# Patient Record
Sex: FEMALE | Race: WHITE | NOT HISPANIC OR LATINO | Employment: OTHER | URBAN - METROPOLITAN AREA
[De-identification: names, ages, dates, MRNs, and addresses within clinical notes are randomized per-mention and may not be internally consistent; named-entity substitution may affect disease eponyms.]

---

## 2015-02-26 LAB — HM MAMMOGRAM: NEGATIVE

## 2016-04-29 LAB — HM PAP SMEAR: NORMAL

## 2017-01-03 ENCOUNTER — TELEPHONE (OUTPATIENT)
Dept: ORTHOPEDIC SURGERY | Facility: CLINIC | Age: 57
End: 2017-01-03

## 2017-01-03 NOTE — TELEPHONE ENCOUNTER
Call return to the patient.  She's having some postop constipation.  She's been taking stool softeners on an as-needed basis rather than daily.  She did have a bowel movement yesterday after trying a fleets enema.  Have advised her that while she is on narcotics she should increase her by mouth fluid intake as well as her diet intake to fruits and vegetables and should take her stool softener twice a day along with MiraLAX daily while on narcotics.  Recommended that she avoid letting her self go more than 2-3 days without having a bowel movement.  She because of this draining yesterday to have a bowel movement she does have some increased warmth to her incision, however denies any redness swelling drainage and has remained afebrile.  Have reassured her that the incision will have some warmth to it while it is healing.  Have advised her to notify the office if she should have any of the above symptoms.  She also has what sounds like a superficial phlebitis from her IV site.  There is no redness and no swelling but does have a small not at the insertion site of the IV that she had while in the hospital.  Have advised her to use warm compresses to that area and it should resolve with time.  Patient also is complaining of her soft cervical collar rubbing her incision.  Denies any discomfort when she wears the hard cervical collar.  I have advised her that she can continue the hard collar 24 hours a day and just forego the use of the soft collar.  If she does decide she wants to soft collar it should be at night and hard cervical collar should be during the day.  Have left it open for her to call if she has any other questions or concerns

## 2017-01-06 DIAGNOSIS — R52 PAIN: Primary | ICD-10-CM

## 2017-01-06 RX ORDER — OXYCODONE HYDROCHLORIDE AND ACETAMINOPHEN 5; 325 MG/1; MG/1
2 TABLET ORAL EVERY 4 HOURS PRN
Qty: 45 TABLET | Refills: 0 | Status: SHIPPED | OUTPATIENT
Start: 2017-01-06 | End: 2017-01-15

## 2017-01-09 ENCOUNTER — TELEPHONE (OUTPATIENT)
Dept: ORTHOPEDIC SURGERY | Facility: CLINIC | Age: 57
End: 2017-01-09

## 2017-01-13 ENCOUNTER — TELEPHONE (OUTPATIENT)
Dept: ORTHOPEDIC SURGERY | Facility: CLINIC | Age: 57
End: 2017-01-13

## 2017-01-13 ENCOUNTER — OFFICE VISIT (OUTPATIENT)
Dept: ORTHOPEDIC SURGERY | Facility: CLINIC | Age: 57
End: 2017-01-13

## 2017-01-13 VITALS — HEIGHT: 64 IN | TEMPERATURE: 98.3 F | BODY MASS INDEX: 34.15 KG/M2 | WEIGHT: 200 LBS

## 2017-01-13 DIAGNOSIS — Z98.1 S/P CERVICAL SPINAL FUSION: Primary | ICD-10-CM

## 2017-01-13 PROCEDURE — 72020 X-RAY EXAM OF SPINE 1 VIEW: CPT | Performed by: ORTHOPAEDIC SURGERY

## 2017-01-13 PROCEDURE — 99024 POSTOP FOLLOW-UP VISIT: CPT | Performed by: ORTHOPAEDIC SURGERY

## 2017-01-13 NOTE — PROGRESS NOTES
Postoperatively the patient expresses normal incisional pain.  There is little or no radiating pain.  Voice and swallow intact.  Good strength on exam.  The wound is intact.  Lateral xray of the cervical spine was obtained to evaluate hardware position and fusion bone an shows good position thereof and are unchanged from intraoperative images.  Instructions given.  We'll need cervical lateral xray on return visit.  As far as the stimulator goes, it may be unavailable to her.

## 2017-01-13 NOTE — MR AVS SNAPSHOT
Keri Nielson   1/13/2017 8:20 AM   Office Visit    Dept Phone:  [phone]   Encounter #:  [id number]    Provider:  Hema Godwin MD   Department:  [facility]                Your Full Care Plan              Your Updated Medication List          This list is accurate as of: 1/13/17  8:07 AM.  Always use your most recent med list.                ARIPiprazole 5 MG tablet   Commonly known as:  ABILIFY       aspirin 81 MG chewable tablet       atenolol 25 MG tablet   Commonly known as:  TENORMIN   Take 1 tablet by mouth daily.       citalopram 40 MG tablet   Commonly known as:  CeleXA       dexlansoprazole 60 MG capsule   Commonly known as:  DEXILANT   Take 1 capsule by mouth Daily for 90 days.       gabapentin 300 MG capsule   Commonly known as:  NEURONTIN   Take 1 capsule by mouth 3 (three) times a day.       isosorbide mononitrate 30 MG 24 hr tablet   Commonly known as:  IMDUR   Take 1 tablet by mouth daily.       lamoTRIgine 200 MG tablet   Commonly known as:  LaMICtal       levothyroxine 50 MCG tablet   Commonly known as:  SYNTHROID, LEVOTHROID       oxyCODONE-acetaminophen 5-325 MG per tablet   Commonly known as:  PERCOCET   Take 2 tablets by mouth Every 4 (Four) Hours As Needed for moderate pain (4-6) for up to 9 days.       pramipexole 0.5 MG tablet   Commonly known as:  MIRAPEX       simethicone 80 MG chewable tablet   Commonly known as:  MYLICON       simvastatin 20 MG tablet   Commonly known as:  ZOCOR       sucralfate 1 GM/10ML suspension   Commonly known as:  CARAFATE   Take 10 mL by mouth 4 (Four) Times a Day.               You Were Diagnosed With        Codes Comments    S/P cervical spinal fusion    -  Primary ICD-10-CM: Z98.1  ICD-9-CM: V45.4       Instructions     None    Patient Instructions History      Upcoming Appointments     Visit Type Date Time Department    POST-OP 1/13/2017  8:20 AM [facility]    OFFICE VISIT 1/30/2017  1:00 PM [facility]    OFFICE VISIT 2/17/2017  1:00 PM [facility]    FOLLOW UP 2/24/2017 10:15 AM [facility]    FOLLOW UP SLEEP CLINIC 3/24/2017  1:45 PM  [facility]    OFFICE VISIT 3/27/2017  9:00 AM [facility]      MyChart Signup     Our records indicate that your [facility] [employer] account has been deactivated. If you would like to reactivate your account, please email [email or web address] or call [phone] to talk to our [employer] staff.             Other Info from Your Visit           Your Appointments     Jan 13, 2017  8:20 AM EST   Post-Op with Hema Godwin MD   [facility] (--)    [address] [address]   [phone]            Jan 30, 2017  1:00 PM EST   Office Visit with James Epley, APRN   [facility] (--)    [address] [facility] [place] [address]   [phone]           Arrive 15 minutes prior to appointment.            Feb 17, 2017  1:00 PM EST   Office Visit with James Epley, APRN   [facility] (--)    [address] [facility] [place] [address]   [phone]           Arrive 15 minutes prior to appointment.            Feb 24, 2017 10:15 AM EST   Follow Up with Hema Godwin MD   [facility] (--)    [address] [address]   [phone]           Arrive 15 minutes prior to appointment.            Mar 24, 2017  1:45 PM EDT   Follow Up Sleep Clinic with  [facility] PROVIDER SCHEDULE   [facility] ([place])    [address] [address] [phone]            1.  If you are currently on a CPAP or BiPAP please bring in your SD card or memory card.  2.  If you are experiencing problems with your current mask, please bring your mask with you  "to your appointment.                Allergies     No Known Allergies      Reason for Visit     Cervical Spine - Follow-up, Pain           Vital Signs     Temperature Height Weight Last Menstrual Period Body Mass Index Smoking Status    98.3 °F (36.8 °C) (Temporal Artery ) 64\" (162.6 cm) 200 lb (90.7 kg) 10/21/2016 34.33 kg/m2 Former Smoker      Problems and Diagnoses Noted     Status post cervical spinal arthrodesis    -  Primary        "

## 2017-01-19 ENCOUNTER — TELEPHONE (OUTPATIENT)
Dept: ORTHOPEDIC SURGERY | Facility: CLINIC | Age: 57
End: 2017-01-19

## 2017-01-19 ENCOUNTER — OFFICE VISIT (OUTPATIENT)
Dept: FAMILY MEDICINE CLINIC | Facility: CLINIC | Age: 57
End: 2017-01-19

## 2017-01-19 VITALS
WEIGHT: 214 LBS | DIASTOLIC BLOOD PRESSURE: 70 MMHG | HEART RATE: 64 BPM | OXYGEN SATURATION: 98 % | BODY MASS INDEX: 36.54 KG/M2 | SYSTOLIC BLOOD PRESSURE: 100 MMHG | TEMPERATURE: 97 F | HEIGHT: 64 IN

## 2017-01-19 DIAGNOSIS — N28.9 RENAL INSUFFICIENCY: ICD-10-CM

## 2017-01-19 DIAGNOSIS — R60.9 DEPENDENT EDEMA: Primary | ICD-10-CM

## 2017-01-19 PROCEDURE — 99213 OFFICE O/P EST LOW 20 MIN: CPT | Performed by: NURSE PRACTITIONER

## 2017-01-19 NOTE — TELEPHONE ENCOUNTER
Call return to the patient.  She is having swelling in both lower extremities and in both hands.  She contacted our office to make sure that it was not related to her spine surgery she was also worried about blood clots.  To her that that is not the usual presentation of blood clots and it would not affect all 4 extremities.  Have a history of heart disease as well as hypertension and it could be related to that.  She did see her PCP today who did do some lab and according to the patient her kidney functions are normal and she had no chest pain or shortness of breath.  he did recommend starting her on a diuretic if the swelling got worse and instructed the patient to continue watching it and notify him if she was having any problems.

## 2017-01-19 NOTE — MR AVS SNAPSHOT
Keri ZELALEM Nielson   1/19/2017 1:40 PM   Office Visit    Dept Phone:  [phone]   Encounter #:  [id number]    Provider:  James Epley, APRN   Department:  [facility]                Your Full Care Plan              Your Updated Medication List          This list is accurate as of: 1/19/17  3:06 PM.  Always use your most recent med list.                ARIPiprazole 5 MG tablet   Commonly known as:  ABILIFY       aspirin 81 MG chewable tablet       atenolol 25 MG tablet   Commonly known as:  TENORMIN   Take 1 tablet by mouth daily.       citalopram 40 MG tablet   Commonly known as:  CeleXA       dexlansoprazole 60 MG capsule   Commonly known as:  DEXILANT   Take 1 capsule by mouth Daily for 90 days.       gabapentin 300 MG capsule   Commonly known as:  NEURONTIN   Take 1 capsule by mouth 3 (three) times a day.       isosorbide mononitrate 30 MG 24 hr tablet   Commonly known as:  IMDUR   Take 1 tablet by mouth daily.       lamoTRIgine 200 MG tablet   Commonly known as:  LaMICtal       levothyroxine 50 MCG tablet   Commonly known as:  SYNTHROID, LEVOTHROID       pramipexole 0.5 MG tablet   Commonly known as:  MIRAPEX       simethicone 80 MG chewable tablet   Commonly known as:  MYLICON       simvastatin 20 MG tablet   Commonly known as:  ZOCOR       sucralfate 1 GM/10ML suspension   Commonly known as:  CARAFATE   Take 10 mL by mouth 4 (Four) Times a Day.               Instructions     None    Patient Instructions History      Upcoming Appointments     Visit Type Date Time Department    OFFICE VISIT 1/19/2017  1:40 PM [facility]    OFFICE VISIT 1/30/2017  1:00 PM [facility]    OFFICE VISIT 2/17/2017  1:00 PM [facility]    FOLLOW UP 2/24/2017 10:10 AM [facility]    FOLLOW UP SLEEP CLINIC 3/24/2017  1:45 PM [facility]    OFFICE VISIT 3/27/2017  9:00 AM [facility]      MyChart Signup     [facility] MyChart allows you  to send messages to your doctor, view your test results, renew your prescriptions, schedule appointments, and more. To sign up, go to [email or web address] and click on the Sign Up Now link in the New User? box. Enter your [employer] Activation Code exactly as it appears below along with the last four digits of your Social Security Number and your Date of Birth () to complete the sign-up process. If you do not sign up before the expiration date, you must request a new code.    [employer] Activation Code: [email or web address]  Expires: 2017 10:15 AM    If you have questions, you can email [email or web address] or call [phone] to talk to our [employer] staff. Remember, [employer] is NOT to be used for urgent needs. For medical emergencies, dial 911.               Other Info from Your Visit           Your Appointments     2017  1:00 PM EST   Office Visit with James Epley, APRN   [facility] (--)    [address] [facility] [address] [phone]           Arrive 15 minutes prior to appointment.            2017  1:00 PM EST   Office Visit with James Epley, APRN   [facility] (--)    [address] [facility] [address] [phone]           Arrive 15 minutes prior to appointment.            2017 10:10 AM EST   Follow Up with Hema Godwin MD   [facility] (--)    [address] [id number] [phone]           Arrive 15 minutes prior to appointment.            Mar 24, 2017  1:45 PM EDT   Follow Up Sleep Clinic with  [facility] SLEEP [facility] PROVIDER SCHEDULE   [facility] ([place])    [address] [facility] [address] [phone]            1.  If you are currently on a CPAP or BiPAP please bring in your SD card or memory card.  2.  If you are experiencing problems with your  "current mask, please bring your mask with you to your appointment.              Mar 27, 2017  9:00 AM EDT   Office Visit with Maxwell Martin MD   [facility] ENDOCRINOLOGY (--)    [address] [place] [address] [phone]           Arrive 15 minutes prior to appointment.              Allergies     No Known Allergies      Reason for Visit     Leg Swelling her legs,feet,and hands has be swollen for about 5 days now, after surgery      Vital Signs     Blood Pressure Pulse Temperature Height Weight Last Menstrual Period    100/70 (BP Location: Left arm, Patient Position: Sitting, Cuff Size: Large Adult) 64 97 °F (36.1 °C) (Oral) 64\" (162.6 cm) 214 lb (97.1 kg) 10/21/2016    Oxygen Saturation Body Mass Index Smoking Status             98% 36.73 kg/m2 Former Smoker           "

## 2017-01-19 NOTE — PROGRESS NOTES
Subjective   eKri Nielson is a 56 y.o. female.     HPI Comments: Complains of lower extremity edema without calf pain no chest pain starts of breath feels some swelling in hands as well    Concerned about her kidneys    Borderline renal insufficiency in September November    The last 2 kidney test had been completely normal    She does not abuse NSAIDs not taking any now increasing her water    Cervical fusion surgery went good                   The following portions of the patient's history were reviewed and updated as appropriate: allergies, current medications, past family history, past medical history, past social history, past surgical history and problem list.    Review of Systems   Cardiovascular: Positive for leg swelling.   Musculoskeletal: Positive for arthralgias.       Objective   Physical Exam   Constitutional: She is oriented to person, place, and time. She appears well-developed and well-nourished.   Patient appears well, wearing c-collarr splint   HENT:   Head: Normocephalic.   Tm clear noemi no mass canal patent without d/c   Eyes: Conjunctivae are normal. Pupils are equal, round, and reactive to light. No scleral icterus.   Neck: Neck supple. No JVD present. No thyromegaly present.   Cardiovascular: Normal rate, regular rhythm and normal heart sounds.  Exam reveals no gallop and no friction rub.    No murmur heard.  Pulmonary/Chest: Effort normal and breath sounds normal. No stridor. No respiratory distress. She has no wheezes. She has no rales.   Abdominal: Soft. Bowel sounds are normal. She exhibits no distension. There is no tenderness.   No hepatosplenomegaly, no ascites,   Musculoskeletal: She exhibits edema. She exhibits no tenderness.   Trace edema ankles nonpitting no calf tenderness no fight tenderness   Lymphadenopathy:     She has no cervical adenopathy.   Neurological: She is alert and oriented to person, place, and time. She has normal reflexes.   Skin: Skin is warm and dry. No rash  noted. No erythema.   Psychiatric: She has a normal mood and affect. Her behavior is normal. Judgment and thought content normal.   Vitals reviewed.      Assessment/Plan   Keri was seen today for leg swelling.    Diagnoses and all orders for this visit:    Dependent edema  Comments:  Trace    Renal insufficiency  Comments:  Last 2 BMP normal                Elevate legs decrease sodium  If calf pain thigh pain increased swelling chest pain shortness of breath seek urgent treatment  Avoid NSAIDs no Aleve no naproxen 64 ounces water daily  Recheck 3 months  Repeat BMP 3-4 months Tylenol okay

## 2017-01-24 ENCOUNTER — TELEPHONE (OUTPATIENT)
Dept: ORTHOPEDIC SURGERY | Facility: CLINIC | Age: 57
End: 2017-01-24

## 2017-01-24 NOTE — TELEPHONE ENCOUNTER
Called in refill OK for Tramadol 50mg, #50, 1-2 q 4-6 hrs prn pain, to [facility], per JGW.  Spoke with pt and informed her JGW stated he would refill her rx but could not increase it/kls

## 2017-02-05 RX ORDER — ATENOLOL 25 MG/1
TABLET ORAL
Qty: 90 TABLET | Refills: 1 | Status: ON HOLD | OUTPATIENT
Start: 2017-02-05 | End: 2017-03-30

## 2017-02-08 ENCOUNTER — TELEPHONE (OUTPATIENT)
Dept: ORTHOPEDIC SURGERY | Facility: CLINIC | Age: 57
End: 2017-02-08

## 2017-02-08 RX ORDER — TRAMADOL HYDROCHLORIDE 50 MG/1
TABLET ORAL
Qty: 50 TABLET | Refills: 0 | Status: SHIPPED | OUTPATIENT
Start: 2017-02-08 | End: 2017-03-27

## 2017-02-14 ENCOUNTER — TELEPHONE (OUTPATIENT)
Dept: ENDOCRINOLOGY | Age: 57
End: 2017-02-14

## 2017-02-14 RX ORDER — LEVOTHYROXINE SODIUM 0.1 MG/1
TABLET ORAL
Qty: 30 TABLET | Refills: 5 | Status: ON HOLD | OUTPATIENT
Start: 2017-02-14 | End: 2017-03-30

## 2017-02-14 NOTE — TELEPHONE ENCOUNTER
----- Message from Celine Parkinson sent at 2/14/2017  3:56 PM EST -----  Contact: patient  Patient is wanting to see if we will up her levothyroxine (SYNTHROID, LEVOTHROID) 50 MCG tablet is gaining weight and low energy, dry skin and losing hair  She would like it sent to  [facility]  [phone] (Phone)  [phone] (Fax)

## 2017-02-16 ENCOUNTER — OFFICE VISIT (OUTPATIENT)
Dept: ORTHOPEDIC SURGERY | Facility: CLINIC | Age: 57
End: 2017-02-16

## 2017-02-16 VITALS — HEIGHT: 64 IN | WEIGHT: 213 LBS | BODY MASS INDEX: 36.37 KG/M2 | TEMPERATURE: 97.6 F

## 2017-02-16 DIAGNOSIS — M17.0 ARTHRITIS OF BOTH KNEES: ICD-10-CM

## 2017-02-16 DIAGNOSIS — M25.561 CHRONIC PAIN OF BOTH KNEES: Primary | ICD-10-CM

## 2017-02-16 DIAGNOSIS — M25.562 CHRONIC PAIN OF BOTH KNEES: Primary | ICD-10-CM

## 2017-02-16 DIAGNOSIS — G89.29 CHRONIC PAIN OF BOTH KNEES: Primary | ICD-10-CM

## 2017-02-16 PROCEDURE — 20610 DRAIN/INJ JOINT/BURSA W/O US: CPT | Performed by: NURSE PRACTITIONER

## 2017-02-16 PROCEDURE — 73562 X-RAY EXAM OF KNEE 3: CPT | Performed by: NURSE PRACTITIONER

## 2017-02-16 RX ORDER — LIDOCAINE HYDROCHLORIDE 10 MG/ML
2 INJECTION, SOLUTION INFILTRATION; PERINEURAL
Status: COMPLETED | OUTPATIENT
Start: 2017-02-16 | End: 2017-02-16

## 2017-02-16 RX ORDER — METHYLPREDNISOLONE ACETATE 80 MG/ML
80 INJECTION, SUSPENSION INTRA-ARTICULAR; INTRALESIONAL; INTRAMUSCULAR; SOFT TISSUE
Status: COMPLETED | OUTPATIENT
Start: 2017-02-16 | End: 2017-02-16

## 2017-02-16 RX ORDER — BUPIVACAINE HYDROCHLORIDE 5 MG/ML
2 INJECTION, SOLUTION PERINEURAL
Status: COMPLETED | OUTPATIENT
Start: 2017-02-16 | End: 2017-02-16

## 2017-02-16 RX ADMIN — LIDOCAINE HYDROCHLORIDE 2 ML: 10 INJECTION, SOLUTION INFILTRATION; PERINEURAL at 09:39

## 2017-02-16 RX ADMIN — METHYLPREDNISOLONE ACETATE 80 MG: 80 INJECTION, SUSPENSION INTRA-ARTICULAR; INTRALESIONAL; INTRAMUSCULAR; SOFT TISSUE at 09:39

## 2017-02-16 RX ADMIN — BUPIVACAINE HYDROCHLORIDE 2 ML: 5 INJECTION, SOLUTION PERINEURAL at 09:39

## 2017-02-16 NOTE — PATIENT INSTRUCTIONS
Knee Injection  A knee injection is a procedure to get medicine into your knee joint. Your health care provider puts a needle into the joint and injects medicine with an attached syringe. The injected medicine may relieve the pain, swelling, and stiffness of arthritis. The injected medicine may also help to lubricate and cushion your knee joint. You may need more than one injection.  LET YOUR HEALTH CARE PROVIDER KNOW ABOUT:  · Any allergies you have.  · All medicines you are taking, including vitamins, herbs, eye drops, creams, and over-the-counter medicines.  · Previous problems you or members of your family have had with the use of anesthetics.  · Any blood disorders you have.  · Previous surgeries you have had.  · Any medical conditions you may have.  RISKS AND COMPLICATIONS  Generally, this is a safe procedure. However, problems may occur, including:  · Infection.  · Bleeding.  · Worsening symptoms.  · Damage to the area around your knee.  · Allergic reaction to any of the medicines.  · Skin reactions from repeated injections.  BEFORE THE PROCEDURE  · Ask your health care provider about changing or stopping your regular medicines. This is especially important if you are taking diabetes medicines or blood thinners.  · Plan to have someone take you home after the procedure.  PROCEDURE  · You will sit or lie down in a position for your knee to be treated.  · The skin over your kneecap will be cleaned with a germ-killing solution (antiseptic).  · You will be given a medicine that numbs the area (local anesthetic). You may feel some stinging.  · After your knee becomes numb, you will have a second injection. This is the medicine. This needle is carefully placed between your kneecap and your knee. The medicine is injected into the joint space.  · At the end of the procedure, the needle will be removed.  · A bandage (dressing) may be placed over the injection site.  The procedure may vary among health care providers  and hospitals.  AFTER THE PROCEDURE  · You may have to move your knee through its full range of motion. This helps to get all of the medicine into your joint space.  · Your blood pressure, heart rate, breathing rate, and blood oxygen level will be monitored often until the medicines you were given have worn off.  · You will be watched to make sure that you do not have a reaction to the injected medicine.     This information is not intended to replace advice given to you by your health care provider. Make sure you discuss any questions you have with your health care provider.     Document Released: 03/10/2008 Document Revised: 01/08/2016 Document Reviewed: 10/28/2015  [email or web address] Interactive Patient Education ©2016 [email or web address] Inc.  Knee Injection, Care After  Refer to this sheet in the next few weeks. These instructions provide you with information about caring for yourself after your procedure. Your health care provider may also give you more specific instructions. Your treatment has been planned according to current medical practices, but problems sometimes occur. Call your health care provider if you have any problems or questions after your procedure.  WHAT TO EXPECT AFTER THE PROCEDURE  After your procedure, it is common to have:  · Soreness.  · Warmth.  · Swelling.  You may have more pain, swelling, and warmth than you did before the injection. This reaction may last for about one day.   HOME CARE INSTRUCTIONS  Bathing  · If you were given a bandage (dressing), keep it dry until your health care provider says it can be removed. Ask your health care provider when you can start showering or taking a bath.   Managing Pain, Stiffness, and Swelling  · If directed, apply ice to the injection area:    Put ice in a plastic bag.    Place a towel between your skin and the bag.    Leave the ice on for 20 minutes, 2-3 times per day.  · Do not apply heat to your knee.  · Raise the injection area above the level of your heart  while you are sitting or lying down.  Activity  · Avoid strenuous activities for as long as directed by your health care provider. Ask your health care provider when you can return to your normal activities.   General Instructions  · Take medicines only as directed by your health care provider.  · Do not take aspirin or other over-the-counter medicines unless your health care provider says you can.  · Check your injection site every day for signs of infection. Watch for:    Redness, swelling, or pain.    Fluid, blood, or pus.  · Follow your health care provider's instructions about dressing changes and removal.  SEEK MEDICAL CARE IF:  · You have symptoms at your injection site that last longer than two days after your procedure.  · You have redness, swelling, or pain in your injection area.  · You have fluid, blood, or pus coming from your injection site.  · You have warmth in your injection area.  · You have a fever.  · Your pain is not controlled with medicine.  SEEK IMMEDIATE MEDICAL CARE IF:  · Your knee turns very red.  · Your knee becomes very swollen.  · Your knee pain is severe.     This information is not intended to replace advice given to you by your health care provider. Make sure you discuss any questions you have with your health care provider.     Document Released: 01/08/2016 Document Reviewed: 01/08/2016  [email or web address] Interactive Patient Education ©2016 [employer].

## 2017-02-16 NOTE — PROGRESS NOTES
Knee Joint Injection      Patient: Keri Nielson  YOB: 1960    Chief Complaints: Knee pain    Subjective:  This problem is not new to this examiner.     History of Present Illness: Pt gets intermittent  injections with good relief. Is here for repeat injection. Understands options. The pain is a generalized joint line tenderness.  It has been progressive in nature but remains intermittent.  Worsened by prolonged standing or walking and squatting activities. Has had improvement in the past with ice/heat, rest, and injections. TIMING:  The pain is described as ACUTE on CHRONIC.  LOCATION: medial joint line tenderness. AGGRAVATING FACTORS:  Is worsened by prolonged standing, sitting, walking and squatting activities.  ASSOCIATED SYMPTOMS:  swelling, tenderness, and aching. OTHER SYMPTOMS denies popping, locking or catching. RELIEVING FACTORS:  Previous treatment has included cortisone injections  OTC Tylenol  OTC meds/NSAIDS.      Allergies: No Known Allergies    Medications:   Home Medications:  Current Outpatient Prescriptions on File Prior to Visit   Medication Sig   • ARIPiprazole (ABILIFY) 5 MG tablet Take 5 mg by mouth Every Morning.   • aspirin 81 MG chewable tablet Chew 81 mg Daily.   • atenolol (TENORMIN) 25 MG tablet TAKE ONE TABLET BY MOUTH DAILY   • citalopram (CeleXA) 40 MG tablet Take 40 mg by mouth Every Night.   • dexlansoprazole (DEXILANT) 60 MG capsule Take 1 capsule by mouth Daily for 90 days. (Patient taking differently: Take 60 mg by mouth Every Morning.)   • gabapentin (NEURONTIN) 300 MG capsule Take 1 capsule by mouth 3 (three) times a day.   • isosorbide mononitrate (IMDUR) 30 MG 24 hr tablet Take 1 tablet by mouth daily. (Patient taking differently: Take 30 mg by mouth Every Morning.)   • lamoTRIgine (LaMICtal) 200 MG tablet Take 200 mg by mouth Every Night.   • levothyroxine (SYNTHROID, LEVOTHROID) 100 MCG tablet TAKE 1 TABLET ON AN EMPTY STOMACH 1 HR BEFORE EATING   •  pramipexole (MIRAPEX) 0.5 MG tablet Take 0.5 mg by mouth Every Night.   • simethicone (MYLICON) 80 MG chewable tablet Chew 80 mg Every 6 (Six) Hours As Needed for flatulence.   • simvastatin (ZOCOR) 20 MG tablet Take 20 mg by mouth Every Night.   • sucralfate (CARAFATE) 1 GM/10ML suspension Take 10 mL by mouth 4 (Four) Times a Day.   • traMADol (ULTRAM) 50 MG tablet 1-2 po q4h prn pain     No current facility-administered medications on file prior to visit.      Current Medications:  Scheduled Meds:  Continuous Infusions:  No current facility-administered medications for this visit.   PRN Meds:.    I have reviewed the patient's medical history in detail and updated the computerized patient record.  Review and summarization of old records include:    Past Medical History   Diagnosis Date   • Anemia    • Arthritis    • Bipolar disorder    • Cervical disc herniation    • Coronary artery disease    • Diverticular disease    • Gastric reflux    • History of transfusion    • Hyperlipidemia    • Hypertension    • Hypothyroidism    • Incontinence in female      wears pads   • Neck pain    • Past myocardial infarction    • PONV (postoperative nausea and vomiting)    • Restless leg syndrome    • Sleep apnea      cpap   • Suicidal ideation 8/19/2016   • Swelling of ankle      right had doppler no s/s blood clot        Past Surgical History   Procedure Laterality Date   • Cervical discectomy  04/2013     C4-7   • Tonsillectomy and adenoidectomy     • Tubal abdominal ligation     • Wrist surgery Bilateral      carpal tunnel   • Shoulder surgery Left      RCR   • Tympanostomy tube placement Right    • Endoscopy  [facility]     Normal.  Dr. Rodriguez   • Colonoscopy  [facility]     Normal.  Dr. Rodriguez   • Cervical biopsy  [facility]     Dr. Jeffery.    • Tonsillectomy     • Cervical fusion  12/29/2016   • Hardware removal  12/29/2016     cervical   • Anterior cervical discectomy w/ fusion N/A 12/29/2016     Procedure: C3-4 anterior cervical discectomy and  fusion with Depuy micro plate, ALLOGRAFT C3-4, AND HARDWARE REMOVAL C4-7.;  Surgeon: Hema Godwin MD;  Location: [facility] MAIN OR;  Service:         Social History     Occupational History   • Not on file.     Social History Main Topics   • Smoking status: Former Smoker     Packs/day: 1.50     Years: 20.00     Types: Cigarettes, Electronic Cigarette     Quit date: 2015   • Smokeless tobacco: Never Used      Comment: Electronic Cigarette/ 3 mg nicotine    • Alcohol use Yes      Comment: OCCASIONAL   • Drug use: No   • Sexual activity: Defer    Social History     Social History Narrative        Family History   Problem Relation Age of Onset   • Diabetes type II Mother    • Hypertension Mother    • Osteoporosis Mother    • Seizures Mother    • COPD Father    • Hypertension Father    • Lung cancer Father    • Heart attack Father    • Bipolar disorder Other    • Heart disease Other    • Thyroid disease Other    • Thyroid disease Sister    • Hypertension Sister    • Bipolar disorder Sister    • Depression Sister    • No Known Problems Son    • Abnormal EKG Daughter    • Hypertension Daughter    • Bipolar disorder Daughter    • Thyroid disease Sister    • Hypertension Sister    • Bipolar disorder Sister    • Asthma Daughter    • Breast cancer Neg Hx    • Ovarian cancer Neg Hx    • Uterine cancer Neg Hx    • Colon cancer Neg Hx        ROS: 14 point review of systems was performed and was negative except for documented findings in HPI and today's encounter.     Allergies: No Known Allergies  Constitutional:  Denies fever, shaking or chills   Eyes:  Denies change in visual acuity   HENT:  Denies nasal congestion or sore throat   Respiratory:  Denies cough or shortness of breath   Cardiovascular:  Denies chest pain or severe LE edema   GI:  Denies abdominal pain, nausea, vomiting, bloody stools or diarrhea   Musculoskeletal:  Numbness, tingling, or loss of motor function only as noted above in history of present  illness.  : Denies painful urination or hematuria  Integument:  Denies rash, lesion or ulceration   Neurologic:  Denies headache or focal weakness  Endocrine:  Denies lymphadenopathy  Psych:  Denies confusion or change in mental status   Hem:  Denies active bleeding    Physical Exam:  Body mass index is 36.56 kg/(m^2).  Vitals:    02/16/17 0923   Temp: 97.6 °F (36.4 °C)     Vital Signs:  reviewed  Constitutional: Awake alert and oriented x3, well developed, no acute distress, non-toxic appearance.  EYES: symmetric, sclera clear  ENT:  Normocephalic, Atraumatic.   Respiratory:  No respiratory distress, No wheezing  CV: pulse regular, no palpitations or pallor.  GI:  Abdomen soft, non-tender.   Vascular:  Intact distal pulses, No cyanosis, no signs or symptoms of DVT.  Neurologic: Sensation grossly intact to the involved extremity, No focal deficits noted.   Neck: No tenderness, Supple.  Integument: warm, dry, no ulcerations.   Psychiatric:  Oriented, no pathological affect.  Musculoskeletal:    Affected knee(s):  Painful gait with a subtle limp, positive for synovitis, swelling, joint effusion with crepitation.  Lachman negative  Posterior drawer negative  Hamida's negative  Patellofemoral grind +  Sensation grossly intact to light touch throughout the lower extremity  Skin is intact  Distal pulses are palpable  No signs or symptoms of DVT        Diagnostic Data:     Imaging was done today and discussed at length with the patient:    Indication: pain related symptoms,  Views: 3V AP, LAT & 40 degree PA bilateral knee(s)   Findings: advanced arthritis  Comparison views: viewed last xray done in the office.     Procedure:  Large Joint Arthrocentesis  Date/Time: 2/16/2017 9:39 AM  Consent given by: patient  Site marked: site marked  Timeout: Immediately prior to procedure a time out was called to verify the correct patient, procedure, equipment, support staff and site/side marked as required   Supporting  Documentation  Indications: pain and joint swelling   Procedure Details  Location: knee - R knee  Preparation: Patient was prepped and draped in the usual sterile fashion  Needle size: 22 G  Approach: anterolateral  Medications administered: 2 mL bupivacaine; 2 mL lidocaine 1 %; 80 mg methylPREDNISolone acetate 80 MG/ML  Patient tolerance: patient tolerated the procedure well with no immediate complications    Large Joint Arthrocentesis  Date/Time: 2/16/2017 9:39 AM  Consent given by: patient  Site marked: site marked  Timeout: Immediately prior to procedure a time out was called to verify the correct patient, procedure, equipment, support staff and site/side marked as required   Supporting Documentation  Indications: pain and joint swelling   Procedure Details  Location: knee - L knee  Preparation: Patient was prepped and draped in the usual sterile fashion  Needle size: 22 G  Approach: anterolateral  Medications administered: 2 mL bupivacaine; 2 mL lidocaine 1 %; 80 mg methylPREDNISolone acetate 80 MG/ML  Patient tolerance: patient tolerated the procedure well with no immediate complications          Assessment. Severe osteoarthritis bilateral knee(s).      Plan: Is to proceed with injection  Follow up as indicated.  Ice, elevate, and rest as needed.  Additional interventions include: Biomechanics of pertinent body area discussed.  Risks, benefits, alternatives, comparisons, and complications of accepted medicines, injections, recommendations, surgical procedures, and therapies explained and education provided in laymen's terms. The patient was given the opportunity to ask questions and they were answerved to their satisfaction.   Natural history and expected course discussed. Questions answered.  Advice on benefits of, and types of regular/moderate exercise.  Lifestyle measures for weight loss with biomechanical explanations for weight loss and how this affects orthopedic condition.  Cortisone Injection. See  procedure note.  OTC analgesics as needed with dosage warning and instructions given.  Cryotherapy/brachy therapy as indicated with instructions.   Rest, ice, compression, and elevation (RICE) therapy.  6 weeks out from cervical fusion surg by Dr. Godwin,doing well  Left knee is new complaint, right knee does well with intermittent cortisone inj  2/16/2017  MJR

## 2017-02-20 ENCOUNTER — OFFICE VISIT (OUTPATIENT)
Dept: FAMILY MEDICINE CLINIC | Facility: CLINIC | Age: 57
End: 2017-02-20

## 2017-02-20 VITALS
HEART RATE: 98 BPM | WEIGHT: 213 LBS | TEMPERATURE: 98.1 F | SYSTOLIC BLOOD PRESSURE: 120 MMHG | HEIGHT: 64 IN | DIASTOLIC BLOOD PRESSURE: 80 MMHG | OXYGEN SATURATION: 100 % | BODY MASS INDEX: 36.37 KG/M2

## 2017-02-20 DIAGNOSIS — J20.9 ACUTE BRONCHITIS, UNSPECIFIED ORGANISM: Primary | ICD-10-CM

## 2017-02-20 DIAGNOSIS — R19.5 LOOSE STOOLS: ICD-10-CM

## 2017-02-20 PROCEDURE — 99213 OFFICE O/P EST LOW 20 MIN: CPT | Performed by: NURSE PRACTITIONER

## 2017-02-20 RX ORDER — BENZONATATE 200 MG/1
200 CAPSULE ORAL 3 TIMES DAILY PRN
Qty: 20 CAPSULE | Refills: 1 | Status: SHIPPED | OUTPATIENT
Start: 2017-02-20 | End: 2017-03-20

## 2017-02-20 RX ORDER — DIPHENHYDRAMINE HCL 25 MG
TABLET ORAL
Qty: 20 TABLET | Refills: 2 | Status: SHIPPED | OUTPATIENT
Start: 2017-02-20 | End: 2017-03-17 | Stop reason: SDUPTHER

## 2017-02-20 NOTE — PATIENT INSTRUCTIONS
Acute Bronchitis  Bronchitis is inflammation of the airways that extend from the windpipe into the lungs (bronchi). The inflammation often causes mucus to develop. This leads to a cough, which is the most common symptom of bronchitis.   In acute bronchitis, the condition usually develops suddenly and goes away over time, usually in a couple weeks. Smoking, allergies, and asthma can make bronchitis worse. Repeated episodes of bronchitis may cause further lung problems.   CAUSES  Acute bronchitis is most often caused by the same virus that causes a cold. The virus can spread from person to person (contagious) through coughing, sneezing, and touching contaminated objects.  SIGNS AND SYMPTOMS   · Cough.    · Fever.    · Coughing up mucus.    · Body aches.    · Chest congestion.    · Chills.    · Shortness of breath.    · Sore throat.    DIAGNOSIS   Acute bronchitis is usually diagnosed through a physical exam. Your health care provider will also ask you questions about your medical history. Tests, such as chest X-rays, are sometimes done to rule out other conditions.   TREATMENT   Acute bronchitis usually goes away in a couple weeks. Oftentimes, no medical treatment is necessary. Medicines are sometimes given for relief of fever or cough. Antibiotic medicines are usually not needed but may be prescribed in certain situations. In some cases, an inhaler may be recommended to help reduce shortness of breath and control the cough. A cool mist vaporizer may also be used to help thin bronchial secretions and make it easier to clear the chest.   HOME CARE INSTRUCTIONS  · Get plenty of rest.    · Drink enough fluids to keep your urine clear or pale yellow (unless you have a medical condition that requires fluid restriction). Increasing fluids may help thin your respiratory secretions (sputum) and reduce chest congestion, and it will prevent dehydration.    · Take medicines only as directed by your health care provider.  · If  you were prescribed an antibiotic medicine, finish it all even if you start to feel better.  · Avoid smoking and secondhand smoke. Exposure to cigarette smoke or irritating chemicals will make bronchitis worse. If you are a smoker, consider using nicotine gum or skin patches to help control withdrawal symptoms. Quitting smoking will help your lungs heal faster.    · Reduce the chances of another bout of acute bronchitis by washing your hands frequently, avoiding people with cold symptoms, and trying not to touch your hands to your mouth, nose, or eyes.    · Keep all follow-up visits as directed by your health care provider.    SEEK MEDICAL CARE IF:  Your symptoms do not improve after 1 week of treatment.   SEEK IMMEDIATE MEDICAL CARE IF:  · You develop an increased fever or chills.    · You have chest pain.    · You have severe shortness of breath.  · You have bloody sputum.    · You develop dehydration.  · You faint or repeatedly feel like you are going to pass out.  · You develop repeated vomiting.  · You develop a severe headache.  MAKE SURE YOU:   · Understand these instructions.  · Will watch your condition.  · Will get help right away if you are not doing well or get worse.     This information is not intended to replace advice given to you by your health care provider. Make sure you discuss any questions you have with your health care provider.     Document Released: 01/25/2006 Document Revised: 01/08/2016 Document Reviewed: 06/10/2014  [employer] Interactive Patient Education ©2016 [employer]      Discharge instructions  Try generic Tessalon Perle 200 mg 3 times a day as needed for cough  If cough unrelieved May take with 25-50 mg Benadryl every 6 hours with caution possible sedation  Do not mix with other sedating   medications    If not working try Robitussin-DM with or without Benadryl    If chest pain shortness of breath high fever seek urgent treatment ER  Recheck if not improving in a couple  weeks    Thank You,      James Epley,  NP    For loose stools, Kaopectate or Pepto-Bismol as needed    Brat diet  Push fluids electrolytes  Avoid high sugary foods which may cause diarrhea  Recheck if not improving in 3-4 days if increased pain fever chills urgent care ER

## 2017-02-20 NOTE — PROGRESS NOTES
Subjective   Keri Nielson is a 56 y.o. female.     HPI Comments: Patient complains several days cough congestion mostly dry  No chest pain or shortness of breath and body  Symptoms are moderate  Increased coughing at night  Not requesting any cough syrup  Not presently taking anything    Some loose stools without fever abdominal pain approximately 3-4 days no recent travel        URI    This is a new problem. The current episode started 1 to 4 weeks ago (1 week). The problem has been gradually improving. There has been no fever. Associated symptoms include coughing, diarrhea, rhinorrhea, sneezing and swollen glands. Pertinent negatives include no abdominal pain, chest pain, joint pain, nausea, plugged ear sensation, sinus pain or wheezing. She has tried nothing for the symptoms. The treatment provided no relief.        The following portions of the patient's history were reviewed and updated as appropriate: allergies, past family history, past medical history, past social history, past surgical history and problem list.    Review of Systems   HENT: Positive for rhinorrhea and sneezing.    Respiratory: Positive for cough. Negative for wheezing.    Cardiovascular: Negative for chest pain.   Gastrointestinal: Positive for diarrhea. Negative for abdominal pain and nausea.   Musculoskeletal: Negative for joint pain.       Objective   Physical Exam   Constitutional: She is oriented to person, place, and time. She appears well-developed.   HENT:   Head: Normocephalic.   Nose: Nose normal.   Turbinates congested   Eyes: Pupils are equal, round, and reactive to light.   Neck:   Neck brace   Pulmonary/Chest: Effort normal and breath sounds normal.   Abdominal: Soft. Bowel sounds are normal. She exhibits no distension and no mass. There is no tenderness. There is no rebound and no guarding. No hernia.   Musculoskeletal:   Wearing neck brace   Neurological: She is alert and oriented to person, place, and time.    Psychiatric: She has a normal mood and affect. Her behavior is normal. Judgment and thought content normal.   Vitals reviewed.      Assessment/Plan   Keri was seen today for uri.    Diagnoses and all orders for this visit:    Acute bronchitis, unspecified organism    Loose stools  Comments:  Mild ×3-4 days    Other orders  -     benzonatate (TESSALON) 200 MG capsule; Take 1 capsule by mouth 3 (Three) Times a Day As Needed for cough.  -     diphenhydrAMINE (BENADRYL) 25 MG tablet; 1-2 tablets every 6 hours as needed for postnasal drip allergies, caution sedation                  Discharge instructions  Try generic Tessalon Perle 200 mg 3 times a day as needed for cough  If cough unrelieved May take with 25-50 mg Benadryl every 6 hours with caution possible sedation  Do not mix with other sedating   medications    If not working try Robitussin-DM with or without Benadryl    If chest pain shortness of breath high fever seek urgent treatment ER  Recheck if not improving in a couple weeks    Thank You,      James Epley,  NP    For loose stools, Kaopectate or Pepto-Bismol as needed    Brat diet  Push fluids electrolytes  Avoid high sugary foods which may cause diarrhea  Recheck if not improving in 3-4 days if increased pain fever chills urgent care ER

## 2017-02-23 DIAGNOSIS — M54.12 CERVICAL RADICULOPATHY: ICD-10-CM

## 2017-02-23 RX ORDER — GABAPENTIN 300 MG/1
CAPSULE ORAL
Qty: 90 CAPSULE | Refills: 2 | Status: ON HOLD | OUTPATIENT
Start: 2017-02-23 | End: 2017-03-30

## 2017-02-24 ENCOUNTER — OFFICE VISIT (OUTPATIENT)
Dept: ORTHOPEDIC SURGERY | Facility: CLINIC | Age: 57
End: 2017-02-24

## 2017-02-24 VITALS — WEIGHT: 210 LBS | BODY MASS INDEX: 37.21 KG/M2 | HEIGHT: 63 IN

## 2017-02-24 DIAGNOSIS — Z98.890 S/P CERVICAL DISCECTOMY: ICD-10-CM

## 2017-02-24 DIAGNOSIS — M50.90 CERVICAL DISC DISEASE: ICD-10-CM

## 2017-02-24 DIAGNOSIS — M47.22 CERVICAL SPONDYLOSIS WITH RADICULOPATHY: Primary | ICD-10-CM

## 2017-02-24 DIAGNOSIS — Z98.1 STATUS POST CERVICAL SPINAL FUSION: ICD-10-CM

## 2017-02-24 PROCEDURE — 72020 X-RAY EXAM OF SPINE 1 VIEW: CPT | Performed by: ORTHOPAEDIC SURGERY

## 2017-02-24 PROCEDURE — 99024 POSTOP FOLLOW-UP VISIT: CPT | Performed by: ORTHOPAEDIC SURGERY

## 2017-02-24 NOTE — PROGRESS NOTES
No new complaints.  Normall neck pain.  Arm and shoulder pain are improved.  Neurologic function intact.  Lateral x-ray obtained to evaluate hardware and fusion bone position appears to be healing, improved from last visit x-ray.  Get a lateral cervical x-ray on return visit.

## 2017-03-17 RX ORDER — DIPHENHYDRAMINE HCL 25 MG
CAPSULE ORAL
Qty: 20 CAPSULE | Refills: 1 | Status: SHIPPED | OUTPATIENT
Start: 2017-03-17 | End: 2017-03-20

## 2017-03-20 ENCOUNTER — OFFICE VISIT (OUTPATIENT)
Dept: FAMILY MEDICINE CLINIC | Facility: CLINIC | Age: 57
End: 2017-03-20

## 2017-03-20 VITALS
TEMPERATURE: 98.2 F | OXYGEN SATURATION: 99 % | WEIGHT: 214 LBS | DIASTOLIC BLOOD PRESSURE: 78 MMHG | HEART RATE: 70 BPM | SYSTOLIC BLOOD PRESSURE: 128 MMHG | HEIGHT: 63 IN | BODY MASS INDEX: 37.92 KG/M2

## 2017-03-20 DIAGNOSIS — J30.9 ALLERGIC RHINITIS, UNSPECIFIED ALLERGIC RHINITIS TRIGGER, UNSPECIFIED RHINITIS SEASONALITY: ICD-10-CM

## 2017-03-20 DIAGNOSIS — R63.5 WEIGHT GAIN, ABNORMAL: Primary | ICD-10-CM

## 2017-03-20 DIAGNOSIS — E78.00 HYPERCHOLESTEROLEMIA: ICD-10-CM

## 2017-03-20 DIAGNOSIS — E66.9 OBESITY (BMI 30-39.9): ICD-10-CM

## 2017-03-20 PROCEDURE — 99213 OFFICE O/P EST LOW 20 MIN: CPT | Performed by: NURSE PRACTITIONER

## 2017-03-20 RX ORDER — FLUTICASONE PROPIONATE 50 MCG
2 SPRAY, SUSPENSION (ML) NASAL DAILY
Qty: 1 EACH | Refills: 5 | Status: ON HOLD | OUTPATIENT
Start: 2017-03-20 | End: 2017-03-30

## 2017-03-20 RX ORDER — SIMVASTATIN 20 MG
20 TABLET ORAL NIGHTLY
Qty: 30 TABLET | Refills: 6 | Status: ON HOLD | OUTPATIENT
Start: 2017-03-20 | End: 2017-03-30

## 2017-03-20 RX ORDER — TOPIRAMATE 50 MG/1
50 TABLET, FILM COATED ORAL 2 TIMES DAILY
COMMUNITY
End: 2017-06-21

## 2017-03-20 RX ORDER — CETIRIZINE HYDROCHLORIDE 10 MG/1
10 TABLET ORAL NIGHTLY PRN
Qty: 30 TABLET | Refills: 5 | Status: ON HOLD | OUTPATIENT
Start: 2017-03-20 | End: 2017-03-30

## 2017-03-20 NOTE — PROGRESS NOTES
Subjective   Keri Nielson is a 56 y.o. female.     HPI Comments: Patient's here today needs refill Zocor for hyperlipidemia  LDL less than 70  Heart disease stable      Depression stable taking Abilify  She's gained weight over the last year    She's had neck surgery as well    Overall she is improved quite a bit  Questions about weight she's had a weight problems for many years  But has picked up approximately 25 pounds or more since last year    Doesn't drink a lot of sodas  Eats out once a week    Oatmeal and small bagel for breakfast sometimes    [place], for lunch    Doesn't think she eats bad    Sneezing allergies  No fever chills chest pain                   The following portions of the patient's history were reviewed and updated as appropriate: allergies, current medications, past medical history, past social history, past surgical history and problem list.    Review of Systems   Constitutional: Negative.    HENT: Positive for postnasal drip and rhinorrhea.    Respiratory: Negative.    Cardiovascular: Negative.    Genitourinary: Negative.    Musculoskeletal: Negative.    Psychiatric/Behavioral: Negative.        Objective   Physical Exam   Constitutional: She is oriented to person, place, and time. She appears well-developed and well-nourished.   HENT:   Head: Normocephalic.   Nose: Nose normal.   Mouth/Throat: Oropharynx is clear and moist.   TMs are retracted turbinates congested   Eyes: Conjunctivae are normal. Pupils are equal, round, and reactive to light. No scleral icterus.   Neck: Neck supple. No JVD present. No thyromegaly present.   Cardiovascular: Normal rate, regular rhythm and normal heart sounds.  Exam reveals no gallop and no friction rub.    No murmur heard.  Pulmonary/Chest: Effort normal and breath sounds normal. No stridor. No respiratory distress. She has no wheezes. She has no rales.   Abdominal: Soft. Bowel sounds are normal. She exhibits no distension. There is no tenderness.   No  hepatosplenomegaly, no ascites,   Musculoskeletal: She exhibits no edema or tenderness.   Lymphadenopathy:     She has no cervical adenopathy.   Neurological: She is alert and oriented to person, place, and time. She has normal reflexes.   Skin: Skin is warm and dry. No rash noted. No erythema.   Psychiatric: She has a normal mood and affect. Her behavior is normal. Judgment and thought content normal.   Vitals reviewed.      Assessment/Plan   Keri was seen today for weight loss and med refill.    Diagnoses and all orders for this visit:    Weight gain, abnormal  Comments:  Excess calories,, increased appetite Abilify    Allergic rhinitis, unspecified allergic rhinitis trigger, unspecified rhinitis seasonality    Hypercholesterolemia    Obesity (BMI 30-39.9)    Other orders  -     fluticasone (FLONASE) 50 MCG/ACT nasal spray; 2 sprays into each nostril Daily. As needed nasal allergies  -     cetirizine (zyrTEC) 10 MG tablet; Take 1 tablet by mouth At Night As Needed for Allergies. For allergies  -     simvastatin (ZOCOR) 20 MG tablet; Take 1 tablet by mouth Every Night.                  Discharge instructions  Read about Mediterranean healthy eating    Portion control caloric restriction  Calories 9998-4432 daily  64 ounces water daily  Recheck in 3 months    Stretching daily  Some exercise joint sparing  Thank You,      James Epley, NP    Office visit 20 minutes greater than 50% counseling

## 2017-03-20 NOTE — PATIENT INSTRUCTIONS
Calorie Counting for Weight Loss  Calories are energy you get from the things you eat and drink. Your body uses this energy to keep you going throughout the day. The number of calories you eat affects your weight. When you eat more calories than your body needs, your body stores the extra calories as fat. When you eat fewer calories than your body needs, your body burns fat to get the energy it needs.  Calorie counting means keeping track of how many calories you eat and drink each day. If you make sure to eat fewer calories than your body needs, you should lose weight. In order for calorie counting to work, you will need to eat the number of calories that are right for you in a day to lose a healthy amount of weight per week. A healthy amount of weight to lose per week is usually 1-2 lb (0.5-0.9 kg). A dietitian can determine how many calories you need in a day and give you suggestions on how to reach your calorie goal.   WHAT IS MY MY PLAN?  My goal is to have __________ calories per day.   If I have this many calories per day, I should lose around __________ pounds per week.  WHAT DO I NEED TO KNOW ABOUT CALORIE COUNTING?  In order to meet your daily calorie goal, you will need to:  · Find out how many calories are in each food you would like to eat. Try to do this before you eat.  · Decide how much of the food you can eat.  · Write down what you ate and how many calories it had. Doing this is called keeping a food log.  WHERE DO I FIND CALORIE INFORMATION?  The number of calories in a food can be found on a Nutrition Facts label. Note that all the information on a label is based on a specific serving of the food. If a food does not have a Nutrition Facts label, try to look up the calories online or ask your dietitian for help.  HOW DO I DECIDE HOW MUCH TO EAT?  To decide how much of the food you can eat, you will need to consider both the number of calories in one serving and the size of one serving. This  information can be found on the Nutrition Facts label. If a food does not have a Nutrition Facts label, look up the information online or ask your dietitian for help.  Remember that calories are listed per serving. If you choose to have more than one serving of a food, you will have to multiply the calories per serving by the amount of servings you plan to eat. For example, the label on a package of bread might say that a serving size is 1 slice and that there are 90 calories in a serving. If you eat 1 slice, you will have eaten 90 calories. If you eat 2 slices, you will have eaten 180 calories.  HOW DO I KEEP A FOOD LOG?  After each meal, record the following information in your food log:  · What you ate.  · How much of it you ate.  · How many calories it had.  · Then, add up your calories.  Keep your food log near you, such as in a small notebook in your pocket. Another option is to use a mobile marimar or website. Some programs will calculate calories for you and show you how many calories you have left each time you add an item to the log.  WHAT ARE SOME CALORIE COUNTING TIPS?  · Use your calories on foods and drinks that will fill you up and not leave you hungry. Some examples of this include foods like nuts and nut butters, vegetables, lean proteins, and high-fiber foods (more than 5 g fiber per serving).  · Eat nutritious foods and avoid empty calories. Empty calories are calories you get from foods or beverages that do not have many nutrients, such as candy and soda. It is better to have a nutritious high-calorie food (such as an avocado) than a food with few nutrients (such as a bag of chips).  · Know how many calories are in the foods you eat most often. This way, you do not have to look up how many calories they have each time you eat them.  · Look out for foods that may seem like low-calorie foods but are really high-calorie foods, such as baked goods, soda, and fat-free candy.  · Pay attention to calories  in drinks. Drinks such as sodas, specialty coffee drinks, alcohol, and juices have a lot of calories yet do not fill you up. Choose low-calorie drinks like water and diet drinks.  · Focus your calorie counting efforts on higher calorie items. Logging the calories in a garden salad that contains only vegetables is less important than calculating the calories in a milk shake.  · Find a way of tracking calories that works for you. Get creative. Most people who are successful find ways to keep track of how much they eat in a day, even if they do not count every calorie.  WHAT ARE SOME PORTION CONTROL TIPS?  · Know how many calories are in a serving. This will help you know how many servings of a certain food you can have.  · Use a measuring cup to measure serving sizes. This is helpful when you start out. With time, you will be able to estimate serving sizes for some foods.  · Take some time to put servings of different foods on your favorite plates, bowls, and cups so you know what a serving looks like.  · Try not to eat straight from a bag or box. Doing this can lead to overeating. Put the amount you would like to eat in a cup or on a plate to make sure you are eating the right portion.  · Use smaller plates, glasses, and bowls to prevent overeating. This is a quick and easy way to practice portion control. If your plate is smaller, less food can fit on it.  · Try not to multitask while eating, such as watching TV or using your computer. If it is time to eat, sit down at a table and enjoy your food. Doing this will help you to start recognizing when you are full. It will also make you more aware of what and how much you are eating.  HOW CAN I CALORIE COUNT WHEN EATING OUT?  · Ask for smaller portion sizes or child-sized portions.  · Consider sharing an entree and sides instead of getting your own entree.  · If you get your own entree, eat only half. Ask for a box at the beginning of your meal and put the rest of your  "entree in it so you are not tempted to eat it.  · Look for the calories on the menu. If calories are listed, choose the lower calorie options.  · Choose dishes that include vegetables, fruits, whole grains, low-fat dairy products, and lean protein. Focusing on smart food choices from each of the 5 food groups can help you stay on track at restaurants.  · Choose items that are boiled, broiled, grilled, or steamed.  · Choose water, milk, unsweetened iced tea, or other drinks without added sugars. If you want an alcoholic beverage, choose a lower calorie option. For example, a regular agapito can have up to 700 calories and a glass of wine has around 150.  · Stay away from items that are buttered, battered, fried, or served with cream sauce. Items labeled \"crispy\" are usually fried, unless stated otherwise.  · Ask for dressings, sauces, and syrups on the side. These are usually very high in calories, so do not eat much of them.  · Watch out for salads. Many people think salads are a healthy option, but this is often not the case. Many salads come with lynn, fried chicken, lots of cheese, fried chips, and dressing. All of these items have a lot of calories. If you want a salad, choose a garden salad and ask for grilled meats or steak. Ask for the dressing on the side, or ask for olive oil and vinegar or lemon to use as dressing.  · Estimate how many servings of a food you are given. For example, a serving of cooked rice is ½ cup or about the size of half a tennis ball or one cupcake wrapper. Knowing serving sizes will help you be aware of how much food you are eating at restaurants. The list below tells you how big or small some common portion sizes are based on everyday objects.    1 oz--4 stacked dice.    3 oz--1 deck of cards.    1 tsp--1 dice.    1 Tbsp--½ a Ping-Pong ball.    2 Tbsp--1 Ping-Pong ball.    ½ cup--1 tennis ball or 1 cupcake wrapper.    1 cup--1 baseball.     This information is not intended to " replace advice given to you by your health care provider. Make sure you discuss any questions you have with your health care provider.     Document Released: 12/18/2006 Document Revised: 01/08/2016 Document Reviewed: 10/23/2014  [employer] Patient Education ©2016 [employer]        Discharge instructions  Read about Mediterranean healthy eating    Portion control caloric restriction  Calories 0656-1364 daily  64 ounces water daily  Recheck in 3 months    Stretching daily  Some exercise joint sparing  Thank You,      James Epley, NP

## 2017-03-24 ENCOUNTER — HOSPITAL ENCOUNTER (OUTPATIENT)
Dept: SLEEP MEDICINE | Facility: HOSPITAL | Age: 57
Discharge: HOME | End: 2017-03-24
Admitting: INTERNAL MEDICINE

## 2017-03-24 PROCEDURE — G0463 HOSPITAL OUTPT CLINIC VISIT: HCPCS

## 2017-03-27 ENCOUNTER — OFFICE VISIT (OUTPATIENT)
Dept: ENDOCRINOLOGY | Age: 57
End: 2017-03-27

## 2017-03-27 VITALS
OXYGEN SATURATION: 98 % | HEART RATE: 60 BPM | SYSTOLIC BLOOD PRESSURE: 114 MMHG | WEIGHT: 214.4 LBS | DIASTOLIC BLOOD PRESSURE: 72 MMHG | HEIGHT: 63 IN | BODY MASS INDEX: 37.99 KG/M2

## 2017-03-27 DIAGNOSIS — E03.9 HYPOTHYROIDISM (ACQUIRED): ICD-10-CM

## 2017-03-27 DIAGNOSIS — E78.5 HYPERLIPIDEMIA, UNSPECIFIED HYPERLIPIDEMIA TYPE: ICD-10-CM

## 2017-03-27 DIAGNOSIS — I25.10 ATHEROSCLEROSIS OF NATIVE CORONARY ARTERY OF NATIVE HEART WITHOUT ANGINA PECTORIS: Primary | ICD-10-CM

## 2017-03-27 DIAGNOSIS — I10 ESSENTIAL HYPERTENSION: ICD-10-CM

## 2017-03-27 DIAGNOSIS — G47.33 OSA (OBSTRUCTIVE SLEEP APNEA): ICD-10-CM

## 2017-03-27 DIAGNOSIS — Z83.3 FAMILY HISTORY OF DIABETES MELLITUS: ICD-10-CM

## 2017-03-27 LAB
ALBUMIN SERPL-MCNC: 4.3 G/DL (ref 3.5–5.2)
ALBUMIN/GLOB SERPL: 1.5 G/DL
ALP SERPL-CCNC: 86 U/L (ref 39–117)
ALT SERPL-CCNC: 24 U/L (ref 1–33)
AST SERPL-CCNC: 17 U/L (ref 1–32)
BILIRUB SERPL-MCNC: 0.2 MG/DL (ref 0.1–1.2)
BUN SERPL-MCNC: 11 MG/DL (ref 6–20)
BUN/CREAT SERPL: 10.9 (ref 7–25)
CALCIUM SERPL-MCNC: 9.6 MG/DL (ref 8.6–10.5)
CHLORIDE SERPL-SCNC: 106 MMOL/L (ref 98–107)
CHOLEST SERPL-MCNC: 181 MG/DL (ref 0–200)
CO2 SERPL-SCNC: 22.7 MMOL/L (ref 22–29)
CREAT SERPL-MCNC: 1.01 MG/DL (ref 0.57–1)
GLOBULIN SER CALC-MCNC: 2.9 GM/DL
GLUCOSE SERPL-MCNC: 87 MG/DL (ref 65–99)
HBA1C MFR BLD: 5.3 % (ref 4.8–5.6)
HDLC SERPL-MCNC: 73 MG/DL (ref 40–60)
LDLC SERPL CALC-MCNC: 86 MG/DL (ref 0–100)
POTASSIUM SERPL-SCNC: 4.4 MMOL/L (ref 3.5–5.2)
PROT SERPL-MCNC: 7.2 G/DL (ref 6–8.5)
SODIUM SERPL-SCNC: 141 MMOL/L (ref 136–145)
T4 FREE SERPL-MCNC: 1.42 NG/DL (ref 0.93–1.7)
TRIGL SERPL-MCNC: 109 MG/DL (ref 0–150)
TSH SERPL DL<=0.005 MIU/L-ACNC: 0.49 MIU/ML (ref 0.27–4.2)
VLDLC SERPL CALC-MCNC: 21.8 MG/DL (ref 5–40)

## 2017-03-27 PROCEDURE — 99214 OFFICE O/P EST MOD 30 MIN: CPT | Performed by: INTERNAL MEDICINE

## 2017-03-27 NOTE — PROGRESS NOTES
Subjective   Keri Nielson is a 56 y.o. female.     HPI Comments: F/u for thyroid nodule, hypothyroidism , hypertension, sleep apnea, hyperlipidemia, cad     Thyroid Problem   Symptoms include anxiety, cold intolerance and fatigue. Her past medical history is significant for hyperlipidemia.   Hypothyroidism   Associated symptoms include fatigue, joint swelling ( knees hands neck ), neck pain and numbness ( hands ).   Hypertension   Associated symptoms include neck pain. Hypertensive end-organ damage includes a thyroid problem.   Hyperlipidemia   Exacerbating diseases include hypothyroidism.   Coronary Artery Disease   Risk factors include hyperlipidemia.      Patient is a 56-year-old female came in for follow-up. She has hypothyroidism and has been on levothyroxine 100 µg per day since April 2016. Thyroid ultrasound done in June 2016 showed 0.5 cm solid nodule in the right.     She has no previous history of thyroid disease. She has no history of head or neck radiation therapy. She has no weight change since 11/16.     She has history of hypertension, and coronary artery disease. She had a previous heart attack in 2000 and had angioplasty was 1 stent. She had a cardiac catheterization done in 2015 which showed a patent stent to the LAD. She has occasional chest pain. She had normal stress test in 9/16.  She is on atenolol and Imdur and follows with Dr. Burroughs.       She has hyperlipidemia and has been on Zocor 20 mg once a day. She denies any myalgia. She has no previous history of diabetes mellitus.  Her mother has diabetes mellitus.  Her last meal was last night.     She had cervical disc surgery in 12/16 Done by Dr. Raymond without complications     She was diagnosed to have sleep apnea and will have another sleep study done.  .    The following portions of the patient's history were reviewed and updated as appropriate: allergies, current medications, past family history, past medical history, past social  "history, past surgical history and problem list.    Review of Systems   Constitutional: Positive for fatigue.   HENT: Negative.    Eyes: Negative.    Respiratory: Negative.    Cardiovascular: Negative.    Gastrointestinal: Negative.    Endocrine: Positive for cold intolerance.   Genitourinary: Positive for frequency and urgency.   Musculoskeletal: Positive for joint swelling ( knees hands neck ) and neck pain.   Skin: Negative.    Allergic/Immunologic: Negative.    Neurological: Positive for numbness ( hands ).   Hematological: Negative.    Psychiatric/Behavioral: Positive for sleep disturbance (sleep apnea has sleep study scheduled for friday ). The patient is nervous/anxious.        Objective      Vitals:    03/27/17 0910   BP: 114/72   BP Location: Right arm   Patient Position: Sitting   Cuff Size: Large Adult   Pulse: 60   SpO2: 98%   Weight: 214 lb 6.4 oz (97.3 kg)   Height: 63\" (160 cm)     Physical Exam   Constitutional: She is oriented to person, place, and time. She appears well-developed and well-nourished. No distress.   HENT:   Head: Normocephalic.   Nose: Nose normal.   Mouth/Throat: No oropharyngeal exudate.   Eyes: Conjunctivae and EOM are normal. Right eye exhibits no discharge. Left eye exhibits no discharge. No scleral icterus.   Neck: Neck supple. No JVD present. No tracheal deviation present. No thyromegaly present.   Cardiovascular: Normal rate, regular rhythm, normal heart sounds and intact distal pulses.  Exam reveals no gallop and no friction rub.    No murmur heard.  Pulmonary/Chest: Effort normal and breath sounds normal. No respiratory distress. She has no wheezes. She has no rales.   Abdominal: Soft. Bowel sounds are normal. She exhibits no distension and no mass. There is no tenderness.   Musculoskeletal: Normal range of motion. She exhibits no edema, tenderness or deformity.   Lymphadenopathy:     She has no cervical adenopathy.   Neurological: She is alert and oriented to person, " place, and time. She has normal reflexes. She displays normal reflexes. No cranial nerve deficit.   Skin: Skin is warm. No erythema. No pallor.   Psychiatric: She has a normal mood and affect. Her behavior is normal.     Admission on 12/29/2016, Discharged on 12/30/2016   Component Date Value Ref Range Status   • HCG, Urine, QL 12/29/2016 Negative  Negative In process   • Lot Number 12/29/2016 [id number]   In process   • Internal Positive Control 12/29/2016 Positive   In process   • Internal Negative Control 12/29/2016 Negative   In process     Assessment/Plan   Keri was seen today for thyroid problem, hypothyroidism, hypertension, hyperlipidemia, coronary artery disease and sleep apnea.    Diagnoses and all orders for this visit:    Atherosclerosis of native coronary artery of native heart without angina pectoris    Essential hypertension    Hyperlipidemia, unspecified hyperlipidemia type  -     Comprehensive Metabolic Panel  -     Lipid Panel    ROCKY (obstructive sleep apnea)    Hypothyroidism (acquired)  -     TSH  -     T4, Free    Family history of diabetes mellitus  -     Hemoglobin A1c      Continue levothyroxine 100 µg per day.  Check thyroid function tests and adjust dose if needed.  Continue Zocor 20 mg once a day.  Check lipid profile and hemoglobin A1c.  Follow-up with sleep specialist.  Continue atenolol and Imdur per Dr. Burroughs.    Send copy of my notes and labs to Dr. Burroughs and James Epley NP.    RTC 6 mos.

## 2017-03-29 ENCOUNTER — TELEPHONE (OUTPATIENT)
Dept: CARDIOLOGY | Facility: CLINIC | Age: 57
End: 2017-03-29

## 2017-03-29 NOTE — TELEPHONE ENCOUNTER
Pt left msg saying she has had angina off and on the past few days and wants to know what she should do about it?      Thanks.  Bette

## 2017-03-30 ENCOUNTER — OFFICE VISIT (OUTPATIENT)
Dept: CARDIOLOGY | Facility: CLINIC | Age: 57
End: 2017-03-30

## 2017-03-30 ENCOUNTER — HOSPITAL ENCOUNTER (OUTPATIENT)
Facility: HOSPITAL | Age: 57
Setting detail: HOSPITAL OUTPATIENT SURGERY
Discharge: HOME | End: 2017-03-30
Attending: INTERNAL MEDICINE | Admitting: INTERNAL MEDICINE

## 2017-03-30 ENCOUNTER — TELEPHONE (OUTPATIENT)
Dept: CARDIOLOGY | Facility: CLINIC | Age: 57
End: 2017-03-30

## 2017-03-30 ENCOUNTER — HOSPITAL ENCOUNTER (OUTPATIENT)
Dept: CARDIOLOGY | Facility: HOSPITAL | Age: 57
Discharge: HOME | End: 2017-03-30

## 2017-03-30 ENCOUNTER — TRANSCRIBE ORDERS (OUTPATIENT)
Dept: PULMONOLOGY | Facility: HOSPITAL | Age: 57
End: 2017-03-30

## 2017-03-30 VITALS
SYSTOLIC BLOOD PRESSURE: 110 MMHG | HEART RATE: 53 BPM | DIASTOLIC BLOOD PRESSURE: 75 MMHG | OXYGEN SATURATION: 96 % | RESPIRATION RATE: 18 BRPM | TEMPERATURE: 97.6 F

## 2017-03-30 VITALS
RESPIRATION RATE: 16 BRPM | WEIGHT: 215 LBS | OXYGEN SATURATION: 95 % | SYSTOLIC BLOOD PRESSURE: 88 MMHG | HEIGHT: 63 IN | DIASTOLIC BLOOD PRESSURE: 62 MMHG | HEART RATE: 56 BPM | BODY MASS INDEX: 38.09 KG/M2

## 2017-03-30 VITALS
HEART RATE: 56 BPM | RESPIRATION RATE: 18 BRPM | HEIGHT: 63 IN | BODY MASS INDEX: 38.09 KG/M2 | DIASTOLIC BLOOD PRESSURE: 60 MMHG | WEIGHT: 215 LBS | SYSTOLIC BLOOD PRESSURE: 102 MMHG

## 2017-03-30 DIAGNOSIS — I25.10 CORONARY ARTERY DISEASE INVOLVING NATIVE HEART, ANGINA PRESENCE UNSPECIFIED, UNSPECIFIED VESSEL OR LESION TYPE: Primary | ICD-10-CM

## 2017-03-30 DIAGNOSIS — I25.10 CORONARY ARTERY DISEASE INVOLVING NATIVE HEART, ANGINA PRESENCE UNSPECIFIED, UNSPECIFIED VESSEL OR LESION TYPE: ICD-10-CM

## 2017-03-30 DIAGNOSIS — R06.02 SHORTNESS OF BREATH: ICD-10-CM

## 2017-03-30 DIAGNOSIS — R07.2 PRECORDIAL PAIN: ICD-10-CM

## 2017-03-30 DIAGNOSIS — G47.33 OSA (OBSTRUCTIVE SLEEP APNEA): Primary | ICD-10-CM

## 2017-03-30 DIAGNOSIS — R07.9 CHEST PAIN, UNSPECIFIED TYPE: Primary | ICD-10-CM

## 2017-03-30 LAB
ALBUMIN SERPL-MCNC: 4 G/DL (ref 3.5–5.2)
ALBUMIN/GLOB SERPL: 1.3 G/DL
ALP SERPL-CCNC: 80 U/L (ref 39–117)
ALT SERPL W P-5'-P-CCNC: 21 U/L (ref 1–33)
ANION GAP SERPL CALCULATED.3IONS-SCNC: 11.1 MMOL/L
AST SERPL-CCNC: 17 U/L (ref 1–32)
BASOPHILS # BLD AUTO: 0.02 10*3/MM3 (ref 0–0.2)
BASOPHILS NFR BLD AUTO: 0.3 % (ref 0–1.5)
BILIRUB SERPL-MCNC: 0.2 MG/DL (ref 0.1–1.2)
BUN BLD-MCNC: 12 MG/DL (ref 6–20)
BUN/CREAT SERPL: 11.5 (ref 7–25)
CALCIUM SPEC-SCNC: 9.4 MG/DL (ref 8.6–10.5)
CHLORIDE SERPL-SCNC: 107 MMOL/L (ref 98–107)
CK MB BLD-MCNC: 1.3 NG/ML
CO2 SERPL-SCNC: 23.9 MMOL/L (ref 22–29)
CREAT BLD-MCNC: 1.04 MG/DL (ref 0.57–1)
DEPRECATED RDW RBC AUTO: 48.5 FL (ref 37–54)
EOSINOPHIL # BLD AUTO: 0.12 10*3/MM3 (ref 0–0.7)
EOSINOPHIL NFR BLD AUTO: 1.9 % (ref 0.3–6.2)
ERYTHROCYTE [DISTWIDTH] IN BLOOD BY AUTOMATED COUNT: 14.8 % (ref 11.7–13)
GFR SERPL CREATININE-BSD FRML MDRD: 55 ML/MIN/1.73
GLOBULIN UR ELPH-MCNC: 3.1 GM/DL
GLUCOSE BLD-MCNC: 85 MG/DL (ref 65–99)
HCT VFR BLD AUTO: 39.7 % (ref 35.6–45.5)
HGB BLD-MCNC: 12.7 G/DL (ref 11.9–15.5)
IMM GRANULOCYTES # BLD: 0.02 10*3/MM3 (ref 0–0.03)
IMM GRANULOCYTES NFR BLD: 0.3 % (ref 0–0.5)
LYMPHOCYTES # BLD AUTO: 2.24 10*3/MM3 (ref 0.9–4.8)
LYMPHOCYTES NFR BLD AUTO: 34.7 % (ref 19.6–45.3)
MCH RBC QN AUTO: 28.8 PG (ref 26.9–32)
MCHC RBC AUTO-ENTMCNC: 32 G/DL (ref 32.4–36.3)
MCV RBC AUTO: 90 FL (ref 80.5–98.2)
MONOCYTES # BLD AUTO: 0.41 10*3/MM3 (ref 0.2–1.2)
MONOCYTES NFR BLD AUTO: 6.4 % (ref 5–12)
NEUTROPHILS # BLD AUTO: 3.64 10*3/MM3 (ref 1.9–8.1)
NEUTROPHILS NFR BLD AUTO: 56.4 % (ref 42.7–76)
PLATELET # BLD AUTO: 307 10*3/MM3 (ref 140–500)
PMV BLD AUTO: 10.7 FL (ref 6–12)
POC MYOGLOBIN: 73.1 NG/ML
POTASSIUM BLD-SCNC: 3.9 MMOL/L (ref 3.5–5.2)
PROT SERPL-MCNC: 7.1 G/DL (ref 6–8.5)
RBC # BLD AUTO: 4.41 10*6/MM3 (ref 3.9–5.2)
SODIUM BLD-SCNC: 142 MMOL/L (ref 136–145)
TROPONIN I SERPL-MCNC: 0.05 NG/ML (ref 0–0.6)
WBC NRBC COR # BLD: 6.45 10*3/MM3 (ref 4.5–10.7)

## 2017-03-30 PROCEDURE — 82553 CREATINE MB FRACTION: CPT

## 2017-03-30 PROCEDURE — 94760 N-INVAS EAR/PLS OXIMETRY 1: CPT

## 2017-03-30 PROCEDURE — 25010000002 MIDAZOLAM PER 1 MG: Performed by: INTERNAL MEDICINE

## 2017-03-30 PROCEDURE — C1769 GUIDE WIRE: HCPCS | Performed by: INTERNAL MEDICINE

## 2017-03-30 PROCEDURE — 93458 L HRT ARTERY/VENTRICLE ANGIO: CPT | Performed by: INTERNAL MEDICINE

## 2017-03-30 PROCEDURE — 99214 OFFICE O/P EST MOD 30 MIN: CPT | Performed by: NURSE PRACTITIONER

## 2017-03-30 PROCEDURE — 25010000002 FENTANYL CITRATE (PF) 100 MCG/2ML SOLUTION: Performed by: INTERNAL MEDICINE

## 2017-03-30 PROCEDURE — 93571 IV DOP VEL&/PRESS C FLO 1ST: CPT | Performed by: INTERNAL MEDICINE

## 2017-03-30 PROCEDURE — C1894 INTRO/SHEATH, NON-LASER: HCPCS | Performed by: INTERNAL MEDICINE

## 2017-03-30 PROCEDURE — 85347 COAGULATION TIME ACTIVATED: CPT

## 2017-03-30 PROCEDURE — 25010000002 HEPARIN (PORCINE) PER 1000 UNITS: Performed by: INTERNAL MEDICINE

## 2017-03-30 PROCEDURE — 25010000002 BH (CUPID ONLY) ADENOSINE 6 MG/100ML MIXTURE: Performed by: INTERNAL MEDICINE

## 2017-03-30 PROCEDURE — 84484 ASSAY OF TROPONIN QUANT: CPT

## 2017-03-30 PROCEDURE — 80053 COMPREHEN METABOLIC PANEL: CPT

## 2017-03-30 PROCEDURE — 93000 ELECTROCARDIOGRAM COMPLETE: CPT | Performed by: NURSE PRACTITIONER

## 2017-03-30 PROCEDURE — 83874 ASSAY OF MYOGLOBIN: CPT

## 2017-03-30 PROCEDURE — 0 IOPAMIDOL PER 1 ML: Performed by: INTERNAL MEDICINE

## 2017-03-30 PROCEDURE — 85025 COMPLETE CBC W/AUTO DIFF WBC: CPT

## 2017-03-30 PROCEDURE — 99152 MOD SED SAME PHYS/QHP 5/>YRS: CPT | Performed by: INTERNAL MEDICINE

## 2017-03-30 PROCEDURE — C1887 CATHETER, GUIDING: HCPCS | Performed by: INTERNAL MEDICINE

## 2017-03-30 PROCEDURE — 99153 MOD SED SAME PHYS/QHP EA: CPT | Performed by: INTERNAL MEDICINE

## 2017-03-30 PROCEDURE — 36415 COLL VENOUS BLD VENIPUNCTURE: CPT

## 2017-03-30 RX ORDER — LEVOTHYROXINE SODIUM 0.1 MG/1
100 TABLET ORAL DAILY
COMMUNITY
End: 2017-09-22 | Stop reason: SDUPTHER

## 2017-03-30 RX ORDER — PANTOPRAZOLE SODIUM 40 MG/1
40 TABLET, DELAYED RELEASE ORAL DAILY
Qty: 30 TABLET | Refills: 3 | Status: SHIPPED | OUTPATIENT
Start: 2017-03-30 | End: 2017-04-17 | Stop reason: SDUPTHER

## 2017-03-30 RX ORDER — ISOSORBIDE MONONITRATE 30 MG/1
30 TABLET, EXTENDED RELEASE ORAL DAILY
COMMUNITY
End: 2017-11-07

## 2017-03-30 RX ORDER — SODIUM CHLORIDE 0.9 % (FLUSH) 0.9 %
10 SYRINGE (ML) INJECTION AS NEEDED
Status: DISCONTINUED | OUTPATIENT
Start: 2017-03-30 | End: 2017-03-30

## 2017-03-30 RX ORDER — ATENOLOL 25 MG/1
25 TABLET ORAL DAILY
COMMUNITY
End: 2017-06-20 | Stop reason: SDUPTHER

## 2017-03-30 RX ORDER — PROMETHAZINE HYDROCHLORIDE 25 MG/ML
12.5 INJECTION, SOLUTION INTRAMUSCULAR; INTRAVENOUS EVERY 4 HOURS PRN
Status: DISCONTINUED | OUTPATIENT
Start: 2017-03-30 | End: 2017-03-30 | Stop reason: HOSPADM

## 2017-03-30 RX ORDER — MIDAZOLAM HYDROCHLORIDE 1 MG/ML
INJECTION INTRAMUSCULAR; INTRAVENOUS AS NEEDED
Status: DISCONTINUED | OUTPATIENT
Start: 2017-03-30 | End: 2017-03-30 | Stop reason: HOSPADM

## 2017-03-30 RX ORDER — SUCRALFATE ORAL 1 G/10ML
1 SUSPENSION ORAL 4 TIMES DAILY
COMMUNITY
End: 2017-05-16

## 2017-03-30 RX ORDER — SODIUM CHLORIDE 9 MG/ML
100 INJECTION, SOLUTION INTRAVENOUS CONTINUOUS
Status: DISCONTINUED | OUTPATIENT
Start: 2017-03-30 | End: 2017-03-30 | Stop reason: HOSPADM

## 2017-03-30 RX ORDER — LIDOCAINE HYDROCHLORIDE 20 MG/ML
INJECTION, SOLUTION INFILTRATION; PERINEURAL AS NEEDED
Status: DISCONTINUED | OUTPATIENT
Start: 2017-03-30 | End: 2017-03-30 | Stop reason: HOSPADM

## 2017-03-30 RX ORDER — LAMOTRIGINE 300 MG/1
1 TABLET, EXTENDED RELEASE ORAL NIGHTLY
COMMUNITY
End: 2017-09-22

## 2017-03-30 RX ORDER — FLUTICASONE PROPIONATE 50 MCG
2 SPRAY, SUSPENSION (ML) NASAL DAILY
COMMUNITY
End: 2017-10-25 | Stop reason: SDUPTHER

## 2017-03-30 RX ORDER — GABAPENTIN 300 MG/1
300 CAPSULE ORAL 3 TIMES DAILY
COMMUNITY
End: 2017-09-22 | Stop reason: SDUPTHER

## 2017-03-30 RX ORDER — NITROGLYCERIN 0.4 MG/1
TABLET SUBLINGUAL
Qty: 100 TABLET | Refills: 1 | Status: ON HOLD | OUTPATIENT
Start: 2017-03-30 | End: 2017-03-30

## 2017-03-30 RX ORDER — METOCLOPRAMIDE HYDROCHLORIDE 5 MG/ML
10 INJECTION INTRAMUSCULAR; INTRAVENOUS EVERY 6 HOURS PRN
Status: DISCONTINUED | OUTPATIENT
Start: 2017-03-30 | End: 2017-03-30 | Stop reason: HOSPADM

## 2017-03-30 RX ORDER — ONDANSETRON 2 MG/ML
4 INJECTION INTRAMUSCULAR; INTRAVENOUS EVERY 6 HOURS PRN
Status: DISCONTINUED | OUTPATIENT
Start: 2017-03-30 | End: 2017-03-30 | Stop reason: HOSPADM

## 2017-03-30 RX ORDER — SODIUM CHLORIDE 0.9 % (FLUSH) 0.9 %
1-10 SYRINGE (ML) INJECTION AS NEEDED
Status: DISCONTINUED | OUTPATIENT
Start: 2017-03-30 | End: 2017-03-30 | Stop reason: HOSPADM

## 2017-03-30 RX ORDER — NITROGLYCERIN 0.4 MG/1
0.4 TABLET SUBLINGUAL
Status: DISCONTINUED | OUTPATIENT
Start: 2017-03-30 | End: 2017-06-21

## 2017-03-30 RX ORDER — SIMVASTATIN 20 MG
20 TABLET ORAL NIGHTLY
COMMUNITY
End: 2017-06-20 | Stop reason: SDUPTHER

## 2017-03-30 RX ORDER — HEPARIN SODIUM 1000 [USP'U]/ML
INJECTION, SOLUTION INTRAVENOUS; SUBCUTANEOUS AS NEEDED
Status: DISCONTINUED | OUTPATIENT
Start: 2017-03-30 | End: 2017-03-30 | Stop reason: HOSPADM

## 2017-03-30 RX ORDER — CETIRIZINE HYDROCHLORIDE 10 MG/1
10 TABLET ORAL DAILY
COMMUNITY
End: 2017-09-27

## 2017-03-30 RX ORDER — SODIUM CHLORIDE 9 MG/ML
75 INJECTION, SOLUTION INTRAVENOUS CONTINUOUS
Status: DISCONTINUED | OUTPATIENT
Start: 2017-03-30 | End: 2017-03-30 | Stop reason: HOSPADM

## 2017-03-30 RX ORDER — NITROGLYCERIN 0.4 MG/1
0.4 TABLET SUBLINGUAL
COMMUNITY
End: 2023-01-06

## 2017-03-30 RX ORDER — FENTANYL CITRATE 50 UG/ML
INJECTION, SOLUTION INTRAMUSCULAR; INTRAVENOUS AS NEEDED
Status: DISCONTINUED | OUTPATIENT
Start: 2017-03-30 | End: 2017-03-30 | Stop reason: HOSPADM

## 2017-03-30 RX ADMIN — NITROGLYCERIN 0.4 MG: 0.4 TABLET SUBLINGUAL at 09:23

## 2017-03-30 RX ADMIN — SODIUM CHLORIDE 75 ML/HR: 9 INJECTION, SOLUTION INTRAVENOUS at 11:46

## 2017-03-30 NOTE — PROGRESS NOTES
DATE OF SERVICE: 03/24/2017    PRIMARY CARE PROVIDER: James Epley, APRN    I had the pleasure of seeing the patient in the office today. Below, please find summary report of pertinent findings and conclusions.      HISTORY OF PRESENT ILLNESS: The patient is a very pleasant 56-year-old female who was last seen in this office in 12/2016. At last visit, we had fixed her CPAP pressures at 15 cm of water as her average AHI remained 10.5. Unfortunately, the patient stopped using her CPAP device 1 month ago. Because of poor compliance the machine was taken away. She is here today for repeat evaluation as she complains of excessive daytime sleepiness. Her Delta Sleepiness Score today is 14. She also reports restless leg syndrome symptoms. She ran out of Mirapex and RLS has gotten worse. When she was taking the Mirapex the RLS was controlled.    REVIEW OF SYSTEMS: Significant nasal congestion, postnasal drip, cough, and anxiety.     CURRENT MEDICATIONS:  1. Simvastatin.  2. Lamictal.  3. Celexa.   4. Other medications are unchanged.     SOCIAL HISTORY: She smokes E-Cigarettes. No alcohol. No energy drinks. Drinks 2-3 caffeinated beverages a day.     PHYSICAL EXAMINATION:  VITAL SIGNS: Weight 212 pounds, blood pressure 124/81, heart rate 71.  HEENT: Class IV Mallampati airway. Large-sized tongue with no evidence of exudate.   NECK: Trachea midline.   LUNGS: Respiratory effort nonlabored.   HEART: Regular rate and rhythm. No murmurs or rubs.  ABDOMEN: Soft, nontender. Bowel sounds are present. Obese.   EXTREMITIES: No evidence of edema. Pedal pulses positive.     DIAGNOSTIC DATA: The initial study in 2015 showed no evidence of obstructive sleep apnea.     Home sleep study done in 2016 shows moderate obstructive sleep apnea.     ASSESSMENT:  1. Obstructive sleep apnea, moderate.   2. Obesity.  3. Periodic limb movement disorder.  4. Excessive caffeine consumption of 2-3 a day.   5. Tobacco abuse and dependency, smoking  E-Cigarettes.   6. Depression.  7. History of 3rd shift work.    PLAN: The patient does have a known diagnosis of moderate obstructive sleep apnea. Her machine was taken away from her due to noncompliance. Unfortunately, she remains excessively sleepy and would like to proceed with repeat evaluation so the machine could be resumed. We will go ahead and schedule her for an in-lab study. We will perform the study during the day as she sleeps during the day. She previously worked 3rd shift. She is no longer working though she maintains a 3rd shift schedule. Caution doing activities that require prolonged concentration is strongly advised. Weight loss will be strongly beneficial in order to reduce the severity of sleep disordered breathing.     She does have periodic leg movement disorder and I would like for her to resume Mirapex 0.5. I would like for her to reduce/eliminate all caffeine in the diet. She is smoking E-Cigarettes and is motivated to quit smoking completely. This is encouraged.     She does have depression. She is currently controlled on Celexa.    I appreciate the opportunity of participating in this patient’s care. I asked the patient to follow up with us after completion of her study.    DAPHENY Pedraza dictating for MD Ama Lima APRN  [place]  D:   03/29/2017 15:08:43  T:   03/30/2017 00:06:15  Job ID:   [id number]  Document ID:   [id number]  cc:   James Epley APRN

## 2017-03-30 NOTE — PROGRESS NOTES
"  Date of Office Visit: 2017  Encounter Provider: DAPHNEY Stahl  Place of Service: [facility] CARDIOLOGY  Patient Name: Keri Nielson  :1960    Chief Complaint   Patient presents with   • Chest Pain   • Coronary Artery Disease   :     HPI: Keri Nielson is a 56 y.o. female who presents today for evaluation of chest pain.  The patient contacted our office yesterday with a chief concern of chest pain and Dr. Cooney and asked that I see the patient.  She has a history of coronary artery disease.  She had a cardiac catheterization in 2015 which showed the following: Ejection fraction 60%, left main normal, LAD with stent in the mid vessel widely patent, large diagonal branch 30% stenosis, remainder of the LAD 30% stenosis, left circumflex 30% stenosis, right coronary artery with focal 50% proximal lesion.  FFR assessment of the RCA lesion was negative for significant stenosis.  Medical management was recommended.    The patient last saw Dr. David Burroughs in 2016.  She was experiencing chest pain at that time.  A stress echocardiogram was completed which was normal showing no evidence of ischemia.    The patient presents today stating that she's been experiencing midsternal chest pressure that radiates to the left for one month.  The pain has become more severe and she had an episode on Tuesday which was more concerning to her.  This occurred at rest and she described a left-sided chest pain that she rated a 7 on a scale of 0-10.  She described the pain as \"sharp and dull\".  She had some mild shortness of breath.  The pain lasted 1.5 hours and subsided on its own.  She had another episode of chest pain yesterday which was similar but not as intense.  She rated that pain a 5 on a scale of 0-10.  She said she has been experiencing some intermittent chest pain this morning and rates that pain a 5 on a scale of 0-10.  She continues to experience some " mild shortness of breath.  I asked her if these symptoms were similar to when she had her stent placement and she was not sure.  She denies radiation of pain, nausea, vomiting, numbness, tingling, or diaphoresis.  She further denies paroxysmal nocturnal dyspnea, orthopnea, cough, wheezing, edema, palpitations, dizziness, or syncope.      Past Medical History:   Diagnosis Date   • Anemia    • Arthritis    • Atherosclerotic heart disease of native coronary artery with other forms of angina pectoris    • Bipolar disorder    • Cervical disc herniation    • Diverticular disease    • Dysphagia    • Gastric reflux    • History of transfusion    • Hyperlipidemia    • Hypertension    • Hypothyroidism    • Incontinence in female     wears pads   • Neck pain    • Obesity    • Past myocardial infarction    • Periodic limb movement disorder    • PONV (postoperative nausea and vomiting)    • Restless leg syndrome    • Sleep apnea     cpap   • Suicidal ideation 8/19/2016   • Swelling of ankle     right had doppler no s/s blood clot   • Tobacco abuse        Past Surgical History:   Procedure Laterality Date   • ANTERIOR CERVICAL DISCECTOMY W/ FUSION N/A 12/29/2016    Procedure: C3-4 anterior cervical discectomy and fusion with Depuy micro plate, ALLOGRAFT C3-4, AND HARDWARE REMOVAL C4-7.;  Surgeon: Hema Godwin MD;  Location: [facility];  Service:    • CERVICAL BIOPSY  [facility]XVI    Dr. Jeffery.    • CERVICAL DISCECTOMY ANTERIOR  04/2013    C4-7   • CERVICAL FUSION  12/29/2016   • COLONOSCOPY  [facility]XV    Normal.  Dr. Rodriguez   • ENDOSCOPY  [facility]    Normal.  Dr. Rodriguez   • HARDWARE REMOVAL  12/29/2016    cervical   • NECK SURGERY  12/29/2016    fusion    • SHOULDER SURGERY Left     RCR   • TONSILLECTOMY     • TONSILLECTOMY AND ADENOIDECTOMY     • TUBAL ABDOMINAL LIGATION     • TYMPANOSTOMY TUBE PLACEMENT Right    • WRIST SURGERY Bilateral     carpal tunnel       Social History     Social History   • Marital status:      Spouse  name: N/A   • Number of children: N/A   • Years of education: N/A     Occupational History   • Not on file.     Social History Main Topics   • Smoking status: Former Smoker     Packs/day: 1.50     Years: 20.00     Types: Cigarettes, Electronic Cigarette     Quit date: 2015   • Smokeless tobacco: Never Used      Comment: Electronic Cigarette/ 3 mg nicotine    • Alcohol use Yes      Comment: OCCASIONAL   • Drug use: No   • Sexual activity: Defer     Other Topics Concern   • Not on file     Social History Narrative       Family History   Problem Relation Age of Onset   • Diabetes type II Mother    • Hypertension Mother    • Osteoporosis Mother    • Seizures Mother    • COPD Father    • Hypertension Father    • Lung cancer Father    • Heart attack Father    • Bipolar disorder Other    • Heart disease Other    • Thyroid disease Other    • Thyroid disease Sister    • Hypertension Sister    • Bipolar disorder Sister    • Depression Sister    • No Known Problems Son    • Abnormal EKG Daughter    • Hypertension Daughter    • Bipolar disorder Daughter    • Thyroid disease Sister    • Hypertension Sister    • Bipolar disorder Sister    • Asthma Daughter    • Breast cancer Neg Hx    • Ovarian cancer Neg Hx    • Uterine cancer Neg Hx    • Colon cancer Neg Hx        Review of Systems   Constitution: Positive for malaise/fatigue. Negative for chills, diaphoresis, fever, night sweats, weight gain and weight loss.   HENT: Negative for hearing loss, nosebleeds, sore throat and tinnitus.    Eyes: Negative for blurred vision, double vision, pain and visual disturbance.   Cardiovascular: Positive for chest pain and dyspnea on exertion. Negative for claudication, cyanosis, irregular heartbeat, leg swelling, near-syncope, orthopnea, palpitations, paroxysmal nocturnal dyspnea and syncope.   Respiratory: Positive for shortness of breath and snoring. Negative for cough, hemoptysis and wheezing.    Endocrine: Positive for cold intolerance and  heat intolerance. Negative for polyuria.   Hematologic/Lymphatic: Negative for bleeding problem. Does not bruise/bleed easily.   Skin: Negative for color change, dry skin, flushing and itching.   Musculoskeletal: Negative for falls, joint pain, joint swelling, muscle cramps, muscle weakness and myalgias.   Gastrointestinal: Negative for abdominal pain, constipation, heartburn, melena, nausea and vomiting.   Genitourinary: Negative for dysuria and hematuria.   Neurological: Positive for excessive daytime sleepiness and paresthesias. Negative for dizziness, light-headedness, loss of balance, numbness, seizures and vertigo.   Psychiatric/Behavioral: Positive for depression. Negative for altered mental status, memory loss and substance abuse. The patient is nervous/anxious. The patient does not have insomnia.    Allergic/Immunologic: Negative for environmental allergies.       No Known Allergies      Current Outpatient Prescriptions:   •  ARIPiprazole (ABILIFY) 5 MG tablet, Take 5 mg by mouth Every Morning., Disp: , Rfl:   •  aspirin 81 MG chewable tablet, Chew 81 mg Daily., Disp: , Rfl:   •  atenolol (TENORMIN) 25 MG tablet, TAKE ONE TABLET BY MOUTH DAILY, Disp: 90 tablet, Rfl: 1  •  cetirizine (zyrTEC) 10 MG tablet, Take 1 tablet by mouth At Night As Needed for Allergies. For allergies, Disp: 30 tablet, Rfl: 5  •  citalopram (CeleXA) 40 MG tablet, Take 40 mg by mouth Every Night., Disp: , Rfl: 2  •  fluticasone (FLONASE) 50 MCG/ACT nasal spray, 2 sprays into each nostril Daily. As needed nasal allergies, Disp: 1 each, Rfl: 5  •  gabapentin (NEURONTIN) 300 MG capsule, TAKE ONE CAPSULE BY MOUTH THREE TIMES A DAY, Disp: 90 capsule, Rfl: 2  •  isosorbide mononitrate (IMDUR) 30 MG 24 hr tablet, Take 1 tablet by mouth daily. (Patient taking differently: Take 30 mg by mouth Every Morning.), Disp: 90 tablet, Rfl: 1  •  LamoTRIgine ER (LAMICTAL XR) 300 MG tablet sustained-release 24 hour, Take 1 tablet by mouth Daily., Disp: ,  "Rfl:   •  levothyroxine (SYNTHROID, LEVOTHROID) 100 MCG tablet, TAKE 1 TABLET ON AN EMPTY STOMACH 1 HR BEFORE EATING, Disp: 30 tablet, Rfl: 5  •  pramipexole (MIRAPEX) 0.5 MG tablet, Take 0.5 mg by mouth Every Night., Disp: , Rfl:   •  simethicone (MYLICON) 80 MG chewable tablet, Chew 80 mg As Needed for flatulence., Disp: , Rfl:   •  simvastatin (ZOCOR) 20 MG tablet, Take 1 tablet by mouth Every Night., Disp: 30 tablet, Rfl: 6  •  sucralfate (CARAFATE) 1 GM/10ML suspension, Take 10 mL by mouth 4 (Four) Times a Day., Disp: 60 each, Rfl: 0  •  topiramate (TOPAMAX) 50 MG tablet, Take 50 mg by mouth 2 (Two) Times a Day., Disp: , Rfl:     Current Facility-Administered Medications:   •  nitroglycerin (NITROSTAT) SL tablet 0.4 mg, 0.4 mg, Sublingual, Q5 Min PRN, Zayda Pineda, APRN, 0.4 mg at 03/30/17 0923  •  sodium chloride 0.9 % flush 10 mL, 10 mL, Intravenous, PRN, Zayda Pineda, APRN     Objective:     Vitals:    03/30/17 0816   BP: 102/60   Pulse: 56   Resp: 18   Weight: 215 lb (97.5 kg)   Height: 63\" (160 cm)     Body mass index is 38.09 kg/(m^2).    PHYSICAL EXAM:    Vitals Reviewed.   General Appearance: No acute distress, well developed and well nourished. Obese.   Eyes: Conjunctiva and lids: No erythema, swelling, or discharge. Sclera non-icteric.   HENT: Atraumatic, normocephalic. External eyes, ears, and nose normal. No hearing loss noted. Mucous membranes normal. Lips not cyanotic. Neck supple with no tenderness.  Respiratory: No signs of respiratory distress. Respiration rhythm and depth normal.   Clear to auscultation. No rales, crackles, rhonchi, or wheezing auscultated.   Cardiovascular:  Jugular Venous Pressure: Normal  Heart Rate and Rhythm: Normal rhythm.  Bradycardic.  Heart Sounds: Normal S1 and S2. No S3 or S4 noted.  Murmurs: No murmurs noted. No rubs, thrills, or gallops.   Arterial Pulses: Carotid pulses normal. No carotid bruit noted. Posterior tibialis and dorsalis pedis pulses normal. "   Lower Extremities: No edema noted.  Gastrointestinal:  Abdomen soft, non-distended, non-tender. Normal bowel sounds. No hepatomegaly.   Musculoskeletal: Normal movement of extremities  Skin and Nails: General appearance normal. No pallor, cyanosis, diaphoresis. Skin temperature normal. No clubbing of fingernails.   Psychiatric: Patient alert and oriented to person, place, and time. Speech and behavior appropriate. Normal mood and affect.       ECG 12 Lead  Date/Time: 3/30/2017 8:18 AM  Performed by: TALAT MENDOZA  Authorized by: TALAT MENDOZA   Comparison: compared with previous ECG from 11/17/2016  Similar to previous ECG  Rhythm: sinus bradycardia  Rate: bradycardic  BPM: 56  Conduction: conduction normal  ST Segments: ST segments normal  T Waves: T waves normal  QRS axis: normal  Other: no other findings                Assessment:       Diagnosis Plan   1. Coronary artery disease involving native heart, angina presence unspecified, unspecified vessel or lesion type  Case Request Cath Lab: Left Heart Cath   2. Precordial pain  Case Request Cath Lab: Left Heart Cath   3. Shortness of breath  Case Request Cath Lab: Left Heart Cath          Plan:       Coronary Artery Disease  Plan  • Lifestyle modifications discussed include adhering to a heart healthy diet, maintenance of a healthy weight, medication compliance, regular exercise and regular monitoring of cholesterol and blood pressure    Subjective - Objective  • There has been a previous stent procedure using DAMI  • Current antiplatelet therapy includes aspirin 81 mg  •   The patient presents today with new onset chest pain and shortness of breath.  The pain can occur with exertion and doesn't necessary resolve with rest.  She's also had episodes of chest pain at rest.  She was also experiencing chest pain back in November 2016.  She had a stress echocardiogram completed at that time which was negative for ischemia.  She does have a history of coronary  artery disease and in 2015 per cardiac catheterization was noted to have a 50% stenosis of the right coronary artery.    I discussed the plan care Dr. David Burroughs.  We have recommended the patient undergo left cardiac catheterization. I have explained the procedure to the patient and he verbalizes understanding. The risks of the procedure were explained to the patient including bleeding, hypotension, allergy/reaction to contrast dye, vascular site problems, kidney problems and 1 in a 1000 risk of heart attack, death, or stroke with procedure. Patient would like to proceed with procedure.     After this was finished explaining the cardiac catheterization to the patient, she stated that she was experience chest pain a 5 out of 10.  I took her over to my urgent care Center in the office and rapid blood work including a troponin level was completed and was negative for myocardial infarction.  Her EKG tracing today is also normal.  The patient was administered nitroglycerin sublingual and her pain subsided.  She has already on isosorbide.  I've called in a prescription for her to take nitroglycerin sublingual as needed per the instructions on the bottle.  I told her if she were to have any worsening symptoms she needs to present to the emergency department immediately.  The patient verbalized understanding.      As always, it has been a pleasure to participate in your patient's care.      Sincerely,         DAPHNEY Mead

## 2017-03-31 ENCOUNTER — HOSPITAL ENCOUNTER (OUTPATIENT)
Dept: SLEEP MEDICINE | Facility: HOSPITAL | Age: 57
Discharge: HOME | End: 2017-03-31
Admitting: INTERNAL MEDICINE

## 2017-03-31 DIAGNOSIS — G47.33 OSA (OBSTRUCTIVE SLEEP APNEA): ICD-10-CM

## 2017-03-31 LAB — ACT BLD: 224 SECONDS (ref 82–152)

## 2017-03-31 PROCEDURE — 95811 POLYSOM 6/>YRS CPAP 4/> PARM: CPT

## 2017-04-07 ENCOUNTER — TELEPHONE (OUTPATIENT)
Dept: SLEEP MEDICINE | Facility: HOSPITAL | Age: 57
End: 2017-04-07

## 2017-04-07 NOTE — TELEPHONE ENCOUNTER
Spoke with pt about her npsg/pap titration study. Order for s/u faxed to [facility] as requested by pt.  Pt given r/v with dr. Vallejo([place]) as well.

## 2017-04-10 ENCOUNTER — OFFICE VISIT (OUTPATIENT)
Dept: ORTHOPEDIC SURGERY | Facility: CLINIC | Age: 57
End: 2017-04-10

## 2017-04-10 VITALS — BODY MASS INDEX: 37.21 KG/M2 | TEMPERATURE: 98.1 F | WEIGHT: 210 LBS | HEIGHT: 63 IN

## 2017-04-10 DIAGNOSIS — S86.302A PERONEAL TENDON INJURY, LEFT, INITIAL ENCOUNTER: ICD-10-CM

## 2017-04-10 DIAGNOSIS — M72.2 PLANTAR FASCIITIS: ICD-10-CM

## 2017-04-10 DIAGNOSIS — M79.672 HEEL PAIN, CHRONIC, LEFT: Primary | ICD-10-CM

## 2017-04-10 DIAGNOSIS — M79.672 FOOT PAIN, LEFT: ICD-10-CM

## 2017-04-10 DIAGNOSIS — G89.29 HEEL PAIN, CHRONIC, LEFT: Primary | ICD-10-CM

## 2017-04-10 PROBLEM — S86.309A PERONEAL TENDON INJURY: Status: ACTIVE | Noted: 2017-04-10

## 2017-04-10 PROCEDURE — 99214 OFFICE O/P EST MOD 30 MIN: CPT | Performed by: ORTHOPAEDIC SURGERY

## 2017-04-10 PROCEDURE — 73620 X-RAY EXAM OF FOOT: CPT | Performed by: ORTHOPAEDIC SURGERY

## 2017-04-10 PROCEDURE — 73650 X-RAY EXAM OF HEEL: CPT | Performed by: ORTHOPAEDIC SURGERY

## 2017-04-10 NOTE — TELEPHONE ENCOUNTER
I spoke with patient about cardiac cath results. She has started on the protonix. She feels that her symptoms have improved. She is going to follow up with Dr. Rodriguez (GI) next week.     She is going to follow up with Dr. Burroughs in 4-6 weeks.

## 2017-04-10 NOTE — PROGRESS NOTES
New Patient Complaint      Patient: Keri Nielson  YOB: 1960 56 y.o. female  Medical Record Number: [id number]    Chief Complaints: My left ankle hurts    History of Present Illness:      Patient sustained an injury falling while dancing last July.  She was seen by Dr. Gutierrez or evidence showed an anterior process the calcaneus fracture she was treated successfully nonoperatively with a boot.  And did well until about 2 weeks ago when she began having onset of severe intermittent stabbing pain in the plantar aspect of her left heel and posterior hindfoot and then 3-4 days ago while walking felt a pop along the lateral aspect of her hindfoot worsening pain and difficulty bearing weight.  She's been relieved some with rest pain is worse with walking and standing.    HPI    Allergies: No Known Allergies    Medications:   Current Outpatient Prescriptions on File Prior to Visit   Medication Sig   • ARIPiprazole (ABILIFY) 5 MG tablet Take 5 mg by mouth Every Morning.   • aspirin 81 MG chewable tablet Chew 81 mg Daily.   • atenolol (TENORMIN) 25 MG tablet Take 25 mg by mouth Daily.   • cetirizine (zyrTEC) 10 MG tablet Take 10 mg by mouth Daily.   • citalopram (CeleXA) 40 MG tablet Take 40 mg by mouth Every Night.   • fluticasone (FLONASE) 50 MCG/ACT nasal spray 2 sprays into each nostril Daily.   • gabapentin (NEURONTIN) 300 MG capsule Take 300 mg by mouth 3 (Three) Times a Day.   • isosorbide mononitrate (IMDUR) 30 MG 24 hr tablet Take 30 mg by mouth Daily.   • LamoTRIgine ER (LAMICTAL XR) 300 MG tablet sustained-release 24 hour Take 1 tablet by mouth Daily.   • levothyroxine (SYNTHROID, LEVOTHROID) 100 MCG tablet Take 100 mcg by mouth Daily.   • nitroglycerin (NITROSTAT) 0.4 MG SL tablet Place 0.4 mg under the tongue Every 5 (Five) Minutes As Needed for Chest Pain (took at M.D  office at 0930 a.m.). Take no more than 3 doses in 15 minutes.   • pantoprazole (PROTONIX) 40 MG EC tablet Take 1 tablet by  mouth Daily.   • pramipexole (MIRAPEX) 0.5 MG tablet Take 0.5 mg by mouth Every Night.   • simethicone (MYLICON) 80 MG chewable tablet Chew 80 mg As Needed for flatulence.   • simvastatin (ZOCOR) 20 MG tablet Take 20 mg by mouth Every Night.   • sucralfate (CARAFATE) 1 GM/10ML suspension Take 1 g by mouth 4 (Four) Times a Day.   • topiramate (TOPAMAX) 50 MG tablet Take 50 mg by mouth 2 (Two) Times a Day.     Current Facility-Administered Medications on File Prior to Visit   Medication   • nitroglycerin (NITROSTAT) SL tablet 0.4 mg       Past Medical History:   Diagnosis Date   • Anemia    • Arthritis    • Atherosclerotic heart disease of native coronary artery with other forms of angina pectoris    • Bipolar disorder    • Carotid artery occlusion    • Cervical disc herniation    • Coronary artery disease    • Diverticular disease    • Dysphagia    • Gastric reflux    • Heart murmur    • History of transfusion    • Hyperlipidemia    • Hypertension    • Hypothyroidism    • Incontinence in female     wears pads   • Neck pain    • Obesity    • Past myocardial infarction    • Periodic limb movement disorder    • PONV (postoperative nausea and vomiting)    • Restless leg syndrome    • Sleep apnea     cpap   • Suicidal ideation 8/19/2016   • Swelling of ankle     right had doppler no s/s blood clot   • Tobacco abuse      Past Surgical History:   Procedure Laterality Date   • ANTERIOR CERVICAL DISCECTOMY W/ FUSION N/A 12/29/2016    Procedure: C3-4 anterior cervical discectomy and fusion with Depuy micro plate, ALLOGRAFT C3-4, AND HARDWARE REMOVAL C4-7.;  Surgeon: Hema Godwin MD;  Location: [facility] OR;  Service:    • CARDIAC CATHETERIZATION N/A 3/30/2017    Procedure: Left Heart Cath;  Surgeon: Tracey Vargas MD;  Location: [facility] CATH INVASIVE LOCATION;  Service:    • CARDIAC CATHETERIZATION N/A 3/30/2017    Procedure: Coronary angiography;  Surgeon: Tracey Vargas MD;  Location: [facility] INVASIVE LOCATION;   Service:    • CARDIAC CATHETERIZATION N/A 3/30/2017    Procedure: Left ventriculography;  Surgeon: Tracey Vargas MD;  Location:  [facility] CATH INVASIVE LOCATION;  Service:    • CARDIAC CATHETERIZATION  3/30/2017    Procedure: Functional Flow [place];  Surgeon: Tracey Vargas MD;  Location:  [facility] CATH INVASIVE LOCATION;  Service:    • CERVICAL BIOPSY  [facility]XVI    Dr. Jeffery.    • CERVICAL DISCECTOMY ANTERIOR  04/2013    C4-7   • CERVICAL FUSION  12/29/2016   • COLONOSCOPY  [facility]    Normal.  Dr. Rodriguez   • ENDOSCOPY  [facility]    Normal.  Dr. Rodriguez   • HARDWARE REMOVAL  12/29/2016    cervical   • NECK SURGERY  12/29/2016    fusion    • OTHER SURGICAL HISTORY Left 2015   • SHOULDER SURGERY Left     RCR   • TONSILLECTOMY     • TONSILLECTOMY AND ADENOIDECTOMY     • TUBAL ABDOMINAL LIGATION     • TYMPANOSTOMY TUBE PLACEMENT Right    • WRIST SURGERY Bilateral     carpal tunnel     Social History     Occupational History   • Not on file.     Social History Main Topics   • Smoking status: Former Smoker     Packs/day: 1.50     Years: 20.00     Types: Cigarettes, Electronic Cigarette     Quit date: 2015   • Smokeless tobacco: Never Used      Comment: Electronic Cigarette/ 3 mg nicotine    • Alcohol use Yes      Comment: OCCASIONAL   • Drug use: No   • Sexual activity: Defer      Social History     Social History Narrative     Family History   Problem Relation Age of Onset   • Diabetes type II Mother    • Hypertension Mother    • Osteoporosis Mother    • Seizures Mother    • COPD Father    • Hypertension Father    • Lung cancer Father    • Heart attack Father    • Bipolar disorder Other    • Heart disease Other    • Thyroid disease Other    • Thyroid disease Sister    • Hypertension Sister    • Bipolar disorder Sister    • Depression Sister    • No Known Problems Son    • Abnormal EKG Daughter    • Hypertension Daughter    • Bipolar disorder Daughter    • Thyroid disease Sister    • Hypertension Sister    • Bipolar disorder Sister    •  "Asthma Daughter    • Breast cancer Neg Hx    • Ovarian cancer Neg Hx    • Uterine cancer Neg Hx    • Colon cancer Neg Hx        Review of Systems: 14 point review of systems performed, positive pertinent findings identified in HPI. All remaining systems negative Except for breathing difficulties, anxiety, difficulty sleeping, hayfever    Review of Systems      Physical Exam:   Vitals:    04/10/17 1407   Temp: 98.1 °F (36.7 °C)   Weight: 210 lb (95.3 kg)   Height: 63\" (160 cm)     Physical Exam   Constitutional: pleasant, well developed   Eyes: sclera non icteric  Hearing : adequate for exam  Cardiovascular: palpable pulses in foot, calf/ thigh NT without sign of DVT  Respiratoy: breathing unlabored   Neurological: grossly sensate to LT throughout LE  Psychiatric: oriented with normal mood and affect.   Lymphatic: No palpable popliteal lymphadenopathy  Skin: intact throughout leg/foot  Musculoskeletal:Slightly antalgic gait.  Left hindfoot shows mild swelling over the anterior lateral hindfoot along the anterior process the calcaneus with focal discomfort palpation to that area also some along the peroneal tendons and peroneal tubercle there is also discomfort at the insertion of the plantar fascia and discomfort with medial lateral calcaneal compression.  Grossly stable ligamentous exam to the ankle with some guarding.  Physical Exam  Ortho Exam    Radiology: 2 views of the left calcaneus and 2 views of left foot were ordered today to evaluate hindfoot pain reviewed and compared with x-rays from last summer.  There is some plantar calcaneal spurring as well as some mild sharpening of the superior calcaneal tuberosity I don't see any obvious fractures about the anterior process the calcaneus her hindfoot.    Assessment/Plan: Left hindfoot pain of unclear etiology.  She may have had a peroneal tendon injury or possible calcaneal stress fracture.  We'll have her fitted with a boot today she may do partial weightbearing " only with crutches she may use ibuprofen as needed for discomfort and I will see her back pending MRI results of her hindfoot.  She understands to call to schedule follow-up appointment once MRI has been scheduled

## 2017-04-12 ENCOUNTER — OFFICE VISIT (OUTPATIENT)
Dept: ORTHOPEDIC SURGERY | Facility: CLINIC | Age: 57
End: 2017-04-12

## 2017-04-12 DIAGNOSIS — Z98.1 HX OF FUSION OF CERVICAL SPINE: Primary | ICD-10-CM

## 2017-04-12 PROCEDURE — 99213 OFFICE O/P EST LOW 20 MIN: CPT | Performed by: ORTHOPAEDIC SURGERY

## 2017-04-12 PROCEDURE — 72020 X-RAY EXAM OF SPINE 1 VIEW: CPT | Performed by: ORTHOPAEDIC SURGERY

## 2017-04-12 NOTE — PROGRESS NOTES
Doing well 3-1/2 months out with no new complaints strength and sensation are good on exam wound is well-healed balance appears good lateral x-ray suggests further solidification of the fusion compared to prior films.  She is doing well I will see her when necessary I told her to save her physical therapy for the ankle.

## 2017-04-14 ENCOUNTER — OFFICE VISIT (OUTPATIENT)
Dept: ORTHOPEDIC SURGERY | Facility: CLINIC | Age: 57
End: 2017-04-14

## 2017-04-14 DIAGNOSIS — S86.302A PERONEAL TENDON INJURY, LEFT, INITIAL ENCOUNTER: ICD-10-CM

## 2017-04-14 DIAGNOSIS — M72.2 PLANTAR FASCIITIS: ICD-10-CM

## 2017-04-14 PROCEDURE — 73721 MRI JNT OF LWR EXTRE W/O DYE: CPT | Performed by: ORTHOPAEDIC SURGERY

## 2017-04-17 ENCOUNTER — OFFICE VISIT (OUTPATIENT)
Dept: GASTROENTEROLOGY | Facility: CLINIC | Age: 57
End: 2017-04-17

## 2017-04-17 VITALS
DIASTOLIC BLOOD PRESSURE: 78 MMHG | HEIGHT: 63 IN | BODY MASS INDEX: 37.7 KG/M2 | WEIGHT: 212.8 LBS | SYSTOLIC BLOOD PRESSURE: 122 MMHG

## 2017-04-17 DIAGNOSIS — K21.9 GASTROESOPHAGEAL REFLUX DISEASE WITHOUT ESOPHAGITIS: ICD-10-CM

## 2017-04-17 DIAGNOSIS — R13.19 OTHER DYSPHAGIA: Primary | ICD-10-CM

## 2017-04-17 DIAGNOSIS — R10.13 DYSPEPSIA: ICD-10-CM

## 2017-04-17 PROCEDURE — 99213 OFFICE O/P EST LOW 20 MIN: CPT | Performed by: NURSE PRACTITIONER

## 2017-04-17 RX ORDER — SODIUM CHLORIDE 0.9 % (FLUSH) 0.9 %
1-10 SYRINGE (ML) INJECTION AS NEEDED
Status: CANCELLED | OUTPATIENT
Start: 2017-04-17

## 2017-04-17 RX ORDER — PANTOPRAZOLE SODIUM 40 MG/1
40 TABLET, DELAYED RELEASE ORAL
Qty: 180 TABLET | Refills: 4 | Status: SHIPPED | OUTPATIENT
Start: 2017-04-17 | End: 2017-09-22

## 2017-04-17 NOTE — PROGRESS NOTES
"Chief Complaint   Patient presents with   • Follow-up     Dyspepsia       Keri Nielson is a  56 y.o. female here for a follow up visit for dyspepsia and GERD sxs.    HPI    Patient of Dr. Rodriguez's last evaluated in the office in December 2016.  She is followed for history of dysphagia, dyspepsia, GERD and bilateral upper quadrant abdominal pain.  Workup thus far has included normal ultrasound of the gallbladder, lab work, and HIDA scan.  Prior EGD performed in April 2015 with findings of gastritis and dilation was performed at that time.  Colonoscopy performed at the same time with findings of diverticular disease and internal hemorrhoids.  Recall colonoscopy scheduled in 5 years from that time.  Maintained at her last office visit on Dexilant with Carafate and over-the-counter FD Zina which she stated improved her symptoms.  She does have some issue continuing the FD Zina on a consistent basis due to cost.  Her last office visit she was scheduled for cervical surgery and underwent that with Dr. Godwin in December.  She reports having been cleared after her cervical surgery back to regular activity.    Patient presents today with continued complaints of discomfort starting at the base of the esophagus and radiating into the epigastric region.  She states \"feels like a have a giant bubble here all the time\".  She reports a lot of upper intestinal belching and gas.  She has had fairly constant symptoms since 2013.  She can identify no specific triggers.  She was initially on a PPI it but became less effective and was placed on Dexilant which she was taking at her prior office visit in December.  She reports that the Dexilant became less effective and she was taken off of that in late March by her cardiologist.  Thinking that some of her symptoms could've been cardiac in nature the patient was evaluated by her cardiologist and underwent a cardiac catheter done approximately 2-3 weeks ago.  Findings were normal.  She " continues with some intermittent dysphagia primarily with pills and foods but not occurring with liquids.  She has no odynophagia and her weight has been stable.    Past Medical History:   Diagnosis Date   • Anemia    • Arthritis    • Atherosclerotic heart disease of native coronary artery with other forms of angina pectoris    • Bipolar disorder    • Carotid artery occlusion    • Cervical disc herniation    • Coronary artery disease    • Diverticular disease    • Dysphagia    • Gastric reflux    • Heart murmur    • History of transfusion    • Hyperlipidemia    • Hypertension    • Hypothyroidism    • Incontinence in female     wears pads   • Neck pain    • Obesity    • Past myocardial infarction    • Periodic limb movement disorder    • PONV (postoperative nausea and vomiting)    • Restless leg syndrome    • Sleep apnea     cpap   • Suicidal ideation 8/19/2016   • Swelling of ankle     right had doppler no s/s blood clot   • Tobacco abuse        Current Outpatient Prescriptions   Medication Sig Dispense Refill   • ARIPiprazole (ABILIFY) 5 MG tablet Take 5 mg by mouth Every Morning.     • aspirin 81 MG chewable tablet Chew 81 mg Daily.     • atenolol (TENORMIN) 25 MG tablet Take 25 mg by mouth Daily.     • cetirizine (zyrTEC) 10 MG tablet Take 10 mg by mouth Daily.     • citalopram (CeleXA) 40 MG tablet Take 40 mg by mouth Every Night.  2   • fluticasone (FLONASE) 50 MCG/ACT nasal spray 2 sprays into each nostril Daily.     • gabapentin (NEURONTIN) 300 MG capsule Take 300 mg by mouth 3 (Three) Times a Day.     • isosorbide mononitrate (IMDUR) 30 MG 24 hr tablet Take 30 mg by mouth Daily.     • LamoTRIgine ER (LAMICTAL XR) 300 MG tablet sustained-release 24 hour Take 1 tablet by mouth Daily.     • levothyroxine (SYNTHROID, LEVOTHROID) 100 MCG tablet Take 100 mcg by mouth Daily.     • nitroglycerin (NITROSTAT) 0.4 MG SL tablet Place 0.4 mg under the tongue Every 5 (Five) Minutes As Needed for Chest Pain (took at M.D   office at 0930 a.m.). Take no more than 3 doses in 15 minutes.     • pantoprazole (PROTONIX) 40 MG EC tablet Take 1 tablet by mouth 2 (Two) Times a Day Before Meals. 180 tablet 4   • pramipexole (MIRAPEX) 0.5 MG tablet Take 0.5 mg by mouth Every Night.     • simethicone (MYLICON) 80 MG chewable tablet Chew 80 mg As Needed for flatulence.     • simvastatin (ZOCOR) 20 MG tablet Take 20 mg by mouth Every Night.     • sucralfate (CARAFATE) 1 GM/10ML suspension Take 1 g by mouth 4 (Four) Times a Day.     • topiramate (TOPAMAX) 50 MG tablet Take 50 mg by mouth 2 (Two) Times a Day.       Current Facility-Administered Medications   Medication Dose Route Frequency Provider Last Rate Last Dose   • nitroglycerin (NITROSTAT) SL tablet 0.4 mg  0.4 mg Sublingual Q5 Min PRN DAPHNEY Driver   0.4 mg at 03/30/17 0923       PRN Meds:.•  nitroglycerin    No Known Allergies    Social History     Social History   • Marital status:      Spouse name: N/A   • Number of children: N/A   • Years of education: N/A     Occupational History   • Not on file.     Social History Main Topics   • Smoking status: Former Smoker     Packs/day: 1.50     Years: 20.00     Types: Cigarettes, Electronic Cigarette     Quit date: 2015   • Smokeless tobacco: Never Used      Comment: Electronic Cigarette/ 3 mg nicotine    • Alcohol use Yes      Comment: OCCASIONAL   • Drug use: No   • Sexual activity: Defer     Other Topics Concern   • Not on file     Social History Narrative       Family History   Problem Relation Age of Onset   • Diabetes type II Mother    • Hypertension Mother    • Osteoporosis Mother    • Seizures Mother    • COPD Father    • Hypertension Father    • Lung cancer Father    • Heart attack Father    • Bipolar disorder Other    • Heart disease Other    • Thyroid disease Other    • Thyroid disease Sister    • Hypertension Sister    • Bipolar disorder Sister    • Depression Sister    • No Known Problems Son    • Abnormal EKG Daughter  "   • Hypertension Daughter    • Bipolar disorder Daughter    • Thyroid disease Sister    • Hypertension Sister    • Bipolar disorder Sister    • Asthma Daughter    • Breast cancer Neg Hx    • Ovarian cancer Neg Hx    • Uterine cancer Neg Hx    • Colon cancer Neg Hx        Review of Systems   Constitutional: Negative for activity change, appetite change and unexpected weight change.   HENT: Positive for trouble swallowing.    Respiratory: Positive for choking.    Gastrointestinal: Positive for abdominal pain and nausea. Negative for abdominal distention and vomiting.       Vitals:    04/17/17 1310   BP: 122/78     /78  Ht 63\" (160 cm)  Wt 212 lb 12.8 oz (96.5 kg)  BMI 37.7 kg/m2  Physical Exam   Constitutional: She is oriented to person, place, and time. She appears well-developed and well-nourished. No distress.   HENT:   Head: Normocephalic and atraumatic.   Mouth/Throat: Oropharynx is clear and moist.   Neck: No tracheal deviation present. No thyromegaly present.   Cardiovascular: Normal rate and regular rhythm.    Pulmonary/Chest: Effort normal and breath sounds normal.   Abdominal: Soft. Bowel sounds are normal. She exhibits no distension.   Bilateral upper abd pain, under rib cage   Neurological: She is alert and oriented to person, place, and time.   Skin: Skin is warm and dry.   Psychiatric: She has a normal mood and affect.   Vitals reviewed.      ASSESSMENT AND PLAN    Keri was seen today for follow-up.    Diagnoses and all orders for this visit:    Other dysphagia  -     Case Request; Standing  -     sodium chloride 0.9 % flush 1-10 mL; Infuse 1-10 mL into a venous catheter As Needed for Line Care.  -     Case Request    Dyspepsia  -     Case Request; Standing  -     sodium chloride 0.9 % flush 1-10 mL; Infuse 1-10 mL into a venous catheter As Needed for Line Care.  -     Case Request    Gastroesophageal reflux disease without esophagitis    Other orders  -     Obtain informed consent; " Standing  -     Insert Peripheral IV; Standing  -     Saline Lock & Maintain IV Access; Standing  -     Verify Informed Consent; Standing  -     pantoprazole (PROTONIX) 40 MG EC tablet; Take 1 tablet by mouth 2 (Two) Times a Day Before Meals.    Current PPI  will be increased to twice daily.  She will continue Carafate.  She will be scheduled for EGD with Dr. Rodriguez for further evaluation.  FD Zina samples were provided to the patient along with a coupon.  She will follow-up with Dr. Rodriguez post-endoscopy as she has as her last office with him was October 2016.  If EGD findings are negative and patient's symptoms continue they will need to discuss additional treatment plan for ongoing symptoms.  She has been evaluated by the nurse practitioner both in December 2016 and today for ongoing complaints listed in HPI.

## 2017-04-19 ENCOUNTER — LAB (OUTPATIENT)
Dept: LAB | Facility: HOSPITAL | Age: 57
End: 2017-04-19

## 2017-04-19 ENCOUNTER — OFFICE VISIT (OUTPATIENT)
Dept: ORTHOPEDIC SURGERY | Facility: CLINIC | Age: 57
End: 2017-04-19

## 2017-04-19 VITALS — HEIGHT: 63 IN | WEIGHT: 212 LBS | TEMPERATURE: 97.9 F | BODY MASS INDEX: 37.56 KG/M2

## 2017-04-19 DIAGNOSIS — M84.375A STRESS FRACTURE OF CALCANEUS, LEFT, INITIAL ENCOUNTER: ICD-10-CM

## 2017-04-19 DIAGNOSIS — E55.9 VITAMIN D DEFICIENCY: ICD-10-CM

## 2017-04-19 DIAGNOSIS — E55.9 VITAMIN D DEFICIENCY: Primary | ICD-10-CM

## 2017-04-19 PROBLEM — M84.376A STRESS FRACTURE OF CALCANEUS: Status: ACTIVE | Noted: 2017-04-19

## 2017-04-19 LAB — 25(OH)D3 SERPL-MCNC: 17 NG/ML (ref 30–100)

## 2017-04-19 PROCEDURE — 82306 VITAMIN D 25 HYDROXY: CPT

## 2017-04-19 PROCEDURE — 36415 COLL VENOUS BLD VENIPUNCTURE: CPT

## 2017-04-19 PROCEDURE — 28400 CLTX CALCANEAL FX W/O MNPJ: CPT | Performed by: ORTHOPAEDIC SURGERY

## 2017-04-19 PROCEDURE — 99213 OFFICE O/P EST LOW 20 MIN: CPT | Performed by: ORTHOPAEDIC SURGERY

## 2017-04-19 NOTE — PROGRESS NOTES
"Ankle Follow Up      Patient: Keri Nielson    YOB: 1960 56 y.o. female    Chief Complaints: Heel hurts    History of Present Illness: Seen back today with 3-4 week history of stabbing pain in the plantar aspect of her left heel that was acutely worsened about 2 weeks ago when she felt a pop along the lateral aspect of her hindfoot with walking.  She was seen on 4/10/17 placed into a boot and crutches she has persistent complaints of plantar heel pain that is throbbing and stabbing worse with attempts at weightbearing.  HPI    ROS: Heel pain, no numbness or tingling  Past Medical History:   Diagnosis Date   • Anemia    • Arthritis    • Atherosclerotic heart disease of native coronary artery with other forms of angina pectoris    • Bipolar disorder    • Carotid artery occlusion    • Cervical disc herniation    • Coronary artery disease    • Diverticular disease    • Dysphagia    • Gastric reflux    • Heart murmur    • History of transfusion    • Hyperlipidemia    • Hypertension    • Hypothyroidism    • Incontinence in female     wears pads   • Neck pain    • Obesity    • Past myocardial infarction    • Periodic limb movement disorder    • PONV (postoperative nausea and vomiting)    • Restless leg syndrome    • Sleep apnea     cpap   • Suicidal ideation 8/19/2016   • Swelling of ankle     right had doppler no s/s blood clot   • Tobacco abuse        Physical Exam:   Vitals:    04/19/17 1107   Temp: 97.9 °F (36.6 °C)   Weight: 212 lb (96.2 kg)   Height: 63\" (160 cm)     Well developed with normal mood. Mild swelling to the left hindfoot discomfort palpation with medial lateral calcaneal compression mild discomfort plantarly.  Neutral dorsiflexion a heel cord nontender over the anterior ankle or dorsum of the midfoot.      Radiology: MRI films and report left hindfoot dated 4/14/17 were reviewed shows marrow edema in the calcaneal tuberosity Stephanie nondisplaced trabecular stress fracture no bony lesion " was identified and no extension of the cortical surface there is a plantar calcaneal spur was some thickening of the medial plantar fascia but no evidence of tendon tear really no surrounding soft tissue edema.      Assessment/Plan: Left calcaneal tuberosity stress fracture.  Reviewed treatment options with her today nothing to do from an operative standpoint.  We'll have her continue with her boot she may do touch down weightbearing only.  I've encouraged heel cord stretching exercises with a towel several times per day.  We'll also check a vitamin D level on her as this may need to be augmented.  Prescription was given for a scooter for use for the next 6 weeks.  I will see her back in 6 weeks' x-rays of her heel if she still having pain.

## 2017-04-21 ENCOUNTER — TELEPHONE (OUTPATIENT)
Dept: ORTHOPEDIC SURGERY | Facility: CLINIC | Age: 57
End: 2017-04-21

## 2017-04-21 NOTE — TELEPHONE ENCOUNTER
----- Message from Rufus Feldman MD sent at 4/19/2017  1:14 PM EDT -----  Please put her on D3 50,000 units weekly and recheck in 6 weeks thank you  ----- Message -----     From: Lab, Background User     Sent: 4/19/2017   1:13 PM       To: Rufus Feldman MD

## 2017-04-25 ENCOUNTER — TELEPHONE (OUTPATIENT)
Dept: ORTHOPEDIC SURGERY | Facility: CLINIC | Age: 57
End: 2017-04-25

## 2017-04-25 DIAGNOSIS — E55.9 VITAMIN D DEFICIENCY: Primary | ICD-10-CM

## 2017-04-26 RX ORDER — CHOLECALCIFEROL (VITAMIN D3) 1250 MCG
50000 CAPSULE ORAL
Qty: 6 CAPSULE | Refills: 0 | Status: SHIPPED | OUTPATIENT
Start: 2017-04-26 | End: 2017-05-10 | Stop reason: SDUPTHER

## 2017-04-26 NOTE — TELEPHONE ENCOUNTER
Patient returned my call.  Her serum vitamin D level is low at 17.0  Be prescribed a new prescription to [facility] at [phone] for vitamin D 3 50,000 units, #6, directions her to take 1 capsule weekly.  Patient also will need to have a repeat serum vitamin D level drawn the week of June 5 per Dr. Feldman

## 2017-04-26 NOTE — TELEPHONE ENCOUNTER
Have again left a message for the patient to contact me the office regarding her serum vitamin D level

## 2017-05-10 ENCOUNTER — TELEPHONE (OUTPATIENT)
Dept: ORTHOPEDIC SURGERY | Facility: CLINIC | Age: 57
End: 2017-05-10

## 2017-05-10 DIAGNOSIS — E55.9 VITAMIN D DEFICIENCY: ICD-10-CM

## 2017-05-10 RX ORDER — CHOLECALCIFEROL (VITAMIN D3) 1250 MCG
50000 CAPSULE ORAL
Qty: 3 CAPSULE | Refills: 0 | Status: SHIPPED | OUTPATIENT
Start: 2017-05-10 | End: 2017-05-25

## 2017-05-12 ENCOUNTER — HOSPITAL ENCOUNTER (OUTPATIENT)
Dept: SLEEP MEDICINE | Facility: HOSPITAL | Age: 57
Discharge: HOME | End: 2017-05-12
Admitting: INTERNAL MEDICINE

## 2017-05-12 PROCEDURE — G0463 HOSPITAL OUTPT CLINIC VISIT: HCPCS

## 2017-05-16 RX ORDER — SUCRALFATE 1 G/10ML
SUSPENSION ORAL
Qty: 1200 ML | Refills: 2 | Status: SHIPPED | OUTPATIENT
Start: 2017-05-16 | End: 2017-06-21

## 2017-05-19 ENCOUNTER — CLINICAL SUPPORT (OUTPATIENT)
Dept: ORTHOPEDIC SURGERY | Facility: CLINIC | Age: 57
End: 2017-05-19

## 2017-05-19 ENCOUNTER — TELEPHONE (OUTPATIENT)
Dept: ORTHOPEDIC SURGERY | Facility: CLINIC | Age: 57
End: 2017-05-19

## 2017-05-19 VITALS — HEIGHT: 64 IN | BODY MASS INDEX: 35.89 KG/M2 | TEMPERATURE: 98 F | WEIGHT: 210.2 LBS

## 2017-05-19 DIAGNOSIS — M17.0 ARTHRITIS OF BOTH KNEES: Primary | ICD-10-CM

## 2017-05-19 PROCEDURE — 20610 DRAIN/INJ JOINT/BURSA W/O US: CPT | Performed by: NURSE PRACTITIONER

## 2017-05-19 RX ORDER — BUPROPION HYDROCHLORIDE 150 MG/1
150 TABLET ORAL EVERY MORNING
COMMUNITY
Start: 2017-05-18 | End: 2017-11-21 | Stop reason: DRUGHIGH

## 2017-05-19 RX ADMIN — METHYLPREDNISOLONE ACETATE 80 MG: 80 INJECTION, SUSPENSION INTRA-ARTICULAR; INTRALESIONAL; INTRAMUSCULAR; SOFT TISSUE at 10:58

## 2017-05-19 RX ADMIN — BUPIVACAINE HYDROCHLORIDE 2 ML: 2.5 INJECTION, SOLUTION INFILTRATION; PERINEURAL at 10:58

## 2017-05-19 RX ADMIN — LIDOCAINE HYDROCHLORIDE 2 ML: 10 INJECTION, SOLUTION INFILTRATION; PERINEURAL at 10:55

## 2017-05-19 RX ADMIN — METHYLPREDNISOLONE ACETATE 80 MG: 80 INJECTION, SUSPENSION INTRA-ARTICULAR; INTRALESIONAL; INTRAMUSCULAR; SOFT TISSUE at 10:55

## 2017-05-19 RX ADMIN — LIDOCAINE HYDROCHLORIDE 2 ML: 10 INJECTION, SOLUTION INFILTRATION; PERINEURAL at 10:58

## 2017-05-19 RX ADMIN — BUPIVACAINE HYDROCHLORIDE 2 ML: 2.5 INJECTION, SOLUTION INFILTRATION; PERINEURAL at 10:55

## 2017-05-22 RX ORDER — METHYLPREDNISOLONE ACETATE 80 MG/ML
80 INJECTION, SUSPENSION INTRA-ARTICULAR; INTRALESIONAL; INTRAMUSCULAR; SOFT TISSUE
Status: COMPLETED | OUTPATIENT
Start: 2017-05-19 | End: 2017-05-19

## 2017-05-22 RX ORDER — BUPIVACAINE HYDROCHLORIDE 2.5 MG/ML
2 INJECTION, SOLUTION INFILTRATION; PERINEURAL
Status: COMPLETED | OUTPATIENT
Start: 2017-05-19 | End: 2017-05-19

## 2017-05-22 RX ORDER — LIDOCAINE HYDROCHLORIDE 10 MG/ML
2 INJECTION, SOLUTION INFILTRATION; PERINEURAL
Status: COMPLETED | OUTPATIENT
Start: 2017-05-19 | End: 2017-05-19

## 2017-05-23 ENCOUNTER — OFFICE VISIT (OUTPATIENT)
Dept: CARDIOLOGY | Facility: CLINIC | Age: 57
End: 2017-05-23

## 2017-05-23 VITALS
WEIGHT: 219 LBS | DIASTOLIC BLOOD PRESSURE: 84 MMHG | SYSTOLIC BLOOD PRESSURE: 120 MMHG | OXYGEN SATURATION: 98 % | HEIGHT: 63 IN | HEART RATE: 83 BPM | BODY MASS INDEX: 38.8 KG/M2

## 2017-05-23 DIAGNOSIS — I25.10 CORONARY ARTERY DISEASE INVOLVING NATIVE CORONARY ARTERY OF NATIVE HEART WITHOUT ANGINA PECTORIS: Primary | ICD-10-CM

## 2017-05-23 DIAGNOSIS — I10 ESSENTIAL HYPERTENSION: ICD-10-CM

## 2017-05-23 PROCEDURE — 99213 OFFICE O/P EST LOW 20 MIN: CPT | Performed by: INTERNAL MEDICINE

## 2017-05-26 ENCOUNTER — TELEPHONE (OUTPATIENT)
Dept: ORTHOPEDIC SURGERY | Facility: CLINIC | Age: 57
End: 2017-05-26

## 2017-05-31 ENCOUNTER — LAB (OUTPATIENT)
Dept: LAB | Facility: HOSPITAL | Age: 57
End: 2017-05-31

## 2017-05-31 ENCOUNTER — OFFICE VISIT (OUTPATIENT)
Dept: ORTHOPEDIC SURGERY | Facility: CLINIC | Age: 57
End: 2017-05-31

## 2017-05-31 VITALS — HEIGHT: 63 IN | TEMPERATURE: 98.1 F | BODY MASS INDEX: 37.21 KG/M2 | WEIGHT: 210 LBS

## 2017-05-31 DIAGNOSIS — E55.9 VITAMIN D DEFICIENCY: Primary | ICD-10-CM

## 2017-05-31 DIAGNOSIS — M84.375D: ICD-10-CM

## 2017-05-31 DIAGNOSIS — E55.9 VITAMIN D DEFICIENCY: ICD-10-CM

## 2017-05-31 LAB — 25(OH)D3 SERPL-MCNC: 32.6 NG/ML (ref 30–100)

## 2017-05-31 PROCEDURE — 36415 COLL VENOUS BLD VENIPUNCTURE: CPT

## 2017-05-31 PROCEDURE — 99024 POSTOP FOLLOW-UP VISIT: CPT | Performed by: ORTHOPAEDIC SURGERY

## 2017-05-31 PROCEDURE — 82306 VITAMIN D 25 HYDROXY: CPT

## 2017-06-02 ENCOUNTER — OFFICE VISIT (OUTPATIENT)
Dept: ORTHOPEDIC SURGERY | Facility: CLINIC | Age: 57
End: 2017-06-02

## 2017-06-02 VITALS — HEIGHT: 63 IN | WEIGHT: 210 LBS | BODY MASS INDEX: 37.21 KG/M2 | TEMPERATURE: 97.6 F

## 2017-06-02 DIAGNOSIS — M25.561 PAIN AND SWELLING OF KNEE, RIGHT: ICD-10-CM

## 2017-06-02 DIAGNOSIS — S89.91XA KNEE INJURY, RIGHT, INITIAL ENCOUNTER: Primary | ICD-10-CM

## 2017-06-02 DIAGNOSIS — M25.461 PAIN AND SWELLING OF KNEE, RIGHT: ICD-10-CM

## 2017-06-02 PROBLEM — M25.569 PAIN AND SWELLING OF KNEE: Status: ACTIVE | Noted: 2017-06-02

## 2017-06-02 PROBLEM — S89.90XA KNEE INJURY: Status: ACTIVE | Noted: 2017-06-02

## 2017-06-02 PROBLEM — M25.469 PAIN AND SWELLING OF KNEE: Status: ACTIVE | Noted: 2017-06-02

## 2017-06-02 PROCEDURE — 99213 OFFICE O/P EST LOW 20 MIN: CPT | Performed by: NURSE PRACTITIONER

## 2017-06-02 PROCEDURE — 73560 X-RAY EXAM OF KNEE 1 OR 2: CPT | Performed by: NURSE PRACTITIONER

## 2017-06-02 RX ORDER — HYDROCODONE BITARTRATE AND ACETAMINOPHEN 5; 325 MG/1; MG/1
TABLET ORAL
Qty: 45 TABLET | Refills: 0 | Status: SHIPPED | OUTPATIENT
Start: 2017-06-02 | End: 2017-09-27

## 2017-06-02 RX ORDER — ISOSORBIDE MONONITRATE 30 MG/1
TABLET, EXTENDED RELEASE ORAL
Qty: 30 TABLET | Refills: 6 | Status: SHIPPED | OUTPATIENT
Start: 2017-06-02 | End: 2017-06-21

## 2017-06-02 NOTE — PATIENT INSTRUCTIONS
[facility]    KNEE HOME EXERCISES      It is important that you maintain and possibly improve your strength and range of motion of your knee.      •  Walk frequently for short intervals  •  Using a cane or walker to allow you to walk safely if needed  •  Avoid sitting for long periods of time to avoid cramping and swelling of your leg   •  Do exercises either on a bed or in a chair.  •  If any of the exercises cause you pain, either eliminate that exercise or decrease          the motion or repetitions      Start exercises with 10 repetitions and increase to 30 repetitions.  Do two sets of each exercise each day    ANKLE PUMPS    1. Move your feet up and down as if you are pumping on a gas pedal       STRAIGHT LEG RAISES    1. Lie flat with your injured leg straight.  Keep the other leg bent.  2. Tighten the injured leg's thigh muscle.  3. Lift leg, with knee locked in the straight position no higher than the other knee for  seconds  4. Lower leg slowly. Rest for 5 seconds      SHORT ARC QUAD    1. Lie with both knees bent over a pillow  2. Straighten both knees  3. Hold 5 seconds  4. Bend knees back to starting position and repeat sequence       QUADRICEPS     1. Lie flat with your injured let straight.  Keep the other leg bent.  2. Tighten the injured leg's thigh muscle by trying to move your kneecap toward the  thigh.  3. Make your leg as stiff as you can.  4. Hold for 5 seconds and relax for 5 seconds        HAMSTRING STRETCH    1. Lie flat with your injured leg straight. Keep the other leg bent  2. Press your heel of your injured leg into the floor for 5 seconds       OR  1. Sit with injured leg out straight.   2. Loop a sheet around the ball of foot and toes.  3. Gently pull on the sheet, keeping the leg straight  4. Hold for 10-30 seconds      KNEE FLEXION-EXTENSION SITTING     1. Sit with injured let bent as shown  2. Straighten leg at the knee  3. Hold 5 seconds  4. Bend knee back  to starting position   5. Rest for 2-5 seconds and repeat sequence       HEEL SLIDES     1. Lie on back with legs straight  2. Slide heels toward buttocks  3. Return to starting position       HEEL SLIDS WITH SHEET    1. Lie on your back with a sheet wrapped around your foot  2. Pull on the sheet to assist you in bending your knee and sliding your heel toward  your buttocks  3. Hold for 5 seconds        KNEE FLEXION STRETCH    1. Sit in a chair  2. Bend bad knee back as far as you can   3. Hold stretch for 5-10 seconds      CHAIR PUSH UPS    1. Sit in a chair with arm rests  2. Place hands on armrests  3. Straighten arms, raising buttocks up off of chair         WALL SLIDES    1. Stand with your back and head against a wall.    2. Keep your feet shoulder width apart and 6-12 inches from the wall  3. Slowly slide down the wall until your knees are slightly bent.    4. Hold for 5 seconds and then slowly slide back up  5. As you get stronger, then you can slowly increase the bend in your knees, but do  not drop your buttocks below the level of your knees       STEP UPS    1. Stand with your foot on your injured leg on a 3-5 inch support (such as a block of  wood)  2. Place other foot on the floor  3. Shift your weight onto the foot on the block and try to straighten the knee and  raising the other foot off of the floor  4. Slowly lower your foot until only the heel touches the floor

## 2017-06-02 NOTE — PROGRESS NOTES
Patient Name: Keri Nielson   YOB: 1960  Referring Primary Care Physician: James Epley, APRN      Chief Complaint:    Chief Complaint   Patient presents with   • Right Knee - Follow-up    Knee injury and pain    Subjective:    HPI:   Keri Nielson is a pleasant 56 y.o. year old who presents today for evaluation of   Chief Complaint   Patient presents with   • Right Knee - Follow-up   . Was walking last week and felt right knee bend backwards and knee cap raise up with sudden severe pain, with swelling and pain since then not relieved by ibuprofen.      This problem is new to this examiner.     Medications:   Home Medications:  Current Outpatient Prescriptions on File Prior to Visit   Medication Sig   • ARIPiprazole (ABILIFY) 5 MG tablet Take 5 mg by mouth Every Morning.   • aspirin 81 MG chewable tablet Chew 81 mg Daily.   • atenolol (TENORMIN) 25 MG tablet Take 25 mg by mouth Daily.   • buPROPion XL (WELLBUTRIN XL) 150 MG 24 hr tablet    • CARAFATE 1 GM/10ML suspension TAKE 10 MILLILITERS BY MOUTH FOUR TIMES A DAY   • cetirizine (zyrTEC) 10 MG tablet Take 10 mg by mouth Daily.   • citalopram (CeleXA) 40 MG tablet Take 40 mg by mouth Every Night.   • fluticasone (FLONASE) 50 MCG/ACT nasal spray 2 sprays into each nostril Daily.   • gabapentin (NEURONTIN) 300 MG capsule Take 300 mg by mouth 3 (Three) Times a Day.   • isosorbide mononitrate (IMDUR) 30 MG 24 hr tablet Take 30 mg by mouth Daily.   • LamoTRIgine ER (LAMICTAL XR) 300 MG tablet sustained-release 24 hour Take 1 tablet by mouth Daily.   • levothyroxine (SYNTHROID, LEVOTHROID) 100 MCG tablet Take 100 mcg by mouth Daily.   • nitroglycerin (NITROSTAT) 0.4 MG SL tablet Place 0.4 mg under the tongue Every 5 (Five) Minutes As Needed for Chest Pain (took at M.D  office at 0930 a.m.). Take no more than 3 doses in 15 minutes.   • pantoprazole (PROTONIX) 40 MG EC tablet Take 1 tablet by mouth 2 (Two) Times a Day Before Meals.   • pramipexole  (MIRAPEX) 0.5 MG tablet Take 0.5 mg by mouth Every Night.   • simethicone (MYLICON) 80 MG chewable tablet Chew 80 mg As Needed for flatulence.   • simvastatin (ZOCOR) 20 MG tablet Take 20 mg by mouth Every Night.   • topiramate (TOPAMAX) 50 MG tablet Take 50 mg by mouth 2 (Two) Times a Day.     Current Facility-Administered Medications on File Prior to Visit   Medication   • nitroglycerin (NITROSTAT) SL tablet 0.4 mg     Current Medications:  Scheduled Meds:   Continuous Infusions:   PRN Meds:.•  nitroglycerin    I have reviewed the patient's medical history in detail and updated the computerized patient record.  Review and summarization of old records includes:    Past Medical History:   Diagnosis Date   • Abnormal vaginal bleeding    • Anemia    • Arthritis    • Atherosclerotic heart disease of native coronary artery with other forms of angina pectoris    • Bipolar I disorder, single manic episode     depressive d(NOS)   • Carotid artery occlusion    • Cervical disc herniation    • Coronary artery disease    • Difficulty swallowing    • Diverticular disease    • Dyspepsia    • Dysphagia    • Gastric reflux    • Heart murmur    • History of transfusion    • Hypercholesterolemia     goal less than 70 s/p MI   • Hyperlipidemia    • Hypertension    • Hypothyroidism    • Incontinence in female     wears pads   • Lightheadedness    • Neck pain    • Obesity    • Past myocardial infarction    • Periodic limb movement disorder    • PONV (postoperative nausea and vomiting)    • Restless leg syndrome    • Sleep apnea     cpap   • Suicidal ideation 8/19/2016   • Swelling of ankle     right had doppler no s/s blood clot   • Thyroid nodule    • Tobacco abuse         Past Surgical History:   Procedure Laterality Date   • ANTERIOR CERVICAL DISCECTOMY W/ FUSION N/A 12/29/2016    Procedure: C3-4 anterior cervical discectomy and fusion with Depuy micro plate, ALLOGRAFT C3-4, AND HARDWARE REMOVAL C4-7.;  Surgeon: Hema Godwin MD;   Location: [facility] MAIN OR;  Service:    • CARDIAC CATHETERIZATION N/A 3/30/2017    Procedure: Left Heart Cath;  Surgeon: Tracey Vargas MD;  Location: [facility] CATH INVASIVE LOCATION;  Service:    • CARDIAC CATHETERIZATION N/A 3/30/2017    Procedure: Coronary angiography;  Surgeon: Tracey Vargas MD;  Location:  [facility] CATH INVASIVE LOCATION;  Service:    • CARDIAC CATHETERIZATION N/A 3/30/2017    Procedure: Left ventriculography;  Surgeon: Tracey Vargas MD;  Location: [facility] CATH INVASIVE LOCATION;  Service:    • CARDIAC CATHETERIZATION  3/30/2017    Procedure: Functional Flow Barco;  Surgeon: Tracey Vargas MD;  Location: [facility] CATH INVASIVE LOCATION;  Service:    • CERVICAL BIOPSY  MMXVI    Dr. Jeffery.    • CERVICAL DISCECTOMY ANTERIOR  04/2013    C4-7   • CERVICAL FUSION  12/29/2016   • COLONOSCOPY  MMXV    Normal.  Dr. Rodriguez   • ENDOSCOPY  [facility]    Normal.  Dr. Rodriguez   • HARDWARE REMOVAL  12/29/2016    cervical   • NECK SURGERY  12/29/2016    fusion    • OTHER SURGICAL HISTORY Left 2015   • SHOULDER SURGERY Left     RCR   • TONSILLECTOMY     • TONSILLECTOMY AND ADENOIDECTOMY     • TUBAL ABDOMINAL LIGATION     • TYMPANOSTOMY TUBE PLACEMENT Right    • WRIST SURGERY Bilateral     carpal tunnel        Social History     Occupational History   • Not on file.     Social History Main Topics   • Smoking status: Former Smoker     Packs/day: 1.50     Years: 20.00     Types: Cigarettes, Electronic Cigarette     Quit date: 2015   • Smokeless tobacco: Never Used      Comment: Electronic Cigarette/ 3 mg nicotine    • Alcohol use Yes      Comment: OCCASIONAL   • Drug use: No   • Sexual activity: Defer      Social History     Social History Narrative        Family History   Problem Relation Age of Onset   • Diabetes type II Mother    • Hypertension Mother    • Osteoporosis Mother    • Seizures Mother    • COPD Father    • Hypertension Father    • Lung cancer Father    • Heart attack Father    • Bipolar disorder Other    •  Heart disease Other    • Thyroid disease Other    • Thyroid disease Sister    • Hypertension Sister    • Bipolar disorder Sister    • Depression Sister    • No Known Problems Son    • Abnormal EKG Daughter    • Hypertension Daughter    • Bipolar disorder Daughter    • Thyroid disease Sister    • Hypertension Sister    • Bipolar disorder Sister    • Asthma Daughter    • Breast cancer Neg Hx    • Ovarian cancer Neg Hx    • Uterine cancer Neg Hx    • Colon cancer Neg Hx        ROS: 14 point review of systems was performed and all other systems were reviewed and are negative except for documented findings in HPI and today's encounter.     Allergies: No Known Allergies  Constitutional:  Denies fever, shaking or chills   Eyes:  Denies change in visual acuity   HENT:  Denies nasal congestion or sore throat   Respiratory:  Denies cough or shortness of breath   Cardiovascular:  Denies chest pain or severe LE edema   GI:  Denies abdominal pain, nausea, vomiting, bloody stools or diarrhea   Musculoskeletal:  Numbness, tingling, pain, or loss of motor function only as noted above in history of present illness.  : Denies painful urination or hematuria  Integument:  Denies rash, lesion or ulceration   Neurologic:  Denies headache or focal weakness  Endocrine:  Denies lymphadenopathy  Psych:  Denies confusion or change in mental status   Hem:  Denies active bleeding    Objective:    Physical Exam: 56 y.o. female  Body mass index is 37.2 kg/(m^2).  Vitals:    06/02/17 0914   Temp: 97.6 °F (36.4 °C)     Vital signs reviewed.   General Appearance:    Alert, cooperative, in no acute distress                  Eyes: conjunctiva clear  ENT: external ears and nose atraumatic  CV: no peripheral edema  Resp: normal respiratory effort  Skin: no rashes or wounds; normal turgor  Psych: mood and affect appropriate  Lymph: no nodes appreciated  Neuro: gross sensation intact  Vascular:  Palpable peripheral pulse in noted  extremity  Musculoskeletal Extremities: GENERAL KNEE Exam: Affected knee(s): Painful gait with a subtle limp, positive for synovitis, swelling, joint effusion with crepitation. Lachman negative, Posterior drawer negative, Hamida's negative, Patellofemoral grind positive, Sensation grossly intact to light touch throughout the lower extremity, Skin is intact, Distal pulses are palpable, No signs or symptoms of DVT Medial and lateral collateral ligament tenderness with manipulation but no instability noted.     Radiology:   Imaging done today and discussed at length with the patient:    Indication: pain related symptoms,  Views: 3V AP, LAT & 40 degree PA right knee(s)   Findings: advanced arthritis  Comparison views: viewed last xray done in the office.       Assessment:     ICD-10-CM ICD-9-CM   1. Knee injury, right, initial encounter S89.91XA 959.7   2. Pain and swelling of knee, right M25.561 719.46    M25.461         Procedures       Plan: Biomechanics of pertinent body area discussed.  Risks, benefits, alternatives, comparisons, and complications of accepted medicines, injections, recommendations, surgical procedures, and therapies explained and education provided in laymen's terms. The patient was given the opportunity to ask questions and they were answerved to their satisfaction.   Natural history and expected course discussed. Questions answered.  Advice on benefits of, and types of regular/moderate exercise.  Lifestyle measures for weight loss with biomechanical explanations for weight loss and how this affects orthopedic condition.  Medications per orders; safety, precautions, and warnings given.  PT referral.  15 min spent face to face with patient >10 min spent counseling about natural history and expected course of assessed complaint. Questions answered.  Compression/elastic brace/support sleeve.   Rest, ice, compression, and elevation (RICE) therapy.  Discussed Narcotic pain medicine with addiction  precautions and weaning instructions, pt verb understanding of instructions and signed narcotic contract verifying agreement to correct use of narcotics.   Cannot take NSAIDS or tramadol will give limited rx for norco for acute pain following injury.     6/2/2017  HOLA

## 2017-06-08 ENCOUNTER — TELEPHONE (OUTPATIENT)
Dept: ORTHOPEDIC SURGERY | Facility: CLINIC | Age: 57
End: 2017-06-08

## 2017-06-08 DIAGNOSIS — E55.9 VITAMIN D DEFICIENCY: Primary | ICD-10-CM

## 2017-06-08 RX ORDER — CHOLECALCIFEROL (VITAMIN D3) 1250 MCG
50000 CAPSULE ORAL
Qty: 4 CAPSULE | Refills: 1 | Status: SHIPPED | OUTPATIENT
Start: 2017-06-08 | End: 2017-07-14

## 2017-06-08 NOTE — TELEPHONE ENCOUNTER
----- Message from Rufus Feldman MD sent at 6/1/2017  9:26 AM EDT -----  Please keep her on 50k of D3 weekly and recheck in 6 weeks  ----- Message -----     From: Lab, Background User     Sent: 5/31/2017   1:36 PM       To: Rufus Feldman MD

## 2017-06-08 NOTE — TELEPHONE ENCOUNTER
Serum vitamin D level is 32.6.  Dr. Feldman is recommending that she remain on the vitamin D 3 50,000 units once a week for the next 6 weeks.  Patient states that her insurance will only cover a month at a time.  Therefore sent in a new prescription to [facility] at [phone] for vitamin D 3 50,000 units, #4, directions her to take 1 capsule every week and 1 refill per Dr. Feldman patient also will need to have a repeat serum vitamin D level done the week of July 17

## 2017-06-08 NOTE — TELEPHONE ENCOUNTER
Patient wanted to know if she was allowed to remove her boot to swim area did will check with Dr. Feldman and get back with the patient

## 2017-06-12 ENCOUNTER — TELEPHONE (OUTPATIENT)
Dept: ORTHOPEDIC SURGERY | Facility: CLINIC | Age: 57
End: 2017-06-12

## 2017-06-20 ENCOUNTER — OFFICE VISIT (OUTPATIENT)
Dept: FAMILY MEDICINE CLINIC | Facility: CLINIC | Age: 57
End: 2017-06-20

## 2017-06-20 VITALS
TEMPERATURE: 98.3 F | WEIGHT: 228 LBS | SYSTOLIC BLOOD PRESSURE: 118 MMHG | BODY MASS INDEX: 40.4 KG/M2 | HEIGHT: 63 IN | OXYGEN SATURATION: 98 % | HEART RATE: 69 BPM | DIASTOLIC BLOOD PRESSURE: 72 MMHG

## 2017-06-20 DIAGNOSIS — E55.9 VITAMIN D DEFICIENCY: ICD-10-CM

## 2017-06-20 DIAGNOSIS — N28.9 RENAL INSUFFICIENCY: ICD-10-CM

## 2017-06-20 DIAGNOSIS — Z11.59 NEED FOR HEPATITIS C SCREENING TEST: ICD-10-CM

## 2017-06-20 DIAGNOSIS — R23.9 UNSPECIFIED SKIN CHANGES: ICD-10-CM

## 2017-06-20 DIAGNOSIS — R63.5 WEIGHT GAIN, ABNORMAL: ICD-10-CM

## 2017-06-20 DIAGNOSIS — I10 ESSENTIAL HYPERTENSION: Primary | ICD-10-CM

## 2017-06-20 PROCEDURE — 99213 OFFICE O/P EST LOW 20 MIN: CPT | Performed by: NURSE PRACTITIONER

## 2017-06-20 RX ORDER — SIMVASTATIN 20 MG
20 TABLET ORAL NIGHTLY
Qty: 30 TABLET | Refills: 5 | Status: SHIPPED | OUTPATIENT
Start: 2017-06-20 | End: 2018-09-24 | Stop reason: SDUPTHER

## 2017-06-20 RX ORDER — ATENOLOL 25 MG/1
25 TABLET ORAL DAILY
Qty: 30 TABLET | Refills: 5 | Status: SHIPPED | OUTPATIENT
Start: 2017-06-20 | End: 2017-11-21 | Stop reason: SDUPTHER

## 2017-06-20 NOTE — PROGRESS NOTES
Subjective   Keri Nielson is a 57 y.o. female.     HPI Comments:     Specialist  Pulmonary Dr. Vallejo    Cardiology Dr. Becker    Psychiatry Dr Villanueva    Orthopedic foot specialist Dr. Padgett    Patient's here for follow-up blood pressures controlled these refill the Tylenol  For hyperlipidemia need Zocor refilled    Recent stress fracture left calcaneus improvement with MRI  Dr. Padgett  presently managing patient's wearing a boot  Significantly limits her mobility  Walking with a cane    Presently getting physical therapy for osteoporotic is right knee chronic pain  She's been trying to eat better lose weight she is interested in physical therapy with [facility] with a weight loss program requesting referral      Present weight 228 with splint    Patient has some chronic borderline renal insufficiency apparent on her labs  She should not take anti-inflammatory's  Hydration him a 64 ounces a day we discussed her labs  She has been taking ibuprofen sometimes  Warned her against this again, will refer to nephrology for further evaluation  Her test are quite borderline but she does have chronic hypertension which is known to cause renal insufficiency       The following portions of the patient's history were reviewed and updated as appropriate: allergies, current medications, past medical history, past social history, past surgical history and problem list.    Review of Systems   Constitutional: Negative.    HENT: Negative.    Eyes: Negative.    Respiratory: Negative.    Genitourinary: Negative.    Musculoskeletal: Positive for arthralgias.   Hematological: Negative.    Psychiatric/Behavioral: The patient is nervous/anxious.        Objective   Physical Exam   Constitutional: She is oriented to person, place, and time. She appears well-developed and well-nourished.   HENT:   Head: Normocephalic.   Nose: Nose normal.   Mouth/Throat: Oropharynx is clear and moist.   Tm clear noemi no mass canal patent without d/c   Eyes:  Conjunctivae are normal. Pupils are equal, round, and reactive to light. No scleral icterus.   Neck: Neck supple. No JVD present. No thyromegaly present.   Cardiovascular: Normal rate, regular rhythm and normal heart sounds.  Exam reveals no gallop and no friction rub.    No murmur heard.  Pulmonary/Chest: Effort normal and breath sounds normal. No stridor. No respiratory distress. She has no wheezes. She has no rales.   Abdominal: Soft. Bowel sounds are normal. She exhibits no distension. There is no tenderness.   No hepatosplenomegaly, no ascites,   Musculoskeletal: She exhibits no edema or tenderness.   Lymphadenopathy:     She has no cervical adenopathy.   Neurological: She is alert and oriented to person, place, and time. She has normal reflexes.   Skin: Skin is warm and dry. No rash noted. No erythema.   Psychiatric: She has a normal mood and affect. Her behavior is normal. Judgment and thought content normal.   Vitals reviewed.      Assessment/Plan   Keri was seen today for follow-up.    Diagnoses and all orders for this visit:    Essential hypertension    Vitamin D deficiency    Weight gain, abnormal    Renal insufficiency  -     Ambulatory Referral to Nephrology  -     Basic Metabolic Panel  -     Hepatitis C Antibody    Unspecified skin changes  -     Ambulatory Referral to Dermatology    Need for hepatitis C screening test  -     Basic Metabolic Panel  -     Hepatitis C Antibody    Other orders  -     simvastatin (ZOCOR) 20 MG tablet; Take 1 tablet by mouth Every Night.  -     atenolol (TENORMIN) 25 MG tablet; Take 1 tablet by mouth Daily. (Patient taking differently: Take 25 mg by mouth Every Morning.)                  Avoid NSAIDs  Need proper hydration  Discharge instructions  Very happy with U progress    See referrals    Calories  [id number] impressions quite stable doing well

## 2017-06-20 NOTE — PATIENT INSTRUCTIONS
Discharge instructions  Very happy with U progress    See referrals    Calories  [phone]

## 2017-06-21 LAB
BUN SERPL-MCNC: 15 MG/DL (ref 6–20)
BUN/CREAT SERPL: 14.3 (ref 7–25)
CALCIUM SERPL-MCNC: 9.3 MG/DL (ref 8.6–10.5)
CHLORIDE SERPL-SCNC: 101 MMOL/L (ref 98–107)
CO2 SERPL-SCNC: 25.3 MMOL/L (ref 22–29)
CREAT SERPL-MCNC: 1.05 MG/DL (ref 0.57–1)
GLUCOSE SERPL-MCNC: 98 MG/DL (ref 65–99)
HCV AB S/CO SERPL IA: <0.1 S/CO RATIO (ref 0–0.9)
POTASSIUM SERPL-SCNC: 4.7 MMOL/L (ref 3.5–5.2)
SODIUM SERPL-SCNC: 143 MMOL/L (ref 136–145)

## 2017-06-21 RX ORDER — SUCRALFATE ORAL 1 G/10ML
1 SUSPENSION ORAL 4 TIMES DAILY
COMMUNITY
End: 2017-09-27 | Stop reason: SDUPTHER

## 2017-06-22 ENCOUNTER — ANESTHESIA (OUTPATIENT)
Dept: GASTROENTEROLOGY | Facility: HOSPITAL | Age: 57
End: 2017-06-22

## 2017-06-22 ENCOUNTER — ANESTHESIA EVENT (OUTPATIENT)
Dept: GASTROENTEROLOGY | Facility: HOSPITAL | Age: 57
End: 2017-06-22

## 2017-06-22 ENCOUNTER — HOSPITAL ENCOUNTER (OUTPATIENT)
Facility: HOSPITAL | Age: 57
Setting detail: HOSPITAL OUTPATIENT SURGERY
Discharge: HOME | End: 2017-06-22
Attending: INTERNAL MEDICINE | Admitting: INTERNAL MEDICINE

## 2017-06-22 VITALS
HEIGHT: 63 IN | DIASTOLIC BLOOD PRESSURE: 90 MMHG | RESPIRATION RATE: 16 BRPM | WEIGHT: 220 LBS | TEMPERATURE: 98.1 F | SYSTOLIC BLOOD PRESSURE: 122 MMHG | HEART RATE: 62 BPM | BODY MASS INDEX: 38.98 KG/M2 | OXYGEN SATURATION: 94 %

## 2017-06-22 DIAGNOSIS — R13.19 OTHER DYSPHAGIA: ICD-10-CM

## 2017-06-22 DIAGNOSIS — R10.13 DYSPEPSIA: ICD-10-CM

## 2017-06-22 PROCEDURE — 88305 TISSUE EXAM BY PATHOLOGIST: CPT | Performed by: INTERNAL MEDICINE

## 2017-06-22 PROCEDURE — 88312 SPECIAL STAINS GROUP 1: CPT | Performed by: INTERNAL MEDICINE

## 2017-06-22 PROCEDURE — 25010000002 PROPOFOL 10 MG/ML EMULSION: Performed by: NURSE ANESTHETIST, CERTIFIED REGISTERED

## 2017-06-22 PROCEDURE — 87081 CULTURE SCREEN ONLY: CPT | Performed by: INTERNAL MEDICINE

## 2017-06-22 PROCEDURE — 88342 IMHCHEM/IMCYTCHM 1ST ANTB: CPT | Performed by: INTERNAL MEDICINE

## 2017-06-22 RX ORDER — PROPOFOL 10 MG/ML
VIAL (ML) INTRAVENOUS AS NEEDED
Status: DISCONTINUED | OUTPATIENT
Start: 2017-06-22 | End: 2017-06-22 | Stop reason: SURG

## 2017-06-22 RX ORDER — SODIUM CHLORIDE, SODIUM LACTATE, POTASSIUM CHLORIDE, CALCIUM CHLORIDE 600; 310; 30; 20 MG/100ML; MG/100ML; MG/100ML; MG/100ML
1000 INJECTION, SOLUTION INTRAVENOUS CONTINUOUS PRN
Status: DISCONTINUED | OUTPATIENT
Start: 2017-06-22 | End: 2017-06-22 | Stop reason: HOSPADM

## 2017-06-22 RX ORDER — PROPOFOL 10 MG/ML
VIAL (ML) INTRAVENOUS CONTINUOUS PRN
Status: DISCONTINUED | OUTPATIENT
Start: 2017-06-22 | End: 2017-06-22 | Stop reason: SURG

## 2017-06-22 RX ORDER — LIDOCAINE HYDROCHLORIDE 20 MG/ML
INJECTION, SOLUTION INFILTRATION; PERINEURAL AS NEEDED
Status: DISCONTINUED | OUTPATIENT
Start: 2017-06-22 | End: 2017-06-22 | Stop reason: SURG

## 2017-06-22 RX ORDER — SODIUM CHLORIDE 0.9 % (FLUSH) 0.9 %
1-10 SYRINGE (ML) INJECTION AS NEEDED
Status: DISCONTINUED | OUTPATIENT
Start: 2017-06-22 | End: 2017-06-22 | Stop reason: HOSPADM

## 2017-06-22 RX ADMIN — LIDOCAINE HYDROCHLORIDE 50 MG: 20 INJECTION, SOLUTION INFILTRATION; PERINEURAL at 14:18

## 2017-06-22 RX ADMIN — PROPOFOL 100 MG: 10 INJECTION, EMULSION INTRAVENOUS at 14:18

## 2017-06-22 RX ADMIN — SODIUM CHLORIDE, POTASSIUM CHLORIDE, SODIUM LACTATE AND CALCIUM CHLORIDE 1000 ML: 600; 310; 30; 20 INJECTION, SOLUTION INTRAVENOUS at 13:42

## 2017-06-22 RX ADMIN — PROPOFOL 100 MCG/KG/MIN: 10 INJECTION, EMULSION INTRAVENOUS at 14:18

## 2017-06-22 RX ADMIN — SODIUM CHLORIDE, POTASSIUM CHLORIDE, SODIUM LACTATE AND CALCIUM CHLORIDE: 600; 310; 30; 20 INJECTION, SOLUTION INTRAVENOUS at 14:15

## 2017-06-22 NOTE — ANESTHESIA PREPROCEDURE EVALUATION
Anesthesia Evaluation     Patient summary reviewed and Nursing notes reviewed   NPO Solid Status: > 8 hours       Airway   Mallampati: III  TM distance: >3 FB  Neck ROM: full  possible difficult intubation  Dental - normal exam     Pulmonary - normal exam   (+) a smoker Current, sleep apnea,   Cardiovascular - normal exam    Patient on routine beta blocker    (+) hypertension, valvular problems/murmurs murmur, CAD, hyperlipidemia  (-) angina, orthopnea, PND, [place]      Neuro/Psych  (+) psychiatric history Bipolar,    GI/Hepatic/Renal/Endo    (+) obesity,  GERD, hypothyroidism,     Musculoskeletal     (+) neck pain,   Abdominal  - normal exam   Substance History - negative use     OB/GYN negative ob/gyn ROS         Other   (+) arthritis                                 Anesthesia Plan    ASA 3     MAC     Anesthetic plan and risks discussed with patient.

## 2017-06-22 NOTE — PLAN OF CARE
Problem: Patient Care Overview (Adult)  Goal: Plan of Care Review  Outcome: Outcome(s) achieved Date Met:  06/22/17  Goal: Adult Individualization and Mutuality  Outcome: Ongoing (interventions implemented as appropriate)  Goal: Discharge Needs Assessment  Outcome: Ongoing (interventions implemented as appropriate)    Problem: GI Endoscopy (Adult)  Goal: Signs and Symptoms of Listed Potential Problems Will be Absent or Manageable (GI Endoscopy)  Outcome: Ongoing (interventions implemented as appropriate)

## 2017-06-22 NOTE — H&P
"Chief Complaint   Patient presents with   • Follow-up       Dyspepsia         Keri Nielson is a 56 y.o. female here for a follow up visit for dyspepsia and GERD sxs.     HPI     Patient of Dr. Rodriguez's last evaluated in the office in December 2016. She is followed for history of dysphagia, dyspepsia, GERD and bilateral upper quadrant abdominal pain. Workup thus far has included normal ultrasound of the gallbladder, lab work, and HIDA scan. Prior EGD performed in April 2015 with findings of gastritis and dilation was performed at that time. Colonoscopy performed at the same time with findings of diverticular disease and internal hemorrhoids. Recall colonoscopy scheduled in 5 years from that time. Maintained at her last office visit on Dexilant with Carafate and over-the-counter FD Zina which she stated improved her symptoms. She does have some issue continuing the FD Zina on a consistent basis due to cost. Her last office visit she was scheduled for cervical surgery and underwent that with Dr. Godwin in December. She reports having been cleared after her cervical surgery back to regular activity.     Patient presents today with continued complaints of discomfort starting at the base of the esophagus and radiating into the epigastric region. She states \"feels like a have a giant bubble here all the time\". She reports a lot of upper intestinal belching and gas. She has had fairly constant symptoms since 2013. She can identify no specific triggers. She was initially on a PPI it but became less effective and was placed on Dexilant which she was taking at her prior office visit in December. She reports that the Dexilant became less effective and she was taken off of that in late March by her cardiologist. Thinking that some of her symptoms could've been cardiac in nature the patient was evaluated by her cardiologist and underwent a cardiac catheter done approximately 2-3 weeks ago. Findings were normal.  She continues " with some intermittent dysphagia primarily with pills and foods but not occurring with liquids. She has no odynophagia and her weight has been stable.      Medical History         Past Medical History:   Diagnosis Date   • Anemia     • Arthritis     • Atherosclerotic heart disease of native coronary artery with other forms of angina pectoris     • Bipolar disorder     • Carotid artery occlusion     • Cervical disc herniation     • Coronary artery disease     • Diverticular disease     • Dysphagia     • Gastric reflux     • Heart murmur     • History of transfusion     • Hyperlipidemia     • Hypertension     • Hypothyroidism     • Incontinence in female       wears pads   • Neck pain     • Obesity     • Past myocardial infarction     • Periodic limb movement disorder     • PONV (postoperative nausea and vomiting)     • Restless leg syndrome     • Sleep apnea       cpap   • Suicidal ideation 8/19/2016   • Swelling of ankle       right had doppler no s/s blood clot   • Tobacco abuse               Current Medications           Current Outpatient Prescriptions   Medication Sig Dispense Refill   • ARIPiprazole (ABILIFY) 5 MG tablet Take 5 mg by mouth Every Morning.       • aspirin 81 MG chewable tablet Chew 81 mg Daily.       • atenolol (TENORMIN) 25 MG tablet Take 25 mg by mouth Daily.       • cetirizine (zyrTEC) 10 MG tablet Take 10 mg by mouth Daily.       • citalopram (CeleXA) 40 MG tablet Take 40 mg by mouth Every Night.   2   • fluticasone (FLONASE) 50 MCG/ACT nasal spray 2 sprays into each nostril Daily.       • gabapentin (NEURONTIN) 300 MG capsule Take 300 mg by mouth 3 (Three) Times a Day.       • isosorbide mononitrate (IMDUR) 30 MG 24 hr tablet Take 30 mg by mouth Daily.       • LamoTRIgine ER (LAMICTAL XR) 300 MG tablet sustained-release 24 hour Take 1 tablet by mouth Daily.       • levothyroxine (SYNTHROID, LEVOTHROID) 100 MCG tablet Take 100 mcg by mouth Daily.       • nitroglycerin (NITROSTAT) 0.4 MG SL  tablet Place 0.4 mg under the tongue Every 5 (Five) Minutes As Needed for Chest Pain (took at M.D office at 0930 a.m.). Take no more than 3 doses in 15 minutes.       • pantoprazole (PROTONIX) 40 MG EC tablet Take 1 tablet by mouth 2 (Two) Times a Day Before Meals. 180 tablet 4   • pramipexole (MIRAPEX) 0.5 MG tablet Take 0.5 mg by mouth Every Night.       • simethicone (MYLICON) 80 MG chewable tablet Chew 80 mg As Needed for flatulence.       • simvastatin (ZOCOR) 20 MG tablet Take 20 mg by mouth Every Night.       • sucralfate (CARAFATE) 1 GM/10ML suspension Take 1 g by mouth 4 (Four) Times a Day.       • topiramate (TOPAMAX) 50 MG tablet Take 50 mg by mouth 2 (Two) Times a Day.                    Current Facility-Administered Medications   Medication Dose Route Frequency Provider Last Rate Last Dose   • nitroglycerin (NITROSTAT) SL tablet 0.4 mg 0.4 mg Sublingual Q5 Min PRN DAPHNEY Driver    0.4 mg at 03/30/17 0923            PRN Meds:.• nitroglycerin     No Known Allergies      Social History    Social History            Social History   • Marital status:        Spouse name: N/A   • Number of children: N/A   • Years of education: N/A          Occupational History   • Not on file.              Social History Main Topics   • Smoking status: Former Smoker       Packs/day: 1.50       Years: 20.00       Types: Cigarettes, Electronic Cigarette       Quit date: 2015   • Smokeless tobacco: Never Used         Comment: Electronic Cigarette/ 3 mg nicotine    • Alcohol use Yes          Comment: OCCASIONAL   • Drug use: No   • Sexual activity: Defer           Other Topics Concern   • Not on file      Social History Narrative                  Family History   Problem Relation Age of Onset   • Diabetes type II Mother     • Hypertension Mother     • Osteoporosis Mother     • Seizures Mother     • COPD Father     • Hypertension Father     • Lung cancer Father     • Heart attack Father     • Bipolar disorder Other  "    • Heart disease Other     • Thyroid disease Other     • Thyroid disease Sister     • Hypertension Sister     • Bipolar disorder Sister     • Depression Sister     • No Known Problems Son     • Abnormal EKG Daughter     • Hypertension Daughter     • Bipolar disorder Daughter     • Thyroid disease Sister     • Hypertension Sister     • Bipolar disorder Sister     • Asthma Daughter     • Breast cancer Neg Hx     • Ovarian cancer Neg Hx     • Uterine cancer Neg Hx     • Colon cancer Neg Hx           Review of Systems   Constitutional: Negative for activity change, appetite change and unexpected weight change.   HENT: Positive for trouble swallowing.   Respiratory: Positive for choking.   Gastrointestinal: Positive for abdominal pain and nausea. Negative for abdominal distention and vomiting.             Vitals:     04/17/17 1310   BP: 122/78      /78  Ht 63\" (160 cm)  Wt 212 lb 12.8 oz (96.5 kg)  BMI 37.7 kg/m2  Physical Exam   Constitutional: She is oriented to person, place, and time. She appears well-developed and well-nourished. No distress.   HENT:   Head: Normocephalic and atraumatic.   Mouth/Throat: Oropharynx is clear and moist.   Neck: No tracheal deviation present. No thyromegaly present.   Cardiovascular: Normal rate and regular rhythm.   Pulmonary/Chest: Effort normal and breath sounds normal.   Abdominal: Soft. Bowel sounds are normal. She exhibits no distension.   Bilateral upper abd pain, under rib cage   Neurological: She is alert and oriented to person, place, and time.   Skin: Skin is warm and dry.   Psychiatric: She has a normal mood and affect.   Vitals reviewed.        ASSESSMENT AND PLAN     Keri was seen today for follow-up.     Diagnoses and all orders for this visit:     Other dysphagia  - Case Request; Standing  - sodium chloride 0.9 % flush 1-10 mL; Infuse 1-10 mL into a venous catheter As Needed for Line Care.  - Case Request     Dyspepsia  - Case Request; Standing  - sodium " chloride 0.9 % flush 1-10 mL; Infuse 1-10 mL into a venous catheter As Needed for Line Care.  - Case Request     Gastroesophageal reflux disease without esophagitis     Other orders  - Obtain informed consent; Standing  - Insert Peripheral IV; Standing  - Saline Lock & Maintain IV Access; Standing  - Verify Informed Consent; Standing  - pantoprazole (PROTONIX) 40 MG EC tablet; Take 1 tablet by mouth 2 (Two) Times a Day Before Meals.     Current PPI will be increased to twice daily. She will continue Carafate. She will be scheduled for EGD with Dr. Rodriguez for further evaluation. FD Zina samples were provided to the patient along with a coupon. She will follow-up with Dr. Rodriguez post-endoscopy as she has as her last office with him was October 2016. If EGD findings are negative and patient's symptoms continue they will need to discuss additional treatment plan for ongoing symptoms. She has been evaluated by the nurse practitioner both in December 2016 and today for ongoing complaints listed in HPI.     6/22/17 - No change from the above H and P. Eduar Rodriguez M.D.

## 2017-06-22 NOTE — DISCHARGE INSTRUCTIONS
For the next 24 hours patient needs to be with a responsible adult.    For 24 hours DO NOT drive, operate machinery, appliances, drink alcohol, make important decisions or sign legal documents.    Start with a light or bland diet and advance to regular diet as tolerated.    Follow recommendations on procedure report provided by your doctor.    Call Dr Rodriguez for problems [phone]    Problems may include but not limited to: large amounts of bleeding, trouble breathing, repeated vomiting, severe unrelieved pain, fever or chills.

## 2017-06-23 LAB — UREASE TISS QL: NEGATIVE

## 2017-06-26 LAB
CYTO UR: NORMAL
LAB AP CASE REPORT: NORMAL
Lab: NORMAL
PATH REPORT.ADDENDUM SPEC: NORMAL
PATH REPORT.FINAL DX SPEC: NORMAL
PATH REPORT.GROSS SPEC: NORMAL

## 2017-06-26 NOTE — PROGRESS NOTES
Tell her that pathology from the EGD looked okay.  I hope that she can swallow better since I did the esophageal dilation.

## 2017-06-28 ENCOUNTER — OFFICE VISIT (OUTPATIENT)
Dept: ORTHOPEDIC SURGERY | Facility: CLINIC | Age: 57
End: 2017-06-28

## 2017-06-28 VITALS — TEMPERATURE: 98.9 F | BODY MASS INDEX: 38.98 KG/M2 | WEIGHT: 220 LBS | HEIGHT: 63 IN

## 2017-06-28 DIAGNOSIS — E55.9 VITAMIN D DEFICIENCY: ICD-10-CM

## 2017-06-28 DIAGNOSIS — M84.375D: Primary | ICD-10-CM

## 2017-06-28 PROCEDURE — 99024 POSTOP FOLLOW-UP VISIT: CPT | Performed by: ORTHOPAEDIC SURGERY

## 2017-06-28 PROCEDURE — 73650 X-RAY EXAM OF HEEL: CPT | Performed by: ORTHOPAEDIC SURGERY

## 2017-06-28 NOTE — PROGRESS NOTES
"Ankle Follow Up      Patient: Keri Nielson    YOB: 1960 57 y.o. female    Chief Complaints: Heel hurts    History of Present Illness: Follows up left calcaneal stress fracture diagnosed 4/19/17.  Last seen on 5/31/17.  Since then she has been using her boot and her cane with complaints of mild to moderate intermittent aching pain in the plantar aspect of the left heel.    Since her last visit she has had her vitamin D level rechecked is now taking 50,000 units of D3 once a week.  HPI    ROS: ankle pain  Past Medical History:   Diagnosis Date   • Abnormal vaginal bleeding    • Anemia    • Arthritis    • Atherosclerotic heart disease of native coronary artery with other forms of angina pectoris    • Bipolar I disorder, single manic episode     depressive d(NOS)   • Cervical disc herniation    • Coronary artery disease    • Depression     NO SUICIDAL PLANS   • Difficulty swallowing    • Diverticular disease    • Dyspepsia    • Dysphagia    • Gastric reflux    • Heart murmur    • History of transfusion    • Hyperlipidemia    • Hypertension    • Hypothyroidism    • Incontinence in female     wears pads   • Lightheadedness    • Neck pain    • Obesity    • Past myocardial infarction 05/2000   • Periodic limb movement disorder    • Restless leg syndrome    • Sleep apnea     cpap   • Stress fracture     LEFT HEEL   • Suicidal ideation 08/19/2016   • Swelling of ankle     right had doppler no s/s blood clot   • Thyroid nodule        Physical Exam:   Vitals:    06/28/17 1152   Temp: 98.9 °F (37.2 °C)   Weight: 220 lb (99.8 kg)   Height: 63\" (160 cm)     Well developed with normal mood.Minimal swelling left hindfoot.  Mild to moderate discomfort with medial lateral calcaneal compression and 5 out of 5 plantarflexion strength without discomfort along the Achilles or its insertion.  Neutral dorsiflexion a heel cord.  Grossly sensate light touch throughout the foot.  Left calf nontender without sign of " DVT      Radiology: 2 views of the left calcaneus were ordered to evaluate heel pain and reviewed and compared with x-rays taken in April.  Does still have some prominence to the superior calcaneal tuberosity and some spurring at the insertion of the Achilles.  I still do not clearly appreciate a fracture line as was seen on MRI.        Vit D 5/31/17 32.6    Assessment/Plan:  1.  Left calcaneal stress fracture 2.  Left insertional Achilles tendinosis with prominent Rod deformity 3.  Vitamin D deficiency.        Regarding her vitamin D she has improved from 17 she'll continue with 50,000 units D3 weekly be rechecked 7/17/17.    Her left calcaneal stress fracture and insertional Achilles tendinosis with prominent Rod deformity prior .  I don't see anything to do from an operative standpoint at this time nor did she desire it.  She'll continue with her boot and use of her cane she may swim for exercise and she has any increased pain should completely off of it onto a scooter and let me know.  Otherwise I will see her back in 4 weeks' x-rays of her left calcaneus

## 2017-06-29 DIAGNOSIS — M54.12 CERVICAL RADICULOPATHY: ICD-10-CM

## 2017-06-30 RX ORDER — GABAPENTIN 300 MG/1
CAPSULE ORAL
Qty: 90 CAPSULE | Refills: 1 | Status: SHIPPED | OUTPATIENT
Start: 2017-06-30 | End: 2017-10-25 | Stop reason: SDUPTHER

## 2017-07-10 ENCOUNTER — OFFICE VISIT (OUTPATIENT)
Dept: GASTROENTEROLOGY | Facility: CLINIC | Age: 57
End: 2017-07-10

## 2017-07-10 VITALS
BODY MASS INDEX: 39.94 KG/M2 | SYSTOLIC BLOOD PRESSURE: 128 MMHG | WEIGHT: 225.4 LBS | HEIGHT: 63 IN | TEMPERATURE: 98 F | DIASTOLIC BLOOD PRESSURE: 82 MMHG

## 2017-07-10 DIAGNOSIS — K21.9 GASTROESOPHAGEAL REFLUX DISEASE, ESOPHAGITIS PRESENCE NOT SPECIFIED: ICD-10-CM

## 2017-07-10 DIAGNOSIS — Z83.71 FH: COLON POLYPS: ICD-10-CM

## 2017-07-10 DIAGNOSIS — R13.10 DYSPHAGIA, UNSPECIFIED TYPE: Primary | ICD-10-CM

## 2017-07-10 PROCEDURE — 99213 OFFICE O/P EST LOW 20 MIN: CPT | Performed by: INTERNAL MEDICINE

## 2017-07-10 NOTE — PROGRESS NOTES
Chief Complaint   Patient presents with   • Follow-up     from scope        History of Present Illness: We discussed the results of the 6/17 EGD with esophageal dilation. She still feels solids dysphagia in the sternal notch.  She is better since the esophageal dilatlion.     Past Medical History:   Diagnosis Date   • Abnormal vaginal bleeding    • Anemia    • Arthritis    • Atherosclerotic heart disease of native coronary artery with other forms of angina pectoris    • Bipolar I disorder, single manic episode     depressive d(NOS)   • Cervical disc herniation    • Coronary artery disease    • Depression     NO SUICIDAL PLANS   • Difficulty swallowing    • Diverticular disease    • Dyspepsia    • Dysphagia    • Gastric reflux    • Heart murmur    • History of transfusion    • Hyperlipidemia    • Hypertension    • Hypothyroidism    • Incontinence in female     wears pads   • Lightheadedness    • Neck pain    • Obesity    • Past myocardial infarction 05/2000   • Periodic limb movement disorder    • Restless leg syndrome    • Sleep apnea     cpap   • Stress fracture     LEFT HEEL   • Suicidal ideation 08/19/2016   • Swelling of ankle     right had doppler no s/s blood clot   • Thyroid nodule        Past Surgical History:   Procedure Laterality Date   • ANTERIOR CERVICAL DISCECTOMY W/ FUSION N/A 12/29/2016    Procedure: C3-4 anterior cervical discectomy and fusion with Depuy micro plate, ALLOGRAFT C3-4, AND HARDWARE REMOVAL C4-7.;  Surgeon: Hema Godwin MD;  Location: [facility] OR;  Service:    • CARDIAC CATHETERIZATION N/A 3/30/2017    Procedure: Left Heart Cath;  Surgeon: Tracey Vargas MD;  Location: [facility] CATH INVASIVE LOCATION;  Service:    • CARDIAC CATHETERIZATION N/A 3/30/2017    Procedure: Coronary angiography;  Surgeon: Tracey Vargas MD;  Location: [facility] INVASIVE LOCATION;  Service:    • CARDIAC CATHETERIZATION N/A 3/30/2017    Procedure: Left ventriculography;  Surgeon: Tracey Vargas MD;   Location:  [facility] CATH INVASIVE LOCATION;  Service:    • CARDIAC CATHETERIZATION  3/30/2017    Procedure: Functional Flow [place];  Surgeon: Tracey Vargas MD;  Location:  [facility] CATH INVASIVE LOCATION;  Service:    • CERVICAL BIOPSY  MMXVI    Dr. Jeffery.    • CERVICAL DISCECTOMY ANTERIOR  04/2013    C4-7   • COLONOSCOPY  04/20/2015    Diverticulosis, IH   • ENDOSCOPY  MMXV    Normal.  Dr. Rodriguez   • ENDOSCOPY N/A 6/22/2017    Erythematous mucosa in the stomach  PATH: Chronic active gastritis, moderate with intestinal metaplasia    • HARDWARE REMOVAL  12/29/2016    cervical   • OTHER SURGICAL HISTORY Left 2015   • SHOULDER SURGERY Left     RCR   • TONSILLECTOMY AND ADENOIDECTOMY     • TUBAL ABDOMINAL LIGATION     • TYMPANOSTOMY TUBE PLACEMENT Right    • WRIST SURGERY Bilateral     carpal tunnel         Current Outpatient Prescriptions:   •  ARIPiprazole (ABILIFY) 5 MG tablet, Take 5 mg by mouth Every Morning., Disp: , Rfl:   •  aspirin 81 MG chewable tablet, Chew 81 mg Daily., Disp: , Rfl:   •  atenolol (TENORMIN) 25 MG tablet, Take 1 tablet by mouth Daily. (Patient taking differently: Take 25 mg by mouth Every Morning.), Disp: 30 tablet, Rfl: 5  •  buPROPion XL (WELLBUTRIN XL) 150 MG 24 hr tablet, Take 150 mg by mouth Every Morning., Disp: , Rfl:   •  cetirizine (zyrTEC) 10 MG tablet, Take 10 mg by mouth Daily., Disp: , Rfl:   •  Cholecalciferol (VITAMIN D3) 84343 UNITS capsule, Take 1 capsule by mouth Every 7 (Seven) Days for 6 doses., Disp: 4 capsule, Rfl: 1  •  citalopram (CeleXA) 40 MG tablet, Take 40 mg by mouth Every Night., Disp: , Rfl: 2  •  fluticasone (FLONASE) 50 MCG/ACT nasal spray, 2 sprays into each nostril Daily., Disp: , Rfl:   •  gabapentin (NEURONTIN) 300 MG capsule, Take 300 mg by mouth 3 (Three) Times a Day., Disp: , Rfl:   •  gabapentin (NEURONTIN) 300 MG capsule, TAKE ONE CAPSULE BY MOUTH THREE TIMES A DAY, Disp: 90 capsule, Rfl: 1  •  HYDROcodone-acetaminophen (NORCO) 5-325 MG per tablet, 1 oral  Q4H PRN severe pain, Disp: 45 tablet, Rfl: 0  •  isosorbide mononitrate (IMDUR) 30 MG 24 hr tablet, Take 30 mg by mouth Daily., Disp: , Rfl:   •  LamoTRIgine ER (LAMICTAL XR) 300 MG tablet sustained-release 24 hour, Take 1 tablet by mouth Every Night., Disp: , Rfl:   •  levothyroxine (SYNTHROID, LEVOTHROID) 100 MCG tablet, Take 100 mcg by mouth Daily., Disp: , Rfl:   •  nitroglycerin (NITROSTAT) 0.4 MG SL tablet, Place 0.4 mg under the tongue Every 5 (Five) Minutes As Needed for Chest Pain (took at M.D  office at 0930 a.m.). Take no more than 3 doses in 15 minutes., Disp: , Rfl:   •  pantoprazole (PROTONIX) 40 MG EC tablet, Take 1 tablet by mouth 2 (Two) Times a Day Before Meals., Disp: 180 tablet, Rfl: 4  •  pramipexole (MIRAPEX) 0.5 MG tablet, Take 0.5 mg by mouth Every Night., Disp: , Rfl:   •  simethicone (MYLICON) 80 MG chewable tablet, Chew 80 mg As Needed for flatulence., Disp: , Rfl:   •  simvastatin (ZOCOR) 20 MG tablet, Take 1 tablet by mouth Every Night., Disp: 30 tablet, Rfl: 5  •  sucralfate (CARAFATE) 1 GM/10ML suspension, Take 1 g by mouth 4 (Four) Times a Day., Disp: , Rfl:     No Known Allergies    Family History   Problem Relation Age of Onset   • Diabetes type II Mother    • Hypertension Mother    • Osteoporosis Mother    • Seizures Mother    • COPD Father    • Hypertension Father    • Lung cancer Father    • Heart attack Father    • Bipolar disorder Other    • Heart disease Other    • Thyroid disease Other    • Thyroid disease Sister    • Hypertension Sister    • Bipolar disorder Sister    • Depression Sister    • No Known Problems Son    • Abnormal EKG Daughter    • Hypertension Daughter    • Bipolar disorder Daughter    • Thyroid disease Sister    • Hypertension Sister    • Bipolar disorder Sister    • Asthma Daughter    • Breast cancer Neg Hx    • Ovarian cancer Neg Hx    • Uterine cancer Neg Hx    • Colon cancer Neg Hx    • Malig Hyperthermia Neg Hx        Social History     Social History    • Marital status:      Spouse name: N/A   • Number of children: N/A   • Years of education: N/A     Occupational History   • Not on file.     Social History Main Topics   • Smoking status: Current Every Day Smoker     Packs/day: 1.50     Years: 20.00     Types: Electronic Cigarette     Last attempt to quit: 2015   • Smokeless tobacco: Never Used      Comment: Electronic Cigarette/ 3 mg nicotine    • Alcohol use Yes      Comment: OCCASIONAL   • Drug use: No   • Sexual activity: Defer     Other Topics Concern   • Not on file     Social History Narrative       Review of Systems   All other systems reviewed and are negative.      Vitals:    07/10/17 1000   BP: 128/82   Temp: 98 °F (36.7 °C)       Physical Exam   Constitutional: She is oriented to person, place, and time. She appears well-developed and well-nourished. No distress.   HENT:   Head: Normocephalic and atraumatic. Hair is normal.   Right Ear: Hearing, tympanic membrane, external ear and ear canal normal. No drainage. No decreased hearing is noted.   Left Ear: Hearing, tympanic membrane, external ear and ear canal normal. No decreased hearing is noted.   Nose: No nasal deformity.   Mouth/Throat: Oropharynx is clear and moist.   Eyes: Conjunctivae, EOM and lids are normal. Pupils are equal, round, and reactive to light. Right eye exhibits no discharge. Left eye exhibits no discharge.   Neck: Normal range of motion. Neck supple. No JVD present. No tracheal deviation present. No thyromegaly present.   Cardiovascular: Normal rate, regular rhythm, normal heart sounds, intact distal pulses and normal pulses.  Exam reveals no gallop and no friction rub.    No murmur heard.  Pulmonary/Chest: Effort normal and breath sounds normal. No respiratory distress. She has no wheezes. She has no rales. She exhibits no tenderness.   Abdominal: Soft. Bowel sounds are normal. She exhibits no distension and no mass. There is no tenderness. There is no rebound and no  guarding. No hernia.   Musculoskeletal: Normal range of motion. She exhibits no edema, tenderness or deformity.   Lymphadenopathy:     She has no cervical adenopathy.   Neurological: She is alert and oriented to person, place, and time. She has normal reflexes. She displays normal reflexes. No cranial nerve deficit. She exhibits normal muscle tone. Coordination normal.   Skin: Skin is warm and dry. No rash noted. She is not diaphoretic. No erythema.   Psychiatric: She has a normal mood and affect. Her behavior is normal. Judgment and thought content normal.   Vitals reviewed.      Keri was seen today for follow-up.    Diagnoses and all orders for this visit:    Dysphagia, unspecified type  -     FL Esophagram Complete    FH: colon polyps    Gastroesophageal reflux disease, esophagitis presence not specified      Assessment:  1) FH (2 sisters) colon polyps.  2) GERD  3) Dysphagia since she has had neck spinal surgeries    Recommendations:  1) Barium swallow. Patient to call for results.  2) f/u 1 yr.     No Follow-up on file.

## 2017-07-14 ENCOUNTER — TELEPHONE (OUTPATIENT)
Dept: GASTROENTEROLOGY | Facility: CLINIC | Age: 57
End: 2017-07-14

## 2017-07-14 NOTE — TELEPHONE ENCOUNTER
----- Message from Eduar Rodriguez MD sent at 6/27/2017  3:03 PM EDT -----  Tell her that pathology from the EGD looked okay.

## 2017-07-21 ENCOUNTER — LAB (OUTPATIENT)
Dept: LAB | Facility: HOSPITAL | Age: 57
End: 2017-07-21

## 2017-07-21 ENCOUNTER — HOSPITAL ENCOUNTER (OUTPATIENT)
Dept: GENERAL RADIOLOGY | Facility: HOSPITAL | Age: 57
Discharge: HOME | End: 2017-07-21
Attending: INTERNAL MEDICINE | Admitting: INTERNAL MEDICINE

## 2017-07-21 ENCOUNTER — TELEPHONE (OUTPATIENT)
Dept: ORTHOPEDIC SURGERY | Facility: CLINIC | Age: 57
End: 2017-07-21

## 2017-07-21 DIAGNOSIS — E55.9 VITAMIN D DEFICIENCY: ICD-10-CM

## 2017-07-21 DIAGNOSIS — E55.9 VITAMIN D DEFICIENCY: Primary | ICD-10-CM

## 2017-07-21 LAB — 25(OH)D3 SERPL-MCNC: 41.5 NG/ML (ref 30–100)

## 2017-07-21 PROCEDURE — 74220 X-RAY XM ESOPHAGUS 1CNTRST: CPT

## 2017-07-21 PROCEDURE — 82306 VITAMIN D 25 HYDROXY: CPT

## 2017-07-21 PROCEDURE — 36415 COLL VENOUS BLD VENIPUNCTURE: CPT

## 2017-07-21 NOTE — TELEPHONE ENCOUNTER
----- Message from Rufus Feldman MD sent at 7/21/2017 12:49 PM EDT -----  Please change to 5000 D3 daily and recheck in 4 weeks, thanks  ----- Message -----     From: Lab, Background User     Sent: 7/21/2017  12:42 PM       To: Rufus Feldman MD

## 2017-07-21 NOTE — TELEPHONE ENCOUNTER
Have spoken with the patient.  Serum vitamin D level drawn today was 41.5.  Dr. Feldman is recommending that she start on vitamin D3 5000 units once a day and will recheck her serum vitamin D level in 4 weeks per Dr. Feldman

## 2017-07-26 ENCOUNTER — OFFICE VISIT (OUTPATIENT)
Dept: ORTHOPEDIC SURGERY | Facility: CLINIC | Age: 57
End: 2017-07-26

## 2017-07-26 VITALS — BODY MASS INDEX: 39.97 KG/M2 | TEMPERATURE: 98.3 F | HEIGHT: 63 IN | WEIGHT: 225.6 LBS

## 2017-07-26 DIAGNOSIS — E55.9 VITAMIN D DEFICIENCY: ICD-10-CM

## 2017-07-26 DIAGNOSIS — M65.28 CALCIFIC ACHILLES TENDONITIS: ICD-10-CM

## 2017-07-26 DIAGNOSIS — M84.375D: Primary | ICD-10-CM

## 2017-07-26 DIAGNOSIS — M92.62 HAGLUND'S DEFORMITY, LEFT: ICD-10-CM

## 2017-07-26 PROBLEM — M76.60 CALCIFIC ACHILLES TENDONITIS: Status: ACTIVE | Noted: 2017-07-26

## 2017-07-26 PROBLEM — M92.60 HAGLUND'S DEFORMITY: Status: ACTIVE | Noted: 2017-07-26

## 2017-07-26 PROCEDURE — 99213 OFFICE O/P EST LOW 20 MIN: CPT | Performed by: ORTHOPAEDIC SURGERY

## 2017-07-26 PROCEDURE — 73650 X-RAY EXAM OF HEEL: CPT | Performed by: ORTHOPAEDIC SURGERY

## 2017-07-26 NOTE — PROGRESS NOTES
"Foot Follow Up      Patient: Keri Nielson    YOB: 1960 57 y.o. female    Chief Complaints: Heel feels a little better    History of Present Illness: Follows up left calcaneal stress fracture diagnosed 4/19/17.  Since visit on 6/28/17 she's been using her boot and her cane and then coming out of the boot intermittently.  She said she can tolerate this but not for extended periods.  Only mild intermittent aching pain.  She's been doing some stretching but is pulling her foot back.  Mild complaints of pain along the distal aspect of the Achilles relieved some with rest      HPI    ROS: Foot pain  Past Medical History:   Diagnosis Date   • Abnormal vaginal bleeding    • Anemia    • Arthritis    • Atherosclerotic heart disease of native coronary artery with other forms of angina pectoris    • Bipolar I disorder, single manic episode     depressive d(NOS)   • Cervical disc herniation    • Coronary artery disease    • Depression     NO SUICIDAL PLANS   • Difficulty swallowing    • Diverticular disease    • Dyspepsia    • Dysphagia    • Gastric reflux    • Heart murmur    • History of transfusion    • Hyperlipidemia    • Hypertension    • Hypothyroidism    • Incontinence in female     wears pads   • Kidney disease, chronic, stage III (GFR 30-59 ml/min)    • Lightheadedness    • Neck pain    • Obesity    • Past myocardial infarction 05/2000   • Periodic limb movement disorder    • Restless leg syndrome    • Sleep apnea     cpap   • Stress fracture     LEFT HEEL   • Suicidal ideation 08/19/2016   • Swelling of ankle     right had doppler no s/s blood clot   • Thyroid nodule      Physical Exam:   Vitals:    07/26/17 1136   Temp: 98.3 °F (36.8 °C)   Weight: 225 lb 9.6 oz (102 kg)   Height: 63\" (160 cm)     Well developed with normal mood.Left Achilles shows no warmth erythema there is mild tenderness at the insertion but no palpable defects.  Neutral dorsiflexion a heel cord.    There is mild discomfort under " the plantar aspect of the calcaneus and minimal discomfort with medial lateral compression.      Radiology: 2 views left calcaneus were ordered to evaluate pain reviewed and compared with x-rays from last visit.  There is prominence of the superior calcaneal tuberosity and some spurring at the insertion of the Achilles.  I don't clearly appreciate the stress fracture as seen on MRI    Vitamin D: 7/21/17 41.5      Assessment/Plan:  1.  Left calcaneal stress fracture  2.  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  Vitamin D deficiency.    Regarding her vitamin D she has improved dramatically and is now on 5000 units daily.  We'll plan to recheck this in about 3 weeks.  She's been on it since 7/21/17.    Regarding the left insertional Achilles tendinosis with Rod deformity I demonstrated gentle heel cord stretching exercises for her to do for about 1 minute 4-5 times per day.  One limit pressure on the heel.    Regarding the calcaneal stress fracture she does seem to be improving albeit slowly and I'll have anything to offer from an operative standpoint.  She may continue try to gradually wean out of her boot to a sturdy a cushioned athletic shoe min one hour in the morning and 1 hour in the evening and may gradually increase as her symptoms improve.  I will check her back in 4 weeks' x-rays of the left calcaneus if she still having pain

## 2017-07-27 ENCOUNTER — TRANSCRIBE ORDERS (OUTPATIENT)
Dept: ADMINISTRATIVE | Facility: HOSPITAL | Age: 57
End: 2017-07-27

## 2017-07-27 ENCOUNTER — TELEPHONE (OUTPATIENT)
Dept: FAMILY MEDICINE CLINIC | Facility: CLINIC | Age: 57
End: 2017-07-27

## 2017-07-27 DIAGNOSIS — I15.1 HYPERTENSION SECONDARY TO OTHER RENAL DISORDERS: Primary | ICD-10-CM

## 2017-07-27 DIAGNOSIS — N18.9 CHRONIC KIDNEY DISEASE, UNSPECIFIED: Primary | ICD-10-CM

## 2017-07-27 DIAGNOSIS — N28.89 HYPERTENSION SECONDARY TO OTHER RENAL DISORDERS: Primary | ICD-10-CM

## 2017-08-02 ENCOUNTER — TELEPHONE (OUTPATIENT)
Dept: ORTHOPEDIC SURGERY | Facility: CLINIC | Age: 57
End: 2017-08-02

## 2017-08-02 NOTE — TELEPHONE ENCOUNTER
Tried calling pt to inform her of JGW response but there was no answer or option to leave a message.

## 2017-08-02 NOTE — TELEPHONE ENCOUNTER
Called in a Medrol dose pack to [facility] pharmacy @ [phone], per JGW/cld. Patient has been informed.cld

## 2017-08-02 NOTE — TELEPHONE ENCOUNTER
Patient called back and I informed her of JW's recommendations. Patient has been using ice and ibuprofen. Do you have any further recommendations to help with her pain? Please advise.cld

## 2017-08-04 ENCOUNTER — HOSPITAL ENCOUNTER (OUTPATIENT)
Dept: CARDIOLOGY | Facility: HOSPITAL | Age: 57
Discharge: HOME | End: 2017-08-04
Admitting: INTERNAL MEDICINE

## 2017-08-04 ENCOUNTER — HOSPITAL ENCOUNTER (OUTPATIENT)
Dept: ULTRASOUND IMAGING | Facility: HOSPITAL | Age: 57
Discharge: HOME | End: 2017-08-04

## 2017-08-04 DIAGNOSIS — N28.89 HYPERTENSION SECONDARY TO OTHER RENAL DISORDERS: ICD-10-CM

## 2017-08-04 DIAGNOSIS — I15.1 HYPERTENSION SECONDARY TO OTHER RENAL DISORDERS: ICD-10-CM

## 2017-08-04 DIAGNOSIS — N18.9 CHRONIC KIDNEY DISEASE, UNSPECIFIED: ICD-10-CM

## 2017-08-04 LAB
BH CV ECHO MEAS - DIST REN A EDV LEFT: 20 CM/SEC
BH CV ECHO MEAS - DIST REN A PSV LEFT: 56 CM/SEC
BH CV ECHO MEAS - DIST REN A RI LEFT: 0.64
BH CV ECHO MEAS - HILAR A EDV LEFT: 17.1 CM/SEC
BH CV ECHO MEAS - HILAR A PSV LEFT: 42.4 CM/SEC
BH CV ECHO MEAS - HILAR A RI LEFT: 0.6
BH CV ECHO MEAS - MID REN A EDV LEFT: 24 CM/SEC
BH CV ECHO MEAS - MID REN A PSV LEFT: 73.4 CM/SEC
BH CV ECHO MEAS - MID REN A RI LEFT: 0.67
BH CV ECHO MEAS - PROX REN A EDV LEFT: 39.2 CM/SEC
BH CV ECHO MEAS - PROX REN A PSV LEFT: 115 CM/SEC
BH CV ECHO MEAS - PROX REN A RI LEFT: 0.66
BH CV VAS BP LEFT ARM: NORMAL MMHG
BH CV VAS BP RIGHT ARM: NORMAL MMHG
BH CV VAS RENAL AORTIC MID PSV: 54 CM/S
BH CV VAS RENAL HILUM LEFT EDV: 17.1 CM/S
BH CV VAS RENAL HILUM LEFT PSV: 42.4 CM/S
BH CV VAS RENAL HILUM RIGHT EDV: 12 CM/S
BH CV VAS RENAL HILUM RIGHT PSV: 27 CM/S
BH CV XLRA MEAS - KID L LEFT: 10 CM
BH CV XLRA MEAS - RENAL A ORG RI LEFT: 0.66
BH CV XLRA MEAS DIST REN A EDV RIGHT: 39.4 CM/SEC
BH CV XLRA MEAS DIST REN A PSV RIGHT: 101 CM/SEC
BH CV XLRA MEAS DIST REN A RI RIGHT: 0.61
BH CV XLRA MEAS HILAR A EDV RIGHT: 12 CM/SEC
BH CV XLRA MEAS HILAR A PSV RIGHT: 27 CM/SEC
BH CV XLRA MEAS HILAR A RI RIGHT: 0.56
BH CV XLRA MEAS KID L RIGHT: 10.5 CM
BH CV XLRA MEAS KID W RIGHT: 5 CM
BH CV XLRA MEAS MID REN A EDV RIGHT: 26.3 CM/SEC
BH CV XLRA MEAS MID REN A PSV RIGHT: 68.1 CM/SEC
BH CV XLRA MEAS MID REN A RI RIGHT: 0.61
BH CV XLRA MEAS PROX REN A EDV RIGHT: 28.4 CM/SEC
BH CV XLRA MEAS PROX REN A PSV RIGHT: 96.8 CM/SEC
BH CV XLRA MEAS PROX REN A RI RIGHT: 0.71
BH CV XLRA MEAS RAR LEFT: 2.1
BH CV XLRA MEAS RAR RIGHT: 1.8
BH CV XLRA MEAS RENAL A ORG EDV LEFT: 38.4 CM/SEC
BH CV XLRA MEAS RENAL A ORG EDV RIGHT: 26.8 CM/SEC
BH CV XLRA MEAS RENAL A ORG PSV LEFT: 114 CM/SEC
BH CV XLRA MEAS RENAL A ORG PSV RIGHT: 79.8 CM/SEC
BH CV XLRA MEAS RENAL A ORG RI RIGHT: 0.66
LEFT KIDNEY WIDTH: 5.2 CM
LEFT RENAL UPPER PARENCHYMA MAX: 26.4 CM/S
LEFT RENAL UPPER PARENCHYMA MIN: 10.5 CM/S
LEFT RENAL UPPER PARENCHYMA RI: 0.6
RIGHT RENAL UPPER PARENCHYMA MAX: 25.7 CM/S
RIGHT RENAL UPPER PARENCHYMA MIN: 10.8 CM/S
RIGHT RENAL UPPER PARENCHYMA RI: 0.58

## 2017-08-04 PROCEDURE — 93975 VASCULAR STUDY: CPT

## 2017-08-04 PROCEDURE — 76775 US EXAM ABDO BACK WALL LIM: CPT

## 2017-08-08 ENCOUNTER — OFFICE VISIT (OUTPATIENT)
Dept: ORTHOPEDIC SURGERY | Facility: CLINIC | Age: 57
End: 2017-08-08

## 2017-08-08 VITALS — HEIGHT: 63 IN | WEIGHT: 228.4 LBS | TEMPERATURE: 97.8 F | BODY MASS INDEX: 40.47 KG/M2

## 2017-08-08 DIAGNOSIS — Z98.1 S/P CERVICAL SPINAL FUSION: Primary | ICD-10-CM

## 2017-08-08 PROCEDURE — 72052 X-RAY EXAM NECK SPINE 6/>VWS: CPT | Performed by: ORTHOPAEDIC SURGERY

## 2017-08-08 PROCEDURE — 99213 OFFICE O/P EST LOW 20 MIN: CPT | Performed by: ORTHOPAEDIC SURGERY

## 2017-08-08 NOTE — PROGRESS NOTES
New patient or new problem visit    Chief Complaint   Patient presents with   • Neck - Follow-up, Pain     She underwent C3 4 anterior cervical discectomy and fusion last December.  Did well until about 6 weeks ago and having increasing neck pain and radiating shoulder pain.  No fever chills etc.  No balance difficulties no bowel or bladder complaints.  No numbness or tingling.  The pain is severe sometimes 8 out of 10 and sometimes only annoying 3 out of 10  HPI: As above    PFSH: See chart- reviewed    Review of Systems    PE: Constitutional: Vital signs above-noted.  Awake, alert and oriented    Psychiatric: Affect and insight do not appear grossly disturbed.    Pulmonary: Breathing is unlabored, color is good.    Skin: Warm, dry and normal turgor    Cardiac:  radial pulses intact.  No arm edema.    Eyesight and hearing appear adequate for examination purposes    Musculoskeletal:  Posture is unremarkable to coronal and sagittal inspection.  Motion appears stiff, by design.  The skin about the area is well-healed.  There is no palpable or visible deformity.  There is no local spasm. There is mild tenderness to percussion and palpation of the spine.     Neurologic:  In the bilateral upper extremities there is no evidence of atrophy.  Motor function is undisturbed in shoulder abduction, elbow flexion, wrist extension, finger extension, triceps extension, or finger abduction   .  Sensation appears symmetrically intact to light touch   .  Reflexes are 2+ and symmetrical in the biceps, brachioradialis, and triceps. Curiel test is negative.  Gait appears undisturbed.  Spurling test is negative.      MEDICAL DECISION MAKING    XRAY: Plain film x-rays of cervical spine are unremarkable and suggest a solid fusion flexion extension films however suggest the at least 6° increase in flexion from extension at the C3-4 level.  Still there is no lucency around the screws, and the graft though not contiguously solid shows areas  of continuous bony contact.  They could certainly past for solid.    Other: n/a    Impression: May have a C3 for nonunion.  CT scan would be best for diagnosing nonunion.  An MRI would be best for adjacent level issues which are less likely.    Plan: For now the point is moot she doesn't one surgery nor my encouraging it we'll simply watch her along and see her back in 3 months with lateral flexion-extension x-rays.

## 2017-08-23 ENCOUNTER — LAB (OUTPATIENT)
Dept: LAB | Facility: HOSPITAL | Age: 57
End: 2017-08-23

## 2017-08-23 DIAGNOSIS — E55.9 VITAMIN D DEFICIENCY: ICD-10-CM

## 2017-08-23 LAB — 25(OH)D3 SERPL-MCNC: 40.7 NG/ML (ref 30–100)

## 2017-08-23 PROCEDURE — 36415 COLL VENOUS BLD VENIPUNCTURE: CPT

## 2017-08-23 PROCEDURE — 82306 VITAMIN D 25 HYDROXY: CPT

## 2017-08-23 RX ORDER — LEVOTHYROXINE SODIUM 0.1 MG/1
TABLET ORAL
Qty: 30 TABLET | Refills: 5 | Status: SHIPPED | OUTPATIENT
Start: 2017-08-23 | End: 2017-09-27 | Stop reason: SDUPTHER

## 2017-08-24 ENCOUNTER — OFFICE VISIT (OUTPATIENT)
Dept: ORTHOPEDIC SURGERY | Facility: CLINIC | Age: 57
End: 2017-08-24

## 2017-08-24 VITALS — HEIGHT: 63 IN | BODY MASS INDEX: 39.87 KG/M2 | WEIGHT: 225 LBS | TEMPERATURE: 97.8 F

## 2017-08-24 DIAGNOSIS — M65.28 CALCIFIC ACHILLES TENDONITIS: Primary | ICD-10-CM

## 2017-08-24 DIAGNOSIS — E55.9 VITAMIN D DEFICIENCY: ICD-10-CM

## 2017-08-24 DIAGNOSIS — M84.375D: ICD-10-CM

## 2017-08-24 PROCEDURE — 99213 OFFICE O/P EST LOW 20 MIN: CPT | Performed by: ORTHOPAEDIC SURGERY

## 2017-08-24 NOTE — PROGRESS NOTES
"Foot Follow Up      Patient: Keri Nielson    YOB: 1960 57 y.o. female    Chief Complaints: Foot feels a lot better    History of Present Illness: Follows up left calcaneus stress fracture diagnosed 4/19/17.  Last seen on 7/26/17.  So follow-up for vitamin D deficiency and insertional Achilles tendinosis    Her last visit she is improved quite a bit with only minimal mild aching pain in the heel after prolonged standing and walking.  She's been doing her stretching exercises and taking vitamin D 5000 units daily.  HPI    ROS: Foot pain  Past Medical History:   Diagnosis Date   • Abnormal vaginal bleeding    • Anemia    • Arthritis    • Atherosclerotic heart disease of native coronary artery with other forms of angina pectoris    • Bipolar I disorder, single manic episode     depressive d(NOS)   • Cervical disc herniation    • Coronary artery disease    • Depression     NO SUICIDAL PLANS   • Difficulty swallowing    • Diverticular disease    • Dyspepsia    • Dysphagia    • Gastric reflux    • Heart murmur    • History of transfusion    • Hyperlipidemia    • Hypertension    • Hypothyroidism    • Incontinence in female     wears pads   • Kidney disease, chronic, stage III (GFR 30-59 ml/min)    • Lightheadedness    • Neck pain    • Obesity    • Past myocardial infarction 05/2000   • Periodic limb movement disorder    • Restless leg syndrome    • Sleep apnea     cpap   • Stress fracture     LEFT HEEL   • Suicidal ideation 08/19/2016   • Swelling of ankle     right had doppler no s/s blood clot   • Thyroid nodule      Physical Exam:   Vitals:    08/24/17 1345   Temp: 97.8 °F (36.6 °C)   Weight: 225 lb (102 kg)   Height: 63\" (160 cm)     Well developed with normal mood.Left heel showed no focal pain with medial lateral calcaneal compression.  Neutral dorsiflexion a heel cord without significant discomfort palpation at the insertion of the Achilles posteriorly.      Radiology: None performed      Vitamin D " 8/23/17  40.7    Assessment/Plan:  1.  Left Radial stress fracture to be left insertional Achilles tendinosis with Rod deformity  3.  Vitamin D deficiency.  Again regarding her vitamin D it is maintained relatively stable level at 5000 units every day.  She will continue with that and will check with her primary care physician regarding further monitoring and dosing.      Regarding the left insertional Achilles tendinosis she'll continue with stretching exercises at least 3-4 times per day as previously demonstrated.    Regarding the calcaneal stress fracture she has improved and may continue weaning out of her boot into an athletic shoe as her pain allows.  She understands may be a while before she is more mobile and will monitor her activity as pain allows.    I will see her back as needed

## 2017-09-01 ENCOUNTER — CLINICAL SUPPORT (OUTPATIENT)
Dept: ORTHOPEDIC SURGERY | Facility: CLINIC | Age: 57
End: 2017-09-01

## 2017-09-01 VITALS — HEIGHT: 63 IN | BODY MASS INDEX: 39.87 KG/M2 | WEIGHT: 225 LBS | TEMPERATURE: 97.9 F

## 2017-09-01 DIAGNOSIS — M17.0 ARTHRITIS OF BOTH KNEES: Primary | ICD-10-CM

## 2017-09-01 PROCEDURE — 99213 OFFICE O/P EST LOW 20 MIN: CPT | Performed by: NURSE PRACTITIONER

## 2017-09-01 PROCEDURE — 20610 DRAIN/INJ JOINT/BURSA W/O US: CPT | Performed by: NURSE PRACTITIONER

## 2017-09-01 RX ORDER — LIDOCAINE HYDROCHLORIDE 10 MG/ML
1 INJECTION, SOLUTION EPIDURAL; INFILTRATION; INTRACAUDAL; PERINEURAL
Status: COMPLETED | OUTPATIENT
Start: 2017-09-01 | End: 2017-09-01

## 2017-09-01 RX ORDER — BUPIVACAINE HYDROCHLORIDE 5 MG/ML
1 INJECTION, SOLUTION EPIDURAL; INTRACAUDAL
Status: COMPLETED | OUTPATIENT
Start: 2017-09-01 | End: 2017-09-01

## 2017-09-01 RX ORDER — LIDOCAINE HYDROCHLORIDE 10 MG/ML
2 INJECTION, SOLUTION EPIDURAL; INFILTRATION; INTRACAUDAL; PERINEURAL
Status: COMPLETED | OUTPATIENT
Start: 2017-09-01 | End: 2017-09-01

## 2017-09-01 RX ORDER — METHYLPREDNISOLONE ACETATE 80 MG/ML
80 INJECTION, SUSPENSION INTRA-ARTICULAR; INTRALESIONAL; INTRAMUSCULAR; SOFT TISSUE
Status: COMPLETED | OUTPATIENT
Start: 2017-09-01 | End: 2017-09-01

## 2017-09-01 RX ADMIN — METHYLPREDNISOLONE ACETATE 80 MG: 80 INJECTION, SUSPENSION INTRA-ARTICULAR; INTRALESIONAL; INTRAMUSCULAR; SOFT TISSUE at 09:05

## 2017-09-01 RX ADMIN — BUPIVACAINE HYDROCHLORIDE 1 ML: 5 INJECTION, SOLUTION EPIDURAL; INTRACAUDAL at 09:13

## 2017-09-01 RX ADMIN — LIDOCAINE HYDROCHLORIDE 1 ML: 10 INJECTION, SOLUTION EPIDURAL; INFILTRATION; INTRACAUDAL; PERINEURAL at 09:05

## 2017-09-01 RX ADMIN — LIDOCAINE HYDROCHLORIDE 2 ML: 10 INJECTION, SOLUTION EPIDURAL; INFILTRATION; INTRACAUDAL; PERINEURAL at 09:13

## 2017-09-01 RX ADMIN — METHYLPREDNISOLONE ACETATE 80 MG: 80 INJECTION, SUSPENSION INTRA-ARTICULAR; INTRALESIONAL; INTRAMUSCULAR; SOFT TISSUE at 09:13

## 2017-09-01 RX ADMIN — BUPIVACAINE HYDROCHLORIDE 1 ML: 5 INJECTION, SOLUTION EPIDURAL; INTRACAUDAL at 09:05

## 2017-09-01 NOTE — PROGRESS NOTES
Patient: Keri Nielson  YOB: 1960    Chief Complaints:  bilateral knee pain    Subjective:    History of Present Illness: Here today for knee pain. The pain is a generalized joint tenderness.  It has been progressive in nature but remains intermittent.  Worsened by prolonged standing or walking and squatting activities. Has had improvement in the past with ice/heat, rest, and injections.     This problem is not new to this examiner.     Allergies: No Known Allergies    Medications:   Home Medications:  Current Outpatient Prescriptions on File Prior to Visit   Medication Sig   • ARIPiprazole (ABILIFY) 5 MG tablet Take 5 mg by mouth Every Morning.   • aspirin 81 MG chewable tablet Chew 81 mg Daily.   • atenolol (TENORMIN) 25 MG tablet Take 1 tablet by mouth Daily. (Patient taking differently: Take 25 mg by mouth Every Morning.)   • buPROPion XL (WELLBUTRIN XL) 150 MG 24 hr tablet Take 150 mg by mouth Every Morning.   • cetirizine (zyrTEC) 10 MG tablet Take 10 mg by mouth Daily.   • Cholecalciferol (VITAMIN D3) 5000 UNITS capsule capsule Take 5,000 Units by mouth Daily.   • citalopram (CeleXA) 40 MG tablet Take 40 mg by mouth Every Night.   • fluticasone (FLONASE) 50 MCG/ACT nasal spray 2 sprays into each nostril Daily.   • gabapentin (NEURONTIN) 300 MG capsule Take 300 mg by mouth 3 (Three) Times a Day.   • gabapentin (NEURONTIN) 300 MG capsule TAKE ONE CAPSULE BY MOUTH THREE TIMES A DAY   • HYDROcodone-acetaminophen (NORCO) 5-325 MG per tablet 1 oral Q4H PRN severe pain   • isosorbide mononitrate (IMDUR) 30 MG 24 hr tablet Take 30 mg by mouth Daily.   • LamoTRIgine ER (LAMICTAL XR) 300 MG tablet sustained-release 24 hour Take 1 tablet by mouth Every Night.   • levothyroxine (SYNTHROID, LEVOTHROID) 100 MCG tablet Take 100 mcg by mouth Daily.   • levothyroxine (SYNTHROID, LEVOTHROID) 100 MCG tablet TAKE ONE TABLET BY MOUTH DAILY ON AN EMPTY STOMACH 1 HOUR BEFORE EATING   • nitroglycerin (NITROSTAT)  0.4 MG SL tablet Place 0.4 mg under the tongue Every 5 (Five) Minutes As Needed for Chest Pain (took at M.D  office at 0930 a.m.). Take no more than 3 doses in 15 minutes.   • pantoprazole (PROTONIX) 40 MG EC tablet Take 1 tablet by mouth 2 (Two) Times a Day Before Meals.   • pramipexole (MIRAPEX) 0.5 MG tablet Take 0.5 mg by mouth Every Night.   • simethicone (MYLICON) 80 MG chewable tablet Chew 80 mg As Needed for flatulence.   • simvastatin (ZOCOR) 20 MG tablet Take 1 tablet by mouth Every Night.   • sucralfate (CARAFATE) 1 GM/10ML suspension Take 1 g by mouth 4 (Four) Times a Day.     No current facility-administered medications on file prior to visit.      Current Medications:  Scheduled Meds:  Continuous Infusions:  No current facility-administered medications for this visit.   PRN Meds:.    I have reviewed the patient's medical history in detail and updated the computerized patient record.  Review and summarization of old records include:    Past Medical History:   Diagnosis Date   • Abnormal vaginal bleeding    • Anemia    • Arthritis    • Atherosclerotic heart disease of native coronary artery with other forms of angina pectoris    • Bipolar I disorder, single manic episode     depressive d(NOS)   • Cervical disc herniation    • Coronary artery disease    • Depression     NO SUICIDAL PLANS   • Difficulty swallowing    • Diverticular disease    • Dyspepsia    • Dysphagia    • Gastric reflux    • Heart murmur    • History of transfusion    • Hyperlipidemia    • Hypertension    • Hypothyroidism    • Incontinence in female     wears pads   • Kidney disease, chronic, stage III (GFR 30-59 ml/min)    • Lightheadedness    • Neck pain    • Obesity    • Past myocardial infarction 05/2000   • Periodic limb movement disorder    • Restless leg syndrome    • Sleep apnea     cpap   • Stress fracture     LEFT HEEL   • Suicidal ideation 08/19/2016   • Swelling of ankle     right had doppler no s/s blood clot   • Thyroid  nodule         Past Surgical History:   Procedure Laterality Date   • ANTERIOR CERVICAL DISCECTOMY W/ FUSION N/A 12/29/2016    Procedure: C3-4 anterior cervical discectomy and fusion with Depuy micro plate, ALLOGRAFT C3-4, AND HARDWARE REMOVAL C4-7.;  Surgeon: Hema Godwin MD;  Location: [facility] MAIN OR;  Service:    • CARDIAC CATHETERIZATION N/A 3/30/2017    Procedure: Left Heart Cath;  Surgeon: Tracey Vargas MD;  Location:  [facility] CATH INVASIVE [facility];  Service:    • CARDIAC CATHETERIZATION N/A 3/30/2017    Procedure: Coronary angiography;  Surgeon: Tracey Vargas MD;  Location:  [facility];  Service:    • CARDIAC CATHETERIZATION N/A 3/30/2017    Procedure: Left ventriculography;  Surgeon: Tracey Vargas MD;  Location:  [facility];  Service:    • CARDIAC CATHETERIZATION  3/30/2017    Procedure: Functional Flow [place];  Surgeon: Tracey Vargas MD;  Location:  [facility];  Service:    • CERVICAL BIOPSY  [facility]    Dr. Jeffery.    • CERVICAL DISCECTOMY ANTERIOR  04/2013    C4-7   • COLONOSCOPY  04/20/2015    Diverticulosis, IH   • ENDOSCOPY  [facility]    Normal.  Dr. Rodriguez   • ENDOSCOPY N/A 6/22/2017    Erythematous mucosa in the stomach  PATH: Chronic active gastritis, moderate with intestinal metaplasia    • HARDWARE REMOVAL  12/29/2016    cervical   • OTHER SURGICAL HISTORY Left 2015   • SHOULDER SURGERY Left     RCR   • TONSILLECTOMY AND ADENOIDECTOMY     • TUBAL ABDOMINAL LIGATION     • TYMPANOSTOMY TUBE PLACEMENT Right    • WRIST SURGERY Bilateral     carpal tunnel        Social History     Occupational History   • Not on file.     Social History Main Topics   • Smoking status: Current Every Day Smoker     Packs/day: 1.50     Years: 20.00     Types: Electronic Cigarette     Last attempt to quit: 2015   • Smokeless tobacco: Never Used      Comment: Electronic Cigarette/ 3 mg nicotine    • Alcohol use Yes      Comment: OCCASIONAL   • Drug use: No   • Sexual  activity: Defer    Social History     Social History Narrative        Family History   Problem Relation Age of Onset   • Diabetes type II Mother    • Hypertension Mother    • Osteoporosis Mother    • Seizures Mother    • COPD Father    • Hypertension Father    • Lung cancer Father    • Heart attack Father    • Bipolar disorder Other    • Heart disease Other    • Thyroid disease Other    • Thyroid disease Sister    • Hypertension Sister    • Bipolar disorder Sister    • Depression Sister    • No Known Problems Son    • Abnormal EKG Daughter    • Hypertension Daughter    • Bipolar disorder Daughter    • Thyroid disease Sister    • Hypertension Sister    • Bipolar disorder Sister    • Asthma Daughter    • Breast cancer Neg Hx    • Ovarian cancer Neg Hx    • Uterine cancer Neg Hx    • Colon cancer Neg Hx    • Malig Hyperthermia Neg Hx        ROS: 14 point review of systems was performed and was negative except for documented findings in HPI and today's encounter.     Allergies: No Known Allergies  Constitutional:  Denies fever, shaking or chills   Eyes:  Denies change in visual acuity   HENT:  Denies nasal congestion or sore throat   Respiratory:  Denies cough or shortness of breath   Cardiovascular:  Denies chest pain or severe LE edema   GI:  Denies abdominal pain, nausea, vomiting, bloody stools or diarrhea   Musculoskeletal:  Numbness, tingling, or loss of motor function only as noted above in history of present illness.  : Denies painful urination or hematuria  Integument:  Denies rash, lesion or ulceration   Neurologic:  Denies headache or focal weakness  Endocrine:  Denies lymphadenopathy  Psych:  Denies confusion or change in mental status   Hem:  Denies active bleeding    Physical Exam:  Body mass index is 39.86 kg/(m^2).  Vitals:    09/01/17 0849   Temp: 97.9 °F (36.6 °C)     Vital Signs:  reviewed  Constitutional: Awake alert and oriented x3, well developed, no acute distress, non-toxic appearance.  EYES:  symmetric, sclera clear  ENT:  Normocephalic, Atraumatic.   Respiratory:  No respiratory distress, No wheezing  CV: pulse regular, no palpitations or pallor.  GI:  Abdomen soft, non-tender.   Vascular:  Intact distal pulses, No cyanosis, no signs or symptoms of DVT.  Neurologic: Sensation grossly intact to the involved extremity, No focal deficits noted.   Neck: No tenderness, Supple.  Integument: warm, dry, no ulcerations.   Psychiatric:  Oriented, no pathological affect.  Musculoskeletal:    Affected knee(s):  Painful gait with a subtle limp, positive for synovitis, swelling, joint effusion with crepitation.  Lachman negative  Posterior drawer negative  Hamida's negative  Patellofemoral grind +  Sensation grossly intact to light touch throughout the lower extremity  Skin is intact  Distal pulses are palpable  No signs or symptoms of DVT        Diagnostic Data:     Imaging was done previously in the office, images were personally viewed and discussed at length with the patient:    Indication: pain related symptoms,  Views: 3V AP, LAT & 40 degree PA bilateral knee(s)   Findings: advanced arthritis  Comparison views: viewed last xray done in the office.     Procedure:  Large Joint Arthrocentesis  Date/Time: 9/1/2017 9:05 AM  Consent given by: patient  Site marked: site marked  Timeout: Immediately prior to procedure a time out was called to verify the correct patient, procedure, equipment, support staff and site/side marked as required   Supporting Documentation  Indications: pain   Procedure Details  Location: knee - R knee  Needle size: 25 G  Approach: anteromedial  Medications administered: 1 mL lidocaine PF 1% 1 %; 1 mL bupivacaine (PF) 0.5 %; 80 mg methylPREDNISolone acetate 80 MG/ML  Patient tolerance: patient tolerated the procedure well with no immediate complications    Large Joint Arthrocentesis  Date/Time: 9/1/2017 9:13 AM  Consent given by: patient  Site marked: site marked  Timeout: Immediately prior  to procedure a time out was called to verify the correct patient, procedure, equipment, support staff and site/side marked as required   Supporting Documentation  Indications: pain   Procedure Details  Location: knee - L knee  Needle size: 25 G  Approach: anteromedial  Medications administered: 80 mg methylPREDNISolone acetate 80 MG/ML; 2 mL lidocaine PF 1% 1 %; 1 mL bupivacaine (PF) 0.5 %  Patient tolerance: patient tolerated the procedure well with no immediate complications          Assessment. Moderate arthritis bilateral knee(s).      Plan: Is to proceed with injection  Follow up as indicated.  Ice, elevate, and rest as needed.  Additional interventions include:  15 min spent face to face with patient 11 min spent counseling about natural history and expected course of assessed complaint and reviewed treatment options that have been tried and not tried and those currently available. Questions answered.    Natural history and expected course of this patient's diagnosis discussed along with evaluation of therapies. Questions answered.  Address and update of wt loss, physical exercises, use of assistive devices, and monitoring of Medications per orders to address ortho complaints; Evaluation and discussion of safety, precautions, side effects, and warnings given especially of long term NSAID therapy.  Lifestyle measures for weight loss, suggest starting at 10lbs, with biomechanical explanations for weight loss and how this affects orthopedic condition.  Cortisone Injection. See procedure note.  OTC analgesics as needed with dosage warning and instructions given.  Cryotherapy/brachy therapy as indicated with instructions.   Advised on knee strengthening exercises, stressed importance of these with progression of arthritis, demonstrated exercises to patient with repeat demonstration and verb of understanding.   Is working on weight loss with diet and exercises is recovering from heel fx at present, encourage to  incorporate thigh strengthening exericises daily for knee strengthening. Verb underst.   9/1/2017  MJR

## 2017-09-01 NOTE — PATIENT INSTRUCTIONS
[facility]    KNEE HOME EXERCISES      It is important that you maintain and possibly improve your strength and range of motion of your knee.      •  Walk frequently for short intervals  •  Using a cane or walker to allow you to walk safely if needed  •  Avoid sitting for long periods of time to avoid cramping and swelling of your leg   •  Do exercises either on a bed or in a chair.  •  If any of the exercises cause you pain, either eliminate that exercise or decrease          the motion or repetitions      Start exercises with 10 repetitions and increase to 30 repetitions.  Do two sets of each exercise each day    ANKLE PUMPS    1. Move your feet up and down as if you are pumping on a gas pedal       STRAIGHT LEG RAISES    1. Lie flat with your injured leg straight.  Keep the other leg bent.  2. Tighten the injured leg's thigh muscle.  3. Lift leg, with knee locked in the straight position no higher than the other knee for  seconds  4. Lower leg slowly. Rest for 5 seconds      SHORT ARC QUAD    1. Lie with both knees bent over a pillow  2. Straighten both knees  3. Hold 5 seconds  4. Bend knees back to starting position and repeat sequence       QUADRICEPS     1. Lie flat with your injured let straight.  Keep the other leg bent.  2. Tighten the injured leg's thigh muscle by trying to move your kneecap toward the  thigh.  3. Make your leg as stiff as you can.  4. Hold for 5 seconds and relax for 5 seconds        HAMSTRING STRETCH    1. Lie flat with your injured leg straight. Keep the other leg bent  2. Press your heel of your injured leg into the floor for 5 seconds       OR  1. Sit with injured leg out straight.   2. Loop a sheet around the ball of foot and toes.  3. Gently pull on the sheet, keeping the leg straight  4. Hold for 10-30 seconds      KNEE FLEXION-EXTENSION SITTING     1. Sit with injured let bent as shown  2. Straighten leg at the knee  3. Hold 5 seconds  4. Bend knee back  to starting position   5. Rest for 2-5 seconds and repeat sequence       HEEL SLIDES     1. Lie on back with legs straight  2. Slide heels toward buttocks  3. Return to starting position       HEEL SLIDS WITH SHEET    1. Lie on your back with a sheet wrapped around your foot  2. Pull on the sheet to assist you in bending your knee and sliding your heel toward  your buttocks  3. Hold for 5 seconds        KNEE FLEXION STRETCH    1. Sit in a chair  2. Bend bad knee back as far as you can   3. Hold stretch for 5-10 seconds      CHAIR PUSH UPS    1. Sit in a chair with arm rests  2. Place hands on armrests  3. Straighten arms, raising buttocks up off of chair         WALL SLIDES    1. Stand with your back and head against a wall.    2. Keep your feet shoulder width apart and 6-12 inches from the wall  3. Slowly slide down the wall until your knees are slightly bent.    4. Hold for 5 seconds and then slowly slide back up  5. As you get stronger, then you can slowly increase the bend in your knees, but do  not drop your buttocks below the level of your knees       STEP UPS    1. Stand with your foot on your injured leg on a 3-5 inch support (such as a block of  wood)  2. Place other foot on the floor  3. Shift your weight onto the foot on the block and try to straighten the knee and  raising the other foot off of the floor  4. Slowly lower your foot until only the heel touches the floor

## 2017-09-22 ENCOUNTER — OFFICE VISIT (OUTPATIENT)
Dept: OBSTETRICS AND GYNECOLOGY | Facility: CLINIC | Age: 57
End: 2017-09-22

## 2017-09-22 VITALS
WEIGHT: 233.2 LBS | SYSTOLIC BLOOD PRESSURE: 124 MMHG | DIASTOLIC BLOOD PRESSURE: 80 MMHG | BODY MASS INDEX: 41.32 KG/M2 | HEIGHT: 63 IN

## 2017-09-22 DIAGNOSIS — Z11.3 SCREEN FOR STD (SEXUALLY TRANSMITTED DISEASE): ICD-10-CM

## 2017-09-22 DIAGNOSIS — Z12.31 ENCOUNTER FOR SCREENING MAMMOGRAM FOR BREAST CANCER: ICD-10-CM

## 2017-09-22 DIAGNOSIS — Z12.4 SCREENING FOR MALIGNANT NEOPLASM OF CERVIX: ICD-10-CM

## 2017-09-22 DIAGNOSIS — Z13.9 SCREENING: ICD-10-CM

## 2017-09-22 DIAGNOSIS — N39.41 URGENCY INCONTINENCE: ICD-10-CM

## 2017-09-22 DIAGNOSIS — R35.1 NOCTURIA: ICD-10-CM

## 2017-09-22 DIAGNOSIS — Z01.419 ENCOUNTER FOR GYNECOLOGICAL EXAMINATION WITHOUT ABNORMAL FINDING: Primary | ICD-10-CM

## 2017-09-22 DIAGNOSIS — R10.31 RLQ ABDOMINAL PAIN: ICD-10-CM

## 2017-09-22 LAB
BILIRUB BLD-MCNC: NEGATIVE MG/DL
CLARITY, POC: CLEAR
COLOR UR: YELLOW
GLUCOSE UR STRIP-MCNC: NEGATIVE MG/DL
KETONES UR QL: NEGATIVE
LEUKOCYTE EST, POC: NEGATIVE
NITRITE UR-MCNC: NEGATIVE MG/ML
PH UR: 6 [PH] (ref 5–8)
PROT UR STRIP-MCNC: NEGATIVE MG/DL
RBC # UR STRIP: NEGATIVE /UL
SP GR UR: 1.01 (ref 1–1.03)
UROBILINOGEN UR QL: NORMAL

## 2017-09-22 PROCEDURE — 99406 BEHAV CHNG SMOKING 3-10 MIN: CPT | Performed by: OBSTETRICS & GYNECOLOGY

## 2017-09-22 PROCEDURE — 99396 PREV VISIT EST AGE 40-64: CPT | Performed by: OBSTETRICS & GYNECOLOGY

## 2017-09-22 RX ORDER — LAMOTRIGINE 150 MG/1
TABLET ORAL
COMMUNITY
Start: 2017-08-29 | End: 2017-09-22

## 2017-09-22 RX ORDER — ARIPIPRAZOLE 10 MG/1
TABLET ORAL
COMMUNITY
Start: 2017-08-29 | End: 2017-09-22

## 2017-09-22 RX ORDER — INFLUENZA A VIRUS A/MICHIGAN/45/2015 X-275 (H1N1) ANTIGEN (FORMALDEHYDE INACTIVATED), INFLUENZA A VIRUS A/SINGAPORE/INFIMH-16-0019/2016 IVR-186 (H3N2) ANTIGEN (FORMALDEHYDE INACTIVATED), INFLUENZA B VIRUS B/PHUKET/3073/2013 ANTIGEN (FORMALDEHYDE INACTIVATED), AND INFLUENZA B VIRUS B/MARYLAND/15/2016 BX-69A ANTIGEN (FORMALDEHYDE INACTIVATED) 15; 15; 15; 15 UG/.5ML; UG/.5ML; UG/.5ML; UG/.5ML
INJECTION, SUSPENSION INTRAMUSCULAR
COMMUNITY
Start: 2017-09-20 | End: 2017-09-22

## 2017-09-22 RX ORDER — RANITIDINE 150 MG/1
TABLET ORAL
COMMUNITY
Start: 2017-09-14 | End: 2017-09-22

## 2017-09-22 RX ORDER — BUPROPION HYDROCHLORIDE 300 MG/1
TABLET ORAL
COMMUNITY
Start: 2017-08-29 | End: 2017-09-22

## 2017-09-22 NOTE — PROGRESS NOTES
"Subjective   Keri Nielson is a 57 y.o. female Pt here for annual. last pap 4/29/16 lgsil hpv+, colpo 6/17/16 negative, mammo 2/26/15 negative -wants to ge screened for std and have blood work for HIV/Aids  History of Present Illness    The following portions of the patient's history were reviewed and updated as appropriate: allergies, current medications, past family history, past medical history, past social history, past surgical history and problem list.    Review of Systems   Constitutional: Negative for chills, fatigue and fever.   Gastrointestinal: Negative for abdominal distention and abdominal pain.   Genitourinary: Negative for dyspareunia, dysuria, menstrual problem, pelvic pain, vaginal bleeding, vaginal discharge and vaginal pain.   All other systems reviewed and are negative.    /80  Ht 63\" (160 cm)  Wt 233 lb 3.2 oz (106 kg)  LMP 10/21/2016  Breastfeeding? No  BMI 41.31 kg/m2    Objective   Physical Exam   Constitutional: She is oriented to person, place, and time. She appears well-developed and well-nourished.   Neck: Normal range of motion. Neck supple. No thyromegaly present.   Cardiovascular: Normal rate and regular rhythm.    Pulmonary/Chest: Effort normal and breath sounds normal. Right breast exhibits no mass, no nipple discharge, no skin change and no tenderness. Left breast exhibits no mass, no nipple discharge, no skin change and no tenderness.   Abdominal: Soft. Bowel sounds are normal. She exhibits no distension. There is no tenderness.   Genitourinary: Vagina normal and uterus normal. Pelvic exam was performed with patient supine. Uterus is not tender. Cervix exhibits no friability. Right adnexum displays tenderness. Right adnexum displays no mass and no fullness. Left adnexum displays no mass and no tenderness. No vaginal discharge found.   Genitourinary Comments: Exam limited by body habitus   Musculoskeletal: Normal range of motion. She exhibits no edema.   Neurological: " She is alert and oriented to person, place, and time.   Skin: Skin is warm and dry. No rash noted.   Psychiatric: She has a normal mood and affect. Her behavior is normal.   Nursing note and vitals reviewed.        Assessment/Plan   Keri was seen today for gynecologic exam and exposure to std.    Diagnoses and all orders for this visit:    Encounter for gynecological examination without abnormal finding    Screening  -     POC Urinalysis Dipstick    Screening for malignant neoplasm of cervix  -     IgP, Aptima HPV    Screen for STD (sexually transmitted disease)  -     NuSwab VG+  -     Hepatitis B Surface Antigen  -     Hepatitis C Antibody  -     HIV-1 / O / 2 Ag / Antibody 4th Generation  -     RPR    Encounter for screening mammogram for breast cancer  -     Mammo Screening Bilateral With CAD; Future    Urgency incontinence  -     Ambulatory Referral to Gynecologic Urology    Nocturia  -     Ambulatory Referral to Gynecologic Urology    RLQ abdominal pain    TVUS - will call with results   Counseling was given to patient for the following topics: self-breast exams .   I advised the patient of the risks in continuing to use tobacco, and I provided this patient with smoking cessation educational materials.  I also discussed how to quit smoking and the patient has expressed the willingness to quit.      During this visit, I spent 3-10 mintues counseling the patient regarding smoking cessation.

## 2017-09-25 LAB
HBV SURFACE AG SERPL QL IA: NEGATIVE
HCV AB S/CO SERPL IA: <0.1 S/CO RATIO (ref 0–0.9)
HIV 1+2 AB+HIV1 P24 AG SERPL QL IA: NON REACTIVE
RPR SER QL: NORMAL

## 2017-09-25 RX ORDER — SUCRALFATE 1 G/10ML
SUSPENSION ORAL
Qty: 1200 ML | Refills: 1 | Status: SHIPPED | OUTPATIENT
Start: 2017-09-25 | End: 2017-11-21 | Stop reason: SDUPTHER

## 2017-09-27 ENCOUNTER — OFFICE VISIT (OUTPATIENT)
Dept: ENDOCRINOLOGY | Age: 57
End: 2017-09-27

## 2017-09-27 VITALS
OXYGEN SATURATION: 97 % | HEIGHT: 63 IN | WEIGHT: 233.4 LBS | HEART RATE: 62 BPM | DIASTOLIC BLOOD PRESSURE: 76 MMHG | SYSTOLIC BLOOD PRESSURE: 120 MMHG | BODY MASS INDEX: 41.36 KG/M2

## 2017-09-27 DIAGNOSIS — E03.9 HYPOTHYROIDISM (ACQUIRED): Primary | ICD-10-CM

## 2017-09-27 DIAGNOSIS — I10 ESSENTIAL HYPERTENSION: ICD-10-CM

## 2017-09-27 DIAGNOSIS — E78.5 HYPERLIPIDEMIA, UNSPECIFIED HYPERLIPIDEMIA TYPE: ICD-10-CM

## 2017-09-27 DIAGNOSIS — I25.10 CORONARY ARTERY DISEASE INVOLVING NATIVE CORONARY ARTERY OF NATIVE HEART WITHOUT ANGINA PECTORIS: ICD-10-CM

## 2017-09-27 DIAGNOSIS — E04.1 THYROID NODULE: ICD-10-CM

## 2017-09-27 DIAGNOSIS — K29.50 CHRONIC GASTRITIS WITHOUT BLEEDING, UNSPECIFIED GASTRITIS TYPE: ICD-10-CM

## 2017-09-27 PROBLEM — R10.31 RLQ ABDOMINAL PAIN: Status: RESOLVED | Noted: 2017-09-22 | Resolved: 2017-09-27

## 2017-09-27 LAB
A VAGINAE DNA VAG QL NAA+PROBE: NORMAL SCORE
ALBUMIN SERPL-MCNC: 4.3 G/DL (ref 3.5–5.2)
ALBUMIN/GLOB SERPL: 1.6 G/DL
ALP SERPL-CCNC: 89 U/L (ref 39–117)
ALT SERPL-CCNC: 29 U/L (ref 1–33)
AST SERPL-CCNC: 17 U/L (ref 1–32)
BILIRUB SERPL-MCNC: 0.2 MG/DL (ref 0.1–1.2)
BUN SERPL-MCNC: 15 MG/DL (ref 6–20)
BUN/CREAT SERPL: 13.2 (ref 7–25)
BVAB2 DNA VAG QL NAA+PROBE: NORMAL SCORE
C ALBICANS DNA VAG QL NAA+PROBE: NEGATIVE
C GLABRATA DNA VAG QL NAA+PROBE: NEGATIVE
C TRACH RRNA SPEC QL NAA+PROBE: NEGATIVE
CALCIUM SERPL-MCNC: 9.6 MG/DL (ref 8.6–10.5)
CHLORIDE SERPL-SCNC: 105 MMOL/L (ref 98–107)
CHOLEST SERPL-MCNC: 174 MG/DL (ref 0–200)
CO2 SERPL-SCNC: 27.7 MMOL/L (ref 22–29)
CREAT SERPL-MCNC: 1.14 MG/DL (ref 0.57–1)
GLOBULIN SER CALC-MCNC: 2.7 GM/DL
GLUCOSE SERPL-MCNC: 94 MG/DL (ref 65–99)
HDLC SERPL-MCNC: 66 MG/DL (ref 40–60)
LDLC SERPL CALC-MCNC: 89 MG/DL (ref 0–100)
MEGA1 DNA VAG QL NAA+PROBE: NORMAL SCORE
N GONORRHOEA RRNA SPEC QL NAA+PROBE: NEGATIVE
POTASSIUM SERPL-SCNC: 5 MMOL/L (ref 3.5–5.2)
PROT SERPL-MCNC: 7 G/DL (ref 6–8.5)
SODIUM SERPL-SCNC: 145 MMOL/L (ref 136–145)
T VAGINALIS RRNA SPEC QL NAA+PROBE: NEGATIVE
T4 FREE SERPL-MCNC: 1.38 NG/DL (ref 0.93–1.7)
TRIGL SERPL-MCNC: 97 MG/DL (ref 0–150)
TSH SERPL DL<=0.005 MIU/L-ACNC: 0.67 MIU/ML (ref 0.27–4.2)
VLDLC SERPL CALC-MCNC: 19.4 MG/DL (ref 5–40)

## 2017-09-27 PROCEDURE — 99214 OFFICE O/P EST MOD 30 MIN: CPT | Performed by: INTERNAL MEDICINE

## 2017-09-27 RX ORDER — ARIPIPRAZOLE 10 MG/1
10 TABLET ORAL DAILY
COMMUNITY
Start: 2017-09-25 | End: 2019-01-17

## 2017-09-27 RX ORDER — LEVOTHYROXINE SODIUM 0.1 MG/1
TABLET ORAL
Qty: 30 TABLET | Refills: 6 | Status: SHIPPED | OUTPATIENT
Start: 2017-09-27 | End: 2017-11-21 | Stop reason: SDUPTHER

## 2017-09-27 RX ORDER — RANITIDINE 150 MG/1
150 TABLET ORAL 2 TIMES DAILY
COMMUNITY
End: 2018-01-29

## 2017-09-27 NOTE — PROGRESS NOTES
Subjective   Keri Nielson is a 57 y.o. female.     HPI Comments: F/u for hypothyroidism, hypertension, hyperlipidemia , cad  Sleep apnea     Hypothyroidism   Associated symptoms include fatigue, joint swelling ( knees , shoulder and neck ) and numbness (left thigh ).   Hypertension   Associated symptoms include palpitations. Identifiable causes of hypertension include sleep apnea.   Hyperlipidemia   Exacerbating diseases include hypothyroidism.   Sleep Apnea   Associated symptoms include fatigue, joint swelling ( knees , shoulder and neck ) and numbness (left thigh ).   Coronary Artery Disease   Symptoms include palpitations. Risk factors include hyperlipidemia.      Patient is a 56-year-old female came in for follow-up. She has hypothyroidism and has been on levothyroxine 100 µg per day since April 2016. Thyroid ultrasound done in June 2016 showed 0.5 cm solid nodule in the right.      She has no previous history of thyroid disease. She has no history of head or neck radiation therapy. She has gained 19 lbs since 3/17.      She had an upper endoscopy with dilatation in June 2016 done by Dr. Rodriugez.  She has gastritis and hiatal hernia and was initially on Protonix and Carafate.  She was switched from Protonix to ranitidine 150 mg twice a day by the nephrologist.  She denies melena or hematochezia    She was seen by Dr. Lind for CKD stage 2 due to HPN and NSAID use.    She has history of hypertension, and coronary artery disease. She had a previous heart attack in 2000 and had angioplasty was 1 stent. She had a cardiac catheterization done in 2015 which showed a patent stent to the LAD. She had normal stress test in 9/16.  She denies chest pain or SOB.She is on atenolol and Imdur and follows with Dr. Burroughs.        She has hyperlipidemia and has been on Zocor 20 mg once a day. She denies any myalgia. She has no previous history of diabetes mellitus.  Hemoglobin A1c done in March 2017 was normal at 5.3%.   "Her mother has diabetes mellitus.  Her last meal was last night.      She was diagnosed to have sleep apnea and is sleeping well with a CPAP.      She has chronic knee and neck pain and sees Dr. Raymond and Dr. Pritchard.  She gets steroid injection on both knees every 3 months.    She smoked cigarettes for more than 40 years and switch to electronic cigarettes 1 year ago.  She has used Wellbutrin in the past.    She had flu vaccine 2 weeks ago.    .  The following portions of the patient's history were reviewed and updated as appropriate: allergies, current medications, past family history, past medical history, past social history, past surgical history and problem list.    Review of Systems   Constitutional: Positive for fatigue.   HENT: Negative.    Eyes: Negative.    Respiratory: Negative.    Cardiovascular: Positive for palpitations.   Gastrointestinal: Negative.    Endocrine: Negative.    Genitourinary: Positive for frequency and urgency.   Musculoskeletal: Positive for back pain and joint swelling ( knees , shoulder and neck ).   Skin: Negative.    Allergic/Immunologic: Negative.    Neurological: Positive for numbness (left thigh ).   Hematological: Bruises/bleeds easily ( bruise ).   Psychiatric/Behavioral: Positive for sleep disturbance (sleep apnea on c pap machine ).       Objective      Vitals:    09/27/17 0851   BP: 120/76   BP Location: Right arm   Patient Position: Sitting   Cuff Size: Large Adult   Pulse: 62   SpO2: 97%   Weight: 233 lb 6.4 oz (106 kg)   Height: 63\" (160 cm)     Physical Exam   Constitutional: She is oriented to person, place, and time. She appears well-developed and well-nourished. No distress.   HENT:   Head: Normocephalic.   Nose: Nose normal.   Mouth/Throat: No oropharyngeal exudate.   Eyes: Conjunctivae and EOM are normal. Right eye exhibits no discharge. Left eye exhibits no discharge. No scleral icterus.   Neck: Neck supple. No JVD present. No tracheal deviation present. No " thyromegaly present.   Cardiovascular: Normal rate, regular rhythm, normal heart sounds and intact distal pulses.  Exam reveals no gallop and no friction rub.    No murmur heard.  Pulmonary/Chest: Effort normal and breath sounds normal. No respiratory distress. She has no wheezes. She has no rales.   Abdominal: Soft. Bowel sounds are normal. She exhibits no distension and no mass. There is no tenderness.   Musculoskeletal: Normal range of motion. She exhibits no edema, tenderness or deformity.   Lymphadenopathy:     She has no cervical adenopathy.   Neurological: She is alert and oriented to person, place, and time. She has normal reflexes. She displays normal reflexes. No cranial nerve deficit.   Skin: Skin is warm and dry. No rash noted. No erythema. No pallor.   Psychiatric: She has a normal mood and affect. Her behavior is normal.     Office Visit on 09/22/2017   Component Date Value Ref Range Status   • Color 09/22/2017 Yellow  Yellow, Straw, Dark Yellow, Mckenzie Final   • Clarity, UA 09/22/2017 Clear  Clear Final   • Glucose, UA 09/22/2017 Negative  Negative, 1000 mg/dL (3+) mg/dL Final   • Bilirubin 09/22/2017 Negative  Negative Final   • Ketones, UA 09/22/2017 Negative  Negative Final   • Specific Gravity  09/22/2017 1.015  1.005 - 1.030 Final   • Blood, UA 09/22/2017 Negative  Negative Final   • pH, Urine 09/22/2017 6.0  5.0 - 8.0 Final   • Protein, POC 09/22/2017 Negative  Negative mg/dL Final   • Urobilinogen, UA 09/22/2017 Normal  Normal Final   • Leukocytes 09/22/2017 Negative  Negative Final   • Nitrite, UA 09/22/2017 Negative  Negative Final   • Atopobium Vaginae 09/22/2017 Moderate - 1  Score Final   • BVAB 2 09/22/2017 Low - 0  Score Final   • Megasphaera 1 09/22/2017 Low - 0  Score Final    Comment: Calculate total score by adding the 3 individual bacterial  vaginosis (BV) marker scores together.  Total score is  interpreted as follows:  Total score 0-1: Indicates the absence of BV.  Total score   2:  Indeterminate for BV. Additional clinical                   data should be evaluated to establish a                   diagnosis.  Total score 3-6: Indicates the presence of BV.  This test was developed and its performance characteristics  determined by [facility].  It has not been cleared or approved  by the Food and Drug Administration.  The FDA has determined  that such clearance or approval is not necessary.     • Diane Albicans, PATRICA 09/22/2017 Negative  Negative Final   • Diane Glabrata, PATRICA 09/22/2017 Negative  Negative Final    Comment: This test was developed and its performance characteristics determined  by [facility].  It has not been cleared or approved by the Food and Drug  Administration.  The FDA has determined that such clearance or  approval is not necessary.     • Trichomonas vaginosis 09/22/2017 Negative  Negative Final   • Chlamydia trachomatis, PATRICA 09/22/2017 Negative  Negative Final   • Neisseria gonorrhoeae, PATRICA 09/22/2017 Negative  Negative Final   • Hepatitis B Surface Ag 09/22/2017 Negative  Negative Final   • Hep C Virus Ab 09/22/2017 <0.1  0.0 - 0.9 s/co ratio Final    Comment:                                   Negative:     < 0.8                               Indeterminate: 0.8 - 0.9                                    Positive:     > 0.9   The CDC recommends that a positive HCV antibody result   be followed up with a HCV Nucleic Acid Amplification   test (675699).     • HIV Screen 4th Gen w/RFX (Referenc* 09/22/2017 Non Reactive  Non Reactive Final   • RPR 09/22/2017 Comment  Non-Reactive Final    Non-Reactive     Assessment/Plan   Keri was seen today for hypothyroidism, hypertension, hyperlipidemia, sleep apnea and coronary artery disease.    Diagnoses and all orders for this visit:    Hypothyroidism (acquired)  -     Comprehensive Metabolic Panel  -     TSH  -     T4, Free  -     US Thyroid  -     levothyroxine (SYNTHROID, LEVOTHROID) 100 MCG tablet; One tablet daily on an empty  stomach.    Thyroid nodule    Coronary artery disease involving native coronary artery of native heart without angina pectoris  -     Lipid Panel    Hyperlipidemia, unspecified hyperlipidemia type  -     Comprehensive Metabolic Panel  -     Lipid Panel    Essential hypertension  -     Comprehensive Metabolic Panel    Chronic gastritis without bleeding, unspecified gastritis type      Continue levothyroxine 100 µg per day.  Schedule thyroid ultrasound.  Continue simvastatin 20 mg once a day.  Check lipid profile.  Continue atenolol and isosorbide per Dr. Burroughs.  Continue Carafate and Zantac.  Follow-up with Dr. Rodriguez.    Send copy of my notes and labs to James Epley NP, Dr. Rodriguez, Dr. Tho Pritchard, and Dr. Lind    RTC 6 mos.

## 2017-09-28 ENCOUNTER — PROCEDURE VISIT (OUTPATIENT)
Dept: OBSTETRICS AND GYNECOLOGY | Facility: CLINIC | Age: 57
End: 2017-09-28

## 2017-09-28 DIAGNOSIS — R10.31 RLQ ABDOMINAL PAIN: Primary | ICD-10-CM

## 2017-09-28 PROBLEM — R87.612 LGSIL ON PAP SMEAR OF CERVIX: Status: ACTIVE | Noted: 2017-09-28

## 2017-09-28 LAB
CYTOLOGIST CVX/VAG CYTO: ABNORMAL
CYTOLOGY CVX/VAG DOC THIN PREP: ABNORMAL
DX ICD CODE: ABNORMAL
DX ICD CODE: ABNORMAL
HIV 1 & 2 AB SER-IMP: ABNORMAL
HPV I/H RISK 4 DNA CVX QL PROBE+SIG AMP: POSITIVE
OTHER STN SPEC: ABNORMAL
PATH REPORT.FINAL DX SPEC: ABNORMAL
PATHOLOGIST CVX/VAG CYTO: ABNORMAL
RECOM F/U CVX/VAG CYTO: ABNORMAL
STAT OF ADQ CVX/VAG CYTO-IMP: ABNORMAL

## 2017-09-28 PROCEDURE — 76830 TRANSVAGINAL US NON-OB: CPT | Performed by: OBSTETRICS & GYNECOLOGY

## 2017-09-29 ENCOUNTER — APPOINTMENT (OUTPATIENT)
Dept: MAMMOGRAPHY | Facility: HOSPITAL | Age: 57
End: 2017-09-29
Attending: OBSTETRICS & GYNECOLOGY

## 2017-10-02 ENCOUNTER — OFFICE VISIT (OUTPATIENT)
Dept: ORTHOPEDIC SURGERY | Facility: CLINIC | Age: 57
End: 2017-10-02

## 2017-10-02 ENCOUNTER — HOSPITAL ENCOUNTER (OUTPATIENT)
Dept: MAMMOGRAPHY | Facility: HOSPITAL | Age: 57
Discharge: HOME | End: 2017-10-02
Attending: OBSTETRICS & GYNECOLOGY | Admitting: OBSTETRICS & GYNECOLOGY

## 2017-10-02 VITALS — BODY MASS INDEX: 41.46 KG/M2 | WEIGHT: 234 LBS | HEIGHT: 63 IN | TEMPERATURE: 98 F

## 2017-10-02 DIAGNOSIS — M25.561 CHRONIC PAIN OF RIGHT KNEE: Primary | ICD-10-CM

## 2017-10-02 DIAGNOSIS — M17.11 PRIMARY OSTEOARTHRITIS OF RIGHT KNEE: ICD-10-CM

## 2017-10-02 DIAGNOSIS — G89.29 CHRONIC PAIN OF RIGHT KNEE: Primary | ICD-10-CM

## 2017-10-02 DIAGNOSIS — Z12.31 ENCOUNTER FOR SCREENING MAMMOGRAM FOR BREAST CANCER: ICD-10-CM

## 2017-10-02 PROCEDURE — 99213 OFFICE O/P EST LOW 20 MIN: CPT | Performed by: NURSE PRACTITIONER

## 2017-10-02 PROCEDURE — G0202 SCR MAMMO BI INCL CAD: HCPCS

## 2017-10-02 PROCEDURE — 73562 X-RAY EXAM OF KNEE 3: CPT | Performed by: NURSE PRACTITIONER

## 2017-10-02 NOTE — PATIENT INSTRUCTIONS
Hylan G-F 20 intra-articular injection  What is this medicine?  HYLAN G-F 20 (HI liliam G F 20) is used to treat osteoarthritis of the knee. It lubricates and cushions the joint, reducing pain in the knee.  This medicine may be used for other purposes; ask your health care provider or pharmacist if you have questions.  COMMON BRAND NAME(S): Synvisc, Synvisc-One  What should I tell my health care provider before I take this medicine?  They need to know if you have any of these conditions:  -severe knee inflammation  -skin conditions or sensitivity  -skin or joint infection  -venous stasis  -an unusual or allergic reaction to hylan G-F 20, hyaluronan (sodium hyaluronate), eggs, other medicines, foods, dyes, or preservatives  -pregnant or trying to get pregnant  -breast-feeding  How should I use this medicine?  This medicine is for injection into the knee joint. It is given by a health care professional in a hospital or clinic setting.  Talk to your pediatrician regarding the use of this medicine in children. This medicine is not approved for use in children.  Overdosage: If you think you have taken too much of this medicine contact a poison control center or emergency room at once.  NOTE: This medicine is only for you. Do not share this medicine with others.  What if I miss a dose?  Keep appointments for follow-up doses as directed. For Synvisc, you will need weekly injections for 3 doses. It is important not to miss your dose. If you will receive Synvisc-One, then only 1 injection will be needed. Call your doctor or health care professional if you are unable to keep an appointment.  What may interact with this medicine?  Do not take this medicine with any of the following medications:  -other injections for the joint like steroids or anesthetics  -certain skin disinfectants like benzalkonium chloride  This list may not describe all possible interactions. Give your health care provider a list of all the medicines, herbs,  non-prescription drugs, or dietary supplements you use. Also tell them if you smoke, drink alcohol, or use illegal drugs. Some items may interact with your medicine.  What should I watch for while using this medicine?  Tell your doctor or healthcare professional if your symptoms do not start to get better or if they get worse. Your condition will be monitored carefully while you are receiving this medicine. Most persons get pain relief for up to 6 months after treatment.  Avoid strenuous activities (high-impact sports, jogging) or major weight-bearing activities for 48 hours after the injection.  What side effects may I notice from receiving this medicine?  Side effects that you should report to your doctor or health care professional as soon as possible:  -allergic reactions like skin rash, itching or hives, swelling of the face, lips, or tongue  -difficulty breathing  -fever or chills  -severe joint pain or swelling  -unusual bleeding or bruising  Side effects that usually do not require medical attention (report to your doctor or health care professional if they continue or are bothersome):  -dizziness  -flushing  -general ill feeling or flu-like symptoms  -headache  -minor joint pain or swelling  -muscle pain or cramps  -pain, redness, irritation or bruising at site of injection  This list may not describe all possible side effects. Call your doctor for medical advice about side effects. You may report side effects to FDA at 4-470-FDA-8466.  Where should I keep my medicine?  This drug is given in a hospital or clinic and will not be stored at home.  NOTE: This sheet is a summary. It may not cover all possible information. If you have questions about this medicine, talk to your doctor, pharmacist, or health care provider.     © 2017, [employer]. (2017-01-19 11:48:41)

## 2017-10-02 NOTE — PROGRESS NOTES
[id number] Name: Keri Nielson   YOB: 1960  Referring Primary Care Physician: James Epley, APRN      Chief Complaint:    Chief Complaint   Patient presents with   • Right Knee - Follow-up        Subjective:    HPI:   Keri Nielson is a pleasant 57 y.o. year old who presents today for evaluation of   Chief Complaint   Patient presents with   • Right Knee - Follow-up   .  KNEE: TIMING:  The pain is described as ACUTE on CHRONIC.  LOCATION: medial joint line tenderness. AGGRAVATING FACTORS:  Is worsened by prolonged standing, sitting, walking and squatting activities.  ASSOCIATED SYMPTOMS:  swelling, tenderness, and aching. OTHER SYMPTOMS denies popping, locking or catching. RELIEVING FACTORS:  Previous treatment has included cortisone injections  OTC Tylenol  activtiy modification  weight loss  ice/heat/rest.    This problem is not new to this examiner.     Medications:   Home Medications:  Current Outpatient Prescriptions on File Prior to Visit   Medication Sig   • ARIPiprazole (ABILIFY) 10 MG tablet Take 10 mg by mouth Daily.   • aspirin 81 MG chewable tablet Chew 81 mg Daily.   • atenolol (TENORMIN) 25 MG tablet Take 1 tablet by mouth Daily. (Patient taking differently: Take 25 mg by mouth Every Morning.)   • buPROPion XL (WELLBUTRIN XL) 150 MG 24 hr tablet Take 150 mg by mouth Every Morning.   • CARAFATE 1 GM/10ML suspension TAKE 10 ML'S BY MOUTH FOUR TIMES A DAY   • Cholecalciferol (VITAMIN D3) 5000 UNITS capsule capsule Take 5,000 Units by mouth Daily.   • citalopram (CeleXA) 40 MG tablet Take 40 mg by mouth Every Night.   • fluticasone (FLONASE) 50 MCG/ACT nasal spray 2 sprays into each nostril Daily.   • gabapentin (NEURONTIN) 300 MG capsule TAKE ONE CAPSULE BY MOUTH THREE TIMES A DAY   • isosorbide mononitrate (IMDUR) 30 MG 24 hr tablet Take 30 mg by mouth Daily.   • levothyroxine (SYNTHROID, LEVOTHROID) 100 MCG tablet One tablet daily on an empty stomach.   • nitroglycerin (NITROSTAT) 0.4  MG SL tablet Place 0.4 mg under the tongue Every 5 (Five) Minutes As Needed for Chest Pain (took at M.D  office at 0930 a.m.). Take no more than 3 doses in 15 minutes.   • pramipexole (MIRAPEX) 0.5 MG tablet Take 0.5 mg by mouth Every Night.   • raNITIdine (ZANTAC) 150 MG tablet Take 150 mg by mouth 2 (Two) Times a Day.   • simvastatin (ZOCOR) 20 MG tablet Take 1 tablet by mouth Every Night.     No current facility-administered medications on file prior to visit.      Current Medications:  Scheduled Meds:  Continuous Infusions:  No current facility-administered medications for this visit.   PRN Meds:.    I have reviewed the patient's medical history in detail and updated the computerized patient record.  Review and summarization of old records includes:    Past Medical History:   Diagnosis Date   • Abnormal vaginal bleeding    • Anemia    • Arthritis    • Atherosclerotic heart disease of native coronary artery with other forms of angina pectoris    • Bipolar I disorder, single manic episode     depressive d(NOS)   • Cervical disc herniation    • Coronary artery disease    • Depression     NO SUICIDAL PLANS   • Difficulty swallowing    • Diverticular disease    • Dyspepsia    • Dysphagia    • Gastric reflux    • Heart murmur    • History of transfusion    • 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    HPV positive on pap LGSIL   • Hyperlipidemia    • Hypertension    • Hypothyroidism    • Incontinence in female     wears pads   • Incontinence in female    • Kidney disease 2017    stage 2    • Kidney disease, chronic, stage III (GFR 30-59 ml/min)    • LGSIL on Pap smear of cervix 04/29/2016    LGSIL HPV positive   • Lightheadedness    • Neck pain    • Obesity    • Past myocardial infarction 05/2000   • Periodic limb movement disorder    • Restless leg syndrome    • Sleep apnea     cpap   • Stress fracture     LEFT HEEL   • Suicidal ideation 08/19/2016   • Swelling of ankle     right had doppler no s/s blood clot    • Thyroid nodule         Past Surgical History:   Procedure Laterality Date   • ANTERIOR CERVICAL DISCECTOMY W/ FUSION N/A 12/29/2016    Procedure: C3-4 anterior cervical discectomy and fusion with Depuy micro plate, ALLOGRAFT C3-4, AND HARDWARE REMOVAL C4-7.;  Surgeon: Hema Godwin MD;  Location: [facility] MAIN OR;  Service:    • CARDIAC CATHETERIZATION N/A 3/30/2017    Procedure: Left Heart Cath;  Surgeon: Tracey Vargas MD;  Location:  [facility]VASIVE [facility];  Service:    • CARDIAC CATHETERIZATION N/A 3/30/2017    Procedure: Coronary angiography;  Surgeon: Tracey Vargas MD;  Location:  [facility];  Service:    • CARDIAC CATHETERIZATION N/A 3/30/2017    Procedure: Left ventriculography;  Surgeon: Tracey Vargas MD;  Location:  [facility];  Service:    • CARDIAC CATHETERIZATION  3/30/2017    Procedure: Functional Flow [place];  Surgeon: Tracey Vargas MD;  Location:  [facility];  Service:    • CERVICAL BIOPSY  [facility]XVI    Dr. Jeffery.    • CERVICAL DISCECTOMY ANTERIOR  04/2013    C4-7   • COLONOSCOPY  04/20/2015    Diverticulosis, IH   • COLPOSCOPY W/ BIOPSY / CURETTAGE  06/17/2016    LGSIL HPV positive. Results were normal repeat pap in one year. Chronic Cervicitis   • ENDOSCOPY  [facility]    Normal.  Dr. Rodriguez   • ENDOSCOPY N/A 6/22/2017    Erythematous mucosa in the stomach  PATH: Chronic active gastritis, moderate with intestinal metaplasia    • HARDWARE REMOVAL  12/29/2016    cervical   • OTHER SURGICAL HISTORY Left 2015   • SHOULDER SURGERY Left     RCR   • TONSILLECTOMY AND ADENOIDECTOMY     • TUBAL ABDOMINAL LIGATION     • TYMPANOSTOMY TUBE PLACEMENT Right    • WRIST SURGERY Bilateral     carpal tunnel        Social History     Occupational History   • Not on file.     Social History Main Topics   • Smoking status: Former Smoker     Packs/day: 1.50     Years: 20.00     Types: Electronic Cigarette     Quit date: 2015   • Smokeless tobacco: Current User       Comment: Electronic Cigarette/ 3 mg nicotine    • Alcohol use Yes      Comment: OCCASIONAL   • Drug use: No   • Sexual activity: Yes     Partners: Male     Birth control/ protection: Condom      Social History     Social History Narrative        Family History   Problem Relation Age of Onset   • Diabetes type II Mother    • Hypertension Mother    • Osteoporosis Mother    • Seizures Mother    • COPD Father    • Hypertension Father    • Lung cancer Father    • Heart attack Father    • Thyroid disease Sister    • Hypertension Sister    • Bipolar disorder Sister    • Depression Sister    • No Known Problems Son    • Abnormal EKG Daughter    • Hypertension Daughter    • Bipolar disorder Daughter    • No Known Problems Paternal Grandfather    • No Known Problems Paternal Grandmother    • No Known Problems Maternal Grandmother    • No Known Problems Maternal Grandfather    • Thyroid disease Sister    • Hypertension Sister    • Bipolar disorder Sister    • Asthma Daughter    • Breast cancer Neg Hx    • Ovarian cancer Neg Hx    • Uterine cancer Neg Hx    • Colon cancer Neg Hx    • Malig Hyperthermia Neg Hx        ROS: 14 point review of systems was performed and all other systems were reviewed and are negative except for documented findings in HPI and today's encounter.     Allergies: No Known Allergies  Constitutional:  Denies fever, shaking or chills   Eyes:  Denies change in visual acuity   HENT:  Denies nasal congestion or sore throat   Respiratory:  Denies cough or shortness of breath   Cardiovascular:  Denies chest pain or severe LE edema   GI:  Denies abdominal pain, nausea, vomiting, bloody stools or diarrhea   Musculoskeletal:  Numbness, tingling, pain, or loss of motor function only as noted above in history of present illness.  : Denies painful urination or hematuria  Integument:  Denies rash, lesion or ulceration   Neurologic:  Denies headache or focal weakness  Endocrine:  Denies lymphadenopathy  Psych:   Denies confusion or change in mental status   Hem:  Denies active bleeding    Objective:    Physical Exam: 57 y.o. female  Body mass index is 41.45 kg/(m^2)., @WT@, @HT@  Vitals:    10/02/17 0928   Temp: 98 °F (36.7 °C)     Vital signs reviewed.   General Appearance:    Alert, cooperative, in no acute distress                  Eyes: conjunctiva clear  ENT: external ears and nose atraumatic  CV: no peripheral edema  Resp: normal respiratory effort  Skin: no rashes or wounds; normal turgor  Psych: mood and affect appropriate  Lymph: no nodes appreciated  Neuro: gross sensation intact  Vascular:  Palpable peripheral pulse in noted extremity  Musculoskeletal Extremities: DETAILED KNEE Exam: Right knee: Painful gait w/wo limp, muscle atrophy, erythema, ecchymosis, or gross deformity noted, Large knee effusion, + medial, medial and lateral joint line tenderness, Active range of motion normal, 5/5 strength flexion and extension, The knee is stable to varus and valgus stress testing, VARUS VALGUS NEUTRAL: valgus alignment of the limb, Lachman negative, Posterior drawer negative, Hamida's negative, Patellofemoral grind +, Sensation grossly intact to light tough throughout the lower extremity, Skin is intact, Distal pulses are palpable, No signs or symptoms of DVT    Radiology:   Imaging done today and discussed at length with the patient:    Indication: pain related symptoms,  Views: 3V AP, LAT & 40 degree PA bilateral knee(s)   Findings: severe end-stage arthritis (bone on bone, subchondral sclerosis/cysts, osteophytes)  Comparison views: viewed last xray done in the office.       Assessment:     ICD-10-CM ICD-9-CM   1. Chronic pain of right knee M25.561 719.46    G89.29 338.29   2. Primary osteoarthritis of right knee M17.11 715.16        Procedures       Plan:  15 min spent face to face with patient 12 min spent counseling about natural history and expected course of assessed complaint and reviewed treatment options  that have been tried and not tried and those currently available. Questions answered.    Natural history and expected course of this patient's diagnosis discussed along with evaluation of therapies. Questions answered.  Advice on benefits of, and types of regular/moderate exercise including biomechanical forces involved as it pertains to this complaint, with a goal of 5 min at least 7 times a week.    Address and update of wt loss, physical exercises, use of assistive devices, and monitoring of Medications per orders to address ortho complaints; Evaluation and discussion of safety, precautions, side effects, and warnings given especially of long term NSAID therapy.  Lifestyle measures for weight loss, suggest starting at 10lbs, with biomechanical explanations for weight loss and how this affects orthopedic condition.  OTC analgesics as needed with dosage warning and instructions given.  Cryotherapy/brachy therapy as indicated with instructions.   Advised on knee strengthening exercises, stressed importance of these with progression of arthritis, demonstrated exercises to patient with repeat demonstration and verb of understanding.   Cannot take NSAIDS due to deccreased kidney function.   Is on a new psych med that she feels is making difficult to lose weight.     Need synvisc approval and will try, the cortisone did not give enough relief and is having a bad flare in the right knee.   10/2/2017   MJR

## 2017-10-03 ENCOUNTER — OFFICE VISIT (OUTPATIENT)
Dept: ORTHOPEDIC SURGERY | Facility: CLINIC | Age: 57
End: 2017-10-03

## 2017-10-03 ENCOUNTER — OFFICE VISIT (OUTPATIENT)
Dept: FAMILY MEDICINE CLINIC | Facility: CLINIC | Age: 57
End: 2017-10-03

## 2017-10-03 VITALS
WEIGHT: 233 LBS | HEART RATE: 73 BPM | OXYGEN SATURATION: 98 % | TEMPERATURE: 98.3 F | SYSTOLIC BLOOD PRESSURE: 138 MMHG | BODY MASS INDEX: 41.29 KG/M2 | DIASTOLIC BLOOD PRESSURE: 70 MMHG | HEIGHT: 63 IN

## 2017-10-03 VITALS — WEIGHT: 230 LBS | TEMPERATURE: 98.4 F | HEIGHT: 63 IN | BODY MASS INDEX: 40.75 KG/M2

## 2017-10-03 DIAGNOSIS — M77.8 LEFT WRIST TENDINITIS: Primary | ICD-10-CM

## 2017-10-03 DIAGNOSIS — Z98.1 HX OF FUSION OF CERVICAL SPINE: Primary | ICD-10-CM

## 2017-10-03 DIAGNOSIS — N18.30 CHRONIC RENAL INSUFFICIENCY, STAGE III (MODERATE) (HCC): ICD-10-CM

## 2017-10-03 PROCEDURE — 72020 X-RAY EXAM OF SPINE 1 VIEW: CPT | Performed by: ORTHOPAEDIC SURGERY

## 2017-10-03 PROCEDURE — 99213 OFFICE O/P EST LOW 20 MIN: CPT | Performed by: ORTHOPAEDIC SURGERY

## 2017-10-03 PROCEDURE — 99213 OFFICE O/P EST LOW 20 MIN: CPT | Performed by: NURSE PRACTITIONER

## 2017-10-03 PROCEDURE — 72040 X-RAY EXAM NECK SPINE 2-3 VW: CPT | Performed by: ORTHOPAEDIC SURGERY

## 2017-10-03 NOTE — PATIENT INSTRUCTIONS
"Decrease processed sweets, carbohydrates, breads and pastas. increase natural fiber, healthy proteins.  Decrease saturated fats. Increase healthy vegetables.  Portion control all meals.  Increase water 64 ounces a day.  Avoid soda pops, juices, sugary drinks, sugar substitutes, alcohol and most \"low-fat\" products which are generally high in sugar.   If you haven't already, watch the movie \"Fed UP\", available [employer] and other sources.      [id number] calories per dayCalorie Counting for Weight Loss  Calories are energy you get from the things you eat and drink. Your body uses this energy to keep you going throughout the day. The number of calories you eat affects your weight. When you eat more calories than your body needs, your body stores the extra calories as fat. When you eat fewer calories than your body needs, your body burns fat to get the energy it needs.  Calorie counting means keeping track of how many calories you eat and drink each day. If you make sure to eat fewer calories than your body needs, you should lose weight. In order for calorie counting to work, you will need to eat the number of calories that are right for you in a day to lose a healthy amount of weight per week. A healthy amount of weight to lose per week is usually 1-2 lb (0.5-0.9 kg). A dietitian can determine how many calories you need in a day and give you suggestions on how to reach your calorie goal.   WHAT IS MY MY PLAN?  My goal is to have __________ calories per day.   If I have this many calories per day, I should lose around __________ pounds per week.  WHAT DO I NEED TO KNOW ABOUT CALORIE COUNTING?  In order to meet your daily calorie goal, you will need to:  · Find out how many calories are in each food you would like to eat. Try to do this before you eat.  · Decide how much of the food you can eat.  · Write down what you ate and how many calories it had. Doing this is called keeping a food log.  WHERE DO I FIND CALORIE " INFORMATION?  The number of calories in a food can be found on a Nutrition Facts label. Note that all the information on a label is based on a specific serving of the food. If a food does not have a Nutrition Facts label, try to look up the calories online or ask your dietitian for help.  HOW DO I DECIDE HOW MUCH TO EAT?  To decide how much of the food you can eat, you will need to consider both the number of calories in one serving and the size of one serving. This information can be found on the Nutrition Facts label. If a food does not have a Nutrition Facts label, look up the information online or ask your dietitian for help.  Remember that calories are listed per serving. If you choose to have more than one serving of a food, you will have to multiply the calories per serving by the amount of servings you plan to eat. For example, the label on a package of bread might say that a serving size is 1 slice and that there are 90 calories in a serving. If you eat 1 slice, you will have eaten 90 calories. If you eat 2 slices, you will have eaten 180 calories.  HOW DO I KEEP A FOOD LOG?  After each meal, record the following information in your food log:  · What you ate.  · How much of it you ate.  · How many calories it had.  · Then, add up your calories.  Keep your food log near you, such as in a small notebook in your pocket. Another option is to use a mobile marimar or website. Some programs will calculate calories for you and show you how many calories you have left each time you add an item to the log.  WHAT ARE SOME CALORIE COUNTING TIPS?  · Use your calories on foods and drinks that will fill you up and not leave you hungry. Some examples of this include foods like nuts and nut butters, vegetables, lean proteins, and high-fiber foods (more than 5 g fiber per serving).  · Eat nutritious foods and avoid empty calories. Empty calories are calories you get from foods or beverages that do not have many nutrients, such  as candy and soda. It is better to have a nutritious high-calorie food (such as an avocado) than a food with few nutrients (such as a bag of chips).  · Know how many calories are in the foods you eat most often. This way, you do not have to look up how many calories they have each time you eat them.  · Look out for foods that may seem like low-calorie foods but are really high-calorie foods, such as baked goods, soda, and fat-free candy.  · Pay attention to calories in drinks. Drinks such as sodas, specialty coffee drinks, alcohol, and juices have a lot of calories yet do not fill you up. Choose low-calorie drinks like water and diet drinks.  · Focus your calorie counting efforts on higher calorie items. Logging the calories in a garden salad that contains only vegetables is less important than calculating the calories in a milk shake.  · Find a way of tracking calories that works for you. Get creative. Most people who are successful find ways to keep track of how much they eat in a day, even if they do not count every calorie.  WHAT ARE SOME PORTION CONTROL TIPS?  · Know how many calories are in a serving. This will help you know how many servings of a certain food you can have.  · Use a measuring cup to measure serving sizes. This is helpful when you start out. With time, you will be able to estimate serving sizes for some foods.  · Take some time to put servings of different foods on your favorite plates, bowls, and cups so you know what a serving looks like.  · Try not to eat straight from a bag or box. Doing this can lead to overeating. Put the amount you would like to eat in a cup or on a plate to make sure you are eating the right portion.  · Use smaller plates, glasses, and bowls to prevent overeating. This is a quick and easy way to practice portion control. If your plate is smaller, less food can fit on it.  · Try not to multitask while eating, such as watching TV or using your computer. If it is time to  "eat, sit down at a table and enjoy your food. Doing this will help you to start recognizing when you are full. It will also make you more aware of what and how much you are eating.  HOW CAN I CALORIE COUNT WHEN EATING OUT?  · Ask for smaller portion sizes or child-sized portions.  · Consider sharing an entree and sides instead of getting your own entree.  · If you get your own entree, eat only half. Ask for a box at the beginning of your meal and put the rest of your entree in it so you are not tempted to eat it.  · Look for the calories on the menu. If calories are listed, choose the lower calorie options.  · Choose dishes that include vegetables, fruits, whole grains, low-fat dairy products, and lean protein. Focusing on smart food choices from each of the 5 food groups can help you stay on track at restaurants.  · Choose items that are boiled, broiled, grilled, or steamed.  · Choose water, milk, unsweetened iced tea, or other drinks without added sugars. If you want an alcoholic beverage, choose a lower calorie option. For example, a regular agapito can have up to 700 calories and a glass of wine has around 150.  · Stay away from items that are buttered, battered, fried, or served with cream sauce. Items labeled \"crispy\" are usually fried, unless stated otherwise.  · Ask for dressings, sauces, and syrups on the side. These are usually very high in calories, so do not eat much of them.  · Watch out for salads. Many people think salads are a healthy option, but this is often not the case. Many salads come with lynn, fried chicken, lots of cheese, fried chips, and dressing. All of these items have a lot of calories. If you want a salad, choose a garden salad and ask for grilled meats or steak. Ask for the dressing on the side, or ask for olive oil and vinegar or lemon to use as dressing.  · Estimate how many servings of a food you are given. For example, a serving of cooked rice is ½ cup or about the size of half " a tennis ball or one cupcake wrapper. Knowing serving sizes will help you be aware of how much food you are eating at restaurants. The list below tells you how big or small some common portion sizes are based on everyday objects.    1 oz--4 stacked dice.    3 oz--1 deck of cards.    1 tsp--1 dice.    1 Tbsp--½ a Ping-Pong ball.    2 Tbsp--1 Ping-Pong ball.    ½ cup--1 tennis ball or 1 cupcake wrapper.    1 cup--1 baseball.     This information is not intended to replace advice given to you by your health care provider. Make sure you discuss any questions you have with your health care provider.     Document Released: 12/18/2006 Document Revised: 01/08/2016 Document Reviewed: 10/23/2014  Elsevier Interactive Patient Education ©2017 [employer]

## 2017-10-03 NOTE — PROGRESS NOTES
Subjective   Keri Nielson is a 57 y.o. female.     HPI Comments:     Patient today complains of left wrist pain approximately one week  Lateral aspect of wrist pain mild  Worse with movement no injury achy sharp pain without radiation  No weakness          She went to nephrology, says she was told she has 2-3 stage renal failure  Due to long-term hypertension and NSAIDs  No longer takes NSAIDs        Uses her hands to play with her phone or use her phone, to hold her e-cigarette    No fall or trauma                     The following portions of the patient's history were reviewed and updated as appropriate: allergies, current medications, past family history, past social history, past surgical history and problem list.    Review of Systems    Objective   Physical Exam   Constitutional: She appears well-developed.   HENT:   Head: Normocephalic.   Eyes: Pupils are equal, round, and reactive to light.   Pulmonary/Chest: Effort normal.   Musculoskeletal:   Mild-to-moderate sharp pain medial aspect on ulnar region  Localized tenderness especially when she moves her wrist certain positions towards the pain  No weakness no fever no chills no swelling no injury     Neurological: She is alert.   Psychiatric: She has a normal mood and affect. Her behavior is normal. Judgment and thought content normal.   Vitals reviewed.      Assessment/Plan Is a hamburger good in the fridge and looked again  Keri was seen today for wrist pain.    Diagnoses and all orders for this visit:    Left wrist tendinitis    Chronic renal insufficiency, stage III (moderate)                    Use up to 16 hours a day  The next couple weeks as needed  Take off at least eight hours  Increase pain weakness urgent recheck  No NSAIDs  Blood pressure control lesson 130 Rady  64 ounces water data protect kidneys      Recheck in one week recheck wrist, as well as to discuss obesity strategy

## 2017-10-03 NOTE — PROGRESS NOTES
8 months out now from anterior cervical discectomy and fusion and she is getting worse not better.  Neurologic exam is unchanged but she complains of neck pain and occipital headache and these were completely relieved before.  Plain film x-rays suggest solid fusions from see for 5 to see 67 where the plate has been removed.  At C3-4 however there remains a cephalad lucency in the fusion bone and very slight lucency of the cephalad screws.  Flexion extension films however don't show any obvious instability.  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  She were going to give this 3 more months at which time I'll see her back with repeat flexion-extension laterals.  If pain becomes so bad that she wants surgery then we'll get a CT scan as the neck study and she may call at any point for this.  It's certainly possible she's got a nonunion and she is not moving enough to demonstrate.  Flexion-extension lateral cervical x-rays on return visit.

## 2017-10-04 ENCOUNTER — TELEPHONE (OUTPATIENT)
Dept: OBSTETRICS AND GYNECOLOGY | Facility: CLINIC | Age: 57
End: 2017-10-04

## 2017-10-04 NOTE — TELEPHONE ENCOUNTER
----- Message from Nelida Jeffery MD sent at 10/4/2017  4:12 PM EDT -----  Please call patient and notify of normal results of mammogram - repeat in one year

## 2017-10-06 ENCOUNTER — HOSPITAL ENCOUNTER (OUTPATIENT)
Dept: ULTRASOUND IMAGING | Facility: HOSPITAL | Age: 57
Discharge: HOME | End: 2017-10-06
Attending: INTERNAL MEDICINE

## 2017-10-06 ENCOUNTER — HOSPITAL ENCOUNTER (OUTPATIENT)
Dept: ULTRASOUND IMAGING | Facility: HOSPITAL | Age: 57
Discharge: HOME | End: 2017-10-06
Attending: INTERNAL MEDICINE | Admitting: INTERNAL MEDICINE

## 2017-10-06 PROCEDURE — 76536 US EXAM OF HEAD AND NECK: CPT

## 2017-10-08 PROBLEM — M77.8 LEFT WRIST TENDINITIS: Status: ACTIVE | Noted: 2017-10-08

## 2017-10-08 PROBLEM — N18.30 CHRONIC RENAL INSUFFICIENCY, STAGE III (MODERATE): Status: ACTIVE | Noted: 2017-10-08

## 2017-10-25 ENCOUNTER — OFFICE VISIT (OUTPATIENT)
Dept: FAMILY MEDICINE CLINIC | Facility: CLINIC | Age: 57
End: 2017-10-25

## 2017-10-25 VITALS
DIASTOLIC BLOOD PRESSURE: 72 MMHG | BODY MASS INDEX: 41.18 KG/M2 | HEART RATE: 84 BPM | WEIGHT: 232.4 LBS | HEIGHT: 63 IN | OXYGEN SATURATION: 98 % | SYSTOLIC BLOOD PRESSURE: 112 MMHG | TEMPERATURE: 99 F

## 2017-10-25 DIAGNOSIS — M54.12 CERVICAL RADICULOPATHY: ICD-10-CM

## 2017-10-25 DIAGNOSIS — R05.9 COUGH: Primary | ICD-10-CM

## 2017-10-25 DIAGNOSIS — Z12.2 ENCOUNTER FOR SCREENING FOR LUNG CANCER: ICD-10-CM

## 2017-10-25 DIAGNOSIS — Z72.0 TOBACCO ABUSE: ICD-10-CM

## 2017-10-25 DIAGNOSIS — E66.01 OBESITY, MORBID, BMI 40.0-49.9 (HCC): ICD-10-CM

## 2017-10-25 PROCEDURE — 99214 OFFICE O/P EST MOD 30 MIN: CPT | Performed by: NURSE PRACTITIONER

## 2017-10-25 RX ORDER — GABAPENTIN 300 MG/1
300 CAPSULE ORAL 3 TIMES DAILY
Qty: 90 CAPSULE | Refills: 3 | Status: SHIPPED | OUTPATIENT
Start: 2017-10-25 | End: 2017-11-22 | Stop reason: SDUPTHER

## 2017-10-25 RX ORDER — LORATADINE 10 MG/1
10 TABLET ORAL DAILY
Qty: 30 TABLET | Refills: 11 | Status: SHIPPED | OUTPATIENT
Start: 2017-10-25 | End: 2019-09-30 | Stop reason: SDUPTHER

## 2017-10-25 RX ORDER — FLUTICASONE PROPIONATE 50 MCG
2 SPRAY, SUSPENSION (ML) NASAL DAILY
Qty: 1 BOTTLE | Refills: 6 | Status: SHIPPED | OUTPATIENT
Start: 2017-10-25 | End: 2018-08-09

## 2017-10-25 RX ORDER — BENZONATATE 200 MG/1
200 CAPSULE ORAL 3 TIMES DAILY PRN
Qty: 20 CAPSULE | Refills: 1 | Status: SHIPPED | OUTPATIENT
Start: 2017-10-25 | End: 2018-01-29

## 2017-10-25 NOTE — PROGRESS NOTES
Subjective   Keri Nielson is a 57 y.o. female.     HPI Comments: Patient's here for follow-up today  Long history of obesity  Approximate 5 years ago was able to lose weight and keep it around 140 but then gradually gained it back  She's always had trouble keep her weight off  And has been significantly obese most of her life  She's not been able to lose weight on her own  Presently 1800 alex a day times one month  She is interested in surgical consult regarding bariatric surgery    Presently depression anxiety mood fairly stable although little more depressed lately  See psychiatry manage her medication  Abilify several months, I don't think she's gained weight with this so far question        urogyn bladder control issues     Needs refill Tessalon Perle dry cough  No chronic cough no chest pain or shortness of breath no fever    Just a cough a couple weeks      Needs allergy medicine Flonase,  Additional allergy medicines well cannot remember the name      P         The following portions of the patient's history were reviewed and updated as appropriate: allergies, current medications, past family history, past medical history, past social history and problem list.    Review of Systems   Constitutional: Negative.  Negative for appetite change, chills, fatigue, fever and unexpected weight change.   HENT: Negative for congestion, ear pain and sore throat.    Eyes: Negative.    Respiratory: Positive for cough. Negative for chest tightness, shortness of breath and wheezing.    Cardiovascular: Negative for chest pain, palpitations and leg swelling.   Gastrointestinal: Negative for abdominal pain and constipation.   Genitourinary: Negative for difficulty urinating, dysuria and urgency.   Musculoskeletal: Negative for arthralgias and myalgias.   Skin: Negative for rash and wound.   Neurological: Negative for dizziness, speech difficulty, weakness, numbness and headaches.   Psychiatric/Behavioral: Negative for sleep  disturbance. The patient is not nervous/anxious.        Objective   Physical Exam   Constitutional: She is oriented to person, place, and time. She appears well-developed and well-nourished.   HENT:   Head: Normocephalic.   Nose: Nose normal.   Mouth/Throat: Oropharynx is clear and moist.   Tm clear noemi no mass canal patent without d/c   Eyes: Conjunctivae are normal. Pupils are equal, round, and reactive to light. No scleral icterus.   Neck: Neck supple. No JVD present. No thyromegaly present.   Cardiovascular: Normal rate, regular rhythm and normal heart sounds.  Exam reveals no gallop and no friction rub.    No murmur heard.  Pulmonary/Chest: Effort normal and breath sounds normal. No stridor. No respiratory distress. She has no wheezes. She has no rales.   Abdominal: Soft. Bowel sounds are normal. She exhibits no distension. There is no tenderness.   No hepatosplenomegaly, no ascites,   Musculoskeletal: She exhibits no edema or tenderness.   Lymphadenopathy:     She has no cervical adenopathy.   Neurological: She is alert and oriented to person, place, and time. She has normal reflexes.   Skin: Skin is warm and dry. No rash noted. No erythema.   Psychiatric: She has a normal mood and affect. Her behavior is normal. Judgment and thought content normal.   Vitals reviewed.      Assessment/Plan   Keri was seen today for follow-up.    Diagnoses and all orders for this visit:    Cough    Obesity, morbid, BMI 40.0-49.9  -     Ambulatory Referral to Bariatric Surgery    Tobacco abuse  -     CT Chest Low Dose Wo    Encounter for screening for lung cancer  -     CT Chest Low Dose Wo    Cervical radiculopathy  -     gabapentin (NEURONTIN) 300 MG capsule; Take 1 capsule by mouth 3 (Three) Times a Day.    Other orders  -     benzonatate (TESSALON) 200 MG capsule; Take 1 capsule by mouth 3 (Three) Times a Day As Needed for Cough.  -     fluticasone (FLONASE) 50 MCG/ACT nasal spray; 2 sprays into each nostril Daily. As  needed allergies  -     loratadine (CLARITIN) 10 MG tablet; Take 1 tablet by mouth Daily. As needed for allergies                    liliam    1400 alex a day increased protein  Healthy fats decrease processed sweets portion control  Bariatric surgery for further information      Avoid all tobacco products  Consider CT scanning chest  Lung cancer screening  Likely a candidate  Risk-benefit    Requesting refill gabapentin takes 300 3 times a day  Neuropathic pain radiculopathy cervical status post cervical fusion much better  When she stops the medication has more pain otherwise usually controlled well  Moderate dose  Low risk abuse diversion  Risk-benefit discussed proper use disposal storage    Mirapex prescribed by her sleep doctor    Upcoming bladder sling surgery as discussed

## 2017-10-29 PROBLEM — M54.12 CERVICAL RADICULOPATHY: Status: ACTIVE | Noted: 2017-10-29

## 2017-10-29 PROBLEM — E66.01 OBESITY, MORBID, BMI 40.0-49.9 (HCC): Status: ACTIVE | Noted: 2017-10-29

## 2017-10-29 PROBLEM — Z72.0 TOBACCO ABUSE: Status: ACTIVE | Noted: 2017-10-29

## 2017-10-29 PROBLEM — Z12.2 ENCOUNTER FOR SCREENING FOR LUNG CANCER: Status: ACTIVE | Noted: 2017-10-29

## 2017-11-06 ENCOUNTER — TELEPHONE (OUTPATIENT)
Dept: FAMILY MEDICINE CLINIC | Facility: CLINIC | Age: 57
End: 2017-11-06

## 2017-11-07 ENCOUNTER — APPOINTMENT (OUTPATIENT)
Dept: OTHER | Facility: HOSPITAL | Age: 57
End: 2017-11-07

## 2017-11-07 ENCOUNTER — PROCEDURE VISIT (OUTPATIENT)
Dept: OBSTETRICS AND GYNECOLOGY | Facility: CLINIC | Age: 57
End: 2017-11-07

## 2017-11-07 VITALS
HEIGHT: 63 IN | WEIGHT: 233 LBS | BODY MASS INDEX: 41.29 KG/M2 | DIASTOLIC BLOOD PRESSURE: 80 MMHG | SYSTOLIC BLOOD PRESSURE: 118 MMHG

## 2017-11-07 DIAGNOSIS — Z13.89 SCREENING FOR BLOOD OR PROTEIN IN URINE: ICD-10-CM

## 2017-11-07 DIAGNOSIS — Z72.0 TOBACCO ABUSE: ICD-10-CM

## 2017-11-07 DIAGNOSIS — N30.00 ACUTE CYSTITIS WITHOUT HEMATURIA: ICD-10-CM

## 2017-11-07 DIAGNOSIS — R87.612 LGSIL ON PAP SMEAR OF CERVIX: Primary | ICD-10-CM

## 2017-11-07 DIAGNOSIS — Z13.9 SPECIAL SCREENING: ICD-10-CM

## 2017-11-07 LAB
B-HCG UR QL: NEGATIVE
BILIRUB BLD-MCNC: NEGATIVE MG/DL
CLARITY, POC: CLEAR
COLOR UR: YELLOW
GLUCOSE UR STRIP-MCNC: NEGATIVE MG/DL
INTERNAL NEGATIVE CONTROL: NEGATIVE
INTERNAL POSITIVE CONTROL: POSITIVE
KETONES UR QL: NEGATIVE
LEUKOCYTE EST, POC: ABNORMAL
Lab: NORMAL
NITRITE UR-MCNC: POSITIVE MG/ML
PH UR: 6 [PH] (ref 5–8)
PROT UR STRIP-MCNC: NEGATIVE MG/DL
RBC # UR STRIP: NEGATIVE /UL
SP GR UR: 1.01 (ref 1–1.03)
UROBILINOGEN UR QL: NORMAL

## 2017-11-07 PROCEDURE — 99406 BEHAV CHNG SMOKING 3-10 MIN: CPT | Performed by: OBSTETRICS & GYNECOLOGY

## 2017-11-07 PROCEDURE — 81025 URINE PREGNANCY TEST: CPT | Performed by: OBSTETRICS & GYNECOLOGY

## 2017-11-07 PROCEDURE — 57454 BX/CURETT OF CERVIX W/SCOPE: CPT | Performed by: OBSTETRICS & GYNECOLOGY

## 2017-11-07 PROCEDURE — 99212 OFFICE O/P EST SF 10 MIN: CPT | Performed by: OBSTETRICS & GYNECOLOGY

## 2017-11-07 RX ORDER — SULFAMETHOXAZOLE AND TRIMETHOPRIM 800; 160 MG/1; MG/1
1 TABLET ORAL 2 TIMES DAILY
Qty: 6 TABLET | Refills: 0 | Status: SHIPPED | OUTPATIENT
Start: 2017-11-07 | End: 2017-11-10

## 2017-11-07 RX ORDER — OXYBUTYNIN CHLORIDE 10 MG/1
10 TABLET, EXTENDED RELEASE ORAL DAILY
COMMUNITY
Start: 2017-10-17 | End: 2018-01-12 | Stop reason: DRUGHIGH

## 2017-11-07 NOTE — PROGRESS NOTES
"Subjective   Keri Nielson is a 57 y.o. female Colposcopy, last pap 9/22/17 lgsil hpv+, urine is nitrite positive. Discussed relationship of smoking to abnormal paps -     History of Present Illness    The following portions of the patient's history were reviewed and updated as appropriate: allergies, current medications, past family history, past medical history, past social history, past surgical history and problem list.    Review of Systems   Constitutional: Negative for chills, fatigue and fever.   Gastrointestinal: Negative for abdominal distention and abdominal pain.   Genitourinary: Negative for dyspareunia, dysuria, pelvic pain, vaginal bleeding, vaginal discharge and vaginal pain.   All other systems reviewed and are negative.  /80  Ht 63\" (160 cm)  Wt 233 lb (106 kg)  LMP 10/21/2016 (Approximate) Comment: 54  Breastfeeding? No  BMI 41.27 kg/m2      Objective   Physical Exam   Constitutional: She is oriented to person, place, and time. She appears well-developed and well-nourished.   Pulmonary/Chest: Effort normal.   Neurological: She is alert and oriented to person, place, and time.   Skin: Skin is warm and dry.   Psychiatric: She has a normal mood and affect. Her behavior is normal.   Nursing note and vitals reviewed.      Assessment/Plan   Keri was seen today for procedure.    Diagnoses and all orders for this visit:    LGSIL on Pap smear of cervix    Screening for blood or protein in urine  -     POC Urinalysis Dipstick    Special screening  -     POC Pregnancy, Urine    Tobacco abuse    Acute cystitis without hematuria  -     Urine Culture - Urine, Urine, Clean Catch  -     sulfamethoxazole-trimethoprim (BACTRIM DS) 800-160 MG per tablet; Take 1 tablet by mouth 2 (Two) Times a Day for 3 days.       I advised the patient of the risks in continuing to use tobacco, and I provided this patient with smoking cessation educational materials.  I also discussed how to quit smoking and the " patient has expressed the willingness to quit.      During this visit, I spent 3-10 mintues counseling the patient regarding smoking cessation.

## 2017-11-07 NOTE — PROGRESS NOTES
Procedure   Procedures    Colposcopy Procedure Note    Indications: Pap smear 1 months ago showed: low-grade squamous intraepithelial neoplasia (LGSIL - encompassing HPV,mild dysplasia,MORALES I). The prior pap showed low-grade squamous intraepithelial neoplasia (LGSIL - encompassing HPV,mild dysplasia,MORALES I).  Prior cervical/vaginal disease: MORALES 1. Prior cervical treatment: no treatment.    Procedure Details   The risks and benefits of the procedure and Written informed consent obtained.    Speculum placed in vagina and excellent visualization of cervix achieved, cervix swabbed x 3 with acetic acid solution.    Findings:  Cervix: visible lesion(s) at 10, 1 and 5 o'clock; SCJ visualized - lesion at 10,1 and 5  o'clock, endocervical curettage performed, cervical biopsies taken at 10, 1 and 5  o'clock and hemostasis achieved with Monsel's solution.  Vaginal inspection: vaginal colposcopy not performed.  Vulvar colposcopy: vulvar colposcopy not performed.    Specimens: cervical biopsies x 3, ECC      Complications: none.    Plan:  Specimens labelled and sent to Pathology.  Will base further treatment on Pathology findings.  Post biopsy instructions given to patient.

## 2017-11-08 ENCOUNTER — OFFICE VISIT (OUTPATIENT)
Dept: FAMILY MEDICINE CLINIC | Facility: CLINIC | Age: 57
End: 2017-11-08

## 2017-11-08 VITALS
OXYGEN SATURATION: 100 % | DIASTOLIC BLOOD PRESSURE: 80 MMHG | BODY MASS INDEX: 41.29 KG/M2 | WEIGHT: 233 LBS | HEART RATE: 80 BPM | HEIGHT: 63 IN | SYSTOLIC BLOOD PRESSURE: 122 MMHG | TEMPERATURE: 98 F

## 2017-11-08 DIAGNOSIS — Z13.1 DIABETES MELLITUS SCREENING: ICD-10-CM

## 2017-11-08 DIAGNOSIS — N39.0 URINARY TRACT INFECTION WITHOUT HEMATURIA, SITE UNSPECIFIED: ICD-10-CM

## 2017-11-08 DIAGNOSIS — R35.0 URINARY FREQUENCY: Primary | ICD-10-CM

## 2017-11-08 LAB — GLUCOSE BLDC GLUCOMTR-MCNC: 99 MG/DL (ref 70–130)

## 2017-11-08 PROCEDURE — 99213 OFFICE O/P EST LOW 20 MIN: CPT | Performed by: NURSE PRACTITIONER

## 2017-11-08 PROCEDURE — 82962 GLUCOSE BLOOD TEST: CPT | Performed by: NURSE PRACTITIONER

## 2017-11-08 NOTE — PROGRESS NOTES
Subjective   Keri Nielson is a 57 y.o. female.     HPI Comments: Patient was diagnosed with a UTI yesterday her doctor's office  She will be having bladder surgery soon sling in December  Urine was positive nitrate and white blood cell  Started Bactrim for 3 days  She has had some urinary frequency times one week and she was concerned because she had a UTI and possibly she has diabetes.  No personal history diabetes  No vision change blurred vision unexplained weight loss  Or increased thirst  Her glucose readings have been less than 100 and last 3  Over the last several months    She does have risk factors for diabetes and family history             The following portions of the patient's history were reviewed and updated as appropriate: allergies, current medications, past family history, past social history, past surgical history and problem list.    Review of Systems   Constitutional: Negative.  Negative for appetite change, chills, fatigue, fever and unexpected weight change.   HENT: Negative for congestion, ear pain and sore throat.    Eyes: Negative.    Respiratory: Negative for cough, chest tightness, shortness of breath and wheezing.    Cardiovascular: Negative for chest pain, palpitations and leg swelling.   Gastrointestinal: Negative for abdominal pain and constipation.   Genitourinary: Positive for frequency. Negative for difficulty urinating, dysuria and urgency.   Musculoskeletal: Negative for arthralgias and myalgias.   Skin: Negative for rash and wound.   Neurological: Negative for dizziness, speech difficulty, weakness, numbness and headaches.   Psychiatric/Behavioral: Negative for sleep disturbance. The patient is not nervous/anxious.        Objective   Physical Exam   Constitutional: She is oriented to person, place, and time. She appears well-developed.   HENT:   Head: Normocephalic.   Nose: Nose normal.   Mouth/Throat: Oropharynx is clear and moist.   Tm clear noemi no mass canal patent  without d/c   Eyes: Conjunctivae are normal. Pupils are equal, round, and reactive to light. No scleral icterus.   Neck: Neck supple. No JVD present. No thyromegaly present.   Cardiovascular: Normal rate, regular rhythm and normal heart sounds.  Exam reveals no gallop and no friction rub.    No murmur heard.  Pulmonary/Chest: Effort normal and breath sounds normal. No stridor. No respiratory distress. She has no wheezes. She has no rales.   Abdominal: Soft. Bowel sounds are normal. She exhibits no distension. There is no tenderness.   No hepatosplenomegaly, no ascites,no CVA tenderness   Genitourinary: Uterus normal.   Musculoskeletal: She exhibits no edema or tenderness.   Lymphadenopathy:     She has no cervical adenopathy.   Neurological: She is alert and oriented to person, place, and time. She has normal reflexes.   Skin: Skin is warm and dry. No rash noted. No erythema.   Psychiatric: She has a normal mood and affect. Her behavior is normal. Judgment and thought content normal.   Vitals reviewed.      Assessment/Plan   Keri was seen today for follow-up and diabetes.    Diagnoses and all orders for this visit:    Urinary frequency  -     POC Glucose Fingerstick    Diabetes mellitus screening  -     POC Glucose Fingerstick                  Finished antibiotic   started yesterdaypush fluids  Fever chills increase pain urgent recheck ER  Glucose 99    No symptoms of diabetes at this time

## 2017-11-09 LAB
DX ICD CODE: NORMAL
DX ICD CODE: NORMAL
PATH REPORT.FINAL DX SPEC: NORMAL
PATH REPORT.GROSS SPEC: NORMAL
PATH REPORT.SITE OF ORIGIN SPEC: NORMAL
PATHOLOGIST NAME: NORMAL
PAYMENT PROCEDURE: NORMAL

## 2017-11-10 ENCOUNTER — OFFICE VISIT (OUTPATIENT)
Dept: SLEEP MEDICINE | Facility: HOSPITAL | Age: 57
End: 2017-11-10
Attending: INTERNAL MEDICINE

## 2017-11-10 VITALS
SYSTOLIC BLOOD PRESSURE: 133 MMHG | WEIGHT: 231 LBS | HEIGHT: 64 IN | BODY MASS INDEX: 39.44 KG/M2 | DIASTOLIC BLOOD PRESSURE: 80 MMHG | HEART RATE: 74 BPM

## 2017-11-10 DIAGNOSIS — Z78.9 EXCESSIVE CAFFEINE INTAKE: ICD-10-CM

## 2017-11-10 DIAGNOSIS — J30.9 ALLERGIC RHINITIS, UNSPECIFIED CHRONICITY, UNSPECIFIED SEASONALITY, UNSPECIFIED TRIGGER: ICD-10-CM

## 2017-11-10 DIAGNOSIS — G47.33 OBSTRUCTIVE SLEEP APNEA: Primary | ICD-10-CM

## 2017-11-10 DIAGNOSIS — G25.81 RESTLESS LEGS SYNDROME (RLS): ICD-10-CM

## 2017-11-10 DIAGNOSIS — R68.2 DRY MOUTH: ICD-10-CM

## 2017-11-10 DIAGNOSIS — E66.9 OBESITY (BMI 30-39.9): ICD-10-CM

## 2017-11-10 DIAGNOSIS — Z72.0 TOBACCO ABUSE: ICD-10-CM

## 2017-11-10 PROCEDURE — G0463 HOSPITAL OUTPT CLINIC VISIT: HCPCS

## 2017-11-10 NOTE — PROGRESS NOTES
[facility] SLEEP MEDICINE  [address]  [address]rd Floor  [facility] [address]  [phone]    PCP: James Epley, APRN    Reason for visit:  Sleep disorders: ROCKY    Keri Nielson was seen in the [facility] today. She reports her mouth is extremely dry when she wakes up. She uses a FFM which fits well. She has a chin strap but cannot use she feels. Sleeps 10P to 7AM. She is more rested with device. ESS is 10. She does not notice the pressure. Sometimes takes mask off in sleep. She smokes e-cigs. Gave up cigarettes 2 years ago. Does not use alcohol. Drinks 4 coffee/iced tea daily upto 9PM. She denies other health problems. She changes her mask regularly. Her RLS bothers her some time and she remains on pramipexole.    Current Medications:    Current Outpatient Prescriptions:   •  ARIPiprazole (ABILIFY) 10 MG tablet, Take 10 mg by mouth Daily., Disp: , Rfl:   •  aspirin 81 MG chewable tablet, Chew 81 mg Daily., Disp: , Rfl:   •  atenolol (TENORMIN) 25 MG tablet, Take 1 tablet by mouth Daily. (Patient taking differently: Take 25 mg by mouth Every Morning.), Disp: 30 tablet, Rfl: 5  •  benzonatate (TESSALON) 200 MG capsule, Take 1 capsule by mouth 3 (Three) Times a Day As Needed for Cough., Disp: 20 capsule, Rfl: 1  •  buPROPion XL (WELLBUTRIN XL) 150 MG 24 hr tablet, Take 150 mg by mouth Every Morning., Disp: , Rfl:   •  CARAFATE 1 GM/10ML suspension, TAKE 10 ML'S BY MOUTH FOUR TIMES A DAY, Disp: 1200 mL, Rfl: 1  •  Cholecalciferol (VITAMIN D3) 5000 UNITS capsule capsule, Take 5,000 Units by mouth Daily., Disp: , Rfl:   •  citalopram (CeleXA) 40 MG tablet, Take 40 mg by mouth Every Night., Disp: , Rfl: 2  •  fluticasone (FLONASE) 50 MCG/ACT nasal spray, 2 sprays into each nostril Daily. As needed allergies, Disp: 1 bottle, Rfl: 6  •  gabapentin (NEURONTIN) 300 MG capsule, Take 1 capsule by mouth 3 (Three) Times a Day., Disp: 90 capsule, Rfl: 3  •  levothyroxine (SYNTHROID, LEVOTHROID) 100 MCG  tablet, One tablet daily on an empty stomach., Disp: 30 tablet, Rfl: 6  •  loratadine (CLARITIN) 10 MG tablet, Take 1 tablet by mouth Daily. As needed for allergies, Disp: 30 tablet, Rfl: 11  •  nitroglycerin (NITROSTAT) 0.4 MG SL tablet, Place 0.4 mg under the tongue Every 5 (Five) Minutes As Needed for Chest Pain (took at M.D  office at 0930 a.m.). Take no more than 3 doses in 15 minutes., Disp: , Rfl:   •  oxybutynin XL (DITROPAN-XL) 10 MG 24 hr tablet, , Disp: , Rfl:   •  pramipexole (MIRAPEX) 0.5 MG tablet, Take 0.5 mg by mouth Every Night., Disp: , Rfl:   •  raNITIdine (ZANTAC) 150 MG tablet, Take 150 mg by mouth 2 (Two) Times a Day., Disp: , Rfl:   •  simvastatin (ZOCOR) 20 MG tablet, Take 1 tablet by mouth Every Night., Disp: 30 tablet, Rfl: 5  •  sulfamethoxazole-trimethoprim (BACTRIM DS) 800-160 MG per tablet, Take 1 tablet by mouth 2 (Two) Times a Day for 3 days., Disp: 6 tablet, Rfl: 0   also entered in Sleep Questionnaire    Patient  has a past medical history of Abnormal vaginal bleeding; Anemia; Arthritis; Atherosclerotic heart disease of native coronary artery with other forms of angina pectoris; Bipolar I disorder, single manic episode; Cervical disc herniation; Coronary artery disease; Depression; Difficulty swallowing; Diverticular disease; Dyspepsia; Dysphagia; Gastric reflux; Heart murmur; History of transfusion; HPV (human papilloma virus) infection (04/29/2016); Hyperlipidemia; Hypertension; Hypothyroidism; Incontinence in female; Incontinence in female; Kidney disease (2017); Kidney disease, chronic, stage III (GFR 30-59 ml/min); LGSIL on Pap smear of cervix (04/29/2016); Lightheadedness; Neck pain; Obesity; Past myocardial infarction (05/2000); Periodic limb movement disorder; Restless leg syndrome; Sleep apnea; Stress fracture; Suicidal ideation (08/19/2016); Swelling of ankle; and Thyroid nodule.   I have reviewed the Past Medical History, Past Surgical History, Social History and Family  "History in EPIC and Sleep Questionnaire.    Pertinent Positive Review of Systems of postnasal drip, acid reflux, depression  Rest of Review of Systems was negative as recorded in Sleep Questionnaire.        Vital Signs: /80  Pulse 74  Ht 64\" (162.6 cm)  Wt 231 lb (105 kg)  LMP 10/21/2016 (Approximate) Comment: 54  BMI 39.65 kg/m2        General: Alert. Cooperative. Well developed. No acute distress.             Head:  Normocephalic. Symmetrical. Atraumatic.              Nose: No septal deviation. No drainage.          Throat: No oral lesions. No thrush. Moist mucous membranes.    Tongue is normal and dentition is intact       Pharynx: Posterior pharyngeal pillars are wide with Mallampati score of Class III     Chest Wall:  Normal shape. Symmetric expansion with respiration. No tenderness.             Neck:  Trachea midline.           Lungs:  Clear to auscultation bilaterally. No wheezes. No rhonchi. No rales. Respirations regular, even and unlabored.            Heart:  Regular rhythm and normal rate. Normal S1 and S2. No murmur.     Abdomen:  Soft, non-tender and non-distended. Normal bowel sounds. No masses.  Extremities:  Moves all extremities well. No edema.    Psychiatric: Normal mood and affect.    Testing:  · 86.7% compliance for last 30 days.  Average usage is 5 hours 17 minutes.  Usage over 4 hour 70%.  AHI 6.2 onset CPAP pressure of 9 cm.    DME Company: [employer]    Impression:  1. Obstructive sleep apnea    2. Restless legs syndrome (RLS)    3. Obesity (BMI 30-39.9)    4. Excessive caffeine intake    5. Tobacco abuse    6. Dry mouth    7. Allergic rhinitis, unspecified chronicity, unspecified seasonality, unspecified trigger      Plan:  Patient's compliance is suboptimal.  She has a history of shift work and also currently drinks excessive amounts of caffeine. Increase CPAP pressure to 10cms with slight elevation in AHI and some EDS. encouraged her to increase her compliance at night.  I advised her " to use a chinstrap to help with a dry mouth and use Flonase nightly.    Asked to avoid caffeine. Continue mirapex for RLS.    Advised to discontinue use of the cigarettes.  She has previous be been a cigarette smoker.  Hopefully she can taper off the nicotine and then eventually discontinue the cigarettes as well.    Her depression is presently controlled on medications are not interfering with sleep.    I reiterated the importance of effective treatment of obstructive sleep apnea with PAP machine.  Cardiovascular health risks of untreated sleep apnea were again reviewed.  Patient was asked to remain cautious if there is persistent hypersomnolence.    Change of PAP supplies regularly is important for effective use.  Change of cushion on the mask or plugs on nasal pillows along with disposable filters once every month and change of mask frame, tubing, headgear and Velcro straps every 6 months at the minimum was reiterated.    This patient is compliant with PAP machine and benefits from its use.  Apnea hypopneas index is corrected/improved.  Daytime hypersomnolence has resolved.    Patient will follow up in this clinic in 6 months APRN    Thank you for allowing me to participate in your patient's care.    Yevgeniy Vallejo MD  [facility] SLEEP MEDICINE  [address]  [address] Floor  [place] [address]  Dept: [phone]  Dept Fax: [phone]  Loc: [phone]    Part of this note may be an electronic transcription/translation of spoken language to printed text using the Dragon Dictation System.

## 2017-11-11 LAB
BACTERIA UR CULT: ABNORMAL
BACTERIA UR CULT: ABNORMAL
OTHER ANTIBIOTIC SUSC ISLT: ABNORMAL

## 2017-11-12 PROBLEM — Z13.1 DIABETES MELLITUS SCREENING: Status: ACTIVE | Noted: 2017-11-12

## 2017-11-12 PROBLEM — R35.0 URINARY FREQUENCY: Status: ACTIVE | Noted: 2017-11-12

## 2017-11-21 ENCOUNTER — APPOINTMENT (OUTPATIENT)
Dept: PREADMISSION TESTING | Facility: HOSPITAL | Age: 57
End: 2017-11-21

## 2017-11-21 ENCOUNTER — TELEPHONE (OUTPATIENT)
Dept: FAMILY MEDICINE CLINIC | Facility: CLINIC | Age: 57
End: 2017-11-21

## 2017-11-21 VITALS
HEIGHT: 63 IN | OXYGEN SATURATION: 94 % | TEMPERATURE: 98 F | SYSTOLIC BLOOD PRESSURE: 117 MMHG | DIASTOLIC BLOOD PRESSURE: 78 MMHG | BODY MASS INDEX: 40.4 KG/M2 | HEART RATE: 57 BPM | RESPIRATION RATE: 20 BRPM | WEIGHT: 228 LBS

## 2017-11-21 DIAGNOSIS — M54.12 CERVICAL RADICULOPATHY: ICD-10-CM

## 2017-11-21 LAB
ANION GAP SERPL CALCULATED.3IONS-SCNC: 13.4 MMOL/L
BILIRUB UR QL STRIP: NEGATIVE
BUN BLD-MCNC: 11 MG/DL (ref 6–20)
BUN/CREAT SERPL: 11.1 (ref 7–25)
CALCIUM SPEC-SCNC: 9.2 MG/DL (ref 8.6–10.5)
CHLORIDE SERPL-SCNC: 103 MMOL/L (ref 98–107)
CLARITY UR: CLEAR
CO2 SERPL-SCNC: 26.6 MMOL/L (ref 22–29)
COLOR UR: YELLOW
CREAT BLD-MCNC: 0.99 MG/DL (ref 0.57–1)
DEPRECATED RDW RBC AUTO: 46.3 FL (ref 37–54)
ERYTHROCYTE [DISTWIDTH] IN BLOOD BY AUTOMATED COUNT: 14.3 % (ref 11.7–13)
GFR SERPL CREATININE-BSD FRML MDRD: 58 ML/MIN/1.73
GLUCOSE BLD-MCNC: 100 MG/DL (ref 65–99)
GLUCOSE UR STRIP-MCNC: NEGATIVE MG/DL
HCT VFR BLD AUTO: 38.7 % (ref 35.6–45.5)
HGB BLD-MCNC: 11.8 G/DL (ref 11.9–15.5)
HGB UR QL STRIP.AUTO: NEGATIVE
KETONES UR QL STRIP: NEGATIVE
LEUKOCYTE ESTERASE UR QL STRIP.AUTO: NEGATIVE
MCH RBC QN AUTO: 26.9 PG (ref 26.9–32)
MCHC RBC AUTO-ENTMCNC: 30.5 G/DL (ref 32.4–36.3)
MCV RBC AUTO: 88.4 FL (ref 80.5–98.2)
NITRITE UR QL STRIP: NEGATIVE
PH UR STRIP.AUTO: 6.5 [PH] (ref 5–8)
PLATELET # BLD AUTO: 287 10*3/MM3 (ref 140–500)
PMV BLD AUTO: 10.1 FL (ref 6–12)
POTASSIUM BLD-SCNC: 4.5 MMOL/L (ref 3.5–5.2)
PROT UR QL STRIP: NEGATIVE
RBC # BLD AUTO: 4.38 10*6/MM3 (ref 3.9–5.2)
SODIUM BLD-SCNC: 143 MMOL/L (ref 136–145)
SP GR UR STRIP: 1.01 (ref 1–1.03)
UROBILINOGEN UR QL STRIP: NORMAL
WBC NRBC COR # BLD: 6.49 10*3/MM3 (ref 4.5–10.7)

## 2017-11-21 PROCEDURE — 81003 URINALYSIS AUTO W/O SCOPE: CPT | Performed by: OBSTETRICS & GYNECOLOGY

## 2017-11-21 PROCEDURE — 93005 ELECTROCARDIOGRAM TRACING: CPT

## 2017-11-21 PROCEDURE — 85027 COMPLETE CBC AUTOMATED: CPT | Performed by: OBSTETRICS & GYNECOLOGY

## 2017-11-21 PROCEDURE — 93010 ELECTROCARDIOGRAM REPORT: CPT | Performed by: INTERNAL MEDICINE

## 2017-11-21 PROCEDURE — 36415 COLL VENOUS BLD VENIPUNCTURE: CPT

## 2017-11-21 PROCEDURE — 80048 BASIC METABOLIC PNL TOTAL CA: CPT | Performed by: OBSTETRICS & GYNECOLOGY

## 2017-11-21 RX ORDER — ATENOLOL 25 MG/1
25 TABLET ORAL DAILY
COMMUNITY
End: 2018-03-05 | Stop reason: SDUPTHER

## 2017-11-21 RX ORDER — ACETAMINOPHEN 325 MG/1
650 TABLET ORAL EVERY 6 HOURS PRN
COMMUNITY
End: 2018-04-06

## 2017-11-21 RX ORDER — BUPROPION HYDROCHLORIDE 300 MG/1
300 TABLET ORAL EVERY MORNING
COMMUNITY
End: 2019-09-19 | Stop reason: DRUGHIGH

## 2017-11-21 RX ORDER — SUCRALFATE ORAL 1 G/10ML
1 SUSPENSION ORAL 4 TIMES DAILY
COMMUNITY
End: 2018-03-05

## 2017-11-21 RX ORDER — ISOSORBIDE MONONITRATE 30 MG/1
30 TABLET, EXTENDED RELEASE ORAL DAILY
COMMUNITY
End: 2018-08-13 | Stop reason: SDUPTHER

## 2017-11-21 RX ORDER — LAMOTRIGINE 150 MG/1
300 TABLET ORAL DAILY
COMMUNITY

## 2017-11-21 RX ORDER — VENLAFAXINE 75 MG/1
75 TABLET ORAL EVERY MORNING
COMMUNITY
End: 2018-07-05

## 2017-11-21 RX ORDER — LEVOTHYROXINE SODIUM 0.1 MG/1
100 TABLET ORAL DAILY
COMMUNITY
End: 2018-04-06 | Stop reason: SDUPTHER

## 2017-11-21 NOTE — DISCHARGE INSTRUCTIONS
Take the following medications the morning of surgery with a small sip of water:    carafate   Tenormin   Isosorbide   zantac    General Instructions:  • Do not eat or drink anything after midnight the night before surgery.  • Infants may have breast milk up to four hours before surgery.  • Infants drinking formula may drink formula up to six hours before surgery.   • Patients who avoid smoking, chewing tobacco and alcohol for 4 weeks prior to surgery have a reduced risk of post-operative complications.  Quit smoking as many days before surgery as you can.  • Do not smoke, use chewing tobacco or drink alcohol the day of surgery.   • If applicable bring your C-PAP/ BI-PAP machine.  • Bring any papers given to you in the doctor’s office.  • Wear clean comfortable clothes and socks.  • Do not wear contact lenses or make-up.  Bring a case for your glasses.   • Bring crutches or walker if applicable.  • Remove all piercings.  Leave jewelry and any other valuables at home.  • The Pre-Admission Testing nurse will instruct you to bring medications if unable to obtain an accurate list in Pre-Admission Testing.        If you were given a blood bank ID arm band remember to bring it with you the day of surgery.    Preventing a Surgical Site Infection:  • For 2 to 3 days before surgery, avoid shaving with a razor because the razor can irritate skin and make it easier to develop an infection.  • The night prior to surgery sleep in a clean bed with clean clothing.  Do not allow pets to sleep with you.  • Shower on the morning of surgery using a fresh bar of anti-bacterial soap (such as Dial) and clean washcloth.  Dry with a clean towel and dress in clean clothing.  • Ask your surgeon if you will be receiving antibiotics prior to surgery.  • Make sure you, your family, and all healthcare providers clean their hands with soap and water or an alcohol based hand  before caring for you or your wound.    Day of surgery:12/6/17   1130  Upon arrival, a Pre-op nurse and Anesthesiologist will review your health history, obtain vital signs, and answer questions you may have.  The only belongings needed at this time will be your home medications and if applicable your C-PAP/BI-PAP machine.  If you are staying overnight your family can leave the rest of your belongings in the car and bring them to your room later.  A Pre-op nurse will start an IV and you may receive medication in preparation for surgery, including something to help you relax.  Your family will be able to see you in the Pre-op area.  While you are in surgery your family should notify the waiting room  if they leave the waiting room area and provide a contact phone number.    Please be aware that surgery does come with discomfort.  We want to make every effort to control your discomfort so please discuss any uncontrolled symptoms with your nurse.   Your doctor will most likely have prescribed pain medications.      If you are going home after surgery you will receive individualized written care instructions before being discharged.  A responsible adult must drive you to and from the hospital on the day of your surgery and stay with you for 24 hours.    If you are staying overnight following surgery, you will be transported to your hospital room following the recovery period.  [facility] has all private rooms.    If you have any questions please call Pre-Admission Testing at [phone].  Deductibles and co-payments are collected on the day of service. Please be prepared to pay the required co-pay, deductible or deposit on the day of service as defined by your plan.

## 2017-11-22 ENCOUNTER — TELEPHONE (OUTPATIENT)
Dept: FAMILY MEDICINE CLINIC | Facility: CLINIC | Age: 57
End: 2017-11-22

## 2017-11-22 ENCOUNTER — TELEPHONE (OUTPATIENT)
Dept: OBSTETRICS AND GYNECOLOGY | Facility: CLINIC | Age: 57
End: 2017-11-22

## 2017-11-22 PROBLEM — N87.0 CIN I (CERVICAL INTRAEPITHELIAL NEOPLASIA I): Status: ACTIVE | Noted: 2017-11-22

## 2017-11-22 RX ORDER — GABAPENTIN 300 MG/1
300 CAPSULE ORAL 3 TIMES DAILY
Qty: 90 CAPSULE | Refills: 2 | Status: SHIPPED | OUTPATIENT
Start: 2017-11-22 | End: 2018-05-24 | Stop reason: SDUPTHER

## 2017-12-01 ENCOUNTER — CLINICAL SUPPORT (OUTPATIENT)
Dept: ORTHOPEDIC SURGERY | Facility: CLINIC | Age: 57
End: 2017-12-01

## 2017-12-01 VITALS — TEMPERATURE: 98.3 F | WEIGHT: 230 LBS | HEIGHT: 63 IN | BODY MASS INDEX: 40.75 KG/M2

## 2017-12-01 DIAGNOSIS — M17.0 PRIMARY OSTEOARTHRITIS OF BOTH KNEES: Primary | ICD-10-CM

## 2017-12-01 PROCEDURE — 20610 DRAIN/INJ JOINT/BURSA W/O US: CPT | Performed by: NURSE PRACTITIONER

## 2017-12-01 PROCEDURE — 99213 OFFICE O/P EST LOW 20 MIN: CPT | Performed by: NURSE PRACTITIONER

## 2017-12-01 RX ORDER — METHYLPREDNISOLONE ACETATE 80 MG/ML
80 INJECTION, SUSPENSION INTRA-ARTICULAR; INTRALESIONAL; INTRAMUSCULAR; SOFT TISSUE
Status: COMPLETED | OUTPATIENT
Start: 2017-12-01 | End: 2017-12-01

## 2017-12-01 RX ORDER — BUPIVACAINE HYDROCHLORIDE 5 MG/ML
2 INJECTION, SOLUTION EPIDURAL; INTRACAUDAL
Status: COMPLETED | OUTPATIENT
Start: 2017-12-01 | End: 2017-12-01

## 2017-12-01 RX ORDER — LIDOCAINE HYDROCHLORIDE 10 MG/ML
2 INJECTION, SOLUTION EPIDURAL; INFILTRATION; INTRACAUDAL; PERINEURAL
Status: COMPLETED | OUTPATIENT
Start: 2017-12-01 | End: 2017-12-01

## 2017-12-01 RX ADMIN — BUPIVACAINE HYDROCHLORIDE 2 ML: 5 INJECTION, SOLUTION EPIDURAL; INTRACAUDAL at 08:52

## 2017-12-01 RX ADMIN — BUPIVACAINE HYDROCHLORIDE 2 ML: 5 INJECTION, SOLUTION EPIDURAL; INTRACAUDAL at 08:53

## 2017-12-01 RX ADMIN — METHYLPREDNISOLONE ACETATE 80 MG: 80 INJECTION, SUSPENSION INTRA-ARTICULAR; INTRALESIONAL; INTRAMUSCULAR; SOFT TISSUE at 08:53

## 2017-12-01 RX ADMIN — LIDOCAINE HYDROCHLORIDE 2 ML: 10 INJECTION, SOLUTION EPIDURAL; INFILTRATION; INTRACAUDAL; PERINEURAL at 08:52

## 2017-12-01 RX ADMIN — LIDOCAINE HYDROCHLORIDE 2 ML: 10 INJECTION, SOLUTION EPIDURAL; INFILTRATION; INTRACAUDAL; PERINEURAL at 08:53

## 2017-12-01 RX ADMIN — METHYLPREDNISOLONE ACETATE 80 MG: 80 INJECTION, SUSPENSION INTRA-ARTICULAR; INTRALESIONAL; INTRAMUSCULAR; SOFT TISSUE at 08:52

## 2017-12-01 NOTE — PROGRESS NOTES
Patient: Keri Nielson  YOB: 1960    Chief Complaints:  bilateral knee pain    Subjective:    History of Present Illness: Here today for knee pain. She has bilat knee pain and has not had as much relief from the cortisone inj so wants to try synvisc today.  She had a course of oral prednisone a week ago for a sinus infection and it helped her knee pain greatly.  The pain is a generalized joint tenderness.  It has been progressive in nature but remains intermittent.  Worsened by prolonged standing or walking and squatting activities. Has had improvement in the past with ice/heat, rest, and injections.     This problem is not new to this examiner.     Allergies: No Known Allergies    Medications:   Home Medications:  Current Outpatient Prescriptions on File Prior to Visit   Medication Sig   • acetaminophen (TYLENOL) 325 MG tablet Take 650 mg by mouth Every 6 (Six) Hours As Needed for Mild Pain .   • ARIPiprazole (ABILIFY) 10 MG tablet Take 10 mg by mouth Daily.   • atenolol (TENORMIN) 25 MG tablet Take 25 mg by mouth Daily.   • benzonatate (TESSALON) 200 MG capsule Take 1 capsule by mouth 3 (Three) Times a Day As Needed for Cough.   • buPROPion XL (WELLBUTRIN XL) 300 MG 24 hr tablet Take 300 mg by mouth Every Morning.   • cefuroxime (CEFTIN) 500 MG tablet Take 1 tablet by mouth 2 (Two) Times a Day.   • Cholecalciferol (VITAMIN D3) 5000 UNITS capsule capsule Take 5,000 Units by mouth Daily.   • fluticasone (FLONASE) 50 MCG/ACT nasal spray 2 sprays into each nostril Daily. As needed allergies   • gabapentin (NEURONTIN) 300 MG capsule Take 1 capsule by mouth 3 (Three) Times a Day.   • guaifenesin-codeine (GUAIFENESIN AC) 100-10 MG/5ML liquid Take 5 mL by mouth 4 (Four) Times a Day As Needed for Cough.   • isosorbide mononitrate (IMDUR) 30 MG 24 hr tablet Take 30 mg by mouth Daily.   • lamoTRIgine (LaMICtal) 150 MG tablet Take 300 mg by mouth Daily.   • levothyroxine (SYNTHROID, LEVOTHROID) 100 MCG  tablet Take 100 mcg by mouth Daily.   • loratadine (CLARITIN) 10 MG tablet Take 1 tablet by mouth Daily. As needed for allergies   • nitroglycerin (NITROSTAT) 0.4 MG SL tablet Place 0.4 mg under the tongue Every 5 (Five) Minutes As Needed for Chest Pain (took at M.D  office at 0930 a.m.). Take no more than 3 doses in 15 minutes.   • oxybutynin XL (DITROPAN-XL) 10 MG 24 hr tablet Take 10 mg by mouth Daily.   • pramipexole (MIRAPEX) 0.5 MG tablet Take 0.5 mg by mouth Every Night.   • predniSONE (DELTASONE) 20 MG tablet 3 for 2 days, 2 for 2 days, 1 for 2 days   • raNITIdine (ZANTAC) 150 MG tablet Take 150 mg by mouth 2 (Two) Times a Day.   • simvastatin (ZOCOR) 20 MG tablet Take 1 tablet by mouth Every Night.   • sucralfate (CARAFATE) 1 GM/10ML suspension Take 1 g by mouth 4 (Four) Times a Day.   • venlafaxine (EFFEXOR) 75 MG tablet Take 75 mg by mouth Every Morning.     No current facility-administered medications on file prior to visit.      Current Medications:  Scheduled Meds:  Continuous Infusions:  No current facility-administered medications for this visit.   PRN Meds:.    I have reviewed the patient's medical history in detail and updated the computerized patient record.  Review and summarization of old records include:    Past Medical History:   Diagnosis Date   • Anemia    • Arthritis    • Atherosclerotic heart disease of native coronary artery with other forms of angina pectoris    • Bipolar I disorder, single manic episode     depressive d(NOS)   • Cervical disc herniation    • Coronary artery disease    • Depression     NO SUICIDAL PLANS   • Difficulty swallowing    • Diverticular disease    • Dyspepsia    • Dysphagia    • Gastric reflux    • Heart murmur    • Hiatal hernia    • High cholesterol    • History of transfusion    • 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    HPV positive on pap LGSIL   • Hyperlipidemia    • Hypertension    • Hypothyroidism    • Incontinence in female     wears pads    • Incontinence in female    • Kidney disease 2017    stage 2    • Kidney disease, chronic, stage III (GFR 30-59 ml/min)    • LGSIL on Pap smear of cervix 04/29/2016    LGSIL HPV positive   • Neck pain    • Obesity    • Past myocardial infarction 05/2000   • Periodic limb movement disorder    • Restless leg syndrome    • Sleep apnea     cpap   • Stress fracture     LEFT HEEL   • Suicidal ideation 08/19/2016    history   • Swelling of ankle     right had doppler no s/s blood clot   • Thyroid nodule         Past Surgical History:   Procedure Laterality Date   • ANTERIOR CERVICAL DISCECTOMY W/ FUSION N/A 12/29/2016    Procedure: C3-4 anterior cervical discectomy and fusion with Depuy micro plate, ALLOGRAFT C3-4, AND HARDWARE REMOVAL C4-7.;  Surgeon: Hema Godwin MD;  Location: [facility] MAIN OR;  Service:    • CARDIAC CATHETERIZATION N/A 3/30/2017    Procedure: Left Heart Cath;  Surgeon: Tracey Vargas MD;  Location:  [facility] CATH INVASIVE [facility];  Service:    • CARDIAC CATHETERIZATION N/A 3/30/2017    Procedure: Coronary angiography;  Surgeon: Tracey Vargas MD;  Location:  [facility];  Service:    • CARDIAC CATHETERIZATION N/A 3/30/2017    Procedure: Left ventriculography;  Surgeon: Tracey Vargas MD;  Location:  [facility];  Service:    • CARDIAC CATHETERIZATION  3/30/2017    Procedure: Functional Flow [place];  Surgeon: Tracey Vargas MD;  Location:  [facility];  Service:    • CERVICAL BIOPSY  [facility]    Dr. Jeffery.    • CERVICAL DISCECTOMY ANTERIOR  04/2013    C4-7   • COLONOSCOPY  04/20/2015    Diverticulosis, IH   • COLPOSCOPY W/ BIOPSY / CURETTAGE  06/17/2016    LGSIL HPV positive. Results were normal repeat pap in one year. Chronic Cervicitis   • CORONARY ANGIOPLASTY WITH STENT PLACEMENT     • ENDOSCOPY  MM[facility]    Normal.  Dr. Rodriguez   • ENDOSCOPY N/A 6/22/2017    Erythematous mucosa in the stomach  PATH: Chronic active gastritis, moderate with intestinal  metaplasia    • HARDWARE REMOVAL  12/29/2016    cervical   • SHOULDER SURGERY Left     RCR   • TONSILLECTOMY     • TONSILLECTOMY AND ADENOIDECTOMY     • TUBAL ABDOMINAL LIGATION     • TYMPANOSTOMY TUBE PLACEMENT Right    • WRIST SURGERY Bilateral     carpal tunnel        Social History     Occupational History   • Not on file.     Social History Main Topics   • Smoking status: Former Smoker     Packs/day: 1.50     Years: 20.00     Types: Electronic Cigarette, Cigarettes     Quit date: 2015   • Smokeless tobacco: Never Used      Comment: Electronic Cigarette/ 3 mg nicotine    • Alcohol use Yes      Comment: 2 times yearly    • Drug use: No   • Sexual activity: Not on file    Social History     Social History Narrative        Family History   Problem Relation Age of Onset   • Diabetes type II Mother    • Hypertension Mother    • Osteoporosis Mother    • Seizures Mother    • COPD Father    • Hypertension Father    • Lung cancer Father    • Heart attack Father    • Liver cancer Father    • Thyroid disease Sister    • Hypertension Sister    • Bipolar disorder Sister    • Depression Sister    • No Known Problems Son    • Abnormal EKG Daughter    • Hypertension Daughter    • Bipolar disorder Daughter    • No Known Problems Paternal Grandfather    • No Known Problems Paternal Grandmother    • No Known Problems Maternal Grandmother    • No Known Problems Maternal Grandfather    • Thyroid disease Sister    • Hypertension Sister    • Bipolar disorder Sister    • Asthma Daughter    • Breast cancer Neg Hx    • Ovarian cancer Neg Hx    • Uterine cancer Neg Hx    • Colon cancer Neg Hx    • Malig Hyperthermia Neg Hx        ROS: 14 point review of systems was performed and was negative except for documented findings in HPI and today's encounter.     Allergies: No Known Allergies  Constitutional:  Denies fever, shaking or chills   Eyes:  Denies change in visual acuity   HENT:  Denies nasal congestion or sore throat   Respiratory:   Denies cough or shortness of breath   Cardiovascular:  Denies chest pain or severe LE edema   GI:  Denies abdominal pain, nausea, vomiting, bloody stools or diarrhea   Musculoskeletal:  Numbness, tingling, or loss of motor function only as noted above in history of present illness.  : Denies painful urination or hematuria  Integument:  Denies rash, lesion or ulceration   Neurologic:  Denies headache or focal weakness  Endocrine:  Denies lymphadenopathy  Psych:  Denies confusion or change in mental status   Hem:  Denies active bleeding    Physical Exam:  Body mass index is 40.74 kg/(m^2).  Vitals:    12/01/17 0846   Temp: 98.3 °F (36.8 °C)     Vital Signs:  reviewed  Constitutional: Awake alert and oriented x3, well developed, no acute distress, non-toxic appearance.  EYES: symmetric, sclera clear  ENT:  Normocephalic, Atraumatic.   Respiratory:  No respiratory distress, No wheezing  CV: pulse regular, no palpitations or pallor.  GI:  Abdomen soft, non-tender.   Vascular:  Intact distal pulses, No cyanosis, no signs or symptoms of DVT.  Neurologic: Sensation grossly intact to the involved extremity, No focal deficits noted.   Neck: No tenderness, Supple.  Integument: warm, dry, no ulcerations.   Psychiatric:  Oriented, no pathological affect.  Musculoskeletal:    Affected knee(s):  Painful gait with a subtle limp, positive for synovitis, swelling, joint effusion with crepitation.  Lachman negative  Posterior drawer negative  Hamida's negative  Patellofemoral grind +  Sensation grossly intact to light touch throughout the lower extremity  Skin is intact  Distal pulses are palpable  No signs or symptoms of DVT        Diagnostic Data:     Imaging was done previously in the office, images were personally viewed and discussed at length with the patient:    Indication: pain related symptoms,  Views: 3V AP, LAT & 40 degree PA bilateral knee(s)   Findings: severe end-stage arthritis (bone on bone, subchondral  sclerosis/cysts, osteophytes)  Comparison views: viewed last xray done in the office.     Procedure:  Large Joint Arthrocentesis  Date/Time: 12/1/2017 8:52 AM  Consent given by: patient  Site marked: site marked  Timeout: Immediately prior to procedure a time out was called to verify the correct patient, procedure, equipment, support staff and site/side marked as required   Supporting Documentation  Indications: pain and joint swelling   Procedure Details  Location: knee - R knee  Preparation: Patient was prepped and draped in the usual sterile fashion  Needle size: 22 G  Approach: anterolateral  Medications administered: 80 mg methylPREDNISolone acetate 80 MG/ML; 2 mL bupivacaine (PF) 0.5 %; 2 mL lidocaine PF 1% 1 %  Patient tolerance: patient tolerated the procedure well with no immediate complications    Large Joint Arthrocentesis  Date/Time: 12/1/2017 8:53 AM  Consent given by: patient  Site marked: site marked  Timeout: Immediately prior to procedure a time out was called to verify the correct patient, procedure, equipment, support staff and site/side marked as required   Supporting Documentation  Indications: pain and joint swelling   Procedure Details  Location: knee - L knee  Preparation: Patient was prepped and draped in the usual sterile fashion  Needle size: 22 G  Approach: anterolateral  Medications administered: 2 mL lidocaine PF 1% 1 %; 2 mL bupivacaine (PF) 0.5 %; 80 mg methylPREDNISolone acetate 80 MG/ML  Patient tolerance: patient tolerated the procedure well with no immediate complications          Assessment. Severe arthritis bilateral knee(s).      Plan: Is to proceed with injection  Follow up as indicated.  Ice, elevate, and rest as needed.  Additional interventions include:  15 min spent face to face with patient 9 min spent counseling about natural history and expected course of assessed complaint and reviewed treatment options that have been tried and not tried and those currently available.  Questions answered.    Natural history and expected course of this patient's diagnosis discussed along with evaluation of therapies. Questions answered.  Advice on benefits of, and types of regular/moderate exercise including biomechanical forces involved as it pertains to this complaint, with a goal of 5 min at least 7 times a week.    Address and update of wt loss, physical exercises, use of assistive devices, and monitoring of Medications per orders to address ortho complaints; Evaluation and discussion of safety, precautions, side effects, and warnings given especially of long term NSAID therapy.  Lifestyle measures for weight loss, suggest starting at 20lbs, with biomechanical explanations for weight loss and how this affects orthopedic condition.  Cortisone Injection. See procedure note.  OTC analgesics as needed with dosage warning and instructions given.  Cryotherapy/brachy therapy as indicated with instructions.   Advised on strengthening exercises, stressed importance of these with progression of arthritis, demonstrated exercises to patient with repeat demonstration and verb of understanding.   Discussed visco, needs approval so will do coritsone today and make 6 wk f/u,  Is also in the process of evaluation for lap band.   12/1/2017  HOLA

## 2017-12-05 RX ORDER — CEFAZOLIN SODIUM 2 G/100ML
2 INJECTION, SOLUTION INTRAVENOUS ONCE
Status: COMPLETED | OUTPATIENT
Start: 2017-12-06 | End: 2017-12-06

## 2017-12-06 ENCOUNTER — ANESTHESIA EVENT (OUTPATIENT)
Dept: PERIOP | Facility: HOSPITAL | Age: 57
End: 2017-12-06

## 2017-12-06 ENCOUNTER — ANESTHESIA (OUTPATIENT)
Dept: PERIOP | Facility: HOSPITAL | Age: 57
End: 2017-12-06

## 2017-12-06 ENCOUNTER — HOSPITAL ENCOUNTER (OUTPATIENT)
Facility: HOSPITAL | Age: 57
Setting detail: HOSPITAL OUTPATIENT SURGERY
Discharge: HOME | End: 2017-12-06
Attending: OBSTETRICS & GYNECOLOGY | Admitting: OBSTETRICS & GYNECOLOGY

## 2017-12-06 VITALS
WEIGHT: 228.2 LBS | HEIGHT: 64 IN | BODY MASS INDEX: 38.96 KG/M2 | SYSTOLIC BLOOD PRESSURE: 123 MMHG | OXYGEN SATURATION: 93 % | RESPIRATION RATE: 16 BRPM | HEART RATE: 70 BPM | TEMPERATURE: 99.2 F | DIASTOLIC BLOOD PRESSURE: 85 MMHG

## 2017-12-06 PROCEDURE — 25010000002 ONDANSETRON PER 1 MG: Performed by: NURSE ANESTHETIST, CERTIFIED REGISTERED

## 2017-12-06 PROCEDURE — 25010000002 MIDAZOLAM PER 1 MG: Performed by: ANESTHESIOLOGY

## 2017-12-06 PROCEDURE — 25010000003 CEFAZOLIN IN DEXTROSE 2-4 GM/100ML-% SOLUTION: Performed by: OBSTETRICS & GYNECOLOGY

## 2017-12-06 PROCEDURE — 25010000002 FENTANYL CITRATE (PF) 100 MCG/2ML SOLUTION: Performed by: NURSE ANESTHETIST, CERTIFIED REGISTERED

## 2017-12-06 PROCEDURE — C1771 REP DEV, URINARY, W/SLING: HCPCS | Performed by: OBSTETRICS & GYNECOLOGY

## 2017-12-06 PROCEDURE — 25010000002 SUCCINYLCHOLINE PER 20 MG: Performed by: NURSE ANESTHETIST, CERTIFIED REGISTERED

## 2017-12-06 PROCEDURE — 25010000002 PROPOFOL 10 MG/ML EMULSION: Performed by: NURSE ANESTHETIST, CERTIFIED REGISTERED

## 2017-12-06 DEVICE — TRANSVAGINAL MID-URETHRAL SLING
Type: IMPLANTABLE DEVICE | Status: FUNCTIONAL
Brand: ADVANTAGE FIT™  SYSTEM

## 2017-12-06 RX ORDER — LIDOCAINE HYDROCHLORIDE 20 MG/ML
INJECTION, SOLUTION INFILTRATION; PERINEURAL AS NEEDED
Status: DISCONTINUED | OUTPATIENT
Start: 2017-12-06 | End: 2017-12-06 | Stop reason: SURG

## 2017-12-06 RX ORDER — LABETALOL HYDROCHLORIDE 5 MG/ML
5 INJECTION, SOLUTION INTRAVENOUS
Status: DISCONTINUED | OUTPATIENT
Start: 2017-12-06 | End: 2017-12-06 | Stop reason: HOSPADM

## 2017-12-06 RX ORDER — MIDAZOLAM HYDROCHLORIDE 1 MG/ML
1 INJECTION INTRAMUSCULAR; INTRAVENOUS
Status: DISCONTINUED | OUTPATIENT
Start: 2017-12-06 | End: 2017-12-06 | Stop reason: HOSPADM

## 2017-12-06 RX ORDER — ROCURONIUM BROMIDE 10 MG/ML
INJECTION, SOLUTION INTRAVENOUS AS NEEDED
Status: DISCONTINUED | OUTPATIENT
Start: 2017-12-06 | End: 2017-12-06 | Stop reason: SURG

## 2017-12-06 RX ORDER — ONDANSETRON 2 MG/ML
INJECTION INTRAMUSCULAR; INTRAVENOUS AS NEEDED
Status: DISCONTINUED | OUTPATIENT
Start: 2017-12-06 | End: 2017-12-06 | Stop reason: SURG

## 2017-12-06 RX ORDER — PROMETHAZINE HYDROCHLORIDE 25 MG/1
12.5 TABLET ORAL ONCE AS NEEDED
Status: DISCONTINUED | OUTPATIENT
Start: 2017-12-06 | End: 2017-12-06 | Stop reason: HOSPADM

## 2017-12-06 RX ORDER — ONDANSETRON 2 MG/ML
4 INJECTION INTRAMUSCULAR; INTRAVENOUS ONCE AS NEEDED
Status: DISCONTINUED | OUTPATIENT
Start: 2017-12-06 | End: 2017-12-06 | Stop reason: HOSPADM

## 2017-12-06 RX ORDER — OXYCODONE HYDROCHLORIDE AND ACETAMINOPHEN 5; 325 MG/1; MG/1
1 TABLET ORAL EVERY 4 HOURS PRN
Qty: 40 TABLET | Refills: 0 | Status: SHIPPED | OUTPATIENT
Start: 2017-12-06 | End: 2018-01-29

## 2017-12-06 RX ORDER — EPHEDRINE SULFATE 50 MG/ML
5 INJECTION, SOLUTION INTRAVENOUS ONCE AS NEEDED
Status: DISCONTINUED | OUTPATIENT
Start: 2017-12-06 | End: 2017-12-06 | Stop reason: HOSPADM

## 2017-12-06 RX ORDER — PROMETHAZINE HYDROCHLORIDE 25 MG/1
25 TABLET ORAL ONCE AS NEEDED
Status: DISCONTINUED | OUTPATIENT
Start: 2017-12-06 | End: 2017-12-06 | Stop reason: HOSPADM

## 2017-12-06 RX ORDER — NALOXONE HCL 0.4 MG/ML
0.2 VIAL (ML) INJECTION AS NEEDED
Status: DISCONTINUED | OUTPATIENT
Start: 2017-12-06 | End: 2017-12-06 | Stop reason: HOSPADM

## 2017-12-06 RX ORDER — DIPHENHYDRAMINE HYDROCHLORIDE 50 MG/ML
12.5 INJECTION INTRAMUSCULAR; INTRAVENOUS
Status: DISCONTINUED | OUTPATIENT
Start: 2017-12-06 | End: 2017-12-06 | Stop reason: HOSPADM

## 2017-12-06 RX ORDER — FENTANYL CITRATE 50 UG/ML
50 INJECTION, SOLUTION INTRAMUSCULAR; INTRAVENOUS
Status: DISCONTINUED | OUTPATIENT
Start: 2017-12-06 | End: 2017-12-06 | Stop reason: HOSPADM

## 2017-12-06 RX ORDER — HYDRALAZINE HYDROCHLORIDE 20 MG/ML
5 INJECTION INTRAMUSCULAR; INTRAVENOUS
Status: DISCONTINUED | OUTPATIENT
Start: 2017-12-06 | End: 2017-12-06 | Stop reason: HOSPADM

## 2017-12-06 RX ORDER — HYDROCODONE BITARTRATE AND ACETAMINOPHEN 7.5; 325 MG/1; MG/1
1 TABLET ORAL ONCE AS NEEDED
Status: DISCONTINUED | OUTPATIENT
Start: 2017-12-06 | End: 2017-12-06 | Stop reason: HOSPADM

## 2017-12-06 RX ORDER — PROMETHAZINE HYDROCHLORIDE 25 MG/1
25 SUPPOSITORY RECTAL ONCE AS NEEDED
Status: DISCONTINUED | OUTPATIENT
Start: 2017-12-06 | End: 2017-12-06 | Stop reason: HOSPADM

## 2017-12-06 RX ORDER — SODIUM CHLORIDE 0.9 % (FLUSH) 0.9 %
1-10 SYRINGE (ML) INJECTION AS NEEDED
Status: DISCONTINUED | OUTPATIENT
Start: 2017-12-06 | End: 2017-12-06 | Stop reason: HOSPADM

## 2017-12-06 RX ORDER — FLUMAZENIL 0.1 MG/ML
0.2 INJECTION INTRAVENOUS AS NEEDED
Status: DISCONTINUED | OUTPATIENT
Start: 2017-12-06 | End: 2017-12-06 | Stop reason: HOSPADM

## 2017-12-06 RX ORDER — FAMOTIDINE 10 MG/ML
20 INJECTION, SOLUTION INTRAVENOUS ONCE
Status: COMPLETED | OUTPATIENT
Start: 2017-12-06 | End: 2017-12-06

## 2017-12-06 RX ORDER — IBUPROFEN 600 MG/1
600 TABLET ORAL EVERY 6 HOURS PRN
Qty: 60 TABLET | Refills: 0 | Status: SHIPPED | OUTPATIENT
Start: 2017-12-06 | End: 2018-01-29

## 2017-12-06 RX ORDER — MIDAZOLAM HYDROCHLORIDE 1 MG/ML
2 INJECTION INTRAMUSCULAR; INTRAVENOUS
Status: DISCONTINUED | OUTPATIENT
Start: 2017-12-06 | End: 2017-12-06 | Stop reason: HOSPADM

## 2017-12-06 RX ORDER — FENTANYL CITRATE 50 UG/ML
INJECTION, SOLUTION INTRAMUSCULAR; INTRAVENOUS AS NEEDED
Status: DISCONTINUED | OUTPATIENT
Start: 2017-12-06 | End: 2017-12-06 | Stop reason: SURG

## 2017-12-06 RX ORDER — HYDROMORPHONE HCL 110MG/55ML
0.5 PATIENT CONTROLLED ANALGESIA SYRINGE INTRAVENOUS
Status: DISCONTINUED | OUTPATIENT
Start: 2017-12-06 | End: 2017-12-06 | Stop reason: HOSPADM

## 2017-12-06 RX ORDER — PROMETHAZINE HYDROCHLORIDE 25 MG/ML
12.5 INJECTION, SOLUTION INTRAMUSCULAR; INTRAVENOUS ONCE AS NEEDED
Status: DISCONTINUED | OUTPATIENT
Start: 2017-12-06 | End: 2017-12-06 | Stop reason: HOSPADM

## 2017-12-06 RX ORDER — PROPOFOL 10 MG/ML
VIAL (ML) INTRAVENOUS AS NEEDED
Status: DISCONTINUED | OUTPATIENT
Start: 2017-12-06 | End: 2017-12-06 | Stop reason: SURG

## 2017-12-06 RX ORDER — SODIUM CHLORIDE, SODIUM LACTATE, POTASSIUM CHLORIDE, CALCIUM CHLORIDE 600; 310; 30; 20 MG/100ML; MG/100ML; MG/100ML; MG/100ML
9 INJECTION, SOLUTION INTRAVENOUS CONTINUOUS
Status: DISCONTINUED | OUTPATIENT
Start: 2017-12-06 | End: 2017-12-06 | Stop reason: HOSPADM

## 2017-12-06 RX ORDER — SUCCINYLCHOLINE CHLORIDE 20 MG/ML
INJECTION INTRAMUSCULAR; INTRAVENOUS AS NEEDED
Status: DISCONTINUED | OUTPATIENT
Start: 2017-12-06 | End: 2017-12-06 | Stop reason: SURG

## 2017-12-06 RX ORDER — OXYCODONE AND ACETAMINOPHEN 7.5; 325 MG/1; MG/1
1 TABLET ORAL ONCE AS NEEDED
Status: COMPLETED | OUTPATIENT
Start: 2017-12-06 | End: 2017-12-06

## 2017-12-06 RX ORDER — DOCUSATE SODIUM 100 MG/1
100 CAPSULE, LIQUID FILLED ORAL 2 TIMES DAILY
Qty: 60 CAPSULE | Refills: 0 | Status: SHIPPED | OUTPATIENT
Start: 2017-12-06 | End: 2018-01-29

## 2017-12-06 RX ADMIN — PROPOFOL 200 MG: 10 INJECTION, EMULSION INTRAVENOUS at 14:43

## 2017-12-06 RX ADMIN — ONDANSETRON 4 MG: 2 INJECTION INTRAMUSCULAR; INTRAVENOUS at 15:32

## 2017-12-06 RX ADMIN — FAMOTIDINE 20 MG: 10 INJECTION, SOLUTION INTRAVENOUS at 12:41

## 2017-12-06 RX ADMIN — FENTANYL CITRATE 100 MCG: 50 INJECTION INTRAMUSCULAR; INTRAVENOUS at 14:40

## 2017-12-06 RX ADMIN — Medication 1 MG: at 12:41

## 2017-12-06 RX ADMIN — SODIUM CHLORIDE, POTASSIUM CHLORIDE, SODIUM LACTATE AND CALCIUM CHLORIDE 9 ML/HR: 600; 310; 30; 20 INJECTION, SOLUTION INTRAVENOUS at 12:17

## 2017-12-06 RX ADMIN — OXYCODONE HYDROCHLORIDE AND ACETAMINOPHEN 1 TABLET: 7.5; 325 TABLET ORAL at 17:29

## 2017-12-06 RX ADMIN — ROCURONIUM BROMIDE 5 MG: 10 INJECTION INTRAVENOUS at 14:42

## 2017-12-06 RX ADMIN — FENTANYL CITRATE 50 MCG: 50 INJECTION, SOLUTION INTRAMUSCULAR; INTRAVENOUS at 17:04

## 2017-12-06 RX ADMIN — SODIUM CHLORIDE, POTASSIUM CHLORIDE, SODIUM LACTATE AND CALCIUM CHLORIDE: 600; 310; 30; 20 INJECTION, SOLUTION INTRAVENOUS at 15:33

## 2017-12-06 RX ADMIN — SUCCINYLCHOLINE CHLORIDE 120 MG: 20 INJECTION, SOLUTION INTRAMUSCULAR; INTRAVENOUS; PARENTERAL at 14:43

## 2017-12-06 RX ADMIN — CEFAZOLIN SODIUM 2 G: 2 INJECTION, SOLUTION INTRAVENOUS at 14:46

## 2017-12-06 RX ADMIN — LIDOCAINE HYDROCHLORIDE 60 MG: 20 INJECTION, SOLUTION INFILTRATION; PERINEURAL at 14:43

## 2017-12-06 NOTE — ANESTHESIA PREPROCEDURE EVALUATION
Anesthesia Evaluation     Patient summary reviewed   no history of anesthetic complications:  NPO Solid Status: > 8 hours  NPO Liquid Status: > 8 hours     Airway   Mallampati: III  TM distance: >3 FB  Neck ROM: full  possible difficult intubation  Comment: Small Mouth  Dental - normal exam     Pulmonary - normal exam   (+) a smoker Former, recent URI, sleep apnea on CPAP,   Cardiovascular - normal exam    (+) hypertension, valvular problems/murmurs murmur, CAD, cardiac stents more than 12 months ago angina, hyperlipidemia      Neuro/Psych  (+) numbness, psychiatric history Bipolar and Depression,    GI/Hepatic/Renal/Endo    (+) obesity, morbid obesity, hiatal hernia, GERD, hypothyroidism,     Musculoskeletal     (+) neck pain,   Abdominal  - normal exam   Substance History      OB/GYN          Other   (+) arthritis   history of cancer                                    Anesthesia Plan    ASA 3     general     intravenous induction   Anesthetic plan and risks discussed with patient and child.

## 2017-12-06 NOTE — DISCHARGE INSTRUCTIONS
SEDATION DISCHARGE INSTRUCTIONS.    IMPORTANT: The following information will help you return to your best level of health.  GENERAL ANESTHESIA.  You have had general anesthesia. You were given a medicine to help you go to sleep and not feel pain.    Follow these instructions:   Go right home. Rest quietly at home today, then you can be up and about.   Do not drink alcohol, drive or operate machinery for 24 hours.   Do not make any important decisions or sign any legal papers in the next 24 hours.   Have a RESPONSIBLE PERSON stay with you the rest of today and overnight for your protection and safety.   Start your diet with fluids and light foods (jello, soup, juice, toast). Then eat a normal diet if not nauseated.    Call your doctor if you have:   any blue or gray skin color.   repeated vomiting.   trouble breathing.   any new problems or concerns.

## 2017-12-06 NOTE — PLAN OF CARE
Problem: Patient Care Overview (Adult)  Goal: Plan of Care Review  Outcome: Ongoing (interventions implemented as appropriate)    12/06/17 1152   Coping/Psychosocial Response Interventions   Plan Of Care Reviewed With patient   Patient Care Overview   Progress no change       Goal: Adult Individualization and Mutuality  Outcome: Ongoing (interventions implemented as appropriate)    12/06/17 1152   Individualization   Patient Specific Preferences none       Goal: Discharge Needs Assessment  Outcome: Ongoing (interventions implemented as appropriate)    12/06/17 1152   Discharge Needs Assessment   Concerns To Be Addressed no discharge needs identified         Problem: Perioperative Period (Adult)  Goal: Signs and Symptoms of Listed Potential Problems Will be Absent or Manageable (Perioperative Period)  Outcome: Ongoing (interventions implemented as appropriate)    12/06/17 1152   Perioperative Period   Problems Assessed (Perioperative Period) all   Problems Present (Perioperative Period) pain;physiologic stress response

## 2017-12-06 NOTE — H&P
Chief complaint:  Stress urinary incontinence    History of present illness:  57-year-old  with a medical history significant for HTN, CAD, MI in , hypothyroidism sleep apnea, depression/bipolar disorder with stress urinary incontinence who desires surgical management.      Review of Systems - History obtained from chart review and the patient  General ROS: negative  Psychological ROS: positive for - depression  Respiratory ROS: no cough, shortness of breath, or wheezing  Cardiovascular ROS: no chest pain or dyspnea on exertion  Gastrointestinal ROS: no abdominal pain, change in bowel habits, or black or bloody stools  Genito-Urinary ROS: no dysuria, trouble voiding, or hematuria  positive for - incontinence  Musculoskeletal ROS: negative  Neurological ROS: no TIA or stroke symptoms    Past Medical History:   Diagnosis Date   • Anemia    • Arthritis    • Atherosclerotic heart disease of native coronary artery with other forms of angina pectoris    • Bipolar I disorder, single manic episode     depressive d(NOS)   • Cervical disc herniation    • Coronary artery disease    • Depression     NO SUICIDAL PLANS   • Difficulty swallowing    • Diverticular disease    • Dyspepsia    • Dysphagia    • Gastric reflux    • Heart murmur    • Hiatal hernia    • High cholesterol    • History of transfusion    • HPV (human papilloma virus) infection 2016    HPV positive on pap LGSIL   • Hyperlipidemia    • Hypertension    • Hypothyroidism    • Incontinence in female     wears pads   • Incontinence in female    • Kidney disease 2017    stage 2    • Kidney disease, chronic, stage III (GFR 30-59 ml/min)    • LGSIL on Pap smear of cervix 2016    LGSIL HPV positive   • Neck pain    • Obesity    • Past myocardial infarction 2000   • Periodic limb movement disorder    • Restless leg syndrome    • Sleep apnea     cpap   • Stress fracture     LEFT HEEL   • Suicidal ideation 2016    history   • Swelling of ankle      right had doppler no s/s blood clot   • Thyroid nodule      Past Surgical History:   Procedure Laterality Date   • ANTERIOR CERVICAL DISCECTOMY W/ FUSION N/A 12/29/2016    Procedure: C3-4 anterior cervical discectomy and fusion with Depuy micro plate, ALLOGRAFT C3-4, AND HARDWARE REMOVAL C4-7.;  Surgeon: Hema Godwin MD;  Location: [facility] OR;  Service:    • CARDIAC CATHETERIZATION N/A 3/30/2017    Procedure: Left Heart Cath;  Surgeon: Tracey Vargas MD;  Location:  [facility];  Service:    • CARDIAC CATHETERIZATION N/A 3/30/2017    Procedure: Coronary angiography;  Surgeon: Tracey Vargas MD;  Location:  [facility];  Service:    • CARDIAC CATHETERIZATION N/A 3/30/2017    Procedure: Left ventriculography;  Surgeon: Tracey Vargas MD;  Location:  [facility];  Service:    • CARDIAC CATHETERIZATION  3/30/2017    Procedure: Functional Flow [place];  Surgeon: Tracey Vargas MD;  Location:  [facility];  Service:    • CERVICAL BIOPSY  [facility]XVI    Dr. Jeffery.    • CERVICAL DISCECTOMY ANTERIOR  04/2013    C4-7   • COLONOSCOPY  04/20/2015    Diverticulosis, IH   • COLPOSCOPY W/ BIOPSY / CURETTAGE  06/17/2016    LGSIL HPV positive. Results were normal repeat pap in one year. Chronic Cervicitis   • CORONARY ANGIOPLASTY WITH STENT PLACEMENT     • ENDOSCOPY  [facility]    Normal.  Dr. Rodriguez   • ENDOSCOPY N/A 6/22/2017    Erythematous mucosa in the stomach  PATH: Chronic active gastritis, moderate with intestinal metaplasia    • HARDWARE REMOVAL  12/29/2016    cervical   • SHOULDER SURGERY Left     RCR   • TONSILLECTOMY     • TONSILLECTOMY AND ADENOIDECTOMY     • TUBAL ABDOMINAL LIGATION     • TYMPANOSTOMY TUBE PLACEMENT Right    • WRIST SURGERY Bilateral     carpal tunnel     No Known Allergies  No current facility-administered medications on file prior to encounter.      Current Outpatient Prescriptions on File Prior to Encounter   Medication Sig  Dispense Refill   • ARIPiprazole (ABILIFY) 10 MG tablet Take 10 mg by mouth Daily.     • Cholecalciferol (VITAMIN D3) 5000 UNITS capsule capsule Take 5,000 Units by mouth Daily.     • nitroglycerin (NITROSTAT) 0.4 MG SL tablet Place 0.4 mg under the tongue Every 5 (Five) Minutes As Needed for Chest Pain (took at M.D  office at 0930 a.m.). Take no more than 3 doses in 15 minutes.     • pramipexole (MIRAPEX) 0.5 MG tablet Take 0.5 mg by mouth Every Night.     • raNITIdine (ZANTAC) 150 MG tablet Take 150 mg by mouth 2 (Two) Times a Day.     • simvastatin (ZOCOR) 20 MG tablet Take 1 tablet by mouth Every Night. 30 tablet 5     Family History   Problem Relation Age of Onset   • Diabetes type II Mother    • Hypertension Mother    • Osteoporosis Mother    • Seizures Mother    • COPD Father    • Hypertension Father    • Lung cancer Father    • Heart attack Father    • Liver cancer Father    • Thyroid disease Sister    • Hypertension Sister    • Bipolar disorder Sister    • Depression Sister    • No Known Problems Son    • Abnormal EKG Daughter    • Hypertension Daughter    • Bipolar disorder Daughter    • No Known Problems Paternal Grandfather    • No Known Problems Paternal Grandmother    • No Known Problems Maternal Grandmother    • No Known Problems Maternal Grandfather    • Thyroid disease Sister    • Hypertension Sister    • Bipolar disorder Sister    • Asthma Daughter    • Breast cancer Neg Hx    • Ovarian cancer Neg Hx    • Uterine cancer Neg Hx    • Colon cancer Neg Hx    • Malig Hyperthermia Neg Hx      Social History     Social History   • Marital status:      Spouse name: N/A   • Number of children: 3   • Years of education: N/A     Occupational History   • Not on file.     Social History Main Topics   • Smoking status: Former Smoker     Packs/day: 1.50     Years: 20.00     Types: Electronic Cigarette, Cigarettes     Quit date: 2015   • Smokeless tobacco: Never Used      Comment: Electronic Cigarette/ 3  mg nicotine    • Alcohol use Yes      Comment: 2 times yearly    • Drug use: No   • Sexual activity: Not on file     Other Topics Concern   • Not on file     Social History Narrative     Physical Exam    General:  No acute distress  Head:  Atraumatic and normocephalic  Neck:  Supple  Cardiovascular:  RRR, S1 and S2 normal  Lungs:  Clear to auscultation bilaterally  Abdomen:  Soft, nontentder, non-distended, normal bowel sounds  Genitourinary:  Deferred  Musculoskeletal:  No edema or calf tenderness.    Neuroglogical:  AAO x 3  Psychological:  Appropriate mood and affect    Assessment and Plan    57-year-old  with stress urinary incontinence for retropubic mid-urethral sling and cystoscopy    TEDs/SCDs  Verify consent  Antibiotic prophylaxis

## 2017-12-06 NOTE — PERIOPERATIVE NURSING NOTE
Dr. Campbell notified of recent URI and sinus infection, completed abx and steroid, routine pre-op orders noted.

## 2017-12-06 NOTE — ANESTHESIA PROCEDURE NOTES
Airway  Urgency: elective    Date/Time: 12/6/2017 2:45 PM  Airway not difficult    General Information and Staff    Patient location during procedure: OR  Anesthesiologist: TITO LOU  CRNA: OMAYRA CAM    Indications and Patient Condition  Indications for airway management: airway protection    Preoxygenated: yes  Mask difficulty assessment: 1 - vent by mask    Final Airway Details  Final airway type: endotracheal airway      Successful airway: ETT  Cuffed: yes   Successful intubation technique: direct laryngoscopy  Endotracheal tube insertion site: oral  Blade: Amador  Blade size: #2  ETT size: 7.0 mm  Cormack-Lehane Classification: grade I - full view of glottis  Placement verified by: chest auscultation and capnometry   Cuff volume (mL): 8  Measured from: lips  ETT to lips (cm): 21  Number of attempts at approach: 1    Additional Comments  SIVI.  EYES TAPED CLOSED PRIOR TO DL.  INTUBATION AS CHARTED ABOVE.  APPEARS ATRAUMATIC.  NO CHANGE TO DENTITION. +ETCO2. +BBS. +CR.

## 2017-12-06 NOTE — OP NOTE
Surgery Date:   12/6/17    Preoperative Diagnosis: Stress incontinence    Postoperative Diagnosis: Same    Surgery: Retropubic mid-urethral sling, cystoscopy    Surgeon:  Dr. Abby Méndez    Assistant:  Dr. Farzad Jimenes    Anesthesia: General ETA    EBL: 50 mL    IVF: 1000 mL    Urine output:  150 mL    Complications:  None    Implants: Advantage Fit mid-urethral sling     Specimens:  None    Intraoperative Findings:  Cystoscopy intraoperative was positive for bilateral ureteral jets and ureteral orifices were normal in number and location.  Bladder mucosa normal and trigone normal level of metaplasia.  No diverticula, stones, trabeculations, masses, cellules, ulcers, or foreign bodies.  On the first pass of the left side sling trocar, the blue sheath appeared too close to the left distal urethral at 4 OC (not perforated but only 2-3 from surface), so on second pass cystoscopy was repeated with again no trocar in bladder and sheath/trocar no longer visible through urethral mucosa.    Description of Procedure:      Patient's consent reviewed in preop area and patient had preoperative antibiotics given IV and SCDs were on and running prior to induction of anesthesia.  Time out was performed in OR and patient underwent induction of anesthesia and intubation without difficulty and was placed in the high dorsal lithotomy position with legs in Kali stirrups and secured with knees and hips and ankles in line with contralateral shoulder and care taken not to overflex or overextend hips and shoulders.  Arms were out at neutral position at the patient's sides.  The patient was prepped and draped in a sterile fashion.  Carrington was placed in the bladder and the urethral-vesicular junction identified.    Two Allis' were placed at 1 cm proximal to the urethral meatus and at the urethro-vesicular junction framing the mid-urethra. Both lateral sulci were injected with 10 mL 1:1 mix of 0.25% Marcaine and 1% lidocaine solution,  both with epinephrine, and a 1 cm midline incision was made in the vaginal mucosa over the mid-urethra with a #15 blade scalpel.    Bilateral tunnels were made under the mucosa toward the pubic rami until the pubic bone was reached. Suprapubic areas of planned sling exit were marked 2 finger breadths lateral to the mildine bilaterally immediately superior to the pubic bone and were infiltrated behind the pubic bone to skin with 20 mL of the lidocaine/Marcaine solution (10 mL each side). The Advantage Fit sling trocars were then placed bilaterally in the vaginal tunnels and passed behind the pubic bone posterior to inferior in line with the ipsilateral shouder to exit out the marked skin sites.  The vaginal sulci were checked to ensure no injury to the vaginal mucosa, and the bladder was drained.    A cystoscopy was performed using a 70-degree cystoscopy. No bladder perforations or irregularities were seen as noted above, but the blue sheath could be seen behind the distal left urethral mucosa and it was deemed too close to urethra, so sheath was removed and trocar/sheath repassed, with cystoscopy now being normal including no injury to bladder and no sheath blue color visible through distal left urethra.  Cystoscope was removed and bladder drained by reinsertion of the Carrington catheter.      The mesh was then lifted up to tension the mesh, the trocars were incised off of the plastic sheaves and mesh, and the midline blue tab of the Advantage Fit sling was cut and removed.  The plastic sheaves were then lifted and removed from over the mesh, using a Анна clamp against the urethra to avoid over-tensioning. The mesh over the urethra was seen to be laying flat and loose enough not to exert pressure on urethra at rest. The extra mesh was then cut below the skin at the suprapubic skin sites and skin tented above to avoid entrapment in the mesh ends. Suprapubic incisions were closed with interrupted sutures of 4-0 Monocryl.  The vaginal incision was irrigated with sterile fluid and wound was found to be hemostatic. Incision was closed with 2-0 Vicryl.    All instruments were removed from the vagina and vaginal sweep was negative.  All counts were correct x2.  Bladder was drained and Carrington was reinserted.  The patient was returned to supine position and cleaned, undraped, dried, and awakened from anesthesia without complication.  She returned to PACU in a stable condition.  Dr. Abby Méndez, the attending surgeon, was present throughout the entirety of the case.

## 2017-12-06 NOTE — PERIOPERATIVE NURSING NOTE
PATIENT POSITIONED SUPINE. 300cc STERILE NORMAL SALINE GENTLY INSTILLED INTO PETERSON CATHETER PER MD ORDER FOR ATTEMPTED VOIDING TRIAL. TOLERATED WELL

## 2017-12-06 NOTE — PERIOPERATIVE NURSING NOTE
PATIENT UP TO BSC AGAIN PER HER REQUEST. IF UNABLE TO VOID AFTER THIS ATTEMPT, WILL PLACE PETERSON AS ORDERED

## 2017-12-07 NOTE — ANESTHESIA POSTPROCEDURE EVALUATION
"Patient: Keri Nielson    Procedure Summary     Date Anesthesia Start Anesthesia Stop Room / Location    12/06/17 1434 1550 [facility] / [facility]       Procedure Diagnosis Surgeon Provider    MID URETHRAL SLING CYSTSCOPY (N/A Vagina) No diagnosis on file. MD Javier Valle MD          Anesthesia Type: general  Last vitals  BP   127/87 (12/06/17 1859)   Temp   37.3 °C (99.2 °F) (12/06/17 1845)   Pulse   68 (12/06/17 1859)   Resp   16 (12/06/17 1859)     SpO2   92 % (12/06/17 1859)     Post Anesthesia Care and Evaluation    Patient location during evaluation: bedside  Patient participation: complete - patient participated  Level of consciousness: awake and alert  Pain management: adequate  Airway patency: patent  Anesthetic complications: No anesthetic complications    Cardiovascular status: acceptable  Respiratory status: acceptable  Hydration status: acceptable    Comments: /87 (BP Location: Left arm, Patient Position: Sitting)  Pulse 68  Temp 37.3 °C (99.2 °F) (Oral)   Resp 16  Ht 161.3 cm (63.5\")  Wt 104 kg (228 lb 3.2 oz)  LMP 10/21/2016 (Approximate) Comment: 54  SpO2 92%  BMI 39.79 kg/m2      "

## 2017-12-07 NOTE — PLAN OF CARE
Problem: Patient Care Overview (Adult)  Goal: Plan of Care Review  Outcome: Outcome(s) achieved Date Met:  12/06/17  Goal: Adult Individualization and Mutuality  Outcome: Outcome(s) achieved Date Met:  12/06/17  Goal: Discharge Needs Assessment  Outcome: Outcome(s) achieved Date Met:  12/06/17    Problem: Perioperative Period (Adult)  Goal: Signs and Symptoms of Listed Potential Problems Will be Absent or Manageable (Perioperative Period)  Outcome: Outcome(s) achieved Date Met:  12/06/17

## 2018-01-03 ENCOUNTER — OFFICE VISIT (OUTPATIENT)
Dept: ORTHOPEDIC SURGERY | Facility: CLINIC | Age: 58
End: 2018-01-03

## 2018-01-03 VITALS — TEMPERATURE: 98.1 F | WEIGHT: 228 LBS | HEIGHT: 63 IN | BODY MASS INDEX: 40.4 KG/M2

## 2018-01-03 DIAGNOSIS — Z98.1 S/P CERVICAL SPINAL FUSION: Primary | ICD-10-CM

## 2018-01-03 PROCEDURE — 99213 OFFICE O/P EST LOW 20 MIN: CPT | Performed by: ORTHOPAEDIC SURGERY

## 2018-01-03 PROCEDURE — 72020 X-RAY EXAM OF SPINE 1 VIEW: CPT | Performed by: ORTHOPAEDIC SURGERY

## 2018-01-03 NOTE — PROGRESS NOTES
She is 11 months out and actually feeling better occasional crepitus no arm pain.  Good strength on examination.  No balance difficulties.  She has stiffness of course.  Lateral x-ray the cervical spine does show some further remodeling and suggestion of a solid fusion and there is some scalloping going on anteriorly which I did not see previously.  I do think she's turned the corner and this is healed.  I will see her when necessary

## 2018-01-08 RX ORDER — ISOSORBIDE MONONITRATE 30 MG/1
TABLET, EXTENDED RELEASE ORAL
Qty: 30 TABLET | Refills: 5 | Status: SHIPPED | OUTPATIENT
Start: 2018-01-08 | End: 2018-01-29 | Stop reason: SDUPTHER

## 2018-01-12 ENCOUNTER — CLINICAL SUPPORT (OUTPATIENT)
Dept: ORTHOPEDIC SURGERY | Facility: CLINIC | Age: 58
End: 2018-01-12

## 2018-01-12 VITALS — BODY MASS INDEX: 40.4 KG/M2 | WEIGHT: 228 LBS | HEIGHT: 63 IN | TEMPERATURE: 99.3 F

## 2018-01-12 DIAGNOSIS — M17.0 PRIMARY OSTEOARTHRITIS OF BOTH KNEES: ICD-10-CM

## 2018-01-12 PROCEDURE — 99213 OFFICE O/P EST LOW 20 MIN: CPT | Performed by: ORTHOPAEDIC SURGERY

## 2018-01-12 PROCEDURE — 20610 DRAIN/INJ JOINT/BURSA W/O US: CPT | Performed by: ORTHOPAEDIC SURGERY

## 2018-01-12 RX ORDER — OXYBUTYNIN CHLORIDE 15 MG/1
15 TABLET, EXTENDED RELEASE ORAL DAILY
COMMUNITY
Start: 2018-01-04 | End: 2019-08-30

## 2018-01-12 RX ORDER — LIDOCAINE HYDROCHLORIDE 10 MG/ML
4 INJECTION, SOLUTION EPIDURAL; INFILTRATION; INTRACAUDAL; PERINEURAL
Status: COMPLETED | OUTPATIENT
Start: 2018-01-12 | End: 2018-01-12

## 2018-01-12 RX ADMIN — LIDOCAINE HYDROCHLORIDE 4 ML: 10 INJECTION, SOLUTION EPIDURAL; INFILTRATION; INTRACAUDAL; PERINEURAL at 09:17

## 2018-01-12 NOTE — PROGRESS NOTES
Patient: Keri Nielson  YOB: 1960    Chief Complaints:  bilateral knee pain    Subjective:    History of Present Illness: Here today for knee pain. The pain is a generalized joint tenderness.  It has been progressive in nature but remains intermittent.  Worsened by prolonged standing or walking and squatting activities. Has had improvement in the past with ice/heat, rest, and injections.     This problem is not new to this examiner.     Allergies: No Known Allergies    Medications:   Home Medications:  Current Outpatient Prescriptions on File Prior to Visit   Medication Sig   • acetaminophen (TYLENOL) 325 MG tablet Take 650 mg by mouth Every 6 (Six) Hours As Needed for Mild Pain .   • ARIPiprazole (ABILIFY) 10 MG tablet Take 10 mg by mouth Daily.   • atenolol (TENORMIN) 25 MG tablet Take 25 mg by mouth Daily.   • benzonatate (TESSALON) 200 MG capsule Take 1 capsule by mouth 3 (Three) Times a Day As Needed for Cough.   • buPROPion XL (WELLBUTRIN XL) 300 MG 24 hr tablet Take 300 mg by mouth Every Morning.   • Cholecalciferol (VITAMIN D3) 5000 UNITS capsule capsule Take 5,000 Units by mouth Daily.   • docusate sodium (COLACE) 100 MG capsule Take 1 capsule by mouth 2 (Two) Times a Day.   • fluticasone (FLONASE) 50 MCG/ACT nasal spray 2 sprays into each nostril Daily. As needed allergies   • gabapentin (NEURONTIN) 300 MG capsule Take 1 capsule by mouth 3 (Three) Times a Day.   • guaifenesin-codeine (GUAIFENESIN AC) 100-10 MG/5ML liquid Take 5 mL by mouth 4 (Four) Times a Day As Needed for Cough.   • ibuprofen (ADVIL,MOTRIN) 600 MG tablet Take 1 tablet by mouth Every 6 (Six) Hours As Needed for Mild Pain .   • isosorbide mononitrate (IMDUR) 30 MG 24 hr tablet Take 30 mg by mouth Daily.   • isosorbide mononitrate (IMDUR) 30 MG 24 hr tablet TAKE ONE TABLET BY MOUTH DAILY   • lamoTRIgine (LaMICtal) 150 MG tablet Take 300 mg by mouth Daily.   • levothyroxine (SYNTHROID, LEVOTHROID) 100 MCG tablet Take 100  mcg by mouth Daily.   • loratadine (CLARITIN) 10 MG tablet Take 1 tablet by mouth Daily. As needed for allergies   • nitroglycerin (NITROSTAT) 0.4 MG SL tablet Place 0.4 mg under the tongue Every 5 (Five) Minutes As Needed for Chest Pain (took at M.D  office at 0930 a.m.). Take no more than 3 doses in 15 minutes.   • oxyCODONE-acetaminophen (PERCOCET) 5-325 MG per tablet Take 1 tablet by mouth Every 4 (Four) Hours As Needed (Pain).   • pramipexole (MIRAPEX) 0.5 MG tablet Take 0.5 mg by mouth Every Night.   • raNITIdine (ZANTAC) 150 MG tablet Take 150 mg by mouth 2 (Two) Times a Day.   • simvastatin (ZOCOR) 20 MG tablet Take 1 tablet by mouth Every Night.   • sucralfate (CARAFATE) 1 GM/10ML suspension Take 1 g by mouth 4 (Four) Times a Day.   • venlafaxine (EFFEXOR) 75 MG tablet Take 75 mg by mouth Every Morning.   • [DISCONTINUED] oxybutynin XL (DITROPAN-XL) 10 MG 24 hr tablet Take 10 mg by mouth Daily.     No current facility-administered medications on file prior to visit.      Current Medications:  Scheduled Meds:  Continuous Infusions:  No current facility-administered medications for this visit.   PRN Meds:.    I have reviewed the patient's medical history in detail and updated the computerized patient record.  Review and summarization of old records include:    Past Medical History:   Diagnosis Date   • Anemia    • Arthritis    • Atherosclerotic heart disease of native coronary artery with other forms of angina pectoris    • Bipolar I disorder, single manic episode     depressive d(NOS)   • Cervical disc herniation    • Coronary artery disease    • Depression     NO SUICIDAL PLANS   • Difficulty swallowing    • Diverticular disease    • Dyspepsia    • Dysphagia    • Gastric reflux    • Heart murmur    • Hiatal hernia    • High cholesterol    • History of transfusion    • 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    HPV positive on pap LGSIL   • Hyperlipidemia    • Hypertension    • Hypothyroidism    •  Incontinence in female     wears pads   • Incontinence in female    • Kidney disease 2017    stage 2    • Kidney disease, chronic, stage III (GFR 30-59 ml/min)    • LGSIL on Pap smear of cervix 04/29/2016    LGSIL HPV positive   • Neck pain    • Obesity    • Past myocardial infarction 05/2000   • Periodic limb movement disorder    • Restless leg syndrome    • Sleep apnea     cpap   • Stress fracture     LEFT HEEL   • Suicidal ideation 08/19/2016    history   • Swelling of ankle     right had doppler no s/s blood clot   • Thyroid nodule         Past Surgical History:   Procedure Laterality Date   • ANTERIOR CERVICAL DISCECTOMY W/ FUSION N/A 12/29/2016    Procedure: C3-4 anterior cervical discectomy and fusion with Depuy micro plate, ALLOGRAFT C3-4, AND HARDWARE REMOVAL C4-7.;  Surgeon: Hema Godwin MD;  Location: [facility] MAIN OR;  Service:    • CARDIAC CATHETERIZATION N/A 3/30/2017    Procedure: Left Heart Cath;  Surgeon: Tracey Vargas MD;  Location:  [facility];  Service:    • CARDIAC CATHETERIZATION N/A 3/30/2017    Procedure: Coronary angiography;  Surgeon: Tracey Vargas MD;  Location:  [facility];  Service:    • CARDIAC CATHETERIZATION N/A 3/30/2017    Procedure: Left ventriculography;  Surgeon: Tracey Vargas MD;  Location:  [facility];  Service:    • CARDIAC CATHETERIZATION  3/30/2017    Procedure: Functional Flow McKenzie;  Surgeon: Tracey Vargas MD;  Location:  [facility];  Service:    • CERVICAL BIOPSY  [facility]    Dr. Jeffery.    • CERVICAL DISCECTOMY ANTERIOR  04/2013    C4-7   • COLONOSCOPY  04/20/2015    Diverticulosis, IH   • COLPOSCOPY W/ BIOPSY / CURETTAGE  06/17/2016    LGSIL HPV positive. Results were normal repeat pap in one year. Chronic Cervicitis   • CORONARY ANGIOPLASTY WITH STENT PLACEMENT     • ENDOSCOPY  [facility]    Normal.  Dr. Rodriguez   • ENDOSCOPY N/A 6/22/2017    Erythematous mucosa in the stomach  PATH: Chronic active  gastritis, moderate with intestinal metaplasia    • HARDWARE REMOVAL  12/29/2016    cervical   • SHOULDER SURGERY Left     RCR   • TONSILLECTOMY     • TONSILLECTOMY AND ADENOIDECTOMY     • TRANSVAGINAL TAPING SUSPENSION N/A 12/6/2017    Procedure: MID URETHRAL SLING CYSTSCOPY;  Surgeon: Abby Méndez MD;  Location: [facility] OR;  Service:    • TUBAL ABDOMINAL LIGATION     • TYMPANOSTOMY TUBE PLACEMENT Right    • WRIST SURGERY Bilateral     carpal tunnel        Social History     Occupational History   • Not on file.     Social History Main Topics   • Smoking status: Former Smoker     Packs/day: 1.50     Years: 20.00     Types: Electronic Cigarette, Cigarettes     Quit date: 2015   • Smokeless tobacco: Never Used      Comment: Electronic Cigarette/ 3 mg nicotine    • Alcohol use Yes      Comment: 2 times yearly    • Drug use: No   • Sexual activity: Not on file    Social History     Social History Narrative        Family History   Problem Relation Age of Onset   • Diabetes type II Mother    • Hypertension Mother    • Osteoporosis Mother    • Seizures Mother    • COPD Father    • Hypertension Father    • Lung cancer Father    • Heart attack Father    • Liver cancer Father    • Thyroid disease Sister    • Hypertension Sister    • Bipolar disorder Sister    • Depression Sister    • No Known Problems Son    • Abnormal EKG Daughter    • Hypertension Daughter    • Bipolar disorder Daughter    • No Known Problems Paternal Grandfather    • No Known Problems Paternal Grandmother    • No Known Problems Maternal Grandmother    • No Known Problems Maternal Grandfather    • Thyroid disease Sister    • Hypertension Sister    • Bipolar disorder Sister    • Asthma Daughter    • Breast cancer Neg Hx    • Ovarian cancer Neg Hx    • Uterine cancer Neg Hx    • Colon cancer Neg Hx    • Malig Hyperthermia Neg Hx        ROS: 14 point review of systems was performed and was negative except for documented findings in HPI and  today's encounter.     Allergies: No Known Allergies  Constitutional:  Denies fever, shaking or chills   Eyes:  Denies change in visual acuity   HENT:  Denies nasal congestion or sore throat   Respiratory:  Denies cough or shortness of breath   Cardiovascular:  Denies chest pain or severe LE edema   GI:  Denies abdominal pain, nausea, vomiting, bloody stools or diarrhea   Musculoskeletal:  Numbness, tingling, or loss of motor function only as noted above in history of present illness.  : Denies painful urination or hematuria  Integument:  Denies rash, lesion or ulceration   Neurologic:  Denies headache or focal weakness  Endocrine:  Denies lymphadenopathy  Psych:  Denies confusion or change in mental status   Hem:  Denies active bleeding    Physical Exam:  Body mass index is 40.39 kg/(m^2).  Vitals:    01/12/18 0915   Temp: 99.3 °F (37.4 °C)     Vital Signs:  reviewed  Constitutional: Awake alert and oriented x3, well developed, no acute distress, non-toxic appearance.  EYES: symmetric, sclera clear  ENT:  Normocephalic, Atraumatic.   Respiratory:  No respiratory distress, No wheezing  CV: pulse regular, no palpitations or pallor.  GI:  Abdomen soft, non-tender.   Vascular:  Intact distal pulses, No cyanosis, no signs or symptoms of DVT.  Neurologic: Sensation grossly intact to the involved extremity, No focal deficits noted.   Neck: No tenderness, Supple.  Integument: warm, dry, no ulcerations.   Psychiatric:  Oriented, no pathological affect.  Musculoskeletal:    Affected knee(s):  Painful gait with a subtle limp, positive for synovitis, swelling, joint effusion with crepitation.  Lachman negative  Posterior drawer negative  Hamida's negative  Patellofemoral grind +  Sensation grossly intact to light touch throughout the lower extremity  Skin is intact  Distal pulses are palpable  No signs or symptoms of DVT        Diagnostic Data:     Imaging was done previously in the office, images were personally viewed  and discussed at length with the patient:    Indication: pain related symptoms,  Views: 3V AP, LAT & 40 degree PA bilateral knee(s)   Findings: advanced arthritis  Comparison views: viewed last xray done in the office.     Procedure:  Large Joint Arthrocentesis  Date/Time: 1/12/2018 9:17 AM  Consent given by: patient  Site marked: site marked  Timeout: Immediately prior to procedure a time out was called to verify the correct patient, procedure, equipment, support staff and site/side marked as required   Supporting Documentation  Indications: pain and joint swelling   Procedure Details  Location: knee - R knee  Preparation: Patient was prepped and draped in the usual sterile fashion  Needle size: 22 G  Approach: anterolateral  Medications administered: 48 mg hylan 48 MG/6ML; 4 mL lidocaine PF 1% 1 %  Patient tolerance: patient tolerated the procedure well with no immediate complications    Large Joint Arthrocentesis  Date/Time: 1/12/2018 9:17 AM  Consent given by: patient  Site marked: site marked  Timeout: Immediately prior to procedure a time out was called to verify the correct patient, procedure, equipment, support staff and site/side marked as required   Supporting Documentation  Indications: pain and joint swelling   Procedure Details  Location: knee - L knee  Preparation: Patient was prepped and draped in the usual sterile fashion  Needle size: 22 G  Approach: anterolateral  Medications administered: 48 mg hylan 48 MG/6ML; 4 mL lidocaine PF 1% 1 %  Patient tolerance: patient tolerated the procedure well with no immediate complications          Assessment:     ICD-10-CM ICD-9-CM   1. Primary osteoarthritis of both knees M17.0 715.16           Plan: Is to proceed with injection  Follow up as indicated.  Ice, elevate, and rest as needed.  Additional interventions include: Biomechanics of pertinent body area discussed.  Risks, benefits, alternatives, comparisons, and complications of accepted medicines,  injections, recommendations, surgical procedures, and therapies explained and education provided in laymen's terms. The patient was given the opportunity to ask questions and they were answerved to their satisfaction.   Natural history and expected course of this patient's diagnosis discussed along with evaluation of therapies. Questions answered.  Viscosupplementation.  See procedure note.   noemi synvisc one.  Gave explanations, warnings and advice    1/12/2018

## 2018-01-29 ENCOUNTER — OFFICE VISIT (OUTPATIENT)
Dept: FAMILY MEDICINE CLINIC | Facility: CLINIC | Age: 58
End: 2018-01-29

## 2018-01-29 VITALS
DIASTOLIC BLOOD PRESSURE: 72 MMHG | BODY MASS INDEX: 40.75 KG/M2 | HEART RATE: 72 BPM | SYSTOLIC BLOOD PRESSURE: 130 MMHG | TEMPERATURE: 98.3 F | HEIGHT: 63 IN | WEIGHT: 230 LBS | OXYGEN SATURATION: 98 %

## 2018-01-29 DIAGNOSIS — M54.32 SCIATICA OF LEFT SIDE: Primary | ICD-10-CM

## 2018-01-29 PROBLEM — Z98.890 H/O BLADDER REPAIR SURGERY: Status: ACTIVE | Noted: 2018-01-29

## 2018-01-29 PROCEDURE — 99213 OFFICE O/P EST LOW 20 MIN: CPT | Performed by: NURSE PRACTITIONER

## 2018-01-29 RX ORDER — OMEPRAZOLE 40 MG/1
40 CAPSULE, DELAYED RELEASE ORAL DAILY
COMMUNITY
Start: 2018-01-29 | End: 2018-06-15

## 2018-01-29 NOTE — PROGRESS NOTES
Subjective   Keri Nielson is a 57 y.o. female.     HPI Comments: Pleasant patient here    complains of moderate paresthesias numbness left lower back radiating to left    knee without back pain fever incontinence groin paresthesias    without foot drop    2 weeks  Nothing Makes better or worse  No treatment        History of chronic obesity BMI 40  Patient has tried everything to lose weight  She is interested and planning on bariatric surgery in a couple weeks  She goes to [facility]  Bariatric program  She is already considered risk and benefit of surgery  With her orthopedic issues she feels like likely her best interest  Interested in lap band    Reviewed information general bariatric surgery  Up-to-date statistics lap bad versus sleeve  She appears likely to be a good candidate for bariatric surgery    Make sure she asked me questions regarding risk-benefit of her procedure  Lap band and or gastric sleeve  LAP-BAND will require continued adjustments likely  To match or nearly match gastric sleeve results  I am not encouraging her either one  Just to make sure she is considering everything and all her questions are satisfied prior to surgery  She will follow-up with her bariatric surgeon with any further questions she might have    Anxiety depression presently stable  Reviewed medications  She sees a counselor  Her doctors temporarily off that she has another one if she needs  She has all her medications                       The following portions of the patient's history were reviewed and updated as appropriate: current medications, past family history, past medical history, past social history, past surgical history and problem list.    Review of Systems   Musculoskeletal:        Tingling paresthesias left low back and leg   All other systems reviewed and are negative.      Objective   Physical Exam   Constitutional: She is oriented to person, place, and time. She appears well-developed and  well-nourished.   Pleasant   HENT:   Head: Normocephalic.   Nose: Nose normal.   Mouth/Throat: Oropharynx is clear and moist.   Tm clear noemi no mass canal patent without d/c   Eyes: Conjunctivae are normal. Pupils are equal, round, and reactive to light. No scleral icterus.   Neck: Neck supple. No JVD present. No thyromegaly present.   Cardiovascular: Normal rate, regular rhythm and normal heart sounds.  Exam reveals no gallop and no friction rub.    No murmur heard.  Pulmonary/Chest: Effort normal and breath sounds normal. No stridor. No respiratory distress. She has no wheezes. She has no rales.   Abdominal: Soft. Bowel sounds are normal. She exhibits no distension. There is no tenderness.   No hepatosplenomegaly, no ascites,   Musculoskeletal: She exhibits no edema or tenderness.   Negative straight leg raise plantar flexion dorsal flexion normal   Lymphadenopathy:     She has no cervical adenopathy.   Neurological: She is alert and oriented to person, place, and time. She has normal reflexes.   Skin: Skin is warm and dry. No rash noted. No erythema.   Psychiatric: She has a normal mood and affect. Her behavior is normal. Judgment and thought content normal.   Vitals reviewed.      Assessment/Plan   Keri was seen today for left leg numbness.    Diagnoses and all orders for this visit:    Sciatica of left side                Education stretching exercises  Compliance medication depression anxiety  As well as discussed impending surgery LAP-BAND bariatric office visit  Counseling and education 15 minutes greater than 50% counseling        Discharge instructions  Start exercises  Follow-up in 6 weeks if not completely resolved  If weakness bowel or bladder incontinence or retention  Groin paresthesias saddlebag paresthesias emergency room    Thank You,      James Epley, NP

## 2018-01-29 NOTE — PATIENT INSTRUCTIONS
Sciatica  Introduction  Sciatica is pain, numbness, weakness, or tingling along your sciatic nerve. The sciatic nerve starts in the lower back and goes down the back of each leg. Sciatica happens when this nerve is pinched or has pressure put on it. Sciatica usually goes away on its own or with treatment. Sometimes, sciatica may keep coming back (recur).  Follow these instructions at home:  Medicines  · Take over-the-counter and prescription medicines only as told by your doctor.  · Do not drive or use heavy machinery while taking prescription pain medicine.  Managing pain  · If directed, put ice on the affected area.  ¨ Put ice in a plastic bag.  ¨ Place a towel between your skin and the bag.  ¨ Leave the ice on for 20 minutes, 2-3 times a day.  · After icing, apply heat to the affected area before you exercise or as often as told by your doctor. Use the heat source that your doctor tells you to use, such as a moist heat pack or a heating pad.  ¨ Place a towel between your skin and the heat source.  ¨ Leave the heat on for 20-30 minutes.  ¨ Remove the heat if your skin turns bright red. This is especially important if you are unable to feel pain, heat, or cold. You may have a greater risk of getting burned.  Activity  · Return to your normal activities as told by your doctor. Ask your doctor what activities are safe for you.  ¨ Avoid activities that make your sciatica worse.  · Take short rests during the day. Rest in a lying or standing position. This is usually better than sitting to rest.  ¨ When you rest for a long time, do some physical activity or stretching between periods of rest.  ¨ Avoid sitting for a long time without moving. Get up and move around at least one time each hour.  · Exercise and stretch regularly, as told by your doctor.  · Do not lift anything that is heavier than 10 lb (4.5 kg) while you have symptoms of sciatica.  ¨ Avoid lifting heavy things even when you do not have symptoms.  ¨ Avoid  lifting heavy things over and over.  · When you lift objects, always lift in a way that is safe for your body. To do this, you should:  ¨ Bend your knees.  ¨ Keep the object close to your body.  ¨ Avoid twisting.  General instructions  · Use good posture.  ¨ Avoid leaning forward when you are sitting.  ¨ Avoid hunching over when you are standing.  · Stay at a healthy weight.  · Wear comfortable shoes that support your feet. Avoid wearing high heels.  · Avoid sleeping on a mattress that is too soft or too hard. You might have less pain if you sleep on a mattress that is firm enough to support your back.  · Keep all follow-up visits as told by your doctor. This is important.  Contact a doctor if:  · You have pain that:  ¨ Wakes you up when you are sleeping.  ¨ Gets worse when you lie down.  ¨ Is worse than the pain you have had in the past.  ¨ Lasts longer than 4 weeks.  · You lose weight for without trying.  Get help right away if:  · You cannot control when you pee (urinate) or poop (have a bowel movement).  · You have weakness in any of these areas and it gets worse.  ¨ Lower back.  ¨ Lower belly (pelvis).  ¨ Butt (buttocks).  ¨ Legs.  · You have redness or swelling of your back.  · You have a burning feeling when you pee.  This information is not intended to replace advice given to you by your health care provider. Make sure you discuss any questions you have with your health care provider.  Document Released: 09/26/2009 Document Revised: 05/25/2017 Document Reviewed: 08/26/2016  © 2017 [employer]    Back Exercises  The following exercises strengthen the muscles that help to support the back. They also help to keep the lower back flexible. Doing these exercises can help to prevent back pain or lessen existing pain.  If you have back pain or discomfort, try doing these exercises 2-3 times each day or as told by your health care provider. When the pain goes away, do them once each day, but increase the number of times  that you repeat the steps for each exercise (do more repetitions). If you do not have back pain or discomfort, do these exercises once each day or as told by your health care provider.  Exercises  Single Knee to Chest   Repeat these steps 3-5 times for each le. Lie on your back on a firm bed or the floor with your legs extended.  2. Bring one knee to your chest. Your other leg should stay extended and in contact with the floor.  3. Hold your knee in place by grabbing your knee or thigh.  4. Pull on your knee until you feel a gentle stretch in your lower back.  5. Hold the stretch for 10-30 seconds.  6. Slowly release and straighten your leg.  Pelvic Tilt   Repeat these steps 5-10 times:  1. Lie on your back on a firm bed or the floor with your legs extended.  2. Bend your knees so they are pointing toward the ceiling and your feet are flat on the floor.  3. Tighten your lower abdominal muscles to press your lower back against the floor. This motion will tilt your pelvis so your tailbone points up toward the ceiling instead of pointing to your feet or the floor.  4. With gentle tension and even breathing, hold this position for 5-10 seconds.  Cat-Cow   Repeat these steps until your lower back becomes more flexible:  1. Get into a hands-and-knees position on a firm surface. Keep your hands under your shoulders, and keep your knees under your hips. You may place padding under your knees for comfort.  2. Let your head hang down, and point your tailbone toward the floor so your lower back becomes rounded like the back of a cat.  3. Hold this position for 5 seconds.  4. Slowly lift your head and point your tailbone up toward the ceiling so your back forms a sagging arch like the back of a cow.  5. Hold this position for 5 seconds.  Press-Ups   Repeat these steps 5-10 times:  1. Lie on your abdomen (face-down) on the floor.  2. Place your palms near your head, about shoulder-width apart.  3. While you keep your back  as relaxed as possible and keep your hips on the floor, slowly straighten your arms to raise the top half of your body and lift your shoulders. Do not use your back muscles to raise your upper torso. You may adjust the placement of your hands to make yourself more comfortable.  4. Hold this position for 5 seconds while you keep your back relaxed.  5. Slowly return to lying flat on the floor.  Bridges   Repeat these steps 10 times:  1. Lie on your back on a firm surface.  2. Bend your knees so they are pointing toward the ceiling and your feet are flat on the floor.  3. Tighten your buttocks muscles and lift your buttocks off of the floor until your waist is at almost the same height as your knees. You should feel the muscles working in your buttocks and the back of your thighs. If you do not feel these muscles, slide your feet 1-2 inches farther away from your buttocks.  4. Hold this position for 3-5 seconds.  5. Slowly lower your hips to the starting position, and allow your buttocks muscles to relax completely.  If this exercise is too easy, try doing it with your arms crossed over your chest.  Abdominal Crunches   Repeat these steps 5-10 times:  1. Lie on your back on a firm bed or the floor with your legs extended.  2. Bend your knees so they are pointing toward the ceiling and your feet are flat on the floor.  3. Cross your arms over your chest.  4. Tip your chin slightly toward your chest without bending your neck.  5. Tighten your abdominal muscles and slowly raise your trunk (torso) high enough to lift your shoulder blades a tiny bit off of the floor. Avoid raising your torso higher than that, because it can put too much stress on your low back and it does not help to strengthen your abdominal muscles.  6. Slowly return to your starting position.  Back Lifts   Repeat these steps 5-10 times:  1. Lie on your abdomen (face-down) with your arms at your sides, and rest your forehead on the floor.  2. Tighten the  muscles in your legs and your buttocks.  3. Slowly lift your chest off of the floor while you keep your hips pressed to the floor. Keep the back of your head in line with the curve in your back. Your eyes should be looking at the floor.  4. Hold this position for 3-5 seconds.  5. Slowly return to your starting position.  Contact a health care provider if:  · Your back pain or discomfort gets much worse when you do an exercise.  · Your back pain or discomfort does not lessen within 2 hours after you exercise.  If you have any of these problems, stop doing these exercises right away. Do not do them again unless your health care provider says that you can.  Get help right away if:  · You develop sudden, severe back pain. If this happens, stop doing the exercises right away. Do not do them again unless your health care provider says that you can.  This information is not intended to replace advice given to you by your health care provider. Make sure you discuss any questions you have with your health care provider.  Document Released: 01/25/2006 Document Revised: 04/26/2017 Document Reviewed: 02/11/2016  Elsevier Interactive Patient Education © 2017 [employer]      Discharge instructions  Start exercises  Follow-up in 6 weeks if not completely resolved  If weakness bowel or bladder incontinence or retention  Groin paresthesias saddlebag paresthesias emergency room    Thank You,      James Epley, NP

## 2018-03-05 ENCOUNTER — CLINICAL SUPPORT (OUTPATIENT)
Dept: ORTHOPEDIC SURGERY | Facility: CLINIC | Age: 58
End: 2018-03-05

## 2018-03-05 VITALS — WEIGHT: 230.2 LBS | HEIGHT: 63 IN | TEMPERATURE: 100.2 F | BODY MASS INDEX: 40.79 KG/M2

## 2018-03-05 DIAGNOSIS — M17.11 PRIMARY OSTEOARTHRITIS OF RIGHT KNEE: Primary | ICD-10-CM

## 2018-03-05 DIAGNOSIS — M17.0 ARTHRITIS OF BOTH KNEES: ICD-10-CM

## 2018-03-05 PROCEDURE — 99212 OFFICE O/P EST SF 10 MIN: CPT | Performed by: NURSE PRACTITIONER

## 2018-03-05 PROCEDURE — 20610 DRAIN/INJ JOINT/BURSA W/O US: CPT | Performed by: NURSE PRACTITIONER

## 2018-03-05 RX ORDER — LIDOCAINE HYDROCHLORIDE 10 MG/ML
4 INJECTION, SOLUTION EPIDURAL; INFILTRATION; INTRACAUDAL; PERINEURAL
Status: COMPLETED | OUTPATIENT
Start: 2018-03-05 | End: 2018-03-05

## 2018-03-05 RX ORDER — METHYLPREDNISOLONE ACETATE 80 MG/ML
80 INJECTION, SUSPENSION INTRA-ARTICULAR; INTRALESIONAL; INTRAMUSCULAR; SOFT TISSUE
Status: COMPLETED | OUTPATIENT
Start: 2018-03-05 | End: 2018-03-05

## 2018-03-05 RX ORDER — LITHIUM CARBONATE 450 MG
450 TABLET, EXTENDED RELEASE ORAL DAILY
COMMUNITY
Start: 2018-02-14 | End: 2019-09-23 | Stop reason: ALTCHOICE

## 2018-03-05 RX ORDER — ATENOLOL 25 MG/1
TABLET ORAL
Qty: 90 TABLET | Refills: 1 | Status: SHIPPED | OUTPATIENT
Start: 2018-03-05 | End: 2018-03-30 | Stop reason: HOSPADM

## 2018-03-05 RX ADMIN — METHYLPREDNISOLONE ACETATE 80 MG: 80 INJECTION, SUSPENSION INTRA-ARTICULAR; INTRALESIONAL; INTRAMUSCULAR; SOFT TISSUE at 10:57

## 2018-03-05 RX ADMIN — LIDOCAINE HYDROCHLORIDE 4 ML: 10 INJECTION, SOLUTION EPIDURAL; INFILTRATION; INTRACAUDAL; PERINEURAL at 10:58

## 2018-03-05 RX ADMIN — LIDOCAINE HYDROCHLORIDE 4 ML: 10 INJECTION, SOLUTION EPIDURAL; INFILTRATION; INTRACAUDAL; PERINEURAL at 10:57

## 2018-03-05 RX ADMIN — METHYLPREDNISOLONE ACETATE 80 MG: 80 INJECTION, SUSPENSION INTRA-ARTICULAR; INTRALESIONAL; INTRAMUSCULAR; SOFT TISSUE at 10:58

## 2018-03-05 NOTE — PROGRESS NOTES
Patient: Keri Nielson  YOB: 1960    Chief Complaints:  bilateral knee pain    Subjective:    History of Present Illness: Here today for knee pain. She is recovering from a lap band surgery last week wed. The pain is a generalized joint tenderness.  It has been progressive in nature but remains intermittent.  Worsened by prolonged standing or walking and squatting activities. Has had improvement in the past with ice/heat, rest, and injections.     This problem is not new to this examiner.     Allergies: No Known Allergies    Medications:   Home Medications:  Current Outpatient Prescriptions on File Prior to Visit   Medication Sig   • acetaminophen (TYLENOL) 325 MG tablet Take 650 mg by mouth Every 6 (Six) Hours As Needed for Mild Pain .   • ARIPiprazole (ABILIFY) 10 MG tablet Take 10 mg by mouth Daily.   • buPROPion XL (WELLBUTRIN XL) 300 MG 24 hr tablet Take 300 mg by mouth Every Morning.   • Cholecalciferol (VITAMIN D3) 5000 UNITS capsule capsule Take 5,000 Units by mouth Daily.   • fluticasone (FLONASE) 50 MCG/ACT nasal spray 2 sprays into each nostril Daily. As needed allergies   • gabapentin (NEURONTIN) 300 MG capsule Take 1 capsule by mouth 3 (Three) Times a Day.   • isosorbide mononitrate (IMDUR) 30 MG 24 hr tablet Take 30 mg by mouth Daily.   • lamoTRIgine (LaMICtal) 150 MG tablet Take 300 mg by mouth Daily.   • levothyroxine (SYNTHROID, LEVOTHROID) 100 MCG tablet Take 100 mcg by mouth Daily.   • loratadine (CLARITIN) 10 MG tablet Take 1 tablet by mouth Daily. As needed for allergies   • nitroglycerin (NITROSTAT) 0.4 MG SL tablet Place 0.4 mg under the tongue Every 5 (Five) Minutes As Needed for Chest Pain (took at M.D  office at 0930 a.m.). Take no more than 3 doses in 15 minutes.   • omeprazole (PRILOSEC) 40 MG capsule Take 1 capsule by mouth Daily.   • oxybutynin XL (DITROPAN XL) 15 MG 24 hr tablet    • pramipexole (MIRAPEX) 0.5 MG tablet Take 0.5 mg by mouth Every Night.   •  simvastatin (ZOCOR) 20 MG tablet Take 1 tablet by mouth Every Night.   • venlafaxine (EFFEXOR) 75 MG tablet Take 75 mg by mouth Every Morning.   • [DISCONTINUED] atenolol (TENORMIN) 25 MG tablet Take 25 mg by mouth Daily.   • [DISCONTINUED] sucralfate (CARAFATE) 1 GM/10ML suspension Take 1 g by mouth 4 (Four) Times a Day.     No current facility-administered medications on file prior to visit.      Current Medications:  Scheduled Meds:  Continuous Infusions:  No current facility-administered medications for this visit.   PRN Meds:.    I have reviewed the patient's medical history in detail and updated the computerized patient record.  Review and summarization of old records include:    Past Medical History:   Diagnosis Date   • Anemia    • Arthritis    • Atherosclerotic heart disease of native coronary artery with other forms of angina pectoris    • Bipolar I disorder, single manic episode     depressive d(NOS)   • Cervical disc herniation    • Coronary artery disease    • Depression     NO SUICIDAL PLANS   • Difficulty swallowing    • Diverticular disease    • Dyspepsia    • Dysphagia    • Gastric reflux    • Heart murmur    • Hiatal hernia    • High cholesterol    • History of transfusion    • 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    HPV positive on pap LGSIL   • Hyperlipidemia    • Hypertension    • Hypothyroidism    • Incontinence in female     wears pads   • Incontinence in female    • Kidney disease 2017    stage 2    • Kidney disease, chronic, stage III (GFR 30-59 ml/min)    • LGSIL on Pap smear of cervix 04/29/2016    LGSIL HPV positive   • Neck pain    • Obesity    • Past myocardial infarction 05/2000   • Periodic limb movement disorder    • Restless leg syndrome    • Sleep apnea     cpap   • Stress fracture     LEFT HEEL   • Suicidal ideation 08/19/2016    history   • Swelling of ankle     right had doppler no s/s blood clot   • Thyroid nodule         Past Surgical History:   Procedure  Laterality Date   • ANTERIOR CERVICAL DISCECTOMY W/ FUSION N/A 12/29/2016    Procedure: C3-4 anterior cervical discectomy and fusion with Depuy micro plate, ALLOGRAFT C3-4, AND HARDWARE REMOVAL C4-7.;  Surgeon: Hema Godwin MD;  Location: [facility] OR;  Service:    • CARDIAC CATHETERIZATION N/A 3/30/2017    Procedure: Left Heart Cath;  Surgeon: Tracey Vargas MD;  Location:  [facility] CATH INVASIVE LOCATION;  Service:    • CARDIAC CATHETERIZATION N/A 3/30/2017    Procedure: Coronary angiography;  Surgeon: Tracey Vargas MD;  Location:  [facility] CATH INVASIVE LOCATION;  Service:    • CARDIAC CATHETERIZATION N/A 3/30/2017    Procedure: Left ventriculography;  Surgeon: Tracey Vargas MD;  Location:  [facility]ATH INVASIVE [facility];  Service:    • CARDIAC CATHETERIZATION  3/30/2017    Procedure: Functional Flow [place];  Surgeon: Tracey Vargas MD;  [facility];  Service:    • CERVICAL BIOPSY  [facility]    Dr. Jeffery.    • CERVICAL DISCECTOMY ANTERIOR  04/2013    C4-7   • COLONOSCOPY  04/20/2015    Diverticulosis, IH   • COLPOSCOPY W/ BIOPSY / CURETTAGE  06/17/2016    LGSIL HPV positive. Results were normal repeat pap in one year. Chronic Cervicitis   • CORONARY ANGIOPLASTY WITH STENT PLACEMENT     • ENDOSCOPY  [facility]    Normal.  Dr. Rodriguez   • ENDOSCOPY N/A 6/22/2017    Erythematous mucosa in the stomach  PATH: Chronic active gastritis, moderate with intestinal metaplasia    • GASTRIC BYPASS      Done using the sleeve technique   • HARDWARE REMOVAL  12/29/2016    cervical   • SHOULDER SURGERY Left     RCR   • TONSILLECTOMY     • TONSILLECTOMY AND ADENOIDECTOMY     • TRANSVAGINAL TAPING SUSPENSION N/A 12/6/2017    Procedure: MID URETHRAL SLING CYSTSCOPY;  Surgeon: Abby Méndez MD;  Location: [facility] OR;  Service:    • TUBAL ABDOMINAL LIGATION     • TYMPANOSTOMY TUBE PLACEMENT Right    • WRIST SURGERY Bilateral     carpal tunnel        Social History     Occupational History   • Not on  file.     Social History Main Topics   • Smoking status: Former Smoker     Packs/day: 1.50     Years: 20.00     Types: Electronic Cigarette, Cigarettes     Quit date: 2015   • Smokeless tobacco: Never Used      Comment: Electronic Cigarette/ 3 mg nicotine    • Alcohol use Yes      Comment: 2 times yearly    • Drug use: No   • Sexual activity: Not on file      Social History     Social History Narrative        Family History   Problem Relation Age of Onset   • Diabetes type II Mother    • Hypertension Mother    • Osteoporosis Mother    • Seizures Mother    • COPD Father    • Hypertension Father    • Lung cancer Father    • Heart attack Father    • Liver cancer Father    • Thyroid disease Sister    • Hypertension Sister    • Bipolar disorder Sister    • Depression Sister    • No Known Problems Son    • Abnormal EKG Daughter    • Hypertension Daughter    • Bipolar disorder Daughter    • No Known Problems Paternal Grandfather    • No Known Problems Paternal Grandmother    • No Known Problems Maternal Grandmother    • No Known Problems Maternal Grandfather    • Thyroid disease Sister    • Hypertension Sister    • Bipolar disorder Sister    • Asthma Daughter    • Breast cancer Neg Hx    • Ovarian cancer Neg Hx    • Uterine cancer Neg Hx    • Colon cancer Neg Hx    • Malig Hyperthermia Neg Hx        ROS: 14 point review of systems was performed and was negative except for documented findings in HPI and today's encounter.     Allergies: No Known Allergies  Constitutional:  Denies fever, shaking or chills   Eyes:  Denies change in visual acuity   HENT:  Denies nasal congestion or sore throat   Respiratory:  Denies cough or shortness of breath   Cardiovascular:  Denies chest pain or severe LE edema   GI:  Denies abdominal pain, nausea, vomiting, bloody stools or diarrhea   Musculoskeletal:  Numbness, tingling, or loss of motor function only as noted above in history of present illness.  : Denies painful urination or  hematuria  Integument:  Denies rash, lesion or ulceration   Neurologic:  Denies headache or focal weakness  Endocrine:  Denies lymphadenopathy  Psych:  Denies confusion or change in mental status   Hem:  Denies active bleeding    Physical Exam:  Body mass index is 40.78 kg/(m^2).  Vitals:    03/05/18 1053   Temp: 100.2 °F (37.9 °C)     Vital Signs:  reviewed  Constitutional: Awake alert and oriented x3, well developed, no acute distress, non-toxic appearance.  EYES: symmetric, sclera clear  ENT:  Normocephalic, Atraumatic.   Respiratory:  No respiratory distress, No wheezing  CV: pulse regular, no palpitations or pallor.  GI:  Abdomen soft, non-tender.   Vascular:  Intact distal pulses, No cyanosis, no signs or symptoms of DVT.  Neurologic: Sensation grossly intact to the involved extremity, No focal deficits noted.   Neck: No tenderness, Supple.  Integument: warm, dry, no ulcerations.   Psychiatric:  Oriented, no pathological affect.  Musculoskeletal:    Affected knee(s):  Painful gait with a subtle limp, positive for synovitis, swelling, joint effusion with crepitation.  Lachman negative  Posterior drawer negative  Hamida's negative  Patellofemoral grind +  Sensation grossly intact to light touch throughout the lower extremity  Skin is intact  Distal pulses are palpable  No signs or symptoms of DVT        Diagnostic Data:     Imaging was done previously in the office, images were personally viewed and discussed at length with the patient:    Indication: pain related symptoms,  Views: 3V AP, LAT & 40 degree PA bilateral knee(s)   Findings: severe end-stage arthritis (bone on bone, subchondral sclerosis/cysts, osteophytes)  Comparison views: viewed last xray done in the office.     Procedure:  Large Joint Arthrocentesis  Date/Time: 3/5/2018 10:57 AM  Consent given by: patient  Site marked: site marked  Timeout: Immediately prior to procedure a time out was called to verify the correct patient, procedure,  equipment, support staff and site/side marked as required   Supporting Documentation  Indications: pain   Procedure Details  Location: knee - R knee  Preparation: Patient was prepped and draped in the usual sterile fashion  Needle size: 25 G  Approach: anteromedial  Medications administered: 4 mL lidocaine PF 1% 1 %; 80 mg methylPREDNISolone acetate 80 MG/ML  Patient tolerance: patient tolerated the procedure well with no immediate complications    Large Joint Arthrocentesis  Date/Time: 3/5/2018 10:58 AM  Consent given by: patient  Site marked: site marked  Timeout: Immediately prior to procedure a time out was called to verify the correct patient, procedure, equipment, support staff and site/side marked as required   Supporting Documentation  Indications: pain   Procedure Details  Location: knee - L knee  Preparation: Patient was prepped and draped in the usual sterile fashion  Needle size: 25 G  Approach: anteromedial  Medications administered: 4 mL lidocaine PF 1% 1 %; 80 mg methylPREDNISolone acetate 80 MG/ML  Patient tolerance: patient tolerated the procedure well with no immediate complications          Assessment:     ICD-10-CM ICD-9-CM   1. Primary osteoarthritis of right knee M17.11 715.16   2. Arthritis of both knees M17.0 716.96           Plan: Is to proceed with injection  Follow up as indicated.  Ice, elevate, and rest as needed.  Additional interventions include:  15 min spent face to face with patient 10 min spent counseling about natural history and expected course of assessed complaint and reviewed treatment options that have been tried and not tried and those currently available. Questions answered.    Natural history and expected course of this patient's diagnosis discussed along with evaluation of therapies. Questions answered.  Advice on benefits of, and types of regular/moderate exercise including biomechanical forces involved as it pertains to this complaint, with a goal of 5 min at least 7  times a week.    Address and update of wt loss, physical exercises, use of assistive devices, and monitoring of Medications per orders to address ortho complaints; Evaluation and discussion of safety, precautions, side effects, and warnings given especially of long term NSAID therapy.  Lifestyle measures for weight loss, suggest starting at 20lbs, with biomechanical explanations for weight loss and how this affects orthopedic condition.  Cortisone Injection. See procedure note.  OTC analgesics as needed with dosage warning and instructions given.  Cryotherapy/brachy therapy as indicated with instructions.   Advised on strengthening exercises, stressed importance of these with progression of arthritis, demonstrated exercises to patient with repeat demonstration and verb of understanding.    She is recovering from a lap band surgery last week wed and is doing well but her knees are hurting today. Has been doing thigh strengthening exericises. Is required to do 30 min of exercises a day.    3/5/2018  MJR

## 2018-03-28 RX ORDER — SIMVASTATIN 20 MG
TABLET ORAL
Qty: 30 TABLET | Refills: 5 | Status: ON HOLD | OUTPATIENT
Start: 2018-03-28 | End: 2018-04-02

## 2018-03-30 ENCOUNTER — HOSPITAL ENCOUNTER (OUTPATIENT)
Dept: CARDIOLOGY | Facility: HOSPITAL | Age: 58
Discharge: HOME | End: 2018-03-30
Attending: INTERNAL MEDICINE

## 2018-03-30 ENCOUNTER — APPOINTMENT (OUTPATIENT)
Dept: GENERAL RADIOLOGY | Facility: HOSPITAL | Age: 58
End: 2018-03-30

## 2018-03-30 ENCOUNTER — HOSPITAL ENCOUNTER (OUTPATIENT)
Facility: HOSPITAL | Age: 58
Setting detail: OBSERVATION
Discharge: HOME | End: 2018-03-30
Attending: EMERGENCY MEDICINE | Admitting: INTERNAL MEDICINE

## 2018-03-30 VITALS
TEMPERATURE: 97.8 F | RESPIRATION RATE: 18 BRPM | WEIGHT: 216.8 LBS | HEIGHT: 62 IN | HEART RATE: 67 BPM | DIASTOLIC BLOOD PRESSURE: 72 MMHG | BODY MASS INDEX: 39.9 KG/M2 | OXYGEN SATURATION: 92 % | SYSTOLIC BLOOD PRESSURE: 102 MMHG

## 2018-03-30 DIAGNOSIS — R07.2 PRECORDIAL PAIN: ICD-10-CM

## 2018-03-30 DIAGNOSIS — R07.2 PRECORDIAL PAIN: Primary | ICD-10-CM

## 2018-03-30 DIAGNOSIS — R07.9 CHEST PAIN, UNSPECIFIED TYPE: Primary | ICD-10-CM

## 2018-03-30 LAB
ALBUMIN SERPL-MCNC: 4.3 G/DL (ref 3.5–5.2)
ALBUMIN/GLOB SERPL: 1.5 G/DL
ALP SERPL-CCNC: 74 U/L (ref 39–117)
ALT SERPL W P-5'-P-CCNC: 25 U/L (ref 1–33)
ANION GAP SERPL CALCULATED.3IONS-SCNC: 10.1 MMOL/L
AST SERPL-CCNC: 21 U/L (ref 1–32)
BASOPHILS # BLD AUTO: 0.02 10*3/MM3 (ref 0–0.2)
BASOPHILS NFR BLD AUTO: 0.3 % (ref 0–1.5)
BH CV NUCLEAR PRIOR STUDY: 3
BH CV STRESS BP STAGE 1: NORMAL
BH CV STRESS COMMENTS STAGE 1: NORMAL
BH CV STRESS DOSE REGADENOSON STAGE 1: 0.4
BH CV STRESS DURATION MIN STAGE 1: 0
BH CV STRESS DURATION SEC STAGE 1: 10
BH CV STRESS HR STAGE 1: 85
BH CV STRESS PROTOCOL 1: NORMAL
BH CV STRESS RECOVERY BP: NORMAL MMHG
BH CV STRESS RECOVERY HR: 68 BPM
BH CV STRESS STAGE 1: 1
BILIRUB SERPL-MCNC: 0.3 MG/DL (ref 0.1–1.2)
BUN BLD-MCNC: 9 MG/DL (ref 6–20)
BUN/CREAT SERPL: 9.7 (ref 7–25)
CALCIUM SPEC-SCNC: 9.5 MG/DL (ref 8.6–10.5)
CHLORIDE SERPL-SCNC: 105 MMOL/L (ref 98–107)
CO2 SERPL-SCNC: 27.9 MMOL/L (ref 22–29)
CREAT BLD-MCNC: 0.93 MG/DL (ref 0.57–1)
DEPRECATED RDW RBC AUTO: 52.1 FL (ref 37–54)
EOSINOPHIL # BLD AUTO: 0.29 10*3/MM3 (ref 0–0.7)
EOSINOPHIL NFR BLD AUTO: 4.4 % (ref 0.3–6.2)
ERYTHROCYTE [DISTWIDTH] IN BLOOD BY AUTOMATED COUNT: 15.1 % (ref 11.7–13)
GFR SERPL CREATININE-BSD FRML MDRD: 63 ML/MIN/1.73
GLOBULIN UR ELPH-MCNC: 2.8 GM/DL
GLUCOSE BLD-MCNC: 98 MG/DL (ref 65–99)
HCT VFR BLD AUTO: 39 % (ref 35.6–45.5)
HGB BLD-MCNC: 12.3 G/DL (ref 11.9–15.5)
HOLD SPECIMEN: NORMAL
HOLD SPECIMEN: NORMAL
IMM GRANULOCYTES # BLD: 0.02 10*3/MM3 (ref 0–0.03)
IMM GRANULOCYTES NFR BLD: 0.3 % (ref 0–0.5)
LIPASE SERPL-CCNC: 19 U/L (ref 13–60)
LITHIUM SERPL-SCNC: 0.7 MMOL/L (ref 0.6–1.2)
LV EF NUC BP: 80 %
LYMPHOCYTES # BLD AUTO: 2.11 10*3/MM3 (ref 0.9–4.8)
LYMPHOCYTES NFR BLD AUTO: 31.7 % (ref 19.6–45.3)
MAXIMAL PREDICTED HEART RATE: 165 BPM
MCH RBC QN AUTO: 29.8 PG (ref 26.9–32)
MCHC RBC AUTO-ENTMCNC: 31.5 G/DL (ref 32.4–36.3)
MCV RBC AUTO: 94.4 FL (ref 80.5–98.2)
MONOCYTES # BLD AUTO: 0.51 10*3/MM3 (ref 0.2–1.2)
MONOCYTES NFR BLD AUTO: 7.7 % (ref 5–12)
NEUTROPHILS # BLD AUTO: 3.71 10*3/MM3 (ref 1.9–8.1)
NEUTROPHILS NFR BLD AUTO: 55.6 % (ref 42.7–76)
NT-PROBNP SERPL-MCNC: 131.9 PG/ML (ref 5–900)
PERCENT MAX PREDICTED HR: 51.52 %
PLATELET # BLD AUTO: 281 10*3/MM3 (ref 140–500)
PMV BLD AUTO: 10.2 FL (ref 6–12)
POTASSIUM BLD-SCNC: 4 MMOL/L (ref 3.5–5.2)
PROCALCITONIN SERPL-MCNC: 0.03 NG/ML (ref 0.1–0.25)
PROT SERPL-MCNC: 7.1 G/DL (ref 6–8.5)
RBC # BLD AUTO: 4.13 10*6/MM3 (ref 3.9–5.2)
SODIUM BLD-SCNC: 143 MMOL/L (ref 136–145)
STRESS BASELINE BP: NORMAL MMHG
STRESS BASELINE HR: 64 BPM
STRESS PERCENT HR: 61 %
STRESS POST EXERCISE DUR SEC: 10 SEC
STRESS POST PEAK BP: NORMAL MMHG
STRESS POST PEAK HR: 85 BPM
STRESS TARGET HR: 140 BPM
TROPONIN T SERPL-MCNC: <0.01 NG/ML (ref 0–0.03)
TROPONIN T SERPL-MCNC: <0.01 NG/ML (ref 0–0.03)
WBC NRBC COR # BLD: 6.66 10*3/MM3 (ref 4.5–10.7)
WHOLE BLOOD HOLD SPECIMEN: NORMAL
WHOLE BLOOD HOLD SPECIMEN: NORMAL

## 2018-03-30 PROCEDURE — 93016 CV STRESS TEST SUPVJ ONLY: CPT | Performed by: INTERNAL MEDICINE

## 2018-03-30 PROCEDURE — 78492 MYOCRD IMG PET MLT RST&STRS: CPT | Performed by: INTERNAL MEDICINE

## 2018-03-30 PROCEDURE — 99284 EMERGENCY DEPT VISIT MOD MDM: CPT

## 2018-03-30 PROCEDURE — 93018 CV STRESS TEST I&R ONLY: CPT | Performed by: INTERNAL MEDICINE

## 2018-03-30 PROCEDURE — 84484 ASSAY OF TROPONIN QUANT: CPT

## 2018-03-30 PROCEDURE — 25010000002 REGADENOSON 0.4 MG/5ML SOLUTION: Performed by: INTERNAL MEDICINE

## 2018-03-30 PROCEDURE — G0378 HOSPITAL OBSERVATION PER HR: HCPCS

## 2018-03-30 PROCEDURE — A9555 RB82 RUBIDIUM: HCPCS | Performed by: INTERNAL MEDICINE

## 2018-03-30 PROCEDURE — 99220 PR INITIAL OBSERVATION CARE/DAY 70 MINUTES: CPT | Performed by: INTERNAL MEDICINE

## 2018-03-30 PROCEDURE — 78492 MYOCRD IMG PET MLT RST&STRS: CPT

## 2018-03-30 PROCEDURE — 80178 ASSAY OF LITHIUM: CPT | Performed by: PHYSICIAN ASSISTANT

## 2018-03-30 PROCEDURE — 83880 ASSAY OF NATRIURETIC PEPTIDE: CPT | Performed by: PHYSICIAN ASSISTANT

## 2018-03-30 PROCEDURE — 0 RUBIDIUM CHLORIDE: Performed by: INTERNAL MEDICINE

## 2018-03-30 PROCEDURE — 93005 ELECTROCARDIOGRAM TRACING: CPT

## 2018-03-30 PROCEDURE — 71046 X-RAY EXAM CHEST 2 VIEWS: CPT

## 2018-03-30 PROCEDURE — 84484 ASSAY OF TROPONIN QUANT: CPT | Performed by: INTERNAL MEDICINE

## 2018-03-30 PROCEDURE — 84145 PROCALCITONIN (PCT): CPT | Performed by: EMERGENCY MEDICINE

## 2018-03-30 PROCEDURE — 93005 ELECTROCARDIOGRAM TRACING: CPT | Performed by: EMERGENCY MEDICINE

## 2018-03-30 PROCEDURE — 85025 COMPLETE CBC W/AUTO DIFF WBC: CPT

## 2018-03-30 PROCEDURE — 93017 CV STRESS TEST TRACING ONLY: CPT

## 2018-03-30 PROCEDURE — 83690 ASSAY OF LIPASE: CPT | Performed by: PHYSICIAN ASSISTANT

## 2018-03-30 PROCEDURE — 93010 ELECTROCARDIOGRAM REPORT: CPT | Performed by: INTERNAL MEDICINE

## 2018-03-30 PROCEDURE — 80053 COMPREHEN METABOLIC PANEL: CPT

## 2018-03-30 RX ORDER — ISOSORBIDE MONONITRATE 30 MG/1
30 TABLET, EXTENDED RELEASE ORAL DAILY
Status: DISCONTINUED | OUTPATIENT
Start: 2018-03-30 | End: 2018-03-30 | Stop reason: HOSPADM

## 2018-03-30 RX ORDER — VENLAFAXINE 75 MG/1
75 TABLET ORAL EVERY MORNING
Status: DISCONTINUED | OUTPATIENT
Start: 2018-03-31 | End: 2018-03-30 | Stop reason: HOSPADM

## 2018-03-30 RX ORDER — SODIUM CHLORIDE 0.9 % (FLUSH) 0.9 %
10 SYRINGE (ML) INJECTION AS NEEDED
Status: DISCONTINUED | OUTPATIENT
Start: 2018-03-30 | End: 2018-03-30 | Stop reason: HOSPADM

## 2018-03-30 RX ORDER — ARIPIPRAZOLE 10 MG/1
10 TABLET ORAL DAILY
Status: DISCONTINUED | OUTPATIENT
Start: 2018-03-30 | End: 2018-03-30 | Stop reason: HOSPADM

## 2018-03-30 RX ORDER — ATENOLOL 25 MG/1
25 TABLET ORAL DAILY
Status: DISCONTINUED | OUTPATIENT
Start: 2018-03-30 | End: 2018-03-30

## 2018-03-30 RX ORDER — LAMOTRIGINE 100 MG/1
300 TABLET ORAL DAILY
Status: DISCONTINUED | OUTPATIENT
Start: 2018-03-30 | End: 2018-03-30 | Stop reason: HOSPADM

## 2018-03-30 RX ORDER — BUPROPION HYDROCHLORIDE 300 MG/1
300 TABLET ORAL EVERY MORNING
Status: DISCONTINUED | OUTPATIENT
Start: 2018-03-31 | End: 2018-03-30 | Stop reason: HOSPADM

## 2018-03-30 RX ORDER — NITROGLYCERIN 0.4 MG/1
0.4 TABLET SUBLINGUAL
Status: DISCONTINUED | OUTPATIENT
Start: 2018-03-30 | End: 2018-03-30 | Stop reason: HOSPADM

## 2018-03-30 RX ORDER — LEVOTHYROXINE SODIUM 0.1 MG/1
100 TABLET ORAL DAILY
Status: DISCONTINUED | OUTPATIENT
Start: 2018-03-30 | End: 2018-03-30 | Stop reason: HOSPADM

## 2018-03-30 RX ORDER — ATENOLOL 50 MG/1
50 TABLET ORAL DAILY
Qty: 30 TABLET | Refills: 6 | Status: SHIPPED | OUTPATIENT
Start: 2018-03-30 | End: 2018-08-20 | Stop reason: SDUPTHER

## 2018-03-30 RX ORDER — PRAMIPEXOLE DIHYDROCHLORIDE 0.5 MG/1
0.5 TABLET ORAL NIGHTLY
Status: DISCONTINUED | OUTPATIENT
Start: 2018-03-30 | End: 2018-03-30 | Stop reason: HOSPADM

## 2018-03-30 RX ORDER — GABAPENTIN 300 MG/1
300 CAPSULE ORAL 3 TIMES DAILY
Status: DISCONTINUED | OUTPATIENT
Start: 2018-03-30 | End: 2018-03-30 | Stop reason: HOSPADM

## 2018-03-30 RX ORDER — ATENOLOL 50 MG/1
50 TABLET ORAL DAILY
Status: DISCONTINUED | OUTPATIENT
Start: 2018-03-30 | End: 2018-03-30 | Stop reason: HOSPADM

## 2018-03-30 RX ORDER — ACETAMINOPHEN 325 MG/1
650 TABLET ORAL EVERY 6 HOURS PRN
Status: DISCONTINUED | OUTPATIENT
Start: 2018-03-30 | End: 2018-03-30 | Stop reason: HOSPADM

## 2018-03-30 RX ADMIN — RUBIDIUM CHLORIDE RB-82 1 DOSE: 150 INJECTION, SOLUTION INTRAVENOUS at 13:55

## 2018-03-30 RX ADMIN — REGADENOSON 0.4 MG: 0.08 INJECTION, SOLUTION INTRAVENOUS at 13:55

## 2018-03-30 RX ADMIN — RUBIDIUM CHLORIDE RB-82 1 DOSE: 150 INJECTION, SOLUTION INTRAVENOUS at 13:45

## 2018-03-30 RX ADMIN — NITROGLYCERIN 0.4 MG: 0.4 TABLET SUBLINGUAL at 11:21

## 2018-04-01 ENCOUNTER — APPOINTMENT (OUTPATIENT)
Dept: GENERAL RADIOLOGY | Facility: HOSPITAL | Age: 58
End: 2018-04-01

## 2018-04-01 ENCOUNTER — APPOINTMENT (OUTPATIENT)
Dept: CT IMAGING | Facility: HOSPITAL | Age: 58
End: 2018-04-01

## 2018-04-01 ENCOUNTER — HOSPITAL ENCOUNTER (INPATIENT)
Facility: HOSPITAL | Age: 58
LOS: 2 days | Discharge: HOME HEALTH | End: 2018-04-03
Attending: EMERGENCY MEDICINE | Admitting: HOSPITALIST

## 2018-04-01 DIAGNOSIS — S62.336A CLOSED DISPLACED FRACTURE OF NECK OF FIFTH METACARPAL BONE OF RIGHT HAND, INITIAL ENCOUNTER: ICD-10-CM

## 2018-04-01 DIAGNOSIS — S00.81XA ABRASION OF FACE, INITIAL ENCOUNTER: ICD-10-CM

## 2018-04-01 DIAGNOSIS — S60.512A ABRASION OF LEFT HAND, INITIAL ENCOUNTER: ICD-10-CM

## 2018-04-01 DIAGNOSIS — S52.592A OTHER CLOSED FRACTURE OF DISTAL END OF LEFT RADIUS, INITIAL ENCOUNTER: ICD-10-CM

## 2018-04-01 DIAGNOSIS — S52.532A CLOSED COLLES' FRACTURE OF LEFT RADIUS, INITIAL ENCOUNTER: ICD-10-CM

## 2018-04-01 DIAGNOSIS — S60.511A ABRASION OF RIGHT HAND, INITIAL ENCOUNTER: ICD-10-CM
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
## 2018-04-01 DIAGNOSIS — S63.259A DISLOCATION OF FINGER, INITIAL ENCOUNTER: ICD-10-CM

## 2018-04-01 DIAGNOSIS — R55 SYNCOPE AND COLLAPSE: Primary | ICD-10-CM

## 2018-04-01 PROBLEM — S52.502A CLOSED FRACTURE OF LEFT DISTAL RADIUS: Status: ACTIVE | Noted: 2018-04-01

## 2018-04-01 LAB
ALBUMIN SERPL-MCNC: 4.6 G/DL (ref 3.5–5.2)
ALBUMIN/GLOB SERPL: 1.8 G/DL
ALP SERPL-CCNC: 80 U/L (ref 39–117)
ALT SERPL W P-5'-P-CCNC: 32 U/L (ref 1–33)
ANION GAP SERPL CALCULATED.3IONS-SCNC: 11.6 MMOL/L
AST SERPL-CCNC: 32 U/L (ref 1–32)
BASOPHILS # BLD AUTO: 0.02 10*3/MM3 (ref 0–0.2)
BASOPHILS NFR BLD AUTO: 0.2 % (ref 0–1.5)
BILIRUB SERPL-MCNC: 0.2 MG/DL (ref 0.1–1.2)
BUN BLD-MCNC: 9 MG/DL (ref 6–20)
BUN/CREAT SERPL: 8.5 (ref 7–25)
CALCIUM SPEC-SCNC: 9.8 MG/DL (ref 8.6–10.5)
CHLORIDE SERPL-SCNC: 102 MMOL/L (ref 98–107)
CO2 SERPL-SCNC: 27.4 MMOL/L (ref 22–29)
CREAT BLD-MCNC: 1.06 MG/DL (ref 0.57–1)
DEPRECATED RDW RBC AUTO: 53.1 FL (ref 37–54)
EOSINOPHIL # BLD AUTO: 0.39 10*3/MM3 (ref 0–0.7)
EOSINOPHIL NFR BLD AUTO: 4.3 % (ref 0.3–6.2)
ERYTHROCYTE [DISTWIDTH] IN BLOOD BY AUTOMATED COUNT: 15 % (ref 11.7–13)
GFR SERPL CREATININE-BSD FRML MDRD: 54 ML/MIN/1.73
GLOBULIN UR ELPH-MCNC: 2.6 GM/DL
GLUCOSE BLD-MCNC: 97 MG/DL (ref 65–99)
HCT VFR BLD AUTO: 42.5 % (ref 35.6–45.5)
HGB BLD-MCNC: 13 G/DL (ref 11.9–15.5)
HOLD SPECIMEN: NORMAL
HOLD SPECIMEN: NORMAL
IMM GRANULOCYTES # BLD: 0.02 10*3/MM3 (ref 0–0.03)
IMM GRANULOCYTES NFR BLD: 0.2 % (ref 0–0.5)
LITHIUM SERPL-SCNC: 0.6 MMOL/L (ref 0.6–1.2)
LYMPHOCYTES # BLD AUTO: 3.28 10*3/MM3 (ref 0.9–4.8)
LYMPHOCYTES NFR BLD AUTO: 35.8 % (ref 19.6–45.3)
MAGNESIUM SERPL-MCNC: 2.5 MG/DL (ref 1.6–2.6)
MCH RBC QN AUTO: 29.5 PG (ref 26.9–32)
MCHC RBC AUTO-ENTMCNC: 30.6 G/DL (ref 32.4–36.3)
MCV RBC AUTO: 96.6 FL (ref 80.5–98.2)
MONOCYTES # BLD AUTO: 0.63 10*3/MM3 (ref 0.2–1.2)
MONOCYTES NFR BLD AUTO: 6.9 % (ref 5–12)
NEUTROPHILS # BLD AUTO: 4.82 10*3/MM3 (ref 1.9–8.1)
NEUTROPHILS NFR BLD AUTO: 52.6 % (ref 42.7–76)
PLATELET # BLD AUTO: 302 10*3/MM3 (ref 140–500)
PMV BLD AUTO: 10.5 FL (ref 6–12)
POTASSIUM BLD-SCNC: 4.1 MMOL/L (ref 3.5–5.2)
PROT SERPL-MCNC: 7.2 G/DL (ref 6–8.5)
RBC # BLD AUTO: 4.4 10*6/MM3 (ref 3.9–5.2)
SODIUM BLD-SCNC: 141 MMOL/L (ref 136–145)
TROPONIN T SERPL-MCNC: <0.01 NG/ML (ref 0–0.03)
WBC NRBC COR # BLD: 9.16 10*3/MM3 (ref 4.5–10.7)
WHOLE BLOOD HOLD SPECIMEN: NORMAL
WHOLE BLOOD HOLD SPECIMEN: NORMAL

## 2018-04-01 PROCEDURE — 36415 COLL VENOUS BLD VENIPUNCTURE: CPT | Performed by: EMERGENCY MEDICINE

## 2018-04-01 PROCEDURE — 0 IOPAMIDOL PER 1 ML: Performed by: EMERGENCY MEDICINE

## 2018-04-01 PROCEDURE — 73140 X-RAY EXAM OF FINGER(S): CPT

## 2018-04-01 PROCEDURE — 70450 CT HEAD/BRAIN W/O DYE: CPT

## 2018-04-01 PROCEDURE — 73110 X-RAY EXAM OF WRIST: CPT

## 2018-04-01 PROCEDURE — 83735 ASSAY OF MAGNESIUM: CPT | Performed by: EMERGENCY MEDICINE

## 2018-04-01 PROCEDURE — 80053 COMPREHEN METABOLIC PANEL: CPT | Performed by: EMERGENCY MEDICINE

## 2018-04-01 PROCEDURE — 71275 CT ANGIOGRAPHY CHEST: CPT

## 2018-04-01 PROCEDURE — 73130 X-RAY EXAM OF HAND: CPT

## 2018-04-01 PROCEDURE — 93005 ELECTROCARDIOGRAM TRACING: CPT | Performed by: EMERGENCY MEDICINE

## 2018-04-01 PROCEDURE — 25010000002 MORPHINE PER 10 MG: Performed by: NURSE PRACTITIONER

## 2018-04-01 PROCEDURE — 93010 ELECTROCARDIOGRAM REPORT: CPT | Performed by: INTERNAL MEDICINE

## 2018-04-01 PROCEDURE — 25010000002 ONDANSETRON PER 1 MG: Performed by: NURSE PRACTITIONER

## 2018-04-01 PROCEDURE — 70486 CT MAXILLOFACIAL W/O DYE: CPT

## 2018-04-01 PROCEDURE — 84484 ASSAY OF TROPONIN QUANT: CPT | Performed by: EMERGENCY MEDICINE

## 2018-04-01 PROCEDURE — 80178 ASSAY OF LITHIUM: CPT | Performed by: EMERGENCY MEDICINE

## 2018-04-01 PROCEDURE — 99285 EMERGENCY DEPT VISIT HI MDM: CPT

## 2018-04-01 PROCEDURE — 85025 COMPLETE CBC W/AUTO DIFF WBC: CPT | Performed by: EMERGENCY MEDICINE

## 2018-04-01 RX ORDER — OXYCODONE HYDROCHLORIDE AND ACETAMINOPHEN 5; 325 MG/1; MG/1
1 TABLET ORAL ONCE
Status: COMPLETED | OUTPATIENT
Start: 2018-04-01 | End: 2018-04-01

## 2018-04-01 RX ORDER — SODIUM CHLORIDE 0.9 % (FLUSH) 0.9 %
10 SYRINGE (ML) INJECTION AS NEEDED
Status: DISCONTINUED | OUTPATIENT
Start: 2018-04-01 | End: 2018-04-03 | Stop reason: HOSPADM

## 2018-04-01 RX ORDER — ONDANSETRON 2 MG/ML
4 INJECTION INTRAMUSCULAR; INTRAVENOUS ONCE
Status: COMPLETED | OUTPATIENT
Start: 2018-04-01 | End: 2018-04-01

## 2018-04-01 RX ORDER — LIDOCAINE HYDROCHLORIDE AND EPINEPHRINE 10; 10 MG/ML; UG/ML
10 INJECTION, SOLUTION INFILTRATION; PERINEURAL ONCE
Status: COMPLETED | OUTPATIENT
Start: 2018-04-01 | End: 2018-04-01

## 2018-04-01 RX ORDER — LIDOCAINE HYDROCHLORIDE 10 MG/ML
10 INJECTION, SOLUTION INFILTRATION; PERINEURAL ONCE
Status: COMPLETED | OUTPATIENT
Start: 2018-04-01 | End: 2018-04-01

## 2018-04-01 RX ADMIN — MORPHINE SULFATE 4 MG: 4 INJECTION INTRAVENOUS at 22:52

## 2018-04-01 RX ADMIN — OXYCODONE HYDROCHLORIDE AND ACETAMINOPHEN 1 TABLET: 5; 325 TABLET ORAL at 23:34

## 2018-04-01 RX ADMIN — IOPAMIDOL 95 ML: 755 INJECTION, SOLUTION INTRAVENOUS at 21:38

## 2018-04-01 RX ADMIN — MORPHINE SULFATE 4 MG: 4 INJECTION, SOLUTION INTRAMUSCULAR; INTRAVENOUS at 21:17

## 2018-04-01 RX ADMIN — LIDOCAINE HYDROCHLORIDE 10 ML: 10 INJECTION, SOLUTION INFILTRATION; PERINEURAL at 22:13

## 2018-04-01 RX ADMIN — SODIUM CHLORIDE 1000 ML: 9 INJECTION, SOLUTION INTRAVENOUS at 21:16

## 2018-04-01 RX ADMIN — LIDOCAINE HYDROCHLORIDE AND EPINEPHRINE 10 ML: 10; 10 INJECTION, SOLUTION INFILTRATION; PERINEURAL at 22:12

## 2018-04-01 RX ADMIN — ONDANSETRON 4 MG: 2 INJECTION INTRAMUSCULAR; INTRAVENOUS at 21:16

## 2018-04-01 NOTE — ED PROVIDER NOTES
EMERGENCY DEPARTMENT ENCOUNTER    Room Number:    Date seen:  2018  Time seen: 7:58 PM  PCP: James Epley, APRN    HPI:  Chief complaint: syncope  Context:Keri Nielson is a 57 y.o. female who presents to the ED with c/o syncopal episode. Pt states that she became lightheaded and then immediately lost consciousness and fell down 4 steps face first. She has pain in all of her left fingers as well as her left wrist. She also has abrasions and pain on her right hand and facial contusions. Pt also c/o of headache and mild neck pain since falling. She states that she has been feeling cold a lot recently. Pt has no other complaints at this time. She denies CP and SOA today as well as any recent travel or illness. Pt increased her atenolol from 25mg to 50mg two days ago.    Timing: constant  Duration: just prior to arrival  Location: n/a  Radiation: n/a  Quality: syncope preceded by short episode of lightheadedness  Intensity/Severity: moderate  Associated Symptoms: pain and swelling in left and right hand with abrasions on both, HA, neck pain,  abrasions on face, feeling cold  Aggravating Factors: none  Alleviating Factors: none  Previous Episodes: none  Treatment before arrival: none    MEDICAL RECORD REVIEW  Pt was seen in the ED two days ago for chest pain. She was admitted by Dr. Maki and had an unremarkable workup and has not had a chance to follow up with a cardiologist yet.     [facility] NUC [facility]ARD  [address] [address] [phone]             Keri Nielson   Stress Test With Pet Myocardial Perfusion   Order# [id number]   Reading physician: David Burroughs MD Ordering physician: Rufus Vinson MD Study date: 3/30/18   Patient Information   Patient Name  Keri iNelson MRN  [id number] Sex  Female  (Age)  1960 (57 y.o.)   Interpretation Summary   · Myocardial perfusion imaging indicates a normal myocardial perfusion study with no  evidence of ischemia.  · Left ventricular ejection fraction is hyperdynamic (Calculated EF > 70%).  · Impressions are consistent with a low risk study.  · There is no prior study available for comparison.       ALLERGIES  Review of patient's allergies indicates no known allergies.    PAST MEDICAL HISTORY  Active Ambulatory Problems     Diagnosis Date Noted   • Gastroesophageal reflux disease 04/29/2016   • Bipolar I disorder, single manic episode 04/29/2016   • Atherosclerosis of coronary artery 04/29/2016   • Essential hypertension 04/29/2016   • Lightheadedness 04/29/2016   • Low back pain 04/29/2016   • Sciatica 04/29/2016   • Thyroid nodule 04/29/2016   • Fatigue 05/06/2016   • Hyperlipidemia    • Hypothyroidism (acquired) 06/29/2016   • Iron deficiency anemia 06/29/2016   • Right knee pain 07/15/2016   • Cervical disc disease 08/18/2016   • Depression 08/19/2016   • ROCKY (obstructive sleep apnea)    • Coronary artery disease involving native coronary artery of native heart without angina pectoris 11/14/2016   • Cervical spondylosis with radiculopathy 12/29/2016   • Chronic pain of both knees 02/16/2017   • Arthritis of both knees 02/16/2017   • Weight gain, abnormal 03/20/2017   • Allergic rhinitis 03/20/2017   • Obesity (BMI 30-39.9) 03/20/2017   • Plantar fasciitis 04/10/2017   • Peroneal tendon injury 04/10/2017   • Stress fracture of calcaneus 04/19/2017   • Vitamin D deficiency 04/19/2017   • Pain and swelling of knee 06/02/2017   • Knee injury 06/02/2017   • Calcific Achilles tendonitis 07/26/2017   • Rod's deformity 07/26/2017   • Urgency incontinence 09/22/2017   • Nocturia 09/22/2017   • Chronic gastritis without bleeding 09/27/2017   • LGSIL on Pap smear of cervix 09/28/2017   • Primary osteoarthritis of right knee 10/02/2017   • Chronic renal insufficiency, stage III (moderate) 10/08/2017   • Left wrist tendinitis 10/08/2017   • Cervical radiculopathy 10/29/2017   • Tobacco abuse 10/29/2017   •  Obesity, morbid, BMI 40.0-49.9 10/29/2017   • Encounter for screening for lung cancer 10/29/2017   • Urinary frequency 11/12/2017   • Diabetes mellitus screening 11/12/2017   • MORALES I (cervical intraepithelial neoplasia I) 11/22/2017   • Primary osteoarthritis of both knees 12/01/2017   • H/O bladder repair surgery 01/29/2018   • Chest pain 03/30/2018     Resolved Ambulatory Problems     Diagnosis Date Noted   • Abnormal vaginal bleeding 04/29/2016   • Dysphagia 04/29/2016   • Indigestion 04/29/2016   • Flatulence 04/29/2016   • Nausea 04/29/2016   • Viral gastroenteritis 04/29/2016   • LGSIL (low grade squamous intraepithelial dysplasia) 06/17/2016   • Suicidal ideation 08/19/2016   • RLQ abdominal pain 09/22/2017     Past Medical History:   Diagnosis Date   • Anemia    • Arthritis    • Atherosclerotic heart disease of native coronary artery with other forms of angina pectoris    • Bipolar I disorder, single manic episode    • Cervical disc herniation    • Coronary artery disease    • Depression    • Difficulty swallowing    • Diverticular disease    • Dyspepsia    • Dysphagia    • Gastric reflux    • Heart murmur    • Hiatal hernia    • High cholesterol    • History of transfusion    • 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   • Hyperlipidemia    • Hypertension    • Hypothyroidism    • Incontinence in female    • Incontinence in female    • Kidney disease 2017   • Kidney disease, chronic, stage III (GFR 30-59 ml/min)    • LGSIL on Pap smear of cervix 04/29/2016   • Myocardial infarction 2000   • Neck pain    • Obesity    • Past myocardial infarction 05/2000   • Periodic limb movement disorder    • Restless leg syndrome    • Sleep apnea    • Stress fracture    • Suicidal ideation 08/19/2016   • Swelling of ankle    • Thyroid nodule        PAST SURGICAL HISTORY  Past Surgical History:   Procedure Laterality Date   • ANTERIOR CERVICAL DISCECTOMY W/ FUSION N/A 12/29/2016    Procedure: C3-4 anterior cervical  discectomy and fusion with Depuy micro plate, ALLOGRAFT C3-4, AND HARDWARE REMOVAL C4-7.;  Surgeon: Hema Godwin MD;  Location: [facility] MAIN OR;  Service:    • CARDIAC CATHETERIZATION N/A 3/30/2017    Procedure: Left Heart Cath;  Surgeon: Tracey Vargas MD;  Location:  [facility] CATH INVASIVE LOCATION;  Service:    • CARDIAC CATHETERIZATION N/A 3/30/2017    Procedure: Coronary angiography;  Surgeon: Tracey Vargas MD;  [facility];  Service:    • CARDIAC CATHETERIZATION N/A 3/30/2017    Procedure: Left ventriculography;  Surgeon: Tracey Vargas MD;  [facility];  Service:    • CARDIAC CATHETERIZATION  3/30/2017    Procedure: Functional Flow [place];  Surgeon: Tracey Vargas MD;  Location: [facility] CATH INVASIVE LOCATION;  Service:    • CERVICAL BIOPSY  MMXVI    Dr. Jeffery.    • CERVICAL DISCECTOMY ANTERIOR  04/2013    C4-7   • COLONOSCOPY  04/20/2015    Diverticulosis, IH   • COLPOSCOPY W/ BIOPSY / CURETTAGE  06/17/2016    LGSIL HPV positive. Results were normal repeat pap in one year. Chronic Cervicitis   • CORONARY ANGIOPLASTY WITH STENT PLACEMENT     • ENDOSCOPY  MMXV    Normal.  Dr. Rodriguez   • ENDOSCOPY N/A 6/22/2017    Erythematous mucosa in the stomach  PATH: Chronic active gastritis, moderate with intestinal metaplasia    • GASTRIC BYPASS      Done using the sleeve technique   • HARDWARE REMOVAL  12/29/2016    cervical   • SHOULDER SURGERY Left     RCR   • TONSILLECTOMY     • TONSILLECTOMY AND ADENOIDECTOMY     • TRANSVAGINAL TAPING SUSPENSION N/A 12/6/2017    Procedure: MID URETHRAL SLING CYSTSCOPY;  Surgeon: Abby Méndez MD;  Location[facility] OR;  Service:    • TUBAL ABDOMINAL LIGATION     • TYMPANOSTOMY TUBE PLACEMENT Right    • WRIST SURGERY Bilateral     carpal tunnel       FAMILY HISTORY  Family History   Problem Relation Age of Onset   • Diabetes type II Mother    • Hypertension Mother    • Osteoporosis Mother    • Seizures Mother     • COPD Father    • Hypertension Father    • Lung cancer Father    • Heart attack Father    • Liver cancer Father    • Thyroid disease Sister    • Hypertension Sister    • Bipolar disorder Sister    • Depression Sister    • No Known Problems Son    • Abnormal EKG Daughter    • Hypertension Daughter    • Bipolar disorder Daughter    • No Known Problems Paternal Grandfather    • No Known Problems Paternal Grandmother    • No Known Problems Maternal Grandmother    • No Known Problems Maternal Grandfather    • Thyroid disease Sister    • Hypertension Sister    • Bipolar disorder Sister    • Asthma Daughter    • Breast cancer Neg Hx    • Ovarian cancer Neg Hx    • Uterine cancer Neg Hx    • Colon cancer Neg Hx    • Malig Hyperthermia Neg Hx        SOCIAL HISTORY  Social History     Social History   • Marital status:      Spouse name: N/A   • Number of children: 3   • Years of education: N/A     Occupational History   • Not on file.     Social History Main Topics   • Smoking status: Former Smoker     Packs/day: 1.50     Years: 20.00     Types: Electronic Cigarette, Cigarettes     Quit date: 2015   • Smokeless tobacco: Never Used      Comment: Electronic Cigarette/ 3 mg nicotine    • Alcohol use Yes      Comment: 2 times yearly    • Drug use: No   • Sexual activity: Not on file     Other Topics Concern   • Not on file     Social History Narrative   • No narrative on file       REVIEW OF SYSTEMS  Review of Systems   Constitutional: Negative for activity change, appetite change, diaphoresis and fever.        Feeling cold   HENT: Negative for trouble swallowing.    Eyes: Negative for visual disturbance.   Respiratory: Negative for cough, chest tightness, shortness of breath and wheezing.    Cardiovascular: Negative for chest pain, palpitations and leg swelling.   Gastrointestinal: Negative for abdominal pain, diarrhea, nausea and vomiting.   Genitourinary: Negative for dysuria.   Musculoskeletal: Positive for  arthralgias (right hands, left hands) and neck pain (mild). Negative for back pain.   Skin: Positive for wound (abrasions to bilateral hands and face). Negative for rash.   Neurological: Positive for syncope, light-headedness and headaches. Negative for dizziness and speech difficulty.       PHYSICAL EXAM  ED Triage Vitals   Temp Heart Rate Resp BP SpO2   04/01/18 1848 04/01/18 1846 04/01/18 1846 04/01/18 1846 04/01/18 1846   98.3 °F (36.8 °C) 60 16 (!) 154/106 96 %      Temp src Heart Rate Source Patient Position BP Location FiO2 (%)   -- -- -- -- --            Physical Exam   Constitutional: She is oriented to person, place, and time and well-developed, well-nourished, and in no distress. No distress.   HENT:   Head: Normocephalic and atraumatic.   Nose: Sinus tenderness present.   Mouth/Throat: Uvula is midline and mucous membranes are normal.   upper lip laceration, bruising on nose   Neck: Normal range of motion. Neck supple.   Cardiovascular: S1 normal, S2 normal and normal heart sounds.  Exam reveals no gallop and no friction rub.    No murmur heard.  Pulmonary/Chest: Effort normal and breath sounds normal. She has no decreased breath sounds. She has no wheezes. She has no rhonchi. She has no rales.   Abdominal: Soft. Normal appearance. There is no rebound and no guarding.   Musculoskeletal: Normal range of motion.        Hands:  Pain in bilateral wrists. Digits 3 and 4 on left hand are deformed. Abrasions to bilateral hands. Scattered linear abrasions to chin. Deformity at area of fifth metacarpal on left hand.   Neurological: She is alert and oriented to person, place, and time. GCS score is 15.   Cranial nerves 2-12 intact as tested.  Sensation intact.  5/5 strength in all extremities.  Normal cerebellar testing.  No drift in any extremity.  No dysarthria.  No aphasia.  No neglect/extinction.      Skin: Skin is warm, dry and intact.   Psychiatric: Affect and judgment normal.   Nursing note and vitals  reviewed.      LAB RESULTS  Recent Results (from the past 24 hour(s))   Comprehensive Metabolic Panel    Collection Time: 04/01/18  6:58 PM   Result Value Ref Range    Glucose 97 65 - 99 mg/dL    BUN 9 6 - 20 mg/dL    Creatinine 1.06 (H) 0.57 - 1.00 mg/dL    Sodium 141 136 - 145 mmol/L    Potassium 4.1 3.5 - 5.2 mmol/L    Chloride 102 98 - 107 mmol/L    CO2 27.4 22.0 - 29.0 mmol/L    Calcium 9.8 8.6 - 10.5 mg/dL    Total Protein 7.2 6.0 - 8.5 g/dL    Albumin 4.60 3.50 - 5.20 g/dL    ALT (SGPT) 32 1 - 33 U/L    AST (SGOT) 32 1 - 32 U/L    Alkaline Phosphatase 80 39 - 117 U/L    Total Bilirubin 0.2 0.1 - 1.2 mg/dL    eGFR Non African Amer 54 (L) >60 mL/min/1.73    Globulin 2.6 gm/dL    A/G Ratio 1.8 g/dL    BUN/Creatinine Ratio 8.5 7.0 - 25.0    Anion Gap 11.6 mmol/L   Magnesium    Collection Time: 04/01/18  6:58 PM   Result Value Ref Range    Magnesium 2.5 1.6 - 2.6 mg/dL   Troponin    Collection Time: 04/01/18  6:58 PM   Result Value Ref Range    Troponin T <0.010 0.000 - 0.030 ng/mL   Lithium Level    Collection Time: 04/01/18  6:58 PM   Result Value Ref Range    Lithium 0.6 0.6 - 1.2 mmol/L   Light Blue Top    Collection Time: 04/01/18  6:58 PM   Result Value Ref Range    Extra Tube hold for add-on    Green Top (Gel)    Collection Time: 04/01/18  6:58 PM   Result Value Ref Range    Extra Tube Hold for add-ons.    Lavender Top    Collection Time: 04/01/18  6:58 PM   Result Value Ref Range    Extra Tube hold for add-on    Gold Top - SST    Collection Time: 04/01/18  6:58 PM   Result Value Ref Range    Extra Tube Hold for add-ons.    CBC Auto Differential    Collection Time: 04/01/18  6:58 PM   Result Value Ref Range    WBC 9.16 4.50 - 10.70 10*3/mm3    RBC 4.40 3.90 - 5.20 10*6/mm3    Hemoglobin 13.0 11.9 - 15.5 g/dL    Hematocrit 42.5 35.6 - 45.5 %    MCV 96.6 80.5 - 98.2 fL    MCH 29.5 26.9 - 32.0 pg    MCHC 30.6 (L) 32.4 - 36.3 g/dL    RDW 15.0 (H) 11.7 - 13.0 %    RDW-SD 53.1 37.0 - 54.0 fl    MPV 10.5 6.0 -  12.0 fL    Platelets 302 140 - 500 10*3/mm3    Neutrophil % 52.6 42.7 - 76.0 %    Lymphocyte % 35.8 19.6 - 45.3 %    Monocyte % 6.9 5.0 - 12.0 %    Eosinophil % 4.3 0.3 - 6.2 %    Basophil % 0.2 0.0 - 1.5 %    Immature Grans % 0.2 0.0 - 0.5 %    Neutrophils, Absolute 4.82 1.90 - 8.10 10*3/mm3    Lymphocytes, Absolute 3.28 0.90 - 4.80 10*3/mm3    Monocytes, Absolute 0.63 0.20 - 1.20 10*3/mm3    Eosinophils, Absolute 0.39 0.00 - 0.70 10*3/mm3    Basophils, Absolute 0.02 0.00 - 0.20 10*3/mm3    Immature Grans, Absolute 0.02 0.00 - 0.03 10*3/mm3       I ordered the above labs and reviewed the results      EK  HR 63, sinus rhythm  Normal P/WILLIS  Normal QRS  Normal Axis  Normal ST, and T    Improved from prior dated 3/30/18    Interpreted by me  RADIOLOGY  XR Finger 2+ View Left   Preliminary Result   Interval reduction of previously dislocated third and fourth proximal   interphalangeal joints.              CT Angiogram Chest With Contrast   Preliminary Result   No evidence for acute pulmonary embolism.                      XR Wrist 3+ View Bilateral   Preliminary Result   Nondisplaced fracture of the distal left radius.              CT Head Without Contrast   Preliminary Result   No definite acute intracranial pathology.           ----------------------------------------------------------------       CT SCAN OF FACIAL BONES WITHOUT CONTRAST.       TECHNIQUE: Radiation dose reduction techniques were utilized, including   automated exposure control and exposure modulation based on body size.   Multiple axial images of the facial bones with coronal and sagittal   reconstructions.       HISTORY: Head trauma, facial abrasions, laceration to the left.       COMPARISON:  No prior studies for comparison.       FINDINGS:    Limited evaluation of the intracranial structures is unremarkable   Visualized portion of the calvarium is intact.       Nasal bones are intact. Orbits are unremarkable.       Paranasal sinus disease  are unremarkable, no evidence for acute   sinusitis.       Mandible and temporomandibular joints are within normal limits.       Bruising and swelling of the lips.       IMPRESSION:    No acute fracture of the facial bones.                  CT Facial Bones Without Contrast   Preliminary Result   No definite acute intracranial pathology.           ----------------------------------------------------------------       CT SCAN OF FACIAL BONES WITHOUT CONTRAST.       TECHNIQUE: Radiation dose reduction techniques were utilized, including   automated exposure control and exposure modulation based on body size.   Multiple axial images of the facial bones with coronal and sagittal   reconstructions.       HISTORY: Head trauma, facial abrasions, laceration to the left.       COMPARISON:  No prior studies for comparison.       FINDINGS:    Limited evaluation of the intracranial structures is unremarkable   Visualized portion of the calvarium is intact.       Nasal bones are intact. Orbits are unremarkable.       Paranasal sinus disease are unremarkable, no evidence for acute   sinusitis.       Mandible and temporomandibular joints are within normal limits.       Bruising and swelling of the lips.       IMPRESSION:    No acute fracture of the facial bones.                  XR Hand 3+ View Bilateral   Preliminary Result   1.  Slightly angulated fracture of the right fifth metacarpal bone.    2.  Dorsal dislocation of the left third and fourth proximal   interphalangeal joints.              I ordered the above noted radiological studies and reviewed the images on the PACS system.    MEDICATIONS GIVEN IN ER  Medications   sodium chloride 0.9 % flush 10 mL (not administered)   sodium chloride 0.9 % flush 10 mL (not administered)   sodium chloride 0.9 % bolus 1,000 mL (1,000 mL Intravenous New Bag 4/1/18 2116)   morphine injection 4 mg (4 mg Intravenous Given 4/1/18 2117)   ondansetron (ZOFRAN) injection 4 mg (4 mg Intravenous  Given 4/1/18 2116)   lidocaine-EPINEPHrine (XYLOCAINE W/EPI) 1 %-1:[id number] injection 10 mL (10 mL Injection Given 4/1/18 2212)   lidocaine (XYLOCAINE) 1 % injection 10 mL (10 mL Injection Given 4/1/18 2213)   iopamidol (ISOVUE-370) 76 % injection 100 mL (95 mL Intravenous Given 4/1/18 2138)   morphine injection 4 mg (4 mg Intravenous Given 4/1/18 2252)       EKG  Interpreted by ED Physician    PROCEDURES  FX Dislocation  Date/Time: 4/1/2018 9:59 PM  Performed by: SUZY SINGH  Authorized by: TALAT MCDOWELL     Consent:     Consent obtained:  Verbal    Consent given by:  Patient  Injury:     Injury location:  Finger    Finger injury location:  L ring finger  Anesthesia (see MAR for exact dosages):     Anesthesia method:  Nerve block    Block location:  Web space    Block needle gauge:  25 G    Block anesthetic:  Lidocaine 1% w/o epi    Block injection procedure:  Anatomic landmarks identified, introduced needle and negative aspiration for blood    Block outcome:  Anesthesia achieved  Procedure details:     Manipulation performed: yes      Skin traction used: yes      Reduction successful: yes      X-ray confirmed reduction: yes      Supplies used:  Aluminum splint  Post-procedure assessment:     Patient tolerance of procedure:  Tolerated well, no immediate complications  FX Dislocation  Date/Time: 4/1/2018 10:00 PM  Performed by: SUZY SINGH  Authorized by: TALAT MCDOWELL     Consent:     Consent obtained:  Verbal    Consent given by:  Patient  Injury:     Injury location:  Finger    Finger injury location:  L ring finger  Anesthesia (see MAR for exact dosages):     Anesthesia method:  Nerve block    Block location:  Web space    Block needle gauge:  25 G    Block anesthetic:  Lidocaine 1% w/o epi    Block injection procedure:  Anatomic landmarks identified, introduced needle and negative aspiration for blood    Block outcome:  Anesthesia achieved  Procedure details:     Manipulation performed: yes      Skin  traction used: yes      Reduction successful: yes      X-ray confirmed reduction: yes      Supplies used:  Aluminum splint  Post-procedure assessment:     Patient tolerance of procedure:  Tolerated well, no immediate complications  Laceration Repair  Date/Time: 4/1/2018 10:16 PM  Performed by: SUZY SINGH  Authorized by: TALAT MCDOWELL     Consent:     Consent obtained:  Verbal    Consent given by:  Patient  Anesthesia (see MAR for exact dosages):     Anesthesia method:  Local infiltration    Local anesthetic:  Lidocaine 1% WITH epi (0.5cc)  Laceration details:     Location:  Lip    Lip location:  Upper interior lip    Length (cm):  0.5  Repair type:     Repair type:  Simple  Treatment:     Area cleansed with:  Betadine    Amount of cleaning:  Standard  Skin repair:     Repair method:  Sutures    Suture size:  6-0    Wound skin closure material used: Vicryl.    Suture technique:  Simple interrupted    Number of sutures:  1  Post-procedure details:     Dressing:  Open (no dressing)    Patient tolerance of procedure:  Tolerated well, no immediate complications  Splint - Cast - Strapping  Date/Time: 4/1/2018 10:24 PM  Performed by: SUZY SINGH  Authorized by: TALAT MCDOWELL     Consent:     Consent obtained:  Verbal    Consent given by:  Patient  Pre-procedure details:     Sensation:  Normal  Procedure details:     Laterality:  Left    Location:  Hand    Hand:  L hand    Splint type:  Thumb spica    Supplies:  Ortho-Glass  Post-procedure details:     Patient tolerance of procedure:  Tolerated well, no immediate complications  Splint - Cast - Strapping  Date/Time: 4/1/2018 10:25 PM  Performed by: SUZY SINGH  Authorized by: TALAT MCDOWELL     Consent:     Consent obtained:  Verbal    Consent given by:  Patient  Pre-procedure details:     Sensation:  Normal  Procedure details:     Laterality:  Right    Location:  Wrist    Wrist:  R wrist    Splint type:  Ulnar gutter    Supplies:  Ortho-Glass  Post-procedure  details:     Sensation:  Normal    Patient tolerance of procedure:  Tolerated well, no immediate complications        COURSE & MEDICAL DECISION MAKING  Pertinent Labs and Imaging studies that were ordered and reviewed are noted above.  Results were reviewed/discussed with the patient and they were also made aware of online assess.  Pt also made aware that some labs, such as cultures, will not be resulted during ER visit and follow up with PMD is necessary.     PROGRESS AND CONSULTS    Progress Notes:    ED Course       2104  Reviewed Pt's history and workup with Dr. Duque.  After a bedside evaluation, Dr. Duque agrees with the plan of care.    2124  Rechecked Pt and informed her that her CT head and CT face are unremarkable. Informed her that her XR bilateral hands shows dislocation at 3rd and 4th digits on left hand and nondisplaced fracture of distal left radius. There is also a displaced fracture at the right fifth metacarpal bone. Discussed plans to reduce her fingers and place Pt in bilateral wrist splints. Also discussed plans to order a CTA chest in order to look for PEs following her recent surgery and admit Pt for further cardiac workup. Pt understands and agrees to all plans.     2148  At bedside to numb left digits.     2204  Placed call to [facility] for admission.    2210  Performed reduction of left digits.    2215  At bedside to perform laceration repair on upper lip. Pt informs us that her tetanus is not up to date and she would like one.     2253  Discussed Pt's case with Dr. Ho ([facility]) who agrees to admit Pt to telemetry for further evaluation and treatment.     2300  Based on the patient's lab findings and presenting symptoms, the doctor and I feel it is appropriate to admit the patient for further management, evaluation, and treatment.  I have discussed this with the admitting team.  I have also discussed this with the patient/family.  They are in agreement with admission.        Disposition vitals:  BP  "105/64 (BP Location: Left arm, Patient Position: Sitting)   Pulse 70   Temp 98.3 °F (36.8 °C)   Resp 15   Ht 160 cm (63\")   Wt 97.5 kg (215 lb)   LMP 10/21/2016 (Approximate) Comment: 54  SpO2 94%   BMI 38.09 kg/m²       DIAGNOSIS  Final diagnoses:   Syncope and collapse   Fall, initial encounter   Dislocation of finger, initial encounter, left 3rd and 4th   Other closed fracture of distal end of left radius, initial encounter   Abrasion of face, initial encounter   Abrasion of left hand, initial encounter   Abrasion of right hand, initial encounter       Documentation assistance provided by jane Salinas for DAPHNEY Tamez.  Information recorded by the scribe was done at my direction and has been verified and validated by me.  Electronically signed by Zenia Carrillo on 4/1/2018 at time 11:07 PM         Thalia Salinas  04/01/18 9534       DAPHNEY Sparks  04/01/18 4505       DAPHNEY Sparks  04/02/18 0122    "

## 2018-04-01 NOTE — ED NOTES
Pt had syncopal episode at home and fell down 4 steps. Pt fell face first. Pt has deformities and pain in all of left fingers. Pt also reports pain in left wrist, abrasions and pain in right hand, facial contusions, headache & laceration to upper lip. Pt denies any other injuries, no neck or back pain.     Velvet Padgett RN  04/01/18 1141

## 2018-04-02 LAB
ALBUMIN SERPL-MCNC: 3.7 G/DL (ref 3.5–5.2)
ALBUMIN/GLOB SERPL: 1.5 G/DL
ALP SERPL-CCNC: 68 U/L (ref 39–117)
ALT SERPL W P-5'-P-CCNC: 25 U/L (ref 1–33)
ANION GAP SERPL CALCULATED.3IONS-SCNC: 10.9 MMOL/L
AST SERPL-CCNC: 20 U/L (ref 1–32)
BASOPHILS # BLD AUTO: 0.01 10*3/MM3 (ref 0–0.2)
BASOPHILS NFR BLD AUTO: 0.1 % (ref 0–1.5)
BILIRUB SERPL-MCNC: 0.2 MG/DL (ref 0.1–1.2)
BUN BLD-MCNC: 6 MG/DL (ref 6–20)
BUN/CREAT SERPL: 6.7 (ref 7–25)
CALCIUM SPEC-SCNC: 8.6 MG/DL (ref 8.6–10.5)
CHLORIDE SERPL-SCNC: 107 MMOL/L (ref 98–107)
CO2 SERPL-SCNC: 24.1 MMOL/L (ref 22–29)
CREAT BLD-MCNC: 0.89 MG/DL (ref 0.57–1)
DEPRECATED RDW RBC AUTO: 53.8 FL (ref 37–54)
EOSINOPHIL # BLD AUTO: 0.28 10*3/MM3 (ref 0–0.7)
EOSINOPHIL NFR BLD AUTO: 3.2 % (ref 0.3–6.2)
ERYTHROCYTE [DISTWIDTH] IN BLOOD BY AUTOMATED COUNT: 15.2 % (ref 11.7–13)
GFR SERPL CREATININE-BSD FRML MDRD: 66 ML/MIN/1.73
GLOBULIN UR ELPH-MCNC: 2.4 GM/DL
GLUCOSE BLD-MCNC: 91 MG/DL (ref 65–99)
HCT VFR BLD AUTO: 35.8 % (ref 35.6–45.5)
HGB BLD-MCNC: 11.1 G/DL (ref 11.9–15.5)
IMM GRANULOCYTES # BLD: 0.02 10*3/MM3 (ref 0–0.03)
IMM GRANULOCYTES NFR BLD: 0.2 % (ref 0–0.5)
LYMPHOCYTES # BLD AUTO: 3.49 10*3/MM3 (ref 0.9–4.8)
LYMPHOCYTES NFR BLD AUTO: 39.6 % (ref 19.6–45.3)
MCH RBC QN AUTO: 29.6 PG (ref 26.9–32)
MCHC RBC AUTO-ENTMCNC: 31 G/DL (ref 32.4–36.3)
MCV RBC AUTO: 95.5 FL (ref 80.5–98.2)
MONOCYTES # BLD AUTO: 0.63 10*3/MM3 (ref 0.2–1.2)
MONOCYTES NFR BLD AUTO: 7.1 % (ref 5–12)
NEUTROPHILS # BLD AUTO: 4.39 10*3/MM3 (ref 1.9–8.1)
NEUTROPHILS NFR BLD AUTO: 49.8 % (ref 42.7–76)
PLATELET # BLD AUTO: 258 10*3/MM3 (ref 140–500)
PMV BLD AUTO: 10.3 FL (ref 6–12)
POTASSIUM BLD-SCNC: 3.8 MMOL/L (ref 3.5–5.2)
PROT SERPL-MCNC: 6.1 G/DL (ref 6–8.5)
RBC # BLD AUTO: 3.75 10*6/MM3 (ref 3.9–5.2)
SODIUM BLD-SCNC: 142 MMOL/L (ref 136–145)
WBC NRBC COR # BLD: 8.82 10*3/MM3 (ref 4.5–10.7)

## 2018-04-02 PROCEDURE — 80053 COMPREHEN METABOLIC PANEL: CPT | Performed by: HOSPITALIST

## 2018-04-02 PROCEDURE — 99252 IP/OBS CONSLTJ NEW/EST SF 35: CPT | Performed by: PHYSICIAN ASSISTANT

## 2018-04-02 PROCEDURE — 85025 COMPLETE CBC W/AUTO DIFF WBC: CPT | Performed by: HOSPITALIST

## 2018-04-02 RX ORDER — GABAPENTIN 300 MG/1
300 CAPSULE ORAL 3 TIMES DAILY
Status: DISCONTINUED | OUTPATIENT
Start: 2018-04-02 | End: 2018-04-03 | Stop reason: HOSPADM

## 2018-04-02 RX ORDER — SODIUM CHLORIDE 9 MG/ML
100 INJECTION, SOLUTION INTRAVENOUS CONTINUOUS
Status: DISCONTINUED | OUTPATIENT
Start: 2018-04-02 | End: 2018-04-03 | Stop reason: HOSPADM

## 2018-04-02 RX ORDER — ATORVASTATIN CALCIUM 20 MG/1
20 TABLET, FILM COATED ORAL DAILY
Status: DISCONTINUED | OUTPATIENT
Start: 2018-04-02 | End: 2018-04-03 | Stop reason: HOSPADM

## 2018-04-02 RX ORDER — ACETAMINOPHEN 325 MG/1
650 TABLET ORAL EVERY 6 HOURS PRN
Status: DISCONTINUED | OUTPATIENT
Start: 2018-04-02 | End: 2018-04-03 | Stop reason: HOSPADM

## 2018-04-02 RX ORDER — OXYBUTYNIN CHLORIDE 5 MG/1
15 TABLET, EXTENDED RELEASE ORAL DAILY
Status: DISCONTINUED | OUTPATIENT
Start: 2018-04-02 | End: 2018-04-03 | Stop reason: HOSPADM

## 2018-04-02 RX ORDER — LITHIUM CARBONATE 450 MG
450 TABLET, EXTENDED RELEASE ORAL EVERY 12 HOURS SCHEDULED
Status: DISCONTINUED | OUTPATIENT
Start: 2018-04-02 | End: 2018-04-03 | Stop reason: HOSPADM

## 2018-04-02 RX ORDER — PANTOPRAZOLE SODIUM 40 MG/1
40 TABLET, DELAYED RELEASE ORAL EVERY MORNING
Status: DISCONTINUED | OUTPATIENT
Start: 2018-04-02 | End: 2018-04-03 | Stop reason: HOSPADM

## 2018-04-02 RX ORDER — ONDANSETRON 2 MG/ML
4 INJECTION INTRAMUSCULAR; INTRAVENOUS EVERY 6 HOURS PRN
Status: DISCONTINUED | OUTPATIENT
Start: 2018-04-02 | End: 2018-04-03 | Stop reason: HOSPADM

## 2018-04-02 RX ORDER — LEVOTHYROXINE SODIUM 0.1 MG/1
100 TABLET ORAL DAILY
Status: DISCONTINUED | OUTPATIENT
Start: 2018-04-02 | End: 2018-04-03 | Stop reason: HOSPADM

## 2018-04-02 RX ORDER — SODIUM CHLORIDE 0.9 % (FLUSH) 0.9 %
1-10 SYRINGE (ML) INJECTION AS NEEDED
Status: DISCONTINUED | OUTPATIENT
Start: 2018-04-02 | End: 2018-04-03 | Stop reason: HOSPADM

## 2018-04-02 RX ORDER — ISOSORBIDE MONONITRATE 30 MG/1
30 TABLET, EXTENDED RELEASE ORAL DAILY
Status: DISCONTINUED | OUTPATIENT
Start: 2018-04-02 | End: 2018-04-03 | Stop reason: HOSPADM

## 2018-04-02 RX ORDER — BUPROPION HYDROCHLORIDE 300 MG/1
300 TABLET ORAL EVERY MORNING
Status: DISCONTINUED | OUTPATIENT
Start: 2018-04-02 | End: 2018-04-03 | Stop reason: HOSPADM

## 2018-04-02 RX ORDER — ARIPIPRAZOLE 10 MG/1
10 TABLET ORAL DAILY
Status: DISCONTINUED | OUTPATIENT
Start: 2018-04-02 | End: 2018-04-03 | Stop reason: HOSPADM

## 2018-04-02 RX ORDER — OXYCODONE HYDROCHLORIDE AND ACETAMINOPHEN 5; 325 MG/1; MG/1
1 TABLET ORAL EVERY 6 HOURS PRN
Status: DISCONTINUED | OUTPATIENT
Start: 2018-04-02 | End: 2018-04-03 | Stop reason: HOSPADM

## 2018-04-02 RX ORDER — PRAMIPEXOLE DIHYDROCHLORIDE 0.5 MG/1
0.5 TABLET ORAL NIGHTLY
Status: DISCONTINUED | OUTPATIENT
Start: 2018-04-02 | End: 2018-04-03 | Stop reason: HOSPADM

## 2018-04-02 RX ORDER — LAMOTRIGINE 100 MG/1
300 TABLET ORAL DAILY
Status: DISCONTINUED | OUTPATIENT
Start: 2018-04-02 | End: 2018-04-03 | Stop reason: HOSPADM

## 2018-04-02 RX ORDER — VENLAFAXINE 75 MG/1
75 TABLET ORAL EVERY MORNING
Status: DISCONTINUED | OUTPATIENT
Start: 2018-04-02 | End: 2018-04-03 | Stop reason: HOSPADM

## 2018-04-02 RX ADMIN — VENLAFAXINE HYDROCHLORIDE 75 MG: 75 TABLET ORAL at 08:16

## 2018-04-02 RX ADMIN — OXYCODONE HYDROCHLORIDE AND ACETAMINOPHEN 1 TABLET: 5; 325 TABLET ORAL at 12:06

## 2018-04-02 RX ADMIN — OXYCODONE HYDROCHLORIDE AND ACETAMINOPHEN 1 TABLET: 5; 325 TABLET ORAL at 06:06

## 2018-04-02 RX ADMIN — ISOSORBIDE MONONITRATE 30 MG: 30 TABLET ORAL at 08:16

## 2018-04-02 RX ADMIN — BUPROPION HYDROCHLORIDE 300 MG: 300 TABLET, EXTENDED RELEASE ORAL at 08:16

## 2018-04-02 RX ADMIN — SODIUM CHLORIDE 100 ML/HR: 9 INJECTION, SOLUTION INTRAVENOUS at 21:40

## 2018-04-02 RX ADMIN — GABAPENTIN 300 MG: 300 CAPSULE ORAL at 08:16

## 2018-04-02 RX ADMIN — OXYBUTYNIN CHLORIDE 15 MG: 10 TABLET, EXTENDED RELEASE ORAL at 02:27

## 2018-04-02 RX ADMIN — OXYBUTYNIN CHLORIDE 15 MG: 5 TABLET, EXTENDED RELEASE ORAL at 08:16

## 2018-04-02 RX ADMIN — SODIUM CHLORIDE 100 ML/HR: 9 INJECTION, SOLUTION INTRAVENOUS at 11:29

## 2018-04-02 RX ADMIN — ATORVASTATIN CALCIUM 20 MG: 20 TABLET, FILM COATED ORAL at 08:16

## 2018-04-02 RX ADMIN — LITHIUM CARBONATE 450 MG: 450 TABLET, EXTENDED RELEASE ORAL at 02:28

## 2018-04-02 RX ADMIN — GABAPENTIN 300 MG: 300 CAPSULE ORAL at 20:24

## 2018-04-02 RX ADMIN — OXYCODONE HYDROCHLORIDE AND ACETAMINOPHEN 1 TABLET: 5; 325 TABLET ORAL at 20:24

## 2018-04-02 RX ADMIN — ACETAMINOPHEN 650 MG: 325 TABLET ORAL at 08:22

## 2018-04-02 RX ADMIN — LAMOTRIGINE 300 MG: 100 TABLET ORAL at 08:16

## 2018-04-02 RX ADMIN — PRAMIPEXOLE DIHYDROCHLORIDE 0.5 MG: 0.5 TABLET ORAL at 02:27

## 2018-04-02 RX ADMIN — ARIPIPRAZOLE 10 MG: 10 TABLET ORAL at 08:16

## 2018-04-02 RX ADMIN — PRAMIPEXOLE DIHYDROCHLORIDE 0.5 MG: 0.5 TABLET ORAL at 20:24

## 2018-04-02 RX ADMIN — LITHIUM CARBONATE 450 MG: 450 TABLET, EXTENDED RELEASE ORAL at 08:16

## 2018-04-02 RX ADMIN — LEVOTHYROXINE SODIUM 100 MCG: 100 TABLET ORAL at 08:16

## 2018-04-02 RX ADMIN — PANTOPRAZOLE SODIUM 40 MG: 40 TABLET, DELAYED RELEASE ORAL at 06:06

## 2018-04-02 RX ADMIN — LITHIUM CARBONATE 450 MG: 450 TABLET, EXTENDED RELEASE ORAL at 20:24

## 2018-04-02 RX ADMIN — SODIUM CHLORIDE 100 ML/HR: 9 INJECTION, SOLUTION INTRAVENOUS at 00:42

## 2018-04-02 NOTE — H&P
Patient Care Team:  James Epley, APRN as PCP - General (Family Medicine)  David Burroughs MD as Consulting Physician (Cardiology)  Ajay Gutierrez MD as Consulting Physician (Orthopedic Surgery)  Ana Medina MD (Psychiatry)  Eduar Rodriguez MD as Consulting Physician (Gastroenterology)  Chata Lind MD as Consulting Physician (Internal Medicine)  Maxwell Martin MD as Consulting Physician (Endocrinology)    Chief complaint  fall    Subjective     Pleasant 56yo woman who was just admitted 3/30 by [facility] for chest pain. She underwent PET stress test and was discharged home. Per pt's report her Atenolol dose was increased. Today while walking down some stair she became lightheaded and fell. She sustained left distal radius fracture, left 3rd and 4th IP joint dislocations (reduced and splinted in ER), right 5th metacarpal fracture with angulation, and multiple bruise/abrasions on face/hands. She sustained a small laceration to the inside of her upper lip--a single Vicryl suture was placed in ER. She is resting comfortably at present. Pain is controlled. Per staff her HR went into the 130's with standing and BP dropped slightly.        Review of Systems   Constitutional: Negative for activity change, appetite change, chills, diaphoresis, fatigue and fever.   HENT: Negative for ear pain, facial swelling, hearing loss, nosebleeds, sinus pain, sore throat, trouble swallowing and voice change.    Eyes: Negative for pain and visual disturbance.   Respiratory: Negative for cough, choking, chest tightness, shortness of breath and stridor.    Cardiovascular: Negative for chest pain, palpitations and leg swelling.   Gastrointestinal: Negative for abdominal pain, blood in stool, diarrhea, nausea and vomiting.   Endocrine: Negative for cold intolerance and heat intolerance.   Genitourinary: Negative for decreased urine volume, difficulty urinating, dysuria and hematuria.   Musculoskeletal:  Negative for back pain and neck pain.   Skin: Negative for color change and pallor.   Allergic/Immunologic: Negative for environmental allergies, food allergies and immunocompromised state.   Neurological: Positive for dizziness, syncope and light-headedness. Negative for tremors, seizures, facial asymmetry, speech difficulty, weakness, numbness and headaches.   Hematological: Negative for adenopathy. Does not bruise/bleed easily.   Psychiatric/Behavioral: Negative for behavioral problems, confusion and hallucinations.        Past Medical History:   Diagnosis Date   • Anemia    • Arthritis    • Atherosclerotic heart disease of native coronary artery with other forms of angina pectoris    • Bipolar I disorder, single manic episode     depressive d(NOS)   • Cervical disc herniation    • Coronary artery disease    • Depression     NO SUICIDAL PLANS   • Difficulty swallowing    • Diverticular disease    • Dyspepsia    • Dysphagia    • Gastric reflux    • Heart murmur    • Hiatal hernia    • High cholesterol    • History of transfusion    • 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    HPV positive on pap LGSIL   • Hyperlipidemia    • Hypertension    • Hypothyroidism    • Incontinence in female     wears pads   • Incontinence in female    • Kidney disease 2017    stage 2    • Kidney disease, chronic, stage III (GFR 30-59 ml/min)    • LGSIL on Pap smear of cervix 04/29/2016    LGSIL HPV positive   • Myocardial infarction 2000   • Neck pain    • Obesity    • Past myocardial infarction 05/2000   • Periodic limb movement disorder    • Restless leg syndrome    • Sleep apnea     cpap   • Stress fracture     LEFT HEEL   • Suicidal ideation 08/19/2016    history   • Swelling of ankle     right had doppler no s/s blood clot   • Thyroid nodule      Past Surgical History:   Procedure Laterality Date   • ANTERIOR CERVICAL DISCECTOMY W/ FUSION N/A 12/29/2016    Procedure: C3-4 anterior cervical discectomy and fusion with [employer]  micro plate, ALLOGRAFT C3-4, AND HARDWARE REMOVAL C4-7.;  Surgeon: Hema Godwin MD;  Location: [facility] MAIN OR;  Service:    • CARDIAC CATHETERIZATION N/A 3/30/2017    Procedure: Left Heart Cath;  Surgeon: Tracey Vargas MD;  Location:  [facility] CATH INVASIVE LOCATION;  Service:    • CARDIAC CATHETERIZATION N/A 3/30/2017    Procedure: Coronary angiography;  Surgeon: Tracey Vargas MD;  Location[facility] C[facility];  Service:    • CARDIAC CATHETERIZATION N/A 3/30/2017    Procedure: Left ventriculography;  Surgeon: Tracey Vargas MD;  Location:  [facility] CATH INVASIVE LOCATION;  Service:    • CARDIAC CATHETERIZATION  3/30/2017    Procedure: Functional Flow [place];  Surgeon: Tracey Vargas MD;  [facility];  Service:    • CERVICAL BIOPSY  MMXVI    Dr. Jeffery.    • CERVICAL DISCECTOMY ANTERIOR  04/2013    C4-7   • COLONOSCOPY  04/20/2015    Diverticulosis, IH   • COLPOSCOPY W/ BIOPSY / CURETTAGE  06/17/2016    LGSIL HPV positive. Results were normal repeat pap in one year. Chronic Cervicitis   • CORONARY ANGIOPLASTY WITH STENT PLACEMENT     • ENDOSCOPY  MMXV    Normal.  Dr. Rodriguez   • ENDOSCOPY N/A 6/22/2017    Erythematous mucosa in the stomach  PATH: Chronic active gastritis, moderate with intestinal metaplasia    • GASTRIC BYPASS      Done using the sleeve technique   • HARDWARE REMOVAL  12/29/2016    cervical   • SHOULDER SURGERY Left     RCR   • TONSILLECTOMY     • TONSILLECTOMY AND ADENOIDECTOMY     • TRANSVAGINAL TAPING SUSPENSION N/A 12/6/2017    Procedure: MID URETHRAL SLING CYSTSCOPY;  Surgeon: Abby Méndez MD;  Location: [facility] MAIN OR;  Service:    • TUBAL ABDOMINAL LIGATION     • TYMPANOSTOMY TUBE PLACEMENT Right    • WRIST SURGERY Bilateral     carpal tunnel     Family History   Problem Relation Age of Onset   • Diabetes type II Mother    • Hypertension Mother    • Osteoporosis Mother    • Seizures Mother    • COPD Father    • Hypertension Father    • Lung  cancer Father    • Heart attack Father    • Liver cancer Father    • Thyroid disease Sister    • Hypertension Sister    • Bipolar disorder Sister    • Depression Sister    • No Known Problems Son    • Abnormal EKG Daughter    • Hypertension Daughter    • Bipolar disorder Daughter    • No Known Problems Paternal Grandfather    • No Known Problems Paternal Grandmother    • No Known Problems Maternal Grandmother    • No Known Problems Maternal Grandfather    • Thyroid disease Sister    • Hypertension Sister    • Bipolar disorder Sister    • Asthma Daughter    • Breast cancer Neg Hx    • Ovarian cancer Neg Hx    • Uterine cancer Neg Hx    • Colon cancer Neg Hx    • Malig Hyperthermia Neg Hx      Social History   Substance Use Topics   • Smoking status: Former Smoker     Packs/day: 1.50     Years: 20.00     Types: Electronic Cigarette, Cigarettes     Quit date: 2015   • Smokeless tobacco: Never Used      Comment: Electronic Cigarette/ 3 mg nicotine    • Alcohol use Yes      Comment: 2 times yearly      Prescriptions Prior to Admission   Medication Sig Dispense Refill Last Dose   • buPROPion XL (WELLBUTRIN XL) 300 MG 24 hr tablet Take 300 mg by mouth Every Morning.   3/29/2018 at Unknown time   • Cholecalciferol (VITAMIN D3) 5000 UNITS capsule capsule Take 5,000 Units by mouth Daily.   3/29/2018 at Unknown time   • fluticasone (FLONASE) 50 MCG/ACT nasal spray 2 sprays into each nostril Daily. As needed allergies 1 bottle 6 Taking   • gabapentin (NEURONTIN) 300 MG capsule Take 1 capsule by mouth 3 (Three) Times a Day. 90 capsule 2 3/29/2018 at Unknown time   • isosorbide mononitrate (IMDUR) 30 MG 24 hr tablet Take 30 mg by mouth Daily.   3/29/2018 at Unknown time   • lamoTRIgine (LaMICtal) 150 MG tablet Take 300 mg by mouth Daily.   3/29/2018 at Unknown time   • levothyroxine (SYNTHROID, LEVOTHROID) 100 MCG tablet Take 100 mcg by mouth Daily.   3/29/2018 at Unknown time   • lithium (ESKALITH) 450 MG CR tablet    3/29/2018  at Unknown time   • nitroglycerin (NITROSTAT) 0.4 MG SL tablet Place 0.4 mg under the tongue Every 5 (Five) Minutes As Needed for Chest Pain (took at M.D  office at 0930 a.m.). Take no more than 3 doses in 15 minutes.   3/30/2018 at Unknown time   • omeprazole (PRILOSEC) 40 MG capsule Take 1 capsule by mouth Daily.   3/29/2018 at Unknown time   • oxybutynin XL (DITROPAN XL) 15 MG 24 hr tablet    3/29/2018 at Unknown time   • pramipexole (MIRAPEX) 0.5 MG tablet Take 0.5 mg by mouth Every Night.   3/29/2018 at Unknown time   • simvastatin (ZOCOR) 20 MG tablet Take 1 tablet by mouth Every Night. (Patient taking differently: Take 40 mg by mouth Every Night.) 30 tablet 5 3/29/2018 at Unknown time   • venlafaxine (EFFEXOR) 75 MG tablet Take 75 mg by mouth Every Morning.   3/29/2018 at Unknown time   • acetaminophen (TYLENOL) 325 MG tablet Take 650 mg by mouth Every 6 (Six) Hours As Needed for Mild Pain .   Past Month at Unknown time   • ARIPiprazole (ABILIFY) 10 MG tablet Take 10 mg by mouth Daily.   3/29/2018 at Unknown time   • atenolol (TENORMIN) 50 MG tablet Take 1 tablet by mouth Daily. 30 tablet 6 Unknown at Unknown time   • loratadine (CLARITIN) 10 MG tablet Take 1 tablet by mouth Daily. As needed for allergies (Patient taking differently: Take 10 mg by mouth Daily As Needed. As needed for allergies) 30 tablet 11 More than a month at Unknown time   • simvastatin (ZOCOR) 20 MG tablet TAKE ONE TABLET BY MOUTH ONCE NIGHTLY 30 tablet 5      Allergies:  Review of patient's allergies indicates no known allergies.    Objective      Vital Signs  Temp:  [98.1 °F (36.7 °C)-98.3 °F (36.8 °C)] 98.1 °F (36.7 °C)  Heart Rate:  [60-70] 67  Resp:  [15-16] 16  BP: (101-154)/() 116/85    Physical Exam   Constitutional: She is oriented to person, place, and time. She appears well-developed and well-nourished. No distress.   HENT:   Head: Normocephalic.   Mouth/Throat: Oropharynx is clear and moist. No oropharyngeal exudate.    Abrasions on chin, lips, nose  Small lac inside upper lip   Eyes: Conjunctivae and EOM are normal. Pupils are equal, round, and reactive to light. Right eye exhibits no discharge. Left eye exhibits no discharge. No scleral icterus.   Neck: Normal range of motion. Neck supple. No tracheal deviation present. No thyromegaly present.   Cardiovascular: Normal rate, regular rhythm, normal heart sounds and intact distal pulses.    No murmur heard.  Pulmonary/Chest: Effort normal and breath sounds normal. No respiratory distress.   Abdominal: Soft. Bowel sounds are normal. She exhibits no distension. There is no tenderness.   Musculoskeletal: She exhibits no edema.   Splint to left wrist  Splints to left 3rd and 4th digits  Good ROM and sensation in all 10 digits  Swelling and ecchymosis to dorsum of right hand around ulnar aspect  Scattered abrasions both hands   Lymphadenopathy:     She has no cervical adenopathy.   Neurological: She is alert and oriented to person, place, and time. No cranial nerve deficit. She exhibits normal muscle tone.   Skin: Skin is warm and dry. Capillary refill takes less than 2 seconds. She is not diaphoretic. No erythema.   Psychiatric: She has a normal mood and affect. Her behavior is normal. Thought content normal.   Nursing note and vitals reviewed.      Results Review:   I reviewed the patient's new clinical results.  I reviewed the patient's new imaging results and agree with the interpretation.  I reviewed the patient's other test results and agree with the interpretation  I personally viewed and interpreted the patient's EKG/Telemetry data  Discussed with patient, family x 3, and Lorena NP      Assessment/Plan     Principal Problem:    Syncope and collapse  Active Problems:    Gastroesophageal reflux disease    Bipolar I disorder, single manic episode    Essential hypertension    Hypothyroidism (acquired)    ROCKY (obstructive sleep apnea)    Coronary artery disease involving native  coronary artery of native heart without angina pectoris    Chronic renal insufficiency, stage III (moderate)    Tobacco abuse    Closed fracture of left distal radius    Closed displaced fracture of neck of right fifth metacarpal bone          Plan:   (Hold Atenolol  Telemetry monitoring  Card consult  IVFs and FINESSE hose  Will ask Hand Surg to see regarding multiple hand/wrist injuries  Further orders to follow as suggested by evolving hospital course).       I discussed the patients findings and my recommendations with patient, family, nursing staff and consulting provider    Dio Ho MD  04/01/18  11:59 PM    Time: 45min   Pt seen/evaluated prior to midnight on 4/1, but parts of documentation are taking place after midnight on 4/2

## 2018-04-02 NOTE — PLAN OF CARE
Problem: Patient Care Overview  Goal: Plan of Care Review  Outcome: Ongoing (interventions implemented as appropriate)   04/02/18 0448   Coping/Psychosocial   Plan of Care Reviewed With patient   Plan of Care Review   Progress no change   OTHER   Outcome Summary Pt admitted from er this evening. IVF, SCD's. Prn meds or pain. Cardio and hand surg to see pt today. Will continue to monitor     Goal: Individualization and Mutuality  Outcome: Ongoing (interventions implemented as appropriate)    Goal: Discharge Needs Assessment  Outcome: Ongoing (interventions implemented as appropriate)    Goal: Interprofessional Rounds/Family Conf  Outcome: Ongoing (interventions implemented as appropriate)      Problem: Pain, Acute (Adult)  Goal: Identify Related Risk Factors and Signs and Symptoms  Outcome: Outcome(s) achieved Date Met: 04/02/18    Goal: Acceptable Pain Control/Comfort Level  Outcome: Ongoing (interventions implemented as appropriate)

## 2018-04-02 NOTE — PROGRESS NOTES
Name: Keri Nielson ADMIT: 2018   : 1960  PCP: James Epley, APRN    MRN: [id number] LOS: 1 days   AGE/SEX: 57 y.o. female    ROOM: [facility]/    Subjective   Fall  Wrist fracture    Brief hospital course since admission:      I have reviewed past medical history, social hisotory, family history, allergies.  No changes from admission note.      Review of Systems   Constitutional: Negative for activity change, appetite change, chills and fatigue.   HENT: Negative for congestion, ear discharge, mouth sores, nosebleeds, sneezing, sore throat and trouble swallowing.    Eyes: Negative for discharge, itching and visual disturbance.   Respiratory: Negative for apnea, cough, chest tightness, shortness of breath, wheezing and stridor.    Cardiovascular: Negative for chest pain, palpitations and leg swelling.   Gastrointestinal: Negative for abdominal distention, abdominal pain, blood in stool, constipation, diarrhea, nausea and vomiting.   Endocrine: Negative for cold intolerance, heat intolerance and polydipsia.   Genitourinary: Negative for difficulty urinating and frequency.   Musculoskeletal: Positive for joint swelling. Negative for arthralgias, back pain, gait problem and neck stiffness.   Skin: Negative for color change, pallor and rash.   Neurological: Negative for dizziness, seizures, syncope, facial asymmetry, weakness, numbness and headaches.   Hematological: Negative for adenopathy. Does not bruise/bleed easily.   Psychiatric/Behavioral: Negative for agitation, behavioral problems and hallucinations. Decreased concentration: wrist pain.        Objective   Vital Signs  Temp:  [98.1 °F (36.7 °C)-98.3 °F (36.8 °C)] 98.1 °F (36.7 °C)  Heart Rate:  [60-70] 70  Resp:  [15-16] 16  BP: ()/() 101/69  SpO2:  [94 %-96 %] 95 %  on  Flow (L/min):  [1] 1;   Device (Oxygen Therapy): nasal cannula  Body mass index is 39.05 kg/m².    Intake/Output Summary (Last 24 hours) at 18 1343  Last data filed  at 04/02/18 0446   Gross per 24 hour   Intake                0 ml   Output              350 ml   Net             -350 ml       Physical Exam   Constitutional: She is oriented to person, place, and time. She appears well-developed and well-nourished.   HENT:   Head: Atraumatic.   Eyes: Pupils are equal, round, and reactive to light. No scleral icterus.   Cardiovascular: Normal rate and regular rhythm.    Pulmonary/Chest: No accessory muscle usage. No respiratory distress. She has no decreased breath sounds. She has no wheezes.   Abdominal: Soft. Normal appearance and bowel sounds are normal. She exhibits no ascites. There is no hepatosplenomegaly.   Musculoskeletal:   Splint to left wrist  Splints to left 3rd and 4th digits  Good ROM and sensation in all 10 digits  Swelling and ecchymosis to dorsum of right hand around ulnar aspect  Scattered abrasions both hands    Neurological: She is alert and oriented to person, place, and time.   Skin: No laceration and no petechiae noted. No cyanosis. Nails show no clubbing.   Psychiatric: She has a normal mood and affect. Her behavior is normal.   Vitals reviewed.      Results Review:      Results from last 7 days  Lab Units 04/02/18  0635 04/01/18  1858 03/30/18  0841   WBC 10*3/mm3 8.82 9.16 6.66   HEMOGLOBIN g/dL 11.1* 13.0 12.3   HEMATOCRIT % 35.8 42.5 39.0   PLATELETS 10*3/mm3 258 302 281       Results from last 7 days  Lab Units 04/02/18  0635 04/01/18  1858 03/30/18  0841   SODIUM mmol/L 142 141 143   POTASSIUM mmol/L 3.8 4.1 4.0   CHLORIDE mmol/L 107 102 105   CO2 mmol/L 24.1 27.4 27.9   BUN mg/dL 6 9 9   CREATININE mg/dL 0.89 1.06* 0.93   GLUCOSE mg/dL 91 97 98   CALCIUM mg/dL 8.6 9.8 9.5         Hemoglobin A1C:  Lab Results   Component Value Date    HGBA1C 5.30 03/27/2017     Glucose Range:No results found for: POCGLU      ARIPiprazole 10 mg Oral Daily   atorvastatin 20 mg Oral Daily   buPROPion  mg Oral QAM   gabapentin 300 mg Oral TID   isosorbide mononitrate  30 mg Oral Daily   lamoTRIgine 300 mg Oral Daily   levothyroxine 100 mcg Oral Daily   lithium 450 mg Oral Q12H   oxybutynin XL 15 mg Oral Daily   pantoprazole 40 mg Oral QAM   pramipexole 0.5 mg Oral Nightly   venlafaxine 75 mg Oral QAM       sodium chloride 100 mL/hr Last Rate: 100 mL/hr (04/02/18 1129)   Diet Regular; Cardiac  Assessment/Plan       Assessment/Plan      Active Hospital Problems (** Indicates Principal Problem)    Diagnosis Date Noted   • **Syncope and collapse [R55] 04/01/2018   • Closed fracture of left distal radius [S52.502A] 04/01/2018   • Closed displaced fracture of neck of right fifth metacarpal bone [S62.336A] 04/01/2018   • Tobacco abuse [Z72.0] 10/29/2017   • Chronic renal insufficiency, stage III (moderate) [N18.3] 10/08/2017   • Coronary artery disease involving native coronary artery of native heart without angina pectoris [I25.10] 11/14/2016   • ROCKY (obstructive sleep apnea) [G47.33]    • Hypothyroidism (acquired) [E03.9] 06/29/2016   • Gastroesophageal reflux disease [K21.9] 04/29/2016   • Bipolar I disorder, single manic episode [F30.9] 04/29/2016   • Essential hypertension [I10] 04/29/2016      Resolved Hospital Problems    Diagnosis Date Noted Date Resolved   No resolved problems to display.     [facility] has already seen her and cleared for surgery  Consult Orthopedic surgery       Sabino Brown MD  [facility]  04/02/18

## 2018-04-02 NOTE — ED PROVIDER NOTES
The [facility]RE[facility] and I have discussed this patient's history, physical exam, and treatment plan. I have reviewed the documentation and personally had a face to face interaction with the patient  I affirm the documentation and agree with the treatment and plan.  The following describes my personal findings.    The patient presents complaining of multiple injuries s/p near syncope and collapse. She advised she was carrying a basket going down some steps and lost her balance down due to light headedness/syncope and fell down the stairs. She attempted to catch her self with her hands out front of her on the ground. Pt denies neck/back pain, SOA, abd pain, pain to legs, but reports left wrist and bilat hand pain, facial abrasion/contusion.    Limited physical exam:  Patient is nontoxic appearing  Lungs/cardiovascular: CTAB, RRR  Abdomen: soft nontender, positive BS  Back/extremities: Left hand:deformity to wrist, deformity at her PIP joints on 3 & 4 fingers with distal capillary refill intact. Right hand: ecchymosis/TTpalp over 4 & 5 metacarpals with intact distal sensation   Good ROM of BLE  HENT: abrasion to chin and lip  Right lower anterior ribs mildly tender, lungs CTA bilat        Documentation assistance provided by jane Streeter.  Information recorded by the scribe was done at my direction and has been verified and validated by me.         Monse Streeter  04/01/18 5582       Zayda Duque MD  04/02/18 6824

## 2018-04-02 NOTE — PROGRESS NOTES
Continued Stay Note  [facility]     Patient Name: Keri Nielson  MRN: [id number]  Today's Date: 4/2/2018    Admit Date: 4/1/2018          Discharge Plan     Row Name 04/02/18 1539       Plan    Plan Home - follow for post op needs    Patient/Family in Agreement with Plan yes    Plan Comments No IMM ([place]). Spoke with patient at bedside. Introduced self and role. Patient lives with her Mom in an apt and helps with her care. Patient says no stairs and no home concerns. She has not used HH or rehab. She has no advanced directives and says her two sisters Ariella Hill [phone] and Annel Murphy [phone] are her decision makers if she is not able. They are also her emergency contacts and her transportation home. Confirmed face sheet correct. PCP is James Epley APRN and her pharmacy is [facility]. Patient plans to return home. She has both arms wrapped and finger splints on and is not able to do for herself. She says MD plans OR on both arms/hands. Patient says her sisters and daughter Karla Wood (she has her phone number in her cell phone but is not able to use it) will be able to assist them at home. CCP to follow up with patient for post op needs. Patient says she is not considering rehab at this time.               Discharge Codes    No documentation.           Damien Valdivia RN  Discharge Planning Assessment  [facility]     Patient Name: Keri Nielson  MRN: [id number]  Today's Date: 4/2/2018    Admit Date: 4/1/2018          Discharge Needs Assessment     Row Name 04/02/18 1534       Living Environment    Lives With parent(s)    Unique Family Situation Patient's mom lives with her in an apt and patient cares for her mom. Patient says her sisters and daughter can assist as needed,     Current Living Arrangements home/apartment/condo   Apt.    Primary Care Provided by self    Provides Primary Care For parent(s)   Her mom    Family Caregiver if Needed  sibling(s);child(krista), adult    Family Caregiver Names Ariella Hill/sister, Annel Murphy/sister and Karla Wood daughter     Quality of Family Relationships helpful;involved;supportive    Able to Return to Prior Arrangements other (see comments)    Living Arrangement Comments Patient will be having surgery - both arms are wrapped - will need to reasses after surgery       Resource/Environmental Concerns    Resource/Environmental Concerns none    Transportation Concerns car, none       Transition Planning    Patient/Family Anticipates Transition to home with family    Patient/Family Anticipated Services at Transition none    Transportation Anticipated family or friend will provide       Discharge Needs Assessment    Concerns to be Addressed basic needs;discharge planning;home safety    Equipment Currently Used at Home none    Anticipated Changes Related to Illness inability to care for self   Will patient be able to care for self and her mother after surgery.    Equipment Needed After Discharge none    Offered/Gave Vendor List no    Current Discharge Risk dependent with mobility/activities of daily living            Discharge Plan     Row Name 04/02/18 2502       Plan    Plan Home - follow for post op needs    Patient/Family in Agreement with Plan yes    Plan Comments No IMM ([place]).         Destination     No service coordination in this encounter.      Durable Medical Equipment     No service coordination in this encounter.      Dialysis/Infusion     No service coordination in this encounter.      Home Medical Care     No service coordination in this encounter.      Social Care     No service coordination in this encounter.                Demographic Summary     Row Name 04/02/18 5026       General Information    Admission Type inpatient    Arrived From home    Referral Source admission list    Reason for Consult discharge planning    Preferred Language English     Used During This Interaction no        Contact Information    Permission Granted to Share Info With     Contact Information Obtained for             Functional Status     Row Name 04/02/18 1533       Functional Status    Usual Activity Tolerance good    Current Activity Tolerance moderate    Functional Status Comments Patient has both arms wrapped and surgery is planned.        Functional Status, IADL    Medications independent    Meal Preparation independent    Housekeeping independent    Laundry independent    Shopping independent       Mental Status    General Appearance WDL WDL       Mental Status Summary    Recent Changes in Mental Status/Cognitive Functioning no changes            Psychosocial    No documentation.           Abuse/Neglect    No documentation.           Legal    No documentation.           Substance Abuse    No documentation.           Patient Forms    No documentation.         Daimen Valdivia, RN

## 2018-04-02 NOTE — PLAN OF CARE
Problem: Patient Care Overview  Goal: Plan of Care Review  Outcome: Ongoing (interventions implemented as appropriate)   04/02/18 0781   Coping/Psychosocial   Plan of Care Reviewed With patient   Plan of Care Review   Progress no change   OTHER   Outcome Summary IVF continued, up to [facility], dr arias with ortho to see patient tomorrow, cards ok'd if sx needed, vss, will continue to monitor      Goal: Individualization and Mutuality  Outcome: Ongoing (interventions implemented as appropriate)    Goal: Discharge Needs Assessment  Outcome: Ongoing (interventions implemented as appropriate)    Goal: Interprofessional Rounds/Family Conf  Outcome: Ongoing (interventions implemented as appropriate)      Problem: Pain, Acute (Adult)  Goal: Acceptable Pain Control/Comfort Level  Outcome: Ongoing (interventions implemented as appropriate)      Problem: Fall Risk (Adult)  Goal: Identify Related Risk Factors and Signs and Symptoms  Outcome: Ongoing (interventions implemented as appropriate)    Goal: Absence of Fall  Outcome: Ongoing (interventions implemented as appropriate)

## 2018-04-02 NOTE — CONSULTS
Date of Hospital Visit: 18  Encounter Provider: ERIN Quispe  Place of Service: [facility] CARDIOLOGY  Patient Name: Keri Nielson  :1960  Referral Provider: Dio Ho MD    Chief complaint    History of Present Illness    She is a patient of Dr. Benson with a past cardiac history significant for coronary artery disease.  Her last cardiac catheterization was on 3/30/2017.  This showed a normal left main, patent mid LAD stent with a 20% in-stent restenosis, 30% distal LAD, normal ramus with an 80% stenosis of a small secondary branch, 10% proximal circumflex, 30% proximal stenosis and a large OM 1, 50-60% proximal RCA (FFR of this lesion was 0.94), 20-30% diffuse mid RCA, and a 30% proximal PDA lesion.  Her EF at that time was 60%.  Her last echocardiogram was in 2016.  This showed normal LV function with no valvular abnormalities.  On 3/30/2018 she presented to the emergency room with complaints of continuous chest pain that was not relieved with sublingual nitroglycerin.  She recently had a lap band surgery.  A nuclear stress patent test was normal with no evidence of ischemia.  It was felt that her constant pain was from her recent surgery.  Her atenolol dose was increased for her hypertension and pain.  She was discharged home.  Then on 2018 she presented to the emergency room after becoming lightheaded and falling down some stairs.  She did sustain multiple left hand and wrist injuries as well as some other minor injuries.  We are being asked to see her in consultation.   She states that when she fell, she had bent over to  a basket and then carried the basket down the stairs.  On the stairs she felt lightheaded and stumbled and then fell.  She states she never lost consciousness.  She denies any chest pain, shortness of breath, palpitations, or edema.  She has not had anymore chest pain since she was then on 3/30/2018.      Past  Medical History:   Diagnosis Date   • Anemia    • Arthritis    • Atherosclerotic heart disease of native coronary artery with other forms of angina pectoris    • Bipolar I disorder, single manic episode     depressive d(NOS)   • Cervical disc herniation    • Coronary artery disease    • Depression     NO SUICIDAL PLANS   • Difficulty swallowing    • Diverticular disease    • Dyspepsia    • Dysphagia    • Gastric reflux    • Heart murmur    • Hiatal hernia    • High cholesterol    • History of transfusion    • 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    HPV positive on pap LGSIL   • Hyperlipidemia    • Hypertension    • Hypothyroidism    • Incontinence in female     wears pads   • Incontinence in female    • Kidney disease 2017    stage 2    • Kidney disease, chronic, stage III (GFR 30-59 ml/min)    • LGSIL on Pap smear of cervix 04/29/2016    LGSIL HPV positive   • Myocardial infarction 2000   • Neck pain    • Obesity    • Past myocardial infarction 05/2000   • Periodic limb movement disorder    • Restless leg syndrome    • Sleep apnea     cpap   • Stress fracture     LEFT HEEL   • Suicidal ideation 08/19/2016    history   • Swelling of ankle     right had doppler no s/s blood clot   • Thyroid nodule        Past Surgical History:   Procedure Laterality Date   • ANTERIOR CERVICAL DISCECTOMY W/ FUSION N/A 12/29/2016    Procedure: C3-4 anterior cervical discectomy and fusion with Depuy micro plate, ALLOGRAFT C3-4, AND HARDWARE REMOVAL C4-7.;  Surgeon: Hema Godwin MD;  Location: [facility];  Service:    • CARDIAC CATHETERIZATION N/A 3/30/2017    Procedure: Left Heart Cath;  Surgeon: Tracey Vargas MD;  Location: [facility] CATH INVASIVE LOCATION;  Service:    • CARDIAC CATHETERIZATION N/A 3/30/2017    Procedure: Coronary angiography;  Surgeon: Tracey Vargas MD;  Location: [facility] INVASIVE LOCATION;  Service:    • CARDIAC CATHETERIZATION N/A 3/30/2017    Procedure: Left ventriculography;  Surgeon:  Tracey Vargas MD;  Location: [facility] CATH INVASIVE LOCATION;  Service:    • CARDIAC CATHETERIZATION  3/30/2017    Procedure: Functional Flow [place];  Surgeon: Tracey Vargas MD;  [facility];  Service:    • CERVICAL BIOPSY  MMXVI    Dr. Jeffery.    • CERVICAL DISCECTOMY ANTERIOR  04/2013    C4-7   • COLONOSCOPY  04/20/2015    Diverticulosis, IH   • COLPOSCOPY W/ BIOPSY / CURETTAGE  06/17/2016    LGSIL HPV positive. Results were normal repeat pap in one year. Chronic Cervicitis   • CORONARY ANGIOPLASTY WITH STENT PLACEMENT     • ENDOSCOPY  MMXV    Normal.  Dr. Rodriguez   • ENDOSCOPY N/A 6/22/2017    Erythematous mucosa in the stomach  PATH: Chronic active gastritis, moderate with intestinal metaplasia    • GASTRIC BYPASS      Done using the sleeve technique   • HARDWARE REMOVAL  12/29/2016    cervical   • SHOULDER SURGERY Left     RCR   • TONSILLECTOMY     • TONSILLECTOMY AND ADENOIDECTOMY     • TRANSVAGINAL TAPING SUSPENSION N/A 12/6/2017    Procedure: MID URETHRAL SLING CYSTSCOPY;  Surgeon: Abby Méndez MD;  Location: [facility] OR;  Service:    • TUBAL ABDOMINAL LIGATION     • TYMPANOSTOMY TUBE PLACEMENT Right    • WRIST SURGERY Bilateral     carpal tunnel       Prior to Admission medications    Medication Sig Start Date End Date Taking? Authorizing Provider   buPROPion XL (WELLBUTRIN XL) 300 MG 24 hr tablet Take 300 mg by mouth Every Morning.   Yes Historical Provider, MD   Cholecalciferol (VITAMIN D3) 5000 UNITS capsule capsule Take 5,000 Units by mouth Daily.   Yes Historical Provider, MD   fluticasone (FLONASE) 50 MCG/ACT nasal spray 2 sprays into each nostril Daily. As needed allergies 10/25/17  Yes James Epley, APRN   gabapentin (NEURONTIN) 300 MG capsule Take 1 capsule by mouth 3 (Three) Times a Day. 11/22/17  Yes James Epley, APRN   isosorbide mononitrate (IMDUR) 30 MG 24 hr tablet Take 30 mg by mouth Daily.   Yes Historical Provider, MD   lamoTRIgine (LaMICtal) 150  MG tablet Take 300 mg by mouth Daily.   Yes Historical Provider, MD   levothyroxine (SYNTHROID, LEVOTHROID) 100 MCG tablet Take 100 mcg by mouth Daily.   Yes Historical Provider, MD   lithium (ESKALITH) 450 MG CR tablet  2/14/18  Yes Historical Provider, MD   nitroglycerin (NITROSTAT) 0.4 MG SL tablet Place 0.4 mg under the tongue Every 5 (Five) Minutes As Needed for Chest Pain (took at M.D  office at 0930 a.m.). Take no more than 3 doses in 15 minutes.   Yes Historical Provider, MD   omeprazole (PRILOSEC) 40 MG capsule Take 1 capsule by mouth Daily. 1/29/18  Yes James Epley, APRN   oxybutynin XL (DITROPAN XL) 15 MG 24 hr tablet  1/4/18  Yes Historical Provider, MD   pramipexole (MIRAPEX) 0.5 MG tablet Take 0.5 mg by mouth Every Night.   Yes Historical Provider, MD   simvastatin (ZOCOR) 20 MG tablet Take 1 tablet by mouth Every Night.  Patient taking differently: Take 40 mg by mouth Every Night. 6/20/17  Yes James Epley, APRN   venlafaxine (EFFEXOR) 75 MG tablet Take 75 mg by mouth Every Morning.   Yes Historical Provider, MD   acetaminophen (TYLENOL) 325 MG tablet Take 650 mg by mouth Every 6 (Six) Hours As Needed for Mild Pain .    Historical Provider, MD   ARIPiprazole (ABILIFY) 10 MG tablet Take 10 mg by mouth Daily. 9/25/17   Historical Provider, MD   atenolol (TENORMIN) 50 MG tablet Take 1 tablet by mouth Daily. 3/30/18   Rufus Vinson MD   loratadine (CLARITIN) 10 MG tablet Take 1 tablet by mouth Daily. As needed for allergies  Patient taking differently: Take 10 mg by mouth Daily As Needed. As needed for allergies 10/25/17   James Epley, APRN   simvastatin (ZOCOR) 20 MG tablet TAKE ONE TABLET BY MOUTH ONCE NIGHTLY 3/28/18 4/2/18  James Epley, APRN       Social History     Social History   • Marital status:      Spouse name: N/A   • Number of children: 3   • Years of education: N/A     Occupational History   • Not on file.     Social History Main Topics   • Smoking status: Former Smoker      Packs/day: 1.50     Years: 20.00     Types: Electronic Cigarette, Cigarettes     Quit date: 2015   • Smokeless tobacco: Never Used      Comment: Electronic Cigarette/ 3 mg nicotine    • Alcohol use Yes      Comment: 2 times yearly    • Drug use: No   • Sexual activity: Not on file     Other Topics Concern   • Not on file     Social History Narrative   • No narrative on file       Family History   Problem Relation Age of Onset   • Diabetes type II Mother    • Hypertension Mother    • Osteoporosis Mother    • Seizures Mother    • COPD Father    • Hypertension Father    • Lung cancer Father    • Heart attack Father    • Liver cancer Father    • Thyroid disease Sister    • Hypertension Sister    • Bipolar disorder Sister    • Depression Sister    • No Known Problems Son    • Abnormal EKG Daughter    • Hypertension Daughter    • Bipolar disorder Daughter    • No Known Problems Paternal Grandfather    • No Known Problems Paternal Grandmother    • No Known Problems Maternal Grandmother    • No Known Problems Maternal Grandfather    • Thyroid disease Sister    • Hypertension Sister    • Bipolar disorder Sister    • Asthma Daughter    • Breast cancer Neg Hx    • Ovarian cancer Neg Hx    • Uterine cancer Neg Hx    • Colon cancer Neg Hx    • Malig Hyperthermia Neg Hx        Review of Systems   Constitutional: Negative for chills, fever and unexpected weight change.   HENT: Negative.    Respiratory: Negative.    Cardiovascular: Negative.    Gastrointestinal: Negative.    Endocrine: Negative.    Musculoskeletal: Negative.    Neurological: Positive for light-headedness.   Psychiatric/Behavioral: Negative.         Objective:     Vitals:    04/01/18 2357 04/02/18 0421 04/02/18 0756 04/02/18 0816   BP: 116/85 115/74 98/61 101/69   BP Location: Left arm  Left arm    Patient Position: Lying  Lying    Pulse: 67 67 66 70   Resp: 16  16    Temp: 98.1 °F (36.7 °C)  98.1 °F (36.7 °C)    TempSrc: Oral  Oral    SpO2: 94% 94% 95%    Weight:  "100 kg (220 lb 7.4 oz)      Height: 160 cm (63\")        Body mass index is 39.05 kg/m².  Flowsheet Rows    Flowsheet Row First Filed Value   Admission Height 160 cm (63\") Documented at 04/01/2018 1846   Admission Weight 97.5 kg (215 lb) Documented at 04/01/2018 1846          Physical Exam   Constitutional: She is oriented to person, place, and time. She appears well-developed and well-nourished. She appears lethargic.   HENT:   Multiple scratches on chin and nose.   Cardiovascular: Normal rate, regular rhythm and normal heart sounds.    Pulmonary/Chest: Effort normal and breath sounds normal.   Abdominal: Soft. Bowel sounds are normal.   Musculoskeletal: She exhibits no edema.   Both forearms wrapped with Ace bandages and splints.   Neurological: She is oriented to person, place, and time. She appears lethargic.   A little groggy from pain medication.   Psychiatric: Her behavior is normal.               Lab Review:                  Results from last 7 days  Lab Units 04/02/18  0635   SODIUM mmol/L 142   POTASSIUM mmol/L 3.8   CHLORIDE mmol/L 107   CO2 mmol/L 24.1   BUN mg/dL 6   CREATININE mg/dL 0.89   GLUCOSE mg/dL 91   CALCIUM mg/dL 8.6       Results from last 7 days  Lab Units 04/01/18  1858 03/30/18  1310 03/30/18  0841   TROPONIN T ng/mL <0.010 <0.010 <0.010       Results from last 7 days  Lab Units 04/02/18  0635   WBC 10*3/mm3 8.82   HEMOGLOBIN g/dL 11.1*   HEMATOCRIT % 35.8   PLATELETS 10*3/mm3 258               Results from last 7 days  Lab Units 04/01/18  1858   MAGNESIUM mg/dL 2.5             I personally viewed and interpreted the patient's EKG/Telemetry data    Assessment/Plan:     1.  Coronary artery disease.  She had a cardiac catheterization in March 2017.  Her LAD stent was patent, FFR of the RCA lesion showed it to be hemodynamically insignificant at 0.94.  She was recently had a nuclear patent stress test on 3/30/2018 which was normal with no evidence of ischemia.  Her chest pain has since " resolved.  Her ECG is unchanged and unremarkable.  From a cardiac standpoint, I'm not recommending any further workup prior to her upcoming hand surgery.    2.  Lightheadedness and subsequent fall.  I think this is consistent with orthostatic hypotension.  Her atenolol has been discontinued, and her blood pressure still remains in the high 90s to low 100s systolic.  Certainly some of this is the pain medication that she is on.  I recommend close monitoring of her blood pressure, if we need to discontinue or decrease the isosorbide we can do that.  Next    I did discuss the plan of care with Dr. Barlow, who is in agreement.    Principal Problem:    Syncope and collapse  Active Problems:    Gastroesophageal reflux disease    Bipolar I disorder, single manic episode    Essential hypertension    Hypothyroidism (acquired)    ROCKY (obstructive sleep apnea)    Coronary artery disease involving native coronary artery of native heart without angina pectoris    Chronic renal insufficiency, stage III (moderate)    Tobacco abuse    Closed fracture of left distal radius    Closed displaced fracture of neck of right fifth metacarpal bone

## 2018-04-03 VITALS
BODY MASS INDEX: 39.06 KG/M2 | SYSTOLIC BLOOD PRESSURE: 98 MMHG | DIASTOLIC BLOOD PRESSURE: 63 MMHG | WEIGHT: 220.46 LBS | HEART RATE: 73 BPM | RESPIRATION RATE: 18 BRPM | HEIGHT: 63 IN | OXYGEN SATURATION: 96 % | TEMPERATURE: 98 F

## 2018-04-03 PROCEDURE — 25600 CLTX DST RDL FX/EPHYS SEP WO: CPT | Performed by: ORTHOPAEDIC SURGERY

## 2018-04-03 PROCEDURE — 99253 IP/OBS CNSLTJ NEW/EST LOW 45: CPT | Performed by: ORTHOPAEDIC SURGERY

## 2018-04-03 RX ORDER — OXYCODONE HYDROCHLORIDE AND ACETAMINOPHEN 5; 325 MG/1; MG/1
1 TABLET ORAL EVERY 6 HOURS PRN
Qty: 30 TABLET | Refills: 0 | Status: SHIPPED | OUTPATIENT
Start: 2018-04-03 | End: 2018-05-24 | Stop reason: ALTCHOICE

## 2018-04-03 RX ADMIN — VENLAFAXINE HYDROCHLORIDE 75 MG: 75 TABLET ORAL at 07:13

## 2018-04-03 RX ADMIN — OXYCODONE HYDROCHLORIDE AND ACETAMINOPHEN 1 TABLET: 5; 325 TABLET ORAL at 07:13

## 2018-04-03 RX ADMIN — OXYBUTYNIN CHLORIDE 15 MG: 5 TABLET, EXTENDED RELEASE ORAL at 09:05

## 2018-04-03 RX ADMIN — SODIUM CHLORIDE 100 ML/HR: 9 INJECTION, SOLUTION INTRAVENOUS at 07:14

## 2018-04-03 RX ADMIN — OXYCODONE HYDROCHLORIDE AND ACETAMINOPHEN 1 TABLET: 5; 325 TABLET ORAL at 13:00

## 2018-04-03 RX ADMIN — LEVOTHYROXINE SODIUM 100 MCG: 100 TABLET ORAL at 09:06

## 2018-04-03 RX ADMIN — GABAPENTIN 300 MG: 300 CAPSULE ORAL at 16:07

## 2018-04-03 RX ADMIN — BUPROPION HYDROCHLORIDE 300 MG: 300 TABLET, EXTENDED RELEASE ORAL at 07:13

## 2018-04-03 RX ADMIN — LAMOTRIGINE 300 MG: 100 TABLET ORAL at 09:06

## 2018-04-03 RX ADMIN — ATORVASTATIN CALCIUM 20 MG: 20 TABLET, FILM COATED ORAL at 09:06

## 2018-04-03 RX ADMIN — GABAPENTIN 300 MG: 300 CAPSULE ORAL at 09:06

## 2018-04-03 RX ADMIN — ARIPIPRAZOLE 10 MG: 10 TABLET ORAL at 09:06

## 2018-04-03 RX ADMIN — PANTOPRAZOLE SODIUM 40 MG: 40 TABLET, DELAYED RELEASE ORAL at 07:13

## 2018-04-03 RX ADMIN — ISOSORBIDE MONONITRATE 30 MG: 30 TABLET ORAL at 09:05

## 2018-04-03 RX ADMIN — LITHIUM CARBONATE 450 MG: 450 TABLET, EXTENDED RELEASE ORAL at 09:06

## 2018-04-03 NOTE — DISCHARGE SUMMARY
Name: Keri Nielson  Age: 57 y.o.  Sex: female  :  1960  MRN: [id number]         Primary Care Physician: James Epley, APRN      Date of Admission:  2018  Date of Discharge:  4/3/2018      CHIEF COMPLAINT     Syncope and Fall      DISCHARGE DIAGNOSIS  Active Hospital Problems (** Indicates Principal Problem)    Diagnosis Date Noted   • **Closed fracture of left distal radius [S52.502A] 2018   • Closed displaced fracture of neck of right fifth metacarpal bone [S62.336A] 2018     Priority: High   • Syncope and collapse [R55] 2018   • Tobacco abuse [Z72.0] 10/29/2017   • Chronic renal insufficiency, stage III (moderate) [N18.3] 10/08/2017   • Coronary artery disease involving native coronary artery of native heart without angina pectoris [I25.10] 2016   • ROCKY (obstructive sleep apnea) [G47.33]    • Hypothyroidism (acquired) [E03.9] 2016   • Gastroesophageal reflux disease [K21.9] 2016   • Bipolar I disorder, single manic episode [F30.9] 2016   • Essential hypertension [I10] 2016      Resolved Hospital Problems    Diagnosis Date Noted Date Resolved   No resolved problems to display.       SECONDARY DIAGNOSES  Past Medical History:   Diagnosis Date   • Anemia    • Arthritis    • Atherosclerotic heart disease of native coronary artery with other forms of angina pectoris    • Bipolar I disorder, single manic episode     depressive d(NOS)   • Cervical disc herniation    • Coronary artery disease    • Depression     NO SUICIDAL PLANS   • Difficulty swallowing    • Diverticular disease    • Dyspepsia    • Dysphagia    • Gastric reflux    • Heart murmur    • Hiatal hernia    • High cholesterol    • History of transfusion    • HPV (human papilloma virus) infection 2016    HPV positive on pap LGSIL   • Hyperlipidemia    • Hypertension    • Hypothyroidism    • Incontinence in female     wears pads   • Incontinence in female    • Kidney disease      stage 2    • Kidney disease, chronic, stage III (GFR 30-59 ml/min)    • LGSIL on Pap smear of cervix 04/29/2016    LGSIL HPV positive   • Myocardial infarction 2000   • Neck pain    • Obesity    • Past myocardial infarction 05/2000   • Periodic limb movement disorder    • Restless leg syndrome    • Sleep apnea     cpap   • Stress fracture     LEFT HEEL   • Suicidal ideation 08/19/2016    history   • Swelling of ankle     right had doppler no s/s blood clot   • Thyroid nodule        CONSULTS   Consulting Physician(s)     Provider Relationship Specialty    Sil Roman MD Consulting Physician Orthopedic Surgery          HOSPITAL COURSE        PHYSICAL EXAM  Temp:  [98 °F (36.7 °C)-98.5 °F (36.9 °C)] 98 °F (36.7 °C)  Heart Rate:  [71-73] 73  Resp:  [16-20] 18  BP: ()/(63-81) 98/63  Body mass index is 39.05 kg/m².  Physical Exam  57-year-old female who had a fall.  She has sustained left distal radius fracture along with the left third and fourth interphalangeal joint dislocation and a right metacarpal fracture.  She also has abrasions of the left hand and face which were cleaned and dressed.  She also has a laceration on the inside of upper lip which has been sutured.  Cardiology also saw the patient in consultation and did not think that it was any cardiac cause for her fall.  Dr. Sil Roman saw the patient and has applied a splint and she will follow in our office next week.  She has been instructed to elevate the hands and also apply ice    CONDITION ON DISCHARGE  Stable.      DISCHARGE DISPOSITION   Home      ALLERGIES  No Known Allergies    RECENT LABS    Results from last 7 days  Lab Units 04/02/18  0635 04/01/18 1858 03/30/18  0841   WBC 10*3/mm3 8.82 9.16 6.66   HEMOGLOBIN g/dL 11.1* 13.0 12.3   HEMATOCRIT % 35.8 42.5 39.0   PLATELETS 10*3/mm3 258 302 281       Results from last 7 days  Lab Units 04/02/18  0635 04/01/18 1858 03/30/18  0841   SODIUM mmol/L 142 141 143   POTASSIUM mmol/L 3.8 4.1  4.0   CHLORIDE mmol/L 107 102 105   CO2 mmol/L 24.1 27.4 27.9   BUN mg/dL 6 9 9   CREATININE mg/dL 0.89 1.06* 0.93   GLUCOSE mg/dL 91 97 98   CALCIUM mg/dL 8.6 9.8 9.5           DIET;  Diet Order   Procedures   • Diet Regular; Cardiac       DISCHARGE MEDICATIONS     Your medication list      START taking these medications      Instructions Last Dose Given Next Dose Due   oxyCODONE-acetaminophen 5-325 MG per tablet  Commonly known as:  PERCOCET      Take 1 tablet by mouth Every 6 (Six) Hours As Needed for Moderate Pain .          CHANGE how you take these medications      Instructions Last Dose Given Next Dose Due   loratadine 10 MG tablet  Commonly known as:  CLARITIN  What changed:   when to take this   reasons to take this   additional instructions      Take 1 tablet by mouth Daily. As needed for allergies       simvastatin 20 MG tablet  Commonly known as:  ZOCOR  What changed:  how much to take      Take 1 tablet by mouth Every Night.          CONTINUE taking these medications      Instructions Last Dose Given Next Dose Due   acetaminophen 325 MG tablet  Commonly known as:  TYLENOL      Take 650 mg by mouth Every 6 (Six) Hours As Needed for Mild Pain .       ARIPiprazole 10 MG tablet  Commonly known as:  ABILIFY      Take 10 mg by mouth Daily.       atenolol 50 MG tablet  Commonly known as:  TENORMIN      Take 1 tablet by mouth Daily.       fluticasone 50 MCG/ACT nasal spray  Commonly known as:  FLONASE      2 sprays into each nostril Daily. As needed allergies       gabapentin 300 MG capsule  Commonly known as:  NEURONTIN      Take 1 capsule by mouth 3 (Three) Times a Day.       isosorbide mononitrate 30 MG 24 hr tablet  Commonly known as:  IMDUR      Take 30 mg by mouth Daily.       lamoTRIgine 150 MG tablet  Commonly known as:  LaMICtal      Take 300 mg by mouth Daily.       levothyroxine 100 MCG tablet  Commonly known as:  SYNTHROID, LEVOTHROID      Take 100 mcg by mouth Daily.       lithium 450 MG CR  tablet  Commonly known as:  ESKALITH           nitroglycerin 0.4 MG SL tablet  Commonly known as:  NITROSTAT      Place 0.4 mg under the tongue Every 5 (Five) Minutes As Needed for Chest Pain (took at M.D  office at 0930 a.m.). Take no more than 3 doses in 15 minutes.       oxybutynin XL 15 MG 24 hr tablet  Commonly known as:  DITROPAN XL           pramipexole 0.5 MG tablet  Commonly known as:  MIRAPEX      Take 0.5 mg by mouth Every Night.       PRILOSEC 40 MG capsule  Generic drug:  omeprazole      Take 1 capsule by mouth Daily.       venlafaxine 75 MG tablet  Commonly known as:  EFFEXOR      Take 75 mg by mouth Every Morning.       vitamin D3 5000 units capsule capsule      Take 5,000 Units by mouth Daily.       WELLBUTRIN  MG 24 hr tablet  Generic drug:  buPROPion XL      Take 300 mg by mouth Every Morning.             Where to Get Your Medications      You can get these medications from any pharmacy    Bring a paper prescription for each of these medications   oxyCODONE-acetaminophen 5-325 MG per tablet        Future Appointments  Date Time Provider Department Center   5/18/2018 2:30 PM Yevgeniy Vallejo MD [facility] None   5/24/2018 3:00 PM David Burroughs MD [facility] None   5/29/2018 1:20 PM James Epley, APRN [facility] None   6/5/2018 1:00 PM Tho Pritchard MD [facility] None     Additional Instructions for the Follow-ups that You Need to Schedule     Ambulatory Referral to Home Health    As directed      Face to Face Visit Date:  4/3/2018    Follow-up Provider for Plan of Care?:  I treated the patient in an acute care facility and will not continue treatment after discharge.    Follow-up Provider:  EPLEY, JAMES [[id number]]    Reason/Clinical Findings:  wrist fracture and phalengel fracture    Describe mobility limitations that make leaving home difficult:  both hand in cast    Nursing/Therapeutic Services Requested:  Skilled Nursing    Skilled nursing orders:  Wound care dressing/changes Medication  education    Frequency:  1 Week 1           Follow-up Information     James Epley, APRN Follow up.    Specialty:  Family Medicine  Contact information:  [address]  [address]  [facility] [id number] [phone]             [facility] [place] [facility] [place] Follow up.    Specialty:  Home Health Services  Contact information:  [address]  [facility] [address]           Sil Roman MD Follow up in 1 week(s).    Specialty:  Orthopedic Surgery  Contact information:  [address]  [address]  [facility] [address]  [phone]                   TEST  RESULTS PENDING AT DISCHARGE  None     CODE STATUS  Full Code        Sabino Brown MD  [facility]  04/03/18      Time: greater than 35 minutes.

## 2018-04-03 NOTE — PLAN OF CARE
Problem: Patient Care Overview  Goal: Plan of Care Review  Outcome: Ongoing (interventions implemented as appropriate)   04/02/18 1518 04/03/18 0449   Coping/Psychosocial   Plan of Care Reviewed With --  patient   Plan of Care Review   Progress no change --    OTHER   Outcome Summary --  VSS. Pt c/o pain, relieved with medication. Ortho to see today. No acute changes, will continue to monitor.     Goal: Individualization and Mutuality  Outcome: Ongoing (interventions implemented as appropriate)    Goal: Discharge Needs Assessment  Outcome: Ongoing (interventions implemented as appropriate)    Goal: Interprofessional Rounds/Family Conf  Outcome: Ongoing (interventions implemented as appropriate)      Problem: Pain, Acute (Adult)  Goal: Acceptable Pain Control/Comfort Level  Outcome: Ongoing (interventions implemented as appropriate)      Problem: Fall Risk (Adult)  Goal: Identify Related Risk Factors and Signs and Symptoms  Outcome: Ongoing (interventions implemented as appropriate)    Goal: Absence of Fall  Outcome: Ongoing (interventions implemented as appropriate)

## 2018-04-03 NOTE — CONSULTS
Orthopedic Consult      Patient: Keri Nielson    Date of Admission: 4/1/2018  7:52 PM    YOB: 1960    Medical Record Number: [id number]    Attending Physician: Sabino Brown MD  Consulting Physician:       Chief Complaints: Syncope and collapse [R55]  Abrasion of face, initial encounter [S00.81XA]  Dislocation of finger, initial encounter [S63.259A]  Fall, initial encounter [W19.XXXA]  Abrasion of right hand, initial encounter [S60.511A]  Abrasion of left hand, initial encounter [S60.512A]  Other closed fracture of distal end of left radius, initial encounter [S52.592A]      History of Present Illness: This is a 57-year-old female who looks much older than her stated age who fell down concrete steps yesterday following a syncopal episode suffering abrasions to her face dislocations of her left third and fourth fingers, left distal radius fracture and contusions and abrasions to both hands.  The syncope is currently being worked up her fingers were reduced in the ER and orthopedics was consult.  Patient states no prior history of injury to either wrist her symptoms and pain are moderate to severe constant    Allergies: No Known Allergies    Medications:   Home Medications:  No current facility-administered medications on file prior to encounter.      Current Outpatient Prescriptions on File Prior to Encounter   Medication Sig   • buPROPion XL (WELLBUTRIN XL) 300 MG 24 hr tablet Take 300 mg by mouth Every Morning.   • Cholecalciferol (VITAMIN D3) 5000 UNITS capsule capsule Take 5,000 Units by mouth Daily.   • fluticasone (FLONASE) 50 MCG/ACT nasal spray 2 sprays into each nostril Daily. As needed allergies   • gabapentin (NEURONTIN) 300 MG capsule Take 1 capsule by mouth 3 (Three) Times a Day.   • isosorbide mononitrate (IMDUR) 30 MG 24 hr tablet Take 30 mg by mouth Daily.   • lamoTRIgine (LaMICtal) 150 MG tablet Take 300 mg by mouth Daily.   • levothyroxine (SYNTHROID, LEVOTHROID) 100 MCG  tablet Take 100 mcg by mouth Daily.   • lithium (ESKALITH) 450 MG CR tablet    • nitroglycerin (NITROSTAT) 0.4 MG SL tablet Place 0.4 mg under the tongue Every 5 (Five) Minutes As Needed for Chest Pain (took at M.D  office at 0930 a.m.). Take no more than 3 doses in 15 minutes.   • omeprazole (PRILOSEC) 40 MG capsule Take 1 capsule by mouth Daily.   • oxybutynin XL (DITROPAN XL) 15 MG 24 hr tablet    • pramipexole (MIRAPEX) 0.5 MG tablet Take 0.5 mg by mouth Every Night.   • simvastatin (ZOCOR) 20 MG tablet Take 1 tablet by mouth Every Night. (Patient taking differently: Take 40 mg by mouth Every Night.)   • venlafaxine (EFFEXOR) 75 MG tablet Take 75 mg by mouth Every Morning.   • acetaminophen (TYLENOL) 325 MG tablet Take 650 mg by mouth Every 6 (Six) Hours As Needed for Mild Pain .   • ARIPiprazole (ABILIFY) 10 MG tablet Take 10 mg by mouth Daily.   • atenolol (TENORMIN) 50 MG tablet Take 1 tablet by mouth Daily.   • loratadine (CLARITIN) 10 MG tablet Take 1 tablet by mouth Daily. As needed for allergies (Patient taking differently: Take 10 mg by mouth Daily As Needed. As needed for allergies)     Current Medications:  Scheduled Meds:  ARIPiprazole 10 mg Oral Daily   atorvastatin 20 mg Oral Daily   buPROPion  mg Oral QAM   gabapentin 300 mg Oral TID   isosorbide mononitrate 30 mg Oral Daily   lamoTRIgine 300 mg Oral Daily   levothyroxine 100 mcg Oral Daily   lithium 450 mg Oral Q12H   oxybutynin XL 15 mg Oral Daily   pantoprazole 40 mg Oral QAM   pramipexole 0.5 mg Oral Nightly   venlafaxine 75 mg Oral QAM     Continuous Infusions:  sodium chloride 100 mL/hr Last Rate: 100 mL/hr (04/03/18 0714)     PRN Meds:.•  acetaminophen  •  ondansetron  •  oxyCODONE-acetaminophen  •  sodium chloride  •  sodium chloride  •  Insert peripheral IV **AND** sodium chloride    Past Medical History:   Diagnosis Date   • Anemia    • Arthritis    • Atherosclerotic heart disease of native coronary artery with other forms of angina  pectoris    • Bipolar I disorder, single manic episode     depressive d(NOS)   • Cervical disc herniation    • Coronary artery disease    • Depression     NO SUICIDAL PLANS   • Difficulty swallowing    • Diverticular disease    • Dyspepsia    • Dysphagia    • Gastric reflux    • Heart murmur    • Hiatal hernia    • High cholesterol    • History of transfusion    • 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    HPV positive on pap LGSIL   • Hyperlipidemia    • Hypertension    • Hypothyroidism    • Incontinence in female     wears pads   • Incontinence in female    • Kidney disease 2017    stage 2    • Kidney disease, chronic, stage III (GFR 30-59 ml/min)    • LGSIL on Pap smear of cervix 04/29/2016    LGSIL HPV positive   • Myocardial infarction 2000   • Neck pain    • Obesity    • Past myocardial infarction 05/2000   • Periodic limb movement disorder    • Restless leg syndrome    • Sleep apnea     cpap   • Stress fracture     LEFT HEEL   • Suicidal ideation 08/19/2016    history   • Swelling of ankle     right had doppler no s/s blood clot   • Thyroid nodule         Past Surgical History:   Procedure Laterality Date   • ANTERIOR CERVICAL DISCECTOMY W/ FUSION N/A 12/29/2016    Procedure: C3-4 anterior cervical discectomy and fusion with Depuy micro plate, ALLOGRAFT C3-4, AND HARDWARE REMOVAL C4-7.;  Surgeon: Hema Godwin MD;  Location: [facility];  Service:    • CARDIAC CATHETERIZATION N/A 3/30/2017    Procedure: Left Heart Cath;  Surgeon: Tracey Vargas MD;  Location: [facility] INVASIVE LOCATION;  Service:    • CARDIAC CATHETERIZATION N/A 3/30/2017    Procedure: Coronary angiography;  Surgeon: Tracey Vargas MD;  Location: [facility] CATH INVASIVE LOCATION;  Service:    • CARDIAC CATHETERIZATION N/A 3/30/2017    Procedure: Left ventriculography;  Surgeon: Tracey Vargas MD;  Location: [facility] CATH INVASIVE LOCATION;  Service:    • CARDIAC CATHETERIZATION  3/30/2017    Procedure: Functional Flow Parker;   Surgeon: Tracey Vargas MD;  Location: [facility] INVASIVE LOCATION;  Service:    • CERVICAL BIOPSY  MMXVI    Dr. Jeffery.    • CERVICAL DISCECTOMY ANTERIOR  04/2013    C4-7   • COLONOSCOPY  04/20/2015    Diverticulosis, IH   • COLPOSCOPY W/ BIOPSY / CURETTAGE  06/17/2016    LGSIL HPV positive. Results were normal repeat pap in one year. Chronic Cervicitis   • CORONARY ANGIOPLASTY WITH STENT PLACEMENT     • ENDOSCOPY  MMXV    Normal.  Dr. Rodriguez   • ENDOSCOPY N/A 6/22/2017    Erythematous mucosa in the stomach  PATH: Chronic active gastritis, moderate with intestinal metaplasia    • GASTRIC BYPASS      Done using the sleeve technique   • HARDWARE REMOVAL  12/29/2016    cervical   • SHOULDER SURGERY Left     RCR   • TONSILLECTOMY     • TONSILLECTOMY AND ADENOIDECTOMY     • TRANSVAGINAL TAPING SUSPENSION N/A 12/6/2017    Procedure: MID URETHRAL SLING CYSTSCOPY;  Surgeon: Abby Méndez MD;  Location: [facility] OR;  Service:    • TUBAL ABDOMINAL LIGATION     • TYMPANOSTOMY TUBE PLACEMENT Right    • WRIST SURGERY Bilateral     carpal tunnel        Social History     Occupational History   • Not on file.     Social History Main Topics   • Smoking status: Former Smoker     Packs/day: 1.50     Years: 20.00     Types: Electronic Cigarette, Cigarettes     Quit date: 2015   • Smokeless tobacco: Never Used      Comment: Electronic Cigarette/ 3 mg nicotine    • Alcohol use Yes      Comment: 2 times yearly    • Drug use: No   • Sexual activity: Not on file    Social History     Social History Narrative   • No narrative on file        Family History   Problem Relation Age of Onset   • Diabetes type II Mother    • Hypertension Mother    • Osteoporosis Mother    • Seizures Mother    • COPD Father    • Hypertension Father    • Lung cancer Father    • Heart attack Father    • Liver cancer Father    • Thyroid disease Sister    • Hypertension Sister    • Bipolar disorder Sister    • Depression Sister    • No Known Problems  Son    • Abnormal EKG Daughter    • Hypertension Daughter    • Bipolar disorder Daughter    • No Known Problems Paternal Grandfather    • No Known Problems Paternal Grandmother    • No Known Problems Maternal Grandmother    • No Known Problems Maternal Grandfather    • Thyroid disease Sister    • Hypertension Sister    • Bipolar disorder Sister    • Asthma Daughter    • Breast cancer Neg Hx    • Ovarian cancer Neg Hx    • Uterine cancer Neg Hx    • Colon cancer Neg Hx    • Malig Hyperthermia Neg Hx        Review of Systems  Review of Systems    Physical Exam  Ortho Exam      Assessment:  Patient Active Problem List   Diagnosis   • Gastroesophageal reflux disease   • Bipolar I disorder, single manic episode   • Atherosclerosis of coronary artery   • Essential hypertension   • Lightheadedness   • Low back pain   • Sciatica   • Thyroid nodule   • Fatigue   • Hyperlipidemia   • Hypothyroidism (acquired)   • Iron deficiency anemia   • Right knee pain   • Cervical disc disease   • Depression   • ROCKY (obstructive sleep apnea)   • Coronary artery disease involving native coronary artery of native heart without angina pectoris   • Cervical spondylosis with radiculopathy   • Chronic pain of both knees   • Arthritis of both knees   • Weight gain, abnormal   • Allergic rhinitis   • Obesity (BMI 30-39.9)   • Plantar fasciitis   • Peroneal tendon injury   • Stress fracture of calcaneus   • Vitamin D deficiency   • Pain and swelling of knee   • Knee injury   • Calcific Achilles tendonitis   • Rod's deformity   • Urgency incontinence   • Nocturia   • Chronic gastritis without bleeding   • LGSIL on Pap smear of cervix   • Primary osteoarthritis of right knee   • Chronic renal insufficiency, stage III (moderate)   • Left wrist tendinitis   • Cervical radiculopathy   • Tobacco abuse   • Obesity, morbid, BMI 40.0-49.9   • Encounter for screening for lung cancer   • Urinary frequency   • Diabetes mellitus screening   • MORALES I  (cervical intraepithelial neoplasia I)   • Primary osteoarthritis of both knees   • H/O bladder repair surgery   • Chest pain   • Syncope and collapse   • Closed fracture of left distal radius   • Closed displaced fracture of neck of right fifth metacarpal bone       Physical Exam: 57 y.o. female  General Appearance:    Alert, cooperative, in no acute distress                    Vitals:    04/02/18 1640 04/02/18 1932 04/02/18 2334 04/03/18 0737   BP: 101/68 113/76 108/71 127/81   BP Location: Left arm Left arm Left arm Left arm   Patient Position: Lying Lying Lying Lying   Pulse: 82 72  71   Resp: 16 16 20 16   Temp: 98 °F (36.7 °C) 98.5 °F (36.9 °C) 98.4 °F (36.9 °C) 98 °F (36.7 °C)   TempSrc: Oral Oral Oral Oral   SpO2: 97% 96%     Weight:       Height:            Head:    Normocephalic, without obvious abnormality, atraumatic   Eyes:            conjunctivae and sclerae normal, no pallor, corneas clear,    Ears:    Ears appear intact with no abnormalities noted   Throat:   No oral lesions, no thrush, oral mucosa moist   Neck:   No adenopathy, supple, trachea midline, no thyromegaly,    Back:     No kyphosis present, no scoliosis present, no skin lesions,      erythema or scars, no tenderness to percussion or                   palpation,   range of motion normal   Lungs:     Clear to auscultation,respirations regular, even and                  unlabored    Heart:    Regular rhythm and normal rate               Chest Wall:    No abnormalities observed   Abdomen:     Normal bowel sounds, no masses, no organomegaly, soft        non-tender, non-distended, no guarding, no rebound                tenderness   Rectal:     Deferred       Pulses:   Pulses palpable and equal bilaterally   Skin:   No bleeding, bruising or rash   Lymph nodes:   No palpable adenopathy   Neurologic:   Appears neurologic intact       Physical exam of her right upper extremity she is in a splint fingers are little swollen a lot of abrasions of the  dorsal aspect of her fingers elbow exam is normal shoulder exam is normal  Physical exam of her left wrist she is in splints and her third and fourth fingers appear to be reduced hand is swollen she is in a volar splint on the left  Labs:    Lab Results (last 24 hours)     ** No results found for the last 24 hours. **        Xrays: AP lateral oblique of the right wrist were taken to evaluate her pain she is a small cyst in her scaphoid that appears old I see no evidence of any fracture AP lateral and oblique of the left wrist were also taken to evaluate her symptoms have no comparative films these show a nondisplaced distal radius fracture AP lateral oblique of the left hand were taken she has a dislocation of her left third and fourth DIP joints and subsequent films that show reduction of these joints    Assessment:/ Plan:     Right wrist injury I suspect this is just a sprain she appears to have some old issues with the scaphoid joint I will put her in a cock-up splint which will allow her a little more mobility.  His far as the left goes the wrist should be a nonoperative fracture we will keep her in her volar splint for now I will change the splints on her third and fourth fingers as she has had dislocations there with good reductions we'll put her in a volar AlumaFoam splint we will follow fingers and wrists with serial x-rays in the next week or so we'll have her continue to elevate and ice both               Sil Roman MD

## 2018-04-04 ENCOUNTER — TELEPHONE (OUTPATIENT)
Dept: ORTHOPEDIC SURGERY | Facility: CLINIC | Age: 58
End: 2018-04-04

## 2018-04-04 ENCOUNTER — TELEPHONE (OUTPATIENT)
Dept: FAMILY MEDICINE CLINIC | Facility: CLINIC | Age: 58
End: 2018-04-04

## 2018-04-04 DIAGNOSIS — R55 SYNCOPE AND COLLAPSE: Primary | ICD-10-CM

## 2018-04-04 DIAGNOSIS — S52.502D CLOSED FRACTURE OF DISTAL END OF LEFT RADIUS WITH ROUTINE HEALING, UNSPECIFIED FRACTURE MORPHOLOGY, SUBSEQUENT ENCOUNTER: ICD-10-CM

## 2018-04-04 DIAGNOSIS — S62.336D CLOSED DISPLACED FRACTURE OF NECK OF FIFTH METACARPAL BONE OF RIGHT HAND WITH ROUTINE HEALING, SUBSEQUENT ENCOUNTER: ICD-10-CM

## 2018-04-04 NOTE — TELEPHONE ENCOUNTER
Dr Brown as already initiated the order    I'm all for 2 days  If there's anything else she needs please ask Tara thank you\\

## 2018-04-04 NOTE — PROGRESS NOTES
Case Management Discharge Note    Final Note: Pt home with [facility] and family. Call to home health to verify orders received ....drc    Destination     No service coordination in this encounter.      Durable Medical Equipment     No service coordination in this encounter.      Dialysis/Infusion     No service coordination in this encounter.      Home Medical Care     No service coordination in this encounter.      Social Care     No service coordination in this encounter.        Other: Other (car)    Final Discharge Disposition Code: 06 - home with home health care

## 2018-04-04 NOTE — TELEPHONE ENCOUNTER
----- Message from Dennies Garrick sent at 4/4/2018  4:14 PM EDT -----  Regarding: Home Health  Meliton Davis is requesting home health for Keri Nisreen    Please advise

## 2018-04-06 ENCOUNTER — TELEPHONE (OUTPATIENT)
Dept: ORTHOPEDIC SURGERY | Facility: CLINIC | Age: 58
End: 2018-04-06

## 2018-04-06 ENCOUNTER — OFFICE VISIT (OUTPATIENT)
Dept: ENDOCRINOLOGY | Age: 58
End: 2018-04-06

## 2018-04-06 ENCOUNTER — DOCUMENTATION (OUTPATIENT)
Dept: FAMILY MEDICINE CLINIC | Facility: CLINIC | Age: 58
End: 2018-04-06

## 2018-04-06 ENCOUNTER — TELEPHONE (OUTPATIENT)
Dept: FAMILY MEDICINE CLINIC | Facility: CLINIC | Age: 58
End: 2018-04-06

## 2018-04-06 VITALS — HEART RATE: 56 BPM | WEIGHT: 223.6 LBS | BODY MASS INDEX: 39.62 KG/M2 | OXYGEN SATURATION: 96 % | HEIGHT: 63 IN

## 2018-04-06 DIAGNOSIS — E03.9 HYPOTHYROIDISM (ACQUIRED): Primary | ICD-10-CM

## 2018-04-06 DIAGNOSIS — I25.10 CORONARY ARTERY DISEASE INVOLVING NATIVE CORONARY ARTERY OF NATIVE HEART WITHOUT ANGINA PECTORIS: ICD-10-CM

## 2018-04-06 DIAGNOSIS — I10 ESSENTIAL HYPERTENSION: ICD-10-CM

## 2018-04-06 DIAGNOSIS — G47.33 OSA (OBSTRUCTIVE SLEEP APNEA): ICD-10-CM

## 2018-04-06 DIAGNOSIS — E78.5 HYPERLIPIDEMIA, UNSPECIFIED HYPERLIPIDEMIA TYPE: ICD-10-CM

## 2018-04-06 PROCEDURE — 99214 OFFICE O/P EST MOD 30 MIN: CPT | Performed by: INTERNAL MEDICINE

## 2018-04-06 RX ORDER — LEVOTHYROXINE SODIUM 0.1 MG/1
100 TABLET ORAL DAILY
Qty: 90 TABLET | Refills: 2 | Status: SHIPPED | OUTPATIENT
Start: 2018-04-06 | End: 2019-01-26 | Stop reason: SDUPTHER

## 2018-04-06 NOTE — PROGRESS NOTES
Phone call with Lila at [facility] giving my verbal approval for them to see the patient.  She advises me that they need a verbal from the physician that will be signing the orders before they can initiate anything even if they are requested to go into the home by the hospitalist.  She understands that James Epley will continue to manage the patient but I will be available for signatures as needed.

## 2018-04-06 NOTE — PROGRESS NOTES
Subjective   Keri Nielson is a 57 y.o. female.     F/u for hypothyroidism, hypertension,hyperlipidemia, CAD, sleep apnea       Hypothyroidism   Associated symptoms include fatigue.   Hyperlipidemia   Exacerbating diseases include hypothyroidism.   Hypertension   Identifiable causes of hypertension include sleep apnea.   Coronary Artery Disease   Risk factors include hyperlipidemia.   Sleep Apnea   Associated symptoms include fatigue.      Patient is a 56-year-old female came in for follow-up. She has hypothyroidism and has been on levothyroxine 100 µg per day since April 2016. Thyroid ultrasound done in 10/17 showed stable 0.5 cm solid nodule in the right.      She has no previous history of thyroid disease. She has no history of head or neck radiation therapy. She has gained 10 lbs since 9/17.      She had an upper endoscopy with dilatation in June 2016 done by Dr. Rodriguez.  She has gastritis and hiatal hernia and was initially on Protonix and Carafate.  She was switched from Protonix to ranitidine 150 mg twice a day by the nephrologist.  She denies melena or hematochezia     She had a LAP-BAND and hiatal hernia repair done by Dr. Sims February 2018.    She was seen by Dr. Lind for CKD stage 2 due to HPN and NSAID use.     She has history of hypertension, and coronary artery disease. She had a previous heart attack in 2000 and had angioplasty was 1 stent. She had a cardiac catheterization done in 2015 which showed a patent stent to the LAD. She had normal nuclear stress test in 1/18 when she was admitted after a syncopal episode.  She denies chest pain or SOB. She is on atenolol and Imdur and follows with Dr. Burroughs.        She has hyperlipidemia and has been on Zocor 20 mg once a day. She denies any myalgia. She has no previous history of diabetes mellitus.  Hemoglobin A1c done in March 2017 was normal at 5.3%.  Her mother has diabetes mellitus.  Her last meal was 11 AM      She was diagnosed to have  "sleep apnea and is sleeping well with a CPAP.       She has chronic knee and neck pain and sees Dr. Raymond and Dr. Pritchard.  She gets steroid injection on both knees every 3 months.     She had a syncopal episode last week and sustained a fracture to left rages right fifth metacarpal and right third metacarpal.  She will be following with Dr. iSl Roman.    She smoked cigarettes for more than 40 years and switch to electronic cigarettes 1 year ago.  She has used Wellbutrin in the past.       The following portions of the patient's history were reviewed and updated as appropriate: allergies, current medications, past family history, past medical history, past social history, past surgical history and problem list.    Review of Systems   Constitutional: Positive for fatigue.   HENT: Negative.    Eyes: Negative.    Respiratory: Negative.    Cardiovascular: Negative.    Gastrointestinal: Positive for constipation.   Endocrine: Negative.    Genitourinary: Negative.    Musculoskeletal: Negative.    Skin: Negative.    Allergic/Immunologic: Negative.    Neurological: Positive for light-headedness.   Hematological: Bruises/bleeds easily.   Psychiatric/Behavioral: Positive for sleep disturbance ( sleep apnea using CPAP machine ).       Objective      Vitals:    04/06/18 1523   BP Location: Other (Comment)   Pulse: 56   SpO2: 96%   Weight: 101 kg (223 lb 9.6 oz)   Height: 160 cm (62.99\")     Physical Exam   Constitutional: She is oriented to person, place, and time. She appears well-developed and well-nourished. No distress.   HENT:   Head: Normocephalic.   Nose: Nose normal.   Mouth/Throat: No oropharyngeal exudate.   Eyes: Conjunctivae and EOM are normal. Right eye exhibits no discharge. Left eye exhibits no discharge. No scleral icterus.   Neck: Neck supple. No JVD present. No tracheal deviation present. No thyromegaly present.   Cardiovascular: Normal rate, regular rhythm, normal heart sounds and intact distal pulses.  " Exam reveals no gallop and no friction rub.    No murmur heard.  Pulmonary/Chest: Effort normal and breath sounds normal. No respiratory distress. She has no wheezes. She has no rales.   Abdominal: Soft. Bowel sounds are normal. She exhibits no distension and no mass. There is no tenderness. There is no guarding.   Musculoskeletal: She exhibits no edema, tenderness or deformity.   Both hands are in a splint.   Lymphadenopathy:     She has no cervical adenopathy.   Neurological: She is alert and oriented to person, place, and time. She has normal reflexes. She displays normal reflexes. No cranial nerve deficit. Coordination normal.   Skin: Skin is warm and dry. No rash noted. No erythema. No pallor.   Psychiatric: She has a normal mood and affect. Her behavior is normal.     Admission on 04/01/2018, Discharged on 04/03/2018   Component Date Value Ref Range Status   • Glucose 04/01/2018 97  65 - 99 mg/dL Final   • BUN 04/01/2018 9  6 - 20 mg/dL Final   • Creatinine 04/01/2018 1.06* 0.57 - 1.00 mg/dL Final   • Sodium 04/01/2018 141  136 - 145 mmol/L Final   • Potassium 04/01/2018 4.1  3.5 - 5.2 mmol/L Final   • Chloride 04/01/2018 102  98 - 107 mmol/L Final   • CO2 04/01/2018 27.4  22.0 - 29.0 mmol/L Final   • Calcium 04/01/2018 9.8  8.6 - 10.5 mg/dL Final   • Total Protein 04/01/2018 7.2  6.0 - 8.5 g/dL Final   • Albumin 04/01/2018 4.60  3.50 - 5.20 g/dL Final   • ALT (SGPT) 04/01/2018 32  1 - 33 U/L Final   • AST (SGOT) 04/01/2018 32  1 - 32 U/L Final   • Alkaline Phosphatase 04/01/2018 80  39 - 117 U/L Final   • Total Bilirubin 04/01/2018 0.2  0.1 - 1.2 mg/dL Final   • eGFR Non African Amer 04/01/2018 54* >60 mL/min/1.73 Final   • Globulin 04/01/2018 2.6  gm/dL Final   • A/G Ratio 04/01/2018 1.8  g/dL Final   • BUN/Creatinine Ratio 04/01/2018 8.5  7.0 - 25.0 Final   • Anion Gap 04/01/2018 11.6  mmol/L Final   • Magnesium 04/01/2018 2.5  1.6 - 2.6 mg/dL Final   • Troponin T 04/01/2018 <0.010  0.000 - 0.030 ng/mL  Final   • Lithium 04/01/2018 0.6  0.6 - 1.2 mmol/L Final   • Extra Tube 04/01/2018 hold for add-on   Final   • Extra Tube 04/01/2018 Hold for add-ons.   Final   • Extra Tube 04/01/2018 hold for add-on   Final   • Extra Tube 04/01/2018 Hold for add-ons.   Final   • WBC 04/01/2018 9.16  4.50 - 10.70 10*3/mm3 Final   • RBC 04/01/2018 4.40  3.90 - 5.20 10*6/mm3 Final   • Hemoglobin 04/01/2018 13.0  11.9 - 15.5 g/dL Final   • Hematocrit 04/01/2018 42.5  35.6 - 45.5 % Final   • MCV 04/01/2018 96.6  80.5 - 98.2 fL Final   • MCH 04/01/2018 29.5  26.9 - 32.0 pg Final   • MCHC 04/01/2018 30.6* 32.4 - 36.3 g/dL Final   • RDW 04/01/2018 15.0* 11.7 - 13.0 % Final   • RDW-SD 04/01/2018 53.1  37.0 - 54.0 fl Final   • MPV 04/01/2018 10.5  6.0 - 12.0 fL Final   • Platelets 04/01/2018 302  140 - 500 10*3/mm3 Final   • Neutrophil % 04/01/2018 52.6  42.7 - 76.0 % Final   • Lymphocyte % 04/01/2018 35.8  19.6 - 45.3 % Final   • Monocyte % 04/01/2018 6.9  5.0 - 12.0 % Final   • Eosinophil % 04/01/2018 4.3  0.3 - 6.2 % Final   • Basophil % 04/01/2018 0.2  0.0 - 1.5 % Final   • Immature Grans % 04/01/2018 0.2  0.0 - 0.5 % Final   • Neutrophils, Absolute 04/01/2018 4.82  1.90 - 8.10 10*3/mm3 Final   • Lymphocytes, Absolute 04/01/2018 3.28  0.90 - 4.80 10*3/mm3 Final   • Monocytes, Absolute 04/01/2018 0.63  0.20 - 1.20 10*3/mm3 Final   • Eosinophils, Absolute 04/01/2018 0.39  0.00 - 0.70 10*3/mm3 Final   • Basophils, Absolute 04/01/2018 0.02  0.00 - 0.20 10*3/mm3 Final   • Immature Grans, Absolute 04/01/2018 0.02  0.00 - 0.03 10*3/mm3 Final   • Glucose 04/02/2018 91  65 - 99 mg/dL Final   • BUN 04/02/2018 6  6 - 20 mg/dL Final   • Creatinine 04/02/2018 0.89  0.57 - 1.00 mg/dL Final   • Sodium 04/02/2018 142  136 - 145 mmol/L Final   • Potassium 04/02/2018 3.8  3.5 - 5.2 mmol/L Final   • Chloride 04/02/2018 107  98 - 107 mmol/L Final   • CO2 04/02/2018 24.1  22.0 - 29.0 mmol/L Final   • Calcium 04/02/2018 8.6  8.6 - 10.5 mg/dL Final   • Total  Protein 04/02/2018 6.1  6.0 - 8.5 g/dL Final   • Albumin 04/02/2018 3.70  3.50 - 5.20 g/dL Final   • ALT (SGPT) 04/02/2018 25  1 - 33 U/L Final   • AST (SGOT) 04/02/2018 20  1 - 32 U/L Final   • Alkaline Phosphatase 04/02/2018 68  39 - 117 U/L Final   • Total Bilirubin 04/02/2018 0.2  0.1 - 1.2 mg/dL Final   • eGFR Non African Amer 04/02/2018 66  >60 mL/min/1.73 Final   • Globulin 04/02/2018 2.4  gm/dL Final   • A/G Ratio 04/02/2018 1.5  g/dL Final   • BUN/Creatinine Ratio 04/02/2018 6.7* 7.0 - 25.0 Final   • Anion Gap 04/02/2018 10.9  mmol/L Final   • WBC 04/02/2018 8.82  4.50 - 10.70 10*3/mm3 Final   • RBC 04/02/2018 3.75* 3.90 - 5.20 10*6/mm3 Final   • Hemoglobin 04/02/2018 11.1* 11.9 - 15.5 g/dL Final   • Hematocrit 04/02/2018 35.8  35.6 - 45.5 % Final   • MCV 04/02/2018 95.5  80.5 - 98.2 fL Final   • MCH 04/02/2018 29.6  26.9 - 32.0 pg Final   • MCHC 04/02/2018 31.0* 32.4 - 36.3 g/dL Final   • RDW 04/02/2018 15.2* 11.7 - 13.0 % Final   • RDW-SD 04/02/2018 53.8  37.0 - 54.0 fl Final   • MPV 04/02/2018 10.3  6.0 - 12.0 fL Final   • Platelets 04/02/2018 258  140 - 500 10*3/mm3 Final   • Neutrophil % 04/02/2018 49.8  42.7 - 76.0 % Final   • Lymphocyte % 04/02/2018 39.6  19.6 - 45.3 % Final   • Monocyte % 04/02/2018 7.1  5.0 - 12.0 % Final   • Eosinophil % 04/02/2018 3.2  0.3 - 6.2 % Final   • Basophil % 04/02/2018 0.1  0.0 - 1.5 % Final   • Immature Grans % 04/02/2018 0.2  0.0 - 0.5 % Final   • Neutrophils, Absolute 04/02/2018 4.39  1.90 - 8.10 10*3/mm3 Final   • Lymphocytes, Absolute 04/02/2018 3.49  0.90 - 4.80 10*3/mm3 Final   • Monocytes, Absolute 04/02/2018 0.63  0.20 - 1.20 10*3/mm3 Final   • Eosinophils, Absolute 04/02/2018 0.28  0.00 - 0.70 10*3/mm3 Final   • Basophils, Absolute 04/02/2018 0.01  0.00 - 0.20 10*3/mm3 Final   • Immature Grans, Absolute 04/02/2018 0.02  0.00 - 0.03 10*3/mm3 Final     Assessment/Plan   Keri was seen today for hypothyroidism, hyperlipidemia, hypertension, coronary artery  disease and sleep apnea.    Diagnoses and all orders for this visit:    Hypothyroidism (acquired)  -     Comprehensive Metabolic Panel  -     TSH  -     T4, Free  -     Lipid Panel  -     levothyroxine (SYNTHROID, LEVOTHROID) 100 MCG tablet; Take 1 tablet by mouth Daily.    ROCKY (obstructive sleep apnea)  -     TSH  -     T4, Free    Hyperlipidemia, unspecified hyperlipidemia type  -     Comprehensive Metabolic Panel  -     TSH  -     T4, Free  -     Lipid Panel    Essential hypertension  -     Comprehensive Metabolic Panel    Coronary artery disease involving native coronary artery of native heart without angina pectoris  -     Comprehensive Metabolic Panel      Continue levothyroxine 100 µg per day.  Check thyroid function tests.  Continue ranitidine.  Follow with Dr. Burroughs and Dr. Sil Roman.  Continue Zocor 20 mg once a day.  Check lipid profile.  Continue CPAP    Send copy of my notes and labs to James Epley NP, Dr. Burroughs, Dr. Sil Roman, and Dr. Lind    RTC 6 mos

## 2018-04-06 NOTE — TELEPHONE ENCOUNTER
Lila figueroa/[facility] called requesting PT and skilled nursing. Llia states that she had received a message from Fermin, but the message was unclear. Needing a verbal from MD.

## 2018-04-07 LAB
ALBUMIN SERPL-MCNC: 4.1 G/DL (ref 3.5–5.2)
ALBUMIN/GLOB SERPL: 1.8 G/DL
ALP SERPL-CCNC: 72 U/L (ref 39–117)
ALT SERPL-CCNC: 28 U/L (ref 1–33)
AST SERPL-CCNC: 25 U/L (ref 1–32)
BILIRUB SERPL-MCNC: 0.2 MG/DL (ref 0.1–1.2)
BUN SERPL-MCNC: 5 MG/DL (ref 6–20)
BUN/CREAT SERPL: 5.3 (ref 7–25)
CALCIUM SERPL-MCNC: 9.5 MG/DL (ref 8.6–10.5)
CHLORIDE SERPL-SCNC: 106 MMOL/L (ref 98–107)
CHOLEST SERPL-MCNC: 138 MG/DL (ref 0–200)
CO2 SERPL-SCNC: 26.8 MMOL/L (ref 22–29)
CREAT SERPL-MCNC: 0.95 MG/DL (ref 0.57–1)
GFR SERPLBLD CREATININE-BSD FMLA CKD-EPI: 61 ML/MIN/1.73
GFR SERPLBLD CREATININE-BSD FMLA CKD-EPI: 74 ML/MIN/1.73
GLOBULIN SER CALC-MCNC: 2.3 GM/DL
GLUCOSE SERPL-MCNC: 93 MG/DL (ref 65–99)
HDLC SERPL-MCNC: 65 MG/DL (ref 40–60)
INTERPRETATION: NORMAL
LDLC SERPL CALC-MCNC: 54 MG/DL (ref 0–100)
POTASSIUM SERPL-SCNC: 4.6 MMOL/L (ref 3.5–5.2)
PROT SERPL-MCNC: 6.4 G/DL (ref 6–8.5)
SODIUM SERPL-SCNC: 146 MMOL/L (ref 136–145)
T4 FREE SERPL-MCNC: 1.25 NG/DL (ref 0.93–1.7)
TRIGL SERPL-MCNC: 95 MG/DL (ref 0–150)
TSH SERPL DL<=0.005 MIU/L-ACNC: 2.19 MIU/ML (ref 0.27–4.2)
VLDLC SERPL CALC-MCNC: 19 MG/DL (ref 5–40)

## 2018-04-10 ENCOUNTER — OFFICE VISIT (OUTPATIENT)
Dept: ORTHOPEDIC SURGERY | Facility: CLINIC | Age: 58
End: 2018-04-10

## 2018-04-10 VITALS — WEIGHT: 218.2 LBS | HEIGHT: 63 IN | TEMPERATURE: 97.1 F | BODY MASS INDEX: 38.66 KG/M2

## 2018-04-10 DIAGNOSIS — S61.209D: ICD-10-CM

## 2018-04-10 DIAGNOSIS — S52.552D OTHER CLOSED EXTRA-ARTICULAR FRACTURE OF DISTAL END OF LEFT RADIUS WITH ROUTINE HEALING, SUBSEQUENT ENCOUNTER: ICD-10-CM

## 2018-04-10 DIAGNOSIS — S63.259D: ICD-10-CM

## 2018-04-10 DIAGNOSIS — Z09 FRACTURE FOLLOW-UP: Primary | ICD-10-CM

## 2018-04-10 DIAGNOSIS — S62.347A NONDISPLACED FRACTURE OF BASE OF FIFTH METACARPAL BONE, LEFT HAND, INITIAL ENCOUNTER FOR CLOSED FRACTURE: ICD-10-CM

## 2018-04-10 PROCEDURE — 73130 X-RAY EXAM OF HAND: CPT | Performed by: ORTHOPAEDIC SURGERY

## 2018-04-10 PROCEDURE — 99024 POSTOP FOLLOW-UP VISIT: CPT | Performed by: ORTHOPAEDIC SURGERY

## 2018-04-10 PROCEDURE — 73100 X-RAY EXAM OF WRIST: CPT | Performed by: ORTHOPAEDIC SURGERY

## 2018-04-10 NOTE — PROGRESS NOTES
Bilateral Wrist Fracture F/U        Patient: Keri Nielson        YOB: 1960        Chief Complaints: Bilateral wrist pain  Chief Complaint   Patient presents with   • Right Wrist - Follow-up, Pain   • Left Wrist - Follow-up, Pain           History of Present Illness: This patient is here follow-up of bilateral wrist injuries she has a right fifth metacarpal fracture she has left third and fourth finger dislocations and a left distal radius fracture she states she's doing better overall with regard to pain    HPI      Allergies: No Known Allergies    Medications:   Home Medications:  Current Outpatient Prescriptions on File Prior to Visit   Medication Sig   • ARIPiprazole (ABILIFY) 10 MG tablet Take 10 mg by mouth Daily.   • atenolol (TENORMIN) 50 MG tablet Take 1 tablet by mouth Daily.   • buPROPion XL (WELLBUTRIN XL) 300 MG 24 hr tablet Take 300 mg by mouth Every Morning.   • Calcium 600-200 MG-UNIT per tablet Take 1 tablet by mouth Daily.   • Cholecalciferol (VITAMIN D3) 5000 UNITS capsule capsule Take 5,000 Units by mouth Daily.   • fluticasone (FLONASE) 50 MCG/ACT nasal spray 2 sprays into each nostril Daily. As needed allergies   • gabapentin (NEURONTIN) 300 MG capsule Take 1 capsule by mouth 3 (Three) Times a Day.   • isosorbide mononitrate (IMDUR) 30 MG 24 hr tablet Take 30 mg by mouth Daily.   • lamoTRIgine (LaMICtal) 150 MG tablet Take 300 mg by mouth Daily.   • levothyroxine (SYNTHROID, LEVOTHROID) 100 MCG tablet Take 1 tablet by mouth Daily.   • lithium (ESKALITH) 450 MG CR tablet Take 450 mg by mouth Daily.   • loratadine (CLARITIN) 10 MG tablet Take 1 tablet by mouth Daily. As needed for allergies (Patient taking differently: Take 10 mg by mouth Daily As Needed. As needed for allergies)   • Multiple Vitamins-Minerals (MULTI COMPLETE/IRON PO) Take 1 tablet/day by mouth.   • nitroglycerin (NITROSTAT) 0.4  MG SL tablet Place 0.4 mg under the tongue Every 5 (Five) Minutes As Needed for Chest Pain (took at M.D  office at 0930 a.m.). Take no more than 3 doses in 15 minutes.   • omeprazole (PRILOSEC) 40 MG capsule Take 1 capsule by mouth Daily.   • oxybutynin XL (DITROPAN XL) 15 MG 24 hr tablet Take 15 mg by mouth Daily.   • oxyCODONE-acetaminophen (PERCOCET) 5-325 MG per tablet Take 1 tablet by mouth Every 6 (Six) Hours As Needed for Moderate Pain .   • pramipexole (MIRAPEX) 0.5 MG tablet Take 0.5 mg by mouth Every Night.   • simvastatin (ZOCOR) 20 MG tablet Take 1 tablet by mouth Every Night. (Patient taking differently: Take 40 mg by mouth Every Night.)   • venlafaxine (EFFEXOR) 75 MG tablet Take 75 mg by mouth Every Morning.     No current facility-administered medications on file prior to visit.      Current Medications:  Scheduled Meds:  Continuous Infusions:  No current facility-administered medications for this visit.        PRN Meds:.    Past Medical History:   Diagnosis Date   • Anemia    • Arthritis    • Atherosclerotic heart disease of native coronary artery with other forms of angina pectoris    • Bipolar I disorder, single manic episode     depressive d(NOS)   • Cervical disc herniation    • Coronary artery disease    • Depression     NO SUICIDAL PLANS   • Difficulty swallowing    • Diverticular disease    • Dyspepsia    • Dysphagia    • Gastric reflux    • Heart murmur    • Hiatal hernia    • High cholesterol    • History of transfusion    • 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    HPV positive on pap LGSIL   • Hyperlipidemia    • Hypertension    • Hypothyroidism    • Incontinence in female     wears pads   • Incontinence in female    • Kidney disease 2017    stage 2    • Kidney disease, chronic, stage III (GFR 30-59 ml/min)    • LGSIL on Pap smear of cervix 04/29/2016    LGSIL HPV positive   • Myocardial infarction 2000   • Neck pain    • Obesity    • Past myocardial infarction 05/2000   •  Periodic limb movement disorder    • Restless leg syndrome    • Sleep apnea     cpap   • Stress fracture     LEFT HEEL   • Suicidal ideation 08/19/2016    history   • Swelling of ankle     right had doppler no s/s blood clot   • Thyroid nodule         Past Surgical History:   Procedure Laterality Date   • ANTERIOR CERVICAL DISCECTOMY W/ FUSION N/A 12/29/2016    Procedure: C3-4 anterior cervical discectomy and fusion with Depuy micro plate, ALLOGRAFT C3-4, AND HARDWARE REMOVAL C4-7.;  Surgeon: Hema Godwin MD;  Location: [facility] MAIN OR;  Service:    • CARDIAC CATHETERIZATION N/A 3/30/2017    Procedure: Left Heart Cath;  Surgeon: Tracey Vargas MD;  Location:  [facility] CATH INVASIVE LOCATION;  Service:    • CARDIAC CATHETERIZATION N/A 3/30/2017    Procedure: Coronary angiography;  Surgeon: Tracey Vargas MD;  Location:  [facility]VASIVE [facility];  Service:    • CARDIAC CATHETERIZATION N/A 3/30/2017    Procedure: Left ventriculography;  Surgeon: Tracey Vargas MD;  Location:  [facility];  Service:    • CARDIAC CATHETERIZATION  3/30/2017    Procedure: Functional Flow [place];  Surgeon: Tracey Vargas MD;  Location:  [facility];  Service:    • CERVICAL BIOPSY  MMXVI    Dr. Jeffery.    • CERVICAL DISCECTOMY ANTERIOR  04/2013    C4-7   • COLONOSCOPY  04/20/2015    Diverticulosis, IH   • COLPOSCOPY W/ BIOPSY / CURETTAGE  06/17/2016    LGSIL HPV positive. Results were normal repeat pap in one year. Chronic Cervicitis   • CORONARY ANGIOPLASTY WITH STENT PLACEMENT     • ENDOSCOPY  MMXV    Normal.  Dr. Rodriguez   • ENDOSCOPY N/A 6/22/2017    Erythematous mucosa in the stomach  PATH: Chronic active gastritis, moderate with intestinal metaplasia    • GASTRIC BYPASS      Done using the sleeve technique   • HARDWARE REMOVAL  12/29/2016    cervical   • LAPAROSCOPIC GASTRIC BANDING  02/2018   • SHOULDER SURGERY Left     RCR   • TONSILLECTOMY     • TONSILLECTOMY AND ADENOIDECTOMY     •  TRANSVAGINAL TAPING SUSPENSION N/A 12/6/2017    Procedure: MID URETHRAL SLING CYSTSCOPY;  Surgeon: Abby Méndez MD;  Location: [facility] OR;  Service:    • TUBAL ABDOMINAL LIGATION     • TYMPANOSTOMY TUBE PLACEMENT Right    • WRIST SURGERY Bilateral     carpal tunnel        Social History     Occupational History   • Not on file.     Social History Main Topics   • Smoking status: Former Smoker     Packs/day: 1.50     Years: 20.00     Types: Electronic Cigarette, Cigarettes     Quit date: 2015   • Smokeless tobacco: Never Used      Comment: Electronic Cigarette/ 3 mg nicotine    • Alcohol use Yes      Comment: 2 times yearly    • Drug use: No   • Sexual activity: Not on file    Social History     Social History Narrative   • No narrative on file        Family History   Problem Relation Age of Onset   • Diabetes type II Mother    • Hypertension Mother    • Osteoporosis Mother    • Seizures Mother    • COPD Father    • Hypertension Father    • Lung cancer Father    • Heart attack Father    • Liver cancer Father    • Thyroid disease Sister    • Hypertension Sister    • Bipolar disorder Sister    • Depression Sister    • ADD / ADHD Sister    • No Known Problems Son    • Abnormal EKG Daughter    • Hypertension Daughter    • Bipolar disorder Daughter    • No Known Problems Paternal Grandfather    • No Known Problems Paternal Grandmother    • No Known Problems Maternal Grandmother    • No Known Problems Maternal Grandfather    • Thyroid disease Sister    • Hypertension Sister    • Bipolar disorder Sister    • Asthma Daughter    • Breast cancer Neg Hx    • Ovarian cancer Neg Hx    • Uterine cancer Neg Hx    • Colon cancer Neg Hx    • Malig Hyperthermia Neg Hx              Physical Exam: 57 y.o. female  General Appearance:    Alert, cooperative, in no acute distress                 Vitals:    04/10/18 1434   Temp: 97.1 °F (36.2 °C)   TempSrc: Temporal Artery    Weight: 99 kg (218 lb 3.2 oz)   Height: 160 cm  "(63\")      Patient is alert and read ×3 no acute distress appears her above-listed at height weight and age.  Affect is normal respiratory rate is normal unlabored. Heart rate regular rate rhythm, sclera, dentition and hearing are normal for the purpose of this exam.            Physical Exam:  Physical exam of her right hand wrist is fine no tenderness over the snuffbox she does have tenderness and swelling about the fifth metacarpal overall alignment of the hand is good no rotation fifth finger alignment is symmetric to the opposite hand  Physical exam of the left hand she does have a neutral alignment of both third and fourth finger she can move those she is tenderness about the left distal radius as one would expect good distal pulses and sensation is intact to both hands with regard to the median ulnar nerve  She does have several abrasions on the dorsal aspect of both hands appear to be healing    Radiology:   AP, Lateral  And oblique of the left wrist  ordered/reviewed to evaluate pain. I have compared to previous films   she does have a nondisplaced distal radius fracture appears to be healing and is out to length.  Also did AP lateral and oblique of the left hand evaluate her fingers her fingers appear to be reduced no obvious acute pathology, AP and lateral the right wrist were taken do not see an obvious wrist fracture she does have some cysts in the scaphoid I did to a dedicated scaphoid view on the right do not see an obvious fracture she does have cyst that appeared to be chronic.  She also was found to have a minimally displaced fifth metacarpal fracture that appears to be more a boxer's type fracture                                    Assessment/Plan:      Follow-up distal radius fracture on the left I will put her in a  exos splint we will splint her fingers with AlumaFoam splints on the third and fourth.  With regard to the right I did show her a cock-up splint with an AlumaFoam splint on the fifth " finger to support that fracture.  I will see her back in 3 weeks with an x-ray of  the right hand left hand and left wrist

## 2018-04-11 ENCOUNTER — TELEPHONE (OUTPATIENT)
Dept: ORTHOPEDIC SURGERY | Facility: CLINIC | Age: 58
End: 2018-04-11

## 2018-04-11 PROBLEM — S62.347A NONDISPLACED FRACTURE OF BASE OF FIFTH METACARPAL BONE, LEFT HAND, INITIAL ENCOUNTER FOR CLOSED FRACTURE: Status: ACTIVE | Noted: 2018-04-11

## 2018-04-11 PROBLEM — S52.502D CLOSED FRACTURE OF LOWER END OF LEFT RADIUS WITH ROUTINE HEALING: Status: ACTIVE | Noted: 2018-04-11

## 2018-04-11 PROBLEM — S63.259A: Status: ACTIVE | Noted: 2018-04-11

## 2018-04-11 PROBLEM — Z09 FRACTURE FOLLOW-UP: Status: ACTIVE | Noted: 2018-04-11

## 2018-04-11 PROBLEM — S61.209A: Status: ACTIVE | Noted: 2018-04-11

## 2018-04-16 ENCOUNTER — TELEPHONE (OUTPATIENT)
Dept: FAMILY MEDICINE CLINIC | Facility: CLINIC | Age: 58
End: 2018-04-16

## 2018-04-16 ENCOUNTER — OFFICE VISIT (OUTPATIENT)
Dept: FAMILY MEDICINE CLINIC | Facility: CLINIC | Age: 58
End: 2018-04-16

## 2018-04-16 VITALS
OXYGEN SATURATION: 97 % | SYSTOLIC BLOOD PRESSURE: 120 MMHG | WEIGHT: 215 LBS | BODY MASS INDEX: 38.09 KG/M2 | HEIGHT: 63 IN | HEART RATE: 70 BPM | DIASTOLIC BLOOD PRESSURE: 82 MMHG

## 2018-04-16 DIAGNOSIS — S61.209D: ICD-10-CM

## 2018-04-16 DIAGNOSIS — S63.259D: ICD-10-CM

## 2018-04-16 DIAGNOSIS — S52.502D CLOSED FRACTURE OF DISTAL END OF LEFT RADIUS WITH ROUTINE HEALING, UNSPECIFIED FRACTURE MORPHOLOGY, SUBSEQUENT ENCOUNTER: Primary | ICD-10-CM

## 2018-04-16 DIAGNOSIS — E78.49 OTHER HYPERLIPIDEMIA: ICD-10-CM

## 2018-04-16 DIAGNOSIS — I10 ESSENTIAL HYPERTENSION: ICD-10-CM

## 2018-04-16 PROCEDURE — 99213 OFFICE O/P EST LOW 20 MIN: CPT | Performed by: NURSE PRACTITIONER

## 2018-04-16 RX ORDER — OXYCODONE HYDROCHLORIDE AND ACETAMINOPHEN 5; 325 MG/1; MG/1
1 TABLET ORAL EVERY 6 HOURS PRN
Qty: 30 TABLET | Refills: 0 | Status: CANCELLED | OUTPATIENT
Start: 2018-04-16

## 2018-04-16 RX ORDER — OXYCODONE HYDROCHLORIDE AND ACETAMINOPHEN 5; 325 MG/1; MG/1
TABLET ORAL
Qty: 50 TABLET | Refills: 0 | Status: SHIPPED | OUTPATIENT
Start: 2018-04-16 | End: 2018-05-24 | Stop reason: ALTCHOICE

## 2018-04-16 NOTE — PROGRESS NOTES
Subjective   Keri Nielson is a 57 y.o. female.     Fell on [facility] basket    Nurse ot and pt  weeking      Called ortho Dr Shelia Roman  Ortho f/u      Occupational therapy help and patient perform ADLs which is quite difficult due to her injuries bilateral fractures  Boxer fracture right  Fifth metacarpal of believe  Left wrist nondisplaced distal radial fracture  Left third and fourth dislocation   prox interphal joints    Physical therapy  Mobility strengthening upper extremities    Nursing services was        Blood pressures controlled  Hyperlipidemia controlled                             The following portions of the patient's history were reviewed and updated as appropriate: allergies, current medications, past family history, past social history, past surgical history and problem list.    Review of Systems   Musculoskeletal: Positive for arthralgias.        ADL problems  Bilateral wrist and hand pain recent fractures   All other systems reviewed and are negative.      Objective   Physical Exam   Constitutional: She is oriented to person, place, and time. She appears well-developed and well-nourished.   Appears well pleasant   HENT:   Head: Normocephalic.   Mouth/Throat: Oropharynx is clear and moist.   Eyes: Conjunctivae are normal. Pupils are equal, round, and reactive to light.   Neck: Neck supple.   Cardiovascular: Normal rate, regular rhythm and normal heart sounds.  Exam reveals no friction rub.    No murmur heard.  Pulmonary/Chest: Effort normal and breath sounds normal. No respiratory distress. She has no wheezes.   Musculoskeletal: She exhibits no edema.   Neurovascular intact bilateral hands  Bilateral splints  No increased redness or swelling   Neurological: She is alert and oriented to person, place, and time.   Skin: Skin is warm and dry.   Psychiatric: She has a normal mood and affect. Her behavior is normal. Judgment and thought content normal.   Vitals  reviewed.        Assessment/Plan   Keri was seen today for hospital f/up on fall.    Diagnoses and all orders for this visit:    Closed fracture of distal end of left radius with routine healing, unspecified fracture morphology, subsequent encounter    Multiple open dislocations of fingers, subsequent encounter    Essential hypertension    Other hyperlipidemia                  Continue home health PT occupational therapy    Continue cholesterol medication antihypertensives

## 2018-04-20 ENCOUNTER — TELEPHONE (OUTPATIENT)
Dept: ORTHOPEDIC SURGERY | Facility: CLINIC | Age: 58
End: 2018-04-20

## 2018-05-01 ENCOUNTER — OFFICE VISIT (OUTPATIENT)
Dept: ORTHOPEDIC SURGERY | Facility: CLINIC | Age: 58
End: 2018-05-01

## 2018-05-01 VITALS — HEIGHT: 63 IN | BODY MASS INDEX: 37.21 KG/M2 | WEIGHT: 210 LBS | TEMPERATURE: 98 F

## 2018-05-01 DIAGNOSIS — M79.641 HAND PAIN, RIGHT: Primary | ICD-10-CM

## 2018-05-01 DIAGNOSIS — S62.336D CLOSED DISPLACED FRACTURE OF NECK OF FIFTH METACARPAL BONE OF RIGHT HAND WITH ROUTINE HEALING, SUBSEQUENT ENCOUNTER: ICD-10-CM

## 2018-05-01 DIAGNOSIS — S52.592D OTHER CLOSED FRACTURE OF DISTAL END OF LEFT RADIUS WITH ROUTINE HEALING, SUBSEQUENT ENCOUNTER: ICD-10-CM

## 2018-05-01 PROCEDURE — 73100 X-RAY EXAM OF WRIST: CPT | Performed by: ORTHOPAEDIC SURGERY

## 2018-05-01 PROCEDURE — 99024 POSTOP FOLLOW-UP VISIT: CPT | Performed by: ORTHOPAEDIC SURGERY

## 2018-05-01 PROCEDURE — 73130 X-RAY EXAM OF HAND: CPT | Performed by: ORTHOPAEDIC SURGERY

## 2018-05-01 NOTE — PROGRESS NOTES
Wrist Fracture F/U        Patient: Keri Nielson        YOB: 1960        Chief Complaints: left wrist pain and right hand pain  Chief Complaint   Patient presents with   • Right Hand - Follow-up, Pain   • Left Wrist - Follow-up, Pain         History of Present Illness:   This patient is here follow-up of left distal radius fracture left finger dislocation and right fifth metacarpal fracture she is improved on all accounts anxious to get out of the splint  HPI      Allergies: No Known Allergies    Medications:   Home Medications:  Current Outpatient Prescriptions on File Prior to Visit   Medication Sig   • ARIPiprazole (ABILIFY) 10 MG tablet Take 10 mg by mouth Daily.   • atenolol (TENORMIN) 50 MG tablet Take 1 tablet by mouth Daily.   • buPROPion XL (WELLBUTRIN XL) 300 MG 24 hr tablet Take 300 mg by mouth Every Morning.   • Calcium 600-200 MG-UNIT per tablet Take 1 tablet by mouth Daily.   • Cholecalciferol (VITAMIN D3) 5000 UNITS capsule capsule Take 5,000 Units by mouth Daily.   • fluticasone (FLONASE) 50 MCG/ACT nasal spray 2 sprays into each nostril Daily. As needed allergies   • gabapentin (NEURONTIN) 300 MG capsule Take 1 capsule by mouth 3 (Three) Times a Day.   • isosorbide mononitrate (IMDUR) 30 MG 24 hr tablet Take 30 mg by mouth Daily.   • lamoTRIgine (LaMICtal) 150 MG tablet Take 300 mg by mouth Daily.   • levothyroxine (SYNTHROID, LEVOTHROID) 100 MCG tablet Take 1 tablet by mouth Daily.   • lithium (ESKALITH) 450 MG CR tablet Take 450 mg by mouth Daily.   • loratadine (CLARITIN) 10 MG tablet Take 1 tablet by mouth Daily. As needed for allergies (Patient taking differently: Take 10 mg by mouth Daily As Needed. As needed for allergies)   • Multiple Vitamins-Minerals (MULTI COMPLETE/IRON PO) Take 1 tablet/day by mouth.   • nitroglycerin (NITROSTAT) 0.4 MG SL tablet Place 0.4 mg under the tongue Every 5  (Five) Minutes As Needed for Chest Pain (took at M.D  office at 0930 a.m.). Take no more than 3 doses in 15 minutes.   • omeprazole (PRILOSEC) 40 MG capsule Take 1 capsule by mouth Daily.   • oxybutynin XL (DITROPAN XL) 15 MG 24 hr tablet Take 15 mg by mouth Daily.   • oxyCODONE-acetaminophen (PERCOCET) 5-325 MG per tablet Take 1 tablet by mouth Every 6 (Six) Hours As Needed for Moderate Pain .   • oxyCODONE-acetaminophen (PERCOCET) 5-325 MG per tablet 1-2 Oral Q4H PRN severe pain   • pramipexole (MIRAPEX) 0.5 MG tablet Take 0.5 mg by mouth Every Night.   • simvastatin (ZOCOR) 20 MG tablet Take 1 tablet by mouth Every Night. (Patient taking differently: Take 40 mg by mouth Every Night.)   • venlafaxine (EFFEXOR) 75 MG tablet Take 75 mg by mouth Every Morning.     No current facility-administered medications on file prior to visit.      Current Medications:  Scheduled Meds:  Continuous Infusions:  No current facility-administered medications for this visit.        PRN Meds:.    Past Medical History:   Diagnosis Date   • Anemia    • Arthritis    • Atherosclerotic heart disease of native coronary artery with other forms of angina pectoris    • Bipolar I disorder, single manic episode     depressive d(NOS)   • Cervical disc herniation    • Coronary artery disease    • Depression     NO SUICIDAL PLANS   • Difficulty swallowing    • Diverticular disease    • Dyspepsia    • Dysphagia    • Gastric reflux    • Heart murmur    • Hiatal hernia    • High cholesterol    • History of transfusion    • 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    HPV positive on pap LGSIL   • Hyperlipidemia    • Hypertension    • Hypothyroidism    • Incontinence in female     wears pads   • Incontinence in female    • Kidney disease 2017    stage 2    • Kidney disease, chronic, stage III (GFR 30-59 ml/min)    • LGSIL on Pap smear of cervix 04/29/2016    LGSIL HPV positive   • Myocardial infarction 2000   • Neck pain    • Obesity    • Past  myocardial infarction 05/2000   • Periodic limb movement disorder    • Restless leg syndrome    • Sleep apnea     cpap   • Stress fracture     LEFT HEEL   • Suicidal ideation 08/19/2016    history   • Swelling of ankle     right had doppler no s/s blood clot   • Thyroid nodule         Past Surgical History:   Procedure Laterality Date   • ANTERIOR CERVICAL DISCECTOMY W/ FUSION N/A 12/29/2016    Procedure: C3-4 anterior cervical discectomy and fusion with Depuy micro plate, ALLOGRAFT C3-4, AND HARDWARE REMOVAL C4-7.;  Surgeon: Hema Godwin MD;  Location: [facility] MAIN OR;  Service:    • CARDIAC CATHETERIZATION N/A 3/30/2017    Procedure: Left Heart Cath;  Surgeon: Tracey Vargas MD;  Location:  [facility] CATH INVASIVE LOCATION;  Service:    • CARDIAC CATHETERIZATION N/A 3/30/2017    Procedure: Coronary angiography;  Surgeon: Tracey Vargas MD;  Location:  [facility] CATH INVASIVE LOCATION;  Service:    • CARDIAC CATHETERIZATION N/A 3/30/2017    Procedure: Left ventriculography;  Surgeon: Tracey Vargas MD;  [facility] C[facility];  Service:    • CARDIAC CATHETERIZATION  3/30/2017    Procedure: Functional Flow [place];  Surgeon: Tracey Vargas MD;  Location:  [facility] CATH INVASIVE LOCATION;  Service:    • CERVICAL BIOPSY  MMXVI    Dr. Jeffery.    • CERVICAL DISCECTOMY ANTERIOR  04/2013    C4-7   • COLONOSCOPY  04/20/2015    Diverticulosis, IH   • COLPOSCOPY W/ BIOPSY / CURETTAGE  06/17/2016    LGSIL HPV positive. Results were normal repeat pap in one year. Chronic Cervicitis   • CORONARY ANGIOPLASTY WITH STENT PLACEMENT     • ENDOSCOPY  MMXV    Normal.  Dr. Rodriguez   • ENDOSCOPY N/A 6/22/2017    Erythematous mucosa in the stomach  PATH: Chronic active gastritis, moderate with intestinal metaplasia    • GASTRIC BYPASS      Done using the sleeve technique   • HARDWARE REMOVAL  12/29/2016    cervical   • LAPAROSCOPIC GASTRIC BANDING  02/2018   • SHOULDER SURGERY Left     RCR   • TONSILLECTOMY     • TONSILLECTOMY  AND ADENOIDECTOMY     • TRANSVAGINAL TAPING SUSPENSION N/A 12/6/2017    Procedure: MID URETHRAL SLING CYSTSCOPY;  Surgeon: Abby Méndez MD;  Location: [facility] MAIN OR;  Service:    • TUBAL ABDOMINAL LIGATION     • TYMPANOSTOMY TUBE PLACEMENT Right    • WRIST SURGERY Bilateral     carpal tunnel        Social History     Occupational History   • Not on file.     Social History Main Topics   • Smoking status: Former Smoker     Packs/day: 1.50     Years: 20.00     Types: Electronic Cigarette, Cigarettes     Quit date: 2015   • Smokeless tobacco: Never Used      Comment: Electronic Cigarette/ 3 mg nicotine    • Alcohol use Yes      Comment: 2 times yearly    • Drug use: No   • Sexual activity: Defer    Social History     Social History Narrative   • No narrative on file        Family History   Problem Relation Age of Onset   • Diabetes type II Mother    • Hypertension Mother    • Osteoporosis Mother    • Seizures Mother    • COPD Father    • Hypertension Father    • Lung cancer Father    • Heart attack Father    • Liver cancer Father    • Thyroid disease Sister    • Hypertension Sister    • Bipolar disorder Sister    • Depression Sister    • ADD / ADHD Sister    • No Known Problems Son    • Abnormal EKG Daughter    • Hypertension Daughter    • Bipolar disorder Daughter    • No Known Problems Paternal Grandfather    • No Known Problems Paternal Grandmother    • No Known Problems Maternal Grandmother    • No Known Problems Maternal Grandfather    • Thyroid disease Sister    • Hypertension Sister    • Bipolar disorder Sister    • Asthma Daughter    • Breast cancer Neg Hx    • Ovarian cancer Neg Hx    • Uterine cancer Neg Hx    • Colon cancer Neg Hx    • Malig Hyperthermia Neg Hx              Physical Exam: 57 y.o. female  General Appearance:    Alert, cooperative, in no acute distress                 Vitals:    05/01/18 1635   Temp: 98 °F (36.7 °C)   TempSrc: Temporal Artery    Weight: 95.3 kg (210 lb)   Height:  "160 cm (63\")      Patient is alert and read ×3 no acute distress appears her above-listed at height weight and age.  Affect is normal respiratory rate is normal unlabored. Heart rate regular rate rhythm, sclera, dentition and hearing are normal for the purpose of this exam.              Physical Exam:  She does have some swelling about the Left wrist but it is better pt  has a bit of a deformity with some prominence at the ulnar styloid.   The pt has very minimal palpable tenderness.  Her range of motion is somewhat decreased which is expected after being casted./Splinted.  Physical exam right hand she does have a prominence at the fifth metacarpal fracture rotation appear symmetric side to side        Radiology:   AP, Lateral  And oblique of the left wrist  ordered/reviewed to evaluate pain. I have compared to previous films   she has a healed distal radius fracture that is metaphyseal appears out the length appears to be healing/healed AP lateral and oblique of the right hand were also taken to evaluate her fracture and compared to previous films she has a fifth metacarpal fracture that is displaced but an acceptable position and clearly healing                                    Assessment/Plan:      Follow-up distal radius fracture overall doing great this is healing hand is healing as well she can wean out of both splints make an appointment for 4 weeks if she is doing fine she can cancel                                   "

## 2018-05-18 ENCOUNTER — OFFICE VISIT (OUTPATIENT)
Dept: SLEEP MEDICINE | Facility: HOSPITAL | Age: 58
End: 2018-05-18
Attending: INTERNAL MEDICINE

## 2018-05-18 VITALS
OXYGEN SATURATION: 96 % | DIASTOLIC BLOOD PRESSURE: 87 MMHG | WEIGHT: 205.5 LBS | SYSTOLIC BLOOD PRESSURE: 124 MMHG | HEIGHT: 63 IN | BODY MASS INDEX: 36.41 KG/M2 | HEART RATE: 57 BPM

## 2018-05-18 DIAGNOSIS — E66.9 OBESITY (BMI 30-39.9): ICD-10-CM

## 2018-05-18 DIAGNOSIS — G25.81 RESTLESS LEGS SYNDROME (RLS): ICD-10-CM

## 2018-05-18 DIAGNOSIS — R68.2 DRY MOUTH: ICD-10-CM

## 2018-05-18 DIAGNOSIS — G47.33 OBSTRUCTIVE SLEEP APNEA: Primary | ICD-10-CM

## 2018-05-18 PROCEDURE — G0463 HOSPITAL OUTPT CLINIC VISIT: HCPCS

## 2018-05-18 RX ORDER — PRAMIPEXOLE DIHYDROCHLORIDE 0.5 MG/1
0.5 TABLET ORAL NIGHTLY
Qty: 30 TABLET | Refills: 11 | Status: SHIPPED | OUTPATIENT
Start: 2018-05-18 | End: 2018-08-03 | Stop reason: SDUPTHER

## 2018-05-18 NOTE — PROGRESS NOTES
[facility] SLEEP MEDICINE  [address] [facility]  3rd Floor  [facility] [address]  [phone]    PCP: James Epley, APRN    Reason for visit:  Sleep disorders: ROCKY    Keri is a 57 y.o.female who was seen in the [facility] today. She keeps pulling the mask off. She does not feel pressure is too high. Using FFM. Mask is fitting well. No air leaks. Very dry mouth even with chin strap. Sleeps from 10p to 7a. However very variable sleep times. Remains on mirapex for RLS.  Keri  reports that she quit smoking about 3 years ago. Her smoking use included Electronic Cigarette and Cigarettes. She has a 30.00 pack-year smoking history. She has never used smokeless tobacco.Now smoking e-cigs  Keri  reports that she drinks alcohol. 0 per week  Keri has caffeine intake 2-3 per day coffee.  [place] Sleepiness Scale is 11.    Pertinent Positive Review of Systems of acid reflux, depression  Rest of Review of Systems was negative as recorded in Sleep Questionnaire.    Current Medications:    Current Outpatient Prescriptions:   •  ARIPiprazole (ABILIFY) 10 MG tablet, Take 10 mg by mouth Daily., Disp: , Rfl:   •  atenolol (TENORMIN) 50 MG tablet, Take 1 tablet by mouth Daily., Disp: 30 tablet, Rfl: 6  •  buPROPion XL (WELLBUTRIN XL) 300 MG 24 hr tablet, Take 300 mg by mouth Every Morning., Disp: , Rfl:   •  Calcium 600-200 MG-UNIT per tablet, Take 1 tablet by mouth Daily., Disp: , Rfl:   •  Cholecalciferol (VITAMIN D3) 5000 UNITS capsule capsule, Take 5,000 Units by mouth Daily., Disp: , Rfl:   •  fluticasone (FLONASE) 50 MCG/ACT nasal spray, 2 sprays into each nostril Daily. As needed allergies, Disp: 1 bottle, Rfl: 6  •  gabapentin (NEURONTIN) 300 MG capsule, Take 1 capsule by mouth 3 (Three) Times a Day., Disp: 90 capsule, Rfl: 2  •  isosorbide mononitrate (IMDUR) 30 MG 24 hr tablet, Take 30 mg by mouth Daily., Disp: , Rfl:   •  lamoTRIgine (LaMICtal) 150 MG tablet, Take 300 mg by mouth Daily., Disp: ,  Rfl:   •  levothyroxine (SYNTHROID, LEVOTHROID) 100 MCG tablet, Take 1 tablet by mouth Daily., Disp: 90 tablet, Rfl: 2  •  lithium (ESKALITH) 450 MG CR tablet, Take 450 mg by mouth Daily., Disp: , Rfl:   •  loratadine (CLARITIN) 10 MG tablet, Take 1 tablet by mouth Daily. As needed for allergies (Patient taking differently: Take 10 mg by mouth Daily As Needed. As needed for allergies), Disp: 30 tablet, Rfl: 11  •  Multiple Vitamins-Minerals (MULTI COMPLETE/IRON PO), Take 1 tablet/day by mouth., Disp: , Rfl:   •  nitroglycerin (NITROSTAT) 0.4 MG SL tablet, Place 0.4 mg under the tongue Every 5 (Five) Minutes As Needed for Chest Pain (took at M.D  office at 0930 a.m.). Take no more than 3 doses in 15 minutes., Disp: , Rfl:   •  omeprazole (PRILOSEC) 40 MG capsule, Take 1 capsule by mouth Daily., Disp: , Rfl:   •  oxybutynin XL (DITROPAN XL) 15 MG 24 hr tablet, Take 15 mg by mouth Daily., Disp: , Rfl:   •  oxyCODONE-acetaminophen (PERCOCET) 5-325 MG per tablet, Take 1 tablet by mouth Every 6 (Six) Hours As Needed for Moderate Pain ., Disp: 30 tablet, Rfl: 0  •  oxyCODONE-acetaminophen (PERCOCET) 5-325 MG per tablet, 1-2 Oral Q4H PRN severe pain, Disp: 50 tablet, Rfl: 0  •  pramipexole (MIRAPEX) 0.5 MG tablet, Take 1 tablet by mouth Every Night for 30 days., Disp: 30 tablet, Rfl: 11  •  simvastatin (ZOCOR) 20 MG tablet, Take 1 tablet by mouth Every Night. (Patient taking differently: Take 40 mg by mouth Every Night.), Disp: 30 tablet, Rfl: 5  •  venlafaxine (EFFEXOR) 75 MG tablet, Take 75 mg by mouth Every Morning., Disp: , Rfl:    also entered in Sleep Questionnaire    Patient  has a past medical history of Anemia; Arthritis; Atherosclerotic heart disease of native coronary artery with other forms of angina pectoris; Bipolar I disorder, single manic episode; Cervical disc herniation; Coronary artery disease; Depression; Difficulty swallowing; Diverticular disease; Dyspepsia; Dysphagia; Gastric reflux; Heart murmur;  "Hiatal hernia; High cholesterol; History of transfusion; HPV (human papilloma virus) infection (04/29/2016); Hyperlipidemia; Hypertension; Hypothyroidism; Incontinence in female; Incontinence in female; Kidney disease (2017); Kidney disease, chronic, stage III (GFR 30-59 ml/min); LGSIL on Pap smear of cervix (04/29/2016); Myocardial infarction (2000); Neck pain; Obesity; Past myocardial infarction (05/2000); Periodic limb movement disorder; Restless leg syndrome; Sleep apnea; Stress fracture; Suicidal ideation (08/19/2016); Swelling of ankle; and Thyroid nodule.          Vital Signs: /87 (BP Location: Right arm, Patient Position: Sitting)   Pulse 57   Ht 160 cm (63\")   Wt 93.2 kg (205 lb 8 oz)   LMP 10/21/2016 (Approximate) Comment: 54  SpO2 96%   BMI 36.40 kg/m²    Body mass index is 36.4 kg/m².       Tongue: large      Dentition: good       Pharynx: Posterior pharyngeal pillars are wide   Mallampatti: III (soft and hard palate and base of uvula visible)          General: Alert. Cooperative. Well developed. No acute distress.             Head:  Normocephalic. Symmetrical. Atraumatic.              Nose: No septal deviation. No drainage.          Throat: No oral lesions. No thrush. Moist mucous membranes.    Chest Wall:  Normal shape. Symmetric expansion with respiration. No tenderness.             Neck:  Trachea midline.           Lungs:  Clear to auscultation bilaterally. No wheezes. No rhonchi. No rales. Respirations regular, even and unlabored.            Heart:  Regular rhythm and normal rate. Normal S1 and S2. No murmur.     Abdomen:  Soft, non-tender and non-distended. Normal bowel sounds. No masses.  Extremities:  Moves all extremities well. No edema.    Psychiatric: Normal mood and affect.    Study:  · HST 9/12/16  AHI index is 21 indicating moderate obstructive sleep apnea.  It is slightly worse in supine position with index 28. Saturation below 89% for 4 minutes and snoring for 2% of total " sleep time.  · PSG 4/3/17  Overnight split polysomnogram study.  Diagnostic study from 9:21 AM to 11:33 AM.  Sleep efficiency 94% with 2 hours total sleep time.  Proportion of REM sleep was reduced to 5%.  Apnea hypopneas index 12 events an hour with RDI of 24.  Oxygen saturation remained above 89%.  Patient had 19% time snoring.  Following the above titration study from 11:33 AM with the 3 PM.  Sleep efficiency 95% with 3 hours total sleep time.  Rebound in rem sleep to 35%.  Oxygen saturation remained above 89%.  Patient was increased to 11 cm water pressure.  Optimal sleep was achieved at a CPAP of 8 cm water pressure.  With higher pressures air leaks and central events are noted.    Testing:  · 90% compliance for 30 days.  Average usage of machine 5 hours 19 minutes.  AHI 13.6 with set CPAP pressure of 10 cm.    DME Company: [employer]    Impression:  1. Obstructive sleep apnea    2. Dry mouth    3. Restless legs syndrome (RLS)    4. Obesity (BMI 30-39.9)        Plan:  Keri is having an excessively dry mouth despite use of chinstrap.  She will need to trial a different style of full face mask perhaps a dream wear or Isaura view.  Her AHI index is still high and I will trial her on auto CPAP settings of 10-15 cm.    She was asked to continue the Mirapex for control of restless leg syndrome.    I reiterated the importance of effective treatment of obstructive sleep apnea with PAP machine.  Cardiovascular health risks of untreated sleep apnea were again reviewed.  Patient was asked to remain cautious if there is persistent hypersomnolence. The benefit of weight loss in reducing severity of obstructive sleep apnea was discussed.  Patient would benefit from adhering to a strict diet to achieve ideal BMI.     Change of PAP supplies regularly is important for effective use.  Change of cushion on the mask or plugs on nasal pillows along with disposable filters once every month and change of mask frame, tubing, headgear and  Velcro straps every 6 months at the minimum was reiterated.    This patient is compliant with PAP machine and benefits from its use.  Apnea hypopneas index is corrected/improved.  Daytime hypersomnolence has resolved.     Patient will follow up in this clinic in 2 months.    Thank you for allowing me to participate in your patient's care.    Yevgeniy Vallejo MD    Part of this note may be an electronic transcription/translation of spoken language to printed text using the Dragon Dictation System.

## 2018-05-22 ENCOUNTER — OFFICE VISIT (OUTPATIENT)
Dept: ORTHOPEDIC SURGERY | Facility: CLINIC | Age: 58
End: 2018-05-22

## 2018-05-22 VITALS — HEIGHT: 63 IN | WEIGHT: 205 LBS | BODY MASS INDEX: 36.32 KG/M2 | TEMPERATURE: 97.9 F

## 2018-05-22 DIAGNOSIS — S52.592S OTHER CLOSED FRACTURE OF DISTAL END OF LEFT RADIUS, SEQUELA: Primary | ICD-10-CM

## 2018-05-22 PROCEDURE — 99024 POSTOP FOLLOW-UP VISIT: CPT | Performed by: ORTHOPAEDIC SURGERY

## 2018-05-22 NOTE — PROGRESS NOTES
Left Wrist Fracture F/U        Patient: Keri Bhagat        YOB: 1960        Chief Complaints: left wrist pain  Chief Complaint   Patient presents with   • Right Hand - Follow-up   • Left Wrist - Follow-up, Pain           History of Present Illness:   This patient is here follow-up of left distal radius fracture right fifth metacarpal fracture and some dislocations of her left fingers overall she's doing well the left wrist is still bothering her little bit she's been in her splint is ready to wean out of that overall think she's doing better  HPI      Allergies: No Known Allergies    Medications:   Home Medications:  Current Outpatient Prescriptions on File Prior to Visit   Medication Sig   • ARIPiprazole (ABILIFY) 10 MG tablet Take 10 mg by mouth Daily.   • atenolol (TENORMIN) 50 MG tablet Take 1 tablet by mouth Daily.   • buPROPion XL (WELLBUTRIN XL) 300 MG 24 hr tablet Take 300 mg by mouth Every Morning.   • Calcium 600-200 MG-UNIT per tablet Take 1 tablet by mouth Daily.   • Cholecalciferol (VITAMIN D3) 5000 UNITS capsule capsule Take 5,000 Units by mouth Daily.   • fluticasone (FLONASE) 50 MCG/ACT nasal spray 2 sprays into each nostril Daily. As needed allergies   • gabapentin (NEURONTIN) 300 MG capsule Take 1 capsule by mouth 3 (Three) Times a Day.   • isosorbide mononitrate (IMDUR) 30 MG 24 hr tablet Take 30 mg by mouth Daily.   • lamoTRIgine (LaMICtal) 150 MG tablet Take 300 mg by mouth Daily.   • levothyroxine (SYNTHROID, LEVOTHROID) 100 MCG tablet Take 1 tablet by mouth Daily.   • lithium (ESKALITH) 450 MG CR tablet Take 450 mg by mouth Daily.   • loratadine (CLARITIN) 10 MG tablet Take 1 tablet by mouth Daily. As needed for allergies (Patient taking differently: Take 10 mg by mouth Daily As Needed. As needed for allergies)   • Multiple Vitamins-Minerals (MULTI COMPLETE/IRON PO) Take 1 tablet/day by  mouth.   • nitroglycerin (NITROSTAT) 0.4 MG SL tablet Place 0.4 mg under the tongue Every 5 (Five) Minutes As Needed for Chest Pain (took at M.D  office at 0930 a.m.). Take no more than 3 doses in 15 minutes.   • omeprazole (PRILOSEC) 40 MG capsule Take 1 capsule by mouth Daily.   • oxybutynin XL (DITROPAN XL) 15 MG 24 hr tablet Take 15 mg by mouth Daily.   • oxyCODONE-acetaminophen (PERCOCET) 5-325 MG per tablet Take 1 tablet by mouth Every 6 (Six) Hours As Needed for Moderate Pain .   • oxyCODONE-acetaminophen (PERCOCET) 5-325 MG per tablet 1-2 Oral Q4H PRN severe pain   • pramipexole (MIRAPEX) 0.5 MG tablet Take 1 tablet by mouth Every Night for 30 days.   • simvastatin (ZOCOR) 20 MG tablet Take 1 tablet by mouth Every Night. (Patient taking differently: Take 40 mg by mouth Every Night.)   • venlafaxine (EFFEXOR) 75 MG tablet Take 75 mg by mouth Every Morning.     No current facility-administered medications on file prior to visit.      Current Medications:  Scheduled Meds:  Continuous Infusions:  No current facility-administered medications for this visit.        PRN Meds:.    Past Medical History:   Diagnosis Date   • Anemia    • Arthritis    • Atherosclerotic heart disease of native coronary artery with other forms of angina pectoris    • Bipolar I disorder, single manic episode     depressive d(NOS)   • Cervical disc herniation    • Coronary artery disease    • Depression     NO SUICIDAL PLANS   • Difficulty swallowing    • Diverticular disease    • Dyspepsia    • Dysphagia    • Gastric reflux    • Heart murmur    • Hiatal hernia    • High cholesterol    • History of transfusion    • 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    HPV positive on pap LGSIL   • Hyperlipidemia    • Hypertension    • Hypothyroidism    • Incontinence in female     wears pads   • Incontinence in female    • Kidney disease 2017    stage 2    • Kidney disease, chronic, stage III (GFR 30-59 ml/min)    • LGSIL on Pap smear of  cervix 04/29/2016    LGSIL HPV positive   • Myocardial infarction 2000   • Neck pain    • Obesity    • Past myocardial infarction 05/2000   • Periodic limb movement disorder    • Restless leg syndrome    • Sleep apnea     cpap   • Stress fracture     LEFT HEEL   • Suicidal ideation 08/19/2016    history   • Swelling of ankle     right had doppler no s/s blood clot   • Thyroid nodule         Past Surgical History:   Procedure Laterality Date   • ANTERIOR CERVICAL DISCECTOMY W/ FUSION N/A 12/29/2016    Procedure: C3-4 anterior cervical discectomy and fusion with Depuy micro plate, ALLOGRAFT C3-4, AND HARDWARE REMOVAL C4-7.;  Surgeon: Hema Godwin MD;  Location: [facility] MAIN OR;  Service:    • CARDIAC CATHETERIZATION N/A 3/30/2017    Procedure: Left Heart Cath;  Surgeon: Tracey Vargas MD;  Location:  [facility] CATH INVASIVE LOCATION;  Service:    • CARDIAC CATHETERIZATION N/A 3/30/2017    Procedure: Coronary angiography;  Surgeon: Tracey Vargas MD;  Location:  [facility] CATH INVASIVE LOCATION;  Service:    • CARDIAC CATHETERIZATION N/A 3/30/2017    Procedure: Left ventriculography;  Surgeon: Tracey Vargas MD;  [facility];  Service:    • CARDIAC CATHETERIZATION  3/30/2017    Procedure: Functional Flow [place];  Surgeon: Tracey Vargas MD;  Location:  [facility] CATH INVASIVE LOCATION;  Service:    • CERVICAL BIOPSY  MMXVI    Dr. Jeffery.    • CERVICAL DISCECTOMY ANTERIOR  04/2013    C4-7   • COLONOSCOPY  04/20/2015    Diverticulosis, IH   • COLPOSCOPY W/ BIOPSY / CURETTAGE  06/17/2016    LGSIL HPV positive. Results were normal repeat pap in one year. Chronic Cervicitis   • CORONARY ANGIOPLASTY WITH STENT PLACEMENT     • ENDOSCOPY  MMXV    Normal.  Dr. Rodriguez   • ENDOSCOPY N/A 6/22/2017    Erythematous mucosa in the stomach  PATH: Chronic active gastritis, moderate with intestinal metaplasia    • GASTRIC BYPASS      Done using the sleeve technique   • HARDWARE REMOVAL  12/29/2016    cervical   •  LAPAROSCOPIC GASTRIC BANDING  02/2018   • SHOULDER SURGERY Left     RCR   • TONSILLECTOMY     • TONSILLECTOMY AND ADENOIDECTOMY     • TRANSVAGINAL TAPING SUSPENSION N/A 12/6/2017    Procedure: MID URETHRAL SLING CYSTSCOPY;  Surgeon: Abby Méndez MD;  Location: [facility] OR;  Service:    • TUBAL ABDOMINAL LIGATION     • TYMPANOSTOMY TUBE PLACEMENT Right    • WRIST SURGERY Bilateral     carpal tunnel        Social History     Occupational History   • Not on file.     Social History Main Topics   • Smoking status: Former Smoker     Packs/day: 1.50     Years: 20.00     Types: Electronic Cigarette, Cigarettes     Quit date: 2015   • Smokeless tobacco: Never Used      Comment: Electronic Cigarette/ 3 mg nicotine    • Alcohol use Yes      Comment: 2 times yearly    • Drug use: No   • Sexual activity: Defer    Social History     Social History Narrative   • No narrative on file        Family History   Problem Relation Age of Onset   • Diabetes type II Mother    • Hypertension Mother    • Osteoporosis Mother    • Seizures Mother    • COPD Father    • Hypertension Father    • Lung cancer Father    • Heart attack Father    • Liver cancer Father    • Thyroid disease Sister    • Hypertension Sister    • Bipolar disorder Sister    • Depression Sister    • ADD / ADHD Sister    • No Known Problems Son    • Abnormal EKG Daughter    • Hypertension Daughter    • Bipolar disorder Daughter    • No Known Problems Paternal Grandfather    • No Known Problems Paternal Grandmother    • No Known Problems Maternal Grandmother    • No Known Problems Maternal Grandfather    • Thyroid disease Sister    • Hypertension Sister    • Bipolar disorder Sister    • Asthma Daughter    • Breast cancer Neg Hx    • Ovarian cancer Neg Hx    • Uterine cancer Neg Hx    • Colon cancer Neg Hx    • Malig Hyperthermia Neg Hx              Physical Exam: 57 y.o. female  General Appearance:    Alert, cooperative, in no acute distress                  "Vitals:    05/22/18 1541   Temp: 97.9 °F (36.6 °C)   TempSrc: Temporal Artery    Weight: 93 kg (205 lb)   Height: 160 cm (63\")      Patient is alert and read ×3 no acute distress appears her above-listed at height weight and age.  Affect is normal respiratory rate is normal unlabored. Heart rate regular rate rhythm, sclera, dentition and hearing are normal for the purpose of this exam.              Physical Exam:  She does have some swelling about the wrist but it is better pt  has a bit of a deformity with some prominence at the ulnar styloid.   The pt has very minimal palpable tenderness.  Her range of motion is somewhat decreased which is expected after being casted./ Splinted  physical exam of the right hand she is some prominence at the fifth metacarpal fracture but for range of motion no rotational abnormality         Radiology:   AP, Lateral  And oblique of the wrist  ordered/reviewed to evaluate pain. I have compared to previous films  this was of the left wrist she does have a healed distal radius fracture with overall very good alignment                                     Assessment/Plan:      Follow-up distal radius fracture on the left this was doing fine she can wean out of her splint really work on her range of motion is far as the right hand goes she has a fifth metacarpal fracture that is healed and I think functionally will be very good long as she is doing well on all the above accounts she can follow-up as needed                                   "

## 2018-05-23 DIAGNOSIS — M54.12 CERVICAL RADICULOPATHY: ICD-10-CM

## 2018-05-24 ENCOUNTER — OFFICE VISIT (OUTPATIENT)
Dept: CARDIOLOGY | Facility: CLINIC | Age: 58
End: 2018-05-24

## 2018-05-24 VITALS
BODY MASS INDEX: 36.5 KG/M2 | SYSTOLIC BLOOD PRESSURE: 115 MMHG | HEART RATE: 66 BPM | WEIGHT: 206 LBS | HEIGHT: 63 IN | DIASTOLIC BLOOD PRESSURE: 80 MMHG

## 2018-05-24 DIAGNOSIS — I10 ESSENTIAL HYPERTENSION: ICD-10-CM

## 2018-05-24 DIAGNOSIS — I25.10 CORONARY ARTERY DISEASE INVOLVING NATIVE CORONARY ARTERY OF NATIVE HEART WITHOUT ANGINA PECTORIS: Primary | ICD-10-CM

## 2018-05-24 DIAGNOSIS — M54.12 CERVICAL RADICULOPATHY: ICD-10-CM

## 2018-05-24 PROCEDURE — 99213 OFFICE O/P EST LOW 20 MIN: CPT | Performed by: INTERNAL MEDICINE

## 2018-05-24 RX ORDER — GABAPENTIN 300 MG/1
300 CAPSULE ORAL 3 TIMES DAILY
Qty: 90 CAPSULE | Refills: 2 | Status: SHIPPED | OUTPATIENT
Start: 2018-05-24 | End: 2018-06-14 | Stop reason: SDUPTHER

## 2018-05-24 RX ORDER — GABAPENTIN 300 MG/1
CAPSULE ORAL
Qty: 90 CAPSULE | Refills: 2 | Status: CANCELLED | OUTPATIENT
Start: 2018-05-24

## 2018-05-24 NOTE — PROGRESS NOTES
Subjective:     Encounter Date:11/17/2016      Patient ID: Keri Bhagat is a 57 y.o. female.    Chief Complaint:  Coronary Artery Disease   Presents for follow-up visit. Symptoms include chest pressure and dizziness. Pertinent negatives include no leg swelling, muscle weakness, palpitations, shortness of breath or weight gain. Her past medical history is significant for past myocardial infarction. The symptoms have been improving.   Hypertension   This is a chronic problem. The current episode started more than 1 year ago. The problem is controlled. Associated symptoms include malaise/fatigue. Pertinent negatives include no palpitations or shortness of breath.       Patient resents today for reevaluation.  Patient had a syncopal episode about 4 weeks ago.  She presents today overall doing well.  Occasional palpitations but is mostly related to caffeine little bit of shortness of breath.  She does have some dizziness today clearly it's positional in nature.  Overall she is actually doing relatively well.  Review of Systems   Constitution: Positive for malaise/fatigue. Negative for weight gain.   HENT: Positive for hearing loss.    Cardiovascular: Negative for leg swelling and palpitations.   Respiratory: Positive for cough. Negative for shortness of breath.    Skin: Positive for itching and rash.   Musculoskeletal: Negative for muscle weakness.   Neurological: Positive for dizziness.   Psychiatric/Behavioral: Positive for depression. The patient is nervous/anxious.    All other systems reviewed and are negative.      Procedures         Objective:     Physical Exam   Constitutional: She is oriented to person, place, and time. She appears well-developed.   HENT:   Head: Normocephalic.   Eyes: Conjunctivae are normal.   Neck: Normal range of motion.   Cardiovascular: Normal rate, regular rhythm and normal heart sounds.    Pulmonary/Chest: Breath sounds normal.   Abdominal: Soft. Bowel sounds are normal.    Musculoskeletal: Normal range of motion. She exhibits no edema.   Neurological: She is alert and oriented to person, place, and time.   Skin: Skin is warm and dry.   Psychiatric: She has a normal mood and affect. Her behavior is normal.   Vitals reviewed.      Lab Review:       Assessment:          Diagnosis Plan   1. Coronary artery disease involving native coronary artery of native heart without angina pectoris     2. Essential hypertension            Plan:         1.  History of known coronary artery disease. Clinically doing well.  Stress test in March of this year was normal.  2.  Since adjustment of medications she's had no further syncopal or near syncopal episodes.  Patient has minimal lightheadedness and no falls.  3.  Benign positional vertigo some going to defer to her primary care physician  4.  Hypertension blood pressures great   5. Patient told follow-up in 6 months sooner if issues      Coronary Artery Disease  Assessment  • Patient currently having atypical symptoms.  She is off aspirin due to possible GI etiologies at the current time    Plan  • Lifestyle modifications discussed include maintenance of a healthy weight, regular exercise and regular monitoring of cholesterol and blood pressure    Subjective - Objective  • There is a history of past MI

## 2018-05-29 ENCOUNTER — OFFICE VISIT (OUTPATIENT)
Dept: FAMILY MEDICINE CLINIC | Facility: CLINIC | Age: 58
End: 2018-05-29

## 2018-05-29 VITALS
HEIGHT: 63 IN | BODY MASS INDEX: 36.14 KG/M2 | DIASTOLIC BLOOD PRESSURE: 68 MMHG | HEART RATE: 78 BPM | SYSTOLIC BLOOD PRESSURE: 118 MMHG | WEIGHT: 204 LBS | OXYGEN SATURATION: 94 %

## 2018-05-29 DIAGNOSIS — I10 ESSENTIAL HYPERTENSION: ICD-10-CM

## 2018-05-29 DIAGNOSIS — R25.1 SHAKINESS: ICD-10-CM

## 2018-05-29 DIAGNOSIS — N30.00 ACUTE CYSTITIS WITHOUT HEMATURIA: Primary | ICD-10-CM

## 2018-05-29 LAB
BILIRUB BLD-MCNC: NEGATIVE MG/DL
CLARITY, POC: CLEAR
COLOR UR: YELLOW
GLUCOSE UR STRIP-MCNC: NEGATIVE MG/DL
KETONES UR QL: NEGATIVE
LEUKOCYTE EST, POC: ABNORMAL
NITRITE UR-MCNC: NEGATIVE MG/ML
PH UR: 6 [PH] (ref 5–8)
PROT UR STRIP-MCNC: NEGATIVE MG/DL
RBC # UR STRIP: NEGATIVE /UL
SP GR UR: 1 (ref 1–1.03)
UROBILINOGEN UR QL: NORMAL

## 2018-05-29 PROCEDURE — 99213 OFFICE O/P EST LOW 20 MIN: CPT | Performed by: NURSE PRACTITIONER

## 2018-05-29 RX ORDER — NITROFURANTOIN 25; 75 MG/1; MG/1
100 CAPSULE ORAL 2 TIMES DAILY
Qty: 14 CAPSULE | Refills: 0 | Status: SHIPPED | OUTPATIENT
Start: 2018-05-29 | End: 2018-06-05

## 2018-05-29 RX ORDER — BUPROPION HYDROCHLORIDE 150 MG/1
150 TABLET ORAL EVERY MORNING
COMMUNITY
End: 2018-08-09

## 2018-05-29 NOTE — PROGRESS NOTES
Pleasant patient here today for follow-up hypertension presently controlled  Recently had labs with psychiatry    Presently taking Abilify and lithium Wellbutrin and Effexor      Patient occasionally has shaking in her hands  Twitching out of nowhere    Intermittently  No vision loss slurred speech headaches dizziness  Or weakness    She is hyperthyroid presently and we need to decrease   Her thyroid medication she will call her doctor today  And taking a decreased dose tomorrow    Lithium ×2 months  Recent level normal 0.7 therapeutic range  As well as Abilify  She should discuss with her  Psychiatrist if her symptoms do not resolve within a couple weeks of normalizing  Thyroid to euthyroid    If she has any chronic tremors she return office for evaluation  Any seizure emergency room  Confusion slurred speech weakness    C/o urine urine with oder  2-3 mos  Has not beed treated    Patient's doing well after lap band surgery  February 28  She is lost almost 30 pounds  I reassured her that it's more likely to be effort and better diet than thyroid

## 2018-05-29 NOTE — PROGRESS NOTES
Subjective   Keri Bhagat is a 57 y.o. female.     Pleasant patient here today for follow-up hypertension presently controlled  Recently had labs with psychiatry    Presently taking Abilify and lithium Wellbutrin and Effexor      Patient occasionally has shaking in her hands  Twitching out of nowhere  Bilateral increased on the right  Intermittently  No vision loss slurred speech headaches dizziness  Or weakness    She is hyperthyroid presently and we need to decrease   Her thyroid medication she will call her doctor today  And taking a decreased dose tomorrow    Lithium ×2 months  Recent level normal 0.7 therapeutic range  As well as Abilify  She should discuss with her  Psychiatrist if her symptoms do not resolve within a couple weeks of normalizing  Thyroid to euthyroid    If she has any chronic tremors she return office for evaluation  Any seizure emergency room  Confusion slurred speech weakness    C/o urine urine with oder  2-3 mos  Has not beed treated    Patient's doing well after lap band surgery  February 28  She is lost almost 30 pounds  I reassured her that it's more likely to be effort and better diet than thyroid             The following portions of the patient's history were reviewed and updated as appropriate: allergies, current medications, past medical history, past social history, past surgical history and problem list.    Review of Systems   Constitutional: Negative.  Negative for chills and fever.   HENT: Negative.    Respiratory: Negative.    Gastrointestinal: Negative.  Negative for abdominal pain.   Genitourinary: Negative.         Urine has oder cloudy   Musculoskeletal: Negative.    Skin: Negative.    Neurological: Positive for tremors. Negative for dizziness, seizures, facial asymmetry, speech difficulty, weakness, light-headedness, numbness, headache, memory problem and confusion.   Psychiatric/Behavioral: Negative.        Objective   Physical Exam   Constitutional: She is oriented  to person, place, and time. She appears well-developed and well-nourished.   HENT:   Head: Normocephalic.   Mouth/Throat: Oropharynx is clear and moist.   Eyes: Conjunctivae are normal. Pupils are equal, round, and reactive to light.   Neck: Neck supple.   Cardiovascular: Normal rate, regular rhythm and normal heart sounds.  Exam reveals no friction rub.    No murmur heard.  Pulmonary/Chest: Effort normal and breath sounds normal. No respiratory distress. She has no wheezes.   Musculoskeletal: She exhibits no edema.   Neurological: She is alert and oriented to person, place, and time. No cranial nerve deficit. She exhibits normal muscle tone. Coordination normal.   No tremors no focal weakness normal gait   Skin: Skin is warm and dry.   Psychiatric: She has a normal mood and affect. Her behavior is normal. Judgment and thought content normal.   Vitals reviewed.        Assessment/Plan   Diagnoses and all orders for this visit:    Acute cystitis without hematuria  -     POC Urinalysis Dipstick, Automated  -     Urine Culture - Urine, Urine, Random Void    Shakiness    Other orders  -     nitrofurantoin, macrocrystal-monohydrate, (MACROBID) 100 MG capsule; Take 1 capsule by mouth 2 (Two) Times a Day for 7 days.                  Plenty fluids  Nitrofurantoin  Await culture  Fever chills nausea vomiting abdominal pain back pain emergency room    Patient's taking lithium as well as  Synthroid and her TSH is low  Likely shakiness related    Any weakness slurred speech vision loss confusion emergency room  Recheck or see neuro any persistent  Tremors  Any seizure ER    If they persist she'll recheck next month  Normal exam today    Otherwise if she is doing well with everything recheck in 4-6 months    Continue present blood pressure medications

## 2018-05-31 LAB
BACTERIA UR CULT: NORMAL
BACTERIA UR CULT: NORMAL

## 2018-06-01 RX ORDER — AMOXICILLIN 500 MG/1
1000 CAPSULE ORAL 2 TIMES DAILY
Qty: 14 CAPSULE | Refills: 1 | Status: SHIPPED | OUTPATIENT
Start: 2018-06-01 | End: 2018-06-15

## 2018-06-07 ENCOUNTER — TELEPHONE (OUTPATIENT)
Dept: FAMILY MEDICINE CLINIC | Facility: CLINIC | Age: 58
End: 2018-06-07

## 2018-06-07 NOTE — TELEPHONE ENCOUNTER
----- Message from Dennies Garrick sent at 6/7/2018  2:44 PM EDT -----  Regarding: REFERRAL  Contact: [phone]  PATIENT WOULD LIKE REFERRAL TO A NEUROLOGIST

## 2018-06-07 NOTE — TELEPHONE ENCOUNTER
The issue she is having cannot wait to see neurology  It takes some time to see neurology sometimes several months     She is either shaky because of hyperthyroid  Or adverse reactions from Wellbutrin and lithium         Most likely his shakiness is from her medications  Specifically thyroid Wellbutrin and lithium  She needs to call Dr Martin, as well as her psychiatrist tomorrow if she has not already regarding her shakiness    Make an appointment here next week  If she has severe tremors severe headache weakness  Or seizure emergency room    Appointment next week here please    She should call me back after discussing with her doctors

## 2018-06-14 DIAGNOSIS — M54.12 CERVICAL RADICULOPATHY: ICD-10-CM

## 2018-06-14 RX ORDER — GABAPENTIN 300 MG/1
CAPSULE ORAL
Qty: 90 CAPSULE | Refills: 2 | Status: SHIPPED | OUTPATIENT
Start: 2018-06-14 | End: 2019-01-16 | Stop reason: SDUPTHER

## 2018-06-15 ENCOUNTER — CLINICAL SUPPORT (OUTPATIENT)
Dept: ORTHOPEDIC SURGERY | Facility: CLINIC | Age: 58
End: 2018-06-15

## 2018-06-15 VITALS — HEIGHT: 63 IN | BODY MASS INDEX: 35.79 KG/M2 | TEMPERATURE: 98.3 F | WEIGHT: 202 LBS

## 2018-06-15 DIAGNOSIS — M25.562 BILATERAL CHRONIC KNEE PAIN: Primary | ICD-10-CM

## 2018-06-15 DIAGNOSIS — M25.561 BILATERAL CHRONIC KNEE PAIN: Primary | ICD-10-CM

## 2018-06-15 DIAGNOSIS — G89.29 BILATERAL CHRONIC KNEE PAIN: Primary | ICD-10-CM

## 2018-06-15 DIAGNOSIS — M17.11 ARTHRITIS OF RIGHT KNEE: ICD-10-CM

## 2018-06-15 DIAGNOSIS — M17.12 ARTHRITIS OF LEFT KNEE: ICD-10-CM

## 2018-06-15 PROCEDURE — 99213 OFFICE O/P EST LOW 20 MIN: CPT | Performed by: ORTHOPAEDIC SURGERY

## 2018-06-15 PROCEDURE — 73562 X-RAY EXAM OF KNEE 3: CPT | Performed by: ORTHOPAEDIC SURGERY

## 2018-06-15 PROCEDURE — 20610 DRAIN/INJ JOINT/BURSA W/O US: CPT | Performed by: ORTHOPAEDIC SURGERY

## 2018-06-15 RX ORDER — METHYLPREDNISOLONE ACETATE 80 MG/ML
80 INJECTION, SUSPENSION INTRA-ARTICULAR; INTRALESIONAL; INTRAMUSCULAR; SOFT TISSUE
Status: COMPLETED | OUTPATIENT
Start: 2018-06-15 | End: 2018-06-15

## 2018-06-15 RX ORDER — LIDOCAINE HYDROCHLORIDE 10 MG/ML
4 INJECTION, SOLUTION EPIDURAL; INFILTRATION; INTRACAUDAL; PERINEURAL
Status: COMPLETED | OUTPATIENT
Start: 2018-06-15 | End: 2018-06-15

## 2018-06-15 RX ORDER — RANITIDINE 150 MG/1
TABLET ORAL
COMMUNITY
Start: 2018-04-15 | End: 2018-11-20 | Stop reason: SDUPTHER

## 2018-06-15 RX ADMIN — METHYLPREDNISOLONE ACETATE 80 MG: 80 INJECTION, SUSPENSION INTRA-ARTICULAR; INTRALESIONAL; INTRAMUSCULAR; SOFT TISSUE at 10:00

## 2018-06-15 RX ADMIN — METHYLPREDNISOLONE ACETATE 80 MG: 80 INJECTION, SUSPENSION INTRA-ARTICULAR; INTRALESIONAL; INTRAMUSCULAR; SOFT TISSUE at 10:01

## 2018-06-15 RX ADMIN — LIDOCAINE HYDROCHLORIDE 4 ML: 10 INJECTION, SOLUTION EPIDURAL; INFILTRATION; INTRACAUDAL; PERINEURAL at 10:00

## 2018-06-15 RX ADMIN — LIDOCAINE HYDROCHLORIDE 4 ML: 10 INJECTION, SOLUTION EPIDURAL; INFILTRATION; INTRACAUDAL; PERINEURAL at 10:01

## 2018-06-15 NOTE — PROGRESS NOTES
Patient: Keri Bhagat  YOB: 1960    Chief Complaints:  bilateral knee pain    Subjective:    History of Present Illness: Here today for knee pain. The pain is a generalized joint tenderness.  It has been progressive in nature but remains intermittent.  Worsened by prolonged standing or walking and squatting activities. Has had improvement in the past with ice/heat, rest, and injections. Had lap band and has lost 30 lbs.  Walks for exercise and made recs.  [facility] in past helped- went over current info.  Offered PT    This problem is not new to this examiner.     Allergies: No Known Allergies    Medications:   Home Medications:  Current Outpatient Prescriptions on File Prior to Visit   Medication Sig   • ARIPiprazole (ABILIFY) 10 MG tablet Take 10 mg by mouth Daily.   • atenolol (TENORMIN) 50 MG tablet Take 1 tablet by mouth Daily.   • buPROPion XL (WELLBUTRIN XL) 150 MG 24 hr tablet Take 150 mg by mouth Every Morning.   • buPROPion XL (WELLBUTRIN XL) 300 MG 24 hr tablet Take 300 mg by mouth Every Morning.   • Calcium 600-200 MG-UNIT per tablet Take 1 tablet by mouth Daily.   • Cholecalciferol (VITAMIN D3) 5000 UNITS capsule capsule Take 5,000 Units by mouth Daily.   • fluticasone (FLONASE) 50 MCG/ACT nasal spray 2 sprays into each nostril Daily. As needed allergies   • gabapentin (NEURONTIN) 300 MG capsule TAKE ONE CAPSULE BY MOUTH THREE TIMES A DAY   • isosorbide mononitrate (IMDUR) 30 MG 24 hr tablet Take 30 mg by mouth Daily.   • lamoTRIgine (LaMICtal) 150 MG tablet Take 300 mg by mouth Daily.   • levothyroxine (SYNTHROID, LEVOTHROID) 100 MCG tablet Take 1 tablet by mouth Daily.   • lithium (ESKALITH) 450 MG CR tablet Take 450 mg by mouth Daily.   • loratadine (CLARITIN) 10 MG tablet Take 1 tablet by mouth Daily. As needed for allergies (Patient taking differently: Take 10 mg by mouth Daily As Needed. As needed for allergies)   • Multiple Vitamins-Minerals (MULTI COMPLETE/IRON PO) Take 1  tablet/day by mouth.   • nitroglycerin (NITROSTAT) 0.4 MG SL tablet Place 0.4 mg under the tongue Every 5 (Five) Minutes As Needed for Chest Pain (took at M.D  office at 0930 a.m.). Take no more than 3 doses in 15 minutes.   • oxybutynin XL (DITROPAN XL) 15 MG 24 hr tablet Take 15 mg by mouth Daily.   • pramipexole (MIRAPEX) 0.5 MG tablet Take 1 tablet by mouth Every Night for 30 days.   • simvastatin (ZOCOR) 20 MG tablet Take 1 tablet by mouth Every Night. (Patient taking differently: Take 40 mg by mouth Every Night.)   • venlafaxine (EFFEXOR) 75 MG tablet Take 75 mg by mouth Every Morning.   • [DISCONTINUED] amoxicillin (AMOXIL) 500 MG capsule Take 2 capsules by mouth 2 (Two) Times a Day.   • [DISCONTINUED] omeprazole (PRILOSEC) 40 MG capsule Take 1 capsule by mouth Daily.     No current facility-administered medications on file prior to visit.      Current Medications:  Scheduled Meds:  Continuous Infusions:  No current facility-administered medications for this visit.   PRN Meds:.    I have reviewed the patient's medical history in detail and updated the computerized patient record.  Review and summarization of old records include:    Past Medical History:   Diagnosis Date   • Anemia    • Arthritis    • Atherosclerotic heart disease of native coronary artery with other forms of angina pectoris    • Bipolar I disorder, single manic episode     depressive d(NOS)   • Cervical disc herniation    • Coronary artery disease    • Depression     NO SUICIDAL PLANS   • Difficulty swallowing    • Diverticular disease    • Dyspepsia    • Dysphagia    • Gastric reflux    • Heart murmur    • Hiatal hernia    • High cholesterol    • History of transfusion    • 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    HPV positive on pap LGSIL   • Hyperlipidemia    • Hypertension    • Hypothyroidism    • Incontinence in female     wears pads   • Kidney disease 2017    stage 2    • Kidney disease, chronic, stage III (GFR 30-59 ml/min)     • LGSIL on Pap smear of cervix 04/29/2016    LGSIL HPV positive   • Myocardial infarction 2000   • Neck pain    • Obesity    • Past myocardial infarction 05/2000   • Periodic limb movement disorder    • Restless leg syndrome    • Sleep apnea     cpap   • Stress fracture     LEFT HEEL   • Suicidal ideation 08/19/2016    history   • Swelling of ankle     right had doppler no s/s blood clot   • Thyroid nodule         Past Surgical History:   Procedure Laterality Date   • ANTERIOR CERVICAL DISCECTOMY W/ FUSION N/A 12/29/2016    Procedure: C3-4 anterior cervical discectomy and fusion with Depuy micro plate, ALLOGRAFT C3-4, AND HARDWARE REMOVAL C4-7.;  Surgeon: Hema Godwin MD;  Location: [facility] MAIN OR;  Service:    • CARDIAC CATHETERIZATION N/A 3/30/2017    Procedure: Left Heart Cath;  Surgeon: Tracey Vargas MD;  Location:  [facility] CATH INVASIVE [facility];  Service:    • CARDIAC CATHETERIZATION N/A 3/30/2017    Procedure: Coronary angiography;  Surgeon: Tracey Vargas MD;  Location:  [facility];  Service:    • CARDIAC CATHETERIZATION N/A 3/30/2017    Procedure: Left ventriculography;  Surgeon: Tracey Vargas MD;  Location:  [facility];  Service:    • CARDIAC CATHETERIZATION  3/30/2017    Procedure: Functional Flow [place];  Surgeon: Tracey Vargas MD;  Location:  [facility];  Service:    • CERVICAL BIOPSY  MMXVI    Dr. Jeffery.    • CERVICAL DISCECTOMY ANTERIOR  04/2013    C4-7   • COLONOSCOPY  04/20/2015    Diverticulosis, IH   • COLPOSCOPY W/ BIOPSY / CURETTAGE  06/17/2016    LGSIL HPV positive. Results were normal repeat pap in one year. Chronic Cervicitis   • CORONARY ANGIOPLASTY WITH STENT PLACEMENT     • ENDOSCOPY  MMXV    Normal.  Dr. Rodriguez   • ENDOSCOPY N/A 6/22/2017    Erythematous mucosa in the stomach  PATH: Chronic active gastritis, moderate with intestinal metaplasia    • GASTRIC BYPASS      Done using the sleeve technique   • HARDWARE REMOVAL   12/29/2016    cervical   • LAPAROSCOPIC GASTRIC BANDING  02/2018   • SHOULDER SURGERY Left     RCR   • TONSILLECTOMY     • TONSILLECTOMY AND ADENOIDECTOMY     • TRANSVAGINAL TAPING SUSPENSION N/A 12/6/2017    Procedure: MID URETHRAL SLING CYSTSCOPY;  Surgeon: Abby Méndez MD;  Location: [facility] OR;  Service:    • TUBAL ABDOMINAL LIGATION     • TYMPANOSTOMY TUBE PLACEMENT Right    • WRIST SURGERY Bilateral     carpal tunnel        Social History     Occupational History   • Not on file.     Social History Main Topics   • Smoking status: Former Smoker     Packs/day: 1.50     Years: 20.00     Types: Electronic Cigarette, Cigarettes     Quit date: 2015   • Smokeless tobacco: Never Used      Comment: Electronic Cigarette/ 3 mg nicotine    • Alcohol use Yes      Comment: 2 times yearly    • Drug use: No   • Sexual activity: Defer    Social History     Social History Narrative   • No narrative on file        Family History   Problem Relation Age of Onset   • Diabetes type II Mother    • Hypertension Mother    • Osteoporosis Mother    • Seizures Mother    • COPD Father    • Hypertension Father    • Lung cancer Father    • Heart attack Father    • Liver cancer Father    • Thyroid disease Sister    • Hypertension Sister    • Bipolar disorder Sister    • Depression Sister    • ADD / ADHD Sister    • No Known Problems Son    • Abnormal EKG Daughter    • Hypertension Daughter    • Bipolar disorder Daughter    • No Known Problems Paternal Grandfather    • No Known Problems Paternal Grandmother    • No Known Problems Maternal Grandmother    • No Known Problems Maternal Grandfather    • Thyroid disease Sister    • Hypertension Sister    • Bipolar disorder Sister    • Asthma Daughter    • Breast cancer Neg Hx    • Ovarian cancer Neg Hx    • Uterine cancer Neg Hx    • Colon cancer Neg Hx    • Malig Hyperthermia Neg Hx        ROS: 14 point review of systems was performed and was negative except for documented findings  "in HPI and today's encounter.     Allergies: No Known Allergies  Constitutional:  Denies fever, shaking or chills   Eyes:  Denies change in visual acuity   HENT:  Denies nasal congestion or sore throat   Respiratory:  Denies cough or shortness of breath   Cardiovascular:  Denies chest pain or severe LE edema   GI:  Denies abdominal pain, nausea, vomiting, bloody stools or diarrhea   Musculoskeletal:  Numbness, tingling, or loss of motor function only as noted above in history of present illness.  : Denies painful urination or hematuria  Integument:  Denies rash, lesion or ulceration   Neurologic:  Denies headache or focal weakness  Endocrine:  Denies lymphadenopathy  Psych:  Denies confusion or change in mental status   Hem:  Denies active bleeding    Physical Exam:  Wt Readings from Last 3 Encounters:   06/15/18 91.6 kg (202 lb)   05/29/18 92.5 kg (204 lb)   05/24/18 93.4 kg (206 lb)     Ht Readings from Last 3 Encounters:   06/15/18 160 cm (63\")   05/29/18 160 cm (63\")   05/24/18 160 cm (63\")     Body mass index is 35.78 kg/m².  Facility age limit for growth percentiles is 20 years.  Vitals:    06/15/18 0956   Temp: 98.3 °F (36.8 °C)     Vital Signs:  reviewed  Constitutional: Awake alert and oriented x3, well developed, no acute distress, non-toxic appearance.  EYES: symmetric, sclera clear  ENT:  Normocephalic, Atraumatic.   Respiratory:  No respiratory distress, No wheezing  CV: pulse regular, no palpitations or pallor.  GI:  Abdomen soft, non-tender.   Vascular:  Intact distal pulses, No cyanosis, no signs or symptoms of DVT.  Neurologic: Sensation grossly intact to the involved extremity, No focal deficits noted.   Neck: No tenderness, Supple.  Integument: warm, dry, no ulcerations.   Psychiatric:  Oriented, no pathological affect.  Musculoskeletal:    Affected knee(s):  Painful gait with a subtle limp, positive for synovitis, swelling, joint effusion with crepitation.  Lachman negative  Posterior drawer " negative  Hamida's negative  Patellofemoral grind +  Sensation grossly intact to light touch throughout the lower extremity  Skin is intact  Distal pulses are palpable  No signs or symptoms of DVT        Diagnostic Data:     Imaging was done today and discussed with the patient:    Indication: pain related symptoms,  Views: 3V AP, LAT & 40 degree PA bilateral knee(s)   Findings: advanced arthritis  Comparison views: not available    Procedure:  Large Joint Arthrocentesis  Date/Time: 6/15/2018 10:00 AM  Consent given by: patient  Site marked: site marked  Timeout: Immediately prior to procedure a time out was called to verify the correct patient, procedure, equipment, support staff and site/side marked as required   Supporting Documentation  Indications: pain and joint swelling   Procedure Details  Location: knee - R knee  Preparation: Patient was prepped and draped in the usual sterile fashion  Needle size: 22 G  Approach: anterolateral  Medications administered: 80 mg methylPREDNISolone acetate 80 MG/ML; 4 mL lidocaine PF 1% 1 %  Patient tolerance: patient tolerated the procedure well with no immediate complications    Large Joint Arthrocentesis  Date/Time: 6/15/2018 10:01 AM  Consent given by: patient  Site marked: site marked  Timeout: Immediately prior to procedure a time out was called to verify the correct patient, procedure, equipment, support staff and site/side marked as required   Supporting Documentation  Indications: pain and joint swelling   Procedure Details  Location: knee - L knee  Preparation: Patient was prepped and draped in the usual sterile fashion  Needle size: 22 G  Approach: anterolateral  Medications administered: 80 mg methylPREDNISolone acetate 80 MG/ML; 4 mL lidocaine PF 1% 1 %  Patient tolerance: patient tolerated the procedure well with no immediate complications          Assessment:     ICD-10-CM ICD-9-CM   1. Bilateral chronic knee pain M25.561 719.46    M25.562 338.29    G89.29             Plan: Is to proceed with injection  Follow up as indicated.  Ice, elevate, and rest as needed.  Additional interventions include: Biomechanics of pertinent body area discussed.  Risks, benefits, alternatives, comparisons, and complications of accepted medicines, injections, recommendations, surgical procedures, and therapies explained and education provided in laymen's terms. The patient was given the opportunity to ask questions and they were answerved to their satisfaction.   Natural history and expected course of this patient's diagnosis discussed along with evaluation of therapies. Questions answered.  Cortisone Injection. See procedure note.  Cryotherapy/brachy therapy as indicated with instructions.   PT referral offered and declined, advised on stretching/strengthening exercises for self at home.  Biomechanics and proper use of ambulatory aids:  crutches, walker, cane, &/or trekking poles.    6/15/2018

## 2018-07-05 ENCOUNTER — OFFICE VISIT (OUTPATIENT)
Dept: ORTHOPEDIC SURGERY | Facility: CLINIC | Age: 58
End: 2018-07-05

## 2018-07-05 VITALS — WEIGHT: 196 LBS | TEMPERATURE: 98.6 F | BODY MASS INDEX: 34.73 KG/M2 | HEIGHT: 63 IN

## 2018-07-05 DIAGNOSIS — M25.532 LEFT WRIST PAIN: Primary | ICD-10-CM

## 2018-07-05 PROCEDURE — 99213 OFFICE O/P EST LOW 20 MIN: CPT | Performed by: ORTHOPAEDIC SURGERY

## 2018-07-05 PROCEDURE — 73110 X-RAY EXAM OF WRIST: CPT | Performed by: ORTHOPAEDIC SURGERY

## 2018-07-05 NOTE — PROGRESS NOTES
Left  Wrist Fracture F/U        Patient: Keri Bhagat        YOB: 1960        Chief Complaints: Left wrist pain  Chief Complaint   Patient presents with   • Left Wrist - Follow-up         History of Present Illness:   This patient's here follow-up of left wrist injury she's had a previous distal radius fracture she was doing well until about 3 weeks ago she fell landing onto that wrist again.  She has pain with range of motion the right hand is doing great her she had a fifth metacarpal fracture  HPI      Allergies: No Known Allergies    Medications:   Home Medications:  Current Outpatient Prescriptions on File Prior to Visit   Medication Sig   • ARIPiprazole (ABILIFY) 10 MG tablet Take 10 mg by mouth Daily.   • atenolol (TENORMIN) 50 MG tablet Take 1 tablet by mouth Daily.   • buPROPion XL (WELLBUTRIN XL) 150 MG 24 hr tablet Take 150 mg by mouth Every Morning.   • buPROPion XL (WELLBUTRIN XL) 300 MG 24 hr tablet Take 300 mg by mouth Every Morning.   • Calcium 600-200 MG-UNIT per tablet Take 1 tablet by mouth Daily.   • Cholecalciferol (VITAMIN D3) 5000 UNITS capsule capsule Take 5,000 Units by mouth Daily.   • fluticasone (FLONASE) 50 MCG/ACT nasal spray 2 sprays into each nostril Daily. As needed allergies   • gabapentin (NEURONTIN) 300 MG capsule TAKE ONE CAPSULE BY MOUTH THREE TIMES A DAY   • isosorbide mononitrate (IMDUR) 30 MG 24 hr tablet Take 30 mg by mouth Daily.   • lamoTRIgine (LaMICtal) 150 MG tablet Take 300 mg by mouth Daily.   • levothyroxine (SYNTHROID, LEVOTHROID) 100 MCG tablet Take 1 tablet by mouth Daily.   • lithium (ESKALITH) 450 MG CR tablet Take 450 mg by mouth Daily.   • loratadine (CLARITIN) 10 MG tablet Take 1 tablet by mouth Daily. As needed for allergies (Patient taking differently: Take 10 mg by mouth Daily As Needed. As needed for allergies)   • Multiple Vitamins-Minerals (MULTI  COMPLETE/IRON PO) Take 1 tablet/day by mouth.   • nitroglycerin (NITROSTAT) 0.4 MG SL tablet Place 0.4 mg under the tongue Every 5 (Five) Minutes As Needed for Chest Pain (took at M.D  office at 0930 a.m.). Take no more than 3 doses in 15 minutes.   • oxybutynin XL (DITROPAN XL) 15 MG 24 hr tablet Take 15 mg by mouth Daily.   • raNITIdine (ZANTAC) 150 MG tablet    • simvastatin (ZOCOR) 20 MG tablet Take 1 tablet by mouth Every Night. (Patient taking differently: Take 40 mg by mouth Every Night.)   • [DISCONTINUED] venlafaxine (EFFEXOR) 75 MG tablet Take 75 mg by mouth Every Morning.     No current facility-administered medications on file prior to visit.      Current Medications:  Scheduled Meds:  Continuous Infusions:  No current facility-administered medications for this visit.        PRN Meds:.    Past Medical History:   Diagnosis Date   • Anemia    • Arthritis    • Atherosclerotic heart disease of native coronary artery with other forms of angina pectoris (CMS/HCC)    • Bipolar I disorder, single manic episode (CMS/HCC)     depressive d(NOS)   • Cervical disc herniation    • Coronary artery disease    • Depression     NO SUICIDAL PLANS   • Difficulty swallowing    • Diverticular disease    • Dyspepsia    • Dysphagia    • Gastric reflux    • Heart murmur    • Hiatal hernia    • High cholesterol    • History of transfusion    • 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    HPV positive on pap LGSIL   • Hyperlipidemia    • Hypertension    • Hypothyroidism    • Incontinence in female     wears pads   • Kidney disease 2017    stage 2    • Kidney disease, chronic, stage III (GFR 30-59 ml/min)    • LGSIL on Pap smear of cervix 04/29/2016    LGSIL HPV positive   • Myocardial infarction 2000   • Neck pain    • Obesity    • Past myocardial infarction 05/2000   • Periodic limb movement disorder    • Restless leg syndrome    • Sleep apnea     cpap   • Stress fracture     LEFT HEEL   • Suicidal ideation 08/19/2016     history   • Swelling of ankle     right had doppler no s/s blood clot   • Thyroid nodule         Past Surgical History:   Procedure Laterality Date   • ANTERIOR CERVICAL DISCECTOMY W/ FUSION N/A 12/29/2016    Procedure: C3-4 anterior cervical discectomy and fusion with Depuy micro plate, ALLOGRAFT C3-4, AND HARDWARE REMOVAL C4-7.;  Surgeon: Hema Godwin MD;  Location: [facility] OR;  Service:    • CARDIAC CATHETERIZATION N/A 3/30/2017    Procedure: Left Heart Cath;  Surgeon: Tracey Vargas MD;  Location:  [facility];  Service:    • CARDIAC CATHETERIZATION N/A 3/30/2017    Procedure: Coronary angiography;  Surgeon: Tracey Vargas MD;  Location:  [facility];  Service:    • CARDIAC CATHETERIZATION N/A 3/30/2017    Procedure: Left ventriculography;  Surgeon: Tracey Vargas MD;  Location:  [facility];  Service:    • CARDIAC CATHETERIZATION  3/30/2017    Procedure: Functional [place];  Surgeon: Tracey Vargas MD;  [facility];  Service:    • CERVICAL BIOPSY  MMXVI    Dr. Jeffery.    • CERVICAL DISCECTOMY ANTERIOR  04/2013    C4-7   • COLONOSCOPY  04/20/2015    Diverticulosis, IH   • COLPOSCOPY W/ BIOPSY / CURETTAGE  06/17/2016    LGSIL HPV positive. Results were normal repeat pap in one year. Chronic Cervicitis   • CORONARY ANGIOPLASTY WITH STENT PLACEMENT     • ENDOSCOPY  MMXV    Normal.  Dr. Rodriguez   • ENDOSCOPY N/A 6/22/2017    Erythematous mucosa in the stomach  PATH: Chronic active gastritis, moderate with intestinal metaplasia    • GASTRIC BYPASS      Done using the sleeve technique   • HARDWARE REMOVAL  12/29/2016    cervical   • LAPAROSCOPIC GASTRIC BANDING  02/2018   • SHOULDER SURGERY Left     RCR   • TONSILLECTOMY     • TONSILLECTOMY AND ADENOIDECTOMY     • TRANSVAGINAL TAPING SUSPENSION N/A 12/6/2017    Procedure: MID URETHRAL SLING CYSTSCOPY;  Surgeon: Abby Méndez MD;  Location: [facility] OR;  Service:   "  • TUBAL ABDOMINAL LIGATION     • TYMPANOSTOMY TUBE PLACEMENT Right    • WRIST SURGERY Bilateral     carpal tunnel        Social History     Occupational History   • Not on file.     Social History Main Topics   • Smoking status: Former Smoker     Packs/day: 1.50     Years: 20.00     Types: Electronic Cigarette, Cigarettes     Quit date: 2015   • Smokeless tobacco: Never Used      Comment: Electronic Cigarette/ 3 mg nicotine    • Alcohol use Yes      Comment: 2 times yearly    • Drug use: No   • Sexual activity: Defer    Social History     Social History Narrative   • No narrative on file        Family History   Problem Relation Age of Onset   • Diabetes type II Mother    • Hypertension Mother    • Osteoporosis Mother    • Seizures Mother    • COPD Father    • Hypertension Father    • Lung cancer Father    • Heart attack Father    • Liver cancer Father    • Thyroid disease Sister    • Hypertension Sister    • Bipolar disorder Sister    • Depression Sister    • ADD / ADHD Sister    • No Known Problems Son    • Abnormal EKG Daughter    • Hypertension Daughter    • Bipolar disorder Daughter    • No Known Problems Paternal Grandfather    • No Known Problems Paternal Grandmother    • No Known Problems Maternal Grandmother    • No Known Problems Maternal Grandfather    • Thyroid disease Sister    • Hypertension Sister    • Bipolar disorder Sister    • Asthma Daughter    • Breast cancer Neg Hx    • Ovarian cancer Neg Hx    • Uterine cancer Neg Hx    • Colon cancer Neg Hx    • Malig Hyperthermia Neg Hx              Physical Exam: 58 y.o. female  General Appearance:    Alert, cooperative, in no acute distress                 Vitals:    07/05/18 1302   Temp: 98.6 °F (37 °C)   Weight: 88.9 kg (196 lb)   Height: 160 cm (63\")      Patient is alert and read ×3 no acute distress appears her above-listed at height weight and age.  Affect is normal respiratory rate is normal unlabored. Heart rate regular rate rhythm, sclera, " dentition and hearing are normal for the purpose of this exam.    Physical exam of the left wrist she does have mild decrease in flexion extension very mild tenderness over the scaphoid negative Tinel's          Physical Exam:  She does have some swelling about the wrist but it is better pt  has a bit of a deformity with some prominence at the ulnar styloid.   The pt has very minimal palpable tenderness.  Her range of motion is somewhat decreased which is expected after being casted.         Radiology:   AP, Lateral  And oblique of the left wrist  ordered/reviewed to evaluate pain.  Also added a scaphoid view I have compared to previous films she does have a healed distal radius fracture I do see an area the scaphoid that is a bit concerning not a Gerardo percent obvious for fracture but concerning                                       Assessment/Plan:      Follow-up distal radius fracture with a new fall and injury I am concerned about her scaphoid I'm going to have her get back in her splint we will get an MRI to evaluate the scaphoid she's going to call in here and asked for Nallely or Tamiko if  her /MRI is negative she can come out of the splint if it's positive I will probably have her see Dr. Mcgarry

## 2018-07-12 ENCOUNTER — HOSPITAL ENCOUNTER (OUTPATIENT)
Dept: MRI IMAGING | Facility: HOSPITAL | Age: 58
Discharge: HOME | End: 2018-07-12
Attending: ORTHOPAEDIC SURGERY | Admitting: ORTHOPAEDIC SURGERY

## 2018-07-12 DIAGNOSIS — M25.532 LEFT WRIST PAIN: ICD-10-CM

## 2018-07-12 PROCEDURE — 73221 MRI JOINT UPR EXTREM W/O DYE: CPT

## 2018-07-18 ENCOUNTER — TELEPHONE (OUTPATIENT)
Dept: ORTHOPEDIC SURGERY | Facility: CLINIC | Age: 58
End: 2018-07-18

## 2018-07-18 NOTE — TELEPHONE ENCOUNTER
Patient returning call from Vidhya re: MRI results. Patient informed Vidhya is in a meeting and will call her back.

## 2018-07-19 DIAGNOSIS — T14.8XXA LIGAMENT TEAR: Primary | ICD-10-CM

## 2018-08-03 ENCOUNTER — OFFICE VISIT (OUTPATIENT)
Dept: SLEEP MEDICINE | Facility: HOSPITAL | Age: 58
End: 2018-08-03
Attending: INTERNAL MEDICINE

## 2018-08-03 VITALS
SYSTOLIC BLOOD PRESSURE: 117 MMHG | DIASTOLIC BLOOD PRESSURE: 78 MMHG | HEART RATE: 57 BPM | HEIGHT: 63 IN | OXYGEN SATURATION: 97 % | WEIGHT: 197 LBS | BODY MASS INDEX: 34.91 KG/M2 | TEMPERATURE: 97.5 F

## 2018-08-03 DIAGNOSIS — G47.33 OBSTRUCTIVE SLEEP APNEA: Primary | ICD-10-CM

## 2018-08-03 DIAGNOSIS — E66.9 OBESITY (BMI 30-39.9): ICD-10-CM

## 2018-08-03 DIAGNOSIS — Z87.891 EX-SMOKER: ICD-10-CM

## 2018-08-03 DIAGNOSIS — G25.81 RESTLESS LEGS SYNDROME (RLS): ICD-10-CM

## 2018-08-03 RX ORDER — PRAMIPEXOLE DIHYDROCHLORIDE 0.5 MG/1
0.5 TABLET ORAL NIGHTLY
Qty: 30 TABLET | Refills: 11 | Status: SHIPPED | OUTPATIENT
Start: 2018-08-03 | End: 2021-10-22 | Stop reason: SDUPTHER

## 2018-08-03 NOTE — PROGRESS NOTES
[facility] SLEEP MEDICINE  [address]  3rd Floor  [facility] [address]  [phone]    PCP: Epley, James, APRN    Reason for visit:  Sleep disorders: ROCKY and RLS    Keri is a 58 y.o.female who was seen in the [facility] today. She was switched to the Dreamwear FFM and it is much more comfortable. She only sleeps 4-6 hrs at night. She is waking up more rested. She does not mind the pressure on the CPAP.  She has very dry mouth. Does not have chin strap. She remains on Mirapex for RLS.  [place] Sleepiness Scale is 9. Caffeine 4 per day. Alcohol 0 per week.    Keri  reports that she quit smoking about 3 years ago. Her smoking use included Electronic Cigarette and Cigarettes. She has a 30.00 pack-year smoking history. She has never used smokeless tobacco.    Pertinent Positive Review of Systems of denies  Rest of Review of Systems was negative as recorded in Sleep Questionnaire.    Patient  has a past medical history of Anemia; Arthritis; Atherosclerotic heart disease of native coronary artery with other forms of angina pectoris (CMS/HCC); Bipolar I disorder, single manic episode (CMS/HCC); Cervical disc herniation; Coronary artery disease; Depression; Difficulty swallowing; Diverticular disease; Dyspepsia; Dysphagia; Gastric reflux; Heart murmur; Hiatal hernia; High cholesterol; History of transfusion; HPV (human papilloma virus) infection (04/29/2016); Hyperlipidemia; Hypertension; Hypothyroidism; Incontinence in female; Kidney disease (2017); Kidney disease, chronic, stage III (GFR 30-59 ml/min); LGSIL on Pap smear of cervix (04/29/2016); Myocardial infarction (2000); Neck pain; Obesity; Past myocardial infarction (05/2000); Periodic limb movement disorder; Restless leg syndrome; Sleep apnea; Stress fracture; Suicidal ideation (08/19/2016); Swelling of ankle; and Thyroid nodule.     Current Medications:    Current Outpatient Prescriptions:   •  ARIPiprazole (ABILIFY) 10 MG tablet,  Take 10 mg by mouth Daily., Disp: , Rfl:   •  atenolol (TENORMIN) 50 MG tablet, Take 1 tablet by mouth Daily., Disp: 30 tablet, Rfl: 6  •  buPROPion XL (WELLBUTRIN XL) 150 MG 24 hr tablet, Take 150 mg by mouth Every Morning., Disp: , Rfl:   •  buPROPion XL (WELLBUTRIN XL) 300 MG 24 hr tablet, Take 300 mg by mouth Every Morning., Disp: , Rfl:   •  Calcium 600-200 MG-UNIT per tablet, Take 1 tablet by mouth Daily., Disp: , Rfl:   •  Cholecalciferol (VITAMIN D3) 5000 UNITS capsule capsule, Take 5,000 Units by mouth Daily., Disp: , Rfl:   •  fluticasone (FLONASE) 50 MCG/ACT nasal spray, 2 sprays into each nostril Daily. As needed allergies, Disp: 1 bottle, Rfl: 6  •  gabapentin (NEURONTIN) 300 MG capsule, TAKE ONE CAPSULE BY MOUTH THREE TIMES A DAY, Disp: 90 capsule, Rfl: 2  •  isosorbide mononitrate (IMDUR) 30 MG 24 hr tablet, Take 30 mg by mouth Daily., Disp: , Rfl:   •  lamoTRIgine (LaMICtal) 150 MG tablet, Take 300 mg by mouth Daily., Disp: , Rfl:   •  levothyroxine (SYNTHROID, LEVOTHROID) 100 MCG tablet, Take 1 tablet by mouth Daily., Disp: 90 tablet, Rfl: 2  •  lithium (ESKALITH) 450 MG CR tablet, Take 450 mg by mouth Daily., Disp: , Rfl:   •  loratadine (CLARITIN) 10 MG tablet, Take 1 tablet by mouth Daily. As needed for allergies (Patient taking differently: Take 10 mg by mouth Daily As Needed. As needed for allergies), Disp: 30 tablet, Rfl: 11  •  Multiple Vitamins-Minerals (MULTI COMPLETE/IRON PO), Take 1 tablet/day by mouth., Disp: , Rfl:   •  nitroglycerin (NITROSTAT) 0.4 MG SL tablet, Place 0.4 mg under the tongue Every 5 (Five) Minutes As Needed for Chest Pain (took at M.D  office at 0930 a.m.). Take no more than 3 doses in 15 minutes., Disp: , Rfl:   •  oxybutynin XL (DITROPAN XL) 15 MG 24 hr tablet, Take 15 mg by mouth Daily., Disp: , Rfl:   •  raNITIdine (ZANTAC) 150 MG tablet, , Disp: , Rfl:   •  simvastatin (ZOCOR) 20 MG tablet, Take 1 tablet by mouth Every Night. (Patient taking differently: Take 40  "mg by mouth Every Night.), Disp: 30 tablet, Rfl: 5  •  pramipexole (MIRAPEX) 0.5 MG tablet, Take 1 tablet by mouth Every Night for 360 days., Disp: 30 tablet, Rfl: 11   also entered in Sleep Questionnaire         Vital Signs: /78 (BP Location: Left arm, Patient Position: Sitting)   Pulse 57   Temp 97.5 °F (36.4 °C) (Oral)   Ht 160 cm (63\")   Wt 89.4 kg (197 lb)   LMP 10/21/2016 (Approximate) Comment: 54  SpO2 97%   BMI 34.90 kg/m²     Body mass index is 34.9 kg/m².       Tongue: large      Dentition: good       Pharynx: Posterior pharyngeal pillars are wide   Mallampatti: III (soft and hard palate and base of uvula visible)        General: Alert. Cooperative. Well developed. No acute distress.             Head:  Normocephalic. Symmetrical. Atraumatic.              Nose: No septal deviation. No drainage.          Throat: No oral lesions. No thrush. Moist mucous membranes.    Chest Wall:  Normal shape. Symmetric expansion with respiration. No tenderness.             Neck:  Trachea midline.           Lungs:  Clear to auscultation bilaterally. No wheezes. No rhonchi. No rales. Respirations regular, even and unlabored.            Heart:  Regular rhythm and normal rate. Normal S1 and S2. No murmur.     Abdomen:  Soft, non-tender and non-distended. Normal bowel sounds. No masses.  Extremities:  Moves all extremities well. No edema.    Psychiatric: Normal mood and affect.    Study:  · HST 9/12/16  AHI index is 21 indicating moderate obstructive sleep apnea.  It is slightly worse in supine position with index 28. Saturation below 89% for 4 minutes and snoring for 2% of total sleep time.  · PSG 4/3/17  Overnight split polysomnogram study.  Diagnostic study from 9:21 AM to 11:33 AM.  Sleep efficiency 94% with 2 hours total sleep time.  Proportion of REM sleep was reduced to 5%.  Apnea hypopneas index 12 events an hour with RDI of 24.  Oxygen saturation remained above 89%.  Patient had 19% time snoring.  Following " the above titration study from 11:33 AM with the 3 PM.  Sleep efficiency 95% with 3 hours total sleep time.  Rebound in rem sleep to 35%.  Oxygen saturation remained above 89%.  Patient was increased to 11 cm water pressure.  Optimal sleep was achieved at a CPAP of 8 cm water pressure.  With higher pressures air leaks and central events are noted.    Testing:  · Compliance study for 90 days shows 89%.  Average usage 4 hours 20 minutes.  Onset CPAP of 14 AHI 12.  Earlier on auto CPAP between 10 and 15 cm AHI was also 12.    DME Company: [employer]    Impression:  1. Obstructive sleep apnea    2. Restless legs syndrome (RLS)    3. Obesity (BMI 30-39.9)    4. Ex-smoker        Plan:  Keri still has an elevated AHI.  On her titration study higher pressures led to air leaks and central events.  I will therefore reduce her to a fixed 10 cm water pressure.  She will be prescribed a chinstrap for dry mouth and air leaks.  Her symptoms of restless legs are controlled with Mirapex 0.5 mg daily at bedtime which was renewed today.      She is an ex-smoker with over 30 pack years.  She may benefit from screening CT for lung cancer.    I reiterated the importance of effective treatment of obstructive sleep apnea with PAP machine.  Cardiovascular health risks of untreated sleep apnea were again reviewed.  Patient was asked to remain cautious if there is persistent hypersomnolence. The benefit of weight loss in reducing severity of obstructive sleep apnea was discussed.  Patient would benefit from adhering to a strict diet to achieve ideal BMI.     Change of PAP supplies regularly is important for effective use.  Change of cushion on the mask or plugs on nasal pillows along with disposable filters once every month and change of mask frame, tubing, headgear and Velcro straps every 6 months at the minimum was reiterated.    This patient is compliant with PAP machine and benefits from its use.  Apnea hypopneas index is corrected/improved.   Daytime hypersomnolence has resolved.     Patient will follow up in this clinic in 6 months  APRN.    Thank you for allowing me to participate in your patient's care.    Yevgeniy Vallejo MD    Part of this note may be an electronic transcription/translation of spoken language to printed text using the Dragon Dictation System.

## 2018-08-09 ENCOUNTER — TELEPHONE (OUTPATIENT)
Dept: FAMILY MEDICINE CLINIC | Facility: CLINIC | Age: 58
End: 2018-08-09

## 2018-08-13 RX ORDER — ISOSORBIDE MONONITRATE 30 MG/1
30 TABLET, EXTENDED RELEASE ORAL DAILY
Qty: 30 TABLET | Refills: 0 | Status: SHIPPED | OUTPATIENT
Start: 2018-08-13 | End: 2018-09-09 | Stop reason: SDUPTHER

## 2018-08-20 RX ORDER — ATENOLOL 50 MG/1
50 TABLET ORAL DAILY
Qty: 30 TABLET | Refills: 3 | Status: SHIPPED | OUTPATIENT
Start: 2018-08-20 | End: 2019-03-19 | Stop reason: SDUPTHER

## 2018-09-10 RX ORDER — ISOSORBIDE MONONITRATE 30 MG/1
TABLET, EXTENDED RELEASE ORAL
Qty: 30 TABLET | Refills: 5 | Status: SHIPPED | OUTPATIENT
Start: 2018-09-10 | End: 2019-03-30 | Stop reason: SDUPTHER

## 2018-09-12 ENCOUNTER — OFFICE VISIT (OUTPATIENT)
Dept: OBSTETRICS AND GYNECOLOGY | Age: 58
End: 2018-09-12

## 2018-09-12 VITALS
DIASTOLIC BLOOD PRESSURE: 75 MMHG | HEIGHT: 63 IN | WEIGHT: 195 LBS | SYSTOLIC BLOOD PRESSURE: 120 MMHG | BODY MASS INDEX: 34.55 KG/M2

## 2018-09-12 DIAGNOSIS — Z13.89 SCREENING FOR BLOOD OR PROTEIN IN URINE: ICD-10-CM

## 2018-09-12 DIAGNOSIS — N87.0 CIN I (CERVICAL INTRAEPITHELIAL NEOPLASIA I): ICD-10-CM

## 2018-09-12 DIAGNOSIS — Z20.2 STD EXPOSURE: Primary | ICD-10-CM

## 2018-09-12 LAB
BILIRUB BLD-MCNC: NEGATIVE MG/DL
GLUCOSE UR STRIP-MCNC: NEGATIVE MG/DL
KETONES UR QL: NEGATIVE
LEUKOCYTE EST, POC: ABNORMAL
NITRITE UR-MCNC: NEGATIVE MG/ML
PH UR: 7 [PH] (ref 5–8)
PROT UR STRIP-MCNC: NEGATIVE MG/DL
RBC # UR STRIP: ABNORMAL /UL
SP GR UR: 1.01 (ref 1–1.03)
UROBILINOGEN UR QL: NORMAL

## 2018-09-12 PROCEDURE — 99213 OFFICE O/P EST LOW 20 MIN: CPT | Performed by: OBSTETRICS & GYNECOLOGY

## 2018-09-12 RX ORDER — PREGABALIN 75 MG/1
75 CAPSULE ORAL 2 TIMES DAILY
COMMUNITY
End: 2019-02-26 | Stop reason: ALTCHOICE

## 2018-09-12 NOTE — PROGRESS NOTES
"Subjective   Keri Bhagat is a 58 y.o. female who presents for STD testing -  Just found out previous partner was cheating with several partners -    History of Present Illness    The following portions of the patient's history were reviewed and updated as appropriate: allergies, current medications, past family history, past medical history, past social history, past surgical history and problem list.    Review of Systems   Constitutional: Negative for chills and fever.   Gastrointestinal: Negative for abdominal distention and abdominal pain.   Genitourinary: Negative for dyspareunia, dysuria, pelvic pain, vaginal bleeding, vaginal discharge and vaginal pain.   All other systems reviewed and are negative.  /75   Ht 160 cm (63\")   Wt 88.5 kg (195 lb)   LMP 10/21/2016 (Approximate) Comment: 54  BMI 34.54 kg/m²       Objective   Physical Exam   Constitutional: She is oriented to person, place, and time. She appears well-developed and well-nourished.   Abdominal: Soft. She exhibits no distension. There is no tenderness.   Genitourinary: Vagina normal and uterus normal. Pelvic exam was performed with patient supine. Uterus is not enlarged and not tender. Cervix exhibits no motion tenderness and no discharge. Right adnexum displays no mass and no tenderness. Left adnexum displays no mass and no tenderness. No vaginal discharge found.   Neurological: She is alert and oriented to person, place, and time.   Skin: Skin is warm and dry.   Psychiatric: She has a normal mood and affect. Her behavior is normal.   Nursing note and vitals reviewed.      Assessment/Plan   Keri was seen today for gynecologic exam.    Diagnoses and all orders for this visit:    STD exposure  -     RPR  -     HIV-1 / O / 2 Ag / Antibody 4th Generation  -     Hepatitis C Antibody  -     Hepatitis B Surface Antigen  -     NuSwab VG+ - Swab, Vagina    Screening for blood or protein in urine  -     POC Urinalysis Dipstick    MORALES I " (cervical intraepithelial neoplasia I)       Return for annual/pap

## 2018-09-13 LAB
HBV SURFACE AG SERPL QL IA: NEGATIVE
HCV AB S/CO SERPL IA: 0.1 S/CO RATIO (ref 0–0.9)
HIV 1+2 AB+HIV1 P24 AG SERPL QL IA: NON REACTIVE
RPR SER QL: NORMAL

## 2018-09-15 LAB
A VAGINAE DNA VAG QL NAA+PROBE: NORMAL SCORE
BVAB2 DNA VAG QL NAA+PROBE: NORMAL SCORE
C ALBICANS DNA VAG QL NAA+PROBE: NEGATIVE
C GLABRATA DNA VAG QL NAA+PROBE: NEGATIVE
C TRACH RRNA SPEC QL NAA+PROBE: NEGATIVE
MEGA1 DNA VAG QL NAA+PROBE: NORMAL SCORE
N GONORRHOEA RRNA SPEC QL NAA+PROBE: NEGATIVE
T VAGINALIS RRNA SPEC QL NAA+PROBE: NEGATIVE

## 2018-09-17 ENCOUNTER — TELEPHONE (OUTPATIENT)
Dept: OBSTETRICS AND GYNECOLOGY | Age: 58
End: 2018-09-17

## 2018-09-17 NOTE — TELEPHONE ENCOUNTER
----- Message from Nelida Jeffery MD sent at 9/17/2018  8:36 AM EDT -----  Please call patient and notify of normal/negative results of STD screen

## 2018-09-18 ENCOUNTER — CLINICAL SUPPORT (OUTPATIENT)
Dept: ORTHOPEDIC SURGERY | Facility: CLINIC | Age: 58
End: 2018-09-18

## 2018-09-18 VITALS — WEIGHT: 192 LBS | BODY MASS INDEX: 34.02 KG/M2 | TEMPERATURE: 98 F | HEIGHT: 63 IN

## 2018-09-18 DIAGNOSIS — M17.11 ARTHRITIS OF RIGHT KNEE: ICD-10-CM

## 2018-09-18 DIAGNOSIS — M17.12 ARTHRITIS OF LEFT KNEE: ICD-10-CM

## 2018-09-18 PROCEDURE — 20610 DRAIN/INJ JOINT/BURSA W/O US: CPT | Performed by: ORTHOPAEDIC SURGERY

## 2018-09-18 PROCEDURE — 99212 OFFICE O/P EST SF 10 MIN: CPT | Performed by: ORTHOPAEDIC SURGERY

## 2018-09-18 RX ORDER — LIDOCAINE HYDROCHLORIDE 20 MG/ML
4 INJECTION, SOLUTION EPIDURAL; INFILTRATION; INTRACAUDAL; PERINEURAL
Status: COMPLETED | OUTPATIENT
Start: 2018-09-18 | End: 2018-09-18

## 2018-09-18 RX ORDER — LURASIDONE HYDROCHLORIDE 40 MG/1
40 TABLET, FILM COATED ORAL DAILY
COMMUNITY
Start: 2018-08-15 | End: 2019-02-26 | Stop reason: ALTCHOICE

## 2018-09-18 RX ADMIN — LIDOCAINE HYDROCHLORIDE 4 ML: 20 INJECTION, SOLUTION EPIDURAL; INFILTRATION; INTRACAUDAL; PERINEURAL at 13:06

## 2018-09-18 NOTE — PROGRESS NOTES
Patient: Keri Bhagat  YOB: 1960    Chief Complaints:  bilateral knee pain    Subjective:    History of Present Illness: Here today for knee pain. The pain is a generalized joint tenderness.  It has been progressive in nature but remains intermittent.  Worsened by prolonged standing or walking and squatting activities. Has had improvement in the past with ice/heat, rest, and injections.     This problem is not new to this examiner.     Allergies: No Known Allergies    Medications:   Home Medications:  Current Outpatient Prescriptions on File Prior to Visit   Medication Sig   • ARIPiprazole (ABILIFY) 10 MG tablet Take 10 mg by mouth Daily.   • atenolol (TENORMIN) 50 MG tablet Take 1 tablet by mouth Daily.   • buPROPion XL (WELLBUTRIN XL) 300 MG 24 hr tablet Take 300 mg by mouth Every Morning.   • Calcium 600-200 MG-UNIT per tablet Take 1 tablet by mouth Daily.   • Cholecalciferol (VITAMIN D3) 5000 UNITS capsule capsule Take 5,000 Units by mouth Daily.   • gabapentin (NEURONTIN) 300 MG capsule TAKE ONE CAPSULE BY MOUTH THREE TIMES A DAY   • isosorbide mononitrate (IMDUR) 30 MG 24 hr tablet TAKE ONE TABLET BY MOUTH DAILY   • lamoTRIgine (LaMICtal) 150 MG tablet Take 300 mg by mouth Daily.   • levothyroxine (SYNTHROID, LEVOTHROID) 100 MCG tablet Take 1 tablet by mouth Daily.   • lithium (ESKALITH) 450 MG CR tablet Take 450 mg by mouth Daily.   • loratadine (CLARITIN) 10 MG tablet Take 1 tablet by mouth Daily. As needed for allergies (Patient taking differently: Take 10 mg by mouth Daily As Needed. As needed for allergies)   • Multiple Vitamins-Minerals (MULTI COMPLETE/IRON PO) Take 1 tablet/day by mouth.   • nitroglycerin (NITROSTAT) 0.4 MG SL tablet Place 0.4 mg under the tongue Every 5 (Five) Minutes As Needed for Chest Pain (took at M.D  office at 0930 a.m.). Take no more than 3 doses in 15 minutes.   • oxybutynin XL (DITROPAN XL) 15 MG 24 hr tablet Take 15 mg by mouth Daily.   • pramipexole  (MIRAPEX) 0.5 MG tablet Take 1 tablet by mouth Every Night for 360 days.   • pregabalin (LYRICA) 75 MG capsule Take 75 mg by mouth 2 (Two) Times a Day.   • raNITIdine (ZANTAC) 150 MG tablet    • simvastatin (ZOCOR) 20 MG tablet Take 1 tablet by mouth Every Night. (Patient taking differently: Take 40 mg by mouth Every Night.)   • [DISCONTINUED] sulfamethoxazole-trimethoprim (BACTRIM DS,SEPTRA DS) 800-160 MG per tablet Take 1 tablet by mouth 2 (Two) Times a Day.     No current facility-administered medications on file prior to visit.      Current Medications:  Scheduled Meds:  Continuous Infusions:  No current facility-administered medications for this visit.   PRN Meds:.    I have reviewed the patient's medical history in detail and updated the computerized patient record.  Review and summarization of old records include:    Past Medical History:   Diagnosis Date   • Anemia    • Arthritis    • Atherosclerotic heart disease of native coronary artery with other forms of angina pectoris (CMS/HCC)    • Bipolar I disorder, single manic episode (CMS/HCC)     depressive d(NOS)   • Cervical disc herniation    • Coronary artery disease    • Depression     NO SUICIDAL PLANS   • Difficulty swallowing    • Diverticular disease    • Dyspepsia    • Dysphagia    • Gastric reflux    • Heart murmur    • Hiatal hernia    • High cholesterol    • History of transfusion    • 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    HPV positive on pap LGSIL   • Hyperlipidemia    • Hypertension    • Hypothyroidism    • Incontinence in female     wears pads   • Kidney disease 2017    stage 2    • Kidney disease, chronic, stage III (GFR 30-59 ml/min)    • LGSIL on Pap smear of cervix 04/29/2016    LGSIL HPV positive   • Myocardial infarction 2000   • Neck pain    • Obesity    • Past myocardial infarction 05/2000   • Periodic limb movement disorder    • Restless leg syndrome    • Sleep apnea     cpap   • Stress fracture     LEFT HEEL   • Suicidal  ideation 08/19/2016    history   • Swelling of ankle     right had doppler no s/s blood clot   • Thyroid nodule         Past Surgical History:   Procedure Laterality Date   • ANTERIOR CERVICAL DISCECTOMY W/ FUSION N/A 12/29/2016    Procedure: C3-4 anterior cervical discectomy and fusion with Depuy micro plate, ALLOGRAFT C3-4, AND HARDWARE REMOVAL C4-7.;  Surgeon: Hema Godwin MD;  Location: [facility] OR;  Service:    • CARDIAC CATHETERIZATION N/A 3/30/2017    Procedure: Left Heart Cath;  Surgeon: Tracey Vargas MD;  [facility]AS[facility];  Service:    • CARDIAC CATHETERIZATION N/A 3/30/2017    Procedure: Coronary angiography;  Surgeon: Tracey Vargas MD;  Location: [facility] CATH INVASIVE LOCATION;  Service:    • CARDIAC CATHETERIZATION N/A 3/30/2017    Procedure: Left ventriculography;  Surgeon: Tracey Vargas MD;  Location: [facility] CATH INVASIVE LOCATION;  Service:    • CARDIAC CATHETERIZATION  3/30/2017    Procedure: Functional Flow Sterling;  Surgeon: Tracey Vargas MD;  Location: [facility] CATH INVASIVE LOCATION;  Service:    • CERVICAL BIOPSY  [facility]    Dr. Jeffery.    • CERVICAL DISCECTOMY ANTERIOR  04/2013    C4-7   • COLONOSCOPY  04/20/2015    Diverticulosis, IH   • COLPOSCOPY W/ BIOPSY / CURETTAGE  06/17/2016    LGSIL HPV positive. Results were normal repeat pap in one year. Chronic Cervicitis   • CORONARY ANGIOPLASTY WITH STENT PLACEMENT     • ENDOSCOPY  [facility]    Normal.  Dr. Rodriguez   • ENDOSCOPY N/A 6/22/2017    Erythematous mucosa in the stomach  PATH: Chronic active gastritis, moderate with intestinal metaplasia    • GASTRIC BYPASS      Done using the sleeve technique   • HARDWARE REMOVAL  12/29/2016    cervical   • LAPAROSCOPIC GASTRIC BANDING  02/2018   • SHOULDER SURGERY Left     RCR   • TONSILLECTOMY     • TONSILLECTOMY AND ADENOIDECTOMY     • TRANSVAGINAL TAPING SUSPENSION N/A 12/6/2017    Procedure: MID URETHRAL SLING CYSTSCOPY;  Surgeon: Abby Méndez MD;  Location:   [facility];  Service:    • TUBAL ABDOMINAL LIGATION     • TYMPANOSTOMY TUBE PLACEMENT Right    • WRIST SURGERY Bilateral     carpal tunnel        Social History     Occupational History   • Not on file.     Social History Main Topics   • Smoking status: Former Smoker     Packs/day: 1.50     Years: 20.00     Types: Electronic Cigarette, Cigarettes     Quit date: 2015   • Smokeless tobacco: Never Used      Comment: Electronic Cigarette/ 3 mg nicotine    • Alcohol use Yes      Comment: 2 times yearly    • Drug use: No   • Sexual activity: Yes     Partners: Male     Birth control/ protection: None, Condom    Social History     Social History Narrative   • No narrative on file        Family History   Problem Relation Age of Onset   • Diabetes type II Mother    • Hypertension Mother    • Osteoporosis Mother    • Seizures Mother    • COPD Father    • Hypertension Father    • Lung cancer Father    • Heart attack Father    • Liver cancer Father    • Thyroid disease Sister    • Hypertension Sister    • Bipolar disorder Sister    • Depression Sister    • ADD / ADHD Sister    • No Known Problems Son    • Abnormal EKG Daughter    • Hypertension Daughter    • Bipolar disorder Daughter    • No Known Problems Paternal Grandfather    • No Known Problems Paternal Grandmother    • No Known Problems Maternal Grandmother    • No Known Problems Maternal Grandfather    • Thyroid disease Sister    • Hypertension Sister    • Bipolar disorder Sister    • Asthma Daughter    • Breast cancer Neg Hx    • Ovarian cancer Neg Hx    • Uterine cancer Neg Hx    • Colon cancer Neg Hx    • Malig Hyperthermia Neg Hx        ROS: 14 point review of systems was performed and was negative except for documented findings in HPI and today's encounter.     Allergies: No Known Allergies  Constitutional:  Denies fever, shaking or chills   Eyes:  Denies change in visual acuity   HENT:  Denies nasal congestion or sore throat   Respiratory:  Denies cough or  "shortness of breath   Cardiovascular:  Denies chest pain or severe LE edema   GI:  Denies abdominal pain, nausea, vomiting, bloody stools or diarrhea   Musculoskeletal:  Numbness, tingling, or loss of motor function only as noted above in history of present illness.  : Denies painful urination or hematuria  Integument:  Denies rash, lesion or ulceration   Neurologic:  Denies headache or focal weakness  Endocrine:  Denies lymphadenopathy  Psych:  Denies confusion or change in mental status   Hem:  Denies active bleeding    Physical Exam:  Wt Readings from Last 3 Encounters:   09/18/18 87.1 kg (192 lb)   09/12/18 88.5 kg (195 lb)   08/09/18 87.1 kg (192 lb)     Ht Readings from Last 3 Encounters:   09/18/18 160 cm (63\")   09/12/18 160 cm (63\")   08/09/18 160 cm (63\")     Body mass index is 34.01 kg/m².  Facility age limit for growth percentiles is 20 years.  Vitals:    09/18/18 1303   Temp: 98 °F (36.7 °C)     Vital Signs:  reviewed  Constitutional: Awake alert and oriented x3, well developed, no acute distress, non-toxic appearance.  EYES: symmetric, sclera clear  ENT:  Normocephalic, Atraumatic.   Respiratory:  No respiratory distress, No wheezing  CV: pulse regular, no palpitations or pallor.  GI:  Abdomen soft, non-tender.   Vascular:  Intact distal pulses, No cyanosis, no signs or symptoms of DVT.  Neurologic: Sensation grossly intact to the involved extremity, No focal deficits noted.   Neck: No tenderness, Supple.  Integument: warm, dry, no ulcerations.   Psychiatric:  Oriented, no pathological affect.  Musculoskeletal:    Affected knee(s):  Painful gait with a subtle limp, positive for synovitis, swelling, joint effusion with crepitation.  Lachman negative  Posterior drawer negative  Hamida's negative  Patellofemoral grind +  Sensation grossly intact to light touch throughout the lower extremity  Skin is intact  Distal pulses are palpable  No signs or symptoms of DVT        Diagnostic Data:     Imaging was " done today, images were personally viewed and discussed with the patient:    Indication: pain related symptoms,  Views: 3V AP, LAT & 40 degree PA bilateral knee(s)   Findings: severe end-stage arthritis (bone on bone, subchondral sclerosis/cysts, osteophytes)  Comparison views: viewed last xray done in the office.     Procedure:  Large Joint Arthrocentesis  Date/Time: 9/18/2018 1:06 PM  Consent given by: patient  Site marked: site marked  Timeout: Immediately prior to procedure a time out was called to verify the correct patient, procedure, equipment, support staff and site/side marked as required   Supporting Documentation  Indications: pain   Procedure Details  Location: knee - R knee  Needle size: 25 G  Approach: anteromedial  Medications administered: 48 mg hylan 48 MG/6ML; 4 mL lidocaine PF 2% 2 %  Patient tolerance: patient tolerated the procedure well with no immediate complications    Large Joint Arthrocentesis  Date/Time: 9/18/2018 1:06 PM  Consent given by: patient  Site marked: site marked  Timeout: Immediately prior to procedure a time out was called to verify the correct patient, procedure, equipment, support staff and site/side marked as required   Supporting Documentation  Indications: pain   Procedure Details  Location: knee - L knee  Needle size: 25 G  Approach: anteromedial  Medications administered: 4 mL lidocaine PF 2% 2 %; 48 mg hylan 48 MG/6ML            Assessment:     ICD-10-CM ICD-9-CM   1. Arthritis of left knee M17.12 716.96   2. Arthritis of right knee M17.11 716.96           Plan: Is to proceed with injection  Follow up as indicated.  Ice, elevate, and rest as needed.  Additional interventions include:  14 min spent face to face with patient 11 min spent counseling about natural history and expected course of assessed complaint and reviewed treatment options that have been tried and not tried and those currently available. Questions answered.    Natural history and expected course of  this patient's diagnosis discussed along with evaluation of therapies. Questions answered.  Cortisone Injection. See procedure note.  Cryotherapy/brachy therapy as indicated with instructions.   Advised on strengthening exercises, stressed importance of these with progression of arthritis, demonstrated exercises to patient with repeat demonstration and verb of understanding.   Do Daily THIGH exercises sitting up straight in a supportive chair, lean forward as you do when you go to stand up and in that forward leaning position, lift the thigh slowly up and slowly down.  Give assist with your hand under the knee to lift as needed. 15x on each thigh once a day, every day.     9/18/2018  MICHELLER

## 2018-09-24 RX ORDER — SIMVASTATIN 20 MG
TABLET ORAL
Qty: 30 TABLET | Refills: 6 | Status: SHIPPED | OUTPATIENT
Start: 2018-09-24 | End: 2019-05-03 | Stop reason: SDUPTHER

## 2018-10-08 ENCOUNTER — OFFICE VISIT (OUTPATIENT)
Dept: ENDOCRINOLOGY | Age: 58
End: 2018-10-08

## 2018-10-08 VITALS
WEIGHT: 193.2 LBS | BODY MASS INDEX: 34.23 KG/M2 | DIASTOLIC BLOOD PRESSURE: 80 MMHG | OXYGEN SATURATION: 97 % | HEIGHT: 63 IN | SYSTOLIC BLOOD PRESSURE: 124 MMHG | HEART RATE: 58 BPM

## 2018-10-08 DIAGNOSIS — E55.9 VITAMIN D DEFICIENCY: ICD-10-CM

## 2018-10-08 DIAGNOSIS — E78.5 HYPERLIPIDEMIA, UNSPECIFIED HYPERLIPIDEMIA TYPE: ICD-10-CM

## 2018-10-08 DIAGNOSIS — E03.9 HYPOTHYROIDISM (ACQUIRED): Primary | ICD-10-CM

## 2018-10-08 DIAGNOSIS — Z78.0 MENOPAUSE: ICD-10-CM

## 2018-10-08 DIAGNOSIS — I25.10 CORONARY ARTERY DISEASE INVOLVING NATIVE CORONARY ARTERY OF NATIVE HEART WITHOUT ANGINA PECTORIS: ICD-10-CM

## 2018-10-08 DIAGNOSIS — Z83.3 FAMILY HISTORY OF DIABETES MELLITUS (DM): ICD-10-CM

## 2018-10-08 DIAGNOSIS — I10 ESSENTIAL HYPERTENSION: ICD-10-CM

## 2018-10-08 DIAGNOSIS — G47.33 OSA (OBSTRUCTIVE SLEEP APNEA): ICD-10-CM

## 2018-10-08 PROCEDURE — 99214 OFFICE O/P EST MOD 30 MIN: CPT | Performed by: INTERNAL MEDICINE

## 2018-10-08 NOTE — PROGRESS NOTES
Subjective   Keri Bhagat is a 58 y.o. female.     Coronary Artery Disease   Risk factors include hyperlipidemia.   Hypertension   Associated symptoms include neck pain. Identifiable causes of hypertension include sleep apnea.   Hyperlipidemia   Exacerbating diseases include hypothyroidism.   Hypothyroidism   Associated symptoms include joint swelling and neck pain.   Sleep Apnea   Associated symptoms include joint swelling and neck pain.      Patient is a 56-year-old female came in for follow-up. She has hypothyroidism and has been on levothyroxine 100 µg per day since April 2016. Thyroid ultrasound done in 10/17 showed stable 0.5 cm solid nodule in the right.      She has no previous history of thyroid disease. She has no history of head or neck radiation therapy.       She had an upper endoscopy with dilatation in June 2016 done by Dr. Rodriguez.  She has gastritis and hiatal hernia and was initially on Protonix and Carafate.  She was switched from Protonix to ranitidine 150 mg twice a day by the nephrologist.  She denies heartburn, melena or hematochezia     She had a LAP-BAND and hiatal hernia repair done by Dr. Sims February 2018.  She has lost 20 pounds since April 2018.     She was seen by Dr. Lind for CKD stage 2 due to HPN and NSAID use.     She has history of hypertension, and coronary artery disease. She had a previous heart attack in 2000 and had angioplasty was 1 stent. She had a cardiac catheterization done in 2015 which showed a patent stent to the LAD. She had normal nuclear stress test in 1/18 when she was admitted after a syncopal episode.  She denies chest pain or SOB. She is on atenolol and Imdur and follows with Dr. Burroughs.        She has hyperlipidemia and has been on Zocor 20 mg once a day. She denies any myalgia. She has no previous history of diabetes mellitus.  Hemoglobin A1c done in March 2017 was normal at 5.3%.  Her mother and son have diabetes mellitus.  Her last meal was 11  "AM      She was diagnosed to have sleep apnea and is sleeping well with a CPAP.       She has chronic knee and neck pain and sees Dr. Raymond and Dr. Pritchard.  She gets steroid injection on both knees every 3 months.  Last steroids injection was 1 month ago.     She had a syncopal episode last week and sustained a fracture of distal left radius, right fifth metacarpal and right third metacarpal and dislocation of the left third and fourth proximal interphalangeal joints.  Her mother has osteoporosis.  She has not had a bone density.     She smoked cigarettes for more than 40 years and switched to electronic cigarettes in 2017.  She has used Wellbutrin in the past.    The following portions of the patient's history were reviewed and updated as appropriate: allergies, current medications, past family history, past medical history, past social history, past surgical history and problem list.    Review of Systems   Constitutional: Negative.    HENT: Negative.    Eyes: Negative.    Respiratory: Negative.    Cardiovascular: Negative.    Gastrointestinal: Negative.    Endocrine: Negative.    Genitourinary: Negative.    Musculoskeletal: Positive for back pain, joint swelling and neck pain.   Skin: Negative.    Allergic/Immunologic: Negative.    Neurological: Negative.    Hematological: Bruises/bleeds easily (bruise ).   Psychiatric/Behavioral: Positive for sleep disturbance (sleep apnea pt using CPAP machine ).       Objective      Vitals:    10/08/18 1420   BP: 124/80   BP Location: Right arm   Patient Position: Sitting   Cuff Size: Large Adult   Pulse: 58   SpO2: 97%   Weight: 87.6 kg (193 lb 3.2 oz)   Height: 160 cm (62.99\")     Physical Exam   Constitutional: She is oriented to person, place, and time. She appears well-developed and well-nourished. No distress.   HENT:   Head: Normocephalic.   Nose: Nose normal.   Mouth/Throat: No oropharyngeal exudate.   Eyes: Conjunctivae and EOM are normal. Right eye exhibits no " discharge. Left eye exhibits no discharge. No scleral icterus.   Neck: Neck supple. No JVD present. No tracheal deviation present. No thyromegaly present.   Cardiovascular: Normal rate, regular rhythm, normal heart sounds and intact distal pulses.  Exam reveals no gallop and no friction rub.    No murmur heard.  Pulmonary/Chest: Effort normal and breath sounds normal. No respiratory distress. She has no wheezes. She has no rales. She exhibits no tenderness.   Abdominal: Soft. Bowel sounds are normal. She exhibits no distension and no mass. There is no tenderness. There is no rebound and no guarding.   Musculoskeletal: Normal range of motion. She exhibits no edema, tenderness or deformity.   Lymphadenopathy:     She has no cervical adenopathy.   Neurological: She is alert and oriented to person, place, and time. She displays normal reflexes.   Skin: Skin is warm and dry. No rash noted. No erythema. No pallor.   Psychiatric: She has a normal mood and affect. Her behavior is normal.     Office Visit on 09/12/2018   Component Date Value Ref Range Status   • Glucose, UA 09/12/2018 Negative  Negative, 1000 mg/dL (3+) mg/dL Final   • Bilirubin 09/12/2018 Negative  Negative Final   • Ketones, UA 09/12/2018 Negative  Negative Final   • Specific Gravity  09/12/2018 1.010  1.005 - 1.030 Final   • Blood, UA 09/12/2018 Trace* Negative Final   • pH, Urine 09/12/2018 7.0  5.0 - 8.0 Final   • Protein, POC 09/12/2018 Negative  Negative mg/dL Final   • Urobilinogen, UA 09/12/2018 Normal  Normal Final   • Leukocytes 09/12/2018 Moderate (2+)* Negative Final   • Nitrite, UA 09/12/2018 Negative  Negative Final   • RPR 09/12/2018 Comment  Non-Reactive Final    Non-Reactive   • HIV Screen 4th Gen w/RFX (Referenc* 09/12/2018 Non Reactive  Non Reactive Final   • Hep C Virus Ab 09/12/2018 0.1  0.0 - 0.9 s/co ratio Final    Comment:                                   Negative:     < 0.8                               Indeterminate: 0.8 - 0.9                                     Positive:     > 0.9   The CDC recommends that a positive HCV antibody result   be followed up with a HCV Nucleic Acid Amplification   test ([id number]).     • Hepatitis B Surface Ag 09/12/2018 Negative  Negative Final   • Atopobium Vaginae 09/12/2018 Low - 0  Score Final   • BVAB 2 09/12/2018 Moderate - 1  Score Final   • Megasphaera 1 09/12/2018 Low - 0  Score Final    Comment: Calculate total score by adding the 3 individual bacterial  vaginosis (BV) marker scores together.  Total score is  interpreted as follows:  Total score 0-1: Indicates the absence of BV.  Total score   2: Indeterminate for BV. Additional clinical                   data should be evaluated to establish a                   diagnosis.  Total score 3-6: Indicates the presence of BV.  This test was developed and its performance characteristics  determined by [facility].  It has not been cleared or approved  by the Food and Drug Administration.  The FDA has determined  that such clearance or approval is not necessary.     • Diane Albicans, PATRICA 09/12/2018 Negative  Negative Final   • Diane Glabrata, PATRICA 09/12/2018 Negative  Negative Final    Comment: This test was developed and its performance characteristics determined  by [facility].  It has not been cleared or approved by the Food and Drug  Administration.  The FDA has determined that such clearance or  approval is not necessary.     • Trichomonas vaginosis 09/12/2018 Negative  Negative Final   • Chlamydia trachomatis, PATRICA 09/12/2018 Negative  Negative Final   • Neisseria gonorrhoeae, PATRICA 09/12/2018 Negative  Negative Final     Assessment/Plan   Keri was seen today for coronary artery disease, hypertension, hyperlipidemia, hypothyroidism and sleep apnea.    Diagnoses and all orders for this visit:    Hypothyroidism (acquired)  -     Comprehensive Metabolic Panel  -     TSH  -     T4, Free    Essential hypertension  -     Comprehensive Metabolic Panel    Hyperlipidemia,  unspecified hyperlipidemia type  -     Comprehensive Metabolic Panel  -     Lipid Panel  -     Hemoglobin A1c  -     TSH  -     T4, Free    Coronary artery disease involving native coronary artery of native heart without angina pectoris  -     Comprehensive Metabolic Panel    ROCKY (obstructive sleep apnea)    Vitamin D deficiency  -     Comprehensive Metabolic Panel  -     Vitamin D 25 Hydroxy    Menopause  -     Comprehensive Metabolic Panel  -     Vitamin D 25 Hydroxy  -     DEXA Bone Density Axial    Family history of diabetes mellitus (DM)  -     Comprehensive Metabolic Panel  -     Hemoglobin A1c      Check thyroid function tests.  Continue ranitidine.   Continue Zocor 20 mg per day.  Continue CPAP.  Check vitamin D.  Schedule bone density.  Check hemoglobin A1c.    Send copy of my note to James Epley and Dr. Burroughs    RTC 6 mos.

## 2018-10-09 LAB
25(OH)D3+25(OH)D2 SERPL-MCNC: 90.9 NG/ML (ref 30–100)
ALBUMIN SERPL-MCNC: 4.4 G/DL (ref 3.5–5.2)
ALBUMIN/GLOB SERPL: 1.6 G/DL
ALP SERPL-CCNC: 81 U/L (ref 39–117)
ALT SERPL-CCNC: 16 U/L (ref 1–33)
AST SERPL-CCNC: 13 U/L (ref 1–32)
BILIRUB SERPL-MCNC: 0.2 MG/DL (ref 0.1–1.2)
BUN SERPL-MCNC: 14 MG/DL (ref 6–20)
BUN/CREAT SERPL: 13.2 (ref 7–25)
CALCIUM SERPL-MCNC: 10 MG/DL (ref 8.6–10.5)
CHLORIDE SERPL-SCNC: 102 MMOL/L (ref 98–107)
CHOLEST SERPL-MCNC: 151 MG/DL (ref 0–200)
CO2 SERPL-SCNC: 25.7 MMOL/L (ref 22–29)
CREAT SERPL-MCNC: 1.06 MG/DL (ref 0.57–1)
GLOBULIN SER CALC-MCNC: 2.7 GM/DL
GLUCOSE SERPL-MCNC: 91 MG/DL (ref 65–99)
HBA1C MFR BLD: 5.1 % (ref 4.8–5.6)
HDLC SERPL-MCNC: 69 MG/DL (ref 40–60)
INTERPRETATION: NORMAL
LDLC SERPL CALC-MCNC: 63 MG/DL (ref 0–100)
Lab: NORMAL
POTASSIUM SERPL-SCNC: 4.6 MMOL/L (ref 3.5–5.2)
PROT SERPL-MCNC: 7.1 G/DL (ref 6–8.5)
SODIUM SERPL-SCNC: 141 MMOL/L (ref 136–145)
T4 FREE SERPL-MCNC: 1.48 NG/DL (ref 0.93–1.7)
TRIGL SERPL-MCNC: 96 MG/DL (ref 0–150)
TSH SERPL DL<=0.005 MIU/L-ACNC: 0.11 MIU/ML (ref 0.27–4.2)
VLDLC SERPL CALC-MCNC: 19.2 MG/DL (ref 5–40)

## 2018-10-19 DIAGNOSIS — E78.49 OTHER HYPERLIPIDEMIA: ICD-10-CM

## 2018-10-19 DIAGNOSIS — I10 ESSENTIAL HYPERTENSION: Primary | ICD-10-CM

## 2018-10-19 DIAGNOSIS — E03.9 HYPOTHYROIDISM (ACQUIRED): ICD-10-CM

## 2018-10-19 DIAGNOSIS — E04.1 THYROID NODULE: ICD-10-CM

## 2018-11-05 ENCOUNTER — HOSPITAL ENCOUNTER (OUTPATIENT)
Dept: BONE DENSITY | Facility: HOSPITAL | Age: 58
Discharge: HOME | End: 2018-11-05
Attending: INTERNAL MEDICINE | Admitting: INTERNAL MEDICINE

## 2018-11-05 PROCEDURE — 77080 DXA BONE DENSITY AXIAL: CPT

## 2018-11-20 ENCOUNTER — OFFICE VISIT (OUTPATIENT)
Dept: FAMILY MEDICINE CLINIC | Facility: CLINIC | Age: 58
End: 2018-11-20

## 2018-11-20 VITALS
HEART RATE: 61 BPM | DIASTOLIC BLOOD PRESSURE: 68 MMHG | OXYGEN SATURATION: 96 % | SYSTOLIC BLOOD PRESSURE: 110 MMHG | BODY MASS INDEX: 34.2 KG/M2 | HEIGHT: 63 IN | WEIGHT: 193 LBS

## 2018-11-20 DIAGNOSIS — I10 ESSENTIAL HYPERTENSION: Primary | ICD-10-CM

## 2018-11-20 PROCEDURE — 99213 OFFICE O/P EST LOW 20 MIN: CPT | Performed by: NURSE PRACTITIONER

## 2018-11-20 RX ORDER — RANITIDINE 150 MG/1
150 TABLET ORAL 2 TIMES DAILY
Qty: 60 TABLET | Refills: 11 | Status: SHIPPED | OUTPATIENT
Start: 2018-11-20 | End: 2020-05-06 | Stop reason: ALTCHOICE

## 2018-11-20 RX ORDER — ASPIRIN 81 MG/1
81 TABLET ORAL DAILY
Qty: 30 TABLET | Refills: 11 | Status: SHIPPED | OUTPATIENT
Start: 2018-11-20 | End: 2019-03-29 | Stop reason: ALTCHOICE

## 2018-11-20 NOTE — PROGRESS NOTES
Subjective   Keri Bhagat is a 58 y.o. female.     Essential hypertension controlled    F/u  barietric  Lost apx 50 lbs    Patient sees hand specialty  Patient fell earlier this spring unfortunately severe fracture right wrist  Continues to see hand specialty    Mammogram patient will follow-up with her GYN next month to get this scheduled  She will send me the results  Bone density test osteopenia  She takes calcium as well as vitamin D Dr. giraldo  Manages her vitamin D  Patient understands that should be monitored probably in 6 months or so  Recent level 90      Colonoscopy approximate 1 year ago up-to-date    Psychiatry    She actually had bilateral wrist fractures at that time           The following portions of the patient's history were reviewed and updated as appropriate: allergies, current medications, past family history, past social history, past surgical history and problem list.    Review of Systems   Musculoskeletal: Positive for arthralgias.   All other systems reviewed and are negative.      Objective   Physical Exam   Constitutional: She is oriented to person, place, and time. She appears well-developed and well-nourished.   HENT:   Head: Normocephalic.   Mouth/Throat: Oropharynx is clear and moist.   Eyes: Conjunctivae are normal. Pupils are equal, round, and reactive to light.   Neck: Neck supple.   Cardiovascular: Normal rate, regular rhythm and normal heart sounds. Exam reveals no friction rub.   No murmur heard.  Pulmonary/Chest: Effort normal and breath sounds normal. No respiratory distress. She has no wheezes.   Musculoskeletal: She exhibits no edema.   Neurological: She is alert and oriented to person, place, and time.   Skin: Skin is warm and dry.   Psychiatric: She has a normal mood and affect. Her behavior is normal. Judgment and thought content normal.   Vitals reviewed.        Assessment/Plan   Keri was seen today for check-up on hypertension.    Diagnoses and all orders for this  visit:    Essential hypertension    Other orders  -     raNITIdine (ZANTAC) 150 MG tablet; Take 1 tablet by mouth 2 (Two) Times a Day.  -     aspirin 81 MG EC tablet; Take 1 tablet by mouth Daily. For cardiovascular disease protection

## 2018-11-29 ENCOUNTER — OFFICE VISIT (OUTPATIENT)
Dept: CARDIOLOGY | Facility: CLINIC | Age: 58
End: 2018-11-29

## 2018-11-29 VITALS
SYSTOLIC BLOOD PRESSURE: 100 MMHG | OXYGEN SATURATION: 97 % | DIASTOLIC BLOOD PRESSURE: 64 MMHG | HEART RATE: 54 BPM | HEIGHT: 63 IN | WEIGHT: 191 LBS | BODY MASS INDEX: 33.84 KG/M2

## 2018-11-29 DIAGNOSIS — I25.10 CORONARY ARTERY DISEASE INVOLVING NATIVE CORONARY ARTERY OF NATIVE HEART WITHOUT ANGINA PECTORIS: Primary | ICD-10-CM

## 2018-11-29 DIAGNOSIS — I10 ESSENTIAL HYPERTENSION: ICD-10-CM

## 2018-11-29 PROCEDURE — 99213 OFFICE O/P EST LOW 20 MIN: CPT | Performed by: INTERNAL MEDICINE

## 2018-11-29 NOTE — PROGRESS NOTES
Subjective:     Encounter Date:11/29/18      Patient ID: Keri Bhagat is a 58 y.o. female.    Chief Complaint:  Coronary Artery Disease   Presents for follow-up visit. Symptoms include chest pressure and dizziness. Pertinent negatives include no leg swelling, muscle weakness, palpitations, shortness of breath or weight gain. The symptoms have been improving.   Hypertension   This is a chronic problem. The current episode started more than 1 year ago. The problem is controlled. Associated symptoms include malaise/fatigue. Pertinent negatives include no palpitations or shortness of breath.       Patient resents today for reevaluation.  Overall patient is been doing well.  She fell on Sunday fractured both wrists.  She was walking down some stairs and stumbled.  She did not have a syncopal or near syncopal episode dizziness lightheadedness.      Review of Systems   Constitution: Positive for malaise/fatigue. Negative for weight gain.   HENT: Positive for hearing loss.    Cardiovascular: Negative for leg swelling and palpitations.   Respiratory: Negative for cough and shortness of breath.    Musculoskeletal: Negative for muscle weakness.   Neurological: Positive for dizziness.   Psychiatric/Behavioral: Positive for depression. The patient is nervous/anxious.    All other systems reviewed and are negative.      Procedures         Objective:     Physical Exam   Constitutional: She is oriented to person, place, and time. She appears well-developed.   HENT:   Head: Normocephalic.   Eyes: Conjunctivae are normal.   Neck: Normal range of motion.   Cardiovascular: Normal rate, regular rhythm and normal heart sounds.   Pulmonary/Chest: Breath sounds normal.   Abdominal: Soft. Bowel sounds are normal.   Musculoskeletal: Normal range of motion. She exhibits no edema.   Neurological: She is alert and oriented to person, place, and time.   Skin: Skin is warm and dry.   Psychiatric: She has a normal mood and affect. Her  behavior is normal.   Vitals reviewed.      Lab Review:       Assessment:          Diagnosis Plan   1. Coronary artery disease involving native coronary artery of native heart without angina pectoris     2. Essential hypertension            Plan:         1.  History of known coronary artery disease. Clinically doing well.  Stress test in March of this year was normal.  2.  Since adjustment of medications she's had no further syncopal or near syncopal episodes.  Patient has minimal lightheadedness and no falls.  3.  Benign positional vertigo resolved  4.  Hypertension blood pressures today.  She said for the past several days she has felt very fatigued.  She has not fallen or blood pressure home the fatigue has been persistent over the past few days not sure if it's secondary to low blood pressure not.  Patellar continue to follow and see what number she get an report back if her symptoms do not improve.  5. Patient told follow-up in 6 -12 months sooner if issues      Coronary Artery Disease  Assessment  • Patient currently having atypical symptoms.  She is off aspirin due to possible GI etiologies at the current time    Plan  • Lifestyle modifications discussed include maintenance of a healthy weight, regular exercise and regular monitoring of cholesterol and blood pressure

## 2018-12-11 DIAGNOSIS — E66.01 MORBID OBESITY (HCC): Primary | ICD-10-CM

## 2018-12-18 ENCOUNTER — CLINICAL SUPPORT (OUTPATIENT)
Dept: ORTHOPEDIC SURGERY | Facility: CLINIC | Age: 58
End: 2018-12-18

## 2018-12-18 VITALS — BODY MASS INDEX: 32.78 KG/M2 | TEMPERATURE: 98.3 F | HEIGHT: 63 IN | WEIGHT: 185 LBS

## 2018-12-18 DIAGNOSIS — M25.562 CHRONIC PAIN OF BOTH KNEES: Primary | ICD-10-CM

## 2018-12-18 DIAGNOSIS — G89.29 CHRONIC PAIN OF BOTH KNEES: Primary | ICD-10-CM

## 2018-12-18 DIAGNOSIS — M25.561 CHRONIC PAIN OF BOTH KNEES: Primary | ICD-10-CM

## 2018-12-18 DIAGNOSIS — M17.0 ARTHRITIS OF BOTH KNEES: ICD-10-CM

## 2018-12-18 PROCEDURE — 99213 OFFICE O/P EST LOW 20 MIN: CPT | Performed by: ORTHOPAEDIC SURGERY

## 2018-12-18 PROCEDURE — 20610 DRAIN/INJ JOINT/BURSA W/O US: CPT | Performed by: ORTHOPAEDIC SURGERY

## 2018-12-18 RX ORDER — LIDOCAINE HYDROCHLORIDE 20 MG/ML
4 INJECTION, SOLUTION EPIDURAL; INFILTRATION; INTRACAUDAL; PERINEURAL
Status: COMPLETED | OUTPATIENT
Start: 2018-12-18 | End: 2018-12-18

## 2018-12-18 RX ORDER — METHYLPREDNISOLONE ACETATE 80 MG/ML
80 INJECTION, SUSPENSION INTRA-ARTICULAR; INTRALESIONAL; INTRAMUSCULAR; SOFT TISSUE
Status: COMPLETED | OUTPATIENT
Start: 2018-12-18 | End: 2018-12-18

## 2018-12-18 RX ADMIN — LIDOCAINE HYDROCHLORIDE 4 ML: 20 INJECTION, SOLUTION EPIDURAL; INFILTRATION; INTRACAUDAL; PERINEURAL at 13:22

## 2018-12-18 RX ADMIN — METHYLPREDNISOLONE ACETATE 80 MG: 80 INJECTION, SUSPENSION INTRA-ARTICULAR; INTRALESIONAL; INTRAMUSCULAR; SOFT TISSUE at 13:22

## 2018-12-18 NOTE — PROGRESS NOTES
Patient: Keri Bhagat  YOB: 1960    Chief Complaints:  bilateral knee pain    Subjective:    History of Present Illness: Here today for knee pain. The pain is a generalized joint tenderness.  It has been progressive in nature but remains intermittent.  Worsened by prolonged standing or walking and squatting activities. Has had improvement in the past with ice/heat, rest, and injections. Has lost 35 lbs from lap band earlier in the year.  Injections last 2 months.  Went over other recds    This problem is not new to this examiner.     Allergies: No Known Allergies    Medications:   Home Medications:  Current Outpatient Medications on File Prior to Visit   Medication Sig   • ARIPiprazole (ABILIFY) 10 MG tablet Take 10 mg by mouth Daily.   • aspirin 81 MG EC tablet Take 1 tablet by mouth Daily. For cardiovascular disease protection   • atenolol (TENORMIN) 50 MG tablet Take 1 tablet by mouth Daily.   • buPROPion XL (WELLBUTRIN XL) 300 MG 24 hr tablet Take 150 mg by mouth Every Morning.   • Calcium 600-200 MG-UNIT per tablet Take 1 tablet by mouth Daily.   • Cholecalciferol (VITAMIN D3) 5000 UNITS capsule capsule Take 5,000 Units by mouth Daily.   • gabapentin (NEURONTIN) 300 MG capsule TAKE ONE CAPSULE BY MOUTH THREE TIMES A DAY   • isosorbide mononitrate (IMDUR) 30 MG 24 hr tablet TAKE ONE TABLET BY MOUTH DAILY   • lamoTRIgine (LaMICtal) 150 MG tablet Take 300 mg by mouth Daily.   • LATUDA 40 MG tablet tablet 40 mg Daily.   • levothyroxine (SYNTHROID, LEVOTHROID) 100 MCG tablet Take 1 tablet by mouth Daily.   • lithium (ESKALITH) 450 MG CR tablet Take 450 mg by mouth Daily.   • loratadine (CLARITIN) 10 MG tablet Take 1 tablet by mouth Daily. As needed for allergies (Patient taking differently: Take 10 mg by mouth Daily As Needed. As needed for allergies)   • Multiple Vitamins-Minerals (MULTI COMPLETE/IRON PO) Take 1 tablet/day by mouth.   • nitroglycerin (NITROSTAT) 0.4 MG SL tablet Place 0.4 mg  under the tongue Every 5 (Five) Minutes As Needed for Chest Pain (took at M.D  office at 0930 a.m.). Take no more than 3 doses in 15 minutes.   • oxybutynin XL (DITROPAN XL) 15 MG 24 hr tablet Take 15 mg by mouth Daily.   • pramipexole (MIRAPEX) 0.5 MG tablet Take 1 tablet by mouth Every Night for 360 days.   • pregabalin (LYRICA) 75 MG capsule Take 75 mg by mouth 2 (Two) Times a Day.   • raNITIdine (ZANTAC) 150 MG tablet Take 1 tablet by mouth 2 (Two) Times a Day.   • simvastatin (ZOCOR) 20 MG tablet TAKE ONE TABLET BY MOUTH ONCE NIGHTLY     No current facility-administered medications on file prior to visit.      Current Medications:  Scheduled Meds:  Continuous Infusions:  No current facility-administered medications for this visit.   PRN Meds:.    I have reviewed the patient's medical history in detail and updated the computerized patient record.  Review and summarization of old records include:    Past Medical History:   Diagnosis Date   • Anemia    • Arthritis    • Atherosclerotic heart disease of native coronary artery with other forms of angina pectoris (CMS/[facility])    • Bipolar I disorder, single manic episode (CMS/HCC)     depressive d(NOS)   • Cervical disc herniation    • Coronary artery disease    • Depression     NO SUICIDAL PLANS   • Difficulty swallowing    • Diverticular disease    • Dyspepsia    • Dysphagia    • Gastric reflux    • Heart murmur    • Hiatal hernia    • High cholesterol    • History of transfusion    • 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    HPV positive on pap LGSIL   • Hyperlipidemia    • Hypertension    • Hypothyroidism    • Incontinence in female     wears pads   • Kidney disease 2017    stage 2    • Kidney disease, chronic, stage III (GFR 30-59 ml/min) (CMS/HCC)    • LGSIL on Pap smear of cervix 04/29/2016    LGSIL HPV positive   • Myocardial infarction (CMS/HCC) 2000   • Neck pain    • Obesity    • Past myocardial infarction 05/2000   • Periodic limb movement disorder     • Restless leg syndrome    • Sleep apnea     cpap   • Stress fracture     LEFT HEEL   • Suicidal ideation 08/19/2016    history   • Swelling of ankle     right had doppler no s/s blood clot   • Thyroid nodule         Past Surgical History:   Procedure Laterality Date   • CERVICAL BIOPSY  [facility]    Dr. Jeffery.    • CERVICAL DISCECTOMY ANTERIOR  04/2013    C4-7   • COLONOSCOPY  04/20/2015    Diverticulosis, IH   • COLPOSCOPY W/ BIOPSY / CURETTAGE  06/17/2016    LGSIL HPV positive. Results were normal repeat pap in one year. Chronic Cervicitis   • CORONARY ANGIOPLASTY WITH STENT PLACEMENT     • ENDOSCOPY  MM[facility]    Normal.  Dr. Rodriguez   • GASTRIC BYPASS      Done using the sleeve technique   • HARDWARE REMOVAL  12/29/2016    cervical   • LAPAROSCOPIC GASTRIC BANDING  02/2018   • SHOULDER SURGERY Left     RCR   • TONSILLECTOMY     • TONSILLECTOMY AND ADENOIDECTOMY     • TUBAL ABDOMINAL LIGATION     • TYMPANOSTOMY TUBE PLACEMENT Right    • WRIST SURGERY Bilateral     carpal tunnel        Social History     Occupational History   • Not on file   Tobacco Use   • Smoking status: Former Smoker     Packs/day: 1.50     Years: 20.00     Pack years: 30.00     Types: Electronic Cigarette, Cigarettes     Last attempt to quit: 2015     Years since quitting: 3.9   • Smokeless tobacco: Never Used   • Tobacco comment: Electronic Cigarette/ 3 mg nicotine    Substance and Sexual Activity   • Alcohol use: Yes     Comment: 2 times yearly    • Drug use: No   • Sexual activity: Yes     Partners: Male     Birth control/protection: None, Condom      Social History     Social History Narrative   • Not on file        Family History   Problem Relation Age of Onset   • Diabetes type II Mother    • Hypertension Mother    • Osteoporosis Mother    • Seizures Mother    • COPD Father    • Hypertension Father    • Lung cancer Father    • Heart attack Father    • Liver cancer Father    • Thyroid disease Sister    • Hypertension Sister    • Bipolar disorder  "Sister    • Depression Sister    • ADD / ADHD Sister    • No Known Problems Son    • Abnormal EKG Daughter    • Hypertension Daughter    • Bipolar disorder Daughter    • No Known Problems Paternal Grandfather    • No Known Problems Paternal Grandmother    • No Known Problems Maternal Grandmother    • No Known Problems Maternal Grandfather    • Thyroid disease Sister    • Hypertension Sister    • Bipolar disorder Sister    • Asthma Daughter    • Breast cancer Neg Hx    • Ovarian cancer Neg Hx    • Uterine cancer Neg Hx    • Colon cancer Neg Hx    • Malig Hyperthermia Neg Hx        ROS: 14 point review of systems was performed and was negative except for documented findings in HPI and today's encounter.     Allergies: No Known Allergies  Constitutional:  Denies fever, shaking or chills   Eyes:  Denies change in visual acuity   HENT:  Denies nasal congestion or sore throat   Respiratory:  Denies cough or shortness of breath   Cardiovascular:  Denies chest pain or severe LE edema   GI:  Denies abdominal pain, nausea, vomiting, bloody stools or diarrhea   Musculoskeletal:  Numbness, tingling, or loss of motor function only as noted above in history of present illness.  : Denies painful urination or hematuria  Integument:  Denies rash, lesion or ulceration   Neurologic:  Denies headache or focal weakness  Endocrine:  Denies lymphadenopathy  Psych:  Denies confusion or change in mental status   Hem:  Denies active bleeding    Physical Exam:  Wt Readings from Last 3 Encounters:   12/18/18 83.9 kg (185 lb)   11/29/18 86.6 kg (191 lb)   11/20/18 87.5 kg (193 lb)     Ht Readings from Last 3 Encounters:   12/18/18 160 cm (63\")   11/29/18 160 cm (62.99\")   11/20/18 160 cm (62.99\")     Body mass index is 32.77 kg/m².  Facility age limit for growth percentiles is 20 years.  Vitals:    12/18/18 1320   Temp: 98.3 °F (36.8 °C)     Vital Signs:  reviewed  Constitutional: Awake alert and oriented x3, well developed, no acute " distress, non-toxic appearance.  EYES: symmetric, sclera clear  ENT:  Normocephalic, Atraumatic.   Respiratory:  No respiratory distress, No wheezing  CV: pulse regular, no palpitations or pallor.  GI:  Abdomen soft, non-tender.   Vascular:  Intact distal pulses, No cyanosis, no signs or symptoms of DVT.  Neurologic: Sensation grossly intact to the involved extremity, No focal deficits noted.   Neck: No tenderness, Supple.  Integument: warm, dry, no ulcerations.   Psychiatric:  Oriented, no pathological affect.  Musculoskeletal:    Affected knee(s):  Painful gait with a subtle limp, positive for synovitis, swelling, joint effusion with crepitation.  Lachman negative  Posterior drawer negative  Hamida's negative  Patellofemoral grind +  Sensation grossly intact to light touch throughout the lower extremity  Skin is intact  Distal pulses are palpable  No signs or symptoms of DVT        Diagnostic Data:     Imaging was done previously in the office and discussed with the patient:    Indication: pain related symptoms,  Views: 3V AP, LAT & 40 degree PA bilateral knee(s)   Findings: advanced arthritis  Comparison views: viewed last xray done in the office.     Procedure:  Large Joint Arthrocentesis: R knee  Date/Time: 12/18/2018 1:22 PM  Consent given by: patient  Site marked: site marked  Timeout: Immediately prior to procedure a time out was called to verify the correct patient, procedure, equipment, support staff and site/side marked as required   Supporting Documentation  Indications: pain   Procedure Details  Location: knee - R knee  Needle size: 25 G  Approach: anteromedial  Medications administered: 80 mg methylPREDNISolone acetate 80 MG/ML; 4 mL lidocaine PF 2% 2 %  Patient tolerance: patient tolerated the procedure well with no immediate complications    Large Joint Arthrocentesis: L knee  Date/Time: 12/18/2018 1:22 PM  Consent given by: patient  Site marked: site marked  Timeout: Immediately prior to procedure  a time out was called to verify the correct patient, procedure, equipment, support staff and site/side marked as required   Supporting Documentation  Indications: pain   Procedure Details  Location: knee - L knee  Needle size: 25 G  Approach: anteromedial  Medications administered: 80 mg methylPREDNISolone acetate 80 MG/ML; 4 mL lidocaine PF 2% 2 %  Patient tolerance: patient tolerated the procedure well with no immediate complications          Assessment:     ICD-10-CM ICD-9-CM   1. Chronic pain of both knees M25.561 719.46    M25.562 338.29    G89.29    2. Arthritis of both knees M17.0 716.96           Plan: Is to proceed with injection  Follow up as indicated.  Ice, elevate, and rest as needed.  Additional interventions include: Biomechanics of pertinent body area discussed.  Risks, benefits, alternatives, comparisons, and complications of accepted medicines, injections, recommendations, surgical procedures, and therapies explained and education provided in laymen's terms. The patient was given the opportunity to ask questions and they were answerved to their satisfaction.   Natural history and expected course of this patient's diagnosis discussed along with evaluation of therapies. Questions answered.  Cortisone Injection. See procedure note.  PT referral offered and declined, advised on stretching/strengthening exercises for self at home.    12/18/2018

## 2019-01-04 ENCOUNTER — APPOINTMENT (OUTPATIENT)
Dept: PHYSICAL THERAPY | Facility: HOSPITAL | Age: 59
End: 2019-01-04
Attending: ORTHOPAEDIC SURGERY

## 2019-01-14 ENCOUNTER — APPOINTMENT (OUTPATIENT)
Dept: PHYSICAL THERAPY | Facility: HOSPITAL | Age: 59
End: 2019-01-14
Attending: ORTHOPAEDIC SURGERY

## 2019-01-16 DIAGNOSIS — M54.12 CERVICAL RADICULOPATHY: ICD-10-CM

## 2019-01-16 RX ORDER — GABAPENTIN 300 MG/1
CAPSULE ORAL
Qty: 90 CAPSULE | Refills: 0 | Status: SHIPPED | OUTPATIENT
Start: 2019-01-16 | End: 2019-06-07 | Stop reason: SDUPTHER

## 2019-01-17 ENCOUNTER — OFFICE VISIT (OUTPATIENT)
Dept: FAMILY MEDICINE CLINIC | Facility: CLINIC | Age: 59
End: 2019-01-17

## 2019-01-17 VITALS
BODY MASS INDEX: 33.66 KG/M2 | OXYGEN SATURATION: 95 % | HEART RATE: 65 BPM | DIASTOLIC BLOOD PRESSURE: 90 MMHG | WEIGHT: 190 LBS | HEIGHT: 63 IN | SYSTOLIC BLOOD PRESSURE: 110 MMHG

## 2019-01-17 DIAGNOSIS — E03.9 HYPOTHYROIDISM (ACQUIRED): ICD-10-CM

## 2019-01-17 DIAGNOSIS — R53.83 FATIGUE, UNSPECIFIED TYPE: ICD-10-CM

## 2019-01-17 DIAGNOSIS — I10 ESSENTIAL HYPERTENSION: ICD-10-CM

## 2019-01-17 DIAGNOSIS — R41.89 COGNITIVE CHANGE: Primary | ICD-10-CM

## 2019-01-17 DIAGNOSIS — F32.A DEPRESSION, UNSPECIFIED DEPRESSION TYPE: ICD-10-CM

## 2019-01-17 PROCEDURE — 99214 OFFICE O/P EST MOD 30 MIN: CPT | Performed by: NURSE PRACTITIONER

## 2019-01-17 RX ORDER — LURASIDONE HYDROCHLORIDE 60 MG/1
TABLET, FILM COATED ORAL
COMMUNITY
Start: 2018-11-21 | End: 2019-02-26 | Stop reason: ALTCHOICE

## 2019-01-17 RX ORDER — VENLAFAXINE HYDROCHLORIDE 75 MG/1
75 CAPSULE, EXTENDED RELEASE ORAL EVERY MORNING
COMMUNITY
Start: 2018-10-27 | End: 2019-02-26 | Stop reason: ALTCHOICE

## 2019-01-17 RX ORDER — CHLORHEXIDINE GLUCONATE 0.12 MG/ML
RINSE ORAL
COMMUNITY
Start: 2018-10-24 | End: 2019-02-26

## 2019-01-17 NOTE — PATIENT INSTRUCTIONS
Discontinue gabapentin starting today  Decrease Lyrica to 75 mg daily  Times one week  Then discontinue    Follow-up psychiatry  Consider increasing Effexor?  Consider increasing bupropion?      If confusion weakness slurred speech vision loss  Or suicidal ideation  Agitation abnormal thinking emergency room    Recheck in one week    MRI    brain outpatient as soon as possible  Call next day for results  Push fluids 2 days prior day of in 2 days after as discussed    Walking daily  Call friends  Discussed with mom

## 2019-01-17 NOTE — PROGRESS NOTES
Subjective   Keri Bhagat is a 58 y.o. female.      Patient complains several weeks approximately 3  Fatigue somnolence decreased cognition some word finding difficulty  No slurred speech vision loss no paresthesias no headache no weakness extremities  No acute confusion   complains of severe depression  See psychiatry  She's taking quite a few psychoactive medications for bipolar disease type I  Taking Lyrica and gabapentin  Not sure why she's taking both  Chronic arthritic pains  I believe she says she's taken Lyrica through psychiatry  I prescribed gabapentin for osteoporotic pain  I think we should decrease these  History of atherosclerotic disease  Presently stable  Blood pressure controlled  LAP-BAND surgery, lost 50 pounds        Medications which are psychoactive and may call sedation    Gabapentin  lyrica  venalfaxine  mirapex restless leg  Lithium  latuda  Oxybutynin   overactive bladder         The following portions of the patient's history were reviewed and updated as appropriate: allergies, current medications, past family history, past medical history, past social history, past surgical history and problem list.    Review of Systems   Constitutional: Negative for chills, fatigue, fever and unexpected weight loss.   HENT: Negative.  Negative for trouble swallowing.    Eyes: Negative.    Respiratory: Negative for cough and shortness of breath.    Cardiovascular: Negative for chest pain, palpitations and leg swelling.   Gastrointestinal: Negative for abdominal pain, blood in stool, constipation and diarrhea.   Genitourinary: Negative.    Musculoskeletal: Negative.    Skin: Negative.    Neurological: Negative.  Negative for dizziness, speech difficulty, weakness and confusion.   Psychiatric/Behavioral: Negative.        Objective   Physical Exam   Constitutional: She is oriented to person, place, and time. She appears well-developed and well-nourished.   HENT:   Head: Normocephalic.   Mouth/Throat:  Oropharynx is clear and moist.   Eyes: Conjunctivae are normal. Pupils are equal, round, and reactive to light.   Neck: Neck supple.   Cardiovascular: Normal rate, regular rhythm and normal heart sounds. Exam reveals no friction rub.   No murmur heard.  Carotids clear   Pulmonary/Chest: Effort normal and breath sounds normal. No respiratory distress. She has no wheezes.   Musculoskeletal: She exhibits no edema.   Lymphadenopathy:     She has no cervical adenopathy.   Neurological: She is alert and oriented to person, place, and time. She displays normal reflexes. No cranial nerve deficit or sensory deficit. She exhibits normal muscle tone. Coordination normal.   PERRLA  3 mm  Speech clear  Alert and oriented  Cognitively intact  Negative Romberg  Negative pronator drift  No weakness normal gait   Skin: Skin is warm and dry.   Psychiatric: Her behavior is normal. Judgment and thought content normal.   Affect flat  Soft spoken  Pleasant appropriate otherwise   Vitals reviewed.        Assessment/Plan   Keri was seen today for dizzy, confuse and not able to get words out.    Diagnoses and all orders for this visit:    Cognitive change  -     MRI Brain With & Without Contrast  -     CBC & Differential  -     Comprehensive Metabolic Panel  -     Urinalysis With Culture If Indicated - Urine, Clean Catch  -     TSH Rfx On Abnormal To Free T4    Depression, unspecified depression type  -     CBC & Differential  -     Comprehensive Metabolic Panel  -     Urinalysis With Culture If Indicated - Urine, Clean Catch  -     TSH Rfx On Abnormal To Free T4    Fatigue, unspecified type  -     MRI Brain With & Without Contrast  -     CBC & Differential  -     Comprehensive Metabolic Panel  -     Urinalysis With Culture If Indicated - Urine, Clean Catch  -     TSH Rfx On Abnormal To Free T4    Essential hypertension  -     CBC & Differential  -     Comprehensive Metabolic Panel  -     Urinalysis With Culture If Indicated - Urine,  Clean Catch  -     TSH Rfx On Abnormal To Free T4    Hypothyroidism (acquired)  -     CBC & Differential  -     Comprehensive Metabolic Panel  -     Urinalysis With Culture If Indicated - Urine, Clean Catch  -     TSH Rfx On Abnormal To Free T4                  Fatigue and sleepiness cognitive change  Without acute confusion  Normal neuro exam  Complaints of severe depression    Likely symptoms are related to worsening depression  As well as potential drug interaction and sedation from her medications    I don't think she's had a stroke, but she does have a history of atherosclerotic disease hypertension  Higher risk bus also give her set up for MRI of the brain  With and without contrast  Discussed risk of contrast with kidneys  Strategy to mitigate risk  Push fluids    Any confusion weakness slurred speech emergency room      Medications which are psychoactive and may call sedation    Gabapentin  lyrica  venalfaxine  mirapex restless leg  Lithium  latuda  Oxybutynin   overactive bladder          Discontinue gabapentin starting today  Decrease Lyrica to 75 mg daily  Times one week  Then discontinue    Follow-up psychiatry  Consider increasing Effexor?  Consider increasing bupropion?      If confusion weakness slurred speech vision loss  Or suicidal ideation  Agitation abnormal thinking emergency room    Recheck in one week    MRI    brain outpatient as soon as possible  Call next day for results  Push fluids 2 days prior day of in 2 days after as discussed    Walking daily  Call friends  Discussed with mom

## 2019-01-17 NOTE — PROGRESS NOTES
Medications which are psychoactive and may call sedation    Gabapentin  lyrica  venalfaxine  mirapex restless leg  Lithium  latuda  Oxybutynin   overactive bladder

## 2019-01-20 LAB
ALBUMIN SERPL-MCNC: 4.4 G/DL (ref 3.5–5.2)
ALBUMIN/GLOB SERPL: 1.9 G/DL
ALP SERPL-CCNC: 65 U/L (ref 39–117)
ALT SERPL-CCNC: 17 U/L (ref 1–33)
APPEARANCE UR: CLEAR
AST SERPL-CCNC: 15 U/L (ref 1–32)
BACTERIA #/AREA URNS HPF: ABNORMAL /HPF
BACTERIA UR CULT: NORMAL
BACTERIA UR CULT: NORMAL
BASOPHILS # BLD AUTO: 0.03 10*3/MM3 (ref 0–0.2)
BASOPHILS NFR BLD AUTO: 0.3 % (ref 0–1.5)
BILIRUB SERPL-MCNC: <0.2 MG/DL (ref 0.1–1.2)
BILIRUB UR QL STRIP: NEGATIVE
BUN SERPL-MCNC: 14 MG/DL (ref 6–20)
BUN/CREAT SERPL: 12.1 (ref 7–25)
CALCIUM SERPL-MCNC: 9.4 MG/DL (ref 8.6–10.5)
CHLORIDE SERPL-SCNC: 103 MMOL/L (ref 98–107)
CO2 SERPL-SCNC: 26.4 MMOL/L (ref 22–29)
COLOR UR: YELLOW
CREAT SERPL-MCNC: 1.16 MG/DL (ref 0.57–1)
CRYSTALS URNS MICRO: ABNORMAL
EOSINOPHIL # BLD AUTO: 0.22 10*3/MM3 (ref 0–0.7)
EOSINOPHIL NFR BLD AUTO: 2.3 % (ref 0.3–6.2)
EPI CELLS #/AREA URNS HPF: ABNORMAL /HPF
ERYTHROCYTE [DISTWIDTH] IN BLOOD BY AUTOMATED COUNT: 14.1 % (ref 11.7–13)
GLOBULIN SER CALC-MCNC: 2.3 GM/DL
GLUCOSE SERPL-MCNC: 101 MG/DL (ref 65–99)
GLUCOSE UR QL: NEGATIVE
HCT VFR BLD AUTO: 41.4 % (ref 35.6–45.5)
HGB BLD-MCNC: 12.9 G/DL (ref 11.9–15.5)
HGB UR QL STRIP: NEGATIVE
IMM GRANULOCYTES # BLD AUTO: 0.03 10*3/MM3 (ref 0–0.03)
IMM GRANULOCYTES NFR BLD AUTO: 0.3 % (ref 0–0.5)
KETONES UR QL STRIP: NEGATIVE
LEUKOCYTE ESTERASE UR QL STRIP: ABNORMAL
LYMPHOCYTES # BLD AUTO: 4.2 10*3/MM3 (ref 0.9–4.8)
LYMPHOCYTES NFR BLD AUTO: 43.1 % (ref 19.6–45.3)
MCH RBC QN AUTO: 30.1 PG (ref 26.9–32)
MCHC RBC AUTO-ENTMCNC: 31.2 G/DL (ref 32.4–36.3)
MCV RBC AUTO: 96.5 FL (ref 80.5–98.2)
MICRO URNS: ABNORMAL
MONOCYTES # BLD AUTO: 0.68 10*3/MM3 (ref 0.2–1.2)
MONOCYTES NFR BLD AUTO: 7 % (ref 5–12)
MUCOUS THREADS URNS QL MICRO: PRESENT /HPF
NEUTROPHILS # BLD AUTO: 4.62 10*3/MM3 (ref 1.9–8.1)
NEUTROPHILS NFR BLD AUTO: 47.3 % (ref 42.7–76)
NITRITE UR QL STRIP: NEGATIVE
PH UR STRIP: 6.5 [PH] (ref 5–7.5)
PLATELET # BLD AUTO: 330 10*3/MM3 (ref 140–500)
POTASSIUM SERPL-SCNC: 4.4 MMOL/L (ref 3.5–5.2)
PROT SERPL-MCNC: 6.7 G/DL (ref 6–8.5)
PROT UR QL STRIP: NEGATIVE
RBC # BLD AUTO: 4.29 10*6/MM3 (ref 3.9–5.2)
RBC #/AREA URNS HPF: ABNORMAL /HPF
SODIUM SERPL-SCNC: 142 MMOL/L (ref 136–145)
SP GR UR: 1.01 (ref 1–1.03)
T4 FREE SERPL-MCNC: 1.43 NG/DL (ref 0.93–1.7)
TSH SERPL DL<=0.005 MIU/L-ACNC: 0.15 MIU/ML (ref 0.27–4.2)
UNIDENT CRYS URNS QL MICRO: PRESENT /LPF
URINALYSIS REFLEX: ABNORMAL
UROBILINOGEN UR STRIP-MCNC: 0.2 MG/DL (ref 0.2–1)
WBC # BLD AUTO: 9.75 10*3/MM3 (ref 4.5–10.7)
WBC #/AREA URNS HPF: >30 /HPF

## 2019-01-23 ENCOUNTER — TELEPHONE (OUTPATIENT)
Dept: FAMILY MEDICINE CLINIC | Facility: CLINIC | Age: 59
End: 2019-01-23

## 2019-01-23 RX ORDER — ALPRAZOLAM 1 MG/1
TABLET ORAL
Qty: 1 TABLET | Refills: 0 | Status: SHIPPED | OUTPATIENT
Start: 2019-01-23 | End: 2019-02-26

## 2019-01-23 NOTE — TELEPHONE ENCOUNTER
Okay I gave her Xanax 1 mg  One hour prior to procedure    Caution falls sedation  Do not drive with medication  Needs transportation thank you

## 2019-01-23 NOTE — TELEPHONE ENCOUNTER
Patient is schedule for MRI on Monday she stated that she is very claustrophobic and would like something. The strongest you can prescribe.

## 2019-01-26 DIAGNOSIS — E03.9 HYPOTHYROIDISM (ACQUIRED): ICD-10-CM

## 2019-01-28 ENCOUNTER — HOSPITAL ENCOUNTER (OUTPATIENT)
Dept: MRI IMAGING | Facility: HOSPITAL | Age: 59
Discharge: HOME | End: 2019-01-28
Admitting: NURSE PRACTITIONER

## 2019-01-28 LAB — CREAT BLDA-MCNC: 1.3 MG/DL (ref 0.6–1.3)

## 2019-01-28 PROCEDURE — 82565 ASSAY OF CREATININE: CPT

## 2019-01-28 PROCEDURE — 0 GADOBENATE DIMEGLUMINE 529 MG/ML SOLUTION: Performed by: NURSE PRACTITIONER

## 2019-01-28 PROCEDURE — A9577 INJ MULTIHANCE: HCPCS | Performed by: NURSE PRACTITIONER

## 2019-01-28 PROCEDURE — 70553 MRI BRAIN STEM W/O & W/DYE: CPT

## 2019-01-28 RX ORDER — LEVOTHYROXINE SODIUM 0.1 MG/1
TABLET ORAL
Qty: 90 TABLET | Refills: 1 | Status: SHIPPED | OUTPATIENT
Start: 2019-01-28 | End: 2019-02-05 | Stop reason: SDUPTHER

## 2019-01-28 RX ADMIN — GADOBENATE DIMEGLUMINE 17 ML: 529 INJECTION, SOLUTION INTRAVENOUS at 15:13

## 2019-02-05 ENCOUNTER — TELEPHONE (OUTPATIENT)
Dept: ENDOCRINOLOGY | Age: 59
End: 2019-02-05

## 2019-02-05 DIAGNOSIS — E03.9 HYPOTHYROIDISM (ACQUIRED): ICD-10-CM

## 2019-02-05 RX ORDER — LEVOTHYROXINE SODIUM 88 UG/1
88 TABLET ORAL EVERY MORNING
Qty: 30 TABLET | Refills: 5 | Status: SHIPPED | OUTPATIENT
Start: 2019-02-05 | End: 2019-08-01 | Stop reason: SDUPTHER

## 2019-02-05 NOTE — TELEPHONE ENCOUNTER
Called and raphael pt sent in the new rx for the levothyroxine 88 mcg daily / scheduled her for repeat labs 3/25    ----- Message from Mehnaz Walker sent at 2/5/2019 11:52 AM EST -----  Contact: patient  Patient said Dr. James Epley pcp has requested that Dr. Martin look at her labs to see about lowering her thyroid medication, Levothyroxine, 100mcg, 1xday.     She said she had an MRI a couple of weeks ago and it showed a small lesion on pituitary gland.     [facility] - [phone]

## 2019-02-12 ENCOUNTER — TRANSCRIBE ORDERS (OUTPATIENT)
Dept: ADMINISTRATIVE | Facility: HOSPITAL | Age: 59
End: 2019-02-12

## 2019-02-12 ENCOUNTER — TELEPHONE (OUTPATIENT)
Dept: FAMILY MEDICINE CLINIC | Facility: CLINIC | Age: 59
End: 2019-02-12

## 2019-02-12 DIAGNOSIS — R90.89 ABNORMAL FINDING ON MRI OF BRAIN: Primary | ICD-10-CM

## 2019-02-12 DIAGNOSIS — Z12.31 SCREENING MAMMOGRAM, ENCOUNTER FOR: Primary | ICD-10-CM

## 2019-02-13 DIAGNOSIS — R90.89 MRI OF BRAIN ABNORMAL: Primary | ICD-10-CM

## 2019-02-21 ENCOUNTER — OFFICE VISIT (OUTPATIENT)
Dept: RETAIL CLINIC | Facility: CLINIC | Age: 59
End: 2019-02-21

## 2019-02-21 VITALS — TEMPERATURE: 98.2 F | HEART RATE: 84 BPM | RESPIRATION RATE: 18 BRPM | OXYGEN SATURATION: 98 %

## 2019-02-21 DIAGNOSIS — N30.00 ACUTE CYSTITIS WITHOUT HEMATURIA: Primary | ICD-10-CM

## 2019-02-21 LAB
BILIRUB BLD-MCNC: NEGATIVE MG/DL
CLARITY, POC: ABNORMAL
COLOR UR: ABNORMAL
GLUCOSE UR STRIP-MCNC: NEGATIVE MG/DL
KETONES UR QL: NEGATIVE
LEUKOCYTE EST, POC: ABNORMAL
NITRITE UR-MCNC: POSITIVE MG/ML
PH UR: 6.5 [PH] (ref 5–8)
PROT UR STRIP-MCNC: NEGATIVE MG/DL
RBC # UR STRIP: NEGATIVE /UL
SP GR UR: 1.02 (ref 1–1.03)
UROBILINOGEN UR QL: NORMAL

## 2019-02-21 PROCEDURE — 99213 OFFICE O/P EST LOW 20 MIN: CPT | Performed by: NURSE PRACTITIONER

## 2019-02-21 RX ORDER — NITROFURANTOIN 25; 75 MG/1; MG/1
100 CAPSULE ORAL 2 TIMES DAILY
Qty: 14 CAPSULE | Refills: 0 | Status: SHIPPED | OUTPATIENT
Start: 2019-02-21 | End: 2019-02-28

## 2019-02-21 RX ORDER — PHENAZOPYRIDINE HYDROCHLORIDE 200 MG/1
200 TABLET, FILM COATED ORAL 3 TIMES DAILY PRN
Qty: 6 TABLET | Refills: 0 | Status: SHIPPED | OUTPATIENT
Start: 2019-02-21 | End: 2019-02-23

## 2019-02-22 NOTE — PROGRESS NOTES
Subjective   Keri Bhagat is a 58 y.o. female.     Urinary Tract Infection    This is a new problem. The current episode started 1 to 4 weeks ago (2 weeks off and on). The problem has been gradually worsening. The pain is at a severity of 4/10. The pain is mild. There has been no fever. Associated symptoms include chills, frequency, nausea and urgency. Pertinent negatives include no flank pain, hematuria, sweats or vomiting. Treatments tried: azo, fluids. The treatment provided mild relief. Her past medical history is significant for recurrent UTIs. stage 2 kidney disease        The following portions of the patient's history were reviewed and updated as appropriate: allergies, current medications, past family history, past medical history, past social history, past surgical history and problem list.    Review of Systems   Constitutional: Positive for chills.   HENT: Negative.    Cardiovascular: Negative.    Gastrointestinal: Positive for nausea. Negative for vomiting.   Genitourinary: Positive for frequency and urgency. Negative for flank pain and hematuria.   Neurological: Negative.        Objective   Physical Exam   Constitutional: She is cooperative. No distress.   HENT:   Head: Normocephalic.   Right Ear: Hearing, tympanic membrane, external ear and ear canal normal.   Left Ear: Hearing, tympanic membrane, external ear and ear canal normal.   Nose: Nose normal.   Mouth/Throat: Oropharynx is clear and moist.   Eyes: Conjunctivae, EOM and lids are normal. Pupils are equal, round, and reactive to light.   Neck: Trachea normal and full passive range of motion without pain.   Cardiovascular: Normal rate, regular rhythm and normal pulses.   Pulmonary/Chest: Effort normal and breath sounds normal.   Abdominal: There is tenderness in the suprapubic area. There is no CVA tenderness.   Mild lower back pain     Neurological: She is alert.   Skin: Skin is warm. Capillary refill takes less than 2 seconds.    Psychiatric: She has a normal mood and affect. Her speech is normal and behavior is normal.   Vitals reviewed.        Assessment/Plan   Problems Addressed this Visit     None      Visit Diagnoses     Acute cystitis without hematuria    -  Primary    Relevant Medications    nitrofurantoin, macrocrystal-monohydrate, (MACROBID) 100 MG capsule    Other Relevant Orders    POC Urinalysis Dipstick, Automated

## 2019-02-26 ENCOUNTER — LAB (OUTPATIENT)
Dept: LAB | Facility: HOSPITAL | Age: 59
End: 2019-02-26

## 2019-02-26 ENCOUNTER — OFFICE VISIT (OUTPATIENT)
Dept: NEUROSURGERY | Facility: CLINIC | Age: 59
End: 2019-02-26

## 2019-02-26 ENCOUNTER — TELEPHONE (OUTPATIENT)
Dept: NEUROSURGERY | Facility: CLINIC | Age: 59
End: 2019-02-26

## 2019-02-26 VITALS
DIASTOLIC BLOOD PRESSURE: 64 MMHG | WEIGHT: 185 LBS | RESPIRATION RATE: 16 BRPM | SYSTOLIC BLOOD PRESSURE: 108 MMHG | HEART RATE: 68 BPM | BODY MASS INDEX: 32.78 KG/M2 | HEIGHT: 63 IN

## 2019-02-26 DIAGNOSIS — Z72.0 TOBACCO ABUSE: ICD-10-CM

## 2019-02-26 DIAGNOSIS — R90.89 ABNORMAL BRAIN MRI: ICD-10-CM

## 2019-02-26 DIAGNOSIS — R41.3 MEMORY DYSFUNCTION: Primary | ICD-10-CM

## 2019-02-26 DIAGNOSIS — I25.10 CORONARY ARTERY DISEASE INVOLVING NATIVE CORONARY ARTERY OF NATIVE HEART WITHOUT ANGINA PECTORIS: ICD-10-CM

## 2019-02-26 DIAGNOSIS — M47.22 CERVICAL SPONDYLOSIS WITH RADICULOPATHY: ICD-10-CM

## 2019-02-26 DIAGNOSIS — R90.89 MRI OF BRAIN ABNORMAL: ICD-10-CM

## 2019-02-26 DIAGNOSIS — R42 LIGHTHEADEDNESS: ICD-10-CM

## 2019-02-26 DIAGNOSIS — R26.9 GAIT DISTURBANCE: ICD-10-CM

## 2019-02-26 DIAGNOSIS — G47.61 PERIODIC LIMB MOVEMENT DISORDER: ICD-10-CM

## 2019-02-26 LAB — PROLACTIN SERPL-MCNC: 7.04 NG/ML (ref 4.79–23.3)

## 2019-02-26 PROCEDURE — 36415 COLL VENOUS BLD VENIPUNCTURE: CPT

## 2019-02-26 PROCEDURE — 84146 ASSAY OF PROLACTIN: CPT

## 2019-02-26 PROCEDURE — 99204 OFFICE O/P NEW MOD 45 MIN: CPT | Performed by: NEUROLOGICAL SURGERY

## 2019-02-26 RX ORDER — DIAZEPAM 5 MG/1
5 TABLET ORAL 2 TIMES DAILY PRN
Qty: 2 TABLET | Refills: 0 | OUTPATIENT
Start: 2019-02-26 | End: 2019-08-19 | Stop reason: SDUPTHER

## 2019-02-26 NOTE — TELEPHONE ENCOUNTER
Patient was seen today for abnormal brain imaging. Pt is suppose to have 4 MRIs.Pt says she is claustrophobic and will need medication.   Juwan Jones    Also Dr Obrien first available appt is 3/29. Is it ok for pt to wait til then to be seen again   Please advise

## 2019-02-26 NOTE — PROGRESS NOTES
Subjective   Patient ID: Keri Bhagat is a 58 y.o. female is being seen for consultation today at the request of James Epley, APRN for brain imaging abnormality. She is unaccompanied.    History of Present Illness     The patient is a 58-year-old right-handed woman who presents today after several years of neurologic issues.  She is noted some tremulousness, some balance issues, as well as some memory dysfunction and trouble concentrating.  When she describes these problems to her primary care physician an MRI scan of the brain was ordered.  MRI demonstrated some abnormalities and she is referred to neurosurgery for evaluation at this time.    She notes that when she first stands up she may feel little lightheaded.  She is otherwise unable to find a specific time or circumstance in which she notices the memory problems or trouble concentrating or specifically balance issues.  She notes more trouble with going up and down stairs.    Patient does have multiple medical issues that have been an ongoing problem for her including cardiac and vascular problems.  A couple of years ago she had a right carotid endarterectomy.    The following portions of the patient's history were reviewed and updated as appropriate: allergies, current medications, past family history, past medical history, past social history, past surgical history and problem list.    Review of Systems   Constitutional: Negative for fever.   HENT: Negative for trouble swallowing.    Eyes: Negative for visual disturbance.   Respiratory: Negative for choking and wheezing.    Cardiovascular: Negative for chest pain and palpitations.   Gastrointestinal: Negative for abdominal pain.   Genitourinary: Positive for difficulty urinating and dysuria (current UTI).   Musculoskeletal: Positive for gait problem.   Skin: Negative for rash.   Neurological: Positive for dizziness, speech difficulty (word finding difficulty) and light-headedness. Negative for  headaches.   Psychiatric/Behavioral: Positive for confusion and decreased concentration.     Past Medical History:   Diagnosis Date   • Anemia    • Arthritis    • Atherosclerotic heart disease of native coronary artery with other forms of angina pectoris (CMS/[facility])    • Bipolar I disorder, single manic episode (CMS/[facility])     depressive d(NOS)   • Cervical disc herniation    • Coronary artery disease    • Depression     NO SUICIDAL PLANS   • Difficulty swallowing    • Diverticular disease    • Dyspepsia    • Dysphagia    • Gastric reflux    • Heart murmur    • Hiatal hernia    • High cholesterol    • History of transfusion    • 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    HPV positive on pap LGSIL   • Hyperlipidemia    • Hypertension    • Hypothyroidism    • Incontinence in female     wears pads   • Kidney disease 2017    stage 2    • Kidney disease, chronic, stage III (GFR 30-59 ml/min) (CMS/[facility])    • LGSIL on Pap smear of cervix 04/29/2016    LGSIL HPV positive   • Myocardial infarction (CMS/[facility]) 2000   • Neck pain    • Obesity    • Past myocardial infarction 05/2000   • Periodic limb movement disorder    • Restless leg syndrome    • Sleep apnea     cpap   • Stress fracture     LEFT HEEL   • Suicidal ideation 08/19/2016    history   • Swelling of ankle     right had doppler no s/s blood clot   • Thyroid nodule        Past Surgical History:   Procedure Laterality Date   • ANTERIOR CERVICAL DISCECTOMY W/ FUSION N/A 12/29/2016    Procedure: C3-4 anterior cervical discectomy and fusion with Depuy micro plate, ALLOGRAFT C3-4, AND HARDWARE REMOVAL C4-7.;  Surgeon: Hema Godwin MD;  Location: [facility] OR;  Service:    • CARDIAC CATHETERIZATION N/A 3/30/2017    Procedure: Left Heart Cath;  Surgeon: Tracey Vargas MD;  Location: [facility] CATH INVASIVE LOCATION;  Service:    • CARDIAC CATHETERIZATION N/A 3/30/2017    Procedure: Coronary angiography;  Surgeon: Tracey Vargas MD;  Location: [facility] INVASIVE  LOCATION;  Service:    • CARDIAC CATHETERIZATION N/A 3/30/2017    Procedure: Left ventriculography;  Surgeon: Tracey Vargas MD;  Location: [facility] CATH INVASIVE LOCATION;  Service:    • CARDIAC CATHETERIZATION  3/30/2017    Procedure: Functional Flow [place];  Surgeon: Tracey Vargas MD;  Location:  [facility] CATH INVASIVE LOCATION;  Service:    • CARPAL TUNNEL RELEASE     • CERVICAL BIOPSY  [facility]    Dr. Jeffery.    • CERVICAL DISCECTOMY ANTERIOR  04/2013    C4-7   • COLONOSCOPY  04/20/2015    Diverticulosis, IH   • COLPOSCOPY W/ BIOPSY / CURETTAGE  06/17/2016    LGSIL HPV positive. Results were normal repeat pap in one year. Chronic Cervicitis   • CORONARY ANGIOPLASTY WITH STENT PLACEMENT     • ENDOSCOPY  [facility]    Normal.  Dr. Rodriguez   • ENDOSCOPY N/A 6/22/2017    Erythematous mucosa in the stomach  PATH: Chronic active gastritis, moderate with intestinal metaplasia    • GASTRIC BYPASS      Done using the sleeve technique   • HARDWARE REMOVAL  12/29/2016    cervical   • LAPAROSCOPIC GASTRIC BANDING  02/2018   • SHOULDER SURGERY Left     RCR   • TONSILLECTOMY     • TONSILLECTOMY AND ADENOIDECTOMY     • TRANSVAGINAL TAPING SUSPENSION N/A 12/6/2017    Procedure: MID URETHRAL SLING CYSTSCOPY;  Surgeon: Abby Méndez MD;  Location: [facility];  Service:    • TUBAL ABDOMINAL LIGATION     • TYMPANOSTOMY TUBE PLACEMENT Right    • WRIST SURGERY Bilateral     carpal tunnel       Social History     Socioeconomic History   • Marital status:      Spouse name: Not on file   • Number of children: 3   • Years of education: Not on file   • Highest education level: Not on file   Social Needs   • Financial resource strain: Not on file   • Food insecurity - worry: Not on file   • Food insecurity - inability: Not on file   • Transportation needs - medical: Not on file   • Transportation needs - non-medical: Not on file   Occupational History   • Not on file   Tobacco Use   • Smoking status: Former Smoker     Packs/day:  1.50     Years: 20.00     Pack years: 30.00     Types: Electronic Cigarette, Cigarettes     Last attempt to quit:      Years since quittin.1   • Smokeless tobacco: Never Used   • Tobacco comment: Electronic Cigarette/ 3 mg nicotine    Substance and Sexual Activity   • Alcohol use: Yes     Comment: 2 times yearly    • Drug use: No   • Sexual activity: Yes     Partners: Male     Birth control/protection: None, Condom   Other Topics Concern   • Not on file   Social History Narrative   • Not on file       Family History   Problem Relation Age of Onset   • Diabetes type II Mother    • Hypertension Mother    • Osteoporosis Mother    • Seizures Mother    • COPD Father    • Hypertension Father    • Lung cancer Father    • Heart attack Father    • Liver cancer Father    • Thyroid disease Sister    • Hypertension Sister    • Bipolar disorder Sister    • Depression Sister    • ADD / ADHD Sister    • No Known Problems Son    • Abnormal EKG Daughter    • Hypertension Daughter    • Bipolar disorder Daughter    • No Known Problems Paternal Grandfather    • No Known Problems Paternal Grandmother    • No Known Problems Maternal Grandmother    • No Known Problems Maternal Grandfather    • Thyroid disease Sister    • Hypertension Sister    • Bipolar disorder Sister    • Asthma Daughter    • Breast cancer Neg Hx    • Ovarian cancer Neg Hx    • Uterine cancer Neg Hx    • Colon cancer Neg Hx    • Malig Hyperthermia Neg Hx         No Known Allergies      Current Outpatient Medications:   •  aspirin 81 MG EC tablet, Take 1 tablet by mouth Daily. For cardiovascular disease protection, Disp: 30 tablet, Rfl: 11  •  atenolol (TENORMIN) 50 MG tablet, Take 1 tablet by mouth Daily., Disp: 30 tablet, Rfl: 3  •  buPROPion XL (WELLBUTRIN XL) 300 MG 24 hr tablet, Take 300 mg by mouth Every Morning., Disp: , Rfl:   •  Cholecalciferol (VITAMIN D3) 5000 UNITS capsule capsule, Take 5,000 Units by mouth Daily., Disp: , Rfl:   •  gabapentin  (NEURONTIN) 300 MG capsule, TAKE 1 CAPSULE BY MOUTH 3 TIMES A DAY, Disp: 90 capsule, Rfl: 0  •  isosorbide mononitrate (IMDUR) 30 MG 24 hr tablet, TAKE ONE TABLET BY MOUTH DAILY, Disp: 30 tablet, Rfl: 5  •  lamoTRIgine (LaMICtal) 150 MG tablet, Take 300 mg by mouth Daily., Disp: , Rfl:   •  levothyroxine (SYNTHROID, LEVOTHROID) 88 MCG tablet, Take 1 tablet by mouth Every Morning., Disp: 30 tablet, Rfl: 5  •  lithium (ESKALITH) 450 MG CR tablet, Take 450 mg by mouth Daily., Disp: , Rfl:   •  Multiple Vitamins-Minerals (MULTI COMPLETE/IRON PO), Take 1 tablet/day by mouth., Disp: , Rfl:   •  nitrofurantoin, macrocrystal-monohydrate, (MACROBID) 100 MG capsule, Take 1 capsule by mouth 2 (Two) Times a Day for 7 days., Disp: 14 capsule, Rfl: 0  •  oxybutynin XL (DITROPAN XL) 15 MG 24 hr tablet, Take 15 mg by mouth Daily., Disp: , Rfl:   •  pramipexole (MIRAPEX) 0.5 MG tablet, Take 1 tablet by mouth Every Night for 360 days., Disp: 30 tablet, Rfl: 11  •  raNITIdine (ZANTAC) 150 MG tablet, Take 1 tablet by mouth 2 (Two) Times a Day., Disp: 60 tablet, Rfl: 11  •  simvastatin (ZOCOR) 20 MG tablet, TAKE ONE TABLET BY MOUTH ONCE NIGHTLY, Disp: 30 tablet, Rfl: 6  •  loratadine (CLARITIN) 10 MG tablet, Take 1 tablet by mouth Daily. As needed for allergies (Patient taking differently: Take 10 mg by mouth Daily As Needed. As needed for allergies), Disp: 30 tablet, Rfl: 11  •  nitroglycerin (NITROSTAT) 0.4 MG SL tablet, Place 0.4 mg under the tongue Every 5 (Five) Minutes As Needed for Chest Pain (took at M.D  office at 0930 a.m.). Take no more than 3 doses in 15 minutes., Disp: , Rfl:      Objective   Physical Exam   Constitutional: She is oriented to person, place, and time. She appears well-developed and well-nourished. She is cooperative.   HENT:   Head: Normocephalic and atraumatic.   Eyes: EOM are normal. Pupils are equal, round, and reactive to light. No scleral icterus.   Neck: Normal range of motion. Neck supple.        Cardiovascular: Normal rate, regular rhythm and intact distal pulses.   No murmur heard.  Pulmonary/Chest: Effort normal and breath sounds normal.   Abdominal: Soft. Bowel sounds are normal. There is no tenderness.   Musculoskeletal: Normal range of motion.   Neurological: She is alert and oriented to person, place, and time. She has normal strength. She displays no atrophy. No cranial nerve deficit or sensory deficit. She exhibits normal muscle tone. She has an abnormal Romberg Test. Coordination and gait normal. GCS eye subscore is 4. GCS verbal subscore is 5. GCS motor subscore is 6. She displays Babinski's sign on the right side. She displays Babinski's sign on the left side.   Reflex Scores:       Tricep reflexes are 3+ on the right side and 3+ on the left side.       Bicep reflexes are 3+ on the right side and 3+ on the left side.       Brachioradialis reflexes are 3+ on the right side and 3+ on the left side.       Patellar reflexes are 4+ on the right side and 4+ on the left side.       Achilles reflexes are 4+ on the right side and 4+ on the left side.  Negative Curiel and clonus  Finger to nose intact   Heel to shin intact  Unable to tandem walk  Able to walk on heels and toes   Skin: Skin is warm, dry and intact.   Psychiatric: She has a normal mood and affect. Her speech is normal and behavior is normal. Judgment and thought content normal. Cognition and memory are normal.   Vitals reviewed.    Neurologic Exam     Mental Status   Oriented to person, place, and time.   Speech: speech is normal     Cranial Nerves     CN III, IV, VI   Pupils are equal, round, and reactive to light.  Extraocular motions are normal.     Motor Exam     Strength   Strength 5/5 throughout.     Gait, Coordination, and Reflexes     Coordination   Romberg: positive    Reflexes   Right brachioradialis: 3+  Left brachioradialis: 3+  Right biceps: 3+  Left biceps: 3+  Right triceps: 3+  Left triceps: 3+  Right patellar:  4+  Left patellar: 4+  Right achilles: 4+  Left achilles: 4+  Right plantar: equivocal  Left plantar: equivocal  Right Curiel: present  Left Curiel: present      Assessment/Plan   Independent Review of Radiographic Studies:    I personally reviewed the MRI scan of the brain done recently: This demonstrates a very small perhaps 1 cm parafalcine lesion to the left on the anterior falx  And also indicates the possibility of a small 4 mm pituitary lesion  Medical Decision Making:    Patient presents with the above-noted history and physical findings.  There are no red flags.    With regard to the abnormal imaging I do not think that it is the cause of her current complaints.    There is evidence of hyperreflexia in the lower extremities with Babinski.  I am concerned with regard to her gait as to whether or not this could be a manifestation of cervical or thoracic myelopathy.  I am going to go ahead and image her spine.  In 2016 she had an MRI scan of the cervical spine because of neck problems and it did show some cervical canal narrowing.    With regard to her current complaints of concentration and memory dysfunction and restless legs I am going to make a referral to neurology.    With regard to the abnormal imaging I am going to order an MRI of the pituitary and then a follow-up MRI in 1 year.    If the MRI of the pituitary shows a abnormality then we will obtain consultation with endocrinology as well.    Keri was seen today for brain imaging abnormality.    Diagnoses and all orders for this visit:    Memory dysfunction  -     Ambulatory Referral to Neurology    Abnormal brain MRI - parafalcial lesion (small) and possible pituitary lesion  -     MRI Pituitary With & Without Contrast; Future    Lightheadedness  -     Ambulatory Referral to Neurology    Periodic limb movement disorder  -     Ambulatory Referral to Neurology    Coronary artery disease involving native coronary artery of native heart without angina  pectoris    Cervical spondylosis with radiculopathy  -     MRI Cervical Spine Without Contrast; Future    Gait disturbance  -     MRI Thoracic Spine Without Contrast; Future  -     MRI Cervical Spine Without Contrast; Future  -     MRI Lumbar Spine Without Contrast; Future    Tobacco abuse      Return for Follow up after testing.

## 2019-02-27 NOTE — TELEPHONE ENCOUNTER
Patient just informed me that she has stage 2 kidney disease. She needs to know about flushing her system out when she has the contrast with the MRIs.

## 2019-02-28 NOTE — TELEPHONE ENCOUNTER
Spoke with Dr Simmons's office (nephro).  They will check with  the doctor and let us know if she can be cleared for MRI with gadolinium.

## 2019-02-28 NOTE — TELEPHONE ENCOUNTER
"This needs to be addressed with her nephrologist or PCP.  We want WRITTEN clearance that is OK for patient to have gadolinium. Additionally, we have a \"renal protection protocol\" that we can send to the provider. They can review it and determine the portions necessary or they can give us the instructions they would like the patient to follow.   "

## 2019-03-05 ENCOUNTER — TELEPHONE (OUTPATIENT)
Dept: FAMILY MEDICINE CLINIC | Facility: CLINIC | Age: 59
End: 2019-03-05

## 2019-03-05 DIAGNOSIS — M79.673 PAIN OF FOOT, UNSPECIFIED LATERALITY: Primary | ICD-10-CM

## 2019-03-11 ENCOUNTER — HOSPITAL ENCOUNTER (OUTPATIENT)
Dept: MAMMOGRAPHY | Facility: HOSPITAL | Age: 59
Discharge: HOME | End: 2019-03-11
Admitting: OBSTETRICS & GYNECOLOGY

## 2019-03-11 DIAGNOSIS — Z12.31 SCREENING MAMMOGRAM, ENCOUNTER FOR: ICD-10-CM

## 2019-03-11 PROCEDURE — 77063 BREAST TOMOSYNTHESIS BI: CPT

## 2019-03-11 PROCEDURE — 77067 SCR MAMMO BI INCL CAD: CPT

## 2019-03-12 ENCOUNTER — HOSPITAL ENCOUNTER (OUTPATIENT)
Dept: MRI IMAGING | Facility: HOSPITAL | Age: 59
Discharge: HOME | End: 2019-03-12

## 2019-03-12 ENCOUNTER — HOSPITAL ENCOUNTER (OUTPATIENT)
Dept: MRI IMAGING | Facility: HOSPITAL | Age: 59
Discharge: HOME | End: 2019-03-12
Admitting: NEUROLOGICAL SURGERY

## 2019-03-12 DIAGNOSIS — M47.22 CERVICAL SPONDYLOSIS WITH RADICULOPATHY: ICD-10-CM

## 2019-03-12 DIAGNOSIS — R26.9 GAIT DISTURBANCE: ICD-10-CM

## 2019-03-12 DIAGNOSIS — R90.89 ABNORMAL BRAIN MRI: ICD-10-CM

## 2019-03-12 LAB — CREAT BLDA-MCNC: 1.1 MG/DL (ref 0.6–1.3)

## 2019-03-12 PROCEDURE — 72146 MRI CHEST SPINE W/O DYE: CPT

## 2019-03-12 PROCEDURE — 82565 ASSAY OF CREATININE: CPT

## 2019-03-12 PROCEDURE — 72141 MRI NECK SPINE W/O DYE: CPT

## 2019-03-12 PROCEDURE — 0 GADOBENATE DIMEGLUMINE 529 MG/ML SOLUTION: Performed by: NEUROLOGICAL SURGERY

## 2019-03-12 PROCEDURE — 72148 MRI LUMBAR SPINE W/O DYE: CPT

## 2019-03-12 PROCEDURE — 70553 MRI BRAIN STEM W/O & W/DYE: CPT

## 2019-03-12 PROCEDURE — A9577 INJ MULTIHANCE: HCPCS | Performed by: NEUROLOGICAL SURGERY

## 2019-03-12 RX ADMIN — GADOBENATE DIMEGLUMINE 16 ML: 529 INJECTION, SOLUTION INTRAVENOUS at 18:22

## 2019-03-13 ENCOUNTER — TELEPHONE (OUTPATIENT)
Dept: OBSTETRICS AND GYNECOLOGY | Age: 59
End: 2019-03-13

## 2019-03-13 NOTE — TELEPHONE ENCOUNTER
----- Message from Nelida Jeffery MD sent at 3/12/2019 12:45 PM EDT -----  Please call patient and notify of negative results of mammogram - repeat one year

## 2019-03-19 ENCOUNTER — OFFICE VISIT (OUTPATIENT)
Dept: FAMILY MEDICINE CLINIC | Facility: CLINIC | Age: 59
End: 2019-03-19

## 2019-03-19 VITALS
TEMPERATURE: 98.4 F | HEIGHT: 63 IN | HEART RATE: 62 BPM | DIASTOLIC BLOOD PRESSURE: 76 MMHG | SYSTOLIC BLOOD PRESSURE: 120 MMHG | BODY MASS INDEX: 33.31 KG/M2 | OXYGEN SATURATION: 95 % | WEIGHT: 188 LBS

## 2019-03-19 DIAGNOSIS — M54.5 LOW BACK PAIN, UNSPECIFIED BACK PAIN LATERALITY, UNSPECIFIED CHRONICITY, WITH SCIATICA PRESENCE UNSPECIFIED: Primary | ICD-10-CM

## 2019-03-19 DIAGNOSIS — N30.00 ACUTE CYSTITIS WITHOUT HEMATURIA: ICD-10-CM

## 2019-03-19 LAB
BILIRUB BLD-MCNC: NEGATIVE MG/DL
CLARITY, POC: ABNORMAL
COLOR UR: YELLOW
GLUCOSE UR STRIP-MCNC: NEGATIVE MG/DL
KETONES UR QL: NEGATIVE
LEUKOCYTE EST, POC: ABNORMAL
NITRITE UR-MCNC: NEGATIVE MG/ML
PH UR: 7.5 [PH] (ref 5–8)
PROT UR STRIP-MCNC: NEGATIVE MG/DL
RBC # UR STRIP: NEGATIVE /UL
SP GR UR: 1.01 (ref 1–1.03)
UROBILINOGEN UR QL: NORMAL

## 2019-03-19 PROCEDURE — 99214 OFFICE O/P EST MOD 30 MIN: CPT | Performed by: NURSE PRACTITIONER

## 2019-03-19 RX ORDER — ATENOLOL 50 MG/1
50 TABLET ORAL DAILY
Qty: 90 TABLET | Refills: 3 | Status: SHIPPED | OUTPATIENT
Start: 2019-03-19 | End: 2020-03-24 | Stop reason: SDUPTHER

## 2019-03-19 RX ORDER — NITROFURANTOIN 25; 75 MG/1; MG/1
100 CAPSULE ORAL 2 TIMES DAILY
Qty: 14 CAPSULE | Refills: 0 | Status: SHIPPED | OUTPATIENT
Start: 2019-03-19 | End: 2019-03-26

## 2019-03-19 NOTE — PROGRESS NOTES
Subjective   Keri Bhagat is a 58 y.o. female.     Pleasant patient here today think she has a UTI frequency urgency cloudy urine 1 week  Some low back pain times 1 week but no flank pain no nausea vomiting  Weakness fever    Nothing makes better or worse    Recently MRI of the brain and entire spine  Had to lay on the table for long time      Urinary Tract Infection    Associated symptoms include frequency and urgency. Pertinent negatives include no chills.        The following portions of the patient's history were reviewed and updated as appropriate: allergies, current medications, past family history, past medical history, past social history, past surgical history and problem list.    Review of Systems   Constitutional: Negative for chills, diaphoresis, fatigue and fever.   Gastrointestinal: Negative.         No flank pain   Genitourinary: Positive for frequency and urgency.   Musculoskeletal: Positive for back pain.   All other systems reviewed and are negative.      Objective   Physical Exam   Constitutional: She is oriented to person, place, and time. She appears well-developed and well-nourished.   Pleasant appears well   HENT:   Head: Normocephalic.   Mouth/Throat: Oropharynx is clear and moist.   Eyes: Conjunctivae are normal. Pupils are equal, round, and reactive to light.   Neck: Neck supple.   Cardiovascular: Normal rate, regular rhythm and normal heart sounds. Exam reveals no friction rub.   No murmur heard.  Pulmonary/Chest: Effort normal and breath sounds normal. No respiratory distress. She has no wheezes.   Abdominal: Soft. Bowel sounds are normal. She exhibits no distension. There is no tenderness.   No CVA tenderness   Musculoskeletal: She exhibits no edema.   Neurological: She is alert and oriented to person, place, and time.   Skin: Skin is warm and dry.   Psychiatric: She has a normal mood and affect. Her behavior is normal. Judgment and thought content normal.   Vitals  reviewed.        Assessment/Plan   Keri was seen today for urinary tract infection.    Diagnoses and all orders for this visit:    Low back pain, unspecified back pain laterality, unspecified chronicity, with sciatica presence unspecified  -     POCT urinalysis dipstick, automated    Acute cystitis without hematuria  -     Urine Culture - Urine, Urine, Clean Catch    Other orders  -     atenolol (TENORMIN) 50 MG tablet; Take 1 tablet by mouth Daily.                    Discharge instructions push fluids  Start antibiotic  Recheck if not improving in 3-4 days  Fever chills nausea vomiting increased back pain weakness emergency room

## 2019-03-19 NOTE — PATIENT INSTRUCTIONS
Discharge instructions push fluids  Start antibiotic  Recheck if not improving in 3-4 days  Fever chills nausea vomiting increased back pain weakness emergency room

## 2019-03-21 LAB
BACTERIA UR CULT: NORMAL
BACTERIA UR CULT: NORMAL

## 2019-03-22 ENCOUNTER — OFFICE VISIT (OUTPATIENT)
Dept: NEUROLOGY | Facility: CLINIC | Age: 59
End: 2019-03-22

## 2019-03-22 VITALS
OXYGEN SATURATION: 90 % | DIASTOLIC BLOOD PRESSURE: 84 MMHG | HEIGHT: 63 IN | BODY MASS INDEX: 33.54 KG/M2 | SYSTOLIC BLOOD PRESSURE: 118 MMHG | WEIGHT: 189.3 LBS | HEART RATE: 61 BPM

## 2019-03-22 DIAGNOSIS — R41.3 MEMORY CHANGE: Primary | ICD-10-CM

## 2019-03-22 PROCEDURE — 99205 OFFICE O/P NEW HI 60 MIN: CPT | Performed by: PSYCHIATRY & NEUROLOGY

## 2019-03-22 NOTE — PATIENT INSTRUCTIONS
**Eat a high fiber diet    **Exercise regularly (physically and mentally)    **Floss daily    **Consider eating yogurt regularly    **Consider drinking green tea    **Limit soda and alcohol    **Ensure safety in the home    Try alternatives or cut back on valium and oxybutynin.
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
## 2019-03-25 ENCOUNTER — RESULTS ENCOUNTER (OUTPATIENT)
Dept: ENDOCRINOLOGY | Age: 59
End: 2019-03-25

## 2019-03-25 DIAGNOSIS — I10 ESSENTIAL HYPERTENSION: ICD-10-CM

## 2019-03-25 DIAGNOSIS — E78.49 OTHER HYPERLIPIDEMIA: ICD-10-CM

## 2019-03-25 DIAGNOSIS — E03.9 HYPOTHYROIDISM (ACQUIRED): ICD-10-CM

## 2019-03-25 DIAGNOSIS — E04.1 THYROID NODULE: ICD-10-CM

## 2019-03-27 LAB
ALBUMIN SERPL-MCNC: 4.3 G/DL (ref 3.5–5.2)
ALBUMIN/GLOB SERPL: 2 G/DL
ALP SERPL-CCNC: 68 U/L (ref 39–117)
ALT SERPL-CCNC: 14 U/L (ref 1–33)
AST SERPL-CCNC: 13 U/L (ref 1–32)
BILIRUB SERPL-MCNC: 0.2 MG/DL (ref 0.2–1.2)
BUN SERPL-MCNC: 8 MG/DL (ref 6–20)
BUN/CREAT SERPL: 8.3 (ref 7–25)
CALCIUM SERPL-MCNC: 8.9 MG/DL (ref 8.6–10.5)
CHLORIDE SERPL-SCNC: 106 MMOL/L (ref 98–107)
CHOLEST SERPL-MCNC: 146 MG/DL (ref 0–200)
CO2 SERPL-SCNC: 24.1 MMOL/L (ref 22–29)
CREAT SERPL-MCNC: 0.96 MG/DL (ref 0.57–1)
FSH SERPL-ACNC: 87.5 MIU/ML
GLOBULIN SER CALC-MCNC: 2.2 GM/DL
GLUCOSE SERPL-MCNC: 83 MG/DL (ref 65–99)
HDLC SERPL-MCNC: 56 MG/DL (ref 40–60)
IGF-I SERPL-MCNC: 94 NG/ML (ref 46–172)
INTERPRETATION: NORMAL
LDLC SERPL CALC-MCNC: 70 MG/DL (ref 0–100)
LH SERPL-ACNC: 53.7 MIU/ML
POTASSIUM SERPL-SCNC: 4.4 MMOL/L (ref 3.5–5.2)
PROLACTIN SERPL-MCNC: 5.1 NG/ML (ref 4.8–23.3)
PROT SERPL-MCNC: 6.5 G/DL (ref 6–8.5)
SODIUM SERPL-SCNC: 144 MMOL/L (ref 136–145)
T4 FREE SERPL-MCNC: 1.17 NG/DL (ref 0.93–1.7)
TRIGL SERPL-MCNC: 102 MG/DL (ref 0–150)
TSH SERPL DL<=0.005 MIU/L-ACNC: 2.45 MIU/ML (ref 0.27–4.2)
VLDLC SERPL CALC-MCNC: 20.4 MG/DL (ref 5–40)

## 2019-03-29 ENCOUNTER — OFFICE VISIT (OUTPATIENT)
Dept: NEUROSURGERY | Facility: CLINIC | Age: 59
End: 2019-03-29

## 2019-03-29 VITALS
BODY MASS INDEX: 34.23 KG/M2 | DIASTOLIC BLOOD PRESSURE: 82 MMHG | HEIGHT: 62 IN | RESPIRATION RATE: 18 BRPM | WEIGHT: 186 LBS | SYSTOLIC BLOOD PRESSURE: 122 MMHG | HEART RATE: 60 BPM

## 2019-03-29 DIAGNOSIS — R90.89 ABNORMAL BRAIN MRI: Primary | ICD-10-CM

## 2019-03-29 PROCEDURE — 99213 OFFICE O/P EST LOW 20 MIN: CPT | Performed by: NEUROLOGICAL SURGERY

## 2019-03-29 NOTE — PROGRESS NOTES
Subjective   Patient ID: Keri Bhagat is a 58 y.o. female is here today for follow-up on cervical spondylosis/gait disturbance. Pt is accompanied by her sister.    History of Present Illness    This very pleasant patient returns to the office today after extensive testing.  She is undergone new MRI of the brain looking at the pituitary gland, a MRI of the cervical thoracic and lumbar spine, and neurology consultation.    The following portions of the patient's history were reviewed and updated as appropriate: allergies, current medications, past family history, past medical history, past social history, past surgical history and problem list.    Review of Systems   Genitourinary: Negative for difficulty urinating and enuresis.   Musculoskeletal: Positive for gait problem and neck pain.   Neurological: Negative for weakness and numbness.   Psychiatric/Behavioral: Negative for sleep disturbance.       Objective   Physical Exam   Constitutional: She is oriented to person, place, and time.   Eyes: EOM are normal. Pupils are equal, round, and reactive to light.   Neurological: She is oriented to person, place, and time. She has an abnormal Romberg Test and an abnormal Tandem Gait Test. She has a normal Finger-Nose-Finger Test and a normal Heel to Shin Test.   Reflex Scores:       Tricep reflexes are 3+ on the right side and 3+ on the left side.       Bicep reflexes are 3+ on the right side and 3+ on the left side.       Brachioradialis reflexes are 3+ on the right side and 3+ on the left side.       Patellar reflexes are 3+ on the right side and 3+ on the left side.       Achilles reflexes are 2+ on the right side and 2+ on the left side.  Psychiatric: Her speech is normal.     Neurologic Exam     Mental Status   Oriented to person, place, and time.   Registration: recalls 3 of 3 objects. Recall at 5 minutes: recalls 1 of 3 objects. Follows 3 step commands.   Attention: normal. Concentration: normal.   Speech:  speech is normal   Level of consciousness: alert  Knowledge: good.   Able to name object. Able to read. Able to repeat. Able to write. Normal comprehension.     Cranial Nerves     CN II   Visual fields full to confrontation.     CN III, IV, VI   Pupils are equal, round, and reactive to light.  Extraocular motions are normal.   Right pupil: Consensual response: intact.   Left pupil: Consensual response: intact.   CN III: no CN III palsy  CN VI: no CN VI palsy  Nystagmus: none   Diplopia: none  Ophthalmoparesis: none  Upgaze: normal  Downgaze: normal  Conjugate gaze: present  Vestibulo-ocular reflex: present    CN V   Facial sensation intact.     CN VII   Facial expression full, symmetric.     CN VIII   CN VIII normal.     CN IX, X   CN IX normal.     CN XI   CN XI normal.     CN XII   CN XII normal.     Motor Exam   Muscle bulk: normal  Overall muscle tone: normal  Right arm tone: normal  Left arm tone: normal  Right arm pronator drift: absent  Left arm pronator drift: absent  Right leg tone: normal  Left leg tone: normal    Strength   Right neck flexion: 5/5  Left neck flexion: 5/5  Right neck extension: 5/5  Left neck extension: 5/5  Right deltoid: 5/5  Left deltoid: 5/5  Right biceps: 5/5  Left biceps: 5/5  Right triceps: 5/5  Left triceps: 5/5  Right wrist flexion: 5/5  Left wrist flexion: 5/5  Right wrist extension: 5/5  Left wrist extension: 5/5  Right interossei: 5/5  Left interossei: 5/5  Right abdominals: 5/5  Left abdominals: 5/5  Right iliopsoas: 5/5  Left iliopsoas: 5/5  Right quadriceps: 5/5  Left quadriceps: 5/5  Right hamstrin/5  Left hamstrin/5  Right glutei: 5/5  Left glutei: 5/5  Right anterior tibial: 5/5  Left anterior tibial: 5/5  Right posterior tibial: 5/5  Left posterior tibial: 5/5  Right peroneal: 5/5  Left peroneal: 5/5  Right gastroc: 5/5  Left gastroc: 5/5    Sensory Exam   Light touch normal.   Vibration normal.   Proprioception normal.   Pinprick normal.     Gait, Coordination,  and Reflexes     Gait  Gait: wide-based    Coordination   Romberg: positive  Finger to nose coordination: normal  Heel to shin coordination: normal  Tandem walking coordination: abnormal    Tremor   Resting tremor: absent  Intention tremor: absent  Action tremor: absent    Reflexes   Right brachioradialis: 3+  Left brachioradialis: 3+  Right biceps: 3+  Left biceps: 3+  Right triceps: 3+  Left triceps: 3+  Right patellar: 3+  Left patellar: 3+  Right achilles: 2+  Left achilles: 2+  Right plantar: normal  Left plantar: normal  Right Curiel: absent  Left Curiel: present  Right ankle clonus: absent  Left ankle clonus: absent      Assessment/Plan   Independent Review of Radiographic Studies:    I personally reviewed the MRI scan of the pituitary gland and I do not see a significant abnormality there.  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    I also reviewed the MRI scan of the cervical thoracic and lumbar spine.  I see multi levels of degenerative arthritic changes but nothing significantly compromising the spinal cord in the neck or in the thoracic region.    Medical Decision Making:    Patient returns today and thankfully we have not discovered anything that I could make a recommendation toward surgery.    I believe that Dr. Cook is moving forward with further diagnostic testing and follow-up with regard to both the patient's gait disturbance and cognitive problems that she is experiencing.    From the neurosurgical standpoint I cannot think of anything that is going to improve her quality of life.    With regard to the small meningioma or likely meningioma we will follow-up in approximately 6 months with a new MRI of the brain to make sure that there is no change.  If that does not demonstrate any growth that I anticipate an MRI in a year if that is stable then we will go to every 2 years in order to monitor that.    Keri was seen today for cervical spondylosis/gait  disturbance.    Diagnoses and all orders for this visit:    Abnormal brain MRI - parafalcial lesion (small) and possible pituitary lesion  -     MRI Brain With & Without Contrast; Future      Return in about 6 months (around 9/29/2019) for Follow up after testing, Follow up with nurse practitioner.

## 2019-04-01 ENCOUNTER — CLINICAL SUPPORT (OUTPATIENT)
Dept: ORTHOPEDIC SURGERY | Facility: CLINIC | Age: 59
End: 2019-04-01

## 2019-04-01 VITALS — TEMPERATURE: 98.4 F | WEIGHT: 186.2 LBS | BODY MASS INDEX: 34.27 KG/M2 | HEIGHT: 62 IN

## 2019-04-01 DIAGNOSIS — M17.0 PRIMARY OSTEOARTHRITIS OF BOTH KNEES: Primary | ICD-10-CM

## 2019-04-01 PROCEDURE — 20610 DRAIN/INJ JOINT/BURSA W/O US: CPT | Performed by: NURSE PRACTITIONER

## 2019-04-01 RX ORDER — METHYLPREDNISOLONE ACETATE 80 MG/ML
80 INJECTION, SUSPENSION INTRA-ARTICULAR; INTRALESIONAL; INTRAMUSCULAR; SOFT TISSUE
Status: COMPLETED | OUTPATIENT
Start: 2019-04-01 | End: 2019-04-01

## 2019-04-01 RX ORDER — ISOSORBIDE MONONITRATE 30 MG/1
TABLET, EXTENDED RELEASE ORAL
Qty: 30 TABLET | Refills: 0 | Status: SHIPPED | OUTPATIENT
Start: 2019-04-01 | End: 2019-04-23 | Stop reason: SDUPTHER

## 2019-04-01 RX ORDER — VENLAFAXINE HYDROCHLORIDE 75 MG/1
150 CAPSULE, EXTENDED RELEASE ORAL DAILY
COMMUNITY
Start: 2019-04-01 | End: 2022-12-20

## 2019-04-01 RX ADMIN — METHYLPREDNISOLONE ACETATE 80 MG: 80 INJECTION, SUSPENSION INTRA-ARTICULAR; INTRALESIONAL; INTRAMUSCULAR; SOFT TISSUE at 14:31

## 2019-04-01 NOTE — PROGRESS NOTES
Patient: Keri Bhagat  YOB: 1960    Chief Complaints:  bilateral knee pain    Subjective:    History of Present Illness: Here today for knee pain. Had about 2 months of relief with the cortisone she had in December. Recently found a pituitary tumor that they are going to rescan in 6 months.  The pain is a generalized joint tenderness.  It has been progressive in nature but remains intermittent.  Worsened by prolonged standing or walking and squatting activities. Has had improvement in the past with ice/heat, rest, and injections.     This problem is not new to this examiner.     Allergies: No Known Allergies    Medications:   Home Medications:  Current Outpatient Medications on File Prior to Visit   Medication Sig   • atenolol (TENORMIN) 50 MG tablet Take 1 tablet by mouth Daily.   • buPROPion XL (WELLBUTRIN XL) 300 MG 24 hr tablet Take 300 mg by mouth Every Morning.   • Cholecalciferol (VITAMIN D3) 5000 UNITS capsule capsule Take 5,000 Units by mouth Daily.   • diazePAM (VALIUM) 5 MG tablet Take 1 tablet by mouth 2 (Two) Times a Day As Needed for Anxiety.   • gabapentin (NEURONTIN) 300 MG capsule TAKE 1 CAPSULE BY MOUTH 3 TIMES A DAY   • isosorbide mononitrate (IMDUR) 30 MG 24 hr tablet TAKE ONE TABLET BY MOUTH DAILY   • lamoTRIgine (LaMICtal) 150 MG tablet Take 300 mg by mouth Daily.   • levothyroxine (SYNTHROID, LEVOTHROID) 88 MCG tablet Take 1 tablet by mouth Every Morning.   • lithium (ESKALITH) 450 MG CR tablet Take 450 mg by mouth Daily.   • loratadine (CLARITIN) 10 MG tablet Take 1 tablet by mouth Daily. As needed for allergies (Patient taking differently: Take 10 mg by mouth Daily As Needed. As needed for allergies)   • nitroglycerin (NITROSTAT) 0.4 MG SL tablet Place 0.4 mg under the tongue Every 5 (Five) Minutes As Needed for Chest Pain (took at M.D  office at 0930 a.m.). Take no more than 3 doses in 15 minutes.   • oxybutynin XL (DITROPAN XL) 15 MG 24 hr tablet Take 15 mg by mouth  Daily.   • pramipexole (MIRAPEX) 0.5 MG tablet Take 1 tablet by mouth Every Night for 360 days.   • raNITIdine (ZANTAC) 150 MG tablet Take 1 tablet by mouth 2 (Two) Times a Day.   • simvastatin (ZOCOR) 20 MG tablet TAKE ONE TABLET BY MOUTH ONCE NIGHTLY   • venlafaxine XR (EFFEXOR-XR) 75 MG 24 hr capsule      No current facility-administered medications on file prior to visit.      Current Medications:  Scheduled Meds:  Continuous Infusions:  No current facility-administered medications for this visit.   PRN Meds:.    I have reviewed the patient's medical history in detail and updated the computerized patient record.  Review and summarization of old records include:    Past Medical History:   Diagnosis Date   • Anemia    • Anxiety    • Arthritis    • Atherosclerotic heart disease of native coronary artery with other forms of angina pectoris (CMS/[facility])    • Bipolar I disorder, single manic episode (CMS/HCC)     depressive d(NOS)   • Cervical disc herniation    • Coronary artery disease    • CTS (carpal tunnel syndrome)    • Depression     NO SUICIDAL PLANS   • Difficulty swallowing    • Diverticular disease    • Dyspepsia    • Dysphagia    • Gastric reflux    • Heart attack (CMS/HCC)    • Heart murmur    • Hiatal hernia    • High cholesterol    • History of transfusion    • 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    HPV positive on pap LGSIL   • Hyperlipidemia    • Hypertension    • Hypothyroidism    • Incontinence in female     wears pads   • Kidney disease 2017    stage 2    • Kidney disease, chronic, stage III (GFR 30-59 ml/min) (CMS/HCC)    • LGSIL on Pap smear of cervix 04/29/2016    LGSIL HPV positive   • Myocardial infarction (CMS/[facility]) 2000   • Neck pain    • Obesity    • Past myocardial infarction 05/2000   • Periodic limb movement disorder    • Restless leg syndrome    • Sleep apnea     cpap   • Stress fracture     LEFT HEEL   • Suicidal ideation 08/19/2016    history   • Swelling of ankle     right  had doppler no s/s blood clot   • Thyroid nodule         Past Surgical History:   Procedure Laterality Date   • ANTERIOR CERVICAL DISCECTOMY W/ FUSION N/A 12/29/2016    Procedure: C3-4 anterior cervical discectomy and fusion with Depuy micro plate, ALLOGRAFT C3-4, AND HARDWARE REMOVAL C4-7.;  Surgeon: Hema Godwin MD;  Location: [facility] OR;  Service:    • CARDIAC CATHETERIZATION N/A 3/30/2017    Procedure: Left Heart Cath;  Surgeon: Tracey Vargas MD;  [facility];  Service:    • CARDIAC CATHETERIZATION N/A 3/30/2017    Procedure: Coronary angiography;  Surgeon: Tracey Vargas MD;  [facility];  Service:    • CARDIAC CATHETERIZATION N/A 3/30/2017    Procedure: Left ventriculography;  Surgeon: Tracey Vargas MD;  [facility];  Service:    • CARDIAC CATHETERIZATION  3/30/2017    Procedure: Functional Flow [place];  Surgeon: Tracey Vargas MD;  Location: [facility] CATH INVASIVE LOC[facility];  Service:    • CARPAL TUNNEL RELEASE     • CERVICAL BIOPSY  MMXVI    Dr. Jeffery.    • CERVICAL DISCECTOMY ANTERIOR  04/2013    C4-7   • COLONOSCOPY  04/20/2015    Diverticulosis, IH   • COLPOSCOPY W/ BIOPSY / CURETTAGE  06/17/2016    LGSIL HPV positive. Results were normal repeat pap in one year. Chronic Cervicitis   • CORONARY ANGIOPLASTY WITH STENT PLACEMENT     • ENDOSCOPY  MMXV    Normal.  Dr. Rodriguez   • ENDOSCOPY N/A 6/22/2017    Erythematous mucosa in the stomach  PATH: Chronic active gastritis, moderate with intestinal metaplasia    • GASTRIC BYPASS      Done using the sleeve technique   • HARDWARE REMOVAL  12/29/2016    cervical   • LAPAROSCOPIC GASTRIC BANDING  02/2018   • SHOULDER SURGERY Left     RCR   • TONSILLECTOMY     • TONSILLECTOMY AND ADENOIDECTOMY     • TRANSVAGINAL TAPING SUSPENSION N/A 12/6/2017    Procedure: MID URETHRAL SLING CYSTSCOPY;  Surgeon: Abby Méndez MD;  Location: [facility] OR;  Service:    • TUBAL  ABDOMINAL LIGATION     • TYMPANOSTOMY TUBE PLACEMENT Right    • WRIST SURGERY Bilateral     carpal tunnel        Social History     Occupational History   • Occupation: disabled   Tobacco Use   • Smoking status: Former Smoker     Packs/day: 1.50     Years: 20.00     Pack years: 30.00     Types: Electronic Cigarette, Cigarettes     Last attempt to quit: 2015     Years since quittin.2   • Smokeless tobacco: Never Used   • Tobacco comment: Electronic Cigarette/ 3 mg nicotine    Substance and Sexual Activity   • Alcohol use: Yes     Comment: 2 times yearly    • Drug use: No   • Sexual activity: Yes     Partners: Male     Birth control/protection: None, Condom      Social History     Social History Narrative   • Not on file        Family History   Problem Relation Age of Onset   • Diabetes type II Mother    • Hypertension Mother    • Osteoporosis Mother    • Seizures Mother    • COPD Father    • Hypertension Father    • Lung cancer Father    • Heart attack Father    • Liver cancer Father    • Thyroid disease Sister    • Hypertension Sister    • Bipolar disorder Sister    • Depression Sister    • ADD / ADHD Sister    • No Known Problems Son    • Abnormal EKG Daughter    • Hypertension Daughter    • Bipolar disorder Daughter    • Thyroid disease Daughter    • No Known Problems Paternal Grandfather    • No Known Problems Paternal Grandmother    • No Known Problems Maternal Grandmother    • No Known Problems Maternal Grandfather    • Thyroid disease Sister    • Hypertension Sister    • Bipolar disorder Sister    • Asthma Daughter    • Breast cancer Neg Hx    • Ovarian cancer Neg Hx    • Uterine cancer Neg Hx    • Colon cancer Neg Hx    • Malig Hyperthermia Neg Hx        ROS: 14 point review of systems was performed and was negative except for documented findings in HPI and today's encounter.     Allergies: No Known Allergies  Constitutional:  Denies fever, shaking or chills   Eyes:  Denies change in visual acuity   HENT:  " Denies nasal congestion or sore throat   Respiratory:  Denies cough or shortness of breath   Cardiovascular:  Denies chest pain or severe LE edema   GI:  Denies abdominal pain, nausea, vomiting, bloody stools or diarrhea   Musculoskeletal:  Numbness, tingling, or loss of motor function only as noted above in history of present illness.  : Denies painful urination or hematuria  Integument:  Denies rash, lesion or ulceration   Neurologic:  Denies headache or focal weakness  Endocrine:  Denies lymphadenopathy  Psych:  Denies confusion or change in mental status   Hem:  Denies active bleeding    Physical Exam:  Wt Readings from Last 3 Encounters:   04/01/19 84.5 kg (186 lb 3.2 oz)   03/29/19 84.4 kg (186 lb)   03/22/19 85.9 kg (189 lb 4.8 oz)     Ht Readings from Last 3 Encounters:   04/01/19 157.5 cm (62\")   03/29/19 157.5 cm (62\")   03/22/19 160 cm (62.99\")     Body mass index is 34.06 kg/m².  Facility age limit for growth percentiles is 20 years.  Vitals:    04/01/19 1429   Temp: 98.4 °F (36.9 °C)     Vital Signs:  reviewed  Constitutional: Awake alert and oriented x3, well developed, no acute distress, non-toxic appearance.  EYES: symmetric, sclera clear  ENT:  Normocephalic, Atraumatic.   Respiratory:  No respiratory distress, No wheezing  CV: pulse regular, no palpitations or pallor.  GI:  Abdomen soft, non-tender.   Vascular:  Intact distal pulses, No cyanosis, no signs or symptoms of DVT.  Neurologic: Sensation grossly intact to the involved extremity, No focal deficits noted.   Neck: No tenderness, Supple.  Integument: warm, dry, no ulcerations.   Psychiatric:  Oriented, no pathological affect.  Musculoskeletal:    Affected knee(s):  Painful gait with a subtle limp, positive for synovitis, swelling, joint effusion with crepitation.  Lachman negative  Posterior drawer negative  Hamida's negative  Patellofemoral grind +  Sensation grossly intact to light touch throughout the lower extremity  Skin is " intact  Distal pulses are palpable  No signs or symptoms of DVT        Diagnostic Data:     Imaging was done today, images were personally viewed and discussed with the patient:    Indication: pain related symptoms,  Views: 3V AP, LAT & 40 degree PA bilateral knee(s)   Findings: advanced arthritis, no unusual cysts  Comparison views: viewed last xray done in the office.     Procedure:  Large Joint Arthrocentesis: L knee  Date/Time: 4/1/2019 2:31 PM  Consent given by: patient  Site marked: site marked  Timeout: Immediately prior to procedure a time out was called to verify the correct patient, procedure, equipment, support staff and site/side marked as required   Supporting Documentation  Indications: pain and joint swelling   Procedure Details  Location: knee - L knee  Preparation: Patient was prepped and draped in the usual sterile fashion  Needle size: 22 G  Approach: anterolateral  Medications administered: 80 mg methylPREDNISolone acetate 80 MG/ML; 4 mL lidocaine (cardiac) 20 MG/ML  Patient tolerance: patient tolerated the procedure well with no immediate complications    Large Joint Arthrocentesis: R knee  Date/Time: 4/1/2019 2:31 PM  Consent given by: patient  Site marked: site marked  Timeout: Immediately prior to procedure a time out was called to verify the correct patient, procedure, equipment, support staff and site/side marked as required   Supporting Documentation  Indications: pain and joint swelling   Procedure Details  Location: knee - R knee  Preparation: Patient was prepped and draped in the usual sterile fashion  Needle size: 22 G  Approach: anterolateral  Medications administered: 4 mL lidocaine (cardiac) 20 MG/ML; 80 mg methylPREDNISolone acetate 80 MG/ML  Patient tolerance: patient tolerated the procedure well with no immediate complications          Assessment:     ICD-10-CM ICD-9-CM   1. Primary osteoarthritis of both knees M17.0 715.16           Plan: Is to proceed with injection  Follow up  as indicated.  Ice, elevate, and rest as needed.  Additional interventions include:  15 min spent face to face with patient 11 min spent counseling about:  Biomechanics of pertinent body area discussed.  Risks, benefits, alternatives, comparisons, and complications of accepted medicines, injections, recommendations, surgical procedures, and therapies explained and education provided in laymen's terms. Natural history and expected course of this patient's diagnosis discussed along with evaluation of therapies. Questions answered. When appropriate I also discussed proper use of cane, walker, trekking poles.   EXERCISES:  Advice on benefits of, and types of regular/moderate exercise including biomechanical forces involved as it pertains to this complaint.  RICE: Rest, ice, compression, and elevation therapy, Cryotherapy/brachy therapy, and or OTC linaments as indicated with instructions.   Cortisone Injection. See procedure note.  Plan on visco bilat knees next time as this seems to work well alternating.   4/1/2019  Ruba Duncan, APRN

## 2019-04-02 RX ORDER — ISOSORBIDE MONONITRATE 30 MG/1
TABLET, EXTENDED RELEASE ORAL
Qty: 30 TABLET | Refills: 4 | OUTPATIENT
Start: 2019-04-02

## 2019-04-23 RX ORDER — ISOSORBIDE MONONITRATE 30 MG/1
TABLET, EXTENDED RELEASE ORAL
Qty: 30 TABLET | Refills: 0 | Status: SHIPPED | OUTPATIENT
Start: 2019-04-23 | End: 2019-05-22 | Stop reason: SDUPTHER

## 2019-04-25 ENCOUNTER — OFFICE VISIT (OUTPATIENT)
Dept: FAMILY MEDICINE CLINIC | Facility: CLINIC | Age: 59
End: 2019-04-25

## 2019-04-25 VITALS
WEIGHT: 181 LBS | DIASTOLIC BLOOD PRESSURE: 80 MMHG | BODY MASS INDEX: 33.31 KG/M2 | HEART RATE: 59 BPM | TEMPERATURE: 98 F | HEIGHT: 62 IN | OXYGEN SATURATION: 93 % | SYSTOLIC BLOOD PRESSURE: 122 MMHG

## 2019-04-25 DIAGNOSIS — M51.36 DEGENERATIVE DISC DISEASE, LUMBAR: ICD-10-CM

## 2019-04-25 DIAGNOSIS — M54.50 ACUTE MIDLINE LOW BACK PAIN WITHOUT SCIATICA: Primary | ICD-10-CM

## 2019-04-25 PROBLEM — M51.369 DEGENERATIVE DISC DISEASE, LUMBAR: Status: ACTIVE | Noted: 2019-04-25

## 2019-04-25 PROCEDURE — 99214 OFFICE O/P EST MOD 30 MIN: CPT | Performed by: NURSE PRACTITIONER

## 2019-04-29 ENCOUNTER — OFFICE VISIT (OUTPATIENT)
Dept: OBSTETRICS AND GYNECOLOGY | Age: 59
End: 2019-04-29

## 2019-04-29 VITALS
BODY MASS INDEX: 33.31 KG/M2 | WEIGHT: 181 LBS | SYSTOLIC BLOOD PRESSURE: 120 MMHG | HEIGHT: 62 IN | DIASTOLIC BLOOD PRESSURE: 70 MMHG

## 2019-04-29 DIAGNOSIS — Z13.89 SCREENING FOR BLOOD OR PROTEIN IN URINE: ICD-10-CM

## 2019-04-29 DIAGNOSIS — N87.0 CIN I (CERVICAL INTRAEPITHELIAL NEOPLASIA I): ICD-10-CM

## 2019-04-29 DIAGNOSIS — Z12.4 SCREENING FOR MALIGNANT NEOPLASM OF CERVIX: ICD-10-CM

## 2019-04-29 DIAGNOSIS — Z01.411 ENCOUNTER FOR GYNECOLOGICAL EXAMINATION WITH ABNORMAL FINDING: Primary | ICD-10-CM

## 2019-04-29 DIAGNOSIS — N95.2 VAGINAL ATROPHY: ICD-10-CM

## 2019-04-29 DIAGNOSIS — N94.10 FEMALE DYSPAREUNIA: ICD-10-CM

## 2019-04-29 DIAGNOSIS — M85.80 OSTEOPENIA, UNSPECIFIED LOCATION: ICD-10-CM

## 2019-04-29 LAB
BILIRUB BLD-MCNC: NEGATIVE MG/DL
GLUCOSE UR STRIP-MCNC: NEGATIVE MG/DL
KETONES UR QL: NEGATIVE
LEUKOCYTE EST, POC: ABNORMAL
NITRITE UR-MCNC: NEGATIVE MG/ML
PH UR: 7 [PH] (ref 5–8)
PROT UR STRIP-MCNC: NEGATIVE MG/DL
RBC # UR STRIP: NEGATIVE /UL
SP GR UR: 1.01 (ref 1–1.03)
UROBILINOGEN UR QL: NORMAL

## 2019-04-29 PROCEDURE — 99396 PREV VISIT EST AGE 40-64: CPT | Performed by: OBSTETRICS & GYNECOLOGY

## 2019-04-29 NOTE — PROGRESS NOTES
"Subjective   Keri Bhagat is a 58 y.o. female annual visit , last pap 09/22/17 LGSIL HPV POS / Colpo done 11/27/17 neg, mmg 3/11/19  at [facility] - DEXA 11/2018 osteopenia.  Recently had MRI due to gait, memory issues and tremor - reports MRI was inconclusive      History of Present Illness    The following portions of the patient's history were reviewed and updated as appropriate: allergies, current medications, past family history, past medical history, past social history, past surgical history and problem list.    Review of Systems   Constitutional: Negative for chills and fever.   Gastrointestinal: Negative for abdominal distention and abdominal pain.   Genitourinary: Positive for dyspareunia and vaginal pain. Negative for dysuria, menstrual problem, pelvic pain, vaginal bleeding and vaginal discharge.   All other systems reviewed and are negative.    /70   Ht 157.5 cm (62\")   Wt 82.1 kg (181 lb)   LMP 10/21/2016 (Approximate) Comment: 54  Breastfeeding? No   BMI 33.11 kg/m²     Objective   Physical Exam   Constitutional: She is oriented to person, place, and time. She appears well-developed and well-nourished.   Neck: Normal range of motion. Neck supple. No thyromegaly present.   Cardiovascular: Normal rate and regular rhythm.   Pulmonary/Chest: Effort normal and breath sounds normal. Right breast exhibits no mass, no nipple discharge, no skin change and no tenderness. Left breast exhibits no mass, no nipple discharge, no skin change and no tenderness.   Abdominal: Soft. Bowel sounds are normal. She exhibits no distension. There is no tenderness.   Genitourinary: Uterus normal. Pelvic exam was performed with patient supine. Uterus is not tender. Cervix exhibits no friability. Right adnexum displays no mass and no tenderness. Left adnexum displays no mass and no tenderness. No vaginal discharge found.   Genitourinary Comments: Pale vaginal tissue with petechiae c/w atrophy    Musculoskeletal: " Normal range of motion. She exhibits no edema.   Neurological: She is alert and oriented to person, place, and time.   Skin: Skin is warm and dry. No rash noted.   Psychiatric: She has a normal mood and affect. Her behavior is normal.   Nursing note and vitals reviewed.        Assessment/Plan   Keri was seen today for gynecologic exam.    Diagnoses and all orders for this visit:    Encounter for gynecological examination with abnormal finding    Screening for blood or protein in urine  -     POC Urinalysis Dipstick    Screening for malignant neoplasm of cervix  -     IgP, Aptima HPV    MORALES I (cervical intraepithelial neoplasia I)    Osteopenia, unspecified location    Vaginal atrophy  -     Estradiol (IMVEXXY MAINTENANCE PACK) 10 MCG insert; Insert 1 capsule into the vagina 2 (Two) Times a Week.    Female dyspareunia  -     Estradiol (IMVEXXY MAINTENANCE PACK) 10 MCG insert; Insert 1 capsule into the vagina 2 (Two) Times a Week.      Counseling was given to patient for the following topics: patient and family education, impressions, risks and benefits of treatment options and self-breast exams .

## 2019-05-02 LAB
CYTOLOGIST CVX/VAG CYTO: NORMAL
CYTOLOGY CVX/VAG DOC THIN PREP: NORMAL
DX ICD CODE: NORMAL
HIV 1 & 2 AB SER-IMP: NORMAL
HPV I/H RISK 4 DNA CVX QL PROBE+SIG AMP: NEGATIVE
Lab: NORMAL
OTHER STN SPEC: NORMAL
PATH REPORT.FINAL DX SPEC: NORMAL
STAT OF ADQ CVX/VAG CYTO-IMP: NORMAL

## 2019-05-03 RX ORDER — SIMVASTATIN 20 MG
20 TABLET ORAL NIGHTLY
Qty: 30 TABLET | Refills: 6 | Status: SHIPPED | OUTPATIENT
Start: 2019-05-03 | End: 2019-12-04 | Stop reason: SDUPTHER

## 2019-05-06 NOTE — PROGRESS NOTES
Subjective   Keri Bhagat is a 58 y.o. female.       Moderate to moderate severe low back pain  Sharp achy  Low back pain    2 weeks  After standing cooking etc    No nausea vomiting fever chills associated with this no bowel or bladder change associated just saw urology said not related    MRI in February due to gait complaints with Dr. Leonardo's neurosurgery  Some degenerative changes in L5-S1  Disc bulging affecting the right side with narrowing    No history of malignancy no fever    tyl helps    After standing                  Back Pain   Pertinent negatives include no abdominal pain, chest pain, fever, pelvic pain, weakness or weight loss.        The following portions of the patient's history were reviewed and updated as appropriate: allergies, current medications, past family history, past medical history, past social history, past surgical history and problem list.    Review of Systems   Constitutional: Negative for chills, fatigue, fever and unexpected weight loss.   HENT: Negative.  Negative for trouble swallowing.    Eyes: Negative.    Respiratory: Negative for cough and shortness of breath.    Cardiovascular: Negative for chest pain, palpitations and leg swelling.   Gastrointestinal: Negative for abdominal pain and blood in stool.   Genitourinary: Negative.  Negative for pelvic pain.   Musculoskeletal: Positive for back pain.   Skin: Negative.    Neurological: Negative.  Negative for dizziness, speech difficulty, weakness and confusion.   Psychiatric/Behavioral: Negative.        Objective   Physical Exam   Constitutional: She is oriented to person, place, and time. She appears well-developed and well-nourished.   HENT:   Head: Normocephalic.   Mouth/Throat: Oropharynx is clear and moist. No oropharyngeal exudate.   Eyes: Conjunctivae are normal. Pupils are equal, round, and reactive to light.   Neck: Neck supple. No JVD present.   Cardiovascular: Normal rate, regular rhythm and normal heart sounds.  Exam reveals no friction rub.   No murmur heard.  Pulmonary/Chest: Effort normal and breath sounds normal. No respiratory distress. She has no wheezes.   Abdominal: Soft. Bowel sounds are normal. She exhibits no distension and no mass. There is no tenderness. There is no guarding. No hernia.   No CVA tenderness   Musculoskeletal: She exhibits no edema or tenderness.   Physical exam negative straight leg raise plantar flexion dorsiflexion normal otherwise normal exam   Lymphadenopathy:     She has no cervical adenopathy.   Neurological: She is alert and oriented to person, place, and time.   Skin: Skin is warm and dry.   Psychiatric: She has a normal mood and affect. Her behavior is normal. Judgment and thought content normal.   Vitals reviewed.        Assessment/Plan   Keri was seen today for back pain.    Diagnoses and all orders for this visit:    Acute midline low back pain without sciatica    Degenerative disc disease, lumbar                  Patient Instructions   Discharge instructions  Good posture  Avoid repetitive lifting pushing pulling squatting for several weeks  Until back better  Slowly start back exercises daily make them part of your life    If not improving in 3 weeks or so physical therapy  Recheck in 6 weeks otherwise if back pain is not completely resolved  Weakness fever incontinence emergency room    Thank You,      James Epley, NP     Flonase as needed for nasal allergies use seasonally      If needed some Claritin at bedtime    If needed Nasalcrom OTC nasal spray 2 sprays each nostril twice daily as needed for nasal allergies [email or web address] approximately $18 for 2 months  Back Exercises  The following exercises strengthen the muscles that help to support the back. They also help to keep the lower back flexible. Doing these exercises can help to prevent back pain or lessen existing pain.  If you have back pain or discomfort, try doing these exercises 2-3 times each day or as told by your health  care provider. When the pain goes away, do them once each day, but increase the number of times that you repeat the steps for each exercise (do more repetitions). If you do not have back pain or discomfort, do these exercises once each day or as told by your health care provider.  Exercises  Single Knee to Chest  Repeat these steps 3-5 times for each le. Lie on your back on a firm bed or the floor with your legs extended.  2. Bring one knee to your chest. Your other leg should stay extended and in contact with the floor.  3. Hold your knee in place by grabbing your knee or thigh.  4. Pull on your knee until you feel a gentle stretch in your lower back.  5. Hold the stretch for 10-30 seconds.  6. Slowly release and straighten your leg.    Pelvic Tilt  Repeat these steps 5-10 times:  1. Lie on your back on a firm bed or the floor with your legs extended.  2. Bend your knees so they are pointing toward the ceiling and your feet are flat on the floor.  3. Tighten your lower abdominal muscles to press your lower back against the floor. This motion will tilt your pelvis so your tailbone points up toward the ceiling instead of pointing to your feet or the floor.  4. With gentle tension and even breathing, hold this position for 5-10 seconds.    Cat-Cow    Repeat these steps until your lower back becomes more flexible:  1. Get into a hands-and-knees position on a firm surface. Keep your hands under your shoulders, and keep your knees under your hips. You may place padding under your knees for comfort.  2. Let your head hang down, and point your tailbone toward the floor so your lower back becomes rounded like the back of a cat.  3. Hold this position for 5 seconds.  4. Slowly lift your head and point your tailbone up toward the ceiling so your back forms a sagging arch like the back of a cow.  5. Hold this position for 5 seconds.    Press-Ups    Repeat these steps 5-10 times:  1. Lie on your abdomen (face-down) on the  floor.  2. Place your palms near your head, about shoulder-width apart.  3. While you keep your back as relaxed as possible and keep your hips on the floor, slowly straighten your arms to raise the top half of your body and lift your shoulders. Do not use your back muscles to raise your upper torso. You may adjust the placement of your hands to make yourself more comfortable.  4. Hold this position for 5 seconds while you keep your back relaxed.  5. Slowly return to lying flat on the floor.    Bridges    Repeat these steps 10 times:  1. Lie on your back on a firm surface.  2. Bend your knees so they are pointing toward the ceiling and your feet are flat on the floor.  3. Tighten your buttocks muscles and lift your buttocks off of the floor until your waist is at almost the same height as your knees. You should feel the muscles working in your buttocks and the back of your thighs. If you do not feel these muscles, slide your feet 1-2 inches farther away from your buttocks.  4. Hold this position for 3-5 seconds.  5. Slowly lower your hips to the starting position, and allow your buttocks muscles to relax completely.    If this exercise is too easy, try doing it with your arms crossed over your chest.  Abdominal Crunches  Repeat these steps 5-10 times:  1. Lie on your back on a firm bed or the floor with your legs extended.  2. Bend your knees so they are pointing toward the ceiling and your feet are flat on the floor.  3. Cross your arms over your chest.  4. Tip your chin slightly toward your chest without bending your neck.  5. Tighten your abdominal muscles and slowly raise your trunk (torso) high enough to lift your shoulder blades a tiny bit off of the floor. Avoid raising your torso higher than that, because it can put too much stress on your low back and it does not help to strengthen your abdominal muscles.  6. Slowly return to your starting position.    Back Lifts  Repeat these steps 5-10 times:  1. Lie on  your abdomen (face-down) with your arms at your sides, and rest your forehead on the floor.  2. Tighten the muscles in your legs and your buttocks.  3. Slowly lift your chest off of the floor while you keep your hips pressed to the floor. Keep the back of your head in line with the curve in your back. Your eyes should be looking at the floor.  4. Hold this position for 3-5 seconds.  5. Slowly return to your starting position.    Contact a health care provider if:  · Your back pain or discomfort gets much worse when you do an exercise.  · Your back pain or discomfort does not lessen within 2 hours after you exercise.  If you have any of these problems, stop doing these exercises right away. Do not do them again unless your health care provider says that you can.  Get help right away if:  · You develop sudden, severe back pain. If this happens, stop doing the exercises right away. Do not do them again unless your health care provider says that you can.  This information is not intended to replace advice given to you by your health care provider. Make sure you discuss any questions you have with your health care provider.  Document Released: 01/25/2006 Document Revised: 07/31/2018 Document Reviewed: 02/11/2016  Elsevier Interactive Patient Education © 2019 [employer]

## 2019-05-23 RX ORDER — ISOSORBIDE MONONITRATE 30 MG/1
TABLET, EXTENDED RELEASE ORAL
Qty: 30 TABLET | Refills: 5 | Status: SHIPPED | OUTPATIENT
Start: 2019-05-23 | End: 2019-11-25 | Stop reason: SDUPTHER

## 2019-06-07 ENCOUNTER — OFFICE VISIT (OUTPATIENT)
Dept: FAMILY MEDICINE CLINIC | Facility: CLINIC | Age: 59
End: 2019-06-07

## 2019-06-07 ENCOUNTER — HOSPITAL ENCOUNTER (OUTPATIENT)
Dept: GENERAL RADIOLOGY | Facility: HOSPITAL | Age: 59
Discharge: HOME | End: 2019-06-07
Admitting: NURSE PRACTITIONER

## 2019-06-07 VITALS
SYSTOLIC BLOOD PRESSURE: 130 MMHG | DIASTOLIC BLOOD PRESSURE: 70 MMHG | HEIGHT: 62 IN | WEIGHT: 182 LBS | BODY MASS INDEX: 33.49 KG/M2 | OXYGEN SATURATION: 96 % | HEART RATE: 73 BPM
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
## 2019-06-07 DIAGNOSIS — M54.2 NECK PAIN: ICD-10-CM

## 2019-06-07 DIAGNOSIS — S00.83XA FACIAL CONTUSION, INITIAL ENCOUNTER: Primary | ICD-10-CM

## 2019-06-07 DIAGNOSIS — S16.1XXA ACUTE STRAIN OF NECK MUSCLE, INITIAL ENCOUNTER: ICD-10-CM

## 2019-06-07 DIAGNOSIS — M54.12 CERVICAL RADICULOPATHY: ICD-10-CM

## 2019-06-07 PROCEDURE — 99214 OFFICE O/P EST MOD 30 MIN: CPT | Performed by: NURSE PRACTITIONER

## 2019-06-07 PROCEDURE — 72040 X-RAY EXAM NECK SPINE 2-3 VW: CPT

## 2019-06-07 RX ORDER — GABAPENTIN 300 MG/1
300 CAPSULE ORAL 2 TIMES DAILY
Qty: 60 CAPSULE | Refills: 2 | Status: SHIPPED | OUTPATIENT
Start: 2019-06-07 | End: 2019-09-09 | Stop reason: SDUPTHER

## 2019-06-07 RX ORDER — GABAPENTIN 300 MG/1
CAPSULE ORAL
Qty: 90 CAPSULE | Refills: 1 | Status: CANCELLED | OUTPATIENT
Start: 2019-06-07

## 2019-06-07 NOTE — PATIENT INSTRUCTIONS
Discharge instructions  Heat massage neck muscles  As needed  X-rays now call Monday for results  If bowel or bladder incontinence weakness of extremities  Difficulty walking new or upper extremity paresthesias and weakness go to the emergency room    If increased or severe or persistent headache  Double vision or vision change  Increased nausea vomiting confusion irritability any of these go to the emergency room for evaluation  Follow-up in 1 week    Over-the-counter Aleve  2 tablets twice daily x5 days  As needed  Avoid chronic use  Chronic use is relatively contraindicated with her history  Take with food and water  Stop if elevated blood pressure or other difficulties

## 2019-06-13 NOTE — PROGRESS NOTES
Subjective   Keri Bhagat is a 58 y.o. female.     Patient was walking in a dark room tripped lost her balance fell forward onto the table hit her chin on the way down.  No LOC no confusion nausea vomiting  Mild chin soreness but no neck pain chest pain  Hit her forehead as well apparently bounced off the table she has no vision changes no double vision  No severe pain or severe headache   Symptoms are mild to moderate  Mild neck muscle soreness but no cervical point pain or tenderness no paresthesias  Does not feel like she injured her neck but just a strain         The following portions of the patient's history were reviewed and updated as appropriate: allergies, current medications, past family history, past medical history, past social history, past surgical history and problem list.    Review of Systems   HENT:        Facial contusions   Eyes: Negative for blurred vision, double vision and visual disturbance.   Respiratory: Negative for shortness of breath.    Cardiovascular: Negative for chest pain.   Neurological: Negative for dizziness, facial asymmetry, light-headedness, headache, memory problem and confusion.   All other systems reviewed and are negative.      Objective   Physical Exam   Constitutional: She is oriented to person, place, and time. She appears well-developed and well-nourished.   HENT:   Head: Normocephalic.   Mouth/Throat: Oropharynx is clear and moist. No oropharyngeal exudate.   Mild tenderness frontal no deformity no step-off  Maxillary without tenderness  Mild tenderness nasal bridge without deformity or swelling noted  Turbinates clear  TMs clear pharynx clear TMJ normal    tender mid posterior anterior jaw mild tenderness  Full range of motion TMJ without click or pain and no mandible deformity  Speech clear   Eyes: Conjunctivae are normal. Pupils are equal, round, and reactive to light.   EOM intact FOV intact   Neck: Neck supple. No JVD present.   Cardiovascular: Normal rate,  regular rhythm and normal heart sounds. Exam reveals no friction rub.   No murmur heard.  Pulmonary/Chest: Effort normal and breath sounds normal. No respiratory distress. She has no wheezes.   Abdominal: Soft. Bowel sounds are normal. She exhibits no distension and no mass. There is no tenderness. There is no guarding. No hernia.   Musculoskeletal: She exhibits no edema or tenderness.   Neck full range of motion without pain and no localized tenderness normal gait   Lymphadenopathy:     She has no cervical adenopathy.   Neurological: She is alert and oriented to person, place, and time.   Skin: Skin is warm and dry.   Psychiatric: She has a normal mood and affect. Her behavior is normal. Judgment and thought content normal.   Vitals reviewed.        Assessment/Plan   Keri was seen today for fall last  face first in the middle of the night not sure if.    Diagnoses and all orders for this visit:    Facial contusion, initial encounter    Acute strain of neck muscle, initial encounter  -     XR Spine Cervical 2 or 3 View    Neck pain  -     XR Spine Cervical 2 or 3 View    Fall, initial encounter  -     XR Spine Cervical 2 or 3 View    Cervical radiculopathy  -     gabapentin (NEURONTIN) 300 MG capsule; Take 1 capsule by mouth 2 (Two) Times a Day.                  Patient Instructions   Discharge instructions  Heat massage neck muscles  As needed  X-rays now call Monday for results  If bowel or bladder incontinence weakness of extremities  Difficulty walking new or upper extremity paresthesias and weakness go to the emergency room    If increased or severe or persistent headache  Double vision or vision change  Increased nausea vomiting confusion irritability any of these go to the emergency room for evaluation  Follow-up in 1 week    Over-the-counter Aleve  2 tablets twice daily x5 days  As needed  Avoid chronic use  Chronic use is relatively contraindicated with her history  Take with food and water  Stop if  elevated blood pressure or other difficulties

## 2019-06-26 ENCOUNTER — TELEPHONE (OUTPATIENT)
Dept: NEUROSURGERY | Facility: CLINIC | Age: 59
End: 2019-06-26

## 2019-06-26 NOTE — TELEPHONE ENCOUNTER
We can move up her MRI and see her earlier, but I believe her complaints overall are better addressed by her neurologist. She should call and discuss her concerns with her office. She had a tiny parafalcine lesion- not likely related to the multiple complaints that she is having. She does not have a pituitary mass- maybe a Rathke's cyst. Again, not related to her symptom complaint.

## 2019-06-26 NOTE — TELEPHONE ENCOUNTER
Pt would like to get in sooner than Sept. She has been falling, stuttering when she talks and is having memory issues.  She said this has been going on for less than a year.     Pt has been seeing a neurologist.  She said she saw one in April of this year upstairs. Her primary care  Referred her to neurology.  No new img   Please advise [facility] or DR Camille Saavedra [phone]

## 2019-06-26 NOTE — TELEPHONE ENCOUNTER
Spoke with pt.  Last appt was 3/29/19 for parafacial lesion/poss pituit lesion.  Was to return in 6mon with MRI for small meningioma.  Is having no vision issues, but has fallen 2x in past 2 weeks.  No hearing , numb, weakness.  Has tremors and decrease conc.  --see below

## 2019-07-18 ENCOUNTER — OFFICE VISIT (OUTPATIENT)
Dept: FAMILY MEDICINE CLINIC | Facility: CLINIC | Age: 59
End: 2019-07-18

## 2019-07-18 ENCOUNTER — HOSPITAL ENCOUNTER (OUTPATIENT)
Dept: CARDIOLOGY | Facility: HOSPITAL | Age: 59
Discharge: HOME | End: 2019-07-18
Admitting: NURSE PRACTITIONER

## 2019-07-18 VITALS
SYSTOLIC BLOOD PRESSURE: 116 MMHG | HEART RATE: 58 BPM | HEIGHT: 62 IN | WEIGHT: 183 LBS | BODY MASS INDEX: 33.68 KG/M2 | TEMPERATURE: 98.4 F | OXYGEN SATURATION: 97 % | DIASTOLIC BLOOD PRESSURE: 80 MMHG

## 2019-07-18 DIAGNOSIS — M79.661 RIGHT CALF PAIN: ICD-10-CM

## 2019-07-18 DIAGNOSIS — M54.40 ACUTE LEFT-SIDED LOW BACK PAIN WITH SCIATICA, SCIATICA LATERALITY UNSPECIFIED: Primary | ICD-10-CM

## 2019-07-18 LAB
BH CV LOWER VASCULAR LEFT COMMON FEMORAL AUGMENT: NORMAL
BH CV LOWER VASCULAR LEFT COMMON FEMORAL COMPETENT: NORMAL
BH CV LOWER VASCULAR LEFT COMMON FEMORAL COMPRESS: NORMAL
BH CV LOWER VASCULAR LEFT COMMON FEMORAL PHASIC: NORMAL
BH CV LOWER VASCULAR LEFT COMMON FEMORAL SPONT: NORMAL
BH CV LOWER VASCULAR RIGHT COMMON FEMORAL AUGMENT: NORMAL
BH CV LOWER VASCULAR RIGHT COMMON FEMORAL COMPETENT: NORMAL
BH CV LOWER VASCULAR RIGHT COMMON FEMORAL COMPRESS: NORMAL
BH CV LOWER VASCULAR RIGHT COMMON FEMORAL PHASIC: NORMAL
BH CV LOWER VASCULAR RIGHT COMMON FEMORAL SPONT: NORMAL
BH CV LOWER VASCULAR RIGHT DISTAL FEMORAL COMPRESS: NORMAL
BH CV LOWER VASCULAR RIGHT GASTRONEMIUS COMPRESS: NORMAL
BH CV LOWER VASCULAR RIGHT GREATER SAPH AK COMPRESS: NORMAL
BH CV LOWER VASCULAR RIGHT GREATER SAPH BK COMPRESS: NORMAL
BH CV LOWER VASCULAR RIGHT MID FEMORAL AUGMENT: NORMAL
BH CV LOWER VASCULAR RIGHT MID FEMORAL COMPETENT: NORMAL
BH CV LOWER VASCULAR RIGHT MID FEMORAL COMPRESS: NORMAL
BH CV LOWER VASCULAR RIGHT MID FEMORAL PHASIC: NORMAL
BH CV LOWER VASCULAR RIGHT MID FEMORAL SPONT: NORMAL
BH CV LOWER VASCULAR RIGHT PERONEAL COMPRESS: NORMAL
BH CV LOWER VASCULAR RIGHT POPLITEAL AUGMENT: NORMAL
BH CV LOWER VASCULAR RIGHT POPLITEAL COMPETENT: NORMAL
BH CV LOWER VASCULAR RIGHT POPLITEAL COMPRESS: NORMAL
BH CV LOWER VASCULAR RIGHT POPLITEAL PHASIC: NORMAL
BH CV LOWER VASCULAR RIGHT POPLITEAL SPONT: NORMAL
BH CV LOWER VASCULAR RIGHT POSTERIOR TIBIAL COMPRESS: NORMAL
BH CV LOWER VASCULAR RIGHT PROXIMAL FEMORAL COMPRESS: NORMAL
BH CV LOWER VASCULAR RIGHT SAPHENOFEMORAL JUNCTION COMPRESS: NORMAL

## 2019-07-18 PROCEDURE — 99214 OFFICE O/P EST MOD 30 MIN: CPT | Performed by: NURSE PRACTITIONER

## 2019-07-18 PROCEDURE — 93971 EXTREMITY STUDY: CPT

## 2019-07-18 RX ORDER — METHYLPREDNISOLONE 4 MG/1
TABLET ORAL
Qty: 21 TABLET | Refills: 0 | Status: SHIPPED | OUTPATIENT
Start: 2019-07-18 | End: 2019-08-30

## 2019-07-18 NOTE — PATIENT INSTRUCTIONS
Discharge instructions  Tylenol as needed  Medrol Dosepak  For inflammation and pain exercises daily work around the pain not through the pain if chest pain shortness of breath weakness groin paresthesias incontinence or urinary retention bowel emergency room    Recheck  1 month  Back Exercises  If you have pain in your back, do these exercises 2-3 times each day or as told by your doctor. When the pain goes away, do the exercises once each day, but repeat the steps more times for each exercise (do more repetitions). If you do not have pain in your back, do these exercises once each day or as told by your doctor.  Exercises  Single Knee to Chest  Do these steps 3-5 times in a row for each le. Lie on your back on a firm bed or the floor with your legs stretched out.  2. Bring one knee to your chest.  3. Hold your knee to your chest by grabbing your knee or thigh.  4. Pull on your knee until you feel a gentle stretch in your lower back.  5. Keep doing the stretch for 10-30 seconds.  6. Slowly let go of your leg and straighten it.    Pelvic Tilt  Do these steps 5-10 times in a row:  1. Lie on your back on a firm bed or the floor with your legs stretched out.  2. Bend your knees so they point up to the ceiling. Your feet should be flat on the floor.  3. Tighten your lower belly (abdomen) muscles to press your lower back against the floor. This will make your tailbone point up to the ceiling instead of pointing down to your feet or the floor.  4. Stay in this position for 5-10 seconds while you gently tighten your muscles and breathe evenly.    Cat-Cow    Do these steps until your lower back bends more easily:  1. Get on your hands and knees on a firm surface. Keep your hands under your shoulders, and keep your knees under your hips. You may put padding under your knees.  2. Let your head hang down, and make your tailbone point down to the floor so your lower back is round like the back of a cat.  3. Stay in this  position for 5 seconds.  4. Slowly lift your head and make your tailbone point up to the ceiling so your back hangs low (sags) like the back of a cow.  5. Stay in this position for 5 seconds.    Press-Ups    Do these steps 5-10 times in a row:  1. Lie on your belly (face-down) on the floor.  2. Place your hands near your head, about shoulder-width apart.  3. While you keep your back relaxed and keep your hips on the floor, slowly straighten your arms to raise the top half of your body and lift your shoulders. Do not use your back muscles. To make yourself more comfortable, you may change where you place your hands.  4. Stay in this position for 5 seconds.  5. Slowly return to lying flat on the floor.    Bridges    Do these steps 10 times in a row:  1. Lie on your back on a firm surface.  2. Bend your knees so they point up to the ceiling. Your feet should be flat on the floor.  3. Tighten your butt muscles and lift your butt off of the floor until your waist is almost as high as your knees. If you do not feel the muscles working in your butt and the back of your thighs, slide your feet 1-2 inches farther away from your butt.  4. Stay in this position for 3-5 seconds.  5. Slowly lower your butt to the floor, and let your butt muscles relax.    If this exercise is too easy, try doing it with your arms crossed over your chest.  Belly Crunches  Do these steps 5-10 times in a row:  1. Lie on your back on a firm bed or the floor with your legs stretched out.  2. Bend your knees so they point up to the ceiling. Your feet should be flat on the floor.  3. Cross your arms over your chest.  4. Tip your chin a little bit toward your chest but do not bend your neck.  5. Tighten your belly muscles and slowly raise your chest just enough to lift your shoulder blades a tiny bit off of the floor.  6. Slowly lower your chest and your head to the floor.    Back Lifts  Do these steps 5-10 times in a row:  1. Lie on your belly  (face-down) with your arms at your sides, and rest your forehead on the floor.  2. Tighten the muscles in your legs and your butt.  3. Slowly lift your chest off of the floor while you keep your hips on the floor. Keep the back of your head in line with the curve in your back. Look at the floor while you do this.  4. Stay in this position for 3-5 seconds.  5. Slowly lower your chest and your face to the floor.    Contact a doctor if:  · Your back pain gets a lot worse when you do an exercise.  · Your back pain does not lessen 2 hours after you exercise.  If you have any of these problems, stop doing the exercises. Do not do them again unless your doctor says it is okay.  Get help right away if:  · You have sudden, very bad back pain. If this happens, stop doing the exercises. Do not do them again unless your doctor says it is okay.  This information is not intended to replace advice given to you by your health care provider. Make sure you discuss any questions you have with your health care provider.  Document Released: 01/20/2012 Document Revised: 07/25/2018 Document Reviewed: 02/11/2016  Elsevier Interactive Patient Education © 2019 [employer]

## 2019-07-18 NOTE — PROGRESS NOTES
Subjective   Keir Bhagat is a 59 y.o. female.     Pleasant patient here today he reports right lower quadrant calf pain and anterior tibial pain x2 weeks  She has some tenderness in her posterior calf no associated chest pain or shortness of breath no injury  Has pain with walking better when she sits  She also has left low back pain radiates down her left upper hip and pelvis  Does not radiate to the groin or down the leg beyond this region no bowel bladder change no lower extremity weakness  She has no history DVT no recent    Immobilization no recent surgery although she had a injection in her knee  Bilateral in April    Increased pain with walking no Achilles pain         The following portions of the patient's history were reviewed and updated as appropriate: allergies, current medications, past family history, past medical history, past social history, past surgical history and problem list.    Review of Systems   Constitutional: Negative for chills, fatigue, fever and unexpected weight loss.   HENT: Negative.  Negative for trouble swallowing.    Eyes: Negative.    Respiratory: Negative for cough and shortness of breath.    Cardiovascular: Negative for chest pain, palpitations and leg swelling.   Gastrointestinal: Negative for abdominal pain and blood in stool.   Genitourinary: Negative.  Negative for pelvic pain.   Musculoskeletal: Negative.         Leg pain   Skin: Negative.    Neurological: Negative.  Negative for dizziness, speech difficulty, weakness and confusion.   Psychiatric/Behavioral: Negative.        Objective   Physical Exam   Constitutional: She is oriented to person, place, and time. She appears well-developed and well-nourished.   HENT:   Head: Normocephalic.   Mouth/Throat: Oropharynx is clear and moist. No oropharyngeal exudate.   Eyes: Conjunctivae are normal. Pupils are equal, round, and reactive to light.   Neck: Neck supple. No JVD present.   Cardiovascular: Normal rate, regular  rhythm and normal heart sounds. Exam reveals no friction rub.   No murmur heard.  Pulmonary/Chest: Effort normal and breath sounds normal. No respiratory distress. She has no wheezes.   Abdominal: Soft. Bowel sounds are normal. She exhibits no distension and no mass. There is no tenderness. There is no guarding. No hernia.   Musculoskeletal: She exhibits tenderness. She exhibits no edema.   Tenderness right calf  Mild positive Homans  No Achilles tenderness  No swelling or redness noted  Ankle stable knee stable  Walks with a mild limp and stiffness  Negative straight leg raise  Internal and external tech rotation abduction abduction left hip without pain radiating to the groin although she did have some increased pain left low back    No plantar or dorsal flexion weakness   Lymphadenopathy:     She has no cervical adenopathy.   Neurological: She is alert and oriented to person, place, and time. She displays normal reflexes. No sensory deficit. She exhibits normal muscle tone. Coordination normal.   Skin: Skin is warm and dry.   Psychiatric: She has a normal mood and affect. Her behavior is normal. Judgment and thought content normal.   Vitals reviewed.  Venous Doppler study negative preliminary  Which I expected likely her pain may be referred from her back      Assessment/Plan   Keri was seen today for right claf pain.    Diagnoses and all orders for this visit:    Acute left-sided low back pain with sciatica, sciatica laterality unspecified    Right calf pain                  There are no Patient Instructions on file for this visit.

## 2019-07-30 ENCOUNTER — CLINICAL SUPPORT (OUTPATIENT)
Dept: ORTHOPEDIC SURGERY | Facility: CLINIC | Age: 59
End: 2019-07-30

## 2019-07-30 VITALS — HEIGHT: 63 IN | WEIGHT: 181 LBS | TEMPERATURE: 98.2 F | BODY MASS INDEX: 32.07 KG/M2

## 2019-07-30 DIAGNOSIS — M17.0 PRIMARY OSTEOARTHRITIS OF BOTH KNEES: ICD-10-CM

## 2019-07-30 DIAGNOSIS — M17.0 ARTHRITIS OF BOTH KNEES: ICD-10-CM

## 2019-07-30 DIAGNOSIS — M25.561 CHRONIC PAIN OF BOTH KNEES: Primary | ICD-10-CM

## 2019-07-30 DIAGNOSIS — G89.29 CHRONIC PAIN OF BOTH KNEES: Primary | ICD-10-CM

## 2019-07-30 DIAGNOSIS — M25.562 CHRONIC PAIN OF BOTH KNEES: Primary | ICD-10-CM

## 2019-07-30 PROCEDURE — 73562 X-RAY EXAM OF KNEE 3: CPT | Performed by: NURSE PRACTITIONER

## 2019-07-30 PROCEDURE — 99212 OFFICE O/P EST SF 10 MIN: CPT | Performed by: NURSE PRACTITIONER

## 2019-07-30 PROCEDURE — 20610 DRAIN/INJ JOINT/BURSA W/O US: CPT | Performed by: NURSE PRACTITIONER

## 2019-07-30 RX ORDER — METHYLPREDNISOLONE ACETATE 80 MG/ML
80 INJECTION, SUSPENSION INTRA-ARTICULAR; INTRALESIONAL; INTRAMUSCULAR; SOFT TISSUE
Status: DISCONTINUED | OUTPATIENT
Start: 2019-07-30 | End: 2019-07-30

## 2019-07-30 RX ORDER — HYDROXYZINE 50 MG/1
50 TABLET, FILM COATED ORAL 3 TIMES DAILY PRN
COMMUNITY
End: 2019-08-28

## 2019-07-30 NOTE — PROGRESS NOTES
Patient: Keri Bhagat  YOB: 1960    Chief Complaints:  bilateral knee pain    Subjective:    History of Present Illness: Here today for knee pain. Alternates gel/cortisone and does well with this plan. The pain is a generalized joint tenderness.  It has been progressive in nature but remains intermittent.  Worsened by prolonged standing or walking and squatting activities. Has had improvement in the past with ice/heat, rest, and injections.     This problem is not new to this examiner.     Allergies: No Known Allergies    Medications:   Home Medications:  Current Outpatient Medications on File Prior to Visit   Medication Sig   • atenolol (TENORMIN) 50 MG tablet Take 1 tablet by mouth Daily.   • buPROPion XL (WELLBUTRIN XL) 300 MG 24 hr tablet Take 300 mg by mouth Every Morning.   • Cholecalciferol (VITAMIN D3) 5000 UNITS capsule capsule Take 5,000 Units by mouth Daily.   • diazePAM (VALIUM) 5 MG tablet Take 1 tablet by mouth 2 (Two) Times a Day As Needed for Anxiety.   • gabapentin (NEURONTIN) 300 MG capsule Take 1 capsule by mouth 2 (Two) Times a Day.   • hydrOXYzine (ATARAX) 50 MG tablet Take 50 mg by mouth 3 (Three) Times a Day As Needed for Itching.   • isosorbide mononitrate (IMDUR) 30 MG 24 hr tablet TAKE ONE TABLET BY MOUTH DAILY   • lamoTRIgine (LaMICtal) 150 MG tablet Take 300 mg by mouth Daily.   • levothyroxine (SYNTHROID, LEVOTHROID) 88 MCG tablet Take 1 tablet by mouth Every Morning.   • lithium (ESKALITH) 450 MG CR tablet Take 450 mg by mouth Daily.   • loratadine (CLARITIN) 10 MG tablet Take 1 tablet by mouth Daily. As needed for allergies (Patient taking differently: Take 10 mg by mouth Daily As Needed. As needed for allergies)   • nitroglycerin (NITROSTAT) 0.4 MG SL tablet Place 0.4 mg under the tongue Every 5 (Five) Minutes As Needed for Chest Pain (took at M.D  office at 0930 a.m.). Take no more than 3 doses in 15 minutes.   • oxybutynin XL (DITROPAN XL) 15 MG 24 hr tablet  Take 15 mg by mouth Daily.   • raNITIdine (ZANTAC) 150 MG tablet Take 1 tablet by mouth 2 (Two) Times a Day.   • simvastatin (ZOCOR) 20 MG tablet Take 1 tablet by mouth Every Night.   • venlafaxine XR (EFFEXOR-XR) 75 MG 24 hr capsule    • Estradiol (IMVEXXY MAINTENANCE PACK) 10 MCG insert Insert 1 capsule into the vagina 2 (Two) Times a Week.   • methylPREDNISolone (MEDROL, TINA,) 4 MG tablet Take as directed on package instructions.   • [] pramipexole (MIRAPEX) 0.5 MG tablet Take 1 tablet by mouth Every Night for 360 days.     No current facility-administered medications on file prior to visit.      Current Medications:  Scheduled Meds:  Continuous Infusions:  No current facility-administered medications for this visit.   PRN Meds:.    I have reviewed the patient's medical history in detail and updated the computerized patient record.  Review and summarization of old records include:    Past Medical History:   Diagnosis Date   • Abnormal Pap smear of cervix    • Anemia    • Anxiety    • Arthritis    • Atherosclerotic heart disease of native coronary artery with other forms of angina pectoris (CMS/HCC)    • Bipolar I disorder, single manic episode (CMS/HCC)     depressive d(NOS)   • Cervical disc herniation    • Cervical dysplasia    • Coronary artery disease    • CTS (carpal tunnel syndrome)    • Depression     NO SUICIDAL PLANS   • Difficulty swallowing    • Diverticular disease    • Dyspepsia    • Dysphagia    • Gastric reflux    • Heart attack (CMS/HCC)    • Heart murmur    • Hiatal hernia    • High cholesterol    • History of transfusion    • HPV (human papilloma virus) infection 2016    HPV positive on pap LGSIL   • Hyperlipidemia    • Hypertension    • Hypothyroidism    • Incontinence in female     wears pads   • Kidney disease 2017    stage 2    • Kidney disease, chronic, stage III (GFR 30-59 ml/min) (CMS/HCC)    • LGSIL on Pap smear of cervix 2016    LGSIL HPV positive   • Myocardial  infarction (CMS/HCC) 2000   • Neck pain    • Obesity    • Past myocardial infarction 05/2000   • Periodic limb movement disorder    • Restless leg syndrome    • Sleep apnea     cpap   • Stress fracture     LEFT HEEL   • Suicidal ideation 08/19/2016    history   • Swelling of ankle     right had doppler no s/s blood clot   • Thyroid nodule         Past Surgical History:   Procedure Laterality Date   • ANTERIOR CERVICAL DISCECTOMY W/ FUSION N/A 12/29/2016    Procedure: C3-4 anterior cervical discectomy and fusion with Depuy micro plate, ALLOGRAFT C3-4, AND HARDWARE REMOVAL C4-7.;  Surgeon: Hema Godwin MD;  Location: [facility] MAIN OR;  Service:    • CARDIAC CATHETERIZATION N/A 3/30/2017    Procedure: Left Heart Cath;  Surgeon: Tracey Vargas MD;  Location:  [facility]VASIVE [facility];  Service:    • CARDIAC CATHETERIZATION N/A 3/30/2017    Procedure: Coronary angiography;  Surgeon: Tracey Vargas MD;  Location:  [facility];  Service:    • CARDIAC CATHETERIZATION N/A 3/30/2017    Procedure: Left ventriculography;  Surgeon: Tracey Vargas MD;  Location:  [facility];  Service:    • CARDIAC CATHETERIZATION  3/30/2017    Procedure: Functional Flow Hernandez;  Surgeon: Tracey Vargas MD;  Location:  [facility];  Service:    • CARPAL TUNNEL RELEASE     • CERVICAL BIOPSY  [facility]    Dr. Jeffery.    • CERVICAL DISCECTOMY ANTERIOR  04/2013    C4-7   • COLONOSCOPY  04/20/2015    Diverticulosis, IH   • COLPOSCOPY W/ BIOPSY / CURETTAGE  06/17/2016    LGSIL HPV positive. Results were normal repeat pap in one year. Chronic Cervicitis   • CORONARY ANGIOPLASTY WITH STENT PLACEMENT     • ENDOSCOPY  [facility]    Normal.  Dr. Rodriguez   • ENDOSCOPY N/A 6/22/2017    Erythematous mucosa in the stomach  PATH: Chronic active gastritis, moderate with intestinal metaplasia    • GASTRIC BYPASS      Done using the sleeve technique   • HARDWARE REMOVAL  12/29/2016    cervical   • LAPAROSCOPIC GASTRIC  BANDING  2018   • SHOULDER SURGERY Left     RCR   • TONSILLECTOMY     • TONSILLECTOMY AND ADENOIDECTOMY     • TRANSVAGINAL TAPING SUSPENSION N/A 2017    Procedure: MID URETHRAL SLING CYSTSCOPY;  Surgeon: Abby Méndez MD;  Location: [facility];  Service:    • TUBAL ABDOMINAL LIGATION     • TYMPANOSTOMY TUBE PLACEMENT Right    • WRIST SURGERY Bilateral     carpal tunnel        Social History     Occupational History   • Occupation: disabled   Tobacco Use   • Smoking status: Former Smoker     Packs/day: 1.50     Years: 20.00     Pack years: 30.00     Types: Electronic Cigarette, Cigarettes     Last attempt to quit:      Years since quittin.5   • Smokeless tobacco: Never Used   • Tobacco comment: Electronic Cigarette/ 3 mg nicotine    Substance and Sexual Activity   • Alcohol use: Yes     Comment: 2 times yearly    • Drug use: No   • Sexual activity: Yes     Partners: Male     Birth control/protection: None, Condom      Social History     Social History Narrative   • Not on file        Family History   Problem Relation Age of Onset   • Diabetes type II Mother    • Hypertension Mother    • Osteoporosis Mother    • Seizures Mother    • COPD Father    • Hypertension Father    • Lung cancer Father    • Heart attack Father    • Liver cancer Father    • Thyroid disease Sister    • Hypertension Sister    • Bipolar disorder Sister    • Depression Sister    • ADD / ADHD Sister    • No Known Problems Son    • Abnormal EKG Daughter    • Hypertension Daughter    • Bipolar disorder Daughter    • Thyroid disease Daughter    • No Known Problems Paternal Grandfather    • No Known Problems Paternal Grandmother    • No Known Problems Maternal Grandmother    • No Known Problems Maternal Grandfather    • Thyroid disease Sister    • Hypertension Sister    • Bipolar disorder Sister    • Asthma Daughter    • Breast cancer Neg Hx    • Ovarian cancer Neg Hx    • Uterine cancer Neg Hx    • Colon cancer Neg Hx   "  • Malig Hyperthermia Neg Hx        ROS: 14 point review of systems was performed and was negative except for documented findings in HPI and today's encounter.     Allergies: No Known Allergies  Constitutional:  Denies fever, shaking or chills   Eyes:  Denies change in visual acuity   HENT:  Denies nasal congestion or sore throat   Respiratory:  Denies cough or shortness of breath   Cardiovascular:  Denies chest pain or severe LE edema   GI:  Denies abdominal pain, nausea, vomiting, bloody stools or diarrhea   Musculoskeletal:  Numbness, tingling, or loss of motor function only as noted above in history of present illness.  : Denies painful urination or hematuria  Integument:  Denies rash, lesion or ulceration   Neurologic:  Denies headache or focal weakness  Endocrine:  Denies lymphadenopathy  Psych:  Denies confusion or change in mental status   Hem:  Denies active bleeding    Physical Exam:  Wt Readings from Last 3 Encounters:   07/30/19 82.1 kg (181 lb)   07/18/19 83 kg (183 lb)   06/07/19 82.6 kg (182 lb)     Ht Readings from Last 3 Encounters:   07/30/19 160 cm (63\")   07/18/19 157.5 cm (62\")   06/07/19 157.5 cm (62\")     Body mass index is 32.06 kg/m².  Facility age limit for growth percentiles is 20 years.  Vitals:    07/30/19 1342   Temp: 98.2 °F (36.8 °C)     Vital Signs:  reviewed  Constitutional: Awake alert and oriented x3, well developed, no acute distress, non-toxic appearance.  EYES: symmetric, sclera clear  ENT:  Normocephalic, Atraumatic.   Respiratory:  No respiratory distress, No wheezing  CV: pulse regular, no palpitations or pallor.  GI:  Abdomen soft, non-tender.   Vascular:  Intact distal pulses, No cyanosis, no signs or symptoms of DVT.  Neurologic: Sensation grossly intact to the involved extremity, No focal deficits noted.   Neck: No tenderness, Supple.  Integument: warm, dry, no ulcerations.   Psychiatric:  Oriented, no pathological affect.  Musculoskeletal:    Affected " knee(s):  Painful gait with a subtle limp, positive for synovitis, swelling, joint effusion with crepitation.  Lachman negative  Posterior drawer negative  Hamida's negative  Patellofemoral grind +  Sensation grossly intact to light touch throughout the lower extremity  Skin is intact  Distal pulses are palpable  No signs or symptoms of DVT        Diagnostic Data:     Imaging was done today, images were personally viewed and discussed with the patient:    Indication: pain related symptoms,  Views: 3V AP, LAT & 40 degree PA bilateral knee(s)   Findings: advanced arthritis  Comparison views: viewed last xray done in the office.     Procedure:  Large Joint Arthrocentesis: Right Synvisc One  Date/Time: 7/30/2019 3:03 PM  Consent given by: patient  Site marked: site marked  Timeout: Immediately prior to procedure a time out was called to verify the correct patient, procedure, equipment, support staff and site/side marked as required   Supporting Documentation  Indications: pain and joint swelling   Procedure Details  Location: knee - R knee  Preparation: Patient was prepped and draped in the usual sterile fashion  Needle size: 25 G (21)  Approach: anterolateral  Medications administered: 48 mg hylan 48 MG/6ML; 4 mL lidocaine (cardiac)  Patient tolerance: patient tolerated the procedure well with no immediate complications    Large Arthrocentesis Left: Synvisc One   Date/Time: 7/30/2019 3:04 PM  Consent given by: patient  Site marked: site marked  Timeout: Immediately prior to procedure a time out was called to verify the correct patient, procedure, equipment, support staff and site/side marked as required   Supporting Documentation  Indications: pain and joint swelling   Procedure Details  Location: knee - L knee  Preparation: Patient was prepped and draped in the usual sterile fashion  Needle size: 25 G (21)  Approach: anterolateral  Medications administered: 48 mg hylan 48 MG/6ML; 4 mL lidocaine (cardiac)  Patient  tolerance: patient tolerated the procedure well with no immediate complications          Assessment:     ICD-10-CM ICD-9-CM   1. Chronic pain of both knees M25.561 719.46    M25.562 338.29    G89.29    2. Arthritis of both knees M17.0 716.96   3. Primary osteoarthritis of both knees M17.0 715.16           Plan: Is to proceed with injection  Follow up as indicated.  Ice, elevate, and rest as needed.  Additional interventions include:  15 min spent face to face with patient 11 min spent counseling about:  Biomechanics of pertinent body area discussed.  Risks, benefits, alternatives, comparisons, and complications of accepted medicines, injections, recommendations, surgical procedures, and therapies explained and education provided in laymen's terms. Natural history and expected course of this patient's diagnosis discussed along with evaluation of therapies. Questions answered. When appropriate I also discussed proper use of cane, walker, trekking poles.   BMI:  The concept of BMI body mass index and its importance and implications discussed.  BMI suggested to be < 40 or as low as possible. Lifestyle measures for weight loss and how this affects orthopedic condition.  EXERCISES:  Advice on benefits of, and types of regular/moderate exercise including biomechanical forces involved as it pertains to this complaint.  RICE: Rest, ice, compression, and elevation therapy, Cryotherapy/brachy therapy, and or OTC linaments as indicated with instructions.   Cortisone Injection. See procedure note.  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   7/30/2019   Ruba Duncan, APRN

## 2019-08-01 DIAGNOSIS — E03.9 HYPOTHYROIDISM (ACQUIRED): ICD-10-CM

## 2019-08-01 RX ORDER — LEVOTHYROXINE SODIUM 88 UG/1
TABLET ORAL
Qty: 90 TABLET | Refills: 0 | Status: SHIPPED | OUTPATIENT
Start: 2019-08-01 | End: 2019-09-23 | Stop reason: ALTCHOICE

## 2019-08-09 ENCOUNTER — APPOINTMENT (OUTPATIENT)
Dept: SLEEP MEDICINE | Facility: HOSPITAL | Age: 59
End: 2019-08-09

## 2019-08-14 ENCOUNTER — OFFICE VISIT (OUTPATIENT)
Dept: SLEEP MEDICINE | Facility: HOSPITAL | Age: 59
End: 2019-08-14

## 2019-08-14 VITALS
HEART RATE: 59 BPM | WEIGHT: 181 LBS | HEIGHT: 63 IN | SYSTOLIC BLOOD PRESSURE: 114 MMHG | OXYGEN SATURATION: 96 % | BODY MASS INDEX: 32.07 KG/M2 | DIASTOLIC BLOOD PRESSURE: 71 MMHG

## 2019-08-14 DIAGNOSIS — G47.33 OBSTRUCTIVE SLEEP APNEA: Primary | ICD-10-CM

## 2019-08-14 DIAGNOSIS — Z87.891 EX-SMOKER: ICD-10-CM

## 2019-08-14 DIAGNOSIS — G25.81 RESTLESS LEGS SYNDROME (RLS): ICD-10-CM

## 2019-08-14 DIAGNOSIS — E66.9 OBESITY (BMI 30-39.9): ICD-10-CM

## 2019-08-14 PROCEDURE — G0463 HOSPITAL OUTPT CLINIC VISIT: HCPCS

## 2019-08-14 NOTE — PROGRESS NOTES
[facility]  Telephone: [phone] / Fax: [phone] [place]  Telephone: [phone] / Fax: [phone] [place]    PCP: Epley, James, APRN    Reason for visit: ROCKY f/u    Keri Bhagat is a 59 y.o.female  was last seen at [facility] sleep lab 1 year ago. She is doing well on the CPAP machine. She fall asleep in a chair and does not wear her mask for a portion of the night, on some nights. She takes nap in the afternoon or right after dinner, which contributes to sleep onset/maintenance insomnia. Her anxiety has been worse and she is seeing psychiatrist.  She is on multiple anti-depressants as below. Her ESS is 16.    SH- E- cigarettes, no alcohol, 4 cups of coffee, 0 energy drinks.    ROS- +anxiety, +depression, rest is negative.      Current Medications:    Current Outpatient Medications:   •  atenolol (TENORMIN) 50 MG tablet, Take 1 tablet by mouth Daily., Disp: 90 tablet, Rfl: 3  •  buPROPion XL (WELLBUTRIN XL) 300 MG 24 hr tablet, Take 300 mg by mouth Every Morning., Disp: , Rfl:   •  Cholecalciferol (VITAMIN D3) 5000 UNITS capsule capsule, Take 5,000 Units by mouth Daily., Disp: , Rfl:   •  diazePAM (VALIUM) 5 MG tablet, Take 1 tablet by mouth 2 (Two) Times a Day As Needed for Anxiety., Disp: 2 tablet, Rfl: 0  •  Estradiol (IMVEXXY MAINTENANCE PACK) 10 MCG insert, Insert 1 capsule into the vagina 2 (Two) Times a Week., Disp: 24 each, Rfl: 3  •  gabapentin (NEURONTIN) 300 MG capsule, Take 1 capsule by mouth 2 (Two) Times a Day., Disp: 60 capsule, Rfl: 2  •  hydrOXYzine (ATARAX) 50 MG tablet, Take 50 mg by mouth 3 (Three) Times a Day As Needed for Itching., Disp: , Rfl:   •  isosorbide mononitrate (IMDUR) 30 MG 24 hr tablet, TAKE ONE TABLET BY MOUTH DAILY, Disp: 30 tablet, Rfl: 5  •  lamoTRIgine (LaMICtal) 150 MG tablet, Take 300 mg by mouth Daily., Disp: , Rfl:   •  levothyroxine (SYNTHROID, LEVOTHROID) 88 MCG tablet, TAKE ONE TABLET BY MOUTH EVERY MORNING, Disp: 90 tablet, Rfl:  0  •  lithium (ESKALITH) 450 MG CR tablet, Take 450 mg by mouth Daily., Disp: , Rfl:   •  loratadine (CLARITIN) 10 MG tablet, Take 1 tablet by mouth Daily. As needed for allergies (Patient taking differently: Take 10 mg by mouth Daily As Needed. As needed for allergies), Disp: 30 tablet, Rfl: 11  •  methylPREDNISolone (MEDROL, TINA,) 4 MG tablet, Take as directed on package instructions., Disp: 21 tablet, Rfl: 0  •  nitroglycerin (NITROSTAT) 0.4 MG SL tablet, Place 0.4 mg under the tongue Every 5 (Five) Minutes As Needed for Chest Pain (took at M.D  office at 0930 a.m.). Take no more than 3 doses in 15 minutes., Disp: , Rfl:   •  oxybutynin XL (DITROPAN XL) 15 MG 24 hr tablet, Take 15 mg by mouth Daily., Disp: , Rfl:   •  raNITIdine (ZANTAC) 150 MG tablet, Take 1 tablet by mouth 2 (Two) Times a Day., Disp: 60 tablet, Rfl: 11  •  simvastatin (ZOCOR) 20 MG tablet, Take 1 tablet by mouth Every Night., Disp: 30 tablet, Rfl: 6  •  venlafaxine XR (EFFEXOR-XR) 75 MG 24 hr capsule, , Disp: , Rfl:    also entered in Sleep Questionnaire    Patient  has a past medical history of Abnormal Pap smear of cervix, Anemia, Anxiety, Arthritis, Atherosclerotic heart disease of native coronary artery with other forms of angina pectoris (CMS/HCC), Bipolar I disorder, single manic episode (CMS/HCC), Cervical disc herniation, Cervical dysplasia, Coronary artery disease, CTS (carpal tunnel syndrome), Depression, Difficulty swallowing, Diverticular disease, Dyspepsia, Dysphagia, Gastric reflux, Heart attack (CMS/HCC), Heart murmur, Hiatal hernia, High cholesterol, History of transfusion, HPV (human papilloma virus) infection (04/29/2016), Hyperlipidemia, Hypertension, Hypothyroidism, Incontinence in female, Kidney disease (2017), Kidney disease, chronic, stage III (GFR 30-59 ml/min) (CMS/HCC), LGSIL on Pap smear of cervix (04/29/2016), Myocardial infarction (CMS/HCC) (2000), Neck pain, Obesity, Past myocardial infarction (05/2000), Periodic  "limb movement disorder, Restless leg syndrome, Sleep apnea, Stress fracture, Suicidal ideation (08/19/2016), Swelling of ankle, and Thyroid nodule.    I have reviewed the Past Medical History, Past Surgical History, Social History and Family History.            ESS  16   Vital Signs /71   Pulse 59   Ht 160 cm (63\")   Wt 82.1 kg (181 lb)   LMP 10/21/2016 (Approximate) Comment: 54  SpO2 96%   BMI 32.06 kg/m²  Body mass index is 32.06 kg/m².    General Alert and oriented. No acute distress noted   Pharynx/Throat Class IV Mallampati airway, large tongue, no evidence of redundant lateral pharyngeal tissue. No oral lesions. No thrush. Moist mucous membranes.   Head Normocephalic. Symmetrical. Atraumatic.    Nose No septal deviation. No drainage   Chest Wall Normal shape. Symmetric expansion with respiration. No tenderness.   Neck Trachea midline, no thyromegaly or adenopathy    Lungs Clear to auscultation bilaterally. No wheezes. No rhonchi. No rales. Respirations regular, even and unlabored.   Heart Regular rhythm and normal rate. Normal S1 and S2. No murmur   Abdomen Soft, non-tender and non-distended. Normal bowel sounds. No masses.   Extremities Moves all extremities well. No edema   Psychiatric Normal mood and affect.     Testing:  Download- did not bring     Study:  HST 9/12/16- AHI index is 21 indicating moderate obstructive sleep apnea.  It is slightly worse in supine position with index 28. Saturation below 89% for 4 minutes and snoring for 2% of total sleep time.    PSG 4/3/17  Overnight split polysomnogram study.  Diagnostic study from 9:21 AM to 11:33 AM.  Sleep efficiency 94% with 2 hours total sleep time.  Proportion of REM sleep was reduced to 5%.  Apnea hypopneas index 12 events an hour with RDI of 24.  Oxygen saturation remained above 89%.  Patient had 19% time snoring.  Following the above titration study from 11:33 AM with the 3 PM.  Sleep efficiency 95% with 3 hours total sleep time. "  Rebound in rem sleep to 35%.  Oxygen saturation remained above 89%.  Patient was increased to 11 cm water pressure.  Optimal sleep was achieved at a CPAP of 8 cm water pressure.  With higher pressures air leaks and central events are noted.      Impression:  1. Obstructive sleep apnea    2. Restless legs syndrome (RLS)    3. Obesity (BMI 30-39.9)    4. Ex-smoker -now smokes [employer]         Plan:  Bring smart card for review this week so that I evaluate her leak and AHI. I asked her to continue the mirapex for RLS-very symptomatic without it. After I look at her card, we will call her and set up a f/u appt based on readings. Smoking cessation advised. I asked her to speak with PCP about ordering a low dose screening CT for lung cancer-as she has significant smoking history.    Patient uses the CPAP device and benefits from its use in terms of reduction of hypersomnia and snoring.  AHI appears to be within adequate range. I reviewed original sleep study report with the patient.     Weight loss will be strongly beneficial to reduce the severity of sleep-disordered breathing.  Caution during activities that require prolonged concentration is strongly advised if sleepiness returns. Changing of PAP supplies regularly is important for effective use. Patient needs to change cushion on the mask or plugs on nasal pillows along with disposable filters once every month and change mask frame, tubing, headgear and Velcro straps every 6 months at the minimum.           Thank you for allowing me to participate in your patient's care.      DAPHNEY Pedraza  [facility]  Phone: [phone]      Part of this note may be an electronic transcription/translation of spoken language to printed text using the Dragon Dictation System.

## 2019-08-15 ENCOUNTER — TELEPHONE (OUTPATIENT)
Dept: FAMILY MEDICINE CLINIC | Facility: CLINIC | Age: 59
End: 2019-08-15

## 2019-08-15 NOTE — TELEPHONE ENCOUNTER
Please call patient she did not make her appointment today without alcohol    In the chart it shows 5 no-shows  Over the last 12 months    A total of 17 at [facility]    I am getting pressure from the administration to communicate to patients  to make sure they keep their appointments, we must have a 24 noticed that she needs to cancel please    I know she has a lot of pressure in her with her health issues just make sure she communicates with us and keep these on her calendar [facility] has a policy to discharge patients after 3    Thanks

## 2019-08-16 ENCOUNTER — TELEPHONE (OUTPATIENT)
Dept: SLEEP MEDICINE | Facility: HOSPITAL | Age: 59
End: 2019-08-16

## 2019-08-16 NOTE — TELEPHONE ENCOUNTER
Tech called pt in regards to DAPHNEY Azar note. Requested to review download Per [employer] review usage min 4 hrs and to replace mask due to high leaks noted. Also informed pt to stop by lab to do a pressure change due to elevated ahi. Per APRN requested to change patient from set pressure of 10cm to auto setting of 5-20cm due to high ahi. Patient to bring in SD card to complete pap pressure change on CO. MAB

## 2019-08-19 ENCOUNTER — TELEPHONE (OUTPATIENT)
Dept: NEUROSURGERY | Facility: CLINIC | Age: 59
End: 2019-08-19

## 2019-08-19 RX ORDER — DIAZEPAM 10 MG/1
10 TABLET ORAL ONCE
Qty: 1 TABLET | Refills: 0 | Status: SHIPPED | OUTPATIENT
Start: 2019-08-19 | End: 2019-08-19

## 2019-08-19 NOTE — TELEPHONE ENCOUNTER
----- Message from Dona Hidalgo sent at 8/19/2019 10:29 AM EDT -----  Patient calling today asking about getting something to help calm her down to get MRI done. She is scheduled for tom. She has is not sure what they gave her in the past.       Juwan Carra[facility]      Pt [phone]

## 2019-08-20 ENCOUNTER — HOSPITAL ENCOUNTER (OUTPATIENT)
Dept: MRI IMAGING | Facility: HOSPITAL | Age: 59
Discharge: HOME | End: 2019-08-20
Admitting: NEUROLOGICAL SURGERY

## 2019-08-20 DIAGNOSIS — R90.89 ABNORMAL BRAIN MRI: ICD-10-CM

## 2019-08-20 LAB — CREAT BLDA-MCNC: 1.1 MG/DL (ref 0.6–1.3)

## 2019-08-20 PROCEDURE — 70553 MRI BRAIN STEM W/O & W/DYE: CPT

## 2019-08-20 PROCEDURE — 0 GADOBENATE DIMEGLUMINE 529 MG/ML SOLUTION: Performed by: NEUROLOGICAL SURGERY

## 2019-08-20 PROCEDURE — 82565 ASSAY OF CREATININE: CPT

## 2019-08-20 PROCEDURE — A9577 INJ MULTIHANCE: HCPCS | Performed by: NEUROLOGICAL SURGERY

## 2019-08-20 RX ADMIN — GADOBENATE DIMEGLUMINE 17 ML: 529 INJECTION, SOLUTION INTRAVENOUS at 16:50

## 2019-08-27 RX ORDER — DIAZEPAM 10 MG/1
10 TABLET ORAL NIGHTLY PRN
COMMUNITY
Start: 2019-08-19 | End: 2019-09-23

## 2019-08-27 RX ORDER — PRAMIPEXOLE DIHYDROCHLORIDE 0.5 MG/1
TABLET ORAL
COMMUNITY
Start: 2019-08-14 | End: 2019-08-30

## 2019-08-27 NOTE — PROGRESS NOTES
Subjective   Keri Bhagat is a 59 y.o. female.     F/u for hypothyroidism, hyperlipidemia, sleep apnea           Patient is a 59-year-old female came in for follow-up.     She has hypothyroidism and has been on levothyroxine 88 µg per day. Thyroid ultrasound done in 10/17 showed stable 0.5 cm solid nodule in the right.  She denies dysphagia or pressure symptoms      She has no previous history of thyroid disease. She has no history of head or neck radiation therapy.       She had an upper endoscopy with dilatation in June 2016 done by Dr. Rodriguez.  She has gastritis and hiatal hernia and was initially on Protonix and Carafate.  She was switched from Protonix to ranitidine 150 mg twice a day by the nephrologist.  She denies heartburn, melena or hematochezia.     She had a LAP-BAND and hiatal hernia repair done by Dr. Sims February 2018.  She has lost 10 pounds since October 2018     She has history of hypertension, and coronary artery disease. She had a previous heart attack in 2000 and had angioplasty was 1 stent. She had a cardiac catheterization done in 2015 which showed a patent stent to the LAD. She had normal nuclear stress test in 1/18 when she was admitted after a syncopal episode.  She denies chest pain or SOB. She is on atenolol and Imdur and follows with Dr. Burroughs.        She has hyperlipidemia and has been on Zocor 20 mg once a day. She denies any myalgia.     She has no previous history of diabetes mellitus.  Hemoglobin A1c done in March 2017 was normal at 5.3%.  Her mother and son have diabetes mellitus.  Her last meal was 11 AM      She was diagnosed to have sleep apnea and is sleeping well with a CPAP.       She has chronic knee and neck pain and sees Dr. Raymond and Dr. Pritchard.  She gets steroid injection on both knees every 6 months alternating with Synvisc.  Last steroid injection on her knees was in July 2019.     She has osteopenia on bone density done in November 2018.  She had her natural  menopause at age 54 and was never on hormone replacement therapy.  She is on vitamin D 5000 units/day.  She does not exercise.  She was a smoker and switched to electronic cigarettes in 2016.  She has no parental history of hip fractures.  She denies alcohol abuse.     She smoked cigarettes for more than 40 years and switched to electronic cigarettes in 2017.  She has used Wellbutrin in the past.    She was seen by her PCP in February 2019 because of poor memory and poor balance.  MRI of the brain done in January 2019 showed a 8 mm lesion on falx cerebri most likely a small meningioma.  Incidental to the study, there is a suggestion of a hypoenhancing focus measuring 4 mm in diameter on the right aspect of the anterior pituitary.  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  There is an incidental arachnoid cyst measuring up to 1.9 cm anterior to the right cerebellar hemisphere.  She was seen by Dr. Obrien' initially and will see his replacement and neurologist Dr. Cook    She denies change in shoes or ring size.  She denies easy bruising.  She has difficulty getting up from a deep chair because of arthritis in her knees.  She denies breast discharge or headaches.  She has nocturia twice a night but is not drinking water through the night.    Her last eye exam was 2 years ago      The following portions of the patient's history were reviewed and updated as appropriate: allergies, current medications, past family history, past medical history, past social history, past surgical history and problem list.    Review of Systems   Constitutional: Negative.    HENT: Negative.    Eyes: Negative.    Respiratory: Negative.    Cardiovascular: Negative.    Gastrointestinal: Negative.    Endocrine: Negative.    Genitourinary: Negative.    Musculoskeletal:  "Negative.    Skin: Negative.    Allergic/Immunologic: Negative.    Neurological: Positive for dizziness.   Hematological: Bruises/bleeds easily.   Psychiatric/Behavioral: Positive for sleep disturbance (sleep apnea using CPAP machine ). The patient is nervous/anxious.        Objective      .  Vitals:    08/28/19 1407   BP: 98/78   BP Location: Right arm   Patient Position: Sitting   Cuff Size: Large Adult   Pulse: 60   SpO2: 99%   Weight: 83.3 kg (183 lb 9.6 oz)   Height: 157.5 cm (62.01\")     Physical Exam   Constitutional: She is oriented to person, place, and time. She appears well-developed and well-nourished. No distress.   HENT:   Head: Normocephalic.   Eyes: Conjunctivae and EOM are normal. Right eye exhibits no discharge. Left eye exhibits no discharge. No scleral icterus.   Neck: Neck supple. No JVD present. No tracheal deviation present. No thyromegaly present.   Cardiovascular: Normal rate, regular rhythm, normal heart sounds and intact distal pulses. Exam reveals no gallop and no friction rub.   No murmur heard.  Pulmonary/Chest: Effort normal and breath sounds normal. No stridor. No respiratory distress. She has no wheezes. She has no rales. She exhibits no tenderness.   Abdominal: Soft. Bowel sounds are normal. She exhibits no distension and no mass. There is no tenderness. There is no guarding.   No prominent purplish striae   Musculoskeletal: Normal range of motion. She exhibits no edema or deformity.   Lymphadenopathy:     She has no cervical adenopathy.   Neurological: She is alert and oriented to person, place, and time. She displays normal reflexes.   Skin: Skin is warm and dry.   Psychiatric: She has a normal mood and affect. Her behavior is normal.     Hospital Outpatient Visit on 08/20/2019   Component Date Value Ref Range Status   • Creatinine 08/20/2019 1.10  0.60 - 1.30 mg/dL Final    Serial Number: [id number]:  [id number]     Assessment/Plan   Keri was seen today for hypothyroidism and " hyperlipidemia.    Diagnoses and all orders for this visit:    Hypothyroidism (acquired)  -     Comprehensive Metabolic Panel  -     TSH  -     T4, Free    Thyroid nodule  -     Comprehensive Metabolic Panel  -     TSH  -     T4, Free    Pituitary cyst (CMS/HCC)  -     Comprehensive Metabolic Panel  -     TSH  -     T4, Free  -     Luteinizing Hormone  -     Prolactin  -     Insulin-like Growth Factor  -     Follicle Stimulating Hormone    Meningioma (CMS/HCC)    Hyperlipidemia, unspecified hyperlipidemia type  -     Comprehensive Metabolic Panel  -     Lipid Panel  -     TSH  -     T4, Free    ROCKY (obstructive sleep apnea)  -     TSH  -     T4, Free    Vitamin D deficiency  -     Comprehensive Metabolic Panel  -     Vitamin D 25 Hydroxy    Osteopenia of multiple sites  -     Comprehensive Metabolic Panel  -     Vitamin D 25 Hydroxy    Family history of diabetes mellitus (DM)  -     Comprehensive Metabolic Panel      Patient has primary hypothyroidism.   Continue levothyroxine 88 mcg/day.  Check thyroid function tests.    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  Will obtain static pituitary hormone tests.  She has possible meningioma on falx cerebri and arachnoid cyst on the right cerebellar area and will be seeing a neurologist and neurosurgeon.    Continue Zocor 20 mg/day.    She has osteopenia on bone density.  Her calculated FRAX score for major osteoporotic fracture is 25% and hip fracture is 3.0%.  She is not a candidate for oral biphosphonate due to symptomatic GERD.  Her options would be Reclast or Prolia.  Check vitamin D levels.    Will defer blood pressure control to Dr. Burroughs and James Epley, NP    Copy of my note sent to James Epley, NP, Dr. Burroughs, Dr. Cook, and Lorena Crawley NP     RTC 4 mos.

## 2019-08-28 ENCOUNTER — OFFICE VISIT (OUTPATIENT)
Dept: ENDOCRINOLOGY | Age: 59
End: 2019-08-28

## 2019-08-28 VITALS
HEART RATE: 60 BPM | WEIGHT: 183.6 LBS | DIASTOLIC BLOOD PRESSURE: 78 MMHG | HEIGHT: 62 IN | OXYGEN SATURATION: 99 % | SYSTOLIC BLOOD PRESSURE: 98 MMHG | BODY MASS INDEX: 33.79 KG/M2

## 2019-08-28 DIAGNOSIS — G47.33 OSA (OBSTRUCTIVE SLEEP APNEA): ICD-10-CM

## 2019-08-28 DIAGNOSIS — E55.9 VITAMIN D DEFICIENCY: ICD-10-CM

## 2019-08-28 DIAGNOSIS — E03.9 HYPOTHYROIDISM (ACQUIRED): Primary | ICD-10-CM

## 2019-08-28 DIAGNOSIS — M85.89 OSTEOPENIA OF MULTIPLE SITES: ICD-10-CM

## 2019-08-28 DIAGNOSIS — Z83.3 FAMILY HISTORY OF DIABETES MELLITUS (DM): ICD-10-CM

## 2019-08-28 DIAGNOSIS — E04.1 THYROID NODULE: ICD-10-CM

## 2019-08-28 DIAGNOSIS — E23.6 PITUITARY CYST (HCC): ICD-10-CM

## 2019-08-28 DIAGNOSIS — D32.9 MENINGIOMA (HCC): ICD-10-CM

## 2019-08-28 DIAGNOSIS — E78.5 HYPERLIPIDEMIA, UNSPECIFIED HYPERLIPIDEMIA TYPE: ICD-10-CM

## 2019-08-28 PROCEDURE — 99214 OFFICE O/P EST MOD 30 MIN: CPT | Performed by: INTERNAL MEDICINE

## 2019-08-28 RX ORDER — ARIPIPRAZOLE 5 MG/1
TABLET ORAL
COMMUNITY
Start: 2019-08-22 | End: 2019-09-19 | Stop reason: DRUGHIGH

## 2019-08-30 LAB
25(OH)D3+25(OH)D2 SERPL-MCNC: 77.6 NG/ML (ref 30–100)
ALBUMIN SERPL-MCNC: 4.4 G/DL (ref 3.5–5.2)
ALBUMIN/GLOB SERPL: 2 G/DL
ALP SERPL-CCNC: 69 U/L (ref 39–117)
ALT SERPL-CCNC: 15 U/L (ref 1–33)
AST SERPL-CCNC: 17 U/L (ref 1–32)
BILIRUB SERPL-MCNC: <0.2 MG/DL (ref 0.2–1.2)
BUN SERPL-MCNC: 11 MG/DL (ref 6–20)
BUN/CREAT SERPL: 10.7 (ref 7–25)
CALCIUM SERPL-MCNC: 9.5 MG/DL (ref 8.6–10.5)
CHLORIDE SERPL-SCNC: 104 MMOL/L (ref 98–107)
CHOLEST SERPL-MCNC: 171 MG/DL (ref 0–200)
CO2 SERPL-SCNC: 29.5 MMOL/L (ref 22–29)
CREAT SERPL-MCNC: 1.03 MG/DL (ref 0.57–1)
FSH SERPL-ACNC: 94.5 MIU/ML
GLOBULIN SER CALC-MCNC: 2.2 GM/DL
GLUCOSE SERPL-MCNC: 92 MG/DL (ref 65–99)
HDLC SERPL-MCNC: 65 MG/DL (ref 40–60)
IGF-I SERPL-MCNC: 121 NG/ML (ref 46–172)
INTERPRETATION: NORMAL
LDLC SERPL CALC-MCNC: 77 MG/DL (ref 0–100)
LH SERPL-ACNC: 53.5 MIU/ML
POTASSIUM SERPL-SCNC: 4.3 MMOL/L (ref 3.5–5.2)
PROLACTIN SERPL-MCNC: 10.8 NG/ML (ref 4.8–23.3)
PROT SERPL-MCNC: 6.6 G/DL (ref 6–8.5)
SODIUM SERPL-SCNC: 144 MMOL/L (ref 136–145)
T4 FREE SERPL-MCNC: 1.33 NG/DL (ref 0.93–1.7)
TRIGL SERPL-MCNC: 143 MG/DL (ref 0–150)
TSH SERPL DL<=0.005 MIU/L-ACNC: 0.28 UIU/ML (ref 0.27–4.2)
VLDLC SERPL CALC-MCNC: 28.6 MG/DL

## 2019-09-09 ENCOUNTER — TELEPHONE (OUTPATIENT)
Dept: SLEEP MEDICINE | Facility: HOSPITAL | Age: 59
End: 2019-09-09

## 2019-09-09 DIAGNOSIS — M54.12 CERVICAL RADICULOPATHY: ICD-10-CM

## 2019-09-09 RX ORDER — GABAPENTIN 300 MG/1
300 CAPSULE ORAL 2 TIMES DAILY
Qty: 60 CAPSULE | Refills: 2 | Status: SHIPPED | OUTPATIENT
Start: 2019-09-09 | End: 2019-10-16

## 2019-09-09 NOTE — TELEPHONE ENCOUNTER
Pt came in to get pressures changed. Care  was down and pt waited a little bit until it came back up. Pressures were changed to autopap of [id number] per Ama Z note.

## 2019-09-13 ENCOUNTER — APPOINTMENT (OUTPATIENT)
Dept: LAB | Facility: HOSPITAL | Age: 59
End: 2019-09-13

## 2019-09-13 ENCOUNTER — OFFICE VISIT (OUTPATIENT)
Dept: NEUROLOGY | Facility: CLINIC | Age: 59
End: 2019-09-13

## 2019-09-13 VITALS
HEIGHT: 62 IN | HEART RATE: 62 BPM | BODY MASS INDEX: 33.49 KG/M2 | OXYGEN SATURATION: 98 % | SYSTOLIC BLOOD PRESSURE: 104 MMHG | WEIGHT: 182 LBS | DIASTOLIC BLOOD PRESSURE: 80 MMHG

## 2019-09-13 DIAGNOSIS — R47.89 WORD FINDING DIFFICULTY: ICD-10-CM

## 2019-09-13 DIAGNOSIS — R41.3 MEMORY LOSS: Primary | ICD-10-CM

## 2019-09-13 LAB — VIT B12 BLD-MCNC: 191 PG/ML (ref 211–946)

## 2019-09-13 PROCEDURE — 99214 OFFICE O/P EST MOD 30 MIN: CPT | Performed by: PSYCHIATRY & NEUROLOGY

## 2019-09-13 PROCEDURE — 82607 VITAMIN B-12: CPT | Performed by: PSYCHIATRY & NEUROLOGY

## 2019-09-13 PROCEDURE — 36415 COLL VENOUS BLD VENIPUNCTURE: CPT | Performed by: PSYCHIATRY & NEUROLOGY

## 2019-09-13 RX ORDER — OXYBUTYNIN CHLORIDE 15 MG/1
15 TABLET, EXTENDED RELEASE ORAL DAILY
COMMUNITY
Start: 2019-09-11

## 2019-09-13 RX ORDER — PRAMIPEXOLE DIHYDROCHLORIDE 0.5 MG/1
0.5 TABLET ORAL DAILY
COMMUNITY
Start: 2019-09-11 | End: 2021-07-12 | Stop reason: SDUPTHER

## 2019-09-13 NOTE — PATIENT INSTRUCTIONS
At night, only use the bed and bedroom for sleep and sex only. Do not read, watch TV, eat, or worry in bed.   Lie down only when you are sleepy.   If you are unable to fall asleep, get up and go to another room. Avoid stimulating activities if you do get up.   No clocks (if you tend to look at the clock throughout the night) or TV (or other electronic screens) in the bedroom.   Avoid screens (TV, computer, tablet, cell phone) the hour prior to bedtime and during your sleep period.   Establish a regular bedtime routine and a regular sleep/wake schedule. Keep this schedule 7 days a week.   Do not eat or drink close to bedtime.   Make sure your bedroom is dark, cool, and comfortable.   If you need background noise, use a fan or a white noise machine.   Avoid caffeine (regular coffee, decaf coffee, tea, sodas, chocolate, energy drinks).   Avoid alcohol and nicotine close to bedtime.   Exercise during the day, but not within 3 hours of bedtime.   Avoid naps.   Check if any of your night time medications may cause sleep problems.   If you have heart burn or indigestion at night: avoid eating close to bedtime; elevated your head of bed; avoid spicy foods, tomatoes, citrus, alcohol, caffeine, and mint at night; take your antacid at night; sleep on your left side.   If you have nasal stuffiness or congestion: consider taking an over the counter allergy medicine such as zyrtec, claritin, or allegra at night as well as a nasal spray such as Flonase or Nasacort.   If you gasp for air at night, stop breathing in your sleep, have night sweats, snore, unrefreshing sleep, feel tired during the day, have morning headaches or dry mouth in the morning, you may be at risk for having problems with your breathing at night, likely sleep apnea. You may want to talk to your doctor about these symptoms.   Consider trying melatonin at night. This can be purchased over the counter without a prescription.  I recommend doses between 0.5-3 mg.   Try [facility], [employer], Life [employer]PS (on [employer]) or REM Fresh brands.

## 2019-09-13 NOTE — PROGRESS NOTES
Subjective:     Patient ID: Keri Bhagat is a 59 y.o. female.    Ms. Bhagat is a 59-year-old female with a history of anxiety, MI, carpal tunnel syndrome, coronary disease, depression, obstructive sleep apnea, restless legs, hypertension, vitamin D deficiency, bipolar disorder, chronic kidney disease, hyperlipidemia, GERD, and hypothyroidism who presents today as an established patient for follow-up for memory loss.  The patient was initially seen as a new patient in March 22, 2019.  Please see that note for specific details.  She reported that she has had progressive memory problems for approximately 10 years and felt like they had worsened over time.  She has to write things down or she will forget she also has a hard time remembering how to get places.  She lives with her mother.  She also reported some tremor.  She had MRI of her brain done in January that showed some scattered ischemic white matter changes.  At her last visit I felt like she might have some mild cognitive impairment.  I thought the etiology was multifactorial from a mood disorder, medications, or medical conditions.  I checked a B12 level however she did not have that blood work done.  I also scheduled her for neuropsychological testing however she has not had that done either yet.  She continues on the Valium and the oxybutynin with no changes.  The patient reports today that her memory is getting worse.  Her speech is also getting worse.  She gets tongue-tied.  She denies any trouble swallowing.  She did have lap band surgery done last year and takes a multivitamin.  I reviewed the patient's records.  I reviewed the patient's last visit with her primary care physician on July 18, 2019.  The patient was there for some leg pain.  She was also experiencing some low back pain that radiated into her hips and pelvis.  The patient also saw her pulmonologist on August 14, 2019.  It was a follow-up for her CPAP.  The patient saw Dr. Leonardo's on  August 20, 2019.  Looks like that was an order for an MRI.  I reviewed the patient's labs.  TSH on August 28 was normal.  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  Again noted is a 2 to 3 mm hypoenhancing focus within the expected level of the interface of the anterior and posterior pituitary that presumably represents a cyst.  This is unchanged.      The following portions of the patient's history were reviewed and updated as appropriate: allergies, current medications, past family history, past medical history, past social history, past surgical history and problem list.    Review of Systems   Musculoskeletal: Positive for gait problem.   Neurological: Positive for tremors (in hands possibly ). Negative for dizziness, seizures, syncope, facial asymmetry, speech difficulty, weakness, light-headedness, numbness and headaches.   Hematological: Does not bruise/bleed easily.   Psychiatric/Behavioral: Positive for confusion. Negative for agitation, behavioral problems, decreased concentration, dysphoric mood, hallucinations, self-injury, sleep disturbance and suicidal ideas. The patient is not nervous/anxious and is not hyperactive.    All other systems reviewed and are negative.   I reviewed the ROS documented by the MA.  All other systems negative.      Objective:    Neurologic Exam    Physical Exam    Assessment/Plan:  Ms. Bhagat is a 59-year-old female with a history of anxiety, MI, carpal tunnel syndrome, coronary disease, depression, obstructive sleep apnea, restless leg syndrome, hypertension, vitamin D deficiency, bipolar disorder, chronic kidney disease, hyperlipidemia, GERD, and hypothyroidism who presents neurology clinic today for follow-up.    1.   Memory loss-aA lot of her symptoms could be related to B12 deficiency.  She is at high risk given her lap band surgery.  I like for her to get that blood checked today.  I also will contact her prescribing providers for the Valium as well as the oxybutynin to see if alternatives could be used that would have less effect on her memory.  I also strongly encouraged her to follow-up with neuropsychological testing.    2.  Word finding difficulties-The etiology of her symptoms are unclear.  I recommend for her to try speech therapy.    3.  Insomnia- Tips given. Would avoid valium.     Problems Addressed this Visit     None      Visit Diagnoses     Memory loss    -  Primary    Relevant Orders    Vitamin B12    Ambulatory Referral to Speech Therapy    Word finding difficulty        Relevant Orders    Ambulatory Referral to Speech Therapy

## 2019-09-16 ENCOUNTER — TELEPHONE (OUTPATIENT)
Dept: NEUROLOGY | Facility: CLINIC | Age: 59
End: 2019-09-16

## 2019-09-16 ENCOUNTER — TELEPHONE (OUTPATIENT)
Dept: OBSTETRICS AND GYNECOLOGY | Age: 59
End: 2019-09-16

## 2019-09-16 NOTE — TELEPHONE ENCOUNTER
----- Message from Uyen Cook MD sent at 9/16/2019  1:05 PM EDT -----  Regarding: RE: oxybutinin  Sorry, she told me it was you.      ----- Message -----  From: Nelida Jeffery MD  Sent: 9/16/2019  12:56 PM  To: Uyen Cook MD  Subject: RE: oxybutinin                                   Hi - there is myrbetriq but it is often too expensive for patients. Do you know who prescribes the oxybutynin for her?   Thanks.   Nelida Jeffery      ----- Message -----  From: Uyen Cook MD  Sent: 9/13/2019   2:15 PM  To: Nelida Jeffery MD  Subject: oxybutinin                                       Dr. Jeffery, I just saw Ms. Bhagat in follow-up today for her memory loss.  At her initial consultation I was concerned about the use of oxybutynin  since it is highly anticholinergic and can cause memory problems.  Is there an alternative that she could use that would have less anticholinergic side effects?  Sincerely, Uyen Cook

## 2019-09-16 NOTE — TELEPHONE ENCOUNTER
----- Message from Uyen Cook MD sent at 9/13/2019  3:47 PM EDT -----  Ms. Bhagat, Your vitamin B12 level has come back and it is very low.  I recommend contacting your primary care office to start receiving injections.  The injections can then get your B12 level up to the normal range and then you will probably need to start on oral supplementation afterwards.  Hopefully by taking B12 your symptoms will improve.  Please contact our office with any further questions or concerns.    Sincerely,  Uyen Cook MD  [facility], operated by [facility]  [phone]

## 2019-09-16 NOTE — TELEPHONE ENCOUNTER
Stephanie- can you please call patient and patient pharmacy and see who prescribes her the  Oxybuytinin? I do not see that I ever have - do you?

## 2019-09-19 ENCOUNTER — OFFICE VISIT (OUTPATIENT)
Dept: FAMILY MEDICINE CLINIC | Facility: CLINIC | Age: 59
End: 2019-09-19

## 2019-09-19 VITALS
HEIGHT: 62 IN | SYSTOLIC BLOOD PRESSURE: 100 MMHG | HEART RATE: 59 BPM | BODY MASS INDEX: 34.23 KG/M2 | WEIGHT: 186 LBS | OXYGEN SATURATION: 96 % | DIASTOLIC BLOOD PRESSURE: 72 MMHG

## 2019-09-19 DIAGNOSIS — E53.8 B12 DEFICIENCY: Primary | ICD-10-CM

## 2019-09-19 PROCEDURE — 99213 OFFICE O/P EST LOW 20 MIN: CPT | Performed by: NURSE PRACTITIONER

## 2019-09-19 RX ORDER — BUPROPION HYDROCHLORIDE 150 MG/1
150 TABLET, EXTENDED RELEASE ORAL DAILY
COMMUNITY
End: 2020-07-27 | Stop reason: SDUPTHER

## 2019-09-19 RX ORDER — LEVOTHYROXINE SODIUM 0.1 MG/1
100 TABLET ORAL DAILY
COMMUNITY
End: 2020-05-06

## 2019-09-19 RX ORDER — ARIPIPRAZOLE 10 MG/1
10 TABLET ORAL DAILY
COMMUNITY
End: 2020-09-30 | Stop reason: DRUGHIGH

## 2019-09-19 NOTE — PATIENT INSTRUCTIONS
B12 1000 mcg daily x3 to 5 days then increase to twice daily as tolerated  Outpatient B12 level 6 weeks    Follow-up neurology routine care  Sooner for problems thank You,      James Epley, NP      Encourage you to get back into healthy eating start reading more labels  Measuring food  Calories 1200-to 1400 alex a day  Watch for sodas caloric beverages  Hidden sugars  Follow-up nutritionist as well as her bariatric surgeon  Vitamin program to decrease deficiencies as we discussed regular use of multivitamin and B12

## 2019-09-19 NOTE — PROGRESS NOTES
Subjective   Keri Bhagat is a 59 y.o. female.     Pleasant patient here today B12 deficiency 190s  Recently saw neurology regarding some mild memory issues  She has no peripheral neuropathy  No anemia related to macrocytosis  She did have bariatric surgery LAP-BAND 2018 takes vitamins multi but forgets sometimes  Not taking B12 supplementation  I do not think her insurance will cover injections here we discussed regarding outpatient injections I can train her  Or simply starting with oral and see where this takes us with her levels she prefers the latter           The following portions of the patient's history were reviewed and updated as appropriate: allergies, current medications, past family history, past medical history, past social history, past surgical history and problem list.    Review of Systems   Constitutional: Negative for chills, fatigue, fever and unexpected weight loss.   HENT: Negative.  Negative for trouble swallowing.    Eyes: Negative.    Respiratory: Negative for cough and shortness of breath.    Cardiovascular: Negative for chest pain, palpitations and leg swelling.   Gastrointestinal: Negative for abdominal pain and blood in stool.   Genitourinary: Negative.  Negative for pelvic pain.   Musculoskeletal: Negative.    Skin: Negative.    Neurological: Positive for memory problem. Negative for dizziness, tremors, seizures, syncope, facial asymmetry, speech difficulty, weakness, light-headedness, numbness, headache and confusion.   Psychiatric/Behavioral: Negative.        Objective   Physical Exam   Constitutional: She is oriented to person, place, and time. She appears well-developed and well-nourished. No distress.   HENT:   Head: Normocephalic.   Eyes: Conjunctivae are normal. Pupils are equal, round, and reactive to light.   Neck: Neck supple.   Cardiovascular: Exam reveals no friction rub.   No murmur heard.  Pulmonary/Chest: Effort normal. No respiratory distress. She has no wheezes.    Musculoskeletal: She exhibits no edema.   Neurological: She is alert and oriented to person, place, and time.   Skin: Skin is warm and dry.   Psychiatric: She has a normal mood and affect. Her behavior is normal. Judgment and thought content normal.   Vitals reviewed.        Assessment/Plan   Keri was seen today for low b 12.    Diagnoses and all orders for this visit:    B12 deficiency  -     Vitamin B12; Future    Other orders  -     cyanocobalamin ([facility] VITAMIN B-12) 1000 MCG tablet; Take 1 tablet by mouth 2 (Two) Times a Day.      B12 deficiency    Keep appointment with neurosurgery as we discussed follow-up neurology              Patient Instructions   B12 1000 mcg daily x3 to 5 days then increase to twice daily as tolerated  Outpatient B12 level 6 weeks    Follow-up neurology routine care  Sooner for problems thank You,      James Epley, NP      Encourage you to get back into healthy eating start reading more labels  Measuring food  Calories 1200-to 1400 alex a day  Watch for sodas caloric beverages  Hidden sugars  Follow-up nutritionist as well as her bariatric surgeon  Vitamin program to decrease deficiencies as we discussed regular use of multivitamin and B12

## 2019-09-23 ENCOUNTER — OFFICE VISIT (OUTPATIENT)
Dept: NEUROSURGERY | Facility: CLINIC | Age: 59
End: 2019-09-23

## 2019-09-23 VITALS
HEIGHT: 62 IN | BODY MASS INDEX: 33.68 KG/M2 | WEIGHT: 183 LBS | RESPIRATION RATE: 16 BRPM | SYSTOLIC BLOOD PRESSURE: 110 MMHG | DIASTOLIC BLOOD PRESSURE: 72 MMHG | HEART RATE: 64 BPM

## 2019-09-23 DIAGNOSIS — R26.9 GAIT DISTURBANCE: ICD-10-CM

## 2019-09-23 DIAGNOSIS — R90.89 ABNORMAL BRAIN MRI: Primary | ICD-10-CM

## 2019-09-23 DIAGNOSIS — R41.3 MEMORY DYSFUNCTION: ICD-10-CM

## 2019-09-23 PROCEDURE — 99214 OFFICE O/P EST MOD 30 MIN: CPT | Performed by: NURSE PRACTITIONER

## 2019-09-23 NOTE — PROGRESS NOTES
Subjective   Patient ID: Keri Bhagat is a 59 y.o. female is here today for follow-up of abnormal MRI.  Pt is accompanied by her mother.    History of Present Illness  Patient presents for follow-up of abnormal brain imaging.  She is being followed for a parafalcine as well as a sellar lesion.  She has recently been evaluated for follow-up by neurology, Dr. Cook for memory loss and speech difficulties.  Was recommended to change or cease the use of her Valium and oxybutynin.  She also recommended replacement of B12 and speech therapy evaluation.  Patient was also referred at one point to neuropsychiatry.  She still has not seen [facility].  She has not started speech therapy.  She did start B12 oral because she states that she declined injections that were recommended by her primary care after B12 results showed low levels.  She complains of headaches that occur 2-3 times weekly typically frontal and tightening feeling.  She takes ibuprofen on occasion but not every time with headache.  It does relieve her discomfort.  She has no associated nausea vomiting or vision changes with her headaches.  2 prior cervical surgeries.  She complains of ongoing difficulties with gait which include stumbles, one foot gets in front of the other, drifts to the side. All began after fall 4/2018.  She had full spine imaging earlier this year that showed no significant compressive lesion.    The following portions of the patient's history were reviewed and updated as appropriate: allergies, current medications and problem list.    Review of Systems   Eyes: Negative for visual disturbance.   Musculoskeletal: Positive for gait problem.   Neurological: Positive for dizziness (occasional), tremors, speech difficulty, light-headedness and headaches (occasional). Negative for seizures, syncope and facial asymmetry.   Psychiatric/Behavioral: Positive for confusion and decreased concentration.       Objective   Physical Exam    Constitutional: She is oriented to person, place, and time. She appears well-developed and well-nourished.   Neck: Neck supple.   Well-healed right anterior incision   Pulmonary/Chest: Effort normal.   Neurological: She is alert and oriented to person, place, and time. She has normal strength. She has a normal Finger-Nose-Finger Test, a normal Heel to Shin Test, a normal Romberg Test and a normal Tandem Gait Test.   Reflex Scores:       Tricep reflexes are 2+ on the right side and 2+ on the left side.       Bicep reflexes are 2+ on the right side and 2+ on the left side.       Brachioradialis reflexes are 2+ on the right side and 2+ on the left side.       Patellar reflexes are 3+ on the right side and 4+ on the left side.       Achilles reflexes are 2+ on the right side and 2+ on the left side.  Skin: Skin is warm and dry.   Psychiatric: Her speech is normal. Her affect is blunt. She is withdrawn. Cognition and memory are normal.   Vitals reviewed.    Neurologic Exam     Mental Status   Oriented to person, place, and time.   Follows 3 step commands.   Attention: normal. Concentration: normal.   Speech: speech is normal   Level of consciousness: alert  General knowledge: fair.   Normal comprehension.     Cranial Nerves   Cranial nerves II through XII intact.     Motor Exam   Muscle bulk: normal  Right arm pronator drift: absent  Left arm pronator drift: absent    Strength   Strength 5/5 throughout.     Sensory Exam   Light touch normal.   Right leg vibration: normal  Left leg vibration: normal    Temperature intact bilateral lower extremities     Gait, Coordination, and Reflexes     Gait  Gait: wide-based    Coordination   Romberg: negative  Finger to nose coordination: normal  Heel to shin coordination: normal  Tandem walking coordination: normal    Reflexes   Right brachioradialis: 2+  Left brachioradialis: 2+  Right biceps: 2+  Left biceps: 2+  Right triceps: 2+  Left triceps: 2+  Right patellar: 3+  Left  patellar: 4+  Right achilles: 2+  Left achilles: 2+  Right plantar: normal  Left plantar: normal  Right Curiel: absent  Left Curiel: absent  Right ankle clonus: absent  Left ankle clonus: absent      Assessment/Plan   Independent Review of Radiographic Studies:    MRI brain reviewed with Dr. Bhakta.  This reveals a slight increase in size of the left parafalcine lesion.  Currently 8 x 10 mm and previously 7 x 8 mm.  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    Medical Decision Making:    Patient presents for follow-up of abnormal brain imaging.  Is a slight increase in size of the parafalcine lesion as compared to prior study.  She does report headaches that occur several times a week.  She did not discuss these with her neurologist.  She was evaluated for memory loss and word finding difficulties.  Recommendations were made for neuropsych testing, speech therapy, B12 replacement.  She has started oral B12 but none of the other recommendations.  There is also discussion regarding the use of oxybutynin and Valium as contributing factors.  She is off Valium and thinks that she stopped oxybutynin but she is not sure.  She asked why she is having gait issues.  I reviewed her cervical and thoracic MRI which showed no significant compressive lesions.  She is got normal strength, sensation including no evidence of peripheral neuropathy in her feet on exam.  She does have increased reflexes at knee jerks bilaterally, but although she is wide-based, she has the ability to tandem and a negative Romberg.    I do not see anything surgical at this point.  We will continue to watch the hair falcine lesion with repeat MRI in 6 months.  If it has further growth we will consider referral for radiation oncology.  I have discussed with her that B12 injections may be a better avenue for her initially due to potential lack of intrinsic factor for absorption.  Also recommend that she  reschedule her speech therapy as well as a neuropsychiatry testing because I think in general these will give her better insight into what is going on with her memory and help her improve her word finding.  I have asked her to advise us if she has any progressive gait issues or headaches prior to her next visit.     Plan: Return to office 6 months with MRI brain with and without contrast.    Keri was seen today for abnormal mri.    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

## 2019-09-24 ENCOUNTER — TELEPHONE (OUTPATIENT)
Dept: FAMILY MEDICINE CLINIC | Facility: CLINIC | Age: 59
End: 2019-09-24

## 2019-09-24 NOTE — TELEPHONE ENCOUNTER
Pt would like a call back. Pt states she seen her Neurologist yesterday and suggested B12 shots instead of pills. Pt has already picked up rx from Pharmacy. Pt would like to know what is the best way to start receiving the shots and would Fermin recommend her to get the shots as suggested by the Neurologist. Pt states she is not sure what to do with the rx she has picked up for the B12 pills.

## 2019-09-25 NOTE — TELEPHONE ENCOUNTER
"She should go ahead and start the oral B12  Does not take long to get the numbers up with these high doses      This passport cover B12 injections at this office?\"    If not I can order her some B12 when she can return to office for office visit and I will show her how to give her own injections  Thank you  "

## 2019-09-30 ENCOUNTER — TELEPHONE (OUTPATIENT)
Dept: FAMILY MEDICINE CLINIC | Facility: CLINIC | Age: 59
End: 2019-09-30

## 2019-09-30 RX ORDER — LORATADINE 10 MG/1
10 TABLET ORAL DAILY
Qty: 30 TABLET | Refills: 11 | Status: SHIPPED | OUTPATIENT
Start: 2019-09-30 | End: 2022-04-21

## 2019-09-30 NOTE — TELEPHONE ENCOUNTER
Patient called requesting a refill on loratadine 10 MG 1 a day sent to [facility].

## 2019-10-02 ENCOUNTER — OFFICE VISIT (OUTPATIENT)
Dept: FAMILY MEDICINE CLINIC | Facility: CLINIC | Age: 59
End: 2019-10-02

## 2019-10-02 VITALS
WEIGHT: 188 LBS | BODY MASS INDEX: 34.6 KG/M2 | TEMPERATURE: 98.2 F | DIASTOLIC BLOOD PRESSURE: 70 MMHG | SYSTOLIC BLOOD PRESSURE: 110 MMHG | HEART RATE: 68 BPM | HEIGHT: 62 IN | OXYGEN SATURATION: 98 %

## 2019-10-02 DIAGNOSIS — M54.31 SCIATICA OF RIGHT SIDE: ICD-10-CM

## 2019-10-02 DIAGNOSIS — S29.019A THORACIC MYOFASCIAL STRAIN, INITIAL ENCOUNTER: ICD-10-CM

## 2019-10-02 DIAGNOSIS — S39.012A LOW BACK STRAIN, INITIAL ENCOUNTER: Primary | ICD-10-CM

## 2019-10-02 DIAGNOSIS — S16.1XXA ACUTE STRAIN OF NECK MUSCLE, INITIAL ENCOUNTER: ICD-10-CM

## 2019-10-02 PROCEDURE — 99214 OFFICE O/P EST MOD 30 MIN: CPT | Performed by: NURSE PRACTITIONER

## 2019-10-02 NOTE — PATIENT INSTRUCTIONS
Discharge instructions    Lowest effective dose anti-inflammatory shortest time possible due to gastric bleeding risk  Bariatric surgery LAP-BAND    [email or web address]    If long-term or longer-term anti-inflammatories required consider Celebrex  Through [employer] member program around $6 a month less likely gastric problems  Plenty fluids while taking anti-inflammatories    Work around the pain rest  Gradually start exercises as tolerated    If not improving in 3 weeks or so likely would need x-rays as well as physical therapy  As well as referral to orthopedic Dr. Raymond      Back Exercises  The following exercises strengthen the muscles that help to support the back. They also help to keep the lower back flexible. Doing these exercises can help to prevent back pain or lessen existing pain.  If you have back pain or discomfort, try doing these exercises 2-3 times each day or as told by your health care provider. When the pain goes away, do them once each day, but increase the number of times that you repeat the steps for each exercise (do more repetitions). If you do not have back pain or discomfort, do these exercises once each day or as told by your health care provider.  Exercises  Single Knee to Chest  Repeat these steps 3-5 times for each le. Lie on your back on a firm bed or the floor with your legs extended.  2. Bring one knee to your chest. Your other leg should stay extended and in contact with the floor.  3. Hold your knee in place by grabbing your knee or thigh.  4. Pull on your knee until you feel a gentle stretch in your lower back.  5. Hold the stretch for 10-30 seconds.  6. Slowly release and straighten your leg.  Pelvic Tilt  Repeat these steps 5-10 times:  1. Lie on your back on a firm bed or the floor with your legs extended.  2. Bend your knees so they are pointing toward the ceiling and your feet are flat on the floor.  3. Tighten your lower abdominal muscles to press your lower back against the  floor. This motion will tilt your pelvis so your tailbone points up toward the ceiling instead of pointing to your feet or the floor.  4. With gentle tension and even breathing, hold this position for 5-10 seconds.  Cat-Cow  Repeat these steps until your lower back becomes more flexible:  1. Get into a hands-and-knees position on a firm surface. Keep your hands under your shoulders, and keep your knees under your hips. You may place padding under your knees for comfort.  2. Let your head hang down, and point your tailbone toward the floor so your lower back becomes rounded like the back of a cat.  3. Hold this position for 5 seconds.  4. Slowly lift your head and point your tailbone up toward the ceiling so your back forms a sagging arch like the back of a cow.  5. Hold this position for 5 seconds.    Press-Ups  Repeat these steps 5-10 times:  1. Lie on your abdomen (face-down) on the floor.  2. Place your palms near your head, about shoulder-width apart.  3. While you keep your back as relaxed as possible and keep your hips on the floor, slowly straighten your arms to raise the top half of your body and lift your shoulders. Do not use your back muscles to raise your upper torso. You may adjust the placement of your hands to make yourself more comfortable.  4. Hold this position for 5 seconds while you keep your back relaxed.  5. Slowly return to lying flat on the floor.    Bridges  Repeat these steps 10 times:  1. Lie on your back on a firm surface.  2. Bend your knees so they are pointing toward the ceiling and your feet are flat on the floor.  3. Tighten your buttocks muscles and lift your buttocks off of the floor until your waist is at almost the same height as your knees. You should feel the muscles working in your buttocks and the back of your thighs. If you do not feel these muscles, slide your feet 1-2 inches farther away from your buttocks.  4. Hold this position for 3-5 seconds.  5. Slowly lower your hips  to the starting position, and allow your buttocks muscles to relax completely.  If this exercise is too easy, try doing it with your arms crossed over your chest.  Abdominal Crunches  Repeat these steps 5-10 times:  1. Lie on your back on a firm bed or the floor with your legs extended.  2. Bend your knees so they are pointing toward the ceiling and your feet are flat on the floor.  3. Cross your arms over your chest.  4. Tip your chin slightly toward your chest without bending your neck.  5. Tighten your abdominal muscles and slowly raise your trunk (torso) high enough to lift your shoulder blades a tiny bit off of the floor. Avoid raising your torso higher than that, because it can put too much stress on your low back and it does not help to strengthen your abdominal muscles.  6. Slowly return to your starting position.  Back Lifts  Repeat these steps 5-10 times:  1. Lie on your abdomen (face-down) with your arms at your sides, and rest your forehead on the floor.  2. Tighten the muscles in your legs and your buttocks.  3. Slowly lift your chest off of the floor while you keep your hips pressed to the floor. Keep the back of your head in line with the curve in your back. Your eyes should be looking at the floor.  4. Hold this position for 3-5 seconds.  5. Slowly return to your starting position.  Contact a health care provider if:  · Your back pain or discomfort gets much worse when you do an exercise.  · Your back pain or discomfort does not lessen within 2 hours after you exercise.  If you have any of these problems, stop doing these exercises right away. Do not do them again unless your health care provider says that you can.  Get help right away if:  · You develop sudden, severe back pain. If this happens, stop doing the exercises right away. Do not do them again unless your health care provider says that you can.  This information is not intended to replace advice given to you by your health care provider.  Make sure you discuss any questions you have with your health care provider.  Document Released: 01/25/2006 Document Revised: 07/31/2018 Document Reviewed: 02/11/2016  Elsevier Interactive Patient Education © 2019 [employer]

## 2019-10-02 NOTE — PROGRESS NOTES
Subjective   Keri Bhagat is a 59 y.o. female.     Pleasant patient complains of sciatica low back pain mild to moderate started approximately 1-1/2 weeks ago she had knocked over a concrete block  Accidentally with her car her neighbor's yard  Walked around try to  the block and strained her neck thoracic low back  Mild pain base of her neck 3 out of 10 without radiation and mild pain down her thoracic  She is had cervical surgery previous with orthopedic surgeon    Pain numbness radiates from right low back right lateral thigh to distal thigh no weakness bowel bladder change no groin paresthesias no fever no gait difficulty  Decreasing certain movements makes better  She is taking 600-800 mg ibuprofen at times for pain helps  Takes ranitidine to protect her stomach  \             The following portions of the patient's history were reviewed and updated as appropriate: allergies, current medications, past family history, past medical history, past social history, past surgical history and problem list.    Review of Systems   Constitutional: Negative for chills, fatigue, fever and unexpected weight loss.   HENT: Negative.  Negative for trouble swallowing.    Eyes: Negative.    Respiratory: Negative for cough and shortness of breath.    Cardiovascular: Negative for chest pain, palpitations and leg swelling.   Gastrointestinal: Negative for abdominal pain and blood in stool.   Genitourinary: Negative.  Negative for pelvic pain.   Musculoskeletal: Positive for back pain and neck pain.   Skin: Negative.    Neurological: Negative.  Negative for dizziness, speech difficulty, weakness and confusion.   Psychiatric/Behavioral: Negative.    All other systems reviewed and are negative.      Objective   Physical Exam   Constitutional: She is oriented to person, place, and time. She appears well-developed and well-nourished. No distress.   HENT:   Head: Normocephalic.   Eyes: Conjunctivae are normal. Pupils are  equal, round, and reactive to light.   Neck: Neck supple.   Cardiovascular: Exam reveals no friction rub.   No murmur heard.  Pulmonary/Chest: Effort normal. No respiratory distress. She has no wheezes.   Musculoskeletal: She exhibits no edema or tenderness.   Decreased range of motion neck  Her baseline status post surgery no cervical point tenderness or thoracic or lumbar point tenderness  Upper strength normal neurovascular intact  Gait normal  Pain with position change on table as well as increased pain low back straight leg raise although no radiation down her legs from maneuver plantar flexion dorsiflexion normal neurovascular intact   Neurological: She is alert and oriented to person, place, and time. She displays normal reflexes. No cranial nerve deficit or sensory deficit. She exhibits normal muscle tone. Coordination normal.   Skin: Skin is warm and dry.   Psychiatric: She has a normal mood and affect. Her behavior is normal. Judgment and thought content normal.   Vitals reviewed.        Assessment/Plan   Keri was seen today for sciatica right.    Diagnoses and all orders for this visit:    Low back strain, initial encounter    Sciatica of right side    Acute strain of neck muscle, initial encounter    Thoracic myofascial strain, initial encounter      Low back strain sciatica  Follow instructions below  Caution anti-inflammatories lowest effective dose shortest time possible especially after lap band              Patient Instructions   Discharge instructions    Lowest effective dose anti-inflammatory shortest time possible due to gastric bleeding risk  Bariatric surgery LAP-BAND    [email or web address]    If long-term or longer-term anti-inflammatories required consider Celebrex  Through [employer] member program around $6 a month less likely gastric problems  Plenty fluids while taking anti-inflammatories    Work around the pain rest  Gradually start exercises as tolerated    If not improving in 3 weeks or so  likely would need x-rays as well as physical therapy  As well as referral to orthopedic Dr. Raymond      Back Exercises  The following exercises strengthen the muscles that help to support the back. They also help to keep the lower back flexible. Doing these exercises can help to prevent back pain or lessen existing pain.  If you have back pain or discomfort, try doing these exercises 2-3 times each day or as told by your health care provider. When the pain goes away, do them once each day, but increase the number of times that you repeat the steps for each exercise (do more repetitions). If you do not have back pain or discomfort, do these exercises once each day or as told by your health care provider.  Exercises  Single Knee to Chest  Repeat these steps 3-5 times for each le. Lie on your back on a firm bed or the floor with your legs extended.  2. Bring one knee to your chest. Your other leg should stay extended and in contact with the floor.  3. Hold your knee in place by grabbing your knee or thigh.  4. Pull on your knee until you feel a gentle stretch in your lower back.  5. Hold the stretch for 10-30 seconds.  6. Slowly release and straighten your leg.  Pelvic Tilt  Repeat these steps 5-10 times:  1. Lie on your back on a firm bed or the floor with your legs extended.  2. Bend your knees so they are pointing toward the ceiling and your feet are flat on the floor.  3. Tighten your lower abdominal muscles to press your lower back against the floor. This motion will tilt your pelvis so your tailbone points up toward the ceiling instead of pointing to your feet or the floor.  4. With gentle tension and even breathing, hold this position for 5-10 seconds.  Cat-Cow  Repeat these steps until your lower back becomes more flexible:  1. Get into a hands-and-knees position on a firm surface. Keep your hands under your shoulders, and keep your knees under your hips. You may place padding under your knees for  comfort.  2. Let your head hang down, and point your tailbone toward the floor so your lower back becomes rounded like the back of a cat.  3. Hold this position for 5 seconds.  4. Slowly lift your head and point your tailbone up toward the ceiling so your back forms a sagging arch like the back of a cow.  5. Hold this position for 5 seconds.    Press-Ups  Repeat these steps 5-10 times:  1. Lie on your abdomen (face-down) on the floor.  2. Place your palms near your head, about shoulder-width apart.  3. While you keep your back as relaxed as possible and keep your hips on the floor, slowly straighten your arms to raise the top half of your body and lift your shoulders. Do not use your back muscles to raise your upper torso. You may adjust the placement of your hands to make yourself more comfortable.  4. Hold this position for 5 seconds while you keep your back relaxed.  5. Slowly return to lying flat on the floor.    Bridges  Repeat these steps 10 times:  1. Lie on your back on a firm surface.  2. Bend your knees so they are pointing toward the ceiling and your feet are flat on the floor.  3. Tighten your buttocks muscles and lift your buttocks off of the floor until your waist is at almost the same height as your knees. You should feel the muscles working in your buttocks and the back of your thighs. If you do not feel these muscles, slide your feet 1-2 inches farther away from your buttocks.  4. Hold this position for 3-5 seconds.  5. Slowly lower your hips to the starting position, and allow your buttocks muscles to relax completely.  If this exercise is too easy, try doing it with your arms crossed over your chest.  Abdominal Crunches  Repeat these steps 5-10 times:  1. Lie on your back on a firm bed or the floor with your legs extended.  2. Bend your knees so they are pointing toward the ceiling and your feet are flat on the floor.  3. Cross your arms over your chest.  4. Tip your chin slightly toward your  chest without bending your neck.  5. Tighten your abdominal muscles and slowly raise your trunk (torso) high enough to lift your shoulder blades a tiny bit off of the floor. Avoid raising your torso higher than that, because it can put too much stress on your low back and it does not help to strengthen your abdominal muscles.  6. Slowly return to your starting position.  Back Lifts  Repeat these steps 5-10 times:  1. Lie on your abdomen (face-down) with your arms at your sides, and rest your forehead on the floor.  2. Tighten the muscles in your legs and your buttocks.  3. Slowly lift your chest off of the floor while you keep your hips pressed to the floor. Keep the back of your head in line with the curve in your back. Your eyes should be looking at the floor.  4. Hold this position for 3-5 seconds.  5. Slowly return to your starting position.  Contact a health care provider if:  · Your back pain or discomfort gets much worse when you do an exercise.  · Your back pain or discomfort does not lessen within 2 hours after you exercise.  If you have any of these problems, stop doing these exercises right away. Do not do them again unless your health care provider says that you can.  Get help right away if:  · You develop sudden, severe back pain. If this happens, stop doing the exercises right away. Do not do them again unless your health care provider says that you can.  This information is not intended to replace advice given to you by your health care provider. Make sure you discuss any questions you have with your health care provider.  Document Released: 01/25/2006 Document Revised: 07/31/2018 Document Reviewed: 02/11/2016  [employer] Interactive Patient Education © 2019 [employer].

## 2019-10-08 ENCOUNTER — TELEPHONE (OUTPATIENT)
Dept: FAMILY MEDICINE CLINIC | Facility: CLINIC | Age: 59
End: 2019-10-08

## 2019-10-08 NOTE — TELEPHONE ENCOUNTER
Pt spoke with Passport Nurse in regards to sciatica and was asked to contact the office and see if Fermin Epley can up the dosage of her gabapentin (NEURONTIN) 300 MG capsule or change the directions from 2 times a day to 3 times a day.

## 2019-10-16 ENCOUNTER — OFFICE VISIT (OUTPATIENT)
Dept: SLEEP MEDICINE | Facility: HOSPITAL | Age: 59
End: 2019-10-16

## 2019-10-16 VITALS
SYSTOLIC BLOOD PRESSURE: 127 MMHG | DIASTOLIC BLOOD PRESSURE: 75 MMHG | BODY MASS INDEX: 33.66 KG/M2 | OXYGEN SATURATION: 97 % | WEIGHT: 190 LBS | HEIGHT: 63 IN | HEART RATE: 69 BPM

## 2019-10-16 DIAGNOSIS — M54.12 CERVICAL RADICULOPATHY: ICD-10-CM

## 2019-10-16 DIAGNOSIS — G47.33 OBSTRUCTIVE SLEEP APNEA: Primary | ICD-10-CM

## 2019-10-16 DIAGNOSIS — G25.81 RESTLESS LEGS SYNDROME (RLS): ICD-10-CM

## 2019-10-16 DIAGNOSIS — E66.9 OBESITY (BMI 30-39.9): ICD-10-CM

## 2019-10-16 PROCEDURE — G0463 HOSPITAL OUTPT CLINIC VISIT: HCPCS

## 2019-10-16 RX ORDER — GABAPENTIN 300 MG/1
300 CAPSULE ORAL 3 TIMES DAILY
Qty: 90 CAPSULE | Refills: 0 | Status: SHIPPED | OUTPATIENT
Start: 2019-10-16 | End: 2019-12-17 | Stop reason: SDUPTHER

## 2019-10-16 NOTE — PROGRESS NOTES
[facility]  Telephone: [phone] / Fax: [phone] [place]  Telephone: [phone] / Fax: [phone] [place]    PCP: Epley, James, APRN    Reason for visit: ROCKY f/u    Keri Bhagat is a 59 y.o.female  was last seen at [facility] sleep lab in August. We changed her pressures to auto 5-20cm at last visit as her AHI was high on CPAP 10cm fixed pressure. She continues to snore some despite being on the CPAP. She takes her mask off at night during sleep without realizing it. She is due to have her mask replaced in November. Her mask does leak some. She tried nasal mask in the past but it did not seem to work.  Her sleep schedule is midnight-8 AM.  Her ESS score is 10.    Social history, smokes E cigarettes, no alcohol, no energy drinks.    Review of systems, positive nasal congestion, postnasal drip, GERD, anxiety, depression.  Rest is unremarkable.    DME NAPS    Current Medications:    Current Outpatient Medications:   •  ARIPiprazole (ABILIFY) 10 MG tablet, Take 10 mg by mouth Daily., Disp: , Rfl:   •  atenolol (TENORMIN) 50 MG tablet, Take 1 tablet by mouth Daily., Disp: 90 tablet, Rfl: 3  •  buPROPion SR (WELLBUTRIN SR) 150 MG 12 hr tablet, Take 150 mg by mouth Daily., Disp: , Rfl:   •  Cholecalciferol (VITAMIN D3) 5000 UNITS capsule capsule, Take 5,000 Units by mouth Daily., Disp: , Rfl:   •  cyanocobalamin ([employer] VITAMIN B-12) 1000 MCG tablet, Take 1 tablet by mouth 2 (Two) Times a Day., Disp: 60 tablet, Rfl: 3  •  gabapentin (NEURONTIN) 300 MG capsule, Take 1 capsule by mouth 3 (Three) Times a Day., Disp: 90 capsule, Rfl: 0  •  isosorbide mononitrate (IMDUR) 30 MG 24 hr tablet, TAKE ONE TABLET BY MOUTH DAILY, Disp: 30 tablet, Rfl: 5  •  lamoTRIgine (LaMICtal) 150 MG tablet, Take 300 mg by mouth Daily., Disp: , Rfl:   •  levothyroxine (SYNTHROID, LEVOTHROID) 100 MCG tablet, Take 100 mcg by mouth Daily., Disp: , Rfl:   •  loratadine (CLARITIN) 10 MG tablet, Take 1 tablet by mouth  Daily. As needed for allergies, Disp: 30 tablet, Rfl: 11  •  nitroglycerin (NITROSTAT) 0.4 MG SL tablet, Place 0.4 mg under the tongue Every 5 (Five) Minutes As Needed for Chest Pain (took at M.D  office at 0930 a.m.). Take no more than 3 doses in 15 minutes., Disp: , Rfl:   •  oxybutynin XL (DITROPAN XL) 15 MG 24 hr tablet, , Disp: , Rfl:   •  pramipexole (MIRAPEX) 0.5 MG tablet, , Disp: , Rfl:   •  raNITIdine (ZANTAC) 150 MG tablet, Take 1 tablet by mouth 2 (Two) Times a Day., Disp: 60 tablet, Rfl: 11  •  simvastatin (ZOCOR) 20 MG tablet, Take 1 tablet by mouth Every Night., Disp: 30 tablet, Rfl: 6  •  venlafaxine XR (EFFEXOR-XR) 75 MG 24 hr capsule, Take 75 mg by mouth Daily., Disp: , Rfl:    also entered in Sleep Questionnaire    Patient  has a past medical history of Abnormal Pap smear of cervix, Anemia, Anxiety, Arthritis, Atherosclerotic heart disease of native coronary artery with other forms of angina pectoris (CMS/HCC), Bipolar I disorder, single manic episode (CMS/HCC), Cervical disc herniation, Cervical dysplasia, Coronary artery disease, CTS (carpal tunnel syndrome), Depression, Difficulty swallowing, Diverticular disease, Dyspepsia, Dysphagia, Gastric reflux, Heart attack (CMS/HCC), Heart murmur, Hiatal hernia, High cholesterol, History of transfusion, HPV (human papilloma virus) infection (04/29/2016), Hyperlipidemia, Hypertension, Hypothyroidism, Incontinence in female, Kidney disease (2017), Kidney disease, chronic, stage III (GFR 30-59 ml/min) (CMS/HCC), LGSIL on Pap smear of cervix (04/29/2016), Myocardial infarction (CMS/HCC) (2000), Neck pain, Obesity, Past myocardial infarction (05/2000), Periodic limb movement disorder, Restless leg syndrome, Sleep apnea, Stress fracture, Suicidal ideation (08/19/2016), Swelling of ankle, Thyroid nodule, and Vitamin B12 deficiency.    I have reviewed the Past Medical History, Past Surgical History, Social History and Family History.    Vital Signs /75    "Pulse 69   Ht 160 cm (63\")   Wt 86.2 kg (190 lb)   LMP 10/21/2016 (Approximate) Comment: 54  SpO2 97%   BMI 33.66 kg/m²  Body mass index is 33.66 kg/m².    General Alert and oriented. No acute distress noted   Pharynx/Throat Class IV Mallampati airway, large tongue, no evidence of redundant lateral pharyngeal tissue. No oral lesions. No thrush. Moist mucous membranes.   Head Normocephalic. Symmetrical. Atraumatic.    Nose No septal deviation. No drainage   Chest Wall Normal shape. Symmetric expansion with respiration. No tenderness.   Neck Trachea midline, no thyromegaly or adenopathy    Lungs Clear to auscultation bilaterally. No wheezes. No rhonchi. No rales. Respirations regular, even and unlabored.   Heart Regular rhythm and normal rate. Normal S1 and S2. No murmur   Abdomen Soft, non-tender and non-distended. Normal bowel sounds. No masses.   Extremities Moves all extremities well. No edema   Psychiatric Normal mood and affect.     Testing:  Download, July 18, 2019-October 15, 2019, only 58% compliance with average nightly use of 3 hours and 25 minutes on auto CPAP 5-20 cm, average AHI 10.5.    Study:  HST 9/12/16- AHI index is 21 indicating moderate obstructive sleep apnea.  It is slightly worse in supine position with index 28. Saturation below 89% for 4 minutes and snoring for 2% of total sleep time.     PSG 4/3/17  Overnight split polysomnogram study.  Diagnostic study from 9:21 AM to 11:33 AM.  Sleep efficiency 94% with 2 hours total sleep time.  Proportion of REM sleep was reduced to 5%.  Apnea hypopneas index 12 events an hour with RDI of 24.  Oxygen saturation remained above 89%.  Patient had 19% time snoring.    Following the above titration study from 11:33 AM with the 3 PM.  Sleep efficiency 95% with 3 hours total sleep time.  Rebound in rem sleep to 35%.  Oxygen saturation remained above 89%.  Patient was increased to 11 cm water pressure.  Optimal sleep was achieved at a CPAP of 8 cm water " pressure.  With higher pressures air leaks and central events are noted.       Impression:  1. Obstructive sleep apnea    2. Restless legs syndrome (RLS)    3. Obesity (BMI 30-39.9)          Plan:  Change CPAP to 8-20cm in view of persistently elevated AHI of 10.  Keep ramp at 5cm for 20 minutes. Replace mask more often to control air leak.  Increase compliance. RLS seems to be under control. She takes Gabapentin.    She uses the CPAP device and benefits from its use in terms of reduction of hypersomnia and snoring.  AHI appears to be within adequate range. I reviewed download report and original sleep study report with the patient. Weight loss will be strongly beneficial to reduce the severity of sleep-disordered breathing.  Caution during activities that require prolonged concentration is strongly advised if sleepiness returns. Changing of PAP supplies regularly is important for effective use. Patient needs to change cushion on the mask or plugs on nasal pillows along with disposable filters once every month and change mask frame, tubing, headgear and Velcro straps every 6 months at the minimum.       Follow up with Dr. Vallejo in one year    Thank you for allowing me to participate in your patient's care.      DAPHNEY Pedraza  [facility]  Phone: [phone]      Part of this note may be an electronic transcription/translation of spoken language to printed text using the Dragon Dictation System.

## 2019-10-16 NOTE — TELEPHONE ENCOUNTER
I increased her gabapentin 300 mg 3 times daily already sent in  She can pick it up when she is ready    If she already has some just increase  Her present medication the prescription will be waiting for her    Follow instructions given the office thank you

## 2019-10-26 DIAGNOSIS — E03.9 HYPOTHYROIDISM (ACQUIRED): ICD-10-CM

## 2019-10-28 RX ORDER — LEVOTHYROXINE SODIUM 88 UG/1
TABLET ORAL
Qty: 90 TABLET | Refills: 0 | Status: SHIPPED | OUTPATIENT
Start: 2019-10-28 | End: 2020-01-25

## 2019-10-31 ENCOUNTER — OFFICE VISIT (OUTPATIENT)
Dept: FAMILY MEDICINE CLINIC | Facility: CLINIC | Age: 59
End: 2019-10-31

## 2019-10-31 ENCOUNTER — RESULTS ENCOUNTER (OUTPATIENT)
Dept: FAMILY MEDICINE CLINIC | Facility: CLINIC | Age: 59
End: 2019-10-31

## 2019-10-31 VITALS
DIASTOLIC BLOOD PRESSURE: 68 MMHG | OXYGEN SATURATION: 98 % | BODY MASS INDEX: 33.66 KG/M2 | WEIGHT: 190 LBS | HEIGHT: 63 IN | HEART RATE: 67 BPM | SYSTOLIC BLOOD PRESSURE: 104 MMHG

## 2019-10-31 DIAGNOSIS — E53.8 B12 DEFICIENCY: ICD-10-CM

## 2019-10-31 DIAGNOSIS — M77.11 LATERAL EPICONDYLITIS OF RIGHT ELBOW: Primary | ICD-10-CM

## 2019-10-31 PROCEDURE — 99214 OFFICE O/P EST MOD 30 MIN: CPT | Performed by: NURSE PRACTITIONER

## 2019-11-01 ENCOUNTER — HOSPITAL ENCOUNTER (OUTPATIENT)
Dept: SPEECH THERAPY | Facility: HOSPITAL | Age: 59
Setting detail: THERAPIES SERIES
Discharge: HOME | End: 2019-11-01

## 2019-11-01 DIAGNOSIS — R41.841 COGNITIVE COMMUNICATION DEFICIT: ICD-10-CM

## 2019-11-01 DIAGNOSIS — E23.6 PITUITARY CYST (HCC): Primary | ICD-10-CM

## 2019-11-01 DIAGNOSIS — D32.9 MENINGIOMA (HCC): ICD-10-CM

## 2019-11-01 PROCEDURE — 96125 COGNITIVE TEST BY HC PRO: CPT | Performed by: SPEECH-LANGUAGE PATHOLOGIST

## 2019-11-01 NOTE — THERAPY EVALUATION
Outpatient Speech Language Pathology   Adult Speech Language Cognitive Initial Evaluation  [facility]     Patient Name: Keri Bhagat  : 1960  MRN: [id number]  Today's Date: 2019        Visit Date: 2019   Patient Active Problem List   Diagnosis   • Gastroesophageal reflux disease   • Bipolar I disorder, single manic episode (CMS/HCC)   • Atherosclerosis of coronary artery   • Essential hypertension   • Lightheadedness   • Low back pain   • Sciatica   • Thyroid nodule   • Fatigue   • Hyperlipidemia   • Hypothyroidism (acquired)   • Iron deficiency anemia   • Right knee pain   • Cervical disc disease   • Depression   • ROCKY (obstructive sleep apnea)   • Coronary artery disease involving native coronary artery of native heart without angina pectoris   • Cervical spondylosis with radiculopathy   • Chronic pain of both knees   • Arthritis of both knees   • Weight gain, abnormal   • Allergic rhinitis   • Obesity (BMI 30-39.9)   • Plantar fasciitis   • Peroneal tendon injury   • Stress fracture of calcaneus   • Vitamin D deficiency   • Pain and swelling of knee   • Knee injury   • Calcific Achilles tendonitis   • Rod's deformity   • Urgency incontinence   • Nocturia   • Chronic gastritis without bleeding   • LGSIL on Pap smear of cervix   • Primary osteoarthritis of right knee   • Chronic renal insufficiency, stage III (moderate) (CMS/HCC)   • Left wrist tendinitis   • Cervical radiculopathy   • Tobacco abuse   • Obesity, morbid, BMI 40.0-49.9 (CMS/HCC)   • Encounter for screening for lung cancer   • Urinary frequency   • Diabetes mellitus screening   • MORALES I (cervical intraepithelial neoplasia I)   • Primary osteoarthritis of both knees   • H/O bladder repair surgery   • Chest pain   • Syncope and collapse   • Closed fracture of left distal radius   • Closed displaced fracture of neck of right fifth metacarpal bone   • Fracture follow-up   • Nondisplaced fracture of base of fifth metacarpal  bone, left hand, initial encounter for closed fracture   • Closed fracture of lower end of left radius with routine healing   • Multiple dislocations of fingers, open   • Bilateral chronic knee pain   • Arthritis of left knee   • Arthritis of right knee   • Family history of diabetes mellitus (DM)   • Menopause   • 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   • Memory dysfunction   • Periodic limb movement disorder   • Gait disturbance   • Degenerative disc disease, lumbar   • Osteopenia   • Vaginal atrophy   • Female dyspareunia   • Pituitary cyst (CMS/HCC)   • Meningioma (CMS/[facility])   • Pituitary cyst (CMS/[facility])        Past Medical History:   Diagnosis Date   • Abnormal Pap smear of cervix    • Anemia    • Anxiety    • Arthritis    • Atherosclerotic heart disease of native coronary artery with other forms of angina pectoris (CMS/[facility])    • Bipolar I disorder, single manic episode (CMS/[facility])     depressive d(NOS)   • Cervical disc herniation    • Cervical dysplasia    • Coronary artery disease    • CTS (carpal tunnel syndrome)    • Depression     NO SUICIDAL PLANS   • Difficulty swallowing    • Diverticular disease    • Dyspepsia    • Dysphagia    • Gastric reflux    • Heart attack (CMS/[facility])    • Heart murmur    • Hiatal hernia    • High cholesterol    • History of transfusion    • 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    HPV positive on pap LGSIL   • Hyperlipidemia    • Hypertension    • Hypothyroidism    • Incontinence in female     wears pads   • Kidney disease 2017    stage 2    • Kidney disease, chronic, stage III (GFR 30-59 ml/min) (CMS/[facility])    • LGSIL on Pap smear of cervix 04/29/2016    LGSIL HPV positive   • Myocardial infarction (CMS/[facility]) 2000   • Neck pain    • Obesity    • Past myocardial infarction 05/2000   • Periodic limb movement disorder    • Restless leg syndrome    • Sleep apnea     cpap   • Stress fracture     LEFT HEEL   • Suicidal ideation 08/19/2016     history   • Swelling of ankle     right had doppler no s/s blood clot   • Thyroid nodule    • Vitamin B12 deficiency         Past Surgical History:   Procedure Laterality Date   • ANTERIOR CERVICAL DISCECTOMY W/ FUSION N/A 12/29/2016    Procedure: C3-4 anterior cervical discectomy and fusion with Depuy micro plate, ALLOGRAFT C3-4, AND HARDWARE REMOVAL C4-7.;  Surgeon: Hema Godwin MD;  Location: [facility] OR;  Service:    • CARDIAC CATHETERIZATION N/A 3/30/2017    Procedure: Left Heart Cath;  Surgeon: Tracey Vargas MD;  Location:  [facility];  Service:    • CARDIAC CATHETERIZATION N/A 3/30/2017    Procedure: Coronary angiography;  Surgeon: Tracey Vargas MD;  Location:  [facility];  Service:    • CARDIAC CATHETERIZATION N/A 3/30/2017    Procedure: Left ventriculography;  Surgeon: Tracey Vargas MD;  Location:  [facility];  Service:    • CARDIAC CATHETERIZATION  3/30/2017    Procedure: Functional Flow Natalia;  Surgeon: Tracey Vargas MD;  Location:  [facility];  Service:    • CARPAL TUNNEL RELEASE     • CERVICAL BIOPSY  MMXVI    Dr. Jeffery.    • CERVICAL DISCECTOMY ANTERIOR  04/2013    C4-7   • COLONOSCOPY  04/20/2015    Diverticulosis, IH   • COLPOSCOPY W/ BIOPSY / CURETTAGE  06/17/2016    LGSIL HPV positive. Results were normal repeat pap in one year. Chronic Cervicitis   • CORONARY ANGIOPLASTY WITH STENT PLACEMENT     • ENDOSCOPY  MM[facility]    Normal.  Dr. Rodriguez   • ENDOSCOPY N/A 6/22/2017    Erythematous mucosa in the stomach  PATH: Chronic active gastritis, moderate with intestinal metaplasia    • GASTRIC BYPASS      Done using the sleeve technique   • HARDWARE REMOVAL  12/29/2016    cervical   • LAPAROSCOPIC GASTRIC BANDING  02/2018   • SHOULDER SURGERY Left     RCR   • TONSILLECTOMY     • TONSILLECTOMY AND ADENOIDECTOMY     • TRANSVAGINAL TAPING SUSPENSION N/A 12/6/2017    Procedure: MID URETHRAL SLING CYSTSCOPY;  Surgeon: Abby  "Vazquez Méndez MD;  Location: [facility] OR;  Service:    • TUBAL ABDOMINAL LIGATION     • TYMPANOSTOMY TUBE PLACEMENT Right    • WRIST SURGERY Bilateral     carpal tunnel         Visit Dx:    ICD-10-CM ICD-9-CM   1. Pituitary cyst (CMS/[facility]) E23.6 253.8   2. Meningioma (CMS/[facility]) D32.9 225.2   3. Cognitive communication deficit R41.841 799.52           SLP SLC Evaluation - 11/01/19 1400        Communication Assessment/Intervention    Document Type  evaluation   -KA    Subjective Information  no complaints   -KA    Patient Observations  cooperative;alert   -KA    Patient Effort  good   -KA    Symptoms Noted During/After Treatment  none   -KA       General Information    Patient Profile Reviewed  yes   -KA    Pertinent History Of Current Problem  Patient is referred to outpatient speech therapy for word finding difficulty and memory loss. Patient has hx of abnormal brain MRI-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. Patient reports she is followed every 6 months from Neuro, and her lesion did slowly grow between her last two MRIs. Patient reports she experiences slurred speech, stuttering speech especially at the conversation level. She also reports she has a poor memory and has to write everything down otherwise she will not remember it..Patient also c/o \"getting words backwards.\" Patient reports these symtoms started about one year ago. She reported she did fall one year ago and fractured her wrists and also cracked her teeth, she reports some changes with her speech from her teeth. Patient reports she is currently in therapy and being treated for depression, bipolar and borderline personality disorder. She reports she is currently not working, on disability .   -KA    Precautions/Limitations, Vision  WFL with corrective lenses   -KA    Precautions/Limitations, Hearing  WFL   -KA    Prior Level of Function-Communication  WFL   -KA    Plans/Goals Discussed with  patient   -KA    Barriers to Rehab  " none identified   -    Patient's Goals for Discharge  functional cognition;functional communication   -    Standardized Assessment Used  CLQT   -       Pain Assessment    Additional Documentation  Pain Scale: Numbers Pre/Post-Treatment (Group)   -KA       Pain Scale: Numbers Pre/Post-Treatment    Pain Scale: Numbers, Pretreatment  0/10 - no pain   -KA    Pain Scale: Numbers, Post-Treatment  0/10 - no pain   -KA       Oral Motor Structure and Function    Oral Motor Structure and Function  WNL   -KA    Dentition Assessment  missing teeth;other (see comments) cracked teeth     cracked teeth   -KA    Mucosal Quality  moist, healthy   -       Oral Musculature and Cranial Nerve Assessment    Oral Motor General Assessment  WFL   -KA    Oral Motor, Comment  Overall pt demonstrates functional oral motor exam and speech intelligibility is 100% at the conversation with no evidence of dysarthria. Patient occasionally demonstrates forward anterior movement of tongue during /s/ sounds, possibly due to dentition will continue to monitor and assess.    -       Standardized Tests    Cognitive/Memory Tests  CLQT: Cognitive Linguistic Quick Test   -       CLQT (The Cognitive Linguistic Quick Test)    Attention Domain Score  179   -KA    Attention Severity Rating  3: Mild   -KA    Memory Domain Score  159   -KA    Memory Severity Rating  4: WNL   -KA    Executive Function Domain Score  23   -KA    Executive Function Severity Rating  3: Mild   -KA    Language Domain Score  31   -KA    Language Severity Rating  4: [facility]   -    Visuospatial Domain Score  79   -KA    Visuospatial Severity Rating  3: Mild   -KA    Clock Drawing Total Score  13   -KA    Clock Drawing Severity Rating  WNL   -    Composite Severity Rating  3.4   -KA    Composite Severity Rating Range  4.0 - 3.5: WNL   -    CLQT Comments  During portions of CLQT the only task where patient performed below the criterion cut off score was symbol trails where  patient correctly connected specific patterns with three lines out of 10, difficulty with divided attention and following directions. Due to pt score with symbol trails, it brought attention, executive function and visuospatial skills domain within the mild range. Patient demonstrated adequate recall of detail in paragraph retention task. In generative naming task patient named 16 items in concrete category and 6 in abstract. Adequate planning and self monitoring in maze task and mental flexibility in design generation task. During informal assessment pt able to recall three unrelated words after 5 minute delay with 100% accuracy and pt stated she was able to associate them to help her recall them. Functional math word problems 100% with repetition of one problem, word deductions 100%. During conversational speech and summarization of story pt demonstrated no word finding errors, functional word finding and no dysfluency. Overall patient demonstrated functional cognitive communication skills today. SLP will continue diagnostic assessment of cognitive communication skills and plan to target education on strategies and external memory aids.     -KA       SLP Clinical Impressions    SLP Diagnosis  Minimal cognitive communication impairment, mostly functional    -KA    Rehab Potential/Prognosis  good   -KA    [facility] Criteria for Skilled Therapy Interventions Met  yes   -KA    Functional Impact  restrictions in personal and social life;other (see comments) per pt, talking to others in conversation     per pt, talking to others in conversation   -KA       Recommendations    Therapy Frequency (SLP SLC)  1 day per week   -KA    Predicted Duration Therapy Intervention (Days)  until discharge   -KA    Anticipated Dischage Disposition  home   -MIHIR      User Key  (r) = Recorded By, (t) = Taken By, (c) = Cosigned By    Initials Name Provider Type    Derek Robles MA,[facility]-SLP Speech and Language Pathologist          "                OP SLP Education     Row Name 11/01/19 1510       Education    Barriers to Learning  No barriers identified  -KA    Education Provided  Described results of evaluation;Patient expressed understanding of evaluation  -KA    Assessed  Learning needs;Learning motivation  -KA    Learning Motivation  Strong  -KA    Learning Method  Explanation  -KA    Teaching Response  Verbalized understanding  -KA      User Key  (r) = Recorded By, (t) = Taken By, (c) = Cosigned By    Initials Name Effective Dates    KA Derek Quiñones MA,[facility]-SLP 06/08/18 -           SLP OP Goals     Row Name 11/01/19 1500          Goal Type Needed    Goal Type Needed  Memory;Probelm Solving;Dysarthria  -KA        Subjective Pain    Able to rate subjective pain?  yes  -KA     Pre-Treatment Pain Level  0  -KA     Post-Treatment Pain Level  0  -KA        Dysarthria Goals     Dysarthria LTG's  Patient will use verbal speech that is perceived as natural-sounding by familiar/unfamiliar listeners  -KA      Dysarthria STG's  Patient will increase strength and power  of articulators so they can move more quickly and accurately to improve speech intelligibility by  -KA     \"Patient will increase strength and power  of articulators so they can move more quickly and accurately to improve speech intelligibility by completing lip exercises\"  90%:;without cues  -KA     Status: Patient will increase strength and power  of articulators so they can move more quickly and accurately to improve speech intelligibility by completing lip exercises  New  -KA        Memory Goals    Memory LTG's  Patient will be able to remember information needed to participate in avocational activities  -KA     Status: Patient will be able to remember information needed to participate in avocational activities  New  -KA     Memory STG's  Patient’s memory skills will be enhanced as reported by patient by using external memory aides  -KA     Patient’s memory skills will be " enhanced as reported by patient by using external memory aides  90%:;without cues  -KA     Status: Patient’s memory skills will be enhanced as reported by patient by using external memory aides  New  -KA        Problem Solving Goals    Problem Solving LTG's  Patient will be able to execute all activities necessary to manage a home  -KA     Patient will be able to execute all activities necessary to manage a home  Independently  -KA     Status: Patient will be able to execute all activities necessary to manage a home  New  -KA     Problem Solving STG's  Patient will improve ability to analyze problems and determine solutions by completing a visual representation/filling in a chart by following  written directions  -KA     Patient will improve ability to analyze problems and determine solutions by completing a visual representation/filling in a chart by following  written directions  90%:;without cues  -KA     Status: Patient will improve ability to analyze problems and determine solutions by completing a visual representation/filling in a chart by following  written directions  New  -KA       User Key  (r) = Recorded By, (t) = Taken By, (c) = Cosigned By    Initials Name Provider Type    Derek Robles MA,[facility]-SLP Speech and Language Pathologist          OP SLP Assessment/Plan - 11/01/19 1509        SLP Assessment    Functional Problems  Speech Language- Adult/Cognition   -KA    Impact on Function: Adult Speech Language/Cognition  Restrictions in personal and social life   -KA    Clinical Impression: Speech Language-Adult/Congnition  Minimal:;Cognitive Communication Impairment   -KA    Please refer to paper survey for additional self-reported information  Yes   -KA    Please refer to items scanned into chart for additional diagnostic informaiton and handouts as provided by clinician  Yes   -KA    Prognosis  Good (comment)   -KA    Patient/caregiver participated in establishment of treatment plan and goals  Yes    -    Patient would benefit from skilled therapy intervention  Yes   -       SLP Plan    Frequency  1x a week    -KA    Duration  4 weeks   -KA    Planned CPT's?  SLP INDIVIDUAL SPEECH THERAPY: 71786   -    Expected Duration Therapy Session - minutes  45-60 minutes   -      User Key  (r) = Recorded By, (t) = Taken By, (c) = Cosigned By    Initials Name Provider Type    Derek Robles MA,[facility]-SLP Speech and Language Pathologist             SLP Outcome Measures (last 72 hours)      SLP Outcome Measures     Row Name 11/01/19 1500             SLP Outcome Measures    Outcome Measure Used?  Adult NOMS  -         Adult FCM Scores    FCM Chosen  Memory;Motor Speech  -      Motor Speech Score  6  -KA      Memory FCM Score  6  -KA        User Key  (r) = Recorded By, (t) = Taken By, (c) = Cosigned By    Initials Name Effective Dates    Derek Robles MA,CCC-SLP 06/08/18 -              Time Calculation:   SLP Start Time: 1245  SLP Stop Time: 1345  SLP Time Calculation (min): 60 min    Therapy Charges for Today     Code Description Service Date Service Provider Modifiers Qty    [id number]  ST STD COG PERF TEST PER HOUR 11/1/2019 Derek Quiñones MA,CCC-SLP GN 2                   Derek Quiñones MA,CCC-SLP  11/1/2019

## 2019-11-05 ENCOUNTER — CLINICAL SUPPORT (OUTPATIENT)
Dept: ORTHOPEDIC SURGERY | Facility: CLINIC | Age: 59
End: 2019-11-05

## 2019-11-05 VITALS — HEIGHT: 63 IN | TEMPERATURE: 98.3 F | BODY MASS INDEX: 33.66 KG/M2 | WEIGHT: 190 LBS

## 2019-11-05 DIAGNOSIS — M17.0 ARTHRITIS OF BOTH KNEES: Primary | ICD-10-CM

## 2019-11-05 PROCEDURE — 20610 DRAIN/INJ JOINT/BURSA W/O US: CPT | Performed by: NURSE PRACTITIONER

## 2019-11-05 PROCEDURE — 99213 OFFICE O/P EST LOW 20 MIN: CPT | Performed by: NURSE PRACTITIONER

## 2019-11-05 RX ORDER — METHYLPREDNISOLONE ACETATE 80 MG/ML
80 INJECTION, SUSPENSION INTRA-ARTICULAR; INTRALESIONAL; INTRAMUSCULAR; SOFT TISSUE
Status: COMPLETED | OUTPATIENT
Start: 2019-11-05 | End: 2019-11-05

## 2019-11-05 RX ADMIN — METHYLPREDNISOLONE ACETATE 80 MG: 80 INJECTION, SUSPENSION INTRA-ARTICULAR; INTRALESIONAL; INTRAMUSCULAR; SOFT TISSUE at 12:22

## 2019-11-05 RX ADMIN — METHYLPREDNISOLONE ACETATE 80 MG: 80 INJECTION, SUSPENSION INTRA-ARTICULAR; INTRALESIONAL; INTRAMUSCULAR; SOFT TISSUE at 12:23

## 2019-11-05 NOTE — PROGRESS NOTES
Patient: Keri Bhagat  YOB: 1960  Date of Service: 11/5/2019    Chief Complaints:   Chief Complaint   Patient presents with   • Left Knee - Follow-up, Pain   • Right Knee - Follow-up, Pain       Subjective:    History of Present Illness: Pt is seen in the office today with complaints of   Chief Complaint   Patient presents with   • Left Knee - Follow-up, Pain   • Right Knee - Follow-up, Pain   . Both knees flared and is achy all over since weather change 3 weeks ago.  Found a pituitary tumor that they are going to rescan in 6 months also found lesions on the brain and she has started speech therapy so she is dealing with other issues in life as well.  KNEE: TIMING:  The pain is described as ACUTE on CHRONIC.  LOCATION: medial joint line tenderness. AGGRAVATING FACTORS:  Is worsened by prolonged standing, sitting, walking and squatting activities.  ASSOCIATED SYMPTOMS:  swelling, tenderness, and aching. OTHER SYMPTOMS denies popping, locking or catching. RELIEVING FACTORS:  Previous treatment has included cortisone injections  viscosupplementation  OTC Tylenol  OTC meds/NSAIDS  prescription NSAIDS  activtiy modification  physical therapy  ice/heat/rest.    This problem is not new to this examiner.     Allergies: No Known Allergies    Medications:   Home Medications:  Current Outpatient Medications on File Prior to Visit   Medication Sig   • ARIPiprazole (ABILIFY) 10 MG tablet Take 10 mg by mouth Daily.   • atenolol (TENORMIN) 50 MG tablet Take 1 tablet by mouth Daily.   • buPROPion SR (WELLBUTRIN SR) 150 MG 12 hr tablet Take 150 mg by mouth Daily.   • Cholecalciferol (VITAMIN D3) 5000 UNITS capsule capsule Take 5,000 Units by mouth Daily.   • cyanocobalamin ([employer] VITAMIN B-12) 1000 MCG tablet Take 1 tablet by mouth 2 (Two) Times a Day.   • diclofenac (VOLTAREN) 1 % gel gel Apply 4 g topically to the appropriate area as directed 4 (Four) Times a Day. As needed   • gabapentin (NEURONTIN) 300 MG  capsule Take 1 capsule by mouth 3 (Three) Times a Day.   • isosorbide mononitrate (IMDUR) 30 MG 24 hr tablet TAKE ONE TABLET BY MOUTH DAILY   • lamoTRIgine (LaMICtal) 150 MG tablet Take 300 mg by mouth Daily.   • levothyroxine (SYNTHROID, LEVOTHROID) 100 MCG tablet Take 100 mcg by mouth Daily.   • levothyroxine (SYNTHROID, LEVOTHROID) 88 MCG tablet TAKE ONE TABLET BY MOUTH EVERY MORNING   • loratadine (CLARITIN) 10 MG tablet Take 1 tablet by mouth Daily. As needed for allergies   • nitroglycerin (NITROSTAT) 0.4 MG SL tablet Place 0.4 mg under the tongue Every 5 (Five) Minutes As Needed for Chest Pain (took at M.D  office at 0930 a.m.). Take no more than 3 doses in 15 minutes.   • oxybutynin XL (DITROPAN XL) 15 MG 24 hr tablet    • pramipexole (MIRAPEX) 0.5 MG tablet    • raNITIdine (ZANTAC) 150 MG tablet Take 1 tablet by mouth 2 (Two) Times a Day.   • simvastatin (ZOCOR) 20 MG tablet Take 1 tablet by mouth Every Night.   • venlafaxine XR (EFFEXOR-XR) 75 MG 24 hr capsule Take 75 mg by mouth Daily.     No current facility-administered medications on file prior to visit.      Current Medications:  Scheduled Meds:  Continuous Infusions:  No current facility-administered medications for this visit.   PRN Meds:.    I have reviewed the patient's medical history in detail and updated the computerized patient record.  Review and summarization of old records include:    Past Medical History:   Diagnosis Date   • Abnormal Pap smear of cervix    • Anemia    • Anxiety    • Arthritis    • Atherosclerotic heart disease of native coronary artery with other forms of angina pectoris (CMS/HCC)    • Bipolar I disorder, single manic episode (CMS/HCC)     depressive d(NOS)   • Cervical disc herniation    • Cervical dysplasia    • Coronary artery disease    • CTS (carpal tunnel syndrome)    • Depression     NO SUICIDAL PLANS   • Difficulty swallowing    • Diverticular disease    • Dyspepsia    • Dysphagia    • Gastric reflux    • Heart  attack (CMS/[facility])    • Heart murmur    • Hiatal hernia    • High cholesterol    • History of transfusion    • 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    HPV positive on pap LGSIL   • Hyperlipidemia    • Hypertension    • Hypothyroidism    • Incontinence in female     wears pads   • Kidney disease 2017    stage 2    • Kidney disease, chronic, stage III (GFR 30-59 ml/min) (CMS/[facility])    • LGSIL on Pap smear of cervix 04/29/2016    LGSIL HPV positive   • Myocardial infarction (CMS/[facility]) 2000   • Neck pain    • Obesity    • Past myocardial infarction 05/2000   • Periodic limb movement disorder    • Restless leg syndrome    • Sleep apnea     cpap   • Stress fracture     LEFT HEEL   • Suicidal ideation 08/19/2016    history   • Swelling of ankle     right had doppler no s/s blood clot   • Thyroid nodule    • Vitamin B12 deficiency         Past Surgical History:   Procedure Laterality Date   • ANTERIOR CERVICAL DISCECTOMY W/ FUSION N/A 12/29/2016    Procedure: C3-4 anterior cervical discectomy and fusion with Depuy micro plate, ALLOGRAFT C3-4, AND HARDWARE REMOVAL C4-7.;  Surgeon: Hema Godwin MD;  Location: [facility] OR;  Service:    • CARDIAC CATHETERIZATION N/A 3/30/2017    Procedure: Left Heart Cath;  Surgeon: Tracey Vargas MD;  Location: [facility] CATH INVASIVE LOCATION;  Service:    • CARDIAC CATHETERIZATION N/A 3/30/2017    Procedure: Coronary angiography;  Surgeon: Tracey Vargas MD;  [facility];  Service:    • CARDIAC CATHETERIZATION N/A 3/30/2017    Procedure: Left ventriculography;  Surgeon: Tracey Vargas MD;  [facility];  Service:    • CARDIAC CATHETERIZATION  3/30/2017    Procedure: Functional Flow Happy;  Surgeon: Tracey Vargas MD;  [facility];  Service:    • CARPAL TUNNEL RELEASE     • CERVICAL BIOPSY  [facility]XVI    Dr. Jeffery.    • CERVICAL DISCECTOMY ANTERIOR  04/2013    C4-7   • COLONOSCOPY  04/20/2015     Diverticulosis, IH   • COLPOSCOPY W/ BIOPSY / CURETTAGE  2016    LGSIL HPV positive. Results were normal repeat pap in one year. Chronic Cervicitis   • CORONARY ANGIOPLASTY WITH STENT PLACEMENT     • ENDOSCOPY  MM[facility]    Normal.  Dr. Rodriguez   • ENDOSCOPY N/A 2017    Erythematous mucosa in the stomach  PATH: Chronic active gastritis, moderate with intestinal metaplasia    • GASTRIC BYPASS      Done using the sleeve technique   • HARDWARE REMOVAL  2016    cervical   • LAPAROSCOPIC GASTRIC BANDING  2018   • SHOULDER SURGERY Left     RCR   • TONSILLECTOMY     • TONSILLECTOMY AND ADENOIDECTOMY     • TRANSVAGINAL TAPING SUSPENSION N/A 2017    Procedure: MID URETHRAL SLING CYSTSCOPY;  Surgeon: Abby Méndez MD;  Location: [facility];  Service:    • TUBAL ABDOMINAL LIGATION     • TYMPANOSTOMY TUBE PLACEMENT Right    • WRIST SURGERY Bilateral     carpal tunnel        Social History     Occupational History   • Occupation: disabled   Tobacco Use   • Smoking status: Former Smoker     Packs/day: 1.50     Years: 20.00     Pack years: 30.00     Types: Electronic Cigarette, Cigarettes     Last attempt to quit:      Years since quittin.8   • Smokeless tobacco: Never Used   • Tobacco comment: Electronic Cigarette/ 3 mg nicotine    Substance and Sexual Activity   • Alcohol use: Yes     Comment: 2 times yearly    • Drug use: No   • Sexual activity: Yes     Partners: Male     Birth control/protection: None, Condom      Social History     Social History Narrative   • Not on file        Family History   Problem Relation Age of Onset   • Diabetes type II Mother    • Hypertension Mother    • Osteoporosis Mother    • Seizures Mother    • COPD Father    • Hypertension Father    • Lung cancer Father    • Heart attack Father    • Liver cancer Father    • Thyroid disease Sister    • Hypertension Sister    • Bipolar disorder Sister    • Depression Sister    • ADD / ADHD Sister    • No Known Problems  "Son    • Abnormal EKG Daughter    • Hypertension Daughter    • Bipolar disorder Daughter    • Thyroid disease Daughter    • No Known Problems Paternal Grandfather    • No Known Problems Paternal Grandmother    • No Known Problems Maternal Grandmother    • No Known Problems Maternal Grandfather    • Thyroid disease Sister    • Hypertension Sister    • Bipolar disorder Sister    • Asthma Daughter    • Breast cancer Neg Hx    • Ovarian cancer Neg Hx    • Uterine cancer Neg Hx    • Colon cancer Neg Hx    • Malig Hyperthermia Neg Hx        ROS: 14 point review of systems was performed and was negative except for documented findings in HPI and today's encounter.     Allergies: No Known Allergies  Constitutional:  Denies fever, shaking or chills   Eyes:  Denies change in visual acuity   HENT:  Denies nasal congestion or sore throat   Respiratory:  Denies cough or shortness of breath   Cardiovascular:  Denies chest pain or severe LE edema   GI:  Denies abdominal pain, nausea, vomiting, bloody stools or diarrhea   Musculoskeletal:  Numbness, tingling, or loss of motor function only as noted above in history of present illness.  : Denies painful urination or hematuria  Integument:  Denies rash, lesion or ulceration   Neurologic:  Denies headache or focal weakness  Endocrine:  Denies lymphadenopathy  Psych:  Denies confusion or change in mental status   Hem:  Denies active bleeding      Physical Exam: 59 y.o. female  Wt Readings from Last 3 Encounters:   11/05/19 86.2 kg (190 lb)   10/31/19 86.2 kg (190 lb)   10/16/19 86.2 kg (190 lb)     Ht Readings from Last 1 Encounters:   11/05/19 160 cm (63\")     Body mass index is 33.66 kg/m².  Vitals:    11/05/19 1427   Temp: 98.3 °F (36.8 °C)     Vital signs reviewed.   General Appearance:    Alert, cooperative, in no acute distress                  Eyes: conjunctiva clear  ENT: external ears and nose atraumatic  CV: no peripheral edema  Resp: normal respiratory effort  Skin: no " rashes or wounds; normal turgor  Psych: mood and affect appropriate  Lymph: no nodes appreciated  Neuro: gross sensation intact  Vascular:  Palpable peripheral pulse in noted extremity  Musculoskeletal:    Affected knee(s):  Painful gait with a subtle limp, positive for synovitis, swelling, joint effusion with crepitation.  Lachman negative  Posterior drawer negative  Hamida's negative  Patellofemoral grind +  Sensation grossly intact to light touch throughout the lower extremity  Skin is intact  Distal pulses are palpable  No signs or symptoms of DVT           Diagnostic Data:      Imaging was done today, images were personally viewed and discussed with the patient:    Indication: pain related symptoms,  Views: 3V AP, LAT & 40 degree PA bilateral knee(s)   Findings: advanced arthritis  Comparison views: viewed last xray done in the office     Procedure:  Large Joint Arthrocentesis: R knee  Date/Time: 11/5/2019 12:22 PM  Consent given by: patient  Site marked: site marked  Timeout: Immediately prior to procedure a time out was called to verify the correct patient, procedure, equipment, support staff and site/side marked as required   Supporting Documentation  Indications: pain and joint swelling   Procedure Details  Location: knee - R knee  Preparation: Patient was prepped and draped in the usual sterile fashion  Needle size: 22 G  Approach: anterolateral  Medications administered: 80 mg methylPREDNISolone acetate 80 MG/ML; 4 mL lidocaine (cardiac)  Patient tolerance: patient tolerated the procedure well with no immediate complications    Large Joint Arthrocentesis: L knee  Date/Time: 11/5/2019 12:23 PM  Consent given by: patient  Site marked: site marked  Timeout: Immediately prior to procedure a time out was called to verify the correct patient, procedure, equipment, support staff and site/side marked as required   Supporting Documentation  Indications: pain and joint swelling   Procedure Details  Location: knee  - L knee  Preparation: Patient was prepped and draped in the usual sterile fashion  Needle size: 22 G  Approach: anterolateral  Medications administered: 4 mL lidocaine (cardiac); 80 mg methylPREDNISolone acetate 80 MG/ML  Patient tolerance: patient tolerated the procedure well with no immediate complications          Assessment:     ICD-10-CM ICD-9-CM   1. Arthritis of both knees M17.0 716.96       Plan:   Follow up as indicated.  Ice, elevate, and rest as needed.   15 min spent face to face with patient 11 min spent counseling about:  Biomechanics of pertinent body area discussed.  Risks, benefits, alternatives, comparisons, and complications of accepted medicines, injections, recommendations, surgical procedures, and therapies explained and education provided in laymen's terms. Natural history and expected course of this patient's diagnosis discussed along with evaluation of therapies. Questions answered. When appropriate I also discussed proper use of cane, walker, trekking poles.   BMI:  The concept of BMI body mass index and its importance and implications discussed.  BMI suggested to be < 40 or as low as possible. Lifestyle measures for weight loss and how this affects orthopedic condition.  EXERCISES:  Advice on benefits of, and types of regular/moderate exercise including biomechanical forces involved as it pertains to this complaint.  RICE: Rest, ice, compression, and elevation therapy, Cryotherapy/brachy therapy, and or OTC linaments as indicated with instructions.   Cortisone Injection. See procedure note.  Enc to find some exercises to do in the winter, talked about bicycle or walking at [facility].  Verb understanding. 3 mo f/u.   11/5/2019  Ruba Duncan, APRN

## 2019-11-05 NOTE — PROGRESS NOTES
"Subjective   Keri Bhagat is a 59 y.o. female.     Patient complains right lateral elbow tenderness x1 week  No radiation no injury uses phone quite a bit  During the day nothing makes better or worse symptoms are mild to moderate  Really pretty uncomfortable tender localized pain she is had no swelling falls or other problems she is had golfers elbow in the past           /68   Pulse 67   Ht 160 cm (63\")   Wt 86.2 kg (190 lb)   LMP 10/21/2016 (Approximate) Comment: 54  SpO2 98%   BMI 33.66 kg/m²       The following portions of the patient's history were reviewed and updated as appropriate: allergies, current medications, past family history, past medical history, past social history, past surgical history and problem list.    Review of Systems   Constitutional: Negative for chills, fatigue, fever and unexpected weight loss.   HENT: Negative.  Negative for trouble swallowing.    Eyes: Negative.    Respiratory: Negative for cough and shortness of breath.    Cardiovascular: Negative for chest pain, palpitations and leg swelling.   Gastrointestinal: Negative for abdominal pain and blood in stool.   Genitourinary: Negative.  Negative for pelvic pain.   Musculoskeletal: Negative.         Elbow pain right   Skin: Negative.    Neurological: Negative.  Negative for dizziness, speech difficulty, weakness and confusion.   Psychiatric/Behavioral: Negative.    All other systems reviewed and are negative.      Objective   Physical Exam   Constitutional: She is oriented to person, place, and time. She appears well-developed and well-nourished. No distress.   HENT:   Head: Normocephalic.   Eyes: Conjunctivae are normal. Pupils are equal, round, and reactive to light.   Neck: Neck supple.   Cardiovascular: Exam reveals no friction rub.   No murmur heard.  Pulmonary/Chest: Effort normal. No respiratory distress. She has no wheezes.   Musculoskeletal: She exhibits no edema.   Tenderness right lateral epicondyle " localized point tenderness consistent with epicondylitis full range of motion internal and external rotation  Normal but with rotation of the wrist pain lateral tenderness upper arm forearm nontender without swelling  No intra-articular swelling no redness bursa normal   Neurological: She is alert and oriented to person, place, and time.   Skin: Skin is warm and dry.   Psychiatric: She has a normal mood and affect. Her behavior is normal. Judgment and thought content normal.   Vitals reviewed.        Assessment/Plan   Keri was seen today for right elbow pain.    Diagnoses and all orders for this visit:    Lateral epicondylitis of right elbow    Other orders  -     diclofenac (VOLTAREN) 1 % gel gel; Apply 4 g topically to the appropriate area as directed 4 (Four) Times a Day. As needed        Lateral epicondylitis follow instructions below    Risk-benefit Voltaren gel      Patient Instructions   Tendinitis    Tendinitis is inflammation of a tendon. A tendon is a strong cord of tissue that connects muscle to bone.  Tendinitis can affect any tendon, but it most commonly affects the:  · Shoulder tendon (rotator cuff).  · Ankle tendon (Achilles tendon).  · Elbow tendon (triceps tendon).  · Tendons in the wrist.  What are the causes?  This condition may be caused by:  · Overusing a tendon or muscle. This is common.  · Age-related wear and tear.  · Injury.  · Inflammatory conditions, such as arthritis.  · Certain medicines.  What increases the risk?  You are more likely to develop this condition if you do activities that involve the same movements over and over again (repetitive motions).  What are the signs or symptoms?  Symptoms of this condition may include:  · Pain.  · Tenderness.  · Mild swelling.  · Decreased range of motion.  How is this diagnosed?  This condition is diagnosed with a physical exam. You may also have tests, such as:  · Ultrasound. This uses sound waves to make an image of the inside of your body in  the affected area.  · MRI.  How is this treated?  This condition may be treated by resting, icing, applying pressure (compression), and raising (elevating) the affected area above the level of your heart. This is known as RICE therapy. Treatment may also include:  · Medicines to help reduce inflammation or to help reduce pain.  · Exercises or physical therapy to strengthen and stretch the tendon.  · A brace or splint.  · Surgery. This is rarely needed.  Follow these instructions at home:  If you have a splint or brace:  · Wear the splint or brace as told by your health care provider. Remove it only as told by your health care provider.  · Loosen the splint or brace if your fingers or toes tingle, become numb, or turn cold and blue.  · Keep the splint or brace clean.  · If the splint or brace is not waterproof:  ? Do not let it get wet.  ? Cover it with a watertight covering when you take a bath or shower.  Managing pain, stiffness, and swelling  · If directed, put ice on the affected area.  ? If you have a removable splint or brace, remove it as told by your health care provider.  ? Put ice in a plastic bag.  ? Place a towel between your skin and the bag.  ? Leave the ice on for 20 minutes, 2-3 times a day.  · Move the fingers or toes of the affected limb often, if this applies. This can help to prevent stiffness and lessen swelling.  · If directed, raise (elevate) the affected area above the level of your heart while you are sitting or lying down.  · If directed, apply heat to the affected area before you exercise. Use the heat source that your health care provider recommends, such as a moist heat pack or a heating pad.         ? Place a towel between your skin and the heat source.  ? Leave the heat on for 20-30 minutes.  ? Remove the heat if your skin turns bright red. This is especially important if you are unable to feel pain, heat, or cold. You may have a greater risk of getting burned.  Driving  · Do not drive  or use heavy machinery while taking prescription pain medicine.  · Ask your health care provider when it is safe to drive if you have a splint or brace on any part of your arm or leg.  Activity  · Rest the affected area as told by your health care provider.  · Return to your normal activities as told by your health care provider. Ask your health care provider what activities are safe for you.  · Avoid using the affected area while you are experiencing symptoms of tendinitis.  · Do exercises as told by your health care provider.  General instructions  · If you have a splint, do not put pressure on any part of the splint until it is fully hardened. This may take several hours.  · Wear an elastic bandage or compression wrap only as told by your health care provider.  · Take over-the-counter and prescription medicines only as told by your health care provider.  · Keep all follow-up visits as told by your health care provider. This is important.  Contact a health care provider if:  · Your symptoms do not improve.  · You develop new, unexplained problems, such as numbness in your hands.  Summary  · Tendinitis is inflammation of a tendon.  · You are more likely to develop this condition if you do activities that involve the same movements over and over again.  · This condition may be treated by resting, icing, applying pressure (compression), and elevating the area above the level of your heart. This is known as RICE therapy.  · Avoid using the affected area while you are experiencing symptoms of tendinitis.  This information is not intended to replace advice given to you by your health care provider. Make sure you discuss any questions you have with your health care provider.  Document Released: 12/15/2001 Document Revised: 05/08/2019 Document Reviewed: 05/08/2019  [employer] Interactive Patient Education © 2019 [employer]    Tennis Elbow Rehab  Ask your health care provider which exercises are safe for you. Do exercises  exactly as told by your health care provider and adjust them as directed. It is normal to feel mild stretching, pulling, tightness, or discomfort as you do these exercises, but you should stop right away if you feel sudden pain or your pain gets worse. Do not begin these exercises until told by your health care provider.  Stretching and range of motion exercises  These exercises warm up your muscles and joints and improve the movement and flexibility of your elbow. These exercises also help to relieve pain, numbness, and tingling.  Exercise A: Wrist extensor stretch  1. Extend your left / right elbow with your fingers pointing down.  2. Gently pull the palm of your left / right hand toward you until you feel a gentle stretch on the top of your forearm.  3. To increase the stretch, push your left / right hand toward the outer edge or pinkie side of your forearm.  4. Hold this position for __________ seconds.  Repeat __________ times. Complete this exercise __________ times a day.  If directed by your health care provider, repeat this stretch except do it with a bent elbow this time.  Exercise B: Wrist flexor stretch    1. Extend your left / right elbow and turn your palm upward.  2. Gently pull your left / right palm and fingertips back so your wrist extends and your fingers point more toward the ground.  3. You should feel a gentle stretch on the inside of your forearm.  4. Hold this position for __________ seconds.  Repeat __________ times. Complete this exercise __________ times a day.  If directed by your health care provider, repeat this stretch except do it with a bent elbow this time.  Strengthening exercises  These exercises build strength and endurance in your elbow. Endurance is the ability to use your muscles for a long time, even after they get tired.  Exercise C: Wrist extensors    1. Sit with your left / right forearm palm-down and fully supported on a table or countertop. Your elbow should be resting  below the height of your shoulder.  2. Let your left / right wrist extend over the edge of the surface.  3. Loosely hold a __________ weight or a piece of rubber exercise band or tubing in your left / right hand. Slowly curl your left / right hand up toward your forearm. If you are using band or tubing, hold the band or tubing in place with your other hand to provide resistance.  4. Hold this position for __________ seconds.  5. Slowly return to the starting position.  Repeat __________ times. Complete this exercise __________ times a day.  Exercise D: Radial deviators    1. Stand with a __________ weight in your left / right hand. Or, sit while holding a rubber exercise band or tubing with your other arm supported on a table or countertop. Position your hand so your thumb is on top.  2. Raise your hand upward in front of you so your thumb travels toward your forearm, or pull up on the rubber tubing.  3. Hold this position for __________ seconds.  4. Slowly return to the starting position.  Repeat __________ times. Complete this exercise __________ times a day.  Exercise E: Eccentric wrist extensors  1. Sit with your left / right forearm palm-down and fully supported on a table or countertop. Your elbow should be resting below the height of your shoulder.  2. If told by your health care provider, hold a __________ weight in your hand.  3. Let your left / right wrist extend over the edge of the surface.  4. Use your other hand to lift up your left / right hand toward your forearm. Keep your forearm on the table.  5. Using only the muscles in your left / right hand, slowly lower your hand back down to the starting position.  Repeat __________ times. Complete this exercise __________ times a day.  This information is not intended to replace advice given to you by your health care provider. Make sure you discuss any questions you have with your health care provider.  Document Released: 12/18/2006 Document Revised:  08/23/2017 Document Reviewed: 09/15/2016  [employer] Interactive Patient Education © 2019 [employer]

## 2019-11-08 ENCOUNTER — HOSPITAL ENCOUNTER (OUTPATIENT)
Dept: SPEECH THERAPY | Facility: HOSPITAL | Age: 59
Setting detail: THERAPIES SERIES
Discharge: HOME | End: 2019-11-08

## 2019-11-08 DIAGNOSIS — R41.841 COGNITIVE COMMUNICATION DEFICIT: ICD-10-CM

## 2019-11-08 DIAGNOSIS — D32.9 MENINGIOMA (HCC): ICD-10-CM

## 2019-11-08 DIAGNOSIS — E23.6 PITUITARY CYST (HCC): Primary | ICD-10-CM

## 2019-11-08 PROCEDURE — 92507 TX SP LANG VOICE COMM INDIV: CPT | Performed by: SPEECH-LANGUAGE PATHOLOGIST

## 2019-11-08 NOTE — THERAPY TREATMENT NOTE
Outpatient Speech Language Pathology   Adult Speech Language Cognitive Treatment Note  [facility]     Patient Name: Keri Bhagat  : 1960  MRN: [id number]  Today's Date: 2019         Visit Date: 2019   Patient Active Problem List   Diagnosis   • Gastroesophageal reflux disease   • Bipolar I disorder, single manic episode (CMS/HCC)   • Atherosclerosis of coronary artery   • Essential hypertension   • Lightheadedness   • Low back pain   • Sciatica   • Thyroid nodule   • Fatigue   • Hyperlipidemia   • Hypothyroidism (acquired)   • Iron deficiency anemia   • Right knee pain   • Cervical disc disease   • Depression   • ROCKY (obstructive sleep apnea)   • Coronary artery disease involving native coronary artery of native heart without angina pectoris   • Cervical spondylosis with radiculopathy   • Chronic pain of both knees   • Arthritis of both knees   • Weight gain, abnormal   • Allergic rhinitis   • Obesity (BMI 30-39.9)   • Plantar fasciitis   • Peroneal tendon injury   • Stress fracture of calcaneus   • Vitamin D deficiency   • Pain and swelling of knee   • Knee injury   • Calcific Achilles tendonitis   • Rod's deformity   • Urgency incontinence   • Nocturia   • Chronic gastritis without bleeding   • LGSIL on Pap smear of cervix   • Primary osteoarthritis of right knee   • Chronic renal insufficiency, stage III (moderate) (CMS/HCC)   • Left wrist tendinitis   • Cervical radiculopathy   • Tobacco abuse   • Obesity, morbid, BMI 40.0-49.9 (CMS/HCC)   • Encounter for screening for lung cancer   • Urinary frequency   • Diabetes mellitus screening   • MORALES I (cervical intraepithelial neoplasia I)   • Primary osteoarthritis of both knees   • H/O bladder repair surgery   • Chest pain   • Syncope and collapse   • Closed fracture of left distal radius   • Closed displaced fracture of neck of right fifth metacarpal bone   • Fracture follow-up   • Nondisplaced fracture of base of fifth metacarpal bone,  left hand, initial encounter for closed fracture   • Closed fracture of lower end of left radius with routine healing   • Multiple dislocations of fingers, open   • Bilateral chronic knee pain   • Arthritis of left knee   • Arthritis of right knee   • Family history of diabetes mellitus (DM)   • Menopause   • 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 Pituitary cyst (CMS/HCC) E23.6 253.8   2. Meningioma (CMS/HCC) D32.9 225.2   3. Cognitive communication deficit R41.841 799.52                         SLP OP Goals     Row Name 11/08/19 1400          Goal Type Needed    Goal Type Needed  Verbal Expression  -KA        Subjective Comments    Subjective Comments  Patient is pleasant and cooperative  -KA        Subjective Pain    Able to rate subjective pain?  yes  -KA     Pre-Treatment Pain Level  0  -KA     Post-Treatment Pain Level  0  -KA        Verbal Expression Goals    Verbal Expression LTG's  Patient will be able to use verbal expressive language skills to communicate effectively in social/avocational/work setting  -KA     Patient will be able to use verbal expressive language skills to communicate effectively in social/avocational/work setting  90%:;without cues  -KA     Status: Patient will be able to use verbal expressive language skills to communicate effectively in social/avocational/work setting  New  -KA     Verbal Expression STG's  Patient will improve verbal expressive language skills by describing attributes, function, action and/or uses of an object/item  -KA     Patient will improve verbal expressive language skills by describing attributes, function, action and/or uses of  "an object/item  90%:;without cues  -KA     Status: Patient will improve verbal expressive language skills by describing attributes, function, action and/or uses of an object/item  New  -KA     Comments: Patient will improve verbal expressive language skills by describing attributes, function, action and/or uses of an object/item  Discussed word finding strategies due to pt c/o word finding difficulty described semantic features with 80% accuracy without cues  -KA        Dysarthria Goals    \"Patient will increase strength and power  of articulators so they can move more quickly and accurately to improve speech intelligibility by completing lip exercises\"  90%:;without cues  -KA     Status: Patient will increase strength and power  of articulators so they can move more quickly and accurately to improve speech intelligibility by completing lip exercises  New  -KA     Comments: Patient will increase strength and power  of articulators so they can move more quickly and accurately to improve speech intelligibility by completing lip exercises  Patient c/o \"slurred speech,\" however when explains her slurred speech she describes and states \"I cannot think of the correct word.\" SLPeducated patient on dysarthria and oral motor exercises which pt completed with 100% accuracy (lingual, labial). During conversational speech pt does not demonstrate slurred speech and SLP educated on strategies to overarticulate and slow speech rate when fatigued.   -KA        Memory Goals    Memory STG's  Patient’s memory skills will be enhanced as reported by patient by utilizing internal memory strategies to recall up to 3 pieces of information after a 5- minute delay  -KA     Patient’s memory skills will be enhanced as reported by patient by utilizing internal memory strategies to recall up to 3 pieces of information after a 5- minute delay  90%:;without cues  -KA     Status: Patient’s memory skills will be enhanced as reported by patient by " utilizing internal memory strategies to recall up to 3 pieces of information after a 5- minute delay  New  -KA     Comments: Patient’s memory skills will be enhanced as reported by patient by utilizing internal memory strategies to recall up to 3 pieces of information after a 5- minute delay  Delayed recall of 5 names with visualization and picture association 1/5 20% without cues after 20 min delay and 4/5 with cues and after 10 minute delay 4/5 80% without cues and 100% with min cues  -KA     Patient’s memory skills will be enhanced as reported by patient by using external memory aides  90%:;without cues  -KA     Status: Patient’s memory skills will be enhanced as reported by patient by using external memory aides  New  -KA     Comments: Patient’s memory skills will be enhanced as reported by patient by using external memory aides  SLP provided pt with handout on external memory aids and discusses calendars,post it notes, notebooks etc and examples, situations when to write important information down.  -KA        Problem Solving Goals    Patient will improve ability to analyze problems and determine solutions by completing a visual representation/filling in a chart by following  written directions  90%:;without cues  -KA     Status: Patient will improve ability to analyze problems and determine solutions by completing a visual representation/filling in a chart by following  written directions  New  -KA     Comments: Patient will improve ability to analyze problems and determine solutions by completing a visual representation/filling in a chart by following  written directions  SLP completed diagnostic assessment of patient's higher level reasoning and process of elimination where pt had to attend to instructions 60% without cues and 100% with min to mod cues  -KA       User Key  (r) = Recorded By, (t) = Taken By, (c) = Cosigned By    Initials Name Provider Type    Derek Robles MA,CCC-SLP Speech and Language  Pathologist                         Time Calculation:   SLP Start Time: 1245  SLP Stop Time: 1345  SLP Time Calculation (min): 60 min    Therapy Charges for Today     Code Description Service Date Service Provider Modifiers Qty    [id number] [facility] TREATMENT SPEECH 4 11/8/2019 Derek Quiñones MA,CCC-SLP GN 1                   Derek Quiñones MA,REESE-SLP  11/8/2019

## 2019-11-15 ENCOUNTER — HOSPITAL ENCOUNTER (OUTPATIENT)
Dept: SPEECH THERAPY | Facility: HOSPITAL | Age: 59
Setting detail: THERAPIES SERIES
Discharge: HOME | End: 2019-11-15

## 2019-11-15 DIAGNOSIS — E23.6 PITUITARY CYST (HCC): Primary | ICD-10-CM

## 2019-11-15 DIAGNOSIS — R41.841 COGNITIVE COMMUNICATION DEFICIT: ICD-10-CM

## 2019-11-15 DIAGNOSIS — D32.9 MENINGIOMA (HCC): ICD-10-CM

## 2019-11-15 PROCEDURE — 92507 TX SP LANG VOICE COMM INDIV: CPT | Performed by: SPEECH-LANGUAGE PATHOLOGIST

## 2019-11-15 NOTE — THERAPY TREATMENT NOTE
Outpatient Speech Language Pathology   Adult Speech Language Cognitive Treatment Note  [facility]     Patient Name: Keri Bhagat  : 1960  MRN: [id number]  Today's Date: 11/15/2019         Visit Date: 11/15/2019   Patient Active Problem List   Diagnosis   • Gastroesophageal reflux disease   • Bipolar I disorder, single manic episode (CMS/HCC)   • Atherosclerosis of coronary artery   • Essential hypertension   • Lightheadedness   • Low back pain   • Sciatica   • Thyroid nodule   • Fatigue   • Hyperlipidemia   • Hypothyroidism (acquired)   • Iron deficiency anemia   • Right knee pain   • Cervical disc disease   • Depression   • ROCKY (obstructive sleep apnea)   • Coronary artery disease involving native coronary artery of native heart without angina pectoris   • Cervical spondylosis with radiculopathy   • Chronic pain of both knees   • Arthritis of both knees   • Weight gain, abnormal   • Allergic rhinitis   • Obesity (BMI 30-39.9)   • Plantar fasciitis   • Peroneal tendon injury   • Stress fracture of calcaneus   • Vitamin D deficiency   • Pain and swelling of knee   • Knee injury   • Calcific Achilles tendonitis   • Rod's deformity   • Urgency incontinence   • Nocturia   • Chronic gastritis without bleeding   • LGSIL on Pap smear of cervix   • Primary osteoarthritis of right knee   • Chronic renal insufficiency, stage III (moderate) (CMS/HCC)   • Left wrist tendinitis   • Cervical radiculopathy   • Tobacco abuse   • Obesity, morbid, BMI 40.0-49.9 (CMS/HCC)   • Encounter for screening for lung cancer   • Urinary frequency   • Diabetes mellitus screening   • MORALES I (cervical intraepithelial neoplasia I)   • Primary osteoarthritis of both knees   • H/O bladder repair surgery   • Chest pain   • Syncope and collapse   • Closed fracture of left distal radius   • Closed displaced fracture of neck of right fifth metacarpal bone   • Fracture follow-up   • Nondisplaced fracture of base of fifth metacarpal  bone, left hand, initial encounter for closed fracture   • Closed fracture of lower end of left radius with routine healing   • Multiple dislocations of fingers, open   • Bilateral chronic knee pain   • Arthritis of left knee   • Arthritis of right knee   • Family history of diabetes mellitus (DM)   • Menopause   • 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 Pituitary cyst (CMS/HCC) E23.6 253.8   2. Meningioma (CMS/HCC) D32.9 225.2   3. Cognitive communication deficit R41.841 799.52                         SLP OP Goals     Row Name 11/15/19 1500          Subjective Comments    Subjective Comments  Patient arrived 20 minutes late to therapy today   -KA        Subjective Pain    Able to rate subjective pain?  yes  -KA     Pre-Treatment Pain Level  0  -KA     Post-Treatment Pain Level  0  -KA        Memory Goals    Patient’s memory skills will be enhanced as reported by patient by utilizing internal memory strategies to recall up to 3 pieces of information after a 5- minute delay  90%:;without cues  -KA     Status: Patient’s memory skills will be enhanced as reported by patient by utilizing internal memory strategies to recall up to 3 pieces of information after a 5- minute delay  Progressing as expected  -KA     Comments: Patient’s memory skills will be enhanced as reported by patient by utilizing internal memory strategies to recall up to 3 pieces of information after a 5- minute delay  Delayed recall of 5 names after 5 minute delay 3/5 60% and after 15 minutes 100% without cues.   -KA     Patient’s memory skills will be enhanced as reported by patient by using external memory aides   90%:;without cues  -KA     Status: Patient’s memory skills will be enhanced as reported by patient by using external memory aides  Progressing as expected  -KA     Comments: Patient’s memory skills will be enhanced as reported by patient by using external memory aides  Repetition of four words and repeating back four words and answering question based on repetition 80% without cues. Repetition when listening to stories outloud in auditory memory task 80% without multiple choice  -KA        Problem Solving Goals    Patient will improve ability to analyze problems and determine solutions by completing a visual representation/filling in a chart by following  written directions  90%:;without cues  -KA     Status: Patient will improve ability to analyze problems and determine solutions by completing a visual representation/filling in a chart by following  written directions  Progressing as expected  -KA     Comments: Patient will improve ability to analyze problems and determine solutions by completing a visual representation/filling in a chart by following  written directions  100% without cues when completing moderately complex deductive reasoning puzzle without cues increased processing speed  -KA       User Key  (r) = Recorded By, (t) = Taken By, (c) = Cosigned By    Initials Name Provider Type    Derek Robles MA,CCC-SLP Speech and Language Pathologist                         Time Calculation:   SLP Start Time: 1305  SLP Stop Time: 1345  SLP Time Calculation (min): 40 min    Therapy Charges for Today     Code Description Service Date Service Provider Modifiers Qty    [id number]  ST TREATMENT SPEECH 3 11/15/2019 Derek Quiñones MA,CCC-SLP GN 1                   Derek Quiñones MA,CCC-SLP  11/15/2019

## 2019-11-22 ENCOUNTER — HOSPITAL ENCOUNTER (OUTPATIENT)
Dept: SPEECH THERAPY | Facility: HOSPITAL | Age: 59
Setting detail: THERAPIES SERIES
Discharge: HOME | End: 2019-11-22

## 2019-11-22 DIAGNOSIS — D32.9 MENINGIOMA (HCC): ICD-10-CM

## 2019-11-22 DIAGNOSIS — E23.6 PITUITARY CYST (HCC): Primary | ICD-10-CM

## 2019-11-22 DIAGNOSIS — R41.841 COGNITIVE COMMUNICATION DEFICIT: ICD-10-CM

## 2019-11-22 PROCEDURE — 92507 TX SP LANG VOICE COMM INDIV: CPT | Performed by: SPEECH-LANGUAGE PATHOLOGIST

## 2019-11-22 NOTE — THERAPY TREATMENT NOTE
Outpatient Speech Language Pathology   Adult Speech Language Cognitive Treatment Note  [facility]     Patient Name: Keri Bhagat  : 1960  MRN: [id number]  Today's Date: 2019         Visit Date: 2019   Patient Active Problem List   Diagnosis   • Gastroesophageal reflux disease   • Bipolar I disorder, single manic episode (CMS/HCC)   • Atherosclerosis of coronary artery   • Essential hypertension   • Lightheadedness   • Low back pain   • Sciatica   • Thyroid nodule   • Fatigue   • Hyperlipidemia   • Hypothyroidism (acquired)   • Iron deficiency anemia   • Right knee pain   • Cervical disc disease   • Depression   • ROCKY (obstructive sleep apnea)   • Coronary artery disease involving native coronary artery of native heart without angina pectoris   • Cervical spondylosis with radiculopathy   • Chronic pain of both knees   • Arthritis of both knees   • Weight gain, abnormal   • Allergic rhinitis   • Obesity (BMI 30-39.9)   • Plantar fasciitis   • Peroneal tendon injury   • Stress fracture of calcaneus   • Vitamin D deficiency   • Pain and swelling of knee   • Knee injury   • Calcific Achilles tendonitis   • Rod's deformity   • Urgency incontinence   • Nocturia   • Chronic gastritis without bleeding   • LGSIL on Pap smear of cervix   • Primary osteoarthritis of right knee   • Chronic renal insufficiency, stage III (moderate) (CMS/HCC)   • Left wrist tendinitis   • Cervical radiculopathy   • Tobacco abuse   • Obesity, morbid, BMI 40.0-49.9 (CMS/HCC)   • Encounter for screening for lung cancer   • Urinary frequency   • Diabetes mellitus screening   • MORALES I (cervical intraepithelial neoplasia I)   • Primary osteoarthritis of both knees   • H/O bladder repair surgery   • Chest pain   • Syncope and collapse   • Closed fracture of left distal radius   • Closed displaced fracture of neck of right fifth metacarpal bone   • Fracture follow-up   • Nondisplaced fracture of base of fifth metacarpal  "bone, left hand, initial encounter for closed fracture   • Closed fracture of lower end of left radius with routine healing   • Multiple dislocations of fingers, open   • Bilateral chronic knee pain   • Arthritis of left knee   • Arthritis of right knee   • Family history of diabetes mellitus (DM)   • Menopause   • 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 Pituitary cyst (CMS/HCC) E23.6 253.8   2. Meningioma (CMS/HCC) D32.9 225.2   3. Cognitive communication deficit R41.841 799.52                         SLP OP Goals     Row Name 11/22/19 1300          Subjective Comments    Subjective Comments  Patient is pleasant and cooperative, discussed anticipate upcoming d/c. Patient continues to report talking to fast, \"or i think of what I want to say but it comes out too fast.\" Patient demonstrates no evidence of aphasia or dysarthria, speech is 100% intelligible and no evidence of aphasia or word finding impairments. SLP provided handouts per patient's request for overarticulation and provided sentences for pt to practice overarticulation and slowing speech rate (especially in the evening when more fatigued), pt demonstrated understanding of education completed today  -KA        Subjective Pain    Able to rate subjective pain?  yes  -KA     Pre-Treatment Pain Level  0  -KA     Post-Treatment Pain Level  0  -KA        Verbal Expression Goals    Patient will improve verbal expressive language skills by describing attributes, function, action and/or uses of an object/item  90%:;without cues  -KA     Status: Patient will improve verbal expressive language skills by describing attributes, " function, action and/or uses of an object/item  Progressing as expected  -KA     Comments: Patient will improve verbal expressive language skills by describing attributes, function, action and/or uses of an object/item  In simple to moderate complexity task describing words (simple to moderate complex including verbs and not common objects) 90% without cues   -KA        Memory Goals    Patient’s memory skills will be enhanced as reported by patient by utilizing internal memory strategies to recall up to 3 pieces of information after a 5- minute delay  90%:;without cues  -KA     Status: Patient’s memory skills will be enhanced as reported by patient by utilizing internal memory strategies to recall up to 3 pieces of information after a 5- minute delay  Progressing as expected  -KA     Comments: Patient’s memory skills will be enhanced as reported by patient by utilizing internal memory strategies to recall up to 3 pieces of information after a 5- minute delay  delayed recall of 5 names from previous week 3/5 60% without cues and after 15 minute delay 100% without cues   -KA     Patient’s memory skills will be enhanced as reported by patient by using external memory aides  90%:;without cues  -KA     Status: Patient’s memory skills will be enhanced as reported by patient by using external memory aides  Progressing as expected  -KA     Comments: Patient’s memory skills will be enhanced as reported by patient by using external memory aides  Use of repetition during recall of detail at the paragraph level 90% without cues   -KA        Problem Solving Goals    Patient will improve ability to analyze problems and determine solutions by completing a visual representation/filling in a chart by following  written directions  90%:;without cues  -KA     Status: Patient will improve ability to analyze problems and determine solutions by completing a visual representation/filling in a chart by following  written directions   Progressing as expected  -KA     Comments: Patient will improve ability to analyze problems and determine solutions by completing a visual representation/filling in a chart by following  written directions  Some confusion today during complex deductive reasoning task/grid, reduced attention to detail at times, 80% without cues on first one with increeased time and 100% on second one, pt independently self corrected independently   -KA       User Key  (r) = Recorded By, (t) = Taken By, (c) = Cosigned By    Initials Name Provider Type    Derek Robles MA,CCC-SLP Speech and Language Pathologist                         Time Calculation:   SLP Start Time: 1245  SLP Stop Time: 1345  SLP Time Calculation (min): 60 min    Therapy Charges for Today     Code Description Service Date Service Provider Modifiers Qty    [id number]  ST TREATMENT SPEECH 4 11/22/2019 Derek Quiñones MA,CCC-SLP GN 1                   Derek Quiñones MA,CCC-SLP  11/22/2019

## 2019-11-25 ENCOUNTER — APPOINTMENT (OUTPATIENT)
Dept: GENERAL RADIOLOGY | Facility: HOSPITAL | Age: 59
End: 2019-11-25

## 2019-11-25 ENCOUNTER — HOSPITAL ENCOUNTER (EMERGENCY)
Facility: HOSPITAL | Age: 59
Discharge: HOME | End: 2019-11-25
Attending: EMERGENCY MEDICINE | Admitting: EMERGENCY MEDICINE

## 2019-11-25 VITALS
WEIGHT: 204.8 LBS | DIASTOLIC BLOOD PRESSURE: 85 MMHG | BODY MASS INDEX: 36.29 KG/M2 | HEIGHT: 63 IN | OXYGEN SATURATION: 95 % | RESPIRATION RATE: 16 BRPM | HEART RATE: 58 BPM | TEMPERATURE: 97.3 F | SYSTOLIC BLOOD PRESSURE: 126 MMHG

## 2019-11-25 DIAGNOSIS — R07.9 CHEST PAIN, UNSPECIFIED TYPE: Primary | ICD-10-CM

## 2019-11-25 LAB
ALBUMIN SERPL-MCNC: 4.3 G/DL (ref 3.5–5.2)
ALBUMIN/GLOB SERPL: 1.8 G/DL
ALP SERPL-CCNC: 70 U/L (ref 39–117)
ALT SERPL W P-5'-P-CCNC: 12 U/L (ref 1–33)
ANION GAP SERPL CALCULATED.3IONS-SCNC: 10.6 MMOL/L (ref 5–15)
AST SERPL-CCNC: 11 U/L (ref 1–32)
BASOPHILS # BLD AUTO: 0.04 10*3/MM3 (ref 0–0.2)
BASOPHILS NFR BLD AUTO: 0.6 % (ref 0–1.5)
BILIRUB SERPL-MCNC: 0.2 MG/DL (ref 0.2–1.2)
BUN BLD-MCNC: 14 MG/DL (ref 6–20)
BUN/CREAT SERPL: 12.4 (ref 7–25)
CALCIUM SPEC-SCNC: 8.9 MG/DL (ref 8.6–10.5)
CHLORIDE SERPL-SCNC: 105 MMOL/L (ref 98–107)
CO2 SERPL-SCNC: 26.4 MMOL/L (ref 22–29)
CREAT BLD-MCNC: 1.13 MG/DL (ref 0.57–1)
DEPRECATED RDW RBC AUTO: 38.8 FL (ref 37–54)
EOSINOPHIL # BLD AUTO: 0.24 10*3/MM3 (ref 0–0.4)
EOSINOPHIL NFR BLD AUTO: 3.3 % (ref 0.3–6.2)
ERYTHROCYTE [DISTWIDTH] IN BLOOD BY AUTOMATED COUNT: 13.1 % (ref 12.3–15.4)
GFR SERPL CREATININE-BSD FRML MDRD: 50 ML/MIN/1.73
GLOBULIN UR ELPH-MCNC: 2.4 GM/DL
GLUCOSE BLD-MCNC: 93 MG/DL (ref 65–99)
HCT VFR BLD AUTO: 33.3 % (ref 34–46.6)
HGB BLD-MCNC: 10.5 G/DL (ref 12–15.9)
HOLD SPECIMEN: NORMAL
HOLD SPECIMEN: NORMAL
IMM GRANULOCYTES # BLD AUTO: 0.02 10*3/MM3 (ref 0–0.05)
IMM GRANULOCYTES NFR BLD AUTO: 0.3 % (ref 0–0.5)
LYMPHOCYTES # BLD AUTO: 3.05 10*3/MM3 (ref 0.7–3.1)
LYMPHOCYTES NFR BLD AUTO: 42 % (ref 19.6–45.3)
MCH RBC QN AUTO: 26 PG (ref 26.6–33)
MCHC RBC AUTO-ENTMCNC: 31.5 G/DL (ref 31.5–35.7)
MCV RBC AUTO: 82.4 FL (ref 79–97)
MONOCYTES # BLD AUTO: 0.51 10*3/MM3 (ref 0.1–0.9)
MONOCYTES NFR BLD AUTO: 7 % (ref 5–12)
NEUTROPHILS # BLD AUTO: 3.41 10*3/MM3 (ref 1.7–7)
NEUTROPHILS NFR BLD AUTO: 46.8 % (ref 42.7–76)
NRBC BLD AUTO-RTO: 0 /100 WBC (ref 0–0.2)
PLATELET # BLD AUTO: 297 10*3/MM3 (ref 140–450)
PMV BLD AUTO: 9.9 FL (ref 6–12)
POTASSIUM BLD-SCNC: 4.2 MMOL/L (ref 3.5–5.2)
PROT SERPL-MCNC: 6.7 G/DL (ref 6–8.5)
RBC # BLD AUTO: 4.04 10*6/MM3 (ref 3.77–5.28)
SODIUM BLD-SCNC: 142 MMOL/L (ref 136–145)
TROPONIN T SERPL-MCNC: <0.01 NG/ML (ref 0–0.03)
TROPONIN T SERPL-MCNC: <0.01 NG/ML (ref 0–0.03)
WBC NRBC COR # BLD: 7.27 10*3/MM3 (ref 3.4–10.8)
WHOLE BLOOD HOLD SPECIMEN: NORMAL
WHOLE BLOOD HOLD SPECIMEN: NORMAL

## 2019-11-25 PROCEDURE — 84484 ASSAY OF TROPONIN QUANT: CPT | Performed by: EMERGENCY MEDICINE

## 2019-11-25 PROCEDURE — 36415 COLL VENOUS BLD VENIPUNCTURE: CPT

## 2019-11-25 PROCEDURE — 84484 ASSAY OF TROPONIN QUANT: CPT

## 2019-11-25 PROCEDURE — 93005 ELECTROCARDIOGRAM TRACING: CPT

## 2019-11-25 PROCEDURE — 71046 X-RAY EXAM CHEST 2 VIEWS: CPT

## 2019-11-25 PROCEDURE — 93005 ELECTROCARDIOGRAM TRACING: CPT | Performed by: EMERGENCY MEDICINE

## 2019-11-25 PROCEDURE — 80053 COMPREHEN METABOLIC PANEL: CPT

## 2019-11-25 PROCEDURE — 85025 COMPLETE CBC W/AUTO DIFF WBC: CPT

## 2019-11-25 PROCEDURE — 93010 ELECTROCARDIOGRAM REPORT: CPT | Performed by: INTERNAL MEDICINE

## 2019-11-25 PROCEDURE — 99283 EMERGENCY DEPT VISIT LOW MDM: CPT

## 2019-11-25 RX ORDER — SODIUM CHLORIDE 0.9 % (FLUSH) 0.9 %
10 SYRINGE (ML) INJECTION AS NEEDED
Status: DISCONTINUED | OUTPATIENT
Start: 2019-11-25 | End: 2019-11-25 | Stop reason: HOSPADM

## 2019-11-25 RX ORDER — ISOSORBIDE MONONITRATE 30 MG/1
30 TABLET, EXTENDED RELEASE ORAL DAILY
Qty: 30 TABLET | Refills: 5 | Status: SHIPPED | OUTPATIENT
Start: 2019-11-25 | End: 2020-06-09

## 2019-11-25 RX ORDER — ASPIRIN 325 MG
325 TABLET ORAL ONCE
Status: DISCONTINUED | OUTPATIENT
Start: 2019-11-25 | End: 2019-11-25 | Stop reason: HOSPADM

## 2019-11-29 ENCOUNTER — APPOINTMENT (OUTPATIENT)
Dept: SPEECH THERAPY | Facility: HOSPITAL | Age: 59
End: 2019-11-29

## 2019-12-04 RX ORDER — SIMVASTATIN 20 MG
20 TABLET ORAL NIGHTLY
Qty: 30 TABLET | Refills: 6 | Status: SHIPPED | OUTPATIENT
Start: 2019-12-04 | End: 2020-06-11 | Stop reason: SDUPTHER

## 2019-12-06 ENCOUNTER — HOSPITAL ENCOUNTER (OUTPATIENT)
Dept: SPEECH THERAPY | Facility: HOSPITAL | Age: 59
Setting detail: THERAPIES SERIES
Discharge: HOME | End: 2019-12-06

## 2019-12-06 DIAGNOSIS — E23.6 PITUITARY CYST (HCC): Primary | ICD-10-CM

## 2019-12-06 DIAGNOSIS — R41.841 COGNITIVE COMMUNICATION DEFICIT: ICD-10-CM

## 2019-12-06 DIAGNOSIS — D32.9 MENINGIOMA (HCC): ICD-10-CM

## 2019-12-06 PROCEDURE — 92507 TX SP LANG VOICE COMM INDIV: CPT | Performed by: SPEECH-LANGUAGE PATHOLOGIST

## 2019-12-06 NOTE — THERAPY DISCHARGE NOTE
Outpatient Speech Language Pathology   Adult Speech Language Cognitive Treatment Note/Discharge Summary  [facility]     Patient Name: Keri Bhagat  : 1960  MRN: [id number]  Today's Date: 2019         Visit Date: 2019   Patient Active Problem List   Diagnosis   • Gastroesophageal reflux disease   • Bipolar I disorder, single manic episode (CMS/HCC)   • Atherosclerosis of coronary artery   • Essential hypertension   • Lightheadedness   • Low back pain   • Sciatica   • Thyroid nodule   • Fatigue   • Hyperlipidemia   • Hypothyroidism (acquired)   • Iron deficiency anemia   • Right knee pain   • Cervical disc disease   • Depression   • ROCKY (obstructive sleep apnea)   • Coronary artery disease involving native coronary artery of native heart without angina pectoris   • Cervical spondylosis with radiculopathy   • Chronic pain of both knees   • Arthritis of both knees   • Weight gain, abnormal   • Allergic rhinitis   • Obesity (BMI 30-39.9)   • Plantar fasciitis   • Peroneal tendon injury   • Stress fracture of calcaneus   • Vitamin D deficiency   • Pain and swelling of knee   • Knee injury   • Calcific Achilles tendonitis   • Rod's deformity   • Urgency incontinence   • Nocturia   • Chronic gastritis without bleeding   • LGSIL on Pap smear of cervix   • Primary osteoarthritis of right knee   • Chronic renal insufficiency, stage III (moderate) (CMS/HCC)   • Left wrist tendinitis   • Cervical radiculopathy   • Tobacco abuse   • Obesity, morbid, BMI 40.0-49.9 (CMS/HCC)   • Encounter for screening for lung cancer   • Urinary frequency   • Diabetes mellitus screening   • MORALES I (cervical intraepithelial neoplasia I)   • Primary osteoarthritis of both knees   • H/O bladder repair surgery   • Chest pain   • Syncope and collapse   • Closed fracture of left distal radius   • Closed displaced fracture of neck of right fifth metacarpal bone   • Fracture follow-up   • Nondisplaced fracture of base of  fifth metacarpal bone, left hand, initial encounter for closed fracture   • Closed fracture of lower end of left radius with routine healing   • Multiple dislocations of fingers, open   • Bilateral chronic knee pain   • Arthritis of left knee   • Arthritis of right knee   • Family history of diabetes mellitus (DM)   • Menopause   • 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 Pituitary cyst (CMS/HCC) E23.6 253.8   2. Meningioma (CMS/HCC) D32.9 225.2   3. Cognitive communication deficit R41.841 799.52                       SLP OP Goals     Row Name 12/06/19 1300          Subjective Comments    Subjective Comments  Patient is pleasant and cooperative  -KA        Subjective Pain    Able to rate subjective pain?  yes  -KA     Pre-Treatment Pain Level  0  -KA     Post-Treatment Pain Level  0  -KA        Verbal Expression Goals    Patient will be able to use verbal expressive language skills to communicate effectively in social/avocational/work setting  90%:;without cues  -KA     Status: Patient will be able to use verbal expressive language skills to communicate effectively in social/avocational/work setting  Achieved  -KA     Comments: Patient will be able to use verbal expressive language skills to communicate effectively in social/avocational/work setting  Patient demonstrates functional word finding at the conversation level  -KA     Patient will improve verbal expressive language skills by describing attributes, function, action and/or uses of an object/item  90%:;without cues  -KA     Status: Patient will improve verbal expressive language skills by describing attributes, function, action  "and/or uses of an object/item  Achieved  -KA     Comments: Patient will improve verbal expressive language skills by describing attributes, function, action and/or uses of an object/item  In simple to moderate complexity task describing words (simple to moderate complex including verbs and not common objects) 90% without cues   -KA        Dysarthria Goals    \"Patient will increase strength and power  of articulators so they can move more quickly and accurately to improve speech intelligibility by completing lip exercises\"  90%:;without cues  -KA     Status: Patient will increase strength and power  of articulators so they can move more quickly and accurately to improve speech intelligibility by completing lip exercises  Achieved  -KA     Comments: Patient will increase strength and power  of articulators so they can move more quickly and accurately to improve speech intelligibility by completing lip exercises  Patient was provided with oral motor exercises due to c/o of slurred speech, however pt does not demonstrate slurred speech in therapy. Patient c/o lisp due to missing upper dentition and reports her teeth will be repaired soon.   -KA        Memory Goals    Status: Patient will be able to remember information needed to participate in avocational activities  Achieved  -KA     Patient’s memory skills will be enhanced as reported by patient by utilizing internal memory strategies to recall up to 3 pieces of information after a 5- minute delay  90%:;without cues  -KA     Status: Patient’s memory skills will be enhanced as reported by patient by utilizing internal memory strategies to recall up to 3 pieces of information after a 5- minute delay  Achieved  -KA     Comments: Patient’s memory skills will be enhanced as reported by patient by utilizing internal memory strategies to recall up to 3 pieces of information after a 5- minute delay  Delayed recall of 5 names 90% without cues after 10 and 20 minute delay, " delayed recall of three unrelated words after 10 minutes 90% without cues   -KA     Patient’s memory skills will be enhanced as reported by patient by using external memory aides  90%:;without cues  -KA     Status: Patient’s memory skills will be enhanced as reported by patient by using external memory aides  Achieved  -KA     Comments: Patient’s memory skills will be enhanced as reported by patient by using external memory aides  Patient independent with external memory aids   -KA        Problem Solving Goals    Status: Patient will be able to execute all activities necessary to manage a home  Achieved  -KA     Patient will improve ability to analyze problems and determine solutions by completing a visual representation/filling in a chart by following  written directions  90%:;without cues  -KA     Status: Patient will improve ability to analyze problems and determine solutions by completing a visual representation/filling in a chart by following  written directions  Achieved  -KA     Comments: Patient will improve ability to analyze problems and determine solutions by completing a visual representation/filling in a chart by following  written directions  90% without cues in complex deductive reasoning puzzle  -KA       User Key  (r) = Recorded By, (t) = Taken By, (c) = Cosigned By    Initials Name Provider Type    Derek Robles MA,CCC-SLP Speech and Language Pathologist                           Time Calculation:   SLP Start Time: 1300  SLP Stop Time: 1345  SLP Time Calculation (min): 45 min    Therapy Charges for Today     Code Description Service Date Service Provider Modifiers Qty    [id number]  ST TREATMENT SPEECH 3 12/6/2019 Derek Quiñones MA,CCC-SLP GN 1                 OP SLP Discharge Summary  Date of Discharge: 12/06/19  Reason for Discharge: all goals and outcomes met, no further needs identified  Progress Toward Achieving Short/long Term Goals: all goals met within established  timelines  Discharge Destination: home      Derek Quiñones MA,CCC-SLP  12/6/2019

## 2019-12-13 ENCOUNTER — APPOINTMENT (OUTPATIENT)
Dept: SPEECH THERAPY | Facility: HOSPITAL | Age: 59
End: 2019-12-13

## 2019-12-17 DIAGNOSIS — M54.12 CERVICAL RADICULOPATHY: ICD-10-CM

## 2019-12-18 RX ORDER — GABAPENTIN 300 MG/1
CAPSULE ORAL
Qty: 90 CAPSULE | Refills: 0 | Status: SHIPPED | OUTPATIENT
Start: 2019-12-18 | End: 2020-01-27 | Stop reason: SDUPTHER

## 2019-12-20 ENCOUNTER — APPOINTMENT (OUTPATIENT)
Dept: SPEECH THERAPY | Facility: HOSPITAL | Age: 59
End: 2019-12-20

## 2020-01-09 ENCOUNTER — TRANSCRIBE ORDERS (OUTPATIENT)
Dept: ADMINISTRATIVE | Facility: HOSPITAL | Age: 60
End: 2020-01-09

## 2020-01-09 ENCOUNTER — TELEPHONE (OUTPATIENT)
Dept: NEUROLOGY | Facility: CLINIC | Age: 60
End: 2020-01-09

## 2020-01-09 DIAGNOSIS — S63.592A TFCC (TRIANGULAR FIBROCARTILAGE COMPLEX) TEAR, LEFT, INITIAL ENCOUNTER: Primary | ICD-10-CM

## 2020-01-09 NOTE — TELEPHONE ENCOUNTER
Received results from neuropsychological testing that was done December 1, 2019.  The overall diagnosis was severe depression with anxiety.  Results to be scanned into the chart.

## 2020-01-10 ENCOUNTER — APPOINTMENT (OUTPATIENT)
Dept: SLEEP MEDICINE | Facility: HOSPITAL | Age: 60
End: 2020-01-10

## 2020-01-10 ENCOUNTER — APPOINTMENT (OUTPATIENT)
Dept: GENERAL RADIOLOGY | Facility: HOSPITAL | Age: 60
End: 2020-01-10

## 2020-01-10 PROCEDURE — 73562 X-RAY EXAM OF KNEE 3: CPT | Performed by: NURSE PRACTITIONER

## 2020-01-10 PROCEDURE — 73110 X-RAY EXAM OF WRIST: CPT | Performed by: NURSE PRACTITIONER

## 2020-01-22 ENCOUNTER — TELEPHONE (OUTPATIENT)
Dept: FAMILY MEDICINE CLINIC | Facility: CLINIC | Age: 60
End: 2020-01-22

## 2020-01-22 DIAGNOSIS — M50.30 DEGENERATION OF CERVICAL INTERVERTEBRAL DISC: Primary | ICD-10-CM

## 2020-01-22 NOTE — TELEPHONE ENCOUNTER
Pt is requesting a referral to see Dr. Raymond at [facility] for reoccurring neck pain.    Pt states she has had 2 neck surgeries in the past.    Pls advise.    Pt can be reached #[phone].

## 2020-01-23 ENCOUNTER — HOSPITAL ENCOUNTER (OUTPATIENT)
Dept: MRI IMAGING | Facility: HOSPITAL | Age: 60
Discharge: HOME | End: 2020-01-23
Admitting: SURGERY

## 2020-01-23 DIAGNOSIS — S63.592A TFCC (TRIANGULAR FIBROCARTILAGE COMPLEX) TEAR, LEFT, INITIAL ENCOUNTER: ICD-10-CM

## 2020-01-23 PROCEDURE — 73221 MRI JOINT UPR EXTREM W/O DYE: CPT

## 2020-01-23 RX ORDER — LANOLIN ALCOHOL/MO/W.PET/CERES
CREAM (GRAM) TOPICAL
Qty: 60 TABLET | Refills: 2 | Status: SHIPPED | OUTPATIENT
Start: 2020-01-23 | End: 2020-04-28

## 2020-01-24 DIAGNOSIS — E03.9 HYPOTHYROIDISM (ACQUIRED): ICD-10-CM

## 2020-01-25 DIAGNOSIS — M54.12 CERVICAL RADICULOPATHY: ICD-10-CM

## 2020-01-25 RX ORDER — LEVOTHYROXINE SODIUM 88 UG/1
TABLET ORAL
Qty: 90 TABLET | Refills: 0 | Status: SHIPPED | OUTPATIENT
Start: 2020-01-25 | End: 2020-04-23

## 2020-01-27 DIAGNOSIS — M54.12 CERVICAL RADICULOPATHY: ICD-10-CM

## 2020-01-27 RX ORDER — GABAPENTIN 300 MG/1
CAPSULE ORAL
Qty: 90 CAPSULE | Refills: 0 | OUTPATIENT
Start: 2020-01-27

## 2020-01-27 RX ORDER — GABAPENTIN 300 MG/1
300 CAPSULE ORAL 3 TIMES DAILY
Qty: 90 CAPSULE | Refills: 0 | Status: SHIPPED | OUTPATIENT
Start: 2020-01-27 | End: 2020-03-09

## 2020-01-31 ENCOUNTER — TELEPHONE (OUTPATIENT)
Dept: FAMILY MEDICINE CLINIC | Facility: CLINIC | Age: 60
End: 2020-01-31

## 2020-01-31 RX ORDER — ALPRAZOLAM 1 MG/1
1 TABLET ORAL ONCE
Qty: 1 TABLET | Refills: 0 | Status: SHIPPED | OUTPATIENT
Start: 2020-01-31 | End: 2020-01-31

## 2020-02-03 ENCOUNTER — APPOINTMENT (OUTPATIENT)
Dept: MRI IMAGING | Facility: HOSPITAL | Age: 60
End: 2020-02-03

## 2020-02-03 ENCOUNTER — TELEPHONE (OUTPATIENT)
Dept: NEUROSURGERY | Facility: CLINIC | Age: 60
End: 2020-02-03

## 2020-02-03 NOTE — TELEPHONE ENCOUNTER
Lindsay Real with Patient access called and states that this patients insurance is non Par with [facility] [place]. She had [facility] community. Lindsay stated that she spoke with patient and informed her to get with her caseworked to find a provider that is in network.     She was last seen on 09/23/19. She was to return on 03/23/20 with a new Brain MRI.

## 2020-02-07 ENCOUNTER — APPOINTMENT (OUTPATIENT)
Dept: SLEEP MEDICINE | Facility: HOSPITAL | Age: 60
End: 2020-02-07

## 2020-02-07 ENCOUNTER — CLINICAL SUPPORT (OUTPATIENT)
Dept: ORTHOPEDIC SURGERY | Facility: CLINIC | Age: 60
End: 2020-02-07

## 2020-02-07 VITALS — HEIGHT: 63 IN | WEIGHT: 203 LBS | TEMPERATURE: 97.6 F | BODY MASS INDEX: 35.97 KG/M2

## 2020-02-07 DIAGNOSIS — M17.0 ARTHRITIS OF BOTH KNEES: Primary | ICD-10-CM

## 2020-02-07 DIAGNOSIS — M17.0 PRIMARY OSTEOARTHRITIS OF BOTH KNEES: ICD-10-CM

## 2020-02-07 PROCEDURE — 99213 OFFICE O/P EST LOW 20 MIN: CPT | Performed by: NURSE PRACTITIONER

## 2020-02-07 PROCEDURE — 20610 DRAIN/INJ JOINT/BURSA W/O US: CPT | Performed by: NURSE PRACTITIONER

## 2020-02-07 RX ORDER — METHYLPREDNISOLONE ACETATE 80 MG/ML
80 INJECTION, SUSPENSION INTRA-ARTICULAR; INTRALESIONAL; INTRAMUSCULAR; SOFT TISSUE
Status: COMPLETED | OUTPATIENT
Start: 2020-02-07 | End: 2020-02-07

## 2020-02-07 RX ADMIN — METHYLPREDNISOLONE ACETATE 80 MG: 80 INJECTION, SUSPENSION INTRA-ARTICULAR; INTRALESIONAL; INTRAMUSCULAR; SOFT TISSUE at 08:15

## 2020-02-07 NOTE — PROGRESS NOTES
Patient: Keri Bhagat  YOB: 1960    Chief Complaints:  bilateral knee pain    Subjective:    History of Present Illness: Here today for knee pain. She had lost some wt but gained it back.  She moved and has a house with sidewalks now. The pain is a generalized joint tenderness.  It has been progressive in nature but remains intermittent.  Worsened by prolonged standing or walking and squatting activities. Has had improvement in the past with ice/heat, rest, and injections.     This problem is not new to this examiner.     Allergies: No Known Allergies    Medications:   Home Medications:  Current Outpatient Medications on File Prior to Visit   Medication Sig   • ARIPiprazole (ABILIFY) 10 MG tablet Take 10 mg by mouth Daily.   • atenolol (TENORMIN) 50 MG tablet Take 1 tablet by mouth Daily.   • buPROPion SR (WELLBUTRIN SR) 150 MG 12 hr tablet Take 150 mg by mouth Daily.   • buPROPion XL (WELLBUTRIN XL) 150 MG 24 hr tablet    • Cholecalciferol (VITAMIN D3) 5000 UNITS capsule capsule Take 5,000 Units by mouth Daily.   • diclofenac (VOLTAREN) 1 % gel gel Apply 4 g topically to the appropriate area as directed 4 (Four) Times a Day. As needed   • gabapentin (NEURONTIN) 300 MG capsule Take 1 capsule by mouth 3 (Three) Times a Day.   • isosorbide mononitrate (IMDUR) 30 MG 24 hr tablet Take 1 tablet by mouth Daily.   • lamoTRIgine (LaMICtal) 150 MG tablet Take 300 mg by mouth Daily.   • levothyroxine (SYNTHROID, LEVOTHROID) 100 MCG tablet Take 100 mcg by mouth Daily.   • levothyroxine (SYNTHROID, LEVOTHROID) 88 MCG tablet TAKE ONE TABLET BY MOUTH EVERY MORNING   • loratadine (CLARITIN) 10 MG tablet Take 1 tablet by mouth Daily. As needed for allergies   • nitroglycerin (NITROSTAT) 0.4 MG SL tablet Place 0.4 mg under the tongue Every 5 (Five) Minutes As Needed for Chest Pain (took at M.D  office at 0930 a.m.). Take no more than 3 doses in 15 minutes.   • oxybutynin XL (DITROPAN XL) 15 MG 24 hr tablet    •  pramipexole (MIRAPEX) 0.5 MG tablet    • raNITIdine (ZANTAC) 150 MG tablet Take 1 tablet by mouth 2 (Two) Times a Day.   • simvastatin (ZOCOR) 20 MG tablet Take 1 tablet by mouth Every Night.   • venlafaxine XR (EFFEXOR-XR) 75 MG 24 hr capsule Take 75 mg by mouth Daily.   • vitamin B-12 (CYANOCOBALAMIN) 1000 MCG tablet TAKE ONE TABLET BY MOUTH TWICE A DAY     No current facility-administered medications on file prior to visit.      Current Medications:  Scheduled Meds:  Continuous Infusions:  No current facility-administered medications for this visit.   PRN Meds:.    I have reviewed the patient's medical history in detail and updated the computerized patient record.  Review and summarization of old records include:    Past Medical History:   Diagnosis Date   • Abnormal Pap smear of cervix    • Anemia    • Anxiety    • Arthritis    • Atherosclerotic heart disease of native coronary artery with other forms of angina pectoris (CMS/HCC)    • Bipolar I disorder, single manic episode (CMS/HCC)     depressive d(NOS)   • Cervical disc herniation    • Cervical dysplasia    • Coronary artery disease    • CTS (carpal tunnel syndrome)    • Depression     NO SUICIDAL PLANS   • Difficulty swallowing    • Diverticular disease    • Dyspepsia    • Dysphagia    • Gastric reflux    • Heart attack (CMS/HCC)    • Heart murmur    • Hiatal hernia    • High cholesterol    • History of transfusion    • 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    HPV positive on pap LGSIL   • Hyperlipidemia    • Hypertension    • Hypothyroidism    • Incontinence in female     wears pads   • Kidney disease 2017    stage 2    • Kidney disease, chronic, stage III (GFR 30-59 ml/min) (CMS/HCC)    • LGSIL on Pap smear of cervix 04/29/2016    LGSIL HPV positive   • Myocardial infarction (CMS/HCC) 2000   • Neck pain    • Obesity    • Past myocardial infarction 05/2000   • Periodic limb movement disorder    • Restless leg syndrome    • Sleep apnea     cpap    • Stress fracture     LEFT HEEL   • Suicidal ideation 08/19/2016    history   • Swelling of ankle     right had doppler no s/s blood clot   • Thyroid nodule    • Vitamin B12 deficiency         Past Surgical History:   Procedure Laterality Date   • ANTERIOR CERVICAL DISCECTOMY W/ FUSION N/A 12/29/2016    Procedure: C3-4 anterior cervical discectomy and fusion with Depuy micro plate, ALLOGRAFT C3-4, AND HARDWARE REMOVAL C4-7.;  Surgeon: Hema Godwin MD;  Location: [facility] MAIN OR;  Service:    • CARDIAC CATHETERIZATION N/A 3/30/2017    Procedure: Left Heart Cath;  Surgeon: Tracey Vargas MD;  Location:  [facility] CATH INVASIVE LOCATION;  Service:    • CARDIAC CATHETERIZATION N/A 3/30/2017    Procedure: Coronary angiography;  Surgeon: Tracey Vargas MD;  Location:  [facility] CATH INVASIVE LOCATION;  Service:    • CARDIAC CATHETERIZATION N/A 3/30/2017    Procedure: Left ventriculography;  Surgeon: Tracey Vargas MD;  [facility];  Service:    • CARDIAC CATHETERIZATION  3/30/2017    Procedure: Functional Flow [place];  Surgeon: Tracey Vargas MD;  Location:  [facility] CATH INVASIVE LOCATION;  Service:    • CARPAL TUNNEL RELEASE     • CERVICAL BIOPSY  MMXVI    Dr. Jeffery.    • CERVICAL DISCECTOMY ANTERIOR  04/2013    C4-7   • COLONOSCOPY  04/20/2015    Diverticulosis, IH   • COLPOSCOPY W/ BIOPSY / CURETTAGE  06/17/2016    LGSIL HPV positive. Results were normal repeat pap in one year. Chronic Cervicitis   • CORONARY ANGIOPLASTY WITH STENT PLACEMENT     • ENDOSCOPY  MM[facility]    Normal.  Dr. Rodriguez   • ENDOSCOPY N/A 6/22/2017    Erythematous mucosa in the stomach  PATH: Chronic active gastritis, moderate with intestinal metaplasia    • GASTRIC BYPASS      Done using the sleeve technique   • HARDWARE REMOVAL  12/29/2016    cervical   • LAPAROSCOPIC GASTRIC BANDING  02/2018   • SHOULDER SURGERY Left     RCR   • TONSILLECTOMY     • TONSILLECTOMY AND ADENOIDECTOMY     • TRANSVAGINAL TAPING SUSPENSION N/A  2017    Procedure: MID URETHRAL SLING CYSTSCOPY;  Surgeon: Abby Méndez MD;  Location: [facility] OR;  Service:    • TUBAL ABDOMINAL LIGATION     • TYMPANOSTOMY TUBE PLACEMENT Right    • WRIST SURGERY Bilateral     carpal tunnel        Social History     Occupational History   • Occupation: disabled   Tobacco Use   • Smoking status: Former Smoker     Packs/day: 1.50     Years: 20.00     Pack years: 30.00     Types: Electronic Cigarette, Cigarettes     Last attempt to quit: 2015     Years since quittin.1   • Smokeless tobacco: Never Used   • Tobacco comment: Electronic Cigarette/ 3 mg nicotine    Substance and Sexual Activity   • Alcohol use: Yes     Comment: 2 times yearly    • Drug use: No   • Sexual activity: Yes     Partners: Male     Birth control/protection: None, Condom      Social History     Social History Narrative   • Not on file        Family History   Problem Relation Age of Onset   • Diabetes type II Mother    • Hypertension Mother    • Osteoporosis Mother    • Seizures Mother    • COPD Father    • Hypertension Father    • Lung cancer Father    • Heart attack Father    • Liver cancer Father    • Thyroid disease Sister    • Hypertension Sister    • Bipolar disorder Sister    • Depression Sister    • ADD / ADHD Sister    • No Known Problems Son    • Abnormal EKG Daughter    • Hypertension Daughter    • Bipolar disorder Daughter    • Thyroid disease Daughter    • No Known Problems Paternal Grandfather    • No Known Problems Paternal Grandmother    • No Known Problems Maternal Grandmother    • No Known Problems Maternal Grandfather    • Thyroid disease Sister    • Hypertension Sister    • Bipolar disorder Sister    • Asthma Daughter    • Breast cancer Neg Hx    • Ovarian cancer Neg Hx    • Uterine cancer Neg Hx    • Colon cancer Neg Hx    • Malig Hyperthermia Neg Hx        ROS: 14 point review of systems was performed and was negative except for documented findings in HPI and today's  "encounter.     Allergies: No Known Allergies  Constitutional:  Denies fever, shaking or chills   Eyes:  Denies change in visual acuity   HENT:  Denies nasal congestion or sore throat   Respiratory:  Denies cough or shortness of breath   Cardiovascular:  Denies chest pain or severe LE edema   GI:  Denies abdominal pain, nausea, vomiting, bloody stools or diarrhea   Musculoskeletal:  Numbness, tingling, or loss of motor function only as noted above in history of present illness.  : Denies painful urination or hematuria  Integument:  Denies rash, lesion or ulceration   Neurologic:  Denies headache or focal weakness  Endocrine:  Denies lymphadenopathy  Psych:  Denies confusion or change in mental status   Hem:  Denies active bleeding    Physical Exam: 59 y.o. female  Wt Readings from Last 3 Encounters:   02/07/20 92.1 kg (203 lb)   01/10/20 83.9 kg (185 lb)   01/23/20 86.2 kg (190 lb)     Ht Readings from Last 1 Encounters:   02/07/20 160 cm (63\")     Body mass index is 35.96 kg/m².  Vitals:    02/07/20 0947   Temp: 97.6 °F (36.4 °C)     Vital signs reviewed.   Constitutional: Awake alert and oriented x3, well developed, no acute distress, non-toxic appearance.  EYES: symmetric, sclera clear  ENT:  Normocephalic, Atraumatic.   Respiratory:  No respiratory distress, No wheezing  CV: pulse regular, no palpitations or pallor.  GI:  Abdomen soft, non-tender.   Vascular:  Intact distal pulses, No cyanosis, no signs or symptoms of DVT.  Neurologic: Sensation grossly intact to the involved extremity, No focal deficits noted.   Neck: No tenderness, Supple.  Integument: warm, dry, no ulcerations.   Psychiatric:  Oriented, no pathological affect.  Musculoskeletal:    Affected knee(s):  Painful gait with a subtle limp, positive for synovitis, swelling, joint effusion with crepitation.  Lachman negative  Posterior drawer negative  Hamida's negative  Patellofemoral grind +  Sensation grossly intact to light touch throughout the " lower extremity  Skin is intact  Distal pulses are palpable  No signs or symptoms of DVT        Diagnostic Data:     Imaging was done previously in the office, images were personally viewed and discussed with the patient:    Indication: pain related symptoms,  Views: 3V AP, LAT & 40 degree PA bilateral knee(s)   Findings: severe end-stage arthritis (bone on bone, subchondral sclerosis/cysts, osteophytes)  Comparison views: viewed last xray done in the office.     Procedure:  Large Joint Arthrocentesis: R knee  Date/Time: 2/7/2020 8:15 AM  Consent given by: patient  Site marked: site marked  Timeout: Immediately prior to procedure a time out was called to verify the correct patient, procedure, equipment, support staff and site/side marked as required   Supporting Documentation  Indications: pain   Procedure Details  Location: knee - R knee  Preparation: Patient was prepped and draped in the usual sterile fashion  Needle gauge: 21.  Approach: anterolateral  Medications administered: 4 mL lidocaine (cardiac); 80 mg methylPREDNISolone acetate 80 MG/ML  Patient tolerance: patient tolerated the procedure well with no immediate complications    Large Joint Arthrocentesis: L knee  Date/Time: 2/7/2020 8:15 AM  Consent given by: patient  Site marked: site marked  Timeout: Immediately prior to procedure a time out was called to verify the correct patient, procedure, equipment, support staff and site/side marked as required   Supporting Documentation  Indications: pain   Procedure Details  Location: knee - L knee  Preparation: Patient was prepped and draped in the usual sterile fashion  Needle gauge: 21.  Approach: anterolateral  Medications administered: 4 mL lidocaine (cardiac); 80 mg methylPREDNISolone acetate 80 MG/ML  Patient tolerance: patient tolerated the procedure well with no immediate complications          Assessment:     ICD-10-CM ICD-9-CM   1. Arthritis of both knees M17.0 716.96           Plan: Is to proceed with  injection  Follow up as indicated.  Ice, elevate, and rest as needed.  Additional interventions include:  15 min spent face to face with patient 11 min spent counseling about:  Biomechanics of pertinent body area discussed.  Risks, benefits, alternatives, comparisons, and complications of accepted medicines, injections, recommendations, surgical procedures, and therapies explained and education provided in laymen's terms. Natural history and expected course of this patient's diagnosis discussed along with evaluation of therapies. Questions answered. When appropriate I also discussed proper use of cane, walker, trekking poles.   BMI:  The concept of BMI body mass index and its importance and implications discussed.  BMI suggested to be < 40 or as low as possible. Lifestyle measures for weight loss and how this affects orthopedic condition.  EXERCISES:  Advice on benefits of, and types of regular/moderate exercise including biomechanical forces involved as it pertains to this complaint.  RICE: Rest, ice, compression, and elevation therapy, Cryotherapy/brachy therapy, and or OTC linaments as indicated with instructions.   Cortisone Injection. See procedure note.  Enc to restart daily strengthening exercises and get back to wt loss efforts.   2/7/2020  Ruba Duncan, APRN

## 2020-02-11 NOTE — TELEPHONE ENCOUNTER
Pt is now back on the schedule to have her MRI done on 2/17. It looks like they have put that same [employer]  back in.  Not sure what is going on.     Emailed Beatriz Hernandez and Lindsay to find out what ins we are going to use for this pt.  Do not want to put back on the schedule until we know what her ins are.

## 2020-02-12 ENCOUNTER — TELEPHONE (OUTPATIENT)
Dept: FAMILY MEDICINE CLINIC | Facility: CLINIC | Age: 60
End: 2020-02-12

## 2020-02-12 ENCOUNTER — TELEPHONE (OUTPATIENT)
Dept: NEUROSURGERY | Facility: CLINIC | Age: 60
End: 2020-02-12

## 2020-02-12 DIAGNOSIS — E23.6 PITUITARY CYST (HCC): ICD-10-CM

## 2020-02-12 DIAGNOSIS — R90.89 ABNORMAL BRAIN MRI: ICD-10-CM

## 2020-02-12 DIAGNOSIS — N18.30 CHRONIC RENAL INSUFFICIENCY, STAGE III (MODERATE) (HCC): Primary | ICD-10-CM

## 2020-02-12 NOTE — TELEPHONE ENCOUNTER
Pt needs a Creatinine level in order to get an MRI pituitary scheduled for her recall appt.  OK to order?

## 2020-02-16 ENCOUNTER — APPOINTMENT (OUTPATIENT)
Dept: MRI IMAGING | Facility: HOSPITAL | Age: 60
End: 2020-02-16

## 2020-02-17 ENCOUNTER — HOSPITAL ENCOUNTER (OUTPATIENT)
Dept: MRI IMAGING | Facility: HOSPITAL | Age: 60
Discharge: HOME | End: 2020-02-17

## 2020-03-07 ENCOUNTER — HOSPITAL ENCOUNTER (OUTPATIENT)
Dept: MRI IMAGING | Facility: HOSPITAL | Age: 60
Discharge: HOME | End: 2020-03-07
Admitting: NURSE PRACTITIONER

## 2020-03-07 DIAGNOSIS — E23.6 PITUITARY CYST (HCC): ICD-10-CM

## 2020-03-07 DIAGNOSIS — R90.89 ABNORMAL BRAIN MRI: ICD-10-CM

## 2020-03-07 PROCEDURE — A9577 INJ MULTIHANCE: HCPCS | Performed by: NURSE PRACTITIONER

## 2020-03-07 PROCEDURE — 82565 ASSAY OF CREATININE: CPT

## 2020-03-07 PROCEDURE — 70553 MRI BRAIN STEM W/O & W/DYE: CPT

## 2020-03-07 PROCEDURE — 0 GADOBENATE DIMEGLUMINE 529 MG/ML SOLUTION: Performed by: NURSE PRACTITIONER

## 2020-03-07 RX ADMIN — GADOBENATE DIMEGLUMINE 19 ML: 529 INJECTION, SOLUTION INTRAVENOUS at 15:07

## 2020-03-08 DIAGNOSIS — M54.12 CERVICAL RADICULOPATHY: ICD-10-CM

## 2020-03-08 LAB — CREAT BLDA-MCNC: 1.2 MG/DL (ref 0.6–1.3)

## 2020-03-09 RX ORDER — GABAPENTIN 300 MG/1
CAPSULE ORAL
Qty: 90 CAPSULE | Refills: 0 | Status: SHIPPED | OUTPATIENT
Start: 2020-03-09 | End: 2020-04-21 | Stop reason: SDUPTHER

## 2020-03-19 ENCOUNTER — TELEPHONE (OUTPATIENT)
Dept: SLEEP MEDICINE | Facility: HOSPITAL | Age: 60
End: 2020-03-19

## 2020-03-20 ENCOUNTER — APPOINTMENT (OUTPATIENT)
Dept: SLEEP MEDICINE | Facility: HOSPITAL | Age: 60
End: 2020-03-20

## 2020-03-24 DIAGNOSIS — I10 HYPERTENSION, UNSPECIFIED TYPE: Primary | ICD-10-CM

## 2020-03-24 RX ORDER — ATENOLOL 50 MG/1
50 TABLET ORAL DAILY
Qty: 90 TABLET | Refills: 0 | Status: SHIPPED | OUTPATIENT
Start: 2020-03-24 | End: 2020-06-29

## 2020-04-19 DIAGNOSIS — M54.12 CERVICAL RADICULOPATHY: ICD-10-CM

## 2020-04-20 RX ORDER — GABAPENTIN 300 MG/1
CAPSULE ORAL
Qty: 90 CAPSULE | Refills: 0 | OUTPATIENT
Start: 2020-04-20

## 2020-04-20 NOTE — TELEPHONE ENCOUNTER
Left voicemail for patient for her to call back so we can schedule appointment.  Number we have on file was patient's number because it was her voicemail. Will double check when patient calls back

## 2020-04-20 NOTE — TELEPHONE ENCOUNTER
Patient needs appointment  She missed her telehealth visit last week  Make sure we have her numbers right  Thanks!

## 2020-04-21 ENCOUNTER — TELEMEDICINE (OUTPATIENT)
Dept: FAMILY MEDICINE CLINIC | Facility: CLINIC | Age: 60
End: 2020-04-21

## 2020-04-21 DIAGNOSIS — I10 ESSENTIAL HYPERTENSION: Primary | ICD-10-CM

## 2020-04-21 DIAGNOSIS — M17.0 ARTHRITIS OF BOTH KNEES: ICD-10-CM

## 2020-04-21 DIAGNOSIS — E78.2 MIXED HYPERLIPIDEMIA: ICD-10-CM

## 2020-04-21 DIAGNOSIS — M54.12 CERVICAL RADICULOPATHY: ICD-10-CM

## 2020-04-21 DIAGNOSIS — I25.10 ATHEROSCLEROSIS OF NATIVE CORONARY ARTERY OF NATIVE HEART WITHOUT ANGINA PECTORIS: ICD-10-CM

## 2020-04-21 PROCEDURE — 99213 OFFICE O/P EST LOW 20 MIN: CPT | Performed by: NURSE PRACTITIONER

## 2020-04-21 RX ORDER — GABAPENTIN 300 MG/1
300 CAPSULE ORAL 3 TIMES DAILY
Qty: 90 CAPSULE | Refills: 2 | Status: SHIPPED | OUTPATIENT
Start: 2020-04-21 | End: 2020-08-26

## 2020-04-21 NOTE — PROGRESS NOTES
Subjective   Keri Bhagat is a 59 y.o. female.     Pleasant patient with history of degenerative arthritis degenerative disc disease of her spine  Degenerative arthritis of her knees  Gabapentin helps substantially takes a moderate dose with no problem no sedation no cognitive problems  Controlled substance agreement intact previous urine drug screens appropriate Julian's have been up-to-date  Without problem  Essential hypertension presently controlled  Hypothyroid acquired compliant with her medication  Mixed hyperlipidemia takes a statin           LMP 10/21/2016 (Approximate) Comment: 54      The following portions of the patient's history were reviewed and updated as appropriate: allergies, current medications, past family history, past medical history, past social history, past surgical history and problem list.    Review of Systems   Constitutional: Negative for chills, fatigue, fever and unexpected weight loss.   HENT: Negative.  Negative for trouble swallowing.    Eyes: Negative.    Respiratory: Negative for cough and shortness of breath.    Cardiovascular: Negative for chest pain, palpitations and leg swelling.   Gastrointestinal: Negative for abdominal pain and blood in stool.   Genitourinary: Negative.  Negative for pelvic pain.   Musculoskeletal: Negative.    Skin: Negative.    Neurological: Negative.  Negative for dizziness, speech difficulty, weakness and confusion.   Psychiatric/Behavioral: Negative.        Objective   Physical Exam   Constitutional: She is oriented to person, place, and time. She appears well-developed and well-nourished. No distress.   Pleasant appears well   HENT:   Head: Normocephalic.   Eyes: Pupils are equal, round, and reactive to light. Conjunctivae are normal.   Neck: Neck supple.   Cardiovascular: Exam reveals no friction rub.   No murmur heard.  Pulmonary/Chest: Effort normal. No respiratory distress. She has no wheezes.   Musculoskeletal: She exhibits no edema.    Neurological: She is alert and oriented to person, place, and time.   Skin: Skin is warm and dry.   Psychiatric: She has a normal mood and affect. Her behavior is normal. Judgment and thought content normal.   Vitals reviewed.        Assessment/Plan   Diagnoses and all orders for this visit:    Essential hypertension    Cervical radiculopathy  -     gabapentin (NEURONTIN) 300 MG capsule; Take 1 capsule by mouth 3 (Three) Times a Day.    Atherosclerosis of native coronary artery of native heart without angina pectoris    Mixed hyperlipidemia    Arthritis of both knees    Continue present medication  Continue cycling for knees  Walking stretching exercises [email or web address]  Recheck in the office 4 to 6 months  Healthy diet decrease calories lots of vegetables  Telehealth visit approximately 20 minutes        There are no Patient Instructions on file for this visit.

## 2020-04-23 DIAGNOSIS — E03.9 HYPOTHYROIDISM (ACQUIRED): ICD-10-CM

## 2020-04-23 RX ORDER — LEVOTHYROXINE SODIUM 88 UG/1
TABLET ORAL
Qty: 30 TABLET | Refills: 0 | Status: SHIPPED | OUTPATIENT
Start: 2020-04-23 | End: 2020-05-06 | Stop reason: SDUPTHER

## 2020-04-27 ENCOUNTER — TELEPHONE (OUTPATIENT)
Dept: ENDOCRINOLOGY | Age: 60
End: 2020-04-27

## 2020-04-28 RX ORDER — LANOLIN ALCOHOL/MO/W.PET/CERES
CREAM (GRAM) TOPICAL
Qty: 60 TABLET | Refills: 1 | Status: SHIPPED | OUTPATIENT
Start: 2020-04-28 | End: 2020-07-06

## 2020-04-29 DIAGNOSIS — E78.5 HYPERLIPIDEMIA, UNSPECIFIED HYPERLIPIDEMIA TYPE: ICD-10-CM

## 2020-04-29 DIAGNOSIS — E23.6 PITUITARY CYST (HCC): ICD-10-CM

## 2020-04-29 DIAGNOSIS — E55.9 VITAMIN D DEFICIENCY: ICD-10-CM

## 2020-04-29 DIAGNOSIS — E04.1 THYROID NODULE: ICD-10-CM

## 2020-04-29 DIAGNOSIS — I10 ESSENTIAL HYPERTENSION: ICD-10-CM

## 2020-04-29 DIAGNOSIS — E03.9 HYPOTHYROIDISM (ACQUIRED): Primary | ICD-10-CM

## 2020-04-30 LAB
25(OH)D3+25(OH)D2 SERPL-MCNC: 46.6 NG/ML (ref 30–100)
ALBUMIN SERPL-MCNC: 4.3 G/DL (ref 3.5–5.2)
ALBUMIN/GLOB SERPL: 1.5 G/DL
ALP SERPL-CCNC: 90 U/L (ref 39–117)
ALT SERPL-CCNC: 13 U/L (ref 1–33)
AST SERPL-CCNC: 17 U/L (ref 1–32)
BILIRUB SERPL-MCNC: 0.2 MG/DL (ref 0.2–1.2)
BUN SERPL-MCNC: 7 MG/DL (ref 6–20)
BUN/CREAT SERPL: 6.7 (ref 7–25)
CALCIUM SERPL-MCNC: 9.3 MG/DL (ref 8.6–10.5)
CHLORIDE SERPL-SCNC: 103 MMOL/L (ref 98–107)
CHOLEST SERPL-MCNC: 176 MG/DL (ref 0–200)
CO2 SERPL-SCNC: 24.9 MMOL/L (ref 22–29)
CREAT SERPL-MCNC: 1.04 MG/DL (ref 0.57–1)
FSH SERPL-ACNC: 89 MIU/ML
GLOBULIN SER CALC-MCNC: 2.8 GM/DL
GLUCOSE SERPL-MCNC: 98 MG/DL (ref 65–99)
HDLC SERPL-MCNC: 70 MG/DL (ref 40–60)
INTERPRETATION: NORMAL
LDLC SERPL CALC-MCNC: 81 MG/DL (ref 0–100)
LH SERPL-ACNC: 56.6 MIU/ML
POTASSIUM SERPL-SCNC: 5 MMOL/L (ref 3.5–5.2)
PROLACTIN SERPL-MCNC: 9.4 NG/ML (ref 4.8–23.3)
PROT SERPL-MCNC: 7.1 G/DL (ref 6–8.5)
SODIUM SERPL-SCNC: 141 MMOL/L (ref 136–145)
T4 FREE SERPL-MCNC: 1.27 NG/DL (ref 0.93–1.7)
TRIGL SERPL-MCNC: 123 MG/DL (ref 0–150)
TSH SERPL DL<=0.005 MIU/L-ACNC: 0.98 UIU/ML (ref 0.27–4.2)
VLDLC SERPL CALC-MCNC: 24.6 MG/DL (ref 5–40)

## 2020-05-08 ENCOUNTER — CLINICAL SUPPORT (OUTPATIENT)
Dept: ORTHOPEDIC SURGERY | Facility: CLINIC | Age: 60
End: 2020-05-08

## 2020-05-08 VITALS — BODY MASS INDEX: 36.96 KG/M2 | WEIGHT: 208.6 LBS | TEMPERATURE: 97.8 F | HEIGHT: 63 IN

## 2020-05-08 DIAGNOSIS — M17.0 PRIMARY OSTEOARTHRITIS OF BOTH KNEES: ICD-10-CM

## 2020-05-08 PROCEDURE — 20610 DRAIN/INJ JOINT/BURSA W/O US: CPT | Performed by: NURSE PRACTITIONER

## 2020-05-08 NOTE — PROGRESS NOTES
"Large Joint Arthrocentesis: R knee  Date/Time: 5/8/2020 11:14 AM  Consent given by: patient  Site marked: site marked  Timeout: Immediately prior to procedure a time out was called to verify the correct patient, procedure, equipment, support staff and site/side marked as required   Supporting Documentation  Indications: pain   Procedure Details  Location: knee - R knee  Needle gauge: 21.  Approach: anteromedial  Medications administered: 88 mg Hyaluronan 88 MG/4ML; 2 mL lidocaine (cardiac)  Patient tolerance: patient tolerated the procedure well with no immediate complications    Large Joint Arthrocentesis: L knee  Date/Time: 5/8/2020 11:14 AM  Consent given by: patient  Site marked: site marked  Timeout: Immediately prior to procedure a time out was called to verify the correct patient, procedure, equipment, support staff and site/side marked as required   Supporting Documentation  Indications: pain   Procedure Details  Location: knee - L knee  Needle gauge: 21.  Approach: anteromedial  Medications administered: 88 mg Hyaluronan 88 MG/4ML; 2 mL lidocaine (cardiac)  Analysis: fluid sample sent for laboratory analysis          Subjective   Keri Bhagat is a 59 y.o. female.     Pain to both knees    Pain in both knees from DJD that pt follows with spm and has had injections and monovisc in past that has helped pt.    The following portions of the patient's history were reviewed and updated as appropriate: allergies, current medications, past social history and problem list.    Review of Systems   Musculoskeletal: Positive for arthralgias and joint swelling.   All other systems reviewed and are negative.   neg for soa, chest pain    Objective   Vitals:    05/08/20 1112   Temp: 97.8 °F (36.6 °C)   TempSrc: Temporal   Weight: 94.6 kg (208 lb 9.6 oz)   Height: 160 cm (63\")     Physical Exam   Constitutional: She is oriented to person, place, and time. She appears well-developed and well-nourished.   HENT:   Head: " Normocephalic.   Pulmonary/Chest: Effort normal.   Musculoskeletal: Normal range of motion.   Neurological: She is alert and oriented to person, place, and time.   Skin: Skin is warm and dry.   Psychiatric: She has a normal mood and affect.   Nursing note and vitals reviewed.   skin warm dry and intact - calf soft to both legs, hips nttp, medial joint line tenderness to both knees with effusion    Assessment/Plan   Problem List Items Addressed This Visit        Other    Primary osteoarthritis of both knees    Relevant Orders    Large Joint Arthrocentesis: R knee    Large Joint Arthrocentesis: L knee

## 2020-05-08 NOTE — PATIENT INSTRUCTIONS
Sodium Hyaluronate intra-articular injection  What is this medicine?  SODIUM HYALURONATE (IRVIN homa um edmund al yoor ON ate) is used to treat pain in the knee due to osteoarthritis.  This medicine may be used for other purposes; ask your health care provider or pharmacist if you have questions.  COMMON BRAND NAME(S): Amvisc, DUROLANE, Euflexxa, GELSYN-3, Hyalgan, Hymovis, Monovisc, Orthovisc, Supartz, Supartz FX, TriVisc, VISCO  What should I tell my health care provider before I take this medicine?  They need to know if you have any of these conditions:  -bleeding disorders  -glaucoma  -infection in the knee joint  -skin conditions or sensitivity  -skin infection  -an unusual allergic reaction to sodium hyaluronate, other medicines, foods, dyes, or preservatives. Different brands of sodium hyaluronate contain different allergens. Some may contain egg. Talk to your doctor about your allergies to make sure that you get the right product.  -pregnant or trying to get pregnant  -breast-feeding  How should I use this medicine?  This medicine is for injection into the knee joint. It is given by a health care professional in a hospital or clinic setting.  Talk to your pediatrician regarding the use of this medicine in children. Special care may be needed.  Overdosage: If you think you have taken too much of this medicine contact a poison control center or emergency room at once.  NOTE: This medicine is only for you. Do not share this medicine with others.  What if I miss a dose?  This does not apply.  What may interact with this medicine?  Interactions are not expected.  This list may not describe all possible interactions. Give your health care provider a list of all the medicines, herbs, non-prescription drugs, or dietary supplements you use. Also tell them if you smoke, drink alcohol, or use illegal drugs. Some items may interact with your medicine.  What should I watch for while using this medicine?  Tell your doctor or  healthcare professional if your symptoms do not start to get better or if they get worse.  If receiving this medicine for osteoarthritis, limit your activity after you receive your injection. Avoid physical activity for 48 hours following your injection to keep your knee from swelling. Do not stand on your feet for more than 1 hour at a time during the first 48 hours following your injection. Ask your doctor or healthcare professional about when you can begin major physical activity again.  What side effects may I notice from receiving this medicine?  Side effects that you should report to your doctor or health care professional as soon as possible:  -allergic reactions like skin rash, itching or hives, swelling of the face, lips, or tongue  -dizziness  -facial flushing  -pain, tingling, numbness in the hands or feet  -vision changes if received this medicine during eye surgery  Side effects that usually do not require medical attention (report to your doctor or health care professional if they continue or are bothersome):  -back pain  -bruising at site where injected  -chills  -diarrhea  -fever  -headache  -joint pain  -joint stiffness  -joint swelling  -muscle cramps  -muscle pain  -nausea, vomiting  -pain, redness, or irritation at site where injected  -weak or tired  This list may not describe all possible side effects. Call your doctor for medical advice about side effects. You may report side effects to FDA at 0-680-FDA-6532.  Where should I keep my medicine?  This drug is given in a hospital or clinic and will not be stored at home.  NOTE: This sheet is a summary. It may not cover all possible information. If you have questions about this medicine, talk to your doctor, pharmacist, or health care provider.  © 2020 [employer] (2017-01-19 08:34:51)

## 2020-05-22 RX ORDER — ASPIRIN 81 MG/1
81 TABLET ORAL DAILY
Qty: 30 TABLET | Refills: 2 | Status: SHIPPED | OUTPATIENT
Start: 2020-05-22 | End: 2022-04-21

## 2020-06-09 ENCOUNTER — OFFICE VISIT (OUTPATIENT)
Dept: ORTHOPEDIC SURGERY | Facility: CLINIC | Age: 60
End: 2020-06-09

## 2020-06-09 VITALS — BODY MASS INDEX: 35.97 KG/M2 | TEMPERATURE: 98 F | WEIGHT: 203 LBS | HEIGHT: 63 IN

## 2020-06-09 DIAGNOSIS — M54.2 CERVICAL SPINE PAIN: Primary | ICD-10-CM

## 2020-06-09 DIAGNOSIS — M54.2 NECK PAIN: ICD-10-CM

## 2020-06-09 PROCEDURE — 99213 OFFICE O/P EST LOW 20 MIN: CPT | Performed by: ORTHOPAEDIC SURGERY

## 2020-06-09 PROCEDURE — 72040 X-RAY EXAM NECK SPINE 2-3 VW: CPT | Performed by: ORTHOPAEDIC SURGERY

## 2020-06-09 RX ORDER — ISOSORBIDE MONONITRATE 30 MG/1
TABLET, EXTENDED RELEASE ORAL
Qty: 30 TABLET | Refills: 4 | Status: SHIPPED | OUTPATIENT
Start: 2020-06-09 | End: 2020-11-12

## 2020-06-09 RX ORDER — HYDROXYZINE 50 MG/1
TABLET, FILM COATED ORAL AS NEEDED
COMMUNITY
Start: 2020-05-19

## 2020-06-09 RX ORDER — ISOSORBIDE MONONITRATE 30 MG/1
TABLET, EXTENDED RELEASE ORAL
Qty: 30 TABLET | Refills: 4 | OUTPATIENT
Start: 2020-06-09

## 2020-06-09 NOTE — PROGRESS NOTES
New patient or new problem visit    Chief Complaint   Patient presents with   • Cervical Spine - Establish Care       HPI: She complains of neck pain radiating into the right shoulder.  It is moderate intermittent stabbing.  She is status post anterior cervical discectomy and fusion first from 4-7 and then over a year ago at C3-4.  I took the old hardware out at that time and she was solidly healed    PFSH: See chart- reviewed    Review of Systems   Constitutional: Positive for activity change.   Musculoskeletal: Positive for arthralgias, back pain, neck pain and neck stiffness.   Psychiatric/Behavioral: Positive for behavioral problems.       PE: Constitutional: Vital signs above-noted.  Awake, alert and oriented    Psychiatric: Affect and insight do not appear grossly disturbed.    Pulmonary: Breathing is unlabored, color is good.    Skin: Warm, dry and normal turgor    Cardiac: Radial pulses intac on the right.  No edema.  She is wearing a brace and stimulator on the left wrist    Eyesight and hearing appear adequate for examination purposes    Musculoskeletal:  Posture is unremarkable to coronal and sagittal inspection.  Motion appears stiff of course that she is solidly fused.  The skin about the area is intact.  There is no palpable or visible deformity.  There is no local spasm. There is no tenderness to percussion and palpation of the spine.     Neurologic:  In the bilateral upper extremities there is no evidence of atrophy.  Motor function is undisturbed in shoulder abduction, elbow flexion, wrist extension, finger extension, triceps extension, or finger abduction.  Sensation appears symmetrically intact to light touch.  Reflexes are 2+ and symmetrical in the biceps, brachioradialis, and triceps. Curiel test is negative.  Gait appears undisturbed.  Spurling test is negative.          MEDICAL DECISION MAKING    XRAY: Plain film x-rays of cervical spine show a plate and fusion at C3-4 and solid interbody  remodeled fusion C4-7.  Mild degenerative changes at 2 3 above really unchanged since 2013 even.    Other: n/a    Impression: Cervical spondylosis with radiculopathy    Plan: For now physical therapy and PRN follow-up  Answers for HPI/ROS submitted by the patient on 6/8/2020   What is the primary reason for your visit?: Other  Have you had these symptoms before?: Yes  Please describe any probable cause for these symptoms. : Arthritis maybe?    Fell in December.    Conflicting answers have been found for some questions. Please document the patient's answers manually.  There is no answer

## 2020-06-11 RX ORDER — SIMVASTATIN 20 MG
20 TABLET ORAL NIGHTLY
Qty: 30 TABLET | Refills: 6 | Status: SHIPPED | OUTPATIENT
Start: 2020-06-11 | End: 2020-06-26

## 2020-06-11 NOTE — TELEPHONE ENCOUNTER
Patient called and request refill for simvastatin (ZOCOR) 20 MG tablet be sent to    [facility] [address] [place] [address], [place] - [address] [facility] [phone] [facility] [phone] FX     Call back  [phone]

## 2020-06-19 ENCOUNTER — HOSPITAL ENCOUNTER (OUTPATIENT)
Dept: PHYSICAL THERAPY | Facility: HOSPITAL | Age: 60
Setting detail: THERAPIES SERIES
Discharge: HOME | End: 2020-06-19

## 2020-06-19 DIAGNOSIS — M54.2 NECK PAIN: Primary | ICD-10-CM

## 2020-06-19 PROCEDURE — 97162 PT EVAL MOD COMPLEX 30 MIN: CPT | Performed by: PHYSICAL THERAPIST

## 2020-06-19 PROCEDURE — 97110 THERAPEUTIC EXERCISES: CPT | Performed by: PHYSICAL THERAPIST

## 2020-06-19 NOTE — THERAPY EVALUATION
Outpatient Physical Therapy Ortho Initial Evaluation  [facility]     Patient Name: Keri Bhagat  : 1960  MRN: [id number]  Today's Date: 2020      Visit Date: 2020    Patient Active Problem List   Diagnosis   • Gastroesophageal reflux disease   • Bipolar I disorder, single manic episode (CMS/HCC)   • Atherosclerosis of coronary artery   • Essential hypertension   • Lightheadedness   • Low back pain   • Sciatica   • Thyroid nodule   • Fatigue   • Hyperlipidemia   • Hypothyroidism (acquired)   • Iron deficiency anemia   • Right knee pain   • Cervical disc disease   • Depression   • ROCKY (obstructive sleep apnea)   • Coronary artery disease involving native coronary artery of native heart without angina pectoris   • Cervical spondylosis with radiculopathy   • Chronic pain of both knees   • Arthritis of both knees   • Weight gain, abnormal   • Allergic rhinitis   • Obesity (BMI 30-39.9)   • Plantar fasciitis   • Peroneal tendon injury   • Stress fracture of calcaneus   • Vitamin D deficiency   • Pain and swelling of knee   • Knee injury   • Calcific Achilles tendonitis   • Rod's deformity   • Urgency incontinence   • Nocturia   • Chronic gastritis without bleeding   • LGSIL on Pap smear of cervix   • Primary osteoarthritis of right knee   • Chronic renal insufficiency, stage III (moderate) (CMS/HCC)   • Left wrist tendinitis   • Cervical radiculopathy   • Tobacco abuse   • Obesity, morbid, BMI 40.0-49.9 (CMS/HCC)   • Encounter for screening for lung cancer   • Urinary frequency   • Diabetes mellitus screening   • MORALES I (cervical intraepithelial neoplasia I)   • Primary osteoarthritis of both knees   • H/O bladder repair surgery   • Chest pain   • Syncope and collapse   • Closed fracture of left distal radius   • Closed displaced fracture of neck of right fifth metacarpal bone   • Fracture follow-up   • Nondisplaced fracture of base of fifth metacarpal bone, left hand, initial encounter  for closed fracture   • Closed fracture of lower end of left radius with routine healing   • Multiple dislocations of fingers, open   • Bilateral chronic knee pain   • Arthritis of left knee   • Arthritis of right knee   • Family history of diabetes mellitus (DM)   • Menopause   • 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   • Memory dysfunction   • Periodic limb movement disorder   • Gait disturbance   • Degenerative disc disease, lumbar   • Osteopenia   • Vaginal atrophy   • Female dyspareunia   • Pituitary cyst (CMS/HCC)   • Meningioma (CMS/[facility])   • Pituitary cyst (CMS/HCC)        Past Medical History:   Diagnosis Date   • Abnormal Pap smear of cervix    • Anemia    • Anxiety    • Arthritis    • Atherosclerotic heart disease of native coronary artery with other forms of angina pectoris (CMS/[facility])    • Bipolar I disorder, single manic episode (CMS/[facility])     depressive d(NOS)   • Cervical disc herniation    • Cervical dysplasia    • Coronary artery disease    • CTS (carpal tunnel syndrome)    • Depression     NO SUICIDAL PLANS   • Difficulty swallowing    • Diverticular disease    • Dyspepsia    • Dysphagia    • Gastric reflux    • Heart attack (CMS/[facility])    • Heart murmur    • Hiatal hernia    • High cholesterol    • History of transfusion    • 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    HPV positive on pap LGSIL   • Hyperlipidemia    • Hypertension    • Hypothyroidism    • Incontinence in female     wears pads   • Kidney disease 2017    stage 2    • Kidney disease, chronic, stage III (GFR 30-59 ml/min) (CMS/[facility])    • LGSIL on Pap smear of cervix 04/29/2016    LGSIL HPV positive   • Myocardial infarction (CMS/[facility]) 2000   • Neck pain    • Obesity    • Past myocardial infarction 05/2000   • Periodic limb movement disorder    • Restless leg syndrome    • Sleep apnea     cpap   • Stress fracture     LEFT HEEL   • Suicidal ideation 08/19/2016    history   • Swelling of ankle     right  had doppler no s/s blood clot   • Thyroid nodule    • Vitamin B12 deficiency         Past Surgical History:   Procedure Laterality Date   • ANTERIOR CERVICAL DISCECTOMY W/ FUSION N/A 12/29/2016    Procedure: C3-4 anterior cervical discectomy and fusion with Depuy micro plate, ALLOGRAFT C3-4, AND HARDWARE REMOVAL C4-7.;  Surgeon: Hema Godwin MD;  Location: [facility] OR;  Service:    • CARDIAC CATHETERIZATION N/A 3/30/2017    Procedure: Left Heart Cath;  Surgeon: Tracey Vargas MD;  Location: [facility] CATH INVASIVE LOCATION;  Service:    • CARDIAC CATHETERIZATION N/A 3/30/2017    Procedure: Coronary angiography;  Surgeon: Tracey Vargas MD;  Location: [facility] CATH INVASIVE LOCATION;  Service:    • CARDIAC CATHETERIZATION N/A 3/30/2017    Procedure: Left ventriculography;  Surgeon: Tracey Vargas MD;  Location: [facility] CATH INVASIVE LOCATION;  Service:    • CARDIAC CATHETERIZATION  3/30/2017    Procedure: Functional Flow [place];  Surgeon: Tracey Vargas MD;  Location: [facility] CATH INVASIVE LOCATION;  Service:    • CARPAL TUNNEL RELEASE     • CERVICAL BIOPSY  MMXVI    Dr. Jeffery.    • CERVICAL DISCECTOMY ANTERIOR  04/2013    C4-7   • COLONOSCOPY  04/20/2015    Diverticulosis, IH   • COLPOSCOPY W/ BIOPSY / CURETTAGE  06/17/2016    LGSIL HPV positive. Results were normal repeat pap in one year. Chronic Cervicitis   • CORONARY ANGIOPLASTY WITH STENT PLACEMENT     • ENDOSCOPY  MMXV    Normal.  Dr. Rodriguez   • ENDOSCOPY N/A 6/22/2017    Erythematous mucosa in the stomach  PATH: Chronic active gastritis, moderate with intestinal metaplasia    • GASTRIC BYPASS      Done using the sleeve technique   • HARDWARE REMOVAL  12/29/2016    cervical   • LAPAROSCOPIC GASTRIC BANDING  02/2018   • SHOULDER SURGERY Left     RCR   • TONSILLECTOMY     • TONSILLECTOMY AND ADENOIDECTOMY     • TRANSVAGINAL TAPING SUSPENSION N/A 12/6/2017    Procedure: MID URETHRAL SLING CYSTSCOPY;  Surgeon: Abby Méndez MD;  Location: [facility]  MAIN OR;  Service:    • TUBAL ABDOMINAL LIGATION     • TYMPANOSTOMY TUBE PLACEMENT Right    • WRIST SURGERY Bilateral     carpal tunnel       Visit Dx:     ICD-10-CM ICD-9-CM   1. Neck pain M54.2 723.1         Patient History     Row Name 06/19/20 1400             History    Chief Complaint  Pain  -GR      Type of Pain  Neck pain  -GR      Date Current Problem(s) Began  01/19/20  -GR      Brief Description of Current Complaint  C/o chronic neck pain dating back to 2009.  She has had surgery 2x at c5-6 dating back to 2013 and 2015. She states she recovered well but now over the last few months she is startingto have pain in the upper part of her neck and between both shoulder blades occasionally travels down her R arm. She states the symptoms stop at her elbow. She states it feels like numbness/tingling. Previously she has had symptoms in the L even resulting in a Rc repair.  She has difficulty lifting heavy weights, ie 50 lbs.  She has some difficulty with brushing her hair. Denies sleep disturbances.  She has not had a fall in 1/5 years but her last was very severe with B wrist fractures; states she was on concrete steps. She is still in a brace and uses a bone growth stimulator. She is not taking pain medication or using heat/ice.  She denies a normal exercise routine but does do a fair amount of yard work.  -GR      Patient/Caregiver Goals  Relieve pain  -GR      Hand Dominance  ambidextrous states she is R handed but depends on her L hand more   -GR         Pain     Pain Location  Neck  -GR      Pain at Present  3  -GR      Pain at Best  0  -GR      Pain at Worst  8  -GR      Is your sleep disturbed?  No  -GR         Fall Risk Assessment    Any falls in the past year:  No  -GR         Services    Do you plan to receive Home Health services in the near future  No  -GR         Daily Activities    Primary Language  English  -GR      How does patient learn best?  Listening;Demonstration  -GR      Pt Participated in  POC and Goals  Yes  -GR         Safety    Are you being hurt, hit, or frightened by anyone at home or in your life?  No  -GR      Are you being neglected by a caregiver  No  -GR        User Key  (r) = Recorded By, (t) = Taken By, (c) = Cosigned By    Initials Name Provider Type    Hossein Renteria, PT Physical Therapist          PT Ortho     Row Name 06/19/20 1500       Quarter Clearing    Quarter Clearing  Upper Quarter Clearing  -GR       Myotomal Screen- Upper Quarter Clearing    Shoulder flexion (C5)  Bilateral:;4 (Good)  -GR    Elbow flexion/wrist extension (C6)  Bilateral:;4 (Good)  -GR    Elbow extension/wrist flexion (C7)  Bilateral:;4 (Good)  -GR       Special Tests/Palpation    Special Tests/Palpation  Cervical/Thoracic  -GR       Cervical/Thoracic Special Tests    Cervical Compression (Forarminal Compression vs. Facet Pain)  Bilateral:;Positive  -GR    Cervical Distraction (Foraminal Compression vs. Facet Pain)  Bilateral:;Positive  -GR       General ROM    Head/Neck/Trunk  Neck Extension;Neck Flexion;Neck Lt Lateral Flexion;Neck Rt Lateral Flexion;Neck Lt Rotation;Neck Rt Rotation  -GR       Head/Neck/Trunk    Neck Extension AROM  30  -GR    Neck Flexion AROM  20  -GR    Neck Lt Lateral Flexion AROM  15  -GR    Neck Rt Lateral Flexion AROM  15  -GR    Neck Lt Rotation AROM  40  -GR    Neck Rt Rotation AROM  36  -GR      User Key  (r) = Recorded By, (t) = Taken By, (c) = Cosigned By    Initials Name Provider Type    Hossein Renteria, PT Physical Therapist                      Therapy Education  Education Details: Role of outpatient PT, POC, differential diagnosis, initial HEP, expectations.  Given: HEP  Program: New  How Provided: Verbal, Demonstration, Written  Provided to: Patient  Level of Understanding: Teach back education performed     PT OP Goals     Row Name 06/19/20 1500          PT Short Term Goals    STG Date to Achieve  07/04/20  -GR     STG 1  Patient will be independent with initial  HEP.  -GR     STG 1 Progress  New  -GR     STG 2  Patient will demonstrate >/=50 degrees of cervical rotation bilaterally to assist with functional turning.  -GR     STG 2 Progress  New  -GR        Long Term Goals    LTG Date to Achieve  08/03/20  -GR     LTG 1  Patient will be independent with progressive HEP for long term management of current condition.  -GR     LTG 1 Progress  New  -GR     LTG 2  Patient will score </= 12% disability on the NDI to indicate improved perceived performance with ADLs.  -GR     LTG 2 Progress  New  -GR     LTG 3  Patient will demonstrate >/=65 degrees of cervical rotation bilaterally to assist with conversational ease  -GR     LTG 3 Progress  New  -GR        Time Calculation    PT Goal Re-Cert Due Date  09/17/20  -GR       User Key  (r) = Recorded By, (t) = Taken By, (c) = Cosigned By    Initials Name Provider Type    GR Hossein Jimenes, PT Physical Therapist          PT Assessment/Plan     Row Name 06/19/20 1623          PT Assessment    Functional Limitations  Performance in leisure activities;Performance in self-care ADL;Limitations in community activities  -GR     Impairments  Posture;Range of motion;Poor body mechanics;Muscle strength;Pain;Peripheral nerve integrity  -GR     Assessment Comments  60 y.o. female referred to outpatient physical therapy for evaluation and treatment of bilateral neck pain x 5 months.  Patient presents with forward head posture, intermittent radicular symptoms to R elbow, decreased mobility all cervical planes and impaired strength. Signs and symptoms are consistent with referring diagnosis.  Pertinent comorbidities and personal factors that may affect progress include, but are not limited to, c/spine surgery x 2, chronic pain, arthritis.  This condition is evolving. Recommend skilled PT to address functional deficits. Thank you for this referral.  -GR     Please refer to paper survey for additional self-reported information  Yes  -GR     Rehab  Potential  Good  -GR     Patient/caregiver participated in establishment of treatment plan and goals  Yes  -GR     Patient would benefit from skilled therapy intervention  Yes  -GR        PT Plan    PT Frequency  2x/week  -GR     Predicted Duration of Therapy Intervention (Therapy Eval)  4 weeks  -GR     Planned CPT's?  PT EVAL MOD COMPLELITY: 44530;PT RE-EVAL: 01176;PT THER PROC EA 15 MIN: 41086;PT THER ACT EA 15 MIN: 68924;PT MANUAL THERAPY EA 15 MIN: 11456;PT NEUROMUSC RE-EDUCATION EA 15 MIN: 70497;PT SELF CARE/HOME MGMT/TRAIN EA 15: 33054;PT HOT OR COLD PACK TREAT MCARE;PT TRACTION CERVICAL: 58016  -GR     Physical Therapy Interventions (Optional Details)  home exercise program;joint mobilization;manual therapy techniques;patient/family education;postural re-education;ROM (Range of Motion);strengthening;stretching  -GR     PT Plan Comments  Begin with supine HEP review. Progress with gentle cervical mobliity, spinal stabilization, postural correction and chest stretches.  -GR       User Key  (r) = Recorded By, (t) = Taken By, (c) = Cosigned By    Initials Name Provider Type    GR Hossein Jimenes, PT Physical Therapist            OP Exercises     Row Name 06/19/20 1500             Total Minutes    60303 - PT Therapeutic Exercise Minutes  10  -GR         Exercise 1    Exercise Name 1  c/spine supine retractiocnn  -GR      Cueing 1  Demo  -GR      Sets 1  1  -GR      Reps 1  10  -GR      Time 1  5 seconds  -GR         Exercise 2    Exercise Name 2  c/spine supine rotation  -GR      Cueing 2  Demo  -GR      Sets 2  1  -GR      Reps 2  10  -GR      Additional Comments  each side  -GR         Exercise 3    Exercise Name 3  scapular retraction  -GR      Cueing 3  Demo  -GR      Sets 3  1  -GR      Reps 3  10  -GR         Exercise 4    Exercise Name 4  doorway stretch - T  -GR      Cueing 4  Demo  -GR      Sets 4  1  -GR      Reps 4  3  -GR      Time 4  20 seconds  -GR        User Key  (r) = Recorded By, (t) = Taken  By, (c) = Cosigned By    Initials Name Provider Type    GR Hossein Jimenes, PT Physical Therapist                        Outcome Measure Options: Neck Disability Index (NDI)(22% disability)         Time Calculation:     Start Time: 1500  Stop Time: 1545  Time Calculation (min): 45 min  Total Timed Code Minutes- PT: 10 minute(s)     Therapy Charges for Today     Code Description Service Date Service Provider Modifiers Qty    67360990988 HC PT THER PROC EA 15 MIN 6/19/2020 Hossein Jimenes, PT GP 1    53662259998 HC PT EVAL MOD COMPLEXITY 2 6/19/2020 Hosseni Jimenes, PT GP 1          PT G-Codes  Outcome Measure Options: Neck Disability Index (NDI)(22% disability)         Hossein Jimenes, PT  6/19/2020

## 2020-06-23 ENCOUNTER — HOSPITAL ENCOUNTER (OUTPATIENT)
Dept: PHYSICAL THERAPY | Facility: HOSPITAL | Age: 60
Setting detail: THERAPIES SERIES
Discharge: HOME | End: 2020-06-23

## 2020-06-23 DIAGNOSIS — M54.2 NECK PAIN: Primary | ICD-10-CM

## 2020-06-23 PROCEDURE — 97110 THERAPEUTIC EXERCISES: CPT | Performed by: PHYSICAL THERAPIST

## 2020-06-23 PROCEDURE — 97140 MANUAL THERAPY 1/> REGIONS: CPT | Performed by: PHYSICAL THERAPIST

## 2020-06-23 NOTE — THERAPY TREATMENT NOTE
Outpatient Physical Therapy Ortho Treatment Note  [facility]     Patient Name: Keri Bhagat  : 1960  MRN: [id number]  Today's Date: 2020      Visit Date: 2020    Visit Dx:    ICD-10-CM ICD-9-CM   1. Neck pain M54.2 723.1       Patient Active Problem List   Diagnosis   • Gastroesophageal reflux disease   • Bipolar I disorder, single manic episode (CMS/[facility])   • Atherosclerosis of coronary artery   • Essential hypertension   • Lightheadedness   • Low back pain   • Sciatica   • Thyroid nodule   • Fatigue   • Hyperlipidemia   • Hypothyroidism (acquired)   • Iron deficiency anemia   • Right knee pain   • Cervical disc disease   • Depression   • ROCKY (obstructive sleep apnea)   • Coronary artery disease involving native coronary artery of native heart without angina pectoris   • Cervical spondylosis with radiculopathy   • Chronic pain of both knees   • Arthritis of both knees   • Weight gain, abnormal   • Allergic rhinitis   • Obesity (BMI 30-39.9)   • Plantar fasciitis   • Peroneal tendon injury   • Stress fracture of calcaneus   • Vitamin D deficiency   • Pain and swelling of knee   • Knee injury   • Calcific Achilles tendonitis   • Rod's deformity   • Urgency incontinence   • Nocturia   • Chronic gastritis without bleeding   • LGSIL on Pap smear of cervix   • Primary osteoarthritis of right knee   • Chronic renal insufficiency, stage III (moderate) (CMS/[facility])   • Left wrist tendinitis   • Cervical radiculopathy   • Tobacco abuse   • Obesity, morbid, BMI 40.0-49.9 (CMS/HCC)   • Encounter for screening for lung cancer   • Urinary frequency   • Diabetes mellitus screening   • MORALES I (cervical intraepithelial neoplasia I)   • Primary osteoarthritis of both knees   • H/O bladder repair surgery   • Chest pain   • Syncope and collapse   • Closed fracture of left distal radius   • Closed displaced fracture of neck of right fifth metacarpal bone   • Fracture follow-up   • Nondisplaced fracture of  base of fifth metacarpal bone, left hand, initial encounter for closed fracture   • Closed fracture of lower end of left radius with routine healing   • Multiple dislocations of fingers, open   • Bilateral chronic knee pain   • Arthritis of left knee   • Arthritis of right knee   • Family history of diabetes mellitus (DM)   • Menopause   • 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   • Memory dysfunction   • Periodic limb movement disorder   • Gait disturbance   • Degenerative disc disease, lumbar   • Osteopenia   • Vaginal atrophy   • Female dyspareunia   • Pituitary cyst (CMS/HCC)   • Meningioma (CMS/[facility])   • Pituitary cyst (CMS/HCC)        Past Medical History:   Diagnosis Date   • Abnormal Pap smear of cervix    • Anemia    • Anxiety    • Arthritis    • Atherosclerotic heart disease of native coronary artery with other forms of angina pectoris (CMS/[facility])    • Bipolar I disorder, single manic episode (CMS/[facility])     depressive d(NOS)   • Cervical disc herniation    • Cervical dysplasia    • Coronary artery disease    • CTS (carpal tunnel syndrome)    • Depression     NO SUICIDAL PLANS   • Difficulty swallowing    • Diverticular disease    • Dyspepsia    • Dysphagia    • Gastric reflux    • Heart attack (CMS/[facility])    • Heart murmur    • Hiatal hernia    • High cholesterol    • History of transfusion    • 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    HPV positive on pap LGSIL   • Hyperlipidemia    • Hypertension    • Hypothyroidism    • Incontinence in female     wears pads   • Kidney disease 2017    stage 2    • Kidney disease, chronic, stage III (GFR 30-59 ml/min) (CMS/[facility])    • LGSIL on Pap smear of cervix 04/29/2016    LGSIL HPV positive   • Myocardial infarction (CMS/[facility]) 2000   • Neck pain    • Obesity    • Past myocardial infarction 05/2000   • Periodic limb movement disorder    • Restless leg syndrome    • Sleep apnea     cpap   • Stress fracture     LEFT HEEL   • Suicidal  ideation 08/19/2016    history   • Swelling of ankle     right had doppler no s/s blood clot   • Thyroid nodule    • Vitamin B12 deficiency         Past Surgical History:   Procedure Laterality Date   • ANTERIOR CERVICAL DISCECTOMY W/ FUSION N/A 12/29/2016    Procedure: C3-4 anterior cervical discectomy and fusion with Depuy micro plate, ALLOGRAFT C3-4, AND HARDWARE REMOVAL C4-7.;  Surgeon: Hema Godwin MD;  Location: [facility] MAIN OR;  Service:    • CARDIAC CATHETERIZATION N/A 3/30/2017    Procedure: Left Heart Cath;  Surgeon: Tracey Vargas MD;  Location:  [facility] CATH INVASIVE LOCATION;  Service:    • CARDIAC CATHETERIZATION N/A 3/30/2017    Procedure: Coronary angiography;  Surgeon: Tracey Vargas MD;  Location:  [facility] CATH INVASIVE LOCATION;  Service:    • CARDIAC CATHETERIZATION N/A 3/30/2017    Procedure: Left ventriculography;  Surgeon: Tracey Vargas MD;  [facility];  Service:    • CARDIAC CATHETERIZATION  3/30/2017    Procedure: Functional Flow [place];  Surgeon: Tracey Vargas MD;  Location:  [facility] CATH INVASIVE LOCATION;  Service:    • CARPAL TUNNEL RELEASE     • CERVICAL BIOPSY  MMXVI    Dr. Jeffery.    • CERVICAL DISCECTOMY ANTERIOR  04/2013    C4-7   • COLONOSCOPY  04/20/2015    Diverticulosis, IH   • COLPOSCOPY W/ BIOPSY / CURETTAGE  06/17/2016    LGSIL HPV positive. Results were normal repeat pap in one year. Chronic Cervicitis   • CORONARY ANGIOPLASTY WITH STENT PLACEMENT     • ENDOSCOPY  MMXV    Normal.  Dr. Rodriguez   • ENDOSCOPY N/A 6/22/2017    Erythematous mucosa in the stomach  PATH: Chronic active gastritis, moderate with intestinal metaplasia    • GASTRIC BYPASS      Done using the sleeve technique   • HARDWARE REMOVAL  12/29/2016    cervical   • LAPAROSCOPIC GASTRIC BANDING  02/2018   • SHOULDER SURGERY Left     RCR   • TONSILLECTOMY     • TONSILLECTOMY AND ADENOIDECTOMY     • TRANSVAGINAL TAPING SUSPENSION N/A 12/6/2017    Procedure: MID URETHRAL SLING  CYSTSCOPY;  Surgeon: Abby Méndez MD;  Location: [facility] OR;  Service:    • TUBAL ABDOMINAL LIGATION     • TYMPANOSTOMY TUBE PLACEMENT Right    • WRIST SURGERY Bilateral     carpal tunnel                       PT Assessment/Plan     Row Name 06/23/20 1313          PT Assessment    Assessment Comments  Patient returns for 1st follow up visit.  She does report mild headache following manual distraction. Advanced gentle with scapular stability without pain.  -GR        PT Plan    PT Plan Comments  Assess response to manual, continue if no worse.  Otherwise continue gentle chest stretching, postural correction.  -GR       User Key  (r) = Recorded By, (t) = Taken By, (c) = Cosigned By    Initials Name Provider Type    GR Hossein Jimenes, PT Physical Therapist            OP Exercises     Row Name 06/23/20 1300 06/23/20 1100          Total Minutes    47083 - PT Therapeutic Exercise Minutes  --  20  -GR     53430 - PT Manual Therapy Minutes  12  -GR  --        Exercise 1    Exercise Name 1  --  c/spine supine retraction  -GR     Cueing 1  --  Demo  -GR     Sets 1  --  2  -GR     Reps 1  --  10  -GR     Time 1  --  5 seconds  -GR        Exercise 2    Exercise Name 2  --  c/spine supine rotation  -GR     Cueing 2  --  Demo  -GR     Sets 2  --  1  -GR     Reps 2  --  10  -GR     Additional Comments  --  each side  -GR        Exercise 3    Exercise Name 3  --  scapular retraction  -GR     Cueing 3  --  Demo  -GR     Sets 3  --  2  -GR     Reps 3  --  10  -GR     Additional Comments  --  performed in supine  -GR        Exercise 4    Exercise Name 4  --  doorway stretch - T  -GR     Cueing 4  --  Demo  -GR     Sets 4  --  1  -GR     Reps 4  --  3  -GR     Time 4  --  20 seconds  -GR        Exercise 5    Exercise Name 5  --  UBE  -GR     Time 5  --  3 min  -GR     Additional Comments  --  L1  -GR        Exercise 6    Exercise Name 6  --  supine horizontal abduction  -GR     Cueing 6  --  Demo  -GR     Sets 6  --  " 1  -GR     Reps 6  --  10  -GR     Additional Comments  --  YTB  -GR        Exercise 7    Exercise Name 7  --  supine \"x's\"  -GR     Cueing 7  --  Demo  -GR     Sets 7  --  1  -GR     Reps 7  --  10  -GR        Exercise 8    Exercise Name 8  --  supine alt shoudler flexion- reciprocal  -GR     Cueing 8  --  Demo  -GR     Sets 8  --  1  -GR     Reps 8  --  10  -GR       User Key  (r) = Recorded By, (t) = Taken By, (c) = Cosigned By    Initials Name Provider Type    GR Hossein Jimenes, PT Physical Therapist                      Manual Rx (last 36 hours)      Manual Treatments     Row Name 06/23/20 1300             Total Minutes    35773 - PT Manual Therapy Minutes  12  -GR         Manual Rx 1    Manual Rx 1 Location  Supine gentle cervical distraction, passive rot/sidebend, suboccipital release  -GR        User Key  (r) = Recorded By, (t) = Taken By, (c) = Cosigned By    Initials Name Provider Type    GR Hossein Jimenes, PT Physical Therapist          PT OP Goals     Row Name 06/23/20 1100          PT Short Term Goals    STG Date to Achieve  07/04/20  -GR     STG 1  Patient will be independent with initial HEP.  -GR     STG 1 Progress  Ongoing  -GR     STG 2  Patient will demonstrate >/=50 degrees of cervical rotation bilaterally to assist with functional turning.  -GR     STG 2 Progress  Ongoing  -GR        Long Term Goals    LTG Date to Achieve  08/03/20  -GR     LTG 1  Patient will be independent with progressive HEP for long term management of current condition.  -GR     LTG 1 Progress  Ongoing  -GR     LTG 2  Patient will score </= 12% disability on the NDI to indicate improved perceived performance with ADLs.  -GR     LTG 2 Progress  Ongoing  -GR     LTG 3  Patient will demonstrate >/=65 degrees of cervical rotation bilaterally to assist with conversational ease  -GR     LTG 3 Progress  Ongoing  -GR       User Key  (r) = Recorded By, (t) = Taken By, (c) = Cosigned By    Initials Name Provider Type    GR " Hossein Jimenes, PT Physical Therapist          Therapy Education  Education Details: continue with initial HEP and monitor effects of manual              Time Calculation:   Start Time: 1130  Stop Time: 1202  Time Calculation (min): 32 min  Total Timed Code Minutes- PT: 32 minute(s)  Therapy Charges for Today     Code Description Service Date Service Provider Modifiers Qty    78205425164  PT THER PROC EA 15 MIN 6/23/2020 Hossein Jimenes, PT GP 1    70622648227 HC PT MANUAL THERAPY EA 15 MIN 6/23/2020 Hossein Jimenes, PT GP 1                    Hossein Jimenes, PT  6/23/2020

## 2020-06-26 RX ORDER — SIMVASTATIN 20 MG
TABLET ORAL
Qty: 90 TABLET | Refills: 0 | Status: SHIPPED | OUTPATIENT
Start: 2020-06-26 | End: 2021-01-06

## 2020-06-28 DIAGNOSIS — I10 HYPERTENSION, UNSPECIFIED TYPE: ICD-10-CM

## 2020-06-29 RX ORDER — ATENOLOL 50 MG/1
TABLET ORAL
Qty: 90 TABLET | Refills: 0 | Status: SHIPPED | OUTPATIENT
Start: 2020-06-29 | End: 2020-09-29

## 2020-06-30 ENCOUNTER — HOSPITAL ENCOUNTER (OUTPATIENT)
Dept: PHYSICAL THERAPY | Facility: HOSPITAL | Age: 60
Setting detail: THERAPIES SERIES
Discharge: HOME | End: 2020-06-30

## 2020-06-30 DIAGNOSIS — M54.2 NECK PAIN: Primary | ICD-10-CM

## 2020-06-30 PROCEDURE — 97140 MANUAL THERAPY 1/> REGIONS: CPT

## 2020-06-30 PROCEDURE — 97110 THERAPEUTIC EXERCISES: CPT

## 2020-07-02 ENCOUNTER — HOSPITAL ENCOUNTER (OUTPATIENT)
Dept: PHYSICAL THERAPY | Facility: HOSPITAL | Age: 60
Setting detail: THERAPIES SERIES
Discharge: HOME | End: 2020-07-02

## 2020-07-02 DIAGNOSIS — M54.2 NECK PAIN: Primary | ICD-10-CM

## 2020-07-02 PROCEDURE — 97140 MANUAL THERAPY 1/> REGIONS: CPT

## 2020-07-02 PROCEDURE — 97110 THERAPEUTIC EXERCISES: CPT

## 2020-07-02 NOTE — THERAPY TREATMENT NOTE
Outpatient Physical Therapy Ortho Treatment Note  [facility]     Patient Name: Keri Bhagat  : 1960  MRN: [id number]  Today's Date: 2020      Visit Date: 2020    Visit Dx:    ICD-10-CM ICD-9-CM   1. Neck pain M54.2 723.1       Patient Active Problem List   Diagnosis   • Gastroesophageal reflux disease   • Bipolar I disorder, single manic episode (CMS/[facility])   • Atherosclerosis of coronary artery   • Essential hypertension   • Lightheadedness   • Low back pain   • Sciatica   • Thyroid nodule   • Fatigue   • Hyperlipidemia   • Hypothyroidism (acquired)   • Iron deficiency anemia   • Right knee pain   • Cervical disc disease   • Depression   • ROCKY (obstructive sleep apnea)   • Coronary artery disease involving native coronary artery of native heart without angina pectoris   • Cervical spondylosis with radiculopathy   • Chronic pain of both knees   • Arthritis of both knees   • Weight gain, abnormal   • Allergic rhinitis   • Obesity (BMI 30-39.9)   • Plantar fasciitis   • Peroneal tendon injury   • Stress fracture of calcaneus   • Vitamin D deficiency   • Pain and swelling of knee   • Knee injury   • Calcific Achilles tendonitis   • Rod's deformity   • Urgency incontinence   • Nocturia   • Chronic gastritis without bleeding   • LGSIL on Pap smear of cervix   • Primary osteoarthritis of right knee   • Chronic renal insufficiency, stage III (moderate) (CMS/[facility])   • Left wrist tendinitis   • Cervical radiculopathy   • Tobacco abuse   • Obesity, morbid, BMI 40.0-49.9 (CMS/HCC)   • Encounter for screening for lung cancer   • Urinary frequency   • Diabetes mellitus screening   • MORALES I (cervical intraepithelial neoplasia I)   • Primary osteoarthritis of both knees   • H/O bladder repair surgery   • Chest pain   • Syncope and collapse   • Closed fracture of left distal radius   • Closed displaced fracture of neck of right fifth metacarpal bone   • Fracture follow-up   • Nondisplaced fracture of  base of fifth metacarpal bone, left hand, initial encounter for closed fracture   • Closed fracture of lower end of left radius with routine healing   • Multiple dislocations of fingers, open   • Bilateral chronic knee pain   • Arthritis of left knee   • Arthritis of right knee   • Family history of diabetes mellitus (DM)   • Menopause   • 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   • Memory dysfunction   • Periodic limb movement disorder   • Gait disturbance   • Degenerative disc disease, lumbar   • Osteopenia   • Vaginal atrophy   • Female dyspareunia   • Pituitary cyst (CMS/HCC)   • Meningioma (CMS/[facility])   • Pituitary cyst (CMS/HCC)        Past Medical History:   Diagnosis Date   • Abnormal Pap smear of cervix    • Anemia    • Anxiety    • Arthritis    • Atherosclerotic heart disease of native coronary artery with other forms of angina pectoris (CMS/[facility])    • Bipolar I disorder, single manic episode (CMS/[facility])     depressive d(NOS)   • Cervical disc herniation    • Cervical dysplasia    • Coronary artery disease    • CTS (carpal tunnel syndrome)    • Depression     NO SUICIDAL PLANS   • Difficulty swallowing    • Diverticular disease    • Dyspepsia    • Dysphagia    • Gastric reflux    • Heart attack (CMS/[facility])    • Heart murmur    • Hiatal hernia    • High cholesterol    • History of transfusion    • 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    HPV positive on pap LGSIL   • Hyperlipidemia    • Hypertension    • Hypothyroidism    • Incontinence in female     wears pads   • Kidney disease 2017    stage 2    • Kidney disease, chronic, stage III (GFR 30-59 ml/min) (CMS/[facility])    • LGSIL on Pap smear of cervix 04/29/2016    LGSIL HPV positive   • Myocardial infarction (CMS/[facility]) 2000   • Neck pain    • Obesity    • Past myocardial infarction 05/2000   • Periodic limb movement disorder    • Restless leg syndrome    • Sleep apnea     cpap   • Stress fracture     LEFT HEEL   • Suicidal  ideation 08/19/2016    history   • Swelling of ankle     right had doppler no s/s blood clot   • Thyroid nodule    • Vitamin B12 deficiency         Past Surgical History:   Procedure Laterality Date   • ANTERIOR CERVICAL DISCECTOMY W/ FUSION N/A 12/29/2016    Procedure: C3-4 anterior cervical discectomy and fusion with Depuy micro plate, ALLOGRAFT C3-4, AND HARDWARE REMOVAL C4-7.;  Surgeon: Hema Godwin MD;  Location: [facility] MAIN OR;  Service:    • CARDIAC CATHETERIZATION N/A 3/30/2017    Procedure: Left Heart Cath;  Surgeon: Tracey Vargas MD;  Location:  [facility] CATH INVASIVE LOCATION;  Service:    • CARDIAC CATHETERIZATION N/A 3/30/2017    Procedure: Coronary angiography;  Surgeon: Tracey Vargas MD;  Location:  [facility] CATH INVASIVE LOCATION;  Service:    • CARDIAC CATHETERIZATION N/A 3/30/2017    Procedure: Left ventriculography;  Surgeon: Tracey Vargas MD;  Location: [facility] CATH INVASIVE LOCATION;  Service:    • CARDIAC CATHETERIZATION  3/30/2017    Procedure: Functional Flow [place];  Surgeon: Tracey Vargas MD;  Location:  [facility] CATH INVASIVE LOCATION;  Service:    • CARPAL TUNNEL RELEASE     • CERVICAL BIOPSY  MMXVI    Dr. Jeffery.    • CERVICAL DISCECTOMY ANTERIOR  04/2013    C4-7   • COLONOSCOPY  04/20/2015    Diverticulosis, IH   • COLPOSCOPY W/ BIOPSY / CURETTAGE  06/17/2016    LGSIL HPV positive. Results were normal repeat pap in one year. Chronic Cervicitis   • CORONARY ANGIOPLASTY WITH STENT PLACEMENT     • ENDOSCOPY  MMXV    Normal.  Dr. Rodriguez   • ENDOSCOPY N/A 6/22/2017    Erythematous mucosa in the stomach  PATH: Chronic active gastritis, moderate with intestinal metaplasia    • GASTRIC BYPASS      Done using the sleeve technique   • HARDWARE REMOVAL  12/29/2016    cervical   • LAPAROSCOPIC GASTRIC BANDING  02/2018   • SHOULDER SURGERY Left     RCR   • TONSILLECTOMY     • TONSILLECTOMY AND ADENOIDECTOMY     • TRANSVAGINAL TAPING SUSPENSION N/A 12/6/2017    Procedure: MID URETHRAL SLING  CYSTSCOPY;  Surgeon: Abby Méndez MD;  Location: [facility] OR;  Service:    • TUBAL ABDOMINAL LIGATION     • TYMPANOSTOMY TUBE PLACEMENT Right    • WRIST SURGERY Bilateral     carpal tunnel                       PT Assessment/Plan     Row Name 07/02/20 1119          PT Assessment    Assessment Comments  Pt. reports headaches seem to be less frequent and more manageable. Continued with manual therapy to address mobility and trigger points in cervical spine. Performed PAs to thoracic spine this session with increased tenderness and hypomobility in upper thoracic spine. Pt. tolerated progression of ther ex with no increased complaints of pain. Added standing rows and shoulder extension with tactile cues needed for proper scapular retraction.  -        PT Plan    PT Plan Comments  Progress exercises to improve standing posture  -       User Key  (r) = Recorded By, (t) = Taken By, (c) = Cosigned By    Initials Name Provider Type     Dona Tapia, PT Physical Therapist            OP Exercises     Row Name 07/02/20 1100             Subjective Pain    Able to rate subjective pain?  yes  -      Post-Treatment Pain Level  3  -      Subjective Pain Comment  My headaches dont seem to be as frequent; they are manageable. I still have a sore spot between my shoulder blades  -         Total Minutes    35999 - PT Therapeutic Exercise Minutes  22  -      26977 - PT Manual Therapy Minutes  17  -MH         Exercise 1    Exercise Name 1  c/spine supine retraction  -      Cueing 1  Demo;Verbal  -      Reps 1  10  -      Time 1  5 seconds  -         Exercise 2    Exercise Name 2  c/spine supine rotation  -      Cueing 2  Demo  -      Reps 2  10  -MH      Additional Comments  each side  -         Exercise 4    Exercise Name 4  doorway stretch - T  -      Cueing 4  Verbal  -      Sets 4  1  -      Reps 4  3  -      Time 4  20  -MH         Exercise 5    Exercise Name 5  UBE  -       "Time 5  4 min  -      Additional Comments  L1  -         Exercise 6    Exercise Name 6  supine horizontal abduction  -MH      Cueing 6  Demo  -      Reps 6  15  -MH      Additional Comments  RTB  -         Exercise 7    Exercise Name 7  supine \"x's\"  -MH      Cueing 7  Demo  -      Reps 7  10  -MH      Additional Comments  RTB  -         Exercise 8    Exercise Name 8  supine alt shoudler flexion- reciprocal  -      Cueing 8  Demo  -      Sets 8  1  -MH      Reps 8  10  -MH      Additional Comments  on orange noodle  -         Exercise 9    Exercise Name 9  open book  -      Cueing 9  Verbal;Tactile  -MH      Reps 9  5e  -MH      Time 9  5  -MH         Exercise 10    Exercise Name 10  rows  -      Cueing 10  Verbal;Tactile;Demo  -      Reps 10  10  -MH      Additional Comments  YTB  -         Exercise 11    Exercise Name 11  shoulder extension  -      Cueing 11  Verbal;Tactile;Demo  -      Reps 11  10  -MH      Additional Comments  YTB  -         Exercise 12    Exercise Name 12  SA punches over orange noodle  -      Cueing 12  Verbal;Tactile;Demo  -      Reps 12  10  -MH        User Key  (r) = Recorded By, (t) = Taken By, (c) = Cosigned By    Initials Name Provider Type     Dona Tapia, PT Physical Therapist                      Manual Rx (last 36 hours)      Manual Treatments     Row Name 07/02/20 1100             Total Minutes    02832 - PT Manual Therapy Minutes  17  -         Manual Rx 1    Manual Rx 1 Location  Supine gentle cervical distraction, passive rot/sidebend, suboccipital release  -         Manual Rx 2    Manual Rx 2 Location  thoracic PA's  -      Manual Rx 2 Grade  II/III  -        User Key  (r) = Recorded By, (t) = Taken By, (c) = Cosigned By    Initials Name Provider Type     Dona Tapia, PT Physical Therapist          PT OP Goals     Row Name 07/02/20 1100          PT Short Term Goals    STG Date to Achieve  07/04/20  -     STG 1  Patient " will be independent with initial HEP.  -     STG 1 Progress  Partially Met  -     STG 2  Patient will demonstrate >/=50 degrees of cervical rotation bilaterally to assist with functional turning.  -     STG 2 Progress  Ongoing  -        Long Term Goals    LTG Date to Achieve  08/03/20  -     LTG 1  Patient will be independent with progressive HEP for long term management of current condition.  -     LTG 1 Progress  Ongoing  -     LTG 2  Patient will score </= 12% disability on the NDI to indicate improved perceived performance with ADLs.  -     LTG 2 Progress  Ongoing  -     LTG 3  Patient will demonstrate >/=65 degrees of cervical rotation bilaterally to assist with conversational ease  -     LTG 3 Progress  Ongoing  -       User Key  (r) = Recorded By, (t) = Taken By, (c) = Cosigned By    Initials Name Provider Type     Dona Tapia, PT Physical Therapist          Therapy Education  Education Details: Continued with education on postureal awareness throughout day; discussed avoiding lifting heavy objects and proper technique if lifting is required  Program: Reinforced  How Provided: Verbal, Demonstration  Provided to: Patient  Level of Understanding: Teach back education performed, Verbalized, Demonstrated              Time Calculation:   Start Time: 1035  Stop Time: 1115  Time Calculation (min): 40 min  Total Timed Code Minutes- PT: 39 minute(s)  Therapy Charges for Today     Code Description Service Date Service Provider Modifiers Qty    51085616099 HC PT THER PROC EA 15 MIN 7/2/2020 Dona Tapia, PT GP 2    88128434589 HC PT MANUAL THERAPY EA 15 MIN 7/2/2020 Dona Tapia, PT GP 1                    Dona Tapia PT  7/2/2020

## 2020-07-07 ENCOUNTER — HOSPITAL ENCOUNTER (OUTPATIENT)
Dept: PHYSICAL THERAPY | Facility: HOSPITAL | Age: 60
Setting detail: THERAPIES SERIES
Discharge: HOME | End: 2020-07-07

## 2020-07-07 DIAGNOSIS — M54.2 NECK PAIN: Primary | ICD-10-CM

## 2020-07-07 PROCEDURE — 97012 MECHANICAL TRACTION THERAPY: CPT | Performed by: PHYSICAL THERAPIST

## 2020-07-07 PROCEDURE — 97110 THERAPEUTIC EXERCISES: CPT | Performed by: PHYSICAL THERAPIST

## 2020-07-07 NOTE — THERAPY TREATMENT NOTE
Outpatient Physical Therapy Ortho Treatment Note  [facility]     Patient Name: Keri Bhagat  : 1960  MRN: [id number]  Today's Date: 2020      Visit Date: 2020    Visit Dx:    ICD-10-CM ICD-9-CM   1. Neck pain M54.2 723.1       Patient Active Problem List   Diagnosis   • Gastroesophageal reflux disease   • Bipolar I disorder, single manic episode (CMS/[facility])   • Atherosclerosis of coronary artery   • Essential hypertension   • Lightheadedness   • Low back pain   • Sciatica   • Thyroid nodule   • Fatigue   • Hyperlipidemia   • Hypothyroidism (acquired)   • Iron deficiency anemia   • Right knee pain   • Cervical disc disease   • Depression   • ROCKY (obstructive sleep apnea)   • Coronary artery disease involving native coronary artery of native heart without angina pectoris   • Cervical spondylosis with radiculopathy   • Chronic pain of both knees   • Arthritis of both knees   • Weight gain, abnormal   • Allergic rhinitis   • Obesity (BMI 30-39.9)   • Plantar fasciitis   • Peroneal tendon injury   • Stress fracture of calcaneus   • Vitamin D deficiency   • Pain and swelling of knee   • Knee injury   • Calcific Achilles tendonitis   • Rod's deformity   • Urgency incontinence   • Nocturia   • Chronic gastritis without bleeding   • LGSIL on Pap smear of cervix   • Primary osteoarthritis of right knee   • Chronic renal insufficiency, stage III (moderate) (CMS/[facility])   • Left wrist tendinitis   • Cervical radiculopathy   • Tobacco abuse   • Obesity, morbid, BMI 40.0-49.9 (CMS/HCC)   • Encounter for screening for lung cancer   • Urinary frequency   • Diabetes mellitus screening   • MORALES I (cervical intraepithelial neoplasia I)   • Primary osteoarthritis of both knees   • H/O bladder repair surgery   • Chest pain   • Syncope and collapse   • Closed fracture of left distal radius   • Closed displaced fracture of neck of right fifth metacarpal bone   • Fracture follow-up   • Nondisplaced fracture of  base of fifth metacarpal bone, left hand, initial encounter for closed fracture   • Closed fracture of lower end of left radius with routine healing   • Multiple dislocations of fingers, open   • Bilateral chronic knee pain   • Arthritis of left knee   • Arthritis of right knee   • Family history of diabetes mellitus (DM)   • Menopause   • 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   • Memory dysfunction   • Periodic limb movement disorder   • Gait disturbance   • Degenerative disc disease, lumbar   • Osteopenia   • Vaginal atrophy   • Female dyspareunia   • Pituitary cyst (CMS/HCC)   • Meningioma (CMS/[facility])   • Pituitary cyst (CMS/HCC)        Past Medical History:   Diagnosis Date   • Abnormal Pap smear of cervix    • Anemia    • Anxiety    • Arthritis    • Atherosclerotic heart disease of native coronary artery with other forms of angina pectoris (CMS/[facility])    • Bipolar I disorder, single manic episode (CMS/[facility])     depressive d(NOS)   • Cervical disc herniation    • Cervical dysplasia    • Coronary artery disease    • CTS (carpal tunnel syndrome)    • Depression     NO SUICIDAL PLANS   • Difficulty swallowing    • Diverticular disease    • Dyspepsia    • Dysphagia    • Gastric reflux    • Heart attack (CMS/[facility])    • Heart murmur    • Hiatal hernia    • High cholesterol    • History of transfusion    • 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    HPV positive on pap LGSIL   • Hyperlipidemia    • Hypertension    • Hypothyroidism    • Incontinence in female     wears pads   • Kidney disease 2017    stage 2    • Kidney disease, chronic, stage III (GFR 30-59 ml/min) (CMS/[facility])    • LGSIL on Pap smear of cervix 04/29/2016    LGSIL HPV positive   • Myocardial infarction (CMS/[facility]) 2000   • Neck pain    • Obesity    • Past myocardial infarction 05/2000   • Periodic limb movement disorder    • Restless leg syndrome    • Sleep apnea     cpap   • Stress fracture     LEFT HEEL   • Suicidal  ideation 08/19/2016    history   • Swelling of ankle     right had doppler no s/s blood clot   • Thyroid nodule    • Vitamin B12 deficiency         Past Surgical History:   Procedure Laterality Date   • ANTERIOR CERVICAL DISCECTOMY W/ FUSION N/A 12/29/2016    Procedure: C3-4 anterior cervical discectomy and fusion with Depuy micro plate, ALLOGRAFT C3-4, AND HARDWARE REMOVAL C4-7.;  Surgeon: Hema Godwin MD;  Location: [facility] MAIN OR;  Service:    • CARDIAC CATHETERIZATION N/A 3/30/2017    Procedure: Left Heart Cath;  Surgeon: Tracey Vargas MD;  Location:  [facility] CATH INVASIVE LOCATION;  Service:    • CARDIAC CATHETERIZATION N/A 3/30/2017    Procedure: Coronary angiography;  Surgeon: Tracey Vargas MD;  Location:  [facility] CATH INVASIVE LOCATION;  Service:    • CARDIAC CATHETERIZATION N/A 3/30/2017    Procedure: Left ventriculography;  Surgeon: Tracey Vargas MD;  Location: [facility] CATH INVASIVE LOC[facility];  Service:    • CARDIAC CATHETERIZATION  3/30/2017    Procedure: Functional Flow [place];  Surgeon: Tracey Vargas MD;  Location:  [facility] CATH INVASIVE LOCATION;  Service:    • CARPAL TUNNEL RELEASE     • CERVICAL BIOPSY  MMXVI    Dr. Jeffery.    • CERVICAL DISCECTOMY ANTERIOR  04/2013    C4-7   • COLONOSCOPY  04/20/2015    Diverticulosis, IH   • COLPOSCOPY W/ BIOPSY / CURETTAGE  06/17/2016    LGSIL HPV positive. Results were normal repeat pap in one year. Chronic Cervicitis   • CORONARY ANGIOPLASTY WITH STENT PLACEMENT     • ENDOSCOPY  MMXV    Normal.  Dr. Rodriguez   • ENDOSCOPY N/A 6/22/2017    Erythematous mucosa in the stomach  PATH: Chronic active gastritis, moderate with intestinal metaplasia    • GASTRIC BYPASS      Done using the sleeve technique   • HARDWARE REMOVAL  12/29/2016    cervical   • LAPAROSCOPIC GASTRIC BANDING  02/2018   • SHOULDER SURGERY Left     RCR   • TONSILLECTOMY     • TONSILLECTOMY AND ADENOIDECTOMY     • TRANSVAGINAL TAPING SUSPENSION N/A 12/6/2017    Procedure: MID URETHRAL SLING  CYSTSCOPY;  Surgeon: Abby Méndez MD;  Location: [facility];  Service:    • TUBAL ABDOMINAL LIGATION     • TYMPANOSTOMY TUBE PLACEMENT Right    • WRIST SURGERY Bilateral     carpal tunnel                       PT Assessment/Plan     Row Name 07/07/20 1558 07/07/20 1530       PT Assessment    Assessment Comments  --  Trialed mechanical cervical traction today with moist heat due to pain flare up which was receieved with immediate reduction in pain, specifically between the shoudler blades. Passively she achieved 40 degrees of passive R rotation (was 36 actively upon evaluation).  -GR       PT Plan    PT Plan Comments  Assess response to traction.  -GR  --      User Key  (r) = Recorded By, (t) = Taken By, (c) = Cosigned By    Initials Name Provider Type    Hossein Renteria, PT Physical Therapist          Modalities     Row Name 07/07/20 1500             Traction 06283    Traction Type  Cervical  -GR      Rx Minutes  10  -GR      Duration  Intermittent  -GR      Position  Other 90/90  -GR      Weight  13 /8  -GR      Hold  45  -GR      Relax  15  -GR        User Key  (r) = Recorded By, (t) = Taken By, (c) = Cosigned By    Initials Name Provider Type    Hossein Renteria, PT Physical Therapist        OP Exercises     Row Name 07/07/20 1500             Subjective Comments    Subjective Comments  Was doing great and today pain has worsened quite a bit. Feeling from the neck to between the shoulder blades and not sure why.  -GR         Subjective Pain    Able to rate subjective pain?  yes  -GR      Pre-Treatment Pain Level  7  -GR      Post-Treatment Pain Level  3  -GR         Total Minutes    81340 - PT Therapeutic Exercise Minutes  30  -GR         Exercise 2    Exercise Name 2  c/spine seated rotation  -GR      Cueing 2  Demo  -GR      Reps 2  1-  -GR         Exercise 3    Exercise Name 3  seated SNAG cervical rotation  -GR      Cueing 3  Demo  -GR      Sets 3  1  -GR      Reps 3  10  -GR       Time 3  5 sec   -GR      Additional Comments  each side; towel  -GR         Exercise 4    Exercise Name 4  doorway stretch - T  -GR      Cueing 4  Verbal  -GR      Sets 4  1  -GR      Reps 4  3  -GR      Time 4  20  -GR         Exercise 5    Exercise Name 5  UBE  -GR      Time 5  4 min  -GR         Exercise 10    Exercise Name 10  rows  -GR      Cueing 10  Verbal;Tactile;Demo  -GR      Reps 10  10  -GR      Additional Comments  YTB  -GR         Exercise 11    Exercise Name 11  shoulder extension  -GR      Cueing 11  Verbal;Tactile;Demo  -GR      Reps 11  10  -GR      Additional Comments  YTB  -GR         Exercise 13    Exercise Name 13  seated thoracic extension  -GR      Cueing 13  Demo  -GR      Sets 13  1  -GR      Reps 13  10  -GR      Time 13  5 sec  -GR        User Key  (r) = Recorded By, (t) = Taken By, (c) = Cosigned By    Initials Name Provider Type    Hossein Renteria, PT Physical Therapist                       PT OP Goals     Row Name 07/07/20 1500          PT Short Term Goals    STG Date to Achieve  07/04/20  -GR     STG 1  Patient will be independent with initial HEP.  -GR     STG 1 Progress  Partially Met  -GR     STG 2  Patient will demonstrate >/=50 degrees of cervical rotation bilaterally to assist with functional turning.  -GR     STG 2 Progress  Ongoing  -GR        Long Term Goals    LTG Date to Achieve  08/03/20  -GR     LTG 1  Patient will be independent with progressive HEP for long term management of current condition.  -GR     LTG 1 Progress  Ongoing  -GR     LTG 2  Patient will score </= 12% disability on the NDI to indicate improved perceived performance with ADLs.  -GR     LTG 2 Progress  Ongoing  -GR     LTG 3  Patient will demonstrate >/=65 degrees of cervical rotation bilaterally to assist with conversational ease  -GR     LTG 3 Progress  Ongoing  -GR       User Key  (r) = Recorded By, (t) = Taken By, (c) = Cosigned By    Initials Name Provider Type    Hossein Renteria, PT  Physical Therapist                         Time Calculation:   Start Time: 1500  Stop Time: 1544  Time Calculation (min): 44 min  Total Timed Code Minutes- PT: 30 minute(s)  Therapy Charges for Today     Code Description Service Date Service Provider Modifiers Qty    [id number]  PT THER PROC EA 15 MIN 7/7/2020 Hossein Jimenes, PT GP 2    [id number]  PT-TRACTION MECHANICAL 7/7/2020 Hossein Jimenes, PT  1                    Hossein Jimenes, PT  7/7/2020

## 2020-07-10 ENCOUNTER — HOSPITAL ENCOUNTER (OUTPATIENT)
Dept: PHYSICAL THERAPY | Facility: HOSPITAL | Age: 60
Setting detail: THERAPIES SERIES
Discharge: HOME | End: 2020-07-10

## 2020-07-10 DIAGNOSIS — M54.2 NECK PAIN: Primary | ICD-10-CM

## 2020-07-10 PROCEDURE — 97012 MECHANICAL TRACTION THERAPY: CPT | Performed by: PHYSICAL THERAPIST

## 2020-07-10 PROCEDURE — 97110 THERAPEUTIC EXERCISES: CPT | Performed by: PHYSICAL THERAPIST

## 2020-07-10 NOTE — THERAPY TREATMENT NOTE
Outpatient Physical Therapy Ortho Treatment Note  [facility]     Patient Name: Keri Bhagat  : 1960  MRN: [id number]  Today's Date: 7/10/2020      Visit Date: 07/10/2020    Visit Dx:    ICD-10-CM ICD-9-CM   1. Neck pain M54.2 723.1       Patient Active Problem List   Diagnosis   • Gastroesophageal reflux disease   • Bipolar I disorder, single manic episode (CMS/[facility])   • Atherosclerosis of coronary artery   • Essential hypertension   • Lightheadedness   • Low back pain   • Sciatica   • Thyroid nodule   • Fatigue   • Hyperlipidemia   • Hypothyroidism (acquired)   • Iron deficiency anemia   • Right knee pain   • Cervical disc disease   • Depression   • ROCKY (obstructive sleep apnea)   • Coronary artery disease involving native coronary artery of native heart without angina pectoris   • Cervical spondylosis with radiculopathy   • Chronic pain of both knees   • Arthritis of both knees   • Weight gain, abnormal   • Allergic rhinitis   • Obesity (BMI 30-39.9)   • Plantar fasciitis   • Peroneal tendon injury   • Stress fracture of calcaneus   • Vitamin D deficiency   • Pain and swelling of knee   • Knee injury   • Calcific Achilles tendonitis   • Rod's deformity   • Urgency incontinence   • Nocturia   • Chronic gastritis without bleeding   • LGSIL on Pap smear of cervix   • Primary osteoarthritis of right knee   • Chronic renal insufficiency, stage III (moderate) (CMS/[facility])   • Left wrist tendinitis   • Cervical radiculopathy   • Tobacco abuse   • Obesity, morbid, BMI 40.0-49.9 (CMS/HCC)   • Encounter for screening for lung cancer   • Urinary frequency   • Diabetes mellitus screening   • MORALES I (cervical intraepithelial neoplasia I)   • Primary osteoarthritis of both knees   • H/O bladder repair surgery   • Chest pain   • Syncope and collapse   • Closed fracture of left distal radius   • Closed displaced fracture of neck of right fifth metacarpal bone   • Fracture follow-up   • Nondisplaced fracture of  base of fifth metacarpal bone, left hand, initial encounter for closed fracture   • Closed fracture of lower end of left radius with routine healing   • Multiple dislocations of fingers, open   • Bilateral chronic knee pain   • Arthritis of left knee   • Arthritis of right knee   • Family history of diabetes mellitus (DM)   • Menopause   • 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   • Memory dysfunction   • Periodic limb movement disorder   • Gait disturbance   • Degenerative disc disease, lumbar   • Osteopenia   • Vaginal atrophy   • Female dyspareunia   • Pituitary cyst (CMS/HCC)   • Meningioma (CMS/[facility])   • Pituitary cyst (CMS/HCC)        Past Medical History:   Diagnosis Date   • Abnormal Pap smear of cervix    • Anemia    • Anxiety    • Arthritis    • Atherosclerotic heart disease of native coronary artery with other forms of angina pectoris (CMS/[facility])    • Bipolar I disorder, single manic episode (CMS/[facility])     depressive d(NOS)   • Cervical disc herniation    • Cervical dysplasia    • Coronary artery disease    • CTS (carpal tunnel syndrome)    • Depression     NO SUICIDAL PLANS   • Difficulty swallowing    • Diverticular disease    • Dyspepsia    • Dysphagia    • Gastric reflux    • Heart attack (CMS/[facility])    • Heart murmur    • Hiatal hernia    • High cholesterol    • History of transfusion    • 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    HPV positive on pap LGSIL   • Hyperlipidemia    • Hypertension    • Hypothyroidism    • Incontinence in female     wears pads   • Kidney disease 2017    stage 2    • Kidney disease, chronic, stage III (GFR 30-59 ml/min) (CMS/[facility])    • LGSIL on Pap smear of cervix 04/29/2016    LGSIL HPV positive   • Myocardial infarction (CMS/[facility]) 2000   • Neck pain    • Obesity    • Past myocardial infarction 05/2000   • Periodic limb movement disorder    • Restless leg syndrome    • Sleep apnea     cpap   • Stress fracture     LEFT HEEL   • Suicidal  ideation 08/19/2016    history   • Swelling of ankle     right had doppler no s/s blood clot   • Thyroid nodule    • Vitamin B12 deficiency         Past Surgical History:   Procedure Laterality Date   • ANTERIOR CERVICAL DISCECTOMY W/ FUSION N/A 12/29/2016    Procedure: C3-4 anterior cervical discectomy and fusion with Depuy micro plate, ALLOGRAFT C3-4, AND HARDWARE REMOVAL C4-7.;  Surgeon: Hema Gdowin MD;  Location: [facility] MAIN OR;  Service:    • CARDIAC CATHETERIZATION N/A 3/30/2017    Procedure: Left Heart Cath;  Surgeon: Tracey Vargas MD;  Location:  [facility] CATH INVASIVE LOCATION;  Service:    • CARDIAC CATHETERIZATION N/A 3/30/2017    Procedure: Coronary angiography;  Surgeon: Tracey Vargas MD;  Location:  [facility] CATH INVASIVE LOCATION;  Service:    • CARDIAC CATHETERIZATION N/A 3/30/2017    Procedure: Left ventriculography;  Surgeon: Tracey Vargas MD;  [facility];  Service:    • CARDIAC CATHETERIZATION  3/30/2017    Procedure: Functional Flow [place];  Surgeon: Tracey Vargas MD;  Location:  [facility] CATH INVASIVE LOCATION;  Service:    • CARPAL TUNNEL RELEASE     • CERVICAL BIOPSY  MMXVI    Dr. Jeffery.    • CERVICAL DISCECTOMY ANTERIOR  04/2013    C4-7   • COLONOSCOPY  04/20/2015    Diverticulosis, IH   • COLPOSCOPY W/ BIOPSY / CURETTAGE  06/17/2016    LGSIL HPV positive. Results were normal repeat pap in one year. Chronic Cervicitis   • CORONARY ANGIOPLASTY WITH STENT PLACEMENT     • ENDOSCOPY  MMXV    Normal.  Dr. Rodriguez   • ENDOSCOPY N/A 6/22/2017    Erythematous mucosa in the stomach  PATH: Chronic active gastritis, moderate with intestinal metaplasia    • GASTRIC BYPASS      Done using the sleeve technique   • HARDWARE REMOVAL  12/29/2016    cervical   • LAPAROSCOPIC GASTRIC BANDING  02/2018   • SHOULDER SURGERY Left     RCR   • TONSILLECTOMY     • TONSILLECTOMY AND ADENOIDECTOMY     • TRANSVAGINAL TAPING SUSPENSION N/A 12/6/2017    Procedure: MID URETHRAL SLING  CYSTSCOPY;  Surgeon: Abby Méndez MD;  Location: [facility];  Service:    • TUBAL ABDOMINAL LIGATION     • TYMPANOSTOMY TUBE PLACEMENT Right    • WRIST SURGERY Bilateral     carpal tunnel                       PT Assessment/Plan     Row Name 07/10/20 1445          PT Assessment    Assessment Comments  Pain reduced and able to increase traction parameters without immediate adverse response.  -GR        PT Plan    PT Plan Comments  Continue traction and therex.  Reassess by 7/19.  -GR       User Key  (r) = Recorded By, (t) = Taken By, (c) = Cosigned By    Initials Name Provider Type    Hossein Renteria, PT Physical Therapist          Modalities     Row Name 07/10/20 1400             Traction 26241    Traction Type  Cervical  -GR      Rx Minutes  15  -GR      Duration  Intermittent  -GR      Position  Other 90/90  -GR      Weight  15 /8  -GR      Hold  45  -GR      Relax  15  -GR        User Key  (r) = Recorded By, (t) = Taken By, (c) = Cosigned By    Initials Name Provider Type    Hossein Renteria, PT Physical Therapist        OP Exercises     Row Name 07/10/20 1400             Subjective Comments    Subjective Comments  Much better today. Hardly any pain. Would like to do traction again.  -GR         Subjective Pain    Able to rate subjective pain?  yes  -GR      Pre-Treatment Pain Level  3  -GR         Total Minutes    49185 - PT Therapeutic Exercise Minutes  25  -GR         Exercise 2    Exercise Name 2  c/spine seated rotation  -GR      Cueing 2  Demo  -GR      Reps 2  10  -GR      Additional Comments  each side  -GR         Exercise 3    Exercise Name 3  seated SNAG cervical rotation  -GR      Cueing 3  Demo  -GR      Sets 3  1  -GR      Reps 3  10  -GR      Time 3  5 sec   -GR      Additional Comments  each side; towel  -GR         Exercise 4    Exercise Name 4  doorway stretch - T  -GR      Cueing 4  Verbal  -GR      Sets 4  1  -GR      Reps 4  3  -GR      Time 4  20  -GR          Exercise 5    Exercise Name 5  UBE  -GR      Time 5  4 min  -GR         Exercise 10    Exercise Name 10  rows  -GR      Cueing 10  Verbal;Tactile;Demo  -GR      Reps 10  10  -GR      Additional Comments  RTB  -GR         Exercise 11    Exercise Name 11  shoulder extension  -GR      Cueing 11  Verbal;Tactile;Demo  -GR      Reps 11  10  -GR      Additional Comments  RTB  -GR         Exercise 13    Exercise Name 13  seated thoracic extension  -GR      Cueing 13  Demo  -GR      Sets 13  1  -GR      Reps 13  10  -GR      Time 13  5 sec  -GR        User Key  (r) = Recorded By, (t) = Taken By, (c) = Cosigned By    Initials Name Provider Type    GR Hossein Jimenes, PT Physical Therapist                                          Time Calculation:   Start Time: 1415  Stop Time: 1455  Time Calculation (min): 40 min  Total Timed Code Minutes- PT: 25 minute(s)  Therapy Charges for Today     Code Description Service Date Service Provider Modifiers Qty    [id number]  PT THER PROC EA 15 MIN 7/10/2020 Hossein Jimenes, PT GP 2    [id number]  PT-TRACTION MECHANICAL 7/10/2020 Hossein Jimenes, PT  1                    Hossein Jimenes, PT  7/10/2020

## 2020-07-13 ENCOUNTER — HOSPITAL ENCOUNTER (OUTPATIENT)
Dept: PHYSICAL THERAPY | Facility: HOSPITAL | Age: 60
Setting detail: THERAPIES SERIES
Discharge: HOME | End: 2020-07-13

## 2020-07-13 DIAGNOSIS — M54.2 NECK PAIN: Primary | ICD-10-CM

## 2020-07-13 PROCEDURE — 97110 THERAPEUTIC EXERCISES: CPT | Performed by: PHYSICAL THERAPIST

## 2020-07-13 PROCEDURE — 97012 MECHANICAL TRACTION THERAPY: CPT | Performed by: PHYSICAL THERAPIST

## 2020-07-13 NOTE — THERAPY TREATMENT NOTE
Outpatient Physical Therapy Ortho Treatment Note  [facility]     Patient Name: Keri Bhagat  : 1960  MRN: [id number]  Today's Date: 2020      Visit Date: 2020    Visit Dx:    ICD-10-CM ICD-9-CM   1. Neck pain M54.2 723.1       Patient Active Problem List   Diagnosis   • Gastroesophageal reflux disease   • Bipolar I disorder, single manic episode (CMS/[facility])   • Atherosclerosis of coronary artery   • Essential hypertension   • Lightheadedness   • Low back pain   • Sciatica   • Thyroid nodule   • Fatigue   • Hyperlipidemia   • Hypothyroidism (acquired)   • Iron deficiency anemia   • Right knee pain   • Cervical disc disease   • Depression   • ROCKY (obstructive sleep apnea)   • Coronary artery disease involving native coronary artery of native heart without angina pectoris   • Cervical spondylosis with radiculopathy   • Chronic pain of both knees   • Arthritis of both knees   • Weight gain, abnormal   • Allergic rhinitis   • Obesity (BMI 30-39.9)   • Plantar fasciitis   • Peroneal tendon injury   • Stress fracture of calcaneus   • Vitamin D deficiency   • Pain and swelling of knee   • Knee injury   • Calcific Achilles tendonitis   • Rod's deformity   • Urgency incontinence   • Nocturia   • Chronic gastritis without bleeding   • LGSIL on Pap smear of cervix   • Primary osteoarthritis of right knee   • Chronic renal insufficiency, stage III (moderate) (CMS/[facility])   • Left wrist tendinitis   • Cervical radiculopathy   • Tobacco abuse   • Obesity, morbid, BMI 40.0-49.9 (CMS/HCC)   • Encounter for screening for lung cancer   • Urinary frequency   • Diabetes mellitus screening   • MORALES I (cervical intraepithelial neoplasia I)   • Primary osteoarthritis of both knees   • H/O bladder repair surgery   • Chest pain   • Syncope and collapse   • Closed fracture of left distal radius   • Closed displaced fracture of neck of right fifth metacarpal bone   • Fracture follow-up   • Nondisplaced fracture of  base of fifth metacarpal bone, left hand, initial encounter for closed fracture   • Closed fracture of lower end of left radius with routine healing   • Multiple dislocations of fingers, open   • Bilateral chronic knee pain   • Arthritis of left knee   • Arthritis of right knee   • Family history of diabetes mellitus (DM)   • Menopause   • 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   • Memory dysfunction   • Periodic limb movement disorder   • Gait disturbance   • Degenerative disc disease, lumbar   • Osteopenia   • Vaginal atrophy   • Female dyspareunia   • Pituitary cyst (CMS/HCC)   • Meningioma (CMS/[facility])   • Pituitary cyst (CMS/HCC)        Past Medical History:   Diagnosis Date   • Abnormal Pap smear of cervix    • Anemia    • Anxiety    • Arthritis    • Atherosclerotic heart disease of native coronary artery with other forms of angina pectoris (CMS/[facility])    • Bipolar I disorder, single manic episode (CMS/[facility])     depressive d(NOS)   • Cervical disc herniation    • Cervical dysplasia    • Coronary artery disease    • CTS (carpal tunnel syndrome)    • Depression     NO SUICIDAL PLANS   • Difficulty swallowing    • Diverticular disease    • Dyspepsia    • Dysphagia    • Gastric reflux    • Heart attack (CMS/[facility])    • Heart murmur    • Hiatal hernia    • High cholesterol    • History of transfusion    • 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    HPV positive on pap LGSIL   • Hyperlipidemia    • Hypertension    • Hypothyroidism    • Incontinence in female     wears pads   • Kidney disease 2017    stage 2    • Kidney disease, chronic, stage III (GFR 30-59 ml/min) (CMS/[facility])    • LGSIL on Pap smear of cervix 04/29/2016    LGSIL HPV positive   • Myocardial infarction (CMS/[facility]) 2000   • Neck pain    • Obesity    • Past myocardial infarction 05/2000   • Periodic limb movement disorder    • Restless leg syndrome    • Sleep apnea     cpap   • Stress fracture     LEFT HEEL   • Suicidal  ideation 08/19/2016    history   • Swelling of ankle     right had doppler no s/s blood clot   • Thyroid nodule    • Vitamin B12 deficiency         Past Surgical History:   Procedure Laterality Date   • ANTERIOR CERVICAL DISCECTOMY W/ FUSION N/A 12/29/2016    Procedure: C3-4 anterior cervical discectomy and fusion with Depuy micro plate, ALLOGRAFT C3-4, AND HARDWARE REMOVAL C4-7.;  Surgeon: Hema Godwin MD;  Location: [facility] MAIN OR;  Service:    • CARDIAC CATHETERIZATION N/A 3/30/2017    Procedure: Left Heart Cath;  Surgeon: Tracey Vargas MD;  Location:  [facility] CATH INVASIVE LOCATION;  Service:    • CARDIAC CATHETERIZATION N/A 3/30/2017    Procedure: Coronary angiography;  Surgeon: Tracey Vargas MD;  Location:  [facility] CATH INVASIVE LOCATION;  Service:    • CARDIAC CATHETERIZATION N/A 3/30/2017    Procedure: Left ventriculography;  Surgeon: Tracey Vargas MD;  [facility];  Service:    • CARDIAC CATHETERIZATION  3/30/2017    Procedure: Functional Flow Maple;  Surgeon: Tracey Vargas MD;  Location:  [facility] CATH INVASIVE LOCATION;  Service:    • CARPAL TUNNEL RELEASE     • CERVICAL BIOPSY  MMXVI    Dr. Jeffery.    • CERVICAL DISCECTOMY ANTERIOR  04/2013    C4-7   • COLONOSCOPY  04/20/2015    Diverticulosis, IH   • COLPOSCOPY W/ BIOPSY / CURETTAGE  06/17/2016    LGSIL HPV positive. Results were normal repeat pap in one year. Chronic Cervicitis   • CORONARY ANGIOPLASTY WITH STENT PLACEMENT     • ENDOSCOPY  MMXV    Normal.  Dr. Rodriguez   • ENDOSCOPY N/A 6/22/2017    Erythematous mucosa in the stomach  PATH: Chronic active gastritis, moderate with intestinal metaplasia    • GASTRIC BYPASS      Done using the sleeve technique   • HARDWARE REMOVAL  12/29/2016    cervical   • LAPAROSCOPIC GASTRIC BANDING  02/2018   • SHOULDER SURGERY Left     RCR   • TONSILLECTOMY     • TONSILLECTOMY AND ADENOIDECTOMY     • TRANSVAGINAL TAPING SUSPENSION N/A 12/6/2017    Procedure: MID URETHRAL SLING  CYSTSCOPY;  Surgeon: Abby Méndez MD;  Location: [facility];  Service:    • TUBAL ABDOMINAL LIGATION     • TYMPANOSTOMY TUBE PLACEMENT Right    • WRIST SURGERY Bilateral     carpal tunnel                       PT Assessment/Plan     Row Name 07/13/20 1158          PT Assessment    Assessment Comments  Returned to strengthening this visit per 1 wk of well controlled symptoms and improving sleep.  All were met without immediate adverse response. End range R shoulder flex pain noted.  -GR        PT Plan    PT Plan Comments  Continue POC. Reassess by 7/19.  -GR       User Key  (r) = Recorded By, (t) = Taken By, (c) = Cosigned By    Initials Name Provider Type    Hossein Renteria, PT Physical Therapist          Modalities     Row Name 07/13/20 1100             Traction 09889    Traction Type  Cervical  -GR      Rx Minutes  15  -GR      Duration  Intermittent  -GR      Position  Other 90/90  -GR      Weight  15 /8  -GR      Hold  45  -GR      Relax  15  -GR        User Key  (r) = Recorded By, (t) = Taken By, (c) = Cosigned By    Initials Name Provider Type    Hossein Renteria, PT Physical Therapist        OP Exercises     Row Name 07/13/20 1100             Subjective Comments    Subjective Comments  Continues to feel better compared to early last week.  -GR         Subjective Pain    Able to rate subjective pain?  yes  -GR      Pre-Treatment Pain Level  3  -GR         Total Minutes    49255 - PT Therapeutic Exercise Minutes  24  -GR         Exercise 3    Exercise Name 3  seated SNAG cervical rotation  -GR      Cueing 3  Demo  -GR      Sets 3  1  -GR      Reps 3  10  -GR      Time 3  5 sec   -GR      Additional Comments  each side; towel  -GR         Exercise 5    Exercise Name 5  UBE  -GR      Time 5  4 min  -GR      Additional Comments  L1  -GR         Exercise 6    Exercise Name 6  supine horizontal abduction  -GR      Cueing 6  Demo  -GR      Reps 6  10  -GR      Additional Comments  RTB; over  "noodle  -GR         Exercise 7    Exercise Name 7  supine \"x's\"  -GR      Cueing 7  Demo  -GR      Reps 7  10  -GR      Additional Comments  RTB; over noodle  -GR         Exercise 8    Exercise Name 8  supine pec stretch over noodle  -GR      Time 8  2 min  -GR         Exercise 10    Exercise Name 10  rows  -GR      Cueing 10  Verbal;Tactile;Demo  -GR      Reps 10  15  -GR      Additional Comments  RTB  -GR         Exercise 12    Exercise Name 12  Pulleys with neck flexion/extension following shoulder  -GR      Time 12  1 min  -GR        User Key  (r) = Recorded By, (t) = Taken By, (c) = Cosigned By    Initials Name Provider Type    GR Hossein Jimenes, PT Physical Therapist                                          Time Calculation:   Start Time: 1130  Stop Time: 1212  Time Calculation (min): 42 min  Total Timed Code Minutes- PT: 24 minute(s)  Therapy Charges for Today     Code Description Service Date Service Provider Modifiers Qty    55989070584  PT THER PROC EA 15 MIN 7/13/2020 Hossein Jimenes, PT GP 2    [id number]  PT-TRACTION MECHANICAL 7/13/2020 Hossein Jimenes, PT  1                    Hossein Jimenes, PT  7/13/2020     "

## 2020-07-15 ENCOUNTER — HOSPITAL ENCOUNTER (OUTPATIENT)
Dept: PHYSICAL THERAPY | Facility: HOSPITAL | Age: 60
Setting detail: THERAPIES SERIES
Discharge: HOME | End: 2020-07-15

## 2020-07-15 DIAGNOSIS — M54.2 NECK PAIN: Primary | ICD-10-CM

## 2020-07-15 PROCEDURE — 97012 MECHANICAL TRACTION THERAPY: CPT | Performed by: PHYSICAL THERAPIST

## 2020-07-15 PROCEDURE — 97110 THERAPEUTIC EXERCISES: CPT | Performed by: PHYSICAL THERAPIST

## 2020-07-15 NOTE — THERAPY TREATMENT NOTE
Outpatient Physical Therapy Ortho Treatment Note  [facility]     Patient Name: Keri Bhagat  : 1960  MRN: [id number]  Today's Date: 7/15/2020      Visit Date: 07/15/2020    Visit Dx:    ICD-10-CM ICD-9-CM   1. Neck pain M54.2 723.1       Patient Active Problem List   Diagnosis   • Gastroesophageal reflux disease   • Bipolar I disorder, single manic episode (CMS/[facility])   • Atherosclerosis of coronary artery   • Essential hypertension   • Lightheadedness   • Low back pain   • Sciatica   • Thyroid nodule   • Fatigue   • Hyperlipidemia   • Hypothyroidism (acquired)   • Iron deficiency anemia   • Right knee pain   • Cervical disc disease   • Depression   • ROCKY (obstructive sleep apnea)   • Coronary artery disease involving native coronary artery of native heart without angina pectoris   • Cervical spondylosis with radiculopathy   • Chronic pain of both knees   • Arthritis of both knees   • Weight gain, abnormal   • Allergic rhinitis   • Obesity (BMI 30-39.9)   • Plantar fasciitis   • Peroneal tendon injury   • Stress fracture of calcaneus   • Vitamin D deficiency   • Pain and swelling of knee   • Knee injury   • Calcific Achilles tendonitis   • Rod's deformity   • Urgency incontinence   • Nocturia   • Chronic gastritis without bleeding   • LGSIL on Pap smear of cervix   • Primary osteoarthritis of right knee   • Chronic renal insufficiency, stage III (moderate) (CMS/[facility])   • Left wrist tendinitis   • Cervical radiculopathy   • Tobacco abuse   • Obesity, morbid, BMI 40.0-49.9 (CMS/HCC)   • Encounter for screening for lung cancer   • Urinary frequency   • Diabetes mellitus screening   • MORALES I (cervical intraepithelial neoplasia I)   • Primary osteoarthritis of both knees   • H/O bladder repair surgery   • Chest pain   • Syncope and collapse   • Closed fracture of left distal radius   • Closed displaced fracture of neck of right fifth metacarpal bone   • Fracture follow-up   • Nondisplaced fracture of  base of fifth metacarpal bone, left hand, initial encounter for closed fracture   • Closed fracture of lower end of left radius with routine healing   • Multiple dislocations of fingers, open   • Bilateral chronic knee pain   • Arthritis of left knee   • Arthritis of right knee   • Family history of diabetes mellitus (DM)   • Menopause   • 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   • Memory dysfunction   • Periodic limb movement disorder   • Gait disturbance   • Degenerative disc disease, lumbar   • Osteopenia   • Vaginal atrophy   • Female dyspareunia   • Pituitary cyst (CMS/HCC)   • Meningioma (CMS/[facility])   • Pituitary cyst (CMS/HCC)        Past Medical History:   Diagnosis Date   • Abnormal Pap smear of cervix    • Anemia    • Anxiety    • Arthritis    • Atherosclerotic heart disease of native coronary artery with other forms of angina pectoris (CMS/[facility])    • Bipolar I disorder, single manic episode (CMS/[facility])     depressive d(NOS)   • Cervical disc herniation    • Cervical dysplasia    • Coronary artery disease    • CTS (carpal tunnel syndrome)    • Depression     NO SUICIDAL PLANS   • Difficulty swallowing    • Diverticular disease    • Dyspepsia    • Dysphagia    • Gastric reflux    • Heart attack (CMS/[facility])    • Heart murmur    • Hiatal hernia    • High cholesterol    • History of transfusion    • 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    HPV positive on pap LGSIL   • Hyperlipidemia    • Hypertension    • Hypothyroidism    • Incontinence in female     wears pads   • Kidney disease 2017    stage 2    • Kidney disease, chronic, stage III (GFR 30-59 ml/min) (CMS/[facility])    • LGSIL on Pap smear of cervix 04/29/2016    LGSIL HPV positive   • Myocardial infarction (CMS/[facility]) 2000   • Neck pain    • Obesity    • Past myocardial infarction 05/2000   • Periodic limb movement disorder    • Restless leg syndrome    • Sleep apnea     cpap   • Stress fracture     LEFT HEEL   • Suicidal  ideation 08/19/2016    history   • Swelling of ankle     right had doppler no s/s blood clot   • Thyroid nodule    • Vitamin B12 deficiency         Past Surgical History:   Procedure Laterality Date   • ANTERIOR CERVICAL DISCECTOMY W/ FUSION N/A 12/29/2016    Procedure: C3-4 anterior cervical discectomy and fusion with Depuy micro plate, ALLOGRAFT C3-4, AND HARDWARE REMOVAL C4-7.;  Surgeon: Hema Godwin MD;  Location: [facility] MAIN OR;  Service:    • CARDIAC CATHETERIZATION N/A 3/30/2017    Procedure: Left Heart Cath;  Surgeon: Tracey Vargas MD;  Location:  [facility] CATH INVASIVE LOCATION;  Service:    • CARDIAC CATHETERIZATION N/A 3/30/2017    Procedure: Coronary angiography;  Surgeon: Tracey Vargas MD;  Location:  [facility] CATH INVASIVE LOCATION;  Service:    • CARDIAC CATHETERIZATION N/A 3/30/2017    Procedure: Left ventriculography;  Surgeon: Tracey Vargas MD;  [facility];  Service:    • CARDIAC CATHETERIZATION  3/30/2017    Procedure: Functional Flow [place];  Surgeon: Tracey Vargas MD;  Location:  [facility] CATH INVASIVE LOCATION;  Service:    • CARPAL TUNNEL RELEASE     • CERVICAL BIOPSY  MMXVI    Dr. Jeffery.    • CERVICAL DISCECTOMY ANTERIOR  04/2013    C4-7   • COLONOSCOPY  04/20/2015    Diverticulosis, IH   • COLPOSCOPY W/ BIOPSY / CURETTAGE  06/17/2016    LGSIL HPV positive. Results were normal repeat pap in one year. Chronic Cervicitis   • CORONARY ANGIOPLASTY WITH STENT PLACEMENT     • ENDOSCOPY  MMXV    Normal.  Dr. Rodriguez   • ENDOSCOPY N/A 6/22/2017    Erythematous mucosa in the stomach  PATH: Chronic active gastritis, moderate with intestinal metaplasia    • GASTRIC BYPASS      Done using the sleeve technique   • HARDWARE REMOVAL  12/29/2016    cervical   • LAPAROSCOPIC GASTRIC BANDING  02/2018   • SHOULDER SURGERY Left     RCR   • TONSILLECTOMY     • TONSILLECTOMY AND ADENOIDECTOMY     • TRANSVAGINAL TAPING SUSPENSION N/A 12/6/2017    Procedure: MID URETHRAL SLING  CYSTSCOPY;  Surgeon: Abby Méndez MD;  Location: [facility] OR;  Service:    • TUBAL ABDOMINAL LIGATION     • TYMPANOSTOMY TUBE PLACEMENT Right    • WRIST SURGERY Bilateral     carpal tunnel                       PT Assessment/Plan     Row Name 07/15/20 1242          PT Assessment    Assessment Comments  Updated HEP with handout, requiring several cues to correctly return SNAG. Pain overall is reduced.  Will reassess for more visits next session.  -GR        PT Plan    PT Plan Comments  Reassess next visit.  -GR       User Key  (r) = Recorded By, (t) = Taken By, (c) = Cosigned By    Initials Name Provider Type    Hossein Renteria, PT Physical Therapist          Modalities     Row Name 07/15/20 1100             Moist Heat    MH Applied  Yes  -GR      Location  upper back during traction  -GR         Traction 09187    Traction Type  Cervical  -GR      Rx Minutes  15  -GR      Duration  Intermittent  -GR      Position  Other 90/90  -GR      Weight  16 /8  -GR      Hold  40  -GR      Relax  10  -GR        User Key  (r) = Recorded By, (t) = Taken By, (c) = Cosigned By    Initials Name Provider Type    Hossein Renteria, PT Physical Therapist        OP Exercises     Row Name 07/15/20 1100             Subjective Comments    Subjective Comments  Pain is less today, was sore after yesterday.  -GR         Subjective Pain    Able to rate subjective pain?  yes  -GR      Pre-Treatment Pain Level  2  -GR         Total Minutes    73064 - PT Therapeutic Exercise Minutes  25  -GR         Exercise 3    Exercise Name 3  seated SNAG cervical rotation  -GR      Cueing 3  Demo  -GR      Sets 3  1  -GR      Reps 3  10  -GR      Time 3  5 sec   -GR      Additional Comments  towel; added to HEP  -GR         Exercise 5    Exercise Name 5  UBE  -GR      Time 5  4 min  -GR      Additional Comments  L2 2' fwd/2' retro  -GR         Exercise 6    Exercise Name 6  supine horizontal abduction  -GR      Cueing 6  Demo  -GR       "Reps 6  10  -GR      Additional Comments  YTB (decreased resistance)  -GR         Exercise 7    Exercise Name 7  supine \"x's\"  -GR      Cueing 7  Demo  -GR      Reps 7  10  -GR      Additional Comments  YTB (decreased resistance)  -GR         Exercise 8    Exercise Name 8  supine pec stretch over noodle  -GR      Time 8  2 min  -GR         Exercise 9    Exercise Name 9  supine B shoulder flex  -GR      Cueing 9  Demo  -GR      Sets 9  1  -GR      Reps 9  10  -GR      Additional Comments  holding 2lb med ball  -GR         Exercise 11    Exercise Name 11  supine PNF D2  -GR      Cueing 11  Demo  -GR      Sets 11  1  -GR      Reps 11  10  -GR      Additional Comments  holding 2lb med ball  -GR        User Key  (r) = Recorded By, (t) = Taken By, (c) = Cosigned By    Initials Name Provider Type    Hossein Renteria, PT Physical Therapist                       PT OP Goals     Row Name 07/15/20 1200          PT Short Term Goals    STG Date to Achieve  07/04/20  -GR     STG 1  Patient will be independent with initial HEP.  -GR     STG 1 Progress  Partially Met  -GR     STG 2  Patient will demonstrate >/=50 degrees of cervical rotation bilaterally to assist with functional turning.  -GR     STG 2 Progress  Ongoing  -GR        Long Term Goals    LTG Date to Achieve  08/03/20  -GR     LTG 1  Patient will be independent with progressive HEP for long term management of current condition.  -GR     LTG 1 Progress  Ongoing  -GR     LTG 2  Patient will score </= 12% disability on the NDI to indicate improved perceived performance with ADLs.  -GR     LTG 2 Progress  Ongoing  -GR     LTG 3  Patient will demonstrate >/=65 degrees of cervical rotation bilaterally to assist with conversational ease  -GR     LTG 3 Progress  Ongoing  -GR       User Key  (r) = Recorded By, (t) = Taken By, (c) = Cosigned By    Initials Name Provider Type    Hossein Renteria, PT Physical Therapist          Therapy Education  Education Details: updated " HEP with handout              Time Calculation:   Start Time: 1130  Stop Time: 1210  Time Calculation (min): 40 min  Total Timed Code Minutes- PT: 25 minute(s)  Therapy Charges for Today     Code Description Service Date Service Provider Modifiers Qty    88281745059  PT THER PROC EA 15 MIN 7/15/2020 Hossein Jimenes, PT GP 2    [id number]  PT-TRACTION MECHANICAL 7/15/2020 Hossein Jimenes, PT  1                    Hossein Jimenes, PT  7/15/2020

## 2020-07-20 ENCOUNTER — TRANSCRIBE ORDERS (OUTPATIENT)
Dept: PHYSICAL THERAPY | Facility: HOSPITAL | Age: 60
End: 2020-07-20

## 2020-07-20 ENCOUNTER — HOSPITAL ENCOUNTER (OUTPATIENT)
Dept: PHYSICAL THERAPY | Facility: HOSPITAL | Age: 60
Setting detail: THERAPIES SERIES
Discharge: HOME | End: 2020-07-20

## 2020-07-20 DIAGNOSIS — M54.2 CERVICAL SPINE PAIN: Primary | ICD-10-CM

## 2020-07-20 DIAGNOSIS — M54.2 NECK PAIN: Primary | ICD-10-CM

## 2020-07-20 PROCEDURE — 97110 THERAPEUTIC EXERCISES: CPT | Performed by: PHYSICAL THERAPIST

## 2020-07-20 PROCEDURE — 97012 MECHANICAL TRACTION THERAPY: CPT | Performed by: PHYSICAL THERAPIST

## 2020-07-20 NOTE — THERAPY RE-EVALUATION
Outpatient Physical Therapy Ortho Re-Evaluation  [facility]     Patient Name: Keri Bhagat  : 1960  MRN: [id number]  Today's Date: 2020      Visit Date: 2020    Patient Active Problem List   Diagnosis   • Gastroesophageal reflux disease   • Bipolar I disorder, single manic episode (CMS/HCC)   • Atherosclerosis of coronary artery   • Essential hypertension   • Lightheadedness   • Low back pain   • Sciatica   • Thyroid nodule   • Fatigue   • Hyperlipidemia   • Hypothyroidism (acquired)   • Iron deficiency anemia   • Right knee pain   • Cervical disc disease   • Depression   • ROCKY (obstructive sleep apnea)   • Coronary artery disease involving native coronary artery of native heart without angina pectoris   • Cervical spondylosis with radiculopathy   • Chronic pain of both knees   • Arthritis of both knees   • Weight gain, abnormal   • Allergic rhinitis   • Obesity (BMI 30-39.9)   • Plantar fasciitis   • Peroneal tendon injury   • Stress fracture of calcaneus   • Vitamin D deficiency   • Pain and swelling of knee   • Knee injury   • Calcific Achilles tendonitis   • Rod's deformity   • Urgency incontinence   • Nocturia   • Chronic gastritis without bleeding   • LGSIL on Pap smear of cervix   • Primary osteoarthritis of right knee   • Chronic renal insufficiency, stage III (moderate) (CMS/HCC)   • Left wrist tendinitis   • Cervical radiculopathy   • Tobacco abuse   • Obesity, morbid, BMI 40.0-49.9 (CMS/HCC)   • Encounter for screening for lung cancer   • Urinary frequency   • Diabetes mellitus screening   • MORALES I (cervical intraepithelial neoplasia I)   • Primary osteoarthritis of both knees   • H/O bladder repair surgery   • Chest pain   • Syncope and collapse   • Closed fracture of left distal radius   • Closed displaced fracture of neck of right fifth metacarpal bone   • Fracture follow-up   • Nondisplaced fracture of base of fifth metacarpal bone, left hand, initial encounter for  closed fracture   • Closed fracture of lower end of left radius with routine healing   • Multiple dislocations of fingers, open   • Bilateral chronic knee pain   • Arthritis of left knee   • Arthritis of right knee   • Family history of diabetes mellitus (DM)   • Menopause   • 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   • Memory dysfunction   • Periodic limb movement disorder   • Gait disturbance   • Degenerative disc disease, lumbar   • Osteopenia   • Vaginal atrophy   • Female dyspareunia   • Pituitary cyst (CMS/HCC)   • Meningioma (CMS/[facility])   • Pituitary cyst (CMS/HCC)        Past Medical History:   Diagnosis Date   • Abnormal Pap smear of cervix    • Anemia    • Anxiety    • Arthritis    • Atherosclerotic heart disease of native coronary artery with other forms of angina pectoris (CMS/[facility])    • Bipolar I disorder, single manic episode (CMS/[facility])     depressive d(NOS)   • Cervical disc herniation    • Cervical dysplasia    • Coronary artery disease    • CTS (carpal tunnel syndrome)    • Depression     NO SUICIDAL PLANS   • Difficulty swallowing    • Diverticular disease    • Dyspepsia    • Dysphagia    • Gastric reflux    • Heart attack (CMS/[facility])    • Heart murmur    • Hiatal hernia    • High cholesterol    • History of transfusion    • 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    HPV positive on pap LGSIL   • Hyperlipidemia    • Hypertension    • Hypothyroidism    • Incontinence in female     wears pads   • Kidney disease 2017    stage 2    • Kidney disease, chronic, stage III (GFR 30-59 ml/min) (CMS/[facility])    • LGSIL on Pap smear of cervix 04/29/2016    LGSIL HPV positive   • Myocardial infarction (CMS/[facility]) 2000   • Neck pain    • Obesity    • Past myocardial infarction 05/2000   • Periodic limb movement disorder    • Restless leg syndrome    • Sleep apnea     cpap   • Stress fracture     LEFT HEEL   • Suicidal ideation 08/19/2016    history   • Swelling of ankle     right had  doppler no s/s blood clot   • Thyroid nodule    • Vitamin B12 deficiency         Past Surgical History:   Procedure Laterality Date   • ANTERIOR CERVICAL DISCECTOMY W/ FUSION N/A 12/29/2016    Procedure: C3-4 anterior cervical discectomy and fusion with Depuy micro plate, ALLOGRAFT C3-4, AND HARDWARE REMOVAL C4-7.;  Surgeon: Hema Godwin MD;  Location: [facility] OR;  Service:    • CARDIAC CATHETERIZATION N/A 3/30/2017    Procedure: Left Heart Cath;  Surgeon: Tracey Vargas MD;  Location: [facility] CATH INVASIVE LOCATION;  Service:    • CARDIAC CATHETERIZATION N/A 3/30/2017    Procedure: Coronary angiography;  Surgeon: Tracey Vargas MD;  Location: [facility] CATH INVASIVE LOCATION;  Service:    • CARDIAC CATHETERIZATION N/A 3/30/2017    Procedure: Left ventriculography;  Surgeon: Tracey Vargas MD;  Location: [facility] CATH INVASIVE LOCATION;  Service:    • CARDIAC CATHETERIZATION  3/30/2017    Procedure: Functional Flow [place];  Surgeon: Tracey Vargas MD;  Location: [facility] CATH INVASIVE LOCATION;  Service:    • CARPAL TUNNEL RELEASE     • CERVICAL BIOPSY  MMXVI    Dr. Jeffery.    • CERVICAL DISCECTOMY ANTERIOR  04/2013    C4-7   • COLONOSCOPY  04/20/2015    Diverticulosis, IH   • COLPOSCOPY W/ BIOPSY / CURETTAGE  06/17/2016    LGSIL HPV positive. Results were normal repeat pap in one year. Chronic Cervicitis   • CORONARY ANGIOPLASTY WITH STENT PLACEMENT     • ENDOSCOPY  MMXV    Normal.  Dr. Rodriguez   • ENDOSCOPY N/A 6/22/2017    Erythematous mucosa in the stomach  PATH: Chronic active gastritis, moderate with intestinal metaplasia    • GASTRIC BYPASS      Done using the sleeve technique   • HARDWARE REMOVAL  12/29/2016    cervical   • LAPAROSCOPIC GASTRIC BANDING  02/2018   • SHOULDER SURGERY Left     RCR   • TONSILLECTOMY     • TONSILLECTOMY AND ADENOIDECTOMY     • TRANSVAGINAL TAPING SUSPENSION N/A 12/6/2017    Procedure: MID URETHRAL SLING CYSTSCOPY;  Surgeon: Abby Méndez MD;  Location: [facility]  OR;  Service:    • TUBAL ABDOMINAL LIGATION     • TYMPANOSTOMY TUBE PLACEMENT Right    • WRIST SURGERY Bilateral     carpal tunnel       Visit Dx:     ICD-10-CM ICD-9-CM   1. Neck pain M54.2 723.1             PT Ortho     Row Name 07/20/20 1200       Head/Neck/Trunk    Neck Lt Lateral Flexion AROM  20  -GR    Neck Rt Lateral Flexion AROM  22  -GR    Neck Lt Rotation AROM  40  -GR    Neck Rt Rotation AROM  55  -GR      User Key  (r) = Recorded By, (t) = Taken By, (c) = Cosigned By    Initials Name Provider Type    Hossein Renteria, PT Physical Therapist                            PT OP Goals     Row Name 07/20/20 1100          PT Short Term Goals    STG Date to Achieve  07/04/20  -GR     STG 1  Patient will be independent with initial HEP.  -GR     STG 1 Progress  Met  -GR     STG 2  Patient will demonstrate >/=50 degrees of cervical rotation bilaterally to assist with functional turning.  -GR     STG 2 Progress  Partially Met  -GR     STG 2 Progress Comments  55 R 40 L  -GR        Long Term Goals    LTG Date to Achieve  08/03/20  -GR     LTG 1  Patient will be independent with progressive HEP for long term management of current condition.  -GR     LTG 1 Progress  Ongoing  -GR     LTG 2  Patient will score </= 12% disability on the NDI to indicate improved perceived performance with ADLs.  -GR     LTG 2 Progress  Ongoing  -GR     LTG 2 Progress Comments  26% disability  -GR     LTG 3  Patient will demonstrate >/=65 degrees of cervical rotation bilaterally to assist with conversational ease  -GR     LTG 3 Progress  Ongoing  -GR       User Key  (r) = Recorded By, (t) = Taken By, (c) = Cosigned By    Initials Name Provider Type    Hossein Renteria PT Physical Therapist          PT Assessment/Plan     Row Name 07/20/20 1225          PT Assessment    Assessment Comments  Patient has attended 8 sessions of skilled PT. She has 1.5/2 STGs and 0/3 LTGs.  Pain is overally reducing and she self reports 60% improvement  since starting therapy. She remains restricted in cervical ROM.  Recommend continue skilled PT 2x/week x 3 additional weeks or 6 visits.  -GR        PT Plan    PT Plan Comments  Continue x 6 visits pending insurance.  -GR       User Key  (r) = Recorded By, (t) = Taken By, (c) = Cosigned By    Initials Name Provider Type    Hossein Renteria, PT Physical Therapist          Modalities     Row Name 07/20/20 1100             Moist Heat    MH Applied  Yes  -GR      Location  upper back during traction  -GR         Traction 61047    Traction Type  Cervical  -GR      Rx Minutes  15  -GR      Duration  Intermittent  -GR      Position  Other 90/90  -GR      Weight  16 /8  -GR      Hold  40  -GR      Relax  10  -GR        User Key  (r) = Recorded By, (t) = Taken By, (c) = Cosigned By    Initials Name Provider Type    Hossein Renteria, PT Physical Therapist        OP Exercises     Row Name 07/20/20 1100             Subjective Comments    Subjective Comments  Arrives 10' late.  Just mild soreness middle of neck. Rating 50-60% improvement since initiating PT.  -GR         Subjective Pain    Able to rate subjective pain?  yes  -GR      Pre-Treatment Pain Level  2  -GR         Total Minutes    91186 - PT Therapeutic Exercise Minutes  10  -GR         Exercise 3    Exercise Name 3  seated SNAG cervical rotation  -GR      Cueing 3  Demo  -GR      Sets 3  1  -GR      Reps 3  10  -GR      Time 3  10 sec  -GR      Additional Comments  each side towel  -GR         Exercise 5    Exercise Name 5  UBE  -GR      Time 5  4 min  -GR      Additional Comments  L2 2' fwd/2' retro  -GR         Exercise 10    Exercise Name 10  rows  -GR      Cueing 10  Verbal;Tactile;Demo  -GR      Sets 10  2  -GR      Reps 10  10  -GR      Additional Comments  RTB  -GR        User Key  (r) = Recorded By, (t) = Taken By, (c) = Cosigned By    Initials Name Provider Type    Hossein Renteria, PT Physical Therapist                        Outcome Measure  Options: Neck Disability Index (NDI)(26% disability)         Time Calculation:     Start Time: 1155  Stop Time: 1225  Time Calculation (min): 30 min  Total Timed Code Minutes- PT: 15 minute(s)     Therapy Charges for Today     Code Description Service Date Service Provider Modifiers Qty    85769395523  PT THER PROC EA 15 MIN 7/20/2020 Hossein Jimenes, PT GP 1    [id number] HC PT-TRACTION MECHANICAL 7/20/2020 Hossein Jimenes, PT  1    [id number]  PT HOT OR COLD PACK TREAT MCARE 7/20/2020 Hossein Jimenes, PT GP 1          PT G-Codes  Outcome Measure Options: Neck Disability Index (NDI)(26% disability)         Hossein Jimenes, PT  7/20/2020

## 2020-07-27 ENCOUNTER — OFFICE VISIT (OUTPATIENT)
Dept: FAMILY MEDICINE CLINIC | Facility: CLINIC | Age: 60
End: 2020-07-27

## 2020-07-27 ENCOUNTER — HOSPITAL ENCOUNTER (OUTPATIENT)
Dept: GENERAL RADIOLOGY | Facility: HOSPITAL | Age: 60
Discharge: HOME | End: 2020-07-27
Admitting: NURSE PRACTITIONER

## 2020-07-27 VITALS
SYSTOLIC BLOOD PRESSURE: 130 MMHG | DIASTOLIC BLOOD PRESSURE: 70 MMHG | TEMPERATURE: 98.3 F | OXYGEN SATURATION: 98 % | HEART RATE: 59 BPM | HEIGHT: 63 IN | BODY MASS INDEX: 37.23 KG/M2 | WEIGHT: 210.1 LBS

## 2020-07-27 DIAGNOSIS — S30.0XXA COCCYX CONTUSION, INITIAL ENCOUNTER: Primary | ICD-10-CM
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
## 2020-07-27 PROCEDURE — 72220 X-RAY EXAM SACRUM TAILBONE: CPT

## 2020-07-27 PROCEDURE — 99213 OFFICE O/P EST LOW 20 MIN: CPT | Performed by: NURSE PRACTITIONER

## 2020-07-27 RX ORDER — BUPROPION HYDROCHLORIDE 300 MG/1
300 TABLET ORAL 2 TIMES DAILY
COMMUNITY
Start: 2020-07-10

## 2020-07-27 NOTE — PROGRESS NOTES
"Subjective   Keri Bhagat is a 60 y.o. female.     Very pleasant patient here today follow-up from her recent fall she was walking for health with her  Sister she got her foot caught on something and she lost her balance and fell backward landing on her coccyx and buttock  She complains of considerable soreness and tenderness on her coccyx pain with sitting she got her a cushion it helps  No bowel or bladder change no groin paresthesias no radiation neuropathic pains or sciatica  Better if she relieves pressure  Pain is moderate sometimes severe depends on what she is doing how she sets    She bumped her head but no LOC and her head is fine no increased neck pain    Tylenol arthritis helps some occasionally she is had ibuprofen but she tends to avoid it as she has history of CAD presently stable  As well as her renal numbers have been intermittently abnormal        Fall   Pertinent negatives include no abdominal pain or fever.        /70   Pulse 59   Temp 98.3 °F (36.8 °C) (Tympanic)   Ht 160 cm (63\")   Wt 95.3 kg (210 lb 1.6 oz)   LMP 10/21/2016 (Approximate) Comment: 54  SpO2 98%   BMI 37.22 kg/m²       The following portions of the patient's history were reviewed and updated as appropriate: allergies, current medications, past family history, past medical history, past social history, past surgical history and problem list.    Review of Systems   Constitutional: Negative for chills, fatigue, fever and unexpected weight loss.   HENT: Negative.  Negative for trouble swallowing.    Eyes: Negative.    Respiratory: Negative for cough and shortness of breath.    Cardiovascular: Negative for chest pain, palpitations and leg swelling.   Gastrointestinal: Negative for abdominal pain and blood in stool.   Genitourinary: Negative.  Negative for pelvic pain.   Musculoskeletal: Positive for back pain.        Tailbone pain   Skin: Negative.    Neurological: Negative.  Negative for dizziness, speech " difficulty, weakness and confusion.   Psychiatric/Behavioral: Negative.    All other systems reviewed and are negative.      Objective   Physical Exam   Constitutional: She is oriented to person, place, and time. She appears well-developed and well-nourished. No distress.   HENT:   Head: Normocephalic.   Eyes: Pupils are equal, round, and reactive to light. Conjunctivae are normal.   Neck: Neck supple.   Cardiovascular: Exam reveals no friction rub.   No murmur heard.  Pulmonary/Chest: Effort normal. No respiratory distress. She has no wheezes.   Musculoskeletal: She exhibits no edema.   Tenderness over her coccyx moderately  Negative straight leg raise plantar flexion dorsiflexion normal neurovascular intact  Gait a little slow otherwise normal no weakness  Pain with sitting she is leaning forward in her chair for relief   Neurological: She is alert and oriented to person, place, and time.   Skin: Skin is warm and dry.   Psychiatric: She has a normal mood and affect. Her behavior is normal. Judgment and thought content normal.   Vitals reviewed.        Assessment/Plan   Keri was seen today for fall.    Diagnoses and all orders for this visit:    Coccyx contusion, initial encounter  -     XR sacrum and coccyx    Fall, initial encounter  -     XR sacrum and coccyx        Coccyx contusion  Still has considerable pain after a week we will check an x-ray see if there is a fracture  She will follow-up if not improving see patient instructions conservative treatment            There are no Patient Instructions on file for this visit.

## 2020-07-27 NOTE — PATIENT INSTRUCTIONS
Discharge instructions      Outpatient x-ray  Downstairs call tomorrow for result  Warm bath as needed  Donut cushion as needed  Tylenol as needed  Recheck in 3 weeks if not greatly improved  Weakness bowel bladder incontinence retention  Saddlebag paresthesia or more severe pain  Emergency room    1200 alex a day  Mostly vegetables chicken fish  64 ounces of water daily  Greatly decrease breads and pastas    My fitness pal

## 2020-08-04 ENCOUNTER — HOSPITAL ENCOUNTER (OUTPATIENT)
Dept: PHYSICAL THERAPY | Facility: HOSPITAL | Age: 60
Setting detail: THERAPIES SERIES
Discharge: HOME | End: 2020-08-04

## 2020-08-04 DIAGNOSIS — M54.2 NECK PAIN: Primary | ICD-10-CM

## 2020-08-04 PROCEDURE — 97012 MECHANICAL TRACTION THERAPY: CPT | Performed by: PHYSICAL THERAPIST

## 2020-08-04 PROCEDURE — 97110 THERAPEUTIC EXERCISES: CPT | Performed by: PHYSICAL THERAPIST

## 2020-08-04 NOTE — THERAPY TREATMENT NOTE
Outpatient Physical Therapy Ortho Treatment Note  [facility]     Patient Name: Keri Bhagat  : 1960  MRN: [id number]  Today's Date: 2020      Visit Date: 2020    Visit Dx:    ICD-10-CM ICD-9-CM   1. Neck pain M54.2 723.1       Patient Active Problem List   Diagnosis   • Gastroesophageal reflux disease   • Bipolar I disorder, single manic episode (CMS/[facility])   • Atherosclerosis of coronary artery   • Essential hypertension   • Lightheadedness   • Low back pain   • Sciatica   • Thyroid nodule   • Fatigue   • Hyperlipidemia   • Hypothyroidism (acquired)   • Iron deficiency anemia   • Right knee pain   • Cervical disc disease   • Depression   • ROCKY (obstructive sleep apnea)   • Coronary artery disease involving native coronary artery of native heart without angina pectoris   • Cervical spondylosis with radiculopathy   • Chronic pain of both knees   • Arthritis of both knees   • Weight gain, abnormal   • Allergic rhinitis   • Obesity (BMI 30-39.9)   • Plantar fasciitis   • Peroneal tendon injury   • Stress fracture of calcaneus   • Vitamin D deficiency   • Pain and swelling of knee   • Knee injury   • Calcific Achilles tendonitis   • Rod's deformity   • Urgency incontinence   • Nocturia   • Chronic gastritis without bleeding   • LGSIL on Pap smear of cervix   • Primary osteoarthritis of right knee   • Chronic renal insufficiency, stage III (moderate) (CMS/[facility])   • Left wrist tendinitis   • Cervical radiculopathy   • Tobacco abuse   • Obesity, morbid, BMI 40.0-49.9 (CMS/HCC)   • Encounter for screening for lung cancer   • Urinary frequency   • Diabetes mellitus screening   • MORALES I (cervical intraepithelial neoplasia I)   • Primary osteoarthritis of both knees   • H/O bladder repair surgery   • Chest pain   • Syncope and collapse   • Closed fracture of left distal radius   • Closed displaced fracture of neck of right fifth metacarpal bone   • Fracture follow-up   • Nondisplaced fracture of  base of fifth metacarpal bone, left hand, initial encounter for closed fracture   • Closed fracture of lower end of left radius with routine healing   • Multiple dislocations of fingers, open   • Bilateral chronic knee pain   • Arthritis of left knee   • Arthritis of right knee   • Family history of diabetes mellitus (DM)   • Menopause   • 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   • Memory dysfunction   • Periodic limb movement disorder   • Gait disturbance   • Degenerative disc disease, lumbar   • Osteopenia   • Vaginal atrophy   • Female dyspareunia   • Pituitary cyst (CMS/HCC)   • Meningioma (CMS/[facility])   • Pituitary cyst (CMS/HCC)        Past Medical History:   Diagnosis Date   • Abnormal Pap smear of cervix    • Anemia    • Anxiety    • Arthritis    • Atherosclerotic heart disease of native coronary artery with other forms of angina pectoris (CMS/[facility])    • Bipolar I disorder, single manic episode (CMS/[facility])     depressive d(NOS)   • Cervical disc herniation    • Cervical dysplasia    • Coronary artery disease    • CTS (carpal tunnel syndrome)    • Depression     NO SUICIDAL PLANS   • Difficulty swallowing    • Diverticular disease    • Dyspepsia    • Dysphagia    • Gastric reflux    • Heart attack (CMS/[facility])    • Heart murmur    • Hiatal hernia    • High cholesterol    • History of transfusion    • 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    HPV positive on pap LGSIL   • Hyperlipidemia    • Hypertension    • Hypothyroidism    • Incontinence in female     wears pads   • Kidney disease 2017    stage 2    • Kidney disease, chronic, stage III (GFR 30-59 ml/min) (CMS/[facility])    • LGSIL on Pap smear of cervix 04/29/2016    LGSIL HPV positive   • Myocardial infarction (CMS/[facility]) 2000   • Neck pain    • Obesity    • Past myocardial infarction 05/2000   • Periodic limb movement disorder    • Restless leg syndrome    • Sleep apnea     cpap   • Stress fracture     LEFT HEEL   • Suicidal  ideation 08/19/2016    history   • Swelling of ankle     right had doppler no s/s blood clot   • Thyroid nodule    • Vitamin B12 deficiency         Past Surgical History:   Procedure Laterality Date   • ANTERIOR CERVICAL DISCECTOMY W/ FUSION N/A 12/29/2016    Procedure: C3-4 anterior cervical discectomy and fusion with Depuy micro plate, ALLOGRAFT C3-4, AND HARDWARE REMOVAL C4-7.;  Surgeon: Hema Godwin MD;  Location: [facility] MAIN OR;  Service:    • CARDIAC CATHETERIZATION N/A 3/30/2017    Procedure: Left Heart Cath;  Surgeon: Tracey Vargas MD;  Location:  [facility] CATH INVASIVE LOCATION;  Service:    • CARDIAC CATHETERIZATION N/A 3/30/2017    Procedure: Coronary angiography;  Surgeon: Tracey Vargas MD;  Location:  [facility] CATH INVASIVE LOCATION;  Service:    • CARDIAC CATHETERIZATION N/A 3/30/2017    Procedure: Left ventriculography;  Surgeon: Tracey Vargas MD;  [facility];  Service:    • CARDIAC CATHETERIZATION  3/30/2017    Procedure: Functional Flow [place];  Surgeon: Tracey Vargas MD;  Location:  [facility] CATH INVASIVE LOCATION;  Service:    • CARPAL TUNNEL RELEASE     • CERVICAL BIOPSY  MMXVI    Dr. Jeffery.    • CERVICAL DISCECTOMY ANTERIOR  04/2013    C4-7   • COLONOSCOPY  04/20/2015    Diverticulosis, IH   • COLPOSCOPY W/ BIOPSY / CURETTAGE  06/17/2016    LGSIL HPV positive. Results were normal repeat pap in one year. Chronic Cervicitis   • CORONARY ANGIOPLASTY WITH STENT PLACEMENT     • ENDOSCOPY  MMXV    Normal.  Dr. Rodriguez   • ENDOSCOPY N/A 6/22/2017    Erythematous mucosa in the stomach  PATH: Chronic active gastritis, moderate with intestinal metaplasia    • GASTRIC BYPASS      Done using the sleeve technique   • HARDWARE REMOVAL  12/29/2016    cervical   • LAPAROSCOPIC GASTRIC BANDING  02/2018   • SHOULDER SURGERY Left     RCR   • TONSILLECTOMY     • TONSILLECTOMY AND ADENOIDECTOMY     • TRANSVAGINAL TAPING SUSPENSION N/A 12/6/2017    Procedure: MID URETHRAL SLING  CYSTSCOPY;  Surgeon: Abby Méndez MD;  Location: [facility];  Service:    • TUBAL ABDOMINAL LIGATION     • TYMPANOSTOMY TUBE PLACEMENT Right    • WRIST SURGERY Bilateral     carpal tunnel                       PT Assessment/Plan     Row Name 08/04/20 1550          PT Assessment    Assessment Comments  Patient returns to PT with 2 week gap due to pending insurance coverage. Pain has been well controlled; reporting stiffness but demonstrating good cervical rotation with overpressure.  Recommend resume traction and therex.  -GR        PT Plan    PT Plan Comments  Resume traction and therex.  -GR       User Key  (r) = Recorded By, (t) = Taken By, (c) = Cosigned By    Initials Name Provider Type    Hossein Renteria, PT Physical Therapist          Modalities     Row Name 08/04/20 1500             Moist Heat    MH Applied  Yes  -GR      Location  upper back during traction  -GR         Traction 63670    Traction Type  Cervical  -GR      Rx Minutes  15  -GR      Duration  Intermittent  -GR      Position  Other 90/90  -GR      Weight  16 /8  -GR      Hold  40  -GR      Relax  10  -GR        User Key  (r) = Recorded By, (t) = Taken By, (c) = Cosigned By    Initials Name Provider Type    Hossein Renteria, PT Physical Therapist        OP Exercises     Row Name 08/04/20 1500             Subjective Comments    Subjective Comments  Pain has beennot bad; felt rough yesterday so cx'd. Feels more stiff than pain the last week.  -GR         Subjective Pain    Able to rate subjective pain?  yes  -GR      Pre-Treatment Pain Level  3  -GR         Total Minutes    22593 - PT Therapeutic Exercise Minutes  17  -GR         Exercise 3    Exercise Name 3  seated SNAG cervical rotation  -GR      Cueing 3  Demo  -GR      Sets 3  1  -GR      Reps 3  10  -GR      Time 3  10 sec  -GR      Additional Comments  each side towel  -GR         Exercise 5    Exercise Name 5  UBE  -GR      Time 5  4 min  -GR         Exercise 6     Exercise Name 6  Pulleys - flexion with cervical flexion/extension following hand  -GR      Cueing 6  Demo  -GR      Sets 6  2  -GR      Reps 6  10  -GR         Exercise 10    Exercise Name 10  rows  -GR      Cueing 10  Verbal;Tactile;Demo  -GR      Sets 10  2  -GR      Reps 10  10  -GR      Additional Comments  RTB  -GR         Exercise 11    Exercise Name 11  PNF chop standing  -GR      Cueing 11  Demo  -GR      Sets 11  1  -GR      Reps 11  10  -GR      Additional Comments  2lb med ball   -GR        User Key  (r) = Recorded By, (t) = Taken By, (c) = Cosigned By    Initials Name Provider Type    Hossein Renteria, PT Physical Therapist                       PT OP Goals     Row Name 08/04/20 1500          PT Short Term Goals    STG Date to Achieve  07/04/20  -GR     STG 1  Patient will be independent with initial HEP.  -GR     STG 1 Progress  Met  -GR     STG 2  Patient will demonstrate >/=50 degrees of cervical rotation bilaterally to assist with functional turning.  -GR     STG 2 Progress  Partially Met  -GR        Long Term Goals    LTG Date to Achieve  08/03/20  -GR     LTG 1  Patient will be independent with progressive HEP for long term management of current condition.  -GR     LTG 1 Progress  Ongoing  -GR     LTG 2  Patient will score </= 12% disability on the NDI to indicate improved perceived performance with ADLs.  -GR     LTG 2 Progress  Ongoing  -GR     LTG 3  Patient will demonstrate >/=65 degrees of cervical rotation bilaterally to assist with conversational ease  -GR     LTG 3 Progress  Ongoing  -GR       User Key  (r) = Recorded By, (t) = Taken By, (c) = Cosigned By    Initials Name Provider Type    Hossein Renteria PT Physical Therapist                         Time Calculation:   Start Time: 1500  Stop Time: 1532  Time Calculation (min): 32 min  Total Timed Code Minutes- PT: 17 minute(s)  Therapy Charges for Today     Code Description Service Date Service Provider Modifiers Qty     25770023708 HC PT THER PROC EA 15 MIN 8/4/2020 Hossein Jimenes, PT GP 1    95719216857 HC PT-TRACTION MECHANICAL 8/4/2020 Hossein Jimenes, PT  1                    Hossein CHOW. Yudy, PT  8/4/2020

## 2020-08-10 ENCOUNTER — CLINICAL SUPPORT (OUTPATIENT)
Dept: ORTHOPEDIC SURGERY | Facility: CLINIC | Age: 60
End: 2020-08-10

## 2020-08-10 ENCOUNTER — HOSPITAL ENCOUNTER (OUTPATIENT)
Dept: PHYSICAL THERAPY | Facility: HOSPITAL | Age: 60
Setting detail: THERAPIES SERIES
Discharge: HOME | End: 2020-08-10

## 2020-08-10 VITALS — TEMPERATURE: 98 F | HEIGHT: 64 IN | WEIGHT: 210 LBS | BODY MASS INDEX: 35.85 KG/M2

## 2020-08-10 DIAGNOSIS — M25.561 PAIN IN BOTH KNEES, UNSPECIFIED CHRONICITY: Primary | ICD-10-CM

## 2020-08-10 DIAGNOSIS — M25.562 PAIN IN BOTH KNEES, UNSPECIFIED CHRONICITY: Primary | ICD-10-CM

## 2020-08-10 DIAGNOSIS — M17.0 BILATERAL PRIMARY OSTEOARTHRITIS OF KNEE: ICD-10-CM

## 2020-08-10 DIAGNOSIS — M54.2 NECK PAIN: Primary | ICD-10-CM

## 2020-08-10 PROCEDURE — 73562 X-RAY EXAM OF KNEE 3: CPT | Performed by: NURSE PRACTITIONER

## 2020-08-10 PROCEDURE — 97012 MECHANICAL TRACTION THERAPY: CPT | Performed by: PHYSICAL THERAPIST

## 2020-08-10 PROCEDURE — 97110 THERAPEUTIC EXERCISES: CPT | Performed by: PHYSICAL THERAPIST

## 2020-08-10 PROCEDURE — 20610 DRAIN/INJ JOINT/BURSA W/O US: CPT | Performed by: NURSE PRACTITIONER

## 2020-08-10 RX ADMIN — METHYLPREDNISOLONE ACETATE 80 MG: 80 INJECTION, SUSPENSION INTRA-ARTICULAR; INTRALESIONAL; INTRAMUSCULAR; SOFT TISSUE at 13:05

## 2020-08-10 NOTE — THERAPY TREATMENT NOTE
Outpatient Physical Therapy Ortho Treatment Note  [facility]     Patient Name: Keri Bhagat  : 1960  MRN: [id number]  Today's Date: 8/10/2020      Visit Date: 08/10/2020    Visit Dx:    ICD-10-CM ICD-9-CM   1. Neck pain M54.2 723.1       Patient Active Problem List   Diagnosis   • Gastroesophageal reflux disease   • Bipolar I disorder, single manic episode (CMS/[facility])   • Atherosclerosis of coronary artery   • Essential hypertension   • Lightheadedness   • Low back pain   • Sciatica   • Thyroid nodule   • Fatigue   • Hyperlipidemia   • Hypothyroidism (acquired)   • Iron deficiency anemia   • Right knee pain   • Cervical disc disease   • Depression   • ROCKY (obstructive sleep apnea)   • Coronary artery disease involving native coronary artery of native heart without angina pectoris   • Cervical spondylosis with radiculopathy   • Chronic pain of both knees   • Arthritis of both knees   • Weight gain, abnormal   • Allergic rhinitis   • Obesity (BMI 30-39.9)   • Plantar fasciitis   • Peroneal tendon injury   • Stress fracture of calcaneus   • Vitamin D deficiency   • Pain and swelling of knee   • Knee injury   • Calcific Achilles tendonitis   • Rod's deformity   • Urgency incontinence   • Nocturia   • Chronic gastritis without bleeding   • LGSIL on Pap smear of cervix   • Primary osteoarthritis of right knee   • Chronic renal insufficiency, stage III (moderate) (CMS/[facility])   • Left wrist tendinitis   • Cervical radiculopathy   • Tobacco abuse   • Obesity, morbid, BMI 40.0-49.9 (CMS/HCC)   • Encounter for screening for lung cancer   • Urinary frequency   • Diabetes mellitus screening   • MORALES I (cervical intraepithelial neoplasia I)   • Primary osteoarthritis of both knees   • H/O bladder repair surgery   • Chest pain   • Syncope and collapse   • Closed fracture of left distal radius   • Closed displaced fracture of neck of right fifth metacarpal bone   • Fracture follow-up   • Nondisplaced fracture of  base of fifth metacarpal bone, left hand, initial encounter for closed fracture   • Closed fracture of lower end of left radius with routine healing   • Multiple dislocations of fingers, open   • Bilateral chronic knee pain   • Arthritis of left knee   • Arthritis of right knee   • Family history of diabetes mellitus (DM)   • Menopause   • 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   • Memory dysfunction   • Periodic limb movement disorder   • Gait disturbance   • Degenerative disc disease, lumbar   • Osteopenia   • Vaginal atrophy   • Female dyspareunia   • Pituitary cyst (CMS/HCC)   • Meningioma (CMS/[facility])   • Pituitary cyst (CMS/HCC)        Past Medical History:   Diagnosis Date   • Abnormal Pap smear of cervix    • Anemia    • Anxiety    • Arthritis    • Atherosclerotic heart disease of native coronary artery with other forms of angina pectoris (CMS/[facility])    • Bipolar I disorder, single manic episode (CMS/[facility])     depressive d(NOS)   • Cervical disc herniation    • Cervical dysplasia    • Coronary artery disease    • CTS (carpal tunnel syndrome)    • Depression     NO SUICIDAL PLANS   • Difficulty swallowing    • Diverticular disease    • Dyspepsia    • Dysphagia    • Gastric reflux    • Heart attack (CMS/[facility])    • Heart murmur    • Hiatal hernia    • High cholesterol    • History of transfusion    • 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    HPV positive on pap LGSIL   • Hyperlipidemia    • Hypertension    • Hypothyroidism    • Incontinence in female     wears pads   • Kidney disease 2017    stage 2    • Kidney disease, chronic, stage III (GFR 30-59 ml/min) (CMS/[facility])    • LGSIL on Pap smear of cervix 04/29/2016    LGSIL HPV positive   • Myocardial infarction (CMS/[facility]) 2000   • Neck pain    • Obesity    • Past myocardial infarction 05/2000   • Periodic limb movement disorder    • Restless leg syndrome    • Sleep apnea     cpap   • Stress fracture     LEFT HEEL   • Suicidal  ideation 08/19/2016    history   • Swelling of ankle     right had doppler no s/s blood clot   • Thyroid nodule    • Vitamin B12 deficiency         Past Surgical History:   Procedure Laterality Date   • ANTERIOR CERVICAL DISCECTOMY W/ FUSION N/A 12/29/2016    Procedure: C3-4 anterior cervical discectomy and fusion with Depuy micro plate, ALLOGRAFT C3-4, AND HARDWARE REMOVAL C4-7.;  Surgeon: Hema Godwin MD;  Location: [facility] MAIN OR;  Service:    • CARDIAC CATHETERIZATION N/A 3/30/2017    Procedure: Left Heart Cath;  Surgeon: Tracey Vargas MD;  Location:  [facility] CATH INVASIVE LOCATION;  Service:    • CARDIAC CATHETERIZATION N/A 3/30/2017    Procedure: Coronary angiography;  Surgeon: Tracey Vargas MD;  Location:  [facility] CATH INVASIVE LOCATION;  Service:    • CARDIAC CATHETERIZATION N/A 3/30/2017    Procedure: Left ventriculography;  Surgeon: Tracey Vargas MD;  [facility];  Service:    • CARDIAC CATHETERIZATION  3/30/2017    Procedure: Functional Flow [place];  Surgeon: Tracey Vargas MD;  Location:  [facility] CATH INVASIVE LOCATION;  Service:    • CARPAL TUNNEL RELEASE     • CERVICAL BIOPSY  MMXVI    Dr. Jeffery.    • CERVICAL DISCECTOMY ANTERIOR  04/2013    C4-7   • COLONOSCOPY  04/20/2015    Diverticulosis, IH   • COLPOSCOPY W/ BIOPSY / CURETTAGE  06/17/2016    LGSIL HPV positive. Results were normal repeat pap in one year. Chronic Cervicitis   • CORONARY ANGIOPLASTY WITH STENT PLACEMENT     • ENDOSCOPY  MMXV    Normal.  Dr. Rodriguez   • ENDOSCOPY N/A 6/22/2017    Erythematous mucosa in the stomach  PATH: Chronic active gastritis, moderate with intestinal metaplasia    • GASTRIC BYPASS      Done using the sleeve technique   • HARDWARE REMOVAL  12/29/2016    cervical   • LAPAROSCOPIC GASTRIC BANDING  02/2018   • SHOULDER SURGERY Left     RCR   • TONSILLECTOMY     • TONSILLECTOMY AND ADENOIDECTOMY     • TRANSVAGINAL TAPING SUSPENSION N/A 12/6/2017    Procedure: MID URETHRAL SLING  CYSTSCOPY;  Surgeon: Abby Méndez MD;  Location: [facility] OR;  Service:    • TUBAL ABDOMINAL LIGATION     • TYMPANOSTOMY TUBE PLACEMENT Right    • WRIST SURGERY Bilateral     carpal tunnel                       PT Assessment/Plan     Row Name 08/10/20 1218          PT Assessment    Assessment Comments  Pain is worse today. Did increase time on traction to mitigate symptoms which helped briefly. Patient requests possible trial of dry needling in future; informed of self pay service.  -GR        PT Plan    PT Plan Comments  Consider dry needling.   -GR       User Key  (r) = Recorded By, (t) = Taken By, (c) = Cosigned By    Initials Name Provider Type    Hossein Renteria, PT Physical Therapist          Modalities     Row Name 08/10/20 1100             Moist Heat    Location  upper back during traction  -GR         Traction 29430    Traction Type  Cervical  -GR      Rx Minutes  18  -GR      Duration  Intermittent  -GR      Position  Other 90/90  -GR      Weight  16 /8  -GR      Hold  40  -GR      Relax  10  -GR        User Key  (r) = Recorded By, (t) = Taken By, (c) = Cosigned By    Initials Name Provider Type    Hossein Renteria, PT Physical Therapist        OP Exercises     Row Name 08/10/20 1200             Subjective Comments    Subjective Comments  Pain bad since Saturday - sharp pain with turning to the L.  -GR         Subjective Pain    Able to rate subjective pain?  yes  -GR      Pre-Treatment Pain Level  6  -GR         Total Minutes    61427 - PT Therapeutic Exercise Minutes  12  -GR         Exercise 5    Exercise Name 5  UBE  -GR      Time 5  4 min  -GR      Additional Comments  L1 2' fwd/2' retro  -GR         Exercise 10    Exercise Name 10  rows  -GR      Cueing 10  Verbal;Tactile;Demo  -GR      Sets 10  2  -GR      Reps 10  10  -GR      Additional Comments  RTB  -GR         Exercise 12    Exercise Name 12  B shoulder ext  -GR      Cueing 12  Demo  -GR      Reps 12  2x10  -GR       Additional Comments  RTB  -GR         Exercise 13    Exercise Name 13  Doorway stretch low and T  -GR      Cueing 13  Demo  -GR      Sets 13  1  -GR      Reps 13  3  -GR      Time 13  20 seconds  -GR      Additional Comments  each position  -GR        User Key  (r) = Recorded By, (t) = Taken By, (c) = Cosigned By    Initials Name Provider Type    Hossein Renteria, PT Physical Therapist                       PT OP Goals     Row Name 08/10/20 1200          PT Short Term Goals    STG Date to Achieve  07/04/20  -GR     STG 1  Patient will be independent with initial HEP.  -GR     STG 1 Progress  Met  -GR     STG 2  Patient will demonstrate >/=50 degrees of cervical rotation bilaterally to assist with functional turning.  -GR     STG 2 Progress  Partially Met  -GR        Long Term Goals    LTG Date to Achieve  08/03/20  -GR     LTG 1  Patient will be independent with progressive HEP for long term management of current condition.  -GR     LTG 1 Progress  Ongoing  -GR     LTG 2  Patient will score </= 12% disability on the NDI to indicate improved perceived performance with ADLs.  -GR     LTG 2 Progress  Ongoing  -GR     LTG 3  Patient will demonstrate >/=65 degrees of cervical rotation bilaterally to assist with conversational ease  -GR     LTG 3 Progress  Ongoing  -GR       User Key  (r) = Recorded By, (t) = Taken By, (c) = Cosigned By    Initials Name Provider Type    Hossein Renteria, PT Physical Therapist                         Time Calculation:   Start Time: 1145  Stop Time: 1215  Time Calculation (min): 30 min  Total Timed Code Minutes- PT: 12 minute(s)  Therapy Charges for Today     Code Description Service Date Service Provider Modifiers Qty    26911854828 HC PT THER PROC EA 15 MIN 8/10/2020 Hossein Jimenes, PT GP 1    74862300315 HC PT-TRACTION MECHANICAL 8/10/2020 Hossein Jimenes, PT  1    15506172924 HC PT HOT OR COLD PACK TREAT [place] 8/10/2020 Hossein Jimenes, PT GP 1                    Hossein  ALFREDO Jimenes, PT  8/10/2020

## 2020-08-10 NOTE — PROGRESS NOTES
Patient Name: Keri Bhagat   YOB: 1960  Referring Primary Care Physician: Epley, James, APRN  BMI: Body mass index is 36.05 kg/m².    Chief Complaint:    Chief Complaint   Patient presents with   • Left Knee - Follow-up   • Right Knee - Follow-up        HPI: Patient Dr. Lunsford presents today with bilateral knee pain she is cortisone injections they work well for her.  Mask were worn by myself and the patient throughout the duration of the visit    Keri Bhagat is a 60 y.o. female who presents today for evaluation of   Chief Complaint   Patient presents with   • Left Knee - Follow-up   • Right Knee - Follow-up     This problem is new to this examiner.     Subjective   Medications:   Home Medications:  Current Outpatient Medications on File Prior to Visit   Medication Sig   • ARIPiprazole (ABILIFY) 10 MG tablet Take 10 mg by mouth Daily.   • aspirin 81 MG EC tablet Take 1 tablet by mouth Daily.   • atenolol (TENORMIN) 50 MG tablet TAKE ONE TABLET BY MOUTH DAILY   • buPROPion XL (WELLBUTRIN XL) 150 MG 24 hr tablet    • Cholecalciferol (VITAMIN D-3) 25 MCG (1000 UT) capsule 1 capsule daily   • famotidine (PEPCID) 20 MG tablet Take 1 tablet by mouth Daily.   • gabapentin (NEURONTIN) 300 MG capsule Take 1 capsule by mouth 3 (Three) Times a Day.   • hydrOXYzine (ATARAX) 50 MG tablet    • isosorbide mononitrate (IMDUR) 30 MG 24 hr tablet TAKE ONE TABLET BY MOUTH DAILY   • lamoTRIgine (LaMICtal) 150 MG tablet Take 300 mg by mouth Daily.   • levothyroxine (SYNTHROID, LEVOTHROID) 88 MCG tablet Take 1 tablet by mouth Every Morning.   • loratadine (CLARITIN) 10 MG tablet Take 1 tablet by mouth Daily. As needed for allergies   • nitroglycerin (NITROSTAT) 0.4 MG SL tablet Place 0.4 mg under the tongue Every 5 (Five) Minutes As Needed for Chest Pain (took at M.D  office at 0930 a.m.). Take no more than 3 doses in 15 minutes.   • oxybutynin XL (DITROPAN XL) 15 MG 24 hr tablet    • pramipexole (MIRAPEX) 0.5 MG  tablet    • simvastatin (ZOCOR) 20 MG tablet TAKE ONE TABLET BY MOUTH ONCE NIGHTLY   • venlafaxine XR (EFFEXOR-XR) 75 MG 24 hr capsule Take 75 mg by mouth Daily.     No current facility-administered medications on file prior to visit.      Current Medications:  Scheduled Meds:  Continuous Infusions:  No current facility-administered medications for this visit.   PRN Meds:.    I have reviewed the patient's medical history in detail and updated the computerized patient record.  Review and summarization of old records includes:    Past Medical History:   Diagnosis Date   • Abnormal Pap smear of cervix    • Anemia    • Anxiety    • Arthritis    • Atherosclerotic heart disease of native coronary artery with other forms of angina pectoris (CMS/HCC)    • Bipolar I disorder, single manic episode (CMS/HCC)     depressive d(NOS)   • Cervical disc herniation    • Cervical dysplasia    • Coronary artery disease    • CTS (carpal tunnel syndrome)    • Depression     NO SUICIDAL PLANS   • Difficulty swallowing    • Diverticular disease    • Dyspepsia    • Dysphagia    • Gastric reflux    • Heart attack (CMS/HCC)    • Heart murmur    • Hiatal hernia    • High cholesterol    • History of transfusion    • 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    HPV positive on pap LGSIL   • Hyperlipidemia    • Hypertension    • Hypothyroidism    • Incontinence in female     wears pads   • Kidney disease 2017    stage 2    • Kidney disease, chronic, stage III (GFR 30-59 ml/min) (CMS/HCC)    • LGSIL on Pap smear of cervix 04/29/2016    LGSIL HPV positive   • Myocardial infarction (CMS/HCC) 2000   • Neck pain    • Obesity    • Past myocardial infarction 05/2000   • Periodic limb movement disorder    • Restless leg syndrome    • Sleep apnea     cpap   • Stress fracture     LEFT HEEL   • Suicidal ideation 08/19/2016    history   • Swelling of ankle     right had doppler no s/s blood clot   • Thyroid nodule    • Vitamin B12 deficiency          Past Surgical History:   Procedure Laterality Date   • ANTERIOR CERVICAL DISCECTOMY W/ FUSION N/A 12/29/2016    Procedure: C3-4 anterior cervical discectomy and fusion with Depuy micro plate, ALLOGRAFT C3-4, AND HARDWARE REMOVAL C4-7.;  Surgeon: Hema Godwin MD;  Location: [facility] OR;  Service:    • CARDIAC CATHETERIZATION N/A 3/30/2017    Procedure: Left Heart Cath;  Surgeon: Tracey Vargas MD;  [facility];  Service:    • CARDIAC CATHETERIZATION N/A 3/30/2017    Procedure: Coronary angiography;  Surgeon: Tracey Vargas MD;  Location:  [facility] CATH INVASIVE LOCATION;  Service:    • CARDIAC CATHETERIZATION N/A 3/30/2017    Procedure: Left ventriculography;  Surgeon: Tracey Vargas MD;  Location:  [facility] CATH INVASIVE LOCATION;  Service:    • CARDIAC CATHETERIZATION  3/30/2017    Procedure: Functional Flow [place];  Surgeon: Tracey Vargas MD;  Location: [facility] CATH INVASIVE LOCATION;  Service:    • CARPAL TUNNEL RELEASE     • CERVICAL BIOPSY  MMXVI    Dr. Jeffery.    • CERVICAL DISCECTOMY ANTERIOR  04/2013    C4-7   • COLONOSCOPY  04/20/2015    Diverticulosis, IH   • COLPOSCOPY W/ BIOPSY / CURETTAGE  06/17/2016    LGSIL HPV positive. Results were normal repeat pap in one year. Chronic Cervicitis   • CORONARY ANGIOPLASTY WITH STENT PLACEMENT     • ENDOSCOPY  MMXV    Normal.  Dr. Rodriguez   • ENDOSCOPY N/A 6/22/2017    Erythematous mucosa in the stomach  PATH: Chronic active gastritis, moderate with intestinal metaplasia    • GASTRIC BYPASS      Done using the sleeve technique   • HARDWARE REMOVAL  12/29/2016    cervical   • LAPAROSCOPIC GASTRIC BANDING  02/2018   • SHOULDER SURGERY Left     RCR   • TONSILLECTOMY     • TONSILLECTOMY AND ADENOIDECTOMY     • TRANSVAGINAL TAPING SUSPENSION N/A 12/6/2017    Procedure: MID URETHRAL SLING CYSTSCOPY;  Surgeon: Abby Méndez MD;  Location: [facility] OR;  Service:    • TUBAL ABDOMINAL LIGATION     • TYMPANOSTOMY TUBE PLACEMENT  Right    • WRIST SURGERY Bilateral     carpal tunnel        Social History     Occupational History   • Occupation: disabled   Tobacco Use   • Smoking status: Former Smoker     Packs/day: 1.50     Years: 20.00     Pack years: 30.00     Types: Electronic Cigarette, Cigarettes     Last attempt to quit:      Years since quittin.6   • Smokeless tobacco: Never Used   • Tobacco comment: Electronic Cigarette/ 3 mg nicotine    Substance and Sexual Activity   • Alcohol use: Yes     Comment: 2 times yearly    • Drug use: No   • Sexual activity: Yes     Partners: Male     Birth control/protection: None, Condom      Social History     Social History Narrative   • Not on file        Family History   Problem Relation Age of Onset   • Diabetes type II Mother    • Hypertension Mother    • Osteoporosis Mother    • Seizures Mother    • COPD Father    • Hypertension Father    • Lung cancer Father    • Heart attack Father    • Liver cancer Father    • Thyroid disease Sister    • Hypertension Sister    • Bipolar disorder Sister    • Depression Sister    • ADD / ADHD Sister    • No Known Problems Son    • Abnormal EKG Daughter    • Hypertension Daughter    • Bipolar disorder Daughter    • Thyroid disease Daughter    • No Known Problems Paternal Grandfather    • No Known Problems Paternal Grandmother    • No Known Problems Maternal Grandmother    • No Known Problems Maternal Grandfather    • Thyroid disease Sister    • Hypertension Sister    • Bipolar disorder Sister    • Asthma Daughter    • Breast cancer Neg Hx    • Ovarian cancer Neg Hx    • Uterine cancer Neg Hx    • Colon cancer Neg Hx    • Malig Hyperthermia Neg Hx        ROS: 14 point review of systems was performed and all other systems were reviewed and are negative except for documented findings in HPI and today's encounter.     Allergies: No Known Allergies  Constitutional:  Denies fever, shaking or chills   Eyes:  Denies change in visual acuity   HENT:  Denies nasal  "congestion or sore throat   Respiratory:  Denies cough or shortness of breath   Cardiovascular:  Denies chest pain or severe LE edema   GI:  Denies abdominal pain, nausea, vomiting, bloody stools or diarrhea   Musculoskeletal:  Numbness, tingling, pain, or loss of motor function only as noted above in history of present illness.  : Denies painful urination or hematuria  Integument:  Denies rash, lesion or ulceration   Neurologic:  Denies headache or focal weakness  Endocrine:  Denies lymphadenopathy  Psych:  Denies confusion or change in mental status   Hem:  Denies active bleeding    OBJECTIVE:  Physical Exam: 60 y.o. female  Wt Readings from Last 3 Encounters:   08/10/20 95.3 kg (210 lb)   07/27/20 95.3 kg (210 lb 1.6 oz)   06/09/20 92.1 kg (203 lb)     Ht Readings from Last 1 Encounters:   08/10/20 162.6 cm (64\")     Body mass index is 36.05 kg/m².  Vitals:    08/10/20 1416   Temp: 98 °F (36.7 °C)     Vital signs reviewed.     General Appearance:    Alert, cooperative, in no acute distress                  Eyes: conjunctiva clear  ENT: external ears and nose atraumatic  CV: no peripheral edema  Resp: normal respiratory effort  Skin: no rashes or wounds; normal turgor  Psych: mood and affect appropriate  Lymph: no nodes appreciated  Neuro: gross sensation intact  Vascular:  Palpable peripheral pulse in noted extremity  Musculoskeletal Extremities: Bilateral knees with medial joint line tenderness with effusion patellofemoral crepitation skin is warm dry and intact has good pulses mood sensation calves are soft and nontender hips are nontender    Radiology:   Bilateral knees 3 views done that show tricompartment compartmental osteoarthritic changes done for pain comparison views were reviewed    Assessment:     ICD-10-CM ICD-9-CM   1. Pain in both knees, unspecified chronicity M25.561 719.46    M25.562    2. Bilateral primary osteoarthritis of knee M17.0 715.16        Large Joint Arthrocentesis: R " knee  Date/Time: 8/10/2020 1:05 PM  Consent given by: patient  Site marked: site marked  Timeout: Immediately prior to procedure a time out was called to verify the correct patient, procedure, equipment, support staff and site/side marked as required   Supporting Documentation  Indications: pain   Procedure Details  Location: knee - R knee  Needle size: 25 G  Approach: anteromedial  Medications administered: 80 mg methylPREDNISolone acetate 80 MG/ML; 4 mL lidocaine (cardiac)      Large Joint Arthrocentesis: L knee  Date/Time: 8/10/2020 1:05 PM  Consent given by: patient  Site marked: site marked  Timeout: Immediately prior to procedure a time out was called to verify the correct patient, procedure, equipment, support staff and site/side marked as required   Supporting Documentation  Indications: pain   Procedure Details  Location: knee - L knee  Needle size: 25 G  Approach: anteromedial  Medications administered: 4 mL lidocaine (cardiac); 80 mg methylPREDNISolone acetate 80 MG/ML  Patient tolerance: patient tolerated the procedure well with no immediate complications             Plan: Biomechanics of pertinent body area discussed.  Risks, benefits, alternatives, comparisons, and complications of accepted medicines, injections, recommendations, surgical procedures, and therapies explained and education provided in laymen's terms. Natural history and expected course of this patient's diagnosis discussed along with evaluation of therapies. Questions answered. When appropriate I also discussed proper use of cane, walker, trekking poles.   BMI:  The concept of BMI body mass index and its importance and implications discussed.  BMI suggested to be < 40 or as low as possible. Lifestyle measures for weight loss and how this affects orthopedic condition.  EXERCISES:  Advice on benefits of, and types of regular/moderate exercise including biomechanical forces involved as it pertains to this complaint.  MEDICATIONS:   Prescription, OTC and Monitoring of Medications per orders to address ortho complaints; Evaluation and discussion of safety, precautions, side effects, and warnings given especially of long term NSAID or steroid therapy.    RICE: Rest, ice, compression, and elevation therapy, Cryotherapy/brachy therapy, and or OTC linaments as indicated with instructions.   Cortisone Injection. See procedure note.      8/12/2020    Much of this encounter note is an electronic transcription/translation of spoken language to printed text. The electronic translation of spoken language may permit erroneous, or at times, nonsensical words or phrases to be inadvertently transcribed; Although I have reviewed the note for such errors, some may still exist

## 2020-08-12 ENCOUNTER — HOSPITAL ENCOUNTER (OUTPATIENT)
Dept: PHYSICAL THERAPY | Facility: HOSPITAL | Age: 60
Setting detail: THERAPIES SERIES
Discharge: HOME | End: 2020-08-12

## 2020-08-12 DIAGNOSIS — M54.2 NECK PAIN: Primary | ICD-10-CM

## 2020-08-12 PROCEDURE — 97012 MECHANICAL TRACTION THERAPY: CPT | Performed by: PHYSICAL THERAPIST

## 2020-08-12 PROCEDURE — 97110 THERAPEUTIC EXERCISES: CPT | Performed by: PHYSICAL THERAPIST

## 2020-08-12 RX ORDER — METHYLPREDNISOLONE ACETATE 80 MG/ML
80 INJECTION, SUSPENSION INTRA-ARTICULAR; INTRALESIONAL; INTRAMUSCULAR; SOFT TISSUE
Status: COMPLETED | OUTPATIENT
Start: 2020-08-10 | End: 2020-08-10

## 2020-08-12 NOTE — THERAPY TREATMENT NOTE
Outpatient Physical Therapy Ortho Treatment Note  [facility]     Patient Name: Keri Bhagat  : 1960  MRN: [id number]  Today's Date: 2020      Visit Date: 2020    Visit Dx:    ICD-10-CM ICD-9-CM   1. Neck pain M54.2 723.1       Patient Active Problem List   Diagnosis   • Gastroesophageal reflux disease   • Bipolar I disorder, single manic episode (CMS/[facility])   • Atherosclerosis of coronary artery   • Essential hypertension   • Lightheadedness   • Low back pain   • Sciatica   • Thyroid nodule   • Fatigue   • Hyperlipidemia   • Hypothyroidism (acquired)   • Iron deficiency anemia   • Right knee pain   • Cervical disc disease   • Depression   • ROCKY (obstructive sleep apnea)   • Coronary artery disease involving native coronary artery of native heart without angina pectoris   • Cervical spondylosis with radiculopathy   • Chronic pain of both knees   • Arthritis of both knees   • Weight gain, abnormal   • Allergic rhinitis   • Obesity (BMI 30-39.9)   • Plantar fasciitis   • Peroneal tendon injury   • Stress fracture of calcaneus   • Vitamin D deficiency   • Pain and swelling of knee   • Knee injury   • Calcific Achilles tendonitis   • Rod's deformity   • Urgency incontinence   • Nocturia   • Chronic gastritis without bleeding   • LGSIL on Pap smear of cervix   • Primary osteoarthritis of right knee   • Chronic renal insufficiency, stage III (moderate) (CMS/[facility])   • Left wrist tendinitis   • Cervical radiculopathy   • Tobacco abuse   • Obesity, morbid, BMI 40.0-49.9 (CMS/HCC)   • Encounter for screening for lung cancer   • Urinary frequency   • Diabetes mellitus screening   • MORALES I (cervical intraepithelial neoplasia I)   • Primary osteoarthritis of both knees   • H/O bladder repair surgery   • Chest pain   • Syncope and collapse   • Closed fracture of left distal radius   • Closed displaced fracture of neck of right fifth metacarpal bone   • Fracture follow-up   • Nondisplaced fracture of  base of fifth metacarpal bone, left hand, initial encounter for closed fracture   • Closed fracture of lower end of left radius with routine healing   • Multiple dislocations of fingers, open   • Bilateral chronic knee pain   • Arthritis of left knee   • Arthritis of right knee   • Family history of diabetes mellitus (DM)   • Menopause   • 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   • Memory dysfunction   • Periodic limb movement disorder   • Gait disturbance   • Degenerative disc disease, lumbar   • Osteopenia   • Vaginal atrophy   • Female dyspareunia   • Pituitary cyst (CMS/HCC)   • Meningioma (CMS/[facility])   • Pituitary cyst (CMS/HCC)        Past Medical History:   Diagnosis Date   • Abnormal Pap smear of cervix    • Anemia    • Anxiety    • Arthritis    • Atherosclerotic heart disease of native coronary artery with other forms of angina pectoris (CMS/[facility])    • Bipolar I disorder, single manic episode (CMS/[facility])     depressive d(NOS)   • Cervical disc herniation    • Cervical dysplasia    • Coronary artery disease    • CTS (carpal tunnel syndrome)    • Depression     NO SUICIDAL PLANS   • Difficulty swallowing    • Diverticular disease    • Dyspepsia    • Dysphagia    • Gastric reflux    • Heart attack (CMS/[facility])    • Heart murmur    • Hiatal hernia    • High cholesterol    • History of transfusion    • 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    HPV positive on pap LGSIL   • Hyperlipidemia    • Hypertension    • Hypothyroidism    • Incontinence in female     wears pads   • Kidney disease 2017    stage 2    • Kidney disease, chronic, stage III (GFR 30-59 ml/min) (CMS/[facility])    • LGSIL on Pap smear of cervix 04/29/2016    LGSIL HPV positive   • Myocardial infarction (CMS/[facility]) 2000   • Neck pain    • Obesity    • Past myocardial infarction 05/2000   • Periodic limb movement disorder    • Restless leg syndrome    • Sleep apnea     cpap   • Stress fracture     LEFT HEEL   • Suicidal  ideation 08/19/2016    history   • Swelling of ankle     right had doppler no s/s blood clot   • Thyroid nodule    • Vitamin B12 deficiency         Past Surgical History:   Procedure Laterality Date   • ANTERIOR CERVICAL DISCECTOMY W/ FUSION N/A 12/29/2016    Procedure: C3-4 anterior cervical discectomy and fusion with Depuy micro plate, ALLOGRAFT C3-4, AND HARDWARE REMOVAL C4-7.;  Surgeon: Hema Godwin MD;  Location: [facility] MAIN OR;  Service:    • CARDIAC CATHETERIZATION N/A 3/30/2017    Procedure: Left Heart Cath;  Surgeon: Tarcey Vargas MD;  Location:  [facility] CATH INVASIVE LOCATION;  Service:    • CARDIAC CATHETERIZATION N/A 3/30/2017    Procedure: Coronary angiography;  Surgeon: Tracey Vargas MD;  Location:  [facility] CATH INVASIVE LOCATION;  Service:    • CARDIAC CATHETERIZATION N/A 3/30/2017    Procedure: Left ventriculography;  Surgeon: Tracey Vargas MD;  [facility];  Service:    • CARDIAC CATHETERIZATION  3/30/2017    Procedure: Functional Flow [place];  Surgeon: Tracey Vargas MD;  Location:  [facility] CATH INVASIVE LOCATION;  Service:    • CARPAL TUNNEL RELEASE     • CERVICAL BIOPSY  MMXVI    Dr. Jeffery.    • CERVICAL DISCECTOMY ANTERIOR  04/2013    C4-7   • COLONOSCOPY  04/20/2015    Diverticulosis, IH   • COLPOSCOPY W/ BIOPSY / CURETTAGE  06/17/2016    LGSIL HPV positive. Results were normal repeat pap in one year. Chronic Cervicitis   • CORONARY ANGIOPLASTY WITH STENT PLACEMENT     • ENDOSCOPY  MMXV    Normal.  Dr. Rodriguez   • ENDOSCOPY N/A 6/22/2017    Erythematous mucosa in the stomach  PATH: Chronic active gastritis, moderate with intestinal metaplasia    • GASTRIC BYPASS      Done using the sleeve technique   • HARDWARE REMOVAL  12/29/2016    cervical   • LAPAROSCOPIC GASTRIC BANDING  02/2018   • SHOULDER SURGERY Left     RCR   • TONSILLECTOMY     • TONSILLECTOMY AND ADENOIDECTOMY     • TRANSVAGINAL TAPING SUSPENSION N/A 12/6/2017    Procedure: MID URETHRAL SLING  CYSTSCOPY;  Surgeon: Abby Méndez MD;  Location: [facility] OR;  Service:    • TUBAL ABDOMINAL LIGATION     • TYMPANOSTOMY TUBE PLACEMENT Right    • WRIST SURGERY Bilateral     carpal tunnel                       PT Assessment/Plan     Row Name 08/12/20 1506          PT Assessment    Assessment Comments  Increased workload this visit. Decreased pain reported leaving clinic.   -GR        PT Plan    PT Plan Comments  Continue therex and traction. Consider DDN for trigger points.  -GR       User Key  (r) = Recorded By, (t) = Taken By, (c) = Cosigned By    Initials Name Provider Type    Hossein Renteria, PT Physical Therapist          Modalities     Row Name 08/12/20 1300             Moist Heat    MH Applied  Yes  -GR      Location  upper  back during traction  -GR      Rx Minutes  Other: 18  -GR         Traction 46358    Traction Type  Cervical  -GR      Rx Minutes  18  -GR      Duration  Intermittent  -GR      Position  Hook-lying  -GR      Weight  16 /8  -GR      Hold  45  -GR      Relax  15  -GR        User Key  (r) = Recorded By, (t) = Taken By, (c) = Cosigned By    Initials Name Provider Type    Hossein Renteria, PT Physical Therapist        OP Exercises     Row Name 08/12/20 1300             Subjective Comments    Subjective Comments  L side maybe mildly better but overall pain is still high.  -GR         Subjective Pain    Able to rate subjective pain?  yes  -GR      Pre-Treatment Pain Level  7  -GR         Total Minutes    10277 - PT Therapeutic Exercise Minutes  20  -GR         Exercise 5    Exercise Name 5  UBE  -GR      Time 5  4 min  -GR      Additional Comments  L1 2' fwd/2' retro  -GR         Exercise 6    Exercise Name 6  Pulleys - flexion with cervical flexion/extension following hand  -GR      Cueing 6  Demo  -GR      Sets 6  2  -GR      Reps 6  10  -GR         Exercise 7    Exercise Name 7  PNF CHOP seated  -GR      Cueing 7  Demo  -GR      Sets 7  2  -GR      Reps 7  10  -GR       Additional Comments  2Lb med ball  -GR         Exercise 10    Exercise Name 10  rows  -GR      Cueing 10  Verbal;Tactile;Demo  -GR      Sets 10  1  -GR      Reps 10  15  -GR      Additional Comments  GTB  -GR         Exercise 12    Exercise Name 12  B shoulder ext  -GR      Cueing 12  Demo  -GR      Reps 12  2x10  -GR      Additional Comments  RTB  -GR         Exercise 13    Exercise Name 13  Doorway stretch low and T  -GR      Cueing 13  Demo  -GR      Sets 13  1  -GR      Reps 13  3  -GR      Time 13  20 seconds  -GR        User Key  (r) = Recorded By, (t) = Taken By, (c) = Cosigned By    Initials Name Provider Type    Hossein Renteria, PT Physical Therapist                           Therapy Education  Education Details: Access Code: [email or web address]              Time Calculation:   Start Time: 1330  Stop Time: 1415  Time Calculation (min): 45 min  Total Timed Code Minutes- PT: 20 minute(s)  Therapy Charges for Today     Code Description Service Date Service Provider Modifiers Qty    75206538391 HC PT THER PROC EA 15 MIN 8/12/2020 Hossein Jimenes, PT GP 1    93977018226 HC PT-TRACTION MECHANICAL 8/12/2020 Hossein Jimenes, PT  1    98584179897 HC PT HOT OR COLD PACK TREAT MCARE 8/12/2020 Hossein Jimenes, PT GP 1                    Hossein Jimenes, RICHY  8/12/2020

## 2020-08-14 NOTE — PROGRESS NOTES
Subjective   Patient ID: Keri Bhagat is a 60 y.o. female is here today for follow-up of parafalcine lesion/pars intermedia cyst.  MRI pituitary on 3/7/20.  Last seen on 9/23/19 and pt reported headaches, gait issues as well as memory dysfunction.  Today pt reports that her symptoms from last visit have not resolved.  She also is having light-headedness, decreased concentration, and dizziness in addition to the HA, gait issues, and memory issue.    History of Present Illness  She is doing fairly well with no new complaints but the complaints of imbalance and memory loss persists. We reviewed the neuropsych recommendations from her evaluation after she was last seen in this office.  She says she had never gotten those results.    The following portions of the patient's history were reviewed and updated as appropriate: allergies, current medications, past family history, past medical history, past social history, past surgical history and problem list.    Review of Systems   HENT: Negative for rhinorrhea.    Eyes: Negative for pain and visual disturbance.   Musculoskeletal: Positive for gait problem.   Neurological: Positive for dizziness (occasionally), light-headedness and headaches. Negative for tremors, seizures, syncope, speech difficulty and numbness.   Psychiatric/Behavioral: Positive for decreased concentration. Negative for confusion.        Memory issues   All other systems reviewed and are negative.      Objective   Physical Exam   Constitutional: She is oriented to person, place, and time.   Eyes: Pupils are equal, round, and reactive to light. EOM are normal.   Neurological: She is oriented to person, place, and time. Gait normal.   Psychiatric: Her speech is normal.     Neurologic Exam     Mental Status   Oriented to person, place, and time.   Speech: speech is normal   Level of consciousness: alert    Cranial Nerves     CN II   Visual fields full to confrontation.     CN III, IV, VI   Pupils are  equal, round, and reactive to light.  Extraocular motions are normal.   Nystagmus: none     CN V   Facial sensation intact.     CN VII   Facial expression full, symmetric.     CN VIII   Hearing: intact    CN IX, X   CN IX normal.   CN X normal.     Motor Exam   Right arm pronator drift: absent  Left arm pronator drift: absent    Sensory Exam   Light touch normal.     Gait, Coordination, and Reflexes     Gait  Gait: normal      Assessment/Plan   Independent Review of Radiographic Studies:    MRI of the brain at [facility] 3/9/2020 was reviewed and reveals stable appearance of left parafalcine lesion measuring 10 x 8 x 10 mm.  The low T1 and high T2 signal right CP angle cyst measuring 18 x 13 mm appears stable from 3/12/2019 as well.  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      Medical Decision Making:    Reviewed the MRI results with Dr. Bhakta as well as the patient.  At this point we will extend follow-up to 1 year with a new MRI of the brain.  He was encouraged to maintain a relationship with a psychologist.  The neuropsychologist recommendation was to keep her depression and anxiety under control.    Keri was seen today for parafalcine lesion/pars intermedia cyst.    Diagnoses and all orders for this visit:    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  -     Cancel: MRI Brain With & Without Contrast; Future  -     MRI Brain With & Without Contrast; Future    Pituitary cyst (CMS/HCC)  -     Cancel: MRI Brain With & Without Contrast; Future  -     MRI Brain With & Without Contrast; Future      Return in about 1 year (around 8/19/2021) for After radiographic tests.

## 2020-08-18 ENCOUNTER — HOSPITAL ENCOUNTER (OUTPATIENT)
Dept: PHYSICAL THERAPY | Facility: HOSPITAL | Age: 60
Setting detail: THERAPIES SERIES
Discharge: HOME | End: 2020-08-18

## 2020-08-18 DIAGNOSIS — M54.2 NECK PAIN: Primary | ICD-10-CM

## 2020-08-18 PROCEDURE — 97110 THERAPEUTIC EXERCISES: CPT | Performed by: PHYSICAL THERAPIST

## 2020-08-18 PROCEDURE — 97012 MECHANICAL TRACTION THERAPY: CPT | Performed by: PHYSICAL THERAPIST

## 2020-08-18 NOTE — THERAPY TREATMENT NOTE
Outpatient Physical Therapy Ortho Treatment Note  [facility]     Patient Name: Keri Bahgat  : 1960  MRN: [id number]  Today's Date: 2020      Visit Date: 2020    Visit Dx:    ICD-10-CM ICD-9-CM   1. Neck pain M54.2 723.1       Patient Active Problem List   Diagnosis   • Gastroesophageal reflux disease   • Bipolar I disorder, single manic episode (CMS/[facility])   • Atherosclerosis of coronary artery   • Essential hypertension   • Lightheadedness   • Low back pain   • Sciatica   • Thyroid nodule   • Fatigue   • Hyperlipidemia   • Hypothyroidism (acquired)   • Iron deficiency anemia   • Right knee pain   • Cervical disc disease   • Depression   • ROCKY (obstructive sleep apnea)   • Coronary artery disease involving native coronary artery of native heart without angina pectoris   • Cervical spondylosis with radiculopathy   • Chronic pain of both knees   • Arthritis of both knees   • Weight gain, abnormal   • Allergic rhinitis   • Obesity (BMI 30-39.9)   • Plantar fasciitis   • Peroneal tendon injury   • Stress fracture of calcaneus   • Vitamin D deficiency   • Pain and swelling of knee   • Knee injury   • Calcific Achilles tendonitis   • Rod's deformity   • Urgency incontinence   • Nocturia   • Chronic gastritis without bleeding   • LGSIL on Pap smear of cervix   • Primary osteoarthritis of right knee   • Chronic renal insufficiency, stage III (moderate) (CMS/[facility])   • Left wrist tendinitis   • Cervical radiculopathy   • Tobacco abuse   • Obesity, morbid, BMI 40.0-49.9 (CMS/HCC)   • Encounter for screening for lung cancer   • Urinary frequency   • Diabetes mellitus screening   • MORALES I (cervical intraepithelial neoplasia I)   • Primary osteoarthritis of both knees   • H/O bladder repair surgery   • Chest pain   • Syncope and collapse   • Closed fracture of left distal radius   • Closed displaced fracture of neck of right fifth metacarpal bone   • Fracture follow-up   • Nondisplaced fracture of  base of fifth metacarpal bone, left hand, initial encounter for closed fracture   • Closed fracture of lower end of left radius with routine healing   • Multiple dislocations of fingers, open   • Bilateral chronic knee pain   • Arthritis of left knee   • Arthritis of right knee   • Family history of diabetes mellitus (DM)   • Menopause   • 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   • Memory dysfunction   • Periodic limb movement disorder   • Gait disturbance   • Degenerative disc disease, lumbar   • Osteopenia   • Vaginal atrophy   • Female dyspareunia   • Pituitary cyst (CMS/HCC)   • Meningioma (CMS/[facility])   • Pituitary cyst (CMS/HCC)        Past Medical History:   Diagnosis Date   • Abnormal Pap smear of cervix    • Anemia    • Anxiety    • Arthritis    • Atherosclerotic heart disease of native coronary artery with other forms of angina pectoris (CMS/[facility])    • Bipolar I disorder, single manic episode (CMS/[facility])     depressive d(NOS)   • Cervical disc herniation    • Cervical dysplasia    • Coronary artery disease    • CTS (carpal tunnel syndrome)    • Depression     NO SUICIDAL PLANS   • Difficulty swallowing    • Diverticular disease    • Dyspepsia    • Dysphagia    • Gastric reflux    • Heart attack (CMS/[facility])    • Heart murmur    • Hiatal hernia    • High cholesterol    • History of transfusion    • 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    HPV positive on pap LGSIL   • Hyperlipidemia    • Hypertension    • Hypothyroidism    • Incontinence in female     wears pads   • Kidney disease 2017    stage 2    • Kidney disease, chronic, stage III (GFR 30-59 ml/min) (CMS/[facility])    • LGSIL on Pap smear of cervix 04/29/2016    LGSIL HPV positive   • Myocardial infarction (CMS/[facility]) 2000   • Neck pain    • Obesity    • Past myocardial infarction 05/2000   • Periodic limb movement disorder    • Restless leg syndrome    • Sleep apnea     cpap   • Stress fracture     LEFT HEEL   • Suicidal  ideation 08/19/2016    history   • Swelling of ankle     right had doppler no s/s blood clot   • Thyroid nodule    • Vitamin B12 deficiency         Past Surgical History:   Procedure Laterality Date   • ANTERIOR CERVICAL DISCECTOMY W/ FUSION N/A 12/29/2016    Procedure: C3-4 anterior cervical discectomy and fusion with Depuy micro plate, ALLOGRAFT C3-4, AND HARDWARE REMOVAL C4-7.;  Surgeon: Hema Godwin MD;  Location: [facility] MAIN OR;  Service:    • CARDIAC CATHETERIZATION N/A 3/30/2017    Procedure: Left Heart Cath;  Surgeon: Tracey Vargas MD;  Location:  [facility] CATH INVASIVE LOCATION;  Service:    • CARDIAC CATHETERIZATION N/A 3/30/2017    Procedure: Coronary angiography;  Surgeon: Tracey Vargas MD;  Location:  [facility] CATH INVASIVE LOCATION;  Service:    • CARDIAC CATHETERIZATION N/A 3/30/2017    Procedure: Left ventriculography;  Surgeon: Tracey Vargas MD;  [facility];  Service:    • CARDIAC CATHETERIZATION  3/30/2017    Procedure: Functional Flow [place];  Surgeon: Tracey Vargas MD;  Location:  [facility] CATH INVASIVE LOCATION;  Service:    • CARPAL TUNNEL RELEASE     • CERVICAL BIOPSY  MMXVI    Dr. Jeffery.    • CERVICAL DISCECTOMY ANTERIOR  04/2013    C4-7   • COLONOSCOPY  04/20/2015    Diverticulosis, IH   • COLPOSCOPY W/ BIOPSY / CURETTAGE  06/17/2016    LGSIL HPV positive. Results were normal repeat pap in one year. Chronic Cervicitis   • CORONARY ANGIOPLASTY WITH STENT PLACEMENT     • ENDOSCOPY  MMXV    Normal.  Dr. Rodriguez   • ENDOSCOPY N/A 6/22/2017    Erythematous mucosa in the stomach  PATH: Chronic active gastritis, moderate with intestinal metaplasia    • GASTRIC BYPASS      Done using the sleeve technique   • HARDWARE REMOVAL  12/29/2016    cervical   • LAPAROSCOPIC GASTRIC BANDING  02/2018   • SHOULDER SURGERY Left     RCR   • TONSILLECTOMY     • TONSILLECTOMY AND ADENOIDECTOMY     • TRANSVAGINAL TAPING SUSPENSION N/A 12/6/2017    Procedure: MID URETHRAL SLING  CYSTSCOPY;  Surgeon: Abby Méndez MD;  Location: [facility] OR;  Service:    • TUBAL ABDOMINAL LIGATION     • TYMPANOSTOMY TUBE PLACEMENT Right    • WRIST SURGERY Bilateral     carpal tunnel                       PT Assessment/Plan     Row Name 08/18/20 1421          PT Assessment    Assessment Comments  Pain is better today, continues to require cueing for correct return of SNAGs.  -GR        PT Plan    PT Plan Comments  Continue POC.  -GR       User Key  (r) = Recorded By, (t) = Taken By, (c) = Cosigned By    Initials Name Provider Type    Hossein Renteria, PT Physical Therapist          Modalities     Row Name 08/18/20 1300             Moist Heat    MH Applied  Yes  -GR      Location  upper  back during traction  -GR      Rx Minutes  15 mins  -GR         Traction 72457    Traction Type  Cervical  -GR      Rx Minutes  15  -GR      Duration  Intermittent  -GR      Position  Hook-lying  -GR      Weight  16 /8  -GR      Hold  45  -GR      Relax  15  -GR        User Key  (r) = Recorded By, (t) = Taken By, (c) = Cosigned By    Initials Name Provider Type    Hossein Renteria, PT Physical Therapist        OP Exercises     Row Name 08/18/20 1300             Subjective Comments    Subjective Comments  Pain is much better this week.  -GR         Subjective Pain    Able to rate subjective pain?  yes  -GR      Pre-Treatment Pain Level  3  -GR         Total Minutes    07153 - PT Therapeutic Exercise Minutes  24  -GR         Exercise 5    Exercise Name 5  UBE  -GR      Time 5  4 min  -GR      Additional Comments  L2 2' fwd/2' retro  -GR         Exercise 6    Exercise Name 6  Pulleys - flexion with cervical flexion/extension following hand  -GR      Cueing 6  Demo  -GR      Sets 6  2  -GR      Reps 6  10  -GR         Exercise 7    Exercise Name 7  PNF CHOP seated  -GR      Cueing 7  Demo  -GR      Sets 7  2  -GR      Reps 7  10  -GR      Additional Comments  2Lb med ball  -GR         Exercise 10    Exercise  Name 10  rows  -GR      Cueing 10  Verbal;Tactile;Demo  -GR      Sets 10  1  -GR      Reps 10  15  -GR      Additional Comments  GTB  -GR         Exercise 12    Exercise Name 12  B shoulder ext  -GR      Cueing 12  Demo  -GR      Reps 12  1x15  -GR      Additional Comments  GTB  -GR         Exercise 13    Exercise Name 13  Doorway stretch low and T  -GR      Cueing 13  Demo  -GR      Sets 13  1  -GR      Reps 13  3  -GR      Time 13  20 seconds  -GR        User Key  (r) = Recorded By, (t) = Taken By, (c) = Cosigned By    Initials Name Provider Type    GR Hossein Jimenes, PT Physical Therapist                                          Time Calculation:   Start Time: 1330  Stop Time: 1415  Time Calculation (min): 45 min  Total Timed Code Minutes- PT: 24 minute(s)  Therapy Charges for Today     Code Description Service Date Service Provider Modifiers Qty    10789072127  PT THER PROC EA 15 MIN 8/18/2020 Hossein Jimenes, PT GP 2    [id number] HC PT-TRACTION MECHANICAL 8/18/2020 Hossein Jimenes, PT  1    [id number]  PT HOT OR COLD PACK TREAT MCARE 8/18/2020 Hossein Jimenes, PT GP 1                    Hossein Jimenes, PT  8/18/2020

## 2020-08-19 ENCOUNTER — OFFICE VISIT (OUTPATIENT)
Dept: NEUROSURGERY | Facility: CLINIC | Age: 60
End: 2020-08-19

## 2020-08-19 VITALS
WEIGHT: 208 LBS | BODY MASS INDEX: 35.51 KG/M2 | TEMPERATURE: 97.5 F | SYSTOLIC BLOOD PRESSURE: 130 MMHG | RESPIRATION RATE: 16 BRPM | DIASTOLIC BLOOD PRESSURE: 86 MMHG | HEIGHT: 64 IN | HEART RATE: 64 BPM

## 2020-08-19 DIAGNOSIS — E23.6 PITUITARY CYST (HCC): ICD-10-CM

## 2020-08-19 DIAGNOSIS — R90.89 ABNORMAL BRAIN MRI: Primary | ICD-10-CM

## 2020-08-19 PROCEDURE — 99213 OFFICE O/P EST LOW 20 MIN: CPT | Performed by: NURSE PRACTITIONER

## 2020-08-20 DIAGNOSIS — I10 ESSENTIAL HYPERTENSION: ICD-10-CM

## 2020-08-20 DIAGNOSIS — M54.12 CERVICAL RADICULOPATHY: ICD-10-CM

## 2020-08-20 DIAGNOSIS — E04.1 THYROID NODULE: ICD-10-CM

## 2020-08-20 DIAGNOSIS — E03.9 HYPOTHYROIDISM (ACQUIRED): Primary | ICD-10-CM

## 2020-08-20 DIAGNOSIS — E23.6 PITUITARY CYST (HCC): ICD-10-CM

## 2020-08-20 DIAGNOSIS — E55.9 VITAMIN D DEFICIENCY: ICD-10-CM

## 2020-08-20 DIAGNOSIS — E78.2 MIXED HYPERLIPIDEMIA: ICD-10-CM

## 2020-08-21 ENCOUNTER — HOSPITAL ENCOUNTER (OUTPATIENT)
Dept: PHYSICAL THERAPY | Facility: HOSPITAL | Age: 60
Setting detail: THERAPIES SERIES
Discharge: HOME | End: 2020-08-21

## 2020-08-21 DIAGNOSIS — M54.2 NECK PAIN: Primary | ICD-10-CM

## 2020-08-21 PROCEDURE — 97110 THERAPEUTIC EXERCISES: CPT | Performed by: PHYSICAL THERAPIST

## 2020-08-21 PROCEDURE — 97012 MECHANICAL TRACTION THERAPY: CPT | Performed by: PHYSICAL THERAPIST

## 2020-08-21 NOTE — THERAPY TREATMENT NOTE
Outpatient Physical Therapy Ortho Treatment Note  [facility]     Patient Name: Keri Bhagat  : 1960  MRN: [id number]  Today's Date: 2020      Visit Date: 2020    Visit Dx:    ICD-10-CM ICD-9-CM   1. Neck pain M54.2 723.1       Patient Active Problem List   Diagnosis   • Gastroesophageal reflux disease   • Bipolar I disorder, single manic episode (CMS/[facility])   • Atherosclerosis of coronary artery   • Essential hypertension   • Lightheadedness   • Low back pain   • Sciatica   • Thyroid nodule   • Fatigue   • Hyperlipidemia   • Hypothyroidism (acquired)   • Iron deficiency anemia   • Right knee pain   • Cervical disc disease   • Depression   • ROCKY (obstructive sleep apnea)   • Coronary artery disease involving native coronary artery of native heart without angina pectoris   • Cervical spondylosis with radiculopathy   • Chronic pain of both knees   • Arthritis of both knees   • Weight gain, abnormal   • Allergic rhinitis   • Obesity (BMI 30-39.9)   • Plantar fasciitis   • Peroneal tendon injury   • Stress fracture of calcaneus   • Vitamin D deficiency   • Pain and swelling of knee   • Knee injury   • Calcific Achilles tendonitis   • Rod's deformity   • Urgency incontinence   • Nocturia   • Chronic gastritis without bleeding   • LGSIL on Pap smear of cervix   • Primary osteoarthritis of right knee   • Chronic renal insufficiency, stage III (moderate) (CMS/[facility])   • Left wrist tendinitis   • Cervical radiculopathy   • Tobacco abuse   • Obesity, morbid, BMI 40.0-49.9 (CMS/HCC)   • Encounter for screening for lung cancer   • Urinary frequency   • Diabetes mellitus screening   • MORALES I (cervical intraepithelial neoplasia I)   • Primary osteoarthritis of both knees   • H/O bladder repair surgery   • Chest pain   • Syncope and collapse   • Closed fracture of left distal radius   • Closed displaced fracture of neck of right fifth metacarpal bone   • Fracture follow-up   • Nondisplaced fracture of  base of fifth metacarpal bone, left hand, initial encounter for closed fracture   • Closed fracture of lower end of left radius with routine healing   • Multiple dislocations of fingers, open   • Bilateral chronic knee pain   • Arthritis of left knee   • Arthritis of right knee   • Family history of diabetes mellitus (DM)   • Menopause   • 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   • Memory dysfunction   • Periodic limb movement disorder   • Gait disturbance   • Degenerative disc disease, lumbar   • Osteopenia   • Vaginal atrophy   • Female dyspareunia   • Pituitary cyst (CMS/HCC)   • Meningioma (CMS/[facility])   • Pituitary cyst (CMS/HCC)        Past Medical History:   Diagnosis Date   • Abnormal Pap smear of cervix    • Anemia    • Anxiety    • Arthritis    • Atherosclerotic heart disease of native coronary artery with other forms of angina pectoris (CMS/[facility])    • Bipolar I disorder, single manic episode (CMS/[facility])     depressive d(NOS)   • Cervical disc herniation    • Cervical dysplasia    • Coronary artery disease    • CTS (carpal tunnel syndrome)    • Depression     NO SUICIDAL PLANS   • Difficulty swallowing    • Diverticular disease    • Dyspepsia    • Dysphagia    • Gastric reflux    • Heart attack (CMS/[facility])    • Heart murmur    • Hiatal hernia    • High cholesterol    • History of transfusion    • 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    HPV positive on pap LGSIL   • Hyperlipidemia    • Hypertension    • Hypothyroidism    • Incontinence in female     wears pads   • Kidney disease 2017    stage 2    • Kidney disease, chronic, stage III (GFR 30-59 ml/min) (CMS/[facility])    • LGSIL on Pap smear of cervix 04/29/2016    LGSIL HPV positive   • Myocardial infarction (CMS/[facility]) 2000   • Neck pain    • Obesity    • Past myocardial infarction 05/2000   • Periodic limb movement disorder    • Restless leg syndrome    • Sleep apnea     cpap   • Stress fracture     LEFT HEEL   • Suicidal  ideation 08/19/2016    history   • Swelling of ankle     right had doppler no s/s blood clot   • Thyroid nodule    • Vitamin B12 deficiency         Past Surgical History:   Procedure Laterality Date   • ANTERIOR CERVICAL DISCECTOMY W/ FUSION N/A 12/29/2016    Procedure: C3-4 anterior cervical discectomy and fusion with Depuy micro plate, ALLOGRAFT C3-4, AND HARDWARE REMOVAL C4-7.;  Surgeon: Hema Godwin MD;  Location: [facility] MAIN OR;  Service:    • CARDIAC CATHETERIZATION N/A 3/30/2017    Procedure: Left Heart Cath;  Surgeon: Tracey Vargas MD;  Location:  [facility] CATH INVASIVE LOCATION;  Service:    • CARDIAC CATHETERIZATION N/A 3/30/2017    Procedure: Coronary angiography;  Surgeon: Tracey Vargas MD;  Location:  [facility] CATH INVASIVE LOCATION;  Service:    • CARDIAC CATHETERIZATION N/A 3/30/2017    Procedure: Left ventriculography;  Surgeon: Tracey Vargas MD;  [facility];  Service:    • CARDIAC CATHETERIZATION  3/30/2017    Procedure: Functional Flow [place];  Surgeon: Tracey Vargas MD;  Location:  [facility] CATH INVASIVE LOCATION;  Service:    • CARPAL TUNNEL RELEASE     • CERVICAL BIOPSY  MMXVI    Dr. Jeffery.    • CERVICAL DISCECTOMY ANTERIOR  04/2013    C4-7   • COLONOSCOPY  04/20/2015    Diverticulosis, IH   • COLPOSCOPY W/ BIOPSY / CURETTAGE  06/17/2016    LGSIL HPV positive. Results were normal repeat pap in one year. Chronic Cervicitis   • CORONARY ANGIOPLASTY WITH STENT PLACEMENT     • ENDOSCOPY  MMXV    Normal.  Dr. Rodriguez   • ENDOSCOPY N/A 6/22/2017    Erythematous mucosa in the stomach  PATH: Chronic active gastritis, moderate with intestinal metaplasia    • GASTRIC BYPASS      Done using the sleeve technique   • HARDWARE REMOVAL  12/29/2016    cervical   • LAPAROSCOPIC GASTRIC BANDING  02/2018   • SHOULDER SURGERY Left     RCR   • TONSILLECTOMY     • TONSILLECTOMY AND ADENOIDECTOMY     • TRANSVAGINAL TAPING SUSPENSION N/A 12/6/2017    Procedure: MID URETHRAL SLING  CYSTSCOPY;  Surgeon: Abby Méndez MD;  Location: [facility];  Service:    • TUBAL ABDOMINAL LIGATION     • TYMPANOSTOMY TUBE PLACEMENT Right    • WRIST SURGERY Bilateral     carpal tunnel   • WRIST SURGERY      L wrist/ 4/2020                       PT Assessment/Plan     Row Name 08/21/20 1203          PT Assessment    Assessment Comments  Mr. Bhagat's pain overall is imroved. She has made mild improvements in cervical ROM and has progressed with a home program. She has persistent L shoulder stuffness which does not resolve with cervical intervention. She wishes to trial dry needling next visit for some scapular trigger points and will likely d/c to home program at that time pending her progress.  -GR        PT Plan    PT Plan Comments  Trial dry needling and likely dc otherwise.  -GR       User Key  (r) = Recorded By, (t) = Taken By, (c) = Cosigned By    Initials Name Provider Type    Hossein Renteria, PT Physical Therapist          Modalities     Row Name 08/21/20 1100             Traction 85081    Traction Type  Cervical  -GR      Rx Minutes  15  -GR      Duration  Intermittent  -GR      Position  Hook-lying  -GR      Weight  16 /8  -GR      Hold  45  -GR      Relax  15  -GR        User Key  (r) = Recorded By, (t) = Taken By, (c) = Cosigned By    Initials Name Provider Type    Hossein Renteria, PT Physical Therapist        OP Exercises     Row Name 08/21/20 1100             Subjective Comments    Subjective Comments  Very little pain; L shoulder feels stiff frequently.  -GR         Subjective Pain    Able to rate subjective pain?  yes  -GR      Pre-Treatment Pain Level  1  -GR         Total Minutes    25361 - PT Therapeutic Exercise Minutes  18  -GR         Exercise 5    Exercise Name 5  UBE  -GR      Time 5  4 min  -GR      Additional Comments  L2 2' fwd/2' retro  -GR         Exercise 7    Exercise Name 7  PNF CHOP seated  -GR      Cueing 7  Demo  -GR      Sets 7  2  -GR      Reps 7   10  -GR      Additional Comments  GTB  -GR         Exercise 10    Exercise Name 10  rows  -GR      Cueing 10  Verbal;Tactile;Demo  -GR      Sets 10  2  -GR      Reps 10  10  -GR      Additional Comments  GTB  -GR         Exercise 12    Exercise Name 12  B shoulder ext  -GR      Cueing 12  Demo  -GR      Reps 12  2x10  -GR      Additional Comments  GTB  -GR         Exercise 13    Exercise Name 13  Doorway stretch low and T  -GR      Cueing 13  Demo  -GR      Sets 13  1  -GR      Reps 13  3  -GR      Time 13  20 seconds  -GR        User Key  (r) = Recorded By, (t) = Taken By, (c) = Cosigned By    Initials Name Provider Type    GR Hossein Jimenes, PT Physical Therapist                                          Time Calculation:   Start Time: 1130  Stop Time: 1204  Time Calculation (min): 34 min  Total Timed Code Minutes- PT: 18 minute(s)  Therapy Charges for Today     Code Description Service Date Service Provider Modifiers Qty    99571835969  PT THER PROC EA 15 MIN 8/21/2020 Hossein Jimenes, PT GP 1    [id number]  PT-TRACTION MECHANICAL 8/21/2020 Hossein Jimenes, PT  1                    Hossein CHOW. Yudy, PT  8/21/2020

## 2020-08-25 ENCOUNTER — RESULTS ENCOUNTER (OUTPATIENT)
Dept: ENDOCRINOLOGY | Age: 60
End: 2020-08-25

## 2020-08-25 DIAGNOSIS — E04.1 THYROID NODULE: ICD-10-CM

## 2020-08-25 DIAGNOSIS — E55.9 VITAMIN D DEFICIENCY: ICD-10-CM

## 2020-08-25 DIAGNOSIS — I10 ESSENTIAL HYPERTENSION: ICD-10-CM

## 2020-08-25 DIAGNOSIS — E78.2 MIXED HYPERLIPIDEMIA: ICD-10-CM

## 2020-08-25 NOTE — TELEPHONE ENCOUNTER
Caller: Olayinka Keri L    Relationship: Self    Best call back number: [phone]     Medication needed:   Requested Prescriptions     Pending Prescriptions Disp Refills   • gabapentin (NEURONTIN) 300 MG capsule [Pharmacy Med Name: GABAPENTIN 300 MG CAPSULE] 90 capsule 1     Sig: TAKE ONE CAPSULE BY MOUTH THREE TIMES A DAY       When do you need the refill by: 8-25-20     What details did the patient provide when requesting the medication: PATIENT STATED SHE CALLED THE PHARMACY 5 DAYS AGO BEFORE SHE RAN OUT OF HER MEDICATION AND NOW SHE HAS BEEN TOTALLY OUT FOR TWO DAYS.     Does the patient have less than a 3 day supply:  [x] Yes  [] No    What is the patient's preferred pharmacy: [facility] [address] AT [facility] [phone] [facility] [phone] FX

## 2020-08-26 RX ORDER — GABAPENTIN 300 MG/1
CAPSULE ORAL
Qty: 90 CAPSULE | Refills: 0 | Status: SHIPPED | OUTPATIENT
Start: 2020-08-26 | End: 2020-10-09

## 2020-08-28 ENCOUNTER — LAB (OUTPATIENT)
Dept: ENDOCRINOLOGY | Age: 60
End: 2020-08-28

## 2020-08-28 DIAGNOSIS — R90.89 ABNORMAL BRAIN MRI: ICD-10-CM

## 2020-08-28 DIAGNOSIS — E04.1 THYROID NODULE: ICD-10-CM

## 2020-08-28 DIAGNOSIS — E23.6 PITUITARY CYST (HCC): ICD-10-CM

## 2020-08-29 LAB
FSH SERPL-ACNC: 90 MIU/ML
LH SERPL-ACNC: 48.9 MIU/ML
PROLACTIN SERPL-MCNC: 9.6 NG/ML (ref 4.8–23.3)
TSH SERPL DL<=0.005 MIU/L-ACNC: 1.27 UIU/ML (ref 0.27–4.2)

## 2020-09-04 ENCOUNTER — OFFICE VISIT (OUTPATIENT)
Dept: ENDOCRINOLOGY | Age: 60
End: 2020-09-04

## 2020-09-04 VITALS
HEART RATE: 60 BPM | BODY MASS INDEX: 36.09 KG/M2 | DIASTOLIC BLOOD PRESSURE: 72 MMHG | HEIGHT: 64 IN | SYSTOLIC BLOOD PRESSURE: 122 MMHG | OXYGEN SATURATION: 98 % | WEIGHT: 211.4 LBS

## 2020-09-04 DIAGNOSIS — K21.9 GASTROESOPHAGEAL REFLUX DISEASE WITHOUT ESOPHAGITIS: ICD-10-CM

## 2020-09-04 DIAGNOSIS — E78.2 MIXED HYPERLIPIDEMIA: ICD-10-CM

## 2020-09-04 DIAGNOSIS — G47.33 OSA (OBSTRUCTIVE SLEEP APNEA): ICD-10-CM

## 2020-09-04 DIAGNOSIS — E03.9 HYPOTHYROIDISM (ACQUIRED): Primary | ICD-10-CM

## 2020-09-04 DIAGNOSIS — E55.9 VITAMIN D DEFICIENCY: ICD-10-CM

## 2020-09-04 DIAGNOSIS — Z83.3 FAMILY HISTORY OF DIABETES MELLITUS (DM): ICD-10-CM

## 2020-09-04 DIAGNOSIS — I10 ESSENTIAL HYPERTENSION: ICD-10-CM

## 2020-09-04 DIAGNOSIS — I25.10 CORONARY ARTERY DISEASE INVOLVING NATIVE CORONARY ARTERY OF NATIVE HEART WITHOUT ANGINA PECTORIS: ICD-10-CM

## 2020-09-04 PROCEDURE — 99214 OFFICE O/P EST MOD 30 MIN: CPT | Performed by: INTERNAL MEDICINE

## 2020-09-05 LAB
25(OH)D3+25(OH)D2 SERPL-MCNC: 50.4 NG/ML (ref 30–100)
ALBUMIN SERPL-MCNC: 4.8 G/DL (ref 3.5–5.2)
ALBUMIN/GLOB SERPL: 2.4 G/DL
ALP SERPL-CCNC: 94 U/L (ref 39–117)
ALT SERPL-CCNC: 21 U/L (ref 1–33)
AST SERPL-CCNC: 20 U/L (ref 1–32)
BILIRUB SERPL-MCNC: 0.2 MG/DL (ref 0–1.2)
BUN SERPL-MCNC: 10 MG/DL (ref 6–20)
BUN/CREAT SERPL: 9.6 (ref 7–25)
CALCIUM SERPL-MCNC: 9.2 MG/DL (ref 8.6–10.5)
CHLORIDE SERPL-SCNC: 105 MMOL/L (ref 98–107)
CHOLEST SERPL-MCNC: 191 MG/DL (ref 0–200)
CO2 SERPL-SCNC: 26.8 MMOL/L (ref 22–29)
CREAT SERPL-MCNC: 1.04 MG/DL (ref 0.57–1)
GLOBULIN SER CALC-MCNC: 2 GM/DL
GLUCOSE SERPL-MCNC: 94 MG/DL (ref 65–99)
HBA1C MFR BLD: 5.3 % (ref 4.8–5.6)
HDLC SERPL-MCNC: 74 MG/DL (ref 40–60)
INTERPRETATION: NORMAL
LDLC SERPL CALC-MCNC: 102 MG/DL (ref 0–100)
Lab: NORMAL
POTASSIUM SERPL-SCNC: 4.8 MMOL/L (ref 3.5–5.2)
PROT SERPL-MCNC: 6.8 G/DL (ref 6–8.5)
SODIUM SERPL-SCNC: 141 MMOL/L (ref 136–145)
T4 FREE SERPL-MCNC: 1.43 NG/DL (ref 0.93–1.7)
TRIGL SERPL-MCNC: 74 MG/DL (ref 0–150)
TSH SERPL DL<=0.005 MIU/L-ACNC: 0.68 UIU/ML (ref 0.27–4.2)
VLDLC SERPL CALC-MCNC: 14.8 MG/DL

## 2020-09-10 ENCOUNTER — TELEPHONE (OUTPATIENT)
Dept: ENDOCRINOLOGY | Age: 60
End: 2020-09-10

## 2020-09-10 NOTE — TELEPHONE ENCOUNTER
Villa from Dr.Smantha Herrera with [place] called requesting pt last labs results to be faxed to [phone]. Attention;Villa

## 2020-09-16 ENCOUNTER — HOSPITAL ENCOUNTER (OUTPATIENT)
Dept: ULTRASOUND IMAGING | Facility: HOSPITAL | Age: 60
Discharge: HOME | End: 2020-09-16
Admitting: INTERNAL MEDICINE

## 2020-09-16 PROCEDURE — 76536 US EXAM OF HEAD AND NECK: CPT

## 2020-09-29 DIAGNOSIS — I10 HYPERTENSION, UNSPECIFIED TYPE: ICD-10-CM

## 2020-09-29 RX ORDER — ATENOLOL 50 MG/1
TABLET ORAL
Qty: 90 TABLET | Refills: 0 | Status: SHIPPED | OUTPATIENT
Start: 2020-09-29 | End: 2020-09-30 | Stop reason: SDUPTHER

## 2020-09-30 ENCOUNTER — OFFICE VISIT (OUTPATIENT)
Dept: FAMILY MEDICINE CLINIC | Facility: CLINIC | Age: 60
End: 2020-09-30

## 2020-09-30 ENCOUNTER — HOSPITAL ENCOUNTER (OUTPATIENT)
Dept: GENERAL RADIOLOGY | Facility: HOSPITAL | Age: 60
Discharge: HOME | End: 2020-09-30
Admitting: NURSE PRACTITIONER

## 2020-09-30 VITALS
DIASTOLIC BLOOD PRESSURE: 82 MMHG | HEART RATE: 61 BPM | SYSTOLIC BLOOD PRESSURE: 127 MMHG | OXYGEN SATURATION: 96 % | TEMPERATURE: 97.1 F | BODY MASS INDEX: 36.02 KG/M2 | WEIGHT: 211 LBS | HEIGHT: 64 IN

## 2020-09-30 DIAGNOSIS — I10 HYPERTENSION, UNSPECIFIED TYPE: ICD-10-CM

## 2020-09-30 DIAGNOSIS — S63.501A SPRAIN OF RIGHT WRIST, INITIAL ENCOUNTER: Primary | ICD-10-CM

## 2020-09-30 DIAGNOSIS — G47.33 OBSTRUCTIVE SLEEP APNEA: ICD-10-CM

## 2020-09-30 PROCEDURE — 73110 X-RAY EXAM OF WRIST: CPT

## 2020-09-30 PROCEDURE — 99214 OFFICE O/P EST MOD 30 MIN: CPT | Performed by: NURSE PRACTITIONER

## 2020-09-30 RX ORDER — ATENOLOL 50 MG/1
50 TABLET ORAL DAILY
Qty: 90 TABLET | Refills: 1 | Status: SHIPPED | OUTPATIENT
Start: 2020-09-30 | End: 2021-06-28

## 2020-09-30 RX ORDER — ARIPIPRAZOLE 20 MG/1
20 TABLET ORAL DAILY
COMMUNITY
Start: 2020-09-10

## 2020-10-08 ENCOUNTER — OFFICE VISIT (OUTPATIENT)
Dept: CARDIOLOGY | Facility: CLINIC | Age: 60
End: 2020-10-08

## 2020-10-08 VITALS
HEART RATE: 61 BPM | DIASTOLIC BLOOD PRESSURE: 88 MMHG | SYSTOLIC BLOOD PRESSURE: 126 MMHG | HEIGHT: 64 IN | BODY MASS INDEX: 36.19 KG/M2 | WEIGHT: 212 LBS

## 2020-10-08 DIAGNOSIS — I25.10 CORONARY ARTERY DISEASE INVOLVING NATIVE CORONARY ARTERY OF NATIVE HEART WITHOUT ANGINA PECTORIS: Primary | ICD-10-CM

## 2020-10-08 DIAGNOSIS — I10 ESSENTIAL HYPERTENSION: ICD-10-CM

## 2020-10-08 DIAGNOSIS — M54.12 CERVICAL RADICULOPATHY: ICD-10-CM

## 2020-10-08 PROCEDURE — 93000 ELECTROCARDIOGRAM COMPLETE: CPT | Performed by: INTERNAL MEDICINE

## 2020-10-08 PROCEDURE — 99214 OFFICE O/P EST MOD 30 MIN: CPT | Performed by: INTERNAL MEDICINE

## 2020-10-08 NOTE — PROGRESS NOTES
Subjective:     Encounter Date:10/08/20        Patient ID: Keri Bhagat is a 60 y.o. female.    Chief Complaint:  Coronary Artery Disease  Presents for follow-up visit. Symptoms include chest pressure and dizziness. Pertinent negatives include no leg swelling, muscle weakness, palpitations, shortness of breath or weight gain. The symptoms have been improving.   Hypertension  This is a chronic problem. The current episode started more than 1 year ago. The problem is controlled. Associated symptoms include malaise/fatigue. Pertinent negatives include no palpitations or shortness of breath.       60-year-old female who have not seen in several years presents today for reevaluation.  Clinically she is actually doing relatively well.  She denies chest pain shortness of breath lightheadedness palpitations swelling.  She did ask about refilling her nitroglycerin.  She has never taken it she threw her last bottle out because it .    Review of Systems   Constitution: Positive for malaise/fatigue. Negative for weight gain.   HENT: Positive for hearing loss.    Cardiovascular: Negative for leg swelling and palpitations.   Respiratory: Negative for cough and shortness of breath.    Musculoskeletal: Negative for muscle weakness.   Neurological: Positive for dizziness.   Psychiatric/Behavioral: Positive for depression. The patient is nervous/anxious.    All other systems reviewed and are negative.        ECG 12 Lead    Date/Time: 10/8/2020 9:55 AM  Performed by: David Burroughs MD  Authorized by: David Burroughs MD   Comparison: compared with previous ECG from 2019  Similar to previous ECG  Rhythm: sinus rhythm    Clinical impression: normal ECG               Objective:     Physical Exam   Constitutional: She is oriented to person, place, and time. She appears well-developed.   HENT:   Head: Normocephalic.   Eyes: Conjunctivae are normal.   Neck: Normal range of motion.   Cardiovascular: Normal rate,  regular rhythm and normal heart sounds.   Pulmonary/Chest: Breath sounds normal.   Abdominal: Soft. Bowel sounds are normal.   Musculoskeletal: Normal range of motion.         General: No edema.   Neurological: She is alert and oriented to person, place, and time.   Skin: Skin is warm and dry.   Psychiatric: She has a normal mood and affect. Her behavior is normal.   Vitals reviewed.      Lab Review:       Assessment:          Diagnosis Plan   1. Coronary artery disease involving native coronary artery of native heart without angina pectoris     2. Essential hypertension            Plan:         1.  History of known coronary artery disease. Clinically doing well.  Last stress test was in 2018.  She has not had any further symptoms and has been doing well.  I told her she did need nitroglycerin if she starts having chest discomfort to contact my office emergency room.  2.  Hypertension blood pressures been doing good.  She has not had dizziness or syncope.  3.  Patient is supposed to get some dental work done at [facility].  From a cardiovascular standpoint she is a low risk and should be able to proceed with what ever is required.  4.  Follow-up 1 year with nurse practitioner sooner course if she is having issues.      Coronary Artery Disease  Assessment  • Patient currently having atypical symptoms.  She is off aspirin due to possible GI etiologies at the current time    Plan  • Lifestyle modifications discussed include maintenance of a healthy weight, regular exercise and regular monitoring of cholesterol and blood pressure

## 2020-10-09 RX ORDER — GABAPENTIN 300 MG/1
CAPSULE ORAL
Qty: 90 CAPSULE | Refills: 2 | Status: SHIPPED | OUTPATIENT
Start: 2020-10-09 | End: 2021-01-18

## 2020-10-21 ENCOUNTER — TELEPHONE (OUTPATIENT)
Dept: CARDIOLOGY | Facility: CLINIC | Age: 60
End: 2020-10-21

## 2020-10-21 NOTE — TELEPHONE ENCOUNTER
Pt left msg asking if we received the paperwork from [facility] for clearance?  I called her back to let her know we have not received it and she is going to call and have them fax it to [phone]./siomara

## 2020-10-23 ENCOUNTER — TELEPHONE (OUTPATIENT)
Dept: CARDIOLOGY | Facility: CLINIC | Age: 60
End: 2020-10-23

## 2020-10-23 NOTE — TELEPHONE ENCOUNTER
Received fax request from [facility] asking for clearance for pt to have restorative work, the formation of a partial and possible extractions performed over apprx 10-15- appts of 2-3 hours, each using local anesthesia and 2 % lidocaine containing 1:200,000 epinephrine.       Please advise.    Fax clearance to:  [phone]      Thanks,  Bette

## 2020-10-29 ENCOUNTER — OFFICE VISIT (OUTPATIENT)
Dept: SLEEP MEDICINE | Facility: HOSPITAL | Age: 60
End: 2020-10-29

## 2020-10-29 ENCOUNTER — TELEPHONE (OUTPATIENT)
Dept: SLEEP MEDICINE | Facility: HOSPITAL | Age: 60
End: 2020-10-29

## 2020-10-29 VITALS
DIASTOLIC BLOOD PRESSURE: 88 MMHG | OXYGEN SATURATION: 97 % | HEART RATE: 74 BPM | WEIGHT: 212 LBS | SYSTOLIC BLOOD PRESSURE: 145 MMHG | BODY MASS INDEX: 37.56 KG/M2 | HEIGHT: 63 IN

## 2020-10-29 DIAGNOSIS — E66.9 OBESITY (BMI 30-39.9): ICD-10-CM

## 2020-10-29 DIAGNOSIS — G47.33 OBSTRUCTIVE SLEEP APNEA: Primary | ICD-10-CM

## 2020-10-29 DIAGNOSIS — G25.81 RESTLESS LEGS SYNDROME (RLS): ICD-10-CM

## 2020-10-29 PROCEDURE — G0463 HOSPITAL OUTPT CLINIC VISIT: HCPCS

## 2020-10-29 NOTE — PROGRESS NOTES
[facility]  Telephone: [phone] / Fax: [phone] [place]  Telephone: [phone] / Fax: [phone] [place]vara    PCP: Epley, James, APRN    Reason for visit: ROCKY f/u    Keri Bhagat is a 60 y.o.female  was last seen at [facility] sleep lab 1 year ago. She stopped using her machine 6 months ago. No apparent reason for discontinuation is provided. Since she stopped using the machine she has been feeling more tired. She has been snoring more, she thinks. She wakes up with a dry mouth. She agrees to resume CPAP. She would like to try a different mask today. She currently has Dreamwear FFM but would like to try something new. She is a mouth breather and will mostly likely need a FFM. Her sleep schedule is MN-10am. ESS is 11. Dr Vallejo previously prescribed Mirapex for RLS and it has really helped. However, she ran out and has been feeling more restless. She does admit to consuming caffeine up until 6pm.    SH- smokes E cig, no alcohol, 3 coffee/tea per day.    ROS-+post nasal drip, +anxiety, +depression. Rest is negative.    DME NAPS    Current Medications:    Current Outpatient Medications:   •  ARIPiprazole (ABILIFY) 20 MG tablet, , Disp: , Rfl:   •  aspirin 81 MG EC tablet, Take 1 tablet by mouth Daily., Disp: 30 tablet, Rfl: 2  •  atenolol (TENORMIN) 50 MG tablet, Take 1 tablet by mouth Daily., Disp: 90 tablet, Rfl: 1  •  buPROPion XL (WELLBUTRIN XL) 150 MG 24 hr tablet, , Disp: , Rfl:   •  Cholecalciferol (VITAMIN D-3) 25 MCG (1000 UT) capsule, 1 capsule daily, Disp: , Rfl:   •  cyanocobalamin (VITAMIN B-12) 1000 MCG tablet, TAKE ONE TABLET BY MOUTH TWICE A DAY, Disp: 60 tablet, Rfl: 5  •  famotidine (PEPCID) 20 MG tablet, Take 1 tablet by mouth Daily., Disp: , Rfl:   •  gabapentin (NEURONTIN) 300 MG capsule, TAKE ONE CAPSULE BY MOUTH THREE TIMES A DAY, Disp: 90 capsule, Rfl: 2  •  hydrOXYzine (ATARAX) 50 MG tablet, , Disp: , Rfl:   •  isosorbide mononitrate (IMDUR) 30 MG 24 hr  tablet, TAKE ONE TABLET BY MOUTH DAILY, Disp: 30 tablet, Rfl: 4  •  lamoTRIgine (LaMICtal) 150 MG tablet, Take 300 mg by mouth Daily., Disp: , Rfl:   •  levothyroxine (SYNTHROID, LEVOTHROID) 88 MCG tablet, Take 1 tablet by mouth Every Morning., Disp: 90 tablet, Rfl: 2  •  loratadine (CLARITIN) 10 MG tablet, Take 1 tablet by mouth Daily. As needed for allergies, Disp: 30 tablet, Rfl: 11  •  nitroglycerin (NITROSTAT) 0.4 MG SL tablet, Place 0.4 mg under the tongue Every 5 (Five) Minutes As Needed for Chest Pain (took at M.D  office at 0930 a.m.). Take no more than 3 doses in 15 minutes., Disp: , Rfl:   •  oxybutynin XL (DITROPAN XL) 15 MG 24 hr tablet, Take 15 mg by mouth Daily., Disp: , Rfl:   •  pramipexole (MIRAPEX) 0.5 MG tablet, , Disp: , Rfl:   •  simvastatin (ZOCOR) 20 MG tablet, TAKE ONE TABLET BY MOUTH ONCE NIGHTLY, Disp: 90 tablet, Rfl: 0  •  venlafaxine XR (EFFEXOR-XR) 75 MG 24 hr capsule, Take 75 mg by mouth Daily., Disp: , Rfl:    also entered in Sleep Questionnaire    Patient  has a past medical history of Abnormal Pap smear of cervix, Anemia, Anxiety, Arthritis, Atherosclerotic heart disease of native coronary artery with other forms of angina pectoris (CMS/HCC), Bipolar I disorder, single manic episode (CMS/HCC), Cervical disc herniation, Cervical dysplasia, Coronary artery disease, CTS (carpal tunnel syndrome), Depression, Difficulty swallowing, Diverticular disease, Dyspepsia, Dysphagia, Gastric reflux, Heart attack (CMS/HCC), Heart murmur, Hiatal hernia, High cholesterol, History of transfusion, HPV (human papilloma virus) infection (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    I have reviewed the Past Medical History, Past Surgical History, Social History and Family History.    Vital Signs /88   Pulse 74   Ht 160 cm (63\")   Wt 96.2 kg (212 lb)   LMP 10/21/2016 (Approximate) Comment: 54  SpO2 97%   BMI 37.55 kg/m²  Body mass index is 37.55 kg/m².    General Alert and oriented. No acute distress noted   Pharynx/Throat Class IV Mallampati airway, large tongue, no evidence of redundant lateral pharyngeal tissue. No oral lesions. No thrush. Moist mucous membranes.   Head Normocephalic. Symmetrical. Atraumatic.    Nose No septal deviation. No drainage   Chest Wall Normal shape. Symmetric expansion with respiration. No tenderness.   Neck Trachea midline, no thyromegaly or adenopathy    Lungs Clear to auscultation bilaterally. No wheezes. No rhonchi. No rales. Respirations regular, even and unlabored.   Heart Regular rhythm and normal rate. Normal S1 and S2. No murmur   Abdomen Soft, non-tender and non-distended. Normal bowel sounds. No masses.   Extremities Moves all extremities well. No edema   Psychiatric Normal mood and affect.     Testing:  · Download n/a- patient has not been using her machine.    Study:  HST 9/12/16- AHI index is 21 indicating moderate obstructive sleep apnea.  It is slightly worse in supine position with index 28. Saturation below 89% for 4 minutes and snoring for 2% of total sleep time.     PSG 4/3/17  Overnight split polysomnogram study.  Diagnostic study from 9:21 AM to 11:33 AM.  Sleep efficiency 94% with 2 hours total sleep time.  Proportion of REM sleep was reduced to 5%.  Apnea hypopneas index 12 events an hour with RDI of 24.  Oxygen saturation remained above 89%.  Patient had 19% time snoring.     Following the above titration study from 11:33 AM with the 3 PM.  Sleep efficiency 95% with 3 hours total sleep time.  Rebound in rem sleep to " 35%.  Oxygen saturation remained above 89%.  Patient was increased to 11 cm water pressure.  Optimal sleep was achieved at a CPAP of 8 cm water pressure.  With higher pressures air leaks and central events are noted.     Impression:  1. Obstructive sleep apnea    2. Restless legs syndrome (RLS)    3. Obesity (BMI 30-39.9)          Plan:  Reduce caffeine after 2pm as caffeine is likely to make her sleep more restless, and may worsen RLS.  She has hx of iron deficiency anemia-and is currently not on any iron. I advised pt to take iron supplement  I recommended she resumes the CPAP machine. To help her get adjusted to it, I asked our staff to decrease the pressures to 8-15cm. We will also do mask fit today. I have resumed Pramipexole 0.5mg and asked patient to take it 2-3 hours prior to bedtime.    I asked her to f/u with Dr Vallejo in 6-8 weeks.      Thank you for allowing me to participate in your patient's care.      DAPHNEY Pedraza  [facility]  Phone: [phone]      Part of this note may be an electronic transcription/translation of spoken language to printed text using the Dragon Dictation System.

## 2020-11-12 RX ORDER — ISOSORBIDE MONONITRATE 30 MG/1
TABLET, EXTENDED RELEASE ORAL
Qty: 90 TABLET | Refills: 1 | Status: SHIPPED | OUTPATIENT
Start: 2020-11-12 | End: 2021-06-02

## 2020-11-23 ENCOUNTER — OFFICE VISIT (OUTPATIENT)
Dept: ORTHOPEDIC SURGERY | Facility: CLINIC | Age: 60
End: 2020-11-23

## 2020-11-23 VITALS — HEIGHT: 63 IN | BODY MASS INDEX: 37.21 KG/M2 | WEIGHT: 210 LBS | TEMPERATURE: 98 F

## 2020-11-23 DIAGNOSIS — M17.0 PRIMARY OSTEOARTHRITIS OF BOTH KNEES: Primary | ICD-10-CM

## 2020-11-23 PROCEDURE — 99213 OFFICE O/P EST LOW 20 MIN: CPT | Performed by: NURSE PRACTITIONER

## 2020-11-23 PROCEDURE — 20610 DRAIN/INJ JOINT/BURSA W/O US: CPT | Performed by: NURSE PRACTITIONER

## 2020-11-23 RX ORDER — METHYLPREDNISOLONE ACETATE 80 MG/ML
80 INJECTION, SUSPENSION INTRA-ARTICULAR; INTRALESIONAL; INTRAMUSCULAR; SOFT TISSUE
Status: COMPLETED | OUTPATIENT
Start: 2020-11-23 | End: 2020-11-23

## 2020-11-23 RX ADMIN — METHYLPREDNISOLONE ACETATE 80 MG: 80 INJECTION, SUSPENSION INTRA-ARTICULAR; INTRALESIONAL; INTRAMUSCULAR; SOFT TISSUE at 14:51

## 2020-11-23 RX ADMIN — METHYLPREDNISOLONE ACETATE 80 MG: 80 INJECTION, SUSPENSION INTRA-ARTICULAR; INTRALESIONAL; INTRAMUSCULAR; SOFT TISSUE at 14:50

## 2020-11-23 NOTE — PROGRESS NOTES
Patient Name: Keri Bhagat   YOB: 1960  Referring Primary Care Physician: Epley, James, APRN  BMI: Body mass index is 37.2 kg/m².    Chief Complaint:    Chief Complaint   Patient presents with   • Left Knee - Follow-up   • Right Knee - Follow-up        HPI: pt here for injections to both knees that help her - she does not want to have surgery. She has used conservative measures successfully.     Keri Bhagat is a 60 y.o. female who presents today for evaluation of   Chief Complaint   Patient presents with   • Left Knee - Follow-up   • Right Knee - Follow-up         Subjective   Medications:   Home Medications:  Current Outpatient Medications on File Prior to Visit   Medication Sig   • ARIPiprazole (ABILIFY) 20 MG tablet    • aspirin 81 MG EC tablet Take 1 tablet by mouth Daily.   • atenolol (TENORMIN) 50 MG tablet Take 1 tablet by mouth Daily.   • buPROPion XL (WELLBUTRIN XL) 150 MG 24 hr tablet    • Cholecalciferol (VITAMIN D-3) 25 MCG (1000 UT) capsule 1 capsule daily   • cyanocobalamin (VITAMIN B-12) 1000 MCG tablet TAKE ONE TABLET BY MOUTH TWICE A DAY   • famotidine (PEPCID) 20 MG tablet Take 1 tablet by mouth Daily.   • gabapentin (NEURONTIN) 300 MG capsule TAKE ONE CAPSULE BY MOUTH THREE TIMES A DAY   • hydrOXYzine (ATARAX) 50 MG tablet    • isosorbide mononitrate (IMDUR) 30 MG 24 hr tablet TAKE ONE TABLET BY MOUTH DAILY   • lamoTRIgine (LaMICtal) 150 MG tablet Take 300 mg by mouth Daily.   • levothyroxine (SYNTHROID, LEVOTHROID) 88 MCG tablet Take 1 tablet by mouth Every Morning.   • loratadine (CLARITIN) 10 MG tablet Take 1 tablet by mouth Daily. As needed for allergies   • nitroglycerin (NITROSTAT) 0.4 MG SL tablet Place 0.4 mg under the tongue Every 5 (Five) Minutes As Needed for Chest Pain (took at M.D  office at 0930 a.m.). Take no more than 3 doses in 15 minutes.   • oxybutynin XL (DITROPAN XL) 15 MG 24 hr tablet Take 15 mg by mouth Daily.   • pramipexole (MIRAPEX) 0.5 MG tablet     • simvastatin (ZOCOR) 20 MG tablet TAKE ONE TABLET BY MOUTH ONCE NIGHTLY   • venlafaxine XR (EFFEXOR-XR) 75 MG 24 hr capsule Take 75 mg by mouth Daily.     No current facility-administered medications on file prior to visit.      Current Medications:  Scheduled Meds:  Continuous Infusions:No current facility-administered medications for this visit.     PRN Meds:.    I have reviewed the patient's medical history in detail and updated the computerized patient record.  Review and summarization of old records includes:    Past Medical History:   Diagnosis Date   • Abnormal Pap smear of cervix    • Anemia    • Anxiety    • Arthritis    • Atherosclerotic heart disease of native coronary artery with other forms of angina pectoris (CMS/HCC)    • Bipolar I disorder, single manic episode (CMS/HCC)     depressive d(NOS)   • Cervical disc herniation    • Cervical dysplasia    • Coronary artery disease    • CTS (carpal tunnel syndrome)    • Depression     NO SUICIDAL PLANS   • Difficulty swallowing    • Diverticular disease    • Dyspepsia    • Dysphagia    • Gastric reflux    • Heart attack (CMS/HCC)    • Heart murmur    • Hiatal hernia    • High cholesterol    • History of transfusion    • 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    HPV positive on pap LGSIL   • Hyperlipidemia    • Hypertension    • Hypothyroidism    • Incontinence in female     wears pads   • Kidney disease 2017    stage 2    • Kidney disease, chronic, stage III (GFR 30-59 ml/min)    • LGSIL on Pap smear of cervix 04/29/2016    LGSIL HPV positive   • Myocardial infarction (CMS/HCC) 2000   • Neck pain    • Obesity    • Past myocardial infarction 05/2000   • Periodic limb movement disorder    • Restless leg syndrome    • Sleep apnea     cpap   • Stress fracture     LEFT HEEL   • Suicidal ideation 08/19/2016    history   • Swelling of ankle     right had doppler no s/s blood clot   • Thyroid nodule    • Vitamin B12 deficiency         Past Surgical  History:   Procedure Laterality Date   • ANTERIOR CERVICAL DISCECTOMY W/ FUSION N/A 12/29/2016    Procedure: C3-4 anterior cervical discectomy and fusion with Depuy micro plate, ALLOGRAFT C3-4, AND HARDWARE REMOVAL C4-7.;  Surgeon: Hema Godwin MD;  Location: [facility] OR;  Service:    • CARDIAC CATHETERIZATION N/A 3/30/2017    Procedure: Left Heart Cath;  Surgeon: Tracey Vargas MD;  [facility];  Service:    • CARDIAC CATHETERIZATION N/A 3/30/2017    Procedure: Coronary angiography;  Surgeon: Tracey Vargas MD;  Location:  [facility] CATH INVASIVE LOCATION;  Service:    • CARDIAC CATHETERIZATION N/A 3/30/2017    Procedure: Left ventriculography;  Surgeon: Tracey Vargas MD;  [facility];  Service:    • CARDIAC CATHETERIZATION  3/30/2017    Procedure: Functional Flow Cascade;  Surgeon: Tracey Vargas MD;  Location: [facility] CATH INVASIVE LOCATION;  Service:    • CARPAL TUNNEL RELEASE     • CERVICAL BIOPSY  [facility]XVI    Dr. Jeffery.    • CERVICAL DISCECTOMY ANTERIOR  04/2013    C4-7   • COLONOSCOPY  04/20/2015    Diverticulosis, IH   • COLPOSCOPY W/ BIOPSY / CURETTAGE  06/17/2016    LGSIL HPV positive. Results were normal repeat pap in one year. Chronic Cervicitis   • CORONARY ANGIOPLASTY WITH STENT PLACEMENT     • ENDOSCOPY  MMXV    Normal.  Dr. Rodriguez   • ENDOSCOPY N/A 6/22/2017    Erythematous mucosa in the stomach  PATH: Chronic active gastritis, moderate with intestinal metaplasia    • GASTRIC BYPASS      Done using the sleeve technique   • HARDWARE REMOVAL  12/29/2016    cervical   • LAPAROSCOPIC GASTRIC BANDING  02/2018   • SHOULDER SURGERY Left     RCR   • TONSILLECTOMY     • TONSILLECTOMY AND ADENOIDECTOMY     • TRANSVAGINAL TAPING SUSPENSION N/A 12/6/2017    Procedure: MID URETHRAL SLING CYSTSCOPY;  Surgeon: Abby Méndez MD;  Location: [facility] OR;  Service:    • TUBAL ABDOMINAL LIGATION     • TYMPANOSTOMY TUBE PLACEMENT Right    • WRIST  SURGERY Bilateral     carpal tunnel   • WRIST SURGERY      L wrist/ 2020        Social History     Occupational History   • Occupation: disabled   Tobacco Use   • Smoking status: Former Smoker     Packs/day: 1.50     Years: 20.00     Pack years: 30.00     Types: Electronic Cigarette, Cigarettes     Quit date:      Years since quittin.8   • Smokeless tobacco: Never Used   • Tobacco comment: Electronic Cigarette/ 3 mg nicotine    Substance and Sexual Activity   • Alcohol use: Yes     Comment: 2 times yearly    • Drug use: No   • Sexual activity: Yes     Partners: Male     Birth control/protection: None, Condom      Social History     Social History Narrative   • Not on file        Family History   Problem Relation Age of Onset   • Diabetes type II Mother    • Hypertension Mother    • Osteoporosis Mother    • Seizures Mother    • COPD Father    • Hypertension Father    • Lung cancer Father    • Heart attack Father    • Liver cancer Father    • Thyroid disease Sister    • Hypertension Sister    • Bipolar disorder Sister    • Depression Sister    • ADD / ADHD Sister    • No Known Problems Son    • Abnormal EKG Daughter    • Hypertension Daughter    • Bipolar disorder Daughter    • Thyroid disease Daughter    • No Known Problems Paternal Grandfather    • No Known Problems Paternal Grandmother    • No Known Problems Maternal Grandmother    • No Known Problems Maternal Grandfather    • Thyroid disease Sister    • Hypertension Sister    • Bipolar disorder Sister    • Asthma Daughter    • Breast cancer Neg Hx    • Ovarian cancer Neg Hx    • Uterine cancer Neg Hx    • Colon cancer Neg Hx    • Malig Hyperthermia Neg Hx        ROS: 14 point review of systems was performed and all other systems were reviewed and are negative except for documented findings in HPI and today's encounter.     Allergies: No Known Allergies  Constitutional:  Denies fever, shaking or chills   Eyes:  Denies change in visual acuity   HENT:   "Denies nasal congestion or sore throat   Respiratory:  Denies cough or shortness of breath   Cardiovascular:  Denies chest pain or severe LE edema   GI:  Denies abdominal pain, nausea, vomiting, bloody stools or diarrhea   Musculoskeletal:  Numbness, tingling, pain, or loss of motor function only as noted above in history of present illness.  : Denies painful urination or hematuria  Integument:  Denies rash, lesion or ulceration   Neurologic:  Denies headache or focal weakness  Endocrine:  Denies lymphadenopathy  Psych:  Denies confusion or change in mental status   Hem:  Denies active bleeding    OBJECTIVE:  Physical Exam: 60 y.o. female  Wt Readings from Last 3 Encounters:   11/23/20 95.3 kg (210 lb)   10/29/20 96.2 kg (212 lb)   10/08/20 96.2 kg (212 lb)     Ht Readings from Last 1 Encounters:   11/23/20 160 cm (63\")     Body mass index is 37.2 kg/m².  Vitals:    11/23/20 1607   Temp: 98 °F (36.7 °C)     Vital signs reviewed.     General Appearance:    Alert, cooperative, in no acute distress                  Eyes: conjunctiva clear  ENT: external ears and nose atraumatic  CV: no peripheral edema  Resp: normal respiratory effort  Skin: no rashes or wounds; normal turgor  Psych: mood and affect appropriate  Lymph: no nodes appreciated  Neuro: gross sensation intact  Vascular:  Palpable peripheral pulse in noted extremity  Musculoskeletal Extremities: Skin warm and dry and intact with good pulses movement sensation calves are soft and nontender both knees have medial joint line tenderness with effusion synovitis stiffness upon ambulation calves are soft    Radiology:   Reviewed from 8/10/2020 show tricompartmental osteoarthritis of both knees    Assessment:     ICD-10-CM ICD-9-CM   1. Primary osteoarthritis of both knees  M17.0 715.16        Large Joint Arthrocentesis: L knee  Date/Time: 11/23/2020 2:50 PM  Consent given by: patient  Site marked: site marked  Timeout: Immediately prior to procedure a time out was " called to verify the correct patient, procedure, equipment, support staff and site/side marked as required   Supporting Documentation  Indications: pain   Procedure Details  Location: knee - L knee  Needle size: 25 G  Approach: anteromedial  Medications administered: 80 mg methylPREDNISolone acetate 80 MG/ML; 4 mL lidocaine (cardiac)      Large Joint Arthrocentesis: R knee  Date/Time: 11/23/2020 2:51 PM  Consent given by: patient  Site marked: site marked  Timeout: Immediately prior to procedure a time out was called to verify the correct patient, procedure, equipment, support staff and site/side marked as required   Supporting Documentation  Indications: pain   Procedure Details  Location: knee - R knee  Needle size: 25 G  Approach: anteromedial  Medications administered: 4 mL lidocaine (cardiac); 80 mg methylPREDNISolone acetate 80 MG/ML  Patient tolerance: patient tolerated the procedure well with no immediate complications             Plan: The diagnosis(es), natural history, pathophysiology and treatment for diagnosis(es) were discussed. Opportunity given and questions answered.  Biomechanics of pertinent body areas discussed.  When appropriate, the use of ambulatory aids discussed.  BMI:  The concept of BMI body mass index and its importance and implications discussed.    MEDICATIONS:  The risks, benefits, warnings,side effects and alternatives of medications discussed.  Inflammation/pain control; with cold, heat, elevation and/or liniments discussed as appropriate  Cortisone Injection. See procedure note.  HOME EXERCISE/PT program encouraged  MEDICAL RECORDS reviewed from other provider(s) for past and current medical history pertinent to this complaint.      11/23/2020    Much of this encounter note is an electronic transcription/translation of spoken language to printed text. The electronic translation of spoken language may permit erroneous, or at times, nonsensical words or phrases to be inadvertently  transcribed; Although I have reviewed the note for such errors, some may still exist

## 2020-12-18 DIAGNOSIS — R68.89 ABNORMAL ENDOCRINE LABORATORY TEST FINDING: ICD-10-CM

## 2020-12-18 DIAGNOSIS — E03.9 HYPOTHYROIDISM (ACQUIRED): Primary | ICD-10-CM

## 2020-12-18 DIAGNOSIS — E78.2 MIXED HYPERLIPIDEMIA: ICD-10-CM

## 2020-12-18 DIAGNOSIS — I10 ESSENTIAL HYPERTENSION: ICD-10-CM

## 2020-12-18 DIAGNOSIS — E55.9 VITAMIN D DEFICIENCY: ICD-10-CM

## 2020-12-23 ENCOUNTER — RESULTS ENCOUNTER (OUTPATIENT)
Dept: ENDOCRINOLOGY | Age: 60
End: 2020-12-23

## 2020-12-23 DIAGNOSIS — I10 ESSENTIAL HYPERTENSION: ICD-10-CM

## 2020-12-23 DIAGNOSIS — E03.9 HYPOTHYROIDISM (ACQUIRED): ICD-10-CM

## 2020-12-23 DIAGNOSIS — E78.2 MIXED HYPERLIPIDEMIA: ICD-10-CM

## 2021-01-06 RX ORDER — SIMVASTATIN 20 MG
TABLET ORAL
Qty: 90 TABLET | Refills: 3 | Status: SHIPPED | OUTPATIENT
Start: 2021-01-06 | End: 2021-04-02

## 2021-01-08 ENCOUNTER — APPOINTMENT (OUTPATIENT)
Dept: SLEEP MEDICINE | Facility: HOSPITAL | Age: 61
End: 2021-01-08

## 2021-01-14 ENCOUNTER — TELEPHONE (OUTPATIENT)
Dept: FAMILY MEDICINE CLINIC | Facility: CLINIC | Age: 61
End: 2021-01-14

## 2021-01-14 NOTE — TELEPHONE ENCOUNTER
Push fluids plenty rest  Assume that she is contagious and she quarantine for 14 days  Should she become ill with any Covid symptoms runny nose cough congestion sore throat loss of taste or smell body aches headache abdominal pain cramping etc.  Then urgent care, or telehealth if her symptoms are mild ER for any severe symptoms    Should she become sick push fluids fluids fluids fluids and treat fever any shortness of breath immediately to the emergency room

## 2021-01-14 NOTE — TELEPHONE ENCOUNTER
PATENT IS CALLING TO INFORM JAMES EPLEY THAT SHE WAS EXPOSED TO COVID ON 1-12-21 AND NEEDS TO KNOW WHAT TO DO.  PATIENT ISN'T SHOWING SYMPTOMS AS OF RIGHT NOW.     CALL BACK: [phone]

## 2021-01-18 DIAGNOSIS — M54.12 CERVICAL RADICULOPATHY: ICD-10-CM

## 2021-01-18 RX ORDER — GABAPENTIN 300 MG/1
CAPSULE ORAL
Qty: 90 CAPSULE | Refills: 0 | Status: SHIPPED | OUTPATIENT
Start: 2021-01-18 | End: 2021-02-22

## 2021-02-05 ENCOUNTER — OFFICE VISIT (OUTPATIENT)
Dept: FAMILY MEDICINE CLINIC | Facility: CLINIC | Age: 61
End: 2021-02-05

## 2021-02-05 VITALS
SYSTOLIC BLOOD PRESSURE: 119 MMHG | BODY MASS INDEX: 39.16 KG/M2 | OXYGEN SATURATION: 94 % | WEIGHT: 221 LBS | HEART RATE: 68 BPM | HEIGHT: 63 IN | DIASTOLIC BLOOD PRESSURE: 79 MMHG | TEMPERATURE: 97.5 F

## 2021-02-05 DIAGNOSIS — I10 ESSENTIAL HYPERTENSION: Primary | ICD-10-CM

## 2021-02-05 DIAGNOSIS — D50.9 MICROCYTIC ANEMIA: ICD-10-CM

## 2021-02-05 DIAGNOSIS — Z72.0 TOBACCO ABUSE: ICD-10-CM

## 2021-02-05 PROCEDURE — 99213 OFFICE O/P EST LOW 20 MIN: CPT | Performed by: NURSE PRACTITIONER

## 2021-02-05 NOTE — PROGRESS NOTES
"Chief Complaint  LOW IRON (ALSO ELEVATED SODIUM LEVEL) and ELEVATED A1C    Subjective          Keri Bhagat presents to [facility] for   Pleasant patient here today follow-up some abnormal labs she has some mild anemia  Hemoglobin 11 MCV 25 otherwise essentially normal    She has no coffee-ground emesis black tarry stools or recurrent abdominal pain  She does have some somnolence during the day has sleep apnea uses her mask  Hypothyroid takes replacement therapy  A1c mildly elevated 5.9 prediabetes  Obesity, BMI 39 she had lap band a couple years ago  Previously B12 deficiency not sure she is taking supplementation        Objective   Vital Signs:   /79   Pulse 68   Temp 97.5 °F (36.4 °C) (Temporal)   Ht 160 cm (63\")   Wt 100 kg (221 lb)   SpO2 94%   BMI 39.15 kg/m²     Physical Exam  Vitals signs reviewed.   Constitutional:       Appearance: She is well-developed.   HENT:      Head: Normocephalic.      Nose: Nose normal.   Eyes:      General: No scleral icterus.     Conjunctiva/sclera: Conjunctivae normal.      Pupils: Pupils are equal, round, and reactive to light.   Neck:      Musculoskeletal: Neck supple.      Thyroid: No thyromegaly.      Vascular: No JVD.   Cardiovascular:      Rate and Rhythm: Normal rate and regular rhythm.      Heart sounds: Normal heart sounds. No murmur. No friction rub. No gallop.    Pulmonary:      Effort: Pulmonary effort is normal. No respiratory distress.      Breath sounds: Normal breath sounds. No stridor. No wheezing or rales.   Abdominal:      General: Bowel sounds are normal. There is no distension.      Palpations: Abdomen is soft.      Tenderness: There is no abdominal tenderness.      Comments: No hepatosplenomegaly, no ascites,   Musculoskeletal:         General: No tenderness.   Lymphadenopathy:      Cervical: No cervical adenopathy.   Skin:     General: Skin is warm and dry.      Findings: No erythema or rash.   Neurological: "      Mental Status: She is alert and oriented to person, place, and time.      Deep Tendon Reflexes: Reflexes are normal and symmetric.   Psychiatric:         Behavior: Behavior normal.         Thought Content: Thought content normal.         Judgment: Judgment normal.        Result Review :                 Assessment and Plan    Problem List Items Addressed This Visit     None          Follow Up   No follow-ups on file.  Patient was given instructions and counseling regarding her condition or for health maintenance advice. Please see specific information pulled into the AVS if appropriate.   We will recheck labs healthy diet regular exercise  Colonoscopy 2015 EGD 2017  LAP-BAND 2018  She is having no black tarry stools or bloody stools no coffee-ground emesis    She will follow up with results  If anemic she will need to complete Hemoccult kit x3 and refer to gastro we will request previous colonoscopy as well should continue present plan healthy diet regular exercise and follow-up with these results next plan

## 2021-02-05 NOTE — PATIENT INSTRUCTIONS
Discharge instructions  You have some mild anemia but no direct evidence of low iron at this time we will recheck labs today    Hemoccult kit x3  Collect on separate days and mail back to us make sure you follow-up  for results    No further action required as long as you are not iron deficient at this time but we will recheck your CBC in 3 months otherwise    Focus on therapeutic lifestyle changes range of motion movement  As described  Dietary changes  Gradually decrease calories portion control  Make an appointment to schedule follow-up with your bariatric surgeon at [facility]  Let me know if you need a referral  To reevaluate your LAP-BAND and see if it needs to be tightened    Decrease portion size as we discussed think with the plan follow-up in 6 months labs prior to next appointment    Follow-up with your sleep specialist doctor regarding your obstructive sleep apnea mass to assure things are going nicely  Dr. Vallejo

## 2021-02-06 LAB
BASOPHILS # BLD AUTO: 0.03 10*3/MM3 (ref 0–0.2)
BASOPHILS NFR BLD AUTO: 0.4 % (ref 0–1.5)
EOSINOPHIL # BLD AUTO: 0.25 10*3/MM3 (ref 0–0.4)
EOSINOPHIL NFR BLD AUTO: 3.1 % (ref 0.3–6.2)
ERYTHROCYTE [DISTWIDTH] IN BLOOD BY AUTOMATED COUNT: 15.6 % (ref 12.3–15.4)
FERRITIN SERPL-MCNC: 8.4 NG/ML (ref 13–150)
FOLATE SERPL-MCNC: 10.3 NG/ML (ref 4.78–24.2)
HCT VFR BLD AUTO: 33.9 % (ref 34–46.6)
HGB BLD-MCNC: 10.8 G/DL (ref 12–15.9)
IMM GRANULOCYTES # BLD AUTO: 0.04 10*3/MM3 (ref 0–0.05)
IMM GRANULOCYTES NFR BLD AUTO: 0.5 % (ref 0–0.5)
IRON SATN MFR SERPL: 4 % (ref 20–50)
IRON SERPL-MCNC: 24 MCG/DL (ref 37–145)
LYMPHOCYTES # BLD AUTO: 3.39 10*3/MM3 (ref 0.7–3.1)
LYMPHOCYTES NFR BLD AUTO: 42.6 % (ref 19.6–45.3)
MCH RBC QN AUTO: 25.2 PG (ref 26.6–33)
MCHC RBC AUTO-ENTMCNC: 31.9 G/DL (ref 31.5–35.7)
MCV RBC AUTO: 79 FL (ref 79–97)
MONOCYTES # BLD AUTO: 0.57 10*3/MM3 (ref 0.1–0.9)
MONOCYTES NFR BLD AUTO: 7.2 % (ref 5–12)
NEUTROPHILS # BLD AUTO: 3.67 10*3/MM3 (ref 1.7–7)
NEUTROPHILS NFR BLD AUTO: 46.2 % (ref 42.7–76)
NRBC BLD AUTO-RTO: 0 /100 WBC (ref 0–0.2)
PLATELET # BLD AUTO: 352 10*3/MM3 (ref 140–450)
RBC # BLD AUTO: 4.29 10*6/MM3 (ref 3.77–5.28)
RETICS/RBC NFR AUTO: 1.04 % (ref 0.7–1.9)
TIBC SERPL-MCNC: 581 MCG/DL
UIBC SERPL-MCNC: 557 MCG/DL (ref 112–346)
VIT B12 SERPL-MCNC: 1912 PG/ML (ref 211–946)
WBC # BLD AUTO: 7.95 10*3/MM3 (ref 3.4–10.8)

## 2021-02-12 DIAGNOSIS — E03.9 HYPOTHYROIDISM (ACQUIRED): ICD-10-CM

## 2021-02-12 RX ORDER — LEVOTHYROXINE SODIUM 88 UG/1
88 TABLET ORAL EVERY MORNING
Qty: 90 TABLET | Refills: 0 | Status: SHIPPED | OUTPATIENT
Start: 2021-02-12 | End: 2021-05-18

## 2021-02-17 DIAGNOSIS — M54.12 CERVICAL RADICULOPATHY: Primary | ICD-10-CM

## 2021-02-21 DIAGNOSIS — M54.12 CERVICAL RADICULOPATHY: ICD-10-CM

## 2021-02-22 RX ORDER — GABAPENTIN 300 MG/1
CAPSULE ORAL
Qty: 90 CAPSULE | Refills: 0 | Status: SHIPPED | OUTPATIENT
Start: 2021-02-22 | End: 2021-04-14

## 2021-02-24 ENCOUNTER — CLINICAL SUPPORT (OUTPATIENT)
Dept: ORTHOPEDIC SURGERY | Facility: CLINIC | Age: 61
End: 2021-02-24

## 2021-02-24 VITALS — BODY MASS INDEX: 38.62 KG/M2 | WEIGHT: 218 LBS | TEMPERATURE: 98 F | HEIGHT: 63 IN

## 2021-02-24 DIAGNOSIS — M25.562 PAIN IN BOTH KNEES, UNSPECIFIED CHRONICITY: Primary | ICD-10-CM

## 2021-02-24 DIAGNOSIS — M25.561 PAIN IN BOTH KNEES, UNSPECIFIED CHRONICITY: Primary | ICD-10-CM

## 2021-02-24 DIAGNOSIS — M17.0 PRIMARY OSTEOARTHRITIS OF BOTH KNEES: ICD-10-CM

## 2021-02-24 PROCEDURE — 20610 DRAIN/INJ JOINT/BURSA W/O US: CPT | Performed by: NURSE PRACTITIONER

## 2021-02-24 PROCEDURE — 99213 OFFICE O/P EST LOW 20 MIN: CPT | Performed by: NURSE PRACTITIONER

## 2021-02-24 PROCEDURE — 73562 X-RAY EXAM OF KNEE 3: CPT | Performed by: NURSE PRACTITIONER

## 2021-02-24 RX ADMIN — METHYLPREDNISOLONE ACETATE 80 MG: 80 INJECTION, SUSPENSION INTRA-ARTICULAR; INTRALESIONAL; INTRAMUSCULAR; SOFT TISSUE at 14:07

## 2021-02-24 NOTE — PROGRESS NOTES
Patient Name: Keri Bhagat   YOB: 1960  Referring Primary Care Physician: Epley, James, APRN  BMI: Body mass index is 38.62 kg/m².    Chief Complaint:    Chief Complaint   Patient presents with   • Right Knee - Follow-up   • Left Knee - Follow-up        HPI: pt here for injections to both knees that help her - she does not want to have surgery. She has used conservative measures successfully.   She is a gastric lap band and is working on getting that readjusted and working on diet and exercise.  Keri Bhagat is a 60 y.o. female who presents today for evaluation of   Chief Complaint   Patient presents with   • Right Knee - Follow-up   • Left Knee - Follow-up         Subjective   Medications:   Home Medications:  Current Outpatient Medications on File Prior to Visit   Medication Sig   • ARIPiprazole (ABILIFY) 20 MG tablet    • aspirin 81 MG EC tablet Take 1 tablet by mouth Daily.   • atenolol (TENORMIN) 50 MG tablet Take 1 tablet by mouth Daily.   • buPROPion XL (WELLBUTRIN XL) 150 MG 24 hr tablet    • Cholecalciferol (VITAMIN D-3) 25 MCG (1000 UT) capsule 1 capsule daily   • cyanocobalamin (VITAMIN B-12) 1000 MCG tablet TAKE ONE TABLET BY MOUTH TWICE A DAY   • famotidine (PEPCID) 20 MG tablet Take 1 tablet by mouth Daily.   • Ferrous Sulfate (IRON PO) Take  by mouth.   • gabapentin (NEURONTIN) 300 MG capsule TAKE ONE CAPSULE BY MOUTH THREE TIMES A DAY   • hydrOXYzine (ATARAX) 50 MG tablet    • isosorbide mononitrate (IMDUR) 30 MG 24 hr tablet TAKE ONE TABLET BY MOUTH DAILY   • lamoTRIgine (LaMICtal) 150 MG tablet Take 300 mg by mouth Daily.   • levothyroxine (SYNTHROID, LEVOTHROID) 88 MCG tablet Take 1 tablet by mouth Every Morning. Please call for follow-up visit   • loratadine (CLARITIN) 10 MG tablet Take 1 tablet by mouth Daily. As needed for allergies   • oxybutynin XL (DITROPAN XL) 15 MG 24 hr tablet Take 15 mg by mouth Daily.   • pramipexole (MIRAPEX) 0.5 MG tablet    • simvastatin  (ZOCOR) 20 MG tablet TAKE ONE TABLET BY MOUTH ONCE NIGHTLY   • venlafaxine XR (EFFEXOR-XR) 75 MG 24 hr capsule Take 75 mg by mouth Daily.   • nitroglycerin (NITROSTAT) 0.4 MG SL tablet Place 0.4 mg under the tongue Every 5 (Five) Minutes As Needed for Chest Pain (took at M.D  office at 0930 a.m.). Take no more than 3 doses in 15 minutes.     No current facility-administered medications on file prior to visit.      Current Medications:  Scheduled Meds:  Continuous Infusions:No current facility-administered medications for this visit.     PRN Meds:.    I have reviewed the patient's medical history in detail and updated the computerized patient record.  Review and summarization of old records includes:    Past Medical History:   Diagnosis Date   • Abnormal Pap smear of cervix    • Anemia    • Anxiety    • Arthritis    • Atherosclerotic heart disease of native coronary artery with other forms of angina pectoris (CMS/HCC)    • Bipolar I disorder, single manic episode (CMS/HCC)     depressive d(NOS)   • Cervical disc herniation    • Cervical dysplasia    • Coronary artery disease    • CTS (carpal tunnel syndrome)    • Depression     NO SUICIDAL PLANS   • Difficulty swallowing    • Diverticular disease    • Dyspepsia    • Dysphagia    • Gastric reflux    • Heart attack (CMS/HCC)    • Heart murmur    • Hiatal hernia    • High cholesterol    • History of transfusion    • 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    HPV positive on pap LGSIL   • Hyperlipidemia    • Hypertension    • Hypothyroidism    • Incontinence in female     wears pads   • Kidney disease 2017    stage 2    • Kidney disease, chronic, stage III (GFR 30-59 ml/min) (CMS/HCC)    • LGSIL on Pap smear of cervix 04/29/2016    LGSIL HPV positive   • Myocardial infarction (CMS/HCC) 2000   • Neck pain    • Obesity    • Past myocardial infarction 05/2000   • Periodic limb movement disorder    • Restless leg syndrome    • Sleep apnea     cpap   • Stress  fracture     LEFT HEEL   • Suicidal ideation 08/19/2016    history   • Swelling of ankle     right had doppler no s/s blood clot   • Thyroid nodule    • Vitamin B12 deficiency         Past Surgical History:   Procedure Laterality Date   • ANTERIOR CERVICAL DISCECTOMY W/ FUSION N/A 12/29/2016    Procedure: C3-4 anterior cervical discectomy and fusion with Depuy micro plate, ALLOGRAFT C3-4, AND HARDWARE REMOVAL C4-7.;  Surgeon: Hema Godwin MD;  Location: [facility] OR;  Service:    • CARDIAC CATHETERIZATION N/A 3/30/2017    Procedure: Left Heart Cath;  Surgeon: Tracey Vargas MD;  Location:  [facility];  Service:    • CARDIAC CATHETERIZATION N/A 3/30/2017    Procedure: Coronary angiography;  Surgeon: Tracey Vargas MD;  [facility];  Service:    • CARDIAC CATHETERIZATION N/A 3/30/2017    Procedure: Left ventriculography;  Surgeon: Tracey Vargas MD;  [facility];  Service:    • CARDIAC CATHETERIZATION  3/30/2017    Procedure: Functional Flow Grabill;  Surgeon: Tracey Vargas MD;  [facility];  Service:    • CARPAL TUNNEL RELEASE     • CERVICAL BIOPSY  [facility]    Dr. Jeffery.    • CERVICAL DISCECTOMY ANTERIOR  04/2013    C4-7   • COLONOSCOPY  04/20/2015    Diverticulosis, IH   • COLPOSCOPY W/ BIOPSY / CURETTAGE  06/17/2016    LGSIL HPV positive. Results were normal repeat pap in one year. Chronic Cervicitis   • CORONARY ANGIOPLASTY WITH STENT PLACEMENT     • ENDOSCOPY  [facility]    Normal.  Dr. Rodriguez   • ENDOSCOPY N/A 6/22/2017    Erythematous mucosa in the stomach  PATH: Chronic active gastritis, moderate with intestinal metaplasia    • GASTRIC BYPASS      Done using the sleeve technique   • HARDWARE REMOVAL  12/29/2016    cervical   • LAPAROSCOPIC GASTRIC BANDING  02/2018   • SHOULDER SURGERY Left     RCR   • TONSILLECTOMY     • TONSILLECTOMY AND ADENOIDECTOMY     • TRANSVAGINAL TAPING SUSPENSION N/A 12/6/2017     Procedure: MID URETHRAL SLING CYSTSCOPY;  Surgeon: Abby Méndez MD;  Location: [facility] OR;  Service:    • TUBAL ABDOMINAL LIGATION     • TYMPANOSTOMY TUBE PLACEMENT Right    • WRIST SURGERY Bilateral     carpal tunnel   • WRIST SURGERY      L wrist/ 2020        Social History     Occupational History   • Occupation: disabled   Tobacco Use   • Smoking status: Former Smoker     Packs/day: 1.50     Years: 20.00     Pack years: 30.00     Types: Electronic Cigarette, Cigarettes     Quit date:      Years since quittin.1   • Smokeless tobacco: Never Used   • Tobacco comment: Electronic Cigarette/ 3 mg nicotine    Substance and Sexual Activity   • Alcohol use: Yes     Comment: 2 times yearly    • Drug use: No   • Sexual activity: Yes     Partners: Male     Birth control/protection: None, Condom      Social History     Social History Narrative   • Not on file        Family History   Problem Relation Age of Onset   • Diabetes type II Mother    • Hypertension Mother    • Osteoporosis Mother    • Seizures Mother    • COPD Father    • Hypertension Father    • Lung cancer Father    • Heart attack Father    • Liver cancer Father    • Thyroid disease Sister    • Hypertension Sister    • Bipolar disorder Sister    • Depression Sister    • ADD / ADHD Sister    • No Known Problems Son    • Abnormal EKG Daughter    • Hypertension Daughter    • Bipolar disorder Daughter    • Thyroid disease Daughter    • No Known Problems Paternal Grandfather    • No Known Problems Paternal Grandmother    • No Known Problems Maternal Grandmother    • No Known Problems Maternal Grandfather    • Thyroid disease Sister    • Hypertension Sister    • Bipolar disorder Sister    • Asthma Daughter    • Breast cancer Neg Hx    • Ovarian cancer Neg Hx    • Uterine cancer Neg Hx    • Colon cancer Neg Hx    • Malig Hyperthermia Neg Hx        ROS: 14 point review of systems was performed and all other systems were reviewed and are negative  "except for documented findings in HPI and today's encounter.     Allergies: No Known Allergies  Constitutional:  Denies fever, shaking or chills   Eyes:  Denies change in visual acuity   HENT:  Denies nasal congestion or sore throat   Respiratory:  Denies cough or shortness of breath   Cardiovascular:  Denies chest pain or severe LE edema   GI:  Denies abdominal pain, nausea, vomiting, bloody stools or diarrhea   Musculoskeletal:  Numbness, tingling, pain, or loss of motor function only as noted above in history of present illness.  : Denies painful urination or hematuria  Integument:  Denies rash, lesion or ulceration   Neurologic:  Denies headache or focal weakness  Endocrine:  Denies lymphadenopathy  Psych:  Denies confusion or change in mental status   Hem:  Denies active bleeding    OBJECTIVE:  Physical Exam: 60 y.o. female  Wt Readings from Last 3 Encounters:   02/24/21 98.9 kg (218 lb)   02/05/21 100 kg (221 lb)   11/23/20 95.3 kg (210 lb)     Ht Readings from Last 1 Encounters:   02/24/21 160 cm (63\")     Body mass index is 38.62 kg/m².  Vitals:    02/24/21 1405   Temp: 98 °F (36.7 °C)     Vital signs reviewed.     General Appearance:    Alert, cooperative, in no acute distress                  Eyes: conjunctiva clear  ENT: external ears and nose atraumatic  CV: no peripheral edema  Resp: normal respiratory effort  Skin: no rashes or wounds; normal turgor  Psych: mood and affect appropriate  Lymph: no nodes appreciated  Neuro: gross sensation intact  Vascular:  Palpable peripheral pulse in noted extremity  Musculoskeletal Extremities: Skin warm and dry and intact with good pulses movement sensation calves are soft and nontender both knees have medial joint line tenderness with effusion synovitis stiffness upon ambulation calves are soft    Radiology:   Both knees     Assessment:     ICD-10-CM ICD-9-CM   1. Pain in both knees, unspecified chronicity  M25.561 719.46    M25.562         Large Joint " Arthrocentesis: R knee  Date/Time: 2/24/2021 2:07 PM  Consent given by: patient  Site marked: site marked  Timeout: Immediately prior to procedure a time out was called to verify the correct patient, procedure, equipment, support staff and site/side marked as required   Supporting Documentation  Indications: pain   Procedure Details  Location: knee - R knee  Needle size: 25 G  Approach: anteromedial  Medications administered: 80 mg methylPREDNISolone acetate 80 MG/ML; 4 mL lidocaine (cardiac)      Large Joint Arthrocentesis: L knee  Date/Time: 2/24/2021 2:07 PM  Consent given by: patient  Site marked: site marked  Timeout: Immediately prior to procedure a time out was called to verify the correct patient, procedure, equipment, support staff and site/side marked as required   Supporting Documentation  Indications: pain   Procedure Details  Location: knee - L knee  Needle size: 25 G  Approach: anteromedial  Medications administered: 4 mL lidocaine (cardiac); 80 mg methylPREDNISolone acetate 80 MG/ML  Patient tolerance: patient tolerated the procedure well with no immediate complications             MDM/Plan:   The diagnosis(es), natural history, pathophysiology and treatment for diagnosis(es) were discussed. Opportunity given and questions answered.  Biomechanics of pertinent body areas discussed.  When appropriate, the use of ambulatory aids discussed.    The diagnosis(es), natural history, pathophysiology and treatment for diagnosis(es) were discussed. Opportunity given and questions answered.  Biomechanics of pertinent body areas discussed.  When appropriate, the use of ambulatory aids discussed.  BMI:  The concept of BMI body mass index and its importance and implications discussed.    EXERCISES:  Advice on benefits of, and types of regular/moderate exercise pertaining to orthopedic diagnosis(es).  MEDICATIONS:  The risks, benefits, warnings,side effects and alternatives of medications  discussed.  Inflammation/pain control; with cold, heat, elevation and/or liniments discussed as appropriate  Cortisone Injection. See procedure note.  HOME EXERCISE/PT program encouraged  MEDICAL RECORDS reviewed from other provider(s) for past and current medical history pertinent to this complaint.          2/24/2021    Much of this encounter note is an electronic transcription/translation of spoken language to printed text. The electronic translation of spoken language may permit erroneous, or at times, nonsensical words or phrases to be inadvertently transcribed; Although I have reviewed the note for such errors, some may still exist

## 2021-02-25 RX ORDER — METHYLPREDNISOLONE ACETATE 80 MG/ML
80 INJECTION, SUSPENSION INTRA-ARTICULAR; INTRALESIONAL; INTRAMUSCULAR; SOFT TISSUE
Status: COMPLETED | OUTPATIENT
Start: 2021-02-24 | End: 2021-02-24

## 2021-02-26 DIAGNOSIS — Z12.11 COLON CANCER SCREENING: Primary | ICD-10-CM

## 2021-03-01 ENCOUNTER — TELEPHONE (OUTPATIENT)
Dept: FAMILY MEDICINE CLINIC | Facility: CLINIC | Age: 61
End: 2021-03-01

## 2021-03-01 NOTE — TELEPHONE ENCOUNTER
Caller: Keri Bhagat    Relationship to patient: Self    Best call back number: [phone]    Patient is needing: PATIENT IS UNABLE TO SEE THE FONT FOR THE STOOL SAMPLE TEST BECAUSE IT IS TOO SMALL. SHE WOULD LIKE TO KNOW IF THERE IS A DIFFERENT TYPE OF TEST. PLEASE ADVISE.    PATIENT HAS CONTROLLED SUBSTANCE CONTRACT SIGNED AND CAN DROP THAT OFF AT THE SAME TIME THAT SHE PICKS UP A NEW TEST.

## 2021-03-02 RX ORDER — LANOLIN ALCOHOL/MO/W.PET/CERES
CREAM (GRAM) TOPICAL
Qty: 60 TABLET | Refills: 4 | Status: SHIPPED | OUTPATIENT
Start: 2021-03-02 | End: 2021-09-13

## 2021-03-03 ENCOUNTER — OFFICE VISIT (OUTPATIENT)
Dept: ORTHOPEDIC SURGERY | Facility: CLINIC | Age: 61
End: 2021-03-03

## 2021-03-03 VITALS — WEIGHT: 220.4 LBS | TEMPERATURE: 97.5 F | HEIGHT: 63 IN | BODY MASS INDEX: 39.05 KG/M2

## 2021-03-03 DIAGNOSIS — M47.22 CERVICAL SPONDYLOSIS WITH RADICULOPATHY: ICD-10-CM

## 2021-03-03 DIAGNOSIS — R52 PAIN: Primary | ICD-10-CM

## 2021-03-03 DIAGNOSIS — M54.2 NECK PAIN: ICD-10-CM

## 2021-03-03 PROCEDURE — 99213 OFFICE O/P EST LOW 20 MIN: CPT | Performed by: ORTHOPAEDIC SURGERY

## 2021-03-03 PROCEDURE — 72040 X-RAY EXAM NECK SPINE 2-3 VW: CPT | Performed by: ORTHOPAEDIC SURGERY

## 2021-03-03 NOTE — PROGRESS NOTES
She complains of continued neck pain worse since November or December of last year.  See the note from last summer I did C3-4 ACDF most recently in 2017 and prior to that she is fused down to C7 and intervals.  She is complaining of neck pain radiating to the shoulders worse with activity equal parts of neck and radiating pain physical therapy helped at first.  On exam intact motor or sensory and reflex evaluation.  Cj film x-rays suggest degenerative changes at 2 3 above the old fusion and hard to tell what is going on at C7-T1.  Everything else looks solidly healed and not much change from prior films.  She is failing to improve with conservative care some going to obtain an MRI scan of the cervical spine.

## 2021-03-04 NOTE — TELEPHONE ENCOUNTER
Patient will have a C-scope in may with Dr. Rodriguez. Do you still want patient to do Ifobt?     Advise.

## 2021-03-11 ENCOUNTER — TELEPHONE (OUTPATIENT)
Dept: ORTHOPEDIC SURGERY | Facility: CLINIC | Age: 61
End: 2021-03-11

## 2021-03-11 DIAGNOSIS — M48.062 LUMBAR STENOSIS WITH NEUROGENIC CLAUDICATION: Primary | ICD-10-CM

## 2021-03-11 NOTE — TELEPHONE ENCOUNTER
Provider:     Caller: PATIENT    Relationship to Patient: SELF    : [place]- [phone]    Reason for Call: PT. # [phone]  SHE HAS MRI OF NECK ARE SCHEDULED FOR 03/23/21. PT. STATES THAT SHE IS CLAUSTRPHOBIC AND IS ASKING IF DR. CREWS CAN PRESCRIBE SOMETHING TO CALM HER.   PLEASE CALL TO ADVISE.

## 2021-03-12 RX ORDER — DIAZEPAM 5 MG/1
TABLET ORAL
Qty: 1 TABLET | Refills: 0 | Status: SHIPPED | OUTPATIENT
Start: 2021-03-12 | End: 2021-04-20

## 2021-03-23 ENCOUNTER — HOSPITAL ENCOUNTER (OUTPATIENT)
Dept: MRI IMAGING | Facility: HOSPITAL | Age: 61
Discharge: HOME | End: 2021-03-23
Admitting: ORTHOPAEDIC SURGERY

## 2021-03-23 ENCOUNTER — OFFICE VISIT (OUTPATIENT)
Dept: FAMILY MEDICINE CLINIC | Facility: CLINIC | Age: 61
End: 2021-03-23

## 2021-03-23 VITALS
BODY MASS INDEX: 40.04 KG/M2 | TEMPERATURE: 97.3 F | DIASTOLIC BLOOD PRESSURE: 86 MMHG | OXYGEN SATURATION: 95 % | HEART RATE: 62 BPM | WEIGHT: 226 LBS | HEIGHT: 63 IN | SYSTOLIC BLOOD PRESSURE: 135 MMHG

## 2021-03-23 DIAGNOSIS — R10.13 DYSPEPSIA: ICD-10-CM

## 2021-03-23 DIAGNOSIS — K21.00 GASTROESOPHAGEAL REFLUX DISEASE WITH ESOPHAGITIS, UNSPECIFIED WHETHER HEMORRHAGE: ICD-10-CM

## 2021-03-23 DIAGNOSIS — K29.00 OTHER ACUTE GASTRITIS WITHOUT HEMORRHAGE: Primary | ICD-10-CM

## 2021-03-23 DIAGNOSIS — M54.2 NECK PAIN: ICD-10-CM

## 2021-03-23 PROBLEM — R33.8 POSTOPERATIVE RETENTION OF URINE: Status: ACTIVE | Noted: 2017-12-12

## 2021-03-23 PROBLEM — N99.89 POSTOPERATIVE RETENTION OF URINE: Status: ACTIVE | Noted: 2017-12-12

## 2021-03-23 PROBLEM — Z98.890 OTHER SPECIFIED POSTPROCEDURAL STATES: Status: ACTIVE | Noted: 2018-01-29

## 2021-03-23 PROBLEM — N39.0 ACUTE URINARY TRACT INFECTION: Status: ACTIVE | Noted: 2018-01-09

## 2021-03-23 PROBLEM — R33.9 INCOMPLETE EMPTYING OF BLADDER: Status: ACTIVE | Noted: 2018-07-05

## 2021-03-23 PROBLEM — R11.10 VOMITING: Status: ACTIVE | Noted: 2018-03-13

## 2021-03-23 PROBLEM — B96.89 BACTERIAL VAGINOSIS: Status: ACTIVE | Noted: 2017-12-15

## 2021-03-23 PROBLEM — Z72.4 INAPPROPRIATE DIET AND EATING HABITS: Status: ACTIVE | Noted: 2018-02-21

## 2021-03-23 PROBLEM — N18.1 STAGE 1 CHRONIC KIDNEY DISEASE: Status: ACTIVE | Noted: 2017-10-08

## 2021-03-23 PROBLEM — T14.91XA SUICIDE ATTEMPT (HCC): Status: ACTIVE | Noted: 2019-04-23

## 2021-03-23 PROBLEM — N76.0 BACTERIAL VAGINOSIS: Status: ACTIVE | Noted: 2017-12-15

## 2021-03-23 PROCEDURE — 72141 MRI NECK SPINE W/O DYE: CPT

## 2021-03-23 PROCEDURE — 99214 OFFICE O/P EST MOD 30 MIN: CPT | Performed by: NURSE PRACTITIONER

## 2021-03-23 RX ORDER — OMEPRAZOLE 40 MG/1
40 CAPSULE, DELAYED RELEASE ORAL DAILY
Qty: 14 CAPSULE | Refills: 1 | Status: SHIPPED | OUTPATIENT
Start: 2021-03-23 | End: 2021-04-19

## 2021-03-23 RX ORDER — ALUMINA, MAGNESIA, AND SIMETHICONE 2400; 2400; 240 MG/30ML; MG/30ML; MG/30ML
20 SUSPENSION ORAL EVERY 6 HOURS PRN
Qty: 355 ML | Refills: 1 | Status: SHIPPED | OUTPATIENT
Start: 2021-03-23 | End: 2022-04-21

## 2021-03-23 NOTE — PATIENT INSTRUCTIONS
Discharge instructions    Encourage you to quit vaping    Smaller more frequent meals but more protein in healthy fat  Avoid spicy greasy foods  Caloric restriction eat mostly vegetables  Avoiding gassy vegetables for now  Healthy chicken and fish weight watchers  Consider intermittent fasting  Count calories keep them 1200 or less on average    Follow-up Dr. Smith regarding LAP-BAND and any esophagitis and results of your EGD colonoscopy    Any severe vomiting fever chills severe epigastric pain chest pain nausea vomiting radiating pains any shortness of breath heaviness in the chest nausea vomiting jaw pain shoulder pain neck pain upper back pain or other emergency room 911  Resume baby aspirin 81 mg with food enteric-coated    Follow-up

## 2021-03-23 NOTE — PROGRESS NOTES
"Chief Complaint  Heartburn    Subjective          Keri Bhagat presents to [facility]  Follow-up here for gastritis dyspepsia acid reflux.  Several days ago she developed mild belching, and acid reflux and some epigastric discomfort, she was evaluated urgent care EKG was normal she was told increase Pepcid to twice a day, and follow-up here.  She is quite a bit better still has some belching she was belching in the office today quite loudly.  But she has no fever no vomiting no epigastric pain abdominal pain no chest pain shortness of breath nausea vomiting diaphoresis no radiating pains.  She has a history of CAD which has been stable.  She quit taking her aspirin a couple months ago she takes no chronic NSAID  She does have chronic neck pain she will get an MRI today but she has not been taking NSAIDs she does take Tylenol.  No problems gabapentin for osteoarthritis pain    Heartburn        Objective   Vital Signs:   /86   Pulse 62   Temp 97.3 °F (36.3 °C) (Temporal)   Ht 160 cm (62.99\")   Wt 103 kg (226 lb)   SpO2 95%   BMI 40.04 kg/m²     Physical Exam  Vitals reviewed.   Constitutional:       Appearance: She is well-developed.      Comments: Pleasant appears well   HENT:      Head: Normocephalic.      Nose: Nose normal.   Eyes:      General: No scleral icterus.     Conjunctiva/sclera: Conjunctivae normal.      Pupils: Pupils are equal, round, and reactive to light.   Neck:      Thyroid: No thyromegaly.      Vascular: No JVD.   Cardiovascular:      Rate and Rhythm: Normal rate and regular rhythm.      Heart sounds: Normal heart sounds. No murmur heard.   No friction rub. No gallop.    Pulmonary:      Effort: Pulmonary effort is normal. No respiratory distress.      Breath sounds: Normal breath sounds. No stridor. No wheezing or rales.   Abdominal:      General: Bowel sounds are normal. There is no distension.      Palpations: Abdomen is soft.      Tenderness: There " is no abdominal tenderness.      Comments: No hepatosplenomegaly, no ascites,   Musculoskeletal:         General: No tenderness.      Cervical back: Neck supple.   Lymphadenopathy:      Cervical: No cervical adenopathy.   Skin:     General: Skin is warm and dry.      Findings: No erythema or rash.   Neurological:      Mental Status: She is alert and oriented to person, place, and time.      Deep Tendon Reflexes: Reflexes are normal and symmetric.   Psychiatric:         Behavior: Behavior normal.         Thought Content: Thought content normal.         Judgment: Judgment normal.        Result Review :                 Assessment and Plan    There are no diagnoses linked to this encounter.    Follow Up   No follow-ups on file.  Patient was given instructions and counseling regarding her condition or for health maintenance advice. Please see specific information pulled into the AVS if appropriate.     ECG was normal patient is having no exertional chest pain her symptoms are suggestive of esophagitis gastritis she is already had a EGD and she has LAP-BAND no severe pain no bleeding she is already improving    Should she have recurrent chest pain follow-up with cardiology promptly any severe chest pain shortness of breath nausea vomiting diaphoresis vomiting emergency room    She will follow-up in 6 months continue present medications gabapentin for osteoarthritis pain updated controlled subs agreement today she will continue plan

## 2021-03-24 ENCOUNTER — BULK ORDERING (OUTPATIENT)
Dept: CASE MANAGEMENT | Facility: OTHER | Age: 61
End: 2021-03-24

## 2021-03-24 DIAGNOSIS — Z23 IMMUNIZATION DUE: ICD-10-CM

## 2021-03-25 ENCOUNTER — IMMUNIZATION (OUTPATIENT)
Dept: VACCINE CLINIC | Facility: HOSPITAL | Age: 61
End: 2021-03-25

## 2021-03-25 DIAGNOSIS — Z23 IMMUNIZATION DUE: ICD-10-CM

## 2021-03-25 PROCEDURE — 0001A: CPT | Performed by: INTERNAL MEDICINE

## 2021-03-25 PROCEDURE — 91300 HC SARSCOV02 VAC 30MCG/0.3ML IM: CPT | Performed by: INTERNAL MEDICINE

## 2021-03-30 ENCOUNTER — OFFICE VISIT (OUTPATIENT)
Dept: ORTHOPEDIC SURGERY | Facility: CLINIC | Age: 61
End: 2021-03-30

## 2021-03-30 VITALS — HEIGHT: 63 IN | BODY MASS INDEX: 38.98 KG/M2 | WEIGHT: 220 LBS | TEMPERATURE: 98.6 F

## 2021-03-30 DIAGNOSIS — M47.22 CERVICAL SPONDYLOSIS WITH RADICULOPATHY: Primary | ICD-10-CM

## 2021-03-30 LAB
25(OH)D3+25(OH)D2 SERPL-MCNC: 54.4 NG/ML (ref 30–100)
ALBUMIN SERPL-MCNC: 4.2 G/DL (ref 3.5–5.2)
ALBUMIN/GLOB SERPL: 1.9 G/DL
ALP SERPL-CCNC: 93 U/L (ref 39–117)
ALT SERPL-CCNC: 24 U/L (ref 1–33)
AST SERPL-CCNC: 20 U/L (ref 1–32)
BILIRUB SERPL-MCNC: 0.2 MG/DL (ref 0–1.2)
BUN SERPL-MCNC: 9 MG/DL (ref 6–20)
BUN/CREAT SERPL: 9.6 (ref 7–25)
CALCIUM SERPL-MCNC: 9.1 MG/DL (ref 8.6–10.5)
CHLORIDE SERPL-SCNC: 104 MMOL/L (ref 98–107)
CHOLEST SERPL-MCNC: 185 MG/DL (ref 0–200)
CO2 SERPL-SCNC: 26.4 MMOL/L (ref 22–29)
CREAT SERPL-MCNC: 0.94 MG/DL (ref 0.57–1)
GLOBULIN SER CALC-MCNC: 2.2 GM/DL
GLUCOSE SERPL-MCNC: 97 MG/DL (ref 65–99)
HDLC SERPL-MCNC: 69 MG/DL (ref 40–60)
IMP & REVIEW OF LAB RESULTS: NORMAL
LDLC SERPL CALC-MCNC: 96 MG/DL (ref 0–100)
POTASSIUM SERPL-SCNC: 4.7 MMOL/L (ref 3.5–5.2)
PROLACTIN SERPL-MCNC: 7.8 NG/ML (ref 4.8–23.3)
PROT SERPL-MCNC: 6.4 G/DL (ref 6–8.5)
SODIUM SERPL-SCNC: 141 MMOL/L (ref 136–145)
T4 FREE SERPL-MCNC: 1.1 NG/DL (ref 0.93–1.7)
TRIGL SERPL-MCNC: 112 MG/DL (ref 0–150)
TSH SERPL DL<=0.005 MIU/L-ACNC: 1.18 UIU/ML (ref 0.27–4.2)
VLDLC SERPL CALC-MCNC: 20 MG/DL (ref 5–40)

## 2021-03-30 PROCEDURE — 99213 OFFICE O/P EST LOW 20 MIN: CPT | Performed by: ORTHOPAEDIC SURGERY

## 2021-03-31 DIAGNOSIS — M48.02 CERVICAL SPINAL STENOSIS: Primary | ICD-10-CM

## 2021-03-31 NOTE — PROGRESS NOTES
New patient or new problem visit    CC: Neck pain occipital headache    HPI: She has persistent neck pain occipital headache.  She is undergone C3-7 ACDF in stages with good result.    PFSH: See attached    ROS: No fever or chills, no balance difficulties bowel or bladder complaints    PE: Anterior wound is well-healed.  Minimal mid to upper cervical tenderness.  Good strength in the upper extremities bilaterally.  Balance appears unremarkable.    XRAY: Prior x-rays show solid fusion C3-7 with a plate in place at C3-4.  Degenerative changes are noted at C2-3.  MRI scan shows broad-based disc bulge at C2-3 and looks fine otherwise.    Other: n/a    Impression: Cervical 2 3 herniated disc and degenerative disc disease status post C3-7 anterior cervical discectomy and fusion with remaining plate at see 3 4..    Plan: She is a candidate for C2-3 anterior cervical discectomy and fusion.  I discussed the risk and benefits of which she is well aware.  We need to make a new incision just under the jaw.  She will think about it and get back to me.

## 2021-04-02 RX ORDER — SIMVASTATIN 20 MG
TABLET ORAL
Qty: 90 TABLET | Refills: 0 | Status: SHIPPED | OUTPATIENT
Start: 2021-04-02 | End: 2022-04-11

## 2021-04-14 ENCOUNTER — TELEPHONE (OUTPATIENT)
Dept: ENDOCRINOLOGY | Age: 61
End: 2021-04-14

## 2021-04-14 DIAGNOSIS — M54.12 CERVICAL RADICULOPATHY: ICD-10-CM

## 2021-04-14 NOTE — TELEPHONE ENCOUNTER
4/14 called and sw pt reg her labs     ----- Message from Maxwell Martin MD sent at 4/3/2021  8:04 PM EDT -----  HDL okay.  LDL lower.  Continue Zocor 20 mg/day.Normal thyroid function tests.  Continue levothyroxine 88 mcg/day.Normal vitamin D.  Continue vitamin D3 1000 units/dayCopy of labs sent to James Epley, NPPlease notify patient of results

## 2021-04-15 ENCOUNTER — IMMUNIZATION (OUTPATIENT)
Dept: VACCINE CLINIC | Facility: HOSPITAL | Age: 61
End: 2021-04-15

## 2021-04-15 DIAGNOSIS — M54.12 CERVICAL RADICULOPATHY: ICD-10-CM

## 2021-04-15 PROCEDURE — 0002A: CPT | Performed by: INTERNAL MEDICINE

## 2021-04-15 PROCEDURE — 91300 HC SARSCOV02 VAC 30MCG/0.3ML IM: CPT | Performed by: INTERNAL MEDICINE

## 2021-04-15 RX ORDER — GABAPENTIN 300 MG/1
CAPSULE ORAL
Qty: 90 CAPSULE | Refills: 0 | Status: SHIPPED | OUTPATIENT
Start: 2021-04-15 | End: 2021-04-15 | Stop reason: SDUPTHER

## 2021-04-15 RX ORDER — GABAPENTIN 300 MG/1
300 CAPSULE ORAL 3 TIMES DAILY
Qty: 90 CAPSULE | Refills: 0 | Status: SHIPPED | OUTPATIENT
Start: 2021-04-15 | End: 2021-06-01

## 2021-04-15 NOTE — TELEPHONE ENCOUNTER
Caller: Keri Bhagat    Relationship: Self    Best call back number: [phone]    Medication needed:   Requested Prescriptions     Pending Prescriptions Disp Refills   • gabapentin (NEURONTIN) 300 MG capsule 90 capsule 0     Sig: Take 1 capsule by mouth 3 (Three) Times a Day.       When do you need the refill by: ASAP    What additional details did the patient provide when requesting the medication:   HAS 1 LEFT    Does the patient have less than a 3 day supply:  [x] Yes  [] No    What is the patient's preferred pharmacy:        [facility] FRAG[facility] [address] [place] [address] 57[address] [facility] AT [facility] - [phone] [facility] [phone] FX

## 2021-04-19 RX ORDER — OMEPRAZOLE 40 MG/1
CAPSULE, DELAYED RELEASE ORAL
Qty: 14 CAPSULE | Refills: 0 | Status: SHIPPED | OUTPATIENT
Start: 2021-04-19 | End: 2021-04-30

## 2021-04-20 ENCOUNTER — HOSPITAL ENCOUNTER (OUTPATIENT)
Dept: GENERAL RADIOLOGY | Facility: HOSPITAL | Age: 61
Discharge: HOME | End: 2021-04-20

## 2021-04-20 ENCOUNTER — HOSPITAL ENCOUNTER (OUTPATIENT)
Dept: PAIN MEDICINE | Facility: HOSPITAL | Age: 61
Discharge: HOME | End: 2021-04-20

## 2021-04-20 ENCOUNTER — ANESTHESIA (OUTPATIENT)
Dept: PAIN MEDICINE | Facility: HOSPITAL | Age: 61
End: 2021-04-20

## 2021-04-20 ENCOUNTER — ANESTHESIA EVENT (OUTPATIENT)
Dept: PAIN MEDICINE | Facility: HOSPITAL | Age: 61
End: 2021-04-20

## 2021-04-20 VITALS
RESPIRATION RATE: 14 BRPM | SYSTOLIC BLOOD PRESSURE: 138 MMHG | OXYGEN SATURATION: 95 % | HEART RATE: 61 BPM | DIASTOLIC BLOOD PRESSURE: 90 MMHG

## 2021-04-20 DIAGNOSIS — R52 PAIN: ICD-10-CM

## 2021-04-20 DIAGNOSIS — M54.12 CERVICAL RADICULOPATHY: Primary | ICD-10-CM

## 2021-04-20 PROCEDURE — 25010000002 DEXAMETHASONE SODIUM PHOSPHATE 10 MG/ML SOLUTION: Performed by: ANESTHESIOLOGY

## 2021-04-20 PROCEDURE — 77003 FLUOROGUIDE FOR SPINE INJECT: CPT

## 2021-04-20 PROCEDURE — 25010000002 MIDAZOLAM PER 1 MG: Performed by: ANESTHESIOLOGY

## 2021-04-20 RX ORDER — LIDOCAINE HYDROCHLORIDE 10 MG/ML
1 INJECTION, SOLUTION INFILTRATION; PERINEURAL ONCE AS NEEDED
Status: DISCONTINUED | OUTPATIENT
Start: 2021-04-20 | End: 2021-04-22 | Stop reason: HOSPADM

## 2021-04-20 RX ORDER — DEXAMETHASONE SODIUM PHOSPHATE 10 MG/ML
10 INJECTION, SOLUTION INTRAMUSCULAR; INTRAVENOUS ONCE
Status: COMPLETED | OUTPATIENT
Start: 2021-04-20 | End: 2021-04-20

## 2021-04-20 RX ORDER — SODIUM CHLORIDE 0.9 % (FLUSH) 0.9 %
1-10 SYRINGE (ML) INJECTION AS NEEDED
Status: DISCONTINUED | OUTPATIENT
Start: 2021-04-20 | End: 2021-04-22 | Stop reason: HOSPADM

## 2021-04-20 RX ORDER — MIDAZOLAM HYDROCHLORIDE 1 MG/ML
1 INJECTION INTRAMUSCULAR; INTRAVENOUS AS NEEDED
Status: DISCONTINUED | OUTPATIENT
Start: 2021-04-20 | End: 2021-04-22 | Stop reason: HOSPADM

## 2021-04-20 RX ORDER — FENTANYL CITRATE 50 UG/ML
50 INJECTION, SOLUTION INTRAMUSCULAR; INTRAVENOUS AS NEEDED
Status: DISCONTINUED | OUTPATIENT
Start: 2021-04-20 | End: 2021-04-22 | Stop reason: HOSPADM

## 2021-04-20 RX ADMIN — DEXAMETHASONE SODIUM PHOSPHATE 10 MG: 10 INJECTION, SOLUTION INTRAMUSCULAR; INTRAVENOUS at 13:33

## 2021-04-20 RX ADMIN — MIDAZOLAM 2 MG: 1 INJECTION INTRAMUSCULAR; INTRAVENOUS at 13:31

## 2021-04-20 NOTE — DISCHARGE INSTRUCTIONS
What can I expect after the procedure?  Follow these instructions at home:  Injection site care  1. You may remove the bandage (dressing) after 24 hours.  2. Check your injection site every day for signs of infection. Check for:  ? Redness, swelling, or pain.  ? Fluid or blood.  ? Warmth.  ? Pus or a bad smell.  Managing pain, stiffness, and swelling  1. For 24 hours after the procedure:  ? Avoid using heat on the injection site.  ? Do not take baths, swim, or use a hot tub. You may take a shower.   2. If directed, put ice on the injection site. To do this:     3.    ? Put ice in a plastic bag.  ? Place a towel between your skin and the bag.  ? Leave the ice on for 20 minutes, 2-3 times a day.     Activity  · NO DRIVING FOR THE REST OF THE DAY IF receiving a sedative during your procedure.  · Return to your normal activities the next day as tolerated.  General instructions  · The injection site may feel sore.  · Take over-the-counter medications as directed on packaging and prescription medicines only as told by your health care provider who prescribes these.  · Keep all follow-up visits as told by your health care provider.   Contact a health care provider if:  1. You have any of these signs of infection:  ? Redness, swelling, or pain around your injection site.  ? Fluid or blood coming from your injection site.  ? Warmth coming from your injection site.  ? Pus or a bad smell coming from your injection site.  ? A fever.  2. You continue to have pain and soreness around the injection site, even after taking over-the-counter pain medicine.  3. You have severe, sudden, or lasting nausea or vomiting.  Get help right away if:  · You have severe pain at the injection site that is not relieved by medicines.  · You develop a severe headache or a stiff neck.  · You become sensitive to light.  · You have any new numbness or weakness in your legs or arms.  · You lose control of your bladder or bowel movements.  · You have  trouble breathing.  Summary  · An epidural steroid block is an injection of steroid medication and numbing medicine that is given into the epidural space.  This injection helps relieve pain caused by an irritated or swollen nerve root.Cervical epidural steroid injection instructions  Plan includes:  1.  Cervical epidural steroid injections, up to 3, spaced 4 weeks apart.  If pain control is acceptable after 1 or 2 injections, it was discussed with the patient that they may return for the subsequent injections if and when their pain returns.  The risks were discussed with the patient including failure of relief, worsening pain, Headache (post dural puncture headache), bleeding (epidural hematoma) and infection (epidural abscess or skin infection).  2.  Physical therapy exercises at home as prescribed by physical therapy or from the pain clinic handout (given to the patient).  Continuation of these exercises every day, or multiple times per week, even when the patient has good pain relief, was stressed to the patient as a preventative measure to decrease the frequency and severity of future pain episodes.  3.  Continue pain medicines as already prescribed.  If patient not currently taking any, it is recommended to begin Acetaminophen 1000 mg po q 8 hours.  If other medicines containing Acetaminophen are currently prescribed, maintain daily dose at 3000 mg.    4.  If they can tolerate NSAIDS, it is recommended to take Ibuprofen 600 mg po q 6 hours for 7 days during pain exacerbations.  Alternatively, they may substitute an NSAID of their choice (e.g. Aleve).  This may be taken at the same time as Acetaminophen.  5.  Heat and ice to the affected area as tolerated for pain control.  It was discussed that heating pads can cause burns.  6.  Low impact exercise such as walking or water exercise was recommended to maintain overall health and aid in weight control.   7.  Follow up as needed for subsequent injections.  · 8.   Patient was counseled to abstain from tobacco products.

## 2021-04-20 NOTE — ANESTHESIA PROCEDURE NOTES
PAIN Epidural block    Pre-sedation assessment completed: 4/20/2021 1:26 PM    Patient reassessed immediately prior to procedure    Patient location during procedure: pain clinic  Start Time: 4/20/2021 1:26 PM  Stop Time: 4/20/2021 1:34 PM  Indication:procedure for pain  Performed By  Anesthesiologist: Jagruti Dean MD  Preanesthetic Checklist  Completed: patient identified, IV checked, site marked, risks and benefits discussed, surgical consent, monitors and equipment checked, pre-op evaluation and timeout performed  Additional Notes  Fluoro used.    Cervical spondylosis w/ radiculopathy.    Prep:  Pt Position:prone  Sterile Tech:cap, gloves, mask and sterile barrier  Prep:chlorhexidine gluconate and isopropyl alcohol  Monitoring:blood pressure monitoring, continuous pulse oximetry and EKG  Procedure:Sedation: yes     Approach:midline  Guidance: fluoroscopy  Location:cervical  Level:6-7  Needle Type:Tuohy  Needle Gauge:20  Aspiration:negative  Medications:  Depomedrol:80  Preservative Free Saline:3mL    Post Assessment:  Dressing:occlusive dressing applied  Pt Tolerance:patient tolerated the procedure well with no apparent complications  Complications:no

## 2021-04-20 NOTE — H&P
[facility]    History and Physical    Patient Name: Keri Bhagat  :  1960  MRN:  [id number]  Date of Admission: 2021    Subjective     Patient is a 60 y.o. female presents with chief complaint of chronic, moderate neck pain.  Pain radiates into her shoulders & right arm.  Onset of symptoms was gradual starting 5 months ago.  Symptoms are associated/aggravated by nothing in particular. Symptoms improve with nothing - though initially helped by PT.  She has had a prior C3-7 ACDF & had been doing well until 5 months ago.  Has tried conservative therapies.  In discussion w/ dr. Godwin re: cervical discectomy & fusion.Presents for MARLA.      Per Dr. Godwin's note:  Prior x-rays show solid fusion C3-7 with a plate in place at C3-4.  Degenerative changes are noted at C2-3.  MRI scan shows broad-based disc bulge at C2-3 and looks fine otherwise.    The following portions of the patients history were reviewed and updated as appropriate: current medications, allergies, past medical history, past surgical history, past family history, past social history and problem list                Objective     Past Medical History:   Past Medical History:   Diagnosis Date   • Abnormal Pap smear of cervix    • Anemia    • Anxiety    • Arthritis    • Atherosclerotic heart disease of native coronary artery with other forms of angina pectoris (CMS/HCC)    • Bipolar I disorder, single manic episode (CMS/HCC)     depressive d(NOS)   • Cervical disc herniation    • Cervical dysplasia    • Coronary artery disease    • CTS (carpal tunnel syndrome)    • Depression     NO SUICIDAL PLANS   • Difficulty swallowing    • Diverticular disease    • Dyspepsia    • Dysphagia    • Gastric reflux    • GERD (gastroesophageal reflux disease)    • Heart attack (CMS/HCC)    • Heart murmur    • Hiatal hernia    • High cholesterol    • History of transfusion    • HPV (human papilloma virus) infection 2016    HPV positive on pap LGSIL    • Hyperlipidemia    • Hypertension    • Hypothyroidism    • Incontinence in female     wears pads   • Kidney disease 2017    stage 2    • Kidney disease, chronic, stage III (GFR 30-59 ml/min) (CMS/[facility])    • LGSIL on Pap smear of cervix 04/29/2016    LGSIL HPV positive   • Myocardial infarction (CMS/[facility]) 2000   • Neck pain    • Obesity    • Past myocardial infarction 05/2000   • Periodic limb movement disorder    • Restless leg syndrome    • Sleep apnea     cpap   • Stress fracture     LEFT HEEL   • Suicidal ideation 08/19/2016    history   • Swelling of ankle     right had doppler no s/s blood clot   • Thyroid nodule    • Vitamin B12 deficiency      Past Surgical History:   Past Surgical History:   Procedure Laterality Date   • ANTERIOR CERVICAL DISCECTOMY W/ FUSION N/A 12/29/2016    Procedure: C3-4 anterior cervical discectomy and fusion with Depuy micro plate, ALLOGRAFT C3-4, AND HARDWARE REMOVAL C4-7.;  Surgeon: Hema Godwin MD;  Location: [facility] OR;  Service:    • CARDIAC CATHETERIZATION N/A 3/30/2017    Procedure: Left Heart Cath;  Surgeon: Tracey Vargas MD;  Location: [facility] CATH INVASIVE LOCATION;  Service:    • CARDIAC CATHETERIZATION N/A 3/30/2017    Procedure: Coronary angiography;  Surgeon: Tracey Vargas MD;  Location: [facility] CATH INVASIVE LOC[facility];  Service:    • CARDIAC CATHETERIZATION N/A 3/30/2017    Procedure: Left ventriculography;  Surgeon: Tracey Vargas MD;  Location:  [facility] CATH INVASIVE LOCATION;  Service:    • CARDIAC CATHETERIZATION  3/30/2017    Procedure: Functional Flow [place];  Surgeon: Tracey Vargas MD;  Location: [facility] CATH INVASIVE LOCATION;  Service:    • CARPAL TUNNEL RELEASE     • CERVICAL BIOPSY  MMXVI    Dr. Jeffery.    • CERVICAL DISCECTOMY ANTERIOR  04/2013    C4-7   • COLONOSCOPY  04/20/2015    Diverticulosis, IH   • COLONOSCOPY  03/09/2021   • COLPOSCOPY W/ BIOPSY / CURETTAGE  06/17/2016    LGSIL HPV positive. Results were normal repeat pap in one year.  Chronic Cervicitis   • CORONARY ANGIOPLASTY WITH STENT PLACEMENT     • ENDOSCOPY  MMXV    Normal.  Dr. Rodriguez   • ENDOSCOPY N/A 6/22/2017    Erythematous mucosa in the stomach  PATH: Chronic active gastritis, moderate with intestinal metaplasia    • GASTRIC BYPASS      Done using the sleeve technique   • HARDWARE REMOVAL  12/29/2016    cervical   • HERNIA REPAIR     • LAPAROSCOPIC GASTRIC BANDING  02/2018   • SHOULDER SURGERY Left     RCR   • TONSILLECTOMY     • TONSILLECTOMY AND ADENOIDECTOMY     • TRANSVAGINAL TAPING SUSPENSION N/A 12/6/2017    Procedure: MID URETHRAL SLING CYSTSCOPY;  Surgeon: Abby Méndez MD;  Location: [facility];  Service:    • TUBAL ABDOMINAL LIGATION     • TYMPANOSTOMY TUBE PLACEMENT Right    • WRIST SURGERY Bilateral     carpal tunnel   • WRIST SURGERY      L wrist/ 4/2020     Family History:   Family History   Problem Relation Age of Onset   • Diabetes type II Mother    • Hypertension Mother    • Osteoporosis Mother    • Seizures Mother    • COPD Father    • Hypertension Father    • Lung cancer Father    • Heart attack Father    • Liver cancer Father    • Thyroid disease Sister    • Hypertension Sister    • Bipolar disorder Sister    • Depression Sister    • ADD / ADHD Sister    • No Known Problems Son    • Abnormal EKG Daughter    • Hypertension Daughter    • Bipolar disorder Daughter    • Thyroid disease Daughter    • No Known Problems Paternal Grandfather    • No Known Problems Paternal Grandmother    • No Known Problems Maternal Grandmother    • No Known Problems Maternal Grandfather    • Thyroid disease Sister    • Hypertension Sister    • Bipolar disorder Sister    • Asthma Daughter    • Breast cancer Neg Hx    • Ovarian cancer Neg Hx    • Uterine cancer Neg Hx    • Colon cancer Neg Hx    • Malig Hyperthermia Neg Hx      Social History:   Social History     Socioeconomic History   • Marital status:      Spouse name: Not on file   • Number of children: 3   •  Years of education: 12   • Highest education level: Not on file   Tobacco Use   • Smoking status: Former Smoker     Packs/day: 1.50     Years: 20.00     Pack years: 30.00     Types: Electronic Cigarette, Cigarettes     Quit date:      Years since quittin.3   • Smokeless tobacco: Never Used   • Tobacco comment: Electronic Cigarette/ 3 mg nicotine    Vaping Use   • Vaping Use: Every day   • Substances: Nicotine, Flavoring   • Devices: Refillable tank   Substance and Sexual Activity   • Alcohol use: Yes     Comment: 2 times yearly    • Drug use: No   • Sexual activity: Yes     Partners: Male     Birth control/protection: None, Condom       Vital Signs Range for the last 24 hours  Temperature:     Temp Source:     BP:     Pulse:     Respirations:     SPO2:     O2 Amount (l/min):     O2 Devices     Weight:           --------------------------------------------------------------------------------    Current Outpatient Medications   Medication Sig Dispense Refill   • aluminum-magnesium hydroxide-simethicone (Mylanta Maximum Strength) 400-400-40 MG/5ML suspension Take 20 mL by mouth Every 6 (Six) Hours As Needed for Indigestion or Heartburn. 355 mL 1   • ARIPiprazole (ABILIFY) 20 MG tablet      • atenolol (TENORMIN) 50 MG tablet Take 1 tablet by mouth Daily. 90 tablet 1   • buPROPion XL (WELLBUTRIN XL) 150 MG 24 hr tablet      • Cholecalciferol (VITAMIN D-3) 25 MCG (1000 UT) capsule 1 capsule daily     • famotidine (PEPCID) 20 MG tablet Take 1 tablet by mouth Daily.     • Ferrous Sulfate (IRON PO) Take  by mouth.     • gabapentin (NEURONTIN) 300 MG capsule Take 1 capsule by mouth 3 (Three) Times a Day. 90 capsule 0   • hydrOXYzine (ATARAX) 50 MG tablet      • isosorbide mononitrate (IMDUR) 30 MG 24 hr tablet TAKE ONE TABLET BY MOUTH DAILY 90 tablet 1   • lamoTRIgine (LaMICtal) 150 MG tablet Take 300 mg by mouth Daily.     • levothyroxine (SYNTHROID, LEVOTHROID) 88 MCG tablet Take 1 tablet by mouth Every Morning.  Please call for follow-up visit 90 tablet 0   • loratadine (CLARITIN) 10 MG tablet Take 1 tablet by mouth Daily. As needed for allergies 30 tablet 11   • nitroglycerin (NITROSTAT) 0.4 MG SL tablet Place 0.4 mg under the tongue Every 5 (Five) Minutes As Needed for Chest Pain (took at M.D  office at 0930 a.m.). Take no more than 3 doses in 15 minutes.     • omeprazole (priLOSEC) 40 MG capsule TAKE ONE CAPSULE BY MOUTH DAILY 14 capsule 0   • oxybutynin XL (DITROPAN XL) 15 MG 24 hr tablet Take 15 mg by mouth Daily.     • pramipexole (MIRAPEX) 0.5 MG tablet      • simvastatin (ZOCOR) 20 MG tablet TAKE ONE TABLET BY MOUTH ONCE NIGHTLY 90 tablet 0   • venlafaxine XR (EFFEXOR-XR) 75 MG 24 hr capsule Take 75 mg by mouth Daily.     • vitamin B-12 (CYANOCOBALAMIN) 1000 MCG tablet TAKE ONE TABLET BY MOUTH TWICE A DAY 60 tablet 4   • aspirin 81 MG EC tablet Take 1 tablet by mouth Daily. 30 tablet 2     Current Facility-Administered Medications   Medication Dose Route Frequency Provider Last Rate Last Admin   • dexamethasone sodium phosphate injection 10 mg  10 mg Epidural Once Jagruti Dean MD       • fentaNYL citrate (PF) (SUBLIMAZE) injection 50 mcg  50 mcg Intravenous PRN Jagruti Dean MD       • iopamidol (ISOVUE-M 200) injection 41%  12 mL Epidural Once in imaging Jagruti Dean MD       • lidocaine (XYLOCAINE) 1 % injection 1 mL  1 mL Intradermal Once PRN Jagruti Dean MD       • midazolam (VERSED) injection 1 mg  1 mg Intravenous PRN Jagruti Dean MD       • sodium chloride 0.9 % flush 1-10 mL  1-10 mL Intravenous PRN Jagruti Dean MD           --------------------------------------------------------------------------------  Assessment/Plan      Anesthesia Evaluation     Patient summary reviewed and Nursing notes reviewed                Airway   Mallampati: II  Dental      Pulmonary    (+) a smoker Former, sleep apnea,   Cardiovascular    Exercise tolerance: good (4-7 METS)    (+) hypertension, valvular problems/murmurs, past MI , CAD, angina, hyperlipidemia,       Neuro/Psych  (+) syncope, numbness, psychiatric history Bipolar and Anxiety,     GI/Hepatic/Renal/Endo    (+) morbid obesity, hiatal hernia, GERD,  renal disease, thyroid problem hypothyroidism    Musculoskeletal     (+) back pain, chronic pain, neck pain, radiculopathy  Abdominal    Substance History - negative use     OB/GYN negative ob/gyn ROS         Other   arthritis,    history of cancer               Diagnosis and Plan    Treatment Plan  ASA 3      Procedures: Cervical Epidural Steroid Injection(MARLA), With fluoroscopy,       Anesthetic plan and risks discussed with patient.          Diagnosis     * Cervical disc disorder [M50.90]     * Cervical spondylosis with radiculopathy [M47.22]

## 2021-04-30 RX ORDER — OMEPRAZOLE 40 MG/1
CAPSULE, DELAYED RELEASE ORAL
Qty: 14 CAPSULE | Refills: 0 | Status: SHIPPED | OUTPATIENT
Start: 2021-04-30 | End: 2021-05-03 | Stop reason: ALTCHOICE

## 2021-05-03 ENCOUNTER — OFFICE VISIT (OUTPATIENT)
Dept: GASTROENTEROLOGY | Facility: CLINIC | Age: 61
End: 2021-05-03

## 2021-05-03 VITALS
HEIGHT: 63 IN | DIASTOLIC BLOOD PRESSURE: 90 MMHG | BODY MASS INDEX: 39.65 KG/M2 | TEMPERATURE: 97 F | SYSTOLIC BLOOD PRESSURE: 140 MMHG | WEIGHT: 223.8 LBS

## 2021-05-03 DIAGNOSIS — K63.5 POLYP OF COLON, UNSPECIFIED PART OF COLON, UNSPECIFIED TYPE: ICD-10-CM

## 2021-05-03 DIAGNOSIS — D50.9 IRON DEFICIENCY ANEMIA, UNSPECIFIED IRON DEFICIENCY ANEMIA TYPE: ICD-10-CM

## 2021-05-03 DIAGNOSIS — R13.10 DYSPHAGIA, UNSPECIFIED TYPE: ICD-10-CM

## 2021-05-03 DIAGNOSIS — R10.10 PAIN OF UPPER ABDOMEN: Primary | ICD-10-CM

## 2021-05-03 DIAGNOSIS — R10.13 DYSPEPSIA: ICD-10-CM

## 2021-05-03 LAB
ALBUMIN SERPL-MCNC: 4.6 G/DL (ref 3.5–5.2)
ALBUMIN/GLOB SERPL: 1.9 G/DL
ALP SERPL-CCNC: 89 U/L (ref 39–117)
ALT SERPL-CCNC: 33 U/L (ref 1–33)
AMYLASE SERPL-CCNC: 30 U/L (ref 28–100)
AST SERPL-CCNC: 25 U/L (ref 1–32)
BASOPHILS # BLD AUTO: 0.03 10*3/MM3 (ref 0–0.2)
BASOPHILS NFR BLD AUTO: 0.4 % (ref 0–1.5)
BILIRUB SERPL-MCNC: 0.2 MG/DL (ref 0–1.2)
BUN SERPL-MCNC: 12 MG/DL (ref 6–20)
BUN/CREAT SERPL: 11.5 (ref 7–25)
CALCIUM SERPL-MCNC: 10.1 MG/DL (ref 8.6–10.5)
CHLORIDE SERPL-SCNC: 102 MMOL/L (ref 98–107)
CO2 SERPL-SCNC: 27.8 MMOL/L (ref 22–29)
CREAT SERPL-MCNC: 1.04 MG/DL (ref 0.57–1)
EOSINOPHIL # BLD AUTO: 0.24 10*3/MM3 (ref 0–0.4)
EOSINOPHIL NFR BLD AUTO: 2.9 % (ref 0.3–6.2)
ERYTHROCYTE [DISTWIDTH] IN BLOOD BY AUTOMATED COUNT: 18.2 % (ref 12.3–15.4)
FERRITIN SERPL-MCNC: 39.9 NG/ML (ref 13–150)
GLOBULIN SER CALC-MCNC: 2.4 GM/DL
GLUCOSE SERPL-MCNC: 93 MG/DL (ref 65–99)
HCT VFR BLD AUTO: 41.7 % (ref 34–46.6)
HGB BLD-MCNC: 13.5 G/DL (ref 12–15.9)
IMM GRANULOCYTES # BLD AUTO: 0.04 10*3/MM3 (ref 0–0.05)
IMM GRANULOCYTES NFR BLD AUTO: 0.5 % (ref 0–0.5)
IRON SATN MFR SERPL: 31 % (ref 20–50)
IRON SERPL-MCNC: 156 MCG/DL (ref 37–145)
LIPASE SERPL-CCNC: 18 U/L (ref 13–60)
LYMPHOCYTES # BLD AUTO: 3.11 10*3/MM3 (ref 0.7–3.1)
LYMPHOCYTES NFR BLD AUTO: 37.3 % (ref 19.6–45.3)
MCH RBC QN AUTO: 28.1 PG (ref 26.6–33)
MCHC RBC AUTO-ENTMCNC: 32.4 G/DL (ref 31.5–35.7)
MCV RBC AUTO: 86.9 FL (ref 79–97)
MONOCYTES # BLD AUTO: 0.63 10*3/MM3 (ref 0.1–0.9)
MONOCYTES NFR BLD AUTO: 7.6 % (ref 5–12)
NEUTROPHILS # BLD AUTO: 4.28 10*3/MM3 (ref 1.7–7)
NEUTROPHILS NFR BLD AUTO: 51.3 % (ref 42.7–76)
NRBC BLD AUTO-RTO: 0 /100 WBC (ref 0–0.2)
PLATELET # BLD AUTO: 321 10*3/MM3 (ref 140–450)
POTASSIUM SERPL-SCNC: 5.1 MMOL/L (ref 3.5–5.2)
PROT SERPL-MCNC: 7 G/DL (ref 6–8.5)
RBC # BLD AUTO: 4.8 10*6/MM3 (ref 3.77–5.28)
SODIUM SERPL-SCNC: 140 MMOL/L (ref 136–145)
TIBC SERPL-MCNC: 505 MCG/DL
TSH SERPL DL<=0.005 MIU/L-ACNC: 0.83 UIU/ML (ref 0.27–4.2)
UIBC SERPL-MCNC: 349 MCG/DL (ref 112–346)
VIT B12 SERPL-MCNC: 1405 PG/ML (ref 211–946)
WBC # BLD AUTO: 8.33 10*3/MM3 (ref 3.4–10.8)

## 2021-05-03 PROCEDURE — 99204 OFFICE O/P NEW MOD 45 MIN: CPT | Performed by: INTERNAL MEDICINE

## 2021-05-03 RX ORDER — PANTOPRAZOLE SODIUM 40 MG/1
40 TABLET, DELAYED RELEASE ORAL DAILY
Qty: 30 TABLET | Refills: 11 | Status: SHIPPED | OUTPATIENT
Start: 2021-05-03 | End: 2021-07-01 | Stop reason: SDUPTHER

## 2021-05-03 NOTE — PROGRESS NOTES
Chief Complaint   Patient presents with   • Heartburn   • Diarrhea       History of Present Illness:   60 y.o. female who had a lapband placed in 2019 at [facility] and was happy but her weight is now increasing and so is unhappy now.        I have seen her in the past with intermittent dysphagia.  I last did an EGD in 6 of 2017 when I dilated her esophagus with a 56 [place] dilator.       She is having extreme heartburn and loose bowels. She averages 4-5 BM/day. No rectal bleeding or melena. No constiatpion. She has discomfort in the epigastric and substernal area. It is worse after drinking water. May be worse after eating. No change with exercise. She does have some solids dysphagia. She may vomit. She saw the Bariatric surgeon at [facility] and he did an EGD and colonoscopy. There is no fluid to take out of the band. She is on Omeprazole 40 mg/day and Pepcid 20 mg BID. Takes ibuprofen. She vapes. Occasional ETOH.     Past Medical History:   Diagnosis Date   • Abnormal Pap smear of cervix    • Anemia    • Anxiety    • Arthritis    • Atherosclerotic heart disease of native coronary artery with other forms of angina pectoris (CMS/HCC)    • Bipolar I disorder, single manic episode (CMS/HCC)     depressive d(NOS)   • Cervical disc herniation    • Cervical dysplasia    • Coronary artery disease    • CTS (carpal tunnel syndrome)    • Depression     NO SUICIDAL PLANS   • Difficulty swallowing    • Diverticular disease    • Dyspepsia    • Dysphagia    • Gastric reflux    • GERD (gastroesophageal reflux disease)    • Heart attack (CMS/HCC)    • Heart murmur    • Hiatal hernia    • High cholesterol    • History of transfusion    • 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    HPV positive on pap LGSIL   • Hyperlipidemia    • Hypertension    • Hypothyroidism    • Incontinence in female     wears pads   • Kidney disease 2017    stage 2    • Kidney disease, chronic, stage III (GFR 30-59 ml/min) (CMS/HCC)    • LGSIL on Pap  smear of cervix 04/29/2016    LGSIL HPV positive   • Myocardial infarction (CMS/HCC) 2000   • Neck pain    • Obesity    • Past myocardial infarction 05/2000   • Periodic limb movement disorder    • Restless leg syndrome    • Sleep apnea     cpap   • Stress fracture     LEFT HEEL   • Suicidal ideation 08/19/2016    history   • Swelling of ankle     right had doppler no s/s blood clot   • Thyroid nodule    • Vitamin B12 deficiency        Past Surgical History:   Procedure Laterality Date   • ANTERIOR CERVICAL DISCECTOMY W/ FUSION N/A 12/29/2016    Procedure: C3-4 anterior cervical discectomy and fusion with Depuy micro plate, ALLOGRAFT C3-4, AND HARDWARE REMOVAL C4-7.;  Surgeon: Hema Godwin MD;  Location: [facility] MAIN OR;  Service:    • CARDIAC CATHETERIZATION N/A 3/30/2017    Procedure: Left Heart Cath;  Surgeon: Tracey Vargas MD;  Location:  [facility] C[facility]VASIVE [facility];  Service:    • CARDIAC CATHETERIZATION N/A 3/30/2017    Procedure: Coronary angiography;  Surgeon: Tracey Vargas MD;  Location:  [facility];  Service:    • CARDIAC CATHETERIZATION N/A 3/30/2017    Procedure: Left ventriculography;  Surgeon: Tracey Vargas MD;  Location:  [facility];  Service:    • CARDIAC CATHETERIZATION  3/30/2017    Procedure: Functional Flow Grayling;  Surgeon: Tracey Vargas MD;  Location:  [facility];  Service:    • CARPAL TUNNEL RELEASE     • CERVICAL BIOPSY  MMXVI    Dr. Jeffery.    • CERVICAL DISCECTOMY ANTERIOR  04/2013    C4-7   • COLONOSCOPY  04/20/2015    Diverticulosis, IH   • COLONOSCOPY  03/09/2021   • COLPOSCOPY W/ BIOPSY / CURETTAGE  06/17/2016    LGSIL HPV positive. Results were normal repeat pap in one year. Chronic Cervicitis   • CORONARY ANGIOPLASTY WITH STENT PLACEMENT     • ENDOSCOPY  MM[facility]    Normal.  Dr. Rodriguez   • ENDOSCOPY N/A 6/22/2017    Erythematous mucosa in the stomach  PATH: Chronic active gastritis, moderate with intestinal metaplasia    •  GASTRIC BYPASS      Done using the sleeve technique   • HARDWARE REMOVAL  12/29/2016    cervical   • HERNIA REPAIR     • LAPAROSCOPIC GASTRIC BANDING  02/2018   • SHOULDER SURGERY Left     RCR   • TONSILLECTOMY     • TONSILLECTOMY AND ADENOIDECTOMY     • TRANSVAGINAL TAPING SUSPENSION N/A 12/6/2017    Procedure: MID URETHRAL SLING CYSTSCOPY;  Surgeon: Abby Méndez MD;  Location: [facility];  Service:    • TUBAL ABDOMINAL LIGATION     • TYMPANOSTOMY TUBE PLACEMENT Right    • WRIST SURGERY Bilateral     carpal tunnel   • WRIST SURGERY      L wrist/ 4/2020         Current Outpatient Medications:   •  aluminum-magnesium hydroxide-simethicone (Mylanta Maximum Strength) 400-400-40 MG/5ML suspension, Take 20 mL by mouth Every 6 (Six) Hours As Needed for Indigestion or Heartburn., Disp: 355 mL, Rfl: 1  •  ARIPiprazole (ABILIFY) 20 MG tablet, Take 20 mg by mouth Daily., Disp: , Rfl:   •  aspirin 81 MG EC tablet, Take 1 tablet by mouth Daily., Disp: 30 tablet, Rfl: 2  •  atenolol (TENORMIN) 50 MG tablet, Take 1 tablet by mouth Daily., Disp: 90 tablet, Rfl: 1  •  buPROPion XL (WELLBUTRIN XL) 150 MG 24 hr tablet, , Disp: , Rfl:   •  Cholecalciferol (VITAMIN D-3) 25 MCG (1000 UT) capsule, 1 capsule daily, Disp: , Rfl:   •  famotidine (PEPCID) 20 MG tablet, Take 1 tablet by mouth Daily., Disp: , Rfl:   •  Ferrous Sulfate (IRON PO), Take  by mouth., Disp: , Rfl:   •  gabapentin (NEURONTIN) 300 MG capsule, Take 1 capsule by mouth 3 (Three) Times a Day., Disp: 90 capsule, Rfl: 0  •  hydrOXYzine (ATARAX) 50 MG tablet, As Needed., Disp: , Rfl:   •  isosorbide mononitrate (IMDUR) 30 MG 24 hr tablet, TAKE ONE TABLET BY MOUTH DAILY, Disp: 90 tablet, Rfl: 1  •  lamoTRIgine (LaMICtal) 150 MG tablet, Take 300 mg by mouth Daily., Disp: , Rfl:   •  levothyroxine (SYNTHROID, LEVOTHROID) 88 MCG tablet, Take 1 tablet by mouth Every Morning. Please call for follow-up visit, Disp: 90 tablet, Rfl: 0  •  loratadine (CLARITIN) 10 MG  tablet, Take 1 tablet by mouth Daily. As needed for allergies, Disp: 30 tablet, Rfl: 11  •  oxybutynin XL (DITROPAN XL) 15 MG 24 hr tablet, Take 15 mg by mouth Daily., Disp: , Rfl:   •  pramipexole (MIRAPEX) 0.5 MG tablet, , Disp: , Rfl:   •  simvastatin (ZOCOR) 20 MG tablet, TAKE ONE TABLET BY MOUTH ONCE NIGHTLY, Disp: 90 tablet, Rfl: 0  •  venlafaxine XR (EFFEXOR-XR) 75 MG 24 hr capsule, Take 75 mg by mouth Daily., Disp: , Rfl:   •  vitamin B-12 (CYANOCOBALAMIN) 1000 MCG tablet, TAKE ONE TABLET BY MOUTH TWICE A DAY, Disp: 60 tablet, Rfl: 4  •  nitroglycerin (NITROSTAT) 0.4 MG SL tablet, Place 0.4 mg under the tongue Every 5 (Five) Minutes As Needed for Chest Pain (took at M.D  office at 0930 a.m.). Take no more than 3 doses in 15 minutes., Disp: , Rfl:   •  pantoprazole (PROTONIX) 40 MG EC tablet, Take 1 tablet by mouth Daily., Disp: 30 tablet, Rfl: 11    No Known Allergies    Family History   Problem Relation Age of Onset   • Diabetes type II Mother    • Hypertension Mother    • Osteoporosis Mother    • Seizures Mother    • COPD Father    • Hypertension Father    • Lung cancer Father    • Heart attack Father    • Liver cancer Father    • Thyroid disease Sister    • Hypertension Sister    • Bipolar disorder Sister    • Depression Sister    • ADD / ADHD Sister    • No Known Problems Son    • Abnormal EKG Daughter    • Hypertension Daughter    • Bipolar disorder Daughter    • Thyroid disease Daughter    • No Known Problems Paternal Grandfather    • No Known Problems Paternal Grandmother    • No Known Problems Maternal Grandmother    • No Known Problems Maternal Grandfather    • Thyroid disease Sister    • Hypertension Sister    • Bipolar disorder Sister    • Asthma Daughter    • Breast cancer Neg Hx    • Ovarian cancer Neg Hx    • Uterine cancer Neg Hx    • Colon cancer Neg Hx    • Malig Hyperthermia Neg Hx        Social History     Socioeconomic History   • Marital status:      Spouse name: Not on file   •  Number of children: 3   • Years of education: 12   • Highest education level: Not on file   Tobacco Use   • Smoking status: Former Smoker     Packs/day: 1.50     Years: 20.00     Pack years: 30.00     Types: Electronic Cigarette, Cigarettes     Quit date:      Years since quittin.3   • Smokeless tobacco: Never Used   • Tobacco comment: Electronic Cigarette/ 3 mg nicotine    Vaping Use   • Vaping Use: Every day   • Substances: Nicotine, Flavoring   • Devices: Refillable tank   Substance and Sexual Activity   • Alcohol use: Yes     Comment: 2 times yearly    • Drug use: No   • Sexual activity: Yes     Partners: Male     Birth control/protection: None, Condom       Review of Systems   Gastrointestinal: Positive for abdominal pain.     Pertinent positives and negatives documented in the HPI and all other systems reviewed and were found to be negative.  Vitals:    21 1005   BP: 140/90   Temp: 97 °F (36.1 °C)       Physical Exam  Vitals reviewed.   Constitutional:       General: She is not in acute distress.     Appearance: Normal appearance. She is well-developed. She is not diaphoretic.   HENT:      Head: Normocephalic and atraumatic. Hair is normal.      Right Ear: Hearing, tympanic membrane, ear canal and external ear normal. No decreased hearing noted. No drainage.      Left Ear: Hearing, tympanic membrane, ear canal and external ear normal. No decreased hearing noted.      Nose: Nose normal. No nasal deformity.      Mouth/Throat:      Mouth: Mucous membranes are moist.   Eyes:      General: Lids are normal.         Right eye: No discharge.         Left eye: No discharge.      Extraocular Movements: Extraocular movements intact.      Conjunctiva/sclera: Conjunctivae normal.      Pupils: Pupils are equal, round, and reactive to light.   Neck:      Thyroid: No thyromegaly.      Vascular: No JVD.      Trachea: No tracheal deviation.   Cardiovascular:      Rate and Rhythm: Normal rate and regular rhythm.       Pulses: Normal pulses.      Heart sounds: Normal heart sounds. No murmur heard.   No friction rub. No gallop.    Pulmonary:      Effort: Pulmonary effort is normal. No respiratory distress.      Breath sounds: Normal breath sounds. No wheezing or rales.   Chest:      Chest wall: No tenderness.   Abdominal:      General: Bowel sounds are normal. There is no distension.      Palpations: Abdomen is soft. There is no mass.      Tenderness: There is no abdominal tenderness. There is no guarding or rebound.      Hernia: No hernia is present.   Genitourinary:     Rectum: Normal. Guaiac result negative.   Musculoskeletal:         General: No tenderness or deformity. Normal range of motion.      Cervical back: Normal range of motion and neck supple.   Lymphadenopathy:      Cervical: No cervical adenopathy.   Skin:     General: Skin is warm and dry.      Findings: No erythema or rash.   Neurological:      Mental Status: She is alert and oriented to person, place, and time.      Cranial Nerves: No cranial nerve deficit.      Motor: No abnormal muscle tone.      Coordination: Coordination normal.      Deep Tendon Reflexes: Reflexes are normal and symmetric. Reflexes normal.   Psychiatric:         Mood and Affect: Mood normal.         Behavior: Behavior normal.         Thought Content: Thought content normal.         Judgment: Judgment normal.         Diagnoses and all orders for this visit:    1. Pain of upper abdomen (Primary)  -     US Gallbladder; Future  -     FL Upper GI With Air Contrast & SBFT; Future  -     Amylase  -     Lipase  -     CBC & Differential  -     Comprehensive Metabolic Panel  -     Celiac Ab tTG DGP TIgA  -     TSH  -     Troponin I ([facility])  -     pantoprazole (PROTONIX) 40 MG EC tablet; Take 1 tablet by mouth Daily.  Dispense: 30 tablet; Refill: 11    2. Iron deficiency anemia, unspecified iron deficiency anemia type  -     US Gallbladder; Future  -     FL Upper GI With Air Contrast & SBFT;  Future  -     Amylase  -     Lipase  -     CBC & Differential  -     Comprehensive Metabolic Panel  -     Celiac Ab tTG DGP TIgA  -     TSH  -     Troponin I ([facility])  -     Ferritin  -     Folate RBC  -     Iron Profile  -     Vitamin B12    3. Dyspepsia  -     US Gallbladder; Future  -     FL Upper GI With Air Contrast & SBFT; Future  -     Amylase  -     Lipase  -     CBC & Differential  -     Comprehensive Metabolic Panel  -     Celiac Ab tTG DGP TIgA  -     TSH  -     Troponin I ([facility])  -     pantoprazole (PROTONIX) 40 MG EC tablet; Take 1 tablet by mouth Daily.  Dispense: 30 tablet; Refill: 11    4. Polyp of colon, unspecified part of colon, unspecified type    5. Dysphagia, unspecified type  -     US Gallbladder; Future  -     FL Upper GI With Air Contrast & SBFT; Future  -     Amylase  -     Lipase  -     CBC & Differential  -     Comprehensive Metabolic Panel  -     Celiac Ab tTG DGP TIgA  -     TSH  -     Troponin I ([facility])  -     pantoprazole (PROTONIX) 40 MG EC tablet; Take 1 tablet by mouth Daily.  Dispense: 30 tablet; Refill: 11      Assessment:  1. Bad heartburn  2. H/o iron def anemia  3. Epigastric pain  4. H/o colon polyps.   5. Dysphagia    Recommendations:  1. US of the gallbladder  2. Labs: CBC, amylase, lipase, CMP. Troponin  3. UGI with sbft.  4. Trial of Protonix 40 mg/day and take that instead of Omeprazole  5. Get records from the EGD and colonoscopy done 3/21 at [facility].   6. F/u 6 weeks.   7. Stop vaping, stop smoking!    Return in about 1 week (around 5/10/2021).    Eduar Rodriguez MD  5/3/2021

## 2021-05-04 ENCOUNTER — TELEPHONE (OUTPATIENT)
Dept: GASTROENTEROLOGY | Facility: CLINIC | Age: 61
End: 2021-05-04

## 2021-05-04 LAB
FOLATE BLD-MCNC: 554 NG/ML
FOLATE RBC-MCNC: 1329 NG/ML
GLIADIN PEPTIDE IGA SER-ACNC: 4 UNITS (ref 0–19)
GLIADIN PEPTIDE IGG SER-ACNC: 5 UNITS (ref 0–19)
IGA SERPL-MCNC: 211 MG/DL (ref 87–352)
TROPONIN I SERPL-MCNC: <0.01 NG/ML (ref 0–0.04)
TTG IGA SER-ACNC: <2 U/ML (ref 0–3)
TTG IGG SER-ACNC: <2 U/ML (ref 0–5)

## 2021-05-04 NOTE — TELEPHONE ENCOUNTER
Please get the reports of the 3/21 EGD and colonoscopy (with pathology report) done at [facility] in [facility].

## 2021-05-04 NOTE — TELEPHONE ENCOUNTER
Call to  [facility]&E @ [phone] and spoke with Clarisa.  Request 3/2021 egd/c/s and path reports be faxed to [phone].

## 2021-05-05 ENCOUNTER — TELEPHONE (OUTPATIENT)
Dept: GASTROENTEROLOGY | Facility: CLINIC | Age: 61
End: 2021-05-05

## 2021-05-05 NOTE — PROGRESS NOTES
05/05/21  Tell her that her celiac sprue antibody panel, amylase, lipase, CBC, and thyroid-stimulating hormone were all normal.  The patient's iron level (ferritin of 39.9) is normal although it is low normal.  I would continue taking the iron pills for 3 more months and then I think you could stop the iron pills.  Please fax a copy of this report to her PCP.  Quang zheng

## 2021-05-12 ENCOUNTER — OFFICE VISIT (OUTPATIENT)
Dept: SLEEP MEDICINE | Facility: HOSPITAL | Age: 61
End: 2021-05-12

## 2021-05-12 ENCOUNTER — DOCUMENTATION (OUTPATIENT)
Dept: PAIN MEDICINE | Facility: HOSPITAL | Age: 61
End: 2021-05-12

## 2021-05-12 VITALS
SYSTOLIC BLOOD PRESSURE: 125 MMHG | HEART RATE: 62 BPM | WEIGHT: 227 LBS | OXYGEN SATURATION: 97 % | HEIGHT: 63 IN | BODY MASS INDEX: 40.22 KG/M2 | DIASTOLIC BLOOD PRESSURE: 75 MMHG

## 2021-05-12 DIAGNOSIS — G47.33 OSA (OBSTRUCTIVE SLEEP APNEA): Primary | ICD-10-CM

## 2021-05-12 DIAGNOSIS — Z78.9 INTOLERANCE OF CONTINUOUS POSITIVE AIRWAY PRESSURE (CPAP) VENTILATION: ICD-10-CM

## 2021-05-12 DIAGNOSIS — E66.01 MORBID (SEVERE) OBESITY DUE TO EXCESS CALORIES (HCC): ICD-10-CM

## 2021-05-12 PROCEDURE — G0463 HOSPITAL OUTPT CLINIC VISIT: HCPCS

## 2021-05-12 RX ORDER — OMEPRAZOLE 40 MG/1
CAPSULE, DELAYED RELEASE ORAL
Qty: 30 CAPSULE | Refills: 1 | Status: SHIPPED | OUTPATIENT
Start: 2021-05-12 | End: 2021-07-01 | Stop reason: ALTCHOICE

## 2021-05-12 RX ORDER — ZOLPIDEM TARTRATE 5 MG/1
5 TABLET ORAL NIGHTLY PRN
Qty: 1 TABLET | Refills: 0 | Status: SHIPPED | OUTPATIENT
Start: 2021-05-12 | End: 2021-06-17

## 2021-05-12 NOTE — PROGRESS NOTES
[facility]  Telephone: [phone] / Fax: [phone] [place]  Telephone: [phone] / Fax: [phone] [place]    PCP: Epley, James, APRN    Reason for visit: ROCKY f/u    Keri Bhagat is a 60 y.o.female  was last seen at [facility] sleep lab in October 2020. She tried resuming her CPAP machine after seeing us and used it for a couple of months. However, she kept on taking her mask off as pressures felt uncomfortable. We tried pressure changes, but it did not help. She has had a hard time exhaling with CPAP.  Her mask fits well. She would like to repeat sleep study to see if CPAP is still needed. She may need different pressures or a BIPAP device.  Her weight has fluctuated over the years.  She sleeps alone and is unaware of snoring.  She has CAD and HTN.   Her sleep schedule is MN-10am. ESS is 7.    SH- E cig, no alcohol, 3 coffee/tea per day, no energy drinks.    ROS- +nasal congestion, +post nasal drip, +SOA, +GERD, +anxiety, +depression.    Current Medications:    Current Outpatient Medications:   •  aluminum-magnesium hydroxide-simethicone (Mylanta Maximum Strength) 400-400-40 MG/5ML suspension, Take 20 mL by mouth Every 6 (Six) Hours As Needed for Indigestion or Heartburn., Disp: 355 mL, Rfl: 1  •  ARIPiprazole (ABILIFY) 20 MG tablet, Take 20 mg by mouth Daily., Disp: , Rfl:   •  aspirin 81 MG EC tablet, Take 1 tablet by mouth Daily., Disp: 30 tablet, Rfl: 2  •  atenolol (TENORMIN) 50 MG tablet, Take 1 tablet by mouth Daily., Disp: 90 tablet, Rfl: 1  •  buPROPion XL (WELLBUTRIN XL) 150 MG 24 hr tablet, , Disp: , Rfl:   •  Cholecalciferol (VITAMIN D-3) 25 MCG (1000 UT) capsule, 1 capsule daily, Disp: , Rfl:   •  famotidine (PEPCID) 20 MG tablet, Take 1 tablet by mouth Daily., Disp: , Rfl:   •  Ferrous Sulfate (IRON PO), Take  by mouth., Disp: , Rfl:   •  gabapentin (NEURONTIN) 300 MG capsule, Take 1 capsule by mouth 3 (Three) Times a Day., Disp: 90 capsule, Rfl: 0  •   hydrOXYzine (ATARAX) 50 MG tablet, As Needed., Disp: , Rfl:   •  isosorbide mononitrate (IMDUR) 30 MG 24 hr tablet, TAKE ONE TABLET BY MOUTH DAILY, Disp: 90 tablet, Rfl: 1  •  lamoTRIgine (LaMICtal) 150 MG tablet, Take 300 mg by mouth Daily., Disp: , Rfl:   •  levothyroxine (SYNTHROID, LEVOTHROID) 88 MCG tablet, Take 1 tablet by mouth Every Morning. Please call for follow-up visit, Disp: 90 tablet, Rfl: 0  •  loratadine (CLARITIN) 10 MG tablet, Take 1 tablet by mouth Daily. As needed for allergies, Disp: 30 tablet, Rfl: 11  •  nitroglycerin (NITROSTAT) 0.4 MG SL tablet, Place 0.4 mg under the tongue Every 5 (Five) Minutes As Needed for Chest Pain (took at M.D  office at 0930 a.m.). Take no more than 3 doses in 15 minutes., Disp: , Rfl:   •  omeprazole (priLOSEC) 40 MG capsule, TAKE ONE CAPSULE BY MOUTH DAILY, Disp: 30 capsule, Rfl: 1  •  oxybutynin XL (DITROPAN XL) 15 MG 24 hr tablet, Take 15 mg by mouth Daily., Disp: , Rfl:   •  pantoprazole (PROTONIX) 40 MG EC tablet, Take 1 tablet by mouth Daily., Disp: 30 tablet, Rfl: 11  •  pramipexole (MIRAPEX) 0.5 MG tablet, , Disp: , Rfl:   •  simvastatin (ZOCOR) 20 MG tablet, TAKE ONE TABLET BY MOUTH ONCE NIGHTLY, Disp: 90 tablet, Rfl: 0  •  venlafaxine XR (EFFEXOR-XR) 75 MG 24 hr capsule, Take 75 mg by mouth Daily., Disp: , Rfl:   •  vitamin B-12 (CYANOCOBALAMIN) 1000 MCG tablet, TAKE ONE TABLET BY MOUTH TWICE A DAY, Disp: 60 tablet, Rfl: 4   also entered in Sleep Questionnaire    Patient  has a past medical history of Abnormal Pap smear of cervix, Anemia, Anxiety, Arthritis, Atherosclerotic heart disease of native coronary artery with other forms of angina pectoris (CMS/HCC), Bipolar I disorder, single manic episode (CMS/HCC), Cervical disc herniation, Cervical dysplasia, Coronary artery disease, CTS (carpal tunnel syndrome), Depression, Difficulty swallowing, Diverticular disease, Dyspepsia, Dysphagia, Gastric reflux, GERD (gastroesophageal reflux disease), Heart attack  "(CMS/[facility]), Heart murmur, Hiatal hernia, High cholesterol, History of transfusion, HPV (human papilloma virus) infection (04/29/2016), Hyperlipidemia, Hypertension, Hypothyroidism, Incontinence in female, Kidney disease (2017), Kidney disease, chronic, stage III (GFR 30-59 ml/min) (CMS/[facility]), LGSIL on Pap smear of cervix (04/29/2016), Myocardial infarction (CMS/[facility]) (2000), Neck pain, Obesity, Past myocardial infarction (05/2000), Periodic limb movement disorder, Restless leg syndrome, Sleep apnea, Stress fracture, Suicidal ideation (08/19/2016), Swelling of ankle, Thyroid nodule, and Vitamin B12 deficiency.    I have reviewed the Past Medical History, Past Surgical History, Social History and Family History.    Vital Signs /75   Pulse 62   Ht 160 cm (62.99\")   Wt 103 kg (227 lb)   LMP 10/21/2016 (Approximate) Comment: 54  SpO2 97%   BMI 40.22 kg/m²  Body mass index is 40.22 kg/m².    General Alert and oriented. No acute distress noted   Pharynx/Throat Class IV  Mallampati airway, large tongue, no evidence of redundant lateral pharyngeal tissue. No oral lesions. No thrush. Moist mucous membranes.   Head Normocephalic. Symmetrical. Atraumatic.    Nose No septal deviation. No drainage   Chest Wall Normal shape. Symmetric expansion with respiration. No tenderness.   Neck Trachea midline, no thyromegaly or adenopathy    Lungs Clear to auscultation bilaterally. No wheezes. No rhonchi. No rales. Respirations regular, even and unlabored.   Heart Regular rhythm and normal rate. Normal S1 and S2. No murmur   Abdomen Soft, non-tender and non-distended. Normal bowel sounds. No masses.   Extremities Moves all extremities well. No edema   Psychiatric Normal mood and affect.     Testing:  · Download n/a     Study:  HST 9/12/16- AHI index is 21 indicating moderate obstructive sleep apnea.  It is slightly worse in supine position with index 28. Saturation below 89% for 4 minutes and snoring for 2% of total sleep " time.       Impression:  1. ROCKY (obstructive sleep apnea)    2. Intolerance of continuous positive airway pressure (CPAP) ventilation    3. Morbid (severe) obesity due to excess calories (CMS/HCC)          Plan:  Repeat sleep study. Split if AHI over 5 and do CPAP/BIPAP titration if needed.  Failed CPAP despite pressure/mask adjustment. Not inspire candidate due to BMI 40.  We discussed OMAD.  It may not be ideal.    Follow up with us after study    Thank you for allowing me to participate in your patient's care.      DAPHNEY Pedraza  [facility]  Phone: [phone]      Part of this note may be an electronic transcription/translation of spoken language to printed text using the Dragon Dictation System.

## 2021-05-12 NOTE — PROGRESS NOTES
First CE was 4/20.    Intended procedure for 5/8 will be CE #2 levels C6 to C7   Pain rated as an 8 on 1 to 10 scale prior to last CE.  Current pain on best days rated as a 3 and on other days a 5.  Overall she feels like she is 80% improved.  Taking Ibuprofen and Tylenol arthritis as before.    Also doing home exercises.

## 2021-05-13 ENCOUNTER — TRANSCRIBE ORDERS (OUTPATIENT)
Dept: ADMINISTRATIVE | Facility: HOSPITAL | Age: 61
End: 2021-05-13

## 2021-05-13 ENCOUNTER — DOCUMENTATION (OUTPATIENT)
Dept: PREADMISSION TESTING | Facility: HOSPITAL | Age: 61
End: 2021-05-13

## 2021-05-13 DIAGNOSIS — Z01.818 OTHER SPECIFIED PRE-OPERATIVE EXAMINATION: Primary | ICD-10-CM

## 2021-05-15 DIAGNOSIS — E03.9 HYPOTHYROIDISM (ACQUIRED): ICD-10-CM

## 2021-05-17 ENCOUNTER — TRANSCRIBE ORDERS (OUTPATIENT)
Dept: SLEEP MEDICINE | Facility: HOSPITAL | Age: 61
End: 2021-05-17

## 2021-05-17 DIAGNOSIS — Z01.818 OTHER SPECIFIED PRE-OPERATIVE EXAMINATION: Primary | ICD-10-CM

## 2021-05-18 ENCOUNTER — ANESTHESIA (OUTPATIENT)
Dept: PAIN MEDICINE | Facility: HOSPITAL | Age: 61
End: 2021-05-18

## 2021-05-18 ENCOUNTER — ANESTHESIA EVENT (OUTPATIENT)
Dept: PAIN MEDICINE | Facility: HOSPITAL | Age: 61
End: 2021-05-18

## 2021-05-18 ENCOUNTER — LAB (OUTPATIENT)
Dept: LAB | Facility: HOSPITAL | Age: 61
End: 2021-05-18

## 2021-05-18 ENCOUNTER — HOSPITAL ENCOUNTER (OUTPATIENT)
Dept: GENERAL RADIOLOGY | Facility: HOSPITAL | Age: 61
Discharge: HOME | End: 2021-05-18

## 2021-05-18 ENCOUNTER — HOSPITAL ENCOUNTER (OUTPATIENT)
Dept: PAIN MEDICINE | Facility: HOSPITAL | Age: 61
Discharge: HOME | End: 2021-05-18

## 2021-05-18 VITALS
HEART RATE: 59 BPM | OXYGEN SATURATION: 96 % | SYSTOLIC BLOOD PRESSURE: 116 MMHG | RESPIRATION RATE: 16 BRPM | TEMPERATURE: 96.9 F | DIASTOLIC BLOOD PRESSURE: 73 MMHG

## 2021-05-18 DIAGNOSIS — R52 PAIN: ICD-10-CM

## 2021-05-18 DIAGNOSIS — Z01.818 OTHER SPECIFIED PRE-OPERATIVE EXAMINATION: ICD-10-CM

## 2021-05-18 DIAGNOSIS — M54.12 CERVICAL RADICULOPATHY: ICD-10-CM

## 2021-05-18 PROCEDURE — 25010000002 DEXAMETHASONE SODIUM PHOSPHATE 10 MG/ML SOLUTION: Performed by: ANESTHESIOLOGY

## 2021-05-18 PROCEDURE — 77003 FLUOROGUIDE FOR SPINE INJECT: CPT

## 2021-05-18 PROCEDURE — 25010000002 MIDAZOLAM PER 1 MG: Performed by: ANESTHESIOLOGY

## 2021-05-18 PROCEDURE — C9803 HOPD COVID-19 SPEC COLLECT: HCPCS

## 2021-05-18 PROCEDURE — U0004 COV-19 TEST NON-CDC HGH THRU: HCPCS

## 2021-05-18 RX ORDER — LIDOCAINE HYDROCHLORIDE 10 MG/ML
1 INJECTION, SOLUTION INFILTRATION; PERINEURAL ONCE AS NEEDED
Status: DISCONTINUED | OUTPATIENT
Start: 2021-05-18 | End: 2021-05-19 | Stop reason: HOSPADM

## 2021-05-18 RX ORDER — FENTANYL CITRATE 50 UG/ML
50 INJECTION, SOLUTION INTRAMUSCULAR; INTRAVENOUS AS NEEDED
Status: DISCONTINUED | OUTPATIENT
Start: 2021-05-18 | End: 2021-05-19 | Stop reason: HOSPADM

## 2021-05-18 RX ORDER — SODIUM CHLORIDE 0.9 % (FLUSH) 0.9 %
3 SYRINGE (ML) INJECTION EVERY 12 HOURS SCHEDULED
Status: DISCONTINUED | OUTPATIENT
Start: 2021-05-18 | End: 2021-05-19 | Stop reason: HOSPADM

## 2021-05-18 RX ORDER — LEVOTHYROXINE SODIUM 88 UG/1
TABLET ORAL
Qty: 90 TABLET | Refills: 1 | Status: SHIPPED | OUTPATIENT
Start: 2021-05-18 | End: 2021-10-13 | Stop reason: SDUPTHER

## 2021-05-18 RX ORDER — SODIUM CHLORIDE 0.9 % (FLUSH) 0.9 %
1-10 SYRINGE (ML) INJECTION AS NEEDED
Status: DISCONTINUED | OUTPATIENT
Start: 2021-05-18 | End: 2021-05-19 | Stop reason: HOSPADM

## 2021-05-18 RX ORDER — SODIUM CHLORIDE 0.9 % (FLUSH) 0.9 %
3-10 SYRINGE (ML) INJECTION AS NEEDED
Status: DISCONTINUED | OUTPATIENT
Start: 2021-05-18 | End: 2021-05-19 | Stop reason: HOSPADM

## 2021-05-18 RX ORDER — METHYLPREDNISOLONE ACETATE 80 MG/ML
80 INJECTION, SUSPENSION INTRA-ARTICULAR; INTRALESIONAL; INTRAMUSCULAR; SOFT TISSUE ONCE
Status: DISCONTINUED | OUTPATIENT
Start: 2021-05-18 | End: 2021-05-19 | Stop reason: HOSPADM

## 2021-05-18 RX ORDER — DEXAMETHASONE SODIUM PHOSPHATE 10 MG/ML
10 INJECTION, SOLUTION INTRAMUSCULAR; INTRAVENOUS ONCE
Status: COMPLETED | OUTPATIENT
Start: 2021-05-18 | End: 2021-05-18

## 2021-05-18 RX ORDER — MIDAZOLAM HYDROCHLORIDE 1 MG/ML
1 INJECTION INTRAMUSCULAR; INTRAVENOUS AS NEEDED
Status: DISCONTINUED | OUTPATIENT
Start: 2021-05-18 | End: 2021-05-19 | Stop reason: HOSPADM

## 2021-05-18 RX ADMIN — MIDAZOLAM 2 MG: 1 INJECTION INTRAMUSCULAR; INTRAVENOUS at 10:03

## 2021-05-18 RX ADMIN — DEXAMETHASONE SODIUM PHOSPHATE 10 MG: 10 INJECTION, SOLUTION INTRAMUSCULAR; INTRAVENOUS at 10:06

## 2021-05-18 NOTE — ANESTHESIA PROCEDURE NOTES
PAIN Epidural block    Pre-sedation assessment completed: 5/18/2021 9:54 AM    Patient reassessed immediately prior to procedure    Patient location during procedure: pain clinic  Start Time: 5/18/2021 9:54 AM  Stop Time: 5/18/2021 10:07 AM  Indication:procedure for pain  Performed By  Anesthesiologist: Jagruti Dean MD  Preanesthetic Checklist  Completed: patient identified, IV checked, site marked, risks and benefits discussed, surgical consent, monitors and equipment checked, pre-op evaluation and timeout performed  Additional Notes  Fluoro used.    Cervical spondylosis w/ radiculopathy.    Prep:  Pt Position:prone  Sterile Tech:cap, gloves, mask and sterile barrier  Prep:chlorhexidine gluconate and isopropyl alcohol  Monitoring:blood pressure monitoring, continuous pulse oximetry and EKG  Procedure:Sedation: yes     Approach:midline  Guidance: fluoroscopy  Location:cervical  Level:C7-T1  Needle Type:Tuohy  Needle Gauge:20  Aspiration:negative  Medications:  Depomedrol:80  Preservative Free Saline:3mL    Post Assessment:  Dressing:occlusive dressing applied  Pt Tolerance:patient tolerated the procedure well with no apparent complications  Complications:no

## 2021-05-18 NOTE — INTERVAL H&P NOTE
[facility]  H&P reviewed. No changes since last visit.  Patient states   50-75% improvement since the last procedure/injection.  Pain relief lasted approx 2 weeks.  Pain persists in left shoulder & up into the base of the neck/skull.  Presents for dariela.      Diagnosis     * Cervical disc disorder [M50.90]     * Cervical spondylosis with radiculopathy [M47.22]      Airway assessed since last visit. Airway class equals: 2.

## 2021-05-19 LAB — SARS-COV-2 RNA RESP QL NAA+PROBE: NOT DETECTED

## 2021-05-20 ENCOUNTER — APPOINTMENT (OUTPATIENT)
Dept: GENERAL RADIOLOGY | Facility: HOSPITAL | Age: 61
End: 2021-05-20

## 2021-05-20 ENCOUNTER — HOSPITAL ENCOUNTER (OUTPATIENT)
Dept: ULTRASOUND IMAGING | Facility: HOSPITAL | Age: 61
Discharge: HOME | End: 2021-05-20
Admitting: INTERNAL MEDICINE

## 2021-05-20 ENCOUNTER — TRANSCRIBE ORDERS (OUTPATIENT)
Dept: ADMINISTRATIVE | Facility: HOSPITAL | Age: 61
End: 2021-05-20

## 2021-05-20 DIAGNOSIS — Z01.818 OTHER SPECIFIED PRE-OPERATIVE EXAMINATION: Primary | ICD-10-CM

## 2021-05-20 DIAGNOSIS — R13.10 DYSPHAGIA, UNSPECIFIED TYPE: ICD-10-CM

## 2021-05-20 DIAGNOSIS — R10.13 DYSPEPSIA: ICD-10-CM

## 2021-05-20 DIAGNOSIS — D50.9 IRON DEFICIENCY ANEMIA, UNSPECIFIED IRON DEFICIENCY ANEMIA TYPE: ICD-10-CM

## 2021-05-20 DIAGNOSIS — R10.10 PAIN OF UPPER ABDOMEN: ICD-10-CM

## 2021-05-20 PROCEDURE — 76705 ECHO EXAM OF ABDOMEN: CPT

## 2021-05-21 ENCOUNTER — LAB (OUTPATIENT)
Dept: LAB | Facility: HOSPITAL | Age: 61
End: 2021-05-21

## 2021-05-21 ENCOUNTER — TRANSCRIBE ORDERS (OUTPATIENT)
Dept: SLEEP MEDICINE | Facility: HOSPITAL | Age: 61
End: 2021-05-21

## 2021-05-21 DIAGNOSIS — Z01.818 OTHER SPECIFIED PRE-OPERATIVE EXAMINATION: ICD-10-CM

## 2021-05-21 DIAGNOSIS — Z01.818 OTHER SPECIFIED PRE-OPERATIVE EXAMINATION: Primary | ICD-10-CM

## 2021-05-21 PROCEDURE — C9803 HOPD COVID-19 SPEC COLLECT: HCPCS

## 2021-05-21 PROCEDURE — U0004 COV-19 TEST NON-CDC HGH THRU: HCPCS

## 2021-05-22 LAB — SARS-COV-2 ORF1AB RESP QL NAA+PROBE: NOT DETECTED

## 2021-05-24 ENCOUNTER — HOSPITAL ENCOUNTER (OUTPATIENT)
Dept: GENERAL RADIOLOGY | Facility: HOSPITAL | Age: 61
Discharge: HOME | End: 2021-05-24
Admitting: INTERNAL MEDICINE

## 2021-05-24 DIAGNOSIS — R10.13 DYSPEPSIA: ICD-10-CM

## 2021-05-24 DIAGNOSIS — R10.10 PAIN OF UPPER ABDOMEN: ICD-10-CM

## 2021-05-24 DIAGNOSIS — D50.9 IRON DEFICIENCY ANEMIA, UNSPECIFIED IRON DEFICIENCY ANEMIA TYPE: ICD-10-CM

## 2021-05-24 DIAGNOSIS — R13.10 DYSPHAGIA, UNSPECIFIED TYPE: ICD-10-CM

## 2021-05-24 PROCEDURE — 74240 X-RAY XM UPR GI TRC 1CNTRST: CPT

## 2021-05-24 PROCEDURE — 74248 X-RAY SM INT F-THRU STD: CPT

## 2021-05-24 RX ADMIN — BARIUM SULFATE 366 ML: 960 POWDER, FOR SUSPENSION ORAL at 08:45

## 2021-05-25 NOTE — PROGRESS NOTES
05/25/21  Tell her that her lapband looks in satisfactory position. There is moderate dilation of the esphagus above the band with some food residue in the esophagus.        I would recommend that she go see the Surgeon that placed the lapband and did the EGD in 3/21. I would recommend that the Surgeon take all of the fluid out of the lapband and see if your dysphagia resolves? Please fax this report to her PCP and to the Surgeon at [facility] that placed the lapband.  Ailyn. [email or web address]

## 2021-05-25 NOTE — PROGRESS NOTES
05/25/21  Tell her that the US of the RUQ of the abdomen showed fatty liver. The way to get rid of fatty liver would be weight loss. FAX to her PCP.   Quang zheng

## 2021-05-26 ENCOUNTER — TELEPHONE (OUTPATIENT)
Dept: GASTROENTEROLOGY | Facility: CLINIC | Age: 61
End: 2021-05-26

## 2021-05-26 NOTE — TELEPHONE ENCOUNTER
Call to pt.  Advise per DR Rodriguez's notes.  Verb understanding.      SBFT faxed via epic to DR Jhony Morse.     Update to James Epley, APRN.

## 2021-05-26 NOTE — TELEPHONE ENCOUNTER
----- Message from Eduar Rodriguez MD sent at 5/25/2021  5:55 PM EDT -----  05/25/21  Tell her that her lapband looks in satisfactory position. There is moderate dilation of the esphagus above the band with some food residue in the esophagus.        I would recommend that she go see the Surgeon that placed the lapband and did the EGD in 3/21. I would recommend that the Surgeon take all of the fluid out of the lapband and see if your dysphagia resolves? Please fax this report to her PCP and to the Surgeon at [facility] that placed the lapband.  Thx. kjh

## 2021-05-26 NOTE — TELEPHONE ENCOUNTER
----- Message from Eduar Rodriguez MD sent at 5/25/2021  5:52 PM EDT -----  05/25/21  Tell her that the US of the RUQ of the abdomen showed fatty liver. The way to get rid of fatty liver would be weight loss. FAX to her PCP.   Thx. kjh

## 2021-05-28 ENCOUNTER — TELEPHONE (OUTPATIENT)
Dept: ENDOCRINOLOGY | Age: 61
End: 2021-05-28

## 2021-05-28 NOTE — TELEPHONE ENCOUNTER
Called and sw pt     ----- Message from Maxwell Martin MD sent at 5/26/2021  6:48 PM EDT -----  Thyroid levels were normal on May 3, 2021 and March 29, 2021.  Stay on levothyroxine 88 mcg/day.  Recheck thyroid function tests in 6 weeks.  Excess thyroid hormone can cause increased bone loss and rapid heartbeat,insomnia and tremors  ----- Message -----  From: Do Caraballo MA  Sent: 5/25/2021   4:51 PM EDT  To: Maxwell Martin MD    5/25 pt called in has messed up on her synthroid 88 mcg daily she has been taking 2 instead of 1. She wants to know if she can stay on this dose because she fell better and is loosing wait on it /told her to  take only 1 as prescribed so she doesn't mess up her levels until I can talk to you to see if its okay or if she needs to come in for f/u labs

## 2021-05-30 DIAGNOSIS — M54.12 CERVICAL RADICULOPATHY: ICD-10-CM

## 2021-06-01 RX ORDER — GABAPENTIN 300 MG/1
CAPSULE ORAL
Qty: 90 CAPSULE | Refills: 0 | Status: SHIPPED | OUTPATIENT
Start: 2021-06-01 | End: 2021-06-30

## 2021-06-02 ENCOUNTER — CLINICAL SUPPORT (OUTPATIENT)
Dept: ORTHOPEDIC SURGERY | Facility: CLINIC | Age: 61
End: 2021-06-02

## 2021-06-02 VITALS — TEMPERATURE: 96.4 F | BODY MASS INDEX: 38.98 KG/M2 | WEIGHT: 220 LBS | HEIGHT: 63 IN

## 2021-06-02 DIAGNOSIS — M17.0 PRIMARY OSTEOARTHRITIS OF KNEES, BILATERAL: Primary | ICD-10-CM

## 2021-06-02 PROCEDURE — 20610 DRAIN/INJ JOINT/BURSA W/O US: CPT | Performed by: NURSE PRACTITIONER

## 2021-06-02 RX ORDER — METHYLPREDNISOLONE ACETATE 80 MG/ML
80 INJECTION, SUSPENSION INTRA-ARTICULAR; INTRALESIONAL; INTRAMUSCULAR; SOFT TISSUE
Status: COMPLETED | OUTPATIENT
Start: 2021-06-02 | End: 2021-06-02

## 2021-06-02 RX ORDER — LIDOCAINE HYDROCHLORIDE 20 MG/ML
4 INJECTION, SOLUTION EPIDURAL; INFILTRATION; INTRACAUDAL; PERINEURAL
Status: COMPLETED | OUTPATIENT
Start: 2021-06-02 | End: 2021-06-02

## 2021-06-02 RX ORDER — ISOSORBIDE MONONITRATE 30 MG/1
TABLET, EXTENDED RELEASE ORAL
Qty: 30 TABLET | Refills: 5 | Status: SHIPPED | OUTPATIENT
Start: 2021-06-02 | End: 2021-12-23

## 2021-06-02 RX ORDER — ERGOCALCIFEROL 1.25 MG/1
50000 CAPSULE ORAL
COMMUNITY
End: 2021-10-13

## 2021-06-02 RX ADMIN — LIDOCAINE HYDROCHLORIDE 4 ML: 20 INJECTION, SOLUTION EPIDURAL; INFILTRATION; INTRACAUDAL; PERINEURAL at 14:05

## 2021-06-02 RX ADMIN — METHYLPREDNISOLONE ACETATE 80 MG: 80 INJECTION, SUSPENSION INTRA-ARTICULAR; INTRALESIONAL; INTRAMUSCULAR; SOFT TISSUE at 14:05

## 2021-06-02 NOTE — PATIENT INSTRUCTIONS
Knee Injection  A knee injection is a procedure to get medicine into your knee joint to relieve the pain, swelling, and stiffness of arthritis. Your health care provider uses a needle to inject medicine, which may also help to lubricate and cushion your knee joint. You may need more than one injection.  Tell a health care provider about:  · Any allergies you have.  · All medicines you are taking, including vitamins, herbs, eye drops, creams, and over-the-counter medicines.  · Any problems you or family members have had with anesthetic medicines.  · Any blood disorders you have.  · Any surgeries you have had.  · Any medical conditions you have.  · Whether you are pregnant or may be pregnant.  What are the risks?  Generally, this is a safe procedure. However, problems may occur, including:  · Infection.  · Bleeding.  · Symptoms that get worse.  · Damage to the area around your knee.  · Allergic reaction to any of the medicines.  · Skin reactions from repeated injections.  What happens before the procedure?  · Ask your health care provider about changing or stopping your regular medicines. This is especially important if you are taking diabetes medicines or blood thinners.  · Plan to have someone take you home from the hospital or clinic.  What happens during the procedure?    · You will sit or lie down in a position for your knee to be treated.  · The skin over your kneecap will be cleaned with a germ-killing soap.  · You will be given a medicine that numbs the area (local anesthetic). You may feel some stinging.  · The medicine will be injected into your knee. The needle is carefully placed between your kneecap and your knee. The medicine is injected into the joint space.  · The needle will be removed at the end of the procedure.  · A bandage (dressing) may be placed over the injection site.  The procedure may vary among health care providers and hospitals.  What can I expect after the procedure?  · Your blood  pressure, heart rate, breathing rate, and blood oxygen level will be monitored until you leave the hospital or clinic.  · You may have to move your knee through its full range of motion. This helps to get all the medicine into your joint space.  · You will be watched to make sure that you do not have a reaction to the injected medicine.  · You may feel more pain, swelling, and warmth than you did before the injection. This reaction may last about 1-2 days.  Follow these instructions at home:  Medicines  · Take over-the-counter and prescription medicines only as told by your doctor.  · Do not drive or use heavy machinery while taking prescription pain medicine.  · Do not take medicines such as aspirin and ibuprofen unless your health care provider tells you to take them.  Injection site care  · Follow instructions from your health care provider about:  ? How to take care of your puncture site.  ? When and how you should change your dressing.  ? When you should remove your dressing.  · Check your injection area every day for signs of infection. Check for:  ? More redness, swelling, or pain after 2 days.  ? Fluid or blood.  ? Pus or a bad smell.  ? Warmth.  Managing pain, stiffness, and swelling    · If directed, put ice on the injection area:  ? Put ice in a plastic bag.  ? Place a towel between your skin and the bag.  ? Leave the ice on for 20 minutes, 2-3 times per day.  · Do not apply heat to your knee.  · Raise (elevate) the injection area above the level of your heart while you are sitting or lying down.  General instructions  · If you were given a dressing, keep it dry until your health care provider says it can be removed. Ask your health care provider when you can start showering or taking a bath.  · Avoid strenuous activities for as long as directed by your health care provider. Ask your health care provider when you can return to your normal activities.  · Keep all follow-up visits as told by your health  care provider. This is important. You may need more injections.  Contact a health care provider if you have:  · A fever.  · Warmth in your injection area.  · Fluid, blood, or pus coming from your injection site.  · Symptoms at your injection site that last longer than 2 days after your procedure.  Get help right away if:  · Your knee:  ? Turns very red.  ? Becomes very swollen.  ? Is in severe pain.  Summary  · A knee injection is a procedure to get medicine into your knee joint to relieve the pain, swelling, and stiffness of arthritis.  · A needle is carefully placed between your kneecap and your knee to inject medicine into the joint space.  · Before the procedure, ask your health care provider about changing or stopping your regular medicines, especially if you are taking diabetes medicines or blood thinners.  · Contact your health care provider if you have any problems or questions after your procedure.  This information is not intended to replace advice given to you by your health care provider. Make sure you discuss any questions you have with your health care provider.  Document Revised: 01/07/2019 Document Reviewed: 01/07/2019  Elsevier Patient Education © 2021 [employer]

## 2021-06-02 NOTE — PROGRESS NOTES
Patient Name: Keri Bhagat   YOB: 1960  Referring Primary Care Physician: Epley, James, APRN  BMI: Body mass index is 38.97 kg/m².    Chief Complaint:    Chief Complaint   Patient presents with   • Right Knee - Follow-up   • Left Knee - Follow-up      HPI: pt here for injections to both knees that help her - she does not want to have surgery. She has used conservative measures successfully.   She is a gastric lap band and is working on getting that readjusted and working on diet and exercise. Injections lasting 2 months     Keri Bhagat is a 60 y.o. female who presents today for evaluation of   Chief Complaint   Patient presents with   • Right Knee - Follow-up   • Left Knee - Follow-up         Subjective   Medications:   Home Medications:  Current Outpatient Medications on File Prior to Visit   Medication Sig   • aluminum-magnesium hydroxide-simethicone (Mylanta Maximum Strength) 400-400-40 MG/5ML suspension Take 20 mL by mouth Every 6 (Six) Hours As Needed for Indigestion or Heartburn.   • ARIPiprazole (ABILIFY) 20 MG tablet Take 20 mg by mouth Daily.   • aspirin 81 MG EC tablet Take 1 tablet by mouth Daily.   • atenolol (TENORMIN) 50 MG tablet Take 1 tablet by mouth Daily.   • buPROPion XL (WELLBUTRIN XL) 150 MG 24 hr tablet    • Cholecalciferol (VITAMIN D-3) 25 MCG (1000 UT) capsule 1 capsule daily   • ergocalciferol (ERGOCALCIFEROL) 1.25 MG (49227 UT) capsule Take  by mouth.   • famotidine (PEPCID) 20 MG tablet Take 1 tablet by mouth Daily.   • Ferrous Sulfate (IRON PO) Take  by mouth.   • gabapentin (NEURONTIN) 300 MG capsule TAKE ONE CAPSULE BY MOUTH THREE TIMES A DAY   • hydrOXYzine (ATARAX) 50 MG tablet As Needed.   • isosorbide mononitrate (IMDUR) 30 MG 24 hr tablet TAKE ONE TABLET BY MOUTH DAILY   • lamoTRIgine (LaMICtal) 150 MG tablet Take 300 mg by mouth Daily.   • levothyroxine (SYNTHROID, LEVOTHROID) 88 MCG tablet TAKE ONE TABLET BY MOUTH EVERY MORNING   • loratadine (CLARITIN)  10 MG tablet Take 1 tablet by mouth Daily. As needed for allergies   • nitroglycerin (NITROSTAT) 0.4 MG SL tablet Place 0.4 mg under the tongue Every 5 (Five) Minutes As Needed for Chest Pain (took at M.D  office at 0930 a.m.). Take no more than 3 doses in 15 minutes.   • omeprazole (priLOSEC) 40 MG capsule TAKE ONE CAPSULE BY MOUTH DAILY   • oxybutynin XL (DITROPAN XL) 15 MG 24 hr tablet Take 15 mg by mouth Daily.   • pantoprazole (PROTONIX) 40 MG EC tablet Take 1 tablet by mouth Daily.   • pramipexole (MIRAPEX) 0.5 MG tablet    • simvastatin (ZOCOR) 20 MG tablet TAKE ONE TABLET BY MOUTH ONCE NIGHTLY   • venlafaxine XR (EFFEXOR-XR) 75 MG 24 hr capsule Take 75 mg by mouth Daily.   • vitamin B-12 (CYANOCOBALAMIN) 1000 MCG tablet TAKE ONE TABLET BY MOUTH TWICE A DAY   • zolpidem (Ambien) 5 MG tablet Take 1 tablet by mouth At Night As Needed (Bring to the sleep lab. DO NOT take at home.).     No current facility-administered medications on file prior to visit.     Current Medications:  Scheduled Meds:  Continuous Infusions:No current facility-administered medications for this visit.    PRN Meds:.    I have reviewed the patient's medical history in detail and updated the computerized patient record.  Review and summarization of old records includes:    Past Medical History:   Diagnosis Date   • Abnormal Pap smear of cervix    • Anemia    • Anxiety    • Arthritis    • Atherosclerotic heart disease of native coronary artery with other forms of angina pectoris (CMS/HCC)    • Bipolar I disorder, single manic episode (CMS/HCC)     depressive d(NOS)   • Cervical disc herniation    • Cervical dysplasia    • Coronary artery disease    • CTS (carpal tunnel syndrome)    • Depression     NO SUICIDAL PLANS   • Difficulty swallowing    • Diverticular disease    • Dyspepsia    • Dysphagia    • Gastric reflux    • GERD (gastroesophageal reflux disease)    • Heart attack (CMS/HCC)    • Heart murmur    • Hiatal hernia    • High  cholesterol    • History of transfusion    • 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    HPV positive on pap LGSIL   • Hyperlipidemia    • Hypertension    • Hypothyroidism    • Incontinence in female     wears pads   • Kidney disease 2017    stage 2    • Kidney disease, chronic, stage III (GFR 30-59 ml/min) (CMS/[facility])    • LGSIL on Pap smear of cervix 04/29/2016    LGSIL HPV positive   • Myocardial infarction (CMS/[facility]) 2000   • Neck pain    • Obesity    • Past myocardial infarction 05/2000   • Periodic limb movement disorder    • Restless leg syndrome    • Sleep apnea     cpap   • Stress fracture     LEFT HEEL   • Suicidal ideation 08/19/2016    history   • Swelling of ankle     right had doppler no s/s blood clot   • Thyroid nodule    • Vitamin B12 deficiency         Past Surgical History:   Procedure Laterality Date   • ANTERIOR CERVICAL DISCECTOMY W/ FUSION N/A 12/29/2016    Procedure: C3-4 anterior cervical discectomy and fusion with Depuy micro plate, ALLOGRAFT C3-4, AND HARDWARE REMOVAL C4-7.;  Surgeon: Hema Godwin MD;  Location: [facility] MAIN OR;  Service:    • CARDIAC CATHETERIZATION N/A 3/30/2017    Procedure: Left Heart Cath;  Surgeon: Tracey Vargas MD;  Location: [facility] CATH [facility]VASIVE LOC[facility];  Service:    • CARDIAC CATHETERIZATION N/A 3/30/2017    Procedure: Coronary angiography;  Surgeon: Tracey Vargas MD;  Location:  [facility] CATH INVASIVE LOCATION;  Service:    • CARDIAC CATHETERIZATION N/A 3/30/2017    Procedure: Left ventriculography;  Surgeon: Tracey Vargas MD;  Location:  [facility] CATH INVASIVE LOCATION;  Service:    • CARDIAC CATHETERIZATION  3/30/2017    Procedure: Functional Flow Clyde;  Surgeon: Tracey Vargas MD;  Location: [facility] CATH INVASIVE LOCATION;  Service:    • CARPAL TUNNEL RELEASE     • CERVICAL BIOPSY  [facility]    Dr. Jeffery.    • CERVICAL DISCECTOMY ANTERIOR  04/2013    C4-7   • COLONOSCOPY  04/20/2015    Diverticulosis, IH   • COLONOSCOPY  03/09/2021   • COLPOSCOPY W/  BIOPSY / CURETTAGE  2016    LGSIL HPV positive. Results were normal repeat pap in one year. Chronic Cervicitis   • CORONARY ANGIOPLASTY WITH STENT PLACEMENT     • ENDOSCOPY  [facility]    Normal.  Dr. Rodriguez   • ENDOSCOPY N/A 2017    Erythematous mucosa in the stomach  PATH: Chronic active gastritis, moderate with intestinal metaplasia    • GASTRIC BYPASS      Done using the sleeve technique   • HARDWARE REMOVAL  2016    cervical   • HERNIA REPAIR     • LAPAROSCOPIC GASTRIC BANDING  2018   • SHOULDER SURGERY Left     RCR   • TONSILLECTOMY     • TONSILLECTOMY AND ADENOIDECTOMY     • TRANSVAGINAL TAPING SUSPENSION N/A 2017    Procedure: MID URETHRAL SLING CYSTSCOPY;  Surgeon: Abby Méndez MD;  Location: [facility];  Service:    • TUBAL ABDOMINAL LIGATION     • TYMPANOSTOMY TUBE PLACEMENT Right    • WRIST SURGERY Bilateral     carpal tunnel   • WRIST SURGERY      L wrist/ 2020        Social History     Occupational History   • Occupation: disabled   Tobacco Use   • Smoking status: Former Smoker     Packs/day: 1.50     Years: 20.00     Pack years: 30.00     Types: Electronic Cigarette, Cigarettes     Quit date:      Years since quittin.4   • Smokeless tobacco: Never Used   • Tobacco comment: Electronic Cigarette/ 3 mg nicotine    Vaping Use   • Vaping Use: Every day   • Substances: Nicotine, Flavoring   • Devices: Refillable tank   Substance and Sexual Activity   • Alcohol use: Yes     Comment: 2 times yearly    • Drug use: No   • Sexual activity: Yes     Partners: Male     Birth control/protection: None, Condom      Social History     Social History Narrative   • Not on file        Family History   Problem Relation Age of Onset   • Diabetes type II Mother    • Hypertension Mother    • Osteoporosis Mother    • Seizures Mother    • COPD Father    • Hypertension Father    • Lung cancer Father    • Heart attack Father    • Liver cancer Father    • Thyroid disease Sister    •  "Hypertension Sister    • Bipolar disorder Sister    • Depression Sister    • ADD / ADHD Sister    • No Known Problems Son    • Abnormal EKG Daughter    • Hypertension Daughter    • Bipolar disorder Daughter    • Thyroid disease Daughter    • No Known Problems Paternal Grandfather    • No Known Problems Paternal Grandmother    • No Known Problems Maternal Grandmother    • No Known Problems Maternal Grandfather    • Thyroid disease Sister    • Hypertension Sister    • Bipolar disorder Sister    • Asthma Daughter    • Breast cancer Neg Hx    • Ovarian cancer Neg Hx    • Uterine cancer Neg Hx    • Colon cancer Neg Hx    • Malig Hyperthermia Neg Hx        ROS: 14 point review of systems was performed and all other systems were reviewed and are negative except for documented findings in HPI and today's encounter.     Allergies: No Known Allergies  Constitutional:  Denies fever, shaking or chills   Eyes:  Denies change in visual acuity   HENT:  Denies nasal congestion or sore throat   Respiratory:  Denies cough or shortness of breath   Cardiovascular:  Denies chest pain or severe LE edema   GI:  Denies abdominal pain, nausea, vomiting, bloody stools or diarrhea   Musculoskeletal:  Numbness, tingling, pain, or loss of motor function only as noted above in history of present illness.  : Denies painful urination or hematuria  Integument:  Denies rash, lesion or ulceration   Neurologic:  Denies headache or focal weakness  Endocrine:  Denies lymphadenopathy  Psych:  Denies confusion or change in mental status   Hem:  Denies active bleeding    OBJECTIVE:  Physical Exam: 60 y.o. female  Wt Readings from Last 3 Encounters:   06/02/21 99.8 kg (220 lb)   05/12/21 103 kg (227 lb)   05/03/21 102 kg (223 lb 12.8 oz)     Ht Readings from Last 1 Encounters:   06/02/21 160 cm (63\")     Body mass index is 38.97 kg/m².  Vitals:    06/02/21 1402   Temp: 96.4 °F (35.8 °C)     Vital signs reviewed.     General Appearance:    Alert, " cooperative, in no acute distress                  Eyes: conjunctiva clear  ENT: external ears and nose atraumatic  CV: no peripheral edema  Resp: normal respiratory effort  Skin: no rashes or wounds; normal turgor  Psych: mood and affect appropriate  Lymph: no nodes appreciated  Neuro: gross sensation intact  Vascular:  Palpable peripheral pulse in noted extremity  Musculoskeletal Extremities:Skin warm and dry and intact with good pulses movement sensation calves are soft and nontender both knees have medial joint line tenderness with effusion synovitis stiffness upon ambulation calves are soft       Radiology:   Not done today reviewed tricompartmental djd        Assessment:     ICD-10-CM ICD-9-CM   1. Primary osteoarthritis of knees, bilateral  M17.0 715.16        MDM/Plan:   The diagnosis(es), natural history, pathophysiology and treatment for diagnosis(es) were discussed. Opportunity given and questions answered.  Biomechanics of pertinent body areas discussed.  When appropriate, the use of ambulatory aids discussed.    Large Joint Arthrocentesis: R knee  Date/Time: 6/2/2021 2:05 PM  Consent given by: patient  Site marked: site marked  Timeout: Immediately prior to procedure a time out was called to verify the correct patient, procedure, equipment, support staff and site/side marked as required   Supporting Documentation  Indications: pain   Procedure Details  Location: knee - R knee  Preparation: Patient was prepped and draped in the usual sterile fashion  Needle size: 22 G  Approach: anteromedial  Medications administered: 80 mg methylPREDNISolone acetate 80 MG/ML; 4 mL lidocaine PF 2% 2 %      Large Joint Arthrocentesis: L knee  Date/Time: 6/2/2021 2:05 PM  Consent given by: patient  Site marked: site marked  Timeout: Immediately prior to procedure a time out was called to verify the correct patient, procedure, equipment, support staff and site/side marked as required   Supporting Documentation  Indications:  pain   Procedure Details  Location: knee - L knee  Preparation: Patient was prepped and draped in the usual sterile fashion  Needle size: 22 G  Approach: anteromedial  Medications administered: 80 mg methylPREDNISolone acetate 80 MG/ML; 4 mL lidocaine PF 2% 2 %  Patient tolerance: patient tolerated the procedure well with no immediate complications          The diagnosis(es), natural history, pathophysiology and treatment for diagnosis(es) were discussed. Opportunity given and questions answered.  Biomechanics of pertinent body areas discussed.  When appropriate, the use of ambulatory aids discussed.  BMI:  The concept of BMI body mass index and its importance and implications discussed.    EXERCISES:  Advice on benefits of, and types of regular/moderate exercise pertaining to orthopedic diagnosis(es).  MEDICATIONS:  The risks, benefits, warnings,side effects and alternatives of medications discussed.  Inflammation/pain control; with cold, heat, elevation and/or liniments discussed as appropriate  Cortisone Injection. See procedure note.  HOME EXERCISE/PT program encouraged  MEDICAL RECORDS reviewed from other provider(s) for past and current medical history pertinent to this complaint.      6/2/2021    Much of this encounter note is an electronic transcription/translation of spoken language to printed text. The electronic translation of spoken language may permit erroneous, or at times, nonsensical words or phrases to be inadvertently transcribed; Although I have reviewed the note for such errors, some may still exist

## 2021-06-08 ENCOUNTER — LAB (OUTPATIENT)
Dept: LAB | Facility: HOSPITAL | Age: 61
End: 2021-06-08

## 2021-06-08 LAB — 25(OH)D3 SERPL-MCNC: 48.7 NG/ML (ref 30–100)

## 2021-06-08 PROCEDURE — 84443 ASSAY THYROID STIM HORMONE: CPT | Performed by: INTERNAL MEDICINE

## 2021-06-08 PROCEDURE — 84146 ASSAY OF PROLACTIN: CPT | Performed by: INTERNAL MEDICINE

## 2021-06-08 PROCEDURE — 82306 VITAMIN D 25 HYDROXY: CPT | Performed by: INTERNAL MEDICINE

## 2021-06-09 ENCOUNTER — APPOINTMENT (OUTPATIENT)
Dept: LAB | Facility: HOSPITAL | Age: 61
End: 2021-06-09

## 2021-06-09 ENCOUNTER — LAB (OUTPATIENT)
Dept: LAB | Facility: HOSPITAL | Age: 61
End: 2021-06-09

## 2021-06-09 ENCOUNTER — TRANSCRIBE ORDERS (OUTPATIENT)
Dept: SLEEP MEDICINE | Facility: HOSPITAL | Age: 61
End: 2021-06-09

## 2021-06-09 DIAGNOSIS — Z01.818 OTHER SPECIFIED PRE-OPERATIVE EXAMINATION: Primary | ICD-10-CM

## 2021-06-09 LAB — SARS-COV-2 ORF1AB RESP QL NAA+PROBE: NOT DETECTED

## 2021-06-09 PROCEDURE — C9803 HOPD COVID-19 SPEC COLLECT: HCPCS | Performed by: INTERNAL MEDICINE

## 2021-06-09 PROCEDURE — U0004 COV-19 TEST NON-CDC HGH THRU: HCPCS | Performed by: INTERNAL MEDICINE

## 2021-06-11 ENCOUNTER — HOSPITAL ENCOUNTER (OUTPATIENT)
Dept: SLEEP MEDICINE | Facility: HOSPITAL | Age: 61
Discharge: HOME | End: 2021-06-11
Admitting: NURSE PRACTITIONER

## 2021-06-11 DIAGNOSIS — G47.33 OSA (OBSTRUCTIVE SLEEP APNEA): ICD-10-CM

## 2021-06-11 DIAGNOSIS — E66.01 MORBID (SEVERE) OBESITY DUE TO EXCESS CALORIES (HCC): ICD-10-CM

## 2021-06-11 DIAGNOSIS — Z78.9 INTOLERANCE OF CONTINUOUS POSITIVE AIRWAY PRESSURE (CPAP) VENTILATION: ICD-10-CM

## 2021-06-11 PROCEDURE — 95811 POLYSOM 6/>YRS CPAP 4/> PARM: CPT

## 2021-06-17 ENCOUNTER — OFFICE VISIT (OUTPATIENT)
Dept: FAMILY MEDICINE CLINIC | Facility: CLINIC | Age: 61
End: 2021-06-17

## 2021-06-17 VITALS
HEART RATE: 58 BPM | DIASTOLIC BLOOD PRESSURE: 88 MMHG | SYSTOLIC BLOOD PRESSURE: 118 MMHG | HEIGHT: 63 IN | OXYGEN SATURATION: 99 % | BODY MASS INDEX: 39.97 KG/M2 | TEMPERATURE: 96.9 F | RESPIRATION RATE: 16 BRPM | WEIGHT: 225.6 LBS

## 2021-06-17 DIAGNOSIS — G44.209 TENSION HEADACHE: Primary | ICD-10-CM

## 2021-06-17 DIAGNOSIS — D32.9 MENINGIOMA (HCC): ICD-10-CM

## 2021-06-17 PROBLEM — M77.8 TENDINITIS OF LEFT WRIST: Status: ACTIVE | Noted: 2017-10-08

## 2021-06-17 PROBLEM — Z09 ENCOUNTER FOR FOLLOW-UP EXAMINATION AFTER COMPLETED TREATMENT FOR CONDITIONS OTHER THAN MALIGNANT NEOPLASM: Status: ACTIVE | Noted: 2018-04-11

## 2021-06-17 PROBLEM — K21.00 GASTRO-ESOPHAGEAL REFLUX DISEASE WITH ESOPHAGITIS, WITHOUT BLEEDING: Status: ACTIVE | Noted: 2021-05-10

## 2021-06-17 PROCEDURE — 99214 OFFICE O/P EST MOD 30 MIN: CPT | Performed by: NURSE PRACTITIONER

## 2021-06-17 RX ORDER — BUTALBITAL, ACETAMINOPHEN AND CAFFEINE 300; 40; 50 MG/1; MG/1; MG/1
1 CAPSULE ORAL EVERY 6 HOURS PRN
Qty: 10 CAPSULE | Refills: 0 | Status: SHIPPED | OUTPATIENT
Start: 2021-06-17 | End: 2021-07-06
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
## 2021-06-17 NOTE — PATIENT INSTRUCTIONS
Discharge instructions likely a tension headache  Need to immediately stop which you do and if possible soon as the headache returns, Fioricet at the onset  And relaxation meditation at the onset to prevent these headaches each day  Healthy diet exercise walking some type of nice relax will activity daily to decrease the recurrence of these headaches should she have the worst headache ever weakness fevers chills severe neck pain  Confusion gait difficulties emergency room    Follow-up neurosurgery as we discussed
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
## 2021-06-23 DIAGNOSIS — E23.6 PITUITARY CYST (HCC): ICD-10-CM

## 2021-06-23 DIAGNOSIS — R90.89 ABNORMAL BRAIN MRI: Primary | ICD-10-CM

## 2021-06-28 DIAGNOSIS — I10 HYPERTENSION, UNSPECIFIED TYPE: ICD-10-CM

## 2021-06-28 RX ORDER — ATENOLOL 50 MG/1
TABLET ORAL
Qty: 90 TABLET | Refills: 0 | Status: SHIPPED | OUTPATIENT
Start: 2021-06-28 | End: 2021-06-29 | Stop reason: SDUPTHER

## 2021-06-29 ENCOUNTER — APPOINTMENT (OUTPATIENT)
Dept: PAIN MEDICINE | Facility: HOSPITAL | Age: 61
End: 2021-06-29

## 2021-06-29 DIAGNOSIS — I10 HYPERTENSION, UNSPECIFIED TYPE: ICD-10-CM

## 2021-06-29 DIAGNOSIS — M54.12 CERVICAL RADICULOPATHY: ICD-10-CM

## 2021-06-29 RX ORDER — ATENOLOL 50 MG/1
TABLET ORAL
Qty: 90 TABLET | Refills: 0 | OUTPATIENT
Start: 2021-06-29

## 2021-06-30 DIAGNOSIS — I10 HYPERTENSION, UNSPECIFIED TYPE: ICD-10-CM

## 2021-06-30 RX ORDER — GABAPENTIN 300 MG/1
CAPSULE ORAL
Qty: 90 CAPSULE | Refills: 0 | Status: SHIPPED | OUTPATIENT
Start: 2021-06-30 | End: 2021-07-26

## 2021-06-30 RX ORDER — ATENOLOL 50 MG/1
TABLET ORAL
Qty: 90 TABLET | Refills: 0 | OUTPATIENT
Start: 2021-06-30

## 2021-07-01 ENCOUNTER — OFFICE VISIT (OUTPATIENT)
Dept: GASTROENTEROLOGY | Facility: CLINIC | Age: 61
End: 2021-07-01

## 2021-07-01 VITALS — WEIGHT: 224 LBS | HEIGHT: 63 IN | BODY MASS INDEX: 39.69 KG/M2

## 2021-07-01 DIAGNOSIS — R10.11 RIGHT UPPER QUADRANT ABDOMINAL PAIN: Primary | ICD-10-CM

## 2021-07-01 DIAGNOSIS — R10.13 DYSPEPSIA: ICD-10-CM

## 2021-07-01 DIAGNOSIS — K21.00 GASTROESOPHAGEAL REFLUX DISEASE WITH ESOPHAGITIS WITHOUT HEMORRHAGE: ICD-10-CM

## 2021-07-01 DIAGNOSIS — R19.7 DIARRHEA, UNSPECIFIED TYPE: ICD-10-CM

## 2021-07-01 DIAGNOSIS — Z98.84 HX OF LAPAROSCOPIC GASTRIC BANDING: ICD-10-CM

## 2021-07-01 DIAGNOSIS — R13.10 DYSPHAGIA, UNSPECIFIED TYPE: ICD-10-CM

## 2021-07-01 DIAGNOSIS — R10.10 PAIN OF UPPER ABDOMEN: ICD-10-CM

## 2021-07-01 PROCEDURE — 99214 OFFICE O/P EST MOD 30 MIN: CPT | Performed by: NURSE PRACTITIONER

## 2021-07-01 RX ORDER — PANTOPRAZOLE SODIUM 40 MG/1
40 TABLET, DELAYED RELEASE ORAL 2 TIMES DAILY
Qty: 60 TABLET | Refills: 3 | Status: SHIPPED | OUTPATIENT
Start: 2021-07-01 | End: 2021-10-26

## 2021-07-01 RX ORDER — ONDANSETRON 4 MG/1
4 TABLET, FILM COATED ORAL EVERY 8 HOURS PRN
Qty: 20 TABLET | Refills: 3 | Status: SHIPPED | OUTPATIENT
Start: 2021-07-01

## 2021-07-01 RX ORDER — ATENOLOL 50 MG/1
50 TABLET ORAL DAILY
Qty: 90 TABLET | Refills: 0 | Status: SHIPPED | OUTPATIENT
Start: 2021-07-01 | End: 2021-09-24

## 2021-07-01 NOTE — PROGRESS NOTES
Chief Complaint   Patient presents with   • Abdominal Pain   • change in stool       Keri Bhagat is a  61 y.o. female here for a follow up visit for abdominal pain.    HPI  61-year-old female presents today for follow-up visit for abdominal pain.  She is a patient of Dr. Rodriguez.  She was last seen in the office on 5/3/2021.  She is new to me today.  She has a history of LAP-BAND surgery.  And has lately been having a lot of issues including upper abdominal pain, dyspepsia, trouble swallowing, nausea and vomiting and diarrhea.  She had an upper GI study done recently that showed:    IMPRESSIONS: Lap band in satisfactory position as described. Moderate  diffuse dilatation of the esophagus proximal to the lap band with  residual food material in the esophagus at the beginning of the study  that did not pass across the lap band during the study. Diminished  primary esophageal peristalsis was observed with tertiary contractions.  Otherwise unremarkable upper GI series and small bowel follow-through.     She had a gallbladder ultrasound done that showed:  IMPRESSION:  Fatty infiltration of the liver otherwise unremarkable    Does have a history of GERD and lately she says her reflux is really bad she took her self off Pepcid because she did not feel like it was working.  She has been having worsening reflux with food, vomiting and has a feeling that things are getting stuck every time she eats.  She is been having nausea and vomiting.  And lately she is even been having some diarrhea with fecal incontinence.  She does see her bariatric surgeon in August.  She does have a history of colon polyps.  Her last colonoscopy was on 3/9/2021.  Her last EGD was in 2017.  Past Medical History:   Diagnosis Date   • Abnormal Pap smear of cervix    • Anemia    • Anxiety    • Arthritis    • Atherosclerotic heart disease of native coronary artery with other forms of angina pectoris (CMS/HCC)    • Bipolar I disorder, single manic  episode (CMS/[facility])     depressive d(NOS)   • Cervical disc herniation    • Cervical dysplasia    • Coronary artery disease    • CTS (carpal tunnel syndrome)    • Depression     NO SUICIDAL PLANS   • Difficulty swallowing    • Diverticular disease    • Dyspepsia    • Dysphagia    • Gastric reflux    • GERD (gastroesophageal reflux disease)    • Heart attack (CMS/[facility])    • Heart murmur    • Hiatal hernia    • High cholesterol    • History of transfusion    • 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    HPV positive on pap LGSIL   • Hyperlipidemia    • Hypertension    • Hypothyroidism    • Incontinence in female     wears pads   • Kidney disease 2017    stage 2    • Kidney disease, chronic, stage III (GFR 30-59 ml/min) (CMS/[facility])    • LGSIL on Pap smear of cervix 04/29/2016    LGSIL HPV positive   • Myocardial infarction (CMS/[facility]) 2000   • Neck pain    • Obesity    • Past myocardial infarction 05/2000   • Periodic limb movement disorder    • Restless leg syndrome    • Sleep apnea     cpap   • Stress fracture     LEFT HEEL   • Suicidal ideation 08/19/2016    history   • Swelling of ankle     right had doppler no s/s blood clot   • Thyroid nodule    • Vitamin B12 deficiency        Past Surgical History:   Procedure Laterality Date   • ANTERIOR CERVICAL DISCECTOMY W/ FUSION N/A 12/29/2016    Procedure: C3-4 anterior cervical discectomy and fusion with Depuy micro plate, ALLOGRAFT C3-4, AND HARDWARE REMOVAL C4-7.;  Surgeon: Hema Godwin MD;  Location: [facility];  Service:    • CARDIAC CATHETERIZATION N/A 3/30/2017    Procedure: Left Heart Cath;  Surgeon: Tracey Vargas MD;  Location: [facility] INVASIVE LOCATION;  Service:    • CARDIAC CATHETERIZATION N/A 3/30/2017    Procedure: Coronary angiography;  Surgeon: Tracey Vargas MD;  Location: [facility] INVASIVE LOCATION;  Service:    • CARDIAC CATHETERIZATION N/A 3/30/2017    Procedure: Left ventriculography;  Surgeon: Tracey Vargas MD;  Location: [facility]  CATH INVASIVE LOCATION;  Service:    • CARDIAC CATHETERIZATION  3/30/2017    Procedure: Functional Flow Catherine;  Surgeon: Tracey Vargas MD;  Location: [facility] CATH INVASIVE LOCATION;  Service:    • CARPAL TUNNEL RELEASE     • CERVICAL BIOPSY  MMXVI    Dr. Jeffery.    • CERVICAL DISCECTOMY ANTERIOR  2013    C4-7   • COLONOSCOPY  2015    Diverticulosis, IH   • COLONOSCOPY  2021   • COLPOSCOPY W/ BIOPSY / CURETTAGE  2016    LGSIL HPV positive. Results were normal repeat pap in one year. Chronic Cervicitis   • CORONARY ANGIOPLASTY WITH STENT PLACEMENT     • ENDOSCOPY  MMXV    Normal.  Dr. Rodriguez   • ENDOSCOPY N/A 2017    Erythematous mucosa in the stomach  PATH: Chronic active gastritis, moderate with intestinal metaplasia    • GASTRIC BYPASS      Done using the sleeve technique   • HARDWARE REMOVAL  2016    cervical   • HERNIA REPAIR     • LAPAROSCOPIC GASTRIC BANDING  2018   • SHOULDER SURGERY Left     RCR   • TONSILLECTOMY     • TONSILLECTOMY AND ADENOIDECTOMY     • TRANSVAGINAL TAPING SUSPENSION N/A 2017    Procedure: MID URETHRAL SLING CYSTSCOPY;  Surgeon: Abby Méndez MD;  Location: [facility] MAIN OR;  Service:    • TUBAL ABDOMINAL LIGATION     • TYMPANOSTOMY TUBE PLACEMENT Right    • WRIST SURGERY Bilateral     carpal tunnel   • WRIST SURGERY      L wrist/ 2020       Scheduled Meds:    Continuous Infusions:No current facility-administered medications for this visit.      PRN Meds:.    No Known Allergies    Social History     Socioeconomic History   • Marital status:      Spouse name: Not on file   • Number of children: 3   • Years of education: 12   • Highest education level: Not on file   Tobacco Use   • Smoking status: Former Smoker     Packs/day: 1.50     Years: 20.00     Pack years: 30.00     Types: Electronic Cigarette, Cigarettes     Quit date:      Years since quittin.5   • Smokeless tobacco: Never Used   • Tobacco comment: Electronic  Cigarette   Vaping Use   • Vaping Use: Every day   • Substances: Nicotine, Flavoring   • Devices: Refillable tank   Substance and Sexual Activity   • Alcohol use: Yes     Comment: 2 times yearly    • Drug use: No   • Sexual activity: Yes     Partners: Male     Birth control/protection: None, Condom       Family History   Problem Relation Age of Onset   • Diabetes type II Mother    • Hypertension Mother    • Osteoporosis Mother    • Seizures Mother    • COPD Father    • Hypertension Father    • Lung cancer Father    • Heart attack Father    • Liver cancer Father    • Thyroid disease Sister    • Hypertension Sister    • Bipolar disorder Sister    • Depression Sister    • ADD / ADHD Sister    • No Known Problems Son    • Abnormal EKG Daughter    • Hypertension Daughter    • Bipolar disorder Daughter    • Thyroid disease Daughter    • No Known Problems Paternal Grandfather    • No Known Problems Paternal Grandmother    • No Known Problems Maternal Grandmother    • No Known Problems Maternal Grandfather    • Thyroid disease Sister    • Hypertension Sister    • Bipolar disorder Sister    • Asthma Daughter    • Breast cancer Neg Hx    • Ovarian cancer Neg Hx    • Uterine cancer Neg Hx    • Colon cancer Neg Hx    • Malig Hyperthermia Neg Hx        Review of Systems   Constitutional: Negative for appetite change, chills, diaphoresis, fatigue, fever and unexpected weight change.   HENT: Negative for nosebleeds, postnasal drip, sore throat, trouble swallowing and voice change.    Respiratory: Negative for cough, choking, chest tightness, shortness of breath and wheezing.    Cardiovascular: Negative for chest pain, palpitations and leg swelling.   Gastrointestinal: Positive for abdominal distention, abdominal pain, diarrhea, nausea and vomiting. Negative for anal bleeding, blood in stool, constipation and rectal pain.   Endocrine: Negative for polydipsia, polyphagia and polyuria.   Musculoskeletal: Negative for gait problem.    Skin: Negative for rash and wound.   Allergic/Immunologic: Negative for food allergies.   Neurological: Negative for dizziness, speech difficulty and light-headedness.   Psychiatric/Behavioral: Negative for confusion, self-injury, sleep disturbance and suicidal ideas.       There were no vitals filed for this visit.    Physical Exam  Constitutional:       General: She is not in acute distress.     Appearance: She is well-developed. She is not ill-appearing.   HENT:      Head: Normocephalic.   Eyes:      Pupils: Pupils are equal, round, and reactive to light.   Cardiovascular:      Rate and Rhythm: Normal rate and regular rhythm.      Heart sounds: Normal heart sounds.   Pulmonary:      Effort: Pulmonary effort is normal.      Breath sounds: Normal breath sounds.   Abdominal:      General: Bowel sounds are normal. There is distension.      Palpations: Abdomen is soft. There is no mass.      Tenderness: There is abdominal tenderness. There is no guarding or rebound.      Hernia: No hernia is present.       Musculoskeletal:         General: Normal range of motion.   Skin:     General: Skin is warm and dry.   Neurological:      Mental Status: She is alert and oriented to person, place, and time.   Psychiatric:         Speech: Speech normal.         Behavior: Behavior normal.         Judgment: Judgment normal.         No radiology results for the last 7 days     Assessment and plan     1. Right upper quadrant abdominal pain  - CBC & Differential  - Comprehensive Metabolic Panel  - Amylase  - Lipase    2. Diarrhea, unspecified type  - CBC & Differential  - Comprehensive Metabolic Panel  - Amylase  - Lipase    3. Gastroesophageal reflux disease with esophagitis without hemorrhage    4. Hx of laparoscopic gastric banding    Reviewed her upper GI study with her today.  She is supposed to follow-up with her bariatric surgeon in early August.  She still continues to have terrible reflux with nausea and vomiting.  Will go  ahead and increase the Protonix to 40 mg twice daily.  Will give her some Zofran for the nausea.  Will check some labs today.  For the diarrhea that just started up I would like her to add some Pepto-Bismol OTC as needed.  Continue a bland diet.  Continue GERD precautions.  She still has her gallbladder so will check some labs today.  May need to get a HIDA scan.  Her abdominal ultrasound was good except for some fatty liver disease.  Patient to call the office next week with an update.  Patient to follow-up with me in 2 weeks.  Patient is agreeable to the plan.

## 2021-07-02 ENCOUNTER — TELEPHONE (OUTPATIENT)
Dept: GASTROENTEROLOGY | Facility: CLINIC | Age: 61
End: 2021-07-02

## 2021-07-02 LAB
ALBUMIN SERPL-MCNC: 4.5 G/DL (ref 3.5–5.2)
ALBUMIN/GLOB SERPL: 1.9 G/DL
ALP SERPL-CCNC: 93 U/L (ref 39–117)
ALT SERPL-CCNC: 42 U/L (ref 1–33)
AMYLASE SERPL-CCNC: 31 U/L (ref 28–100)
AST SERPL-CCNC: 26 U/L (ref 1–32)
BASOPHILS # BLD AUTO: 0.03 10*3/MM3 (ref 0–0.2)
BASOPHILS NFR BLD AUTO: 0.3 % (ref 0–1.5)
BILIRUB SERPL-MCNC: 0.2 MG/DL (ref 0–1.2)
BUN SERPL-MCNC: 10 MG/DL (ref 6–20)
BUN/CREAT SERPL: 10.2 (ref 7–25)
CALCIUM SERPL-MCNC: 9.6 MG/DL (ref 8.6–10.5)
CHLORIDE SERPL-SCNC: 106 MMOL/L (ref 98–107)
CO2 SERPL-SCNC: 27.4 MMOL/L (ref 22–29)
CREAT SERPL-MCNC: 0.98 MG/DL (ref 0.57–1)
EOSINOPHIL # BLD AUTO: 0.18 10*3/MM3 (ref 0–0.4)
EOSINOPHIL NFR BLD AUTO: 1.9 % (ref 0.3–6.2)
ERYTHROCYTE [DISTWIDTH] IN BLOOD BY AUTOMATED COUNT: 14.2 % (ref 12.3–15.4)
GLOBULIN SER CALC-MCNC: 2.4 GM/DL
GLUCOSE SERPL-MCNC: 74 MG/DL (ref 65–99)
HCT VFR BLD AUTO: 41.7 % (ref 34–46.6)
HGB BLD-MCNC: 13.7 G/DL (ref 12–15.9)
IMM GRANULOCYTES # BLD AUTO: 0.03 10*3/MM3 (ref 0–0.05)
IMM GRANULOCYTES NFR BLD AUTO: 0.3 % (ref 0–0.5)
LIPASE SERPL-CCNC: 19 U/L (ref 13–60)
LYMPHOCYTES # BLD AUTO: 3.46 10*3/MM3 (ref 0.7–3.1)
LYMPHOCYTES NFR BLD AUTO: 36.9 % (ref 19.6–45.3)
MCH RBC QN AUTO: 29.8 PG (ref 26.6–33)
MCHC RBC AUTO-ENTMCNC: 32.9 G/DL (ref 31.5–35.7)
MCV RBC AUTO: 90.8 FL (ref 79–97)
MONOCYTES # BLD AUTO: 0.6 10*3/MM3 (ref 0.1–0.9)
MONOCYTES NFR BLD AUTO: 6.4 % (ref 5–12)
NEUTROPHILS # BLD AUTO: 5.08 10*3/MM3 (ref 1.7–7)
NEUTROPHILS NFR BLD AUTO: 54.2 % (ref 42.7–76)
NRBC BLD AUTO-RTO: 0 /100 WBC (ref 0–0.2)
PLATELET # BLD AUTO: 273 10*3/MM3 (ref 140–450)
POTASSIUM SERPL-SCNC: 4.3 MMOL/L (ref 3.5–5.2)
PROT SERPL-MCNC: 6.9 G/DL (ref 6–8.5)
RBC # BLD AUTO: 4.59 10*6/MM3 (ref 3.77–5.28)
SODIUM SERPL-SCNC: 141 MMOL/L (ref 136–145)
WBC # BLD AUTO: 9.38 10*3/MM3 (ref 3.4–10.8)

## 2021-07-02 NOTE — TELEPHONE ENCOUNTER
----- Message from DAPHNEY Hayes sent at 7/2/2021  9:14 AM EDT -----  Please call the patient and let her know her labs look good.  Her ALT is slightly elevated but everything else looks good.

## 2021-07-06 RX ORDER — BUTALBITAL, ACETAMINOPHEN AND CAFFEINE 300; 40; 50 MG/1; MG/1; MG/1
CAPSULE ORAL
Qty: 10 CAPSULE | Refills: 0 | Status: SHIPPED | OUTPATIENT
Start: 2021-07-06 | End: 2021-08-03

## 2021-07-06 RX ORDER — OMEPRAZOLE 40 MG/1
CAPSULE, DELAYED RELEASE ORAL
Qty: 14 CAPSULE | Refills: 0 | Status: SHIPPED | OUTPATIENT
Start: 2021-07-06 | End: 2021-07-19

## 2021-07-12 ENCOUNTER — OFFICE VISIT (OUTPATIENT)
Dept: SLEEP MEDICINE | Facility: HOSPITAL | Age: 61
End: 2021-07-12

## 2021-07-12 VITALS
BODY MASS INDEX: 40.22 KG/M2 | HEIGHT: 63 IN | WEIGHT: 227 LBS | HEART RATE: 71 BPM | DIASTOLIC BLOOD PRESSURE: 81 MMHG | SYSTOLIC BLOOD PRESSURE: 126 MMHG | OXYGEN SATURATION: 95 %

## 2021-07-12 DIAGNOSIS — Z78.9 INTOLERANCE OF CONTINUOUS POSITIVE AIRWAY PRESSURE (CPAP) VENTILATION: ICD-10-CM

## 2021-07-12 DIAGNOSIS — E66.9 OBESITY (BMI 30-39.9): ICD-10-CM

## 2021-07-12 DIAGNOSIS — G25.81 RESTLESS LEGS SYNDROME (RLS): ICD-10-CM

## 2021-07-12 DIAGNOSIS — E66.01 MORBID (SEVERE) OBESITY DUE TO EXCESS CALORIES (HCC): ICD-10-CM

## 2021-07-12 DIAGNOSIS — G47.33 OSA (OBSTRUCTIVE SLEEP APNEA): Primary | ICD-10-CM

## 2021-07-12 PROCEDURE — G0463 HOSPITAL OUTPT CLINIC VISIT: HCPCS

## 2021-07-12 RX ORDER — PRAMIPEXOLE DIHYDROCHLORIDE 0.5 MG/1
0.5 TABLET ORAL DAILY
Qty: 30 TABLET | Refills: 5 | Status: SHIPPED | OUTPATIENT
Start: 2021-07-12 | End: 2021-08-05

## 2021-07-12 NOTE — PROGRESS NOTES
[facility]  Telephone: [phone] / Fax: [phone] [place]  Telephone: [phone] / Fax: [phone] [place]    PCP: Epley, James, APRN    Reason for visit: ROCKY f/u    Keri Bhagat is a 61 y.o.female  was last seen at [facility] sleep lab in May 2021. We did in lab titration study in June 2021 in view of CPAP intolerance, and auto BIPAP was recommended with IPAP max 25, EPAP min 10 and PS 4.  Her machine is going to be 5 years old in September 2021. She would like to get it replaced. She wants to use [facility] for DME.  She has Med[employer] auto machine. It is set at 8-15cm Her CPAP usage was compromised April to June due to a move.  She since then resumed the machine and is feeling better. She is inquiring about inspire. Sleep schedule is 11pm-8am.    SH- +tobacco, no alcohol, 2-4 caffeine per day    ROS- +nasal congestion, +GERD +depression    Current Medications:    Current Outpatient Medications:   •  aluminum-magnesium hydroxide-simethicone (Mylanta Maximum Strength) 400-400-40 MG/5ML suspension, Take 20 mL by mouth Every 6 (Six) Hours As Needed for Indigestion or Heartburn., Disp: 355 mL, Rfl: 1  •  ARIPiprazole (ABILIFY) 20 MG tablet, Take 20 mg by mouth Daily., Disp: , Rfl:   •  aspirin 81 MG EC tablet, Take 1 tablet by mouth Daily., Disp: 30 tablet, Rfl: 2  •  atenolol (TENORMIN) 50 MG tablet, Take 1 tablet by mouth Daily., Disp: 90 tablet, Rfl: 0  •  buPROPion XL (WELLBUTRIN XL) 150 MG 24 hr tablet, Take 150 mg by mouth Every Morning., Disp: , Rfl:   •  butalbital-acetaminophen-caffeine (ORBIVAN) -40 MG capsule capsule, TAKE ONE CAPSULE BY MOUTH EVERY 6 HOURS AS NEEDED FOR TENSION HEADACHE, Disp: 10 capsule, Rfl: 0  •  Cholecalciferol (VITAMIN D-3) 25 MCG (1000 UT) capsule, 1 capsule daily, Disp: , Rfl:   •  ergocalciferol (ERGOCALCIFEROL) 1.25 MG (62454 UT) capsule, Take  by mouth., Disp: , Rfl:   •  Ferrous Sulfate (IRON PO), Take  by mouth., Disp: , Rfl:   •   gabapentin (NEURONTIN) 300 MG capsule, TAKE ONE CAPSULE BY MOUTH THREE TIMES A DAY, Disp: 90 capsule, Rfl: 0  •  hydrOXYzine (ATARAX) 50 MG tablet, As Needed., Disp: , Rfl:   •  isosorbide mononitrate (IMDUR) 30 MG 24 hr tablet, TAKE ONE TABLET BY MOUTH DAILY, Disp: 30 tablet, Rfl: 5  •  lamoTRIgine (LaMICtal) 150 MG tablet, Take 300 mg by mouth Daily., Disp: , Rfl:   •  levothyroxine (SYNTHROID, LEVOTHROID) 88 MCG tablet, TAKE ONE TABLET BY MOUTH EVERY MORNING, Disp: 90 tablet, Rfl: 1  •  loratadine (CLARITIN) 10 MG tablet, Take 1 tablet by mouth Daily. As needed for allergies, Disp: 30 tablet, Rfl: 11  •  nitroglycerin (NITROSTAT) 0.4 MG SL tablet, Place 0.4 mg under the tongue Every 5 (Five) Minutes As Needed for Chest Pain (took at M.D  office at 0930 a.m.). Take no more than 3 doses in 15 minutes., Disp: , Rfl:   •  omeprazole (priLOSEC) 40 MG capsule, TAKE ONE CAPSULE BY MOUTH DAILY, Disp: 14 capsule, Rfl: 0  •  ondansetron (Zofran) 4 MG tablet, Take 1 tablet by mouth Every 8 (Eight) Hours As Needed for Nausea or Vomiting., Disp: 20 tablet, Rfl: 3  •  oxybutynin XL (DITROPAN XL) 15 MG 24 hr tablet, Take 15 mg by mouth Daily., Disp: , Rfl:   •  pantoprazole (PROTONIX) 40 MG EC tablet, Take 1 tablet by mouth 2 (two) times a day., Disp: 60 tablet, Rfl: 3  •  pramipexole (MIRAPEX) 0.5 MG tablet, Take 0.5 mg by mouth Daily., Disp: , Rfl:   •  simvastatin (ZOCOR) 20 MG tablet, TAKE ONE TABLET BY MOUTH ONCE NIGHTLY, Disp: 90 tablet, Rfl: 0  •  venlafaxine XR (EFFEXOR-XR) 75 MG 24 hr capsule, Take 75 mg by mouth Daily., Disp: , Rfl:   •  vitamin B-12 (CYANOCOBALAMIN) 1000 MCG tablet, TAKE ONE TABLET BY MOUTH TWICE A DAY, Disp: 60 tablet, Rfl: 4   also entered in Sleep Questionnaire    Patient  has a past medical history of Abnormal Pap smear of cervix, Anemia, Anxiety, Arthritis, Atherosclerotic heart disease of native coronary artery with other forms of angina pectoris (CMS/HCC), Bipolar I disorder, single manic  "episode (CMS/[facility]), Cervical disc herniation, Cervical dysplasia, Coronary artery disease, CTS (carpal tunnel syndrome), Depression, Difficulty swallowing, Diverticular disease, Dyspepsia, Dysphagia, Gastric reflux, GERD (gastroesophageal reflux disease), Heart attack (CMS/[facility]), Heart murmur, Hiatal hernia, High cholesterol, History of transfusion, HPV (human papilloma virus) infection (04/29/2016), Hyperlipidemia, Hypertension, Hypothyroidism, Incontinence in female, Kidney disease (2017), Kidney disease, chronic, stage III (GFR 30-59 ml/min) (CMS/[facility]), LGSIL on Pap smear of cervix (04/29/2016), Myocardial infarction (CMS/[facility]) (2000), Neck pain, Obesity, Past myocardial infarction (05/2000), Periodic limb movement disorder, Restless leg syndrome, Sleep apnea, Stress fracture, Suicidal ideation (08/19/2016), Swelling of ankle, Thyroid nodule, and Vitamin B12 deficiency.    I have reviewed the Past Medical History, Past Surgical History, Social History and Family History.    Vital Signs /81   Pulse 71   Ht 160 cm (63\")   Wt 103 kg (227 lb)   LMP 10/21/2016 (Approximate) Comment: 54  SpO2 95%   BMI 40.21 kg/m²  Body mass index is 40.21 kg/m².    General Alert and oriented. No acute distress noted   Pharynx/Throat Class IV  Mallampati airway, large tongue, no evidence of redundant lateral pharyngeal tissue. No oral lesions. No thrush. Moist mucous membranes.   Head Normocephalic. Symmetrical. Atraumatic.    Nose No septal deviation. No drainage   Chest Wall Normal shape. Symmetric expansion with respiration. No tenderness.   Neck Trachea midline, no thyromegaly or adenopathy    Lungs Clear to auscultation bilaterally. No wheezes. No rhonchi. No rales. Respirations regular, even and unlabored.   Heart Regular rhythm and normal rate. Normal S1 and S2. No murmur   Abdomen Soft, non-tender and non-distended. Normal bowel sounds. No masses.   Extremities Moves all extremities well. No edema   Psychiatric Normal " mood and affect.     Testing:    Study-6/12/21- Results:  Overnight split polysomnogram study.  Diagnostic study 10:07 PM to 11:49 PM.  Sleep efficiency 86.5% with 1.48 hours total sleep time.  Sleep distribution shows absence of slow-wave sleep and REM sleep.  AHI index 12.9 indicating at least mild sleep apnea.  Minimal supine sleep of 4.5 minutes indicated moderately severe sleep apnea.  Due to absence of REM sleep severity may be underestimated.  Oxygen stayed above 88%.  Arousal index 12.9.  PLM index 55.6 with PLM arousal index 3.4.  Titration study from 11:49 PM to 5 AM.  Sleep efficiency 95% with 4.91 hours of total sleep time.  Sleep distribution continues to show absence of slow-wave sleep but there was a rebound in rem sleep to 22.6%.   Apnea hypopnea index is improved.  Oxygen saturation remains above 88%.  PLM index improved but still at 21 events an hour.  CPAP increased from 5 to 13 cm water pressure.  She continued to have apneas and hypopneas and was switched to a bilevel device.  She was tried on pressures of 14/9 and then 17/12.  There was persistent elevated AHI index at all pressure settings.  The overall central apnea index for the titration study was 3.1 and the overall obstructive apnea index for the study was 2.9.  Some of these could be treatment emergent central apneas.  Maximum sleep was achieved only with bilevel pressures.    Study:  HST 9/12/16- AHI index is 21 indicating moderate obstructive sleep apnea.  It is slightly worse in supine position with index 28. Saturation below 89% for 4 minutes and snoring for 2% of total sleep time.    Labs-  May 2021  Component   Ref Range & Units 2 mo ago   Ferritin   13.00 - 150.00 ng/mL 39.90          Impression:  1. ROCKY (obstructive sleep apnea)    2. Intolerance of continuous positive airway pressure (CPAP) ventilation    3. Morbid (severe) obesity due to excess calories (CMS/HCC)    4. Restless legs syndrome (RLS)    5. Obesity (BMI 30-39.9)           Plan:  New machine IPAP 25 EPAP min 10, PS 4.   Start Pramipexole 0.25mg 2-3 hours prior to bedtime  Check iron profile next visit. Ferritin level 39 in May 2021  Continue iron once per day if BID dose makes her constipation  Advised to take iron with Vit C to enhance absorption.  [place] current device with Huang to see if affected.  Not a candidate for inspire as BMI is high and ROCKY is mild.        Weight loss will be strongly beneficial to reduce the severity of sleep-disordered breathing.  Caution during activities that require prolonged concentration is strongly advised if sleepiness returns.    Follow up with Dr. Vallejo in 3 months    Thank you for allowing me to participate in your patient's care.      DAPHNEY Pedraza  [facility]  Phone: [phone]      Part of this note may be an electronic transcription/translation of spoken language to printed text using the Dragon Dictation System.

## 2021-07-15 ENCOUNTER — TELEPHONE (OUTPATIENT)
Dept: SLEEP MEDICINE | Facility: HOSPITAL | Age: 61
End: 2021-07-15

## 2021-07-19 RX ORDER — OMEPRAZOLE 40 MG/1
CAPSULE, DELAYED RELEASE ORAL
Qty: 14 CAPSULE | Refills: 0 | Status: SHIPPED | OUTPATIENT
Start: 2021-07-19 | End: 2021-07-30

## 2021-07-23 ENCOUNTER — TELEPHONE (OUTPATIENT)
Dept: GASTROENTEROLOGY | Facility: CLINIC | Age: 61
End: 2021-07-23

## 2021-07-23 NOTE — TELEPHONE ENCOUNTER
PA submitted through CM for Pantoprazole Sodium 40MG dr tablets.  The patient currently has access to the requested medication and a Prior Authorization is not needed for the patient/medication.

## 2021-07-26 ENCOUNTER — TELEPHONE (OUTPATIENT)
Dept: PAIN MEDICINE | Facility: HOSPITAL | Age: 61
End: 2021-07-26

## 2021-07-26 DIAGNOSIS — M54.12 CERVICAL RADICULOPATHY: ICD-10-CM

## 2021-07-26 RX ORDER — GABAPENTIN 300 MG/1
CAPSULE ORAL
Qty: 90 CAPSULE | Refills: 2 | Status: SHIPPED | OUTPATIENT
Start: 2021-07-26 | End: 2021-07-28

## 2021-07-26 NOTE — TELEPHONE ENCOUNTER
..What would you rate your pain on a 1-10 scale...   Prior to your last procedure? 10   On the best days following your last procedure? 1   Currently? 10    How frequently were you having pain...   Prior to your last procedure? constant   On the best days following your last procedure?   No pain   Currently? constant    When you do have pain does an episode last as long as before your last procedure?  What has been the difference?      Has your function improved?  Yes after getting the injection  Are you walking farther or more often?  NA  If so, how much farther and how much more often than before your last procedure? NA    Have you tried/continued to try any other treatments since your last procedure? (Chiropractor, physical therapy, massage, yoga, back exercises, heat/ice) NO    What medications are you currently taking to help with your pain? In chart  (Narcotics, muscle relaxer's, NSAID's, Tylenol, other)    Considering all factors what % improvement would you say you have had overall? 60%   At its best following your last procedure? 90%   Currently? 30%    Is there anything else you'd like to tell us that has improved since your last procedure?

## 2021-07-27 ENCOUNTER — TELEPHONE (OUTPATIENT)
Dept: FAMILY MEDICINE CLINIC | Facility: CLINIC | Age: 61
End: 2021-07-27

## 2021-07-27 NOTE — TELEPHONE ENCOUNTER
PT IS REQUESTING A CALL BACK TO SEE IF SOMETHING CAN BE CALLED IN FOR A PAIN IN HER SHOULDER AND NECK.

## 2021-07-28 ENCOUNTER — TELEPHONE (OUTPATIENT)
Dept: NEUROSURGERY | Facility: CLINIC | Age: 61
End: 2021-07-28

## 2021-07-28 ENCOUNTER — OFFICE VISIT (OUTPATIENT)
Dept: FAMILY MEDICINE CLINIC | Facility: CLINIC | Age: 61
End: 2021-07-28

## 2021-07-28 VITALS
OXYGEN SATURATION: 97 % | DIASTOLIC BLOOD PRESSURE: 90 MMHG | HEIGHT: 63 IN | WEIGHT: 228.9 LBS | SYSTOLIC BLOOD PRESSURE: 130 MMHG | TEMPERATURE: 96.9 F | HEART RATE: 69 BPM | BODY MASS INDEX: 40.56 KG/M2

## 2021-07-28 DIAGNOSIS — M50.30 BULGE OF CERVICAL DISC WITHOUT MYELOPATHY: ICD-10-CM

## 2021-07-28 DIAGNOSIS — M50.30 DEGENERATIVE DISC DISEASE, CERVICAL: ICD-10-CM

## 2021-07-28 DIAGNOSIS — M54.12 CERVICAL RADICULOPATHY: Primary | ICD-10-CM

## 2021-07-28 PROCEDURE — 99214 OFFICE O/P EST MOD 30 MIN: CPT | Performed by: NURSE PRACTITIONER

## 2021-07-28 RX ORDER — METHYLPREDNISOLONE 4 MG/1
TABLET ORAL
Qty: 21 TABLET | Refills: 0 | Status: SHIPPED | OUTPATIENT
Start: 2021-07-28 | End: 2021-08-05

## 2021-07-28 RX ORDER — GABAPENTIN 300 MG/1
600 CAPSULE ORAL 3 TIMES DAILY
Qty: 180 CAPSULE | Refills: 1 | Status: SHIPPED | OUTPATIENT
Start: 2021-07-28 | End: 2021-10-22

## 2021-07-28 NOTE — TELEPHONE ENCOUNTER
Kiki from [facility] called to clarify pt's orders for MRIs. She has an order in for MRI brain (scheduled 8/4/21) ordered by Ruth Luz and MRI pituitary (scheduled 8/2/21) ordered by Maria Elena Giles. She is wanting to know if pt is to get both or is one of the orders incorrect. Last testing pt had completed was MRI pituitary 3/7/20. Please advise! She stated if someone calls today to cancel one of the orders to call her directly at [phone] but if someone calls after today they will need to call the main number.

## 2021-07-28 NOTE — TELEPHONE ENCOUNTER
I spoke to Cam let her know to cancel the old order from last year and to use the order from this year signed by Maria Elena Giles and cancel the one entered by Ruth Luz.  The order this year has already been approved by her insurance.

## 2021-07-30 RX ORDER — OMEPRAZOLE 40 MG/1
CAPSULE, DELAYED RELEASE ORAL
Qty: 30 CAPSULE | Refills: 1 | Status: SHIPPED | OUTPATIENT
Start: 2021-07-30 | End: 2021-08-03

## 2021-08-02 ENCOUNTER — APPOINTMENT (OUTPATIENT)
Dept: MRI IMAGING | Facility: HOSPITAL | Age: 61
End: 2021-08-02

## 2021-08-02 ENCOUNTER — HOSPITAL ENCOUNTER (OUTPATIENT)
Dept: MRI IMAGING | Facility: HOSPITAL | Age: 61
Discharge: HOME | End: 2021-08-02

## 2021-08-02 DIAGNOSIS — R90.89 ABNORMAL BRAIN MRI: ICD-10-CM

## 2021-08-02 DIAGNOSIS — E23.6 PITUITARY CYST (HCC): ICD-10-CM

## 2021-08-02 LAB — CREAT BLDA-MCNC: 1 MG/DL (ref 0.6–1.3)

## 2021-08-02 PROCEDURE — 82565 ASSAY OF CREATININE: CPT

## 2021-08-02 PROCEDURE — 0 GADOBENATE DIMEGLUMINE 529 MG/ML SOLUTION: Performed by: NURSE PRACTITIONER

## 2021-08-02 PROCEDURE — A9577 INJ MULTIHANCE: HCPCS | Performed by: NURSE PRACTITIONER

## 2021-08-02 PROCEDURE — 70553 MRI BRAIN STEM W/O & W/DYE: CPT

## 2021-08-02 RX ADMIN — GADOBENATE DIMEGLUMINE 20 ML: 529 INJECTION, SOLUTION INTRAVENOUS at 09:37

## 2021-08-03 ENCOUNTER — OFFICE VISIT (OUTPATIENT)
Dept: GASTROENTEROLOGY | Facility: CLINIC | Age: 61
End: 2021-08-03

## 2021-08-03 VITALS — TEMPERATURE: 96.8 F | HEIGHT: 63 IN | BODY MASS INDEX: 40.43 KG/M2 | WEIGHT: 228.2 LBS

## 2021-08-03 DIAGNOSIS — K76.0 FATTY LIVER: ICD-10-CM

## 2021-08-03 DIAGNOSIS — K21.9 GASTROESOPHAGEAL REFLUX DISEASE WITHOUT ESOPHAGITIS: Primary | ICD-10-CM

## 2021-08-03 DIAGNOSIS — R19.7 DIARRHEA, UNSPECIFIED TYPE: ICD-10-CM

## 2021-08-03 DIAGNOSIS — K29.50 OTHER CHRONIC GASTRITIS WITHOUT HEMORRHAGE: ICD-10-CM

## 2021-08-03 PROCEDURE — 99214 OFFICE O/P EST MOD 30 MIN: CPT | Performed by: NURSE PRACTITIONER

## 2021-08-03 NOTE — PROGRESS NOTES
Chief Complaint   Patient presents with   • Diarrhea   • Heartburn   • Abdominal Pain       Keri Bhagat is a  61 y.o. female here for a follow up visit for GERD.    HPI  61-year-old female presents today for a follow-up visit for GERD and diarrhea.  She is a patient of Dr. Rodriguez.  She was last seen in the office by me on 7/1/2021.  She has a history of GERD/gastritis and recently had an upper GI done that showed dilated esophagus with residual food above her LAP-BAND.  She tells me she did follow-up with her surgeon last week and he told her that there is no fluid in her lap band so that really should not be causing any of her upper GI symptoms.  Patient admits her swallowing problems and her reflux is somewhat better since we increase the Protonix to 40 mg twice daily.  She still having some occasional nausea and heartburn.  But she does feel like it somewhat better right now.  She does use Zofran as needed for nausea.  At last visit she was having a lot of diarrhea but now she admits that is calm down and she tends to have more constipation now that she is on iron supplement twice daily for iron deficiency anemia.  Her last colonoscopy was on 3/9/2021.  She does have a history of colon polyps.  Last EGD was in 2017.  She denies any dysphagia, vomiting, rectal bleeding or melena.  She admits her appetite is okay and her weight has gone up 4 pounds since last visit.  She tells me she is really considering having the LAP-BAND removed and having a gastric surgery to help with her weight loss.  She tells me she is tired is gaining weight with the LAP-BAND.  She had a gallbladder ultrasound recently that showed fatty liver disease.  Most recent LFTs were normal except for a slightly elevated AST at 42.  Past Medical History:   Diagnosis Date   • Abnormal Pap smear of cervix    • Anemia    • Anxiety    • Arthritis    • Atherosclerotic heart disease of native coronary artery with other forms of angina pectoris  (CMS/[facility])    • Bipolar I disorder, single manic episode (CMS/[facility])     depressive d(NOS)   • Cervical disc herniation    • Cervical dysplasia    • Coronary artery disease    • CTS (carpal tunnel syndrome)    • Depression     NO SUICIDAL PLANS   • Difficulty swallowing    • Diverticular disease    • Dyspepsia    • Dysphagia    • Gastric reflux    • GERD (gastroesophageal reflux disease)    • Heart attack (CMS/[facility])    • Heart murmur    • Hiatal hernia    • High cholesterol    • History of transfusion    • 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    HPV positive on pap LGSIL   • Hyperlipidemia    • Hypertension    • Hypothyroidism    • Incontinence in female     wears pads   • Kidney disease 2017    stage 2    • Kidney disease, chronic, stage III (GFR 30-59 ml/min) (CMS/[facility])    • LGSIL on Pap smear of cervix 04/29/2016    LGSIL HPV positive   • Myocardial infarction (CMS/[facility]) 2000   • Neck pain    • Obesity    • Past myocardial infarction 05/2000   • Periodic limb movement disorder    • Restless leg syndrome    • Sleep apnea     cpap   • Stress fracture     LEFT HEEL   • Suicidal ideation 08/19/2016    history   • Swelling of ankle     right had doppler no s/s blood clot   • Thyroid nodule    • Vitamin B12 deficiency        Past Surgical History:   Procedure Laterality Date   • ANTERIOR CERVICAL DISCECTOMY W/ FUSION N/A 12/29/2016    Procedure: C3-4 anterior cervical discectomy and fusion with Depuy micro plate, ALLOGRAFT C3-4, AND HARDWARE REMOVAL C4-7.;  Surgeon: Hema Godwin MD;  Location: [facility];  Service:    • CARDIAC CATHETERIZATION N/A 3/30/2017    Procedure: Left Heart Cath;  Surgeon: Tracey Vargas MD;  Location: [facility] INVASIVE LOCATION;  Service:    • CARDIAC CATHETERIZATION N/A 3/30/2017    Procedure: Coronary angiography;  Surgeon: Tracey Vargas MD;  Location: [facility] CATH INVASIVE LOCATION;  Service:    • CARDIAC CATHETERIZATION N/A 3/30/2017    Procedure: Left ventriculography;   Surgeon: Tracey Vargas MD;  Location:  [facility] CATH INVASIVE LOCATION;  Service:    • CARDIAC CATHETERIZATION  3/30/2017    Procedure: Functional Flow [place];  Surgeon: Tracey Vargas MD;  Location:  [facility] CATH INVASIVE LOCATION;  Service:    • CARPAL TUNNEL RELEASE     • CERVICAL BIOPSY  MMXVI    Dr. Jeffery.    • CERVICAL DISCECTOMY ANTERIOR  04/2013    C4-7   • COLONOSCOPY  04/20/2015    Diverticulosis, IH   • COLONOSCOPY  03/09/2021   • COLPOSCOPY W/ BIOPSY / CURETTAGE  06/17/2016    LGSIL HPV positive. Results were normal repeat pap in one year. Chronic Cervicitis   • CORONARY ANGIOPLASTY WITH STENT PLACEMENT     • ENDOSCOPY  MMXV    Normal.  Dr. Rodriguez   • ENDOSCOPY N/A 6/22/2017    Erythematous mucosa in the stomach  PATH: Chronic active gastritis, moderate with intestinal metaplasia    • GASTRIC BYPASS      Done using the sleeve technique   • HARDWARE REMOVAL  12/29/2016    cervical   • HERNIA REPAIR     • LAPAROSCOPIC GASTRIC BANDING  02/2018   • SHOULDER SURGERY Left     RCR   • TONSILLECTOMY     • TONSILLECTOMY AND ADENOIDECTOMY     • TRANSVAGINAL TAPING SUSPENSION N/A 12/6/2017    Procedure: MID URETHRAL SLING CYSTSCOPY;  Surgeon: Abby Méndez MD;  Location: [facility] OR;  Service:    • TUBAL ABDOMINAL LIGATION     • TYMPANOSTOMY TUBE PLACEMENT Right    • UPPER GASTROINTESTINAL ENDOSCOPY  approx 2021   • WRIST SURGERY Bilateral     carpal tunnel   • WRIST SURGERY      L wrist/ 4/2020       Scheduled Meds:    Continuous Infusions:No current facility-administered medications for this visit.      PRN Meds:.    No Known Allergies    Social History     Socioeconomic History   • Marital status:      Spouse name: Not on file   • Number of children: 3   • Years of education: 12   • Highest education level: Not on file   Tobacco Use   • Smoking status: Former Smoker     Packs/day: 1.50     Years: 20.00     Pack years: 30.00     Types: Electronic Cigarette, Cigarettes     Quit date: 2015      Years since quittin.5   • Smokeless tobacco: Never Used   • Tobacco comment: Electronic Cigarette   Vaping Use   • Vaping Use: Every day   • Substances: Nicotine, Flavoring   • Devices: Refillable tank   Substance and Sexual Activity   • Alcohol use: Not Currently   • Drug use: No   • Sexual activity: Yes     Partners: Male     Birth control/protection: None, Condom       Family History   Problem Relation Age of Onset   • Diabetes type II Mother    • Hypertension Mother    • Osteoporosis Mother    • Seizures Mother    • COPD Father    • Hypertension Father    • Lung cancer Father    • Heart attack Father    • Liver cancer Father    • Liver disease Father    • Thyroid disease Sister    • Hypertension Sister    • Bipolar disorder Sister    • Depression Sister    • ADD / ADHD Sister    • No Known Problems Son    • Abnormal EKG Daughter    • Hypertension Daughter    • Bipolar disorder Daughter    • Thyroid disease Daughter    • No Known Problems Paternal Grandfather    • No Known Problems Paternal Grandmother    • No Known Problems Maternal Grandmother    • No Known Problems Maternal Grandfather    • Thyroid disease Sister    • Hypertension Sister    • Bipolar disorder Sister    • Asthma Daughter    • Breast cancer Neg Hx    • Ovarian cancer Neg Hx    • Uterine cancer Neg Hx    • Colon cancer Neg Hx    • Malig Hyperthermia Neg Hx        Review of Systems   Constitutional: Negative for appetite change, chills, diaphoresis, fatigue, fever and unexpected weight change.   HENT: Negative for nosebleeds, postnasal drip, sore throat, trouble swallowing and voice change.    Respiratory: Negative for cough, choking, chest tightness, shortness of breath and wheezing.    Cardiovascular: Negative for chest pain, palpitations and leg swelling.   Gastrointestinal: Positive for constipation. Negative for abdominal distention, abdominal pain, anal bleeding, blood in stool, diarrhea, nausea, rectal pain and vomiting.   Endocrine:  Negative for polydipsia, polyphagia and polyuria.   Musculoskeletal: Negative for gait problem.   Skin: Negative for rash and wound.   Allergic/Immunologic: Negative for food allergies.   Neurological: Negative for dizziness, speech difficulty and light-headedness.   Psychiatric/Behavioral: Negative for confusion, self-injury, sleep disturbance and suicidal ideas.       Vitals:    08/03/21 1138   Temp: 96.8 °F (36 °C)       Physical Exam  Constitutional:       General: She is not in acute distress.     Appearance: She is well-developed. She is not ill-appearing.   HENT:      Head: Normocephalic.   Eyes:      Pupils: Pupils are equal, round, and reactive to light.   Cardiovascular:      Rate and Rhythm: Normal rate and regular rhythm.      Heart sounds: Normal heart sounds.   Pulmonary:      Effort: Pulmonary effort is normal.      Breath sounds: Normal breath sounds.   Abdominal:      General: Bowel sounds are normal. There is no distension.      Palpations: Abdomen is soft. There is no mass.      Tenderness: There is no abdominal tenderness. There is no guarding or rebound.      Hernia: No hernia is present.   Musculoskeletal:         General: Normal range of motion.   Skin:     General: Skin is warm and dry.   Neurological:      Mental Status: She is alert and oriented to person, place, and time.   Psychiatric:         Speech: Speech normal.         Behavior: Behavior normal.         Judgment: Judgment normal.         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 This area is stable when compared to prior MRIs dating back to 03/12/2019.  There are findings most compatible with a meningioma arising from the falx cerebri along the left aspect of the anterior and inferior portion of the falx mildly indenting the  adjacent frontal lobe measuring up to 7 x 9 mm in greatest axial dimensions. This lesion demonstrates no convincing interval change when compared to studies dating back to 03/12/2019.  Mild-to-moderate changes of chronic small vessel ischemic phenomena.  The previously identified probable incidental small arachnoid cyst anterior to the inferior aspect of the right cerebellar hemisphere is additionally stable.        Assessment and plan     1. Gastroesophageal reflux disease without esophagitis    2. Other chronic gastritis without hemorrhage    3. Diarrhea, unspecified type    4. Fatty liver      GERD seems somewhat better today on Protonix 40 mg twice daily.  She is not having near as much nausea, vomiting or dysphagia.  Continue Zofran as needed for nausea.  Diarrhea has resolved but now she is having more episodes with constipation.  This is most likely due to her increase in iron supplementation to twice daily.  I would like her to start a daily stool softener or MiraLAX to keep her regular.  Continue a high-fiber diet.  Patient to call the office with any issues.  Patient to make follow-up with Dr. Rodriguez as planned.  Patient is agreeable to the plan.

## 2021-08-04 ENCOUNTER — HOSPITAL ENCOUNTER (OUTPATIENT)
Dept: MRI IMAGING | Facility: HOSPITAL | Age: 61
End: 2021-08-04

## 2021-08-04 ENCOUNTER — TELEPHONE (OUTPATIENT)
Dept: NEUROSURGERY | Facility: CLINIC | Age: 61
End: 2021-08-04

## 2021-08-04 NOTE — TELEPHONE ENCOUNTER
I called and left message on patient's voicemail regarding there has been a provider change for her appointment on 8-20-21 @ 130 pm and she will be seeing DAPHNEY Davis instead of DAPHNEY Benitez.

## 2021-08-05 ENCOUNTER — ANESTHESIA EVENT (OUTPATIENT)
Dept: PAIN MEDICINE | Facility: HOSPITAL | Age: 61
End: 2021-08-05

## 2021-08-05 ENCOUNTER — ANESTHESIA (OUTPATIENT)
Dept: PAIN MEDICINE | Facility: HOSPITAL | Age: 61
End: 2021-08-05

## 2021-08-05 ENCOUNTER — HOSPITAL ENCOUNTER (OUTPATIENT)
Dept: GENERAL RADIOLOGY | Facility: HOSPITAL | Age: 61
Discharge: HOME | End: 2021-08-05

## 2021-08-05 ENCOUNTER — HOSPITAL ENCOUNTER (OUTPATIENT)
Dept: PAIN MEDICINE | Facility: HOSPITAL | Age: 61
Discharge: HOME | End: 2021-08-05

## 2021-08-05 VITALS
OXYGEN SATURATION: 97 % | DIASTOLIC BLOOD PRESSURE: 94 MMHG | RESPIRATION RATE: 17 BRPM | SYSTOLIC BLOOD PRESSURE: 142 MMHG | TEMPERATURE: 97.3 F | HEART RATE: 59 BPM

## 2021-08-05 DIAGNOSIS — R52 PAIN: ICD-10-CM

## 2021-08-05 DIAGNOSIS — M47.22 CERVICAL SPONDYLOSIS WITH RADICULOPATHY: Primary | ICD-10-CM

## 2021-08-05 PROCEDURE — 25010000002 DEXAMETHASONE SODIUM PHOSPHATE 10 MG/ML SOLUTION: Performed by: ANESTHESIOLOGY

## 2021-08-05 PROCEDURE — 25010000002 MIDAZOLAM PER 1 MG: Performed by: ANESTHESIOLOGY

## 2021-08-05 PROCEDURE — 77003 FLUOROGUIDE FOR SPINE INJECT: CPT

## 2021-08-05 RX ORDER — SODIUM CHLORIDE 0.9 % (FLUSH) 0.9 %
3 SYRINGE (ML) INJECTION EVERY 12 HOURS SCHEDULED
Status: DISCONTINUED | OUTPATIENT
Start: 2021-08-05 | End: 2021-08-06 | Stop reason: HOSPADM

## 2021-08-05 RX ORDER — MIDAZOLAM HYDROCHLORIDE 1 MG/ML
1 INJECTION INTRAMUSCULAR; INTRAVENOUS AS NEEDED
Status: DISCONTINUED | OUTPATIENT
Start: 2021-08-05 | End: 2021-08-06 | Stop reason: HOSPADM

## 2021-08-05 RX ORDER — FENTANYL CITRATE 50 UG/ML
50 INJECTION, SOLUTION INTRAMUSCULAR; INTRAVENOUS AS NEEDED
Status: DISCONTINUED | OUTPATIENT
Start: 2021-08-05 | End: 2021-08-06 | Stop reason: HOSPADM

## 2021-08-05 RX ORDER — SODIUM CHLORIDE 0.9 % (FLUSH) 0.9 %
1-10 SYRINGE (ML) INJECTION AS NEEDED
Status: DISCONTINUED | OUTPATIENT
Start: 2021-08-05 | End: 2021-08-06 | Stop reason: HOSPADM

## 2021-08-05 RX ORDER — DEXAMETHASONE SODIUM PHOSPHATE 10 MG/ML
10 INJECTION, SOLUTION INTRAMUSCULAR; INTRAVENOUS ONCE
Status: COMPLETED | OUTPATIENT
Start: 2021-08-05 | End: 2021-08-05

## 2021-08-05 RX ORDER — LIDOCAINE HYDROCHLORIDE 10 MG/ML
1 INJECTION, SOLUTION INFILTRATION; PERINEURAL ONCE AS NEEDED
Status: DISCONTINUED | OUTPATIENT
Start: 2021-08-05 | End: 2021-08-06 | Stop reason: HOSPADM

## 2021-08-05 RX ORDER — SODIUM CHLORIDE 0.9 % (FLUSH) 0.9 %
3-10 SYRINGE (ML) INJECTION AS NEEDED
Status: DISCONTINUED | OUTPATIENT
Start: 2021-08-05 | End: 2021-08-06 | Stop reason: HOSPADM

## 2021-08-05 RX ADMIN — MIDAZOLAM 2 MG: 1 INJECTION INTRAMUSCULAR; INTRAVENOUS at 09:01

## 2021-08-05 RX ADMIN — DEXAMETHASONE SODIUM PHOSPHATE 10 MG: 10 INJECTION, SOLUTION INTRAMUSCULAR; INTRAVENOUS at 09:06

## 2021-08-05 NOTE — ANESTHESIA PROCEDURE NOTES
PAIN Epidural block      Patient reassessed immediately prior to procedure    Patient location during procedure: pain clinic  Start Time: 8/5/2021 8:58 AM  Stop Time: 8/5/2021 9:13 AM  Indication:procedure for pain  Performed By  Anesthesiologist: Javier Savage MD  Preanesthetic Checklist  Completed: patient identified and risks and benefits discussed  Additional Notes  Diagnosis:   Post-Op Diagnosis Codes:     * Cervical disc disorder with radiculopathy (M50.10)    Sedation: Versed    Under fluoroscopic guidance, the epidural space was identified and accessed, confirmed by loss of resistance to saline.  The above medications were injected uneventfully.    Prep:  Pt Position:prone  Sterile Tech:cap, gloves, mask and sterile barrier  Prep:chlorhexidine gluconate and isopropyl alcohol  Monitoring:blood pressure monitoring, continuous pulse oximetry and EKG  Procedure:Sedation: yes     Approach:midline  Guidance: fluoroscopy  Location:cervical  Level:6-7  Needle Type:Tuohy  Needle Gauge:20  Aspiration:negative  Medications:  Comments:Decadron 10 mg preservative-free  Post Assessment:  Pt Tolerance:patient tolerated the procedure well with no apparent complications  Complications:no

## 2021-08-05 NOTE — H&P
HPI:  The patient returns for another Cervical epidural steroid injection today.  They have received 90% improvement since their last injection with a pain level of 10/10 at its worst recently.    Conservative measures tried in the interim rest    Current Outpatient Medications on File Prior to Encounter   Medication Sig Dispense Refill   • aluminum-magnesium hydroxide-simethicone (Mylanta Maximum Strength) 400-400-40 MG/5ML suspension Take 20 mL by mouth Every 6 (Six) Hours As Needed for Indigestion or Heartburn. 355 mL 1   • ARIPiprazole (ABILIFY) 20 MG tablet Take 20 mg by mouth Daily.     • atenolol (TENORMIN) 50 MG tablet Take 1 tablet by mouth Daily. 90 tablet 0   • buPROPion XL (WELLBUTRIN XL) 150 MG 24 hr tablet Take 150 mg by mouth Every Morning.     • Cholecalciferol (VITAMIN D-3) 25 MCG (1000 UT) capsule 1 capsule daily     • Ferrous Sulfate (IRON PO) Take  by mouth.     • gabapentin (NEURONTIN) 300 MG capsule Take 2 capsules by mouth 3 (Three) Times a Day. For cervical radiculopathy pain 180 capsule 1   • hydrOXYzine (ATARAX) 50 MG tablet As Needed.     • isosorbide mononitrate (IMDUR) 30 MG 24 hr tablet TAKE ONE TABLET BY MOUTH DAILY 30 tablet 5   • lamoTRIgine (LaMICtal) 150 MG tablet Take 300 mg by mouth Daily.     • levothyroxine (SYNTHROID, LEVOTHROID) 88 MCG tablet TAKE ONE TABLET BY MOUTH EVERY MORNING 90 tablet 1   • oxybutynin XL (DITROPAN XL) 15 MG 24 hr tablet Take 15 mg by mouth Daily.     • pantoprazole (PROTONIX) 40 MG EC tablet Take 1 tablet by mouth 2 (two) times a day. 60 tablet 3   • pramipexole (MIRAPEX) 0.5 MG tablet Take 1 tablet by mouth Every Night for 360 days. 30 tablet 11   • venlafaxine XR (EFFEXOR-XR) 75 MG 24 hr capsule Take 75 mg by mouth Daily.     • vitamin B-12 (CYANOCOBALAMIN) 1000 MCG tablet TAKE ONE TABLET BY MOUTH TWICE A DAY 60 tablet 4   • aspirin 81 MG EC tablet Take 1 tablet by mouth Daily. 30 tablet 2   • ergocalciferol (ERGOCALCIFEROL) 1.25 MG (67728 UT) capsule  Take  by mouth.     • loratadine (CLARITIN) 10 MG tablet Take 1 tablet by mouth Daily. As needed for allergies 30 tablet 11   • nitroglycerin (NITROSTAT) 0.4 MG SL tablet Place 0.4 mg under the tongue Every 5 (Five) Minutes As Needed for Chest Pain (took at M.D  office at 0930 a.m.). Take no more than 3 doses in 15 minutes.     • ondansetron (Zofran) 4 MG tablet Take 1 tablet by mouth Every 8 (Eight) Hours As Needed for Nausea or Vomiting. 20 tablet 3   • simvastatin (ZOCOR) 20 MG tablet TAKE ONE TABLET BY MOUTH ONCE NIGHTLY 90 tablet 0   • [DISCONTINUED] methylPREDNISolone (MEDROL) 4 MG dose pack Take as directed on package instructions. 21 tablet 0   • [DISCONTINUED] pramipexole (MIRAPEX) 0.5 MG tablet Take 1 tablet by mouth Daily. 30 tablet 5     No current facility-administered medications on file prior to encounter.       Past Medical History:   Diagnosis Date   • Abnormal Pap smear of cervix    • Anemia    • Anxiety    • Arthritis    • Atherosclerotic heart disease of native coronary artery with other forms of angina pectoris (CMS/HCC)    • Bipolar I disorder, single manic episode (CMS/HCC)     depressive d(NOS)   • Cervical disc herniation    • Cervical dysplasia    • Coronary artery disease    • CTS (carpal tunnel syndrome)    • Depression     NO SUICIDAL PLANS   • Difficulty swallowing    • Diverticular disease    • Dyspepsia    • Dysphagia    • Gastric reflux    • GERD (gastroesophageal reflux disease)    • Heart attack (CMS/HCC)    • Heart murmur    • Hiatal hernia    • High cholesterol    • History of transfusion    • 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    HPV positive on pap LGSIL   • Hyperlipidemia    • Hypertension    • Hypothyroidism    • Incontinence in female     wears pads   • Kidney disease 2017    stage 2    • Kidney disease, chronic, stage III (GFR 30-59 ml/min) (CMS/HCC)    • LGSIL on Pap smear of cervix 04/29/2016    LGSIL HPV positive   • Myocardial infarction (CMS/HCC) 2000   •  Neck pain    • Obesity    • Past myocardial infarction 05/2000   • Periodic limb movement disorder    • Restless leg syndrome    • Sleep apnea     cpap   • Stress fracture     LEFT HEEL   • Suicidal ideation 08/19/2016    history   • Swelling of ankle     right had doppler no s/s blood clot   • Thyroid nodule    • Vitamin B12 deficiency        Positive ROCKY screen/Positive ROCKY DX   2 or more mitigating factors used  recovery non-supine and multimodal analgesia    Review of systems negative for hematologic, infectious or constitutional symptoms    Exam:  BP (!) 156/106 (BP Location: Left arm, Patient Position: Sitting)   Pulse 68   Temp 36.3 °C (97.3 °F) (Oral)   Resp 16   LMP 10/21/2016 (Approximate) Comment: 54  SpO2 97%   Airway Mallampatti 2  Alert and oriented    Diagnosis:    Post-Op Diagnosis Codes:     * Cervical disc disorder with radiculopathy [M50.10]    Plan:  Cervical 6 epidural steroid injection under fluoroscopic guidance    I have encouraged them to continue:  1.  Physical therapy exercises at home as prescribed by physical therapy or from the pain clinic handout (given to the patient).  Continuation of these exercises every day, or multiple times per week, even when the patient has good pain relief, was stressed to the patient as a preventative measure to decrease the frequency and severity of future pain episodes.  2.  Continue pain medicines as already prescribed.  If patient not currently taking any, it is recommended to begin Acetaminophen 1000 mg po q 8 hours.  If other medicines containing Acetaminophen are currently prescribed, maintain daily dose at 3000mg.    3.  If they can tolerate NSAIDS, it is recommended to take Ibuprofen 600 mg po q 6 hours for 7 days during pain exacerbations.  Alternatively, they may substitute an NSAID of their choice (e.g. Aleve)  4.  Heat and ice to the affected area as tolerated for pain control.  It was discussed that heating pads can cause burns.  5.  Low  impact exercise such as walking or water exercise was recommended to maintain overall health and aid in weight control.   6.  Follow up as needed for subsequent injections.  7.  Patient was counseled to abstain from tobacco products.

## 2021-08-06 ENCOUNTER — OFFICE VISIT (OUTPATIENT)
Dept: FAMILY MEDICINE CLINIC | Facility: CLINIC | Age: 61
End: 2021-08-06

## 2021-08-06 ENCOUNTER — HOSPITAL ENCOUNTER (EMERGENCY)
Facility: HOSPITAL | Age: 61
Discharge: HOME | End: 2021-08-06
Attending: EMERGENCY MEDICINE | Admitting: EMERGENCY MEDICINE

## 2021-08-06 ENCOUNTER — APPOINTMENT (OUTPATIENT)
Dept: GENERAL RADIOLOGY | Facility: HOSPITAL | Age: 61
End: 2021-08-06

## 2021-08-06 VITALS
HEART RATE: 56 BPM | DIASTOLIC BLOOD PRESSURE: 89 MMHG | OXYGEN SATURATION: 98 % | RESPIRATION RATE: 16 BRPM | SYSTOLIC BLOOD PRESSURE: 135 MMHG | TEMPERATURE: 98.3 F

## 2021-08-06 VITALS
WEIGHT: 226 LBS | SYSTOLIC BLOOD PRESSURE: 130 MMHG | HEIGHT: 63 IN | HEART RATE: 61 BPM | DIASTOLIC BLOOD PRESSURE: 80 MMHG | OXYGEN SATURATION: 97 % | BODY MASS INDEX: 40.04 KG/M2 | TEMPERATURE: 97.1 F

## 2021-08-06 DIAGNOSIS — I25.118 ATHEROSCLEROTIC HEART DISEASE OF NATIVE CORONARY ARTERY WITH OTHER FORMS OF ANGINA PECTORIS (HCC): ICD-10-CM

## 2021-08-06 DIAGNOSIS — R07.9 CHEST PAIN, UNSPECIFIED TYPE: Primary | ICD-10-CM

## 2021-08-06 LAB
ALBUMIN SERPL-MCNC: 4.2 G/DL (ref 3.5–5.2)
ALBUMIN/GLOB SERPL: 1.5 G/DL
ALP SERPL-CCNC: 76 U/L (ref 39–117)
ALT SERPL W P-5'-P-CCNC: 37 U/L (ref 1–33)
ANION GAP SERPL CALCULATED.3IONS-SCNC: 10.6 MMOL/L (ref 5–15)
AST SERPL-CCNC: 22 U/L (ref 1–32)
BILIRUB SERPL-MCNC: 0.2 MG/DL (ref 0–1.2)
BUN SERPL-MCNC: 12 MG/DL (ref 6–20)
BUN/CREAT SERPL: 13 (ref 7–25)
CALCIUM SPEC-SCNC: 9.1 MG/DL (ref 8.6–10.5)
CHLORIDE SERPL-SCNC: 103 MMOL/L (ref 98–107)
CO2 SERPL-SCNC: 29.4 MMOL/L (ref 22–29)
CREAT SERPL-MCNC: 0.92 MG/DL (ref 0.57–1)
DEPRECATED RDW RBC AUTO: 50.1 FL (ref 37–54)
EOSINOPHIL # BLD MANUAL: 0.44 10*3/MM3 (ref 0–0.4)
EOSINOPHIL NFR BLD MANUAL: 3 % (ref 0.3–6.2)
ERYTHROCYTE [DISTWIDTH] IN BLOOD BY AUTOMATED COUNT: 14.2 % (ref 12.3–15.4)
GFR SERPL CREATININE-BSD FRML MDRD: 62 ML/MIN/1.73
GLOBULIN UR ELPH-MCNC: 2.8 GM/DL
GLUCOSE SERPL-MCNC: 89 MG/DL (ref 65–99)
HCT VFR BLD AUTO: 43.7 % (ref 34–46.6)
HGB BLD-MCNC: 14 G/DL (ref 12–15.9)
LYMPHOCYTES # BLD MANUAL: 5.21 10*3/MM3 (ref 0.7–3.1)
LYMPHOCYTES NFR BLD MANUAL: 35.4 % (ref 19.6–45.3)
LYMPHOCYTES NFR BLD MANUAL: 7.1 % (ref 5–12)
MCH RBC QN AUTO: 30.6 PG (ref 26.6–33)
MCHC RBC AUTO-ENTMCNC: 32 G/DL (ref 31.5–35.7)
MCV RBC AUTO: 95.4 FL (ref 79–97)
MONOCYTES # BLD AUTO: 1.04 10*3/MM3 (ref 0.1–0.9)
NEUTROPHILS # BLD AUTO: 8.02 10*3/MM3 (ref 1.7–7)
NEUTROPHILS NFR BLD MANUAL: 54.5 % (ref 42.7–76)
PLAT MORPH BLD: NORMAL
PLATELET # BLD AUTO: 300 10*3/MM3 (ref 140–450)
PMV BLD AUTO: 9.9 FL (ref 6–12)
POTASSIUM SERPL-SCNC: 3.7 MMOL/L (ref 3.5–5.2)
PROT SERPL-MCNC: 7 G/DL (ref 6–8.5)
RBC # BLD AUTO: 4.58 10*6/MM3 (ref 3.77–5.28)
RBC MORPH BLD: NORMAL
SODIUM SERPL-SCNC: 143 MMOL/L (ref 136–145)
TROPONIN T SERPL-MCNC: <0.01 NG/ML (ref 0–0.03)
TROPONIN T SERPL-MCNC: <0.01 NG/ML (ref 0–0.03)
WBC # BLD AUTO: 14.71 10*3/MM3 (ref 3.4–10.8)
WBC MORPH BLD: NORMAL

## 2021-08-06 PROCEDURE — 71045 X-RAY EXAM CHEST 1 VIEW: CPT

## 2021-08-06 PROCEDURE — 99213 OFFICE O/P EST LOW 20 MIN: CPT | Performed by: NURSE PRACTITIONER

## 2021-08-06 PROCEDURE — 84484 ASSAY OF TROPONIN QUANT: CPT | Performed by: EMERGENCY MEDICINE

## 2021-08-06 PROCEDURE — 85025 COMPLETE CBC W/AUTO DIFF WBC: CPT | Performed by: EMERGENCY MEDICINE

## 2021-08-06 PROCEDURE — 80053 COMPREHEN METABOLIC PANEL: CPT | Performed by: EMERGENCY MEDICINE

## 2021-08-06 PROCEDURE — 93005 ELECTROCARDIOGRAM TRACING: CPT | Performed by: EMERGENCY MEDICINE

## 2021-08-06 PROCEDURE — 93000 ELECTROCARDIOGRAM COMPLETE: CPT | Performed by: NURSE PRACTITIONER

## 2021-08-06 PROCEDURE — 93010 ELECTROCARDIOGRAM REPORT: CPT | Performed by: INTERNAL MEDICINE

## 2021-08-06 PROCEDURE — 99284 EMERGENCY DEPT VISIT MOD MDM: CPT

## 2021-08-06 PROCEDURE — 93005 ELECTROCARDIOGRAM TRACING: CPT

## 2021-08-06 PROCEDURE — 85007 BL SMEAR W/DIFF WBC COUNT: CPT | Performed by: EMERGENCY MEDICINE

## 2021-08-06 RX ORDER — ASPIRIN 325 MG
325 TABLET ORAL DAILY
Status: SHIPPED | OUTPATIENT
Start: 2021-08-06 | End: 2021-08-07

## 2021-08-06 NOTE — ED TRIAGE NOTES
Pt comes from PCP for intermittent CP x1 week. Pt states the pain was brought on by vaping last night and subsided after stopping vaping. Pt received 4 nitro, and ASA in route and is now pain free. Pt and RN wearing mask upon triage.

## 2021-08-06 NOTE — ED PROVIDER NOTES
EMERGENCY DEPARTMENT ENCOUNTER    Room Number:  04/04  Date of encounter:  8/6/2021  PCP: Epley, James, APRN  Historian: Patient      HPI:  Chief Complaint: Chest pain  A complete HPI/ROS/PMH/PSH/SH/FH are unobtainable due to: Nothing    Context: Keri Bhagat is a 61 y.o. female who presents to the ED c/o chest pain that has been intermittent for approximately 24 hours.  Per the patient she was vaping yesterday evening at about 9 PM and began to develop the chest pain.  She describes the pain as a left-sided substernal pressure that does not radiate.  Each episode of pain last about 30 minutes and then slowly subsides.  She had 2 episodes yesterday evening prior to going to bed.  She has had approximately 6 today.  She denies exertional chest pain, associated nausea, vomiting, diaphoresis.  Movements and repositioning does not improve the chest pain.  She states on the way to the hospital this evening she was given 3 nitro by EMS as well as ASA and this did relieve her pain.  At present she is pain-free.  She is currently taking 81 mg of ASA for her known coronary artery disease.    Reviewing the patient's past medical history she is followed by [facility] and last had a cardiac catheter 3/30/2017.  At that time the left main had 0% stenosis.  The LAD had 20% mid stenosis the distal LAD 30% stenosis.  The ramus 0% proximal stenosis, 80% stenosis, a small secondary branch.  The left circumflex 10% stenosis the obtuse marginal branch 30% proximal stenosis.  The RCA 50 to 60% proximal stenosis.  The RCA bifurcated into a PDA branch and a MISA branch.  The PDA branch has a 30% stenosis in the MISA branch 0% stenosis.  Patient states she had an MI in 2000 and did receive a stent but is unable to give me the coronary artery.      PAST MEDICAL HISTORY  Active Ambulatory Problems     Diagnosis Date Noted   • Gastroesophageal reflux disease 04/29/2016   • Bipolar I disorder, single manic episode (CMS/[facility])  04/29/2016   • Atherosclerosis of coronary artery 04/29/2016   • Essential hypertension 04/29/2016   • Lightheadedness 04/29/2016   • Low back pain 04/29/2016   • Sciatica 04/29/2016   • Thyroid nodule 04/29/2016   • Fatigue 05/06/2016   • Hyperlipidemia    • Hypothyroidism (acquired) 06/29/2016   • Iron deficiency anemia 06/29/2016   • Right knee pain 07/15/2016   • Degenerative disc disease, cervical 08/18/2016   • Depression 08/19/2016   • ROCKY (obstructive sleep apnea)    • Atherosclerotic heart disease of native coronary artery with other forms of angina pectoris (CMS/[facility]) 11/14/2016   • Cervical spondylosis with radiculopathy 12/29/2016   • Chronic pain of both knees 02/16/2017   • Arthritis of both knees 02/16/2017   • Weight gain, abnormal 03/20/2017   • Allergic rhinitis 03/20/2017   • Obesity (BMI 30-39.9) 03/20/2017   • Plantar fasciitis 04/10/2017   • Peroneal tendon injury 04/10/2017   • Stress fracture of calcaneus 04/19/2017   • Vitamin D deficiency 04/19/2017   • Pain and swelling of knee 06/02/2017   • Knee injury 06/02/2017   • Calcific Achilles tendinitis 07/26/2017   • Rod's deformity 07/26/2017   • Urgency incontinence 09/22/2017   • Nocturia 09/22/2017   • Chronic gastritis without bleeding 09/27/2017   • LGSIL on Pap smear of cervix 09/28/2017   • Primary osteoarthritis of right knee 10/02/2017   • Stage 1 chronic kidney disease 10/08/2017   • Left wrist tendinitis 10/08/2017   • Cervical radiculopathy 10/29/2017   • Tobacco abuse 10/29/2017   • Obesity, morbid, BMI 40.0-49.9 (CMS/[facility]) 10/29/2017   • Encounter for screening for lung cancer 10/29/2017   • Urinary frequency 11/12/2017   • Diabetes mellitus screening 11/12/2017   • MORALES I (cervical intraepithelial neoplasia I) 11/22/2017   • Primary osteoarthritis of both knees 12/01/2017   • H/O bladder repair surgery 01/29/2018   • Chest pain 03/30/2018   • Syncope and collapse 04/01/2018   • Closed fracture of left distal radius 04/01/2018    • Closed displaced fracture of neck of right fifth metacarpal bone 04/01/2018   • Fracture follow-up 04/11/2018   • Nondisplaced fracture of base of fifth metacarpal bone, left hand, initial encounter for closed fracture 04/11/2018   • Closed fracture of lower end of left radius with routine healing 04/11/2018   • Multiple dislocations of fingers, open 04/11/2018   • Bilateral chronic knee pain 06/15/2018   • Arthritis of left knee 06/15/2018   • Arthritis of right knee 06/15/2018   • Family history of diabetes mellitus (DM) 10/08/2018   • Menopause 10/08/2018   • 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 02/26/2019   • Memory dysfunction 02/26/2019   • Periodic limb movement disorder 02/26/2019   • Gait disturbance 02/26/2019   • Degenerative disc disease, lumbar 04/25/2019   • Osteopenia 04/29/2019   • Vaginal atrophy 04/29/2019   • Female dyspareunia 04/29/2019   • Pituitary cyst (CMS/[facility]) 08/28/2019   • Meningioma (CMS/HCC) 08/28/2019   • Pituitary cyst (CMS/[facility]) 08/28/2019   • Vitamin B12 deficiency    • Acute urinary tract infection 01/09/2018   • Anemia 02/04/2015   • Bacterial vaginosis 12/15/2017   • Anxiety 02/04/2015   • Inappropriate diet and eating habits 02/21/2018   • Incomplete emptying of bladder 07/05/2018   • Myocardial infarction (CMS/[facility]) 05/01/2000   • Other specified postprocedural states 01/29/2018   • Postoperative retention of urine 12/12/2017   • Suicide attempt (CMS/HCC) 04/23/2019   • Urinary urgency 03/25/2015   • Vaginal discharge 03/25/2015   • Vomiting 03/13/2018   • Gastro-esophageal reflux disease with esophagitis, without bleeding 05/10/2021   • Encounter for follow-up examination after completed treatment for conditions other than malignant neoplasm 04/11/2018   • Tendinitis of left wrist 10/08/2017     Resolved Ambulatory Problems     Diagnosis Date Noted   • Abnormal vaginal bleeding 04/29/2016   • Dysphagia 04/29/2016   • Indigestion 04/29/2016    • Flatulence 04/29/2016   • Nausea 04/29/2016   • Viral gastroenteritis 04/29/2016   • LGSIL (low grade squamous intraepithelial dysplasia) 06/17/2016   • Suicidal ideation 08/19/2016   • RLQ abdominal pain 09/22/2017     Past Medical History:   Diagnosis Date   • Abnormal Pap smear of cervix    • Arthritis    • Cervical disc herniation    • Cervical dysplasia    • Coronary artery disease    • CTS (carpal tunnel syndrome)    • Difficulty swallowing    • Diverticular disease    • Dyspepsia    • Gastric reflux    • GERD (gastroesophageal reflux disease)    • Heart attack (CMS/[facility])    • Heart murmur    • Hiatal hernia    • High cholesterol    • History of transfusion    • 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   • Hypertension    • Hypothyroidism    • Incontinence in female    • Kidney disease 2017   • Kidney disease, chronic, stage III (GFR 30-59 ml/min) (CMS/[facility])    • Neck pain    • Obesity    • Past myocardial infarction 05/2000   • Restless leg syndrome    • Sleep apnea    • Stress fracture    • Swelling of ankle          PAST SURGICAL HISTORY  Past Surgical History:   Procedure Laterality Date   • ANTERIOR CERVICAL DISCECTOMY W/ FUSION N/A 12/29/2016    Procedure: C3-4 anterior cervical discectomy and fusion with Depuy micro plate, ALLOGRAFT C3-4, AND HARDWARE REMOVAL C4-7.;  Surgeon: Hema Godwin MD;  Location: [facility];  Service:    • CARDIAC CATHETERIZATION N/A 3/30/2017    Procedure: Left Heart Cath;  Surgeon: Tracey Vargas MD;  Location: [facility] CATH INVASIVE LOCATION;  Service:    • CARDIAC CATHETERIZATION N/A 3/30/2017    Procedure: Coronary angiography;  Surgeon: Tracey Vargas MD;  Location: [facility] CATH INVASIVE LOCATION;  Service:    • CARDIAC CATHETERIZATION N/A 3/30/2017    Procedure: Left ventriculography;  Surgeon: Tracey Vargas MD;  Location: [facility] CATH INVASIVE LOCATION;  Service:    • CARDIAC CATHETERIZATION  3/30/2017    Procedure: Functional Flow [place];  Surgeon:  Tracey Vargas MD;  Location: [facility] INVASIVE LOCATION;  Service:    • CARPAL TUNNEL RELEASE     • CERVICAL BIOPSY  MMXVI    Dr. Jeffery.    • CERVICAL DISCECTOMY ANTERIOR  04/2013    C4-7   • COLONOSCOPY  04/20/2015    Diverticulosis, IH   • COLONOSCOPY  03/09/2021   • COLPOSCOPY W/ BIOPSY / CURETTAGE  06/17/2016    LGSIL HPV positive. Results were normal repeat pap in one year. Chronic Cervicitis   • CORONARY ANGIOPLASTY WITH STENT PLACEMENT     • ENDOSCOPY  MMXV    Normal.  Dr. Rodriguez   • ENDOSCOPY N/A 6/22/2017    Erythematous mucosa in the stomach  PATH: Chronic active gastritis, moderate with intestinal metaplasia    • GASTRIC BYPASS      Done using the sleeve technique   • HARDWARE REMOVAL  12/29/2016    cervical   • HERNIA REPAIR     • LAPAROSCOPIC GASTRIC BANDING  02/2018   • SHOULDER SURGERY Left     RCR   • TONSILLECTOMY     • TONSILLECTOMY AND ADENOIDECTOMY     • TRANSVAGINAL TAPING SUSPENSION N/A 12/6/2017    Procedure: MID URETHRAL SLING CYSTSCOPY;  Surgeon: Abby Méndez MD;  Location: [facility] OR;  Service:    • TUBAL ABDOMINAL LIGATION     • TYMPANOSTOMY TUBE PLACEMENT Right    • UPPER GASTROINTESTINAL ENDOSCOPY  approx 2021   • WRIST SURGERY Bilateral     carpal tunnel   • WRIST SURGERY      L wrist/ 4/2020         FAMILY HISTORY  Family History   Problem Relation Age of Onset   • Diabetes type II Mother    • Hypertension Mother    • Osteoporosis Mother    • Seizures Mother    • COPD Father    • Hypertension Father    • Lung cancer Father    • Heart attack Father    • Liver cancer Father    • Liver disease Father    • Thyroid disease Sister    • Hypertension Sister    • Bipolar disorder Sister    • Depression Sister    • ADD / ADHD Sister    • No Known Problems Son    • Abnormal EKG Daughter    • Hypertension Daughter    • Bipolar disorder Daughter    • Thyroid disease Daughter    • No Known Problems Paternal Grandfather    • No Known Problems Paternal Grandmother    • No Known  Problems Maternal Grandmother    • No Known Problems Maternal Grandfather    • Thyroid disease Sister    • Hypertension Sister    • Bipolar disorder Sister    • Asthma Daughter    • Breast cancer Neg Hx    • Ovarian cancer Neg Hx    • Uterine cancer Neg Hx    • Colon cancer Neg Hx    • Malig Hyperthermia Neg Hx          SOCIAL HISTORY  Social History     Socioeconomic History   • Marital status:      Spouse name: Not on file   • Number of children: 3   • Years of education: 12   • Highest education level: Not on file   Tobacco Use   • Smoking status: Former Smoker     Packs/day: 1.50     Years: 20.00     Pack years: 30.00     Types: Electronic Cigarette, Cigarettes     Quit date:      Years since quittin.6   • Smokeless tobacco: Never Used   • Tobacco comment: Electronic Cigarette   Vaping Use   • Vaping Use: Every day   • Substances: Nicotine, Flavoring   • Devices: Refillable tank   Substance and Sexual Activity   • Alcohol use: Not Currently   • Drug use: No   • Sexual activity: Yes     Partners: Male     Birth control/protection: None, Condom         ALLERGIES  Patient has no known allergies.        REVIEW OF SYSTEMS  Review of Systems   Constitutional: Negative for chills, diaphoresis and fever.   HENT: Negative.    Respiratory: Negative for cough and shortness of breath.    Cardiovascular: Positive for chest pain.   Gastrointestinal: Negative for abdominal pain, nausea and vomiting.   Genitourinary: Negative.    Musculoskeletal: Negative.    Skin: Negative.    Neurological: Negative.         All systems reviewed and negative except for those discussed in HPI.       PHYSICAL EXAM    I have reviewed the triage vital signs and nursing notes.    ED Triage Vitals [21 1652]   Temp Heart Rate Resp BP SpO2   98.3 °F (36.8 °C) 57 16 127/90 100 %      Temp src Heart Rate Source Patient Position BP Location FiO2 (%)   Tympanic -- -- -- --       Physical Exam  GENERAL: Obese, nontoxic, no acute  distress  HENT: nares patent  EYES: no scleral icterus  CV: regular rhythm, regular rate, no obvious rubs murmurs gallops, no unilateral leg swelling, palpable +2 pedal pulses bilaterally, no JVD  RESPIRATORY: normal effort, lungs CTA B  ABDOMEN: soft, nontender  MUSCULOSKELETAL: no deformity  NEURO: alert, moves all extremities, follows commands  SKIN: warm, dry no skin rash to the chest wall        LAB RESULTS  Recent Results (from the past 24 hour(s))   Comprehensive Metabolic Panel    Collection Time: 08/06/21  5:32 PM    Specimen: Blood   Result Value Ref Range    Glucose 89 65 - 99 mg/dL    BUN 12 6 - 20 mg/dL    Creatinine 0.92 0.57 - 1.00 mg/dL    Sodium 143 136 - 145 mmol/L    Potassium 3.7 3.5 - 5.2 mmol/L    Chloride 103 98 - 107 mmol/L    CO2 29.4 (H) 22.0 - 29.0 mmol/L    Calcium 9.1 8.6 - 10.5 mg/dL    Total Protein 7.0 6.0 - 8.5 g/dL    Albumin 4.20 3.50 - 5.20 g/dL    ALT (SGPT) 37 (H) 1 - 33 U/L    AST (SGOT) 22 1 - 32 U/L    Alkaline Phosphatase 76 39 - 117 U/L    Total Bilirubin 0.2 0.0 - 1.2 mg/dL    eGFR Non African Amer 62 >60 mL/min/1.73    Globulin 2.8 gm/dL    A/G Ratio 1.5 g/dL    BUN/Creatinine Ratio 13.0 7.0 - 25.0    Anion Gap 10.6 5.0 - 15.0 mmol/L   Troponin    Collection Time: 08/06/21  5:32 PM    Specimen: Blood   Result Value Ref Range    Troponin T <0.010 0.000 - 0.030 ng/mL   CBC Auto Differential    Collection Time: 08/06/21  5:32 PM    Specimen: Blood   Result Value Ref Range    WBC 14.71 (H) 3.40 - 10.80 10*3/mm3    RBC 4.58 3.77 - 5.28 10*6/mm3    Hemoglobin 14.0 12.0 - 15.9 g/dL    Hematocrit 43.7 34.0 - 46.6 %    MCV 95.4 79.0 - 97.0 fL    MCH 30.6 26.6 - 33.0 pg    MCHC 32.0 31.5 - 35.7 g/dL    RDW 14.2 12.3 - 15.4 %    RDW-SD 50.1 37.0 - 54.0 fl    MPV 9.9 6.0 - 12.0 fL    Platelets 300 140 - 450 10*3/mm3   Manual Differential    Collection Time: 08/06/21  5:32 PM    Specimen: Blood   Result Value Ref Range    Neutrophil % 54.5 42.7 - 76.0 %    Lymphocyte % 35.4 19.6 -  45.3 %    Monocyte % 7.1 5.0 - 12.0 %    Eosinophil % 3.0 0.3 - 6.2 %    Neutrophils Absolute 8.02 (H) 1.70 - 7.00 10*3/mm3    Lymphocytes Absolute 5.21 (H) 0.70 - 3.10 10*3/mm3    Monocytes Absolute 1.04 (H) 0.10 - 0.90 10*3/mm3    Eosinophils Absolute 0.44 (H) 0.00 - 0.40 10*3/mm3    RBC Morphology Normal Normal    WBC Morphology Normal Normal    Platelet Morphology Normal Normal   Troponin    Collection Time: 08/06/21  7:33 PM    Specimen: Blood   Result Value Ref Range    Troponin T <0.010 0.000 - 0.030 ng/mL       Ordered the above labs and independently reviewed the results.        RADIOLOGY  XR Chest 1 View    Result Date: 8/6/2021  ONE VIEW PORTABLE CHEST  HISTORY: Chest pain.  FINDINGS: The lungs are well-expanded and clear and the heart size is normal. There is no acute disease or change from 11/25/2019.  This report was finalized on 8/6/2021 7:51 PM by Dr. Gunnar Davis M.D.        I ordered the above noted radiological studies. Reviewed by me and discussed with radiologist.  See dictation for official radiology interpretation.      PROCEDURES    Procedures      MEDICATIONS GIVEN IN ER    Medications - No data to display      PROGRESS, DATA ANALYSIS, CONSULTS, AND MEDICAL DECISION MAKING    All labs have been independently reviewed by me.  All radiology studies have been reviewed by me and discussed with radiologist dictating the report.   EKG's independently viewed and interpreted by me.  Discussion below represents my analysis of pertinent findings related to patient's condition, differential diagnosis, treatment plan and final disposition.    DDx includes but is not limited to: ACS, PTX, PE, PNA, GI mediated chest pain, chest wall pain, anxiety.  To further evaluate this patient I will order a CBC, CMP, troponin, EKG, portable chest x-ray.  Please see below for MDM and course of care.    ED Course as of Aug 06 2056   Fri Aug 06, 2021   1737 EKG          EKG time: 1023  Rhythm/Rate: 92/Sinus  P waves  and PA: Normal pr interval  QRS, axis: Normal Axis   ST and T waves: No acute st or t wave cgs     Interpreted Contemporaneously by me, independently viewed  -There are Q waves present in 2 3 and avF that are more pronounced than they were on 10/8/2020    [RC]   2052 HEART SCORE    History Slightly or non-suspicious (0)  ECG Normal (0)  Age 46-65 (1)  Risk factors > or = to 3 RF for atherosclerotic dx (2)  Troponin < or = Normal limit (0)    This patient's HEART score is 3    HEART Score Key:  Scores 0-3: 0.9-1.7% risk of adverse cardiac event. In the HEART Score study, these patients were discharged (0.99% in the retrospective study, 1.7% in the prospective study)  Scores 4-6: 12-16.6% risk of adverse cardiac event. In the HEART Score study, these patients were admitted to the hospital. (11.6% retrospective, 16.6% prospective)  Scores ?7: 50-65% risk of adverse cardiac event. In the HEART Score study, these patients were candidates for early invasive measures. (65.2% retrospective, 50.1% prospective)       [RC]   2052 Patient's heart score is a 3 she has 2 - troponins.  Her chest x-ray and EKG are unremarkable.  Is felt she is safe for discharge at this time with close follow-up with her cardiologist.      [RC]      ED Course User Index  [RC] Leandro Muñoz III, PA       Patient was placed in face mask in first look. Patient was wearing facemask when I entered the room and throughout our encounter. I wore full protective equipment throughout this patient encounter including a face mask, and gloves. Hand hygiene was performed before donning protective equipment and after removal when leaving the room.    AS OF 20:56 EDT VITALS:    BP - 135/89  HR - 56  TEMP - 98.3 °F (36.8 °C) (Tympanic)  O2 SATS - 98%        DIAGNOSIS  Final diagnoses:   Chest pain, unspecified type         DISPOSITION  DISCHARGE    Patient discharged in stable condition.    Reviewed implications of results, diagnosis, meds, responsibility  to follow up, warning signs and symptoms of possible worsening, potential complications and reasons to return to ER.    Patient/Family voiced understanding of above instructions.    Discussed plan for discharge, as there is no emergent indication for admission. Patient referred to primary care provider for BP management due to today's BP. Pt/family is agreeable and understands need for follow up and repeat testing.  Pt is aware that discharge does not mean that nothing is wrong but it indicates no emergency is present that requires admission and they must continue care with follow-up as given below or physician of their choice.     FOLLOW-UP  [facility] CARDIOLOGY  [address]  [address]  [place] [facility] [id number] [phone]  Schedule an appointment as soon as possible for a visit   For further evaluation and treatment         Medication List      No changes were made to your prescriptions during this visit.                Leandro Muñoz III, PA  08/06/21 4807

## 2021-08-06 NOTE — PROGRESS NOTES
"Chief Complaint  Follow-up on radiculopathy (patient states feels like something sitting on chest)    Subjective          Keri Bhagat presents to [facility]  Pleasant patient here today complains of chest pressure intermittent lasting about 30 seconds throughout the day x1 week,  Occasionally has some shortness of breath with this but no nausea vomiting diaphoresis or radiating pain no jaw pain upper thoracic pain or arm pain.  It was severe last night she thought about calling her cardiologist, but he got better so she decided to come here now have very minimal discomfort presently 3 out of 10 substernal discomfort or pressure, without nausea vomiting diaphoresis or other associated symptoms presently.  Nothing is making better or worse.  She has no accompanying abdominal pain  No respiratory symptoms no cough congestion sore throat loss of taste or smell or Covid symptoms.    She is supposed to be taking aspirin but just forgot has not been on it more lately,  We will give her an aspirin today  She had epidural last night for cervical radiculopathy pains.    Social history  No tobacco but she does vape tobacco      Objective   Vital Signs:   /80   Pulse 61   Temp 97.1 °F (36.2 °C) (Temporal)   Ht 160 cm (63\")   Wt 103 kg (226 lb)   SpO2 97%   BMI 40.03 kg/m²     Physical Exam  Vitals reviewed.   Constitutional:       Appearance: She is well-developed.   HENT:      Head: Normocephalic.      Nose: Nose normal.   Eyes:      General: No scleral icterus.     Conjunctiva/sclera: Conjunctivae normal.      Pupils: Pupils are equal, round, and reactive to light.   Neck:      Thyroid: No thyromegaly.      Vascular: No JVD.   Cardiovascular:      Rate and Rhythm: Normal rate and regular rhythm.      Heart sounds: Normal heart sounds. No murmur heard.   No friction rub. No gallop.    Pulmonary:      Effort: Pulmonary effort is normal. No respiratory distress.      Breath " sounds: Normal breath sounds. No stridor. No wheezing or rales.   Abdominal:      General: Bowel sounds are normal. There is no distension.      Palpations: Abdomen is soft.      Tenderness: There is no abdominal tenderness.      Comments: No hepatosplenomegaly, no ascites,   Musculoskeletal:         General: No tenderness.      Cervical back: Neck supple.   Lymphadenopathy:      Cervical: No cervical adenopathy.   Skin:     General: Skin is warm and dry.      Findings: No erythema or rash.   Neurological:      Mental Status: She is alert and oriented to person, place, and time.      Deep Tendon Reflexes: Reflexes are normal and symmetric.   Psychiatric:         Behavior: Behavior normal.         Thought Content: Thought content normal.         Judgment: Judgment normal.        Result Review :                 Assessment and Plan    Diagnoses and all orders for this visit:    1. Chest pain, unspecified type (Primary)    2. Atherosclerotic heart disease of native coronary artery with other forms of angina pectoris (CMS/HCC)        Follow Up   No follow-ups on file.  Patient was given instructions and counseling regarding her condition or for health maintenance advice. Please see specific information pulled into the AVS if appropriate.     EKG is normal  Patient is having 3 out of 10 chest pressure without nausea vomiting diaphoresis  She has had 325 aspirin today although she has not been taking aspirin at home prior to this she had forgotten.    Advised emergency room EMS now  She is presently stable vitals are stable she is without distress.

## 2021-08-06 NOTE — PROGRESS NOTES
Procedure   Procedures     Adult ECG Report     Name: Keri Bhagat   Age: 61 y.o.   Gender: female       Rate: 56   Rhythm: sinus bradycardia   QRS Axis: nml   WV Interval: 139   QRS Duration: 94   QTc: 403   Voltages: .   Conduction Disturbances: none   Other Abnormalities: none     Narrative Interpretation: Sinus bradycardia no acute ST changes, no change EKG 10/8/2020  Indication chest pain

## 2021-08-07 NOTE — ED PROVIDER NOTES
MD ATTESTATION NOTE    The MIREYA and I have discussed this patient's history, physical exam, and treatment plan.  I have reviewed the documentation and personally had a face to face interaction with the patient. I affirm the documentation and agree with the treatment and plan.  The attached note describes my personal findings.    61-year-old female who presents with left-sided chest pain that began yesterday while she was vaping.  It was a sharp pain, it is not exertional, and it was not relieved with nitroglycerin.  Her EKG shows no acute ischemia, chest x-ray unremarkable and enzymes negative x2.  Patient does have underlying coronary disease and should stop vaping, but I think this particular episode of chest pain is not cardiac and she can follow-up in the outpatient setting safely     Delgado Lowe MD  08/06/21 2010

## 2021-08-10 NOTE — PROGRESS NOTES
Subjective   Patient ID: Keri Bhagat is a 61 y.o. female is here today for a 1Y follow-up of parafalcine lesion/Rathke's cyst.  MRI pituitary done on 8/2/21.     History of Present Illness  Today pt reports occasional headaches along with light-headedness as well as gait issues. No visual changes. She had full spine imaging after fall in April 2018 due to complaints of balance issues.  There was no evidence of significant compressive lesion at that time.  She was recently evaluated by Dr. Godwin with new cervical MRI.  No surgery recommended.    The following portions of the patient's history were reviewed and updated as appropriate: allergies, current medications, past family history, past medical history, past social history, past surgical history and problem list.    Review of Systems   Eyes: Negative for visual disturbance.   Musculoskeletal: Positive for gait problem (occasionally).   Neurological: Positive for light-headedness (occasionally) and headaches (occasional). Negative for dizziness, tremors, seizures, syncope, speech difficulty and numbness.   Psychiatric/Behavioral: Positive for decreased concentration. Negative for confusion.       Objective   Physical Exam  Vitals reviewed.   Constitutional:       Appearance: Normal appearance. She is obese.   Eyes:      Extraocular Movements: EOM normal.   Pulmonary:      Effort: Pulmonary effort is normal.   Musculoskeletal:      Cervical back: Neck supple.   Skin:     General: Skin is warm and dry.   Neurological:      General: No focal deficit present.      Mental Status: She is alert and oriented to person, place, and time.      Coordination: Romberg Test normal.      Gait: Gait is intact.   Psychiatric:         Mood and Affect: Mood normal.         Speech: Speech normal.         Thought Content: Thought content normal.       Neurologic Exam     Mental Status   Oriented to person, place, and time.   Attention: normal. Concentration: normal.   Speech:  speech is normal   Level of consciousness: alert  Knowledge: good.   Normal comprehension.     Cranial Nerves   Cranial nerves II through XII intact.     CN II   Visual fields full to confrontation.     CN III, IV, VI   Extraocular motions are normal.   Right pupil: Size: 3 mm. Shape: regular.   Left pupil: Size: 3 mm. Shape: regular.   CN III: no CN III palsy  CN VI: no CN VI palsy  Nystagmus: none   Diplopia: none    CN VII   Right facial weakness: none  Left facial weakness: Mild left nasolabial fold flattening.    CN VIII   CN VIII normal.     CN IX, X   CN IX normal.   CN X normal.     CN XII   CN XII normal.     Motor Exam   Right arm pronator drift: absent  Left arm pronator drift: absent    Gait, Coordination, and Reflexes     Gait  Gait: normal    Coordination   Romberg: negative      Assessment/Plan   Independent Review of Radiographic Studies:    MRI pituitary reveals stable small T1 abnormality at the interface of the anterior/posterior pituitary consistent with pars intermedius cyst and is stable in size when compared to prior imaging.  There is also a stable dural based enhancing lesion at the inferior falx cerebri that is stable in size when compared to the prior imaging.    Medical Decision Making:    Patient presents for follow-up of abnormal brain imaging with a pituitary lesion and parafalcine mass.  Both are stable on recent MRI study.  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  She has no particular issues with progressive or severe headaches or visual changes.  She has no red flags on exam.  Recommend follow-up in 2 years with repeat MRI pituitary.  Although the pars intermedius cyst does not does not need to be followed, the suspected meningioma does.  We will continue to watch both at the same time.  She is advised to call if she has any increasing headaches or visual changes.    Diagnoses and all orders for this visit:    1.  Meningioma (CMS/HCC) (Primary)  -     MRI Pituitary With & Without Contrast; Future    2. 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  -     MRI Pituitary With & Without Contrast; Future      Return in about 2 years (around 8/20/2023) for Follow-up with [facility], with imaging.

## 2021-08-11 LAB — QT INTERVAL: 414 MS

## 2021-08-16 ENCOUNTER — OFFICE VISIT (OUTPATIENT)
Dept: CARDIOLOGY | Facility: CLINIC | Age: 61
End: 2021-08-16

## 2021-08-16 VITALS
BODY MASS INDEX: 40.4 KG/M2 | WEIGHT: 228 LBS | HEART RATE: 61 BPM | OXYGEN SATURATION: 99 % | HEIGHT: 63 IN | DIASTOLIC BLOOD PRESSURE: 86 MMHG | SYSTOLIC BLOOD PRESSURE: 124 MMHG

## 2021-08-16 DIAGNOSIS — I25.10 CORONARY ARTERY DISEASE INVOLVING NATIVE CORONARY ARTERY OF NATIVE HEART WITHOUT ANGINA PECTORIS: Primary | ICD-10-CM

## 2021-08-16 DIAGNOSIS — R07.2 PRECORDIAL PAIN: ICD-10-CM

## 2021-08-16 PROCEDURE — 99214 OFFICE O/P EST MOD 30 MIN: CPT | Performed by: NURSE PRACTITIONER

## 2021-08-16 NOTE — PROGRESS NOTES
"    CARDIOLOGY        Patient Name: Keri Bhagat  :1960  Age: 61 y.o.  Primary Cardiologist: David Burroughs MD  Encounter Provider:  DAPHNEY Nolasco    Date of Service: 21          CHIEF COMPLAINT / REASON FOR OFFICE VISIT     Coronary Artery Disease ([facility] ED f/u )      HISTORY OF PRESENT ILLNESS       HPI  Keri Bhagat is a 61 y.o. female who presents today for emergency department reevaluation.     Pt has a  history significant for CAD, hypertension.    Review of medical records reveals patient was evaluated in the emergency department 2021 for episode of chest discomfort.  Episode was described as left-sided substernal chest pressure that was nonradiating that occurred after vaping.  Episode lasted 30 minutes and then subsided.  She was given nitroglycerin by EMS and it did relieve her pain.  ECG tracings were independently reviewed by me and did not reveal any ischemic changes.  Patient had negative cardiac biomarkers.    Patient states that on the date of ED eval she experienced chest discomfort that was non-radiating.  She states that episodes were intermittent for about a week and lasted about 30 minutes.  She denies associated symptoms of shortness of breath, nausea, vomiting.  She states that episodes were near the time that she was vaping. She states that she has been vaping for sometime and that there were no new products.  She has vaped since and not had any episodes.  Last episode was over the weekend.  Pain is worsened with exertion and improved with NTG.  She does not exercise.        The following portions of the patient's history were reviewed and updated as appropriate: allergies, current medications, past family history, past medical history, past social history, past surgical history and problem list.      VITAL SIGNS     Visit Vitals  /86 (BP Location: Left arm, Patient Position: Sitting, Cuff Size: Large Adult)   Pulse 61   Ht 160 cm (63\")   Wt 103 kg " (228 lb)   LMP 10/21/2016 (Approximate) Comment: 54   SpO2 99%   BMI 40.39 kg/m²         Wt Readings from Last 3 Encounters:   08/16/21 103 kg (228 lb)   08/06/21 103 kg (226 lb)   08/03/21 104 kg (228 lb 3.2 oz)     Body mass index is 40.39 kg/m².      REVIEW OF SYSTEMS   Review of Systems   Constitutional: Negative for chills, fever, weight gain and weight loss.   Cardiovascular: Positive for chest pain. Negative for leg swelling.   Respiratory: Negative for cough, snoring and wheezing.    Hematologic/Lymphatic: Negative for bleeding problem. Does not bruise/bleed easily.   Skin: Negative for color change.   Musculoskeletal: Negative for falls, joint pain and myalgias.   Gastrointestinal: Negative for melena.   Genitourinary: Negative for hematuria.   Neurological: Negative for excessive daytime sleepiness.   Psychiatric/Behavioral: Negative for depression. The patient is not nervous/anxious.            PHYSICAL EXAMINATION     Vitals and nursing note reviewed.   Constitutional:       Appearance: Normal appearance. Well-developed.   Eyes:      Conjunctiva/sclera: Conjunctivae normal.   Neck:      Vascular: No carotid bruit.   Pulmonary:      Breath sounds: Normal breath sounds.   Cardiovascular:      Normal rate. Regular rhythm. Normal S1 with normal intensity. Normal S2 with normal intensity.      Murmurs: There is no murmur.      No gallop. No click. No rub.   Edema:     Peripheral edema absent.   Musculoskeletal: Normal range of motion. Skin:     General: Skin is warm and dry.   Neurological:      Mental Status: Alert and oriented to person, place, and time.      GCS: GCS eye subscore is 4. GCS verbal subscore is 5. GCS motor subscore is 6.   Psychiatric:         Speech: Speech normal.         Behavior: Behavior normal.         Thought Content: Thought content normal.         Judgment: Judgment normal.           REVIEWED DATA     Procedures    Cardiac Procedures:  1. Cardiac catheterization 3/30/2017.  Left  main 0% stenosis, LAD 20% mid stenosis, distal LAD 30% stenosis.  Ramus 0% stenosis, 80% stenosis of a small secondary branch.  Left circumflex 10% stenosis.  RCA 50-60% stenosis.  PDA 30% stenosis.    Lipid Panel    Lipid Panel 9/4/20 3/29/21   Total Cholesterol 191 185   Triglycerides 74 112   HDL Cholesterol 74 (A) 69 (A)   VLDL Cholesterol 14.8 20   LDL Cholesterol  102 (A) 96   (A) Abnormal value       Comments are available for some flowsheets but are not being displayed.               ASSESSMENT & PLAN      Diagnosis Plan   1. Coronary artery disease involving native coronary artery of native heart without angina pectoris  Adult Transthoracic Echo Complete W/ Cont if Necessary Per Protocol    Stress Test With Myocardial Perfusion One Day   2. Precordial pain  Adult Transthoracic Echo Complete W/ Cont if Necessary Per Protocol    Stress Test With Myocardial Perfusion One Day         SUMMARY/DISCUSSION  1. History of nonobstructive coronary disease.  Patient had nonobstructive coronary artery disease on cardiac catheterization in 2017.  At that time she did have 50-60% RCA stenosis.  She reports that she has been having intermittent episodes of precordial chest pain.  She states that episodes last for approximately 30 minutes and have been present intermittently for the past week.  Some of these episodes occur after vaping and some are worse with exertion.  On date that she was evaluated in the emergency department she was given nitroglycerin by EMS and stated that it did relieve her pain.  She did have episodes over the weekend.  I recommended that we proceed with a myocardial perfusion stress test to further evaluate her symptoms.  She also complains of dyspnea with exertion so we will get echocardiogram to assess structure and function of the heart.  2. Precordial chest pain.  As noted above.  3. Follow-up to be arranged after above-mentioned testing.        MEDICATIONS         Discharge Medications           Accurate as of August 16, 2021 10:56 AM. If you have any questions, ask your nurse or doctor.            Continue These Medications      Instructions Start Date   aluminum-magnesium hydroxide-simethicone 400-400-40 MG/5ML suspension  Commonly known as: Mylanta Maximum Strength   20 mL, Oral, Every 6 Hours PRN      ARIPiprazole 20 MG tablet  Commonly known as: ABILIFY   20 mg, Oral, Daily      aspirin 81 MG EC tablet   81 mg, Oral, Daily      atenolol 50 MG tablet  Commonly known as: TENORMIN   50 mg, Oral, Daily      buPROPion  MG 24 hr tablet  Commonly known as: WELLBUTRIN XL   150 mg, Oral, Every Morning      ergocalciferol 1.25 MG (53687 UT) capsule  Commonly known as: ERGOCALCIFEROL   Oral      gabapentin 300 MG capsule  Commonly known as: NEURONTIN   600 mg, Oral, 3 Times Daily, For cervical radiculopathy pain      hydrOXYzine 50 MG tablet  Commonly known as: ATARAX   As Needed      IRON PO   Oral      isosorbide mononitrate 30 MG 24 hr tablet  Commonly known as: IMDUR   TAKE ONE TABLET BY MOUTH DAILY      lamoTRIgine 150 MG tablet  Commonly known as: LaMICtal   300 mg, Oral, Daily      levothyroxine 88 MCG tablet  Commonly known as: SYNTHROID, LEVOTHROID   TAKE ONE TABLET BY MOUTH EVERY MORNING      loratadine 10 MG tablet  Commonly known as: Claritin   10 mg, Oral, Daily, As needed for allergies      nitroglycerin 0.4 MG SL tablet  Commonly known as: NITROSTAT   0.4 mg, Sublingual, Every 5 Minutes PRN, Take no more than 3 doses in 15 minutes.       ondansetron 4 MG tablet  Commonly known as: Zofran   4 mg, Oral, Every 8 Hours PRN      oxybutynin XL 15 MG 24 hr tablet  Commonly known as: DITROPAN XL   15 mg, Oral, Daily      pantoprazole 40 MG EC tablet  Commonly known as: PROTONIX   40 mg, Oral, 2 times daily      pramipexole 0.5 MG tablet  Commonly known as: MIRAPEX   0.5 mg, Oral, Nightly      simvastatin 20 MG tablet  Commonly known as: ZOCOR   TAKE ONE TABLET BY MOUTH ONCE NIGHTLY      venlafaxine  XR 75 MG 24 hr capsule  Commonly known as: EFFEXOR-XR   75 mg, Oral, Daily      vitamin B-12 1000 MCG tablet  Commonly known as: CYANOCOBALAMIN   TAKE ONE TABLET BY MOUTH TWICE A DAY      Vitamin D-3 25 MCG (1000 UT) capsule   1 capsule daily                 **Dragon Disclaimer:   Much of this encounter note is an electronic transcription/translation of spoken language to printed text. The electronic translation of spoken language may permit erroneous, or at times, nonsensical words or phrases to be inadvertently transcribed. Although I have reviewed the note for such errors, some may still exist.

## 2021-08-20 ENCOUNTER — OFFICE VISIT (OUTPATIENT)
Dept: NEUROSURGERY | Facility: CLINIC | Age: 61
End: 2021-08-20

## 2021-08-20 VITALS
DIASTOLIC BLOOD PRESSURE: 78 MMHG | SYSTOLIC BLOOD PRESSURE: 130 MMHG | HEART RATE: 60 BPM | BODY MASS INDEX: 40.22 KG/M2 | WEIGHT: 227 LBS | RESPIRATION RATE: 16 BRPM | TEMPERATURE: 97.9 F | HEIGHT: 63 IN

## 2021-08-20 DIAGNOSIS — D32.9 MENINGIOMA (HCC): Primary | ICD-10-CM

## 2021-08-20 DIAGNOSIS — R90.89 ABNORMAL BRAIN MRI: ICD-10-CM

## 2021-08-20 PROCEDURE — 99213 OFFICE O/P EST LOW 20 MIN: CPT | Performed by: NURSE PRACTITIONER

## 2021-08-27 ENCOUNTER — HOSPITAL ENCOUNTER (OUTPATIENT)
Dept: CARDIOLOGY | Facility: HOSPITAL | Age: 61
Discharge: HOME | End: 2021-08-27

## 2021-08-27 VITALS
DIASTOLIC BLOOD PRESSURE: 82 MMHG | HEIGHT: 63 IN | WEIGHT: 227 LBS | SYSTOLIC BLOOD PRESSURE: 130 MMHG | HEART RATE: 68 BPM | BODY MASS INDEX: 40.22 KG/M2

## 2021-08-27 DIAGNOSIS — I25.10 CORONARY ARTERY DISEASE INVOLVING NATIVE CORONARY ARTERY OF NATIVE HEART WITHOUT ANGINA PECTORIS: ICD-10-CM

## 2021-08-27 DIAGNOSIS — R07.2 PRECORDIAL PAIN: ICD-10-CM

## 2021-08-27 LAB
AORTIC ARCH: 2.4 CM
ASCENDING AORTA: 3.2 CM
BH CV ECHO MEAS - ACS: 1.9 CM
BH CV ECHO MEAS - AO MAX PG (FULL): 4.1 MMHG
BH CV ECHO MEAS - AO MAX PG: 11 MMHG
BH CV ECHO MEAS - AO MEAN PG (FULL): 2 MMHG
BH CV ECHO MEAS - AO MEAN PG: 6 MMHG
BH CV ECHO MEAS - AO ROOT AREA (BSA CORRECTED): 1.3
BH CV ECHO MEAS - AO ROOT AREA: 5.7 CM^2
BH CV ECHO MEAS - AO ROOT DIAM: 2.7 CM
BH CV ECHO MEAS - AO V2 MAX: 166 CM/SEC
BH CV ECHO MEAS - AO V2 MEAN: 117 CM/SEC
BH CV ECHO MEAS - AO V2 VTI: 35.1 CM
BH CV ECHO MEAS - ASC AORTA: 3.2 CM
BH CV ECHO MEAS - AVA(I,A): 2.2 CM^2
BH CV ECHO MEAS - AVA(I,D): 2.2 CM^2
BH CV ECHO MEAS - AVA(V,A): 2 CM^2
BH CV ECHO MEAS - AVA(V,D): 2 CM^2
BH CV ECHO MEAS - BSA(HAYCOCK): 2.2 M^2
BH CV ECHO MEAS - BSA: 2 M^2
BH CV ECHO MEAS - BZI_BMI: 40.2 KILOGRAMS/M^2
BH CV ECHO MEAS - BZI_METRIC_HEIGHT: 160 CM
BH CV ECHO MEAS - BZI_METRIC_WEIGHT: 103 KG
BH CV ECHO MEAS - EDV(MOD-SP2): 56 ML
BH CV ECHO MEAS - EDV(MOD-SP4): 67 ML
BH CV ECHO MEAS - EDV(TEICH): 92.4 ML
BH CV ECHO MEAS - EF(CUBED): 73.2 %
BH CV ECHO MEAS - EF(MOD-BP): 60.4 %
BH CV ECHO MEAS - EF(MOD-SP2): 60.7 %
BH CV ECHO MEAS - EF(MOD-SP4): 61.2 %
BH CV ECHO MEAS - EF(TEICH): 65.2 %
BH CV ECHO MEAS - ESV(MOD-SP2): 22 ML
BH CV ECHO MEAS - ESV(MOD-SP4): 26 ML
BH CV ECHO MEAS - ESV(TEICH): 32.2 ML
BH CV ECHO MEAS - FS: 35.6 %
BH CV ECHO MEAS - IVS/LVPW: 1
BH CV ECHO MEAS - IVSD: 0.9 CM
BH CV ECHO MEAS - LAT PEAK E' VEL: 9 CM/SEC
BH CV ECHO MEAS - LV DIASTOLIC VOL/BSA (35-75): 32.8 ML/M^2
BH CV ECHO MEAS - LV MASS(C)D: 132.8 GRAMS
BH CV ECHO MEAS - LV MASS(C)DI: 65.1 GRAMS/M^2
BH CV ECHO MEAS - LV MAX PG: 7 MMHG
BH CV ECHO MEAS - LV MEAN PG: 4 MMHG
BH CV ECHO MEAS - LV SYSTOLIC VOL/BSA (12-30): 12.7 ML/M^2
BH CV ECHO MEAS - LV V1 MAX: 132 CM/SEC
BH CV ECHO MEAS - LV V1 MEAN: 99.7 CM/SEC
BH CV ECHO MEAS - LV V1 VTI: 30.9 CM
BH CV ECHO MEAS - LVIDD: 4.5 CM
BH CV ECHO MEAS - LVIDS: 2.9 CM
BH CV ECHO MEAS - LVLD AP2: 7 CM
BH CV ECHO MEAS - LVLD AP4: 7 CM
BH CV ECHO MEAS - LVLS AP2: 6 CM
BH CV ECHO MEAS - LVLS AP4: 6.3 CM
BH CV ECHO MEAS - LVOT AREA (M): 2.5 CM^2
BH CV ECHO MEAS - LVOT AREA: 2.5 CM^2
BH CV ECHO MEAS - LVOT DIAM: 1.8 CM
BH CV ECHO MEAS - LVPWD: 0.9 CM
BH CV ECHO MEAS - MED PEAK E' VEL: 5.2 CM/SEC
BH CV ECHO MEAS - MR MAX PG: 57.8 MMHG
BH CV ECHO MEAS - MR MAX VEL: 380 CM/SEC
BH CV ECHO MEAS - MV A DUR: 0.12 SEC
BH CV ECHO MEAS - MV A MAX VEL: 94.8 CM/SEC
BH CV ECHO MEAS - MV DEC SLOPE: 163 CM/SEC^2
BH CV ECHO MEAS - MV DEC TIME: 0.25 SEC
BH CV ECHO MEAS - MV E MAX VEL: 77.9 CM/SEC
BH CV ECHO MEAS - MV E/A: 0.82
BH CV ECHO MEAS - MV MAX PG: 3 MMHG
BH CV ECHO MEAS - MV MEAN PG: 1 MMHG
BH CV ECHO MEAS - MV P1/2T MAX VEL: 76 CM/SEC
BH CV ECHO MEAS - MV P1/2T: 136.6 MSEC
BH CV ECHO MEAS - MV V2 MAX: 86.1 CM/SEC
BH CV ECHO MEAS - MV V2 MEAN: 50 CM/SEC
BH CV ECHO MEAS - MV V2 VTI: 29.9 CM
BH CV ECHO MEAS - MVA P1/2T LCG: 2.9 CM^2
BH CV ECHO MEAS - MVA(P1/2T): 1.6 CM^2
BH CV ECHO MEAS - MVA(VTI): 2.6 CM^2
BH CV ECHO MEAS - PA ACC TIME: 0.11 SEC
BH CV ECHO MEAS - PA MAX PG (FULL): 1.5 MMHG
BH CV ECHO MEAS - PA MAX PG: 3.2 MMHG
BH CV ECHO MEAS - PA PR(ACCEL): 30.4 MMHG
BH CV ECHO MEAS - PA V2 MAX: 89.6 CM/SEC
BH CV ECHO MEAS - PULM A REVS DUR: 0.12 SEC
BH CV ECHO MEAS - PULM A REVS VEL: 22.4 CM/SEC
BH CV ECHO MEAS - PULM DIAS VEL: 31.4 CM/SEC
BH CV ECHO MEAS - PULM S/D: 1.4
BH CV ECHO MEAS - PULM SYS VEL: 44.3 CM/SEC
BH CV ECHO MEAS - PVA(V,A): 2 CM^2
BH CV ECHO MEAS - PVA(V,D): 2 CM^2
BH CV ECHO MEAS - QP/QS: 0.47
BH CV ECHO MEAS - RAP SYSTOLE: 3 MMHG
BH CV ECHO MEAS - RV MAX PG: 1.7 MMHG
BH CV ECHO MEAS - RV MEAN PG: 1 MMHG
BH CV ECHO MEAS - RV V1 MAX: 64.6 CM/SEC
BH CV ECHO MEAS - RV V1 MEAN: 47 CM/SEC
BH CV ECHO MEAS - RV V1 VTI: 13.1 CM
BH CV ECHO MEAS - RVOT AREA: 2.8 CM^2
BH CV ECHO MEAS - RVOT DIAM: 1.9 CM
BH CV ECHO MEAS - RVSP: 24 MMHG
BH CV ECHO MEAS - SI(AO): 98.5 ML/M^2
BH CV ECHO MEAS - SI(CUBED): 32.7 ML/M^2
BH CV ECHO MEAS - SI(LVOT): 38.5 ML/M^2
BH CV ECHO MEAS - SI(MOD-SP2): 16.7 ML/M^2
BH CV ECHO MEAS - SI(MOD-SP4): 20.1 ML/M^2
BH CV ECHO MEAS - SI(TEICH): 29.5 ML/M^2
BH CV ECHO MEAS - SUP REN AO DIAM: 2.2 CM
BH CV ECHO MEAS - SV(AO): 201 ML
BH CV ECHO MEAS - SV(CUBED): 66.7 ML
BH CV ECHO MEAS - SV(LVOT): 78.6 ML
BH CV ECHO MEAS - SV(MOD-SP2): 34 ML
BH CV ECHO MEAS - SV(MOD-SP4): 41 ML
BH CV ECHO MEAS - SV(RVOT): 37.1 ML
BH CV ECHO MEAS - SV(TEICH): 60.2 ML
BH CV ECHO MEAS - TAPSE (>1.6): 2.3 CM
BH CV ECHO MEAS - TR MAX VEL: 229 CM/SEC
BH CV ECHO MEASUREMENTS AVERAGE E/E' RATIO: 10.97
BH CV REST NUCLEAR ISOTOPE DOSE: 11.3 MCI
BH CV STRESS BP STAGE 1: NORMAL
BH CV STRESS BP STAGE 2: NORMAL
BH CV STRESS DURATION MIN STAGE 1: 3
BH CV STRESS DURATION MIN STAGE 2: 2
BH CV STRESS DURATION SEC STAGE 1: 0
BH CV STRESS DURATION SEC STAGE 2: 0
BH CV STRESS GRADE STAGE 1: 10
BH CV STRESS GRADE STAGE 2: 12
BH CV STRESS HR STAGE 1: 129
BH CV STRESS HR STAGE 2: 162
BH CV STRESS METS STAGE 1: 5
BH CV STRESS METS STAGE 2: 7.5
BH CV STRESS NUCLEAR ISOTOPE DOSE: 34.3 MCI
BH CV STRESS PROTOCOL 1: NORMAL
BH CV STRESS RECOVERY BP: NORMAL MMHG
BH CV STRESS RECOVERY HR: 94 BPM
BH CV STRESS SPEED STAGE 1: 1.7
BH CV STRESS SPEED STAGE 2: 2.5
BH CV STRESS STAGE 1: 1
BH CV STRESS STAGE 2: 2
BH CV XLRA - RV BASE: 2.9 CM
BH CV XLRA - RV LENGTH: 6.9 CM
BH CV XLRA - RV MID: 2.3 CM
BH CV XLRA - TDI S': 13.7 CM/SEC
LEFT ATRIUM VOLUME INDEX: 25 ML/M2
LV EF NUC BP: 67 %
MAXIMAL PREDICTED HEART RATE: 161 BPM
MAXIMAL PREDICTED HEART RATE: 161 BPM
PERCENT MAX PREDICTED HR: 100.62 %
SINUS: 3 CM
STJ: 2.8 CM
STRESS BASELINE BP: NORMAL MMHG
STRESS BASELINE HR: 67 BPM
STRESS PERCENT HR: 118 %
STRESS POST ESTIMATED WORKLOAD: 6 METS
STRESS POST EXERCISE DUR MIN: 5 MIN
STRESS POST EXERCISE DUR SEC: 0 SEC
STRESS POST PEAK BP: NORMAL MMHG
STRESS POST PEAK HR: 162 BPM
STRESS TARGET HR: 137 BPM
STRESS TARGET HR: 137 BPM

## 2021-08-27 PROCEDURE — 93356 MYOCRD STRAIN IMG SPCKL TRCK: CPT

## 2021-08-27 PROCEDURE — 93356 MYOCRD STRAIN IMG SPCKL TRCK: CPT | Performed by: INTERNAL MEDICINE

## 2021-08-27 PROCEDURE — 93016 CV STRESS TEST SUPVJ ONLY: CPT | Performed by: INTERNAL MEDICINE

## 2021-08-27 PROCEDURE — 78452 HT MUSCLE IMAGE SPECT MULT: CPT

## 2021-08-27 PROCEDURE — 93306 TTE W/DOPPLER COMPLETE: CPT | Performed by: INTERNAL MEDICINE

## 2021-08-27 PROCEDURE — 93017 CV STRESS TEST TRACING ONLY: CPT

## 2021-08-27 PROCEDURE — 78452 HT MUSCLE IMAGE SPECT MULT: CPT | Performed by: INTERNAL MEDICINE

## 2021-08-27 PROCEDURE — A9502 TC99M TETROFOSMIN: HCPCS | Performed by: NURSE PRACTITIONER

## 2021-08-27 PROCEDURE — 93306 TTE W/DOPPLER COMPLETE: CPT

## 2021-08-27 PROCEDURE — 93018 CV STRESS TEST I&R ONLY: CPT | Performed by: INTERNAL MEDICINE

## 2021-08-27 PROCEDURE — 0 TECHNETIUM TETROFOSMIN KIT: Performed by: NURSE PRACTITIONER

## 2021-08-27 RX ADMIN — TETROFOSMIN 1 DOSE: 1.38 INJECTION, POWDER, LYOPHILIZED, FOR SOLUTION INTRAVENOUS at 09:31

## 2021-08-27 RX ADMIN — TETROFOSMIN 1 DOSE: 1.38 INJECTION, POWDER, LYOPHILIZED, FOR SOLUTION INTRAVENOUS at 08:38

## 2021-08-31 ENCOUNTER — APPOINTMENT (OUTPATIENT)
Dept: GENERAL RADIOLOGY | Facility: HOSPITAL | Age: 61
End: 2021-08-31

## 2021-08-31 ENCOUNTER — TELEPHONE (OUTPATIENT)
Dept: ORTHOPEDIC SURGERY | Facility: CLINIC | Age: 61
End: 2021-08-31

## 2021-08-31 ENCOUNTER — HOSPITAL ENCOUNTER (EMERGENCY)
Facility: HOSPITAL | Age: 61
Discharge: HOME | End: 2021-08-31
Attending: EMERGENCY MEDICINE | Admitting: EMERGENCY MEDICINE

## 2021-08-31 VITALS
HEART RATE: 59 BPM | BODY MASS INDEX: 40.22 KG/M2 | DIASTOLIC BLOOD PRESSURE: 68 MMHG | HEIGHT: 63 IN | TEMPERATURE: 97.9 F | RESPIRATION RATE: 17 BRPM | SYSTOLIC BLOOD PRESSURE: 115 MMHG | OXYGEN SATURATION: 96 % | WEIGHT: 227 LBS

## 2021-08-31 DIAGNOSIS — S80.211A ABRASION OF RIGHT KNEE, INITIAL ENCOUNTER: ICD-10-CM

## 2021-08-31 DIAGNOSIS — S60.222A CONTUSION OF LEFT HAND, INITIAL ENCOUNTER: ICD-10-CM

## 2021-08-31 DIAGNOSIS — S80.01XA CONTUSION OF RIGHT KNEE, INITIAL ENCOUNTER: ICD-10-CM

## 2021-08-31 DIAGNOSIS — S63.502A LEFT WRIST SPRAIN, INITIAL ENCOUNTER: Primary | ICD-10-CM

## 2021-08-31 PROCEDURE — 73110 X-RAY EXAM OF WRIST: CPT

## 2021-08-31 PROCEDURE — 73562 X-RAY EXAM OF KNEE 3: CPT

## 2021-08-31 PROCEDURE — 25010000002 TDAP 5-2.5-18.5 LF-MCG/0.5 SUSPENSION: Performed by: PHYSICIAN ASSISTANT

## 2021-08-31 PROCEDURE — 99283 EMERGENCY DEPT VISIT LOW MDM: CPT

## 2021-08-31 PROCEDURE — 90715 TDAP VACCINE 7 YRS/> IM: CPT | Performed by: PHYSICIAN ASSISTANT

## 2021-08-31 PROCEDURE — 73130 X-RAY EXAM OF HAND: CPT

## 2021-08-31 PROCEDURE — 90471 IMMUNIZATION ADMIN: CPT | Performed by: PHYSICIAN ASSISTANT

## 2021-08-31 RX ORDER — DICLOFENAC SODIUM 75 MG/1
75 TABLET, DELAYED RELEASE ORAL 2 TIMES DAILY
Qty: 20 TABLET | Refills: 0 | Status: SHIPPED | OUTPATIENT
Start: 2021-08-31 | End: 2022-04-21

## 2021-08-31 RX ORDER — IBUPROFEN 800 MG/1
800 TABLET ORAL ONCE
Status: COMPLETED | OUTPATIENT
Start: 2021-08-31 | End: 2021-08-31

## 2021-08-31 RX ADMIN — IBUPROFEN 800 MG: 800 TABLET, FILM COATED ORAL at 17:28

## 2021-08-31 RX ADMIN — TETANUS TOXOID, REDUCED DIPHTHERIA TOXOID AND ACELLULAR PERTUSSIS VACCINE, ADSORBED 0.5 ML: 5; 2.5; 8; 8; 2.5 SUSPENSION INTRAMUSCULAR at 17:29

## 2021-08-31 NOTE — TELEPHONE ENCOUNTER
PT fell and went to be seen at the ER. PT called and wanted to make sure you were informed. PT was currently in waiting room. PT would like you to call before her appointment to discuss treatment options once she is taken care of

## 2021-09-02 ENCOUNTER — CLINICAL SUPPORT (OUTPATIENT)
Dept: ORTHOPEDIC SURGERY | Facility: CLINIC | Age: 61
End: 2021-09-02

## 2021-09-02 VITALS — TEMPERATURE: 97.1 F | HEIGHT: 63 IN | WEIGHT: 227 LBS | BODY MASS INDEX: 40.22 KG/M2

## 2021-09-02 DIAGNOSIS — M17.0 PRIMARY OSTEOARTHRITIS OF BOTH KNEES: Primary | ICD-10-CM

## 2021-09-02 DIAGNOSIS — S63.502A SPRAIN OF LEFT WRIST, INITIAL ENCOUNTER: ICD-10-CM

## 2021-09-02 PROCEDURE — 20610 DRAIN/INJ JOINT/BURSA W/O US: CPT | Performed by: NURSE PRACTITIONER

## 2021-09-02 PROCEDURE — 99213 OFFICE O/P EST LOW 20 MIN: CPT | Performed by: NURSE PRACTITIONER

## 2021-09-02 RX ORDER — METHYLPREDNISOLONE ACETATE 80 MG/ML
80 INJECTION, SUSPENSION INTRA-ARTICULAR; INTRALESIONAL; INTRAMUSCULAR; SOFT TISSUE
Status: COMPLETED | OUTPATIENT
Start: 2021-09-02 | End: 2021-09-02

## 2021-09-02 RX ADMIN — METHYLPREDNISOLONE ACETATE 80 MG: 80 INJECTION, SUSPENSION INTRA-ARTICULAR; INTRALESIONAL; INTRAMUSCULAR; SOFT TISSUE at 13:34

## 2021-09-02 RX ADMIN — METHYLPREDNISOLONE ACETATE 80 MG: 80 INJECTION, SUSPENSION INTRA-ARTICULAR; INTRALESIONAL; INTRAMUSCULAR; SOFT TISSUE at 13:33

## 2021-09-02 NOTE — PROGRESS NOTES
Patient Name: Keri Bhagat   YOB: 1960  Referring Primary Care Physician: Epley, James, APRN  BMI: Body mass index is 40.21 kg/m².    Chief Complaint:    Chief Complaint   Patient presents with   • Left Knee - Pain, Follow-up   • Right Knee - Follow-up, Pain        HPI: Pt returns for knee injections to both knees. She unfortunately fell Monday and hit her right knee and went to ER and had xrays.     Keri Bhagat is a 61 y.o. female who presents today for evaluation of   Chief Complaint   Patient presents with   • Left Knee - Pain, Follow-up   • Right Knee - Follow-up, Pain         Subjective   Medications:   Home Medications:  Current Outpatient Medications on File Prior to Visit   Medication Sig   • aluminum-magnesium hydroxide-simethicone (Mylanta Maximum Strength) 400-400-40 MG/5ML suspension Take 20 mL by mouth Every 6 (Six) Hours As Needed for Indigestion or Heartburn.   • ARIPiprazole (ABILIFY) 20 MG tablet Take 20 mg by mouth Daily.   • aspirin 81 MG EC tablet Take 1 tablet by mouth Daily.   • atenolol (TENORMIN) 50 MG tablet Take 1 tablet by mouth Daily.   • buPROPion XL (WELLBUTRIN XL) 150 MG 24 hr tablet Take 150 mg by mouth Every Morning.   • diclofenac (VOLTAREN) 75 MG EC tablet Take 1 tablet by mouth 2 (Two) Times a Day. With food   • ergocalciferol (ERGOCALCIFEROL) 1.25 MG (54762 UT) capsule Take  by mouth.   • Ferrous Sulfate (IRON PO) Take  by mouth.   • gabapentin (NEURONTIN) 300 MG capsule Take 2 capsules by mouth 3 (Three) Times a Day. For cervical radiculopathy pain   • hydrOXYzine (ATARAX) 50 MG tablet As Needed.   • isosorbide mononitrate (IMDUR) 30 MG 24 hr tablet TAKE ONE TABLET BY MOUTH DAILY   • lamoTRIgine (LaMICtal) 150 MG tablet Take 300 mg by mouth Daily.   • levothyroxine (SYNTHROID, LEVOTHROID) 88 MCG tablet TAKE ONE TABLET BY MOUTH EVERY MORNING   • loratadine (CLARITIN) 10 MG tablet Take 1 tablet by mouth Daily. As needed for allergies   • Mirabegron ER  (MYRBETRIQ) 25 MG tablet sustained-release 24 hour 24 hr tablet Take 25 mg by mouth Daily.   • nitroglycerin (NITROSTAT) 0.4 MG SL tablet Place 0.4 mg under the tongue Every 5 (Five) Minutes As Needed for Chest Pain (took at M.D  office at 0930 a.m.). Take no more than 3 doses in 15 minutes.   • ondansetron (Zofran) 4 MG tablet Take 1 tablet by mouth Every 8 (Eight) Hours As Needed for Nausea or Vomiting.   • oxybutynin XL (DITROPAN XL) 15 MG 24 hr tablet Take 15 mg by mouth Daily.   • pantoprazole (PROTONIX) 40 MG EC tablet Take 1 tablet by mouth 2 (two) times a day.   • pramipexole (MIRAPEX) 0.5 MG tablet Take 1 tablet by mouth Every Night for 360 days.   • simvastatin (ZOCOR) 20 MG tablet TAKE ONE TABLET BY MOUTH ONCE NIGHTLY   • venlafaxine XR (EFFEXOR-XR) 75 MG 24 hr capsule Take 75 mg by mouth Daily.   • vitamin B-12 (CYANOCOBALAMIN) 1000 MCG tablet TAKE ONE TABLET BY MOUTH TWICE A DAY     No current facility-administered medications on file prior to visit.     Current Medications:  Scheduled Meds:  Continuous Infusions:No current facility-administered medications for this visit.    PRN Meds:.    I have reviewed the patient's medical history in detail and updated the computerized patient record.  Review and summarization of old records includes:    Past Medical History:   Diagnosis Date   • Abnormal Pap smear of cervix    • Anemia    • Anxiety    • Arthritis    • Atherosclerotic heart disease of native coronary artery with other forms of angina pectoris (CMS/HCC)    • Bipolar I disorder, single manic episode (CMS/HCC)     depressive d(NOS)   • Cervical disc herniation    • Cervical dysplasia    • Coronary artery disease    • CTS (carpal tunnel syndrome)    • Depression     NO SUICIDAL PLANS   • Difficulty swallowing    • Diverticular disease    • Dyspepsia    • Dysphagia    • Gastric reflux    • GERD (gastroesophageal reflux disease)    • Heart attack (CMS/HCC)    • Heart murmur    • Hiatal hernia    • High  cholesterol    • History of transfusion    • 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    HPV positive on pap LGSIL   • Hyperlipidemia    • Hypertension    • Hypothyroidism    • Incontinence in female     wears pads   • Kidney disease 2017    stage 2    • Kidney disease, chronic, stage III (GFR 30-59 ml/min) (CMS/[facility])    • LGSIL on Pap smear of cervix 04/29/2016    LGSIL HPV positive   • Myocardial infarction (CMS/[facility]) 2000   • Neck pain    • Obesity    • Past myocardial infarction 05/2000   • Periodic limb movement disorder    • Restless leg syndrome    • Sleep apnea     cpap   • Stress fracture     LEFT HEEL   • Suicidal ideation 08/19/2016    history   • Swelling of ankle     right had doppler no s/s blood clot   • Thyroid nodule    • Vitamin B12 deficiency         Past Surgical History:   Procedure Laterality Date   • ANTERIOR CERVICAL DISCECTOMY W/ FUSION N/A 12/29/2016    Procedure: C3-4 anterior cervical discectomy and fusion with Depuy micro plate, ALLOGRAFT C3-4, AND HARDWARE REMOVAL C4-7.;  Surgeon: Hema Godwin MD;  Location: [facility] MAIN OR;  Service:    • CARDIAC CATHETERIZATION N/A 3/30/2017    Procedure: Left Heart Cath;  Surgeon: Tracey Vargas MD;  [facility];  Service:    • CARDIAC CATHETERIZATION N/A 3/30/2017    Procedure: Coronary angiography;  Surgeon: Tracey Vargas MD;  Location:  [facility] CATH INVASIVE LOCATION;  Service:    • CARDIAC CATHETERIZATION N/A 3/30/2017    Procedure: Left ventriculography;  Surgeon: Tracey Vargas MD;  Location:  [facility] CATH INVASIVE LOCATION;  Service:    • CARDIAC CATHETERIZATION  3/30/2017    Procedure: Functional Flow [place];  Surgeon: Tracey Vargas MD;  [facility];  Service:    • CARPAL TUNNEL RELEASE     • CERVICAL BIOPSY  MMXVI    Dr. Jeffery.    • CERVICAL DISCECTOMY ANTERIOR  04/2013    C4-7   • COLONOSCOPY  04/20/2015    Diverticulosis, IH   • COLONOSCOPY  03/09/2021   • COLPOSCOPY W/  BIOPSY / CURETTAGE  2016    LGSIL HPV positive. Results were normal repeat pap in one year. Chronic Cervicitis   • CORONARY ANGIOPLASTY WITH STENT PLACEMENT     • ENDOSCOPY  MMXV    Normal.  Dr. Rodriguez   • ENDOSCOPY N/A 2017    Erythematous mucosa in the stomach  PATH: Chronic active gastritis, moderate with intestinal metaplasia    • GASTRIC BYPASS      Done using the sleeve technique   • HARDWARE REMOVAL  2016    cervical   • HERNIA REPAIR     • LAPAROSCOPIC GASTRIC BANDING  2018   • SHOULDER SURGERY Left     RCR   • TONSILLECTOMY     • TONSILLECTOMY AND ADENOIDECTOMY     • TRANSVAGINAL TAPING SUSPENSION N/A 2017    Procedure: MID URETHRAL SLING CYSTSCOPY;  Surgeon: Abby Méndez MD;  Location: [facility];  Service:    • TUBAL ABDOMINAL LIGATION     • TYMPANOSTOMY TUBE PLACEMENT Right    • UPPER GASTROINTESTINAL ENDOSCOPY  approx    • WRIST SURGERY Bilateral     carpal tunnel   • WRIST SURGERY      L wrist/ 2020        Social History     Occupational History   • Occupation: disabled   Tobacco Use   • Smoking status: Former Smoker     Packs/day: 1.50     Years: 20.00     Pack years: 30.00     Types: Electronic Cigarette, Cigarettes     Quit date:      Years since quittin.6   • Smokeless tobacco: Never Used   • Tobacco comment: Electronic Cigarette   Vaping Use   • Vaping Use: Every day   • Substances: Nicotine, Flavoring   • Devices: Refillable tank   Substance and Sexual Activity   • Alcohol use: Not Currently   • Drug use: No   • Sexual activity: Yes     Partners: Male     Birth control/protection: None, Condom      Social History     Social History Narrative   • Not on file        Family History   Problem Relation Age of Onset   • Diabetes type II Mother    • Hypertension Mother    • Osteoporosis Mother    • Seizures Mother    • COPD Father    • Hypertension Father    • Lung cancer Father    • Heart attack Father    • Liver cancer Father    • Liver disease  "Father    • Thyroid disease Sister    • Hypertension Sister    • Bipolar disorder Sister    • Depression Sister    • ADD / ADHD Sister    • No Known Problems Son    • Abnormal EKG Daughter    • Hypertension Daughter    • Bipolar disorder Daughter    • Thyroid disease Daughter    • No Known Problems Paternal Grandfather    • No Known Problems Paternal Grandmother    • No Known Problems Maternal Grandmother    • No Known Problems Maternal Grandfather    • Thyroid disease Sister    • Hypertension Sister    • Bipolar disorder Sister    • Asthma Daughter    • Breast cancer Neg Hx    • Ovarian cancer Neg Hx    • Uterine cancer Neg Hx    • Colon cancer Neg Hx    • Malig Hyperthermia Neg Hx        ROS: 14 point review of systems was performed and all other systems were reviewed and are negative except for documented findings in HPI and today's encounter.     Allergies: No Known Allergies  Constitutional:  Denies fever, shaking or chills   Eyes:  Denies change in visual acuity   HENT:  Denies nasal congestion or sore throat   Respiratory:  Denies cough or shortness of breath   Cardiovascular:  Denies chest pain or severe LE edema   GI:  Denies abdominal pain, nausea, vomiting, bloody stools or diarrhea   Musculoskeletal:  Numbness, tingling, pain, or loss of motor function only as noted above in history of present illness.  : Denies painful urination or hematuria  Integument:  Denies rash, lesion or ulceration   Neurologic:  Denies headache or focal weakness  Endocrine:  Denies lymphadenopathy  Psych:  Denies confusion or change in mental status   Hem:  Denies active bleeding    OBJECTIVE:  Physical Exam: 61 y.o. female  Wt Readings from Last 3 Encounters:   09/02/21 103 kg (227 lb)   08/31/21 103 kg (227 lb)   08/27/21 103 kg (227 lb)     Ht Readings from Last 1 Encounters:   09/02/21 160 cm (63\")     Body mass index is 40.21 kg/m².  Vitals:    09/02/21 1337   Temp: 97.1 °F (36.2 °C)     Vital signs reviewed.     General " Appearance:    Alert, cooperative, in no acute distress                  Eyes: conjunctiva clear  ENT: external ears and nose atraumatic  CV: no peripheral edema  Resp: normal respiratory effort  Skin: no rashes or wounds; normal turgor  Psych: mood and affect appropriate  Lymph: no nodes appreciated  Neuro: gross sensation intact  Vascular:  Palpable peripheral pulse in noted extremity  Musculoskeletal Extremities: Skin is warm and dry there is a superficial abrasion to the left knee laterally calf is soft and nontender there is no effusion she has medial joint line tenderness and patellofemoral crepitation she has a cock up brace on the left wrist and right knee has a pull-up knee sleeve wrist has no scaphoid or navicular tenderness x-rays were reviewed from the ER    Radiology:   X-RAYS OF THE RIGHT KNEE AND LEFT HAND AND LEFT WRIST     CLINICAL HISTORY: Patient fell. Knee and wrist and hand pain     Right knee:     A total 4 AP lateral and sunrise views were obtained. There is no  evidence recent or old fracture or subluxation. The joint spaces appear  fairly well maintained. No obvious joint effusion is evident.     Left hand and wrist:     3 views of the left hand and 5 views of the left wrist were obtained.  There is a well-healed old fracture of the distal ulna with internal  fixation. No other fractures are identified. There is no evidence of  acute fracture or subluxation involving the hand or wrist.     This report was finalized on 8/31/2021 5:38 PM by Dr. Marques Blonut M.D.             Assessment:     ICD-10-CM ICD-9-CM   1. Primary osteoarthritis of both knees  M17.0 715.16   2. Sprain of left wrist, initial encounter  S63.502A 842.00        MDM/Plan:   The diagnosis(es), natural history, pathophysiology and treatment for diagnosis(es) were discussed. Opportunity given and questions answered.  Biomechanics of pertinent body areas discussed.  When appropriate, the use of ambulatory aids  discussed.    Large Joint Arthrocentesis: R knee  Date/Time: 9/2/2021 1:33 PM  Consent given by: patient  Site marked: site marked  Timeout: Immediately prior to procedure a time out was called to verify the correct patient, procedure, equipment, support staff and site/side marked as required   Supporting Documentation  Indications: pain   Procedure Details  Location: knee - R knee  Preparation: Patient was prepped and draped in the usual sterile fashion  Needle size: 22 G  Approach: anteromedial  Medications administered: 80 mg methylPREDNISolone acetate 80 MG/ML; 4 mL lidocaine (cardiac)      Large Joint Arthrocentesis: L knee  Date/Time: 9/2/2021 1:34 PM  Consent given by: patient  Site marked: site marked  Timeout: Immediately prior to procedure a time out was called to verify the correct patient, procedure, equipment, support staff and site/side marked as required   Supporting Documentation  Indications: pain   Procedure Details  Location: knee - L knee  Preparation: Patient was prepped and draped in the usual sterile fashion  Needle size: 22 G  Approach: anteromedial  Medications administered: 80 mg methylPREDNISolone acetate 80 MG/ML; 4 mL lidocaine (cardiac)  Patient tolerance: patient tolerated the procedure well with no immediate complications            The diagnosis(es), natural history, pathophysiology and treatment for diagnosis(es) were discussed. Opportunity given and questions answered.  Biomechanics of pertinent body areas discussed.  When appropriate, the use of ambulatory aids discussed.  BMI:  The concept of BMI body mass index and its importance and implications discussed.    EXERCISES:  Advice on benefits of, and types of regular/moderate exercise pertaining to orthopedic diagnosis(es).  MEDICATIONS:  The risks, benefits, warnings,side effects and alternatives of medications discussed.  Inflammation/pain control; with cold, heat, elevation and/or liniments discussed as appropriate  MEDICAL  RECORDS reviewed from other provider(s) for past and current medical history pertinent to this complaint.      9/2/2021    Much of this encounter note is an electronic transcription/translation of spoken language to printed text. The electronic translation of spoken language may permit erroneous, or at times, nonsensical words or phrases to be inadvertently transcribed; Although I have reviewed the note for such errors, some may still exist

## 2021-09-09 ENCOUNTER — APPOINTMENT (OUTPATIENT)
Dept: GENERAL RADIOLOGY | Facility: HOSPITAL | Age: 61
End: 2021-09-09

## 2021-09-09 PROCEDURE — 73130 X-RAY EXAM OF HAND: CPT | Performed by: FAMILY MEDICINE

## 2021-09-13 RX ORDER — LANOLIN ALCOHOL/MO/W.PET/CERES
CREAM (GRAM) TOPICAL
Qty: 60 TABLET | Refills: 4 | Status: SHIPPED | OUTPATIENT
Start: 2021-09-13 | End: 2022-03-02

## 2021-09-24 DIAGNOSIS — I10 HYPERTENSION, UNSPECIFIED TYPE: ICD-10-CM

## 2021-09-24 RX ORDER — ATENOLOL 50 MG/1
TABLET ORAL
Qty: 90 TABLET | Refills: 0 | Status: SHIPPED | OUTPATIENT
Start: 2021-09-24 | End: 2022-01-14 | Stop reason: SDUPTHER

## 2021-10-01 ENCOUNTER — APPOINTMENT (OUTPATIENT)
Dept: SLEEP MEDICINE | Facility: HOSPITAL | Age: 61
End: 2021-10-01

## 2021-10-13 ENCOUNTER — OFFICE VISIT (OUTPATIENT)
Dept: ENDOCRINOLOGY | Age: 61
End: 2021-10-13

## 2021-10-13 VITALS
HEART RATE: 63 BPM | OXYGEN SATURATION: 98 % | WEIGHT: 227.8 LBS | HEIGHT: 63 IN | SYSTOLIC BLOOD PRESSURE: 126 MMHG | BODY MASS INDEX: 40.36 KG/M2 | DIASTOLIC BLOOD PRESSURE: 84 MMHG

## 2021-10-13 DIAGNOSIS — Z83.3 FAMILY HISTORY OF DIABETES MELLITUS (DM): ICD-10-CM

## 2021-10-13 DIAGNOSIS — M85.89 OSTEOPENIA OF MULTIPLE SITES: ICD-10-CM

## 2021-10-13 DIAGNOSIS — I10 ESSENTIAL HYPERTENSION: ICD-10-CM

## 2021-10-13 DIAGNOSIS — G47.33 OSA (OBSTRUCTIVE SLEEP APNEA): ICD-10-CM

## 2021-10-13 DIAGNOSIS — E78.5 HYPERLIPIDEMIA, UNSPECIFIED HYPERLIPIDEMIA TYPE: ICD-10-CM

## 2021-10-13 DIAGNOSIS — E03.9 HYPOTHYROIDISM (ACQUIRED): Primary | ICD-10-CM

## 2021-10-13 DIAGNOSIS — I25.10 ATHEROSCLEROSIS OF NATIVE CORONARY ARTERY OF NATIVE HEART WITHOUT ANGINA PECTORIS: ICD-10-CM

## 2021-10-13 DIAGNOSIS — E55.9 VITAMIN D DEFICIENCY: ICD-10-CM

## 2021-10-13 DIAGNOSIS — E23.6 PITUITARY CYST (HCC): ICD-10-CM

## 2021-10-13 DIAGNOSIS — D32.9 MENINGIOMA (HCC): ICD-10-CM

## 2021-10-13 DIAGNOSIS — Z23 NEED FOR INFLUENZA VACCINATION: ICD-10-CM

## 2021-10-13 PROCEDURE — G0008 ADMIN INFLUENZA VIRUS VAC: HCPCS | Performed by: INTERNAL MEDICINE

## 2021-10-13 PROCEDURE — 90686 IIV4 VACC NO PRSV 0.5 ML IM: CPT | Performed by: INTERNAL MEDICINE

## 2021-10-13 RX ORDER — LEVOTHYROXINE SODIUM 88 UG/1
88 TABLET ORAL EVERY MORNING
Qty: 90 TABLET | Refills: 1 | Status: SHIPPED | OUTPATIENT
Start: 2021-10-13 | End: 2021-12-09 | Stop reason: SDUPTHER

## 2021-10-13 RX ORDER — BUPROPION HYDROCHLORIDE 300 MG/1
TABLET ORAL
COMMUNITY
Start: 2021-10-05 | End: 2022-04-13 | Stop reason: SDUPTHER

## 2021-10-13 RX ORDER — FAMOTIDINE 20 MG/1
TABLET, FILM COATED ORAL
COMMUNITY
Start: 2021-09-07 | End: 2022-04-21

## 2021-10-14 LAB
25(OH)D3+25(OH)D2 SERPL-MCNC: 46.2 NG/ML (ref 30–100)
ALBUMIN SERPL-MCNC: 4.4 G/DL (ref 3.5–5.2)
ALBUMIN/GLOB SERPL: 2.2 G/DL
ALP SERPL-CCNC: 89 U/L (ref 39–117)
ALT SERPL-CCNC: 36 U/L (ref 1–33)
AST SERPL-CCNC: 28 U/L (ref 1–32)
BILIRUB SERPL-MCNC: 0.2 MG/DL (ref 0–1.2)
BUN SERPL-MCNC: 8 MG/DL (ref 6–20)
BUN/CREAT SERPL: 7.1 (ref 7–25)
CALCIUM SERPL-MCNC: 9.3 MG/DL (ref 8.6–10.5)
CHLORIDE SERPL-SCNC: 107 MMOL/L (ref 98–107)
CHOLEST SERPL-MCNC: 170 MG/DL (ref 0–200)
CO2 SERPL-SCNC: 26.9 MMOL/L (ref 22–29)
CREAT SERPL-MCNC: 1.13 MG/DL (ref 0.57–1)
GLOBULIN SER CALC-MCNC: 2 GM/DL
GLUCOSE SERPL-MCNC: 106 MG/DL (ref 65–99)
HBA1C MFR BLD: 5.4 % (ref 4.8–5.6)
HDLC SERPL-MCNC: 67 MG/DL (ref 40–60)
IMP & REVIEW OF LAB RESULTS: NORMAL
LDLC SERPL CALC-MCNC: 81 MG/DL (ref 0–100)
POTASSIUM SERPL-SCNC: 4.3 MMOL/L (ref 3.5–5.2)
PROT SERPL-MCNC: 6.4 G/DL (ref 6–8.5)
SODIUM SERPL-SCNC: 142 MMOL/L (ref 136–145)
T4 FREE SERPL-MCNC: 1.36 NG/DL (ref 0.93–1.7)
TRIGL SERPL-MCNC: 129 MG/DL (ref 0–150)
TSH SERPL DL<=0.005 MIU/L-ACNC: 1.28 UIU/ML (ref 0.27–4.2)
VLDLC SERPL CALC-MCNC: 22 MG/DL (ref 5–40)

## 2021-10-14 NOTE — PROGRESS NOTES
Normal thyroid function tests.  Continue levothyroxine 88 mcg/day.Normal vitamin D.  Continue vitamin D3 1000 units twice daily.LDL 81.  HDL 67.  Continue Zocor 20 mg a day and low-fat diet.Hemoglobin A1c 5.4%.  Hemoglobin A1c is in nondiabetic range.  Copy of labs sent to James Epley, NP.  Please notify patient of results.

## 2021-10-21 ENCOUNTER — OFFICE VISIT (OUTPATIENT)
Dept: CARDIOLOGY | Facility: CLINIC | Age: 61
End: 2021-10-21

## 2021-10-21 VITALS
DIASTOLIC BLOOD PRESSURE: 84 MMHG | HEIGHT: 63 IN | HEART RATE: 64 BPM | SYSTOLIC BLOOD PRESSURE: 130 MMHG | BODY MASS INDEX: 40.35 KG/M2

## 2021-10-21 DIAGNOSIS — I10 ESSENTIAL HYPERTENSION: ICD-10-CM

## 2021-10-21 DIAGNOSIS — I25.118 ATHEROSCLEROTIC HEART DISEASE OF NATIVE CORONARY ARTERY WITH OTHER FORMS OF ANGINA PECTORIS (HCC): Primary | ICD-10-CM

## 2021-10-21 PROCEDURE — 99213 OFFICE O/P EST LOW 20 MIN: CPT | Performed by: INTERNAL MEDICINE

## 2021-10-21 PROCEDURE — 93000 ELECTROCARDIOGRAM COMPLETE: CPT | Performed by: INTERNAL MEDICINE

## 2021-10-22 ENCOUNTER — TELEPHONE (OUTPATIENT)
Dept: SLEEP MEDICINE | Facility: HOSPITAL | Age: 61
End: 2021-10-22

## 2021-10-22 ENCOUNTER — OFFICE VISIT (OUTPATIENT)
Dept: SLEEP MEDICINE | Facility: HOSPITAL | Age: 61
End: 2021-10-22

## 2021-10-22 ENCOUNTER — LAB (OUTPATIENT)
Dept: LAB | Facility: HOSPITAL | Age: 61
End: 2021-10-22

## 2021-10-22 VITALS
WEIGHT: 229 LBS | SYSTOLIC BLOOD PRESSURE: 112 MMHG | HEART RATE: 61 BPM | DIASTOLIC BLOOD PRESSURE: 71 MMHG | OXYGEN SATURATION: 95 % | HEIGHT: 63 IN | BODY MASS INDEX: 40.57 KG/M2

## 2021-10-22 DIAGNOSIS — E61.1 IRON DEFICIENCY: Primary | ICD-10-CM

## 2021-10-22 DIAGNOSIS — G47.33 OSA (OBSTRUCTIVE SLEEP APNEA): ICD-10-CM

## 2021-10-22 DIAGNOSIS — Z78.9 INTOLERANCE OF CONTINUOUS POSITIVE AIRWAY PRESSURE (CPAP) VENTILATION: ICD-10-CM

## 2021-10-22 DIAGNOSIS — E61.1 IRON DEFICIENCY: ICD-10-CM

## 2021-10-22 DIAGNOSIS — G25.81 RESTLESS LEGS SYNDROME (RLS): ICD-10-CM

## 2021-10-22 LAB
FERRITIN SERPL-MCNC: 76.3 NG/ML (ref 13–150)
IRON 24H UR-MRATE: 122 MCG/DL (ref 37–145)
IRON SATN MFR SERPL: 30 % (ref 20–50)
TIBC SERPL-MCNC: 410 MCG/DL (ref 298–536)
TRANSFERRIN SERPL-MCNC: 275 MG/DL (ref 200–360)

## 2021-10-22 PROCEDURE — 36415 COLL VENOUS BLD VENIPUNCTURE: CPT

## 2021-10-22 PROCEDURE — 82728 ASSAY OF FERRITIN: CPT

## 2021-10-22 PROCEDURE — 84466 ASSAY OF TRANSFERRIN: CPT

## 2021-10-22 PROCEDURE — 83540 ASSAY OF IRON: CPT

## 2021-10-22 PROCEDURE — G0463 HOSPITAL OUTPT CLINIC VISIT: HCPCS

## 2021-10-22 RX ORDER — GABAPENTIN 300 MG/1
CAPSULE ORAL
Qty: 270 CAPSULE | Refills: 0 | Status: SHIPPED | OUTPATIENT
Start: 2021-10-22 | End: 2022-01-17

## 2021-10-22 RX ORDER — PRAMIPEXOLE DIHYDROCHLORIDE 0.5 MG/1
0.5 TABLET ORAL NIGHTLY
Qty: 30 TABLET | Refills: 5 | Status: SHIPPED | OUTPATIENT
Start: 2021-10-22 | End: 2022-06-27 | Stop reason: SDUPTHER

## 2021-10-22 NOTE — PROGRESS NOTES
"      CARDIOLOGY    David Burroughs MD    ENCOUNTER DATE:  10/21/2021    Keri Bhagat / 61 y.o. / female        CHIEF COMPLAINT / REASON FOR OFFICE VISIT     Coronary Artery Disease (08/16/2021 Follow up)  Hypertension    HISTORY OF PRESENT ILLNESS       Coronary Artery Disease  Symptoms include weight gain.     Keri Bhagat is a 61 y.o. female who presents today for reevaluation.  Patient says she is doing great no chest pain shortness of breath palpitations lightheadedness swelling or fatigue.  Patient exercises routinely with no issues.      The following portions of the patient's history were reviewed and updated as appropriate: allergies, current medications, past family history, past medical history, past social history, past surgical history and problem list.      VITAL SIGNS     Visit Vitals  /84 (BP Location: Left arm)   Pulse 64   Ht 160 cm (63\")   LMP 10/21/2016 (Approximate) Comment: 54   BMI 40.35 kg/m²         Wt Readings from Last 3 Encounters:   10/13/21 103 kg (227 lb 12.8 oz)   09/02/21 103 kg (227 lb)   08/31/21 103 kg (227 lb)     Body mass index is 40.35 kg/m².      REVIEW OF SYSTEMS   Review of Systems   Constitutional: Positive for weight gain.   Gastrointestinal: Positive for vomiting.   Psychiatric/Behavioral: Positive for depression. The patient is nervous/anxious.    All other systems reviewed and are negative.          PHYSICAL EXAMINATION     Vitals reviewed.   Constitutional:       Appearance: Healthy appearance.   Pulmonary:      Effort: Pulmonary effort is normal.   Cardiovascular:      Normal rate. Regular rhythm. Normal S1. Normal S2.      Murmurs: There is no murmur.      No gallop. No click. No rub.   Pulses:     Intact distal pulses.   Edema:     Peripheral edema absent.   Neurological:      Mental Status: Alert and oriented to person, place and time.           REVIEWED DATA       ECG 12 Lead    Date/Time: 10/21/2021 8:47 AM  Performed by: David Burroughs, " MD  Authorized by: David Burroughs MD   Comparison: compared with previous ECG from 10/8/2020  Similar to previous ECG  Rhythm: sinus rhythm    Clinical impression: normal ECG            Cardiac Procedures:  1.     Lipid Panel    Lipid Panel 3/29/21 10/13/21   Total Cholesterol 185 170   Triglycerides 112 129   HDL Cholesterol 69 (A) 67 (A)   VLDL Cholesterol 20 22   LDL Cholesterol  96 81   (A) Abnormal value       Comments are available for some flowsheets but are not being displayed.               ASSESSMENT & PLAN      Diagnosis Plan   1. Atherosclerotic heart disease of native coronary artery with other forms of angina pectoris (HCC)     2. Essential hypertension           SUMMARY/DISCUSSION  1. Coronary artery disease.  Clinically stable.  Cholesterol levels noted above.  Continue statin.  2. Hypertension blood pressures good  3. Continue same patient was told to follow-up in 1 year sooner if issues.        MEDICATIONS         Discharge Medications          Accurate as of October 21, 2021 11:59 PM. If you have any questions, ask your nurse or doctor.            Continue These Medications      Instructions Start Date   aluminum-magnesium hydroxide-simethicone 400-400-40 MG/5ML suspension  Commonly known as: Mylanta Maximum Strength   20 mL, Oral, Every 6 Hours PRN      ARIPiprazole 20 MG tablet  Commonly known as: ABILIFY   20 mg, Oral, Daily      aspirin 81 MG EC tablet   81 mg, Oral, Daily      atenolol 50 MG tablet  Commonly known as: TENORMIN   TAKE ONE TABLET BY MOUTH DAILY      buPROPion  MG 24 hr tablet  Commonly known as: WELLBUTRIN XL   300 mg, Oral, Every Morning      buPROPion  MG 24 hr tablet  Commonly known as: WELLBUTRIN XL   No dose, route, or frequency recorded.      diclofenac 75 MG EC tablet  Commonly known as: VOLTAREN   75 mg, Oral, 2 Times Daily, With food      famotidine 20 MG tablet  Commonly known as: PEPCID   No dose, route, or frequency recorded.      gabapentin 300 MG  capsule  Commonly known as: NEURONTIN   600 mg, Oral, 3 Times Daily, For cervical radiculopathy pain      hydrOXYzine 50 MG tablet  Commonly known as: ATARAX   As Needed      IRON PO   Oral      isosorbide mononitrate 30 MG 24 hr tablet  Commonly known as: IMDUR   TAKE ONE TABLET BY MOUTH DAILY      lamoTRIgine 150 MG tablet  Commonly known as: LaMICtal   300 mg, Oral, Daily      levothyroxine 88 MCG tablet  Commonly known as: SYNTHROID, LEVOTHROID   88 mcg, Oral, Every Morning      loratadine 10 MG tablet  Commonly known as: Claritin   10 mg, Oral, Daily, As needed for allergies      nitroglycerin 0.4 MG SL tablet  Commonly known as: NITROSTAT   0.4 mg, Sublingual, Every 5 Minutes PRN, Take no more than 3 doses in 15 minutes.       ondansetron 4 MG tablet  Commonly known as: Zofran   4 mg, Oral, Every 8 Hours PRN      oxybutynin XL 15 MG 24 hr tablet  Commonly known as: DITROPAN XL   15 mg, Oral, Daily      pantoprazole 40 MG EC tablet  Commonly known as: PROTONIX   40 mg, Oral, 2 times daily      pramipexole 0.5 MG tablet  Commonly known as: MIRAPEX   0.5 mg, Oral, Nightly      simvastatin 20 MG tablet  Commonly known as: ZOCOR   TAKE ONE TABLET BY MOUTH ONCE NIGHTLY      venlafaxine XR 75 MG 24 hr capsule  Commonly known as: EFFEXOR-XR   75 mg, Oral, Daily      vitamin B-12 1000 MCG tablet  Commonly known as: CYANOCOBALAMIN   TAKE 1 TABLET BY MOUTH TWICE A DAY                 **Dragon Disclaimer:   Much of this encounter note is an electronic transcription/translation of spoken language to printed text. The electronic translation of spoken language may permit erroneous, or at times, nonsensical words or phrases to be inadvertently transcribed. Although I have reviewed the note for such errors, some may still exist.

## 2021-10-22 NOTE — PROGRESS NOTES
[facility]  Telephone: [phone] / Fax: [phone] [place]  Telephone: [phone] / Fax: [phone] Libra Guevara    PCP: Epley, James, APRN    Reason for visit: ROCKY f/u    Keri Bhagat is a 61 y.o.female  was last seen at [facility] sleep lab in July 2021. I ordered new machine from [facility] in July 2021, however, machine was not yet 5 years old, and [facility] was unable to replace it. They also needs additional info from us(likely updated office note, orders, and copies of her study) they said.  She still has her old machine at home. It requires humidity reduction from 5 to 2.  She has RLS and history of iron deficiency. She takes OTC iron, Pramipexole and also takes Gabapentin for cervical radiculopathy.     SH-+tobacco, no alcohol, no energy drinks.    ROS- +post nasal drip, +GERD, +anxiety, +depression, rest is negative.    DME [facility].    Current Medications:    Current Outpatient Medications:   •  aluminum-magnesium hydroxide-simethicone (Mylanta Maximum Strength) 400-400-40 MG/5ML suspension, Take 20 mL by mouth Every 6 (Six) Hours As Needed for Indigestion or Heartburn., Disp: 355 mL, Rfl: 1  •  ARIPiprazole (ABILIFY) 20 MG tablet, Take 20 mg by mouth Daily., Disp: , Rfl:   •  aspirin 81 MG EC tablet, Take 1 tablet by mouth Daily., Disp: 30 tablet, Rfl: 2  •  atenolol (TENORMIN) 50 MG tablet, TAKE ONE TABLET BY MOUTH DAILY, Disp: 90 tablet, Rfl: 0  •  buPROPion XL (WELLBUTRIN XL) 300 MG 24 hr tablet, Take 300 mg by mouth Every Morning., Disp: , Rfl:   •  buPROPion XL (WELLBUTRIN XL) 300 MG 24 hr tablet, , Disp: , Rfl:   •  diclofenac (VOLTAREN) 75 MG EC tablet, Take 1 tablet by mouth 2 (Two) Times a Day. With food, Disp: 20 tablet, Rfl: 0  •  famotidine (PEPCID) 20 MG tablet, , Disp: , Rfl:   •  Ferrous Sulfate (IRON PO), Take  by mouth., Disp: , Rfl:   •  gabapentin (NEURONTIN) 300 MG capsule, Take 2 capsules by mouth 3 (Three) Times a Day. For cervical radiculopathy pain,  Disp: 180 capsule, Rfl: 1  •  hydrOXYzine (ATARAX) 50 MG tablet, As Needed., Disp: , Rfl:   •  isosorbide mononitrate (IMDUR) 30 MG 24 hr tablet, TAKE ONE TABLET BY MOUTH DAILY, Disp: 30 tablet, Rfl: 5  •  lamoTRIgine (LaMICtal) 150 MG tablet, Take 300 mg by mouth Daily., Disp: , Rfl:   •  levothyroxine (SYNTHROID, LEVOTHROID) 88 MCG tablet, Take 1 tablet by mouth Every Morning., Disp: 90 tablet, Rfl: 1  •  loratadine (CLARITIN) 10 MG tablet, Take 1 tablet by mouth Daily. As needed for allergies, Disp: 30 tablet, Rfl: 11  •  nitroglycerin (NITROSTAT) 0.4 MG SL tablet, Place 0.4 mg under the tongue Every 5 (Five) Minutes As Needed for Chest Pain (took at M.D  office at 0930 a.m.). Take no more than 3 doses in 15 minutes., Disp: , Rfl:   •  ondansetron (Zofran) 4 MG tablet, Take 1 tablet by mouth Every 8 (Eight) Hours As Needed for Nausea or Vomiting., Disp: 20 tablet, Rfl: 3  •  oxybutynin XL (DITROPAN XL) 15 MG 24 hr tablet, Take 15 mg by mouth Daily., Disp: , Rfl:   •  pantoprazole (PROTONIX) 40 MG EC tablet, Take 1 tablet by mouth 2 (two) times a day., Disp: 60 tablet, Rfl: 3  •  pramipexole (MIRAPEX) 0.5 MG tablet, Take 1 tablet by mouth Every Night for 360 days., Disp: 30 tablet, Rfl: 11  •  simvastatin (ZOCOR) 20 MG tablet, TAKE ONE TABLET BY MOUTH ONCE NIGHTLY, Disp: 90 tablet, Rfl: 0  •  venlafaxine XR (EFFEXOR-XR) 75 MG 24 hr capsule, Take 75 mg by mouth Daily., Disp: , Rfl:   •  vitamin B-12 (CYANOCOBALAMIN) 1000 MCG tablet, TAKE 1 TABLET BY MOUTH TWICE A DAY, Disp: 60 tablet, Rfl: 4   also entered in Sleep Questionnaire    Patient  has a past medical history of Abnormal Pap smear of cervix, Anemia, Anxiety, Arthritis, Atherosclerotic heart disease of native coronary artery with other forms of angina pectoris (HCC), Bipolar I disorder, single manic episode (HCC), Cervical disc herniation, Cervical dysplasia, Coronary artery disease, CTS (carpal tunnel syndrome), Depression, Difficulty swallowing,  "Diverticular disease, Dyspepsia, Dysphagia, Gastric reflux, GERD (gastroesophageal reflux disease), Heart attack (HCC), Heart murmur, Hiatal hernia, High cholesterol, History of transfusion, HPV (human papilloma virus) infection (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    I have reviewed the Past Medical History, Past Surgical History, Social History and Family History.    Vital Signs /71   Pulse 61   Ht 160 cm (63\")   Wt 104 kg (229 lb)   LMP 10/21/2016 (Approximate) Comment: 54  SpO2 95%   BMI 40.57 kg/m²  Body mass index is 40.57 kg/m².    General Alert and oriented. No acute distress noted   Pharynx/Throat Class IV   Mallampati airway, large tongue, no evidence of redundant lateral pharyngeal tissue. No oral lesions. No thrush. Moist mucous membranes.   Head Normocephalic. Symmetrical. Atraumatic.    Nose No septal deviation. No drainage   Chest Wall Normal shape. Symmetric expansion with respiration. No tenderness.   Neck Trachea midline, no thyromegaly or adenopathy    Lungs Clear to auscultation bilaterally. No wheezes. No rhonchi. No rales. Respirations regular, even and unlabored.   Heart Regular rhythm and normal rate. Normal S1 and S2. No murmur   Abdomen Soft, non-tender and non-distended. Normal bowel sounds. No masses.   Extremities Moves all extremities well. No edema   Psychiatric Normal mood and affect.     Testing:  · No recent d/l available.    Study-7/14/21- Overnight split polysomnogram study.  Diagnostic study 10:07 PM to 11:49 PM.  Sleep " efficiency 86.5% with 1.48 hours total sleep time.  Sleep distribution shows absence of slow-wave sleep and REM sleep.  AHI index 12.9 indicating at least mild sleep apnea.  Minimal supine sleep of 4.5 minutes indicated moderately severe sleep apnea.  Due to absence of REM sleep severity may be underestimated.  Oxygen stayed above 88%.  Arousal index 12.9.  PLM index 55.6 with PLM arousal index 3.4.    Titration study from 11:49 PM to 5 AM.  Sleep efficiency 95% with 4.91 hours of total sleep time.  Sleep distribution continues to show absence of slow-wave sleep but there was a rebound in rem sleep to 22.6%.   Apnea hypopnea index is improved.  Oxygen saturation remains above 88%.  PLM index improved but still at 21 events an hour.  CPAP increased from 5 to 13 cm water pressure.  She continued to have apneas and hypopneas and was switched to a bilevel device.  She was tried on pressures of 14/9 and then 17/12.  There was persistent elevated AHI index at all pressure settings.  The overall central apnea index for the titration study was 3.1 and the overall obstructive apnea index for the study was 2.9.  Some of these could be treatment emergent central apneas.  Maximum sleep was achieved only with bilevel pressures.     Study:  HST 9/12/16- AHI index is 21 indicating moderate obstructive sleep apnea.  It is slightly worse in supine position with index 28. Saturation below 89% for 4 minutes and snoring for 2% of total sleep time.    Impression:  1. Iron deficiency    2. ROCKY (obstructive sleep apnea)    3. Intolerance of continuous positive airway pressure (CPAP) ventilation    4. Restless legs syndrome (RLS)          Plan:  Resume old machine until replacement provided. Order new machine, BIPAP, IPAP max 25, EPAP min 10, PS 4 from [facility] as she is CPAP intolerant and continues to have apneas despite CPAP. Repeat iron profile and ferritin, avoid Soclean and UV light . Reduce humidity to level 2 on CPAP. Refill  Pramipexole. Re-evaluate here in 4 months with Dr. Vallejo.    Thank you for allowing me to participate in your patient's care.      DAPHNEY Pedraza  [facility]  Phone: [phone]      Part of this note may be an electronic transcription/translation of spoken language to printed text using the Dragon Dictation System.

## 2021-10-22 NOTE — TELEPHONE ENCOUNTER
Rx Refill Note  Requested Prescriptions     Pending Prescriptions Disp Refills   • gabapentin (NEURONTIN) 300 MG capsule [Pharmacy Med Name: GABAPENTIN 300 MG CAPSULE] 90 capsule      Sig: TAKE ONE CAPSULE BY MOUTH THREE TIMES A DAY      Last office visit with prescribing clinician: 8/6/2021      Next office visit with prescribing clinician: Visit date not found            Jigar Chapa Rep  10/22/21, 14:50 EDT

## 2021-10-26 DIAGNOSIS — K21.00 GASTROESOPHAGEAL REFLUX DISEASE WITH ESOPHAGITIS WITHOUT HEMORRHAGE: ICD-10-CM

## 2021-10-26 RX ORDER — PANTOPRAZOLE SODIUM 40 MG/1
TABLET, DELAYED RELEASE ORAL
Qty: 60 TABLET | Refills: 0 | Status: SHIPPED | OUTPATIENT
Start: 2021-10-26 | End: 2023-01-24

## 2021-11-03 ENCOUNTER — OFFICE VISIT (OUTPATIENT)
Dept: GASTROENTEROLOGY | Facility: CLINIC | Age: 61
End: 2021-11-03

## 2021-11-03 VITALS
WEIGHT: 228.4 LBS | DIASTOLIC BLOOD PRESSURE: 76 MMHG | HEART RATE: 61 BPM | OXYGEN SATURATION: 94 % | TEMPERATURE: 96.5 F | BODY MASS INDEX: 40.47 KG/M2 | HEIGHT: 63 IN | SYSTOLIC BLOOD PRESSURE: 116 MMHG

## 2021-11-03 DIAGNOSIS — K21.00 GASTROESOPHAGEAL REFLUX DISEASE WITH ESOPHAGITIS WITHOUT HEMORRHAGE: Primary | ICD-10-CM

## 2021-11-03 DIAGNOSIS — R13.10 DYSPHAGIA, UNSPECIFIED TYPE: ICD-10-CM

## 2021-11-03 PROCEDURE — 99214 OFFICE O/P EST MOD 30 MIN: CPT | Performed by: INTERNAL MEDICINE

## 2021-11-03 NOTE — PROGRESS NOTES
Chief Complaint   Patient presents with   • Follow-up       History of Present Illness:   61 y.o. female        I last did an EGD on her in 6 of 2017 when I dilated her esophagus with a 56 [place] dilator.  I believe she had another EGD in 3 of 2021 by her bariatric surgeon at [facility]?       She had a colonoscopy in 4/15 and in 3/21.       I last saw her in the office in 5 of 2021.  At that time my assessment and plan was as follows:  Assessment:  1. Bad heartburn  2. H/o iron def anemia  3. Epigastric pain  4. H/o colon polyps.   5. Dysphagia     Recommendations:  1. US of the gallbladder  2. Labs: CBC, amylase, lipase, CMP. Troponin  3. UGI with sbft.  4. Trial of Protonix 40 mg/day and take that instead of Omeprazole  5. Get records from the EGD and colonoscopy done 3/21 at [facility].   6. F/u 6 weeks.   7. Stop vaping, stop smoking!       Not vomiting. She eats smaller meals. She stops eating when she is full. Sometimes constiaption. No rectal bleeeding or melena. No abdomial or chest pain. +dysphagia. Food gets stuck in the sternal notch.  The gastric band was put in in 2019. The dysphagia started in the last 1-1.5 yrs. Her weight is going up. Occasional heartburn. Taking Pantoprazole 40 mg/ BID.            Past Medical History:   Diagnosis Date   • Abnormal Pap smear of cervix    • Anemia    • Anxiety    • Arthritis    • Atherosclerotic heart disease of native coronary artery with other forms of angina pectoris (HCC)    • Bipolar I disorder, single manic episode (HCC)     depressive d(NOS)   • Cervical disc herniation    • Cervical dysplasia    • Coronary artery disease    • CTS (carpal tunnel syndrome)    • Depression     NO SUICIDAL PLANS   • Difficulty swallowing    • Diverticular disease    • Dyspepsia    • Dysphagia    • Gastric reflux    • GERD (gastroesophageal reflux disease)    • Heart attack (HCC)    • Heart murmur    • Hiatal hernia    • High cholesterol    • History of  transfusion    • 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    HPV positive on pap LGSIL   • Hyperlipidemia    • Hypertension    • Hypothyroidism    • Incontinence in female     wears pads   • Kidney disease 2017    stage 2    • Kidney disease, chronic, stage III (GFR 30-59 ml/min) ([facility])    • LGSIL on Pap smear of cervix 04/29/2016    LGSIL HPV positive   • Myocardial infarction ([facility]) 2000   • Neck pain    • Obesity    • Past myocardial infarction 05/2000   • Periodic limb movement disorder    • Restless leg syndrome    • Sleep apnea     cpap   • Stress fracture     LEFT HEEL   • Suicidal ideation 08/19/2016    history   • Swelling of ankle     right had doppler no s/s blood clot   • Thyroid nodule    • Vitamin B12 deficiency        Past Surgical History:   Procedure Laterality Date   • ANTERIOR CERVICAL DISCECTOMY W/ FUSION N/A 12/29/2016    Procedure: C3-4 anterior cervical discectomy and fusion with Depuy micro plate, ALLOGRAFT C3-4, AND HARDWARE REMOVAL C4-7.;  Surgeon: Hema Godwin MD;  Location: [facility] OR;  Service:    • CARDIAC CATHETERIZATION N/A 3/30/2017    Procedure: Left Heart Cath;  Surgeon: Tracey Vargas MD;  Location: [facility] CATH INVASIVE LOCATION;  Service:    • CARDIAC CATHETERIZATION N/A 3/30/2017    Procedure: Coronary angiography;  Surgeon: Tracey Vargas MD;  Location:  [facility] CATH INVASIVE LOCATION;  Service:    • CARDIAC CATHETERIZATION N/A 3/30/2017    Procedure: Left ventriculography;  Surgeon: Tracey Vargas MD;  Location:  [facility] CATH INVASIVE LOCATION;  Service:    • CARDIAC CATHETERIZATION  3/30/2017    Procedure: Functional Flow [place];  Surgeon: Tracey Vargas MD;  Location: [facility] CATH INVASIVE LOCATION;  Service:    • CARPAL TUNNEL RELEASE     • CERVICAL BIOPSY  MMXVI    Dr. Jeffery.    • CERVICAL DISCECTOMY ANTERIOR  04/2013    C4-7   • COLONOSCOPY  04/20/2015    Diverticulosis, IH   • COLONOSCOPY  03/09/2021   • COLPOSCOPY W/ BIOPSY / CURETTAGE  06/17/2016    LGSIL  HPV positive. Results were normal repeat pap in one year. Chronic Cervicitis   • CORONARY ANGIOPLASTY WITH STENT PLACEMENT     • ENDOSCOPY  [facility]    Normal.  Dr. Rodriguez   • ENDOSCOPY N/A 6/22/2017    Erythematous mucosa in the stomach  PATH: Chronic active gastritis, moderate with intestinal metaplasia    • GASTRIC BYPASS      Done using the sleeve technique   • HARDWARE REMOVAL  12/29/2016    cervical   • HERNIA REPAIR     • LAPAROSCOPIC GASTRIC BANDING  02/2018   • SHOULDER SURGERY Left     RCR   • TONSILLECTOMY     • TONSILLECTOMY AND ADENOIDECTOMY     • TRANSVAGINAL TAPING SUSPENSION N/A 12/6/2017    Procedure: MID URETHRAL SLING CYSTSCOPY;  Surgeon: Abby Méndez MD;  Location: [facility];  Service:    • TUBAL ABDOMINAL LIGATION     • TYMPANOSTOMY TUBE PLACEMENT Right    • UPPER GASTROINTESTINAL ENDOSCOPY  approx 2021   • WRIST SURGERY Bilateral     carpal tunnel   • WRIST SURGERY      L wrist/ 4/2020         Current Outpatient Medications:   •  aluminum-magnesium hydroxide-simethicone (Mylanta Maximum Strength) 400-400-40 MG/5ML suspension, Take 20 mL by mouth Every 6 (Six) Hours As Needed for Indigestion or Heartburn., Disp: 355 mL, Rfl: 1  •  ARIPiprazole (ABILIFY) 20 MG tablet, Take 20 mg by mouth Daily., Disp: , Rfl:   •  aspirin 81 MG EC tablet, Take 1 tablet by mouth Daily., Disp: 30 tablet, Rfl: 2  •  atenolol (TENORMIN) 50 MG tablet, TAKE ONE TABLET BY MOUTH DAILY, Disp: 90 tablet, Rfl: 0  •  buPROPion XL (WELLBUTRIN XL) 300 MG 24 hr tablet, Take 300 mg by mouth Every Morning., Disp: , Rfl:   •  buPROPion XL (WELLBUTRIN XL) 300 MG 24 hr tablet, , Disp: , Rfl:   •  diclofenac (VOLTAREN) 75 MG EC tablet, Take 1 tablet by mouth 2 (Two) Times a Day. With food, Disp: 20 tablet, Rfl: 0  •  famotidine (PEPCID) 20 MG tablet, , Disp: , Rfl:   •  Ferrous Sulfate (IRON PO), Take  by mouth., Disp: , Rfl:   •  gabapentin (NEURONTIN) 300 MG capsule, TAKE ONE CAPSULE BY MOUTH THREE TIMES A DAY, Disp:  270 capsule, Rfl: 0  •  hydrOXYzine (ATARAX) 50 MG tablet, As Needed., Disp: , Rfl:   •  isosorbide mononitrate (IMDUR) 30 MG 24 hr tablet, TAKE ONE TABLET BY MOUTH DAILY, Disp: 30 tablet, Rfl: 5  •  lamoTRIgine (LaMICtal) 150 MG tablet, Take 300 mg by mouth Daily., Disp: , Rfl:   •  levothyroxine (SYNTHROID, LEVOTHROID) 88 MCG tablet, Take 1 tablet by mouth Every Morning., Disp: 90 tablet, Rfl: 1  •  loratadine (CLARITIN) 10 MG tablet, Take 1 tablet by mouth Daily. As needed for allergies, Disp: 30 tablet, Rfl: 11  •  nitroglycerin (NITROSTAT) 0.4 MG SL tablet, Place 0.4 mg under the tongue Every 5 (Five) Minutes As Needed for Chest Pain (took at M.D  office at 0930 a.m.). Take no more than 3 doses in 15 minutes., Disp: , Rfl:   •  ondansetron (Zofran) 4 MG tablet, Take 1 tablet by mouth Every 8 (Eight) Hours As Needed for Nausea or Vomiting., Disp: 20 tablet, Rfl: 3  •  oxybutynin XL (DITROPAN XL) 15 MG 24 hr tablet, Take 15 mg by mouth Daily., Disp: , Rfl:   •  pantoprazole (PROTONIX) 40 MG EC tablet, TAKE ONE TABLET BY MOUTH TWICE A DAY, Disp: 60 tablet, Rfl: 0  •  pramipexole (MIRAPEX) 0.5 MG tablet, Take 1 tablet by mouth Every Night for 360 days., Disp: 30 tablet, Rfl: 5  •  simvastatin (ZOCOR) 20 MG tablet, TAKE ONE TABLET BY MOUTH ONCE NIGHTLY, Disp: 90 tablet, Rfl: 0  •  venlafaxine XR (EFFEXOR-XR) 75 MG 24 hr capsule, Take 75 mg by mouth Daily., Disp: , Rfl:   •  vitamin B-12 (CYANOCOBALAMIN) 1000 MCG tablet, TAKE 1 TABLET BY MOUTH TWICE A DAY, Disp: 60 tablet, Rfl: 4    No Known Allergies    Family History   Problem Relation Age of Onset   • Diabetes type II Mother    • Hypertension Mother    • Osteoporosis Mother    • Seizures Mother    • COPD Father    • Hypertension Father    • Lung cancer Father    • Heart attack Father    • Liver cancer Father    • Liver disease Father    • Thyroid disease Sister    • Hypertension Sister    • Bipolar disorder Sister    • Depression Sister    • ADD / ADHD Sister    •  No Known Problems Son    • Abnormal EKG Daughter    • Hypertension Daughter    • Bipolar disorder Daughter    • Thyroid disease Daughter    • No Known Problems Paternal Grandfather    • No Known Problems Paternal Grandmother    • No Known Problems Maternal Grandmother    • No Known Problems Maternal Grandfather    • Thyroid disease Sister    • Hypertension Sister    • Bipolar disorder Sister    • Asthma Daughter    • Breast cancer Neg Hx    • Ovarian cancer Neg Hx    • Uterine cancer Neg Hx    • Colon cancer Neg Hx    • Malig Hyperthermia Neg Hx        Social History     Socioeconomic History   • Marital status:    • Number of children: 3   • Years of education: 12   Tobacco Use   • Smoking status: Former Smoker     Packs/day: 1.50     Years: 20.00     Pack years: 30.00     Types: Electronic Cigarette, Cigarettes     Quit date:      Years since quittin.8   • Smokeless tobacco: Never Used   • Tobacco comment: Electronic Cigarette   Vaping Use   • Vaping Use: Every day   • Substances: Nicotine, Flavoring   • Devices: Refillable tank   Substance and Sexual Activity   • Alcohol use: Not Currently   • Drug use: No   • Sexual activity: Yes     Partners: Male     Birth control/protection: None, Condom       Review of Systems   Gastrointestinal: Negative for abdominal pain.   All other systems reviewed and are negative.    Pertinent positives and negatives documented in the HPI and all other systems reviewed and were found to be negative.  Vitals:    21 1307   BP: 116/76   Pulse: 61   Temp: 96.5 °F (35.8 °C)   SpO2: 94%       Physical Exam  Vitals reviewed.   Constitutional:       General: She is not in acute distress.     Appearance: Normal appearance. She is well-developed. She is obese. She is not diaphoretic.   HENT:      Head: Normocephalic and atraumatic. Hair is normal.      Right Ear: Hearing, tympanic membrane, ear canal and external ear normal. No decreased hearing noted. No drainage.       Left Ear: Hearing, tympanic membrane, ear canal and external ear normal. No decreased hearing noted.      Nose: Nose normal. No nasal deformity.      Mouth/Throat:      Mouth: Mucous membranes are moist.   Eyes:      General: Lids are normal.         Right eye: No discharge.         Left eye: No discharge.      Extraocular Movements: Extraocular movements intact.      Conjunctiva/sclera: Conjunctivae normal.      Pupils: Pupils are equal, round, and reactive to light.   Neck:      Thyroid: No thyromegaly.      Vascular: No JVD.      Trachea: No tracheal deviation.   Cardiovascular:      Rate and Rhythm: Normal rate and regular rhythm.      Pulses: Normal pulses.      Heart sounds: Normal heart sounds. No murmur heard.  No friction rub. No gallop.    Pulmonary:      Effort: Pulmonary effort is normal. No respiratory distress.      Breath sounds: Normal breath sounds. No wheezing or rales.   Chest:      Chest wall: No tenderness.   Abdominal:      General: Bowel sounds are normal. There is no distension.      Palpations: Abdomen is soft. There is no mass.      Tenderness: There is no abdominal tenderness. There is no guarding or rebound.      Hernia: No hernia is present.   Musculoskeletal:         General: No tenderness or deformity. Normal range of motion.      Cervical back: Normal range of motion and neck supple.   Lymphadenopathy:      Cervical: No cervical adenopathy.   Skin:     General: Skin is warm and dry.      Findings: No erythema or rash.   Neurological:      Mental Status: She is alert and oriented to person, place, and time.      Cranial Nerves: No cranial nerve deficit.      Motor: No abnormal muscle tone.      Coordination: Coordination normal.      Deep Tendon Reflexes: Reflexes are normal and symmetric. Reflexes normal.   Psychiatric:         Mood and Affect: Mood normal.         Behavior: Behavior normal.         Thought Content: Thought content normal.         Judgment: Judgment normal.          Diagnoses and all orders for this visit:    1. Gastroesophageal reflux disease with esophagitis without hemorrhage (Primary)  -     FL Esophagram Complete Single Contrast; Future    2. Dysphagia, unspecified type  -     FL Esophagram Complete Single Contrast; Future      Assessment:  1. H/o colon polyps. Her last colonsocpoy [facility] 3/21 at [facility].  2. Dypshagia  3.     Recommendations:  1. Barium swallow with barium tablets  2. F/u 4 weeks.     Return in about 4 weeks (around 12/1/2021).    Eduar Rodriguez MD  11/3/2021

## 2021-11-17 ENCOUNTER — IMMUNIZATION (OUTPATIENT)
Dept: VACCINE CLINIC | Facility: HOSPITAL | Age: 61
End: 2021-11-17

## 2021-11-17 PROCEDURE — 91300 HC SARSCOV02 VAC 30MCG/0.3ML IM: CPT | Performed by: INTERNAL MEDICINE

## 2021-11-17 PROCEDURE — 0004A HC ADM SARSCOV2 30MCG/0.3ML BOOSTER: CPT | Performed by: INTERNAL MEDICINE

## 2021-11-17 PROCEDURE — 0003A: CPT | Performed by: INTERNAL MEDICINE

## 2021-11-24 ENCOUNTER — TELEPHONE (OUTPATIENT)
Dept: ORTHOPEDIC SURGERY | Facility: CLINIC | Age: 61
End: 2021-11-24

## 2021-11-24 NOTE — TELEPHONE ENCOUNTER
Caller: Keri Bhagat    Relationship to patient: Self    Best call back number: [phone]    Chief complaint: NEEDS BILATERAL KNEE INJECTION APPT RESCHEDULED     Type of visit: 3 MONTH FOLLOW UP / BILATERAL KNEES / NO NEW INJURIES / DISCUSS CORTISONE INJECTIONS (PREVIOUSLY 09/02/21)     Requested date: ANY DAY ANY TIME EXCEPT NOT THURS 12/02/21 NOR FRI 12/03/21 (PATIENT WILL BE OUT OF TOWN)     If rescheduling, when is the original appointment: WAS 12/02/21 @ 1110     THANKS

## 2021-12-02 ENCOUNTER — TELEPHONE (OUTPATIENT)
Dept: GASTROENTEROLOGY | Facility: CLINIC | Age: 61
End: 2021-12-02

## 2021-12-02 RX ORDER — PANTOPRAZOLE SODIUM 40 MG/1
40 TABLET, DELAYED RELEASE ORAL 2 TIMES DAILY
Qty: 180 TABLET | Refills: 1 | Status: SHIPPED | OUTPATIENT
Start: 2021-12-02 | End: 2022-05-11 | Stop reason: SDUPTHER

## 2021-12-02 NOTE — TELEPHONE ENCOUNTER
Called pt to clarify if pt takes her pantoprazole 40mg po bid or daily. Pt states takes it bid and is working great.      Asked Misite Np and she advised that we can refill this at bid.     Called pt and advised of the above. Advised we have escribed this to her [facility] pharmacy. Pt verb understanding.

## 2021-12-02 NOTE — TELEPHONE ENCOUNTER
----- Message from Jigar Ortiz Rep sent at 12/2/2021 11:52 AM EST -----  Regarding: pantoprazole (PROTONIX) 40 MG  Contact: [phone]  Pt needs prescription called in.     BERKLEY FRAGA [address] [place] [address] - [address] AT [facility] - [phone]  - [phone] FX   [address], [place] [id number]   Phone:  [phone]  Fax:  [phone]

## 2021-12-06 ENCOUNTER — CLINICAL SUPPORT (OUTPATIENT)
Dept: ORTHOPEDIC SURGERY | Facility: CLINIC | Age: 61
End: 2021-12-06

## 2021-12-06 VITALS — WEIGHT: 220 LBS | BODY MASS INDEX: 38.98 KG/M2 | HEIGHT: 63 IN | TEMPERATURE: 98.2 F

## 2021-12-06 DIAGNOSIS — M17.0 PRIMARY OSTEOARTHRITIS OF KNEES, BILATERAL: Primary | ICD-10-CM

## 2021-12-06 PROCEDURE — 20610 DRAIN/INJ JOINT/BURSA W/O US: CPT | Performed by: NURSE PRACTITIONER

## 2021-12-06 RX ORDER — METHYLPREDNISOLONE ACETATE 80 MG/ML
80 INJECTION, SUSPENSION INTRA-ARTICULAR; INTRALESIONAL; INTRAMUSCULAR; SOFT TISSUE
Status: COMPLETED | OUTPATIENT
Start: 2021-12-06 | End: 2021-12-06

## 2021-12-06 RX ADMIN — METHYLPREDNISOLONE ACETATE 80 MG: 80 INJECTION, SUSPENSION INTRA-ARTICULAR; INTRALESIONAL; INTRAMUSCULAR; SOFT TISSUE at 13:54

## 2021-12-06 RX ADMIN — METHYLPREDNISOLONE ACETATE 80 MG: 80 INJECTION, SUSPENSION INTRA-ARTICULAR; INTRALESIONAL; INTRAMUSCULAR; SOFT TISSUE at 13:53

## 2021-12-06 NOTE — PATIENT INSTRUCTIONS
Knee Injection  A knee injection is a procedure to get medicine into your knee joint to relieve the pain, swelling, and stiffness of arthritis. Your health care provider uses a needle to inject medicine, which may also help to lubricate and cushion your knee joint. You may need more than one injection.  Tell a health care provider about:  · Any allergies you have.  · All medicines you are taking, including vitamins, herbs, eye drops, creams, and over-the-counter medicines.  · Any problems you or family members have had with anesthetic medicines.  · Any blood disorders you have.  · Any surgeries you have had.  · Any medical conditions you have.  · Whether you are pregnant or may be pregnant.  What are the risks?  Generally, this is a safe procedure. However, problems may occur, including:  · Infection.  · Bleeding.  · Symptoms that get worse.  · Damage to the area around your knee.  · Allergic reaction to any of the medicines.  · Skin reactions from repeated injections.  What happens before the procedure?  · Ask your health care provider about:  ? Changing or stopping your regular medicines. This is especially important if you are taking diabetes medicines or blood thinners.  ? Taking medicines such as aspirin and ibuprofen. These medicines can thin your blood. Do not take these medicines unless your health care provider tells you to take them.  ? Taking over-the-counter medicines, vitamins, herbs, and supplements.  · Plan to have a responsible adult take you home from the hospital or clinic.  What happens during the procedure?    · You will sit or lie down in a position for your knee to be treated.  · The skin over your kneecap will be cleaned with a germ-killing soap.  · You will be given a medicine that numbs the area (local anesthetic). You may feel some stinging.  · The medicine will be injected into your knee. The needle is carefully placed between your kneecap and your knee. The medicine is injected into the  joint space.  · The needle will be removed at the end of the procedure.  · A bandage (dressing) may be placed over the injection site.  The procedure may vary among health care providers and hospitals.  What can I expect after the procedure?  · Your blood pressure, heart rate, breathing rate, and blood oxygen level will be monitored until you leave the hospital or clinic.  · You may have to move your knee through its full range of motion. This helps to get all the medicine into your joint space.  · You will be watched to make sure that you do not have a reaction to the injected medicine.  · You may feel more pain, swelling, and warmth than you did before the injection. This reaction may last about 1-2 days.  Follow these instructions at home:  Medicines  · Take over-the-counter and prescription medicines only as told by your health care provider.  · Ask your health care provider if the medicine prescribed to you requires you to avoid driving or using machinery.  · Do not take medicines such as aspirin and ibuprofen unless your health care provider tells you to take them.  Injection site care  · Follow instructions from your health care provider about:  ? How to take care of your puncture site.  ? When and how you should change your dressing.  ? When you should remove your dressing.  · Check your injection area every day for signs of infection. Check for:  ? More redness, swelling, or pain after 2 days.  ? Fluid or blood.  ? Pus or a bad smell.  ? Warmth.  Managing pain, stiffness, and swelling    · If directed, put ice on the injection area. To do this:  ? Put ice in a plastic bag.  ? Place a towel between your skin and the bag.  ? Leave the ice on for 20 minutes, 2-3 times per day.  ? Remove the ice if your skin turns bright red. This is very important. If you cannot feel pain, heat, or cold, you have a greater risk of damage to the area.  · Do not apply heat to your knee.  · Raise (elevate) the injection area  above the level of your heart while you are sitting or lying down.    General instructions  · If you were given a dressing, keep it dry until your health care provider says it can be removed. Ask your health care provider when you can start showering or bathing.  · Avoid strenuous activities for as long as directed by your health care provider. Ask your health care provider when you can return to your normal activities.  · Keep all follow-up visits. This is important. You may need more injections.  Contact a health care provider if you have:  · A fever.  · Warmth in your injection area.  · Fluid, blood, or pus coming from your injection site.  · Symptoms at your injection site that last longer than 2 days after your procedure.  Get help right away if:  · Your knee turns very red.  · Your knee becomes very swollen.  · Your knee is in severe pain.  Summary  · A knee injection is a procedure to get medicine into your knee joint to relieve the pain, swelling, and stiffness of arthritis.  · A needle is carefully placed between your kneecap and your knee to inject medicine into the joint space.  · Before the procedure, ask your health care provider about changing or stopping your regular medicines, especially if you are taking diabetes medicines or blood thinners.  · Contact your health care provider if you have any problems or questions after your procedure.  This information is not intended to replace advice given to you by your health care provider. Make sure you discuss any questions you have with your health care provider.  Document Revised: 06/02/2021 Document Reviewed: 06/02/2021  [employer] Patient Education © 2021 [employer]

## 2021-12-06 NOTE — PROGRESS NOTES
Patient Name: Keri Bhagat   YOB: 1960  Referring Primary Care Physician: Epley, James, APRN  BMI: Body mass index is 38.97 kg/m².    Chief Complaint:    Chief Complaint   Patient presents with   • Left Knee - Follow-up, Pain   • Right Knee - Follow-up, Pain        HPI: Pt returns for knee injections to both knees. Desires conservative treatment - injections last about 2 months. Working on weight loss.     Keri Bhagat is a 61 y.o. female who presents today for evaluation of   Chief Complaint   Patient presents with   • Left Knee - Follow-up, Pain   • Right Knee - Follow-up, Pain         Subjective   Medications:   Home Medications:  Current Outpatient Medications on File Prior to Visit   Medication Sig   • aluminum-magnesium hydroxide-simethicone (Mylanta Maximum Strength) 400-400-40 MG/5ML suspension Take 20 mL by mouth Every 6 (Six) Hours As Needed for Indigestion or Heartburn.   • ARIPiprazole (ABILIFY) 20 MG tablet Take 20 mg by mouth Daily.   • aspirin 81 MG EC tablet Take 1 tablet by mouth Daily.   • atenolol (TENORMIN) 50 MG tablet TAKE ONE TABLET BY MOUTH DAILY   • buPROPion XL (WELLBUTRIN XL) 300 MG 24 hr tablet Take 300 mg by mouth Every Morning.   • buPROPion XL (WELLBUTRIN XL) 300 MG 24 hr tablet    • diclofenac (VOLTAREN) 75 MG EC tablet Take 1 tablet by mouth 2 (Two) Times a Day. With food   • famotidine (PEPCID) 20 MG tablet    • Ferrous Sulfate (IRON PO) Take  by mouth.   • gabapentin (NEURONTIN) 300 MG capsule TAKE ONE CAPSULE BY MOUTH THREE TIMES A DAY   • hydrOXYzine (ATARAX) 50 MG tablet As Needed.   • isosorbide mononitrate (IMDUR) 30 MG 24 hr tablet TAKE ONE TABLET BY MOUTH DAILY   • lamoTRIgine (LaMICtal) 150 MG tablet Take 300 mg by mouth Daily.   • levothyroxine (SYNTHROID, LEVOTHROID) 88 MCG tablet Take 1 tablet by mouth Every Morning.   • loratadine (CLARITIN) 10 MG tablet Take 1 tablet by mouth Daily. As needed for allergies   • nitroglycerin (NITROSTAT) 0.4 MG  SL tablet Place 0.4 mg under the tongue Every 5 (Five) Minutes As Needed for Chest Pain (took at M.D  office at 0930 a.m.). Take no more than 3 doses in 15 minutes.   • ondansetron (Zofran) 4 MG tablet Take 1 tablet by mouth Every 8 (Eight) Hours As Needed for Nausea or Vomiting.   • oxybutynin XL (DITROPAN XL) 15 MG 24 hr tablet Take 15 mg by mouth Daily.   • pantoprazole (PROTONIX) 40 MG EC tablet TAKE ONE TABLET BY MOUTH TWICE A DAY   • pantoprazole (PROTONIX) 40 MG EC tablet Take 1 tablet by mouth 2 (Two) Times a Day.   • pramipexole (MIRAPEX) 0.5 MG tablet Take 1 tablet by mouth Every Night for 360 days.   • simvastatin (ZOCOR) 20 MG tablet TAKE ONE TABLET BY MOUTH ONCE NIGHTLY   • venlafaxine XR (EFFEXOR-XR) 75 MG 24 hr capsule Take 75 mg by mouth Daily.   • vitamin B-12 (CYANOCOBALAMIN) 1000 MCG tablet TAKE 1 TABLET BY MOUTH TWICE A DAY     No current facility-administered medications on file prior to visit.     Current Medications:  Scheduled Meds:  Continuous Infusions:No current facility-administered medications for this visit.    PRN Meds:.    I have reviewed the patient's medical history in detail and updated the computerized patient record.  Review and summarization of old records includes:    Past Medical History:   Diagnosis Date   • Abnormal Pap smear of cervix    • Anemia    • Anxiety    • Arthritis    • Atherosclerotic heart disease of native coronary artery with other forms of angina pectoris (HCC)    • Bipolar I disorder, single manic episode (HCC)     depressive d(NOS)   • Cervical disc herniation    • Cervical dysplasia    • Coronary artery disease    • CTS (carpal tunnel syndrome)    • Depression     NO SUICIDAL PLANS   • Difficulty swallowing    • Diverticular disease    • Dyspepsia    • Dysphagia    • Gastric reflux    • GERD (gastroesophageal reflux disease)    • Heart attack (HCC)    • Heart murmur    • Hiatal hernia    • High cholesterol    • History of transfusion    • 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/2016    HPV positive on pap LGSIL   • Hyperlipidemia    • Hypertension    • Hypothyroidism    • Incontinence in female     wears pads   • Kidney disease 2017    stage 2    • Kidney disease, chronic, stage III (GFR 30-59 ml/min) ([facility])    • LGSIL on Pap smear of cervix 04/29/2016    LGSIL HPV positive   • Myocardial infarction ([facility]) 2000   • Neck pain    • Obesity    • Past myocardial infarction 05/2000   • Periodic limb movement disorder    • Restless leg syndrome    • Sleep apnea     cpap   • Stress fracture     LEFT HEEL   • Suicidal ideation 08/19/2016    history   • Swelling of ankle     right had doppler no s/s blood clot   • Thyroid nodule    • Vitamin B12 deficiency         Past Surgical History:   Procedure Laterality Date   • ANTERIOR CERVICAL DISCECTOMY W/ FUSION N/A 12/29/2016    Procedure: C3-4 anterior cervical discectomy and fusion with Depuy micro plate, ALLOGRAFT C3-4, AND HARDWARE REMOVAL C4-7.;  Surgeon: Hema Godwin MD;  Location: [facility] OR;  Service:    • CARDIAC CATHETERIZATION N/A 3/30/2017    Procedure: Left Heart Cath;  Surgeon: Tracey Vargas MD;  [facility];  Service:    • CARDIAC CATHETERIZATION N/A 3/30/2017    Procedure: Coronary angiography;  Surgeon: Tracey Vargas MD;  Location:  [facility] CATH INVASIVE LOCATION;  Service:    • CARDIAC CATHETERIZATION N/A 3/30/2017    Procedure: Left ventriculography;  Surgeon: Tracey Vargas MD;  Location:  [facility] CATH INVASIVE LOCATION;  Service:    • CARDIAC CATHETERIZATION  3/30/2017    Procedure: Functional Flow [place];  Surgeon: Tracey Vargas MD;  [facility];  Service:    • CARPAL TUNNEL RELEASE     • CERVICAL BIOPSY  MMX[facility]    Dr. Jeffery.    • CERVICAL DISCECTOMY ANTERIOR  04/2013    C4-7   • COLONOSCOPY  04/20/2015    Diverticulosis, IH   • COLONOSCOPY  03/09/2021   • COLPOSCOPY W/ BIOPSY / CURETTAGE  06/17/2016    LGSIL HPV positive. Results were  normal repeat pap in one year. Chronic Cervicitis   • CORONARY ANGIOPLASTY WITH STENT PLACEMENT     • ENDOSCOPY  MMXV    Normal.  Dr. Rodriguez   • ENDOSCOPY N/A 2017    Erythematous mucosa in the stomach  PATH: Chronic active gastritis, moderate with intestinal metaplasia    • GASTRIC BYPASS      Done using the sleeve technique   • HARDWARE REMOVAL  2016    cervical   • HERNIA REPAIR     • LAPAROSCOPIC GASTRIC BANDING  2018   • SHOULDER SURGERY Left     RCR   • TONSILLECTOMY     • TONSILLECTOMY AND ADENOIDECTOMY     • TRANSVAGINAL TAPING SUSPENSION N/A 2017    Procedure: MID URETHRAL SLING CYSTSCOPY;  Surgeon: Abby Méndez MD;  Location: [facility];  Service:    • TUBAL ABDOMINAL LIGATION     • TYMPANOSTOMY TUBE PLACEMENT Right    • UPPER GASTROINTESTINAL ENDOSCOPY  approx    • WRIST SURGERY Bilateral     carpal tunnel   • WRIST SURGERY      L wrist/ 2020        Social History     Occupational History   • Occupation: disabled   Tobacco Use   • Smoking status: Former Smoker     Packs/day: 1.50     Years: 20.00     Pack years: 30.00     Types: Electronic Cigarette, Cigarettes     Quit date:      Years since quittin.9   • Smokeless tobacco: Never Used   • Tobacco comment: Electronic Cigarette   Vaping Use   • Vaping Use: Every day   • Substances: Nicotine, Flavoring   • Devices: Refillable tank   Substance and Sexual Activity   • Alcohol use: Not Currently   • Drug use: No   • Sexual activity: Yes     Partners: Male     Birth control/protection: None, Condom      Social History     Social History Narrative   • Not on file        Family History   Problem Relation Age of Onset   • Diabetes type II Mother    • Hypertension Mother    • Osteoporosis Mother    • Seizures Mother    • COPD Father    • Hypertension Father    • Lung cancer Father    • Heart attack Father    • Liver cancer Father    • Liver disease Father    • Thyroid disease Sister    • Hypertension Sister    •  "Bipolar disorder Sister    • Depression Sister    • ADD / ADHD Sister    • No Known Problems Son    • Abnormal EKG Daughter    • Hypertension Daughter    • Bipolar disorder Daughter    • Thyroid disease Daughter    • No Known Problems Paternal Grandfather    • No Known Problems Paternal Grandmother    • No Known Problems Maternal Grandmother    • No Known Problems Maternal Grandfather    • Thyroid disease Sister    • Hypertension Sister    • Bipolar disorder Sister    • Asthma Daughter    • Breast cancer Neg Hx    • Ovarian cancer Neg Hx    • Uterine cancer Neg Hx    • Colon cancer Neg Hx    • Malig Hyperthermia Neg Hx        ROS: 14 point review of systems was performed and all other systems were reviewed and are negative except for documented findings in HPI and today's encounter.     Allergies: No Known Allergies  Constitutional:  Denies fever, shaking or chills   Eyes:  Denies change in visual acuity   HENT:  Denies nasal congestion or sore throat   Respiratory:  Denies cough or shortness of breath   Cardiovascular:  Denies chest pain or severe LE edema   GI:  Denies abdominal pain, nausea, vomiting, bloody stools or diarrhea   Musculoskeletal:  Numbness, tingling, pain, or loss of motor function only as noted above in history of present illness.  : Denies painful urination or hematuria  Integument:  Denies rash, lesion or ulceration   Neurologic:  Denies headache or focal weakness  Endocrine:  Denies lymphadenopathy  Psych:  Denies confusion or change in mental status   Hem:  Denies active bleeding    OBJECTIVE:  Physical Exam: 61 y.o. female  Wt Readings from Last 3 Encounters:   12/06/21 99.8 kg (220 lb)   11/03/21 104 kg (228 lb 6.4 oz)   10/22/21 104 kg (229 lb)     Ht Readings from Last 1 Encounters:   12/06/21 160 cm (63\")     Body mass index is 38.97 kg/m².  Vitals:    12/06/21 1355   Temp: 98.2 °F (36.8 °C)     Vital signs reviewed.     General Appearance:    Alert, cooperative, in no acute distress   "                Eyes: conjunctiva clear  ENT: external ears and nose atraumatic  CV: no peripheral edema  Resp: normal respiratory effort  Skin: no rashes or wounds; normal turgor  Psych: mood and affect appropriate  Lymph: no nodes appreciated  Neuro: gross sensation intact  Vascular:  Palpable peripheral pulse in noted extremity  Musculoskeletal Extremities: Skin is warm and dry there is a superficial abrasion to the left knee laterally calf is soft and nontender there is no effusion she has medial joint line tenderness and patellofemoral crepitation     Radiology:   reviewed        Assessment:     ICD-10-CM ICD-9-CM   1. Primary osteoarthritis of knees, bilateral  M17.0 715.16        MDM/Plan:   The diagnosis(es), natural history, pathophysiology and treatment for diagnosis(es) were discussed. Opportunity given and questions answered.  Biomechanics of pertinent body areas discussed.  When appropriate, the use of ambulatory aids discussed.    Large Joint Arthrocentesis: R knee  Date/Time: 12/6/2021 1:53 PM  Consent given by: patient  Site marked: site marked  Timeout: Immediately prior to procedure a time out was called to verify the correct patient, procedure, equipment, support staff and site/side marked as required   Supporting Documentation  Indications: pain   Procedure Details  Location: knee - R knee  Preparation: Patient was prepped and draped in the usual sterile fashion  Needle gauge: 21G.  Approach: anteromedial  Medications administered: 80 mg methylPREDNISolone acetate 80 MG/ML; 4 mL lidocaine (cardiac)      Large Joint Arthrocentesis: L knee  Date/Time: 12/6/2021 1:54 PM  Consent given by: patient  Site marked: site marked  Timeout: Immediately prior to procedure a time out was called to verify the correct patient, procedure, equipment, support staff and site/side marked as required   Supporting Documentation  Indications: pain   Procedure Details  Location: knee - L knee  Preparation: Patient was  prepped and draped in the usual sterile fashion  Needle gauge: 21G.  Approach: anteromedial  Medications administered: 80 mg methylPREDNISolone acetate 80 MG/ML; 4 mL lidocaine (cardiac)  Patient tolerance: patient tolerated the procedure well with no immediate complications          The diagnosis(es), natural history, pathophysiology and treatment for diagnosis(es) were discussed. Opportunity given and questions answered.  Biomechanics of pertinent body areas discussed.  When appropriate, the use of ambulatory aids discussed.  EXERCISES:  Advice on benefits of, and types of regular/moderate exercise pertaining to orthopedic diagnosis(es).  MEDICATIONS:  The risks, benefits, warnings,side effects and alternatives of medications discussed.  DIABETES:  Diabetic counseling and how it affects the diagnosis and treatment  Inflammation/pain control; with cold, heat, elevation and/or liniments discussed as appropriate  HOME EXERCISE/PT program encouraged  MEDICAL RECORDS reviewed from other provider(s) for past and current medical history pertinent to this complaint.      12/6/2021    Dictated utilizing Dragon dictation

## 2021-12-09 DIAGNOSIS — E03.9 HYPOTHYROIDISM (ACQUIRED): ICD-10-CM

## 2021-12-09 RX ORDER — LEVOTHYROXINE SODIUM 88 UG/1
88 TABLET ORAL EVERY MORNING
Qty: 90 TABLET | Refills: 1 | Status: SHIPPED | OUTPATIENT
Start: 2021-12-09 | End: 2022-04-21 | Stop reason: SDUPTHER

## 2021-12-13 NOTE — TELEPHONE ENCOUNTER
Records received - see media tab.  Message to DR Rodriguez.    Quality 226: Preventive Care And Screening: Tobacco Use: Screening And Cessation Intervention: Patient screened for tobacco use and is an ex/non-smoker Quality 130: Documentation Of Current Medications In The Medical Record: Current Medications Documented Quality 431: Preventive Care And Screening: Unhealthy Alcohol Use - Screening: Patient not identified as an unhealthy alcohol user when screened for unhealthy alcohol use using a systematic screening method Detail Level: Generalized

## 2021-12-23 ENCOUNTER — TELEPHONE (OUTPATIENT)
Dept: FAMILY MEDICINE CLINIC | Facility: CLINIC | Age: 61
End: 2021-12-23

## 2021-12-23 RX ORDER — ISOSORBIDE MONONITRATE 30 MG/1
TABLET, EXTENDED RELEASE ORAL
Qty: 30 TABLET | Refills: 2 | Status: SHIPPED | OUTPATIENT
Start: 2021-12-23 | End: 2022-03-31

## 2021-12-23 NOTE — TELEPHONE ENCOUNTER
Rx Refill Note  Requested Prescriptions     Pending Prescriptions Disp Refills   • isosorbide mononitrate (IMDUR) 30 MG 24 hr tablet [Pharmacy Med Name: ISOSORBIDE MONONIT ER 30 MG TB] 30 tablet 5     Sig: TAKE ONE TABLET BY MOUTH DAILY      Last office visit with prescribing clinician: 8/6/2021      Next office visit with prescribing clinician: Visit date not found            Jigar Chapa Rep  12/23/21, 13:45 EST

## 2021-12-23 NOTE — TELEPHONE ENCOUNTER
Called LVM for pt to give the office a call back to find out what kind of medication she is wanting

## 2021-12-23 NOTE — TELEPHONE ENCOUNTER
Caller: Keri Bhagat    Relationship: Self    Best call back number: [phone]    What medication are you requesting: SOMETHING TO HELP WITH NUMBNESS     What are your current symptoms: SCIATICA NERVE NUMBNESS IN LEFT HIP TO LEFT KNEE    How long have you been experiencing symptoms: OFF AND ON FOR 1 MONTH     Have you had these symptoms before:    [x] Yes  [] No    Have you been treated for these symptoms before:   [x] Yes  [] No    If a prescription is needed, what is your preferred pharmacy and phone number: [facility] [address] [facility] AT [facility] - [phone]  - [phone] FX     Additional notes:

## 2021-12-28 DIAGNOSIS — M54.30 SCIATICA, UNSPECIFIED LATERALITY: Primary | ICD-10-CM

## 2021-12-28 RX ORDER — METHYLPREDNISOLONE 4 MG/1
TABLET ORAL
Qty: 30 EACH | Refills: 0 | Status: SHIPPED | OUTPATIENT
Start: 2021-12-28 | End: 2022-01-08

## 2022-01-11 ENCOUNTER — OFFICE VISIT (OUTPATIENT)
Dept: FAMILY MEDICINE CLINIC | Facility: CLINIC | Age: 62
End: 2022-01-11

## 2022-01-11 VITALS
HEIGHT: 63 IN | TEMPERATURE: 97.1 F | HEART RATE: 125 BPM | BODY MASS INDEX: 39.55 KG/M2 | RESPIRATION RATE: 14 BRPM | WEIGHT: 223.2 LBS | SYSTOLIC BLOOD PRESSURE: 148 MMHG | DIASTOLIC BLOOD PRESSURE: 100 MMHG | OXYGEN SATURATION: 95 %

## 2022-01-11 DIAGNOSIS — B34.9 ACUTE VIRAL SYNDROME: Primary | ICD-10-CM

## 2022-01-11 PROCEDURE — 99214 OFFICE O/P EST MOD 30 MIN: CPT | Performed by: NURSE PRACTITIONER

## 2022-01-11 RX ORDER — ALBUTEROL SULFATE 90 UG/1
2 AEROSOL, METERED RESPIRATORY (INHALATION) EVERY 4 HOURS PRN
Qty: 6.7 G | Refills: 1 | Status: SHIPPED | OUTPATIENT
Start: 2022-01-11 | End: 2022-04-21

## 2022-01-11 NOTE — PROGRESS NOTES
"Chief Complaint  breathing (pt states feels like shes not getting enough air), Cough (pt states head feels like its congested), Chest Pain (pt states chest ache), and Generalized Body Aches    Subjective          Keri Bhagat presents to [facility]  Cough puma bodyaches sinus puma symptoms started Thursday, worsened and she went to urgent care Saturday.  Cough worse  uc stat uc med not helping  covid test neg  No chest pain presently positive wheezing no lethargy no weakness she still has some body aches and fever equivalent but no actual fever        Cough  Associated symptoms include chest pain.   Chest Pain   Associated symptoms include a cough.       Objective   Vital Signs:   /100   Pulse (!) 125   Temp 97.1 °F (36.2 °C) (Infrared)   Resp 14   Ht 160 cm (63\")   Wt 101 kg (223 lb 3.2 oz)   SpO2 95%   BMI 39.54 kg/m²     Physical Exam   Result Review :                 Assessment and Plan    There are no diagnoses linked to this encounter.    Follow Up   No follow-ups on file.  Patient was given instructions and counseling regarding her condition or for health maintenance advice. Please see specific information pulled into the AVS if appropriate.       "

## 2022-01-11 NOTE — PATIENT INSTRUCTIONS
Push fluids  Fluids fluids fluids  Ibuprofen 800 mg 3 times a day for body aches fever fever equivalent  Albuterol 4 times a day until better  Cough medicine as needed  Increased shortness of breath chest pain high fever lethargy weakness emergency room  Recheck if not improving Monday night plenty of rest    Oral rehydration therapy and stay on top of your fever and you will do nicely    Send me a message Friday before the weekend let me know how you are doing I will follow you in 2-year

## 2022-01-14 ENCOUNTER — TELEPHONE (OUTPATIENT)
Dept: FAMILY MEDICINE CLINIC | Facility: CLINIC | Age: 62
End: 2022-01-14

## 2022-01-14 DIAGNOSIS — I10 HYPERTENSION, UNSPECIFIED TYPE: ICD-10-CM

## 2022-01-14 LAB — SARS-COV-2 RNA RESP QL NAA+PROBE: NOT DETECTED

## 2022-01-14 RX ORDER — ATENOLOL 50 MG/1
50 TABLET ORAL DAILY
Qty: 90 TABLET | Refills: 0 | Status: SHIPPED | OUTPATIENT
Start: 2022-01-14 | End: 2022-03-31

## 2022-01-14 NOTE — TELEPHONE ENCOUNTER
Caller: Keri Bhagat    Relationship: Self    Best call back number: [phone]    Requested Prescriptions:   Requested Prescriptions     Pending Prescriptions Disp Refills   • atenolol (TENORMIN) 50 MG tablet 90 tablet 0     Sig: Take 1 tablet by mouth Daily.        Pharmacy where request should be sent: [facility] [address] AT [facility] [phone] [facility] [phone] FX     Additional details provided by patient: OUT OF MEDICATION     Does the patient have less than a 3 day supply:  [x] Yes  [] No    Jigar Winston Rep   01/14/22 09:36 EST

## 2022-01-14 NOTE — TELEPHONE ENCOUNTER
Caller: Keri Bhagat    Relationship: Self    Best call back number: [phone]    Caller requesting test results: PATIENT     What test was performed: COVID     When was the test performed: 1-11-22    Where was the test performed: OUR OFFICE     Additional notes: PATIENT STATES SHE CAN'T GET RID OF COUGH

## 2022-01-17 RX ORDER — GABAPENTIN 300 MG/1
CAPSULE ORAL
Qty: 270 CAPSULE | Refills: 0 | Status: SHIPPED | OUTPATIENT
Start: 2022-01-17 | End: 2022-05-11

## 2022-01-19 ENCOUNTER — TELEPHONE (OUTPATIENT)
Dept: FAMILY MEDICINE CLINIC | Facility: CLINIC | Age: 62
End: 2022-01-19

## 2022-01-19 DIAGNOSIS — J20.9 ACUTE BRONCHITIS, UNSPECIFIED ORGANISM: Primary | ICD-10-CM

## 2022-01-19 DIAGNOSIS — R05.9 COUGH: ICD-10-CM

## 2022-01-19 NOTE — TELEPHONE ENCOUNTER
Plenty of fluids    Outpatient chest x-ray Thursday call Friday for results if increasing chest pain shortness of breath high fever lethargy weakness emergency room  Recheck next week if not better thank you

## 2022-01-19 NOTE — TELEPHONE ENCOUNTER
PATIENT CALLED STATING THAT SHE STILL HAS THE COUGH, EVEN WITH THE MEDICATION THAT WAS GIVEN TO HER    SHE DOES NOT HAVE ANY ENERGY    PATIENT ALSO NOTED THAT HER CHEST AREA IS HURTING FROM COUGHING SO MUCH    PLEASE CALL AND ADVISE   Keri Bhagat (Self) [phone] (H)           [facility] [address] AT [facility] - [phone] [facility] [phone]   [phone]

## 2022-01-20 ENCOUNTER — HOSPITAL ENCOUNTER (OUTPATIENT)
Dept: GENERAL RADIOLOGY | Facility: HOSPITAL | Age: 62
Discharge: HOME | End: 2022-01-20
Admitting: NURSE PRACTITIONER

## 2022-01-20 ENCOUNTER — TELEPHONE (OUTPATIENT)
Dept: PEDIATRICS | Facility: OTHER | Age: 62
End: 2022-01-20

## 2022-01-20 PROCEDURE — 71046 X-RAY EXAM CHEST 2 VIEWS: CPT

## 2022-01-20 NOTE — TELEPHONE ENCOUNTER
PATIENT WANTED TO LET JAMES EPLEY KNOW THAT SHE HAD HER CHEST XRAY TODAY. IF HE COULD CALL HER WITH THE RESULTS SHE WOULD APPRECIATE IT.    PLEASE ADVISE  [phone]

## 2022-02-23 ENCOUNTER — TELEPHONE (OUTPATIENT)
Dept: CARDIOLOGY | Facility: CLINIC | Age: 62
End: 2022-02-23

## 2022-02-23 NOTE — TELEPHONE ENCOUNTER
Pt left msg saying she was having chest pain that radiates to her shoulder sometimes and asked what to do.    I called her bk.  She had pain that started yesterday that comes and goes. If she sits down it makes it better. Doesn't know vitals, denied all other symptoms.  She currently doesn't have the pain.  Please advise.

## 2022-02-23 NOTE — TELEPHONE ENCOUNTER
I called.  Patient was very vague about the discomfort.  I asked her to do several maneuvers to see if we get a better insight what could be causing this.  She just had a stress test in August so I do not think that doing another stress test would be beneficial at this time.

## 2022-03-02 ENCOUNTER — HOSPITAL ENCOUNTER (OUTPATIENT)
Dept: BONE DENSITY | Facility: HOSPITAL | Age: 62
Discharge: HOME | End: 2022-03-02
Admitting: INTERNAL MEDICINE

## 2022-03-02 PROCEDURE — 77080 DXA BONE DENSITY AXIAL: CPT

## 2022-03-02 RX ORDER — LANOLIN ALCOHOL/MO/W.PET/CERES
CREAM (GRAM) TOPICAL
Qty: 60 TABLET | Refills: 4 | Status: SHIPPED | OUTPATIENT
Start: 2022-03-02 | End: 2022-08-22

## 2022-03-02 NOTE — TELEPHONE ENCOUNTER
Rx Refill Note  Requested Prescriptions     Pending Prescriptions Disp Refills   • vitamin B-12 (CYANOCOBALAMIN) 1000 MCG tablet [Pharmacy Med Name: VITAMIN B-12 1,000 MCG TABLET] 60 tablet 4     Sig: TAKE ONE TABLET BY MOUTH TWICE A DAY      Last office visit with prescribing clinician: 1/11/2022      Next office visit with prescribing clinician: Visit date not found            Jigar Chapa Rep  03/02/22, 16:46 EST

## 2022-03-07 ENCOUNTER — CLINICAL SUPPORT (OUTPATIENT)
Dept: ORTHOPEDIC SURGERY | Facility: CLINIC | Age: 62
End: 2022-03-07

## 2022-03-07 VITALS — TEMPERATURE: 96.9 F | BODY MASS INDEX: 38.45 KG/M2 | HEIGHT: 63 IN | WEIGHT: 217 LBS

## 2022-03-07 DIAGNOSIS — M17.0 PRIMARY OSTEOARTHRITIS OF BOTH KNEES: ICD-10-CM

## 2022-03-07 DIAGNOSIS — M25.562 PAIN IN BOTH KNEES, UNSPECIFIED CHRONICITY: Primary | ICD-10-CM

## 2022-03-07 DIAGNOSIS — M25.561 PAIN IN BOTH KNEES, UNSPECIFIED CHRONICITY: Primary | ICD-10-CM

## 2022-03-07 PROCEDURE — 20610 DRAIN/INJ JOINT/BURSA W/O US: CPT | Performed by: NURSE PRACTITIONER

## 2022-03-07 PROCEDURE — 73562 X-RAY EXAM OF KNEE 3: CPT | Performed by: NURSE PRACTITIONER

## 2022-03-07 RX ORDER — METHYLPREDNISOLONE ACETATE 80 MG/ML
80 INJECTION, SUSPENSION INTRA-ARTICULAR; INTRALESIONAL; INTRAMUSCULAR; SOFT TISSUE
Status: COMPLETED | OUTPATIENT
Start: 2022-03-07 | End: 2022-03-07

## 2022-03-07 RX ADMIN — METHYLPREDNISOLONE ACETATE 80 MG: 80 INJECTION, SUSPENSION INTRA-ARTICULAR; INTRALESIONAL; INTRAMUSCULAR; SOFT TISSUE at 13:37

## 2022-03-07 NOTE — PROGRESS NOTES
3/7/2022    Keri Bhagat is here today for worsening knee pain. Pt has undergone injection of the knee in the past with good resolution of symptoms. Pt is requesting a repeat injection.     KNEE Injection Procedure Note:    Large Joint Arthrocentesis: L knee  Date/Time: 3/7/2022 1:37 PM  Consent given by: patient  Site marked: site marked  Timeout: Immediately prior to procedure a time out was called to verify the correct patient, procedure, equipment, support staff and site/side marked as required   Supporting Documentation  Indications: pain and joint swelling   Procedure Details  Location: knee - L knee  Preparation: Patient was prepped and draped in the usual sterile fashion  Needle gauge: 21 G.  Approach: anterolateral  Medications administered: 2 mL lidocaine (cardiac); 80 mg methylPREDNISolone acetate 80 MG/ML  Patient tolerance: patient tolerated the procedure well with no immediate complications    Large Joint Arthrocentesis: R knee  Date/Time: 3/7/2022 1:37 PM  Consent given by: patient  Site marked: site marked  Timeout: Immediately prior to procedure a time out was called to verify the correct patient, procedure, equipment, support staff and site/side marked as required   Supporting Documentation  Indications: pain and joint swelling   Procedure Details  Location: knee - R knee  Preparation: Patient was prepped and draped in the usual sterile fashion  Needle gauge: 21 G.  Approach: anterolateral  Medications administered: 2 mL lidocaine (cardiac); 80 mg methylPREDNISolone acetate 80 MG/ML  Patient tolerance: patient tolerated the procedure well with no immediate complications        Both knees 3 views done for pain with tricompartmental DJD comparison views reviewed  At the conclusion of the injection I discussed the importance of continued quad strengthening exercises on a daily basis. I will see the patient back if the symptoms should fail to improve or worsen.    Haily Pritchard, APRN  3/7/2022

## 2022-03-16 DIAGNOSIS — I10 ESSENTIAL HYPERTENSION: ICD-10-CM

## 2022-03-16 DIAGNOSIS — E78.5 HYPERLIPIDEMIA, UNSPECIFIED HYPERLIPIDEMIA TYPE: ICD-10-CM

## 2022-03-16 DIAGNOSIS — E03.9 HYPOTHYROIDISM (ACQUIRED): Primary | ICD-10-CM

## 2022-03-16 DIAGNOSIS — Z83.3 FAMILY HISTORY OF DIABETES MELLITUS (DM): ICD-10-CM

## 2022-03-16 DIAGNOSIS — E55.9 VITAMIN D DEFICIENCY: ICD-10-CM

## 2022-03-30 DIAGNOSIS — I10 HYPERTENSION, UNSPECIFIED TYPE: ICD-10-CM

## 2022-03-31 RX ORDER — ATENOLOL 50 MG/1
TABLET ORAL
Qty: 90 TABLET | Refills: 0 | Status: SHIPPED | OUTPATIENT
Start: 2022-03-31 | End: 2022-05-13 | Stop reason: SDUPTHER

## 2022-03-31 RX ORDER — ISOSORBIDE MONONITRATE 30 MG/1
TABLET, EXTENDED RELEASE ORAL
Qty: 30 TABLET | Refills: 0 | Status: SHIPPED | OUTPATIENT
Start: 2022-03-31 | End: 2022-05-26 | Stop reason: SDUPTHER

## 2022-04-01 ENCOUNTER — TELEPHONE (OUTPATIENT)
Dept: GASTROENTEROLOGY | Facility: CLINIC | Age: 62
End: 2022-04-01

## 2022-04-01 NOTE — TELEPHONE ENCOUNTER
Called pt and advised of Misite NP's note. Advised we will have the sample for her at the  .  Also advised that the sample is enough for 15 days.  Verb understanding.

## 2022-04-01 NOTE — TELEPHONE ENCOUNTER
----- Message from Jigar Gonsalez sent at 4/1/2022  9:23 AM EDT -----  Contact: [phone]  Pt says that she has a  hemorrhoids that's been there for 3 days that will not ago away.

## 2022-04-01 NOTE — TELEPHONE ENCOUNTER
Called pt and pt reports that on Tuesday she developed severe hemorrhoid pain.   Pt reports that it is swollen and is bulging outward.  Pt has been using prep h cream. Pt denies constipation.  Pt reports lifting very heavy items prior to this. Pt reports that she can not do sitz baths due to the size of her tub.  Advised will send message to Ember HENDRIX.   Verb understanding

## 2022-04-07 ENCOUNTER — LAB (OUTPATIENT)
Dept: ENDOCRINOLOGY | Age: 62
End: 2022-04-07

## 2022-04-07 DIAGNOSIS — E55.9 VITAMIN D DEFICIENCY: ICD-10-CM

## 2022-04-07 DIAGNOSIS — E03.9 HYPOTHYROIDISM (ACQUIRED): ICD-10-CM

## 2022-04-07 DIAGNOSIS — Z83.3 FAMILY HISTORY OF DIABETES MELLITUS (DM): ICD-10-CM

## 2022-04-07 DIAGNOSIS — E78.5 HYPERLIPIDEMIA, UNSPECIFIED HYPERLIPIDEMIA TYPE: ICD-10-CM

## 2022-04-07 DIAGNOSIS — I10 ESSENTIAL HYPERTENSION: ICD-10-CM

## 2022-04-08 LAB
25(OH)D3+25(OH)D2 SERPL-MCNC: 31.3 NG/ML (ref 30–100)
ALBUMIN SERPL-MCNC: 4.2 G/DL (ref 3.8–4.9)
ALBUMIN/GLOB SERPL: 1.8 {RATIO} (ref 1.2–2.2)
ALP SERPL-CCNC: 89 IU/L (ref 44–121)
ALT SERPL-CCNC: 36 IU/L (ref 0–32)
AST SERPL-CCNC: 26 IU/L (ref 0–40)
BILIRUB SERPL-MCNC: 0.3 MG/DL (ref 0–1.2)
BUN SERPL-MCNC: 7 MG/DL (ref 6–24)
BUN/CREAT SERPL: 6 (ref 9–23)
CALCIUM SERPL-MCNC: 9.3 MG/DL (ref 8.7–10.2)
CHLORIDE SERPL-SCNC: 104 MMOL/L (ref 96–106)
CHOLEST SERPL-MCNC: 200 MG/DL (ref 100–199)
CO2 SERPL-SCNC: 22 MMOL/L (ref 20–29)
CREAT SERPL-MCNC: 1.1 MG/DL (ref 0.57–1)
EGFRCR SERPLBLD CKD-EPI 2021: 58 ML/MIN/1.73
GLOBULIN SER CALC-MCNC: 2.4 G/DL (ref 1.5–4.5)
GLUCOSE SERPL-MCNC: 95 MG/DL (ref 65–99)
HBA1C MFR BLD: 5.4 % (ref 4.8–5.6)
HDLC SERPL-MCNC: 69 MG/DL
IMP & REVIEW OF LAB RESULTS: NORMAL
LDLC SERPL CALC-MCNC: 106 MG/DL (ref 0–99)
POTASSIUM SERPL-SCNC: 4.5 MMOL/L (ref 3.5–5.2)
PROT SERPL-MCNC: 6.6 G/DL (ref 6–8.5)
REPORT: NORMAL
SODIUM SERPL-SCNC: 141 MMOL/L (ref 134–144)
T4 FREE SERPL-MCNC: 1.23 NG/DL (ref 0.82–1.77)
TRIGL SERPL-MCNC: 143 MG/DL (ref 0–149)
TSH SERPL DL<=0.005 MIU/L-ACNC: 1.59 UIU/ML (ref 0.45–4.5)
VLDLC SERPL CALC-MCNC: 25 MG/DL (ref 5–40)

## 2022-04-11 RX ORDER — SIMVASTATIN 20 MG
TABLET ORAL
Qty: 90 TABLET | Refills: 0 | Status: SHIPPED | OUTPATIENT
Start: 2022-04-11 | End: 2022-09-28

## 2022-04-11 NOTE — TELEPHONE ENCOUNTER
Rx Refill Note  Requested Prescriptions     Pending Prescriptions Disp Refills   • simvastatin (ZOCOR) 20 MG tablet [Pharmacy Med Name: SIMVASTATIN 20 MG TABLET] 90 tablet 0     Sig: TAKE ONE TABLET BY MOUTH ONCE NIGHTLY      Last office visit with prescribing clinician: 1/11/2022      Next office visit with prescribing clinician: Visit date not found            Jigar Chapa Rep  04/11/22, 16:28 EDT

## 2022-04-13 ENCOUNTER — OFFICE VISIT (OUTPATIENT)
Dept: FAMILY MEDICINE CLINIC | Facility: CLINIC | Age: 62
End: 2022-04-13

## 2022-04-13 VITALS
OXYGEN SATURATION: 97 % | TEMPERATURE: 97.3 F | SYSTOLIC BLOOD PRESSURE: 126 MMHG | HEART RATE: 80 BPM | HEIGHT: 63 IN | WEIGHT: 218.7 LBS | BODY MASS INDEX: 38.75 KG/M2 | DIASTOLIC BLOOD PRESSURE: 87 MMHG

## 2022-04-13 DIAGNOSIS — S16.1XXA STRAIN OF NECK MUSCLE, INITIAL ENCOUNTER: ICD-10-CM

## 2022-04-13 PROCEDURE — 99213 OFFICE O/P EST LOW 20 MIN: CPT | Performed by: NURSE PRACTITIONER

## 2022-04-13 RX ORDER — CYCLOBENZAPRINE HCL 10 MG
10 TABLET ORAL NIGHTLY PRN
Qty: 30 TABLET | Refills: 0 | Status: SHIPPED | OUTPATIENT
Start: 2022-04-13 | End: 2022-05-16 | Stop reason: SDUPTHER

## 2022-04-13 NOTE — PROGRESS NOTES
"Chief Complaint  Neck Pain (C/o neck pain, neck stiffness, shoulder pain both shoulders )    Subjective          Keri Bhagat presents to [facility]  History of Present Illness new pt to me.  Here for sudden onset of b/l neck pain last Friday.  Has had 2 past neck surgeries for DDD. Saw Tato for this in past.  Usually manages with Ibuprofen.    Went to  and was given steroids, muscle relaxer and naproxen and using heat.  Helps for short time.  Steroids completed today.  She does not feel any better. She is concerned that something might be wrong in neck and that she may need to see neurosurg or have imaging.   Pain is located right upper mid back between scapula and spine and right trapezius and pectoral muscles.  No bony pain.  No radiculopathy.    No injury prior, was carrying some heavy water. But did not have pain while carrying.      Objective   Vital Signs:   /87   Pulse 80   Temp 97.3 °F (36.3 °C)   Ht 160 cm (63\")   Wt 99.2 kg (218 lb 11.2 oz)   SpO2 97%   BMI 38.74 kg/m²            Physical Exam  Vitals and nursing note reviewed.   Constitutional:       General: She is not in acute distress.     Appearance: She is well-developed. She is not diaphoretic.   HENT:      Head: Normocephalic and atraumatic.   Eyes:      General:         Right eye: No discharge.         Left eye: No discharge.      Conjunctiva/sclera: Conjunctivae normal.   Cardiovascular:      Rate and Rhythm: Normal rate and regular rhythm.   Pulmonary:      Effort: Pulmonary effort is normal.      Breath sounds: Normal breath sounds.   Abdominal:      General: Bowel sounds are normal.      Palpations: Abdomen is soft.      Tenderness: There is no abdominal tenderness.   Musculoskeletal:         General: No deformity.      Cervical back: Spasms and tenderness present. No deformity, bony tenderness or crepitus. Pain with movement present. Decreased range of motion.      Comments: Neck is " kyphotic in appearance.  She seems to have abnl posture-leaning upper body slightly to right   Skin:     General: Skin is warm and dry.   Neurological:      Mental Status: She is alert and oriented to person, place, and time.        Result Review :{Labs  Result Review  Imaging  Med Tab  Media  Procedures :23}                 Assessment and Plan    Diagnoses and all orders for this visit:    1. Strain of neck muscle, initial encounter  -     cyclobenzaprine (FLEXERIL) 10 MG tablet; Take 1 tablet by mouth At Night As Needed for Muscle Spasms.  Dispense: 30 tablet; Refill: 0  -     Ambulatory Referral to Physical Therapy Evaluate and treat    I dont think she needs imaging at this point.  No RED FLAGs in exam or history.  Pain seems to be muscular.  We discussed topicals, ICE, heat.  I dont think she needs additional round of steroids.  Would continue naproxen with food prn.  I will give refill on flexeril and have her take at HS.  She will schedule for PT and I have put referral in.  If worsening recommend f/u with PCP.             Follow Up   Return if symptoms worsen or fail to improve.  Patient was given instructions and counseling regarding her condition or for health maintenance advice. Please see specific information pulled into the AVS if appropriate.

## 2022-04-18 ENCOUNTER — CLINICAL SUPPORT (OUTPATIENT)
Dept: ORTHOPEDIC SURGERY | Facility: CLINIC | Age: 62
End: 2022-04-18

## 2022-04-18 VITALS — BODY MASS INDEX: 39.34 KG/M2 | WEIGHT: 222 LBS | TEMPERATURE: 97.7 F | HEIGHT: 63 IN

## 2022-04-18 DIAGNOSIS — E66.01 OBESITY, MORBID, BMI 40.0-49.9: ICD-10-CM

## 2022-04-18 DIAGNOSIS — M17.0 PRIMARY OSTEOARTHRITIS OF BOTH KNEES: Primary | ICD-10-CM

## 2022-04-18 PROCEDURE — 20610 DRAIN/INJ JOINT/BURSA W/O US: CPT | Performed by: NURSE PRACTITIONER

## 2022-04-18 RX ORDER — LIDOCAINE HYDROCHLORIDE 20 MG/ML
4 INJECTION, SOLUTION EPIDURAL; INFILTRATION; INTRACAUDAL; PERINEURAL
Status: COMPLETED | OUTPATIENT
Start: 2022-04-18 | End: 2022-04-18

## 2022-04-18 RX ADMIN — LIDOCAINE HYDROCHLORIDE 4 ML: 20 INJECTION, SOLUTION EPIDURAL; INFILTRATION; INTRACAUDAL; PERINEURAL at 15:23

## 2022-04-18 RX ADMIN — LIDOCAINE HYDROCHLORIDE 4 ML: 20 INJECTION, SOLUTION EPIDURAL; INFILTRATION; INTRACAUDAL; PERINEURAL at 15:25

## 2022-04-18 NOTE — PROGRESS NOTES
Patient Name: Keri Bhagat   YOB: 1960  Referring Primary Care Physician: Epley, James, APRN  BMI: Body mass index is 39.33 kg/m².    Chief Complaint:    Chief Complaint   Patient presents with   • Left Knee - Pain, Follow-up   • Right Knee - Pain, Follow-up        HPI: Keri Bhagat is here today for worsening knee pain. Pt has undergone injection of the knee in the past with good resolution of symptoms. Pt is here for synvisc one gel    Keri Bhagat is a 61 y.o. female who presents today for evaluation of   Chief Complaint   Patient presents with   • Left Knee - Pain, Follow-up   • Right Knee - Pain, Follow-up         Subjective   Medications:   Home Medications:  Current Outpatient Medications on File Prior to Visit   Medication Sig   • albuterol sulfate  (90 Base) MCG/ACT inhaler Inhale 2 puffs Every 4 (Four) Hours As Needed for Wheezing.   • aluminum-magnesium hydroxide-simethicone (Mylanta Maximum Strength) 400-400-40 MG/5ML suspension Take 20 mL by mouth Every 6 (Six) Hours As Needed for Indigestion or Heartburn.   • ARIPiprazole (ABILIFY) 20 MG tablet Take 20 mg by mouth Daily.   • aspirin 81 MG EC tablet Take 1 tablet by mouth Daily.   • atenolol (TENORMIN) 50 MG tablet TAKE ONE TABLET BY MOUTH DAILY   • buPROPion XL (WELLBUTRIN XL) 300 MG 24 hr tablet Take 300 mg by mouth 2 (Two) Times a Day.   • cyclobenzaprine (FLEXERIL) 10 MG tablet Take 1 tablet by mouth At Night As Needed for Muscle Spasms.   • diclofenac (VOLTAREN) 75 MG EC tablet Take 1 tablet by mouth 2 (Two) Times a Day. With food   • famotidine (PEPCID) 20 MG tablet    • Ferrous Sulfate (IRON PO) Take  by mouth.   • gabapentin (NEURONTIN) 300 MG capsule TAKE ONE CAPSULE BY MOUTH THREE TIMES A DAY   • hydrOXYzine (ATARAX) 50 MG tablet As Needed.   • isosorbide mononitrate (IMDUR) 30 MG 24 hr tablet TAKE ONE TABLET BY MOUTH DAILY   • lamoTRIgine (LaMICtal) 150 MG tablet Take 300 mg by mouth Daily.   •  levothyroxine (SYNTHROID, LEVOTHROID) 88 MCG tablet Take 1 tablet by mouth Every Morning.   • loratadine (CLARITIN) 10 MG tablet Take 1 tablet by mouth Daily. As needed for allergies   • naproxen (NAPROSYN) 500 MG tablet Take 1 tablet by mouth 2 (Two) Times a Day With Meals for 10 days.   • nitroglycerin (NITROSTAT) 0.4 MG SL tablet Place 0.4 mg under the tongue Every 5 (Five) Minutes As Needed for Chest Pain (took at M.D  office at 0930 a.m.). Take no more than 3 doses in 15 minutes.   • ondansetron (Zofran) 4 MG tablet Take 1 tablet by mouth Every 8 (Eight) Hours As Needed for Nausea or Vomiting.   • oxybutynin XL (DITROPAN XL) 15 MG 24 hr tablet Take 15 mg by mouth Daily.   • pantoprazole (PROTONIX) 40 MG EC tablet TAKE ONE TABLET BY MOUTH TWICE A DAY   • pantoprazole (PROTONIX) 40 MG EC tablet Take 1 tablet by mouth 2 (Two) Times a Day.   • pramipexole (MIRAPEX) 0.5 MG tablet Take 1 tablet by mouth Every Night for 360 days.   • simvastatin (ZOCOR) 20 MG tablet TAKE ONE TABLET BY MOUTH ONCE NIGHTLY   • venlafaxine XR (EFFEXOR-XR) 75 MG 24 hr capsule Take 75 mg by mouth Daily.   • vitamin B-12 (CYANOCOBALAMIN) 1000 MCG tablet TAKE ONE TABLET BY MOUTH TWICE A DAY     No current facility-administered medications on file prior to visit.     Current Medications:  Scheduled Meds:  Continuous Infusions:No current facility-administered medications for this visit.    PRN Meds:.    I have reviewed the patient's medical history in detail and updated the computerized patient record.  Review and summarization of old records includes:    Past Medical History:   Diagnosis Date   • Abnormal Pap smear of cervix    • Anemia    • Anxiety    • Arthritis    • Atherosclerotic heart disease of native coronary artery with other forms of angina pectoris (HCC)    • Bipolar I disorder, single manic episode (HCC)     depressive d(NOS)   • Cervical disc herniation    • Cervical dysplasia    • Coronary artery disease    • CTS (carpal tunnel  syndrome)    • Depression     NO SUICIDAL PLANS   • Difficulty swallowing    • Diverticular disease    • Dyspepsia    • Dysphagia    • Gastric reflux    • GERD (gastroesophageal reflux disease)    • Heart attack ([facility])    • Heart murmur    • Hiatal hernia    • High cholesterol    • History of transfusion    • 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    HPV positive on pap LGSIL   • Hyperlipidemia    • Hypertension    • Hypothyroidism    • Incontinence in female     wears pads   • Kidney disease 2017    stage 2    • Kidney disease, chronic, stage III (GFR 30-59 ml/min) ([facility])    • LGSIL on Pap smear of cervix 04/29/2016    LGSIL HPV positive   • Myocardial infarction ([facility]) 2000   • Neck pain    • Obesity    • Past myocardial infarction 05/2000   • Periodic limb movement disorder    • Restless leg syndrome    • Sleep apnea     cpap   • Stress fracture     LEFT HEEL   • Suicidal ideation 08/19/2016    history   • Swelling of ankle     right had doppler no s/s blood clot   • Thyroid nodule    • Vitamin B12 deficiency         Past Surgical History:   Procedure Laterality Date   • ANTERIOR CERVICAL DISCECTOMY W/ FUSION N/A 12/29/2016    Procedure: C3-4 anterior cervical discectomy and fusion with Depuy micro plate, ALLOGRAFT C3-4, AND HARDWARE REMOVAL C4-7.;  Surgeon: Hema Godwin MD;  Location: [facility];  Service:    • CARDIAC CATHETERIZATION N/A 3/30/2017    Procedure: Left Heart Cath;  Surgeon: Tracey Vargas MD;  Location: [facility] INVASIVE LOCATION;  Service:    • CARDIAC CATHETERIZATION N/A 3/30/2017    Procedure: Coronary angiography;  Surgeon: Tracey Vargas MD;  Location: [facility] INVASIVE LOCATION;  Service:    • CARDIAC CATHETERIZATION N/A 3/30/2017    Procedure: Left ventriculography;  Surgeon: Tracey Vargas MD;  Location: [facility] CATH INVASIVE LOCATION;  Service:    • CARDIAC CATHETERIZATION  3/30/2017    Procedure: Functional Flow [place];  Surgeon: Tracey Vargas MD;  Location:   [facility] INVASIVE LOCATION;  Service:    • CARPAL TUNNEL RELEASE     • CERVICAL BIOPSY  [facility]    Dr. Jeffery.    • CERVICAL DISCECTOMY ANTERIOR  2013    C4-7   • COLONOSCOPY  2015    Diverticulosis, IH   • COLONOSCOPY  2021   • COLPOSCOPY W/ BIOPSY / CURETTAGE  2016    LGSIL HPV positive. Results were normal repeat pap in one year. Chronic Cervicitis   • CORONARY ANGIOPLASTY WITH STENT PLACEMENT     • ENDOSCOPY  [facility]    Normal.  Dr. Rodriguez   • ENDOSCOPY N/A 2017    Erythematous mucosa in the stomach  PATH: Chronic active gastritis, moderate with intestinal metaplasia    • GASTRIC BYPASS      Done using the sleeve technique   • HARDWARE REMOVAL  2016    cervical   • HERNIA REPAIR     • LAPAROSCOPIC GASTRIC BANDING  2018   • SHOULDER SURGERY Left     RCR   • TONSILLECTOMY     • TONSILLECTOMY AND ADENOIDECTOMY     • TRANSVAGINAL TAPING SUSPENSION N/A 2017    Procedure: MID URETHRAL SLING CYSTSCOPY;  Surgeon: Abby Méndez MD;  Location: [facility];  Service:    • TUBAL ABDOMINAL LIGATION     • TYMPANOSTOMY TUBE PLACEMENT Right    • UPPER GASTROINTESTINAL ENDOSCOPY  approx    • WRIST SURGERY Bilateral     carpal tunnel   • WRIST SURGERY      L wrist/ 2020        Social History     Occupational History   • Occupation: disabled   Tobacco Use   • Smoking status: Former Smoker     Packs/day: 1.50     Years: 20.00     Pack years: 30.00     Types: Electronic Cigarette, Cigarettes     Quit date:      Years since quittin.2   • Smokeless tobacco: Never Used   • Tobacco comment: Electronic Cigarette   Vaping Use   • Vaping Use: Every day   • Substances: Nicotine, Flavoring   • Devices: Refillable tank   Substance and Sexual Activity   • Alcohol use: Not Currently   • Drug use: No   • Sexual activity: Yes     Partners: Male     Birth control/protection: None, Condom      Social History     Social History Narrative   • Not on file        Family History   Problem  Relation Age of Onset   • Diabetes type II Mother    • Hypertension Mother    • Osteoporosis Mother    • Seizures Mother    • COPD Father    • Hypertension Father    • Lung cancer Father    • Heart attack Father    • Liver cancer Father    • Liver disease Father    • Thyroid disease Sister    • Hypertension Sister    • Bipolar disorder Sister    • Depression Sister    • ADD / ADHD Sister    • No Known Problems Son    • Abnormal EKG Daughter    • Hypertension Daughter    • Bipolar disorder Daughter    • Thyroid disease Daughter    • No Known Problems Paternal Grandfather    • No Known Problems Paternal Grandmother    • No Known Problems Maternal Grandmother    • No Known Problems Maternal Grandfather    • Thyroid disease Sister    • Hypertension Sister    • Bipolar disorder Sister    • Asthma Daughter    • Breast cancer Neg Hx    • Ovarian cancer Neg Hx    • Uterine cancer Neg Hx    • Colon cancer Neg Hx    • Malig Hyperthermia Neg Hx        ROS: 14 point review of systems was performed and all other systems were reviewed and are negative except for documented findings in HPI and today's encounter.     Allergies: No Known Allergies  Constitutional:  Denies fever, shaking or chills   Eyes:  Denies change in visual acuity   HENT:  Denies nasal congestion or sore throat   Respiratory:  Denies cough or shortness of breath   Cardiovascular:  Denies chest pain or severe LE edema   GI:  Denies abdominal pain, nausea, vomiting, bloody stools or diarrhea   Musculoskeletal:  Numbness, tingling, pain, or loss of motor function only as noted above in history of present illness.  : Denies painful urination or hematuria  Integument:  Denies rash, lesion or ulceration   Neurologic:  Denies headache or focal weakness  Endocrine:  Denies lymphadenopathy  Psych:  Denies confusion or change in mental status   Hem:  Denies active bleeding    OBJECTIVE:  Physical Exam: 61 y.o. female  Wt Readings from Last 3 Encounters:   04/18/22 101  "kg (222 lb)   04/13/22 99.2 kg (218 lb 11.2 oz)   03/07/22 98.4 kg (217 lb)     Ht Readings from Last 1 Encounters:   04/18/22 160 cm (63\")     Body mass index is 39.33 kg/m².  Vitals:    04/18/22 1516   Temp: 97.7 °F (36.5 °C)     Vital signs reviewed.     General Appearance:    Alert, cooperative, in no acute distress                  Eyes: conjunctiva clear  ENT: external ears and nose atraumatic  CV: no peripheral edema  Resp: normal respiratory effort  Skin: no rashes or wounds; normal turgor  Psych: mood and affect appropriate  Lymph: no nodes appreciated  Neuro: gross sensation intact  Vascular:  Palpable peripheral pulse in noted extremity  Musculoskeletal Extremities: skin warm, dry and intact, calves soft and nttp, medial joint line tenderness    Radiology: 3-7-22 reviewed oa          Assessment:     ICD-10-CM ICD-9-CM   1. Primary osteoarthritis of both knees  M17.0 715.16        MDM/Plan:   The diagnosis(es), natural history, pathophysiology and treatment for diagnosis(es) were discussed. Opportunity given and questions answered.  Biomechanics of pertinent body areas discussed.  When appropriate, the use of ambulatory aids discussed.    The diagnosis(es), natural history, pathophysiology and treatment for diagnosis(es) were discussed. Opportunity given and questions answered.  Biomechanics of pertinent body areas discussed.  When appropriate, the use of ambulatory aids discussed.  BMI:  The concept of BMI body mass index and its importance and implications discussed.    EXERCISES:  Advice on benefits of, and types of regular/moderate exercise pertaining to orthopedic diagnosis(es).  MEDICATIONS:  The risks, benefits, warnings,side effects and alternatives of medications discussed.  Inflammation/pain control; with cold, heat, elevation and/or liniments discussed as appropriate  Viscosupplementation.  See procedure note.     Large Joint Arthrocentesis: L knee  Date/Time: 4/18/2022 3:23 PM  Consent given " by: patient  Site marked: site marked  Timeout: Immediately prior to procedure a time out was called to verify the correct patient, procedure, equipment, support staff and site/side marked as required   Supporting Documentation  Indications: pain   Procedure Details  Location: knee - L knee  Preparation: Patient was prepped and draped in the usual sterile fashion  Needle gauge: 21G.  Approach: anteromedial  Medications administered: 4 mL lidocaine PF 2% 2 %; 48 mg hylan 48 MG/6ML  Patient tolerance: patient tolerated the procedure well with no immediate complications    Large Joint Arthrocentesis: R knee  Date/Time: 4/18/2022 3:25 PM  Consent given by: patient  Site marked: site marked  Timeout: Immediately prior to procedure a time out was called to verify the correct patient, procedure, equipment, support staff and site/side marked as required   Supporting Documentation  Indications: pain   Procedure Details  Location: knee - R knee  Preparation: Patient was prepped and draped in the usual sterile fashion  Needle gauge: 21G.  Approach: anteromedial  Medications administered: 4 mL lidocaine PF 2% 2 %; 48 mg hylan 48 MG/6ML  Patient tolerance: patient tolerated the procedure well with no immediate complications                  4/18/2022    Dictated utilizing Dragon dictation

## 2022-04-20 NOTE — PROGRESS NOTES
Subjective   Keri Bhagat is a 61 y.o. female.     F/u for hypothyroidism, hyperlipidemia, sleep apnea           Patient is a 61 year old who came in for follow-up     She has hypothyroidism and has been on levothyroxine 88 µg per day. Thyroid ultrasound done in 10/17 showed stable 0.5 cm solid nodule in the right.  She denies dysphagia or pressure symptoms.  TSH done in April 2022 is normal at 1.59 with a normal free T4 at 1.23 ng per DL.      She has no history of head or neck radiation therapy.       She had an upper endoscopy with dilatation in June 2016 done by Dr. Rodriguez.  She has gastritis and hiatal hernia.  She is on Protonix.  She has occasional heartburn.  She denies melena or hematochezia.     She had a LAP-BAND and hiatal hernia repair done by Dr. Sims February 2018.  She has lost 6 pounds since October 2021.     She has history of hypertension, and coronary artery disease. She had a previous heart attack in 2000 and had angioplasty was 1 stent. She had a cardiac catheterization done in 2015 which showed a patent stent to the LAD. She had normal nuclear stress test in August 2021.  She denies chest pain or SOB. She is on atenolol and Imdur and follows with Dr. Burroughs.        She has hyperlipidemia and has been on Zocor 20 mg once a day. She denies any myalgia.  Lipid panel done in April 2022 are as follows: Cholesterol 200.  HDL 69.  .  Triglycerides 143.     She has no previous history of diabetes mellitus.  Hemoglobin A1c done in October 2018 is normal at 5.1%.  Her mother and son have diabetes mellitus.       She has sleep apnea and is using a CPAP.  She wakes up rested most of the time.     She has chronic knee and neck pain and sees Dr. Godwin and Dr. Pritchard.  She had steroid injection and Synvisc on both knees in the past.      She has osteopenia on bone density done in November 2018.  She had her natural menopause at age 54 and was never on hormone replacement therapy. She does  exercise regularly.  She has no parental history of hip fractures.  She denies alcohol abuse.      Bone density done in March 2022 showed osteopenia.  Her 10-year risk of major osteoporotic fracture is 5.4% and of hip fracture is 0.5%.  She is on vitamin D3 1000 units twice daily.  25 hydroxy vitamin D done in April 2022 is normal at 31.3 ng/mL.     She smoked cigarettes for more than 40 years and switched to electronic cigarettes in 2017.  She is on Wellbutrin.     She was seen by her PCP in February 2019 because of poor memory and poor balance.  MRI of the brain done in January 2019 showed a 8 mm lesion on falx cerebri most likely a small meningioma.  Incidental to the study, there is a suggestion of a hypoenhancing focus measuring 4 mm in diameter on the right aspect of the anterior pituitary.  MRI of the pituitary done in March 2019 showed a hypoenhancing area within the sella turcica more posteriorly at the level of the interface of the anterior posterior pituitary suspected to be a pars intermedia cyst.  There is an incidental arachnoid cyst measuring up to 1.9 cm anterior to the right cerebellar hemisphere.  She is following with Maria Elena Giles NP and neurologist Dr. Cook.     MRI of the pituitary done in August 2021 showed a stable pars intermedia cyst and stable meningioma on the falx cerebri.     She denies changes in shoes or ring size.  She denies easy bruising.  She denies headaches or seizures.     She had 3 Pfizer Covid vaccines.       The following portions of the patient's history were reviewed and updated as appropriate: allergies, current medications, past family history, past medical history, past social history, past surgical history and problem list.    Review of Systems   Respiratory: Negative for shortness of breath.    Cardiovascular: Negative for chest pain and palpitations.   Gastrointestinal: Negative.    Endocrine: Negative for cold intolerance, heat intolerance and polyuria.  "  Genitourinary: Negative.    Musculoskeletal: Negative for myalgias.   Neurological: Negative for numbness.   Hematological: Negative for adenopathy. Does not bruise/bleed easily.     Objective      Vitals:    04/21/22 1424   BP: 108/74   Pulse: 62   SpO2: 99%   Weight: 100 kg (220 lb 6.4 oz)   Height: 160 cm (63\")     Physical Exam  Constitutional:       General: She is not in acute distress.     Appearance: Normal appearance. She is not ill-appearing, toxic-appearing or diaphoretic.   Eyes:      General: No scleral icterus.        Right eye: No discharge.         Left eye: No discharge.      Extraocular Movements: Extraocular movements intact.   Neck:      Vascular: No carotid bruit.   Cardiovascular:      Rate and Rhythm: Normal rate and regular rhythm.      Pulses: Normal pulses.      Heart sounds: Normal heart sounds. No murmur heard.    No friction rub.   Pulmonary:      Effort: Pulmonary effort is normal. No respiratory distress.      Breath sounds: Normal breath sounds. No stridor.   Abdominal:      Tenderness: There is no right CVA tenderness or left CVA tenderness.   Musculoskeletal:         General: No swelling or tenderness.      Cervical back: No rigidity or tenderness.   Lymphadenopathy:      Cervical: No cervical adenopathy.   Neurological:      Mental Status: She is alert and oriented to person, place, and time.       Lab on 04/07/2022   Component Date Value Ref Range Status   • 25 Hydroxy, Vitamin D 04/07/2022 31.3  30.0 - 100.0 ng/mL Final    Comment: Vitamin D deficiency has been defined by the [place] [employer] and an Endocrine Society practice guideline as a  level of serum 25-OH vitamin D less than 20 ng/mL (1,2).  The Endocrine Society went on to further define vitamin D  insufficiency as a level between 21 and 29 ng/mL (2).  1. IOM ([place] [employer]). 2010. Dietary reference     intakes for calcium and D. [place]: The     National Academies Press.  2. Price HAIRSTON, Miracle MONTERO, " Bj MADRIGAL et al.     Evaluation, treatment, and prevention of vitamin D     deficiency: an Endocrine Society clinical practice     guideline. JCEM. 2011 Jul; 96(7):1911-30.     • Hemoglobin A1C 04/07/2022 5.4  4.8 - 5.6 % Final    Comment:          Prediabetes: 5.7 - 6.4           Diabetes: >6.4           Glycemic control for adults with diabetes: <7.0     • Free T4 04/07/2022 1.23  0.82 - 1.77 ng/dL Final   • TSH 04/07/2022 1.590  0.450 - 4.500 uIU/mL Final   • Total Cholesterol 04/07/2022 200 (A) 100 - 199 mg/dL Final   • Triglycerides 04/07/2022 143  0 - 149 mg/dL Final   • HDL Cholesterol 04/07/2022 69  >39 mg/dL Final   • VLDL Cholesterol Ronaldo 04/07/2022 25  5 - 40 mg/dL Final   • LDL Chol Calc (NIH) 04/07/2022 106 (A) 0 - 99 mg/dL Final   • Glucose 04/07/2022 95  65 - 99 mg/dL Final   • BUN 04/07/2022 7  6 - 24 mg/dL Final   • Creatinine 04/07/2022 1.10 (A) 0.57 - 1.00 mg/dL Final   • EGFR Result 04/07/2022 58 (A) >59 mL/min/1.73 Final   • BUN/Creatinine Ratio 04/07/2022 6 (A) 9 - 23 Final   • Sodium 04/07/2022 141  134 - 144 mmol/L Final   • Potassium 04/07/2022 4.5  3.5 - 5.2 mmol/L Final   • Chloride 04/07/2022 104  96 - 106 mmol/L Final   • Total CO2 04/07/2022 22  20 - 29 mmol/L Final   • Calcium 04/07/2022 9.3  8.7 - 10.2 mg/dL Final   • Total Protein 04/07/2022 6.6  6.0 - 8.5 g/dL Final   • Albumin 04/07/2022 4.2  3.8 - 4.9 g/dL Final   • Globulin 04/07/2022 2.4  1.5 - 4.5 g/dL Final   • A/G Ratio 04/07/2022 1.8  1.2 - 2.2 Final   • Total Bilirubin 04/07/2022 0.3  0.0 - 1.2 mg/dL Final   • Alkaline Phosphatase 04/07/2022 89  44 - 121 IU/L Final   • AST (SGOT) 04/07/2022 26  0 - 40 IU/L Final   • ALT (SGPT) 04/07/2022 36 (A) 0 - 32 IU/L Final   • Interpretation 04/07/2022 Note   Final    Supplemental report is available.   • Interpretation 04/07/2022 Note   Final    Comment: Medical Director's Note: Effective 2/28/2022, [facility] uses  the 2021 CKD-EPI creatinine equation without a race  factor  to calculate and report eGFR results. eGFR results reported  prior to this date using the 2009 CKD-EPI equation are no  longer included in this report interpretation.  -------------------------------  CHRONIC KIDNEY DISEASE:  EGFR, BLOOD PRESSURE, AND PROTEINURIA ASSESSMENT  We presume eGFR has been less than 60 mL/min/1.73mE2 on at  least two occasions spaced at least 3 months apart. Current  eGFR is 58 mL/min/1.73mE2 corresponding to CKD stage 3a.  Potassium is within goal and has not changed significantly,  was 4.3 and now is 4.5 mmol/L. Hemoglobin A1C is 5.4 %;  diabetic status is unknown. Refer to ADA guidelines for  clinical practice recommendations.  EGFR, BLOOD PRESSURE, AND PROTEINURIA TREATMENT SUGGESTIONS  -  Guidelines recommend a target blood pressure of 120/80 mmHg  or less in CKD patients to reduce cardiovascular risk and  CKD progression. Assessment of                            albuminuria (urine  albumin:creatinine ratio or urine protein:creatinine ratio  preferred) is recommended at least annually in CKD patients  for staging and disease prognosis.  EGFR, BLOOD PRESSURE, AND PROTEINURIA FOLLOW-UP  -  fasting Renal Panel within 12 months; Spot Urine Panel is  recommended by guidelines, at least yearly;  BONE and MINERAL ASSESSMENT  Calcium is within goal and has not changed significantly,  was 9.3 and now is 9.3 mg/dL. Carbon Dioxide is within goal  and has decreased, was 27 and now is 22 mmol/L. Vitamin D is  adequate (was 46.2 and now is 31.3 ng/mL).  BONE and MINERAL TREATMENT SUGGESTIONS  -  Interpretations require simultaneous measurements of serum  calcium and phosphorus.  BONE and MINERAL FOLLOW-UP  -  25-Hydroxy Vitamin D within 12 months; fasting PTH with  Renal Panel is recommended by guidelines, at least yearly;  LIPIDS ASSESSMENT  LDL-C is borderline high and has risen, was 81 and now is  106 mg/dL. Triglyceride is normal and has not changed  si                            gnificantly, was 129 and now is 143 mg/dL. Non-HDL  Cholesterol is borderline high and has risen, was 103 and  now is 131 mg/dL. HDL-C is high and has not changed  significantly, was 67 and now is 69 mg/dL.  LIPIDS TREATMENT SUGGESTIONS  -  Therapeutic lifestyle changes are always valuable to  maintain optimal blood lipid status (diet, exercise, weight  management). Begin statin. If statin already in use,  consider increasing dose to achieve at least a 50% LDL  reduction from baseline. Moderate or high intensity statin  is preferred. If statin cannot be tolerated or increased,  alternatives include use of an intestinal agent (ezetimibe  or bile acid sequestrant) or niacin.  LIPIDS FOLLOW-UP  -  fasting Lipid Panel within 3 months;  ANEMIA ASSESSMENT  Most recent order does not include a CBC Panel or iron  studies.  ANEMIA FOLLOW-UP  -  CBC is recommended by guidelines, at least yearly;  -------------------------------  DISCLAIMER  These assessments and treatment suggestions are provide                           d as  a convenience in support of the physician-patient  relationship and are not intended to replace the physician's  clinical judgment. They are derived from national guidelines  in addition to other evidence and expert opinion. The  clinician should consider this information within the  context of clinical opinion and the individual patient.  SEE GUIDANCE FOR CHRONIC KIDNEY DISEASE PROGRAM: Kidney  Disease Improving Global Outcomes (KDIGO) clinical practice  guidelines are at http://kdigo.org/home/guidelines/.  National Kidney Foundation Kidney Disease Outcomes Quality  Initiative (KDOQI (TM)), with its limitations and  disclaimers, are at www.kidney.org/professionals/KDOQI. This  program is intended for patients who have been diagnosed  with stages 3, 4, or pre-dialysis 5 CKD. It is not intended  for children, pregnant patients, or transplant patients.       Assessment/Plan   Diagnoses and all orders for  this visit:    1. Hypothyroidism (acquired) (Primary)    2. Essential hypertension    3. Thyroid nodule    4. Hyperlipidemia, unspecified hyperlipidemia type    5. ROCKY (obstructive sleep apnea)    6. Atherosclerotic heart disease of native coronary artery with other forms of angina pectoris (HCC)    7. Vitamin D deficiency    8. Family history of diabetes mellitus (DM)    9. Osteopenia of multiple sites    10. Pituitary cyst (HCC)    11. Meningioma (HCC)      Continue levothyroxine 88 mcg a day.  Continue Protonix.  Follow-up with Dr. Rodriguez as scheduled.  Continue simvastatin 20 mg/day and low-fat diet.  Continue CPAP.  Continue vitamin D3 1000 units twice a day.  Follow-up with neurosurgeon and neurologist as scheduled.    Copy of my note sent to James Epley NP, Dr. Rodriguez, Maria Elena Giles NP, Dr. Godwin and Dr. Cook.    RTC 4 mos

## 2022-04-21 ENCOUNTER — OFFICE VISIT (OUTPATIENT)
Dept: ENDOCRINOLOGY | Age: 62
End: 2022-04-21

## 2022-04-21 VITALS
SYSTOLIC BLOOD PRESSURE: 108 MMHG | DIASTOLIC BLOOD PRESSURE: 74 MMHG | OXYGEN SATURATION: 99 % | HEIGHT: 63 IN | WEIGHT: 220.4 LBS | BODY MASS INDEX: 39.05 KG/M2 | HEART RATE: 62 BPM

## 2022-04-21 DIAGNOSIS — Z83.3 FAMILY HISTORY OF DIABETES MELLITUS (DM): ICD-10-CM

## 2022-04-21 DIAGNOSIS — G47.33 OSA (OBSTRUCTIVE SLEEP APNEA): ICD-10-CM

## 2022-04-21 DIAGNOSIS — E55.9 VITAMIN D DEFICIENCY: ICD-10-CM

## 2022-04-21 DIAGNOSIS — E23.6 PITUITARY CYST: ICD-10-CM

## 2022-04-21 DIAGNOSIS — I25.118 ATHEROSCLEROTIC HEART DISEASE OF NATIVE CORONARY ARTERY WITH OTHER FORMS OF ANGINA PECTORIS: ICD-10-CM

## 2022-04-21 DIAGNOSIS — M85.89 OSTEOPENIA OF MULTIPLE SITES: ICD-10-CM

## 2022-04-21 DIAGNOSIS — D32.9 MENINGIOMA: ICD-10-CM

## 2022-04-21 DIAGNOSIS — E03.9 HYPOTHYROIDISM (ACQUIRED): Primary | ICD-10-CM

## 2022-04-21 DIAGNOSIS — E04.1 THYROID NODULE: ICD-10-CM

## 2022-04-21 DIAGNOSIS — E78.5 HYPERLIPIDEMIA, UNSPECIFIED HYPERLIPIDEMIA TYPE: ICD-10-CM

## 2022-04-21 DIAGNOSIS — I10 ESSENTIAL HYPERTENSION: ICD-10-CM

## 2022-04-21 PROCEDURE — 99214 OFFICE O/P EST MOD 30 MIN: CPT | Performed by: INTERNAL MEDICINE

## 2022-04-21 RX ORDER — PHENOL 1.4 %
600 AEROSOL, SPRAY (ML) MUCOUS MEMBRANE DAILY
COMMUNITY

## 2022-04-21 RX ORDER — LEVOTHYROXINE SODIUM 88 UG/1
88 TABLET ORAL EVERY MORNING
Qty: 90 TABLET | Refills: 1 | Status: SHIPPED | OUTPATIENT
Start: 2022-04-21 | End: 2023-02-16

## 2022-04-26 ENCOUNTER — OFFICE VISIT (OUTPATIENT)
Dept: FAMILY MEDICINE CLINIC | Facility: CLINIC | Age: 62
End: 2022-04-26

## 2022-04-26 VITALS
HEIGHT: 63 IN | HEART RATE: 68 BPM | TEMPERATURE: 97.3 F | RESPIRATION RATE: 12 BRPM | BODY MASS INDEX: 39.12 KG/M2 | OXYGEN SATURATION: 97 % | SYSTOLIC BLOOD PRESSURE: 132 MMHG | WEIGHT: 220.8 LBS | DIASTOLIC BLOOD PRESSURE: 76 MMHG

## 2022-04-26 DIAGNOSIS — M25.511 ACUTE PAIN OF RIGHT SHOULDER: ICD-10-CM

## 2022-04-26 DIAGNOSIS — M54.12 CERVICAL RADICULOPATHY: Primary | ICD-10-CM

## 2022-04-26 PROCEDURE — 99213 OFFICE O/P EST LOW 20 MIN: CPT | Performed by: NURSE PRACTITIONER

## 2022-04-26 NOTE — PATIENT INSTRUCTIONS
Discharge instructions range of motion exercises of your shoulders  Physical therapy continue with your neck and your shoulder, see orthopedic if not improving with your shoulder after 6 or 8 weeks or up to 3 months Dr. Gutierrez    Otherwise follow-up in 6 to 8 weeks for your Medicare wellness and recheck of your primary care needs as well as your neck and shoulder healthy diet, decrease portion control across the board    Decrease breads Posta sweets 64 ounces water daily slow gradual weight loss over the next 12 months

## 2022-04-26 NOTE — PROGRESS NOTES
"Chief Complaint  Neck Pain    Subjective          Keri Bhagat presents to [facility]  Very pleasant patient here today has a couple issues she has had increased left radiculopathy pain pain that radiates from her left upper neck down her trap across her shoulder down most of her arm down to her fingers.  She also has pain with range of motion of her right shoulder this pain does not radiate from her neck although she has had it before which is done this.  And pain mostly in her anterior shoulder and when she reaches backward no known injury.  She has a known history of degenerative disc disease cervical as well as a cervical fusion  Cervical fusion C3-C7  She has no new bowel or bladder incontinence retention no weakness.  Her gait difficulties.  Associated with this    Neck Pain          Objective   Vital Signs:   /76   Pulse 68   Temp 97.3 °F (36.3 °C) (Infrared)   Resp 12   Ht 160 cm (63\")   Wt 100 kg (220 lb 12.8 oz)   SpO2 97%   BMI 39.11 kg/m²           Physical Exam  Vitals reviewed.   Constitutional:       Appearance: Normal appearance. She is well-developed. She is not ill-appearing or diaphoretic.   HENT:      Head: Normocephalic.      Nose: Nose normal.   Eyes:      General: No scleral icterus.     Conjunctiva/sclera: Conjunctivae normal.      Pupils: Pupils are equal, round, and reactive to light.   Neck:      Thyroid: No thyromegaly.      Vascular: No JVD.   Cardiovascular:      Rate and Rhythm: Normal rate and regular rhythm.      Pulses: Normal pulses.      Heart sounds: Normal heart sounds. No murmur heard.    No friction rub. No gallop.   Pulmonary:      Effort: Pulmonary effort is normal. No respiratory distress.      Breath sounds: Normal breath sounds. No stridor. No wheezing or rales.   Abdominal:      General: Bowel sounds are normal. There is no distension.      Palpations: Abdomen is soft.      Tenderness: There is no abdominal tenderness.     "  Comments: No hepatosplenomegaly, no ascites,   Musculoskeletal:         General: No tenderness.      Cervical back: Neck supple.      Comments: Mildly decreased range of motion more so left lateral,  No cervical focal tenderness,  Normal strength upper extremity neurovascular intact.  Right shoulder pain with range of motion, reaching back and some tenderness anterior right lateral.  No gross deformity.   Lymphadenopathy:      Cervical: No cervical adenopathy.   Skin:     General: Skin is warm and dry.      Findings: No erythema or rash.   Neurological:      General: No focal deficit present.      Mental Status: She is alert and oriented to person, place, and time.      Deep Tendon Reflexes: Reflexes are normal and symmetric.   Psychiatric:         Behavior: Behavior normal.         Thought Content: Thought content normal.         Judgment: Judgment normal.        Result Review :                 Assessment and Plan    Diagnoses and all orders for this visit:    1. Cervical radiculopathy (Primary)  -     Ambulatory Referral to Physical Therapy Evaluate and treat    2. Acute pain of right shoulder  -     Ambulatory Referral to Physical Therapy Evaluate and treat      I spent 30  minutes caring for Keri on this date of service. This time includes time spent by me in the following activities:preparing for the visit, reviewing tests, obtaining and/or reviewing a separately obtained history, performing a medically appropriate examination and/or evaluation , counseling and educating the patient/family/caregiver, referring and communicating with other health care professionals , documenting information in the medical record and independently interpreting results and communicating that information with the patient/family/caregiver  Follow Up   Return in about 6 weeks (around 6/7/2022) for Medicare Wellness.  Patient was given instructions and counseling regarding her condition or for health maintenance advice. Please see  specific information pulled into the AVS if appropriate.     Discharge instructions range of motion exercises of your shoulders  Physical therapy continue with your neck and your shoulder, see orthopedic if not improving with your shoulder after 6 or 8 weeks or up to 3 months Dr. Gutierrez    Otherwise follow-up in 6 to 8 weeks for your Medicare wellness and recheck of your primary care needs as well as your neck and shoulder healthy diet, decrease portion control across the board    Decrease breads Posta sweets 64 ounces water daily slow gradual weight loss over the next 12 months    Great looks like you doing spectacular  At your present medications generic Abilify does carry a significant risk  For you benefit has been greater than the risk  However it does have some potential downfall side effects always have risk-benefit discussion and make sure you are at the lowest effective dose

## 2022-04-28 ENCOUNTER — HOSPITAL ENCOUNTER (OUTPATIENT)
Dept: PHYSICAL THERAPY | Facility: HOSPITAL | Age: 62
Setting detail: THERAPIES SERIES
Discharge: HOME | End: 2022-04-28

## 2022-04-28 DIAGNOSIS — M25.511 RIGHT SHOULDER PAIN, UNSPECIFIED CHRONICITY: ICD-10-CM

## 2022-04-28 DIAGNOSIS — M54.2 NECK PAIN: ICD-10-CM

## 2022-04-28 DIAGNOSIS — M54.12 CERVICAL RADICULOPATHY: Primary | ICD-10-CM

## 2022-04-28 PROCEDURE — 97161 PT EVAL LOW COMPLEX 20 MIN: CPT | Performed by: PHYSICAL THERAPIST

## 2022-04-28 PROCEDURE — 97110 THERAPEUTIC EXERCISES: CPT | Performed by: PHYSICAL THERAPIST

## 2022-04-28 NOTE — THERAPY EVALUATION
Outpatient Physical Therapy Ortho Initial Evaluation  [facility]     Patient Name: Keri Bhagat  : 1960  MRN: [id number]  Today's Date: 2022      Visit Date: 2022    Patient Active Problem List   Diagnosis   • Gastroesophageal reflux disease   • Bipolar I disorder, single manic episode ([facility])   • Atherosclerosis of coronary artery   • Essential hypertension   • Lightheadedness   • Low back pain   • Sciatica   • Thyroid nodule   • Fatigue   • Hyperlipidemia   • Hypothyroidism (acquired)   • Iron deficiency anemia   • Right knee pain   • Degenerative disc disease, cervical   • Depression   • ROCKY (obstructive sleep apnea)   • Atherosclerotic heart disease of native coronary artery with other forms of angina pectoris ([facility])   • Cervical spondylosis with radiculopathy   • Chronic pain of both knees   • Arthritis of both knees   • Weight gain, abnormal   • Allergic rhinitis   • Obesity (BMI 30-39.9)   • Plantar fasciitis   • Peroneal tendon injury   • Stress fracture of calcaneus   • Vitamin D deficiency   • Pain and swelling of knee   • Knee injury   • Calcific Achilles tendinitis   • Rod's deformity   • Urgency incontinence   • Nocturia   • Chronic gastritis without bleeding   • LGSIL on Pap smear of cervix   • Primary osteoarthritis of right knee   • Stage 1 chronic kidney disease   • Left wrist tendinitis   • Cervical radiculopathy   • Tobacco abuse   • Obesity, morbid, BMI 40.0-49.9 ([facility])   • Encounter for screening for lung cancer   • Urinary frequency   • Diabetes mellitus screening   • MORALES I (cervical intraepithelial neoplasia I)   • Primary osteoarthritis of both knees   • H/O bladder repair surgery   • Chest pain   • Syncope and collapse   • Closed fracture of left distal radius   • Closed displaced fracture of neck of right fifth metacarpal bone   • Fracture follow-up   • Nondisplaced fracture of base of fifth metacarpal bone, left hand, initial encounter for closed fracture   •  Closed fracture of lower end of left radius with routine healing   • Multiple dislocations of fingers, open   • Bilateral chronic knee pain   • Arthritis of left knee   • Arthritis of right knee   • Family history of diabetes mellitus (DM)   • Menopause   • 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   • Memory dysfunction   • Periodic limb movement disorder   • Gait disturbance   • Degenerative disc disease, lumbar   • Osteopenia   • Vaginal atrophy   • Female dyspareunia   • Pituitary cyst ([facility])   • Meningioma ([facility])   • Vitamin B12 deficiency   • Acute urinary tract infection   • Anemia   • Bacterial vaginosis   • Anxiety   • Inappropriate diet and eating habits   • Incomplete emptying of bladder   • Myocardial infarction ([facility])   • Other specified postprocedural states   • Postoperative retention of urine   • Suicide attempt ([facility])   • Urinary urgency   • Vaginal discharge   • Vomiting   • Gastro-esophageal reflux disease with esophagitis, without bleeding   • Encounter for follow-up examination after completed treatment for conditions other than malignant neoplasm   • Tendinitis of left wrist        Past Medical History:   Diagnosis Date   • Abnormal Pap smear of cervix    • Anemia    • Anxiety    • Arthritis    • Atherosclerotic heart disease of native coronary artery with other forms of angina pectoris ([facility])    • Bipolar I disorder, single manic episode ([facility])     depressive d(NOS)   • Cervical disc herniation    • Cervical dysplasia    • Coronary artery disease    • CTS (carpal tunnel syndrome)    • Depression     NO SUICIDAL PLANS   • Difficulty swallowing    • Diverticular disease    • Dyspepsia    • Dysphagia    • Gastric reflux    • GERD (gastroesophageal reflux disease)    • Heart attack ([facility])    • Heart murmur    • Hiatal hernia    • High cholesterol    • History of transfusion    • 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    HPV positive on pap LGSIL   • Hyperlipidemia     • Hypertension    • Hypothyroidism    • Incontinence in female     wears pads   • Kidney disease 2017    stage 2    • Kidney disease, chronic, stage III (GFR 30-59 ml/min) ([facility])    • LGSIL on Pap smear of cervix 04/29/2016    LGSIL HPV positive   • Myocardial infarction ([facility]) 2000   • Neck pain    • Obesity    • Past myocardial infarction 05/2000   • Periodic limb movement disorder    • Restless leg syndrome    • Sleep apnea     cpap   • Stress fracture     LEFT HEEL   • Suicidal ideation 08/19/2016    history   • Swelling of ankle     right had doppler no s/s blood clot   • Thyroid nodule    • Vitamin B12 deficiency         Past Surgical History:   Procedure Laterality Date   • ANTERIOR CERVICAL DISCECTOMY W/ FUSION N/A 12/29/2016    Procedure: C3-4 anterior cervical discectomy and fusion with Depuy micro plate, ALLOGRAFT C3-4, AND HARDWARE REMOVAL C4-7.;  Surgeon: Hema Godwin MD;  Location: [facility] MAIN OR;  Service:    • CARDIAC CATHETERIZATION N/A 3/30/2017    Procedure: Left Heart Cath;  Surgeon: Tracey Vargas MD;  Location: [facility] CATH INVASIVE LOCATION;  Service:    • CARDIAC CATHETERIZATION N/A 3/30/2017    Procedure: Coronary angiography;  Surgeon: Tracey Vargas MD;  Location:  [facility] CATH INVASIVE LOCATION;  Service:    • CARDIAC CATHETERIZATION N/A 3/30/2017    Procedure: Left ventriculography;  Surgeon: Tracey Vargas MD;  Location:  [facility] CATH INVASIVE LOCATION;  Service:    • CARDIAC CATHETERIZATION  3/30/2017    Procedure: Functional Flow [place];  Surgeon: Tracey Vargas MD;  Location:  [facility] CATH INVASIVE LOCATION;  Service:    • CARPAL TUNNEL RELEASE     • CERVICAL BIOPSY  MMXVI    Dr. Jeffery.    • CERVICAL DISCECTOMY ANTERIOR  04/2013    C4-7   • COLONOSCOPY  04/20/2015    Diverticulosis, IH   • COLONOSCOPY  03/09/2021   • COLPOSCOPY W/ BIOPSY / CURETTAGE  06/17/2016    LGSIL HPV positive. Results were normal repeat pap in one year. Chronic Cervicitis   • CORONARY ANGIOPLASTY WITH STENT  "PLACEMENT     • ENDOSCOPY  MM[facility]    Normal.  Dr. Rodriguez   • ENDOSCOPY N/A 6/22/2017    Erythematous mucosa in the stomach  PATH: Chronic active gastritis, moderate with intestinal metaplasia    • GASTRIC BYPASS      Done using the sleeve technique   • HARDWARE REMOVAL  12/29/2016    cervical   • HERNIA REPAIR     • LAPAROSCOPIC GASTRIC BANDING  02/2018   • SHOULDER SURGERY Left     RCR   • TONSILLECTOMY     • TONSILLECTOMY AND ADENOIDECTOMY     • TRANSVAGINAL TAPING SUSPENSION N/A 12/6/2017    Procedure: MID URETHRAL SLING CYSTSCOPY;  Surgeon: Abby Méndez MD;  Location: [facility];  Service:    • TUBAL ABDOMINAL LIGATION     • TYMPANOSTOMY TUBE PLACEMENT Right    • UPPER GASTROINTESTINAL ENDOSCOPY  approx 2021   • WRIST SURGERY Bilateral     carpal tunnel   • WRIST SURGERY      L wrist/ 4/2020       Visit Dx:     ICD-10-CM ICD-9-CM   1. Cervical radiculopathy  M54.12 723.4   2. Neck pain  M54.2 723.1   3. Right shoulder pain, unspecified chronicity  M25.511 719.41          Patient History     Row Name 04/28/22 1045 04/28/22 1000          History    Chief Complaint Pain  -JS --  -JS     Type of Pain Neck pain;Shoulder pain  -JS --  -JS     Date Current Problem(s) Began 02/01/22  -JS --  -JS     Brief Description of Current Complaint Patient reports onset of R shoulder pain in Feb 2022 of unknown onset. Also history of cervical pain, with physical therapy approximately 1 year ago. Cervical pain had improved until 1 month ago when patient c/o neck pain that extends from L side of neck to shoulder after neck \"popped\" at home. L sided neck pain has remained increased since that time with occasional numbness & tingling middle 3 fingers when symptoms are worsened. No current exercises. Patient denies continuing previously prescribed HEP as pain had improved. 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 Knees do occasionally buckle, which has resulted in 2 falls in the past year, one just last week. Pt with history of cervical fusion.  -JS --     Patient/Caregiver Goals Improve strength;Other (comment)  avoid another surgery  -JS --     Hand Dominance right-handed  -JS --     Surgery/Hospitalization Cervical fusion. Per record, hardware removal in 2016  -JS --            Pain     Pain Location Neck;Shoulder  -JS --     Pain at Present 3;4  -JS --     Pain at Best 0  -JS --     Pain at Worst 8  -JS --     What Performance Factors Make the Current Problem(s) WORSE? Looking down at phone, lifting, moving neck in wrong direction, am  -JS --     What Performance Factors Make the Current Problem(s) BETTER? Lying supine, heating pad  -JS --     What position do you sleep in? Right sidelying;Left sidelying  -JS --     Difficulties with ADL's? lifting, housework, assisting pt's mom (ie putting her compression stockings on)  -JS --            Fall Risk Assessment    Any falls in the past year: Yes  -JS --     Number of falls reported in the last 12 months 2  -JS --     Factors that contributed to the fall: Lost balance;Tripped  -JS --            Daily Activities    Primary Language English  -JS --     Are you able to read Yes  -JS --     Are you able to write Yes  -JS --     Pt Participated in POC and Goals Yes  -JS --            Safety    Are you being hurt, hit, or frightened by anyone at home or in your life? No  -JS --     Are you being neglected by a caregiver No  -JS --           User Key  (r) = Recorded By, (t) = Taken By, (c) = Cosigned By    Initials Name Provider Type    Ginger Strickland PT Physical Therapist                 PT Ortho     Row Name 04/28/22 8061       Subjective Comments    Subjective Comments C/o L sided neck pain, R shoulder pain  -JS       Subjective Pain    Able to rate subjective pain? yes  -JS    Pre-Treatment Pain Level 3  -4/10  -JS       Posture/Observations     Alignment Options Forward head;Rounded shoulders  -JS    Posture/Observations Comments Holds neck in slight R lateral flexion  -JS       Quarter Clearing    Quarter Clearing Upper Quarter Clearing  -JS       Myotomal Screen- Upper Quarter Clearing    Shoulder flexion (C5) Bilateral:;3+ (Fair +)  -JS    Elbow flexion/wrist extension (C6) Bilateral:;4+ (Good +)  -JS    Elbow extension/wrist flexion (C7) Bilateral:;4 (Good)  -JS       Cervical/Shoulder ROM Screen    Cervical flexion Impaired  Completes 25% with pain  -JS    Cervical extension Impaired  Completes 25% with pain  -JS    Cervical lateral flexion Impaired  Completes 25% to R, 10% to L with opp side tightness/pain  -JS    Cervical rotation Impaired  Completes 25% with pain  -JS       General ROM    RT Upper Ext Rt Shoulder ABduction;Rt Shoulder Flexion;Rt Shoulder External Rotation;Rt Shoulder Internal Rotation  -JS    LT Upper Ext Lt Shoulder ABduction;Lt Shoulder Flexion;Lt Shoulder External Rotation;Lt Shoulder Internal Rotation  -JS       Right Upper Ext    Rt Shoulder Abduction AROM 90  -JS    Rt Shoulder Abduction PROM 155  -JS    Rt Shoulder Flexion AROM 70  -JS    Rt Shoulder Flexion PROM 145  -JS    Rt Shoulder External Rotation AROM Functionally to C8  -JS    Rt Shoulder External Rotation PROM 60  -JS    Rt Shoulder Internal Rotation AROM FIR to T9 level  -JS    Rt Shoulder Internal Rotation PROM 70  -JS    Rt Upper Extremity Comments  R shoulder pain with all movements  -JS       Left Upper Ext    Lt Shoulder Abduction AROM 115  -JS    Lt Shoulder Abduction PROM 150  -JS    Lt Shoulder Flexion AROM 140  -JS    Lt Shoulder Flexion PROM 160  -JS    Lt Shoulder External Rotation AROM Functionally to C8  -JS    Lt Shoulder External Rotation PROM 75  -JS    Lt Shoulder Internal Rotation AROM FIR to T10 level  -JS    Lt Shoulder Internal Rotation PROM 70  -JS    Lt Upper Extremity Comments  Pain with shoulder abd, ER, IR on L from shoulder to neck  -JS        MMT (Manual Muscle Testing)    Rt Upper Ext Rt Shoulder Internal Rotation;Rt Shoulder External Rotation  -JS    Lt Upper Ext Lt Shoulder Internal Rotation;Lt Shoulder External Rotation  -JS       MMT Right Upper Ext    Rt Shoulder Internal Rotation MMT, Gross Movement (4/5) good  -JS    Rt Shoulder External Rotation MMT, Gross Movement (3+/5) fair plus  -JS       MMT Left Upper Ext    Lt Shoulder Internal Rotation MMT, Gross Movement (4/5) good  -JS    Lt Shoulder External Rotation MMT, Gross Movement (3+/5) fair plus  -JS          User Key  (r) = Recorded By, (t) = Taken By, (c) = Cosigned By    Initials Name Provider Type    Ginger Strickland, PT Physical Therapist                            Therapy Education  Education Details: Role of outpatient PT, POC, differential diagnosis, initial HEP, expectations.  Issued initial HEP via [place] [id number] (printed & texted)  Given: HEP, Symptoms/condition management  Program: New  How Provided: Verbal, Demonstration, Written  Provided to: Patient  Level of Understanding: Teach back education performed, Verbalized, Demonstrated      PT OP Goals     Row Name 04/28/22 1045          PT Short Term Goals    STG Date to Achieve 05/12/22  -JS     STG 1 Patient will be independent in initial HEP.  -JS     STG 1 Progress New  -JS     STG 2 Patient will complete cervical rotation through 50% ROM for improved functional mobility.  -JS     STG 2 Progress New  -JS     STG 3 Patient will be independent in postural awareness in sitting and standing for improved mechanics of cervical spine & shoulders.  -JS     STG 3 Progress New  -JS            Long Term Goals    LTG Date to Achieve 06/02/22  -JS     LTG 1 Patient will be independent in comprehensive HEP to allow ongoing self-management.  -JS     LTG 1 Progress New  -JS     LTG 2 Patient will improve NDI score from 28% to 15% or less for improved perceived disability.  -JS     LTG 2 Progress New  -JS     LTG 3 Patient will improve  Quick Dash  score from 40.91 to 25% or less for improved perceived disability of shoulder.  -JS     LTG 3 Progress New  -JS     LTG 4 Patient will improve cervical rotation to 75% or greater ROM to allow improved functional mobility.  -JS     LTG 4 Progress New  -JS     LTG 5 Patient will improve shoulder ROM and strength to allow flexion to 160 degrees or greater for improved reaching and functional mobility.  -JS     LTG 5 Progress New  -JS            Time Calculation    PT Goal Re-Cert Due Date 07/28/22  -JS           User Key  (r) = Recorded By, (t) = Taken By, (c) = Cosigned By    Initials Name Provider Type    Ginger Strickland, PT Physical Therapist                 PT Assessment/Plan     Row Name 04/28/22 1045          PT Assessment    Functional Limitations Limitation in home management;Limitations in functional capacity and performance;Performance in self-care ADL  -JS     Impairments Impaired flexibility;Impaired postural alignment;Muscle strength;Pain;Posture;Range of motion  -JS     Assessment Comments Keri Bhagat is a 61 y.o. year-old female referred to physical therapy for cervical radiculopathy and shoulder pain. She presents with a evolving clinical presentation with onset of R shoulder pain in Feb 2022 and onset of cervical pain extending to L shoulder since March 2022.   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  Signs and symptoms include decreased postural awareness, limited shoulder & cervical ROM, decreased shoulder strength, (+) impingement tests R shoulder, decreased flexibility that contribute to abnormal mechanics. Patient will benefit from skilled physical therapy to address impairments and progress toward goals.  -JS     Please refer to paper survey for additional  self-reported information Yes  -JS     Rehab Potential Good  -JS     Patient/caregiver participated in establishment of treatment plan and goals Yes  -JS     Patient would benefit from skilled therapy intervention Yes  -JS            PT Plan    PT Frequency 2x/week  -JS     Predicted Duration of Therapy Intervention (PT) 8-10 visits  -JS     Planned CPT's? PT EVAL LOW COMPLEXITY: 43004;PT RE-EVAL: 69900;PT THER PROC EA 15 MIN: 20208;PT MANUAL THERAPY EA 15 MIN: 17399;PT NEUROMUSC RE-EDUCATION EA 15 MIN: 09236  -JS     Physical Therapy Interventions (Optional Details) home exercise program;manual therapy techniques;neuromuscular re-education;patient/family education;postural re-education;ROM (Range of Motion);strengthening;stretching  -JS     PT Plan Comments Consider UBE/pulleys warm-up, review initial HEP and postural awareness, B theraband rows & shoulder extension in neutral cervical spine position, gentle doorway stretch (arms low).  -JS           User Key  (r) = Recorded By, (t) = Taken By, (c) = Cosigned By    Initials Name Provider Type    Ginger Strickland, PT Physical Therapist                   OP Exercises     Row Name 04/28/22 1045             Subjective Comments    Subjective Comments C/o L sided neck pain, R shoulder pain  -JS              Subjective Pain    Able to rate subjective pain? yes  -JS      Pre-Treatment Pain Level 3  -4/10  -JS              Total Minutes    45901 - PT Therapeutic Exercise Minutes 10  -JS              Exercise 1    Exercise Name 1 Cervical retraction (supine & sitting)  -JS      Cueing 1 Verbal;Demo  -JS      Reps 1 10 each  -JS              Exercise 2    Exercise Name 2 Supine cervical retraction with rotation  -JS      Cueing 2 Verbal;Demo  -JS      Reps 2 10  -JS      Additional Comments within painfree range  -JS              Exercise 3    Exercise Name 3 Cane shoulder flex AAROM (supine)  -JS      Cueing 3 Verbal;Demo  -JS      Reps 3 10  -JS              Exercise 4     Exercise Name 4 Post shoulder rolls  -JS      Cueing 4 Verbal;Demo  -JS      Reps 4 10  -JS            User Key  (r) = Recorded By, (t) = Taken By, (c) = Cosigned By    Initials Name Provider Type    Ginger Strickland PT Physical Therapist                              Outcome Measure Options: Neck Disability Index (NDI), Quick DASH  Quick DASH  Open a tight or new jar.: Moderate Difficulty  Do heavy household chores (e.g., wash walls, wash floors): Moderate Difficulty  Carry a shopping bag or briefcase: Mild Difficulty  Wash your back: No Difficulty  Use a knife to cut food: No Difficulty  Recreational activities in which you take some force or impact through your arm, should or hand (e.g. golf, hammering, tennis, etc.): Severe Difficulty  During the past week, to what extent has your arm, shoulder, or hand problem interfered with your normal social activites with family, friends, neighbors or groups?: Moderately  During the past week, were you limited in your work or other regular daily activities as a result of your arm, shoulder or hand problem?: Very limited  Arm, Shoulder, or hand pain: Moderate  Tingling (pins and needles) in your arm, shoulder, or hand: Moderate  During the past week, how much difficulty have you had sleeping because of the pain in your arm, shoulder or hand?: Mild Difficulty  Number of Questions Answered: 11  Quick DASH Score: 40.91  Neck Disability Index  Section 1 - Pain Intensity: The pain is moderate at the moment.  Section 2 - Personal Care: I can look after myself normally, but it causes extra pain.  Section 3 - Lifting: I can lift heavy weights, but it gives me extra pain.  Section 4 - Reading: I can read as much as I want with moderate neck pain.  Section 5 - Headaches: I have slight headaches that come infrequently.  Section 6 - Concentration: I can concentrate fully with slight difficulty.  Section 7 - Work: I can only do my usual work, but no more  Section 8 - Driving: I can drive  as long as I want with slight neck pain.  Section 9 - Sleeping: My sleep is mildly disturbed (1-2 hours sleepless).  Section 10 - Recreation: I have some neck pain with a few recreational activities.  Neck Disability Index Score: 14  Neck Disability Index Comments: 28%      Time Calculation:     Start Time: 1045  Stop Time: 1135  Time Calculation (min): 50 min  Timed Charges  74758 - PT Therapeutic Exercise Minutes: 10  Total Minutes  Timed Charges Total Minutes: 10   Total Minutes: 10     Therapy Charges for Today     Code Description Service Date Service Provider Modifiers Qty    95217958012 HC PT THER PROC EA 15 MIN 4/28/2022 Gingre Smart, PT GP 1    27549681668 HC PT EVAL LOW COMPLEXITY 2 4/28/2022 Ginger Smart, PT GP 1          PT G-Codes  Outcome Measure Options: Neck Disability Index (NDI), Quick DASH  Quick DASH Score: 40.91  Neck Disability Index Score: 14         Ginger Smart PT  4/28/2022

## 2022-05-09 ENCOUNTER — HOSPITAL ENCOUNTER (OUTPATIENT)
Dept: PHYSICAL THERAPY | Facility: HOSPITAL | Age: 62
Setting detail: THERAPIES SERIES
Discharge: HOME | End: 2022-05-09

## 2022-05-09 ENCOUNTER — TELEPHONE (OUTPATIENT)
Dept: PHYSICAL THERAPY | Facility: HOSPITAL | Age: 62
End: 2022-05-09

## 2022-05-09 DIAGNOSIS — M54.12 CERVICAL RADICULOPATHY: Primary | ICD-10-CM

## 2022-05-09 DIAGNOSIS — M54.2 NECK PAIN: ICD-10-CM

## 2022-05-09 DIAGNOSIS — M25.511 RIGHT SHOULDER PAIN, UNSPECIFIED CHRONICITY: ICD-10-CM

## 2022-05-09 PROCEDURE — 97110 THERAPEUTIC EXERCISES: CPT

## 2022-05-09 PROCEDURE — 97140 MANUAL THERAPY 1/> REGIONS: CPT

## 2022-05-09 NOTE — THERAPY TREATMENT NOTE
Outpatient Physical Therapy Ortho Treatment Note  [facility]     Patient Name: Keri Bhagat  : 1960  MRN: [id number]  Today's Date: 2022      Visit Date: 2022    Visit Dx:    ICD-10-CM ICD-9-CM   1. Cervical radiculopathy  M54.12 723.4   2. Neck pain  M54.2 723.1   3. Right shoulder pain, unspecified chronicity  M25.511 719.41       Patient Active Problem List   Diagnosis   • Gastroesophageal reflux disease   • Bipolar I disorder, single manic episode ([facility])   • Atherosclerosis of coronary artery   • Essential hypertension   • Lightheadedness   • Low back pain   • Sciatica   • Thyroid nodule   • Fatigue   • Hyperlipidemia   • Hypothyroidism (acquired)   • Iron deficiency anemia   • Right knee pain   • Degenerative disc disease, cervical   • Depression   • ROCKY (obstructive sleep apnea)   • Atherosclerotic heart disease of native coronary artery with other forms of angina pectoris ([facility])   • Cervical spondylosis with radiculopathy   • Chronic pain of both knees   • Arthritis of both knees   • Weight gain, abnormal   • Allergic rhinitis   • Obesity (BMI 30-39.9)   • Plantar fasciitis   • Peroneal tendon injury   • Stress fracture of calcaneus   • Vitamin D deficiency   • Pain and swelling of knee   • Knee injury   • Calcific Achilles tendinitis   • Rod's deformity   • Urgency incontinence   • Nocturia   • Chronic gastritis without bleeding   • LGSIL on Pap smear of cervix   • Primary osteoarthritis of right knee   • Stage 1 chronic kidney disease   • Left wrist tendinitis   • Cervical radiculopathy   • Tobacco abuse   • Obesity, morbid, BMI 40.0-49.9 ([facility])   • Encounter for screening for lung cancer   • Urinary frequency   • Diabetes mellitus screening   • MORALES I (cervical intraepithelial neoplasia I)   • Primary osteoarthritis of both knees   • H/O bladder repair surgery   • Chest pain   • Syncope and collapse   • Closed fracture of left distal radius   • Closed displaced fracture of  neck of right fifth metacarpal bone   • Fracture follow-up   • Nondisplaced fracture of base of fifth metacarpal bone, left hand, initial encounter for closed fracture   • Closed fracture of lower end of left radius with routine healing   • Multiple dislocations of fingers, open   • Bilateral chronic knee pain   • Arthritis of left knee   • Arthritis of right knee   • Family history of diabetes mellitus (DM)   • Menopause   • 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   • Memory dysfunction   • Periodic limb movement disorder   • Gait disturbance   • Degenerative disc disease, lumbar   • Osteopenia   • Vaginal atrophy   • Female dyspareunia   • Pituitary cyst ([facility])   • Meningioma ([facility])   • Vitamin B12 deficiency   • Acute urinary tract infection   • Anemia   • Bacterial vaginosis   • Anxiety   • Inappropriate diet and eating habits   • Incomplete emptying of bladder   • Myocardial infarction ([facility])   • Other specified postprocedural states   • Postoperative retention of urine   • Suicide attempt ([facility])   • Urinary urgency   • Vaginal discharge   • Vomiting   • Gastro-esophageal reflux disease with esophagitis, without bleeding   • Encounter for follow-up examination after completed treatment for conditions other than malignant neoplasm   • Tendinitis of left wrist        Past Medical History:   Diagnosis Date   • Abnormal Pap smear of cervix    • Anemia    • Anxiety    • Arthritis    • Atherosclerotic heart disease of native coronary artery with other forms of angina pectoris ([facility])    • Bipolar I disorder, single manic episode ([facility])     depressive d(NOS)   • Cervical disc herniation    • Cervical dysplasia    • Coronary artery disease    • CTS (carpal tunnel syndrome)    • Depression     NO SUICIDAL PLANS   • Difficulty swallowing    • Diverticular disease    • Dyspepsia    • Dysphagia    • Gastric reflux    • GERD (gastroesophageal reflux disease)    • Heart attack ([facility])    • Heart  murmur    • Hiatal hernia    • High cholesterol    • History of transfusion    • 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    HPV positive on pap LGSIL   • Hyperlipidemia    • Hypertension    • Hypothyroidism    • Incontinence in female     wears pads   • Kidney disease 2017    stage 2    • Kidney disease, chronic, stage III (GFR 30-59 ml/min) ([facility])    • LGSIL on Pap smear of cervix 04/29/2016    LGSIL HPV positive   • Myocardial infarction ([facility]) 2000   • Neck pain    • Obesity    • Past myocardial infarction 05/2000   • Periodic limb movement disorder    • Restless leg syndrome    • Sleep apnea     cpap   • Stress fracture     LEFT HEEL   • Suicidal ideation 08/19/2016    history   • Swelling of ankle     right had doppler no s/s blood clot   • Thyroid nodule    • Vitamin B12 deficiency         Past Surgical History:   Procedure Laterality Date   • ANTERIOR CERVICAL DISCECTOMY W/ FUSION N/A 12/29/2016    Procedure: C3-4 anterior cervical discectomy and fusion with Depuy micro plate, ALLOGRAFT C3-4, AND HARDWARE REMOVAL C4-7.;  Surgeon: Hema Godwin MD;  Location: [facility] OR;  Service:    • CARDIAC CATHETERIZATION N/A 3/30/2017    Procedure: Left Heart Cath;  Surgeon: Tracey Vargas MD;  Location: [facility] CATH INVASIVE LOCATION;  Service:    • CARDIAC CATHETERIZATION N/A 3/30/2017    Procedure: Coronary angiography;  Surgeon: Tracey Vargas MD;  [facility];  Service:    • CARDIAC CATHETERIZATION N/A 3/30/2017    Procedure: Left ventriculography;  Surgeon: Tracey Vargas MD;  [facility];  Service:    • CARDIAC CATHETERIZATION  3/30/2017    Procedure: Functional Flow [place];  Surgeon: Tracey Vargas MD;  Location: [facility] CATH INVASIVE LOCATION;  Service:    • CARPAL TUNNEL RELEASE     • CERVICAL BIOPSY  MMXVI    Dr. Jeffery.    • CERVICAL DISCECTOMY ANTERIOR  04/2013    C4-7   • COLONOSCOPY  04/20/2015    Diverticulosis, IH   • COLONOSCOPY   03/09/2021   • COLPOSCOPY W/ BIOPSY / CURETTAGE  06/17/2016    LGSIL HPV positive. Results were normal repeat pap in one year. Chronic Cervicitis   • CORONARY ANGIOPLASTY WITH STENT PLACEMENT     • ENDOSCOPY  MMXV    Normal.  Dr. Rodriguez   • ENDOSCOPY N/A 6/22/2017    Erythematous mucosa in the stomach  PATH: Chronic active gastritis, moderate with intestinal metaplasia    • GASTRIC BYPASS      Done using the sleeve technique   • HARDWARE REMOVAL  12/29/2016    cervical   • HERNIA REPAIR     • LAPAROSCOPIC GASTRIC BANDING  02/2018   • SHOULDER SURGERY Left     RCR   • TONSILLECTOMY     • TONSILLECTOMY AND ADENOIDECTOMY     • TRANSVAGINAL TAPING SUSPENSION N/A 12/6/2017    Procedure: MID URETHRAL SLING CYSTSCOPY;  Surgeon: Abby Méndez MD;  Location: [facility];  Service:    • TUBAL ABDOMINAL LIGATION     • TYMPANOSTOMY TUBE PLACEMENT Right    • UPPER GASTROINTESTINAL ENDOSCOPY  approx 2021   • WRIST SURGERY Bilateral     carpal tunnel   • WRIST SURGERY      L wrist/ 4/2020                        PT Assessment/Plan     Row Name 05/09/22 1217          PT Assessment    Assessment Comments Pt returns for initial follow up after evaluation reporting slight decrease in symptoms last session. Time spent throughout session discussing posture with ADLs to reduce stress on affected tissues including propping UEs on pillows when looking at her phone as well as activity pacing with cleaning tasks as well as lifting (i.e. carrying groceries). Added rows, shoulder ext, UT stretch and doorway stretch with good tolerance and cues throughout for form.  -CN            PT Plan    PT Plan Comments Assess response to added exercises. Consider SNAG, bottom up rotation. Update HEP if appropriate.  -CN           User Key  (r) = Recorded By, (t) = Taken By, (c) = Cosigned By    Initials Name Provider Type    Jaye Beauchamp, PT Physical Therapist                   OP Exercises     Row Name 05/09/22 1100              Subjective Comments    Subjective Comments It was a little better after last time but it is hurting today. It is into my shoulder blade.  -CN              Subjective Pain    Able to rate subjective pain? yes  -CN      Pre-Treatment Pain Level 7  -CN              Total Minutes    51492 - PT Therapeutic Exercise Minutes 30  -CN      25154 - PT Manual Therapy Minutes 10  -CN              Exercise 1    Exercise Name 1 Cervical retraction (supine & sitting)  -CN      Cueing 1 Verbal;Demo  -CN      Reps 1 10 each  -CN              Exercise 2    Exercise Name 2 Supine cervical retraction with rotation  -CN      Cueing 2 Verbal;Demo  -CN      Reps 2 10  -CN      Additional Comments within painfree range  -CN              Exercise 3    Exercise Name 3 Cane shoulder flex AAROM (supine)  -CN      Cueing 3 Verbal;Demo  -CN      Reps 3 10  -CN              Exercise 4    Exercise Name 4 Post shoulder rolls  -CN      Cueing 4 Verbal;Demo  -CN      Reps 4 10  -CN              Exercise 5    Exercise Name 5 UBE  -CN      Cueing 5 Verbal;Demo  -CN      Time 5 4 min  -CN              Exercise 6    Exercise Name 6 Beach chair stretch  -CN      Cueing 6 Verbal;Demo  -CN      Reps 6 2  -CN      Time 6 30 sec  -CN              Exercise 7    Exercise Name 7 SL arc  -CN      Cueing 7 Verbal;Demo  -CN      Reps 7 10 B  -CN      Additional Comments small range, cues to follow with eyes  -CN              Exercise 8    Exercise Name 8 Row  -CN      Cueing 8 Verbal;Demo  -CN      Reps 8 10  -CN      Additional Comments RTB  -CN              Exercise 9    Exercise Name 9 Shoulder ext  -CN      Cueing 9 Verbal;Demo;Tactile  -CN      Reps 9 10  -CN      Additional Comments RTB  -CN              Exercise 10    Exercise Name 10 Low doorway pec stretch  -CN      Cueing 10 Verbal;Demo  -CN      Reps 10 3  -CN      Time 10 20 sec  -CN              Exercise 11    Exercise Name 11 Seated UT stretch  -CN      Cueing 11 Verbal;Demo  -CN      Reps 11 3  -CN       Time 11 20 sec  -CN            User Key  (r) = Recorded By, (t) = Taken By, (c) = Cosigned By    Initials Name Provider Type    Jaye Beauchamp, PT Physical Therapist                         Manual Rx (last 36 hours)     Manual Treatments     Row Name 05/09/22 1100             Total Minutes    30325 - PT Manual Therapy Minutes 10  -CN              Manual Rx 1    Manual Rx 1 Location B UT/levator stm, intermittent distraction, suboccipital release, PA mobs  -CN            User Key  (r) = Recorded By, (t) = Taken By, (c) = Cosigned By    Initials Name Provider Type    Jaye Beauchamp, PT Physical Therapist                 PT OP Goals     Row Name 05/09/22 1200          PT Short Term Goals    STG Date to Achieve 05/12/22  -CN     STG 1 Patient will be independent in initial HEP.  -CN     STG 1 Progress Ongoing  -CN     STG 1 Progress Comments Pt reporting compliance with initial HEP, reviewed form today.  -CN     STG 2 Patient will complete cervical rotation through 50% ROM for improved functional mobility.  -CN     STG 2 Progress Ongoing  -CN     STG 3 Patient will be independent in postural awareness in sitting and standing for improved mechanics of cervical spine & shoulders.  -CN     STG 3 Progress Ongoing  -CN            Long Term Goals    LTG Date to Achieve 06/02/22  -CN     LTG 1 Patient will be independent in comprehensive HEP to allow ongoing self-management.  -CN     LTG 1 Progress Ongoing  -CN     LTG 2 Patient will improve NDI score from 28% to 15% or less for improved perceived disability.  -CN     LTG 2 Progress Ongoing  -CN     LTG 3 Patient will improve Quick Dash  score from 40.91 to 25% or less for improved perceived disability of shoulder.  -CN     LTG 3 Progress Ongoing  -CN     LTG 4 Patient will improve cervical rotation to 75% or greater ROM to allow improved functional mobility.  -CN     LTG 4 Progress Ongoing  -CN     LTG 5 Patient will improve shoulder ROM and  strength to allow flexion to 160 degrees or greater for improved reaching and functional mobility.  -CARMEN     LTG 5 Progress Ongoing  -CN           User Key  (r) = Recorded By, (t) = Taken By, (c) = Cosigned By    Initials Name Provider Type    Jaye Beauchamp, PT Physical Therapist                Therapy Education  Given: HEP, Symptoms/condition management  Program: Reinforced  How Provided: Verbal, Demonstration, Written  Provided to: Patient  Level of Understanding: Teach back education performed, Verbalized, Demonstrated              Time Calculation:   Start Time: 1135  Stop Time: 1215  Time Calculation (min): 40 min  Timed Charges  93800 - PT Therapeutic Exercise Minutes: 30  76656 - PT Manual Therapy Minutes: 10  Total Minutes  Timed Charges Total Minutes: 40   Total Minutes: 40  Therapy Charges for Today     Code Description Service Date Service Provider Modifiers Qty    43868770562 HC PT THER PROC EA 15 MIN 5/9/2022 Jaye Cooney, PT GP 2    89450414711 HC PT MANUAL THERAPY EA 15 MIN 5/9/2022 Jaye Cooney, PT GP 1                    Jaye Cooney PT  5/9/2022

## 2022-05-09 NOTE — TELEPHONE ENCOUNTER
Spoke with pt re: missed visit today for outpatient PT.  I did offer her an appointment later today, initially stating 10:45, however was mistaken and called to let her know it was actually at 11:30, and asked her to call to confirm.

## 2022-05-11 RX ORDER — GABAPENTIN 300 MG/1
CAPSULE ORAL
Qty: 270 CAPSULE | Refills: 0 | Status: SHIPPED | OUTPATIENT
Start: 2022-05-11 | End: 2023-01-17 | Stop reason: SDUPTHER

## 2022-05-11 RX ORDER — PANTOPRAZOLE SODIUM 40 MG/1
TABLET, DELAYED RELEASE ORAL
Qty: 180 TABLET | Refills: 1 | Status: SHIPPED | OUTPATIENT
Start: 2022-05-11 | End: 2022-09-21 | Stop reason: SDUPTHER

## 2022-05-11 NOTE — TELEPHONE ENCOUNTER
Rx Refill Note  Requested Prescriptions     Pending Prescriptions Disp Refills   • gabapentin (NEURONTIN) 300 MG capsule [Pharmacy Med Name: GABAPENTIN 300 MG CAPSULE] 270 capsule      Sig: TAKE ONE CAPSULE BY MOUTH THREE TIMES A DAY      Last office visit with prescribing clinician: 4/26/2022      Next office visit with prescribing clinician: 6/7/2022            Jigar Chapa Rep  05/11/22, 14:10 EDT

## 2022-05-13 DIAGNOSIS — I10 HYPERTENSION, UNSPECIFIED TYPE: ICD-10-CM

## 2022-05-13 RX ORDER — ATENOLOL 50 MG/1
50 TABLET ORAL DAILY
Qty: 90 TABLET | Refills: 1 | Status: SHIPPED | OUTPATIENT
Start: 2022-05-13 | End: 2023-01-24

## 2022-05-13 NOTE — TELEPHONE ENCOUNTER
Rx Refill Note  Requested Prescriptions     Pending Prescriptions Disp Refills   • atenolol (TENORMIN) 50 MG tablet 90 tablet 0     Sig: Take 1 tablet by mouth Daily.      Last office visit with prescribing clinician: 4/26/2022      Next office visit with prescribing clinician: 6/7/2022            Yasmine Miller MA  05/13/22, 16:52 EDT

## 2022-05-16 DIAGNOSIS — S16.1XXA STRAIN OF NECK MUSCLE, INITIAL ENCOUNTER: ICD-10-CM

## 2022-05-16 RX ORDER — CYCLOBENZAPRINE HCL 10 MG
10 TABLET ORAL NIGHTLY PRN
Qty: 30 TABLET | Refills: 1 | Status: SHIPPED | OUTPATIENT
Start: 2022-05-16

## 2022-05-16 NOTE — TELEPHONE ENCOUNTER
Rx Refill Note  Requested Prescriptions     Pending Prescriptions Disp Refills   • cyclobenzaprine (FLEXERIL) 10 MG tablet 30 tablet 0     Sig: Take 1 tablet by mouth At Night As Needed for Muscle Spasms.      Last office visit with prescribing clinician: 4/26/2022      Next office visit with prescribing clinician: 6/7/2022            Jigar Chapa Rep  05/16/22, 17:05 EDT

## 2022-05-16 NOTE — TELEPHONE ENCOUNTER
Caller: Keri Bahgat    Relationship: Self    Best call back number: [phone]    Requested Prescriptions:   Requested Prescriptions     Pending Prescriptions Disp Refills   • cyclobenzaprine (FLEXERIL) 10 MG tablet 30 tablet 0     Sig: Take 1 tablet by mouth At Night As Needed for Muscle Spasms.        Pharmacy where request should be sent: [facility] [address] [place] [address] RD AT [facility] [phone] [facility] [phone]      Additional details provided by patient: PATIENT RAN OUT YESTERDAY     Does the patient have less than a 3 day supply:  [x] Yes  [] No    Jigar Barroso Rep   05/16/22 16:25 EDT

## 2022-05-17 ENCOUNTER — HOSPITAL ENCOUNTER (OUTPATIENT)
Dept: PHYSICAL THERAPY | Facility: HOSPITAL | Age: 62
Setting detail: THERAPIES SERIES
Discharge: HOME | End: 2022-05-17

## 2022-05-17 DIAGNOSIS — M25.511 RIGHT SHOULDER PAIN, UNSPECIFIED CHRONICITY: ICD-10-CM

## 2022-05-17 DIAGNOSIS — M54.2 NECK PAIN: ICD-10-CM

## 2022-05-17 DIAGNOSIS — M54.12 CERVICAL RADICULOPATHY: Primary | ICD-10-CM

## 2022-05-17 PROCEDURE — 97110 THERAPEUTIC EXERCISES: CPT | Performed by: PHYSICAL THERAPIST

## 2022-05-17 PROCEDURE — 97140 MANUAL THERAPY 1/> REGIONS: CPT | Performed by: PHYSICAL THERAPIST

## 2022-05-17 NOTE — THERAPY TREATMENT NOTE
Outpatient Physical Therapy Ortho Treatment Note  [facility]     Patient Name: Keri Bhagat  : 1960  MRN: [id number]  Today's Date: 2022      Visit Date: 2022    Visit Dx:    ICD-10-CM ICD-9-CM   1. Cervical radiculopathy  M54.12 723.4   2. Neck pain  M54.2 723.1   3. Right shoulder pain, unspecified chronicity  M25.511 719.41       Patient Active Problem List   Diagnosis   • Gastroesophageal reflux disease   • Bipolar I disorder, single manic episode ([facility])   • Atherosclerosis of coronary artery   • Essential hypertension   • Lightheadedness   • Low back pain   • Sciatica   • Thyroid nodule   • Fatigue   • Hyperlipidemia   • Hypothyroidism (acquired)   • Iron deficiency anemia   • Right knee pain   • Degenerative disc disease, cervical   • Depression   • ROCKY (obstructive sleep apnea)   • Atherosclerotic heart disease of native coronary artery with other forms of angina pectoris ([facility])   • Cervical spondylosis with radiculopathy   • Chronic pain of both knees   • Arthritis of both knees   • Weight gain, abnormal   • Allergic rhinitis   • Obesity (BMI 30-39.9)   • Plantar fasciitis   • Peroneal tendon injury   • Stress fracture of calcaneus   • Vitamin D deficiency   • Pain and swelling of knee   • Knee injury   • Calcific Achilles tendinitis   • Rod's deformity   • Urgency incontinence   • Nocturia   • Chronic gastritis without bleeding   • LGSIL on Pap smear of cervix   • Primary osteoarthritis of right knee   • Stage 1 chronic kidney disease   • Left wrist tendinitis   • Cervical radiculopathy   • Tobacco abuse   • Obesity, morbid, BMI 40.0-49.9 ([facility])   • Encounter for screening for lung cancer   • Urinary frequency   • Diabetes mellitus screening   • MORALES I (cervical intraepithelial neoplasia I)   • Primary osteoarthritis of both knees   • H/O bladder repair surgery   • Chest pain   • Syncope and collapse   • Closed fracture of left distal radius   • Closed displaced fracture of  neck of right fifth metacarpal bone   • Fracture follow-up   • Nondisplaced fracture of base of fifth metacarpal bone, left hand, initial encounter for closed fracture   • Closed fracture of lower end of left radius with routine healing   • Multiple dislocations of fingers, open   • Bilateral chronic knee pain   • Arthritis of left knee   • Arthritis of right knee   • Family history of diabetes mellitus (DM)   • Menopause   • 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   • Memory dysfunction   • Periodic limb movement disorder   • Gait disturbance   • Degenerative disc disease, lumbar   • Osteopenia   • Vaginal atrophy   • Female dyspareunia   • Pituitary cyst ([facility])   • Meningioma ([facility])   • Vitamin B12 deficiency   • Acute urinary tract infection   • Anemia   • Bacterial vaginosis   • Anxiety   • Inappropriate diet and eating habits   • Incomplete emptying of bladder   • Myocardial infarction ([facility])   • Other specified postprocedural states   • Postoperative retention of urine   • Suicide attempt ([facility])   • Urinary urgency   • Vaginal discharge   • Vomiting   • Gastro-esophageal reflux disease with esophagitis, without bleeding   • Encounter for follow-up examination after completed treatment for conditions other than malignant neoplasm   • Tendinitis of left wrist        Past Medical History:   Diagnosis Date   • Abnormal Pap smear of cervix    • Anemia    • Anxiety    • Arthritis    • Atherosclerotic heart disease of native coronary artery with other forms of angina pectoris ([facility])    • Bipolar I disorder, single manic episode ([facility])     depressive d(NOS)   • Cervical disc herniation    • Cervical dysplasia    • Coronary artery disease    • CTS (carpal tunnel syndrome)    • Depression     NO SUICIDAL PLANS   • Difficulty swallowing    • Diverticular disease    • Dyspepsia    • Dysphagia    • Gastric reflux    • GERD (gastroesophageal reflux disease)    • Heart attack ([facility])    • Heart  murmur    • Hiatal hernia    • High cholesterol    • History of transfusion    • 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    HPV positive on pap LGSIL   • Hyperlipidemia    • Hypertension    • Hypothyroidism    • Incontinence in female     wears pads   • Kidney disease 2017    stage 2    • Kidney disease, chronic, stage III (GFR 30-59 ml/min) ([facility])    • LGSIL on Pap smear of cervix 04/29/2016    LGSIL HPV positive   • Myocardial infarction ([facility]) 2000   • Neck pain    • Obesity    • Past myocardial infarction 05/2000   • Periodic limb movement disorder    • Restless leg syndrome    • Sleep apnea     cpap   • Stress fracture     LEFT HEEL   • Suicidal ideation 08/19/2016    history   • Swelling of ankle     right had doppler no s/s blood clot   • Thyroid nodule    • Vitamin B12 deficiency         Past Surgical History:   Procedure Laterality Date   • ANTERIOR CERVICAL DISCECTOMY W/ FUSION N/A 12/29/2016    Procedure: C3-4 anterior cervical discectomy and fusion with Depuy micro plate, ALLOGRAFT C3-4, AND HARDWARE REMOVAL C4-7.;  Surgeon: Hema Godwin MD;  Location: [facility] OR;  Service:    • CARDIAC CATHETERIZATION N/A 3/30/2017    Procedure: Left Heart Cath;  Surgeon: Tracey Vargas MD;  Location: [facility] CATH INVASIVE LOCATION;  Service:    • CARDIAC CATHETERIZATION N/A 3/30/2017    Procedure: Coronary angiography;  Surgeon: Tracey Vargas MD;  Location[facility]AS[facility];  Service:    • CARDIAC CATHETERIZATION N/A 3/30/2017    Procedure: Left ventriculography;  Surgeon: Tracey Vargas MD;  [facility];  Service:    • CARDIAC CATHETERIZATION  3/30/2017    Procedure: Functional Flow [place];  Surgeon: Traecy Vargas MD;  Location: [facility] CATH INVASIVE LOCATION;  Service:    • CARPAL TUNNEL RELEASE     • CERVICAL BIOPSY  MMXVI    Dr. Jeffery.    • CERVICAL DISCECTOMY ANTERIOR  04/2013    C4-7   • COLONOSCOPY  04/20/2015    Diverticulosis, IH   • COLONOSCOPY   03/09/2021   • COLPOSCOPY W/ BIOPSY / CURETTAGE  06/17/2016    LGSIL HPV positive. Results were normal repeat pap in one year. Chronic Cervicitis   • CORONARY ANGIOPLASTY WITH STENT PLACEMENT     • ENDOSCOPY  MMXV    Normal.  Dr. Rodriguez   • ENDOSCOPY N/A 6/22/2017    Erythematous mucosa in the stomach  PATH: Chronic active gastritis, moderate with intestinal metaplasia    • GASTRIC BYPASS      Done using the sleeve technique   • HARDWARE REMOVAL  12/29/2016    cervical   • HERNIA REPAIR     • LAPAROSCOPIC GASTRIC BANDING  02/2018   • SHOULDER SURGERY Left     RCR   • TONSILLECTOMY     • TONSILLECTOMY AND ADENOIDECTOMY     • TRANSVAGINAL TAPING SUSPENSION N/A 12/6/2017    Procedure: MID URETHRAL SLING CYSTSCOPY;  Surgeon: Abby Méndez MD;  Location: [facility];  Service:    • TUBAL ABDOMINAL LIGATION     • TYMPANOSTOMY TUBE PLACEMENT Right    • UPPER GASTROINTESTINAL ENDOSCOPY  approx 2021   • WRIST SURGERY Bilateral     carpal tunnel   • WRIST SURGERY      L wrist/ 4/2020        PT Ortho     Row Name 05/17/22 1220       Subjective Comments    Subjective Comments Reports some soreness after last visit. Today, increased pain include sciatica-like symptoms, noting performing gardening/yardwork bending for prolonged periods.  -JS          User Key  (r) = Recorded By, (t) = Taken By, (c) = Cosigned By    Initials Name Provider Type    Ginger Strickland PT Physical Therapist                             PT Assessment/Plan     Row Name 05/17/22 1220          PT Assessment    Assessment Comments Pt presents with increased subjective report of pain, noting yardwork with poor mechanics which may contribute to exacerbated symptoms. Education provided to improve postural awareness. Pt with improved technique with cervical retraction exercises following cueing with centralized symptoms noted with this movement. Introduced SNAG to address cervical rotation. Pt responds with decreased symptoms following manual  "therapy.  -JS            PT Plan    PT Plan Comments Continue PT, including manual therapy. Review cervical self-SNAG to ensure proper form. Further education on postural awareness & proper body mechanics.  -JS           User Key  (r) = Recorded By, (t) = Taken By, (c) = Cosigned By    Initials Name Provider Type    Ginger Strickland, PT Physical Therapist                   OP Exercises     Row Name 05/17/22 1220             Subjective Comments    Subjective Comments Reports some soreness after last visit. Today, increased pain include sciatica-like symptoms, noting performing gardening/yardwork bending for prolonged periods.  -JS              Subjective Pain    Able to rate subjective pain? yes  -JS      Pre-Treatment Pain Level 8  -JS      Subjective Pain Comment Neck, L shoulder, \"behind\" shoulder blade  -JS              Total Minutes    40588 - PT Therapeutic Exercise Minutes 30  -JS      42215 - PT Manual Therapy Minutes 10  -JS              Exercise 1    Exercise Name 1 Cervical retraction (supine & sitting)  -JS      Cueing 1 Verbal;Demo;Tactile  -JS      Reps 1 10 each  -JS              Exercise 2    Exercise Name 2 Supine cervical retraction with rotation  -JS      Cueing 2 Verbal;Demo;Tactile  -JS      Reps 2 10  -JS      Additional Comments within painfree range  -JS              Exercise 3    Exercise Name 3 Cane shoulder flex AAROM (supine)  -JS      Cueing 3 Verbal;Demo  -JS      Reps 3 10  -JS              Exercise 4    Exercise Name 4 Post shoulder rolls  -JS      Cueing 4 Verbal;Demo  -JS      Reps 4 10  -JS              Exercise 5    Exercise Name 5 UBE  -JS      Cueing 5 Verbal;Demo  -JS      Time 5 4 min  -JS              Exercise 6    Exercise Name 6 Beach chair stretch  -JS      Cueing 6 Verbal;Demo  -JS      Reps 6 2  -JS      Time 6 30 sec  -JS              Exercise 7    Exercise Name 7 SL arc  -JS      Cueing 7 Verbal;Demo  -JS      Reps 7 10 B  -JS      Additional Comments small range, cues to " follow with eyes  -JS              Exercise 8    Exercise Name 8 Row  -JS      Cueing 8 Verbal;Demo  -JS      Reps 8 10  -JS      Additional Comments RTB  -JS              Exercise 9    Exercise Name 9 Shoulder ext  -JS      Cueing 9 Verbal;Demo;Tactile  -JS      Reps 9 10  -JS      Additional Comments RTB  -JS              Exercise 10    Exercise Name 10 Low doorway pec stretch  -JS      Cueing 10 Verbal;Demo  -JS      Reps 10 3  -JS      Time 10 20 sec  -JS              Exercise 11    Exercise Name 11 Seated UT stretch  -JS      Time 11 resume next visit  -JS              Exercise 12    Exercise Name 12 cervical self SNAG  -JS      Reps 12 3B  -JS            User Key  (r) = Recorded By, (t) = Taken By, (c) = Cosigned By    Initials Name Provider Type    Ginger Strickland, PT Physical Therapist                         Manual Rx (last 36 hours)     Manual Treatments     Row Name 05/17/22 1220             Total Minutes    54006 - PT Manual Therapy Minutes 10  -JS              Manual Rx 1    Manual Rx 1 Location B UT/levator stm, intermittent distraction, suboccipital release, PA mobs  -JS            User Key  (r) = Recorded By, (t) = Taken By, (c) = Cosigned By    Initials Name Provider Type    Ginger Strickland, PT Physical Therapist                 PT OP Goals     Row Name 05/17/22 1220          PT Short Term Goals    STG Date to Achieve 05/12/22  -     STG 1 Patient will be independent in initial HEP.  -JS     STG 1 Progress Met  -JS     STG 1 Progress Comments Met for initial HEP  -JS     STG 2 Patient will complete cervical rotation through 50% ROM for improved functional mobility.  -JS     STG 2 Progress Ongoing  -     STG 3 Patient will be independent in postural awareness in sitting and standing for improved mechanics of cervical spine & shoulders.  -     STG 3 Progress Ongoing  -            Long Term Goals    LTG Date to Achieve 06/02/22  -     LTG 1 Patient will be independent in comprehensive HEP to  allow ongoing self-management.  -JS     LTG 1 Progress Ongoing  -JS     LTG 2 Patient will improve NDI score from 28% to 15% or less for improved perceived disability.  -JS     LTG 2 Progress Ongoing  -JS     LTG 3 Patient will improve Quick Dash  score from 40.91 to 25% or less for improved perceived disability of shoulder.  -JS     LTG 3 Progress Ongoing  -JS     LTG 4 Patient will improve cervical rotation to 75% or greater ROM to allow improved functional mobility.  -JS     LTG 4 Progress Ongoing  -JS     LTG 5 Patient will improve shoulder ROM and strength to allow flexion to 160 degrees or greater for improved reaching and functional mobility.  -JS     LTG 5 Progress Ongoing  -JS           User Key  (r) = Recorded By, (t) = Taken By, (c) = Cosigned By    Initials Name Provider Type    Ginger Strickland PT Physical Therapist                Therapy Education  Education Details: Reviewed HEP. Education on proper body mechanics, working on neutral height & sitting on chair to garden vs bending over to reduce strain on lumbar & cervical spine  Given: HEP, Posture/body mechanics              Time Calculation:   Start Time: 1220  Stop Time: 1300  Time Calculation (min): 40 min  Timed Charges  34577 - PT Therapeutic Exercise Minutes: 30  27389 - PT Manual Therapy Minutes: 10  Total Minutes  Timed Charges Total Minutes: 40   Total Minutes: 40  Therapy Charges for Today     Code Description Service Date Service Provider Modifiers Qty    [id number]  PT THER PROC EA 15 MIN 5/17/2022 Ginger Smart PT GP 2    [id number] HC PT MANUAL THERAPY EA 15 MIN 5/17/2022 Ginger Smart PT GP 1                    Ginger Smart PT  5/17/2022

## 2022-05-20 ENCOUNTER — HOSPITAL ENCOUNTER (OUTPATIENT)
Dept: PHYSICAL THERAPY | Facility: HOSPITAL | Age: 62
Setting detail: THERAPIES SERIES
Discharge: HOME | End: 2022-05-20

## 2022-05-20 DIAGNOSIS — M54.2 NECK PAIN: ICD-10-CM

## 2022-05-20 DIAGNOSIS — M54.12 CERVICAL RADICULOPATHY: Primary | ICD-10-CM

## 2022-05-20 DIAGNOSIS — M25.511 RIGHT SHOULDER PAIN, UNSPECIFIED CHRONICITY: ICD-10-CM

## 2022-05-20 PROCEDURE — 97140 MANUAL THERAPY 1/> REGIONS: CPT | Performed by: PHYSICAL THERAPIST

## 2022-05-20 PROCEDURE — 97110 THERAPEUTIC EXERCISES: CPT | Performed by: PHYSICAL THERAPIST

## 2022-05-20 NOTE — THERAPY TREATMENT NOTE
Outpatient Physical Therapy Ortho Treatment Note  [facility]     Patient Name: Keri Bhagat  : 1960  MRN: [id number]  Today's Date: 2022      Visit Date: 2022    Visit Dx:    ICD-10-CM ICD-9-CM   1. Cervical radiculopathy  M54.12 723.4   2. Neck pain  M54.2 723.1   3. Right shoulder pain, unspecified chronicity  M25.511 719.41       Patient Active Problem List   Diagnosis   • Gastroesophageal reflux disease   • Bipolar I disorder, single manic episode ([facility])   • Atherosclerosis of coronary artery   • Essential hypertension   • Lightheadedness   • Low back pain   • Sciatica   • Thyroid nodule   • Fatigue   • Hyperlipidemia   • Hypothyroidism (acquired)   • Iron deficiency anemia   • Right knee pain   • Degenerative disc disease, cervical   • Depression   • ROCKY (obstructive sleep apnea)   • Atherosclerotic heart disease of native coronary artery with other forms of angina pectoris ([facility])   • Cervical spondylosis with radiculopathy   • Chronic pain of both knees   • Arthritis of both knees   • Weight gain, abnormal   • Allergic rhinitis   • Obesity (BMI 30-39.9)   • Plantar fasciitis   • Peroneal tendon injury   • Stress fracture of calcaneus   • Vitamin D deficiency   • Pain and swelling of knee   • Knee injury   • Calcific Achilles tendinitis   • Rod's deformity   • Urgency incontinence   • Nocturia   • Chronic gastritis without bleeding   • LGSIL on Pap smear of cervix   • Primary osteoarthritis of right knee   • Stage 1 chronic kidney disease   • Left wrist tendinitis   • Cervical radiculopathy   • Tobacco abuse   • Obesity, morbid, BMI 40.0-49.9 ([facility])   • Encounter for screening for lung cancer   • Urinary frequency   • Diabetes mellitus screening   • MORALES I (cervical intraepithelial neoplasia I)   • Primary osteoarthritis of both knees   • H/O bladder repair surgery   • Chest pain   • Syncope and collapse   • Closed fracture of left distal radius   • Closed displaced fracture of  neck of right fifth metacarpal bone   • Fracture follow-up   • Nondisplaced fracture of base of fifth metacarpal bone, left hand, initial encounter for closed fracture   • Closed fracture of lower end of left radius with routine healing   • Multiple dislocations of fingers, open   • Bilateral chronic knee pain   • Arthritis of left knee   • Arthritis of right knee   • Family history of diabetes mellitus (DM)   • Menopause   • 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   • Memory dysfunction   • Periodic limb movement disorder   • Gait disturbance   • Degenerative disc disease, lumbar   • Osteopenia   • Vaginal atrophy   • Female dyspareunia   • Pituitary cyst ([facility])   • Meningioma ([facility])   • Vitamin B12 deficiency   • Acute urinary tract infection   • Anemia   • Bacterial vaginosis   • Anxiety   • Inappropriate diet and eating habits   • Incomplete emptying of bladder   • Myocardial infarction ([facility])   • Other specified postprocedural states   • Postoperative retention of urine   • Suicide attempt ([facility])   • Urinary urgency   • Vaginal discharge   • Vomiting   • Gastro-esophageal reflux disease with esophagitis, without bleeding   • Encounter for follow-up examination after completed treatment for conditions other than malignant neoplasm   • Tendinitis of left wrist        Past Medical History:   Diagnosis Date   • Abnormal Pap smear of cervix    • Anemia    • Anxiety    • Arthritis    • Atherosclerotic heart disease of native coronary artery with other forms of angina pectoris ([facility])    • Bipolar I disorder, single manic episode ([facility])     depressive d(NOS)   • Cervical disc herniation    • Cervical dysplasia    • Coronary artery disease    • CTS (carpal tunnel syndrome)    • Depression     NO SUICIDAL PLANS   • Difficulty swallowing    • Diverticular disease    • Dyspepsia    • Dysphagia    • Gastric reflux    • GERD (gastroesophageal reflux disease)    • Heart attack ([facility])    • Heart  murmur    • Hiatal hernia    • High cholesterol    • History of transfusion    • 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    HPV positive on pap LGSIL   • Hyperlipidemia    • Hypertension    • Hypothyroidism    • Incontinence in female     wears pads   • Kidney disease 2017    stage 2    • Kidney disease, chronic, stage III (GFR 30-59 ml/min) ([facility])    • LGSIL on Pap smear of cervix 04/29/2016    LGSIL HPV positive   • Myocardial infarction ([facility]) 2000   • Neck pain    • Obesity    • Past myocardial infarction 05/2000   • Periodic limb movement disorder    • Restless leg syndrome    • Sleep apnea     cpap   • Stress fracture     LEFT HEEL   • Suicidal ideation 08/19/2016    history   • Swelling of ankle     right had doppler no s/s blood clot   • Thyroid nodule    • Vitamin B12 deficiency         Past Surgical History:   Procedure Laterality Date   • ANTERIOR CERVICAL DISCECTOMY W/ FUSION N/A 12/29/2016    Procedure: C3-4 anterior cervical discectomy and fusion with Depuy micro plate, ALLOGRAFT C3-4, AND HARDWARE REMOVAL C4-7.;  Surgeon: Hema Godwin MD;  Location: [facility] OR;  Service:    • CARDIAC CATHETERIZATION N/A 3/30/2017    Procedure: Left Heart Cath;  Surgeon: Tracey Vargas MD;  Location: [facility] CATH INVASIVE LOCATION;  Service:    • CARDIAC CATHETERIZATION N/A 3/30/2017    Procedure: Coronary angiography;  Surgeon: Tracey Vargas MD;  Location: [facility] CATH INVASIVE LOCATION;  Service:    • CARDIAC CATHETERIZATION N/A 3/30/2017    Procedure: Left ventriculography;  Surgeon: Tracey Vargas MD;  Location: [facility] CATH INVASIVE LOCATION;  Service:    • CARDIAC CATHETERIZATION  3/30/2017    Procedure: Functional Flow Kamas;  Surgeon: Tracey Vargas MD;  Location: [facility] CATH INVASIVE LOCATION;  Service:    • CARPAL TUNNEL RELEASE     • CERVICAL BIOPSY  MMXVI    Dr. Jeffery.    • CERVICAL DISCECTOMY ANTERIOR  04/2013    C4-7   • COLONOSCOPY  04/20/2015    Diverticulosis, IH   • COLONOSCOPY   "03/09/2021   • COLPOSCOPY W/ BIOPSY / CURETTAGE  06/17/2016    LGSIL HPV positive. Results were normal repeat pap in one year. Chronic Cervicitis   • CORONARY ANGIOPLASTY WITH STENT PLACEMENT     • ENDOSCOPY  MMXV    Normal.  Dr. Rodriguez   • ENDOSCOPY N/A 6/22/2017    Erythematous mucosa in the stomach  PATH: Chronic active gastritis, moderate with intestinal metaplasia    • GASTRIC BYPASS      Done using the sleeve technique   • HARDWARE REMOVAL  12/29/2016    cervical   • HERNIA REPAIR     • LAPAROSCOPIC GASTRIC BANDING  02/2018   • SHOULDER SURGERY Left     RCR   • TONSILLECTOMY     • TONSILLECTOMY AND ADENOIDECTOMY     • TRANSVAGINAL TAPING SUSPENSION N/A 12/6/2017    Procedure: MID URETHRAL SLING CYSTSCOPY;  Surgeon: Abby Méndez MD;  Location: [facility];  Service:    • TUBAL ABDOMINAL LIGATION     • TYMPANOSTOMY TUBE PLACEMENT Right    • UPPER GASTROINTESTINAL ENDOSCOPY  approx 2021   • WRIST SURGERY Bilateral     carpal tunnel   • WRIST SURGERY      L wrist/ 4/2020        PT Ortho     Row Name 05/20/22 1045       Subjective Comments    Subjective Comments Neck \"is not popping as much\". Continued pain, but notes shoulder pain is the greater area of symptoms.  -JS          User Key  (r) = Recorded By, (t) = Taken By, (c) = Cosigned By    Initials Name Provider Type    Ginger Strickland PT Physical Therapist                             PT Assessment/Plan     Row Name 05/20/22 1045          PT Assessment    Assessment Comments Pt presents with improving cervical pain but no significant change in shoulder pain. Cervical rotation gradually improving phill to R. Progressed shoulder strengthening with theraband shoulder ER. Remaining exercises not modified as pt continues to require intermittent cueing.  -JS           User Key  (r) = Recorded By, (t) = Taken By, (c) = Cosigned By    Initials Name Provider Type    Ginger Strickland PT Physical Therapist                   OP Exercises     Row Name 05/20/22 " "1045             Subjective Comments    Subjective Comments Neck \"is not popping as much\". Continued pain, but notes shoulder pain is the greater area of symptoms.  -JS              Subjective Pain    Able to rate subjective pain? yes  -JS      Pre-Treatment Pain Level 5  -JS      Subjective Pain Comment Neck, R shoulder, L scapula  -JS              Total Minutes    89641 - PT Therapeutic Exercise Minutes 30  -JS      21728 - PT Manual Therapy Minutes 10  -JS              Exercise 1    Exercise Name 1 Cervical retraction (supine & sitting)  -JS      Cueing 1 Verbal;Demo;Tactile  -JS      Reps 1 10 each  -JS              Exercise 2    Exercise Name 2 Supine cervical retraction with rotation  -JS      Cueing 2 Verbal;Demo;Tactile  -JS      Reps 2 10  -JS              Exercise 3    Exercise Name 3 Cane shoulder flex AAROM (supine)  -JS      Cueing 3 Verbal;Demo  -JS      Reps 3 10  -JS              Exercise 4    Exercise Name 4 Post shoulder rolls  -JS      Cueing 4 Verbal;Demo  -JS      Reps 4 10  -JS              Exercise 5    Exercise Name 5 UBE  -JS      Cueing 5 Verbal;Demo  -JS      Time 5 4 min  -JS      Additional Comments 2 min forward/2 min backward  -JS              Exercise 6    Exercise Name 6 Beach chair stretch  -JS      Cueing 6 Verbal;Demo  -JS      Reps 6 2  -JS      Time 6 30 sec  -JS              Exercise 7    Exercise Name 7 SL arc  -JS      Cueing 7 Verbal;Demo  -JS      Reps 7 10 B  -JS      Additional Comments small range, cues to follow with eyes  -JS              Exercise 8    Exercise Name 8 Row  -JS      Cueing 8 Verbal;Demo  -JS      Sets 8 2  -JS      Reps 8 10  -JS      Additional Comments RTB  -JS              Exercise 9    Exercise Name 9 Shoulder ext  -JS      Cueing 9 Verbal;Demo;Tactile  -JS      Sets 9 2  -JS      Reps 9 10  -JS      Additional Comments RTB  -JS              Exercise 10    Exercise Name 10 Low doorway pec stretch  -JS      Cueing 10 Verbal;Demo  -JS      Reps 10 3  " -JS      Time 10 20 sec  -JS              Exercise 11    Exercise Name 11 Seated UT stretch  -JS      Reps 11 3  -JS      Time 11 10 sec B  -JS              Exercise 12    Exercise Name 12 cervical self SNAG  -JS      Reps 12 5B  -JS              Exercise 13    Exercise Name 13 B shoulder ER in supine  -JS      Cueing 13 Verbal;Demo  -JS      Reps 13 10  -JS      Time 13 RTB  -JS            User Key  (r) = Recorded By, (t) = Taken By, (c) = Cosigned By    Initials Name Provider Type    Ginger Strickland, PT Physical Therapist                         Manual Rx (last 36 hours)     Manual Treatments     Row Name 05/20/22 1045             Total Minutes    20932 - PT Manual Therapy Minutes 10  -JS              Manual Rx 1    Manual Rx 1 Location B UT/levator stm, intermittent distraction, suboccipital release, PA mobs  -JS            User Key  (r) = Recorded By, (t) = Taken By, (c) = Cosigned By    Initials Name Provider Type    Ginger Strickland, PT Physical Therapist                 PT OP Goals     Row Name 05/20/22 1045          PT Short Term Goals    STG Date to Achieve 05/12/22  -JS     STG 1 Patient will be independent in initial HEP.  -JS     STG 1 Progress Met  -JS     STG 2 Patient will complete cervical rotation through 50% ROM for improved functional mobility.  -JS     STG 2 Progress Ongoing;Partially Met  -JS     STG 2 Progress Comments Met with R rotation 50%, ongoing for L  -JS     STG 3 Patient will be independent in postural awareness in sitting and standing for improved mechanics of cervical spine & shoulders.  -JS     STG 3 Progress Ongoing  -            Long Term Goals    LTG Date to Achieve 06/02/22  -JS     LTG 1 Patient will be independent in comprehensive HEP to allow ongoing self-management.  -JS     LTG 1 Progress Ongoing  -JS     LTG 2 Patient will improve NDI score from 28% to 15% or less for improved perceived disability.  -JS     LTG 2 Progress Ongoing  -JS     LTG 3 Patient will improve Quick  Dash  score from 40.91 to 25% or less for improved perceived disability of shoulder.  -JS     LTG 3 Progress Ongoing  -JS     LTG 4 Patient will improve cervical rotation to 75% or greater ROM to allow improved functional mobility.  -JS     LTG 4 Progress Ongoing  -JS     LTG 5 Patient will improve shoulder ROM and strength to allow flexion to 160 degrees or greater for improved reaching and functional mobility.  -JS     LTG 5 Progress Ongoing  -JS           User Key  (r) = Recorded By, (t) = Taken By, (c) = Cosigned By    Initials Name Provider Type    Ginger Strickland PT Physical Therapist                Therapy Education  Education Details: Reviewed HEP. Added supine B ER strengthening              Time Calculation:   Start Time: 1045  Stop Time: 1130  Time Calculation (min): 45 min  Timed Charges  92452 - PT Therapeutic Exercise Minutes: 30  88717 - PT Manual Therapy Minutes: 10  Total Minutes  Timed Charges Total Minutes: 40   Total Minutes: 40  Therapy Charges for Today     Code Description Service Date Service Provider Modifiers Qty    [id number]  PT THER PROC EA 15 MIN 5/20/2022 Ginger Smart, PT GP 2    [id number]  PT MANUAL THERAPY EA 15 MIN 5/20/2022 Ginger Smart, PT GP 1                    Ginger Smart PT  5/20/2022

## 2022-05-24 ENCOUNTER — HOSPITAL ENCOUNTER (OUTPATIENT)
Dept: PHYSICAL THERAPY | Facility: HOSPITAL | Age: 62
Setting detail: THERAPIES SERIES
Discharge: HOME | End: 2022-05-24

## 2022-05-24 DIAGNOSIS — M54.12 CERVICAL RADICULOPATHY: Primary | ICD-10-CM

## 2022-05-24 DIAGNOSIS — M54.2 NECK PAIN: ICD-10-CM

## 2022-05-24 DIAGNOSIS — M25.511 RIGHT SHOULDER PAIN, UNSPECIFIED CHRONICITY: ICD-10-CM

## 2022-05-24 PROCEDURE — 97140 MANUAL THERAPY 1/> REGIONS: CPT | Performed by: PHYSICAL THERAPIST

## 2022-05-24 PROCEDURE — 97110 THERAPEUTIC EXERCISES: CPT | Performed by: PHYSICAL THERAPIST

## 2022-05-24 NOTE — THERAPY TREATMENT NOTE
Outpatient Physical Therapy Ortho Treatment Note  [facility]     Patient Name: Keri Bhagat  : 1960  MRN: [id number]  Today's Date: 2022      Visit Date: 2022    Visit Dx:    ICD-10-CM ICD-9-CM   1. Cervical radiculopathy  M54.12 723.4   2. Neck pain  M54.2 723.1   3. Right shoulder pain, unspecified chronicity  M25.511 719.41       Patient Active Problem List   Diagnosis   • Gastroesophageal reflux disease   • Bipolar I disorder, single manic episode ([facility])   • Atherosclerosis of coronary artery   • Essential hypertension   • Lightheadedness   • Low back pain   • Sciatica   • Thyroid nodule   • Fatigue   • Hyperlipidemia   • Hypothyroidism (acquired)   • Iron deficiency anemia   • Right knee pain   • Degenerative disc disease, cervical   • Depression   • ROCKY (obstructive sleep apnea)   • Atherosclerotic heart disease of native coronary artery with other forms of angina pectoris ([facility])   • Cervical spondylosis with radiculopathy   • Chronic pain of both knees   • Arthritis of both knees   • Weight gain, abnormal   • Allergic rhinitis   • Obesity (BMI 30-39.9)   • Plantar fasciitis   • Peroneal tendon injury   • Stress fracture of calcaneus   • Vitamin D deficiency   • Pain and swelling of knee   • Knee injury   • Calcific Achilles tendinitis   • Rod's deformity   • Urgency incontinence   • Nocturia   • Chronic gastritis without bleeding   • LGSIL on Pap smear of cervix   • Primary osteoarthritis of right knee   • Stage 1 chronic kidney disease   • Left wrist tendinitis   • Cervical radiculopathy   • Tobacco abuse   • Obesity, morbid, BMI 40.0-49.9 ([facility])   • Encounter for screening for lung cancer   • Urinary frequency   • Diabetes mellitus screening   • MORALES I (cervical intraepithelial neoplasia I)   • Primary osteoarthritis of both knees   • H/O bladder repair surgery   • Chest pain   • Syncope and collapse   • Closed fracture of left distal radius   • Closed displaced fracture of  neck of right fifth metacarpal bone   • Fracture follow-up   • Nondisplaced fracture of base of fifth metacarpal bone, left hand, initial encounter for closed fracture   • Closed fracture of lower end of left radius with routine healing   • Multiple dislocations of fingers, open   • Bilateral chronic knee pain   • Arthritis of left knee   • Arthritis of right knee   • Family history of diabetes mellitus (DM)   • Menopause   • 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   • Memory dysfunction   • Periodic limb movement disorder   • Gait disturbance   • Degenerative disc disease, lumbar   • Osteopenia   • Vaginal atrophy   • Female dyspareunia   • Pituitary cyst ([facility])   • Meningioma ([facility])   • Vitamin B12 deficiency   • Acute urinary tract infection   • Anemia   • Bacterial vaginosis   • Anxiety   • Inappropriate diet and eating habits   • Incomplete emptying of bladder   • Myocardial infarction ([facility])   • Other specified postprocedural states   • Postoperative retention of urine   • Suicide attempt ([facility])   • Urinary urgency   • Vaginal discharge   • Vomiting   • Gastro-esophageal reflux disease with esophagitis, without bleeding   • Encounter for follow-up examination after completed treatment for conditions other than malignant neoplasm   • Tendinitis of left wrist        Past Medical History:   Diagnosis Date   • Abnormal Pap smear of cervix    • Anemia    • Anxiety    • Arthritis    • Atherosclerotic heart disease of native coronary artery with other forms of angina pectoris ([facility])    • Bipolar I disorder, single manic episode ([facility])     depressive d(NOS)   • Cervical disc herniation    • Cervical dysplasia    • Coronary artery disease    • CTS (carpal tunnel syndrome)    • Depression     NO SUICIDAL PLANS   • Difficulty swallowing    • Diverticular disease    • Dyspepsia    • Dysphagia    • Gastric reflux    • GERD (gastroesophageal reflux disease)    • Heart attack ([facility])    • Heart  murmur    • Hiatal hernia    • High cholesterol    • History of transfusion    • 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    HPV positive on pap LGSIL   • Hyperlipidemia    • Hypertension    • Hypothyroidism    • Incontinence in female     wears pads   • Kidney disease 2017    stage 2    • Kidney disease, chronic, stage III (GFR 30-59 ml/min) ([facility])    • LGSIL on Pap smear of cervix 04/29/2016    LGSIL HPV positive   • Myocardial infarction ([facility]) 2000   • Neck pain    • Obesity    • Past myocardial infarction 05/2000   • Periodic limb movement disorder    • Restless leg syndrome    • Sleep apnea     cpap   • Stress fracture     LEFT HEEL   • Suicidal ideation 08/19/2016    history   • Swelling of ankle     right had doppler no s/s blood clot   • Thyroid nodule    • Vitamin B12 deficiency         Past Surgical History:   Procedure Laterality Date   • ANTERIOR CERVICAL DISCECTOMY W/ FUSION N/A 12/29/2016    Procedure: C3-4 anterior cervical discectomy and fusion with Depuy micro plate, ALLOGRAFT C3-4, AND HARDWARE REMOVAL C4-7.;  Surgeon: Hema Godwin MD;  Location: [facility] OR;  Service:    • CARDIAC CATHETERIZATION N/A 3/30/2017    Procedure: Left Heart Cath;  Surgeon: Tracey Vargas MD;  Location: [facility] CATH INVASIVE LOCATION;  Service:    • CARDIAC CATHETERIZATION N/A 3/30/2017    Procedure: Coronary angiography;  Surgeon: Tracey Vargas MD;  [facility];  Service:    • CARDIAC CATHETERIZATION N/A 3/30/2017    Procedure: Left ventriculography;  Surgeon: Tracey Vargas MD;  [facility];  Service:    • CARDIAC CATHETERIZATION  3/30/2017    Procedure: Functional Flow [place];  Surgeon: Tracey Vargas MD;  Location: [facility] CATH INVASIVE LOCATION;  Service:    • CARPAL TUNNEL RELEASE     • CERVICAL BIOPSY  MMXVI    Dr. Jeffery.    • CERVICAL DISCECTOMY ANTERIOR  04/2013    C4-7   • COLONOSCOPY  04/20/2015    Diverticulosis, IH   • COLONOSCOPY   "03/09/2021   • COLPOSCOPY W/ BIOPSY / CURETTAGE  06/17/2016    LGSIL HPV positive. Results were normal repeat pap in one year. Chronic Cervicitis   • CORONARY ANGIOPLASTY WITH STENT PLACEMENT     • ENDOSCOPY  MMXV    Normal.  Dr. Rodriguez   • ENDOSCOPY N/A 6/22/2017    Erythematous mucosa in the stomach  PATH: Chronic active gastritis, moderate with intestinal metaplasia    • GASTRIC BYPASS      Done using the sleeve technique   • HARDWARE REMOVAL  12/29/2016    cervical   • HERNIA REPAIR     • LAPAROSCOPIC GASTRIC BANDING  02/2018   • SHOULDER SURGERY Left     RCR   • TONSILLECTOMY     • TONSILLECTOMY AND ADENOIDECTOMY     • TRANSVAGINAL TAPING SUSPENSION N/A 12/6/2017    Procedure: MID URETHRAL SLING CYSTSCOPY;  Surgeon: Abby Méndez MD;  Location: [facility];  Service:    • TUBAL ABDOMINAL LIGATION     • TYMPANOSTOMY TUBE PLACEMENT Right    • UPPER GASTROINTESTINAL ENDOSCOPY  approx 2021   • WRIST SURGERY Bilateral     carpal tunnel   • WRIST SURGERY      L wrist/ 4/2020        PT Ortho     Row Name 05/24/22 1040       Subjective Comments    Subjective Comments Would like to consider dry needling, as it has helped in the past. Reports L scapular pain & R shoulder popping intermittently with exercises. \"My knees are killing me\" due to the weather.  -JS       Right Upper Ext    Rt Shoulder Abduction AROM 135  -JS    Rt Shoulder Flexion AROM 155  -JS    Rt Shoulder External Rotation AROM T1 level  -JS    Rt Shoulder Internal Rotation AROM T9 level  -JS       Left Upper Ext    Lt Shoulder Abduction AROM 155  -JS    Lt Shoulder Flexion AROM 155  -JS    Lt Shoulder External Rotation AROM T2 level  -JS    Lt Shoulder Internal Rotation AROM T9 level  -JS          User Key  (r) = Recorded By, (t) = Taken By, (c) = Cosigned By    Initials Name Provider Type    Ginger Strickland, PT Physical Therapist                             PT Assessment/Plan     Row Name 05/24/22 1140 05/24/22 1040       PT Assessment    " "Assessment Comments Patient presents with continued c/o shoulder & cervical pain, though subjective pain rating continues to improve. Pt demonstrates normal R shoulder PROM and improving B shoulder & cervical AROM. Making progress with goals with STG's met and progress toward LTGs.  -JS --  -JS       PT Plan    PT Plan Comments Continue PT for postural strengthening, flexibility and ROM exercises.  Assess for DDD as appropriate.  -JS --          User Key  (r) = Recorded By, (t) = Taken By, (c) = Cosigned By    Initials Name Provider Type    Ginger Strickland, PT Physical Therapist                   OP Exercises     Row Name 05/24/22 1140 05/24/22 1040          Subjective Comments    Subjective Comments -- Would like to consider dry needling, as it has helped in the past. Reports L scapular pain & R shoulder popping intermittently with exercises. \"My knees are killing me\" due to the weather.  -JS            Subjective Pain    Able to rate subjective pain? -- yes  -JS     Pre-Treatment Pain Level -- 3  -JS     Subjective Pain Comment -- neck/L scapular area, R shoulder  -JS            Total Minutes    36277 - PT Therapeutic Exercise Minutes -- 35  -JS     26082 - PT Manual Therapy Minutes 10  -JS --  -JS            Exercise 1    Exercise Name 1 -- Cervical retraction (supine & sitting)  -JS     Cueing 1 -- Verbal;Demo  -JS     Reps 1 -- 10  -JS            Exercise 2    Exercise Name 2 -- Sitting cervical retraction with rotation  -JS     Cueing 2 -- Verbal;Demo  -JS     Reps 2 -- 10  -JS            Exercise 3    Exercise Name 3 -- Cane shoulder flex AAROM (supine)  -JS     Cueing 3 -- Verbal;Demo  -JS     Reps 3 -- 10  -JS            Exercise 4    Exercise Name 4 -- Post shoulder rolls  -JS     Cueing 4 -- Verbal;Demo  -JS     Reps 4 -- 10  -JS            Exercise 5    Exercise Name 5 -- UBE  -JS     Cueing 5 -- Verbal;Demo  -JS     Time 5 -- 4 min  -JS     Additional Comments -- 2 min forward/2 min backward  -JS         "    Exercise 6    Exercise Name 6 -- Beach chair stretch  -JS     Cueing 6 -- Verbal;Demo  -JS     Reps 6 -- 2  -JS     Time 6 -- 30 sec  -JS            Exercise 8    Exercise Name 8 -- Row  -JS     Cueing 8 -- Verbal;Demo  -JS     Sets 8 -- 2  -JS     Reps 8 -- 10  -JS     Additional Comments -- RTB  -JS            Exercise 9    Exercise Name 9 -- Shoulder ext  -JS     Cueing 9 -- Verbal;Demo;Tactile  -JS     Sets 9 -- 2  -JS     Reps 9 -- 10  -JS     Additional Comments -- RTB  -JS            Exercise 10    Exercise Name 10 -- Low doorway pec stretch  -JS     Cueing 10 -- Verbal;Demo  -JS     Reps 10 -- 3  -JS     Time 10 -- 20 sec  -JS            Exercise 11    Exercise Name 11 -- Seated UT stretch  -JS     Reps 11 -- 3  -JS     Time 11 -- 10 sec B  -JS            Exercise 13    Exercise Name 13 -- B shoulder ER in supine  -JS     Cueing 13 -- Verbal;Demo  -JS     Sets 13 -- 2  -JS     Reps 13 -- 10  -JS     Time 13 -- RTB  -JS            Exercise 14    Exercise Name 14 -- Serratus ant punch- supine  -JS     Cueing 14 -- Verbal;Demo  -JS     Reps 14 -- 10  -JS            Exercise 15    Exercise Name 15 -- Supine HA  -JS     Cueing 15 -- Verbal;Demo  -JS     Reps 15 -- 10  -JS           User Key  (r) = Recorded By, (t) = Taken By, (c) = Cosigned By    Initials Name Provider Type    Ginger Strickland, PT Physical Therapist                         Manual Rx (last 36 hours)     Manual Treatments     Row Name 05/24/22 1140 05/24/22 1040          Total Minutes    07771 - PT Manual Therapy Minutes 10  -JS --  -JS            Manual Rx 1    Manual Rx 1 Location B UT/levator stm, intermittent distraction, suboccipital release, PA mobs  -JS --  -JS     Manual Rx 1 Type PROM R shoulder  -JS --           User Key  (r) = Recorded By, (t) = Taken By, (c) = Cosigned By    Initials Name Provider Type    JS Ginger Smart, PT Physical Therapist                 PT OP Goals     Row Name 05/24/22 1140          PT Short Term Goals    STG  Date to Achieve 05/12/22  -JS     STG 1 Patient will be independent in initial HEP.  -JS     STG 1 Progress Met  -JS     STG 2 Patient will complete cervical rotation through 50% ROM for improved functional mobility.  -JS     STG 2 Progress Met  -JS     STG 3 Patient will be independent in postural awareness in sitting and standing for improved mechanics of cervical spine & shoulders.  -JS     STG 3 Progress Met  -JS            Long Term Goals    LTG Date to Achieve 06/02/22  -JS     LTG 1 Patient will be independent in comprehensive HEP to allow ongoing self-management.  -JS     LTG 1 Progress Ongoing  -JS     LTG 2 Patient will improve NDI score from 28% to 15% or less for improved perceived disability.  -JS     LTG 2 Progress Ongoing  -JS     LTG 3 Patient will improve Quick Dash  score from 40.91 to 25% or less for improved perceived disability of shoulder.  -JS     LTG 3 Progress Ongoing  -JS     LTG 4 Patient will improve cervical rotation to 75% or greater ROM to allow improved functional mobility.  -JS     LTG 4 Progress Ongoing  -JS     LTG 4 Progress Comments 50% Bilaterally  -JS     LTG 5 Patient will improve shoulder ROM and strength to allow flexion to 160 degrees or greater for improved reaching and functional mobility.  -JS     LTG 5 Progress Ongoing;Progressing  -JS     LTG 5 Progress Comments 155 degrees bilaterally  -JS           User Key  (r) = Recorded By, (t) = Taken By, (c) = Cosigned By    Initials Name Provider Type    Ginger Strickland, PT Physical Therapist                Therapy Education  Education Details: Reviewed & updated HEP with written instruction issued for theraband exercises, RTB issued via [employer] [id number]  Given: HEP  Program: Reinforced, Progressed  How Provided: Verbal, Demonstration, Written  Provided to: Patient  Level of Understanding: Teach back education performed, Verbalized, Demonstrated              Time Calculation:   Start Time: 1140  Stop Time: 1220  Time  Calculation (min): 40 min  Timed Charges  55115 - PT Therapeutic Exercise Minutes: 35  69252 - PT Manual Therapy Minutes: 10  Total Minutes  Timed Charges Total Minutes: 10   Total Minutes: 10  Therapy Charges for Today     Code Description Service Date Service Provider Modifiers Qty    [id number]  PT THER PROC EA 15 MIN 5/24/2022 Ginger Smart, PT GP 2    [id number]  PT MANUAL THERAPY EA 15 MIN 5/24/2022 Ginger Smart, PT GP 1                    Ginger Smart PT  5/24/2022

## 2022-05-26 NOTE — TELEPHONE ENCOUNTER
Rx Refill Note  Requested Prescriptions     Pending Prescriptions Disp Refills   • isosorbide mononitrate (IMDUR) 30 MG 24 hr tablet 30 tablet 0     Sig: Take 1 tablet by mouth Daily.      Last office visit with prescribing clinician: 4/26/2022      Next office visit with prescribing clinician: 6/7/2022            Yasmine Miller MA  05/26/22, 16:42 EDT

## 2022-05-27 RX ORDER — ISOSORBIDE MONONITRATE 30 MG/1
30 TABLET, EXTENDED RELEASE ORAL DAILY
Qty: 90 TABLET | Refills: 1 | Status: SHIPPED | OUTPATIENT
Start: 2022-05-27 | End: 2022-12-22

## 2022-06-01 ENCOUNTER — APPOINTMENT (OUTPATIENT)
Dept: PHYSICAL THERAPY | Facility: HOSPITAL | Age: 62
End: 2022-06-01

## 2022-06-03 ENCOUNTER — HOSPITAL ENCOUNTER (OUTPATIENT)
Dept: PHYSICAL THERAPY | Facility: HOSPITAL | Age: 62
Setting detail: THERAPIES SERIES
Discharge: HOME | End: 2022-06-03

## 2022-06-03 DIAGNOSIS — M25.511 RIGHT SHOULDER PAIN, UNSPECIFIED CHRONICITY: ICD-10-CM

## 2022-06-03 DIAGNOSIS — M54.12 CERVICAL RADICULOPATHY: Primary | ICD-10-CM

## 2022-06-03 DIAGNOSIS — M54.2 NECK PAIN: ICD-10-CM

## 2022-06-03 PROCEDURE — 97530 THERAPEUTIC ACTIVITIES: CPT | Performed by: PHYSICAL THERAPIST

## 2022-06-03 NOTE — THERAPY TREATMENT NOTE
Outpatient Physical Therapy Ortho Treatment Note  [facility]     Patient Name: Keri Bhagat  : 1960  MRN: [id number]  Today's Date: 6/3/2022      Visit Date: 2022    Visit Dx:    ICD-10-CM ICD-9-CM   1. Cervical radiculopathy  M54.12 723.4   2. Neck pain  M54.2 723.1   3. Right shoulder pain, unspecified chronicity  M25.511 719.41       Patient Active Problem List   Diagnosis   • Gastroesophageal reflux disease   • Bipolar I disorder, single manic episode ([facility])   • Atherosclerosis of coronary artery   • Essential hypertension   • Lightheadedness   • Low back pain   • Sciatica   • Thyroid nodule   • Fatigue   • Hyperlipidemia   • Hypothyroidism (acquired)   • Iron deficiency anemia   • Right knee pain   • Degenerative disc disease, cervical   • Depression   • ROCKY (obstructive sleep apnea)   • Atherosclerotic heart disease of native coronary artery with other forms of angina pectoris ([facility])   • Cervical spondylosis with radiculopathy   • Chronic pain of both knees   • Arthritis of both knees   • Weight gain, abnormal   • Allergic rhinitis   • Obesity (BMI 30-39.9)   • Plantar fasciitis   • Peroneal tendon injury   • Stress fracture of calcaneus   • Vitamin D deficiency   • Pain and swelling of knee   • Knee injury   • Calcific Achilles tendinitis   • Rod's deformity   • Urgency incontinence   • Nocturia   • Chronic gastritis without bleeding   • LGSIL on Pap smear of cervix   • Primary osteoarthritis of right knee   • Stage 1 chronic kidney disease   • Left wrist tendinitis   • Cervical radiculopathy   • Tobacco abuse   • Obesity, morbid, BMI 40.0-49.9 ([facility])   • Encounter for screening for lung cancer   • Urinary frequency   • Diabetes mellitus screening   • MORALES I (cervical intraepithelial neoplasia I)   • Primary osteoarthritis of both knees   • H/O bladder repair surgery   • Chest pain   • Syncope and collapse   • Closed fracture of left distal radius   • Closed displaced fracture of  neck of right fifth metacarpal bone   • Fracture follow-up   • Nondisplaced fracture of base of fifth metacarpal bone, left hand, initial encounter for closed fracture   • Closed fracture of lower end of left radius with routine healing   • Multiple dislocations of fingers, open   • Bilateral chronic knee pain   • Arthritis of left knee   • Arthritis of right knee   • Family history of diabetes mellitus (DM)   • Menopause   • 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   • Memory dysfunction   • Periodic limb movement disorder   • Gait disturbance   • Degenerative disc disease, lumbar   • Osteopenia   • Vaginal atrophy   • Female dyspareunia   • Pituitary cyst ([facility])   • Meningioma ([facility])   • Vitamin B12 deficiency   • Acute urinary tract infection   • Anemia   • Bacterial vaginosis   • Anxiety   • Inappropriate diet and eating habits   • Incomplete emptying of bladder   • Myocardial infarction ([facility])   • Other specified postprocedural states   • Postoperative retention of urine   • Suicide attempt ([facility])   • Urinary urgency   • Vaginal discharge   • Vomiting   • Gastro-esophageal reflux disease with esophagitis, without bleeding   • Encounter for follow-up examination after completed treatment for conditions other than malignant neoplasm   • Tendinitis of left wrist        Past Medical History:   Diagnosis Date   • Abnormal Pap smear of cervix    • Anemia    • Anxiety    • Arthritis    • Atherosclerotic heart disease of native coronary artery with other forms of angina pectoris ([facility])    • Bipolar I disorder, single manic episode ([facility])     depressive d(NOS)   • Cervical disc herniation    • Cervical dysplasia    • Coronary artery disease    • CTS (carpal tunnel syndrome)    • Depression     NO SUICIDAL PLANS   • Difficulty swallowing    • Diverticular disease    • Dyspepsia    • Dysphagia    • Gastric reflux    • GERD (gastroesophageal reflux disease)    • Heart attack ([facility])    • Heart  murmur    • Hiatal hernia    • High cholesterol    • History of transfusion    • 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    HPV positive on pap LGSIL   • Hyperlipidemia    • Hypertension    • Hypothyroidism    • Incontinence in female     wears pads   • Kidney disease 2017    stage 2    • Kidney disease, chronic, stage III (GFR 30-59 ml/min) ([facility])    • LGSIL on Pap smear of cervix 04/29/2016    LGSIL HPV positive   • Myocardial infarction ([facility]) 2000   • Neck pain    • Obesity    • Past myocardial infarction 05/2000   • Periodic limb movement disorder    • Restless leg syndrome    • Sleep apnea     cpap   • Stress fracture     LEFT HEEL   • Suicidal ideation 08/19/2016    history   • Swelling of ankle     right had doppler no s/s blood clot   • Thyroid nodule    • Vitamin B12 deficiency         Past Surgical History:   Procedure Laterality Date   • ANTERIOR CERVICAL DISCECTOMY W/ FUSION N/A 12/29/2016    Procedure: C3-4 anterior cervical discectomy and fusion with Depuy micro plate, ALLOGRAFT C3-4, AND HARDWARE REMOVAL C4-7.;  Surgeon: Hema Godwin MD;  Location: [facility] OR;  Service:    • CARDIAC CATHETERIZATION N/A 3/30/2017    Procedure: Left Heart Cath;  Surgeon: Tracey Vargas MD;  Location: [facility] CATH INVASIVE LOCATION;  Service:    • CARDIAC CATHETERIZATION N/A 3/30/2017    Procedure: Coronary angiography;  Surgeon: Tracey Vargas MD;  Location[facility] C[facility]ASIVE LOC[facility];  Service:    • CARDIAC CATHETERIZATION N/A 3/30/2017    Procedure: Left ventriculography;  Surgeon: Tracey Vargas MD;  [facility];  Service:    • CARDIAC CATHETERIZATION  3/30/2017    Procedure: Functional Flow [place];  Surgeon: Tracey Vargas MD;  Location: [facility] CATH INVASIVE LOCATION;  Service:    • CARPAL TUNNEL RELEASE     • CERVICAL BIOPSY  MMXVI    Dr. Jeffery.    • CERVICAL DISCECTOMY ANTERIOR  04/2013    C4-7   • COLONOSCOPY  04/20/2015    Diverticulosis, IH   • COLONOSCOPY   03/09/2021   • COLPOSCOPY W/ BIOPSY / CURETTAGE  06/17/2016    LGSIL HPV positive. Results were normal repeat pap in one year. Chronic Cervicitis   • CORONARY ANGIOPLASTY WITH STENT PLACEMENT     • ENDOSCOPY  MMXV    Normal.  Dr. Rodriguez   • ENDOSCOPY N/A 6/22/2017    Erythematous mucosa in the stomach  PATH: Chronic active gastritis, moderate with intestinal metaplasia    • GASTRIC BYPASS      Done using the sleeve technique   • HARDWARE REMOVAL  12/29/2016    cervical   • HERNIA REPAIR     • LAPAROSCOPIC GASTRIC BANDING  02/2018   • SHOULDER SURGERY Left     RCR   • TONSILLECTOMY     • TONSILLECTOMY AND ADENOIDECTOMY     • TRANSVAGINAL TAPING SUSPENSION N/A 12/6/2017    Procedure: MID URETHRAL SLING CYSTSCOPY;  Surgeon: Abby Méndez MD;  Location: [facility];  Service:    • TUBAL ABDOMINAL LIGATION     • TYMPANOSTOMY TUBE PLACEMENT Right    • UPPER GASTROINTESTINAL ENDOSCOPY  approx 2021   • WRIST SURGERY Bilateral     carpal tunnel   • WRIST SURGERY      L wrist/ 4/2020                        PT Assessment/Plan     Row Name 06/03/22 1300          PT Assessment    Assessment Comments Following informed consent (writtent/verbal), we proceed with DDN of bilateral UT tissues.  She demosntrated multiple moderate to large LTR's in R>L LTR's.  She did not demonstrate any immediate adverse response.  -GJ            PT Plan    PT Plan Comments assess response to DDN  -           User Key  (r) = Recorded By, (t) = Taken By, (c) = Cosigned By    Initials Name Provider Type    Abel Bernstein W, PT Physical Therapist                               Manual Rx (last 36 hours)     Manual Treatments     Row Name 06/03/22 1300             Manual Rx 2    Manual Rx 2 Location intramuscular manual therapy  - Assessed pt. for Dry Needling and intramuscular manual therapy. Discussed risks/benefits of Dry Needling with pt, including but not limited to muscle soreness, bruising, vasovagal response, pneumothorax, nerve  injury. Patient signed written consent to proceed with treatment.    Patient position during treatment: prone, nose in nose hole  Muscles treated: R/L UT    Response to treatment: multiple moderate to large LTR's R>L LTR's.  No immediate adverse response.      palpation used before, during, and after to facilitate assessment Clean needle technique observed at all times, precautions for lung fields, neurovascular structures observed.    DDN performed by Abel Sorensen II, PT, DPT     Manual Rx 2 Type R/L UT  -GJ            User Key  (r) = Recorded By, (t) = Taken By, (c) = Cosigned By    Initials Name Provider Type    Abel Bernstein, PT Physical Therapist                    Therapy Education  Education Details: discussed risks/benefits of DDN and it's role in therapy as an adjunct therapy and not a stand alone treatment  Given: Symptoms/condition management              Time Calculation:   Start Time: 1149  Stop Time: 1215  Time Calculation (min): 26 min  Therapy Charges for Today     Code Description Service Date Service Provider Modifiers Qty    [id number]  PT SELF PAY DRY NEEDLING SESSION 6/3/2022 Abel Sorensen, PT  1                    Abel Sorensen, PT  6/3/2022

## 2022-06-03 NOTE — THERAPY PROGRESS REPORT/RE-CERT
Outpatient Physical Therapy Ortho Progress Note  [facility]     Patient Name: Keri Bhagat  : 1960  MRN: [id number]  Today's Date: 6/3/2022      Visit Date: 2022    Visit Dx:    ICD-10-CM ICD-9-CM   1. Cervical radiculopathy  M54.12 723.4   2. Neck pain  M54.2 723.1   3. Right shoulder pain, unspecified chronicity  M25.511 719.41       Patient Active Problem List   Diagnosis   • Gastroesophageal reflux disease   • Bipolar I disorder, single manic episode ([facility])   • Atherosclerosis of coronary artery   • Essential hypertension   • Lightheadedness   • Low back pain   • Sciatica   • Thyroid nodule   • Fatigue   • Hyperlipidemia   • Hypothyroidism (acquired)   • Iron deficiency anemia   • Right knee pain   • Degenerative disc disease, cervical   • Depression   • ROCKY (obstructive sleep apnea)   • Atherosclerotic heart disease of native coronary artery with other forms of angina pectoris ([facility])   • Cervical spondylosis with radiculopathy   • Chronic pain of both knees   • Arthritis of both knees   • Weight gain, abnormal   • Allergic rhinitis   • Obesity (BMI 30-39.9)   • Plantar fasciitis   • Peroneal tendon injury   • Stress fracture of calcaneus   • Vitamin D deficiency   • Pain and swelling of knee   • Knee injury   • Calcific Achilles tendinitis   • Rod's deformity   • Urgency incontinence   • Nocturia   • Chronic gastritis without bleeding   • LGSIL on Pap smear of cervix   • Primary osteoarthritis of right knee   • Stage 1 chronic kidney disease   • Left wrist tendinitis   • Cervical radiculopathy   • Tobacco abuse   • Obesity, morbid, BMI 40.0-49.9 ([facility])   • Encounter for screening for lung cancer   • Urinary frequency   • Diabetes mellitus screening   • MORALES I (cervical intraepithelial neoplasia I)   • Primary osteoarthritis of both knees   • H/O bladder repair surgery   • Chest pain   • Syncope and collapse   • Closed fracture of left distal radius   • Closed displaced fracture of neck  of right fifth metacarpal bone   • Fracture follow-up   • Nondisplaced fracture of base of fifth metacarpal bone, left hand, initial encounter for closed fracture   • Closed fracture of lower end of left radius with routine healing   • Multiple dislocations of fingers, open   • Bilateral chronic knee pain   • Arthritis of left knee   • Arthritis of right knee   • Family history of diabetes mellitus (DM)   • Menopause   • 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   • Memory dysfunction   • Periodic limb movement disorder   • Gait disturbance   • Degenerative disc disease, lumbar   • Osteopenia   • Vaginal atrophy   • Female dyspareunia   • Pituitary cyst ([facility])   • Meningioma ([facility])   • Vitamin B12 deficiency   • Acute urinary tract infection   • Anemia   • Bacterial vaginosis   • Anxiety   • Inappropriate diet and eating habits   • Incomplete emptying of bladder   • Myocardial infarction ([facility])   • Other specified postprocedural states   • Postoperative retention of urine   • Suicide attempt ([facility])   • Urinary urgency   • Vaginal discharge   • Vomiting   • Gastro-esophageal reflux disease with esophagitis, without bleeding   • Encounter for follow-up examination after completed treatment for conditions other than malignant neoplasm   • Tendinitis of left wrist        Past Medical History:   Diagnosis Date   • Abnormal Pap smear of cervix    • Anemia    • Anxiety    • Arthritis    • Atherosclerotic heart disease of native coronary artery with other forms of angina pectoris ([facility])    • Bipolar I disorder, single manic episode ([facility])     depressive d(NOS)   • Cervical disc herniation    • Cervical dysplasia    • Coronary artery disease    • CTS (carpal tunnel syndrome)    • Depression     NO SUICIDAL PLANS   • Difficulty swallowing    • Diverticular disease    • Dyspepsia    • Dysphagia    • Gastric reflux    • GERD (gastroesophageal reflux disease)    • Heart attack ([facility])    • Heart murmur     • Hiatal hernia    • High cholesterol    • History of transfusion    • 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    HPV positive on pap LGSIL   • Hyperlipidemia    • Hypertension    • Hypothyroidism    • Incontinence in female     wears pads   • Kidney disease 2017    stage 2    • Kidney disease, chronic, stage III (GFR 30-59 ml/min) ([facility])    • LGSIL on Pap smear of cervix 04/29/2016    LGSIL HPV positive   • Myocardial infarction ([facility]) 2000   • Neck pain    • Obesity    • Past myocardial infarction 05/2000   • Periodic limb movement disorder    • Restless leg syndrome    • Sleep apnea     cpap   • Stress fracture     LEFT HEEL   • Suicidal ideation 08/19/2016    history   • Swelling of ankle     right had doppler no s/s blood clot   • Thyroid nodule    • Vitamin B12 deficiency         Past Surgical History:   Procedure Laterality Date   • ANTERIOR CERVICAL DISCECTOMY W/ FUSION N/A 12/29/2016    Procedure: C3-4 anterior cervical discectomy and fusion with Depuy micro plate, ALLOGRAFT C3-4, AND HARDWARE REMOVAL C4-7.;  Surgeon: Hema Godwin MD;  Location: [facility] OR;  Service:    • CARDIAC CATHETERIZATION N/A 3/30/2017    Procedure: Left Heart Cath;  Surgeon: Tracey Vargas MD;  [facility];  Service:    • CARDIAC CATHETERIZATION N/A 3/30/2017    Procedure: Coronary angiography;  Surgeon: Tracey Vargas MD;  Location:  [facility] CATH INVASIVE LOCATION;  Service:    • CARDIAC CATHETERIZATION N/A 3/30/2017    Procedure: Left ventriculography;  Surgeon: Tracey Vargas MD;  [facility];  Service:    • CARDIAC CATHETERIZATION  3/30/2017    Procedure: Functional Flow [place];  Surgeon: Tracey Vargas MD;  [facility];  Service:    • CARPAL TUNNEL RELEASE     • CERVICAL BIOPSY  MMXVI    Dr. Jeffery.    • CERVICAL DISCECTOMY ANTERIOR  04/2013    C4-7   • COLONOSCOPY  04/20/2015    Diverticulosis, IH   • COLONOSCOPY  03/09/2021    • COLPOSCOPY W/ BIOPSY / CURETTAGE  06/17/2016    LGSIL HPV positive. Results were normal repeat pap in one year. Chronic Cervicitis   • CORONARY ANGIOPLASTY WITH STENT PLACEMENT     • ENDOSCOPY  MMXV    Normal.  Dr. Rodriguez   • ENDOSCOPY N/A 6/22/2017    Erythematous mucosa in the stomach  PATH: Chronic active gastritis, moderate with intestinal metaplasia    • GASTRIC BYPASS      Done using the sleeve technique   • HARDWARE REMOVAL  12/29/2016    cervical   • HERNIA REPAIR     • LAPAROSCOPIC GASTRIC BANDING  02/2018   • SHOULDER SURGERY Left     RCR   • TONSILLECTOMY     • TONSILLECTOMY AND ADENOIDECTOMY     • TRANSVAGINAL TAPING SUSPENSION N/A 12/6/2017    Procedure: MID URETHRAL SLING CYSTSCOPY;  Surgeon: Abby Méndez MD;  Location: [facility];  Service:    • TUBAL ABDOMINAL LIGATION     • TYMPANOSTOMY TUBE PLACEMENT Right    • UPPER GASTROINTESTINAL ENDOSCOPY  approx 2021   • WRIST SURGERY Bilateral     carpal tunnel   • WRIST SURGERY      L wrist/ 4/2020        PT Ortho     Row Name 06/03/22 1100       Subjective Comments    Subjective Comments i have good days and bad days. I see Dr. Godwin for my neck soon. I want to try dry needling  -GJ       Myotomal Screen- Upper Quarter Clearing    Shoulder flexion (C5) Bilateral:;4- (Good -);4 (Good)  in available range  -GJ    Elbow flexion/wrist extension (C6) Bilateral:;4+ (Good +)  -GJ    Elbow extension/wrist flexion (C7) Right:;4- (Good -);Left:;4+ (Good +)  -GJ       Cervical/Shoulder ROM Screen    Cervical flexion Impaired  25% impaired  -GJ    Cervical extension Impaired  25% impaired, guarded, painful  -GJ    Cervical rotation Impaired  R 50, L 35, painful  -GJ       Special Tests/Palpation    Special Tests/Palpation Cervical/Thoracic  -GJ       Cervical Palpation    Cervical Palpation- Location? Levator scapula;Upper traps  -GJ    Levator Scapula Bilateral:;Tender;Guarded/taut;Trigger point  -GJ    Upper Traps  Bilateral:;Tender;Guarded/taut;Trigger point  -GJ       Right Upper Ext    Rt Shoulder Abduction AROM 115 seated  -GJ    Rt Shoulder Abduction PROM 175 supine  -GJ    Rt Shoulder Flexion AROM 140 seated  -GJ    Rt Shoulder Flexion PROM 170 supine  -GJ    Rt Shoulder External Rotation PROM 90 supine, POS, 45 abd  -GJ    Rt Shoulder Internal Rotation AROM FIR midline T8/9  -GJ    Rt Shoulder Internal Rotation PROM supine pos, 70  -GJ       Left Upper Ext    Lt Shoulder Abduction AROM 135 seated  -GJ    Lt Shoulder Abduction PROM 175 supien  -GJ    Lt Shoulder Flexion AROM 145  seated  -GJ    Lt Shoulder Flexion PROM 170 supine  -GJ    Lt Shoulder External Rotation PROM 90 supine pos 45 abd  -GJ    Lt Shoulder Internal Rotation AROM FIR midline T 8  -GJ    Lt Shoulder Internal Rotation PROM 90 supine pos 45 abd  -GJ          User Key  (r) = Recorded By, (t) = Taken By, (c) = Cosigned By    Initials Name Provider Type    Abel Bernstein, PT Physical Therapist                             PT Assessment/Plan     Row Name 06/03/22 1118          PT Assessment    Functional Limitations Limitation in home management;Limitations in community activities;Performance in leisure activities  -     Impairments Impaired flexibility;Impaired muscle endurance;Impaired muscle length;Impaired muscle power;Impaired postural alignment;Joint mobility;Muscle strength;Motor function;Pain;Poor body mechanics;Posture;Range of motion  -     Assessment Comments Ms. Bhagat is a 62 y/o R handed female. She has attended 6 sessions of physical therapy fo rher L sided c spine pain and R shoulder pain.  She returns to the clinic this morning, reporting that she has some good days/bad days.  She reports she is 60% improved.  Progress toward goals is fair, having met 3 of 3 STG's and 0 of 5 LTG's.  She demosntrates limited R>L shoulder AROM, near full PROM bilaterally without pain. She demonstrates decreased R>L shoulder strength and R elbow  extension strength. She is to see Dr. Godwin (has peformed 2 previous c spine surgeries for her) in the next week or two.  She demosntrates (+) trigger points bilateral UT/levator tissues. Ms. Bhagat continues to be a good candidate for skilled physical therapy.  -GJ     Please refer to paper survey for additional self-reported information Yes  -GJ     Rehab Potential Good  -GJ     Patient/caregiver participated in establishment of treatment plan and goals Yes  -GJ     Patient would benefit from skilled therapy intervention Yes  -GJ            PT Plan    PT Frequency 1x/week;2x/week  -GJ     Predicted Duration of Therapy Intervention (PT) 10 visits  -GJ     Planned CPT's? PT RE-EVAL: 10132;PT THER PROC EA 15 MIN: 80243;PT THER ACT EA 15 MIN: 80624;PT MANUAL THERAPY EA 15 MIN: 56035;PT NEUROMUSC RE-EDUCATION EA 15 MIN: 73287;PT HOT OR COLD PACK TREAT MCARE;PT ELECTRICAL STIM UNATTEND: ;PT HOT/COLD PACK WC NONMCARE: 00769  -GJ     PT Plan Comments assess response to DDN, repeat as appropriate, work on scapular gridle strengthening shoulder ROM  -GJ           User Key  (r) = Recorded By, (t) = Taken By, (c) = Cosigned By    Initials Name Provider Type    Abel Bernstein, PT Physical Therapist                   OP Exercises     Row Name 06/03/22 1104 06/03/22 1100          Subjective Comments    Subjective Comments -- i have good days and bad days. I see Dr. Godwin for my neck soon. I want to try dry needling  -GJ            Total Minutes    71669 - PT Therapeutic Activity Minutes 15  -GJ --           User Key  (r) = Recorded By, (t) = Taken By, (c) = Cosigned By    Initials Name Provider Type    Abel Bernstein, PT Physical Therapist                              PT OP Goals     Row Name 06/03/22 1100          PT Short Term Goals    STG Date to Achieve 05/12/22  -GJ     STG 1 Patient will be independent in initial HEP.  -GJ     STG 1 Progress Met  -GJ     STG 2 Patient will complete cervical rotation  through 50% ROM for improved functional mobility.  -GJ     STG 2 Progress Met  -GJ     STG 3 Patient will be independent in postural awareness in sitting and standing for improved mechanics of cervical spine & shoulders.  -GJ     STG 3 Progress Met  -GJ            Long Term Goals    LTG Date to Achieve 08/12/22  -GJ     LTG 1 Patient will be independent in comprehensive HEP to allow ongoing self-management.  -GJ     LTG 1 Progress Ongoing  -GJ     LTG 2 Patient will improve NDI score from 28% to 15% or less for improved perceived disability.  -GJ     LTG 2 Progress Ongoing  -GJ     LTG 2 Progress Comments 22%  -GJ     LTG 3 Patient will improve Quick Dash  score from 40.91 to 25% or less for improved perceived disability of shoulder.  -GJ     LTG 3 Progress Ongoing  -GJ     LTG 3 Progress Comments 34%  -GJ     LTG 4 Patient will improve cervical rotation to 75% or greater ROM to allow improved functional mobility.  -GJ     LTG 4 Progress Partially Met  -GJ     LTG 5 Patient will improve shoulder ROM and strength to allow flexion to 160 degrees or greater for improved reaching and functional mobility.  -GJ     LTG 5 Progress Ongoing;Progressing  -GJ     LTG 5 Progress Comments see ortho  -GJ           User Key  (r) = Recorded By, (t) = Taken By, (c) = Cosigned By    Initials Name Provider Type    Abel Bernstein, PT Physical Therapist                Therapy Education  Education Details: discussed progressed to date  Given: HEP, Symptoms/condition management, Pain management, Posture/body mechanics, Mobility training  Program: Reinforced  How Provided: Verbal  Provided to: Patient  Level of Understanding: Verbalized    Outcome Measure Options: Neck Disability Index (NDI), Quick DASH  Quick DASH  Open a tight or new jar.: Mild Difficulty  Do heavy household chores (e.g., wash walls, wash floors): Mild Difficulty  Carry a shopping bag or briefcase: Moderate Difficulty  Wash your back: Mild Difficulty  Use a knife to  cut food: No Difficulty  Recreational activities in which you take some force or impact through your arm, should or hand (e.g. golf, hammering, tennis, etc.): Mild Difficulty  During the past week, to what extent has your arm, shoulder, or hand problem interfered with your normal social activites with family, friends, neighbors or groups?: Quite a bit  During the past week, were you limited in your work or other regular daily activities as a result of your arm, shoulder or hand problem?: Very limited  Arm, Shoulder, or hand pain: Moderate  Tingling (pins and needles) in your arm, shoulder, or hand: Mild  During the past week, how much difficulty have you had sleeping because of the pain in your arm, shoulder or hand?: No difficulty  Number of Questions Answered: 11  Quick DASH Score: 34.09  Neck Disability Index  Section 1 - Pain Intensity: The pain is mild at the moment.  Section 2 - Personal Care: I can look after myself normally without causing extra pain.  Section 3 - Lifting: Pain prevents me from lifting heavy weights off the floor but I can if they are conveniently positioned, for example on a table.  Section 4 - Work: I can do most of my usual work, but no more  Section 5 - Headaches: I have slight headaches that come infrequently.  Section 6 - Concentration: I can concentrate fully when I want to without difficulty.  Section 7 - Sleeping: My sleep is slightly disturbed (less than 1 hour sleepless).  Section 8 - Driving: I can drive as long as I want with moderate neck pain.  Section 9 - Reading: I can read as much as I want with moderate neck pain.  Section 10 - Recreation: I am able to engage in all recreational activities with no pain in my neck at all.  Neck Disability Index Score: 11  Neck Disability Index Comments: 22%      Time Calculation:   Start Time: 1130  Stop Time: 1145  Time Calculation (min): 15 min  Timed Charges  91820 - PT Therapeutic Activity Minutes: 15  Total Minutes  Timed Charges Total  Minutes: 15   Total Minutes: 15  Therapy Charges for Today     Code Description Service Date Service Provider Modifiers Qty    [id number]  PT THERAPEUTIC ACT EA 15 MIN 6/3/2022 Abel Sorensen, PT GP 1          PT G-Codes  Outcome Measure Options: Neck Disability Index (NDI), Quick DASH  Quick DASH Score: 34.09  Neck Disability Index Score: 11         Abel Sorensen, PT  6/3/2022

## 2022-06-07 ENCOUNTER — HOSPITAL ENCOUNTER (OUTPATIENT)
Dept: PHYSICAL THERAPY | Facility: HOSPITAL | Age: 62
Setting detail: THERAPIES SERIES
Discharge: HOME | End: 2022-06-07

## 2022-06-07 ENCOUNTER — OFFICE VISIT (OUTPATIENT)
Dept: FAMILY MEDICINE CLINIC | Facility: CLINIC | Age: 62
End: 2022-06-07

## 2022-06-07 ENCOUNTER — HOSPITAL ENCOUNTER (OUTPATIENT)
Dept: GENERAL RADIOLOGY | Facility: HOSPITAL | Age: 62
Discharge: HOME | End: 2022-06-07
Admitting: NURSE PRACTITIONER

## 2022-06-07 VITALS
WEIGHT: 218.5 LBS | HEIGHT: 63 IN | RESPIRATION RATE: 12 BRPM | TEMPERATURE: 97.4 F | SYSTOLIC BLOOD PRESSURE: 120 MMHG | DIASTOLIC BLOOD PRESSURE: 60 MMHG | BODY MASS INDEX: 38.71 KG/M2 | OXYGEN SATURATION: 97 % | HEART RATE: 65 BPM

## 2022-06-07 DIAGNOSIS — M54.2 ACUTE NECK PAIN: Primary | ICD-10-CM

## 2022-06-07 DIAGNOSIS — M54.12 CERVICAL RADICULOPATHY: Primary | ICD-10-CM

## 2022-06-07 DIAGNOSIS — M25.511 RIGHT SHOULDER PAIN, UNSPECIFIED CHRONICITY: ICD-10-CM

## 2022-06-07 DIAGNOSIS — M54.12 CERVICAL RADICULOPATHY: ICD-10-CM

## 2022-06-07 DIAGNOSIS — M54.2 NECK PAIN: ICD-10-CM

## 2022-06-07 PROCEDURE — 97140 MANUAL THERAPY 1/> REGIONS: CPT | Performed by: PHYSICAL THERAPIST

## 2022-06-07 PROCEDURE — 97110 THERAPEUTIC EXERCISES: CPT | Performed by: PHYSICAL THERAPIST

## 2022-06-07 PROCEDURE — 72040 X-RAY EXAM NECK SPINE 2-3 VW: CPT

## 2022-06-07 PROCEDURE — 99213 OFFICE O/P EST LOW 20 MIN: CPT | Performed by: NURSE PRACTITIONER

## 2022-06-07 NOTE — PROGRESS NOTES
"Chief Complaint  Follow-up (6 week f/u)    Subjective        Keri Bhagat presents to [facility]  Pleasant patient here today  Follow-up left cervical radiculopathy neck pain, physical therapy helped pain previously 5 or 6 now down to about a 3, she has a follow-up Dr. Godwin in a couple months said previous neck surgery 2013 and 15  No weakness paresthesias no fever chills    She will return to office for complete physical exam blood pressure looks great today.  Overall she is doing well.      Mildly decreased range of motion no cervical point tenderness mild tenderness to palpation left trap and left side neck.              Objective   Vital Signs:  /60   Pulse 65   Temp 97.4 °F (36.3 °C) (Infrared)   Resp 12   Ht 160 cm (63\")   Wt 99.1 kg (218 lb 8 oz)   SpO2 97%   BMI 38.71 kg/m²   Estimated body mass index is 38.71 kg/m² as calculated from the following:    Height as of this encounter: 160 cm (63\").    Weight as of this encounter: 99.1 kg (218 lb 8 oz).          Physical Exam   Result Review :                Assessment and Plan   Diagnoses and all orders for this visit:    1. Acute neck pain (Primary)  -     XR Spine Cervical 2 or 3 View    2. Cervical radiculopathy  -     XR Spine Cervical 2 or 3 View             Follow Up {Instructions Charge Capture  Follow-up Communications :23}  No follow-ups on file.  Patient was given instructions and counseling regarding her condition or for health maintenance advice. Please see specific information pulled into the AVS if appropriate.       Continue exercises strengthening the ligaments of your C-spine, keep your appointment with Dr. Raymond for follow-up x-rays today    If weakness gait difficulties severe uncontrolled pain bowel or bladder incontinence retention emergency room    Wegovy high dose ozempic med       New med mounjaro  even better   "

## 2022-06-07 NOTE — THERAPY TREATMENT NOTE
Outpatient Physical Therapy Ortho Treatment Note  [facility]     Patient Name: Keri Bhagat  : 1960  MRN: [id number]  Today's Date: 2022      Visit Date: 2022    Visit Dx:    ICD-10-CM ICD-9-CM   1. Cervical radiculopathy  M54.12 723.4   2. Neck pain  M54.2 723.1   3. Right shoulder pain, unspecified chronicity  M25.511 719.41       Patient Active Problem List   Diagnosis   • Gastroesophageal reflux disease   • Bipolar I disorder, single manic episode ([facility])   • Atherosclerosis of coronary artery   • Essential hypertension   • Lightheadedness   • Low back pain   • Sciatica   • Thyroid nodule   • Fatigue   • Hyperlipidemia   • Hypothyroidism (acquired)   • Iron deficiency anemia   • Right knee pain   • Degenerative disc disease, cervical   • Depression   • ROCKY (obstructive sleep apnea)   • Atherosclerotic heart disease of native coronary artery with other forms of angina pectoris ([facility])   • Cervical spondylosis with radiculopathy   • Chronic pain of both knees   • Arthritis of both knees   • Weight gain, abnormal   • Allergic rhinitis   • Obesity (BMI 30-39.9)   • Plantar fasciitis   • Peroneal tendon injury   • Stress fracture of calcaneus   • Vitamin D deficiency   • Pain and swelling of knee   • Knee injury   • Calcific Achilles tendinitis   • Rod's deformity   • Urgency incontinence   • Nocturia   • Chronic gastritis without bleeding   • LGSIL on Pap smear of cervix   • Primary osteoarthritis of right knee   • Stage 1 chronic kidney disease   • Left wrist tendinitis   • Cervical radiculopathy   • Tobacco abuse   • Obesity, morbid, BMI 40.0-49.9 ([facility])   • Encounter for screening for lung cancer   • Urinary frequency   • Diabetes mellitus screening   • MORALES I (cervical intraepithelial neoplasia I)   • Primary osteoarthritis of both knees   • H/O bladder repair surgery   • Chest pain   • Syncope and collapse   • Closed fracture of left distal radius   • Closed displaced fracture of  neck of right fifth metacarpal bone   • Fracture follow-up   • Nondisplaced fracture of base of fifth metacarpal bone, left hand, initial encounter for closed fracture   • Closed fracture of lower end of left radius with routine healing   • Multiple dislocations of fingers, open   • Bilateral chronic knee pain   • Arthritis of left knee   • Arthritis of right knee   • Family history of diabetes mellitus (DM)   • Menopause   • 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   • Memory dysfunction   • Periodic limb movement disorder   • Gait disturbance   • Degenerative disc disease, lumbar   • Osteopenia   • Vaginal atrophy   • Female dyspareunia   • Pituitary cyst ([facility])   • Meningioma ([facility])   • Vitamin B12 deficiency   • Acute urinary tract infection   • Anemia   • Bacterial vaginosis   • Anxiety   • Inappropriate diet and eating habits   • Incomplete emptying of bladder   • Myocardial infarction ([facility])   • Other specified postprocedural states   • Postoperative retention of urine   • Suicide attempt ([facility])   • Urinary urgency   • Vaginal discharge   • Vomiting   • Gastro-esophageal reflux disease with esophagitis, without bleeding   • Encounter for follow-up examination after completed treatment for conditions other than malignant neoplasm   • Tendinitis of left wrist        Past Medical History:   Diagnosis Date   • Abnormal Pap smear of cervix    • Anemia    • Anxiety    • Arthritis    • Atherosclerotic heart disease of native coronary artery with other forms of angina pectoris ([facility])    • Bipolar I disorder, single manic episode ([facility])     depressive d(NOS)   • Cervical disc herniation    • Cervical dysplasia    • Coronary artery disease    • CTS (carpal tunnel syndrome)    • Depression     NO SUICIDAL PLANS   • Difficulty swallowing    • Diverticular disease    • Dyspepsia    • Dysphagia    • Gastric reflux    • GERD (gastroesophageal reflux disease)    • Heart attack ([facility])    • Heart  murmur    • Hiatal hernia    • High cholesterol    • History of transfusion    • 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    HPV positive on pap LGSIL   • Hyperlipidemia    • Hypertension    • Hypothyroidism    • Incontinence in female     wears pads   • Kidney disease 2017    stage 2    • Kidney disease, chronic, stage III (GFR 30-59 ml/min) ([facility])    • LGSIL on Pap smear of cervix 04/29/2016    LGSIL HPV positive   • Myocardial infarction ([facility]) 2000   • Neck pain    • Obesity    • Past myocardial infarction 05/2000   • Periodic limb movement disorder    • Restless leg syndrome    • Sleep apnea     cpap   • Stress fracture     LEFT HEEL   • Suicidal ideation 08/19/2016    history   • Swelling of ankle     right had doppler no s/s blood clot   • Thyroid nodule    • Vitamin B12 deficiency         Past Surgical History:   Procedure Laterality Date   • ANTERIOR CERVICAL DISCECTOMY W/ FUSION N/A 12/29/2016    Procedure: C3-4 anterior cervical discectomy and fusion with Depuy micro plate, ALLOGRAFT C3-4, AND HARDWARE REMOVAL C4-7.;  Surgeon: Hema Godwin MD;  Location: [facility] OR;  Service:    • CARDIAC CATHETERIZATION N/A 3/30/2017    Procedure: Left Heart Cath;  Surgeon: Tracey Vargas MD;  Location: [facility] CATH INVASIVE LOCATION;  Service:    • CARDIAC CATHETERIZATION N/A 3/30/2017    Procedure: Coronary angiography;  Surgeon: Tracey Vargas MD;  [facility];  Service:    • CARDIAC CATHETERIZATION N/A 3/30/2017    Procedure: Left ventriculography;  Surgeon: Tracey Vargas MD;  [facility];  Service:    • CARDIAC CATHETERIZATION  3/30/2017    Procedure: Functional Flow McLeod;  Surgeon: Tracey Vargas MD;  Location: [facility] CATH INVASIVE LOCATION;  Service:    • CARPAL TUNNEL RELEASE     • CERVICAL BIOPSY  MMXVI    Dr. Jeffery.    • CERVICAL DISCECTOMY ANTERIOR  04/2013    C4-7   • COLONOSCOPY  04/20/2015    Diverticulosis, IH   • COLONOSCOPY   "03/09/2021   • COLPOSCOPY W/ BIOPSY / CURETTAGE  06/17/2016    LGSIL HPV positive. Results were normal repeat pap in one year. Chronic Cervicitis   • CORONARY ANGIOPLASTY WITH STENT PLACEMENT     • ENDOSCOPY  MMXV    Normal.  Dr. Rodriguze   • ENDOSCOPY N/A 6/22/2017    Erythematous mucosa in the stomach  PATH: Chronic active gastritis, moderate with intestinal metaplasia    • GASTRIC BYPASS      Done using the sleeve technique   • HARDWARE REMOVAL  12/29/2016    cervical   • HERNIA REPAIR     • LAPAROSCOPIC GASTRIC BANDING  02/2018   • SHOULDER SURGERY Left     RCR   • TONSILLECTOMY     • TONSILLECTOMY AND ADENOIDECTOMY     • TRANSVAGINAL TAPING SUSPENSION N/A 12/6/2017    Procedure: MID URETHRAL SLING CYSTSCOPY;  Surgeon: Abby Méndez MD;  Location: [facility];  Service:    • TUBAL ABDOMINAL LIGATION     • TYMPANOSTOMY TUBE PLACEMENT Right    • UPPER GASTROINTESTINAL ENDOSCOPY  approx 2021   • WRIST SURGERY Bilateral     carpal tunnel   • WRIST SURGERY      L wrist/ 4/2020        PT Ortho     Row Name 06/07/22 0840       Subjective Comments    Subjective Comments Reports being quite sore for a few days following dry needling, but notes improvement with less \"popping\" following procedure.  -JS       Subjective Pain    Able to rate subjective pain? yes  -JS    Pre-Treatment Pain Level 3  -JS       Right Upper Ext    Rt Shoulder Flexion AROM 150 seated  -JS          User Key  (r) = Recorded By, (t) = Taken By, (c) = Cosigned By    Initials Name Provider Type    Ginger Strickland, PT Physical Therapist                             PT Assessment/Plan     Row Name 06/07/22 0840          PT Assessment    Assessment Comments Pt presents with report of initial increased pain following DDN, but over-all improvement following treatment. Demonstrates normal R shoulder PROM, improving shoulder AROM. Arrived 10 min late to treatment session, therefore addition of wall washing only to today's treatment.  -JS            " "PT Plan    PT Plan Comments Scheduled x 3 further visits to progress toward goals.  -JS           User Key  (r) = Recorded By, (t) = Taken By, (c) = Cosigned By    Initials Name Provider Type    JS Ginger Smart, PT Physical Therapist                   OP Exercises     Row Name 06/07/22 0840             Subjective Comments    Subjective Comments Reports being quite sore for a few days following dry needling, but notes improvement with less \"popping\" following procedure.  -JS              Subjective Pain    Able to rate subjective pain? yes  -JS      Pre-Treatment Pain Level 3  -JS      Subjective Pain Comment Pt arrived 10 min late to treatment session  -JS              Total Minutes    81623 - PT Therapeutic Exercise Minutes 25  -JS      55289 - PT Manual Therapy Minutes 10  -JS              Exercise 1    Exercise Name 1 Cervical retraction (supine & sitting)  -JS      Cueing 1 Verbal;Demo  -JS      Reps 1 10  -JS      Time 1 5 seconds  -JS              Exercise 2    Exercise Name 2 Sitting cervical retraction with rotation  -JS      Cueing 2 Verbal;Demo  -JS      Reps 2 10  -JS              Exercise 3    Exercise Name 3 Cane shoulder flex AAROM (supine)  -JS      Cueing 3 Verbal;Demo  -JS      Reps 3 10  -JS      Time 3 10 sec  -JS              Exercise 4    Exercise Name 4 Post shoulder rolls  -JS      Cueing 4 Verbal;Demo  -JS      Reps 4 10  -JS              Exercise 5    Exercise Name 5 UBE  -JS      Cueing 5 Verbal;Demo  -JS      Time 5 4 min  -JS              Exercise 6    Exercise Name 6 Beach chair stretch  -JS      Cueing 6 Verbal;Demo  -JS      Reps 6 2  -JS      Time 6 30 sec  -JS              Exercise 8    Exercise Name 8 Row  -JS      Cueing 8 Verbal;Demo  -JS      Sets 8 1  -JS      Reps 8 10  -JS      Additional Comments RTB  -JS              Exercise 9    Exercise Name 9 Shoulder ext  -JS      Cueing 9 Verbal;Demo;Tactile  -JS      Sets 9 1  -JS      Reps 9 10  -JS      Additional Comments RTB  -JS   "            Exercise 10    Exercise Name 10 Low doorway pec stretch  -JS      Cueing 10 Verbal;Demo  -JS      Reps 10 3  -JS      Time 10 20 sec  -JS              Exercise 11    Exercise Name 11 Seated UT stretch  -JS      Cueing 11 Verbal;Demo  -JS      Reps 11 3  -JS      Time 11 10 sec B  -JS              Exercise 13    Exercise Name 13 B shoulder ER in supine  -JS      Cueing 13 Verbal;Demo  -JS      Sets 13 1  -JS      Reps 13 10  -JS      Time 13 RTB  -JS              Exercise 14    Exercise Name 14 Serratus ant punch- supine  -JS      Cueing 14 Verbal;Demo  -JS      Reps 14 10  -JS              Exercise 15    Exercise Name 15 Supine HA  -JS      Cueing 15 Verbal;Demo  -JS      Reps 15 10  -JS      Time 15 RTB  -JS              Exercise 16    Exercise Name 16 Wall wash with towel  -JS      Cueing 16 Verbal;Demo  -JS      Reps 16 10  -JS            User Key  (r) = Recorded By, (t) = Taken By, (c) = Cosigned By    Initials Name Provider Type    Ginger Strickland, PT Physical Therapist                         Manual Rx (last 36 hours)     Manual Treatments     Row Name 06/07/22 0840             Total Minutes    15615 - PT Manual Therapy Minutes 10  -JS              Manual Rx 1    Manual Rx 1 Location B UT/levator stm, intermittent distraction, suboccipital release, PA mobs  -JS      Manual Rx 1 Type PROM R shoulder  -JS            User Key  (r) = Recorded By, (t) = Taken By, (c) = Cosigned By    Initials Name Provider Type    Ginger Strickland, PT Physical Therapist                 PT OP Goals     Row Name 06/07/22 0840          PT Short Term Goals    STG Date to Achieve 05/12/22  -JS     STG 1 Patient will be independent in initial HEP.  -JS     STG 1 Progress Met  -JS     STG 2 Patient will complete cervical rotation through 50% ROM for improved functional mobility.  -JS     STG 2 Progress Met  -JS     STG 3 Patient will be independent in postural awareness in sitting and standing for improved mechanics of cervical  spine & shoulders.  -JS     STG 3 Progress Met  -JS            Long Term Goals    LTG Date to Achieve 08/12/22  -JS     LTG 1 Patient will be independent in comprehensive HEP to allow ongoing self-management.  -JS     LTG 1 Progress Ongoing  -JS     LTG 2 Patient will improve NDI score from 28% to 15% or less for improved perceived disability.  -JS     LTG 2 Progress Ongoing  -JS     LTG 3 Patient will improve Quick Dash  score from 40.91 to 25% or less for improved perceived disability of shoulder.  -JS     LTG 3 Progress Ongoing  -JS     LTG 4 Patient will improve cervical rotation to 75% or greater ROM to allow improved functional mobility.  -JS     LTG 4 Progress Partially Met  -JS     LTG 5 Patient will improve shoulder ROM and strength to allow flexion to 160 degrees or greater for improved reaching and functional mobility.  -JS     LTG 5 Progress Ongoing;Progressing  -JS     LTG 5 Progress Comments shoulder flex AROM 150  -JS           User Key  (r) = Recorded By, (t) = Taken By, (c) = Cosigned By    Initials Name Provider Type    Ginger Strickland PT Physical Therapist                               Time Calculation:   Start Time: 0840  Stop Time: 0915  Time Calculation (min): 35 min  Timed Charges  36100 - PT Therapeutic Exercise Minutes: 25  73267 - PT Manual Therapy Minutes: 10  Total Minutes  Timed Charges Total Minutes: 35   Total Minutes: 35  Therapy Charges for Today     Code Description Service Date Service Provider Modifiers Qty    84208460520  PT THER PROC EA 15 MIN 6/7/2022 Ginger Smart, PT GP 1    [id number] HC PT MANUAL THERAPY EA 15 MIN 6/7/2022 Ginger Smart, PT GP 1                    Ginger Smart PT  6/7/2022

## 2022-06-07 NOTE — PATIENT INSTRUCTIONS
Continue exercises strengthening the ligaments of your C-spine, keep your appointment with Dr. Raymond for follow-up x-rays today    If weakness gait difficulties severe uncontrolled pain bowel or bladder incontinence retention emergency room    Wegovy high dose ozempic med       New med mounjaro  even better

## 2022-06-16 ENCOUNTER — HOSPITAL ENCOUNTER (OUTPATIENT)
Dept: PHYSICAL THERAPY | Facility: HOSPITAL | Age: 62
Setting detail: THERAPIES SERIES
Discharge: HOME | End: 2022-06-16

## 2022-06-16 DIAGNOSIS — M25.511 RIGHT SHOULDER PAIN, UNSPECIFIED CHRONICITY: ICD-10-CM

## 2022-06-16 DIAGNOSIS — M54.12 CERVICAL RADICULOPATHY: Primary | ICD-10-CM

## 2022-06-16 DIAGNOSIS — M54.2 NECK PAIN: ICD-10-CM

## 2022-06-16 PROCEDURE — 97110 THERAPEUTIC EXERCISES: CPT | Performed by: PHYSICAL THERAPIST

## 2022-06-16 PROCEDURE — 97140 MANUAL THERAPY 1/> REGIONS: CPT | Performed by: PHYSICAL THERAPIST

## 2022-06-16 NOTE — THERAPY TREATMENT NOTE
Outpatient Physical Therapy Ortho Treatment Note  [facility]     Patient Name: Keri Bhagat  : 1960  MRN: [id number]  Today's Date: 2022      Visit Date: 2022    Visit Dx:    ICD-10-CM ICD-9-CM   1. Cervical radiculopathy  M54.12 723.4   2. Neck pain  M54.2 723.1   3. Right shoulder pain, unspecified chronicity  M25.511 719.41       Patient Active Problem List   Diagnosis   • Gastroesophageal reflux disease   • Bipolar I disorder, single manic episode ([facility])   • Atherosclerosis of coronary artery   • Essential hypertension   • Lightheadedness   • Low back pain   • Sciatica   • Thyroid nodule   • Fatigue   • Hyperlipidemia   • Hypothyroidism (acquired)   • Iron deficiency anemia   • Right knee pain   • Degenerative disc disease, cervical   • Depression   • ROCKY (obstructive sleep apnea)   • Atherosclerotic heart disease of native coronary artery with other forms of angina pectoris ([facility])   • Cervical spondylosis with radiculopathy   • Chronic pain of both knees   • Arthritis of both knees   • Weight gain, abnormal   • Allergic rhinitis   • Obesity (BMI 30-39.9)   • Plantar fasciitis   • Peroneal tendon injury   • Stress fracture of calcaneus   • Vitamin D deficiency   • Pain and swelling of knee   • Knee injury   • Calcific Achilles tendinitis   • Rod's deformity   • Urgency incontinence   • Nocturia   • Chronic gastritis without bleeding   • LGSIL on Pap smear of cervix   • Primary osteoarthritis of right knee   • Stage 1 chronic kidney disease   • Left wrist tendinitis   • Cervical radiculopathy   • Tobacco abuse   • Obesity, morbid, BMI 40.0-49.9 ([facility])   • Encounter for screening for lung cancer   • Urinary frequency   • Diabetes mellitus screening   • MORALES I (cervical intraepithelial neoplasia I)   • Primary osteoarthritis of both knees   • H/O bladder repair surgery   • Chest pain   • Syncope and collapse   • Closed fracture of left distal radius   • Closed displaced fracture of  neck of right fifth metacarpal bone   • Fracture follow-up   • Nondisplaced fracture of base of fifth metacarpal bone, left hand, initial encounter for closed fracture   • Closed fracture of lower end of left radius with routine healing   • Multiple dislocations of fingers, open   • Bilateral chronic knee pain   • Arthritis of left knee   • Arthritis of right knee   • Family history of diabetes mellitus (DM)   • Menopause   • 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   • Memory dysfunction   • Periodic limb movement disorder   • Gait disturbance   • Degenerative disc disease, lumbar   • Osteopenia   • Vaginal atrophy   • Female dyspareunia   • Pituitary cyst ([facility])   • Meningioma ([facility])   • Vitamin B12 deficiency   • Acute urinary tract infection   • Anemia   • Bacterial vaginosis   • Anxiety   • Inappropriate diet and eating habits   • Incomplete emptying of bladder   • Myocardial infarction ([facility])   • Other specified postprocedural states   • Postoperative retention of urine   • Suicide attempt ([facility])   • Urinary urgency   • Vaginal discharge   • Vomiting   • Gastro-esophageal reflux disease with esophagitis, without bleeding   • Encounter for follow-up examination after completed treatment for conditions other than malignant neoplasm   • Tendinitis of left wrist        Past Medical History:   Diagnosis Date   • Abnormal Pap smear of cervix    • Anemia    • Anxiety    • Arthritis    • Atherosclerotic heart disease of native coronary artery with other forms of angina pectoris ([facility])    • Bipolar I disorder, single manic episode ([facility])     depressive d(NOS)   • Cervical disc herniation    • Cervical dysplasia    • Coronary artery disease    • CTS (carpal tunnel syndrome)    • Depression     NO SUICIDAL PLANS   • Difficulty swallowing    • Diverticular disease    • Dyspepsia    • Dysphagia    • Gastric reflux    • GERD (gastroesophageal reflux disease)    • Heart attack ([facility])    • Heart  murmur    • Hiatal hernia    • High cholesterol    • History of transfusion    • 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    HPV positive on pap LGSIL   • Hyperlipidemia    • Hypertension    • Hypothyroidism    • Incontinence in female     wears pads   • Kidney disease 2017    stage 2    • Kidney disease, chronic, stage III (GFR 30-59 ml/min) ([facility])    • LGSIL on Pap smear of cervix 04/29/2016    LGSIL HPV positive   • Myocardial infarction ([facility]) 2000   • Neck pain    • Obesity    • Past myocardial infarction 05/2000   • Periodic limb movement disorder    • Restless leg syndrome    • Sleep apnea     cpap   • Stress fracture     LEFT HEEL   • Suicidal ideation 08/19/2016    history   • Swelling of ankle     right had doppler no s/s blood clot   • Thyroid nodule    • Vitamin B12 deficiency         Past Surgical History:   Procedure Laterality Date   • ANTERIOR CERVICAL DISCECTOMY W/ FUSION N/A 12/29/2016    Procedure: C3-4 anterior cervical discectomy and fusion with Depuy micro plate, ALLOGRAFT C3-4, AND HARDWARE REMOVAL C4-7.;  Surgeon: Hema Godwin MD;  Location: [facility] OR;  Service:    • CARDIAC CATHETERIZATION N/A 3/30/2017    Procedure: Left Heart Cath;  Surgeon: Tracey Vargas MD;  Location: [facility] CATH INVASIVE LOCATION;  Service:    • CARDIAC CATHETERIZATION N/A 3/30/2017    Procedure: Coronary angiography;  Surgeon: Tracey Vargas MD;  Location[facility] CATH INVASIVE LOCATION;  Service:    • CARDIAC CATHETERIZATION N/A 3/30/2017    Procedure: Left ventriculography;  Surgeon: Tracey Vargas MD;  [facility] CATH INVASIVE LOCATION;  Service:    • CARDIAC CATHETERIZATION  3/30/2017    Procedure: Functional [place];  Surgeon: Tracey Vargas MD;  Location: [facility] CATH INVASIVE LOCATION;  Service:    • CARPAL TUNNEL RELEASE     • CERVICAL BIOPSY  MMXVI    Dr. Jeffery.    • CERVICAL DISCECTOMY ANTERIOR  04/2013    C4-7   • COLONOSCOPY  04/20/2015    Diverticulosis, IH   • COLONOSCOPY   03/09/2021   • COLPOSCOPY W/ BIOPSY / CURETTAGE  06/17/2016    LGSIL HPV positive. Results were normal repeat pap in one year. Chronic Cervicitis   • CORONARY ANGIOPLASTY WITH STENT PLACEMENT     • ENDOSCOPY  MMXV    Normal.  Dr. Rodriguez   • ENDOSCOPY N/A 6/22/2017    Erythematous mucosa in the stomach  PATH: Chronic active gastritis, moderate with intestinal metaplasia    • GASTRIC BYPASS      Done using the sleeve technique   • HARDWARE REMOVAL  12/29/2016    cervical   • HERNIA REPAIR     • LAPAROSCOPIC GASTRIC BANDING  02/2018   • SHOULDER SURGERY Left     RCR   • TONSILLECTOMY     • TONSILLECTOMY AND ADENOIDECTOMY     • TRANSVAGINAL TAPING SUSPENSION N/A 12/6/2017    Procedure: MID URETHRAL SLING CYSTSCOPY;  Surgeon: Abby Méndez MD;  Location: [facility]  Service:    • TUBAL ABDOMINAL LIGATION     • TYMPANOSTOMY TUBE PLACEMENT Right    • UPPER GASTROINTESTINAL ENDOSCOPY  approx 2021   • WRIST SURGERY Bilateral     carpal tunnel   • WRIST SURGERY      L wrist/ 4/2020        PT Ortho     Row Name 06/16/22 4773       Subjective Comments    Subjective Comments Increased neck & shoulder pain this morning after helping sister clean out her basement yesterday.  -JS       Subjective Pain    Able to rate subjective pain? yes  -JS    Pre-Treatment Pain Level 3  -JS    Subjective Pain Comment 3-4/10  -JS          User Key  (r) = Recorded By, (t) = Taken By, (c) = Cosigned By    Initials Name Provider Type    Ginger Strickland, PT Physical Therapist                             PT Assessment/Plan     Row Name 06/16/22 1135          PT Assessment    Assessment Comments Pt presents with increased soreness upon arrival after increased work while cleaning yesterday. Performed ROM and light postural exercises, then manual therapy with good response to treatment. R shoulder PROM WFL, AROM improved.  L cervical rotation remains restricted.  General education provided on postural awareness, mechanics with bending,  lifting. Review of HEP & techniques for symptom management.  -JS            PT Plan    PT Plan Comments Assess pt's status & resume all strengthening as tolerated. Scheduled x 2 further visits (1x next week, then follow-up in 2 weeks to ensure independence in HEP and progress toward remaining goals).  -JS           User Key  (r) = Recorded By, (t) = Taken By, (c) = Cosigned By    Initials Name Provider Type    Ginger Strickland, PT Physical Therapist                   OP Exercises     Row Name 06/16/22 8125             Subjective Comments    Subjective Comments Increased neck & shoulder pain this morning after helping sister clean out her basement yesterday.  -JS              Subjective Pain    Able to rate subjective pain? yes  -JS      Pre-Treatment Pain Level 3  -JS      Subjective Pain Comment 3-4/10  -JS              Total Minutes    06864 - PT Therapeutic Exercise Minutes 30  -JS      56382 - PT Manual Therapy Minutes 10  -JS              Exercise 1    Exercise Name 1 Cervical retraction (supine & sitting)  -JS      Cueing 1 Verbal;Demo  -JS      Reps 1 10  -JS      Time 1 5 seconds  -JS              Exercise 2    Exercise Name 2 Sitting cervical retraction with rotation  -JS      Cueing 2 Verbal;Demo  -JS      Reps 2 10  -JS              Exercise 3    Exercise Name 3 Cane shoulder flex AAROM (supine)  -JS      Cueing 3 Verbal;Demo  -JS      Reps 3 10  -JS      Time 3 10 sec  -JS              Exercise 4    Exercise Name 4 Post shoulder rolls  -JS      Cueing 4 Verbal;Demo  -JS      Reps 4 10  -JS              Exercise 5    Exercise Name 5 UBE  -JS      Cueing 5 Verbal;Demo  -JS      Time 5 4 min  -JS      Additional Comments 2 fwd/2bwd  -JS              Exercise 6    Exercise Name 6 Beach chair stretch  -JS      Cueing 6 Verbal;Demo  -JS      Reps 6 3  -JS      Time 6 30 sec  -JS              Exercise 8    Exercise Name 8 Row  -JS      Cueing 8 Verbal;Demo  -JS      Sets 8 1  -JS      Reps 8 10  -JS       Additional Comments RTB  -JS              Exercise 9    Exercise Name 9 Shoulder ext  -JS      Cueing 9 Verbal;Demo  -JS      Sets 9 1  -JS      Reps 9 10  -JS      Additional Comments RTB  -JS              Exercise 10    Exercise Name 10 Low doorway pec stretch  -JS      Cueing 10 Verbal;Demo  -JS      Reps 10 3  -JS      Time 10 20 sec  -JS              Exercise 16    Exercise Name 16 Wall wash with towel  -JS      Cueing 16 Verbal;Demo  -JS      Reps 16 10  -JS            User Key  (r) = Recorded By, (t) = Taken By, (c) = Cosigned By    Initials Name Provider Type    Ginger Strickland, PT Physical Therapist                         Manual Rx (last 36 hours)     Manual Treatments     Row Name 06/16/22 1135             Total Minutes    25431 - PT Manual Therapy Minutes 10  -JS              Manual Rx 1    Manual Rx 1 Location B UT/levator stm, intermittent distraction, suboccipital release, PA mobs  -JS      Manual Rx 1 Type PROM R shoulder  -JS            User Key  (r) = Recorded By, (t) = Taken By, (c) = Cosigned By    Initials Name Provider Type    Ginger Strickland, PT Physical Therapist                 PT OP Goals     Row Name 06/16/22 1135          PT Short Term Goals    STG Date to Achieve 05/12/22  -JS     STG 1 Patient will be independent in initial HEP.  -JS     STG 1 Progress Met  -JS     STG 2 Patient will complete cervical rotation through 50% ROM for improved functional mobility.  -JS     STG 2 Progress Met  -JS     STG 3 Patient will be independent in postural awareness in sitting and standing for improved mechanics of cervical spine & shoulders.  -JS     STG 3 Progress Met  -JS            Long Term Goals    LTG Date to Achieve 08/12/22  -JS     LTG 1 Patient will be independent in comprehensive HEP to allow ongoing self-management.  -JS     LTG 1 Progress Ongoing  -JS     LTG 2 Patient will improve NDI score from 28% to 15% or less for improved perceived disability.  -JS     LTG 2 Progress Ongoing  -JS      LTG 3 Patient will improve Quick Dash  score from 40.91 to 25% or less for improved perceived disability of shoulder.  -     LTG 3 Progress Ongoing  -     LTG 4 Patient will improve cervical rotation to 75% or greater ROM to allow improved functional mobility.  -     LTG 4 Progress Partially Met  -     LTG 4 Progress Comments Met to R , ongoing to L  -     LTG 5 Patient will improve shoulder ROM and strength to allow flexion to 160 degrees or greater for improved reaching and functional mobility.  -     LTG 5 Progress Met  -[facility] 5 Progress Comments R shoulder AROM 160 in standing at end of treatment session  -           User Key  (r) = Recorded By, (t) = Taken By, (c) = Cosigned By    Initials Name Provider Type    Ginger Strickland PT Physical Therapist                Therapy Education  Education Details: Reviewed HEP. Discussed postural awareness, including use of LEs for bending, avoiding heavy lifting, taking breaks to peform postural exercises/flexibility.              Time Calculation:   Start Time: 1135  Stop Time: 1215  Time Calculation (min): 40 min  Timed Charges  30533 - PT Therapeutic Exercise Minutes: 30  47352 - PT Manual Therapy Minutes: 10  Total Minutes  Timed Charges Total Minutes: 40   Total Minutes: 40  Therapy Charges for Today     Code Description Service Date Service Provider Modifiers Qty    [id number]  PT THER PROC EA 15 MIN 6/16/2022 Ginger Smart, PT GP 2    [id number]  PT MANUAL THERAPY EA 15 MIN 6/16/2022 Ginger Smart, PT GP 1                    Ginger Smart PT  6/16/2022

## 2022-06-23 ENCOUNTER — HOSPITAL ENCOUNTER (OUTPATIENT)
Dept: PHYSICAL THERAPY | Facility: HOSPITAL | Age: 62
Setting detail: THERAPIES SERIES
Discharge: HOME | End: 2022-06-23

## 2022-06-23 DIAGNOSIS — M54.12 CERVICAL RADICULOPATHY: Primary | ICD-10-CM

## 2022-06-23 DIAGNOSIS — M54.2 NECK PAIN: ICD-10-CM

## 2022-06-23 DIAGNOSIS — M25.511 RIGHT SHOULDER PAIN, UNSPECIFIED CHRONICITY: ICD-10-CM

## 2022-06-23 PROCEDURE — 97110 THERAPEUTIC EXERCISES: CPT

## 2022-06-23 PROCEDURE — 97140 MANUAL THERAPY 1/> REGIONS: CPT

## 2022-06-23 NOTE — THERAPY TREATMENT NOTE
Outpatient Physical Therapy Ortho Treatment Note  [facility]     Patient Name: Keri Bhagat  : 1960  MRN: [id number]  Today's Date: 2022      Visit Date: 2022    Visit Dx:    ICD-10-CM ICD-9-CM   1. Cervical radiculopathy  M54.12 723.4   2. Neck pain  M54.2 723.1   3. Right shoulder pain, unspecified chronicity  M25.511 719.41       Patient Active Problem List   Diagnosis   • Gastroesophageal reflux disease   • Bipolar I disorder, single manic episode ([facility])   • Atherosclerosis of coronary artery   • Essential hypertension   • Lightheadedness   • Low back pain   • Sciatica   • Thyroid nodule   • Fatigue   • Hyperlipidemia   • Hypothyroidism (acquired)   • Iron deficiency anemia   • Right knee pain   • Degenerative disc disease, cervical   • Depression   • ROCKY (obstructive sleep apnea)   • Atherosclerotic heart disease of native coronary artery with other forms of angina pectoris ([facility])   • Cervical spondylosis with radiculopathy   • Chronic pain of both knees   • Arthritis of both knees   • Weight gain, abnormal   • Allergic rhinitis   • Obesity (BMI 30-39.9)   • Plantar fasciitis   • Peroneal tendon injury   • Stress fracture of calcaneus   • Vitamin D deficiency   • Pain and swelling of knee   • Knee injury   • Calcific Achilles tendinitis   • Rod's deformity   • Urgency incontinence   • Nocturia   • Chronic gastritis without bleeding   • LGSIL on Pap smear of cervix   • Primary osteoarthritis of right knee   • Stage 1 chronic kidney disease   • Left wrist tendinitis   • Cervical radiculopathy   • Tobacco abuse   • Obesity, morbid, BMI 40.0-49.9 ([facility])   • Encounter for screening for lung cancer   • Urinary frequency   • Diabetes mellitus screening   • MORALES I (cervical intraepithelial neoplasia I)   • Primary osteoarthritis of both knees   • H/O bladder repair surgery   • Chest pain   • Syncope and collapse   • Closed fracture of left distal radius   • Closed displaced fracture of  neck of right fifth metacarpal bone   • Fracture follow-up   • Nondisplaced fracture of base of fifth metacarpal bone, left hand, initial encounter for closed fracture   • Closed fracture of lower end of left radius with routine healing   • Multiple dislocations of fingers, open   • Bilateral chronic knee pain   • Arthritis of left knee   • Arthritis of right knee   • Family history of diabetes mellitus (DM)   • Menopause   • 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   • Memory dysfunction   • Periodic limb movement disorder   • Gait disturbance   • Degenerative disc disease, lumbar   • Osteopenia   • Vaginal atrophy   • Female dyspareunia   • Pituitary cyst ([facility])   • Meningioma ([facility])   • Vitamin B12 deficiency   • Acute urinary tract infection   • Anemia   • Bacterial vaginosis   • Anxiety   • Inappropriate diet and eating habits   • Incomplete emptying of bladder   • Myocardial infarction ([facility])   • Other specified postprocedural states   • Postoperative retention of urine   • Suicide attempt ([facility])   • Urinary urgency   • Vaginal discharge   • Vomiting   • Gastro-esophageal reflux disease with esophagitis, without bleeding   • Encounter for follow-up examination after completed treatment for conditions other than malignant neoplasm   • Tendinitis of left wrist        Past Medical History:   Diagnosis Date   • Abnormal Pap smear of cervix    • Anemia    • Anxiety    • Arthritis    • Atherosclerotic heart disease of native coronary artery with other forms of angina pectoris ([facility])    • Bipolar I disorder, single manic episode ([facility])     depressive d(NOS)   • Cervical disc herniation    • Cervical dysplasia    • Coronary artery disease    • CTS (carpal tunnel syndrome)    • Depression     NO SUICIDAL PLANS   • Difficulty swallowing    • Diverticular disease    • Dyspepsia    • Dysphagia    • Gastric reflux    • GERD (gastroesophageal reflux disease)    • Heart attack ([facility])    • Heart  murmur    • Hiatal hernia    • High cholesterol    • History of transfusion    • 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    HPV positive on pap LGSIL   • Hyperlipidemia    • Hypertension    • Hypothyroidism    • Incontinence in female     wears pads   • Kidney disease 2017    stage 2    • Kidney disease, chronic, stage III (GFR 30-59 ml/min) ([facility])    • LGSIL on Pap smear of cervix 04/29/2016    LGSIL HPV positive   • Myocardial infarction ([facility]) 2000   • Neck pain    • Obesity    • Past myocardial infarction 05/2000   • Periodic limb movement disorder    • Restless leg syndrome    • Sleep apnea     cpap   • Stress fracture     LEFT HEEL   • Suicidal ideation 08/19/2016    history   • Swelling of ankle     right had doppler no s/s blood clot   • Thyroid nodule    • Vitamin B12 deficiency         Past Surgical History:   Procedure Laterality Date   • ANTERIOR CERVICAL DISCECTOMY W/ FUSION N/A 12/29/2016    Procedure: C3-4 anterior cervical discectomy and fusion with Depuy micro plate, ALLOGRAFT C3-4, AND HARDWARE REMOVAL C4-7.;  Surgeon: Hema Godwin MD;  Location: [facility] OR;  Service:    • CARDIAC CATHETERIZATION N/A 3/30/2017    Procedure: Left Heart Cath;  Surgeon: Tracey Vargas MD;  Location: [facility] CATH INVASIVE LOCATION;  Service:    • CARDIAC CATHETERIZATION N/A 3/30/2017    Procedure: Coronary angiography;  Surgeon: Tracey Vargas MD;  Location: [facility] CATH INVASIVE LOCATION;  Service:    • CARDIAC CATHETERIZATION N/A 3/30/2017    Procedure: Left ventriculography;  Surgeon: Tracey Vargas MD;  Location[facility] LOC[facility];  Service:    • CARDIAC CATHETERIZATION  3/30/2017    Procedure: Functional Flow Bearden;  Surgeon: Tracey Vargas MD;  Location: [facility] CATH INVASIVE LOCATION;  Service:    • CARPAL TUNNEL RELEASE     • CERVICAL BIOPSY  MMXVI    Dr. Jeffery.    • CERVICAL DISCECTOMY ANTERIOR  04/2013    C4-7   • COLONOSCOPY  04/20/2015    Diverticulosis, IH   • COLONOSCOPY   03/09/2021   • COLPOSCOPY W/ BIOPSY / CURETTAGE  06/17/2016    LGSIL HPV positive. Results were normal repeat pap in one year. Chronic Cervicitis   • CORONARY ANGIOPLASTY WITH STENT PLACEMENT     • ENDOSCOPY  MMXV    Normal.  Dr. Rodriguez   • ENDOSCOPY N/A 6/22/2017    Erythematous mucosa in the stomach  PATH: Chronic active gastritis, moderate with intestinal metaplasia    • GASTRIC BYPASS      Done using the sleeve technique   • HARDWARE REMOVAL  12/29/2016    cervical   • HERNIA REPAIR     • LAPAROSCOPIC GASTRIC BANDING  02/2018   • SHOULDER SURGERY Left     RCR   • TONSILLECTOMY     • TONSILLECTOMY AND ADENOIDECTOMY     • TRANSVAGINAL TAPING SUSPENSION N/A 12/6/2017    Procedure: MID URETHRAL SLING CYSTSCOPY;  Surgeon: Abby Méndez MD;  Location: [facility];  Service:    • TUBAL ABDOMINAL LIGATION     • TYMPANOSTOMY TUBE PLACEMENT Right    • UPPER GASTROINTESTINAL ENDOSCOPY  approx 2021   • WRIST SURGERY Bilateral     carpal tunnel   • WRIST SURGERY      L wrist/ 4/2020                        PT Assessment/Plan     Row Name 06/23/22 0950          PT Assessment    Assessment Comments Ms. Bhagat returns after one week of doing HEP. She continues to have mild restriction with L cerv rotation, but improved the more times she tried. Most markedly noted is poor chin position with UBE and other activities, but she corrected easily when cued.  Tightness and tenderness still present L upper trap and mid scap mm.  Pt was inquiring about DDN again.  -LP     Please refer to paper survey for additional self-reported information Yes  -LP     Rehab Potential Good  -LP     Patient/caregiver participated in establishment of treatment plan and goals Yes  -LP     Patient would benefit from skilled therapy intervention Yes  -LP            PT Plan    PT Frequency 1x/week  -LP     PT Plan Comments Pt scheduled for one more session in 2 weeks.  -LP           User Key  (r) = Recorded By, (t) = Taken By, (c) = Cosigned  By    Initials Name Provider Type    LP Patria Dyer, PT Physical Therapist                   OP Exercises     Row Name 06/23/22 0800 06/23/22 0700          Subjective Comments    Subjective Comments sore from working in the yard-pulling weeds  -LP --            Subjective Pain    Able to rate subjective pain? yes  -LP --     Pre-Treatment Pain Level 3  -LP --            Total Minutes    81273 - PT Therapeutic Exercise Minutes 30  -LP --     46280 - PT Manual Therapy Minutes -- 13  -LP            Exercise 1    Exercise Name 1 Cervical retraction (supine & sitting)  -LP --     Cueing 1 Verbal;Demo  -LP --     Reps 1 10  -LP --     Time 1 5s  -LP --     Additional Comments cuing needed for correct form  -LP --            Exercise 2    Exercise Name 2 Sitting cervical retraction with rotation  -LP --     Cueing 2 Verbal;Demo  -LP --     Reps 2 15  -LP --            Exercise 3    Exercise Name 3 Cane shoulder flex AAROM (supine)  -LP --     Cueing 3 Verbal;Demo  -LP --     Sets 3 1  -LP --     Reps 3 10  -LP --     Time 3 10s  -LP --            Exercise 4    Exercise Name 4 Post shoulder rolls  -LP --     Cueing 4 Verbal;Demo  -LP --            Exercise 5    Exercise Name 5 UBE  -LP --     Cueing 5 Verbal;Demo  -LP --     Time 5 4 min  -LP --            Exercise 6    Exercise Name 6 Beach chair stretch  -LP --     Cueing 6 Verbal;Demo  -LP --     Reps 6 3  -LP --     Time 6 30s  -LP --            Exercise 8    Exercise Name 8 Row  -LP --     Cueing 8 Verbal;Demo  -LP --     Sets 8 1  -LP --     Reps 8 10  -LP --     Additional Comments RTB  -LP --            Exercise 9    Exercise Name 9 Shoulder ext  -LP --     Cueing 9 Verbal;Demo  -LP --     Sets 9 1  -LP --     Reps 9 10  -LP --     Additional Comments RTB  -LP --            Exercise 10    Exercise Name 10 Low doorway pec stretch  -LP --     Cueing 10 Verbal;Demo  -LP --     Reps 10 3  -LP --     Time 10 20s  -LP --            Exercise 11    Exercise Name 11  Seated UT stretch  -LP --     Cueing 11 Verbal;Demo  -LP --     Reps 11 3  -LP --     Time 11 10s  -LP --     Additional Comments cuing for correct form  -LP --            Exercise 13    Exercise Name 13 B shoulder ER in supine  -LP --     Cueing 13 Verbal;Demo  -LP --     Sets 13 2  -LP --     Reps 13 10  -LP --     Time 13 RTB  -LP --            Exercise 14    Exercise Name 14 Serratus ant punch- supine  -LP --     Cueing 14 Verbal;Demo  -LP --     Sets 14 1  -LP --     Reps 14 15  -LP --     Additional Comments 2#  -LP --            Exercise 15    Exercise Name 15 Supine HA  -LP --     Cueing 15 Verbal;Demo  -LP --     Sets 15 2  -LP --     Reps 15 5  -LP --     Time 15 RTB  -LP --            Exercise 17    Exercise Name 17 SL ER  -LP --     Cueing 17 Verbal;Tactile;Demo  -LP --     Sets 17 1  -LP --     Reps 17 15  -LP --           User Key  (r) = Recorded By, (t) = Taken By, (c) = Cosigned By    Initials Name Provider Type    LP Patria Dyer, PT Physical Therapist                         Manual Rx (last 36 hours)     Manual Treatments     Row Name 06/23/22 0700             Total Minutes    36376 - PT Manual Therapy Minutes 13  -LP              Manual Rx 1    Manual Rx 1 Location Cerv Supine  -LP      Manual Rx 1 Type STM levator/upper trap,Intermittent distraction,Passive rotation-B  -LP      Manual Rx 1 Grade gentle  -LP      Manual Rx 1 Duration 10  -LP              Manual Rx 2    Manual Rx 2 Location Sidelying  -LP      Manual Rx 2 Type L scap mobs/distraction with light stm to mid scap  -LP      Manual Rx 2 Grade gentle  -LP      Manual Rx 2 Duration 3  -LP            User Key  (r) = Recorded By, (t) = Taken By, (c) = Cosigned By    Initials Name Provider Type    LP Patria Dyer, PT Physical Therapist                    Therapy Education  Education Details: discussed chin tuliliya with several home activities, being more aware of when she has poor chin postition.  Reviewed entire HEP  Given: HEP,  Posture/body mechanics  Program: Reinforced, Progressed  How Provided: Verbal, Demonstration  Provided to: Patient  Level of Understanding: Teach back education performed              Time Calculation:   Start Time: 0800  Stop Time: 0845  Time Calculation (min): 45 min  Timed Charges  71491 - PT Therapeutic Exercise Minutes: 30  69077 - PT Manual Therapy Minutes: 13  Total Minutes  Timed Charges Total Minutes: 30   Total Minutes: 30  Therapy Charges for Today     Code Description Service Date Service Provider Modifiers Qty    96103520489  PT THER PROC EA 15 MIN 6/23/2022 Patria Dyer, PT GP 2    [id number]  PT MANUAL THERAPY EA 15 MIN 6/23/2022 Patria Dyer, PT GP 1                    Patria Dyer PT  6/23/2022

## 2022-06-27 ENCOUNTER — OFFICE VISIT (OUTPATIENT)
Dept: SLEEP MEDICINE | Facility: HOSPITAL | Age: 62
End: 2022-06-27

## 2022-06-27 ENCOUNTER — TELEPHONE (OUTPATIENT)
Dept: SLEEP MEDICINE | Facility: HOSPITAL | Age: 62
End: 2022-06-27

## 2022-06-27 VITALS
OXYGEN SATURATION: 96 % | BODY MASS INDEX: 38.62 KG/M2 | SYSTOLIC BLOOD PRESSURE: 135 MMHG | DIASTOLIC BLOOD PRESSURE: 78 MMHG | HEIGHT: 63 IN | HEART RATE: 65 BPM | WEIGHT: 218 LBS

## 2022-06-27 DIAGNOSIS — G47.33 OSA (OBSTRUCTIVE SLEEP APNEA): Primary | ICD-10-CM

## 2022-06-27 DIAGNOSIS — G25.81 RESTLESS LEGS SYNDROME (RLS): ICD-10-CM

## 2022-06-27 DIAGNOSIS — R68.2 DRY MOUTH: ICD-10-CM

## 2022-06-27 PROCEDURE — G0463 HOSPITAL OUTPT CLINIC VISIT: HCPCS

## 2022-06-27 RX ORDER — PRAMIPEXOLE DIHYDROCHLORIDE 0.5 MG/1
0.5 TABLET ORAL NIGHTLY
Qty: 30 TABLET | Refills: 5 | Status: SHIPPED | OUTPATIENT
Start: 2022-06-27 | End: 2023-01-23 | Stop reason: SDUPTHER

## 2022-06-28 ENCOUNTER — OFFICE VISIT (OUTPATIENT)
Dept: FAMILY MEDICINE CLINIC | Facility: CLINIC | Age: 62
End: 2022-06-28

## 2022-06-28 VITALS
RESPIRATION RATE: 12 BRPM | DIASTOLIC BLOOD PRESSURE: 68 MMHG | HEIGHT: 63 IN | SYSTOLIC BLOOD PRESSURE: 124 MMHG | TEMPERATURE: 97.4 F | BODY MASS INDEX: 38.75 KG/M2 | OXYGEN SATURATION: 96 % | HEART RATE: 62 BPM | WEIGHT: 218.7 LBS

## 2022-06-28 DIAGNOSIS — D17.20 LIPOMA OF UPPER EXTREMITY, UNSPECIFIED LATERALITY: ICD-10-CM

## 2022-06-28 DIAGNOSIS — G89.29 CHRONIC RIGHT SHOULDER PAIN: Primary | ICD-10-CM

## 2022-06-28 DIAGNOSIS — M54.12 CERVICAL RADICULOPATHY: ICD-10-CM

## 2022-06-28 DIAGNOSIS — M25.511 CHRONIC RIGHT SHOULDER PAIN: Primary | ICD-10-CM

## 2022-06-28 PROCEDURE — 99213 OFFICE O/P EST LOW 20 MIN: CPT | Performed by: NURSE PRACTITIONER

## 2022-06-28 NOTE — PROGRESS NOTES
"Chief Complaint  Follow-up (3 week f/u), Cyst (Lower left arm), Neck Pain, and Shoulder Pain    Subjective        Keri Bhagat presents to [facility]  Very pleasant patient here today follow-up acute neck pain, and cervical radiculopathy symptoms, she recently x-rays without any hardware dislocation or other concerns no fractures, she has chronic degenerative disease of the C-spine and she had a fusion and discectomy several years ago she has an appointment with Dr. Godwin in a couple months.    She is chronic right shoulder pain increases with shoulder movement feels a popping more anterior shoulder pain without any weakness or neuralgia.  No chest pain or radiating pains.    More recently she noticed a small bump on her left mid lateral forearm does not cause pain no redness or other associated symptoms no fevers.        Cyst  Associated symptoms include neck pain.   Neck Pain         Objective   Vital Signs:  /68   Pulse 62   Temp 97.4 °F (36.3 °C) (Infrared)   Resp 12   Ht 160 cm (63\")   Wt 99.2 kg (218 lb 11.2 oz)   SpO2 96%   BMI 38.74 kg/m²   Estimated body mass index is 38.74 kg/m² as calculated from the following:    Height as of this encounter: 160 cm (63\").    Weight as of this encounter: 99.2 kg (218 lb 11.2 oz).          Physical Exam  Constitutional:       Appearance: Normal appearance. She is not ill-appearing or diaphoretic.   Eyes:      Conjunctiva/sclera: Conjunctivae normal.      Pupils: Pupils are equal, round, and reactive to light.   Musculoskeletal:      Comments: Mildly decreased range of motion no cervical point tenderness  Upper extremity strength normal, right shoulder pain with reaching upward and backwards, and some anterior tenderness as well over the AC joint tenderness suspicious for AC joint arthritis.    Left forearm 1.5 cm smooth flat mobile soft  Induration suspicious for lipoma left mid forearm no redness no lymphadenopathy     "   Skin:     General: Skin is warm and dry.   Neurological:      General: No focal deficit present.      Mental Status: She is alert and oriented to person, place, and time.   Psychiatric:         Mood and Affect: Mood normal.         Behavior: Behavior normal.        Result Review :                Assessment and Plan   Diagnoses and all orders for this visit:    1. Chronic right shoulder pain (Primary)  -     Ambulatory Referral to Orthopedic Surgery    2. Cervical radiculopathy    3. Lipoma of upper extremity, unspecified laterality  Comments:  Likely very small 1.5 cm flat mobile smooth lipoma or other soft tissue nodule, with benign appearance left lateral mid forearm           I spent 20  minutes caring for Keri on this date of service. This time includes time spent by me in the following activities:preparing for the visit, obtaining and/or reviewing a separately obtained history, performing a medically appropriate examination and/or evaluation , counseling and educating the patient/family/caregiver, ordering medications, tests, or procedures, referring and communicating with other health care professionals , documenting information in the medical record and care coordination  Follow Up   No follow-ups on file.  Patient was given instructions and counseling regarding her condition or for health maintenance advice. Please see specific information pulled into the AVS if appropriate.     Discharge instructions continue physical therapy range of motion exercises for your shoulder evaluate with orthopedic you will need a x-ray and you may benefit from a steroid injection the reason for the referral  Follow-up with Ortho  For your C-spine neck    Should you like to see a surgeon let me know regarding the lump on your left forearm otherwise  Let me know should he get larger or cause you pain or other problems with this I will refer you for further evaluation and/or ultrasound.

## 2022-06-28 NOTE — PATIENT INSTRUCTIONS
Discharge instructions continue physical therapy range of motion exercises for your shoulder evaluate with orthopedic you will need a x-ray and you may benefit from a steroid injection the reason for the referral  Follow-up with Ortho  For your C-spine neck    Should you like to see a surgeon let me know regarding the lump on your left forearm otherwise  Let me know should he get larger or cause you pain or other problems with this I will refer you for further evaluation and/or ultrasound.

## 2022-07-06 ENCOUNTER — HOSPITAL ENCOUNTER (OUTPATIENT)
Dept: PHYSICAL THERAPY | Facility: HOSPITAL | Age: 62
Setting detail: THERAPIES SERIES
Discharge: HOME | End: 2022-07-06

## 2022-07-06 DIAGNOSIS — M54.12 CERVICAL RADICULOPATHY: Primary | ICD-10-CM

## 2022-07-06 DIAGNOSIS — M54.2 NECK PAIN: ICD-10-CM

## 2022-07-06 DIAGNOSIS — M25.511 RIGHT SHOULDER PAIN, UNSPECIFIED CHRONICITY: ICD-10-CM

## 2022-07-06 NOTE — THERAPY TREATMENT NOTE
Outpatient Physical Therapy Ortho Treatment Note  [facility]     Patient Name: Keri Bhagat  : 1960  MRN: [id number]  Today's Date: 2022      Visit Date: 2022    Visit Dx:    ICD-10-CM ICD-9-CM   1. Cervical radiculopathy  M54.12 723.4   2. Neck pain  M54.2 723.1   3. Right shoulder pain, unspecified chronicity  M25.511 719.41       Patient Active Problem List   Diagnosis   • Gastroesophageal reflux disease   • Bipolar I disorder, single manic episode ([facility])   • Atherosclerosis of coronary artery   • Essential hypertension   • Lightheadedness   • Low back pain   • Sciatica   • Thyroid nodule   • Fatigue   • Hyperlipidemia   • Hypothyroidism (acquired)   • Iron deficiency anemia   • Right knee pain   • Degenerative disc disease, cervical   • Depression   • ROCKY (obstructive sleep apnea)   • Atherosclerotic heart disease of native coronary artery with other forms of angina pectoris ([facility])   • Cervical spondylosis with radiculopathy   • Chronic pain of both knees   • Arthritis of both knees   • Weight gain, abnormal   • Allergic rhinitis   • Obesity (BMI 30-39.9)   • Plantar fasciitis   • Peroneal tendon injury   • Stress fracture of calcaneus   • Vitamin D deficiency   • Pain and swelling of knee   • Knee injury   • Calcific Achilles tendinitis   • Rod's deformity   • Urgency incontinence   • Nocturia   • Chronic gastritis without bleeding   • LGSIL on Pap smear of cervix   • Primary osteoarthritis of right knee   • Stage 1 chronic kidney disease   • Left wrist tendinitis   • Cervical radiculopathy   • Tobacco abuse   • Obesity, morbid, BMI 40.0-49.9 ([facility])   • Encounter for screening for lung cancer   • Urinary frequency   • Diabetes mellitus screening   • MORALES I (cervical intraepithelial neoplasia I)   • Primary osteoarthritis of both knees   • H/O bladder repair surgery   • Chest pain   • Syncope and collapse   • Closed fracture of left distal radius   • Closed displaced fracture of  neck of right fifth metacarpal bone   • Fracture follow-up   • Nondisplaced fracture of base of fifth metacarpal bone, left hand, initial encounter for closed fracture   • Closed fracture of lower end of left radius with routine healing   • Multiple dislocations of fingers, open   • Bilateral chronic knee pain   • Arthritis of left knee   • Arthritis of right knee   • Family history of diabetes mellitus (DM)   • Menopause   • 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   • Memory dysfunction   • Periodic limb movement disorder   • Gait disturbance   • Degenerative disc disease, lumbar   • Osteopenia   • Vaginal atrophy   • Female dyspareunia   • Pituitary cyst ([facility])   • Meningioma ([facility])   • Vitamin B12 deficiency   • Acute urinary tract infection   • Anemia   • Bacterial vaginosis   • Anxiety   • Inappropriate diet and eating habits   • Incomplete emptying of bladder   • Myocardial infarction ([facility])   • Other specified postprocedural states   • Postoperative retention of urine   • Suicide attempt ([facility])   • Urinary urgency   • Vaginal discharge   • Vomiting   • Gastro-esophageal reflux disease with esophagitis, without bleeding   • Encounter for follow-up examination after completed treatment for conditions other than malignant neoplasm   • Tendinitis of left wrist   • Lipoma of upper extremity        Past Medical History:   Diagnosis Date   • Abnormal Pap smear of cervix    • Anemia    • Anxiety    • Arthritis    • Atherosclerotic heart disease of native coronary artery with other forms of angina pectoris ([facility])    • Bipolar I disorder, single manic episode ([facility])     depressive d(NOS)   • Cervical disc herniation    • Cervical dysplasia    • Coronary artery disease    • CTS (carpal tunnel syndrome)    • Depression     NO SUICIDAL PLANS   • Difficulty swallowing    • Diverticular disease    • Dyspepsia    • Dysphagia    • Gastric reflux    • GERD (gastroesophageal reflux disease)    • Heart  attack ([facility])    • Heart murmur    • Hiatal hernia    • High cholesterol    • History of transfusion    • 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    HPV positive on pap LGSIL   • Hyperlipidemia    • Hypertension    • Hypothyroidism    • Incontinence in female     wears pads   • Kidney disease 2017    stage 2    • Kidney disease, chronic, stage III (GFR 30-59 ml/min) ([facility])    • LGSIL on Pap smear of cervix 04/29/2016    LGSIL HPV positive   • Myocardial infarction ([facility]) 2000   • Neck pain    • Obesity    • Past myocardial infarction 05/2000   • Periodic limb movement disorder    • Restless leg syndrome    • Sleep apnea     cpap   • Stress fracture     LEFT HEEL   • Suicidal ideation 08/19/2016    history   • Swelling of ankle     right had doppler no s/s blood clot   • Thyroid nodule    • Vitamin B12 deficiency         Past Surgical History:   Procedure Laterality Date   • ANTERIOR CERVICAL DISCECTOMY W/ FUSION N/A 12/29/2016    Procedure: C3-4 anterior cervical discectomy and fusion with Depuy micro plate, ALLOGRAFT C3-4, AND HARDWARE REMOVAL C4-7.;  Surgeon: Hema Godwin MD;  Location: [facility];  Service:    • CARDIAC CATHETERIZATION N/A 3/30/2017    Procedure: Left Heart Cath;  Surgeon: Tracey Vargas MD;  Location: [facility] CATH INVASIVE LOCATION;  Service:    • CARDIAC CATHETERIZATION N/A 3/30/2017    Procedure: Coronary angiography;  Surgeon: Tracey Vargas MD;  Location[facility] CATH [facility]VASIVE LOC[facility];  Service:    • CARDIAC CATHETERIZATION N/A 3/30/2017    Procedure: Left ventriculography;  Surgeon: Tracey Vargas MD;  [facility];  Service:    • CARDIAC CATHETERIZATION  3/30/2017    Procedure: Functional Flow Salix;  Surgeon: Tracey Vargas MD;  Location: [facility] CATH INVASIVE LOCATION;  Service:    • CARPAL TUNNEL RELEASE     • CERVICAL BIOPSY  MMXVI    Dr. Jeffery.    • CERVICAL DISCECTOMY ANTERIOR  04/2013    C4-7   • COLONOSCOPY  04/20/2015    Diverticulosis,  IH   • COLONOSCOPY  03/09/2021   • COLPOSCOPY W/ BIOPSY / CURETTAGE  06/17/2016    LGSIL HPV positive. Results were normal repeat pap in one year. Chronic Cervicitis   • CORONARY ANGIOPLASTY WITH STENT PLACEMENT     • ENDOSCOPY  MMXV    Normal.  Dr. Rodriguez   • ENDOSCOPY N/A 6/22/2017    Erythematous mucosa in the stomach  PATH: Chronic active gastritis, moderate with intestinal metaplasia    • GASTRIC BYPASS      Done using the sleeve technique   • HARDWARE REMOVAL  12/29/2016    cervical   • HERNIA REPAIR     • LAPAROSCOPIC GASTRIC BANDING  02/2018   • SHOULDER SURGERY Left     RCR   • TONSILLECTOMY     • TONSILLECTOMY AND ADENOIDECTOMY     • TRANSVAGINAL TAPING SUSPENSION N/A 12/6/2017    Procedure: MID URETHRAL SLING CYSTSCOPY;  Surgeon: Abby Méndez MD;  Location: [facility];  Service:    • TUBAL ABDOMINAL LIGATION     • TYMPANOSTOMY TUBE PLACEMENT Right    • UPPER GASTROINTESTINAL ENDOSCOPY  approx 2021   • WRIST SURGERY Bilateral     carpal tunnel   • WRIST SURGERY      L wrist/ 4/2020                        PT Assessment/Plan     Row Name 07/06/22 1214          PT Assessment    Assessment Comments Ms. Bhagat returns, reporting increased L/R UT pain. She wishes to try DDN of her UT tissues. She is due to see Dr. Godwin on 7/8 per patient.  She reports N/T of her L digit 1/2.  We proceeded with DDN of Bilateral UT tissues, noted fewer overall LTR's, but deeper.  We reviewed importance of postural awareness.  She wishes to follow up after her visit with her MD.  Ms. Bhagat continues to be a candidate for skilled physical therapy .  -GJ            PT Plan    PT Plan Comments assess response to DDN of R/L UT tissues.  -GJ           User Key  (r) = Recorded By, (t) = Taken By, (c) = Cosigned By    Initials Name Provider Type    Abel Bernstein, PT Physical Therapist                   OP Exercises     Row Name 07/06/22 1100             Subjective Comments    Subjective Comments it's fair, been  having pain in my R shoulder and in my L shoulder blade and the side of my neck. I had to put heat on it yesterday.  The heat helped ease it up some.  -GJ              Subjective Pain    Pre-Treatment Pain Level 4  -            User Key  (r) = Recorded By, (t) = Taken By, (c) = Cosigned By    Initials Name Provider Type    Abel Bernstein, PT Physical Therapist                         Manual Rx (last 36 hours)     Manual Treatments     Row Name 07/06/22 1100             Manual Rx 3    Manual Rx 3 Location intramuscular manual therapy  -GJ Assessed pt. for Dry Needling and intramuscular manual therapy. Discussed risks/benefits of Dry Needling with pt, including but not limited to muscle soreness, bruising, vasovagal response, pneumothorax, nerve injury. Patient signed written consent to proceed with treatment.    Patient position during treatment: prone, nose in nose hole  Muscles treated: R/L UT  Response to treatment: several moderate LTR's bilaterally, decreased frequency from previous session, however deeper LTR's.  No immediate adverse response.     palpation used before, during, and after to facilitate assessment Clean needle technique observed at all times, precautions for lung fields, neurovascular structures observed.    DDN performed by Abel MACIEL. Edu II, PT, DPT     Manual Rx 3 Type R/L UT  -GJ            User Key  (r) = Recorded By, (t) = Taken By, (c) = Cosigned By    Initials Name Provider Type    Abel Bernstein, PT Physical Therapist                 PT OP Goals     Row Name 07/06/22 1100          PT Short Term Goals    STG Date to Achieve 05/12/22  -GJ     STG 1 Patient will be independent in initial HEP.  -GJ     STG 1 Progress Met  -GJ     STG 2 Patient will complete cervical rotation through 50% ROM for improved functional mobility.  -GJ     STG 2 Progress Met  -GJ     STG 3 Patient will be independent in postural awareness in sitting and standing for improved mechanics of cervical spine &  shoulders.  -GJ     STG 3 Progress Met  -GJ            Long Term Goals    LTG Date to Achieve 08/12/22  -GJ     LTG 1 Patient will be independent in comprehensive HEP to allow ongoing self-management.  -GJ     LTG 1 Progress Ongoing  -GJ     LTG 2 Patient will improve NDI score from 28% to 15% or less for improved perceived disability.  -GJ     LTG 2 Progress Ongoing  -GJ     LTG 3 Patient will improve Quick Dash  score from 40.91 to 25% or less for improved perceived disability of shoulder.  -GJ     LTG 3 Progress Ongoing  -GJ     LTG 4 Patient will improve cervical rotation to 75% or greater ROM to allow improved functional mobility.  -GJ     LTG 4 Progress Partially Met  -GJ     LTG 5 Patient will improve shoulder ROM and strength to allow flexion to 160 degrees or greater for improved reaching and functional mobility.  -GJ     LTG 5 Progress Met  -GJ           User Key  (r) = Recorded By, (t) = Taken By, (c) = Cosigned By    Initials Name Provider Type     Abel Sorensen, PT Physical Therapist                Therapy Education  Given: HEP, Pain management, Symptoms/condition management, Posture/body mechanics, Mobility training  Program: Reinforced  How Provided: Verbal  Provided to: Patient  Level of Understanding: Verbalized              Time Calculation:   Start Time: 1135  Stop Time: 1210  Time Calculation (min): 35 min  Therapy Charges for Today     Code Description Service Date Service Provider Modifiers Qty    [id number]  PT SELF PAY DRY NEEDLING SESSION 7/6/2022 Abel Sorensen, PT  1                    Abel Sorensen, PT  7/6/2022

## 2022-07-07 ENCOUNTER — CLINICAL SUPPORT (OUTPATIENT)
Dept: ORTHOPEDIC SURGERY | Facility: CLINIC | Age: 62
End: 2022-07-07

## 2022-07-07 VITALS — WEIGHT: 219.8 LBS | HEIGHT: 63 IN | BODY MASS INDEX: 38.95 KG/M2

## 2022-07-07 DIAGNOSIS — M17.0 PRIMARY OSTEOARTHRITIS OF BOTH KNEES: Primary | ICD-10-CM

## 2022-07-07 PROCEDURE — 20610 DRAIN/INJ JOINT/BURSA W/O US: CPT | Performed by: NURSE PRACTITIONER

## 2022-07-07 RX ORDER — METHYLPREDNISOLONE ACETATE 80 MG/ML
80 INJECTION, SUSPENSION INTRA-ARTICULAR; INTRALESIONAL; INTRAMUSCULAR; SOFT TISSUE
Status: COMPLETED | OUTPATIENT
Start: 2022-07-07 | End: 2022-07-07

## 2022-07-07 RX ADMIN — METHYLPREDNISOLONE ACETATE 80 MG: 80 INJECTION, SUSPENSION INTRA-ARTICULAR; INTRALESIONAL; INTRAMUSCULAR; SOFT TISSUE at 11:48

## 2022-07-07 RX ADMIN — METHYLPREDNISOLONE ACETATE 80 MG: 80 INJECTION, SUSPENSION INTRA-ARTICULAR; INTRALESIONAL; INTRAMUSCULAR; SOFT TISSUE at 11:47

## 2022-07-07 NOTE — PROGRESS NOTES
7/7/2022    Keri Bhagat is here today for worsening knee pain. Pt has undergone injection of the knee in the past with good resolution of symptoms. Pt is requesting a repeat injection.     KNEE Injection Procedure Note:    Large Joint Arthrocentesis: L knee  Date/Time: 7/7/2022 11:47 AM  Consent given by: patient  Site marked: site marked  Timeout: Immediately prior to procedure a time out was called to verify the correct patient, procedure, equipment, support staff and site/side marked as required   Supporting Documentation  Indications: pain and joint swelling   Procedure Details  Location: knee - L knee  Preparation: Patient was prepped and draped in the usual sterile fashion  Needle gauge: 21 G.  Approach: anterolateral  Medications administered: 2 mL lidocaine (cardiac); 80 mg methylPREDNISolone acetate 80 MG/ML  Patient tolerance: patient tolerated the procedure well with no immediate complications    Large Joint Arthrocentesis: R knee  Date/Time: 7/7/2022 11:48 AM  Consent given by: patient  Site marked: site marked  Timeout: Immediately prior to procedure a time out was called to verify the correct patient, procedure, equipment, support staff and site/side marked as required   Supporting Documentation  Indications: pain and joint swelling   Procedure Details  Location: knee - R knee  Preparation: Patient was prepped and draped in the usual sterile fashion  Needle gauge: 21 G.  Approach: anterolateral  Medications administered: 2 mL lidocaine (cardiac); 80 mg methylPREDNISolone acetate 80 MG/ML  Patient tolerance: patient tolerated the procedure well with no immediate complications          At the conclusion of the injection I discussed the importance of continued quad strengthening exercises on a daily basis. I will see the patient back if the symptoms should fail to improve or worsen.    Haily Pritchard, APRN  7/7/2022

## 2022-07-08 ENCOUNTER — OFFICE VISIT (OUTPATIENT)
Dept: ORTHOPEDIC SURGERY | Facility: CLINIC | Age: 62
End: 2022-07-08

## 2022-07-08 VITALS — WEIGHT: 215 LBS | HEIGHT: 63 IN | BODY MASS INDEX: 38.09 KG/M2 | TEMPERATURE: 98.1 F

## 2022-07-08 DIAGNOSIS — M47.22 CERVICAL SPONDYLOSIS WITH RADICULOPATHY: ICD-10-CM

## 2022-07-08 DIAGNOSIS — M54.2 NECK PAIN: ICD-10-CM

## 2022-07-08 DIAGNOSIS — R52 PAIN: Primary | ICD-10-CM

## 2022-07-08 DIAGNOSIS — M48.02 CERVICAL SPINAL STENOSIS: ICD-10-CM

## 2022-07-08 PROCEDURE — 72100 X-RAY EXAM L-S SPINE 2/3 VWS: CPT | Performed by: ORTHOPAEDIC SURGERY

## 2022-07-08 PROCEDURE — 99213 OFFICE O/P EST LOW 20 MIN: CPT | Performed by: ORTHOPAEDIC SURGERY

## 2022-07-08 NOTE — PROGRESS NOTES
Persistent neck pain and popping.  Worse when she turns to the left but I cannot reproduce the popping.  Neurologically upper extremities are intact for motor and sensory testing.  Gait is unremarkable.  X-rays suggest solid fusion C3-7 degenerative changes at 2 3 unchanged from before.  She had a broad-based bulging disc at 2 3.  We had considered surgery for that at 1 point.  For now she wants to try epidurals and certainly this could be coming from C7-T1 where there is some anterolisthesis and degeneration noted.  I will see her back as needed.  At some point she may need to be referred to neurosurgery for more aggressive treatment.

## 2022-07-13 RX ORDER — METHYLPREDNISOLONE 4 MG/1
TABLET ORAL
Qty: 21 TABLET | Refills: 0 | Status: SHIPPED | OUTPATIENT
Start: 2022-07-13 | End: 2022-09-21

## 2022-07-26 ENCOUNTER — HOSPITAL ENCOUNTER (OUTPATIENT)
Dept: GENERAL RADIOLOGY | Facility: HOSPITAL | Age: 62
Discharge: HOME | End: 2022-07-26

## 2022-07-26 ENCOUNTER — HOSPITAL ENCOUNTER (OUTPATIENT)
Dept: PAIN MEDICINE | Facility: HOSPITAL | Age: 62
Discharge: HOME | End: 2022-07-26

## 2022-07-26 ENCOUNTER — ANESTHESIA (OUTPATIENT)
Dept: PAIN MEDICINE | Facility: HOSPITAL | Age: 62
End: 2022-07-26

## 2022-07-26 ENCOUNTER — ANESTHESIA EVENT (OUTPATIENT)
Dept: PAIN MEDICINE | Facility: HOSPITAL | Age: 62
End: 2022-07-26

## 2022-07-26 VITALS
SYSTOLIC BLOOD PRESSURE: 118 MMHG | RESPIRATION RATE: 16 BRPM | TEMPERATURE: 97.8 F | OXYGEN SATURATION: 97 % | HEART RATE: 60 BPM | DIASTOLIC BLOOD PRESSURE: 78 MMHG

## 2022-07-26 DIAGNOSIS — M47.22 CERVICAL SPONDYLOSIS WITH RADICULOPATHY: ICD-10-CM

## 2022-07-26 DIAGNOSIS — M48.02 CERVICAL SPINAL STENOSIS: ICD-10-CM

## 2022-07-26 DIAGNOSIS — R52 PAIN: ICD-10-CM

## 2022-07-26 DIAGNOSIS — M54.2 NECK PAIN: ICD-10-CM

## 2022-07-26 PROCEDURE — 77003 FLUOROGUIDE FOR SPINE INJECT: CPT

## 2022-07-26 PROCEDURE — 25010000002 METHYLPREDNISOLONE PER 80 MG: Performed by: ANESTHESIOLOGY

## 2022-07-26 RX ORDER — LIDOCAINE HYDROCHLORIDE 10 MG/ML
1 INJECTION, SOLUTION INFILTRATION; PERINEURAL ONCE AS NEEDED
Status: DISCONTINUED | OUTPATIENT
Start: 2022-07-26 | End: 2022-07-27 | Stop reason: HOSPADM

## 2022-07-26 RX ORDER — MIDAZOLAM HYDROCHLORIDE 1 MG/ML
1 INJECTION INTRAMUSCULAR; INTRAVENOUS AS NEEDED
Status: DISCONTINUED | OUTPATIENT
Start: 2022-07-26 | End: 2022-07-27 | Stop reason: HOSPADM

## 2022-07-26 RX ORDER — SODIUM CHLORIDE 0.9 % (FLUSH) 0.9 %
1-10 SYRINGE (ML) INJECTION AS NEEDED
Status: DISCONTINUED | OUTPATIENT
Start: 2022-07-26 | End: 2022-07-27 | Stop reason: HOSPADM

## 2022-07-26 RX ORDER — METHYLPREDNISOLONE ACETATE 80 MG/ML
80 INJECTION, SUSPENSION INTRA-ARTICULAR; INTRALESIONAL; INTRAMUSCULAR; SOFT TISSUE ONCE
Status: COMPLETED | OUTPATIENT
Start: 2022-07-26 | End: 2022-07-26

## 2022-07-26 RX ORDER — FENTANYL CITRATE 50 UG/ML
50 INJECTION, SOLUTION INTRAMUSCULAR; INTRAVENOUS AS NEEDED
Status: DISCONTINUED | OUTPATIENT
Start: 2022-07-26 | End: 2022-07-27 | Stop reason: HOSPADM

## 2022-07-26 RX ADMIN — METHYLPREDNISOLONE ACETATE 80 MG: 80 INJECTION, SUSPENSION INTRA-ARTICULAR; INTRALESIONAL; INTRAMUSCULAR; SOFT TISSUE at 12:01

## 2022-07-26 NOTE — H&P
[facility]    History and Physical    Patient Name: Keri Bhagat  :  1960  MRN:  [id number]  Date of Admission: 2022    Subjective     Patient is a 62 y.o. female presents with chief complaint of chronic neck and shoulder: left pain.  Onset of symptoms was gradual starting several years ago.  Symptoms are associated/aggravated by nothing in particular or activity. Symptoms improve with injection    The following portions of the patients history were reviewed and updated as appropriate: current medications, allergies, past medical history, past surgical history, past family history, past social history and problem list    She reports neck pain that radiates primarily to her left shoulder and shoulder blade.  It is somewhat exacerbated by rotating her head and extension of her neck.  She had a C3-4 fusion in the past and has plain x-rays that just show some spondylosis of the other remaining levels caudally.            Objective     Past Medical History:   Past Medical History:   Diagnosis Date   • Abnormal Pap smear of cervix    • Anemia    • Anxiety    • Arthritis    • Atherosclerotic heart disease of native coronary artery with other forms of angina pectoris (HCC)    • Bipolar I disorder, single manic episode ([facility])     depressive d(NOS)   • Cervical disc herniation    • Cervical dysplasia    • Coronary artery disease    • CTS (carpal tunnel syndrome)    • Depression     NO SUICIDAL PLANS   • Difficulty swallowing    • Diverticular disease    • Dyspepsia    • Dysphagia    • Gastric reflux    • GERD (gastroesophageal reflux disease)    • Heart attack (HCC)    • Heart murmur    • Hiatal hernia    • High cholesterol    • History of transfusion    • HPV (human papilloma virus) infection 2016    HPV positive on pap LGSIL   • Hyperlipidemia    • Hypertension    • Hypothyroidism    • Incontinence in female     wears pads   • Kidney disease 2017    stage 2    • Kidney disease, chronic, stage III  (GFR 30-59 ml/min) ([facility])    • LGSIL on Pap smear of cervix 04/29/2016    LGSIL HPV positive   • Myocardial infarction ([facility]) 2000   • Neck pain    • Obesity    • Past myocardial infarction 05/2000   • Periodic limb movement disorder    • Restless leg syndrome    • Sleep apnea     cpap   • Stress fracture     LEFT HEEL   • Suicidal ideation 08/19/2016    history   • Swelling of ankle     right had doppler no s/s blood clot   • Thyroid nodule    • Vitamin B12 deficiency      Past Surgical History:   Past Surgical History:   Procedure Laterality Date   • ANTERIOR CERVICAL DISCECTOMY W/ FUSION N/A 12/29/2016    Procedure: C3-4 anterior cervical discectomy and fusion with Depuy micro plate, ALLOGRAFT C3-4, AND HARDWARE REMOVAL C4-7.;  Surgeon: Hema Godwin MD;  Location: [facility] MAIN OR;  Service:    • CARDIAC CATHETERIZATION N/A 3/30/2017    Procedure: Left Heart Cath;  Surgeon: Tracey Vargas MD;  Location:  [facility] CATH INVASIVE LOCATION;  Service:    • CARDIAC CATHETERIZATION N/A 3/30/2017    Procedure: Coronary angiography;  Surgeon: Tracey Vargas MD;  Location:  [facility] CATH [facility]VASIVE [facility];  Service:    • CARDIAC CATHETERIZATION N/A 3/30/2017    Procedure: Left ventriculography;  Surgeon: Tracey Vargas MD;  Location:  [facility]VASIVE [facility];  Service:    • CARDIAC CATHETERIZATION  3/30/2017    Procedure: Functional Flow [place];  Surgeon: Tracey Vargas MD;  Location:  [facility] CATH [facility]VASIVE [facility];  Service:    • CARPAL TUNNEL RELEASE     • CERVICAL BIOPSY  MMXVI    Dr. Jeffery.    • CERVICAL DISCECTOMY ANTERIOR  04/2013    C4-7   • COLONOSCOPY  04/20/2015    Diverticulosis, IH   • COLONOSCOPY  03/09/2021   • COLPOSCOPY W/ BIOPSY / CURETTAGE  06/17/2016    LGSIL HPV positive. Results were normal repeat pap in one year. Chronic Cervicitis   • CORONARY ANGIOPLASTY WITH STENT PLACEMENT     • ENDOSCOPY  MMXV    Normal.  Dr. Rodriguez   • ENDOSCOPY N/A 6/22/2017    Erythematous mucosa in the stomach  PATH: Chronic  active gastritis, moderate with intestinal metaplasia    • GASTRIC BYPASS      Done using the sleeve technique   • HARDWARE REMOVAL  12/29/2016    cervical   • HERNIA REPAIR     • LAPAROSCOPIC GASTRIC BANDING  02/2018   • SHOULDER SURGERY Left     RCR   • TONSILLECTOMY     • TONSILLECTOMY AND ADENOIDECTOMY     • TRANSVAGINAL TAPING SUSPENSION N/A 12/6/2017    Procedure: MID URETHRAL SLING CYSTSCOPY;  Surgeon: Abby Méndez MD;  Location: [facility];  Service:    • TUBAL ABDOMINAL LIGATION     • TYMPANOSTOMY TUBE PLACEMENT Right    • UPPER GASTROINTESTINAL ENDOSCOPY  approx 2021   • WRIST SURGERY Bilateral     carpal tunnel   • WRIST SURGERY      L wrist/ 4/2020     Family History:   Family History   Problem Relation Age of Onset   • Diabetes type II Mother    • Hypertension Mother    • Osteoporosis Mother    • Seizures Mother    • COPD Father    • Hypertension Father    • Lung cancer Father    • Heart attack Father    • Liver cancer Father    • Liver disease Father    • Thyroid disease Sister    • Hypertension Sister    • Bipolar disorder Sister    • Depression Sister    • ADD / ADHD Sister    • No Known Problems Son    • Abnormal EKG Daughter    • Hypertension Daughter    • Bipolar disorder Daughter    • Thyroid disease Daughter    • No Known Problems Paternal Grandfather    • No Known Problems Paternal Grandmother    • No Known Problems Maternal Grandmother    • No Known Problems Maternal Grandfather    • Thyroid disease Sister    • Hypertension Sister    • Bipolar disorder Sister    • Asthma Daughter    • Breast cancer Neg Hx    • Ovarian cancer Neg Hx    • Uterine cancer Neg Hx    • Colon cancer Neg Hx    • Malig Hyperthermia Neg Hx      Social History:   Social History     Socioeconomic History   • Marital status:    • Number of children: 3   • Years of education: 12   Tobacco Use   • Smoking status: Former Smoker     Packs/day: 1.50     Years: 20.00     Pack years: 30.00     Types:  Electronic Cigarette, Cigarettes     Quit date:      Years since quittin.5   • Smokeless tobacco: Never Used   • Tobacco comment: Electronic Cigarette   Vaping Use   • Vaping Use: Every day   • Substances: Nicotine, Flavoring   • Devices: [employer] tank   Substance and Sexual Activity   • Alcohol use: Not Currently   • Drug use: No   • Sexual activity: Yes     Partners: Male     Birth control/protection: None, Condom       Vital Signs Range for the last 24 hours  Temperature:     Temp Source:     BP:     Pulse:     Respirations:     SPO2:     O2 Amount (l/min):     O2 Devices     Weight:           --------------------------------------------------------------------------------    Current Outpatient Medications   Medication Sig Dispense Refill   • ARIPiprazole (ABILIFY) 20 MG tablet Take 20 mg by mouth Daily.     • atenolol (TENORMIN) 50 MG tablet Take 1 tablet by mouth Daily. 90 tablet 1   • buPROPion XL (WELLBUTRIN XL) 300 MG 24 hr tablet Take 300 mg by mouth 2 (Two) Times a Day.     • calcium carbonate (OS-STEVIE) 600 MG tablet Take 600 mg by mouth Daily.     • cyclobenzaprine (FLEXERIL) 10 MG tablet Take 1 tablet by mouth At Night As Needed for Muscle Spasms. 30 tablet 1   • Ferrous Sulfate (IRON PO) Take  by mouth.     • gabapentin (NEURONTIN) 300 MG capsule TAKE ONE CAPSULE BY MOUTH THREE TIMES A  capsule 0   • hydrOXYzine (ATARAX) 50 MG tablet As Needed.     • isosorbide mononitrate (IMDUR) 30 MG 24 hr tablet Take 1 tablet by mouth Daily. 90 tablet 1   • lamoTRIgine (LaMICtal) 150 MG tablet Take 300 mg by mouth Daily.     • levothyroxine (SYNTHROID, LEVOTHROID) 88 MCG tablet Take 1 tablet by mouth Every Morning. 90 tablet 1   • methylPREDNISolone (MEDROL) 4 MG dose pack Take as directed on package instructions. 21 tablet 0   • nitroglycerin (NITROSTAT) 0.4 MG SL tablet Place 0.4 mg under the tongue Every 5 (Five) Minutes As Needed for Chest Pain (took at M.D  office at 0930 a.m.). Take no more  than 3 doses in 15 minutes.     • ondansetron (Zofran) 4 MG tablet Take 1 tablet by mouth Every 8 (Eight) Hours As Needed for Nausea or Vomiting. 20 tablet 3   • oxybutynin XL (DITROPAN XL) 15 MG 24 hr tablet Take 15 mg by mouth Daily.     • pantoprazole (PROTONIX) 40 MG EC tablet TAKE ONE TABLET BY MOUTH TWICE A DAY 60 tablet 0   • pantoprazole (PROTONIX) 40 MG EC tablet TAKE ONE TABLET BY MOUTH TWICE A  tablet 1   • pramipexole (MIRAPEX) 0.5 MG tablet Take 1 tablet by mouth Every Night for 360 days. 30 tablet 5   • simvastatin (ZOCOR) 20 MG tablet TAKE ONE TABLET BY MOUTH ONCE NIGHTLY 90 tablet 0   • venlafaxine XR (EFFEXOR-XR) 75 MG 24 hr capsule Take 150 mg by mouth Daily.     • vitamin B-12 (CYANOCOBALAMIN) 1000 MCG tablet TAKE ONE TABLET BY MOUTH TWICE A DAY 60 tablet 4     Current Facility-Administered Medications   Medication Dose Route Frequency Provider Last Rate Last Admin   • fentaNYL citrate (PF) (SUBLIMAZE) injection 50 mcg  50 mcg Intravenous PRN Mart Oliva MD       • iopamidol (ISOVUE-M 200) injection 41%  12 mL Epidural Once in imaging Render, Mart Goldstein MD       • lidocaine (XYLOCAINE) 1 % injection 1 mL  1 mL Intradermal Once PRN Mart Oliva MD       • methylPREDNISolone acetate (DEPO-medrol) injection 80 mg  80 mg Intra-articular Once RenderMart MD       • midazolam (VERSED) injection 1 mg  1 mg Intravenous PRN Mart Oliva MD       • sodium chloride 0.9 % flush 1-10 mL  1-10 mL Intravenous PRN Mart Oliva MD           --------------------------------------------------------------------------------  Assessment & Plan      Anesthesia Evaluation           Pain impairs ability to perform ADLs: Exercise/Activity       Airway   Mallampati: III  Neck ROM: limited  Possible difficult intubation  Dental      Pulmonary    (+) sleep apnea,   (-) wheezes  Cardiovascular     Rhythm: regular    (+) hypertension, valvular problems/murmurs murmur, past MI , CAD,        "Neuro/Psych  (-) normal sensory deficit  GI/Hepatic/Renal/Endo    (+)  GERD,  renal disease, thyroid problem     Musculoskeletal         PE comment: No obvious focal weakness in her upper extremities with respect to  strength, biceps and triceps.  Abdominal    Substance History      OB/GYN          Other                   Diagnosis and Plan    Treatment Plan  ASA 3      Procedures: Cervical Epidural Steroid Injection(MARLA), With fluoroscopy,           Discussed with patient risk and benefits of KAMI including but not limited to : Bleeding, infection, PDPH, inadvertant spinal anesthetic, worsening pain, hyperglycemia, hypertension, CHF, nerve damage, steroid \"toxicity\" and AVN of hips.  Pt agrees to proceed.    Diagnosis     * Cervical radiculopathy [M54.12]                    "

## 2022-07-26 NOTE — ANESTHESIA PROCEDURE NOTES
PAIN Epidural block    Pre-sedation assessment completed: 7/26/2022 11:53 AM    Patient reassessed immediately prior to procedure    Patient location during procedure: pain clinic  Start Time: 7/26/2022 11:53 AM  Indication:at surgeon's request and procedure for pain  Performed By  Anesthesiologist: Mart Oliva MD  Preanesthetic Checklist  Completed: patient identified, risks and benefits discussed, surgical consent, monitors and equipment checked, pre-op evaluation and timeout performed  Additional Notes  Post-Op Diagnosis Codes:     * Cervical radiculopathy (M54.12)    Prep:  Pt Position:prone  Sterile Tech:sterile barrier, mask and gloves  Prep:chlorhexidine gluconate and isopropyl alcohol  Monitoring:blood pressure monitoring, continuous pulse oximetry and EKG  Procedure:Sedation: no     Approach:midline  Guidance: fluoroscopy  Location:cervical  Level:C7-T1  Needle Gauge:20 G  Aspiration:negative  Test Dose:negative  Medications:  Depomedrol:80mg  Post Assessment:  Pt Tolerance:patient tolerated the procedure well with no apparent complications  Complications:no

## 2022-07-29 ENCOUNTER — OFFICE VISIT (OUTPATIENT)
Dept: ORTHOPEDIC SURGERY | Facility: CLINIC | Age: 62
End: 2022-07-29

## 2022-07-29 VITALS — TEMPERATURE: 97.1 F | WEIGHT: 216 LBS | BODY MASS INDEX: 38.27 KG/M2 | HEIGHT: 63 IN

## 2022-07-29 DIAGNOSIS — R52 PAIN: Primary | ICD-10-CM

## 2022-07-29 DIAGNOSIS — M25.511 CHRONIC RIGHT SHOULDER PAIN: ICD-10-CM

## 2022-07-29 DIAGNOSIS — G89.29 CHRONIC RIGHT SHOULDER PAIN: ICD-10-CM

## 2022-07-29 PROCEDURE — 20610 DRAIN/INJ JOINT/BURSA W/O US: CPT | Performed by: ORTHOPAEDIC SURGERY

## 2022-07-29 PROCEDURE — 99213 OFFICE O/P EST LOW 20 MIN: CPT | Performed by: ORTHOPAEDIC SURGERY

## 2022-07-29 PROCEDURE — 73030 X-RAY EXAM OF SHOULDER: CPT | Performed by: ORTHOPAEDIC SURGERY

## 2022-07-29 RX ADMIN — METHYLPREDNISOLONE ACETATE 80 MG: 80 INJECTION, SUSPENSION INTRA-ARTICULAR; INTRALESIONAL; INTRAMUSCULAR; SOFT TISSUE at 15:32

## 2022-07-29 RX ADMIN — LIDOCAINE HYDROCHLORIDE 2 ML: 20 INJECTION, SOLUTION EPIDURAL; INFILTRATION; INTRACAUDAL; PERINEURAL at 15:32

## 2022-08-01 RX ORDER — LIDOCAINE HYDROCHLORIDE 20 MG/ML
2 INJECTION, SOLUTION EPIDURAL; INFILTRATION; INTRACAUDAL; PERINEURAL
Status: COMPLETED | OUTPATIENT
Start: 2022-07-29 | End: 2022-07-29

## 2022-08-01 RX ORDER — METHYLPREDNISOLONE ACETATE 80 MG/ML
80 INJECTION, SUSPENSION INTRA-ARTICULAR; INTRALESIONAL; INTRAMUSCULAR; SOFT TISSUE
Status: COMPLETED | OUTPATIENT
Start: 2022-07-29 | End: 2022-07-29

## 2022-08-22 RX ORDER — LANOLIN ALCOHOL/MO/W.PET/CERES
CREAM (GRAM) TOPICAL
Qty: 60 TABLET | Refills: 4 | Status: SHIPPED | OUTPATIENT
Start: 2022-08-22

## 2022-08-22 NOTE — TELEPHONE ENCOUNTER
Rx Refill Note  Requested Prescriptions     Pending Prescriptions Disp Refills   • vitamin B-12 (CYANOCOBALAMIN) 1000 MCG tablet [Pharmacy Med Name: VITAMIN B-12 1,000 MCG TABLET] 60 tablet 4     Sig: TAKE ONE TABLET BY MOUTH TWICE A DAY      Last office visit with prescribing clinician: 6/28/2022      Next office visit with prescribing clinician: Visit date not found            Karla Ashby MA  08/22/22, 09:30 EDT

## 2022-09-02 ENCOUNTER — TELEPHONE (OUTPATIENT)
Dept: ENDOCRINOLOGY | Age: 62
End: 2022-09-02

## 2022-09-09 DIAGNOSIS — E03.9 HYPOTHYROIDISM (ACQUIRED): Primary | ICD-10-CM

## 2022-09-09 DIAGNOSIS — E78.5 HYPERLIPIDEMIA, UNSPECIFIED HYPERLIPIDEMIA TYPE: ICD-10-CM

## 2022-09-09 DIAGNOSIS — M85.89 OSTEOPENIA OF MULTIPLE SITES: ICD-10-CM

## 2022-09-15 ENCOUNTER — LAB (OUTPATIENT)
Dept: ENDOCRINOLOGY | Age: 62
End: 2022-09-15

## 2022-09-15 DIAGNOSIS — M85.89 OSTEOPENIA OF MULTIPLE SITES: ICD-10-CM

## 2022-09-15 DIAGNOSIS — E78.5 HYPERLIPIDEMIA, UNSPECIFIED HYPERLIPIDEMIA TYPE: ICD-10-CM

## 2022-09-15 DIAGNOSIS — E03.9 HYPOTHYROIDISM (ACQUIRED): ICD-10-CM

## 2022-09-16 LAB
25(OH)D3+25(OH)D2 SERPL-MCNC: 48.4 NG/ML (ref 30–100)
ALBUMIN SERPL-MCNC: 4.3 G/DL (ref 3.5–5.2)
ALBUMIN/GLOB SERPL: 2 G/DL
ALP SERPL-CCNC: 84 U/L (ref 39–117)
ALT SERPL-CCNC: 36 U/L (ref 1–33)
AST SERPL-CCNC: 25 U/L (ref 1–32)
BILIRUB SERPL-MCNC: 0.2 MG/DL (ref 0–1.2)
BUN SERPL-MCNC: 7 MG/DL (ref 8–23)
BUN/CREAT SERPL: 7.2 (ref 7–25)
CALCIUM SERPL-MCNC: 9.7 MG/DL (ref 8.6–10.5)
CHLORIDE SERPL-SCNC: 107 MMOL/L (ref 98–107)
CHOLEST SERPL-MCNC: 183 MG/DL (ref 0–200)
CO2 SERPL-SCNC: 26.5 MMOL/L (ref 22–29)
CREAT SERPL-MCNC: 0.97 MG/DL (ref 0.57–1)
EGFRCR-CYS SERPLBLD CKD-EPI 2021: 67 ML/MIN/1.73
GLOBULIN SER CALC-MCNC: 2.1 GM/DL
GLUCOSE SERPL-MCNC: 106 MG/DL (ref 65–99)
HDLC SERPL-MCNC: 72 MG/DL (ref 40–60)
IMP & REVIEW OF LAB RESULTS: NORMAL
LDLC SERPL CALC-MCNC: 94 MG/DL (ref 0–100)
POTASSIUM SERPL-SCNC: 4.7 MMOL/L (ref 3.5–5.2)
PROT SERPL-MCNC: 6.4 G/DL (ref 6–8.5)
SODIUM SERPL-SCNC: 143 MMOL/L (ref 136–145)
T4 FREE SERPL-MCNC: 1.13 NG/DL (ref 0.93–1.7)
TRIGL SERPL-MCNC: 97 MG/DL (ref 0–150)
TSH SERPL DL<=0.005 MIU/L-ACNC: 0.93 UIU/ML (ref 0.27–4.2)
VLDLC SERPL CALC-MCNC: 17 MG/DL (ref 5–40)

## 2022-09-20 NOTE — PROGRESS NOTES
Subjective   Keri Bhagat is a 62 y.o. female.     History of Present Illness  F/u for hypothyroidism, hyperlipidemia, sleep apnea            Patient is a 62 year old who came in for follow-up     She has hypothyroidism and has been on levothyroxine 88 µg per day. Thyroid ultrasound done in 10/17 showed stable 0.5 cm solid nodule in the right.  She denies dysphagia or pressure symptoms.  Thyroid ultrasound done in September 2020 was normal.  TSH done in September 2022 is normal at 0.934 with normal free T4 at 1.13 ng per DL.      She has no history of head or neck radiation therapy.       She had an upper endoscopy with dilatation in June 2016 done by Dr. Rodriguez.  She has gastritis and hiatal hernia.  She is on Protonix.  She has denies heartburn.  She denies melena or hematochezia.     She had a LAP-BAND and hiatal hernia repair done by Dr. Sims February 2018.  She has gained 7 lbs since 4/22.     She has history of hypertension, and coronary artery disease. She had a previous heart attack in 2000 and had angioplasty was 1 stent. She had a cardiac catheterization done in 2015 which showed a patent stent to the LAD. She had normal nuclear stress test in August 2021.  She denies chest pain or SOB. She is on atenolol and Imdur and follows with Dr. Burroughs.        She has hyperlipidemia and has been on Zocor 20 mg once a day. She denies any myalgia.  Lipid panel done in September 2022 are as follows: Cholesterol 183.  HDL 72.  LDL 94.  Triglycerides 97.     She has no previous history of diabetes mellitus.  Hemoglobin A1c done in October 2018 is normal at 5.1%.  Her mother and son have diabetes mellitus.       She has sleep apnea and is using a BiPap.  She wakes up rested most of the time.     She has chronic knee and neck pain and sees Dr. Godwin and Dr. Pritchard.  She had steroid injection and Synvisc on both knees in the past.      She has osteopenia on bone density done in November 2018.  She had her natural  menopause at age 54 and was never on hormone replacement therapy. She does exercise regularly.  She has no parental history of hip fractures.  She denies alcohol abuse.       Bone density done in March 2022 showed osteopenia.  Her 10-year risk of major osteoporotic fracture is 5.4% and of hip fracture is 0.5%.  She is on vitamin D3 1000 units twice daily.  25 hydroxy vitamin D done in September 2022 is normal at 48.4 ng/mL.     She smoked cigarettes for more than 40 years and switched to electronic cigarettes in 2017.  She is on Wellbutrin.     She was seen by her PCP in February 2019 because of poor memory and poor balance.  MRI of the brain done in January 2019 showed a 8 mm lesion on falx cerebri most likely a small meningioma.  Incidental to the study, there is a suggestion of a hypoenhancing focus measuring 4 mm in diameter on the right aspect of the anterior pituitary.  MRI of the pituitary done in March 2019 showed a hypoenhancing area within the sella turcica more posteriorly at the level of the interface of the anterior posterior pituitary suspected to be a pars intermedia cyst.  There is an incidental arachnoid cyst measuring up to 1.9 cm anterior to the right cerebellar hemisphere.  She is following with Maria Elena Giles NP and neurologist Dr. Cook.     MRI of the pituitary done in August 2021 showed a stable pars intermedia cyst and stable meningioma on the falx cerebri.     She had 3 Pfizer Covid vaccines.        The following portions of the patient's history were reviewed and updated as appropriate: allergies, current medications, past family history, past medical history, past social history, past surgical history and problem list.    Review of Systems   Eyes: Negative for visual disturbance.   Respiratory: Negative for shortness of breath.    Cardiovascular: Negative for chest pain and palpitations.   Gastrointestinal: Negative.    Genitourinary: Negative.    Musculoskeletal: Positive for arthralgias.  "Negative for myalgias.   Neurological: Negative for numbness.     Vitals:    09/21/22 1215   BP: 118/72   Pulse: 69   Temp: 95.2 °F (35.1 °C)   TempSrc: Temporal   SpO2: 97%   Weight: 103 kg (227 lb 9.6 oz)   Height: 160 cm (62.99\")      Objective   Physical Exam  Constitutional:       General: She is not in acute distress.     Appearance: Normal appearance. She is obese. She is not ill-appearing, toxic-appearing or diaphoretic.   Eyes:      General: No scleral icterus.        Right eye: No discharge.         Left eye: No discharge.      Extraocular Movements: Extraocular movements intact.      Conjunctiva/sclera: Conjunctivae normal.   Neck:      Vascular: No carotid bruit.   Cardiovascular:      Rate and Rhythm: Normal rate and regular rhythm.      Pulses: Normal pulses.      Heart sounds: Normal heart sounds. No murmur heard.    No friction rub.   Pulmonary:      Effort: No respiratory distress.      Breath sounds: Normal breath sounds. No stridor. No rales.   Chest:      Chest wall: No tenderness.   Abdominal:      General: Bowel sounds are normal. There is no distension.      Palpations: Abdomen is soft. There is no mass.      Tenderness: There is no right CVA tenderness or left CVA tenderness.   Musculoskeletal:         General: Normal range of motion.      Cervical back: Normal range of motion and neck supple. No rigidity or tenderness.      Right lower leg: No edema.      Left lower leg: No edema.   Lymphadenopathy:      Cervical: No cervical adenopathy.   Skin:     General: Skin is warm and dry.   Neurological:      General: No focal deficit present.      Mental Status: She is alert and oriented to person, place, and time.      Comments: +SLR on left.       Lab on 09/15/2022   Component Date Value Ref Range Status   • 25 Hydroxy, Vitamin D 09/15/2022 48.4  30.0 - 100.0 ng/ml Final    Comment: Results may be falsely increased if patient taking Biotin.  Reference Range for Total Vitamin D 25(OH)  Deficiency " <20.0 ng/mL  Insufficiency 21-29 ng/mL  Sufficiency  ng/mL  Toxicity >100 ng/ml     • TSH 09/15/2022 0.934  0.270 - 4.200 uIU/mL Final   • Free T4 09/15/2022 1.13  0.93 - 1.70 ng/dL Final    Results may be falsely increased if patient taking Biotin.   • Glucose 09/15/2022 106 (A) 65 - 99 mg/dL Final   • BUN 09/15/2022 7 (A) 8 - 23 mg/dL Final   • Creatinine 09/15/2022 0.97  0.57 - 1.00 mg/dL Final   • EGFR Result 09/15/2022 67.0  >60.0 mL/min/1.73 Final    Comment: National Kidney Foundation and American Society of  Nephrology (ASN) Task Force recommended calculation based  on the Chronic Kidney Disease Epidemiology Collaboration  (CKD-EPI) equation refit without adjustment for race.  GFR Normal >60  Chronic Kidney Disease <60  Kidney Failure <15     • BUN/Creatinine Ratio 09/15/2022 7.2  7.0 - 25.0 Final   • Sodium 09/15/2022 143  136 - 145 mmol/L Final   • Potassium 09/15/2022 4.7  3.5 - 5.2 mmol/L Final   • Chloride 09/15/2022 107  98 - 107 mmol/L Final   • Total CO2 09/15/2022 26.5  22.0 - 29.0 mmol/L Final   • Calcium 09/15/2022 9.7  8.6 - 10.5 mg/dL Final   • Total Protein 09/15/2022 6.4  6.0 - 8.5 g/dL Final   • Albumin 09/15/2022 4.30  3.50 - 5.20 g/dL Final   • Globulin 09/15/2022 2.1  gm/dL Final   • A/G Ratio 09/15/2022 2.0  g/dL Final   • Total Bilirubin 09/15/2022 0.2  0.0 - 1.2 mg/dL Final   • Alkaline Phosphatase 09/15/2022 84  39 - 117 U/L Final   • AST (SGOT) 09/15/2022 25  1 - 32 U/L Final   • ALT (SGPT) 09/15/2022 36 (A) 1 - 33 U/L Final   • Total Cholesterol 09/15/2022 183  0 - 200 mg/dL Final    Comment: Cholesterol Reference Ranges  (U.S. Department of Health and Human Services ATP III  Classifications)  Desirable          <200 mg/dL  Borderline High    200-239 mg/dL  High Risk          >240 mg/dL  Triglyceride Reference Ranges  (U.S. Department of Health and Human Services ATP III  Classifications)  Normal           <150 mg/dL  Borderline High  150-199 mg/dL  High             200-499  mg/dL  Very High        >500 mg/dL  HDL Reference Ranges  (U.S. Department of Health and Human Services ATP III  Classifications)  Low     <40 mg/dl (major risk factor for CHD)  High    >60 mg/dl ('negative' risk factor for CHD)  LDL Reference Ranges  (U.S. Department of Health and Human Services ATP III  Classifications)  Optimal          <100 mg/dL  Near Optimal     100-129 mg/dL  Borderline High  130-159 mg/dL  High             160-189 mg/dL  Very High        >189 mg/dL     • Triglycerides 09/15/2022 97  0 - 150 mg/dL Final   • HDL Cholesterol 09/15/2022 72 (A) 40 - 60 mg/dL Final   • VLDL Cholesterol R[place] 09/15/2022 17  5 - 40 mg/dL Final   • LDL Chol Calc ([facility]) 09/15/2022 94  0 - 100 mg/dL Final   • Interpretation 09/15/2022 Note   Final    Supplemental report is available.     Assessment & Plan   Diagnoses and all orders for this visit:    1. Hypothyroidism (acquired) (Primary)    2. Atherosclerosis of native coronary artery of native heart without angina pectoris    3. Essential hypertension    4. Sciatica of right side    5. ROCKY (obstructive sleep apnea)    6. Vitamin D deficiency    7. Osteopenia of multiple sites    8. Pituitary cyst (HCC)    9. Meningioma (HCC)    10. Hyperlipidemia, unspecified hyperlipidemia type      Continue levothyroxine 88 mcg/day.  Continue Imdur and atenolol per Dr. Burroughs.  Follow-up with PCP with regards to sciatica.  May use Tylenol and heating pad as needed.  Continue BiPAP.  Continue vitamin D3 1000 units twice a day.  Continue simvastatin 20 mg/day and low-fat diet.  Flu vaccine next month.    Copy of my note sent to James Epley, NP.    RTC 4 mos.

## 2022-09-21 ENCOUNTER — OFFICE VISIT (OUTPATIENT)
Dept: ENDOCRINOLOGY | Age: 62
End: 2022-09-21

## 2022-09-21 VITALS
DIASTOLIC BLOOD PRESSURE: 72 MMHG | OXYGEN SATURATION: 97 % | HEART RATE: 69 BPM | WEIGHT: 227.6 LBS | HEIGHT: 63 IN | TEMPERATURE: 95.2 F | BODY MASS INDEX: 40.33 KG/M2 | SYSTOLIC BLOOD PRESSURE: 118 MMHG

## 2022-09-21 DIAGNOSIS — E03.9 HYPOTHYROIDISM (ACQUIRED): Primary | ICD-10-CM

## 2022-09-21 DIAGNOSIS — E23.6 PITUITARY CYST: ICD-10-CM

## 2022-09-21 DIAGNOSIS — M54.31 SCIATICA OF RIGHT SIDE: ICD-10-CM

## 2022-09-21 DIAGNOSIS — I25.10 ATHEROSCLEROSIS OF NATIVE CORONARY ARTERY OF NATIVE HEART WITHOUT ANGINA PECTORIS: ICD-10-CM

## 2022-09-21 DIAGNOSIS — D32.9 MENINGIOMA: ICD-10-CM

## 2022-09-21 DIAGNOSIS — G47.33 OSA (OBSTRUCTIVE SLEEP APNEA): ICD-10-CM

## 2022-09-21 DIAGNOSIS — E78.5 HYPERLIPIDEMIA, UNSPECIFIED HYPERLIPIDEMIA TYPE: ICD-10-CM

## 2022-09-21 DIAGNOSIS — E55.9 VITAMIN D DEFICIENCY: ICD-10-CM

## 2022-09-21 DIAGNOSIS — I10 ESSENTIAL HYPERTENSION: ICD-10-CM

## 2022-09-21 DIAGNOSIS — M85.89 OSTEOPENIA OF MULTIPLE SITES: ICD-10-CM

## 2022-09-21 PROBLEM — D35.00 ADRENAL ADENOMA: Status: ACTIVE | Noted: 2022-09-21

## 2022-09-21 PROCEDURE — 99214 OFFICE O/P EST MOD 30 MIN: CPT | Performed by: INTERNAL MEDICINE

## 2022-09-21 RX ORDER — NEOMYCIN SULFATE, POLYMYXIN B SULFATE, AND DEXAMETHASONE 3.5; 10000; 1 MG/G; [USP'U]/G; MG/G
OINTMENT OPHTHALMIC
COMMUNITY
Start: 2022-08-23 | End: 2022-09-21

## 2022-09-26 ENCOUNTER — TELEPHONE (OUTPATIENT)
Dept: FAMILY MEDICINE CLINIC | Facility: CLINIC | Age: 62
End: 2022-09-26

## 2022-09-26 NOTE — TELEPHONE ENCOUNTER
Caller: Keri Bhagat    Relationship: Self    Best call back number: [phone]    What was the call regarding: PATIENT CURRENTLY HAS [employer] MEDICARE AND MEDICAID, THEY ARE STATING THAT JAMES EPLEY IS NOT IN THEIR NETWORK. CAN JO ANN FILL OUT A TRANSITION OF COVERAGE REQUEST SO SHE CAN STAY WITH HIM AS A PATIENT? PLEASE ADVISE.     Do you require a callback: YES

## 2022-09-28 RX ORDER — SIMVASTATIN 20 MG
TABLET ORAL
Qty: 90 TABLET | Refills: 0 | Status: SHIPPED | OUTPATIENT
Start: 2022-09-28 | End: 2023-01-19 | Stop reason: SDUPTHER

## 2022-09-29 NOTE — TELEPHONE ENCOUNTER
Patient is going to bring in her card today. We may have the incorrect card as this also shows dental.

## 2022-10-06 ENCOUNTER — CLINICAL SUPPORT (OUTPATIENT)
Dept: ORTHOPEDIC SURGERY | Facility: CLINIC | Age: 62
End: 2022-10-06

## 2022-10-06 VITALS — HEIGHT: 63 IN | TEMPERATURE: 98.7 F | BODY MASS INDEX: 39.69 KG/M2 | WEIGHT: 224 LBS

## 2022-10-06 DIAGNOSIS — M17.0 PRIMARY OSTEOARTHRITIS OF BOTH KNEES: ICD-10-CM

## 2022-10-06 DIAGNOSIS — R52 PAIN: Primary | ICD-10-CM

## 2022-10-06 PROCEDURE — 73562 X-RAY EXAM OF KNEE 3: CPT | Performed by: NURSE PRACTITIONER

## 2022-10-06 PROCEDURE — 20610 DRAIN/INJ JOINT/BURSA W/O US: CPT | Performed by: NURSE PRACTITIONER

## 2022-10-06 RX ORDER — METHYLPREDNISOLONE ACETATE 80 MG/ML
80 INJECTION, SUSPENSION INTRA-ARTICULAR; INTRALESIONAL; INTRAMUSCULAR; SOFT TISSUE
Status: COMPLETED | OUTPATIENT
Start: 2022-10-06 | End: 2022-10-06

## 2022-10-06 RX ADMIN — METHYLPREDNISOLONE ACETATE 80 MG: 80 INJECTION, SUSPENSION INTRA-ARTICULAR; INTRALESIONAL; INTRAMUSCULAR; SOFT TISSUE at 08:21

## 2022-10-06 RX ADMIN — METHYLPREDNISOLONE ACETATE 80 MG: 80 INJECTION, SUSPENSION INTRA-ARTICULAR; INTRALESIONAL; INTRAMUSCULAR; SOFT TISSUE at 09:33

## 2022-10-06 NOTE — PROGRESS NOTES
Patient: Keri Bhagat  YOB: 1960  Date of Service: 10/6/2022    Chief Complaints:   Chief Complaint   Patient presents with   • Left Knee - Pain, Follow-up   • Right Knee - Pain, Follow-up       Subjective:Keri Bhagat is here today for worsening knee pain. Pt has undergone injection of the knee in the past with good resolution of symptoms. Pt is requesting a repeat injection to both knees.    History of Present Illness: Pt is seen in the office today with complaints of   Chief Complaint   Patient presents with   • Left Knee - Pain, Follow-up   • Right Knee - Pain, Follow-up   .  KNEE: TIMING:  The pain is described as ACUTE.  LOCATION: medial joint line tenderness. AGGRAVATING FACTORS:  Is worsened by prolonged standing, sitting, walking and squatting activities.  ASSOCIATED SYMPTOMS:  swelling, tenderness, and aching.    This problem is not new to this examiner.     Allergies: No Known Allergies    Medications:   Home Medications:  Current Outpatient Medications on File Prior to Visit   Medication Sig   • ARIPiprazole (ABILIFY) 20 MG tablet Take 20 mg by mouth Daily.   • atenolol (TENORMIN) 50 MG tablet Take 1 tablet by mouth Daily.   • buPROPion XL (WELLBUTRIN XL) 300 MG 24 hr tablet Take 300 mg by mouth 2 (Two) Times a Day.   • calcium carbonate (OS-STEVIE) 600 MG tablet Take 600 mg by mouth Daily.   • cyclobenzaprine (FLEXERIL) 10 MG tablet Take 1 tablet by mouth At Night As Needed for Muscle Spasms.   • Ferrous Sulfate (IRON PO) Take  by mouth.   • gabapentin (NEURONTIN) 300 MG capsule TAKE ONE CAPSULE BY MOUTH THREE TIMES A DAY   • hydrOXYzine (ATARAX) 50 MG tablet As Needed.   • isosorbide mononitrate (IMDUR) 30 MG 24 hr tablet Take 1 tablet by mouth Daily.   • lamoTRIgine (LaMICtal) 150 MG tablet Take 300 mg by mouth Daily.   • levothyroxine (SYNTHROID, LEVOTHROID) 88 MCG tablet Take 1 tablet by mouth Every Morning.   • nitroglycerin (NITROSTAT) 0.4 MG SL tablet Place 0.4 mg under the  tongue Every 5 (Five) Minutes As Needed for Chest Pain (took at M.D  office at 0930 a.m.). Take no more than 3 doses in 15 minutes.   • ondansetron (Zofran) 4 MG tablet Take 1 tablet by mouth Every 8 (Eight) Hours As Needed for Nausea or Vomiting.   • oxybutynin XL (DITROPAN XL) 15 MG 24 hr tablet Take 15 mg by mouth Daily.   • pantoprazole (PROTONIX) 40 MG EC tablet TAKE ONE TABLET BY MOUTH TWICE A DAY   • pramipexole (MIRAPEX) 0.5 MG tablet Take 1 tablet by mouth Every Night for 360 days.   • simvastatin (ZOCOR) 20 MG tablet TAKE ONE TABLET BY MOUTH ONCE NIGHTLY   • tobramycin 0.3 % solution ophthalmic solution Administer 2 drops to the right eye Every 4 (Four) Hours While Awake.   • venlafaxine XR (EFFEXOR-XR) 75 MG 24 hr capsule Take 150 mg by mouth Daily.   • vitamin B-12 (CYANOCOBALAMIN) 1000 MCG tablet TAKE ONE TABLET BY MOUTH TWICE A DAY     No current facility-administered medications on file prior to visit.     Current Medications:  Scheduled Meds:  Continuous Infusions:No current facility-administered medications for this visit.    PRN Meds:.    I have reviewed the patient's medical history in detail and updated the computerized patient record.  Review and summarization of old records include:    Past Medical History:   Diagnosis Date   • Abnormal Pap smear of cervix    • Anemia    • Anxiety    • Arthritis    • Atherosclerotic heart disease of native coronary artery with other forms of angina pectoris (HCC)    • Bipolar I disorder, single manic episode (HCC)     depressive d(NOS)   • Cervical disc herniation    • Cervical dysplasia    • Coronary artery disease    • CTS (carpal tunnel syndrome)    • Depression     NO SUICIDAL PLANS   • Difficulty swallowing    • Diverticular disease    • Dyspepsia    • Dysphagia    • Gastric reflux    • GERD (gastroesophageal reflux disease)    • Heart attack (HCC)    • Heart murmur    • Hiatal hernia    • High cholesterol    • History of transfusion    • 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/2016    HPV positive on pap LGSIL   • Hyperlipidemia    • Hypertension    • Hypothyroidism    • Incontinence in female     wears pads   • Kidney disease 2017    stage 2    • Kidney disease, chronic, stage III (GFR 30-59 ml/min) ([facility])    • LGSIL on Pap smear of cervix 04/29/2016    LGSIL HPV positive   • Myocardial infarction ([facility]) 2000   • Neck pain    • Obesity    • Past myocardial infarction 05/2000   • Periodic limb movement disorder    • Restless leg syndrome    • Sleep apnea     cpap   • Stress fracture     LEFT HEEL   • Suicidal ideation 08/19/2016    history   • Swelling of ankle     right had doppler no s/s blood clot   • Thyroid nodule    • Vitamin B12 deficiency         Past Surgical History:   Procedure Laterality Date   • ANTERIOR CERVICAL DISCECTOMY W/ FUSION N/A 12/29/2016    Procedure: C3-4 anterior cervical discectomy and fusion with Depuy micro plate, ALLOGRAFT C3-4, AND HARDWARE REMOVAL C4-7.;  Surgeon: Hema Godwin MD;  Location: [facility] OR;  Service:    • CARDIAC CATHETERIZATION N/A 03/30/2017    Procedure: Left Heart Cath;  Surgeon: Tracey Vargas MD;  [facility];  Service:    • CARDIAC CATHETERIZATION N/A 03/30/2017    Procedure: Coronary angiography;  Surgeon: Tracey Vargas MD;  Location:  [facility] CATH INVASIVE LOCATION;  Service:    • CARDIAC CATHETERIZATION N/A 03/30/2017    Procedure: Left ventriculography;  Surgeon: Tracey Vargas MD;  Location:  [facility] CATH INVASIVE LOCATION;  Service:    • CARDIAC CATHETERIZATION  03/30/2017    Procedure: Functional Flow [place];  Surgeon: Tracey Vargas MD;  [facility];  Service:    • CARPAL TUNNEL RELEASE     • CATARACT EXTRACTION     • CERVICAL BIOPSY  MMXVI    Dr. Jeffery.    • CERVICAL DISCECTOMY ANTERIOR  04/2013    C4-7   • COLONOSCOPY  04/20/2015    Diverticulosis, IH   • COLONOSCOPY  03/09/2021   • COLPOSCOPY W/ BIOPSY / CURETTAGE  06/17/2016     LGSIL HPV positive. Results were normal repeat pap in one year. Chronic Cervicitis   • CORONARY ANGIOPLASTY WITH STENT PLACEMENT     • ENDOSCOPY  MMXV    Normal.  Dr. Rodriguez   • ENDOSCOPY N/A 2017    Erythematous mucosa in the stomach  PATH: Chronic active gastritis, moderate with intestinal metaplasia    • GASTRIC BYPASS      Done using the sleeve technique   • HARDWARE REMOVAL  2016    cervical   • HERNIA REPAIR     • LAPAROSCOPIC GASTRIC BANDING  2018   • SHOULDER SURGERY Left     RCR   • TONSILLECTOMY     • TONSILLECTOMY AND ADENOIDECTOMY     • TRANSVAGINAL TAPING SUSPENSION N/A 2017    Procedure: MID URETHRAL SLING CYSTSCOPY;  Surgeon: Abby Méndez MD;  Location: [facility];  Service:    • TUBAL ABDOMINAL LIGATION     • TYMPANOSTOMY TUBE PLACEMENT Right    • UPPER GASTROINTESTINAL ENDOSCOPY  approx    • WRIST SURGERY Bilateral     carpal tunnel   • WRIST SURGERY      L wrist/ 2020        Social History     Occupational History   • Occupation: disabled   Tobacco Use   • Smoking status: Former Smoker     Packs/day: 1.50     Years: 20.00     Pack years: 30.00     Types: Electronic Cigarette, Cigarettes     Quit date:      Years since quittin.7   • Smokeless tobacco: Current User   • Tobacco comment: Electronic Cigarette   Vaping Use   • Vaping Use: Every day   • Substances: Nicotine, Flavoring   • Devices: Refillable tank   Substance and Sexual Activity   • Alcohol use: Not Currently   • Drug use: No   • Sexual activity: Yes     Partners: Male     Birth control/protection: None, Condom      Social History     Social History Narrative   • Not on file        Family History   Problem Relation Age of Onset   • Diabetes type II Mother    • Hypertension Mother    • Osteoporosis Mother    • Seizures Mother    • COPD Father    • Hypertension Father    • Lung cancer Father    • Heart attack Father    • Liver cancer Father    • Liver disease Father    • Thyroid disease  Sister    • Hypertension Sister    • Bipolar disorder Sister    • Depression Sister    • ADD / ADHD Sister    • No Known Problems Son    • Abnormal EKG Daughter    • Hypertension Daughter    • Bipolar disorder Daughter    • Thyroid disease Daughter    • No Known Problems Paternal Grandfather    • No Known Problems Paternal Grandmother    • No Known Problems Maternal Grandmother    • No Known Problems Maternal Grandfather    • Thyroid disease Sister    • Hypertension Sister    • Bipolar disorder Sister    • Asthma Daughter    • Breast cancer Neg Hx    • Ovarian cancer Neg Hx    • Uterine cancer Neg Hx    • Colon cancer Neg Hx    • Malig Hyperthermia Neg Hx        ROS: 14 point review of systems was performed and was negative except for documented findings in HPI and today's encounter.     Allergies: No Known Allergies  Constitutional:  Denies fever, shaking or chills   Eyes:  Denies change in visual acuity   HENT:  Denies nasal congestion or sore throat   Respiratory:  Denies cough or shortness of breath   Cardiovascular:  Denies chest pain or severe LE edema   GI:  Denies abdominal pain, nausea, vomiting, bloody stools or diarrhea   Musculoskeletal:  Numbness, tingling, or loss of motor function only as noted above in history of present illness.  : Denies painful urination or hematuria  Integument:  Denies rash, lesion or ulceration   Neurologic:  Denies headache or focal weakness  Endocrine:  Denies lymphadenopathy  Psych:  Denies confusion or change in mental status   Hem:  Denies active bleeding      Physical Exam: 62 y.o. female  Wt Readings from Last 3 Encounters:   10/06/22 102 kg (224 lb)   09/25/22 100 kg (221 lb)   09/21/22 103 kg (227 lb 9.6 oz)       Body mass index is 39.68 kg/m².  Facility age limit for growth percentiles is 20 years.  Vitals:    10/06/22 0937   Temp: 98.7 °F (37.1 °C)     Vital signs reviewed.   General Appearance:    Alert, cooperative, in no acute distress                  Eyes:  conjunctiva clear  ENT: external ears and nose atraumatic  CV: no peripheral edema  Resp: normal respiratory effort  Skin: no rashes or wounds; normal turgor  Psych: mood and affect appropriate  Lymph: no nodes appreciated  Neuro: gross sensation intact  Vascular:  Palpable peripheral pulse in noted extremity  Musculoskeletal Extremities: KNEE Exam: medial and lateral joint line tenderness with crepitation, synovitis, swelling, and joint effusion bilateral knee.      Diagnostic Data:  Imaging done today and discussed with the patient:    Indication: pain related symptoms,  Views: 3V AP, LAT & 40 degree PA bilateral knee(s)   Findings: advanced arthritis  Comparison views: viewed last xray done in the office.      Procedure:  Large Joint Arthrocentesis: R knee  Date/Time: 10/6/2022 8:21 AM  Consent given by: patient  Site marked: site marked  Timeout: Immediately prior to procedure a time out was called to verify the correct patient, procedure, equipment, support staff and site/side marked as required   Supporting Documentation  Indications: pain, joint swelling and diagnostic evaluation   Procedure Details  Location: knee - R knee  Preparation: Patient was prepped and draped in the usual sterile fashion  Needle gauge: 21G.  Medications administered: 80 mg methylPREDNISolone acetate 80 MG/ML; 2 mL lidocaine (cardiac)  Patient tolerance: patient tolerated the procedure well with no immediate complications    Large Joint Arthrocentesis: L knee  Date/Time: 10/6/2022 9:33 AM  Consent given by: patient  Site marked: site marked  Timeout: Immediately prior to procedure a time out was called to verify the correct patient, procedure, equipment, support staff and site/side marked as required   Supporting Documentation  Indications: pain   Procedure Details  Location: knee - L knee  Preparation: Patient was prepped and draped in the usual sterile fashion  Needle gauge: 21G.  Medications administered: 80 mg methylPREDNISolone  acetate 80 MG/ML; 2 mL lidocaine (cardiac)  Patient tolerance: patient tolerated the procedure well with no immediate complications          Assessment:     ICD-10-CM ICD-9-CM   1. Pain  R52 780.96   2. Primary osteoarthritis of both knees  M17.0 715.16       Plan:   Follow up as indicated.  Ice, elevate, and rest as needed.  The diagnosis(es), natural history, pathophysiology and treatment for diagnosis(es) were discussed. Opportunity given and questions answered.  Biomechanics of pertinent body areas discussed.  When appropriate, the use of ambulatory aids discussed.  BMI:  The concept of BMI body mass index and its importance and implications discussed.    EXERCISES:  Advice on benefits of, and types of regular/moderate exercise pertaining to orthopedic diagnosis(es).  MEDICATIONS:  The risks, benefits, warnings,side effects and alternatives of medications discussed.  Inflammation/pain control; with cold, heat, elevation and/or liniments discussed as appropriate  Cortisone Injection. See procedure note.  HOME EXERCISE/PT program encouraged  MEDICAL RECORDS reviewed from other provider(s) for past and current medical history pertinent to this complaint.    10/6/2022

## 2022-10-11 ENCOUNTER — ANESTHESIA EVENT (OUTPATIENT)
Dept: PAIN MEDICINE | Facility: HOSPITAL | Age: 62
End: 2022-10-11

## 2022-10-11 ENCOUNTER — HOSPITAL ENCOUNTER (OUTPATIENT)
Dept: GENERAL RADIOLOGY | Facility: HOSPITAL | Age: 62
Discharge: HOME | End: 2022-10-11

## 2022-10-11 ENCOUNTER — HOSPITAL ENCOUNTER (OUTPATIENT)
Dept: PAIN MEDICINE | Facility: HOSPITAL | Age: 62
Discharge: HOME | End: 2022-10-11

## 2022-10-11 ENCOUNTER — ANESTHESIA (OUTPATIENT)
Dept: PAIN MEDICINE | Facility: HOSPITAL | Age: 62
End: 2022-10-11

## 2022-10-11 VITALS
HEART RATE: 63 BPM | DIASTOLIC BLOOD PRESSURE: 92 MMHG | OXYGEN SATURATION: 96 % | TEMPERATURE: 97.7 F | RESPIRATION RATE: 16 BRPM | SYSTOLIC BLOOD PRESSURE: 143 MMHG

## 2022-10-11 DIAGNOSIS — R52 PAIN: ICD-10-CM

## 2022-10-11 DIAGNOSIS — M48.02 CERVICAL SPINAL STENOSIS: ICD-10-CM

## 2022-10-11 PROCEDURE — 25010000002 MIDAZOLAM PER 1 MG: Performed by: ANESTHESIOLOGY

## 2022-10-11 PROCEDURE — 77003 FLUOROGUIDE FOR SPINE INJECT: CPT

## 2022-10-11 PROCEDURE — 25010000002 DEXAMETHASONE PER 1 MG: Performed by: ANESTHESIOLOGY

## 2022-10-11 PROCEDURE — 25010000002 DEXAMETHASONE SODIUM PHOSPHATE 10 MG/ML SOLUTION: Performed by: ANESTHESIOLOGY

## 2022-10-11 RX ORDER — DEXAMETHASONE SODIUM PHOSPHATE 10 MG/ML
10 INJECTION, SOLUTION INTRAMUSCULAR; INTRAVENOUS ONCE
Status: COMPLETED | OUTPATIENT
Start: 2022-10-11 | End: 2022-10-11

## 2022-10-11 RX ORDER — MIDAZOLAM HYDROCHLORIDE 1 MG/ML
1 INJECTION INTRAMUSCULAR; INTRAVENOUS AS NEEDED
Status: DISCONTINUED | OUTPATIENT
Start: 2022-10-11 | End: 2022-10-12 | Stop reason: HOSPADM

## 2022-10-11 RX ORDER — SODIUM CHLORIDE 0.9 % (FLUSH) 0.9 %
1-10 SYRINGE (ML) INJECTION AS NEEDED
Status: DISCONTINUED | OUTPATIENT
Start: 2022-10-11 | End: 2022-10-12 | Stop reason: HOSPADM

## 2022-10-11 RX ORDER — DEXAMETHASONE SODIUM PHOSPHATE 4 MG/ML
INJECTION, SOLUTION INTRA-ARTICULAR; INTRALESIONAL; INTRAMUSCULAR; INTRAVENOUS; SOFT TISSUE
Status: COMPLETED | OUTPATIENT
Start: 2022-10-11 | End: 2022-10-11

## 2022-10-11 RX ORDER — FENTANYL CITRATE 50 UG/ML
50 INJECTION, SOLUTION INTRAMUSCULAR; INTRAVENOUS AS NEEDED
Status: DISCONTINUED | OUTPATIENT
Start: 2022-10-11 | End: 2022-10-12 | Stop reason: HOSPADM

## 2022-10-11 RX ORDER — LIDOCAINE HYDROCHLORIDE 10 MG/ML
1 INJECTION, SOLUTION INFILTRATION; PERINEURAL ONCE AS NEEDED
Status: DISCONTINUED | OUTPATIENT
Start: 2022-10-11 | End: 2022-10-12 | Stop reason: HOSPADM

## 2022-10-11 RX ADMIN — DEXAMETHASONE SODIUM PHOSPHATE 10 MG: 10 INJECTION INTRAMUSCULAR; INTRAVENOUS at 10:05

## 2022-10-11 RX ADMIN — MIDAZOLAM HYDROCHLORIDE 2 MG: 1 INJECTION, SOLUTION INTRAMUSCULAR; INTRAVENOUS at 10:02

## 2022-10-11 RX ADMIN — DEXAMETHASONE SODIUM PHOSPHATE 10 MG: 4 INJECTION, SOLUTION INTRA-ARTICULAR; INTRALESIONAL; INTRAMUSCULAR; INTRAVENOUS; SOFT TISSUE at 09:56

## 2022-10-11 NOTE — ANESTHESIA PROCEDURE NOTES
PAIN Epidural block    Pre-sedation assessment completed: 10/11/2022 9:56 AM    Patient reassessed immediately prior to procedure    Patient location during procedure: pain clinic  Start Time: 10/11/2022 9:56 AM  Stop Time: 10/11/2022 10:06 AM  Indication:procedure for pain  Performed By  Anesthesiologist: Jagruti Dean MD  Preanesthetic Checklist  Completed: patient identified, IV checked, site marked, risks and benefits discussed, surgical consent, monitors and equipment checked, pre-op evaluation and timeout performed  Additional Notes  Fluoro used.    Dx: cervical radiculopathy.    Sedation 1002 - 1006.    Prep:  Pt Position:prone  Sterile Tech:cap, gloves, mask and sterile barrier  Prep:chlorhexidine gluconate and isopropyl alcohol  Monitoring:blood pressure monitoring, continuous pulse oximetry and EKG  Procedure:Sedation: yes     Approach:midline  Guidance: fluoroscopy  Location:cervical  Level:C7-T1  Needle Type:Tuohy  Needle Gauge:20  Aspiration:negative  Medications:  Preservative Free Saline:3mL  Comments:Dye not indicated  Post Assessment:  Dressing:occlusive dressing applied  Pt Tolerance:patient tolerated the procedure well with no apparent complications  Complications:no

## 2022-10-11 NOTE — H&P
[facility]    History and Physical    Patient Name: Keri Bhagat  :  1960  MRN:  [id number]  Date of Admission: 10/11/2022    Subjective     Patient is a 62 y.o. female presents with chief complaint of chronic, moderate neck, shoulder: left and arm: left pain.  Onset of symptoms was gradual starting a few years ago.  Symptoms are associated/aggravated by nothing in particular. Symptoms improve with injection - more than 50% improved following prior dariela. Relief lasted about 4 weeks. Presents for dariela today.      The following portions of the patients history were reviewed and updated as appropriate: current medications, allergies, past medical history, past surgical history, past family history, past social history and problem list                Objective     Past Medical History:   Past Medical History:   Diagnosis Date   • Abnormal Pap smear of cervix    • Anemia    • Anxiety    • Arthritis    • Atherosclerotic heart disease of native coronary artery with other forms of angina pectoris ([facility])    • Bipolar I disorder, single manic episode ([facility])     depressive d(NOS)   • Cervical disc herniation    • Cervical dysplasia    • Coronary artery disease    • CTS (carpal tunnel syndrome)    • Depression     NO SUICIDAL PLANS   • Difficulty swallowing    • Diverticular disease    • Dyspepsia    • Dysphagia    • Gastric reflux    • GERD (gastroesophageal reflux disease)    • Heart attack ([facility])    • Heart murmur    • Hiatal hernia    • High cholesterol    • History of transfusion    • HPV (human papilloma virus) infection 2016    HPV positive on pap LGSIL   • Hyperlipidemia    • Hypertension    • Hypothyroidism    • Incontinence in female     wears pads   • Kidney disease 2017    stage 2    • Kidney disease, chronic, stage III (GFR 30-59 ml/min) ([facility])    • LGSIL on Pap smear of cervix 2016    LGSIL HPV positive   • Myocardial infarction ([facility])    • Neck pain    • Obesity    • Past myocardial  infarction 05/2000   • Periodic limb movement disorder    • Restless leg syndrome    • Sleep apnea     cpap   • Stress fracture     LEFT HEEL   • Suicidal ideation 08/19/2016    history   • Swelling of ankle     right had doppler no s/s blood clot   • Thyroid nodule    • Vitamin B12 deficiency      Past Surgical History:   Past Surgical History:   Procedure Laterality Date   • ANTERIOR CERVICAL DISCECTOMY W/ FUSION N/A 12/29/2016    Procedure: C3-4 anterior cervical discectomy and fusion with Depuy micro plate, ALLOGRAFT C3-4, AND HARDWARE REMOVAL C4-7.;  Surgeon: Hema Godwin MD;  Location: [facility] MAIN OR;  Service:    • CARDIAC CATHETERIZATION N/A 03/30/2017    Procedure: Left Heart Cath;  Surgeon: Tracey Vargas MD;  Location:  [facility] CATH INVASIVE LOCATION;  Service:    • CARDIAC CATHETERIZATION N/A 03/30/2017    Procedure: Coronary angiography;  Surgeon: Tracey Vargas MD;  Location:  [facility] CATH INVASIVE LOCATION;  Service:    • CARDIAC CATHETERIZATION N/A 03/30/2017    Procedure: Left ventriculography;  Surgeon: Tracey Vargas MD;  Location:  [facility] CATH INVASIVE LOCATION;  Service:    • CARDIAC CATHETERIZATION  03/30/2017    Procedure: Functional Flow [place];  Surgeon: Tracey Vargas MD;  Location:  [facility] CATH INVASIVE LOCATION;  Service:    • CARPAL TUNNEL RELEASE     • CATARACT EXTRACTION     • CERVICAL BIOPSY  MMXVI    Dr. Jeffery.    • CERVICAL DISCECTOMY ANTERIOR  04/2013    C4-7   • COLONOSCOPY  04/20/2015    Diverticulosis, IH   • COLONOSCOPY  03/09/2021   • COLPOSCOPY W/ BIOPSY / CURETTAGE  06/17/2016    LGSIL HPV positive. Results were normal repeat pap in one year. Chronic Cervicitis   • CORONARY ANGIOPLASTY WITH STENT PLACEMENT     • ENDOSCOPY  MMXV    Normal.  Dr. Rodriguez   • ENDOSCOPY N/A 06/22/2017    Erythematous mucosa in the stomach  PATH: Chronic active gastritis, moderate with intestinal metaplasia    • GASTRIC BYPASS      Done using the sleeve technique   • HARDWARE REMOVAL  12/29/2016     cervical   • HERNIA REPAIR     • LAPAROSCOPIC GASTRIC BANDING  2018   • SHOULDER SURGERY Left     RCR   • TONSILLECTOMY     • TONSILLECTOMY AND ADENOIDECTOMY     • TRANSVAGINAL TAPING SUSPENSION N/A 2017    Procedure: MID URETHRAL SLING CYSTSCOPY;  Surgeon: Abby Méndez MD;  Location: [facility] OR;  Service:    • TUBAL ABDOMINAL LIGATION     • TYMPANOSTOMY TUBE PLACEMENT Right    • UPPER GASTROINTESTINAL ENDOSCOPY  approx    • WRIST SURGERY Bilateral     carpal tunnel   • WRIST SURGERY      L wrist2020     Family History:   Family History   Problem Relation Age of Onset   • Diabetes type II Mother    • Hypertension Mother    • Osteoporosis Mother    • Seizures Mother    • COPD Father    • Hypertension Father    • Lung cancer Father    • Heart attack Father    • Liver cancer Father    • Liver disease Father    • Thyroid disease Sister    • Hypertension Sister    • Bipolar disorder Sister    • Depression Sister    • ADD / ADHD Sister    • No Known Problems Son    • Abnormal EKG Daughter    • Hypertension Daughter    • Bipolar disorder Daughter    • Thyroid disease Daughter    • No Known Problems Paternal Grandfather    • No Known Problems Paternal Grandmother    • No Known Problems Maternal Grandmother    • No Known Problems Maternal Grandfather    • Thyroid disease Sister    • Hypertension Sister    • Bipolar disorder Sister    • Asthma Daughter    • Breast cancer Neg Hx    • Ovarian cancer Neg Hx    • Uterine cancer Neg Hx    • Colon cancer Neg Hx    • Malig Hyperthermia Neg Hx      Social History:   Social History     Socioeconomic History   • Marital status:    • Number of children: 3   • Years of education: 12   Tobacco Use   • Smoking status: Former     Packs/day: 1.50     Years: 20.00     Pack years: 30.00     Types: Electronic Cigarette, Cigarettes     Quit date:      Years since quittin.7   • Smokeless tobacco: Current   • Tobacco comments:     Electronic  Cigarette   Vaping Use   • Vaping Use: Every day   • Substances: Nicotine, Flavoring   • Devices: Refillable tank   Substance and Sexual Activity   • Alcohol use: Not Currently   • Drug use: No   • Sexual activity: Yes     Partners: Male     Birth control/protection: None, Condom       Vital Signs Range for the last 24 hours  Temperature:     Temp Source:     BP:     Pulse:     Respirations:     SPO2:     O2 Amount (l/min):     O2 Devices     Weight:           --------------------------------------------------------------------------------    Current Outpatient Medications   Medication Sig Dispense Refill   • ARIPiprazole (ABILIFY) 20 MG tablet Take 20 mg by mouth Daily.     • atenolol (TENORMIN) 50 MG tablet Take 1 tablet by mouth Daily. 90 tablet 1   • buPROPion XL (WELLBUTRIN XL) 300 MG 24 hr tablet Take 300 mg by mouth 2 (Two) Times a Day.     • calcium carbonate (OS-STEVIE) 600 MG tablet Take 600 mg by mouth Daily.     • cyclobenzaprine (FLEXERIL) 10 MG tablet Take 1 tablet by mouth At Night As Needed for Muscle Spasms. 30 tablet 1   • Ferrous Sulfate (IRON PO) Take  by mouth.     • gabapentin (NEURONTIN) 300 MG capsule TAKE ONE CAPSULE BY MOUTH THREE TIMES A  capsule 0   • hydrOXYzine (ATARAX) 50 MG tablet As Needed.     • isosorbide mononitrate (IMDUR) 30 MG 24 hr tablet Take 1 tablet by mouth Daily. 90 tablet 1   • lamoTRIgine (LaMICtal) 150 MG tablet Take 300 mg by mouth Daily.     • levothyroxine (SYNTHROID, LEVOTHROID) 88 MCG tablet Take 1 tablet by mouth Every Morning. 90 tablet 1   • nitroglycerin (NITROSTAT) 0.4 MG SL tablet Place 0.4 mg under the tongue Every 5 (Five) Minutes As Needed for Chest Pain (took at M.D  office at 0930 a.m.). Take no more than 3 doses in 15 minutes.     • ondansetron (Zofran) 4 MG tablet Take 1 tablet by mouth Every 8 (Eight) Hours As Needed for Nausea or Vomiting. 20 tablet 3   • oxybutynin XL (DITROPAN XL) 15 MG 24 hr tablet Take 15 mg by mouth Daily.     •  pantoprazole (PROTONIX) 40 MG EC tablet TAKE ONE TABLET BY MOUTH TWICE A DAY 60 tablet 0   • pramipexole (MIRAPEX) 0.5 MG tablet Take 1 tablet by mouth Every Night for 360 days. 30 tablet 5   • simvastatin (ZOCOR) 20 MG tablet TAKE ONE TABLET BY MOUTH ONCE NIGHTLY 90 tablet 0   • tobramycin 0.3 % solution ophthalmic solution Administer 2 drops to the right eye Every 4 (Four) Hours While Awake. 5 mL 0   • venlafaxine XR (EFFEXOR-XR) 75 MG 24 hr capsule Take 150 mg by mouth Daily.     • vitamin B-12 (CYANOCOBALAMIN) 1000 MCG tablet TAKE ONE TABLET BY MOUTH TWICE A DAY 60 tablet 4     No current facility-administered medications for this encounter.       --------------------------------------------------------------------------------  Assessment & Plan      Anesthesia Evaluation     Patient summary reviewed and Nursing notes reviewed                Airway   Mallampati: II  Dental      Pulmonary    (+) a smoker Former, sleep apnea,   Cardiovascular     (+) hypertension, valvular problems/murmurs, past MI , CAD, angina, hyperlipidemia,       Neuro/Psych  (+) syncope, numbness, psychiatric history,    GI/Hepatic/Renal/Endo    (+) morbid obesity, hiatal hernia, GERD,  renal disease, thyroid problem     Musculoskeletal     (+) neck pain, radiculopathy  Abdominal   (+) obese,    Substance History - negative use     OB/GYN negative ob/gyn ROS         Other   arthritis,    history of cancer               Diagnosis and Plan    Treatment Plan  ASA 3   Patient has had previous injection/procedure with 50-75% improvement.   Procedures: Cervical Epidural Steroid Injection(MARLA), With fluoroscopy,       Anesthetic plan and risks discussed with patient.          Diagnosis     * Cervical radiculopathy [M54.12]

## 2022-10-14 ENCOUNTER — TELEPHONE (OUTPATIENT)
Dept: ORTHOPEDIC SURGERY | Facility: CLINIC | Age: 62
End: 2022-10-14

## 2022-10-14 RX ORDER — METHYLPREDNISOLONE 4 MG/1
TABLET ORAL
Qty: 21 TABLET | Refills: 0 | Status: SHIPPED | OUTPATIENT
Start: 2022-10-14 | End: 2022-12-30 | Stop reason: SDUPTHER

## 2022-10-14 NOTE — TELEPHONE ENCOUNTER
Left shoulder pain and left neck pain. Patient states that the pain goes down her arm . Patient states pain is unbearable. Patient would like for you to call in a steroid pack.

## 2022-10-14 NOTE — TELEPHONE ENCOUNTER
Caller:  PATIENT    Relationship to patient: SELF     Best call back number: [phone]    Patient is needing: PATIENT RECEIVED AN EPIDURAL ON 10.11.22 AND HER NECK PAIN HAS GRADUALLY GOTTEN WORSE SINCE GETTING HER EPIDURAL. PATIENT IS WANTING TO DISCUSS THIS WITH THE CLINICAL STAFF AND SEE IF DR. CREWS COULD POSSIBLY CALL SOMETHING IN TO HELP HER WITH THE PAIN. I ATTEMPTED TO WARM TRANSFER CALL TO THE CLINICAL LINE BUT RECEIVED NO ANSWER. THANK YOU!

## 2022-10-17 NOTE — TELEPHONE ENCOUNTER
Pain is improving with Medrol pack.  She is scheduled for another epidural in January.  Advised her if she does not get satisfactory relief then to call back and we will get her into [facility].

## 2022-10-17 NOTE — TELEPHONE ENCOUNTER
Called to check to see if she got relief from medrol pack. Left VM to call back if still struggling.

## 2022-10-20 NOTE — TELEPHONE ENCOUNTER
Patient was nofitifed. Patient said the medrol pack is giving a min. Relief. Will call back if the pain gets worse.

## 2022-11-03 ENCOUNTER — OFFICE VISIT (OUTPATIENT)
Dept: CARDIOLOGY | Facility: CLINIC | Age: 62
End: 2022-11-03

## 2022-11-03 VITALS
DIASTOLIC BLOOD PRESSURE: 90 MMHG | WEIGHT: 223 LBS | HEIGHT: 63 IN | SYSTOLIC BLOOD PRESSURE: 128 MMHG | BODY MASS INDEX: 39.51 KG/M2 | HEART RATE: 59 BPM

## 2022-11-03 DIAGNOSIS — I25.10 ATHEROSCLEROSIS OF NATIVE CORONARY ARTERY OF NATIVE HEART WITHOUT ANGINA PECTORIS: ICD-10-CM

## 2022-11-03 DIAGNOSIS — I10 ESSENTIAL HYPERTENSION: Primary | ICD-10-CM

## 2022-11-03 PROCEDURE — 93000 ELECTROCARDIOGRAM COMPLETE: CPT | Performed by: INTERNAL MEDICINE

## 2022-11-03 PROCEDURE — 99214 OFFICE O/P EST MOD 30 MIN: CPT | Performed by: INTERNAL MEDICINE

## 2022-11-03 NOTE — PROGRESS NOTES
"      CARDIOLOGY    David Burroughs MD    ENCOUNTER DATE:  11/03/2022    Keri Bhagat / 62 y.o. / female        CHIEF COMPLAINT / REASON FOR OFFICE VISIT     Atherosclerotic heart disease of native coronary artery (10/21/2021 Follow up)  Hypertension    HISTORY OF PRESENT ILLNESS       HPI  Keri Bhagat is a 62 y.o. female who presents today for reevaluation.  Clinically patient is doing great no chest pain shortness of breath palpitations lightheadedness swelling or fatigue.  She did have an incident where her heart was racing.  She went to urgent care and she felt was found to have a tachycardic rate been about 132.  It subsequently resolved and she has not had any further problems.  She was very symptomatic with his fast heart rate.      The following portions of the patient's history were reviewed and updated as appropriate: allergies, current medications, past family history, past medical history, past social history, past surgical history and problem list.      VITAL SIGNS     Visit Vitals  /90 (BP Location: Left arm)   Pulse 59   Ht 160 cm (63\")   Wt 101 kg (223 lb)   LMP 10/21/2016 (Approximate) Comment: 54   BMI 39.50 kg/m²         Wt Readings from Last 3 Encounters:   11/03/22 101 kg (223 lb)   10/06/22 102 kg (224 lb)   09/25/22 100 kg (221 lb)     Body mass index is 39.5 kg/m².      REVIEW OF SYSTEMS   ROS        PHYSICAL EXAMINATION     Vitals reviewed.   Constitutional:       Appearance: Healthy appearance.   Pulmonary:      Effort: Pulmonary effort is normal.   Cardiovascular:      Normal rate. Regular rhythm. Normal S1. Normal S2.      Murmurs: There is no murmur.      No gallop. No click. No rub.   Pulses:     Intact distal pulses.   Edema:     Peripheral edema absent.   Neurological:      Mental Status: Alert and oriented to person, place and time.           REVIEWED DATA       ECG 12 Lead    Date/Time: 11/3/2022 10:07 AM  Performed by: David Burroughs MD  Authorized by: " David Burroughs MD   Comparison: compared with previous ECG from 9/25/2022  Comparison to previous ECG: Heart rate appeared to be a sinus tachycardia at 132 on previous ECG  Rhythm: sinus rhythm    Clinical impression: normal ECG            Cardiac Procedures:  Interpretation Summary  8/27/21    • Left ventricular ejection fraction appears to be 56 - 60%. Normal global longitudinal LV strain (GLS) = -21.4%. Left ventricle strain data was reviewed by the physician and found to be accurate. Normal left ventricular cavity size noted. Left ventricular wall thickness is consistent with mild to moderate basal asymmetric hypertrophy. All left ventricular wall segments contract normally. Left ventricular diastolic function was normal.     Interpretation Summary  8/27/21    • Left ventricular ejection fraction is normal. (Calculated EF = 67%).  • Myocardial perfusion imaging indicates a normal myocardial perfusion study with no evidence of ischemia.  • Impressions are consistent with a low risk study.     1.     Lipid Panel    Lipid Panel 4/7/22 9/15/22   Total Cholesterol 200 (A) 183   Triglycerides 143 97   HDL Cholesterol 69 72 (A)   VLDL Cholesterol 25 17   LDL Cholesterol  106 (A) 94   (A) Abnormal value       Comments are available for some flowsheets but are not being displayed.               ASSESSMENT & PLAN      Diagnosis Plan   1. Essential hypertension        2. Atherosclerosis of native coronary artery of native heart without angina pectoris              SUMMARY/DISCUSSION  1. Hypertension blood pressures good  2. Coronary artery disease patient remained stable no issues.  3. Tachycardia this is subsequently resolved.  She was highly symptomatic so we will just follow clinically.  4. Follow-up 1 year unless she starts having more issues.        MEDICATIONS         Discharge Medications          Accurate as of November 3, 2022 10:06 AM. If you have any questions, ask your nurse or doctor.            Continue  These Medications      Instructions Start Date   ARIPiprazole 20 MG tablet  Commonly known as: ABILIFY   20 mg, Oral, Daily      atenolol 50 MG tablet  Commonly known as: TENORMIN   50 mg, Oral, Daily      buPROPion  MG 24 hr tablet  Commonly known as: WELLBUTRIN XL   300 mg, Oral, 2 Times Daily      calcium carbonate 600 MG tablet  Commonly known as: OS-STEVIE   600 mg, Oral, Daily      cyclobenzaprine 10 MG tablet  Commonly known as: FLEXERIL   10 mg, Oral, Nightly PRN      gabapentin 300 MG capsule  Commonly known as: NEURONTIN   TAKE ONE CAPSULE BY MOUTH THREE TIMES A DAY      hydrOXYzine 50 MG tablet  Commonly known as: ATARAX   As Needed      IRON PO   Oral      isosorbide mononitrate 30 MG 24 hr tablet  Commonly known as: IMDUR   30 mg, Oral, Daily      lamoTRIgine 150 MG tablet  Commonly known as: LaMICtal   300 mg, Oral, Daily      levothyroxine 88 MCG tablet  Commonly known as: SYNTHROID, LEVOTHROID   88 mcg, Oral, Every Morning      methylPREDNISolone 4 MG dose pack  Commonly known as: MEDROL   Use as directed by package instructions      nitroglycerin 0.4 MG SL tablet  Commonly known as: NITROSTAT   0.4 mg, Sublingual, Every 5 Minutes PRN, Take no more than 3 doses in 15 minutes.       ondansetron 4 MG tablet  Commonly known as: Zofran   4 mg, Oral, Every 8 Hours PRN      oxybutynin XL 15 MG 24 hr tablet  Commonly known as: DITROPAN XL   15 mg, Oral, Daily      pantoprazole 40 MG EC tablet  Commonly known as: PROTONIX   TAKE ONE TABLET BY MOUTH TWICE A DAY      pramipexole 0.5 MG tablet  Commonly known as: MIRAPEX   0.5 mg, Oral, Nightly      simvastatin 20 MG tablet  Commonly known as: ZOCOR   TAKE ONE TABLET BY MOUTH ONCE NIGHTLY      tobramycin 0.3 % solution ophthalmic solution   2 drops, Right Eye, Every 4 Hours While Awake      venlafaxine XR 75 MG 24 hr capsule  Commonly known as: EFFEXOR-XR   150 mg, Oral, Daily      vitamin B-12 1000 MCG tablet  Commonly known as: CYANOCOBALAMIN   TAKE ONE  TABLET BY MOUTH TWICE A DAY                 **Dragon Disclaimer:   Much of this encounter note is an electronic transcription/translation of spoken language to printed text. The electronic translation of spoken language may permit erroneous, or at times, nonsensical words or phrases to be inadvertently transcribed. Although I have reviewed the note for such errors, some may still exist.

## 2022-11-04 ENCOUNTER — TELEPHONE (OUTPATIENT)
Dept: FAMILY MEDICINE CLINIC | Facility: CLINIC | Age: 62
End: 2022-11-04

## 2022-11-04 NOTE — TELEPHONE ENCOUNTER
"SPOKE WITH BOYD FROM [facility] CARDIOLOGY, SHE STATED \"PATIENT INSURANCE REQUIRES A REFERRAL.\" BOYD IS REQUESTING THE REFERRAL BE ENTERED INTO PTS CHART.     PLEASE ADVISE  "

## 2022-11-09 ENCOUNTER — TELEPHONE (OUTPATIENT)
Dept: FAMILY MEDICINE CLINIC | Facility: CLINIC | Age: 62
End: 2022-11-09

## 2022-11-09 DIAGNOSIS — I25.10 CORONARY ARTERY DISEASE INVOLVING NATIVE CORONARY ARTERY OF NATIVE HEART WITHOUT ANGINA PECTORIS: ICD-10-CM

## 2022-11-09 DIAGNOSIS — I25.118 ATHEROSCLEROTIC HEART DISEASE OF NATIVE CORONARY ARTERY WITH OTHER FORMS OF ANGINA PECTORIS: Primary | ICD-10-CM

## 2022-11-09 NOTE — TELEPHONE ENCOUNTER
BOYD FROM [facility] C[facility] OFFICE CALLED AND STATED THE PT WAS SEEN FOR HER ANNUAL VISIT BUT WITH HER HMO INSURANCE PLAN SHE NEEDED A REFERRAL TO BE SEEN. PT WAS SEEN ON 11/3. THEY WERE WANTING TO KNOW IF WE COULD SEND A BACK DATED REFERRAL.     FAX # is [phone].   PHONE # [phone].    THANK YOU.

## 2022-12-20 ENCOUNTER — OFFICE VISIT (OUTPATIENT)
Dept: ORTHOPEDIC SURGERY | Facility: CLINIC | Age: 62
End: 2022-12-20

## 2022-12-20 VITALS — HEIGHT: 63 IN | TEMPERATURE: 97.6 F | WEIGHT: 211.6 LBS | BODY MASS INDEX: 37.49 KG/M2

## 2022-12-20 DIAGNOSIS — M47.22 CERVICAL SPONDYLOSIS WITH RADICULOPATHY: Primary | ICD-10-CM

## 2022-12-20 DIAGNOSIS — M54.2 NECK PAIN: ICD-10-CM

## 2022-12-20 DIAGNOSIS — M48.02 CERVICAL SPINAL STENOSIS: ICD-10-CM

## 2022-12-20 DIAGNOSIS — M43.22 CERVICAL VERTEBRAL FUSION: ICD-10-CM

## 2022-12-20 PROCEDURE — 99213 OFFICE O/P EST LOW 20 MIN: CPT | Performed by: ORTHOPAEDIC SURGERY

## 2022-12-20 RX ORDER — VENLAFAXINE HYDROCHLORIDE 150 MG/1
CAPSULE, EXTENDED RELEASE ORAL
COMMUNITY
Start: 2022-11-29

## 2022-12-20 RX ORDER — DOXYCYCLINE HYCLATE 100 MG/1
1 CAPSULE ORAL
COMMUNITY
Start: 2022-09-28 | End: 2023-01-06

## 2022-12-20 NOTE — PROGRESS NOTES
Long history of cervical spondylosis and previous fusion.  Has a small central disc protrusion at C2-3 on MRI scan of the cervical spine performed last year.  But pain has been managed with epidurals in the past and she wants to repeat them.  On exam today she is neurologically intact and I see no reason not to proceed.  We will repeat the cervical epidural steroid injection she is aware of the risk and benefits.  Long-term she knows I am retiring and would follow-up with Dr. Mercado as a new patient as needed worsening pain.

## 2022-12-22 RX ORDER — ISOSORBIDE MONONITRATE 30 MG/1
TABLET, EXTENDED RELEASE ORAL
Qty: 30 TABLET | Refills: 0 | Status: SHIPPED | OUTPATIENT
Start: 2022-12-22 | End: 2023-01-19 | Stop reason: SDUPTHER

## 2022-12-22 NOTE — TELEPHONE ENCOUNTER
Rx Refill Note  Requested Prescriptions     Pending Prescriptions Disp Refills   • isosorbide mononitrate (IMDUR) 30 MG 24 hr tablet [Pharmacy Med Name: ISOSORBIDE MONONIT ER 30 MG TB] 90 tablet 1     Sig: TAKE ONE TABLET BY MOUTH DAILY      Last office visit with prescribing clinician: 6/28/2022   Last telemedicine visit with prescribing clinician: 12/21/2022   Next office visit with prescribing clinician: 12/21/2022                         Would you like a call back once the refill request has been completed: [] Yes [] No    If the office needs to give you a call back, can they leave a voicemail: [] Yes [] No    Jigar Chapa Rep  12/22/22, 08:53 EST

## 2022-12-29 NOTE — TELEPHONE ENCOUNTER
Provider: DR. CREWS    Caller: TITO THORNE    Relationship to Patient: SELF     Pharmacy: [facility] [phone]    Phone Number: [phone]    Reason for Call: PATIENT STATES SHE IS HAVING INCREASED NECK PAIN. SHE IS ASKING IF DR. CREWS WILL SEND SOMETHING IN TO HER PHARMACY TO HELP WITH THIS. SHE WOULD LIKE A CALL BACK TO LET HER KNOW IF ANYTHING HAS BEEN SENT.    When was the patient last seen: 12/20/22

## 2022-12-30 RX ORDER — METHYLPREDNISOLONE 4 MG/1
TABLET ORAL
Qty: 21 TABLET | Refills: 0 | Status: SHIPPED | OUTPATIENT
Start: 2022-12-30 | End: 2023-01-13

## 2023-01-03 ENCOUNTER — APPOINTMENT (OUTPATIENT)
Dept: SLEEP MEDICINE | Facility: HOSPITAL | Age: 63
End: 2023-01-03

## 2023-01-03 ENCOUNTER — TELEPHONE (OUTPATIENT)
Dept: ORTHOPEDIC SURGERY | Facility: CLINIC | Age: 63
End: 2023-01-03

## 2023-01-03 NOTE — TELEPHONE ENCOUNTER
Per Dr. Godwin's last note if that was ineffective she needs to see Dr. Mercado can we make her an appointment for him

## 2023-01-03 NOTE — TELEPHONE ENCOUNTER
Please review and advise   Dr. Godwin sent the medication in back on 12/30/2022 and Ruth is out until 01/16/2023

## 2023-01-03 NOTE — TELEPHONE ENCOUNTER
Caller: TITO THORNE    Relationship to patient: SELF    Best call back number: [phone]    Patient is needing: PATIENT IS STATING THAT HER STEROID PACK HAS BEEN INEFFECTIVE AND SHE WOULD LIKE TO KNOW WHAT ALTERNATIVES SHE CAN DO.

## 2023-01-06 ENCOUNTER — OFFICE VISIT (OUTPATIENT)
Dept: NEUROSURGERY | Facility: CLINIC | Age: 63
End: 2023-01-06
Payer: MEDICAID

## 2023-01-06 ENCOUNTER — TELEPHONE (OUTPATIENT)
Dept: NEUROSURGERY | Facility: CLINIC | Age: 63
End: 2023-01-06

## 2023-01-06 VITALS
OXYGEN SATURATION: 97 % | HEART RATE: 64 BPM | WEIGHT: 211 LBS | SYSTOLIC BLOOD PRESSURE: 130 MMHG | DIASTOLIC BLOOD PRESSURE: 78 MMHG | BODY MASS INDEX: 37.38 KG/M2

## 2023-01-06 DIAGNOSIS — M54.12 CERVICAL RADICULOPATHY: Primary | ICD-10-CM

## 2023-01-06 DIAGNOSIS — M54.2 CERVICALGIA: ICD-10-CM

## 2023-01-06 PROCEDURE — 99213 OFFICE O/P EST LOW 20 MIN: CPT | Performed by: NEUROLOGICAL SURGERY

## 2023-01-06 NOTE — PROGRESS NOTES
Patient ID: Keri Bhagat is a 62 y.o. female is being seen for consultation today at the request of No ref. provider found     Today,  Keri Bhagat reports  Neck pain radiates down left arm and wrist/palm goes numb. Denies dizziness reports back of head with headaches. She reports Physical Therapy only a short time of relief.  She stated 2 injection complains of only last a short time.    Subjective     The patient is here in regards to   Chief Complaint   Patient presents with   • Neck Pain   • Numbness   • Arm Pain       History of Present Illness  Keri presents with 3 to 4 weeks of neck pain on the left side.  Its tender to palpation and also hurts when she is moving around.  She occasionally hears a popping sound and feels like she needs to stop when she is doing.  She occasionally also describes left-sided arm pain.  This travels down her arm in a nondermatomal distribution.  She has had a brief stent with physical therapy but has not tried an epidural steroid injection just yet.  She is scheduled to have 1 this next week.      While in the room and during my examination of the patient I wore a mask and eye protection.  I washed my hands before and after this patient encounter.  The patient was also wearing a mask.    The following portions of the patient's history were reviewed and updated as appropriate: allergies, current medications, past family history, past medical history, past social history, past surgical history and problem list.    Review of Systems   Constitutional: Positive for activity change.   Eyes: Negative.    Respiratory: Negative for chest tightness and shortness of breath.    Cardiovascular: Negative.    Gastrointestinal: Negative.    Endocrine: Positive for heat intolerance.   Genitourinary: Negative.    Musculoskeletal: Positive for neck pain and neck stiffness.   Skin: Negative.    Allergic/Immunologic: Negative.    Neurological: Positive for weakness, numbness and headaches.    Hematological: Bruises/bleeds easily.   Psychiatric/Behavioral: Positive for agitation and sleep disturbance.        Past Medical History:   Diagnosis Date   • Abnormal Pap smear of cervix    • Anemia    • Anxiety    • Arthritis    • Atherosclerotic heart disease of native coronary artery with other forms of angina pectoris ([facility])    • Bipolar I disorder, single manic episode ([facility])     depressive d(NOS)   • Cervical disc herniation    • Cervical dysplasia    • Coronary artery disease    • CTS (carpal tunnel syndrome)    • Depression     NO SUICIDAL PLANS   • Difficulty swallowing    • Diverticular disease    • Dyspepsia    • Dysphagia    • Gastric reflux    • GERD (gastroesophageal reflux disease)    • Heart attack ([facility])    • Heart murmur    • Hiatal hernia    • High cholesterol    • History of transfusion    • 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    HPV positive on pap LGSIL   • Hyperlipidemia    • Hypertension    • Hypothyroidism    • Incontinence in female     wears pads   • Kidney disease 2017    stage 2    • Kidney disease, chronic, stage III (GFR 30-59 ml/min) ([facility])    • LGSIL on Pap smear of cervix 04/29/2016    LGSIL HPV positive   • Myocardial infarction ([facility]) 2000   • Neck pain    • Obesity    • Past myocardial infarction 05/2000   • Periodic limb movement disorder    • Restless leg syndrome    • Sleep apnea     cpap   • Stress fracture     LEFT HEEL   • Suicidal ideation 08/19/2016    history   • Swelling of ankle     right had doppler no s/s blood clot   • Thyroid nodule    • Vitamin B12 deficiency        No Known Allergies    Family History   Problem Relation Age of Onset   • Diabetes type II Mother    • Hypertension Mother    • Osteoporosis Mother    • Seizures Mother    • COPD Father    • Hypertension Father    • Lung cancer Father    • Heart attack Father    • Liver cancer Father    • Liver disease Father    • Thyroid disease Sister    • Hypertension Sister    • Bipolar disorder Sister     • Depression Sister    • ADD / ADHD Sister    • No Known Problems Son    • Abnormal EKG Daughter    • Hypertension Daughter    • Bipolar disorder Daughter    • Thyroid disease Daughter    • No Known Problems Paternal Grandfather    • No Known Problems Paternal Grandmother    • No Known Problems Maternal Grandmother    • No Known Problems Maternal Grandfather    • Thyroid disease Sister    • Hypertension Sister    • Bipolar disorder Sister    • Asthma Daughter    • Breast cancer Neg Hx    • Ovarian cancer Neg Hx    • Uterine cancer Neg Hx    • Colon cancer Neg Hx    • Malig Hyperthermia Neg Hx        Social History     Socioeconomic History   • Marital status:    • Number of children: 3   • Years of education: 12   Tobacco Use   • Smoking status: Former     Packs/day: 1.50     Years: 20.00     Pack years: 30.00     Types: Electronic Cigarette, Cigarettes     Quit date:      Years since quittin.0   • Smokeless tobacco: Current   • Tobacco comments:     Electronic Cigarette   Vaping Use   • Vaping Use: Every day   • Substances: Nicotine, Flavoring   • Devices: Refillable tank   Substance and Sexual Activity   • Alcohol use: Not Currently   • Drug use: No   • Sexual activity: Yes     Partners: Male     Birth control/protection: None, Condom       Past Surgical History:   Procedure Laterality Date   • ANTERIOR CERVICAL DISCECTOMY W/ FUSION N/A 2016    Procedure: C3-4 anterior cervical discectomy and fusion with Depuy micro plate, ALLOGRAFT C3-4, AND HARDWARE REMOVAL C4-7.;  Surgeon: Hema Godwin MD;  Location: [facility];  Service:    • CARDIAC CATHETERIZATION N/A 2017    Procedure: Left Heart Cath;  Surgeon: Tracey Vargas MD;  Location: [facility] INVASIVE LOCATION;  Service:    • CARDIAC CATHETERIZATION N/A 2017    Procedure: Coronary angiography;  Surgeon: Tracey Vargas MD;  Location: [facility] CATH INVASIVE LOCATION;  Service:    • CARDIAC CATHETERIZATION N/A 2017     Procedure: Left ventriculography;  Surgeon: Tracey Vargas MD;  Location: [facility] INVASIVE LOCATION;  Service:    • CARDIAC CATHETERIZATION  03/30/2017    Procedure: Functional Flow [place];  Surgeon: Tracey Vargas MD;  Location: [facility] INVASIVE LOCATION;  Service:    • CARPAL TUNNEL RELEASE     • CATARACT EXTRACTION     • CERVICAL BIOPSY  MMXVI    Dr. Jeffery.    • CERVICAL DISCECTOMY ANTERIOR  04/2013    C4-7   • COLONOSCOPY  04/20/2015    Diverticulosis, IH   • COLONOSCOPY  03/09/2021   • COLPOSCOPY W/ BIOPSY / CURETTAGE  06/17/2016    LGSIL HPV positive. Results were normal repeat pap in one year. Chronic Cervicitis   • CORONARY ANGIOPLASTY WITH STENT PLACEMENT     • ENDOSCOPY  MMXV    Normal.  Dr. Rodriguez   • ENDOSCOPY N/A 06/22/2017    Erythematous mucosa in the stomach  PATH: Chronic active gastritis, moderate with intestinal metaplasia    • GASTRIC BYPASS      Done using the sleeve technique   • HARDWARE REMOVAL  12/29/2016    cervical   • HERNIA REPAIR     • LAPAROSCOPIC GASTRIC BANDING  02/2018   • SHOULDER SURGERY Left     RCR   • TONSILLECTOMY     • TONSILLECTOMY AND ADENOIDECTOMY     • TRANSVAGINAL TAPING SUSPENSION N/A 12/06/2017    Procedure: MID URETHRAL SLING CYSTSCOPY;  Surgeon: Abby Méndez MD;  Location: [facility] OR;  Service:    • TUBAL ABDOMINAL LIGATION     • TYMPANOSTOMY TUBE PLACEMENT Right    • UPPER GASTROINTESTINAL ENDOSCOPY  approx 2021   • WRIST SURGERY Bilateral     carpal tunnel   • WRIST SURGERY      L wrist/ 4/2020         Objective     Vitals:    01/06/23 1225   BP: 130/78   Pulse: 64   SpO2: 97%     Body mass index is 37.38 kg/m².    Physical Exam  Constitutional:       Appearance: Normal appearance.   HENT:      Head: Normocephalic and atraumatic.   Eyes:      Extraocular Movements: Extraocular movements intact.      Conjunctiva/sclera: Conjunctivae normal.      Pupils: Pupils are equal, round, and reactive to light.   Cardiovascular:      Rate and  Rhythm: Normal rate and regular rhythm.      Pulses: Normal pulses.   Pulmonary:      Breath sounds: Normal breath sounds.   Abdominal:      Palpations: Abdomen is soft.   Musculoskeletal:         General: Normal range of motion.      Cervical back: Normal range of motion and neck supple.   Skin:     General: Skin is warm and dry.   Neurological:      Mental Status: She is alert and oriented to person, place, and time.      Cranial Nerves: Cranial nerves 2-12 are intact.      Motor: Motor function is intact. No weakness or atrophy.      Coordination: Coordination is intact. Romberg sign negative. Romberg Test normal.      Gait: Gait is intact. Gait normal.      Deep Tendon Reflexes: Reflexes are normal and symmetric.      Reflex Scores:       Tricep reflexes are 2+ on the right side and 2+ on the left side.       Bicep reflexes are 2+ on the right side and 2+ on the left side.       Brachioradialis reflexes are 2+ on the right side and 2+ on the left side.       Patellar reflexes are 2+ on the right side and 2+ on the left side.       Achilles reflexes are 2+ on the right side and 2+ on the left side.  Psychiatric:         Speech: Speech normal.         Neurologic Exam     Mental Status   Oriented to person, place, and time.   Attention: normal. Concentration: normal.   Speech: speech is normal   Level of consciousness: alert    Cranial Nerves   Cranial nerves II through XII intact.     CN III, IV, VI   Pupils are equal, round, and reactive to light.    Motor Exam   Muscle bulk: normal  Overall muscle tone: normal    Strength   Strength 5/5 except as noted.     Sensory Exam   Light touch normal.     Gait, Coordination, and Reflexes     Gait  Gait: normal    Coordination   Romberg: negative    Reflexes   Reflexes 2+ except as noted.   Right brachioradialis: 2+  Left brachioradialis: 2+  Right biceps: 2+  Left biceps: 2+  Right triceps: 2+  Left triceps: 2+  Right patellar: 2+  Left patellar: 2+  Right achilles:  2+  Left achilles: 2+      Assessment & Plan   Independent Review of Radiographic Studies:      I personally reviewed the images from the following studies.    XR: XR of the cervical spine was reviewed and shows Previous C3-6 anterior cervical fusion with good fusion across the interbody spacers.  No obvious hardware malfunction at the C3-4 anterior plate  MR: MRI of the cervical spine wo contrast was reviewed and shows Previous fusion with no obvious new disc herniation or central cord stenosis or foraminal stenosis across the fusion levels.    Assessment/Plan: Keri's neck is tender palpation just paramedian to her spinous processes.  It seems like a muscular strain on exam.  I think that she would benefit from some continued physical therapy and conservative management.  Her left arm pain may be radiculopathy but its not in a classic dermatomal distribution.  Hopefully the epidural steroid injection is helpful for her.  If not, we can reinvestigate with a new MRI and x-ray when I see her back in clinic.    Medical Decision Making:      Follow-up in 4 to 6 weeks         Diagnoses and all orders for this visit:    1. Cervical radiculopathy (Primary)    2. Cervicalgia             Patient Instructions/Recommendations:    Call with any questions or concerns      Bob Mercado MD  01/06/23  12:41 EST

## 2023-01-06 NOTE — TELEPHONE ENCOUNTER
Called patient to inform her due to  on call her appointment needed be moved to the afternoon. Asked patient if she would like to come in then or another day. Patient stated she would come in this afternoon.

## 2023-01-09 ENCOUNTER — CLINICAL SUPPORT (OUTPATIENT)
Dept: ORTHOPEDIC SURGERY | Facility: CLINIC | Age: 63
End: 2023-01-09
Payer: MEDICAID

## 2023-01-09 VITALS — TEMPERATURE: 96.4 F | HEIGHT: 63 IN | WEIGHT: 209 LBS | BODY MASS INDEX: 37.03 KG/M2

## 2023-01-09 DIAGNOSIS — M17.0 PRIMARY OSTEOARTHRITIS OF BOTH KNEES: Primary | ICD-10-CM

## 2023-01-09 PROCEDURE — 20610 DRAIN/INJ JOINT/BURSA W/O US: CPT | Performed by: NURSE PRACTITIONER

## 2023-01-09 RX ORDER — LIDOCAINE HYDROCHLORIDE 20 MG/ML
2 INJECTION, SOLUTION EPIDURAL; INFILTRATION; INTRACAUDAL; PERINEURAL
Status: COMPLETED | OUTPATIENT
Start: 2023-01-09 | End: 2023-01-09

## 2023-01-09 RX ORDER — METHYLPREDNISOLONE ACETATE 80 MG/ML
80 INJECTION, SUSPENSION INTRA-ARTICULAR; INTRALESIONAL; INTRAMUSCULAR; SOFT TISSUE
Status: COMPLETED | OUTPATIENT
Start: 2023-01-09 | End: 2023-01-09

## 2023-01-09 RX ADMIN — METHYLPREDNISOLONE ACETATE 80 MG: 80 INJECTION, SUSPENSION INTRA-ARTICULAR; INTRALESIONAL; INTRAMUSCULAR; SOFT TISSUE at 14:02

## 2023-01-09 RX ADMIN — LIDOCAINE HYDROCHLORIDE 2 ML: 20 INJECTION, SOLUTION EPIDURAL; INFILTRATION; INTRACAUDAL; PERINEURAL at 14:02

## 2023-01-09 NOTE — PROGRESS NOTES
Patient: Keri Bhagat  YOB: 1960  Date of Service: 1/9/2023    Chief Complaints:   Chief Complaint   Patient presents with   • Left Knee - Pain, Follow-up   • Right Knee - Follow-up, Pain       Subjective: Here for bilateral knee pain desires conservative treatment has had injections in the past with good resolution of symptoms.    History of Present Illness: Pt is seen in the office today with complaints of   Chief Complaint   Patient presents with   • Left Knee - Pain, Follow-up   • Right Knee - Follow-up, Pain   .  KNEE: TIMING:  The pain is described as ACUTE.  LOCATION: medial joint line tenderness. AGGRAVATING FACTORS:  Is worsened by prolonged standing, sitting, walking and squatting activities.  ASSOCIATED SYMPTOMS:  swelling, tenderness, and aching.    This problem is not new to this examiner.     Allergies: No Known Allergies    Medications:   Home Medications:  Current Outpatient Medications on File Prior to Visit   Medication Sig   • ARIPiprazole (ABILIFY) 20 MG tablet Take 20 mg by mouth Daily.   • atenolol (TENORMIN) 50 MG tablet Take 1 tablet by mouth Daily.   • buPROPion XL (WELLBUTRIN XL) 300 MG 24 hr tablet Take 300 mg by mouth 2 (Two) Times a Day.   • calcium carbonate (OS-STEVIE) 600 MG tablet Take 600 mg by mouth Daily.   • Cholecalciferol 25 MCG (1000 UT) capsule Take 1 capsule by mouth 2 (Two) Times a Day.   • cyclobenzaprine (FLEXERIL) 10 MG tablet Take 1 tablet by mouth At Night As Needed for Muscle Spasms.   • Ferrous Sulfate (IRON PO) Take  by mouth.   • gabapentin (NEURONTIN) 300 MG capsule TAKE ONE CAPSULE BY MOUTH THREE TIMES A DAY   • hydrOXYzine (ATARAX) 50 MG tablet As Needed.   • isosorbide mononitrate (IMDUR) 30 MG 24 hr tablet TAKE ONE TABLET BY MOUTH DAILY   • lamoTRIgine (LaMICtal) 150 MG tablet Take 300 mg by mouth Daily.   • levothyroxine (SYNTHROID, LEVOTHROID) 88 MCG tablet Take 1 tablet by mouth Every Morning.   • ondansetron (Zofran) 4 MG tablet Take 1  tablet by mouth Every 8 (Eight) Hours As Needed for Nausea or Vomiting.   • oxybutynin XL (DITROPAN XL) 15 MG 24 hr tablet Take 15 mg by mouth Daily.   • pantoprazole (PROTONIX) 40 MG EC tablet TAKE ONE TABLET BY MOUTH TWICE A DAY   • pramipexole (MIRAPEX) 0.5 MG tablet Take 1 tablet by mouth Every Night for 360 days.   • simvastatin (ZOCOR) 20 MG tablet TAKE ONE TABLET BY MOUTH ONCE NIGHTLY   • venlafaxine XR (EFFEXOR-XR) 150 MG 24 hr capsule    • vitamin B-12 (CYANOCOBALAMIN) 1000 MCG tablet TAKE ONE TABLET BY MOUTH TWICE A DAY   • methylPREDNISolone (MEDROL) 4 MG dose pack Use as directed by package instructions     No current facility-administered medications on file prior to visit.     Current Medications:  Scheduled Meds:  Continuous Infusions:No current facility-administered medications for this visit.    PRN Meds:.    I have reviewed the patient's medical history in detail and updated the computerized patient record.  Review and summarization of old records include:    Past Medical History:   Diagnosis Date   • Abnormal Pap smear of cervix    • Anemia    • Anxiety    • Arthritis    • Atherosclerotic heart disease of native coronary artery with other forms of angina pectoris (HCC)    • Bipolar I disorder, single manic episode (HCC)     depressive d(NOS)   • Cervical disc herniation    • Cervical dysplasia    • Coronary artery disease    • CTS (carpal tunnel syndrome)    • Depression     NO SUICIDAL PLANS   • Difficulty swallowing    • Diverticular disease    • Dyspepsia    • Dysphagia    • Gastric reflux    • GERD (gastroesophageal reflux disease)    • Heart attack (HCC)    • Heart murmur    • Hiatal hernia    • High cholesterol    • History of transfusion    • 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    HPV positive on pap LGSIL   • Hyperlipidemia    • Hypertension    • Hypothyroidism    • Incontinence in female     wears pads   • Kidney disease 2017    stage 2    • Kidney disease, chronic, stage III  (GFR 30-59 ml/min) ([facility])    • LGSIL on Pap smear of cervix 04/29/2016    LGSIL HPV positive   • Myocardial infarction ([facility]) 2000   • Neck pain    • Obesity    • Past myocardial infarction 05/2000   • Periodic limb movement disorder    • Restless leg syndrome    • Sleep apnea     cpap   • Stress fracture     LEFT HEEL   • Suicidal ideation 08/19/2016    history   • Swelling of ankle     right had doppler no s/s blood clot   • Thyroid nodule    • Vitamin B12 deficiency         Past Surgical History:   Procedure Laterality Date   • ANTERIOR CERVICAL DISCECTOMY W/ FUSION N/A 12/29/2016    Procedure: C3-4 anterior cervical discectomy and fusion with Depuy micro plate, ALLOGRAFT C3-4, AND HARDWARE REMOVAL C4-7.;  Surgeon: Hema Godwin MD;  Location: [facility] MAIN OR;  Service:    • CARDIAC CATHETERIZATION N/A 03/30/2017    Procedure: Left Heart Cath;  Surgeon: Tracey Vargas MD;  Location:  [facility]ASIVE [facility];  Service:    • CARDIAC CATHETERIZATION N/A 03/30/2017    Procedure: Coronary angiography;  Surgeon: Tracey Vargas MD;  Location:  [facility];  Service:    • CARDIAC CATHETERIZATION N/A 03/30/2017    Procedure: Left ventriculography;  Surgeon: Tracey Vargas MD;  Location:  [facility];  Service:    • CARDIAC CATHETERIZATION  03/30/2017    Procedure: Functional Flow [place];  Surgeon: Tracey Vargas MD;  Location:  [facility];  Service:    • CARPAL TUNNEL RELEASE     • CATARACT EXTRACTION     • CERVICAL BIOPSY  MMXVI    Dr. Jeffery.    • CERVICAL DISCECTOMY ANTERIOR  04/2013    C4-7   • COLONOSCOPY  04/20/2015    Diverticulosis, IH   • COLONOSCOPY  03/09/2021   • COLPOSCOPY W/ BIOPSY / CURETTAGE  06/17/2016    LGSIL HPV positive. Results were normal repeat pap in one year. Chronic Cervicitis   • CORONARY ANGIOPLASTY WITH STENT PLACEMENT     • ENDOSCOPY  MMXV    Normal.  Dr. Rodriguez   • ENDOSCOPY N/A 06/22/2017    Erythematous mucosa in the stomach   PATH: Chronic active gastritis, moderate with intestinal metaplasia    • GASTRIC BYPASS      Done using the sleeve technique   • HARDWARE REMOVAL  2016    cervical   • HERNIA REPAIR     • LAPAROSCOPIC GASTRIC BANDING  2018   • SHOULDER SURGERY Left     RCR   • TONSILLECTOMY     • TONSILLECTOMY AND ADENOIDECTOMY     • TRANSVAGINAL TAPING SUSPENSION N/A 2017    Procedure: MID URETHRAL SLING CYSTSCOPY;  Surgeon: Abby Méndez MD;  Location: [facility] OR;  Service:    • TUBAL ABDOMINAL LIGATION     • TYMPANOSTOMY TUBE PLACEMENT Right    • UPPER GASTROINTESTINAL ENDOSCOPY  approx    • WRIST SURGERY Bilateral     carpal tunnel   • WRIST SURGERY      L wrist2020        Social History     Occupational History   • Occupation: disabled   Tobacco Use   • Smoking status: Former     Packs/day: 1.50     Years: 20.00     Pack years: 30.00     Types: Electronic Cigarette, Cigarettes     Quit date:      Years since quittin.0   • Smokeless tobacco: Current   • Tobacco comments:     Electronic Cigarette   Vaping Use   • Vaping Use: Every day   • Substances: Nicotine, Flavoring   • Devices: Refillable tank   Substance and Sexual Activity   • Alcohol use: Not Currently   • Drug use: No   • Sexual activity: Yes     Partners: Male     Birth control/protection: None, Condom      Social History     Social History Narrative   • Not on file        Family History   Problem Relation Age of Onset   • Diabetes type II Mother    • Hypertension Mother    • Osteoporosis Mother    • Seizures Mother    • COPD Father    • Hypertension Father    • Lung cancer Father    • Heart attack Father    • Liver cancer Father    • Liver disease Father    • Thyroid disease Sister    • Hypertension Sister    • Bipolar disorder Sister    • Depression Sister    • ADD / ADHD Sister    • No Known Problems Son    • Abnormal EKG Daughter    • Hypertension Daughter    • Bipolar disorder Daughter    • Thyroid disease Daughter     • No Known Problems Paternal Grandfather    • No Known Problems Paternal Grandmother    • No Known Problems Maternal Grandmother    • No Known Problems Maternal Grandfather    • Thyroid disease Sister    • Hypertension Sister    • Bipolar disorder Sister    • Asthma Daughter    • Breast cancer Neg Hx    • Ovarian cancer Neg Hx    • Uterine cancer Neg Hx    • Colon cancer Neg Hx    • Malig Hyperthermia Neg Hx        ROS: 14 point review of systems was performed and was negative except for documented findings in HPI and today's encounter.     Allergies: No Known Allergies  Constitutional:  Denies fever, shaking or chills   Eyes:  Denies change in visual acuity   HENT:  Denies nasal congestion or sore throat   Respiratory:  Denies cough or shortness of breath   Cardiovascular:  Denies chest pain or severe LE edema   GI:  Denies abdominal pain, nausea, vomiting, bloody stools or diarrhea   Musculoskeletal:  Numbness, tingling, or loss of motor function only as noted above in history of present illness.  : Denies painful urination or hematuria  Integument:  Denies rash, lesion or ulceration   Neurologic:  Denies headache or focal weakness  Endocrine:  Denies lymphadenopathy  Psych:  Denies confusion or change in mental status   Hem:  Denies active bleeding      Physical Exam: 62 y.o. female  Wt Readings from Last 3 Encounters:   01/09/23 94.8 kg (209 lb)   01/06/23 95.7 kg (211 lb)   12/20/22 96 kg (211 lb 9.6 oz)     Body mass index is 37.02 kg/m².  Facility age limit for growth percentiles is 20 years.  Vitals:    01/09/23 1403   Temp: 96.4 °F (35.8 °C)     Vital signs reviewed.   General Appearance:    Alert, cooperative, in no acute distress                  Eyes: conjunctiva clear  ENT: external ears and nose atraumatic  CV: no peripheral edema  Resp: normal respiratory effort  Skin: no rashes or wounds; normal turgor  Psych: mood and affect appropriate  Lymph: no nodes appreciated  Neuro: gross sensation  intact  Vascular:  Palpable peripheral pulse in noted extremity  Musculoskeletal Extremities: KNEE Exam: medial and lateral joint line tenderness with crepitation, synovitis, swelling, and joint effusion bilateral knee.      Diagnostic Data:  Imaging done previously in the office and discussed with the patient:         Procedure:  Large Joint Arthrocentesis: R knee  Date/Time: 1/9/2023 2:02 PM  Consent given by: patient  Site marked: site marked  Timeout: Immediately prior to procedure a time out was called to verify the correct patient, procedure, equipment, support staff and site/side marked as required   Supporting Documentation  Indications: pain, joint swelling and diagnostic evaluation   Procedure Details  Location: knee - R knee  Preparation: Patient was prepped and draped in the usual sterile fashion  Needle gauge: 21G.  Medications administered: 80 mg methylPREDNISolone acetate 80 MG/ML; 2 mL lidocaine PF 2% 2 %  Patient tolerance: patient tolerated the procedure well with no immediate complications    Large Joint Arthrocentesis: L knee  Date/Time: 1/9/2023 2:02 PM  Consent given by: patient  Site marked: site marked  Timeout: Immediately prior to procedure a time out was called to verify the correct patient, procedure, equipment, support staff and site/side marked as required   Supporting Documentation  Indications: pain   Procedure Details  Location: knee - L knee  Preparation: Patient was prepped and draped in the usual sterile fashion  Needle gauge: 21G.  Medications administered: 80 mg methylPREDNISolone acetate 80 MG/ML; 2 mL lidocaine (cardiac)  Patient tolerance: patient tolerated the procedure well with no immediate complications          Assessment:     ICD-10-CM ICD-9-CM   1. Primary osteoarthritis of both knees  M17.0 715.16       Plan:   Follow up as indicated.  Ice, elevate, and rest as needed.  The diagnosis(es), natural history, pathophysiology and treatment for diagnosis(es) were discussed.  Opportunity given and questions answered.  Biomechanics of pertinent body areas discussed.  When appropriate, the use of ambulatory aids discussed.  EXERCISES:  Advice on benefits of, and types of regular/moderate exercise pertaining to orthopedic diagnosis(es).  MEDICATIONS:  The risks, benefits, warnings,side effects and alternatives of medications discussed.  Inflammation/pain control; with cold, heat, elevation and/or liniments discussed as appropriate  Cortisone Injection. See procedure note.  HOME EXERCISE/PT program encouraged  MEDICAL RECORDS reviewed from other provider(s) for past and current medical history pertinent to this complaint.    1/9/2023

## 2023-01-13 ENCOUNTER — TELEPHONE (OUTPATIENT)
Dept: FAMILY MEDICINE CLINIC | Facility: CLINIC | Age: 63
End: 2023-01-13

## 2023-01-13 NOTE — TELEPHONE ENCOUNTER
Called letting pt know that Fermin is out of the office today and other pcps in the office are booked. I let pt know that she can either do a virual visit with [facility] or she can go to urgent care to be seen. Pt voiced her understanding.

## 2023-01-13 NOTE — TELEPHONE ENCOUNTER
Hub staff attempted to follow warm transfer process and was unsuccessful     Caller: Keri Bhagat    Relationship to patient: Self    Best call back number: [phone]    Patient is needing: PATIENT HAS SAME DAY SYMPTOMS.  HUB UNABLE TO SCHEDULE.  THERE WAS A 1:15 PM APPOINTMENT BUT PATIENT DID NOT HAVE TIME TO DRIVE TO OFFICE BY THAT TIME.      PLEASE CALLBACK TO SCHEDULE OR SEE IF JAMES EPLEY CAN CALL IN A PRESCRIPTION FOR HER SYMPTOMS OF HEADACHE, COUGH, NASAL AND CHEST CONGESTION.    [facility] [id number] - [facility] [address] AT [facility] - [phone] [facility] [phone] FX    PLEASE ADVISE.

## 2023-01-16 ENCOUNTER — TELEPHONE (OUTPATIENT)
Dept: FAMILY MEDICINE CLINIC | Facility: CLINIC | Age: 63
End: 2023-01-16

## 2023-01-16 NOTE — TELEPHONE ENCOUNTER
Caller: Keri Bhagat    Relationship to patient: Self    Best call back number: [phone]    Chief complaint: COVID RE TEST    Type of visit: OFFICE VISIT    Requested date: 01.18.23    If rescheduling, when is the original appointment: 01.17.23    Additional notes: PATIENT WAS ADVISED TO RE TEST FOR COVID AFTER 5 DAYS. PATIENT TESTED POSITIVE ON 01.13.23. HUB HAD NO AVAILABILITY ON 01.18.23, ATTEMPTED TO WARM TRANSFER BUT WAS UNSUCCESSFUL. PATIENT TOOK APPOINTMENT ON 01.17.23 RATHER THAN APPOINTMENT ON 01.20.23 BECAUSE SHE CAN NOT WAIT UNTIL Friday TO BE TESTED. PLEASE CALL THE PATIENT AND ADVISE OF APPOINTMENT AVAILABILITY.

## 2023-01-17 ENCOUNTER — OFFICE VISIT (OUTPATIENT)
Dept: FAMILY MEDICINE CLINIC | Facility: CLINIC | Age: 63
End: 2023-01-17
Payer: MEDICARE

## 2023-01-17 VITALS
DIASTOLIC BLOOD PRESSURE: 80 MMHG | OXYGEN SATURATION: 97 % | RESPIRATION RATE: 14 BRPM | HEIGHT: 63 IN | TEMPERATURE: 97 F | WEIGHT: 205 LBS | BODY MASS INDEX: 36.32 KG/M2 | SYSTOLIC BLOOD PRESSURE: 128 MMHG | HEART RATE: 65 BPM

## 2023-01-17 DIAGNOSIS — E78.2 MIXED HYPERLIPIDEMIA: ICD-10-CM

## 2023-01-17 DIAGNOSIS — E03.8 OTHER SPECIFIED HYPOTHYROIDISM: ICD-10-CM

## 2023-01-17 DIAGNOSIS — I10 ESSENTIAL HYPERTENSION: Primary | ICD-10-CM

## 2023-01-17 PROCEDURE — 99213 OFFICE O/P EST LOW 20 MIN: CPT | Performed by: NURSE PRACTITIONER

## 2023-01-17 RX ORDER — GABAPENTIN 300 MG/1
300 CAPSULE ORAL 3 TIMES DAILY
Qty: 270 CAPSULE | Refills: 0 | Status: SHIPPED | OUTPATIENT
Start: 2023-01-17

## 2023-01-17 NOTE — PROGRESS NOTES
"Chief Complaint  covid    Subjective        Keri Bhagat presents to [facility]  History of Present Illness  Pleasant patient here today follow-up recent COVID infection,  Symptoms started Thursday with cough congestion headache body aches, she worsened and went to immediate care center tested positive for COVID  Was prescribed antiviral, patient is doing better now just has a mild cough and some drainage but no fever no chest pain or shortness of breath.  She is in hearing as she was told to follow-up    She had previous vaccines but not the latest EXTR.  Needs refill of cholesterol medication for hyperlipidemia mixed as well as gabapentin for chronic radiculopathy nerve pain arthritic pain stable in the medication doing well low risk reviewed Julian.      Objective   Vital Signs:  /80   Pulse 65   Temp 97 °F (36.1 °C) (Infrared)   Resp 14   Ht 160 cm (63\")   Wt 93 kg (205 lb)   SpO2 97%   BMI 36.31 kg/m²   Estimated body mass index is 36.31 kg/m² as calculated from the following:    Height as of this encounter: 160 cm (63\").    Weight as of this encounter: 93 kg (205 lb).          Physical Exam  Vitals reviewed.   Constitutional:       General: She is not in acute distress.     Appearance: She is well-developed. She is not ill-appearing, toxic-appearing or diaphoretic.      Comments: Pleasant alert no distress   HENT:      Head: Normocephalic.      Mouth/Throat:      Pharynx: No oropharyngeal exudate.   Eyes:      Conjunctiva/sclera: Conjunctivae normal.      Pupils: Pupils are equal, round, and reactive to light.   Neck:      Vascular: No JVD.   Cardiovascular:      Rate and Rhythm: Normal rate and regular rhythm.      Heart sounds: Normal heart sounds. No murmur heard.    No friction rub.   Pulmonary:      Effort: Pulmonary effort is normal. No respiratory distress.      Breath sounds: Normal breath sounds. No wheezing.      Comments: Clear unlabored  Abdominal: "      General: Bowel sounds are normal. There is no distension.      Palpations: Abdomen is soft. There is no mass.      Tenderness: There is no abdominal tenderness. There is no guarding.      Hernia: No hernia is present.   Musculoskeletal:         General: No tenderness.      Cervical back: Neck supple.   Lymphadenopathy:      Cervical: No cervical adenopathy.   Skin:     General: Skin is warm and dry.   Neurological:      Mental Status: She is alert and oriented to person, place, and time.   Psychiatric:         Behavior: Behavior normal.         Thought Content: Thought content normal.         Judgment: Judgment normal.        Result Review :                Assessment and Plan   There are no diagnoses linked to this encounter.         Follow Up   No follow-ups on file.  Patient was given instructions and counseling regarding her condition or for health maintenance advice. Please see specific information pulled into the AVS if appropriate.     There are no Patient Instructions on file for this visit.       Answers for HPI/ROS submitted by the patient on 1/17/2023  Please describe your symptoms.: Positive test for covid.  Need recheck.  Have you had these symptoms before?: No  How long have you been having these symptoms?: 5-7 days  What is the primary reason for your visit?: Other

## 2023-01-17 NOTE — PATIENT INSTRUCTIONS
Discharge instructions continue to push fluids plenty rest  Anything over-the-counter for cough congestion if needed such as Robitussin-DM    Quarantine for total 5 days from onset of symptoms  About fever for 24 hours with the CDC guidance    Although you made a little more time  Likely before you around someone high risk        Should you have high fever chest pain shortness of breath urgent recheck

## 2023-01-19 RX ORDER — SIMVASTATIN 20 MG
20 TABLET ORAL NIGHTLY
Qty: 90 TABLET | Refills: 1 | Status: SHIPPED | OUTPATIENT
Start: 2023-01-19

## 2023-01-19 RX ORDER — ISOSORBIDE MONONITRATE 30 MG/1
30 TABLET, EXTENDED RELEASE ORAL DAILY
Qty: 90 TABLET | Refills: 1 | Status: SHIPPED | OUTPATIENT
Start: 2023-01-19 | End: 2023-01-23

## 2023-01-19 NOTE — TELEPHONE ENCOUNTER
Caller: Keri Bhagat    Relationship: Self    Best call back number: [phone]    Requested Prescriptions:   Requested Prescriptions     Pending Prescriptions Disp Refills   • simvastatin (ZOCOR) 20 MG tablet 90 tablet 0     Sig: Take 1 tablet by mouth Every Night.        Pharmacy where request should be sent: [facility] [id number] [facility] [address] [facility] AT [facility] [phone] [facility] [phone] FX     Additional details provided by patient:     Does the patient have less than a 3 day supply:  [x] Yes  [] No    Would you like a call back once the refill request has been completed: [] Yes [x] No    If the office needs to give you a call back, can they leave a voicemail: [] Yes [] No    Jigar Issa Rep   01/19/23 12:45 EST

## 2023-01-19 NOTE — TELEPHONE ENCOUNTER
Caller: Keri Bhagat    Relationship: Self    Best call back number: [phone]    Requested Prescriptions:   Requested Prescriptions     Pending Prescriptions Disp Refills   • simvastatin (ZOCOR) 20 MG tablet 90 tablet 0     Sig: Take 1 tablet by mouth Every Night.   • isosorbide mononitrate (IMDUR) 30 MG 24 hr tablet 30 tablet 0     Sig: Take 1 tablet by mouth Daily.        Pharmacy where request should be sent: [facility] [id number] [address] AT [facility] - [phone] [facility] [phone] FX     Additional details provided by patient:     Does the patient have less than a 3 day supply:  [x] Yes  [] No    Would you like a call back once the refill request has been completed: [] Yes [x] No    If the office needs to give you a call back, can they leave a voicemail: [] Yes [x] No    Jigar Ko Rep   01/19/23 12:48 EST

## 2023-01-19 NOTE — TELEPHONE ENCOUNTER
Rx Refill Note  Requested Prescriptions     Pending Prescriptions Disp Refills   • simvastatin (ZOCOR) 20 MG tablet 90 tablet 0     Sig: Take 1 tablet by mouth Every Night.   • isosorbide mononitrate (IMDUR) 30 MG 24 hr tablet 30 tablet 0     Sig: Take 1 tablet by mouth Daily.      Last office visit with prescribing clinician: 1/17/2023   Last telemedicine visit with prescribing clinician: Visit date not found   Next office visit with prescribing clinician: Visit date not found                         Would you like a call back once the refill request has been completed: [] Yes [] No    If the office needs to give you a call back, can they leave a voicemail: [] Yes [] No    Jigar Chapa Rep  01/19/23, 14:08 EST

## 2023-01-20 DIAGNOSIS — K21.00 GASTROESOPHAGEAL REFLUX DISEASE WITH ESOPHAGITIS WITHOUT HEMORRHAGE: ICD-10-CM

## 2023-01-23 ENCOUNTER — TELEPHONE (OUTPATIENT)
Dept: GASTROENTEROLOGY | Facility: CLINIC | Age: 63
End: 2023-01-23
Payer: MEDICARE

## 2023-01-23 RX ORDER — PRAMIPEXOLE DIHYDROCHLORIDE 0.5 MG/1
0.5 TABLET ORAL NIGHTLY
Qty: 30 TABLET | Refills: 5 | Status: SHIPPED | OUTPATIENT
Start: 2023-01-23 | End: 2024-01-18

## 2023-01-23 RX ORDER — ISOSORBIDE MONONITRATE 30 MG/1
TABLET, EXTENDED RELEASE ORAL
Qty: 90 TABLET | Refills: 1 | Status: SHIPPED | OUTPATIENT
Start: 2023-01-23

## 2023-01-23 NOTE — TELEPHONE ENCOUNTER
Caller: Keri Bhagat    Relationship: Self    Best call back number: [phone]    Requested Prescriptions:  PANTOPRAZOLE (PROTONIX) 40 MG EC TABLET          Pharmacy where request should be sent:  [facility]    Additional details provided by patient: ONE DOSAGE LEFT    Does the patient have less than a 3 day supply:  [x] Yes  [] No    If the office needs to give you a call back, can they leave a voicemail: [x] Yes [] No    Jigar Mcnair Rep   01/23/23 13:08 EST

## 2023-01-23 NOTE — TELEPHONE ENCOUNTER
Pt last seen 11/3/21.     Call to pt.  Appt scheduled with KATINA Mendoza for 1/24 @ 2:15 pm.      Pt currently taking pantoprazole 40 mg 2x/day.  Will discuss refill with KATINA Mendoza tomorrow.

## 2023-01-23 NOTE — TELEPHONE ENCOUNTER
Rx Refill Note  Requested Prescriptions     Pending Prescriptions Disp Refills   • isosorbide mononitrate (IMDUR) 30 MG 24 hr tablet [Pharmacy Med Name: ISOSORBIDE MONONIT ER 30 MG TB] 30 tablet      Sig: TAKE ONE TABLET BY MOUTH DAILY      Last office visit with prescribing clinician: 1/17/2023   Last telemedicine visit with prescribing clinician: Visit date not found   Next office visit with prescribing clinician: Visit date not found                         Would you like a call back once the refill request has been completed: [] Yes [] No    If the office needs to give you a call back, can they leave a voicemail: [] Yes [] No    Jigar Chapa Rep  01/23/23, 08:56 EST

## 2023-01-24 ENCOUNTER — OFFICE VISIT (OUTPATIENT)
Dept: GASTROENTEROLOGY | Facility: CLINIC | Age: 63
End: 2023-01-24
Payer: MEDICARE

## 2023-01-24 VITALS
SYSTOLIC BLOOD PRESSURE: 128 MMHG | WEIGHT: 205.8 LBS | BODY MASS INDEX: 36.46 KG/M2 | TEMPERATURE: 97.3 F | OXYGEN SATURATION: 96 % | HEIGHT: 63 IN | HEART RATE: 57 BPM | DIASTOLIC BLOOD PRESSURE: 87 MMHG

## 2023-01-24 DIAGNOSIS — K21.00 GASTROESOPHAGEAL REFLUX DISEASE WITH ESOPHAGITIS WITHOUT HEMORRHAGE: ICD-10-CM

## 2023-01-24 DIAGNOSIS — K76.0 FATTY LIVER: ICD-10-CM

## 2023-01-24 DIAGNOSIS — K29.50 CHRONIC GASTRITIS WITHOUT BLEEDING, UNSPECIFIED GASTRITIS TYPE: Primary | ICD-10-CM

## 2023-01-24 DIAGNOSIS — Z86.010 HISTORY OF COLON POLYPS: ICD-10-CM

## 2023-01-24 DIAGNOSIS — I10 HYPERTENSION, UNSPECIFIED TYPE: ICD-10-CM

## 2023-01-24 PROCEDURE — 99213 OFFICE O/P EST LOW 20 MIN: CPT | Performed by: NURSE PRACTITIONER

## 2023-01-24 RX ORDER — PANTOPRAZOLE SODIUM 40 MG/1
40 TABLET, DELAYED RELEASE ORAL 2 TIMES DAILY
Qty: 180 TABLET | Refills: 3 | Status: SHIPPED | OUTPATIENT
Start: 2023-01-24

## 2023-01-24 RX ORDER — PANTOPRAZOLE SODIUM 40 MG/1
TABLET, DELAYED RELEASE ORAL
Qty: 180 TABLET | Refills: 3 | Status: SHIPPED | OUTPATIENT
Start: 2023-01-24 | End: 2023-01-24 | Stop reason: SDUPTHER

## 2023-01-24 RX ORDER — ATENOLOL 50 MG/1
TABLET ORAL
Qty: 90 TABLET | Refills: 1 | Status: SHIPPED | OUTPATIENT
Start: 2023-01-24

## 2023-01-24 NOTE — TELEPHONE ENCOUNTER
Rx Refill Note  Requested Prescriptions     Pending Prescriptions Disp Refills   • atenolol (TENORMIN) 50 MG tablet [Pharmacy Med Name: ATENOLOL 50 MG TABLET] 90 tablet 1     Sig: TAKE ONE TABLET BY MOUTH DAILY      Last office visit with prescribing clinician: 1/17/2023   Last telemedicine visit with prescribing clinician: Visit date not found   Next office visit with prescribing clinician: Visit date not found                         Would you like a call back once the refill request has been completed: [] Yes [] No    If the office needs to give you a call back, can they leave a voicemail: [] Yes [] No    Jigar Chapa Rep  01/24/23, 12:49 EST

## 2023-01-24 NOTE — PROGRESS NOTES
Chief Complaint   Patient presents with   • Med Refill       Keri Bhagat is a  62 y.o. female here for a follow up visit for GERD.    HPI  62-year-old female presents today for follow-up visit for GERD.  She is a patient of Dr. Rodriguez.  She was last seen in the office by him on 11/3/2021.  She has a history of GERD with esophagitis and admits she is doing really well on Protonix 40 mg twice a day.  She tells me she is try to cut back but she really sees a difference when she takes it twice a day.  She does have a history of a Lap-Band surgery and admits recently she did have the fluid taken out of it.  She had a gallbladder ultrasound done recently that did show fatty liver disease.  Most recent LFTs were normal.  Her last EGD and colonoscopy was in 3/2021.  She does have a history of colon polyps.  She denies any dysphagia, reflux, abdominal pain, nausea and vomiting, diarrhea, constipation, rectal bleeding or melena.  She admits her appetite is good and her weight is stable.  She denies any significant GI family history at this time.  Past Medical History:   Diagnosis Date   • Abnormal Pap smear of cervix    • Anemia    • Anxiety    • Arthritis    • Atherosclerotic heart disease of native coronary artery with other forms of angina pectoris (HCC)    • Bipolar I disorder, single manic episode (HCC)     depressive d(NOS)   • Cervical disc herniation    • Cervical dysplasia    • Coronary artery disease    • COVID-19 01/10/2023   • CTS (carpal tunnel syndrome)    • Depression     NO SUICIDAL PLANS   • Difficulty swallowing    • Diverticular disease    • Dyspepsia    • Dysphagia    • Gastric reflux    • GERD (gastroesophageal reflux disease)    • Heart attack (HCC)    • Heart murmur    • Hiatal hernia    • High cholesterol    • History of transfusion    • 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    HPV positive on pap LGSIL   • Hyperlipidemia    • Hypertension    • Hypothyroidism    • Incontinence in female      wears pads   • Kidney disease 2017    stage 2    • Kidney disease, chronic, stage III (GFR 30-59 ml/min) ([facility])    • LGSIL on Pap smear of cervix 04/29/2016    LGSIL HPV positive   • Myocardial infarction ([facility]) 2000   • Neck pain    • Obesity    • Past myocardial infarction 05/2000   • Periodic limb movement disorder    • Restless leg syndrome    • Sleep apnea     cpap   • Stress fracture     LEFT HEEL   • Suicidal ideation 08/19/2016    history   • Swelling of ankle     right had doppler no s/s blood clot   • Thyroid nodule    • Vitamin B12 deficiency        Past Surgical History:   Procedure Laterality Date   • ANTERIOR CERVICAL DISCECTOMY W/ FUSION N/A 12/29/2016    Procedure: C3-4 anterior cervical discectomy and fusion with Depuy micro plate, ALLOGRAFT C3-4, AND HARDWARE REMOVAL C4-7.;  Surgeon: Hmea Godwin MD;  Location: [facility] MAIN OR;  Service:    • BARIATRIC SURGERY  2018   • CARDIAC CATHETERIZATION N/A 03/30/2017    Procedure: Left Heart Cath;  Surgeon: Tracey Vargas MD;  Location:  [facility] CATH INVASIVE LOCATION;  Service:    • CARDIAC CATHETERIZATION N/A 03/30/2017    Procedure: Coronary angiography;  Surgeon: Tracey Vargas MD;  Location:  [facility];  Service:    • CARDIAC CATHETERIZATION N/A 03/30/2017    Procedure: Left ventriculography;  Surgeon: Tracey Vargas MD;  Location:  [facility];  Service:    • CARDIAC CATHETERIZATION  03/30/2017    Procedure: Functional Flow [place];  Surgeon: Tracey Vargas MD;  Location:  [facility];  Service:    • CARPAL TUNNEL RELEASE     • CATARACT EXTRACTION     • CERVICAL BIOPSY  [facility]    Dr. Jeffery.    • CERVICAL DISCECTOMY ANTERIOR  04/2013    C4-7   • COLONOSCOPY  04/20/2015    Diverticulosis, IH   • COLONOSCOPY  03/09/2021   • COLPOSCOPY W/ BIOPSY / CURETTAGE  06/17/2016    LGSIL HPV positive. Results were normal repeat pap in one year. Chronic Cervicitis   • CORONARY ANGIOPLASTY WITH STENT PLACEMENT      • ENDOSCOPY  [facility]    Normal.  Dr. Rodriguez   • ENDOSCOPY N/A 06/22/2017    Erythematous mucosa in the stomach  PATH: Chronic active gastritis, moderate with intestinal metaplasia    • GASTRIC BYPASS      Done using the sleeve technique   • HARDWARE REMOVAL  12/29/2016    cervical   • HERNIA REPAIR     • LAPAROSCOPIC GASTRIC BANDING  02/2018   • SHOULDER SURGERY Left     RCR   • TONSILLECTOMY     • TONSILLECTOMY AND ADENOIDECTOMY     • TRANSVAGINAL TAPING SUSPENSION N/A 12/06/2017    Procedure: MID URETHRAL SLING CYSTSCOPY;  Surgeon: Abby Méndez MD;  Location: [facility];  Service:    • TUBAL ABDOMINAL LIGATION     • TYMPANOSTOMY TUBE PLACEMENT Right    • UPPER GASTROINTESTINAL ENDOSCOPY  approx 2021   • WRIST SURGERY Bilateral     carpal tunnel   • WRIST SURGERY      L wrist/ 4/2020       Scheduled Meds:    Continuous Infusions:No current facility-administered medications for this visit.      PRN Meds:.    No Known Allergies    Social History     Socioeconomic History   • Marital status:    • Number of children: 3   • Years of education: 12   Tobacco Use   • Smoking status: Light Smoker     Types: Electronic Cigarette   • Smokeless tobacco: Current   • Tobacco comments:     Electronic Cigarette   Vaping Use   • Vaping Use: Every day   • Substances: Nicotine, Flavoring   • Devices: Refillable tank   Substance and Sexual Activity   • Alcohol use: Not Currently   • Drug use: No   • Sexual activity: Not Currently     Partners: Male     Birth control/protection: Condom, Abstinence, Post-menopausal, None, Tubal ligation       Family History   Problem Relation Age of Onset   • Diabetes type II Mother    • Hypertension Mother    • Osteoporosis Mother    • Seizures Mother    • COPD Father    • Hypertension Father    • Lung cancer Father    • Heart attack Father    • Liver cancer Father    • Liver disease Father    • Thyroid disease Sister    • Hypertension Sister    • Bipolar disorder Sister    •  Depression Sister    • ADD / ADHD Sister    • No Known Problems Son    • Abnormal EKG Daughter    • Hypertension Daughter    • Bipolar disorder Daughter    • Thyroid disease Daughter    • No Known Problems Paternal Grandfather    • No Known Problems Paternal Grandmother    • No Known Problems Maternal Grandmother    • No Known Problems Maternal Grandfather    • Thyroid disease Sister    • Hypertension Sister    • Bipolar disorder Sister    • Asthma Daughter    • Breast cancer Neg Hx    • Ovarian cancer Neg Hx    • Uterine cancer Neg Hx    • Colon cancer Neg Hx    • Malig Hyperthermia Neg Hx        Review of Systems   Constitutional: Negative for appetite change, chills, diaphoresis, fatigue, fever and unexpected weight change.   HENT: Negative for nosebleeds, postnasal drip, sore throat, trouble swallowing and voice change.    Respiratory: Negative for cough, choking, chest tightness, shortness of breath, wheezing and stridor.    Cardiovascular: Negative for chest pain, palpitations and leg swelling.   Gastrointestinal: Negative for abdominal distention, abdominal pain, anal bleeding, blood in stool, constipation, diarrhea, nausea, rectal pain and vomiting.   Endocrine: Negative for polydipsia, polyphagia and polyuria.   Musculoskeletal: Negative for gait problem.   Skin: Negative for rash and wound.   Allergic/Immunologic: Negative for food allergies.   Neurological: Negative for dizziness, speech difficulty and light-headedness.   Psychiatric/Behavioral: Negative for confusion, self-injury, sleep disturbance and suicidal ideas.       Vitals:    01/24/23 1425   BP: 128/87   Pulse: 57   Temp: 97.3 °F (36.3 °C)   SpO2: 96%       Physical Exam  Constitutional:       General: She is not in acute distress.     Appearance: She is well-developed. She is not ill-appearing.   HENT:      Head: Normocephalic.   Eyes:      Pupils: Pupils are equal, round, and reactive to light.   Cardiovascular:      Rate and Rhythm: Normal  rate and regular rhythm.      Heart sounds: Normal heart sounds.   Pulmonary:      Effort: Pulmonary effort is normal.      Breath sounds: Normal breath sounds.   Abdominal:      General: Bowel sounds are normal. There is no distension.      Palpations: Abdomen is soft. There is no mass.      Tenderness: There is no abdominal tenderness. There is no guarding or rebound.      Hernia: No hernia is present.   Musculoskeletal:         General: Normal range of motion.   Skin:     General: Skin is warm and dry.   Neurological:      Mental Status: She is alert and oriented to person, place, and time.   Psychiatric:         Speech: Speech normal.         Behavior: Behavior normal.         Judgment: Judgment normal.         No radiology results for the last 7 days     Diagnoses and all orders for this visit:    1. Chronic gastritis without bleeding, unspecified gastritis type (Primary)    2. Gastroesophageal reflux disease with esophagitis without hemorrhage  -     pantoprazole (PROTONIX) 40 MG EC tablet; Take 1 tablet by mouth 2 (Two) Times a Day.  Dispense: 180 tablet; Refill: 3    3. Fatty liver    4. History of colon polyps    GERD seems well controlled on Protonix 40 mg twice daily.  Continue GERD precautions.  Reviewed most recent lab work with her today.  LFTs were normal.  Next screening colonoscopy will be due in 2026.  Patient to call the office with any issues.  Patient to follow-up with me in 1 year.  Patient is agreeable to the plan.

## 2023-02-02 NOTE — TELEPHONE ENCOUNTER
----- Message from Eduar Rodriguez MD sent at 5/5/2021  7:59 AM EDT -----  05/05/21  Tell her that her celiac sprue antibody panel, amylase, lipase, CBC, and thyroid-stimulating hormone were all normal.  The patient's iron level (ferritin of 39.9) is normal although it is low normal.  I would continue taking the iron pills for 3 more months and then I think you could stop the iron pills.  Please fax a copy of this report to her PCP.  Quang zheng   11.6   7.07  )-----------( 221      ( 01 Feb 2023 10:21 )             34.0

## 2023-02-15 DIAGNOSIS — E03.9 HYPOTHYROIDISM (ACQUIRED): ICD-10-CM

## 2023-02-16 RX ORDER — LEVOTHYROXINE SODIUM 88 UG/1
TABLET ORAL
Qty: 90 TABLET | Refills: 1 | Status: SHIPPED | OUTPATIENT
Start: 2023-02-16

## 2023-03-07 ENCOUNTER — OFFICE VISIT (OUTPATIENT)
Dept: ENDOCRINOLOGY | Age: 63
End: 2023-03-07
Payer: MEDICARE

## 2023-03-07 VITALS
HEIGHT: 63 IN | BODY MASS INDEX: 36 KG/M2 | OXYGEN SATURATION: 97 % | SYSTOLIC BLOOD PRESSURE: 118 MMHG | WEIGHT: 203.2 LBS | HEART RATE: 69 BPM | DIASTOLIC BLOOD PRESSURE: 80 MMHG | TEMPERATURE: 96.7 F

## 2023-03-07 DIAGNOSIS — E03.9 HYPOTHYROIDISM (ACQUIRED): Primary | ICD-10-CM

## 2023-03-07 DIAGNOSIS — G47.33 OSA (OBSTRUCTIVE SLEEP APNEA): ICD-10-CM

## 2023-03-07 DIAGNOSIS — E78.5 HYPERLIPIDEMIA, UNSPECIFIED HYPERLIPIDEMIA TYPE: ICD-10-CM

## 2023-03-07 DIAGNOSIS — I10 ESSENTIAL HYPERTENSION: ICD-10-CM

## 2023-03-07 DIAGNOSIS — E55.9 VITAMIN D DEFICIENCY: ICD-10-CM

## 2023-03-07 DIAGNOSIS — M85.89 OSTEOPENIA OF MULTIPLE SITES: ICD-10-CM

## 2023-03-07 PROCEDURE — 99214 OFFICE O/P EST MOD 30 MIN: CPT | Performed by: INTERNAL MEDICINE

## 2023-03-07 RX ORDER — ACETAMINOPHEN 160 MG
2000 TABLET,DISINTEGRATING ORAL DAILY
Start: 2023-03-07 | End: 2024-03-06

## 2023-03-07 NOTE — PROGRESS NOTES
Subjective   Keri Bhagat is a 62 y.o. female.     History of Present Illness        Patient is a 62 year old who came in for follow-up     She has hypothyroidism and has been on levothyroxine 88 µg per day. Thyroid ultrasound done in 10/17 showed stable 0.5 cm solid nodule in the right.  She denies dysphagia or pressure symptoms.  Thyroid ultrasound done in September 2020 was normal.       She has no history of head or neck radiation therapy.       She had an upper endoscopy with dilatation in June 2016 done by Dr. Rodriguez.  She has gastritis and hiatal hernia.  She is on Protonix.  She has denies heartburn.  She denies melena or hematochezia.     She had a LAP-BAND and hiatal hernia repair done by Dr. Sims February 2018.  She has gained 7 lbs since 4/22.     She has history of hypertension, and coronary artery disease. She had a previous heart attack in 2000 and had angioplasty was 1 stent. She had a cardiac catheterization done in 2015 which showed a patent stent to the LAD. She had normal nuclear stress test in August 2021.  She denies chest pain or SOB. She is on atenolol and Imdur and follows with Dr. Burroughs.        She has hyperlipidemia and has been on Zocor 20 mg once a day. She denies any myalgia.       She has no previous history of diabetes mellitus.  Hemoglobin A1c done in April 2022 is normal at 5.4%.  Her mother and son have diabetes mellitus.       She has sleep apnea and is using a BiPap.  She wakes up rested most of the time.     She has chronic knee and neck pain and sees Dr. Godwin and Dr. Pritchard.  She had steroid injection and Synvisc on both knees in the past.      She has osteopenia on bone density done in November 2018.  She had her natural menopause at age 54 and was never on hormone replacement therapy. She does exercise regularly.  She has no parental history of hip fractures.  She denies alcohol abuse.       Bone density done in March 2022 showed osteopenia.  Her 10-year risk of major  osteoporotic fracture is 5.4% and of hip fracture is 0.5%.  She is on vitamin D3 1000 units twice daily.      She smoked cigarettes for more than 40 years and switched to electronic cigarettes in 2017.  She is on Wellbutrin.     She was seen by her PCP in February 2019 because of poor memory and poor balance.  MRI of the brain done in January 2019 showed a 8 mm lesion on falx cerebri most likely a small meningioma.  Incidental to the study, there is a suggestion of a hypoenhancing focus measuring 4 mm in diameter on the right aspect of the anterior pituitary.  MRI of the pituitary done in March 2019 showed a hypoenhancing area within the sella turcica more posteriorly at the level of the interface of the anterior posterior pituitary suspected to be a pars intermedia cyst.  There is an incidental arachnoid cyst measuring up to 1.9 cm anterior to the right cerebellar hemisphere.  She is following with Maria Elena Giles NP and neurologist Dr. Cook.     MRI of the pituitary done in August 2021 showed a stable pars intermedia cyst and stable meningioma on the falx cerebri.     She had 3 Pfizer Covid vaccines before Covid infection in January 2023.  She did not require hospitalization and was treated with Paxlovid.      The following portions of the patient's history were reviewed and updated as appropriate: allergies, current medications, past family history, past medical history, past social history, past surgical history and problem list.    Review of Systems   Eyes: Negative for visual disturbance.   Respiratory: Negative for shortness of breath.    Gastrointestinal: Negative.    Endocrine: Negative for cold intolerance and heat intolerance.   Genitourinary: Negative.    Musculoskeletal: Negative for myalgias.   Neurological: Negative for seizures, numbness and headaches.     Vitals:    03/07/23 1405   BP: 118/80   Pulse: 69   Temp: 96.7 °F (35.9 °C)   TempSrc: Temporal   SpO2: 97%   Weight: 92.2 kg (203 lb 3.2 oz)  "  Height: 160 cm (63\")      Objective   Physical Exam  Constitutional:       General: She is not in acute distress.     Appearance: Normal appearance. She is not ill-appearing, toxic-appearing or diaphoretic.   Eyes:      General: No scleral icterus.        Right eye: No discharge.         Left eye: No discharge.      Extraocular Movements: Extraocular movements intact.      Conjunctiva/sclera: Conjunctivae normal.   Neck:      Vascular: No carotid bruit.   Cardiovascular:      Rate and Rhythm: Normal rate and regular rhythm.      Pulses: Normal pulses.      Heart sounds: Normal heart sounds. No murmur heard.    No friction rub.   Pulmonary:      Effort: No respiratory distress.      Breath sounds: No rales.   Chest:      Chest wall: No tenderness.   Abdominal:      General: Bowel sounds are normal.      Palpations: Abdomen is soft.      Tenderness: There is no right CVA tenderness or left CVA tenderness.   Musculoskeletal:         General: Normal range of motion.      Right lower leg: No edema.      Left lower leg: No edema.   Lymphadenopathy:      Cervical: No cervical adenopathy.   Skin:     General: Skin is warm.   Neurological:      Mental Status: She is alert and oriented to person, place, and time.   Psychiatric:         Mood and Affect: Mood normal.         Behavior: Behavior normal.       Admission on 01/13/2023, Discharged on 01/13/2023   Component Date Value Ref Range Status   • COVID19 01/13/2023 Detected (A)   Final   • Influenza A Antigen MARY 01/13/2023 Not Detected   Final   • Influenza B Antigen MARY 01/13/2023 Not Detected   Final   • Internal Control 01/13/2023 Passed   Final   • Lot Number 01/13/2023 [id number]   Final   • Expiration Date 01/13/2023 02/08/2023   Final     Assessment & Plan   Diagnoses and all orders for this visit:    1. Hypothyroidism (acquired) (Primary)  -     TSH  -     T4, Free    2. Essential hypertension  -     Comprehensive Metabolic Panel    3. Hyperlipidemia, unspecified " hyperlipidemia type  -     Comprehensive Metabolic Panel  -     Lipid Panel    4. ROCKY (obstructive sleep apnea)    5. Vitamin D deficiency  -     Vitamin D,25-Hydroxy    6. Osteopenia of multiple sites  -     Vitamin D,25-Hydroxy    Other orders  -     Cholecalciferol (Vitamin D3) 50 MCG (2000 UT) capsule; Take 1 capsule by mouth Daily.      Continue levothyroxine 88 mcg/day.  Continue atenolol and isosorbide mononitrate per cardiologist.  Continue simvastatin 20 mg/day.  Continue vitamin D3 2000 units/day.  Follow-up with neurosurgery.    Copy of my note sent to James Epley, NP.    RTC 6 mos.

## 2023-03-08 LAB
25(OH)D3+25(OH)D2 SERPL-MCNC: 47.7 NG/ML (ref 30–100)
ALBUMIN SERPL-MCNC: 4.6 G/DL (ref 3.5–5.2)
ALBUMIN/GLOB SERPL: 2.2 G/DL
ALP SERPL-CCNC: 82 U/L (ref 39–117)
ALT SERPL-CCNC: 19 U/L (ref 1–33)
AST SERPL-CCNC: 18 U/L (ref 1–32)
BILIRUB SERPL-MCNC: 0.3 MG/DL (ref 0–1.2)
BUN SERPL-MCNC: 8 MG/DL (ref 8–23)
BUN/CREAT SERPL: 6.7 (ref 7–25)
CALCIUM SERPL-MCNC: 9.8 MG/DL (ref 8.6–10.5)
CHLORIDE SERPL-SCNC: 106 MMOL/L (ref 98–107)
CHOLEST SERPL-MCNC: 172 MG/DL (ref 0–200)
CO2 SERPL-SCNC: 28.8 MMOL/L (ref 22–29)
CREAT SERPL-MCNC: 1.2 MG/DL (ref 0.57–1)
EGFRCR SERPLBLD CKD-EPI 2021: 51.9 ML/MIN/1.73
GLOBULIN SER CALC-MCNC: 2.1 GM/DL
GLUCOSE SERPL-MCNC: 93 MG/DL (ref 65–99)
HDLC SERPL-MCNC: 75 MG/DL (ref 40–60)
IMP & REVIEW OF LAB RESULTS: NORMAL
LDLC SERPL CALC-MCNC: 73 MG/DL (ref 0–100)
POTASSIUM SERPL-SCNC: 4.2 MMOL/L (ref 3.5–5.2)
PROT SERPL-MCNC: 6.7 G/DL (ref 6–8.5)
SODIUM SERPL-SCNC: 142 MMOL/L (ref 136–145)
T4 FREE SERPL-MCNC: 1.27 NG/DL (ref 0.93–1.7)
TRIGL SERPL-MCNC: 139 MG/DL (ref 0–150)
TSH SERPL DL<=0.005 MIU/L-ACNC: 0.57 UIU/ML (ref 0.27–4.2)
VLDLC SERPL CALC-MCNC: 24 MG/DL (ref 5–40)

## 2023-03-12 NOTE — PROGRESS NOTES
LDL 73.  HDL 75.  Continue Zocor 20 mg/day.  Normal thyroid function tests.  Continue levothyroxine 88 mcg/day.    Normal vitamin D.  Continue vitamin D3 2000 units/day.  Copy of labs sent to James Epley, NP and to patient through [employer].

## 2023-03-29 ENCOUNTER — OFFICE VISIT (OUTPATIENT)
Dept: FAMILY MEDICINE CLINIC | Facility: CLINIC | Age: 63
End: 2023-03-29
Payer: MEDICARE

## 2023-03-29 VITALS
TEMPERATURE: 97.1 F | HEART RATE: 64 BPM | WEIGHT: 201.7 LBS | DIASTOLIC BLOOD PRESSURE: 74 MMHG | BODY MASS INDEX: 35.74 KG/M2 | SYSTOLIC BLOOD PRESSURE: 111 MMHG | HEIGHT: 63 IN | OXYGEN SATURATION: 98 %

## 2023-03-29 DIAGNOSIS — M67.431 GANGLION CYST OF VOLAR ASPECT OF RIGHT WRIST: ICD-10-CM

## 2023-03-29 DIAGNOSIS — D32.9 MENINGIOMA: Primary | ICD-10-CM

## 2023-03-29 DIAGNOSIS — R90.89 ABNORMAL BRAIN MRI: ICD-10-CM

## 2023-03-29 DIAGNOSIS — E23.6 PITUITARY MASS: ICD-10-CM

## 2023-03-29 DIAGNOSIS — G47.33 OBSTRUCTIVE SLEEP APNEA: ICD-10-CM

## 2023-03-29 DIAGNOSIS — R41.3 MEMORY CHANGES: ICD-10-CM

## 2023-03-29 DIAGNOSIS — D32.9 MENINGIOMA: ICD-10-CM

## 2023-03-29 DIAGNOSIS — R47.89 WORD FINDING DIFFICULTY: Primary | ICD-10-CM

## 2023-03-29 PROCEDURE — 99214 OFFICE O/P EST MOD 30 MIN: CPT | Performed by: NURSE PRACTITIONER

## 2023-03-29 PROCEDURE — 3078F DIAST BP <80 MM HG: CPT | Performed by: NURSE PRACTITIONER

## 2023-03-29 PROCEDURE — 3074F SYST BP LT 130 MM HG: CPT | Performed by: NURSE PRACTITIONER

## 2023-03-29 NOTE — PROGRESS NOTES
"Chief Complaint  Memory Loss (Having trouble forming words and repeating conversations ), Weight Loss (Pt states that shes been losing weight without trying ), and Mass (Mass on right wrist x couple of weeks  )    Subjective        Keri Bhagat presents to [facility]  History of Present Illness    Objective   Vital Signs:  /74   Pulse 64   Temp 97.1 °F (36.2 °C) (Temporal)   Ht 160 cm (63\")   Wt 91.5 kg (201 lb 11.2 oz)   SpO2 98%   BMI 35.73 kg/m²   Estimated body mass index is 35.73 kg/m² as calculated from the following:    Height as of this encounter: 160 cm (63\").    Weight as of this encounter: 91.5 kg (201 lb 11.2 oz).          Physical Exam   Result Review :                Assessment and Plan   Diagnoses and all orders for this visit:    1. Word finding difficulty (Primary)  -     Ambulatory Referral to Neurology    2. Memory changes  -     Ambulatory Referral to Neurology    3. Pituitary mass (HCC)  -     Ambulatory Referral to Neurosurgery    4. Meningioma (HCC)  -     Ambulatory Referral to Neurosurgery    5. Obstructive sleep apnea  -     Cancel: Ambulatory Referral to Sleep Medicine  -     Ambulatory Referral to Sleep Medicine    6. Ganglion cyst of volar aspect of right wrist  -     Ambulatory Referral to Hand Surgery             Follow Up   Return in about 1 week (around 4/5/2023).  Patient was given instructions and counseling regarding her condition or for health maintenance advice. Please see specific information pulled into the AVS if appropriate.     Patient Instructions   Discharge instructions MRI of the pituitary with and without contrast hydrate well before the test in 2 days after to protect your kidneys    Neurology to discuss word finding difficulties and memory issues cognitive issues consider [facility]  Neurosurgery to follow you with your pituitary cyst and you have a small likely benign meningioma which has been stable from " 2019-21 but needs yearly follow-up    Severe headache vision loss confusion weakness emergency room    Referral for hand persist,    Recheck next week to discuss weight loss abnormal weight loss you will need lab and a full discussion here to make sure on top of things very important visit do not be lost to follow-up here to make sure on top of things please    Gabapentin you take this 3 times a day  See if you can decrease this to daily for now and see how you do  Can possibly wean off of this over several months or shorter    Discussed word finding difficulties as well with psychiatry unlikely related to medications nonetheless they should know these things  That said you been stable for long time and there is quite a bit of metabolic risk to the Abilify  Even though it is a favorable choice as opposed to others nonetheless see if you can wean this over the next year or so  To the lowest effective dose if possible    Wegovy or Saxenda see if your insurance will cover for obesity BMI 35 or greater  With serious car mobility  If with the prior authorization they may cover these medications    That said  Since we had some weight loss were not sure why did not a candidate for this at this time but may be in the future  So after discussing this with you may be this is a conversation for a later date                   Answers for HPI/ROS submitted by the patient on 3/28/2023  What is the primary reason for your visit?: Neurological Problem

## 2023-03-29 NOTE — PATIENT INSTRUCTIONS
Discharge instructions MRI of the pituitary with and without contrast hydrate well before the test in 2 days after to protect your kidneys    Neurology to discuss word finding difficulties and memory issues cognitive issues consider [facility]  Neurosurgery to follow you with your pituitary cyst and you have a small likely benign meningioma which has been stable from 2019-21 but needs yearly follow-up    Severe headache vision loss confusion weakness emergency room    Referral for hand persist,    Recheck next week to discuss weight loss abnormal weight loss you will need lab and a full discussion here to make sure on top of things very important visit do not be lost to follow-up here to make sure on top of things please    Gabapentin you take this 3 times a day  See if you can decrease this to daily for now and see how you do  Can possibly wean off of this over several months or shorter    Discussed word finding difficulties as well with psychiatry unlikely related to medications nonetheless they should know these things  That said you been stable for long time and there is quite a bit of metabolic risk to the Abilify  Even though it is a favorable choice as opposed to others nonetheless see if you can wean this over the next year or so  To the lowest effective dose if possible    Wegovy or Saxenda see if your insurance will cover for obesity BMI 35 or greater  With serious car mobility  If with the prior authorization they may cover these medications    That said  Since we had some weight loss were not sure why did not a candidate for this at this time but may be in the future  So after discussing this with you may be this is a conversation for a later date

## 2023-04-13 ENCOUNTER — CLINICAL SUPPORT (OUTPATIENT)
Dept: ORTHOPEDIC SURGERY | Facility: CLINIC | Age: 63
End: 2023-04-13
Payer: MEDICARE

## 2023-04-13 VITALS — BODY MASS INDEX: 35.97 KG/M2 | WEIGHT: 203 LBS | TEMPERATURE: 98.4 F | HEIGHT: 63 IN

## 2023-04-13 DIAGNOSIS — R52 PAIN: Primary | ICD-10-CM

## 2023-04-13 DIAGNOSIS — M17.0 PRIMARY OSTEOARTHRITIS OF BOTH KNEES: ICD-10-CM

## 2023-04-13 RX ORDER — METHYLPREDNISOLONE ACETATE 80 MG/ML
80 INJECTION, SUSPENSION INTRA-ARTICULAR; INTRALESIONAL; INTRAMUSCULAR; SOFT TISSUE
Status: COMPLETED | OUTPATIENT
Start: 2023-04-13 | End: 2023-04-13

## 2023-04-13 RX ADMIN — METHYLPREDNISOLONE ACETATE 80 MG: 80 INJECTION, SUSPENSION INTRA-ARTICULAR; INTRALESIONAL; INTRAMUSCULAR; SOFT TISSUE at 13:45

## 2023-04-13 RX ADMIN — METHYLPREDNISOLONE ACETATE 80 MG: 80 INJECTION, SUSPENSION INTRA-ARTICULAR; INTRALESIONAL; INTRAMUSCULAR; SOFT TISSUE at 13:46

## 2023-04-13 NOTE — PROGRESS NOTES
4/13/2023    Keri Bhagat is here today for worsening knee pain. Pt has undergone injection of the knee in the past with good resolution of symptoms. Pt is requesting a repeat injection.     KNEE Injection Procedure Note:    Large Joint Arthrocentesis: L knee  Date/Time: 4/13/2023 1:45 PM  Consent given by: patient  Site marked: site marked  Timeout: Immediately prior to procedure a time out was called to verify the correct patient, procedure, equipment, support staff and site/side marked as required   Supporting Documentation  Indications: pain and joint swelling   Procedure Details  Location: knee - L knee  Preparation: Patient was prepped and draped in the usual sterile fashion  Needle gauge: 21 G.  Approach: anterolateral  Medications administered: 2 mL lidocaine (cardiac); 80 mg methylPREDNISolone acetate 80 MG/ML  Patient tolerance: patient tolerated the procedure well with no immediate complications    Large Joint Arthrocentesis: R knee  Date/Time: 4/13/2023 1:46 PM  Consent given by: patient  Site marked: site marked  Timeout: Immediately prior to procedure a time out was called to verify the correct patient, procedure, equipment, support staff and site/side marked as required   Supporting Documentation  Indications: pain and joint swelling   Procedure Details  Location: knee - R knee  Preparation: Patient was prepped and draped in the usual sterile fashion  Needle gauge: 21 G.  Approach: anterolateral  Medications administered: 2 mL lidocaine (cardiac); 80 mg methylPREDNISolone acetate 80 MG/ML  Patient tolerance: patient tolerated the procedure well with no immediate complications          At the conclusion of the injection I discussed the importance of continued quad strengthening exercises on a daily basis. I will see the patient back if the symptoms should fail to improve or worsen.    Haily Pritchard, APRN  4/13/2023

## 2023-04-19 ENCOUNTER — TELEPHONE (OUTPATIENT)
Dept: FAMILY MEDICINE CLINIC | Facility: CLINIC | Age: 63
End: 2023-04-19

## 2023-04-19 ENCOUNTER — OFFICE VISIT (OUTPATIENT)
Dept: FAMILY MEDICINE CLINIC | Facility: CLINIC | Age: 63
End: 2023-04-19
Payer: MEDICARE

## 2023-04-19 VITALS
BODY MASS INDEX: 35.81 KG/M2 | TEMPERATURE: 97.7 F | HEART RATE: 63 BPM | SYSTOLIC BLOOD PRESSURE: 141 MMHG | HEIGHT: 63 IN | WEIGHT: 202.1 LBS | DIASTOLIC BLOOD PRESSURE: 84 MMHG | RESPIRATION RATE: 14 BRPM | OXYGEN SATURATION: 97 %

## 2023-04-19 DIAGNOSIS — R41.3 MEMORY CHANGES: ICD-10-CM

## 2023-04-19 DIAGNOSIS — I25.10 ATHEROSCLEROSIS OF NATIVE CORONARY ARTERY OF NATIVE HEART WITHOUT ANGINA PECTORIS: ICD-10-CM

## 2023-04-19 DIAGNOSIS — E78.5 HYPERLIPIDEMIA, UNSPECIFIED HYPERLIPIDEMIA TYPE: ICD-10-CM

## 2023-04-19 DIAGNOSIS — I10 ESSENTIAL HYPERTENSION: Primary | ICD-10-CM

## 2023-04-19 PROCEDURE — 3077F SYST BP >= 140 MM HG: CPT | Performed by: NURSE PRACTITIONER

## 2023-04-19 PROCEDURE — 99213 OFFICE O/P EST LOW 20 MIN: CPT | Performed by: NURSE PRACTITIONER

## 2023-04-19 PROCEDURE — 3079F DIAST BP 80-89 MM HG: CPT | Performed by: NURSE PRACTITIONER

## 2023-04-19 RX ORDER — ALPRAZOLAM 1 MG/1
1 TABLET ORAL ONCE
Qty: 1 TABLET | Refills: 0 | Status: SHIPPED | OUTPATIENT
Start: 2023-04-19 | End: 2023-04-19

## 2023-04-19 NOTE — PROGRESS NOTES
"Chief Complaint  Follow-up    Subjective        Keri Bhagat presents to [facility]  History of Present Illness  Very pleasant patient here today follow-up essential hypertension has been controlled mixed hyperlipidemia stable,  Some memory difficulties, and history of pituitary mass needed follow-up  She is scheduled for MRI of the pituitary with and without contrast soon and plans to get this she is compliant with all her medications and takes a statin antihypertensives  Plan thyroid replacement therapy she is doing well.  Has obstructive sleep apnea, osteoarthritis of her knees, hypertension, and mixed hyperlipidemia as serious comorbidity with obesity she has had Lap-Band surgery in 2018 and she did have some improvement  And substantial weight loss however she has been at a plateau for several years.  BMI is 35 and greater with serious comorbidities as above and she is interested in the newer generation GLP-1 agonists wegovy she has no contraindications no family history of medullary thyroid cancer or multiple endocrine dysplasia and syndrome type II which are blackbox warnings  She is down 6 pounds from January she has no night sweats,  And she is not trying to diet, but she has no significant unexplained weight loss here.  We spent several minutes ensuring.         Objective   Vital Signs:  /84   Pulse 63   Temp 97.7 °F (36.5 °C) (Temporal)   Resp 14   Ht 160 cm (63\")   Wt 91.7 kg (202 lb 1.6 oz)   SpO2 97%   BMI 35.80 kg/m²   Estimated body mass index is 35.8 kg/m² as calculated from the following:    Height as of this encounter: 160 cm (63\").    Weight as of this encounter: 91.7 kg (202 lb 1.6 oz).    Class 2 Severe Obesity (BMI >=35 and <=39.9). Obesity-related health conditions include the following: obstructive sleep apnea, hypertension, osteoarthritis and .. Obesity is unchanged. BMI is is above average; BMI management plan is completed. We discussed " portion control and increasing exercise.      Physical Exam  Vitals reviewed.   Constitutional:       General: She is not in acute distress.     Appearance: She is well-developed. She is obese. She is not ill-appearing, toxic-appearing or diaphoretic.   HENT:      Head: Normocephalic.      Mouth/Throat:      Pharynx: No oropharyngeal exudate.   Eyes:      Conjunctiva/sclera: Conjunctivae normal.      Pupils: Pupils are equal, round, and reactive to light.   Neck:      Vascular: No JVD.   Cardiovascular:      Rate and Rhythm: Normal rate and regular rhythm.      Heart sounds: Normal heart sounds. No murmur heard.    No friction rub.   Pulmonary:      Effort: Pulmonary effort is normal. No respiratory distress.      Breath sounds: Normal breath sounds. No wheezing.   Abdominal:      General: Bowel sounds are normal. There is no distension.      Palpations: Abdomen is soft. There is no mass.      Tenderness: There is no abdominal tenderness. There is no guarding.      Hernia: No hernia is present.   Musculoskeletal:         General: No tenderness.      Cervical back: Neck supple.   Lymphadenopathy:      Cervical: No cervical adenopathy.   Skin:     General: Skin is warm and dry.      Capillary Refill: Capillary refill takes less than 2 seconds.   Neurological:      General: No focal deficit present.      Mental Status: She is alert and oriented to person, place, and time. Mental status is at baseline.   Psychiatric:         Mood and Affect: Mood normal.         Behavior: Behavior normal.         Thought Content: Thought content normal.         Judgment: Judgment normal.        Result Review :                Assessment and Plan   Diagnoses and all orders for this visit:    1. Essential hypertension (Primary)    2. Memory changes    3. Hyperlipidemia, unspecified hyperlipidemia type    4. Atherosclerosis of native coronary artery of native heart without angina pectoris    Other orders  -     ALPRAZolam (Xanax) 1 MG  tablet; Take 1 tablet by mouth 1 (One) Time for 1 dose. 1 hr before MRI  Dispense: 1 tablet; Refill: 0             Follow Up   Return in about 3 months (around 7/19/2023) for Medicare Wellness.  Patient was given instructions and counseling regarding her condition or for health maintenance advice. Please see specific information pulled into the AVS if appropriate.     Patient Instructions   Discharge instructions    Continue healthy diet,  Lets start watching your diet closely  Start monitoring calories keep calories less than 1400 around 1200 a day consider intermittent fasting 64 ounces of fluid daily vegetables vegetables vegetables but much much much less of bread Posta potato starch gravies casseroles sweets meal alcohol juice avoid all these        ischarge instructions    Continue healthy diet,  Lets start watching your diet closely  Start monitoring calories keep calories less than 1400 around 1200 a day consider intermittent fasting 64 ounces of fluid daily vegetables vegetables vegetables but much much much less of bread Posta potato starch gravies casseroles sweets meal alcohol juice avoid all these

## 2023-04-19 NOTE — PATIENT INSTRUCTIONS
Discharge instructions    Continue healthy diet,  Lets start watching your diet closely  Start monitoring calories keep calories less than 1400 around 1200 a day consider intermittent fasting 64 ounces of fluid daily vegetables vegetables vegetables but much much much less of bread Posta potato starch gravies casseroles sweets meal alcohol juice avoid all these

## 2023-04-19 NOTE — TELEPHONE ENCOUNTER
Inquire if lab orders submitted on 4/2/23 will be used for appt 7/19/23-wellness. I will call pt to inform about labs.

## 2023-04-21 ENCOUNTER — TELEPHONE (OUTPATIENT)
Dept: FAMILY MEDICINE CLINIC | Facility: CLINIC | Age: 63
End: 2023-04-21
Payer: MEDICARE

## 2023-04-21 DIAGNOSIS — E78.2 MIXED HYPERLIPIDEMIA: ICD-10-CM

## 2023-04-21 DIAGNOSIS — I10 ESSENTIAL HYPERTENSION: Primary | ICD-10-CM

## 2023-04-21 DIAGNOSIS — R73.03 PREDIABETES: ICD-10-CM

## 2023-04-21 DIAGNOSIS — Z13.1 DIABETES MELLITUS SCREENING: ICD-10-CM

## 2023-04-21 DIAGNOSIS — E55.9 VITAMIN D DEFICIENCY: ICD-10-CM

## 2023-04-21 DIAGNOSIS — E78.5 HYPERLIPIDEMIA, UNSPECIFIED HYPERLIPIDEMIA TYPE: ICD-10-CM

## 2023-04-21 DIAGNOSIS — E03.9 HYPOTHYROIDISM (ACQUIRED): ICD-10-CM

## 2023-04-25 ENCOUNTER — TELEPHONE (OUTPATIENT)
Dept: ORTHOPEDIC SURGERY | Facility: CLINIC | Age: 63
End: 2023-04-25
Payer: MEDICARE

## 2023-04-25 NOTE — TELEPHONE ENCOUNTER
Caller: Keri Bhagat    Relationship: Self    Best call back number:     Who are you requesting to speak with (clinical staff, provider,  specific staff member): HAYDE TREVINO/CLINICAL    What was the call regarding: THE PATIENT HAS AN APPT FOR KNEE INJECTIONS ON 7/17/23 BUT SHE IS HAVING A LOT OF PAIN. SHE WOULD LIKE TO KNOW WHAT ELSE SHE CAN DO TO HELP THE PAIN    Do you require a callback: YES

## 2023-04-26 NOTE — TELEPHONE ENCOUNTER
Spoke with pt; she was informed of [facility]'s recommendations.  She has tried PT in the past and it did not help.  Appt was scheduled for her to see SPM on 4/28/23.

## 2023-04-30 ENCOUNTER — APPOINTMENT (OUTPATIENT)
Dept: GENERAL RADIOLOGY | Facility: HOSPITAL | Age: 63
End: 2023-04-30
Payer: MEDICARE

## 2023-04-30 ENCOUNTER — APPOINTMENT (OUTPATIENT)
Dept: CT IMAGING | Facility: HOSPITAL | Age: 63
End: 2023-04-30
Payer: MEDICARE

## 2023-04-30 ENCOUNTER — HOSPITAL ENCOUNTER (EMERGENCY)
Facility: HOSPITAL | Age: 63
Discharge: HOME | End: 2023-04-30
Attending: EMERGENCY MEDICINE | Admitting: EMERGENCY MEDICINE
Payer: MEDICARE

## 2023-04-30 VITALS
OXYGEN SATURATION: 96 % | HEART RATE: 55 BPM | RESPIRATION RATE: 16 BRPM | TEMPERATURE: 98.6 F | WEIGHT: 203 LBS | DIASTOLIC BLOOD PRESSURE: 77 MMHG | HEIGHT: 63 IN | SYSTOLIC BLOOD PRESSURE: 120 MMHG | BODY MASS INDEX: 35.97 KG/M2

## 2023-04-30 DIAGNOSIS — S16.1XXA STRAIN OF NECK MUSCLE, INITIAL ENCOUNTER: ICD-10-CM

## 2023-04-30 DIAGNOSIS — M54.12 CERVICAL RADICULOPATHY: Primary | ICD-10-CM

## 2023-04-30 LAB
ALBUMIN SERPL-MCNC: 4.1 G/DL (ref 3.5–5.2)
ALBUMIN/GLOB SERPL: 1.8 G/DL
ALP SERPL-CCNC: 74 U/L (ref 39–117)
ALT SERPL W P-5'-P-CCNC: 27 U/L (ref 1–33)
ANION GAP SERPL CALCULATED.3IONS-SCNC: 11.7 MMOL/L (ref 5–15)
AST SERPL-CCNC: 21 U/L (ref 1–32)
BASOPHILS # BLD AUTO: 0.02 10*3/MM3 (ref 0–0.2)
BASOPHILS NFR BLD AUTO: 0.3 % (ref 0–1.5)
BILIRUB SERPL-MCNC: 0.2 MG/DL (ref 0–1.2)
BUN SERPL-MCNC: 10 MG/DL (ref 8–23)
BUN/CREAT SERPL: 9.8 (ref 7–25)
CALCIUM SPEC-SCNC: 8.9 MG/DL (ref 8.6–10.5)
CHLORIDE SERPL-SCNC: 105 MMOL/L (ref 98–107)
CO2 SERPL-SCNC: 24.3 MMOL/L (ref 22–29)
CREAT SERPL-MCNC: 1.02 MG/DL (ref 0.57–1)
DEPRECATED RDW RBC AUTO: 45.9 FL (ref 37–54)
EGFRCR SERPLBLD CKD-EPI 2021: 63.1 ML/MIN/1.73
EOSINOPHIL # BLD AUTO: 0.19 10*3/MM3 (ref 0–0.4)
EOSINOPHIL NFR BLD AUTO: 2.4 % (ref 0.3–6.2)
ERYTHROCYTE [DISTWIDTH] IN BLOOD BY AUTOMATED COUNT: 12.8 % (ref 12.3–15.4)
GLOBULIN UR ELPH-MCNC: 2.3 GM/DL
GLUCOSE SERPL-MCNC: 121 MG/DL (ref 65–99)
HCT VFR BLD AUTO: 39.4 % (ref 34–46.6)
HGB BLD-MCNC: 13.3 G/DL (ref 12–15.9)
IMM GRANULOCYTES # BLD AUTO: 0.01 10*3/MM3 (ref 0–0.05)
IMM GRANULOCYTES NFR BLD AUTO: 0.1 % (ref 0–0.5)
LYMPHOCYTES # BLD AUTO: 3.02 10*3/MM3 (ref 0.7–3.1)
LYMPHOCYTES NFR BLD AUTO: 38.5 % (ref 19.6–45.3)
MCH RBC QN AUTO: 32.3 PG (ref 26.6–33)
MCHC RBC AUTO-ENTMCNC: 33.8 G/DL (ref 31.5–35.7)
MCV RBC AUTO: 95.6 FL (ref 79–97)
MONOCYTES # BLD AUTO: 0.38 10*3/MM3 (ref 0.1–0.9)
MONOCYTES NFR BLD AUTO: 4.8 % (ref 5–12)
NEUTROPHILS NFR BLD AUTO: 4.22 10*3/MM3 (ref 1.7–7)
NEUTROPHILS NFR BLD AUTO: 53.9 % (ref 42.7–76)
PLATELET # BLD AUTO: 251 10*3/MM3 (ref 140–450)
PMV BLD AUTO: 10 FL (ref 6–12)
POTASSIUM SERPL-SCNC: 4 MMOL/L (ref 3.5–5.2)
PROT SERPL-MCNC: 6.4 G/DL (ref 6–8.5)
QT INTERVAL: 409 MS
RBC # BLD AUTO: 4.12 10*6/MM3 (ref 3.77–5.28)
SODIUM SERPL-SCNC: 141 MMOL/L (ref 136–145)
TROPONIN T SERPL HS-MCNC: 11 NG/L
TROPONIN T SERPL HS-MCNC: 12 NG/L
WBC NRBC COR # BLD: 7.84 10*3/MM3 (ref 3.4–10.8)

## 2023-04-30 PROCEDURE — 99283 EMERGENCY DEPT VISIT LOW MDM: CPT

## 2023-04-30 PROCEDURE — 96374 THER/PROPH/DIAG INJ IV PUSH: CPT

## 2023-04-30 PROCEDURE — 85025 COMPLETE CBC W/AUTO DIFF WBC: CPT | Performed by: EMERGENCY MEDICINE

## 2023-04-30 PROCEDURE — 84484 ASSAY OF TROPONIN QUANT: CPT | Performed by: EMERGENCY MEDICINE

## 2023-04-30 PROCEDURE — 93010 ELECTROCARDIOGRAM REPORT: CPT | Performed by: INTERNAL MEDICINE

## 2023-04-30 PROCEDURE — 72125 CT NECK SPINE W/O DYE: CPT

## 2023-04-30 PROCEDURE — 36415 COLL VENOUS BLD VENIPUNCTURE: CPT

## 2023-04-30 PROCEDURE — 80053 COMPREHEN METABOLIC PANEL: CPT | Performed by: EMERGENCY MEDICINE

## 2023-04-30 PROCEDURE — 93005 ELECTROCARDIOGRAM TRACING: CPT | Performed by: EMERGENCY MEDICINE

## 2023-04-30 PROCEDURE — 25010000002 DEXAMETHASONE SODIUM PHOSPHATE 10 MG/ML SOLUTION: Performed by: EMERGENCY MEDICINE

## 2023-04-30 PROCEDURE — 73030 X-RAY EXAM OF SHOULDER: CPT

## 2023-04-30 RX ORDER — DEXAMETHASONE SODIUM PHOSPHATE 10 MG/ML
10 INJECTION, SOLUTION INTRAMUSCULAR; INTRAVENOUS ONCE
Status: COMPLETED | OUTPATIENT
Start: 2023-04-30 | End: 2023-04-30

## 2023-04-30 RX ORDER — CYCLOBENZAPRINE HCL 10 MG
5 TABLET ORAL ONCE
Status: COMPLETED | OUTPATIENT
Start: 2023-04-30 | End: 2023-04-30

## 2023-04-30 RX ORDER — HYDROCODONE BITARTRATE AND ACETAMINOPHEN 5; 325 MG/1; MG/1
1 TABLET ORAL ONCE
Status: COMPLETED | OUTPATIENT
Start: 2023-04-30 | End: 2023-04-30

## 2023-04-30 RX ORDER — IBUPROFEN 600 MG/1
600 TABLET ORAL EVERY 6 HOURS PRN
Qty: 30 TABLET | Refills: 0 | Status: SHIPPED | OUTPATIENT
Start: 2023-04-30

## 2023-04-30 RX ORDER — CYCLOBENZAPRINE HCL 10 MG
10 TABLET ORAL 3 TIMES DAILY PRN
Qty: 20 TABLET | Refills: 0 | Status: SHIPPED | OUTPATIENT
Start: 2023-04-30

## 2023-04-30 RX ADMIN — DEXAMETHASONE SODIUM PHOSPHATE 10 MG: 10 INJECTION, SOLUTION INTRAMUSCULAR; INTRAVENOUS at 14:26

## 2023-04-30 RX ADMIN — HYDROCODONE BITARTRATE AND ACETAMINOPHEN 1 TABLET: 5; 325 TABLET ORAL at 14:27

## 2023-04-30 RX ADMIN — CYCLOBENZAPRINE 5 MG: 10 TABLET, FILM COATED ORAL at 14:27

## 2023-04-30 NOTE — FSED PROVIDER NOTE
Subjective   History of Present Illness  62-year-old female presents complaining of left-sided neck pain.  Symptoms radiate into her left shoulder and chest.  It is sharp and worse with movement.  She denies numbness or weakness.  She denies any recent injury or provoking activity.  She has not had improvement with NSAIDs at home.  Patient does report a history of disc disease in the neck requiring fusion in the past, as well as cardiac disease/MI although this feels different.    History provided by:  Patient   used: No        Review of Systems   Constitutional: Negative.  Negative for fever.   Respiratory: Negative.  Negative for shortness of breath.    Cardiovascular: Positive for chest pain.   Gastrointestinal: Negative.  Negative for abdominal pain and vomiting.   Genitourinary: Negative.  Negative for dysuria.   Musculoskeletal: Positive for arthralgias and neck pain.   All other systems reviewed and are negative.      Past Medical History:   Diagnosis Date   • Abnormal Pap smear of cervix    • Anemia    • Anxiety    • Arthritis    • Atherosclerotic heart disease of native coronary artery with other forms of angina pectoris    • Bipolar I disorder, single manic episode     depressive d(NOS)   • Cervical disc herniation    • Cervical dysplasia    • Coronary artery disease    • COVID-19 01/10/2023   • CTS (carpal tunnel syndrome)    • Depression     NO SUICIDAL PLANS   • Difficulty swallowing    • Diverticular disease    • Dyspepsia    • Dysphagia    • Gastric reflux    • GERD (gastroesophageal reflux disease)    • Heart attack    • Heart murmur    • Hiatal hernia    • High cholesterol    • History of transfusion    • 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    HPV positive on pap LGSIL   • Hyperlipidemia    • Hypertension    • Hypothyroidism    • Incontinence in female     wears pads   • Kidney disease 2017    stage 2    • Kidney disease, chronic, stage III (GFR 30-59 ml/min)    •  LGSIL on Pap smear of cervix 04/29/2016    LGSIL HPV positive   • Myocardial infarction 2000   • Neck pain    • Obesity    • Osteopenia 2021    Bone density test   • Past myocardial infarction 05/2000   • Periodic limb movement disorder    • Renal insufficiency 2018   • Restless leg syndrome    • Sleep apnea     cpap   • Stress fracture     LEFT HEEL   • Suicidal ideation 08/19/2016    history   • Swelling of ankle     right had doppler no s/s blood clot   • Thyroid nodule    • Vitamin B12 deficiency        No Known Allergies    Past Surgical History:   Procedure Laterality Date   • ADENOIDECTOMY  1974   • ANTERIOR CERVICAL DISCECTOMY W/ FUSION N/A 12/29/2016    Procedure: C3-4 anterior cervical discectomy and fusion with Depuy micro plate, ALLOGRAFT C3-4, AND HARDWARE REMOVAL C4-7.;  Surgeon: Hema Godwin MD;  Location: [facility] OR;  Service:    • BARIATRIC SURGERY  2018   • CARDIAC CATHETERIZATION N/A 03/30/2017    Procedure: Left Heart Cath;  Surgeon: Tracey aVrgas MD;  Location:  [facility] CATH INVASIVE LOCATION;  Service:    • CARDIAC CATHETERIZATION N/A 03/30/2017    Procedure: Coronary angiography;  Surgeon: Tracey Vargas MD;  Location:  [facility]ATH [facility]VASIVE [facility];  Service:    • CARDIAC CATHETERIZATION N/A 03/30/2017    Procedure: Left ventriculography;  Surgeon: Tracey Vargas MD;  Location:  [facility]VASIVE [facility]ATION;  Service:    • CARDIAC CATHETERIZATION  03/30/2017    Procedure: Functional Flow [place];  Surgeon: Tracey Vargas MD;  [facility];  Service:    • CARPAL TUNNEL RELEASE     • CATARACT EXTRACTION     • CERVICAL BIOPSY  MMXVI    Dr. Jeffery.    • CERVICAL DISCECTOMY ANTERIOR  04/2013    C4-7   • COLONOSCOPY  04/20/2015    Diverticulosis, IH   • COLONOSCOPY  03/09/2021   • COLPOSCOPY W/ BIOPSY / CURETTAGE  06/17/2016    LGSIL HPV positive. Results were normal repeat pap in one year. Chronic Cervicitis   • CORONARY ANGIOPLASTY WITH STENT PLACEMENT     •  CORONARY STENT PLACEMENT  May 2000   • ENDOSCOPY  MMXV    Normal.  Dr. Rodriguez   • ENDOSCOPY N/A 06/22/2017    Erythematous mucosa in the stomach  PATH: Chronic active gastritis, moderate with intestinal metaplasia    • EYE SURGERY  2022    Cateract surgery   • GASTRIC BYPASS      Done using the sleeve technique   • GASTRIC RESTRICTION SURGERY     • HARDWARE REMOVAL  12/29/2016    cervical   • HERNIA REPAIR     • INGUINAL HERNIA REPAIR     • LAPAROSCOPIC GASTRIC BANDING  02/2018   • SHOULDER SURGERY Left     RCR   • TONSILLECTOMY     • TONSILLECTOMY AND ADENOIDECTOMY     • TRANSVAGINAL TAPING SUSPENSION N/A 12/06/2017    Procedure: MID URETHRAL SLING CYSTSCOPY;  Surgeon: Abby Méndez MD;  Location: [facility];  Service:    • TUBAL ABDOMINAL LIGATION     • TYMPANOSTOMY TUBE PLACEMENT Right    • UPPER GASTROINTESTINAL ENDOSCOPY  approx 2021   • WRIST SURGERY Bilateral     carpal tunnel   • WRIST SURGERY      L wrist/ 4/2020       Family History   Problem Relation Age of Onset   • Diabetes type II Mother    • Hypertension Mother    • Osteoporosis Mother    • Seizures Mother    • Diabetes Mother    • Arthritis Mother    • COPD Father    • Hypertension Father    • Lung cancer Father    • Heart attack Father    • Liver cancer Father    • Liver disease Father    • Heart disease Father    • Thyroid disease Sister    • Hypertension Sister    • Bipolar disorder Sister    • Depression Sister    • ADD / ADHD Sister    • Anxiety disorder Sister    • Mental illness Sister    • No Known Problems Son    • Abnormal EKG Daughter    • Hypertension Daughter    • Bipolar disorder Daughter    • Thyroid disease Daughter    • Mental illness Daughter    • No Known Problems Paternal Grandfather    • No Known Problems Paternal Grandmother    • Cancer Maternal Grandmother    • No Known Problems Maternal Grandfather    • Thyroid disease Sister    • Hypertension Sister    • Bipolar disorder Sister    • Anxiety disorder Sister    •  Depression Sister    • Mental illness Sister    • Asthma Daughter    • Breast cancer Neg Hx    • Ovarian cancer Neg Hx    • Uterine cancer Neg Hx    • Colon cancer Neg Hx    • Malig Hyperthermia Neg Hx        Social History     Socioeconomic History   • Marital status:    • Number of children: 3   • Years of education: 12   Tobacco Use   • Smoking status: Light Smoker     Types: Electronic Cigarette     Passive exposure: Never   • Smokeless tobacco: Current   • Tobacco comments:     Electronic Cigarette   Vaping Use   • Vaping Use: Every day   • Substances: Nicotine, Flavoring   • Devices: Refillable tank   Substance and Sexual Activity   • Alcohol use: Not Currently   • Drug use: Never   • Sexual activity: Not Currently     Partners: Male     Birth control/protection: Condom, Abstinence, Post-menopausal, None, Tubal ligation           Objective   Physical Exam  Vitals and nursing note reviewed.   Constitutional:       General: She is not in acute distress.     Appearance: She is not diaphoretic.   HENT:      Head: Normocephalic and atraumatic.      Right Ear: External ear normal.      Left Ear: External ear normal.      Nose: Nose normal.      Mouth/Throat:      Mouth: Mucous membranes are moist.   Eyes:      Pupils: Pupils are equal, round, and reactive to light.   Neck:      Comments: Decreased ROM, L paraspinal and trapezius ttp  Cardiovascular:      Rate and Rhythm: Normal rate and regular rhythm.      Heart sounds: Normal heart sounds.   Pulmonary:      Effort: Pulmonary effort is normal. No respiratory distress.      Breath sounds: Normal breath sounds.   Abdominal:      Palpations: Abdomen is soft.      Tenderness: There is no abdominal tenderness.   Musculoskeletal:         General: No swelling. Normal range of motion.      Cervical back: Neck supple. Tenderness present.      Comments: L shoulder decreased ROM   Skin:     General: Skin is warm and dry.      Findings: No rash.   Neurological:       General: No focal deficit present.      Mental Status: She is alert and oriented to person, place, and time.      Sensory: No sensory deficit.      Motor: No weakness.   Psychiatric:         Mood and Affect: Mood normal.         Behavior: Behavior normal.         Procedures       CT Cervical Spine Without Contrast   Final Result      XR Shoulder 2+ View Left   Final Result            ED Course  ED Course as of 05/01/23 0822   Sun Apr 30, 2023   1408 EKG: Normal sinus rhythm, rate 57, , QRS 95, QTc 399, no STEMI. [DH]      ED Course User Index  [DH] Marques Lock MD                                   [facility] reviewed by Marques Lock MD       Medical Decision Making  Number and Complexity of Problems  Differential Diagnosis: MSK pain, radiculopathy, disc herniation, fracture, rotator cuff, atypical ACS    MDM Data  My EKG interpretation: Nonspecific changes  My Laboratory interpretation: CBC and chemistry unremarkable, troponin stable  Radiology interpretation: See radiology reports    Treatment and Disposition  ED Course: 62-year-old female presenting with symptoms as described.  History sounds most consistent with an MSK etiology, likely herniated disc and radiculopathy.  However, given radiation into the chest and her cardiac history a more thorough work-up was obtained.  Basic labs were unremarkable.  Initial troponin 12, repeat 11, so no evidence for ACS at this time.  X-ray of the left shoulder and CT of the cervical spine revealed degenerative changes but no acute fractures or other complications.  Patient is feeling much better after symptom control.  She is lying in bed no acute distress.  No indications for admission or transfer.  We will continue conservative management and have her follow-up with her surgeon.    Shared decision making: Home with symptom control, follow-up with her surgeon    Cervical radiculopathy: acute illness or injury  Amount and/or Complexity of Data  Reviewed  Labs: ordered.  Radiology: ordered.  ECG/medicine tests: ordered and independent interpretation performed.      Risk  Prescription drug management.          Final diagnoses:   Cervical radiculopathy       ED Disposition  ED Disposition     ED Disposition   Discharge    Condition   Stable    Comment   --             Epley, James, APRN  [address]  [address]  [place] [id number] [phone]    Schedule an appointment as soon as possible for a visit            Medication List      New Prescriptions    ibuprofen 600 MG tablet  Commonly known as: ADVIL,MOTRIN  Take 1 tablet by mouth Every 6 (Six) Hours As Needed for Mild Pain or Moderate Pain.        Changed    cyclobenzaprine 10 MG tablet  Commonly known as: FLEXERIL  Take 1 tablet by mouth 3 (Three) Times a Day As Needed for Muscle Spasms.  What changed: when to take this           Where to Get Your Medications      These medications were sent to [facility] [id number] - [place], [place] - [address] AT [facility] - [phone]  - [phone]   [address] , [facility] [address]    Phone: [phone]   · cyclobenzaprine 10 MG tablet  · ibuprofen 600 MG tablet

## 2023-05-01 ENCOUNTER — OFFICE VISIT (OUTPATIENT)
Dept: ORTHOPEDIC SURGERY | Facility: CLINIC | Age: 63
End: 2023-05-01
Payer: MEDICARE

## 2023-05-01 VITALS — HEIGHT: 63 IN | WEIGHT: 205 LBS | TEMPERATURE: 97.1 F | BODY MASS INDEX: 36.32 KG/M2

## 2023-05-01 DIAGNOSIS — M17.12 ARTHRITIS OF LEFT KNEE: Primary | ICD-10-CM

## 2023-05-01 PROCEDURE — 99214 OFFICE O/P EST MOD 30 MIN: CPT | Performed by: ORTHOPAEDIC SURGERY

## 2023-05-01 RX ORDER — MELOXICAM 7.5 MG/1
15 TABLET ORAL ONCE
OUTPATIENT
Start: 2023-05-01 | End: 2023-05-01

## 2023-05-01 RX ORDER — CHLORHEXIDINE GLUCONATE 500 MG/1
1 CLOTH TOPICAL TAKE AS DIRECTED
OUTPATIENT
Start: 2023-05-01

## 2023-05-01 RX ORDER — ALPRAZOLAM 1 MG/1
TABLET ORAL
COMMUNITY
Start: 2023-04-19

## 2023-05-01 RX ORDER — PREGABALIN 75 MG/1
75 CAPSULE ORAL ONCE
OUTPATIENT
Start: 2023-05-01 | End: 2023-05-01

## 2023-05-01 RX ORDER — ACETAMINOPHEN 325 MG/1
1000 TABLET ORAL ONCE
OUTPATIENT
Start: 2023-05-01 | End: 2023-05-01

## 2023-05-01 NOTE — PROGRESS NOTES
Patient Name: Keri Bhagat   YOB: 1960  Referring Primary Care Physician: Epley, James, APRN  BMI: Body mass index is 36.31 kg/m².    Chief Complaint:    Chief Complaint   Patient presents with   • Left Knee - Pain, Initial Evaluation   • Right Knee - Initial Evaluation, Pain        HPI:     Keri Bhagat is a 62 y.o. female who presents today for evaluation of   Chief Complaint   Patient presents with   • Left Knee - Pain, Initial Evaluation   • Right Knee - Initial Evaluation, Pain   .  Patient seen today complaining of bilateral knee pain but the left bothers her much more than the right.  They are achy they are stiff.  She is done physical therapy and says she is doing a home program and it helps a little but she still has pain.  She had injections in her back When not too long ago essentially about 2 weeks ago and is just not helping like it used to she had a lap band and her BMI is now down to 36 and she wishes to talk about left total knee surgery today.      Subjective   Medications:   Home Medications:  Current Outpatient Medications on File Prior to Visit   Medication Sig   • ARIPiprazole (ABILIFY) 20 MG tablet Take 1 tablet by mouth Daily.   • atenolol (TENORMIN) 50 MG tablet TAKE ONE TABLET BY MOUTH DAILY   • buPROPion XL (WELLBUTRIN XL) 300 MG 24 hr tablet Take 1 tablet by mouth 2 (Two) Times a Day.   • calcium carbonate (OS-STEVIE) 600 MG tablet Take 1 tablet by mouth Daily.   • Cholecalciferol (Vitamin D3) 50 MCG (2000 UT) capsule Take 1 capsule by mouth Daily.   • Cholecalciferol 25 MCG (1000 UT) capsule Take 1 capsule by mouth 2 (Two) Times a Day.   • cyclobenzaprine (FLEXERIL) 10 MG tablet Take 1 tablet by mouth 3 (Three) Times a Day As Needed for Muscle Spasms.   • Ferrous Sulfate (IRON PO) Take  by mouth.   • gabapentin (NEURONTIN) 300 MG capsule Take 1 capsule by mouth 3 (Three) Times a Day.   • hydrOXYzine (ATARAX) 50 MG tablet As Needed.   • ibuprofen (ADVIL,MOTRIN) 600 MG  tablet Take 1 tablet by mouth Every 6 (Six) Hours As Needed for Mild Pain or Moderate Pain.   • isosorbide mononitrate (IMDUR) 30 MG 24 hr tablet TAKE ONE TABLET BY MOUTH DAILY   • lamoTRIgine (LaMICtal) 150 MG tablet Take 2 tablets by mouth Daily.   • levothyroxine (SYNTHROID, LEVOTHROID) 88 MCG tablet TAKE ONE TABLET BY MOUTH EVERY MORNING   • ondansetron (Zofran) 4 MG tablet Take 1 tablet by mouth Every 8 (Eight) Hours As Needed for Nausea or Vomiting.   • oxybutynin XL (DITROPAN XL) 15 MG 24 hr tablet Take 1 tablet by mouth Daily.   • pantoprazole (PROTONIX) 40 MG EC tablet Take 1 tablet by mouth 2 (Two) Times a Day.   • pramipexole (MIRAPEX) 0.5 MG tablet Take 1 tablet by mouth Every Night for 360 days.   • simvastatin (ZOCOR) 20 MG tablet Take 1 tablet by mouth Every Night.   • venlafaxine XR (EFFEXOR-XR) 150 MG 24 hr capsule    • vitamin B-12 (CYANOCOBALAMIN) 1000 MCG tablet TAKE ONE TABLET BY MOUTH TWICE A DAY   • ALPRAZolam (XANAX) 1 MG tablet      Current Facility-Administered Medications on File Prior to Visit   Medication   • [COMPLETED] cyclobenzaprine (FLEXERIL) tablet 5 mg   • [COMPLETED] dexamethasone sodium phosphate injection 10 mg   • [COMPLETED] HYDROcodone-acetaminophen (NORCO) 5-325 MG per tablet 1 tablet     Current Medications:  Scheduled Meds:  Continuous Infusions:No current facility-administered medications for this visit.    PRN Meds:.    I have reviewed the patient's medical history in detail and updated the computerized patient record.  Review and summarization of old records includes:    Past Medical History:   Diagnosis Date   • Abnormal Pap smear of cervix    • Anemia    • Ankle sprain    • Anxiety    • Arthritis    • Arthritis of back    • Arthritis of neck    • Atherosclerotic heart disease of native coronary artery with other forms of angina pectoris    • Bipolar I disorder, single manic episode     depressive d(NOS)   • Cervical disc herniation    • Cervical dysplasia    •  Coronary artery disease    • COVID-19 01/10/2023   • CTS (carpal tunnel syndrome)    • Depression     NO SUICIDAL PLANS   • Difficulty swallowing    • Dislocation of finger    • Diverticular disease    • Dyspepsia    • Dysphagia    • Fracture of wrist    • Fracture, finger    • Fracture, foot    • Fracture, radius    • Gastric reflux    • GERD (gastroesophageal reflux disease)    • Heart attack    • Heart murmur    • Hiatal hernia    • High cholesterol    • History of transfusion    • 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    HPV positive on pap LGSIL   • Hyperlipidemia    • Hypertension    • Hypothyroidism    • Incontinence in female     wears pads   • Kidney disease 2017    stage 2    • Kidney disease, chronic, stage III (GFR 30-59 ml/min)    • Knee swelling    • LGSIL on Pap smear of cervix 04/29/2016    LGSIL HPV positive   • Myocardial infarction 2000   • Neck pain    • Neck strain    • Obesity    • Osteopenia 2021    Bone density test   • Past myocardial infarction 05/2000   • Periodic limb movement disorder    • Renal insufficiency 2018   • Restless leg syndrome    • Rotator cuff syndrome    • Sleep apnea     cpap   • Stress fracture     LEFT HEEL   • Suicidal ideation 08/19/2016    history   • Swelling of ankle     right had doppler no s/s blood clot   • Tear of meniscus of knee    • Thyroid nodule    • Vitamin B12 deficiency    • Wrist sprain         Past Surgical History:   Procedure Laterality Date   • ADENOIDECTOMY  1974   • ANTERIOR CERVICAL DISCECTOMY W/ FUSION N/A 12/29/2016    Procedure: C3-4 anterior cervical discectomy and fusion with Depuy micro plate, ALLOGRAFT C3-4, AND HARDWARE REMOVAL C4-7.;  Surgeon: Hema Godwin MD;  Location: [facility] OR;  Service:    • BARIATRIC SURGERY  2018   • CARDIAC CATHETERIZATION N/A 03/30/2017    Procedure: Left Heart Cath;  Surgeon: Tracey Vargas MD;  Location: [facility] INVASIVE LOCATION;  Service:    • CARDIAC CATHETERIZATION N/A 03/30/2017     Procedure: Coronary angiography;  Surgeon: Tracey Vargas MD;  Location:  [facility] CATH INVASIVE LOCATION;  Service:    • CARDIAC CATHETERIZATION N/A 03/30/2017    Procedure: Left ventriculography;  Surgeon: Tracey Vargas MD;  Location:  [facility]ATH INVASIVE LOCATION;  Service:    • CARDIAC CATHETERIZATION  03/30/2017    Procedure: Functional Flow [place];  Surgeon: Tracey Vargas MD;  Location:  [facility]ATH INVASIVE LOCATION;  Service:    • CARPAL TUNNEL RELEASE     • CATARACT EXTRACTION     • CERVICAL BIOPSY  [facility]    Dr. Jeffery.    • CERVICAL DISCECTOMY ANTERIOR  04/2013    C4-7   • COLONOSCOPY  04/20/2015    Diverticulosis, IH   • COLONOSCOPY  03/09/2021   • COLPOSCOPY W/ BIOPSY / CURETTAGE  06/17/2016    LGSIL HPV positive. Results were normal repeat pap in one year. Chronic Cervicitis   • CORONARY ANGIOPLASTY WITH STENT PLACEMENT     • CORONARY STENT PLACEMENT  May 2000   • ENDOSCOPY  MMXV    Normal.  Dr. Rodriguez   • ENDOSCOPY N/A 06/22/2017    Erythematous mucosa in the stomach  PATH: Chronic active gastritis, moderate with intestinal metaplasia    • EYE SURGERY  2022    Cateract surgery   • GASTRIC BYPASS      Done using the sleeve technique   • GASTRIC RESTRICTION SURGERY     • HAND SURGERY  Carpal tunnel   • HARDWARE REMOVAL  12/29/2016    cervical   • HERNIA REPAIR     • INGUINAL HERNIA REPAIR     • LAPAROSCOPIC GASTRIC BANDING  02/2018   • NECK SURGERY     • SHOULDER SURGERY Left     RCR   • TONSILLECTOMY     • TONSILLECTOMY AND ADENOIDECTOMY     • TRANSVAGINAL TAPING SUSPENSION N/A 12/06/2017    Procedure: MID URETHRAL SLING CYSTSCOPY;  Surgeon: Abby Méndez MD;  Location: [facility];  Service:    • TRIGGER POINT INJECTION     • TUBAL ABDOMINAL LIGATION     • TYMPANOSTOMY TUBE PLACEMENT Right    • UPPER GASTROINTESTINAL ENDOSCOPY  approx 2021   • WRIST SURGERY Bilateral     carpal tunnel   • WRIST SURGERY      L wrist/ 4/2020        Social History     Occupational History   • Occupation:  disabled   Tobacco Use   • Smoking status: Light Smoker     Types: Electronic Cigarette     Passive exposure: Never   • Smokeless tobacco: Current   • Tobacco comments:     Electronic Cigarette   Vaping Use   • Vaping Use: Every day   • Substances: Nicotine, Flavoring   • Devices: Refillable tank   Substance and Sexual Activity   • Alcohol use: Not Currently   • Drug use: Never   • Sexual activity: Not Currently     Partners: Male     Birth control/protection: Condom, Abstinence, Post-menopausal, None, Tubal ligation      Social History     Social History Narrative   • Not on file        Family History   Problem Relation Age of Onset   • Diabetes type II Mother    • Hypertension Mother    • Osteoporosis Mother    • Seizures Mother    • Diabetes Mother    • Arthritis Mother    • COPD Father    • Hypertension Father    • Lung cancer Father    • Heart attack Father    • Liver cancer Father    • Liver disease Father    • Heart disease Father    • Cancer Father         Lung cacer that metastasized  into the liver.   • Thyroid disease Sister    • Hypertension Sister    • Bipolar disorder Sister    • Depression Sister    • ADD / ADHD Sister    • Anxiety disorder Sister    • Mental illness Sister    • No Known Problems Son    • Abnormal EKG Daughter    • Hypertension Daughter    • Bipolar disorder Daughter    • Thyroid disease Daughter    • Mental illness Daughter    • No Known Problems Paternal Grandfather    • No Known Problems Paternal Grandmother    • Cancer Maternal Grandmother    • No Known Problems Maternal Grandfather    • Thyroid disease Sister    • Hypertension Sister    • Bipolar disorder Sister    • Anxiety disorder Sister    • Depression Sister    • Mental illness Sister    • Asthma Daughter    • Breast cancer Neg Hx    • Ovarian cancer Neg Hx    • Uterine cancer Neg Hx    • Colon cancer Neg Hx    • Malig Hyperthermia Neg Hx        ROS: 14 point review of systems was performed and all other systems were  "reviewed and are negative except for documented findings in HPI and today's encounter.     Allergies: No Known Allergies  Constitutional:  Denies fever, shaking or chills   Eyes:  Denies change in visual acuity   HENT:  Denies nasal congestion or sore throat   Respiratory:  Denies cough or shortness of breath   Cardiovascular:  Denies chest pain or severe LE edema   GI:  Denies abdominal pain, nausea, vomiting, bloody stools or diarrhea   Musculoskeletal:  Numbness, tingling, pain, or loss of motor function only as noted above in history of present illness.  : Denies painful urination or hematuria  Integument:  Denies rash, lesion or ulceration   Neurologic:  Denies headache or focal weakness  Endocrine:  Denies lymphadenopathy  Psych:  Denies confusion or change in mental status   Hem:  Denies active bleeding    OBJECTIVE:  Physical Exam: 62 y.o. female  Wt Readings from Last 3 Encounters:   05/01/23 93 kg (205 lb)   04/30/23 92.1 kg (203 lb)   04/19/23 91.7 kg (202 lb 1.6 oz)     Ht Readings from Last 1 Encounters:   05/01/23 160 cm (63\")     Body mass index is 36.31 kg/m².  Vitals:    05/01/23 1024   Temp: 97.1 °F (36.2 °C)     Vital signs reviewed.     General Appearance:    Alert, cooperative, in no acute distress                  Eyes: conjunctiva clear  ENT: external ears and nose atraumatic  CV: no peripheral edema  Resp: normal respiratory effort  Skin: no rashes or wounds; normal turgor  Psych: mood and affect appropriate  Lymph: no nodes appreciated  Neuro: gross sensation intact  Vascular:  Palpable peripheral pulse in noted extremity  Musculoskeletal Extremities: Exam shows crepitation synovitis and swelling of both knees she has joint line tenderness more tenderness on the left than the right and hips noncontributory she has some pseudolaxity    Radiology:   AP lateral 40 degree PA x-ray bilateral knees taken April 13 and viewed at this time for purposes of this visit for the first time shows " advanced end-stage arthritis of the knees with bone-on-bone subchondral sclerosis and osteophytes        Assessment:     ICD-10-CM ICD-9-CM   1. Arthritis of left knee  M17.12 716.96        MDM/Plan:   The diagnosis(es), natural history, pathophysiology and treatment for diagnosis(es) were discussed. Opportunity given and questions answered.  Biomechanics of pertinent body areas discussed.  When appropriate, the use of ambulatory aids discussed.    Inflammation/pain control; with cold, heat, elevation and/or liniments discussed as appropriate  HOME EXERCISE/PT program encouraged  MEDICAL RECORDS reviewed from other provider(s) for past and current medical history pertinent to this complaint.  Total Knee Replacement : Continuation of conservative management vs. TKA: I reviewed anatomy of a total knee arthroplasty in laymen's terms, as well as typical postoperative recovery and possibly 6-12 months for maximal recovery, and possible need for rehabilitation stay after hospitalization. We also discussed risks, benefits, alternatives, and limitations of procedure with risks including but not limited to neurovascular damage, bleeding, infection, malalignment, chronic pain, failure of implants, osteolysis, loosening of implants, loss of motion, weakness, stiffness, instability, DVT, pulmonary embolus, death, stroke, complex regional pain syndrome, myocardial infarction, and need for additional procedures. Concept of substitution vs. replacement discussed.  No guarantees were given regarding results of surgery.  Patient verbalized understanding, and was given the opportunity to ask and have all questions answered today.   Would need cardiac clearance that she sees cardiology    5/1/2023    Dictated utilizing Dragon dictation

## 2023-05-02 ENCOUNTER — HOSPITAL ENCOUNTER (OUTPATIENT)
Dept: MRI IMAGING | Facility: HOSPITAL | Age: 63
Discharge: HOME | End: 2023-05-02
Admitting: NURSE PRACTITIONER
Payer: MEDICARE

## 2023-05-02 DIAGNOSIS — R90.89 ABNORMAL BRAIN MRI: ICD-10-CM

## 2023-05-02 DIAGNOSIS — D32.9 MENINGIOMA: ICD-10-CM

## 2023-05-02 PROCEDURE — 70553 MRI BRAIN STEM W/O & W/DYE: CPT

## 2023-05-02 PROCEDURE — 0 GADOBENATE DIMEGLUMINE 529 MG/ML SOLUTION: Performed by: NURSE PRACTITIONER

## 2023-05-02 PROCEDURE — A9577 INJ MULTIHANCE: HCPCS | Performed by: NURSE PRACTITIONER

## 2023-05-02 RX ADMIN — GADOBENATE DIMEGLUMINE 20 ML: 529 INJECTION, SOLUTION INTRAVENOUS at 11:37

## 2023-05-04 ENCOUNTER — TELEPHONE (OUTPATIENT)
Dept: CARDIOLOGY | Facility: CLINIC | Age: 63
End: 2023-05-04
Payer: MEDICARE

## 2023-05-04 NOTE — TELEPHONE ENCOUNTER
Pt needs clearance for a total knee arthroplasty being done 08/08/23 by   Dr. Vinod Pritchard.    LOV 11/03/22, NEXT OV 11/07/23.    Fax to: [phone]    Thanks,  Bette

## 2023-05-08 ENCOUNTER — OFFICE VISIT (OUTPATIENT)
Dept: ORTHOPEDIC SURGERY | Facility: CLINIC | Age: 63
End: 2023-05-08
Payer: MEDICARE

## 2023-05-08 VITALS — TEMPERATURE: 97.4 F | HEIGHT: 63 IN | BODY MASS INDEX: 36.79 KG/M2 | WEIGHT: 207.6 LBS

## 2023-05-08 DIAGNOSIS — M47.22 CERVICAL SPONDYLOSIS WITH RADICULOPATHY: Primary | ICD-10-CM

## 2023-05-08 NOTE — PROGRESS NOTES
Patient Name: Keri Bhagat   YOB: 1960  Referring Primary Care Physician: Epley, James, APRN      Chief Complaint:    Chief Complaint   Patient presents with   • Cervical Spine - Follow-up, Pain        HPI:  Keri Bhagat is a 62 y.o. female who presents to [facility] for follow-up of neck pain referring into the left deltoid and anterior shoulder.  Patient has extensive history of true previous cervical surgeries, appears to have had a C4-7 ACDF followed by C3-4 ACDF and removal of previous plate.  Last evaluated 12/20/2022 by Dr. Godwin.  At that time he recommended repeating cervical epidural injections and following up with Dr. Mercado going forward.  It does appear that she saw Dr. Mercado about 3 weeks after last seeing Dr. Godwin.  She also had an epidural scheduled 5 days later which she canceled.  She says she canceled because she was not having much in the way of neck and arm pain at the time.  It appears in reviewing Dr. Mercado's note that he also recommended she repeat cervical epidural and therapy and if not better he will consider repeating cervical MRI.  She does report completing physical therapy which was moderately beneficial, especially dry needling.  She also has history of bilateral carpal tunnel surgery in [place].    PFSH:  See attached    ROS: As per HPI, otherwise negative    Objective:      62 y.o. female  Body mass index is 36.77 kg/m²., 94.2 kg (207 lb 9.6 oz), @HT@  Vitals:    05/08/23 1429   Temp: 97.4 °F (36.3 °C)         Neurologic Exam     Gait, Coordination, and Reflexes     Gait  Gait: normal     Right Hand Exam     Tests   Tinel's sign (median nerve): positive      Left Hand Exam     Tests   Tinel's sign (median nerve): positive      Right Elbow Exam     Tests   Tinel's sign (cubital tunnel): positive      Left Elbow Exam     Tests   Tinel's sign (cubital tunnel): positive            Spine Musculoskeletal Exam    Gait    Gait is  normal.    Palpation    Cervical Spine    Tenderness: present      Paraspinous: left      Trapezius: left    Range of Motion    Cervical Spine    Cervical flexion: 1-2 finger breadths.     Cervical extension: reduced extension (0-25%).       Right      Lateral bending: reduced bend (0-25%).       Lateral rotation: reduced rotation (0-25%).       Left      Lateral bending: reduced bend (0-25%).       Lateral rotation: reduced rotation (0-25%).      Strength    Cervical Spine    Cervical spine motor exam is normal.    Special Tests    Cervical Spine      Right      Tinel's - carpal tunnel: positive      Tinel's - cubital tunnel: positive      Left      Tinel's - carpal tunnel: positive      Tinel's - cubital tunnel: positive        IMAGING:         Assessment:           Diagnoses and all orders for this visit:    1. Cervical spondylosis with radiculopathy (Primary)  -     Epidural Block             Plan:  Patient had establish care with Dr. Mercado.  There was some documentation that she requested to follow-up in this office presumably for recent flareup.  We discussed that I agree with Dr. Mercado in regards to repeating cervical epidural since she did get relief with those in the past.  She has follow-up appointment with Dr. Mercado in 2 weeks so will ask pain management to expedite the next epidural in hopes of being able to know if she got any results once she does follow-up with him.  If not, would leave it up to Dr. Mercado whether he feels repeating cervical MRI is justified.  Encouraged her to continue with the therapy exercises on her own long-term.  She also has positive Tinel's at bilateral wrists and elbows, may need referral to hand specialist or EMG/NCV in the future.  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      No follow-ups on file.    EMR Dragon/Transcription Disclaimer:    Much of this encounter note is an electronic transcription/translation of spoken language to printed text. The electronic translation of spoken language may permit erroneous, or at times, nonsensical words or phrases to be inadvertently transcribed; Although I have reviewed the note for such errors, some may still exist.

## 2023-05-12 ENCOUNTER — OFFICE VISIT (OUTPATIENT)
Dept: SLEEP MEDICINE | Facility: HOSPITAL | Age: 63
End: 2023-05-12
Payer: MEDICARE

## 2023-05-12 DIAGNOSIS — G47.33 OBSTRUCTIVE SLEEP APNEA: ICD-10-CM

## 2023-05-12 DIAGNOSIS — E66.9 OBESITY, UNSPECIFIED CLASSIFICATION, UNSPECIFIED OBESITY TYPE, UNSPECIFIED WHETHER SERIOUS COMORBIDITY PRESENT: Primary | ICD-10-CM

## 2023-05-12 PROCEDURE — G0463 HOSPITAL OUTPT CLINIC VISIT: HCPCS

## 2023-05-12 NOTE — PROGRESS NOTES
[facility]  [address]  [address]  [place] [address]  Phone   Fax         SLEEP CLINIC OFFICE NOTE    Keri Bhagat  [id number]   1960  62 y.o.  female      PCP: Epley, James, APRN    DATE OF VISIT: 5/12/2023        CHIEF COMPLAINT: Obstructive sleep apnea    HPI:  This is a 62 y.o. year old patient who presents to the clinic today for the management of obstructive sleep apnea.  Patient has a history of sleep apnea treated with BiPAP.  She was last seen 6/2022 at which point her EPAP minimum was increased to 12.  She was asked to call if pressures felt excessive.  She never followed up thereafter.  She canceled her 1/2023 follow-up with Dr. Vallejo.  She is now wanting to switch to a new provider.  She reports she has not used her BiPAP in at least 6 to 8 months.  She was having issues with the pressures.  She has since lost more than 20 pounds and believes that her sleep apnea may have resolved.  She is wanting a home sleep study to reassess this.      MEDICATIONS: reviewed     ALLERGIES:  Patient has no known allergies.    SOCIAL HISTORY (habits pertaining to sleep medicine):  • History tobacco use: Yes, e-cigarette  • History of alcohol use: 0 per week  • Caffeine use: 4     REVIEW OF SYSTEMS:   Pertinent positive symptoms are:  • Dowell Sleepiness Scale :  11      PHYSICAL EXAMINATION:  CONSTITUTIONAL:There were no vitals filed for this visit. There is no height or weight on file to calculate BMI.   HEAD: atraumatic, normocephalic  RESP SYSTEM: not in respiratory distress, breathing unlabored  CARDIOVASULAR: normal rate, no edema noted   NEURO: Alert and oriented x 3, mood and affect appeared appropriate        ASSESSMENT AND PLAN:  · Obstructive Sleep Apnea (G 47.33): Patient was treated with BiPAP.  Had issues with the pressures and has not been using for at least 6 to 8 months.  She has lost over 20 pounds and feels that her sleep apnea may have  resolved.  She is wanting a home sleep study (declines in lab study) to reassess.  Have placed order.  She will follow-up in the office after study to go over results.  · Obesity: There is no height or weight on file to calculate BMI.. Patients who are overweight or obese are at increased risk of sleep apnea/ sleep disordered breathing. Weight reduction and healthy lifestyle are encouraged in overweight/ obese patients as part of a comprehensive approach to sleep apnea treatment.    · Hypertension  · Chronic pain: Not on any narcotic pain medication  · CAD  · Memory complaint: If she still has sleep apnea then would recommend resuming treatment.      Patient will follow-up after study or follow-up sooner for any issues or concerns.  Patient's questions were answered.          Thank you for allowing me to participate in the care of this patient.     Janessa Blas DNP, APRN  [facility]

## 2023-05-17 ENCOUNTER — APPOINTMENT (OUTPATIENT)
Dept: GENERAL RADIOLOGY | Facility: HOSPITAL | Age: 63
End: 2023-05-17
Payer: MEDICARE

## 2023-05-17 ENCOUNTER — HOSPITAL ENCOUNTER (OUTPATIENT)
Facility: HOSPITAL | Age: 63
Discharge: HOME | End: 2023-05-17
Attending: EMERGENCY MEDICINE | Admitting: EMERGENCY MEDICINE
Payer: MEDICARE

## 2023-05-17 VITALS
OXYGEN SATURATION: 96 % | TEMPERATURE: 98.8 F | HEIGHT: 63 IN | RESPIRATION RATE: 18 BRPM | HEART RATE: 61 BPM | DIASTOLIC BLOOD PRESSURE: 89 MMHG | WEIGHT: 203 LBS | BODY MASS INDEX: 35.97 KG/M2 | SYSTOLIC BLOOD PRESSURE: 135 MMHG

## 2023-05-17 DIAGNOSIS — S63.502A SPRAIN OF LEFT WRIST, INITIAL ENCOUNTER: Primary | ICD-10-CM

## 2023-05-17 PROCEDURE — G0463 HOSPITAL OUTPT CLINIC VISIT: HCPCS | Performed by: EMERGENCY MEDICINE

## 2023-05-17 PROCEDURE — 73110 X-RAY EXAM OF WRIST: CPT

## 2023-05-17 NOTE — FSED PROVIDER NOTE
Subjective   History of Present Illness  Patient presents with left wrist pain after she tripped over a curb falling forward.  This happened around 330 today and states she skinned up her knees but is not having any pain in her legs.  She denies any head injury, neck or back pain or any other injuries.  She states she took an ibuprofen prior to arrival and is not requesting anything for pain at this time.         Review of Systems   Constitutional: Negative for chills, diaphoresis and fever.   HENT: Negative for congestion, sore throat and trouble swallowing.    Eyes: Negative for visual disturbance.   Respiratory: Negative for cough and shortness of breath.    Cardiovascular: Negative for chest pain and palpitations.   Gastrointestinal: Negative for abdominal pain, constipation, diarrhea, nausea and vomiting.   Genitourinary: Negative for dysuria, frequency, hematuria and urgency.   Skin: Positive for wound. Negative for rash.   Neurological: Negative for weakness, light-headedness, numbness and headaches.   Psychiatric/Behavioral: Negative for confusion.       Past Medical History:   Diagnosis Date   • Abnormal Pap smear of cervix    • Anemia    • Ankle sprain    • Anxiety    • Arthritis    • Arthritis of back    • Arthritis of neck    • Atherosclerotic heart disease of native coronary artery with other forms of angina pectoris    • Bipolar I disorder, single manic episode     depressive d(NOS)   • Cervical disc herniation    • Cervical dysplasia    • Coronary artery disease    • COVID-19 01/10/2023   • CTS (carpal tunnel syndrome)    • Depression     NO SUICIDAL PLANS   • Difficulty swallowing    • Dislocation of finger    • Diverticular disease    • Dyspepsia    • Dysphagia    • Fracture of wrist    • Fracture, finger    • Fracture, foot    • Fracture, radius    • Gastric reflux    • GERD (gastroesophageal reflux disease)    • Heart attack    • Heart murmur    • Hiatal hernia    • High cholesterol    • History  of transfusion    • 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    HPV positive on pap LGSIL   • Hyperlipidemia    • Hypertension    • Hypothyroidism    • Incontinence in female     wears pads   • Kidney disease 2017    stage 2    • Kidney disease, chronic, stage III (GFR 30-59 ml/min)    • Knee swelling    • LGSIL on Pap smear of cervix 04/29/2016    LGSIL HPV positive   • Myocardial infarction 2000   • Neck pain    • Neck strain    • Obesity    • Osteopenia 2021    Bone density test   • Past myocardial infarction 05/2000   • Periodic limb movement disorder    • Renal insufficiency 2018   • Restless leg syndrome    • Rotator cuff syndrome    • Sleep apnea     cpap   • Stress fracture     LEFT HEEL   • Suicidal ideation 08/19/2016    history   • Swelling of ankle     right had doppler no s/s blood clot   • Tear of meniscus of knee    • Thyroid nodule    • Vitamin B12 deficiency    • Wrist sprain        No Known Allergies    Past Surgical History:   Procedure Laterality Date   • ADENOIDECTOMY  1974   • ANTERIOR CERVICAL DISCECTOMY W/ FUSION N/A 12/29/2016    Procedure: C3-4 anterior cervical discectomy and fusion with Depuy micro plate, ALLOGRAFT C3-4, AND HARDWARE REMOVAL C4-7.;  Surgeon: Hema Godwin MD;  Location: [facility] OR;  Service:    • BARIATRIC SURGERY  2018   • CARDIAC CATHETERIZATION N/A 03/30/2017    Procedure: Left Heart Cath;  Surgeon: Tracey Vargas MD;  Location: [facility] INVASIVE LOCATION;  Service:    • CARDIAC CATHETERIZATION N/A 03/30/2017    Procedure: Coronary angiography;  Surgeon: Tracey Vargas MD;  Location: [facility] CATH INVASIVE LOCATION;  Service:    • CARDIAC CATHETERIZATION N/A 03/30/2017    Procedure: Left ventriculography;  Surgeon: Tracey Vargas MD;  Location: [facility] CATH INVASIVE LOCATION;  Service:    • CARDIAC CATHETERIZATION  03/30/2017    Procedure: Functional Flow [place];  Surgeon: Tracey Vargas MD;  Location: [facility] CATH INVASIVE LOCATION;  Service:    •  CARPAL TUNNEL RELEASE     • CATARACT EXTRACTION     • CERVICAL BIOPSY  MMXVI    Dr. Jeffery.    • CERVICAL DISCECTOMY ANTERIOR  04/2013    C4-7   • COLONOSCOPY  04/20/2015    Diverticulosis, IH   • COLONOSCOPY  03/09/2021   • COLPOSCOPY W/ BIOPSY / CURETTAGE  06/17/2016    LGSIL HPV positive. Results were normal repeat pap in one year. Chronic Cervicitis   • CORONARY ANGIOPLASTY WITH STENT PLACEMENT     • CORONARY STENT PLACEMENT  May 2000   • ENDOSCOPY  MMXV    Normal.  Dr. Rodriguez   • ENDOSCOPY N/A 06/22/2017    Erythematous mucosa in the stomach  PATH: Chronic active gastritis, moderate with intestinal metaplasia    • EYE SURGERY  2022    Cateract surgery   • GASTRIC BYPASS      Done using the sleeve technique   • GASTRIC RESTRICTION SURGERY     • HAND SURGERY  Carpal tunnel   • HARDWARE REMOVAL  12/29/2016    cervical   • HERNIA REPAIR     • INGUINAL HERNIA REPAIR     • LAPAROSCOPIC GASTRIC BANDING  02/2018   • NECK SURGERY     • SHOULDER SURGERY Left     RCR   • TONSILLECTOMY     • TONSILLECTOMY AND ADENOIDECTOMY     • TRANSVAGINAL TAPING SUSPENSION N/A 12/06/2017    Procedure: MID URETHRAL SLING CYSTSCOPY;  Surgeon: Abby Méndez MD;  Location: [facility];  Service:    • TRIGGER POINT INJECTION     • TUBAL ABDOMINAL LIGATION     • TYMPANOSTOMY TUBE PLACEMENT Right    • UPPER GASTROINTESTINAL ENDOSCOPY  approx 2021   • WRIST SURGERY Bilateral     carpal tunnel   • WRIST SURGERY      L wrist/ 4/2020       Family History   Problem Relation Age of Onset   • Diabetes type II Mother    • Hypertension Mother    • Osteoporosis Mother    • Seizures Mother    • Diabetes Mother    • Arthritis Mother    • COPD Father    • Hypertension Father    • Lung cancer Father    • Heart attack Father    • Liver cancer Father    • Liver disease Father    • Heart disease Father    • Cancer Father         Lung cacer that metastasized  into the liver.   • Thyroid disease Sister    • Hypertension Sister    • Bipolar disorder  Sister    • Depression Sister    • ADD / ADHD Sister    • Anxiety disorder Sister    • Mental illness Sister    • No Known Problems Son    • Abnormal EKG Daughter    • Hypertension Daughter    • Bipolar disorder Daughter    • Thyroid disease Daughter    • Mental illness Daughter    • No Known Problems Paternal Grandfather    • No Known Problems Paternal Grandmother    • Cancer Maternal Grandmother    • No Known Problems Maternal Grandfather    • Thyroid disease Sister    • Hypertension Sister    • Bipolar disorder Sister    • Anxiety disorder Sister    • Depression Sister    • Mental illness Sister    • Asthma Daughter    • Breast cancer Neg Hx    • Ovarian cancer Neg Hx    • Uterine cancer Neg Hx    • Colon cancer Neg Hx    • Malig Hyperthermia Neg Hx        Social History     Socioeconomic History   • Marital status:    • Number of children: 3   • Years of education: 12   Tobacco Use   • Smoking status: Light Smoker     Types: Electronic Cigarette     Passive exposure: Never   • Smokeless tobacco: Current   • Tobacco comments:     Electronic Cigarette   Vaping Use   • Vaping Use: Every day   • Substances: Nicotine, Flavoring   • Devices: Refillable tank   Substance and Sexual Activity   • Alcohol use: Not Currently   • Drug use: Never   • Sexual activity: Not Currently     Partners: Male     Birth control/protection: Condom, Abstinence, Post-menopausal, None, Tubal ligation           Objective   Physical Exam  Constitutional:       General: She is not in acute distress.     Appearance: She is well-developed. She is not diaphoretic.   HENT:      Head: Normocephalic and atraumatic.      Nose: Nose normal.   Eyes:      Conjunctiva/sclera: Conjunctivae normal.      Pupils: Pupils are equal, round, and reactive to light.   Neck:      Thyroid: No thyromegaly.      Vascular: No JVD.      Trachea: No tracheal deviation.   Cardiovascular:      Rate and Rhythm: Normal rate and regular rhythm.      Heart sounds:  Normal heart sounds. No murmur heard.    No friction rub. No gallop.   Pulmonary:      Effort: Pulmonary effort is normal. No respiratory distress.      Breath sounds: Normal breath sounds. No stridor. No wheezing or rales.   Abdominal:      General: Bowel sounds are normal. There is no distension.      Palpations: Abdomen is soft. There is no mass.      Tenderness: There is no abdominal tenderness. There is no guarding or rebound.      Hernia: No hernia is present.   Musculoskeletal:         General: Tenderness present. No deformity. Normal range of motion.      Cervical back: Normal range of motion and neck supple.      Comments: Left wrist has mild tenderness at the distal ulna, no appreciated swelling or deformity, no snuffbox tenderness appreciated.  Distal neurovascular intact.  Bilateral knees have tiny superficial abrasions without any underlying tenderness or deformity no swelling appreciated on the knees.   Skin:     General: Skin is warm and dry.      Capillary Refill: Capillary refill takes less than 2 seconds.      Coloration: Skin is not pale.      Findings: No erythema or rash.   Neurological:      Mental Status: She is alert and oriented to person, place, and time.      Cranial Nerves: No cranial nerve deficit.      Sensory: No sensory deficit.      Motor: No abnormal muscle tone.      Coordination: Coordination normal.   Psychiatric:         Behavior: Behavior normal.         Thought Content: Thought content normal.         Judgment: Judgment normal.         Procedures           ED Course                                           Medical Decision Making  We will put in Velcro wrist splint given questionable radiology review.  Physical exam not consistent with a distal radius fracture she has no tenderness there.    Amount and/or Complexity of Data Reviewed  Radiology: ordered.     Details: x ray reviewed and radiology report reviewed          Final diagnoses:   Sprain of left wrist, initial  encounter       ED Disposition  ED Disposition     ED Disposition   Discharge    Condition   Stable    Comment   --             Hussein Morin MD  [address] [address]  [phone]    In 1 week  For repeat x-ray and review.         Medication List      No changes were made to your prescriptions during this visit.

## 2023-05-17 NOTE — DISCHARGE INSTRUCTIONS
The radiologist was unsure if there is a fracture in your wrist so recommended a repeat film in 1 week.  Use your wrist splint until being cleared by the orthopedist listed.  Use Tylenol Motrin as directed for pain.

## 2023-05-19 ENCOUNTER — ANESTHESIA EVENT (OUTPATIENT)
Dept: PAIN MEDICINE | Facility: HOSPITAL | Age: 63
End: 2023-05-19
Payer: MEDICARE

## 2023-05-19 ENCOUNTER — HOSPITAL ENCOUNTER (OUTPATIENT)
Dept: GENERAL RADIOLOGY | Facility: HOSPITAL | Age: 63
Discharge: HOME | End: 2023-05-19
Payer: MEDICARE

## 2023-05-19 ENCOUNTER — ANESTHESIA (OUTPATIENT)
Dept: PAIN MEDICINE | Facility: HOSPITAL | Age: 63
End: 2023-05-19
Payer: MEDICARE

## 2023-05-19 ENCOUNTER — HOSPITAL ENCOUNTER (OUTPATIENT)
Dept: PAIN MEDICINE | Facility: HOSPITAL | Age: 63
Discharge: HOME | End: 2023-05-19
Payer: MEDICARE

## 2023-05-19 VITALS
OXYGEN SATURATION: 95 % | TEMPERATURE: 97.8 F | HEIGHT: 63 IN | WEIGHT: 203 LBS | BODY MASS INDEX: 35.97 KG/M2 | SYSTOLIC BLOOD PRESSURE: 117 MMHG | RESPIRATION RATE: 14 BRPM | HEART RATE: 60 BPM | DIASTOLIC BLOOD PRESSURE: 70 MMHG

## 2023-05-19 DIAGNOSIS — M54.12 CERVICAL RADICULOPATHY: Primary | ICD-10-CM

## 2023-05-19 DIAGNOSIS — R52 PAIN: ICD-10-CM

## 2023-05-19 PROCEDURE — 25010000002 DEXAMETHASONE PER 1 MG: Performed by: ANESTHESIOLOGY

## 2023-05-19 PROCEDURE — 25510000001 IOPAMIDOL 41 % SOLUTION: Performed by: ANESTHESIOLOGY

## 2023-05-19 PROCEDURE — 25010000002 MIDAZOLAM PER 1 MG: Performed by: ANESTHESIOLOGY

## 2023-05-19 PROCEDURE — 25010000002 DEXAMETHASONE SODIUM PHOSPHATE 10 MG/ML SOLUTION: Performed by: ANESTHESIOLOGY

## 2023-05-19 PROCEDURE — 77003 FLUOROGUIDE FOR SPINE INJECT: CPT

## 2023-05-19 PROCEDURE — 25010000002 FENTANYL CITRATE (PF) 50 MCG/ML SOLUTION: Performed by: ANESTHESIOLOGY

## 2023-05-19 RX ORDER — SODIUM CHLORIDE 0.9 % (FLUSH) 0.9 %
1-10 SYRINGE (ML) INJECTION AS NEEDED
Status: DISCONTINUED | OUTPATIENT
Start: 2023-05-19 | End: 2023-05-20 | Stop reason: HOSPADM

## 2023-05-19 RX ORDER — DEXAMETHASONE SODIUM PHOSPHATE 10 MG/ML
10 INJECTION, SOLUTION INTRAMUSCULAR; INTRAVENOUS ONCE
Status: COMPLETED | OUTPATIENT
Start: 2023-05-19 | End: 2023-05-19

## 2023-05-19 RX ORDER — DEXAMETHASONE SODIUM PHOSPHATE 4 MG/ML
INJECTION, SOLUTION INTRA-ARTICULAR; INTRALESIONAL; INTRAMUSCULAR; INTRAVENOUS; SOFT TISSUE
Status: COMPLETED | OUTPATIENT
Start: 2023-05-19 | End: 2023-05-19

## 2023-05-19 RX ORDER — SODIUM CHLORIDE 0.9 % (FLUSH) 0.9 %
10 SYRINGE (ML) INJECTION EVERY 12 HOURS SCHEDULED
Status: DISCONTINUED | OUTPATIENT
Start: 2023-05-19 | End: 2023-05-20 | Stop reason: HOSPADM

## 2023-05-19 RX ORDER — FENTANYL CITRATE 50 UG/ML
50 INJECTION, SOLUTION INTRAMUSCULAR; INTRAVENOUS AS NEEDED
Status: DISCONTINUED | OUTPATIENT
Start: 2023-05-19 | End: 2023-05-20 | Stop reason: HOSPADM

## 2023-05-19 RX ORDER — SODIUM CHLORIDE 0.9 % (FLUSH) 0.9 %
10 SYRINGE (ML) INJECTION AS NEEDED
Status: DISCONTINUED | OUTPATIENT
Start: 2023-05-19 | End: 2023-05-20 | Stop reason: HOSPADM

## 2023-05-19 RX ORDER — LIDOCAINE HYDROCHLORIDE 10 MG/ML
1 INJECTION, SOLUTION INFILTRATION; PERINEURAL ONCE AS NEEDED
Status: DISCONTINUED | OUTPATIENT
Start: 2023-05-19 | End: 2023-05-20 | Stop reason: HOSPADM

## 2023-05-19 RX ORDER — SODIUM CHLORIDE 9 MG/ML
40 INJECTION, SOLUTION INTRAVENOUS AS NEEDED
Status: DISCONTINUED | OUTPATIENT
Start: 2023-05-19 | End: 2023-05-20 | Stop reason: HOSPADM

## 2023-05-19 RX ORDER — MIDAZOLAM HYDROCHLORIDE 1 MG/ML
1 INJECTION INTRAMUSCULAR; INTRAVENOUS AS NEEDED
Status: DISCONTINUED | OUTPATIENT
Start: 2023-05-19 | End: 2023-05-20 | Stop reason: HOSPADM

## 2023-05-19 RX ADMIN — DEXAMETHASONE SODIUM PHOSPHATE 10 MG: 4 INJECTION, SOLUTION INTRA-ARTICULAR; INTRALESIONAL; INTRAMUSCULAR; INTRAVENOUS; SOFT TISSUE at 11:06

## 2023-05-19 RX ADMIN — FENTANYL CITRATE 50 MCG: 50 INJECTION, SOLUTION INTRAMUSCULAR; INTRAVENOUS at 11:17

## 2023-05-19 RX ADMIN — DEXAMETHASONE SODIUM PHOSPHATE 10 MG: 10 INJECTION, SOLUTION INTRAMUSCULAR; INTRAVENOUS at 11:12

## 2023-05-19 RX ADMIN — IOPAMIDOL 10 ML: 408 INJECTION, SOLUTION INTRATHECAL at 11:19

## 2023-05-19 RX ADMIN — MIDAZOLAM 1 MG: 1 INJECTION INTRAMUSCULAR; INTRAVENOUS at 11:17

## 2023-05-19 NOTE — H&P
[facility]    History and Physical    Patient Name: Keri Bhagat  :  1960  MRN:  [id number]  Date of Admission: 2023    Subjective     Patient is a 62 y.o. female presents with chief complaint of acute on chronic, intermittent, severe neck and left shoulder pain.  Onset of symptoms was gradual starting 4 weeks ago.  Symptoms are associated/aggravated by activity, lifting, standing or twisting. Symptoms improve with relaxation, pain medication and rest.  On a pain scale from 0-10, she rates her pain as a 3 while at rest and an 8 with activity.  She describes the pain as sharp and throbbing in nature.  She has responded favorably to cervical epidural steroid injections in the past.  She was referred to the pain clinic for a new series.    FINDINGS: The visualized posterior fossa contents appear normal. There  is solid-appearing ankylosis across the disc spaces from C3 through C7.  The articulations of the skull base with C1 and C1 with C2 appear  normal. The canal is patent at these levels.     At C2-3, there is mild disc narrowing and a posterior midline disc bulge  which indents the thecal sac and abuts the cord along the anterior  midline. There is normal CSF signal posterior to the cord, however. The  foramina are patent. Mild facet arthropathy is observed on the right.     From C3 through C7, there is interbody fusion. The canal and the  foramina appear patent at every level.     At C7-T1, there is hypertrophic facet change on the left. The left  foramen is mildly narrowed. 2 mm of anterolisthesis of C7 on T1 is  observed. The canal and the right foramen are patent.     In the visualized upper thoracic spine, the canal and foramina are  patent.     The spinal cord is normal in caliber and signal. Deconditioning of the  paraspinous musculature. No paraspinal soft tissue lesion is evident.        The following portions of the patients history were reviewed and updated as appropriate: current  medications, allergies, past medical history, past surgical history, past family history, past social history and problem list                Objective     Past Medical History:   Past Medical History:   Diagnosis Date   • Abnormal Pap smear of cervix    • Anemia    • Ankle sprain    • Anxiety    • Arthritis    • Arthritis of back    • Arthritis of neck    • Atherosclerotic heart disease of native coronary artery with other forms of angina pectoris    • Bipolar I disorder, single manic episode     depressive d(NOS)   • Cervical disc herniation    • Cervical dysplasia    • Coronary artery disease    • COVID-19 01/10/2023   • CTS (carpal tunnel syndrome)    • Depression     NO SUICIDAL PLANS   • Difficulty swallowing    • Dislocation of finger    • Diverticular disease    • Dyspepsia    • Dysphagia    • Fracture of wrist    • Fracture, finger    • Fracture, foot    • Fracture, radius    • Gastric reflux    • GERD (gastroesophageal reflux disease)    • Heart attack    • Heart murmur    • Hiatal hernia    • High cholesterol    • History of transfusion    • 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    HPV positive on pap LGSIL   • Hyperlipidemia    • Hypertension    • Hypothyroidism    • Incontinence in female     wears pads   • Kidney disease 2017    stage 2    • Kidney disease, chronic, stage III (GFR 30-59 ml/min)    • Knee swelling    • LGSIL on Pap smear of cervix 04/29/2016    LGSIL HPV positive   • Myocardial infarction 2000   • Neck pain    • Neck strain    • Obesity    • Osteopenia 2021    Bone density test   • Past myocardial infarction 05/2000   • Periodic limb movement disorder    • Renal insufficiency 2018   • Restless leg syndrome    • Rotator cuff syndrome    • Sleep apnea     cpap   • Stress fracture     LEFT HEEL   • Suicidal ideation 08/19/2016    history   • Swelling of ankle     right had doppler no s/s blood clot   • Tear of meniscus of knee    • Thyroid nodule    • Vitamin B12 deficiency    •  Wrist sprain      Past Surgical History:   Past Surgical History:   Procedure Laterality Date   • ADENOIDECTOMY  1974   • ANTERIOR CERVICAL DISCECTOMY W/ FUSION N/A 12/29/2016    Procedure: C3-4 anterior cervical discectomy and fusion with Depuy micro plate, ALLOGRAFT C3-4, AND HARDWARE REMOVAL C4-7.;  Surgeon: Hema Godwin MD;  Location: [facility] MAIN OR;  Service:    • BARIATRIC SURGERY  2018   • CARDIAC CATHETERIZATION N/A 03/30/2017    Procedure: Left Heart Cath;  Surgeon: Tracey Vargas MD;  Location:  [facility] CATH INVASIVE LOCATION;  Service:    • CARDIAC CATHETERIZATION N/A 03/30/2017    Procedure: Coronary angiography;  Surgeon: Tracey Vargas MD;  Location:  [facility] CATH INVASIVE LOCATION;  Service:    • CARDIAC CATHETERIZATION N/A 03/30/2017    Procedure: Left ventriculography;  Surgeon: Tracey Vargas MD;  Location:  [facility] CATH INVASIVE LOCATION;  Service:    • CARDIAC CATHETERIZATION  03/30/2017    Procedure: Functional Flow [place];  Surgeon: Tracey Vargas MD;  Location:  [facility] CATH INVASIVE LOCATION;  Service:    • CARPAL TUNNEL RELEASE     • CATARACT EXTRACTION     • CERVICAL BIOPSY  MMXVI    Dr. Jeffery.    • CERVICAL DISCECTOMY ANTERIOR  04/2013    C4-7   • COLONOSCOPY  04/20/2015    Diverticulosis, IH   • COLONOSCOPY  03/09/2021   • COLPOSCOPY W/ BIOPSY / CURETTAGE  06/17/2016    LGSIL HPV positive. Results were normal repeat pap in one year. Chronic Cervicitis   • CORONARY ANGIOPLASTY WITH STENT PLACEMENT     • CORONARY STENT PLACEMENT  May 2000   • ENDOSCOPY  MMXV    Normal.  Dr. Rodriguez   • ENDOSCOPY N/A 06/22/2017    Erythematous mucosa in the stomach  PATH: Chronic active gastritis, moderate with intestinal metaplasia    • EYE SURGERY  2022    Cateract surgery   • GASTRIC BYPASS      Done using the sleeve technique   • GASTRIC RESTRICTION SURGERY     • HAND SURGERY  Carpal tunnel   • HARDWARE REMOVAL  12/29/2016    cervical   • HERNIA REPAIR     • INGUINAL HERNIA REPAIR     • LAPAROSCOPIC  GASTRIC BANDING  02/2018   • NECK SURGERY     • SHOULDER SURGERY Left     RCR   • TONSILLECTOMY     • TONSILLECTOMY AND ADENOIDECTOMY     • TRANSVAGINAL TAPING SUSPENSION N/A 12/06/2017    Procedure: MID URETHRAL SLING CYSTSCOPY;  Surgeon: Abby Méndez MD;  Location: [facility] OR;  Service:    • TRIGGER POINT INJECTION     • TUBAL ABDOMINAL LIGATION     • TYMPANOSTOMY TUBE PLACEMENT Right    • UPPER GASTROINTESTINAL ENDOSCOPY  approx 2021   • WRIST SURGERY Bilateral     carpal tunnel   • WRIST SURGERY      L wrist/ 4/2020     Family History:   Family History   Problem Relation Age of Onset   • Diabetes type II Mother    • Hypertension Mother    • Osteoporosis Mother    • Seizures Mother    • Diabetes Mother    • Arthritis Mother    • COPD Father    • Hypertension Father    • Lung cancer Father    • Heart attack Father    • Liver cancer Father    • Liver disease Father    • Heart disease Father    • Cancer Father         Lung cacer that metastasized  into the liver.   • Thyroid disease Sister    • Hypertension Sister    • Bipolar disorder Sister    • Depression Sister    • ADD / ADHD Sister    • Anxiety disorder Sister    • Mental illness Sister    • No Known Problems Son    • Abnormal EKG Daughter    • Hypertension Daughter    • Bipolar disorder Daughter    • Thyroid disease Daughter    • Mental illness Daughter    • No Known Problems Paternal Grandfather    • No Known Problems Paternal Grandmother    • Cancer Maternal Grandmother    • No Known Problems Maternal Grandfather    • Thyroid disease Sister    • Hypertension Sister    • Bipolar disorder Sister    • Anxiety disorder Sister    • Depression Sister    • Mental illness Sister    • Asthma Daughter    • Breast cancer Neg Hx    • Ovarian cancer Neg Hx    • Uterine cancer Neg Hx    • Colon cancer Neg Hx    • Malig Hyperthermia Neg Hx      Social History:   Social History     Socioeconomic History   • Marital status:    • Number of children:  "3   • Years of education: 12   Tobacco Use   • Smoking status: Light Smoker     Types: Electronic Cigarette     Passive exposure: Never   • Smokeless tobacco: Current   • Tobacco comments:     Electronic Cigarette   Vaping Use   • Vaping Use: Every day   • Substances: Nicotine, Flavoring   • Devices: Refillable tank   Substance and Sexual Activity   • Alcohol use: Not Currently   • Drug use: Never   • Sexual activity: Not Currently     Partners: Male     Birth control/protection: Condom, Abstinence, Post-menopausal, None, Tubal ligation       Vital Signs Range for the last 24 hours  Temperature:     Temp Source:     BP:     Pulse:     Respirations:     SPO2:     O2 Amount (l/min):     O2 Devices     Weight: Weight:  [92.1 kg (203 lb)] 92.1 kg (203 lb)     Flowsheet Rows    Flowsheet Row First Filed Value   Admission Height 160 cm (63\") Documented at 05/19/2023 1035   Admission Weight 92.1 kg (203 lb) Documented at 05/19/2023 1035          --------------------------------------------------------------------------------    Current Outpatient Medications   Medication Sig Dispense Refill   • ARIPiprazole (ABILIFY) 20 MG tablet Take 1 tablet by mouth Daily.     • atenolol (TENORMIN) 50 MG tablet TAKE ONE TABLET BY MOUTH DAILY 90 tablet 1   • buPROPion XL (WELLBUTRIN XL) 300 MG 24 hr tablet Take 1 tablet by mouth 2 (Two) Times a Day.     • calcium carbonate (OS-STEVIE) 600 MG tablet Take 1 tablet by mouth Daily.     • Cholecalciferol 25 MCG (1000 UT) capsule Take 1 capsule by mouth 2 (Two) Times a Day.     • cyclobenzaprine (FLEXERIL) 10 MG tablet Take 1 tablet by mouth 3 (Three) Times a Day As Needed for Muscle Spasms. 20 tablet 0   • Ferrous Sulfate (IRON PO) Take  by mouth.     • gabapentin (NEURONTIN) 300 MG capsule Take 1 capsule by mouth 3 (Three) Times a Day. 270 capsule 0   • hydrOXYzine (ATARAX) 50 MG tablet As Needed.     • ibuprofen (ADVIL,MOTRIN) 600 MG tablet Take 1 tablet by mouth Every 6 (Six) Hours As Needed " for Mild Pain or Moderate Pain. 30 tablet 0   • isosorbide mononitrate (IMDUR) 30 MG 24 hr tablet TAKE ONE TABLET BY MOUTH DAILY 90 tablet 1   • lamoTRIgine (LaMICtal) 150 MG tablet Take 2 tablets by mouth Daily.     • levothyroxine (SYNTHROID, LEVOTHROID) 88 MCG tablet TAKE ONE TABLET BY MOUTH EVERY MORNING 90 tablet 1   • ondansetron (Zofran) 4 MG tablet Take 1 tablet by mouth Every 8 (Eight) Hours As Needed for Nausea or Vomiting. 20 tablet 3   • oxybutynin XL (DITROPAN XL) 15 MG 24 hr tablet Take 1 tablet by mouth Daily.     • pantoprazole (PROTONIX) 40 MG EC tablet Take 1 tablet by mouth 2 (Two) Times a Day. 180 tablet 3   • pramipexole (MIRAPEX) 0.5 MG tablet Take 1 tablet by mouth Every Night for 360 days. 30 tablet 5   • simvastatin (ZOCOR) 20 MG tablet Take 1 tablet by mouth Every Night. 90 tablet 1   • venlafaxine XR (EFFEXOR-XR) 150 MG 24 hr capsule      • vitamin B-12 (CYANOCOBALAMIN) 1000 MCG tablet TAKE ONE TABLET BY MOUTH TWICE A DAY 60 tablet 4     Current Facility-Administered Medications   Medication Dose Route Frequency Provider Last Rate Last Admin   • sodium chloride 0.9 % flush 10 mL  10 mL Intravenous Q12H Abel Roldan MD       • sodium chloride 0.9 % flush 10 mL  10 mL Intravenous PRN Abel Roldan MD       • sodium chloride 0.9 % infusion 40 mL  40 mL Intravenous Abel Reese MD           --------------------------------------------------------------------------------  Assessment & Plan      Anesthesia Evaluation     Patient summary reviewed and Nursing notes reviewed         Pain impairs ability to perform ADLs: Bathing, Dressing, Working, Sleeping and Exercise/Activity  Modalities previously tried to control pain with limited effectiveness within the last 4-6 weeks: Rest, OTC medications and Prescription medications     Airway   Mallampati: II  TM distance: >3 FB  Neck ROM: full  Dental - normal exam     Pulmonary - normal exam    breath sounds clear to auscultation  (+)  a smoker Current Abstained day of surgery, sleep apnea on CPAP,   Cardiovascular - normal exam    Rhythm: regular  Rate: normal    (+) hypertension, valvular problems/murmurs murmur, past MI , CAD, hyperlipidemia,   (-) angina, orthopnea, PND, [place]      Neuro/Psych  (+) syncope, psychiatric history Bipolar,    GI/Hepatic/Renal/Endo    (+) obesity,  hiatal hernia, GERD,  renal disease CRI, thyroid problem hypothyroidism    Musculoskeletal     (+) neck pain,   Abdominal    Substance History - negative use     OB/GYN negative ob/gyn ROS         Other   arthritis,    history of cancer               Diagnosis and Plan    Treatment Plan  ASA 3      Procedures: Cervical Epidural Steroid Injection(MARLA), With fluoroscopy,       Anesthetic plan and risks discussed with patient.          Diagnosis     * Osseous and subluxation stenosis of intervertebral foramina of cervical region [M99.61]     * Cervical radiculopathy [M54.12]

## 2023-05-19 NOTE — DISCHARGE INSTRUCTIONS
"Guide To Relieving And Avoiding Neck Pain    Exercise is important to help prevent and treat neck pain.  Good posture, exercise and avoiding injury will help to keep your neck healthy.    When the neck is strained or over worked symptoms may include headache, upper back pain, shoulder pain or arm pain.  Numbness or tingling in the fingers, dizziness or nausea may also occur.    Posture:    Avoid slumping over a desk.  Raise your work (including computer) to eye level to avoid bending at the neck.      Change Position Often:  Changing position prevents overuse of particular muscles.    Sleep On One Pillow:  Using to many pillows or to large of a pillow causes a \"kink\" in you neck.      Move and Exercise:  Living an active lifestyle is an important part of staying healthy.  Be sure to include the exercise to follow specifically for your neck.                    Range of Motion Exercises:  Do these exercises three times a day.  If you experience increased pain stop and contact your physician.  All exercises can be performed sitting or standing.        1.    2.   3.    1.  Place both hands behind you neck.  Gently tilt your neck backward so that you are looking at the ceiling.  Hold for a count of 10.    2.  Look straight facing forward.  Slowly tip your ear toward your right shoulder.  Do not force the motion.  Hold for a count of 10.  Bring head back to starting position and repeat to left side.    3.  Look straight facing forward.  Gently turn your head to the right.  Do not force the motion.  Hold for a count of 10.  Bring head back to starting position and repeat to the left side.    Exercises To Strengthen Muscles:  1.  Look straight facing forward.  Relax your shoulders.  Raise both shoulders toward your ears.  Hold for 3 seconds.       1.       2.   3.      1.  Look straight facing forward.  Relax your shoulders.  Raise both shoulders toward     2.  Raise your ars to your side and bend your elbows.  Squeeze " shoulder blades together as you rotate your arms outward.  Hold for 5 seconds.    3.  Look straight facing forward.  Pull your head straight back.  Do not tip or move your jaw.  Hold for 5 seconds.    EPIDURAL STEROID INJECTION          An epidural steroid injection is a shot of steroid medicine and numbing medicine that is given into the space between the spinal cord and the bones of the back (epidural space).  The injection helps relieve pain by an irritated or swollen nerve root.    TELL YOUR HEALTH CARE PROVIDER ABOUT:  Any allergies you have  All medicines you are taking including any over the counter medicines  Any blood disorders you have  Any surgeries you have had  Any medical conditions you have  Whether you are pregnant or may be pregnant    WHAT ARE THE RISK?  Generally, this is a safe procedure. However,problems may occur, including  Headache  Bleeding  Infection  Allergic Reaction  Nerve Damage    WHAT CAN I EXPECT AFTER THE PROCEDURE?    INJECTION SITE  Remove the Band-Aid/s after 24 hours  Check your injection site every day for signs of infection.  Check for:             Redness             Bleeding (small amt is normal)             Warmth             Pus or bad odor  Some numbness may be experienced for several hours following the procedure.  Avoid using heat on the injection site for 24 hours. You may use ice intermittently if needed by placing a         towel between your skin and the ice bag and using the ice for 20 minutes 2-3 times a day.  Do not take baths, swim or use a hot tub for 24 hours.    ACTIVITY  No strenuous activity for 24 hours then return to normal activity as tolerated.  If your leg is numb, no driving until full sensation and strength has returned.    GENERAL INSTRUCTIONS:  The injection site may feel numb, use ice with caution if numbness is present and no heat for 24 hours or until numbness is gone.   If you have numbness or weakness in your arm or leg, use those areas with  caution until normal sensation returns.  It is not uncommon to notice an increase in discomfort or a change in the location of discomfort for 3-4 days after the procedure.  If discomfort is noticed at the injection site, ice may be            applied to that area for 20 min 2-3 times a day.  Take the pain medicine your physician has prescribed or over the counter pain relievers as long as you do not have any contraindications.  If you are a diabetic, monitor your blood sugar closely.  The steroids used in your procedure may increase your blood sugar level up to 36 hours after the injection.  If your blood sugar is greater than 250, call the physician that helps you monitor your blood sugar.  Keep all follow-up visits as scheduled by your health care provider. This is important.    CONTACT OUR OFFICE IF:  You have any of these signs of infection            -Redness, swelling, or warmth around your injection site.            -Fluid or blood coming from your injection site (small amt of blood is normal)            -Pus or a bad odor from your injection site            -A fever  You develop a severe headache or a stiff neck  You lose control of your bladder or bowel movements      [facility] HOURS   Monday-Friday 7:30 am. - 4:00 pm.  For any problem related to your procedure we can be reached at [phone].  If you experience an emergency with your procedure, call [phone] or go to the emergency room.

## 2023-05-19 NOTE — ANESTHESIA PROCEDURE NOTES
PAIN Epidural block      Patient reassessed immediately prior to procedure    Patient location during procedure: pain clinic  Start Time: 5/19/2023 11:06 AM  Stop Time: 5/19/2023 11:27 AM  Indication:procedure for pain  Performed By  Anesthesiologist: Abel Roldan MD  Preanesthetic Checklist  Completed: patient identified, site marked, risks and benefits discussed, surgical consent, monitors and equipment checked, pre-op evaluation and timeout performed  Additional Notes  Post-Op Diagnosis Codes:     * Osseous and subluxation stenosis of intervertebral foramina of cervical region (M99.61)     * Cervical radiculopathy (M54.12)     * Previous anterior cervical fusion with instrumentation    The patient was observed in recovery with no evidence of neurological deficits or other problems. All questions were answered. The patient was discharged with appropriate instructions.  Prep:  Pt Position:prone  Sterile Tech:cap, gloves, mask and sterile barrier  Prep:chlorhexidine gluconate and isopropyl alcohol  Monitoring:blood pressure monitoring, continuous pulse oximetry and EKG  Procedure:Sedation: yes (Midazolam 1 mg and fentanyl 50 mcg IV.  Sedation time was from 11:17 AM until 11:27 AM.)     Approach:midline  Guidance: fluoroscopy  Location:cervical  Level:6-7 (Interlaminar)  Needle Type:Tuohy  Needle Gauge:20 G  Aspiration:negative  Medications:  Preservative Free Saline:3mL  Isovue:2mL  Comments:Isovue dye spread was consistent with epidural placement.  Post Assessment:  Dressing:occlusive dressing applied  Pt Tolerance:patient tolerated the procedure well with no apparent complications  Complications:no

## 2023-05-22 ENCOUNTER — OFFICE VISIT (OUTPATIENT)
Dept: ORTHOPEDIC SURGERY | Facility: CLINIC | Age: 63
End: 2023-05-22
Payer: MEDICARE

## 2023-05-22 VITALS — WEIGHT: 203 LBS | HEIGHT: 63 IN | TEMPERATURE: 98.2 F | BODY MASS INDEX: 35.97 KG/M2

## 2023-05-22 DIAGNOSIS — Z91.81 HISTORY OF FALL: Primary | ICD-10-CM

## 2023-05-22 DIAGNOSIS — S52.592A OTHER CLOSED FRACTURE OF DISTAL END OF LEFT RADIUS, INITIAL ENCOUNTER: ICD-10-CM

## 2023-05-22 NOTE — PROGRESS NOTES
Answers for HPI/ROS submitted by the patient on 5/21/2023  Please describe your symptoms.: Extreme pain in left wrist due. To fall  Have you had these symptoms before?: Yes  How long have you been having these symptoms?: 1-4 days  Please describe any probable cause for these symptoms. : Fell and landed on left wrist.  What is the primary reason for your visit?: Other    Patient Name: Keri Bhagat   YOB: 1960  Referring Primary Care Physician: Epley, James, APRN  BMI: Body mass index is 35.96 kg/m².    Chief Complaint:    Chief Complaint   Patient presents with   • Left Wrist - Pain        HPI: Fall in driveway wed picking strawberries and landed on outstretched wrist - hx of ulna ORIF repair right-hand-dominant     Keri Bhagat is a 62 y.o. female who presents today for evaluation of   Chief Complaint   Patient presents with   • Left Wrist - Pain         Subjective   Medications:   Home Medications:  Current Outpatient Medications on File Prior to Visit   Medication Sig   • ARIPiprazole (ABILIFY) 20 MG tablet Take 1 tablet by mouth Daily.   • atenolol (TENORMIN) 50 MG tablet TAKE ONE TABLET BY MOUTH DAILY   • buPROPion XL (WELLBUTRIN XL) 300 MG 24 hr tablet Take 1 tablet by mouth 2 (Two) Times a Day.   • calcium carbonate (OS-STEVIE) 600 MG tablet Take 1 tablet by mouth Daily.   • Cholecalciferol 25 MCG (1000 UT) capsule Take 1 capsule by mouth 2 (Two) Times a Day.   • cyclobenzaprine (FLEXERIL) 10 MG tablet Take 1 tablet by mouth 3 (Three) Times a Day As Needed for Muscle Spasms.   • Ferrous Sulfate (IRON PO) Take  by mouth.   • gabapentin (NEURONTIN) 300 MG capsule Take 1 capsule by mouth 3 (Three) Times a Day.   • hydrOXYzine (ATARAX) 50 MG tablet As Needed.   • ibuprofen (ADVIL,MOTRIN) 600 MG tablet Take 1 tablet by mouth Every 6 (Six) Hours As Needed for Mild Pain or Moderate Pain.   • isosorbide mononitrate (IMDUR) 30 MG 24 hr tablet TAKE ONE TABLET BY MOUTH DAILY   • lamoTRIgine  (LaMICtal) 150 MG tablet Take 2 tablets by mouth Daily.   • levothyroxine (SYNTHROID, LEVOTHROID) 88 MCG tablet TAKE ONE TABLET BY MOUTH EVERY MORNING   • ondansetron (Zofran) 4 MG tablet Take 1 tablet by mouth Every 8 (Eight) Hours As Needed for Nausea or Vomiting.   • oxybutynin XL (DITROPAN XL) 15 MG 24 hr tablet Take 1 tablet by mouth Daily.   • pantoprazole (PROTONIX) 40 MG EC tablet Take 1 tablet by mouth 2 (Two) Times a Day.   • pramipexole (MIRAPEX) 0.5 MG tablet Take 1 tablet by mouth Every Night for 360 days.   • simvastatin (ZOCOR) 20 MG tablet Take 1 tablet by mouth Every Night.   • venlafaxine XR (EFFEXOR-XR) 150 MG 24 hr capsule    • vitamin B-12 (CYANOCOBALAMIN) 1000 MCG tablet TAKE ONE TABLET BY MOUTH TWICE A DAY     No current facility-administered medications on file prior to visit.     Current Medications:  Scheduled Meds:  Continuous Infusions:No current facility-administered medications for this visit.    PRN Meds:.    I have reviewed the patient's medical history in detail and updated the computerized patient record.  Review and summarization of old records includes:    Past Medical History:   Diagnosis Date   • Abnormal Pap smear of cervix    • Anemia    • Ankle sprain    • Anxiety    • Arthritis    • Arthritis of back    • Arthritis of neck    • Atherosclerotic heart disease of native coronary artery with other forms of angina pectoris    • Bipolar I disorder, single manic episode     depressive d(NOS)   • Cervical disc herniation    • Cervical dysplasia    • Coronary artery disease    • COVID-19 01/10/2023   • CTS (carpal tunnel syndrome)    • Depression     NO SUICIDAL PLANS   • Difficulty swallowing    • Dislocation of finger    • Diverticular disease    • Dyspepsia    • Dysphagia    • Fracture of wrist    • Fracture, finger    • Fracture, foot    • Fracture, radius    • Gastric reflux    • GERD (gastroesophageal reflux disease)    • Heart attack    • Heart murmur    • Hiatal hernia    •  High cholesterol    • History of transfusion    • 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    HPV positive on pap LGSIL   • Hyperlipidemia    • Hypertension    • Hypothyroidism    • Incontinence in female     wears pads   • Kidney disease 2017    stage 2    • Kidney disease, chronic, stage III (GFR 30-59 ml/min)    • Knee swelling    • LGSIL on Pap smear of cervix 04/29/2016    LGSIL HPV positive   • Lumbosacral disc disease Unsure    Lower left back is partial osteoarthritis and partial osteoporosis   • Myocardial infarction 2000   • Neck pain    • Neck strain    • Obesity    • Osteopenia 2021    Bone density test   • Past myocardial infarction 05/2000   • Periodic limb movement disorder    • Renal insufficiency 2018   • Restless leg syndrome    • Rotator cuff syndrome    • Sleep apnea     cpap   • Stress fracture     LEFT HEEL   • Suicidal ideation 08/19/2016    history   • Swelling of ankle     right had doppler no s/s blood clot   • Tear of meniscus of knee    • Thyroid nodule    • Vitamin B12 deficiency    • Wrist sprain         Past Surgical History:   Procedure Laterality Date   • ADENOIDECTOMY  1974   • ANTERIOR CERVICAL DISCECTOMY W/ FUSION N/A 12/29/2016    Procedure: C3-4 anterior cervical discectomy and fusion with Depuy micro plate, ALLOGRAFT C3-4, AND HARDWARE REMOVAL C4-7.;  Surgeon: Hema Godwin MD;  Location: [facility];  Service:    • BARIATRIC SURGERY  2018   • CARDIAC CATHETERIZATION N/A 03/30/2017    Procedure: Left Heart Cath;  Surgeon: Tracey Vargas MD;  Location: [facility] CATH INVASIVE LOCATION;  Service:    • CARDIAC CATHETERIZATION N/A 03/30/2017    Procedure: Coronary angiography;  Surgeon: Tracey Vargas MD;  Location: [facility] CATH INVASIVE LOCATION;  Service:    • CARDIAC CATHETERIZATION N/A 03/30/2017    Procedure: Left ventriculography;  Surgeon: Tracey Vargas MD;  Location: [facility] CATH INVASIVE LOCATION;  Service:    • CARDIAC CATHETERIZATION  03/30/2017     Procedure: Functional Flow Idalou;  Surgeon: Tracey Vargas MD;  Location: [facility] INVASIVE LOCATION;  Service:    • CARPAL TUNNEL RELEASE     • CATARACT EXTRACTION     • CERVICAL BIOPSY  [facility]    Dr. Jeffery.    • CERVICAL DISCECTOMY ANTERIOR  04/2013    C4-7   • COLONOSCOPY  04/20/2015    Diverticulosis, IH   • COLONOSCOPY  03/09/2021   • COLPOSCOPY W/ BIOPSY / CURETTAGE  06/17/2016    LGSIL HPV positive. Results were normal repeat pap in one year. Chronic Cervicitis   • CORONARY ANGIOPLASTY WITH STENT PLACEMENT     • CORONARY STENT PLACEMENT  May 2000   • ENDOSCOPY  [facility]    Normal.  Dr. Rodriguez   • ENDOSCOPY N/A 06/22/2017    Erythematous mucosa in the stomach  PATH: Chronic active gastritis, moderate with intestinal metaplasia    • EYE SURGERY  2022    Cateract surgery   • GASTRIC BYPASS      Done using the sleeve technique   • GASTRIC RESTRICTION SURGERY     • HAND SURGERY  Carpal tunnel   • HARDWARE REMOVAL  12/29/2016    cervical   • HERNIA REPAIR     • INGUINAL HERNIA REPAIR     • LAPAROSCOPIC GASTRIC BANDING  02/2018   • NECK SURGERY     • SHOULDER SURGERY Left     RCR   • TONSILLECTOMY     • TONSILLECTOMY AND ADENOIDECTOMY     • TRANSVAGINAL TAPING SUSPENSION N/A 12/06/2017    Procedure: MID URETHRAL SLING CYSTSCOPY;  Surgeon: Abby Méndez MD;  Location: [facility] OR;  Service:    • TRIGGER POINT INJECTION     • TUBAL ABDOMINAL LIGATION     • TYMPANOSTOMY TUBE PLACEMENT Right    • UPPER GASTROINTESTINAL ENDOSCOPY  approx 2021   • WRIST SURGERY Bilateral     carpal tunnel   • WRIST SURGERY      L wrist/ 4/2020        Social History     Occupational History   • Occupation: disabled   Tobacco Use   • Smoking status: Light Smoker     Types: Electronic Cigarette     Passive exposure: Never   • Smokeless tobacco: Current   • Tobacco comments:     Electronic Cigarette   Vaping Use   • Vaping Use: Every day   • Substances: Nicotine, Flavoring   • Devices: RefDiditzble tank   Substance and Sexual  Activity   • Alcohol use: Not Currently   • Drug use: Never   • Sexual activity: Not Currently     Partners: Male     Birth control/protection: Condom, Abstinence, Post-menopausal, None, Tubal ligation      Social History     Social History Narrative   • Not on file        Family History   Problem Relation Age of Onset   • Diabetes type II Mother    • Hypertension Mother    • Osteoporosis Mother    • Seizures Mother    • Diabetes Mother    • Arthritis Mother    • COPD Father    • Hypertension Father    • Lung cancer Father    • Heart attack Father    • Liver cancer Father    • Liver disease Father    • Heart disease Father    • Cancer Father         Lung cacer that metastasized  into the liver.   • Thyroid disease Sister    • Hypertension Sister    • Bipolar disorder Sister    • Depression Sister    • ADD / ADHD Sister    • Anxiety disorder Sister    • Mental illness Sister    • No Known Problems Son    • Abnormal EKG Daughter    • Hypertension Daughter    • Bipolar disorder Daughter    • Thyroid disease Daughter    • Mental illness Daughter    • No Known Problems Paternal Grandfather    • No Known Problems Paternal Grandmother    • Cancer Maternal Grandmother    • No Known Problems Maternal Grandfather    • Thyroid disease Sister    • Hypertension Sister    • Bipolar disorder Sister    • Anxiety disorder Sister    • Depression Sister    • Mental illness Sister    • Asthma Daughter    • Breast cancer Neg Hx    • Ovarian cancer Neg Hx    • Uterine cancer Neg Hx    • Colon cancer Neg Hx    • Malig Hyperthermia Neg Hx        ROS: 14 point review of systems was performed and all other systems were reviewed and are negative except for documented findings in HPI and today's encounter.     Allergies: No Known Allergies  Constitutional:  Denies fever, shaking or chills   Eyes:  Denies change in visual acuity   HENT:  Denies nasal congestion or sore throat   Respiratory:  Denies cough or shortness of breath   Cardiovascular:  " Denies chest pain or severe LE edema   GI:  Denies abdominal pain, nausea, vomiting, bloody stools or diarrhea   Musculoskeletal:  Numbness, tingling, pain, or loss of motor function only as noted above in history of present illness.  : Denies painful urination or hematuria  Integument:  Denies rash, lesion or ulceration   Neurologic:  Denies headache or focal weakness  Endocrine:  Denies lymphadenopathy  Psych:  Denies confusion or change in mental status   Hem:  Denies active bleeding    OBJECTIVE:  Physical Exam: 62 y.o. female  Wt Readings from Last 3 Encounters:   05/22/23 92.1 kg (203 lb)   05/19/23 92.1 kg (203 lb)   05/17/23 92.1 kg (203 lb)     Ht Readings from Last 1 Encounters:   05/22/23 160 cm (63\")     Body mass index is 35.96 kg/m².  Vitals:    05/22/23 1443   Temp: 98.2 °F (36.8 °C)     Vital signs reviewed.     General Appearance:    Alert, cooperative, in no acute distress                  Eyes: conjunctiva clear  ENT: external ears and nose atraumatic  CV: no peripheral edema  Resp: normal respiratory effort  Skin: no rashes or wounds; normal turgor  Psych: mood and affect appropriate  Lymph: no nodes appreciated  Neuro: gross sensation intact  Vascular:  Palpable peripheral pulse in noted extremity  Musculoskeletal Extremities: Skin warm dry intact with good pulses movement and sensation left wrist has tenderness over the distal radius with no obvious deformity or angulation navicular and scaphoid nontender palpation capillary refills intact but is nontender    Radiology:   Study Result    Narrative & Impression   XR WRIST 3+ VW LEFT-     INDICATIONS: Trauma     TECHNIQUE: 4 views of the left wrist     COMPARISON: None available     FINDINGS:     Soft tissue swelling is present at the wrist. On 1 view only, labeled  series 1, appearance of linear lucency at the cortex of the distal  articular surface of the radius could be result of overlapping shadows,  or potentially evidence of nondisplaced " fracture in the setting of  trauma follow-up x-ray in 7-10 days can be obtained to assess for  evidence of healing response, or further evaluation with cross-sectional  imaging could be considered. Plate and screw surgical hardware of the  ulna is partly included. No other evidence of fracture is identified. No  dislocation.     IMPRESSION:     As described.     This report was finalized on 5/17/2023 7:27 PM by Dr. Abel Hand M.D.         Assessment:     ICD-10-CM ICD-9-CM   1. History of fall  Z91.81 V15.88   2. Other closed fracture of distal end of left radius, initial encounter  S52.592A 813.42        Procedures  Placed in [facility] warm-and-form splint follow-up with Dr. Gutierrez no lift pull or push or use of arm    MDM/Plan:   The diagnosis(es), natural history, pathophysiology and treatment for diagnosis(es) were discussed. Opportunity given and questions answered.  Biomechanics of pertinent body areas discussed.  When appropriate, the use of ambulatory aids discussed.  MEDICATIONS:  The risks, benefits, warnings,side effects and alternatives of medications discussed.  Inflammation/pain control; with cold, heat, elevation and/or liniments discussed as appropriate  MEDICAL RECORDS reviewed from other provider(s) for past and current medical history pertinent to this complaint.      5/24/2023    Much of this encounter note is an electronic transcription/translation of spoken language to printed text. The electronic translation of spoken language may permit erroneous, or at times, nonsensical words or phrases to be inadvertently transcribed; Although I have reviewed the note for such errors, some may still exist

## 2023-05-24 ENCOUNTER — TELEPHONE (OUTPATIENT)
Dept: CARDIOLOGY | Facility: CLINIC | Age: 63
End: 2023-05-24
Payer: MEDICARE

## 2023-05-24 NOTE — TELEPHONE ENCOUNTER
Spoke to pt and let her know this is just too far out to clear her right now & to call bk closer to the time of surgery for clearance.  Pt verbalized understanding./prt

## 2023-05-24 NOTE — TELEPHONE ENCOUNTER
Pt needs surgery clearance for knee surgery in August.     LOV 11-03-22    Fax to Dr. Tho Pritchard    Thanks,  Bette

## 2023-05-24 NOTE — TELEPHONE ENCOUNTER
This request is 3 months from now.  As long as nothing changes at the current time I see no reason she should not have surgery however this is too premature since its almost fourth of the year away.

## 2023-05-26 ENCOUNTER — OFFICE VISIT (OUTPATIENT)
Dept: NEUROSURGERY | Facility: CLINIC | Age: 63
End: 2023-05-26
Payer: MEDICARE

## 2023-05-26 VITALS — OXYGEN SATURATION: 97 % | SYSTOLIC BLOOD PRESSURE: 122 MMHG | DIASTOLIC BLOOD PRESSURE: 70 MMHG | HEART RATE: 57 BPM

## 2023-05-26 DIAGNOSIS — D32.9 MENINGIOMA: ICD-10-CM

## 2023-05-26 DIAGNOSIS — R90.89 ABNORMAL BRAIN MRI: Primary | ICD-10-CM

## 2023-05-26 NOTE — PROGRESS NOTES
Patient ID: Keri Bhagat is a 62 y.o. female is an established patient with follow up after imaging.    Imaging- MRI Pituitary W/WO 5/2/23    Today, Keri Bhagat reports dizziness, neck pain and neck stiffness. She reports no headaches. She reports feels off balance when walking      Subjective:     History of Present Illness  Keri denies any headache or seizures.  She does feel like she her balance is off occasionally and is a risk of falling due to poor balance.      Review of Systems   Constitutional: Positive for fatigue.   HENT: Negative for ear pain, sinus pressure, sinus pain, tinnitus and voice change.    Eyes: Negative for photophobia, pain and visual disturbance.   Respiratory: Positive for apnea (sleep apnea). Negative for chest tightness and shortness of breath.    Cardiovascular: Negative.    Gastrointestinal: Negative for abdominal pain and constipation.   Endocrine: Negative.    Genitourinary: Positive for urgency. Negative for difficulty urinating and enuresis.   Musculoskeletal: Positive for neck pain and neck stiffness.   Skin: Negative.    Neurological: Positive for dizziness, speech difficulty and light-headedness. Negative for headaches.   Hematological: Bruises/bleeds easily.   Psychiatric/Behavioral: Negative for confusion. The patient is not nervous/anxious.         While in the room and during my examination of the patient I wore a mask and eye protection.  I washed my hands before and after this patient encounter.  The patient was also wearing a mask.    The following portions of the patient's history were reviewed and updated as appropriate: allergies, current medications, past family history, past medical history, past social history, past surgical history and problem list.     Objective:    Vitals:    05/26/23 1235   BP: 122/70   Pulse: 57   SpO2: 97%     There is no height or weight on file to calculate BMI.    Physical Exam  Constitutional:       Appearance: Normal  appearance.   HENT:      Head: Normocephalic and atraumatic.   Eyes:      Extraocular Movements: Extraocular movements intact.      Conjunctiva/sclera: Conjunctivae normal.      Pupils: Pupils are equal, round, and reactive to light.   Cardiovascular:      Rate and Rhythm: Normal rate and regular rhythm.      Pulses: Normal pulses.   Pulmonary:      Breath sounds: Normal breath sounds.   Abdominal:      Palpations: Abdomen is soft.   Musculoskeletal:         General: Normal range of motion.      Cervical back: Normal range of motion and neck supple.   Skin:     General: Skin is warm and dry.   Neurological:      Mental Status: She is alert and oriented to person, place, and time.      Cranial Nerves: Cranial nerves 2-12 are intact.      Motor: Motor function is intact. No weakness or atrophy.      Coordination: Coordination is intact. Romberg sign negative. Romberg Test abnormal.      Gait: Gait is intact. Gait normal.      Deep Tendon Reflexes: Reflexes are normal and symmetric.      Reflex Scores:       Tricep reflexes are 2+ on the right side and 2+ on the left side.       Bicep reflexes are 2+ on the right side and 2+ on the left side.       Brachioradialis reflexes are 2+ on the right side and 2+ on the left side.       Patellar reflexes are 2+ on the right side and 2+ on the left side.       Achilles reflexes are 2+ on the right side and 2+ on the left side.  Psychiatric:         Speech: Speech normal.       Neurologic Exam     Mental Status   Oriented to person, place, and time.   Attention: normal. Concentration: normal.   Speech: speech is normal   Level of consciousness: alert    Cranial Nerves   Cranial nerves II through XII intact.     CN III, IV, VI   Pupils are equal, round, and reactive to light.    Motor Exam   Muscle bulk: normal  Overall muscle tone: normal    Strength   Strength 5/5 except as noted.     Sensory Exam   Light touch normal.     Gait, Coordination, and Reflexes     Gait  Gait:  normal    Coordination   Romberg: positive    Reflexes   Reflexes 2+ except as noted.   Right brachioradialis: 2+  Left brachioradialis: 2+  Right biceps: 2+  Left biceps: 2+  Right triceps: 2+  Left triceps: 2+  Right patellar: 2+  Left patellar: 2+  Right achilles: 2+  Left achilles: 2+  Right Curiel: absent  Left Curiel: absent       Results: MRI of the brain with and without contrast shows stable pituitary cyst, not obvious adenoma.  There is also a small left frontal extra-axial lesion that is noncontrast enhancing which may represent meningioma versus hyperostosis.    Assessment/Plan: Repeat MRI and head CT in 1 year.  I do not think she will need surgery unless there are some abnormal changes on MRI.  She has negative Mike's and normal reflexes but positive Romberg, I do not think it is coming from her cervical spine.  I think she would benefit from physical therapy for core strengthening to improve her balance.       Diagnoses and all orders for this visit:    1. 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 Meningioma  -     MRI Brain With & Without Contrast; Future  -     CT Head Without Contrast; Future  -     Ambulatory Referral to Physical Therapy                Bob Mercado MD  05/26/23  12:55 EDT

## 2023-05-31 ENCOUNTER — HOSPITAL ENCOUNTER (OUTPATIENT)
Dept: SLEEP MEDICINE | Facility: HOSPITAL | Age: 63
End: 2023-05-31
Payer: MEDICARE

## 2023-05-31 DIAGNOSIS — G47.33 OBSTRUCTIVE SLEEP APNEA: ICD-10-CM

## 2023-05-31 DIAGNOSIS — E66.9 OBESITY, UNSPECIFIED CLASSIFICATION, UNSPECIFIED OBESITY TYPE, UNSPECIFIED WHETHER SERIOUS COMORBIDITY PRESENT: ICD-10-CM

## 2023-05-31 PROCEDURE — 95806 SLEEP STUDY UNATT&RESP EFFT: CPT

## 2023-06-07 ENCOUNTER — OFFICE VISIT (OUTPATIENT)
Dept: ORTHOPEDIC SURGERY | Facility: CLINIC | Age: 63
End: 2023-06-07
Payer: MEDICAID

## 2023-06-07 VITALS — WEIGHT: 203.3 LBS | TEMPERATURE: 97.5 F | BODY MASS INDEX: 36.02 KG/M2 | HEIGHT: 63 IN

## 2023-06-07 DIAGNOSIS — S52.592A OTHER CLOSED FRACTURE OF DISTAL END OF LEFT RADIUS, INITIAL ENCOUNTER: Primary | ICD-10-CM

## 2023-06-07 DIAGNOSIS — S52.502A CLOSED FRACTURE OF DISTAL END OF LEFT RADIUS, UNSPECIFIED FRACTURE MORPHOLOGY, INITIAL ENCOUNTER: ICD-10-CM

## 2023-06-07 NOTE — PROGRESS NOTES
Patient:Keri Bhagat    YOB: 1960    Medical Record Number:[id number]    Chief Complaints: Left wrist injury    History of Present Illness:     62 y.o. female patient who presents for her left wrist.  She fell and injured the wrist in mid May.  She saw Haily and was given a brace.  Current pain is mild and aching.  Localizes the pain to the radial aspect of the wrist.  She reports good use and function of the left wrist prior to this injury.  She has a history of a previous left wrist fracture.  After that healed she developed problems with her ulna.  They had to go ahead and do an ulnar osteotomy.  She says the wrist did fine after that surgery.    Allergies:No Known Allergies    Home Medications:    Current Outpatient Medications:     ARIPiprazole (ABILIFY) 20 MG tablet, Take 1 tablet by mouth Daily., Disp: , Rfl:     atenolol (TENORMIN) 50 MG tablet, TAKE ONE TABLET BY MOUTH DAILY, Disp: 90 tablet, Rfl: 1    buPROPion XL (WELLBUTRIN XL) 300 MG 24 hr tablet, Take 1 tablet by mouth 2 (Two) Times a Day., Disp: , Rfl:     calcium carbonate (OS-STEVIE) 600 MG tablet, Take 1 tablet by mouth Daily., Disp: , Rfl:     Cholecalciferol 25 MCG (1000 UT) capsule, Take 1 capsule by mouth 2 (Two) Times a Day., Disp: , Rfl:     Ferrous Sulfate (IRON PO), Take  by mouth., Disp: , Rfl:     gabapentin (NEURONTIN) 300 MG capsule, Take 1 capsule by mouth 3 (Three) Times a Day., Disp: 270 capsule, Rfl: 0    hydrOXYzine (ATARAX) 50 MG tablet, As Needed., Disp: , Rfl:     isosorbide mononitrate (IMDUR) 30 MG 24 hr tablet, TAKE ONE TABLET BY MOUTH DAILY, Disp: 90 tablet, Rfl: 1    lamoTRIgine (LaMICtal) 150 MG tablet, Take 2 tablets by mouth Daily., Disp: , Rfl:     levothyroxine (SYNTHROID, LEVOTHROID) 88 MCG tablet, TAKE ONE TABLET BY MOUTH EVERY MORNING, Disp: 90 tablet, Rfl: 1    ondansetron (Zofran) 4 MG tablet, Take 1 tablet by mouth Every 8 (Eight) Hours As Needed for Nausea or Vomiting., Disp: 20 tablet, Rfl: 3     oxybutynin XL (DITROPAN XL) 15 MG 24 hr tablet, Take 1 tablet by mouth Daily., Disp: , Rfl:     pantoprazole (PROTONIX) 40 MG EC tablet, Take 1 tablet by mouth 2 (Two) Times a Day., Disp: 180 tablet, Rfl: 3    pramipexole (MIRAPEX) 0.5 MG tablet, Take 1 tablet by mouth Every Night for 360 days., Disp: 30 tablet, Rfl: 5    simvastatin (ZOCOR) 20 MG tablet, Take 1 tablet by mouth Every Night., Disp: 90 tablet, Rfl: 1    venlafaxine XR (EFFEXOR-XR) 150 MG 24 hr capsule, , Disp: , Rfl:     vitamin B-12 (CYANOCOBALAMIN) 1000 MCG tablet, TAKE ONE TABLET BY MOUTH TWICE A DAY, Disp: 60 tablet, Rfl: 4    cyclobenzaprine (FLEXERIL) 10 MG tablet, Take 1 tablet by mouth 3 (Three) Times a Day As Needed for Muscle Spasms., Disp: 20 tablet, Rfl: 0    ibuprofen (ADVIL,MOTRIN) 600 MG tablet, Take 1 tablet by mouth Every 6 (Six) Hours As Needed for Mild Pain or Moderate Pain., Disp: 30 tablet, Rfl: 0    Past Medical History:   Diagnosis Date    Abnormal Pap smear of cervix     Anemia     Ankle sprain     Anxiety     Arthritis     Arthritis of back     Arthritis of neck     Atherosclerotic heart disease of native coronary artery with other forms of angina pectoris     Bipolar I disorder, single manic episode     depressive d(NOS)    Cervical disc herniation     Cervical dysplasia     Coronary artery disease     COVID-19 01/10/2023    CTS (carpal tunnel syndrome)     Depression     NO SUICIDAL PLANS    Difficulty swallowing     Dislocation of finger     Diverticular disease     Dyspepsia     Dysphagia     Fracture of wrist     Fracture, finger     Fracture, foot     Fracture, radius     Gastric reflux     GERD (gastroesophageal reflux disease)     Heart attack     Heart murmur     Hiatal hernia     High cholesterol     History of transfusion     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    HPV positive on pap LGSIL    Hyperlipidemia     Hypertension     Hypothyroidism     Incontinence in female     wears pads    Kidney disease  2017    stage 2     Kidney disease, chronic, stage III (GFR 30-59 ml/min)     Knee swelling     LGSIL on Pap smear of cervix 04/29/2016    LGSIL HPV positive    Lumbosacral disc disease Unsure    Lower left back is partial osteoarthritis and partial osteoporosis    Myocardial infarction 2000    Neck pain     Neck strain     Obesity     Osteopenia 2021    Bone density test    Past myocardial infarction 05/2000    Periodic limb movement disorder     Renal insufficiency 2018    Restless leg syndrome     Rotator cuff syndrome     Sleep apnea     cpap    Stress fracture     LEFT HEEL    Suicidal ideation 08/19/2016    history    Swelling of ankle     right had doppler no s/s blood clot    Tear of meniscus of knee     Thyroid nodule     Vitamin B12 deficiency     Wrist sprain        Past Surgical History:   Procedure Laterality Date    ADENOIDECTOMY  1974    ANTERIOR CERVICAL DISCECTOMY W/ FUSION N/A 12/29/2016    Procedure: C3-4 anterior cervical discectomy and fusion with Depuy micro plate, ALLOGRAFT C3-4, AND HARDWARE REMOVAL C4-7.;  Surgeon: Hema Godwin MD;  Location: [facility] OR;  Service:     BARIATRIC SURGERY  2018    CARDIAC CATHETERIZATION N/A 03/30/2017    Procedure: Left Heart Cath;  Surgeon: Tracey Vargas MD;  Location: [facility] CATH INVASIVE LOCATION;  Service:     CARDIAC CATHETERIZATION N/A 03/30/2017    Procedure: Coronary angiography;  Surgeon: Tracey Vargas MD;  Location:  [facility] CATH INVASIVE LOCATION;  Service:     CARDIAC CATHETERIZATION N/A 03/30/2017    Procedure: Left ventriculography;  Surgeon: Tracey Vargas MD;  Location:  [facility] CATH INVASIVE LOCATION;  Service:     CARDIAC CATHETERIZATION  03/30/2017    Procedure: Functional Flow [place];  Surgeon: Tracey Vargas MD;  Location: [facility] CATH INVASIVE LOCATION;  Service:     CARPAL TUNNEL RELEASE      CATARACT EXTRACTION      CERVICAL BIOPSY  MMXVI    Dr. Jeffery.     CERVICAL DISCECTOMY ANTERIOR  04/2013    C4-7    COLONOSCOPY  04/20/2015     Diverticulosis, IH    COLONOSCOPY  03/09/2021    COLPOSCOPY W/ BIOPSY / CURETTAGE  06/17/2016    LGSIL HPV positive. Results were normal repeat pap in one year. Chronic Cervicitis    CORONARY ANGIOPLASTY WITH STENT PLACEMENT      CORONARY STENT PLACEMENT  May 2000    ENDOSCOPY  MMXV    Normal.  Dr. Rodriguez    ENDOSCOPY N/A 06/22/2017    Erythematous mucosa in the stomach  PATH: Chronic active gastritis, moderate with intestinal metaplasia     EYE SURGERY  2022    Cateract surgery    GASTRIC BYPASS      Done using the sleeve technique    GASTRIC RESTRICTION SURGERY      HAND SURGERY  Carpal tunnel    HARDWARE REMOVAL  12/29/2016    cervical    HERNIA REPAIR      INGUINAL HERNIA REPAIR      LAPAROSCOPIC GASTRIC BANDING  02/2018    NECK SURGERY      SHOULDER SURGERY Left     RCR    TONSILLECTOMY      TONSILLECTOMY AND ADENOIDECTOMY      TRANSVAGINAL TAPING SUSPENSION N/A 12/06/2017    Procedure: MID URETHRAL SLING CYSTSCOPY;  Surgeon: Abby Méndez MD;  Location: [facility];  Service:     TRIGGER POINT INJECTION      TUBAL ABDOMINAL LIGATION      TYMPANOSTOMY TUBE PLACEMENT Right     UPPER GASTROINTESTINAL ENDOSCOPY  approx 2021    WRIST SURGERY Bilateral     carpal tunnel    WRIST SURGERY      L wrist/ 4/2020       Social History     Occupational History    Occupation: disabled   Tobacco Use    Smoking status: Light Smoker     Types: Electronic Cigarette     Passive exposure: Never    Smokeless tobacco: Current    Tobacco comments:     Electronic Cigarette   Vaping Use    Vaping Use: Every day    Substances: Nicotine, Flavoring    Devices: Refillable tank   Substance and Sexual Activity    Alcohol use: Not Currently    Drug use: Never    Sexual activity: Not Currently     Partners: Male     Birth control/protection: Condom, Abstinence, Post-menopausal, None, Tubal ligation      Social History     Social History Narrative    Not on file       Family History   Problem Relation Age of Onset    Diabetes  type II Mother     Hypertension Mother     Osteoporosis Mother     Seizures Mother     Diabetes Mother     Arthritis Mother     COPD Father     Hypertension Father     Lung cancer Father     Heart attack Father     Liver cancer Father     Liver disease Father     Heart disease Father     Cancer Father         Lung cacer that metastasized  into the liver.    Thyroid disease Sister     Hypertension Sister     Bipolar disorder Sister     Depression Sister     ADD / ADHD Sister     Anxiety disorder Sister     Mental illness Sister     No Known Problems Son     Abnormal EKG Daughter     Hypertension Daughter     Bipolar disorder Daughter     Thyroid disease Daughter     Mental illness Daughter     No Known Problems Paternal Grandfather     No Known Problems Paternal Grandmother     Cancer Maternal Grandmother     No Known Problems Maternal Grandfather     Thyroid disease Sister     Hypertension Sister     Bipolar disorder Sister     Anxiety disorder Sister     Depression Sister     Mental illness Sister     Asthma Daughter     Breast cancer Neg Hx     Ovarian cancer Neg Hx     Uterine cancer Neg Hx     Colon cancer Neg Hx     Malig Hyperthermia Neg Hx        Review of Systems:      Constitutional: Denies fever, shaking or chills   Eyes: Denies change in visual acuity   HEENT: Denies nasal congestion or sore throat   Respiratory: Denies cough or shortness of breath   Cardiovascular: Denies chest pain or edema  Endocrine: Denies tremors, palpitations, intolerance of heat or cold, polyuria, polydipsia.  GI: Denies abdominal pain, nausea, vomiting, bloody stools or diarrhea  : Denies frequency, urgency, incontinence, retention, or nocturia.  Musculoskeletal: Denies numbness, tingling or loss of motor function except as above  Integument: Denies rash, lesion or ulceration   Neurologic: Denies headache or focal weakness, deficits  Heme: Denies spontaneous or excessive bleeding, epistaxis, hematuria, melena, fatigue, enlarged  "or tender lymph nodes.      All other pertinent positives and negatives as noted above in HPI.    Physical Exam:62 y.o. female  Vitals:    06/07/23 1459   Temp: 97.5 °F (36.4 °C)   TempSrc: Temporal   Weight: 92.2 kg (203 lb 4.8 oz)   Height: 160 cm (63\")       General:  Patient is awake and alert.  Appears in no acute distress or discomfort.    Psych:  Affect and demeanor are appropriate.    Extremities: Her left wrist and hand are examined.  Skin is benign.  No atrophy, masses or other abnormality.  She is focally tender over the distal radius, particularly towards the styloid.  She has a little bit of tenderness over the dorsum of the carpus as well.  No edema or effusion.  No tenderness in the upper arm or elbow.  She has good elbow motion.  She can flex and extend the wrist for a total arc of motion of about 70 to 80 degrees.  She can abduct and adduct her fingers and thumb with good strength  She has mild to moderate discomfort with  but she has good strength.  Intact sensation in her fingertips.  Brisk capillary refill.    Imaging: AP, oblique and lateral views left wrist are ordered and reviewed to evaluate her complaint.  These are compared to previous x-rays.  She has what appears to be a nondisplaced radial styloid fracture.    Assessment/Plan: Left radial styloid fracture    Is nondisplaced and I expect this should have good healing potential with conservative treatment.  She did not like the brace that she was previously given.  I fitted her for a new brace today that she liked a lot better.  We discussed appropriate activity modification and restrictions.  I would like her to follow-up with me in another month for repeat x-rays and reevaluation.    Ajay Gutierrez MD    06/07/2023  "

## 2023-06-20 ENCOUNTER — TELEPHONE (OUTPATIENT)
Dept: ORTHOPEDIC SURGERY | Facility: CLINIC | Age: 63
End: 2023-06-20

## 2023-07-18 ENCOUNTER — TELEPHONE (OUTPATIENT)
Dept: FAMILY MEDICINE CLINIC | Facility: CLINIC | Age: 63
End: 2023-07-18

## 2023-07-18 NOTE — TELEPHONE ENCOUNTER
Caller: [facility]    Best call back number: [phone]     Who are you requesting to speak with (clinical staff, provider,  specific staff member): JAMES EPLEY     What was the call regarding: WAS SEEING PATIENT FOR DIZZINESS AND BALANCE ISSUES GETTING IT WITH QUICK HEAD TURNS AND FULL BODY MOVEMENTS AND WHEN SHE LOOKS AT THE FLOOR AND LOOKS BACK UP TESTED TODAY FOR BPPV AND WAS NEGATIVE PATIENT ALSO HAVING LOW BLOOD PRESSURE ISSUES WANTING TO KNOW IF SHE COULD OVER MEDICATED OR IF ITS A CARDIOVASCULAR ISSUE STATES PATIENT CONTINUES TO FALL STATES SHE CAN HELP WITH SOME OF THE BALANCE ISSUES BUT DOESN'T KNOW WHY SHE'S HAVING THESE SYMPTOMS AND HOW TO STOP THEM

## 2023-07-18 NOTE — TELEPHONE ENCOUNTER
Patient is weak chest pain shortness of breath or severe dizziness emergency room  If her blood pressure is low  Hold her blood pressure medicine and urgent recheck or emergency room  She should schedule appointment with me this week as well  Thank you

## 2023-07-19 ENCOUNTER — TELEPHONE (OUTPATIENT)
Dept: FAMILY MEDICINE CLINIC | Facility: CLINIC | Age: 63
End: 2023-07-19

## 2023-07-19 NOTE — TELEPHONE ENCOUNTER
PATIENT STATES SHE WAS JUST IN AND FORGOT TO MENTION TO JO ANN THAT SHE WOULD LIKE  ISOSORDIG MONITER 30 MG SENT IN FOR HER AS WELL.    PLEASE ADVISE. PATIENT IS REQUESTING A CALL TO UPDATE.

## 2023-07-21 ENCOUNTER — TELEPHONE (OUTPATIENT)
Dept: FAMILY MEDICINE CLINIC | Facility: CLINIC | Age: 63
End: 2023-07-21

## 2023-07-21 NOTE — TELEPHONE ENCOUNTER
Caller: Keri Bhagat    Relationship: Self    Best call back number:     [phone]       What medications are you currently taking:   Current Outpatient Medications on File Prior to Visit   Medication Sig Dispense Refill    ARIPiprazole (ABILIFY) 20 MG tablet Take 1 tablet by mouth Daily.      atorvastatin (Lipitor) 40 MG tablet Take 1 tablet by mouth Daily. 90 tablet 3    buPROPion XL (WELLBUTRIN XL) 300 MG 24 hr tablet Take 1 tablet by mouth Daily.      calcium carbonate (OS-STEVIE) 600 MG tablet Take 1 tablet by mouth Daily.      Cholecalciferol 25 MCG (1000 UT) capsule Take 1 capsule by mouth 2 (Two) Times a Day.      cyclobenzaprine (FLEXERIL) 10 MG tablet Take 1 tablet by mouth 3 (Three) Times a Day As Needed for Muscle Spasms. 20 tablet 0    Ferrous Sulfate (IRON PO) Take  by mouth.      gabapentin (NEURONTIN) 300 MG capsule TAKE ONE CAPSULE BY MOUTH THREE TIMES A  capsule 0    hydrOXYzine (ATARAX) 50 MG tablet As Needed.      isosorbide mononitrate (IMDUR) 30 MG 24 hr tablet Take 1 tablet by mouth Daily. 90 tablet 2    lamoTRIgine (LaMICtal) 150 MG tablet Take 2 tablets by mouth Daily.      levothyroxine (SYNTHROID, LEVOTHROID) 88 MCG tablet TAKE ONE TABLET BY MOUTH EVERY MORNING 90 tablet 1    metoprolol succinate XL (Toprol XL) 25 MG 24 hr tablet Take 1 tablet by mouth Daily. For bp 90 tablet 3    ondansetron (Zofran) 4 MG tablet Take 1 tablet by mouth Every 8 (Eight) Hours As Needed for Nausea or Vomiting. 20 tablet 3    oxybutynin XL (DITROPAN XL) 15 MG 24 hr tablet Take 1 tablet by mouth Daily.      pantoprazole (PROTONIX) 40 MG EC tablet Take 1 tablet by mouth 2 (Two) Times a Day. 180 tablet 3    pramipexole (MIRAPEX) 0.5 MG tablet Take 1 tablet by mouth Every Night for 360 days. 30 tablet 5    venlafaxine XR (EFFEXOR-XR) 150 MG 24 hr capsule       vitamin B-12 (CYANOCOBALAMIN) 1000 MCG tablet TAKE ONE TABLET BY MOUTH TWICE A DAY 60 tablet 4     No current facility-administered  medications on file prior to visit.          What are your concerns:     PATIENT WANTS TO KNOW WHEN YOU PUT HER ON THE ATORVASTATIN 40 MG MEDICATION WAS YOU TAKING HER OFF OF HER OTHER CHOLESTEROL MEDICATION OR DO YOU WANT HER TO TAKE BOTH MEDICATION?      SHE JUST PICKED UP THE NEW SCRIPT YESTERDAY SO SHE HAS NOT STARTED IT.      PLEASE ADVISE

## 2023-07-24 NOTE — TELEPHONE ENCOUNTER
Called spoke to pt in detail regarding medication. Pt was advised on which medication to take. Pt voiced understanding

## 2023-07-25 ENCOUNTER — APPOINTMENT (OUTPATIENT)
Dept: PHYSICAL THERAPY | Facility: HOSPITAL | Age: 63
End: 2023-07-25
Payer: MEDICARE

## 2023-07-25 ENCOUNTER — OFFICE VISIT (OUTPATIENT)
Dept: NEUROLOGY | Facility: CLINIC | Age: 63
End: 2023-07-25
Payer: MEDICARE

## 2023-07-25 VITALS
SYSTOLIC BLOOD PRESSURE: 128 MMHG | HEART RATE: 71 BPM | OXYGEN SATURATION: 98 % | DIASTOLIC BLOOD PRESSURE: 78 MMHG | BODY MASS INDEX: 36 KG/M2 | WEIGHT: 203.2 LBS | HEIGHT: 63 IN

## 2023-07-25 DIAGNOSIS — R41.3 MEMORY LOSS: Primary | ICD-10-CM

## 2023-07-25 PROCEDURE — 1159F MED LIST DOCD IN RCRD: CPT | Performed by: PSYCHIATRY & NEUROLOGY

## 2023-07-25 PROCEDURE — 1160F RVW MEDS BY RX/DR IN RCRD: CPT | Performed by: PSYCHIATRY & NEUROLOGY

## 2023-07-25 PROCEDURE — 3078F DIAST BP <80 MM HG: CPT | Performed by: PSYCHIATRY & NEUROLOGY

## 2023-07-25 PROCEDURE — 99203 OFFICE O/P NEW LOW 30 MIN: CPT | Performed by: PSYCHIATRY & NEUROLOGY

## 2023-07-25 PROCEDURE — 3074F SYST BP LT 130 MM HG: CPT | Performed by: PSYCHIATRY & NEUROLOGY

## 2023-07-25 RX ORDER — MELATONIN 10 MG
CAPSULE ORAL
COMMUNITY

## 2023-07-25 NOTE — PATIENT INSTRUCTIONS
**Eat a high fiber diet    **Exercise regularly (physically and mentally)    **Floss daily    **Consider eating yogurt regularly    **Consider drinking green tea    **Limit soda and alcohol    **Ensure safety in the home

## 2023-07-25 NOTE — LETTER
July 25, 2023     James Epley, APRN  [address]  [address]  [facility] [address]    Patient: Keri Bhagat   YOB: 1960   Date of Visit: 7/25/2023     Dear James Epley, APRN:       Thank you for referring Keri Bhagat to me for evaluation. Below are the relevant portions of my assessment and plan of care.    If you have questions, please do not hesitate to call me. I look forward to following Keri along with you.         Sincerely,        Uyen Cook MD        CC: No Recipients    Uyen Cook MD  07/25/23 1455  Sign when Signing Visit  Subjective:     Patient ID: Keri Bhagat is a 63 y.o. female.    History of Present Illness  The patient is a 63-year-old female with history of anxiety, MI, carpal tunnel syndrome, coronary disease, depression, obstructive sleep apnea, restless legs, hypertension, vitamin D deficiency, bipolar disorder, chronic kidney disease, hyperlipidemia, GERD, and hypothyroidism who presents to the neurology clinic today as a new patient for evaluation of memory loss.  The patient was last seen on September 13, 2019.  She is a new patient back in March 2019.  At that time she reported a 10-year history of memory decline.  She had a B12 level that was 191.  She never received injections but does take a B complex.  That was repeated in 2021 and was up to 1405.  The patient presents with her sister today and reports that it is okay to talk about her medical history in front of her.  She had neuropsychological testing done in 2019.  It reported the patient's main concern was severe depression and anxiety.  At most she could have mild vascular cognitive impairment however the presence of depression and anxiety was so significant that they were hesitant to diagnose MCI.  The patient feels like her memory is worse since her last visit.  Forgets what people tell her.  Has to write things down.  Forgets to do things.  Leaves things on the  stove.  Lives with mom.  Does drive.  Has not gotten lost.  No tickets.  No accidents in the past year.  Has had a couple of near misses.  Sister does not have any concerns about her drive.  Tends to be overly cautious.  Tends to brake hard.  Has been on disability since 2015 for arthritis and pain.  She scored a 23 out of 30 on her MoCA in 2019.  She scores a 22 out of 30 today.        The following portions of the patient's history were reviewed and updated as appropriate: allergies, current medications, past family history, past medical history, past social history, past surgical history, and problem list.    Review of Systems     Objective:    Neurological Exam    Physical Exam    The patient scored a 22 out of 30 on her [place] today.    Assessment/Plan:    The patient is a 63-year-old female with a history of anxiety, coronary artery disease status post MI, carpal tunnel syndrome, depression, obstructive sleep apnea, restless leg syndrome, vitamin D deficiency, hypertension, bipolar, chronic kidney disease, hyperlipidemia, GERD, hypothyroidism, and vitamin B12 deficiency who presents today for follow-up.    1.  Memory loss-the patient's clinical presentation is most consistent with attention and concentration issues likely related to her mood disorders.  Since her MoCA score did not change much over the last 4 years, I would be hesitant to repeat neuropsychological testing at this time.  I would avoid using hydroxyzine and talk to her prescribing provider about alternative to oxybutynin.  I recommend for her to continue to work with her mental health providers.  The patient can follow-up with neurology on an as-needed basis.    A total of 30 minutes of time was spent on this encounter today.  This includes reviewing patient's records, face-to-face time, documentation.       Problems Addressed this Visit    None  Visit Diagnoses       Memory loss    -  Primary          Diagnoses         Codes Comments    Memory  loss    -  Primary ICD-10-CM: R41.3  ICD-9-CM: 780.93

## 2023-07-25 NOTE — PROGRESS NOTES
Subjective:     Patient ID: Keri Bhagat is a 63 y.o. female.    History of Present Illness  The patient is a 63-year-old female with history of anxiety, MI, carpal tunnel syndrome, coronary disease, depression, obstructive sleep apnea, restless legs, hypertension, vitamin D deficiency, bipolar disorder, chronic kidney disease, hyperlipidemia, GERD, and hypothyroidism who presents to the neurology clinic today as a new patient for evaluation of memory loss.  The patient was last seen on September 13, 2019.  She is a new patient back in March 2019.  At that time she reported a 10-year history of memory decline.  She had a B12 level that was 191.  She never received injections but does take a B complex.  That was repeated in 2021 and was up to 1405.  The patient presents with her sister today and reports that it is okay to talk about her medical history in front of her.  She had neuropsychological testing done in 2019.  It reported the patient's main concern was severe depression and anxiety.  At most she could have mild vascular cognitive impairment however the presence of depression and anxiety was so significant that they were hesitant to diagnose MCI.  The patient feels like her memory is worse since her last visit.  Forgets what people tell her.  Has to write things down.  Forgets to do things.  Leaves things on the stove.  Lives with mom.  Does drive.  Has not gotten lost.  No tickets.  No accidents in the past year.  Has had a couple of near misses.  Sister does not have any concerns about her drive.  Tends to be overly cautious.  Tends to brake hard.  Has been on disability since 2015 for arthritis and pain.  She scored a 23 out of 30 on her MoCA in 2019.  She scores a 22 out of 30 today.        The following portions of the patient's history were reviewed and updated as appropriate: allergies, current medications, past family history, past medical history, past social history, past surgical history, and  problem list.    Review of Systems     Objective:    Neurological Exam    Physical Exam    The patient scored a 22 out of 30 on her [place] today.    Assessment/Plan:    The patient is a 63-year-old female with a history of anxiety, coronary artery disease status post MI, carpal tunnel syndrome, depression, obstructive sleep apnea, restless leg syndrome, vitamin D deficiency, hypertension, bipolar, chronic kidney disease, hyperlipidemia, GERD, hypothyroidism, and vitamin B12 deficiency who presents today for follow-up.    1.  Memory loss-the patient's clinical presentation is most consistent with attention and concentration issues likely related to her mood disorders.  Since her MoCA score did not change much over the last 4 years, I would be hesitant to repeat neuropsychological testing at this time.  I would avoid using hydroxyzine and talk to her prescribing provider about alternative to oxybutynin.  I recommend for her to continue to work with her mental health providers.  The patient can follow-up with neurology on an as-needed basis.    A total of 30 minutes of time was spent on this encounter today.  This includes reviewing patient's records, face-to-face time, documentation.       Problems Addressed this Visit    None  Visit Diagnoses       Memory loss    -  Primary          Diagnoses         Codes Comments    Memory loss    -  Primary ICD-10-CM: R41.3  ICD-9-CM: 780.93

## 2023-07-26 ENCOUNTER — TRANSCRIBE ORDERS (OUTPATIENT)
Dept: FAMILY MEDICINE CLINIC | Facility: CLINIC | Age: 63
End: 2023-07-26
Payer: MEDICARE

## 2023-07-26 DIAGNOSIS — Z12.31 SCREENING MAMMOGRAM FOR BREAST CANCER: Primary | ICD-10-CM

## 2023-07-27 ENCOUNTER — HOSPITAL ENCOUNTER (OUTPATIENT)
Dept: PHYSICAL THERAPY | Facility: HOSPITAL | Age: 63
Setting detail: THERAPIES SERIES
Discharge: HOME | End: 2023-07-27
Payer: MEDICARE

## 2023-07-27 DIAGNOSIS — R26.9 GAIT ABNORMALITY: ICD-10-CM

## 2023-07-27 DIAGNOSIS — M54.12 CERVICAL RADICULOPATHY: Primary | ICD-10-CM

## 2023-07-27 DIAGNOSIS — M25.511 RIGHT SHOULDER PAIN, UNSPECIFIED CHRONICITY: ICD-10-CM

## 2023-07-27 DIAGNOSIS — Z74.09 IMPAIRED FUNCTIONAL MOBILITY, BALANCE, GAIT, AND ENDURANCE: ICD-10-CM

## 2023-07-27 DIAGNOSIS — M54.2 NECK PAIN: ICD-10-CM

## 2023-07-27 PROCEDURE — 97112 NEUROMUSCULAR REEDUCATION: CPT | Performed by: PHYSICAL THERAPIST

## 2023-07-27 NOTE — THERAPY PROGRESS REPORT/RE-CERT
Outpatient Physical Therapy Neuro Progress Note  [facility]     Patient Name: Keri Bhagat  : 1960  MRN: [id number]  Today's Date: 2023      Visit Date: 2023    Visit Dx:    ICD-10-CM ICD-9-CM   1. Cervical radiculopathy  M54.12 723.4   2. Impaired functional mobility, balance, gait, and endurance  Z74.09 V49.89   3. Gait abnormality  R26.9 781.2   4. Right shoulder pain, unspecified chronicity  M25.511 719.41   5. Neck pain  M54.2 723.1       Patient Active Problem List   Diagnosis    Gastroesophageal reflux disease    Bipolar I disorder, single manic episode    Atherosclerosis of coronary artery    Essential hypertension    Lightheadedness    Low back pain    Sciatica    Thyroid nodule    Fatigue    Hyperlipidemia    Hypothyroidism (acquired)    Iron deficiency anemia    Right knee pain    Degenerative disc disease, cervical    Depression    ROCKY (obstructive sleep apnea)    Atherosclerotic heart disease of native coronary artery with other forms of angina pectoris    Cervical spondylosis with radiculopathy    Chronic pain of both knees    Arthritis of both knees    Weight gain, abnormal    Allergic rhinitis    Obesity (BMI 30-39.9)    Plantar fasciitis    Peroneal tendon injury    Stress fracture of calcaneus    Vitamin D deficiency    Pain and swelling of knee    Knee injury    Calcific Achilles tendinitis    Rod's deformity    Urgency incontinence    Nocturia    Chronic gastritis without bleeding    LGSIL on Pap smear of cervix    Primary osteoarthritis of right knee    Stage 1 chronic kidney disease    Left wrist tendinitis    Cervical radiculopathy    Tobacco abuse    Obesity, morbid, BMI 40.0-49.9    Encounter for screening for lung cancer    Urinary frequency    Diabetes mellitus screening    MORALES I (cervical intraepithelial neoplasia I)    Primary osteoarthritis of both knees    H/O bladder repair surgery    Chest pain    Syncope and collapse    Closed fracture of left distal  "radius    Closed displaced fracture of neck of right fifth metacarpal bone    Fracture follow-up    Nondisplaced fracture of base of fifth metacarpal bone, left hand, initial encounter for closed fracture    Closed fracture of lower end of left radius with routine healing    Multiple dislocations of fingers, open    Bilateral chronic knee pain    Arthritis of left knee    Arthritis of right knee    Family history of diabetes mellitus (DM)    Menopause    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    Memory changes    Periodic limb movement disorder    Gait disturbance    Degenerative disc disease, lumbar    Osteopenia    Vaginal atrophy    Female dyspareunia    Pituitary cyst    Meningioma    Vitamin B12 deficiency    Acute urinary tract infection    Anemia    Bacterial vaginosis    Anxiety    Inappropriate diet and eating habits    Incomplete emptying of bladder    Myocardial infarction    Other specified postprocedural states    Postoperative retention of urine    Suicide attempt    Urinary urgency    Vaginal discharge    Vomiting    Gastro-esophageal reflux disease with esophagitis, without bleeding    Encounter for follow-up examination after completed treatment for conditions other than malignant neoplasm    Tendinitis of left wrist    Lipoma of upper extremity    Adrenal adenoma    Cervicalgia            PT Neuro       Row Name 07/27/23 2813             Subjective Comments    Subjective Comments My mom has been in the hospital since Saturday morning. I've been taking a medication for anxiety. I've had a few episodes of \"being in a fog\" and today my head hurts and I feel a little off. Wonder if that could be the medication.  -JK         Precautions and Contraindications    Precautions/Limitations fall precautions  -JK         Subjective Pain    Able to rate subjective pain? yes  -JK      Pre-Treatment Pain Level 0  -JK      Post-Treatment Pain Level 0  -JK      Subjective Pain " Comment reported a headache  -JK         Cognition    Overall Cognitive Status WFL  -JK         Posture/Observations    Posture/Observations Comments Pt arrived to PT with no AD  -JK         Transfers    Sit-Stand [place] (Transfers) modified independence  -JK      Stand-Sit [place] (Transfers) modified independence  -JK         Gait/Stairs (Locomotion)    [place] Level (Gait) modified independence  -JK      Distance in Feet (Gait) 50' x 6  -JK      Pattern (Gait) step-through  -JK      Deviations/Abnormal Patterns (Gait) base of support, narrow;gait speed decreased;stride length decreased  -JK      [place] Level (Stairs) supervision  -JK      Handrail Location (Stairs) left side (ascending);right side (ascending);left side (descending);right side (descending)  -JK      Number of Steps (Stairs) 4  -JK      Ascending Technique (Stairs) step-over-step  -JK      Descending Technique (Stairs) step-over-step  -JK      Stairs, Safety Issues weight-shifting ability decreased  -JK         Curb Negotiation (Mobility)    [place], Curb Negotiation contact guard;standby assist  -JK      Comment, Curb Negotiation (Mobility) first time descending curb pt was guarded; other attempts pt was more stable on descend  -JK         Balance Skills Training    Gait Balance-Level of Assistance Contact guard;Close supervision  -JK      Gait Balance Support No upper extremity supported  -JK      Gait Balance Activities stepping over object;scanning environment R/L  cones  -JK      Balance Comments see [facility]  -JK                User Key  (r) = Recorded By, (t) = Taken By, (c) = Cosigned By      Initials Name Provider Type    Kiana Patel, PT Physical Therapist                             PT Assessment/Plan       Row Name 07/27/23 1534          PT Assessment    Functional Limitations Decreased safety during functional activities;Impaired gait;Limitation in home management;Limitations in community  "activities;Performance in self-care ADL  -JK     Impairments Balance;Gait;Pain;Posture  -JK     Assessment Comments Pt presents to PT today with a lot of things going on at home due to her mom being in the hospital for several days. Pt reports her mobility has been a little off  and at times has feeling of \"being in a fog\" which could be due to anxiety medication. Pt reports she has felt dizzy after picking up object from floor at home, but dizziness only provoked today with head turns up/down. Overall, pt is doing better and has met most STG. Pt could benefit from continued PT to address gait and balance defictits.  -JK               User Key  (r) = Recorded By, (t) = Taken By, (c) = Cosigned By      Initials Name Provider Type    Kiana Patel, PT Physical Therapist                        OP Exercises       Row Name 07/27/23 1537 07/27/23 1440          Subjective Comments    Subjective Comments -- My mom has been in the hospital since Saturday morning. I've been taking a medication for anxiety. I've had a few episodes of \"being in a fog\" and today my head hurts and I feel a little off. Wonder if that could be the medication.  -JK        Subjective Pain    Able to rate subjective pain? -- yes  -JK     Pre-Treatment Pain Level -- 0  -JK     Post-Treatment Pain Level -- 0  -JK     Subjective Pain Comment -- reported a headache  -JK        Total Minutes    36981 -  PT Neuromuscular Reeducation Minutes 30  -JK --        Exercise 1    Exercise Name 1 -- alt tapping 6\" step  -JK     Cueing 1 -- Tactile;Verbal  -JK     Sets 1 -- 2  -JK     Reps 1 -- 10  -JK     Additional Comments -- CGA without LOB  -JK        Exercise 2    Exercise Name 2 -- gait with head turns R/L, up/down  -JK     Cueing 2 -- Verbal  -JK     Additional Comments -- 50' x 2 each task  -JK        Exercise 3    Exercise Name 3 -- gait with varied speeds  -JK     Cueing 3 -- Verbal;Demo  -JK     Additional Comments -- 50'  -JK               User Key  " "(r) = Recorded By, (t) = Taken By, (c) = Cosigned By      Initials Name Provider Type    Kiana Patel, PT Physical Therapist                                 PT OP Goals       Row Name 07/27/23 1537 07/27/23 1440       PT Short Term Goals    STG Date to Achieve -- 07/27/23  -    STG 1 -- Pt to perform alternate tapping on 6\" for 10 reps each LE with SBA and no LOB.  -    STG 1 Progress -- Partially Met;Met  -K    STG 1 Progress Comments -- met with CGA  -K    STG 2 -- Pt to be independent with basic HEP.  -K    STG 2 Progress -- Met  -    STG 3 -- Pt to ambulate up/down 6\"curb with CGA and no LOB.  -    STG 3 Progress -- Met  -    STG 4 -- Pt to improve DGI score to 17/24.  -    STG 4 Progress -- Met  -    STG 4 Progress Comments -- 17/24 today  -       Long Term Goals    LTG Date to Achieve -- 08/24/23  -K    LTG 1 -- Pt to be independent with advanced  HEP.  -K    LTG 1 Progress -- Ongoing  -    LTG 2 -- Pt to ambulate up/down 6\"curb with SBA and no LOB.  -K    LTG 2 Progress -- Ongoing  -    LTG 3 -- Pt to improve DGI score to 20/24.  -    LTG 3 Progress -- Ongoing  -    LTG 4 -- Pt to improve LANDRY balance score to 46/56 or better.  -    LTG 4 Progress -- Met;Ongoing  -    LTG 4 Progress Comments -- 47/56 today  -       Time Calculation    PT Goal Re-Cert Due Date 08/24/23  -JK 07/27/23  -              User Key  (r) = Recorded By, (t) = Taken By, (c) = Cosigned By      Initials Name Provider Type    Kiana Patel, PT Physical Therapist                         [place] Balance Scale  Sitting to Standing: able to stand without using hands and stabilize independently  Standing Unsupported: able to stand safely for 2 minutes  Sitting with Back Unsupported but Feet Supported on Floor or on Stool: able to sit safely and securely for 2 minutes  Standing to Sitting: sits safely with minimal use of hands  Transfers: able to transfer safely with minor use of hands  Standing " Unsupported with Eyes Closed: able to stand 10 seconds with supervision  Standing Unsupported with Feet Together: able to place feet together independently and stand 1 minute safely  Reaching Forward with Outstretched Arm While Standing: can reach forward confidently 25 cm (10 inches)   Object From the Floor From a Standing Position: able to  object but needs supervision  Turning to Look Behind Over Left and Right Shoulders While Standing: looks behind from both sides and weight shifts well  Turn 360 Degrees: able to turn 360 degrees safely in 4 seconds or less  Place Alternate Foot on Step or Stool While Standing Unsupported: able to complete 4 steps without aid with supervision  Standing Unsupported with One Foot in Front: able to take small step independently and hold 30 seconds  Standing on One Leg: tries to lift leg unable to hold 3 seconds but remains standing independently  Torrez Total Score: 47  Dynamic Gait Index (DGI)  Gait Level Surface: Normal: walks 20’, no assistive devices, good speed, no evidence for imbalance, normal gait pattern  Change in Gait Speed: Normal: Able to smoothly change walking speed without loss of balance or gait deviation. Shows significant difference in walking speeds between normal, fast and slow paces.  Gait with Horizontal Head Turns: Mild impairment: Performs head turns smoothly with slight change in gait velocity, i.e. minor disruption to smooth gait path or uses walking aid.  Gait with Vertical Head Turns: Mild impairment: Performs head turns smoothly with slight change in gait velocity, i.e. minor disruption to smooth gait path or uses walking aid.  Gait and Pivot Turn: Mild impairment: pivot turns safely in >3 seconds and stops with no loss of balance.  Step Over Obstacle: Moderate impairment: Is able to step over box but must stop, then step over. May require verbal cueing.  Step Around Obstacles: Mild impairment: Is able to step around both cones, but must  slow down and adjust steps to clear cones.  Steps: Mild impairment: Alternating feet, must use rail.  Dynamic Gait Index Score: 17      Time Calculation:   Start Time: 1440  Stop Time: 1510  Time Calculation (min): 30 min  Total Timed Code Minutes- PT: 30 minute(s)  Timed Charges  61619 -  PT Neuromuscular Reeducation Minutes: 30  Total Minutes  Timed Charges Total Minutes: 30   Total Minutes: 30   Therapy Charges for Today       Code Description Service Date Service Provider Modifiers Qty    [id number] HC PT NEUROMUSC RE EDUCATION EA 15 MIN 7/27/2023 Kiana Burrell, PT GP 2            PT G-Codes  Torrez Total Score: 47         Kiana Burrell, PT  7/27/2023

## 2023-08-01 ENCOUNTER — APPOINTMENT (OUTPATIENT)
Dept: PHYSICAL THERAPY | Facility: HOSPITAL | Age: 63
End: 2023-08-01
Payer: MEDICARE

## 2023-08-03 ENCOUNTER — PATIENT ROUNDING (BHMG ONLY) (OUTPATIENT)
Dept: NEUROLOGY | Facility: CLINIC | Age: 63
End: 2023-08-03
Payer: MEDICARE

## 2023-08-03 ENCOUNTER — HOSPITAL ENCOUNTER (OUTPATIENT)
Dept: PHYSICAL THERAPY | Facility: HOSPITAL | Age: 63
Setting detail: THERAPIES SERIES
Discharge: HOME | End: 2023-08-03
Payer: MEDICARE

## 2023-08-03 DIAGNOSIS — M54.12 CERVICAL RADICULOPATHY: Primary | ICD-10-CM

## 2023-08-03 DIAGNOSIS — Z74.09 IMPAIRED FUNCTIONAL MOBILITY, BALANCE, GAIT, AND ENDURANCE: ICD-10-CM

## 2023-08-03 DIAGNOSIS — R26.9 GAIT ABNORMALITY: ICD-10-CM

## 2023-08-03 PROCEDURE — 97112 NEUROMUSCULAR REEDUCATION: CPT

## 2023-08-08 ENCOUNTER — APPOINTMENT (OUTPATIENT)
Dept: PHYSICAL THERAPY | Facility: HOSPITAL | Age: 63
End: 2023-08-08
Payer: MEDICARE

## 2023-08-09 ENCOUNTER — OFFICE VISIT (OUTPATIENT)
Dept: ORTHOPEDIC SURGERY | Facility: CLINIC | Age: 63
End: 2023-08-09
Payer: MEDICARE

## 2023-08-09 VITALS — WEIGHT: 201.6 LBS | TEMPERATURE: 98.2 F | HEIGHT: 63 IN | BODY MASS INDEX: 35.72 KG/M2

## 2023-08-09 DIAGNOSIS — E03.9 HYPOTHYROIDISM (ACQUIRED): ICD-10-CM

## 2023-08-09 DIAGNOSIS — S52.592A OTHER CLOSED FRACTURE OF DISTAL END OF LEFT RADIUS, INITIAL ENCOUNTER: Primary | ICD-10-CM

## 2023-08-09 RX ORDER — QUETIAPINE FUMARATE 25 MG/1
TABLET, FILM COATED ORAL
COMMUNITY
Start: 2023-08-03

## 2023-08-09 NOTE — PROGRESS NOTES
Keri Bhagat : 1960 MRN: [id number] DATE: 2023    CC: 3 months s/p closed treatment left distal radius fracture    HPI:  Ms. Bhagat returns to clinic today stating pain is much improved.  Reports motion is steadily improving as well.  Reports compliance with use of the brace and restrictions as recommended.    Vitals:    23 1104   Temp: 98.2 øF (36.8 øC)       Exam:  Skin appears benign.  No gross malalignment or obvious deformity on inspection.  No tenderness on exam.  Wrist motion is improved but not quite symmetric to the contralateral side.  Good , pinch, and finger and thumb abduction strength.  Intact sensation.  Brisk cap refill.    Imaging: 3 view x-rays including AP, oblique and lateral views of the wrist are ordered and reviewed by me to evaluate alignment and for comparison purposes.  No new or concerning findings noted. There has been significant interval callous formation.      Impression:  3 months s/p closed treatment left distal radius fracture    Plan: She demonstrates significant evidence for both clinical and radiographic healing.  At this point, she can discontinue use of the brace.  Okay to gradually resume activity without restriction.  Follow-up as needed going forward.    Ajay Gutierrez MD    2023

## 2023-08-10 ENCOUNTER — APPOINTMENT (OUTPATIENT)
Dept: PHYSICAL THERAPY | Facility: HOSPITAL | Age: 63
End: 2023-08-10
Payer: MEDICARE

## 2023-08-10 RX ORDER — LEVOTHYROXINE SODIUM 88 UG/1
TABLET ORAL
Qty: 90 TABLET | Refills: 1 | Status: SHIPPED | OUTPATIENT
Start: 2023-08-10

## 2023-08-15 ENCOUNTER — HOSPITAL ENCOUNTER (OUTPATIENT)
Dept: PHYSICAL THERAPY | Facility: HOSPITAL | Age: 63
Setting detail: THERAPIES SERIES
Discharge: HOME | End: 2023-08-15
Payer: MEDICARE

## 2023-08-15 DIAGNOSIS — R26.9 GAIT ABNORMALITY: ICD-10-CM

## 2023-08-15 DIAGNOSIS — Z74.09 IMPAIRED FUNCTIONAL MOBILITY, BALANCE, GAIT, AND ENDURANCE: ICD-10-CM

## 2023-08-15 DIAGNOSIS — M25.511 RIGHT SHOULDER PAIN, UNSPECIFIED CHRONICITY: ICD-10-CM

## 2023-08-15 DIAGNOSIS — M54.12 CERVICAL RADICULOPATHY: Primary | ICD-10-CM

## 2023-08-15 PROCEDURE — 97112 NEUROMUSCULAR REEDUCATION: CPT | Performed by: PHYSICAL THERAPIST

## 2023-08-15 NOTE — THERAPY DISCHARGE NOTE
Outpatient Physical Therapy Neuro Treatment Note/Discharge Summary  [facility]     Patient Name: Keri Bhagat  : 1960  MRN: [id number]  Today's Date: 8/15/2023      Visit Date: 08/15/2023    Visit Dx:    ICD-10-CM ICD-9-CM   1. Cervical radiculopathy  M54.12 723.4   2. Impaired functional mobility, balance, gait, and endurance  Z74.09 V49.89   3. Gait abnormality  R26.9 781.2   4. Right shoulder pain, unspecified chronicity  M25.511 719.41       Patient Active Problem List   Diagnosis    Gastroesophageal reflux disease    Bipolar I disorder, single manic episode    Atherosclerosis of coronary artery    Essential hypertension    Lightheadedness    Low back pain    Sciatica    Thyroid nodule    Fatigue    Hyperlipidemia    Hypothyroidism (acquired)    Iron deficiency anemia    Right knee pain    Degenerative disc disease, cervical    Depression    ROCKY (obstructive sleep apnea)    Atherosclerotic heart disease of native coronary artery with other forms of angina pectoris    Cervical spondylosis with radiculopathy    Chronic pain of both knees    Arthritis of both knees    Weight gain, abnormal    Allergic rhinitis    Obesity (BMI 30-39.9)    Plantar fasciitis    Peroneal tendon injury    Stress fracture of calcaneus    Vitamin D deficiency    Pain and swelling of knee    Knee injury    Calcific Achilles tendinitis    Rod's deformity    Urgency incontinence    Nocturia    Chronic gastritis without bleeding    LGSIL on Pap smear of cervix    Primary osteoarthritis of right knee    Stage 1 chronic kidney disease    Left wrist tendinitis    Cervical radiculopathy    Tobacco abuse    Obesity, morbid, BMI 40.0-49.9    Encounter for screening for lung cancer    Urinary frequency    Diabetes mellitus screening    MORALES I (cervical intraepithelial neoplasia I)    Primary osteoarthritis of both knees    H/O bladder repair surgery    Chest pain    Syncope and collapse    Closed fracture of left distal radius     Closed displaced fracture of neck of right fifth metacarpal bone    Fracture follow-up    Nondisplaced fracture of base of fifth metacarpal bone, left hand, initial encounter for closed fracture    Closed fracture of lower end of left radius with routine healing    Multiple dislocations of fingers, open    Bilateral chronic knee pain    Arthritis of left knee    Arthritis of right knee    Family history of diabetes mellitus (DM)    Menopause    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    Memory changes    Periodic limb movement disorder    Gait disturbance    Degenerative disc disease, lumbar    Osteopenia    Vaginal atrophy    Female dyspareunia    Pituitary cyst    Meningioma    Vitamin B12 deficiency    Acute urinary tract infection    Anemia    Bacterial vaginosis    Anxiety    Inappropriate diet and eating habits    Incomplete emptying of bladder    Myocardial infarction    Other specified postprocedural states    Postoperative retention of urine    Suicide attempt    Urinary urgency    Vaginal discharge    Vomiting    Gastro-esophageal reflux disease with esophagitis, without bleeding    Encounter for follow-up examination after completed treatment for conditions other than malignant neoplasm    Tendinitis of left wrist    Lipoma of upper extremity    Adrenal adenoma    Cervicalgia             PT Neuro       Row Name 08/15/23 5740             Subjective Comments    Subjective Comments I'm doing better. Still get dizzy after I look down at the floor but no LOB when walkingl  -JK         Precautions and Contraindications    Precautions/Limitations fall precautions  -JK         Subjective Pain    Able to rate subjective pain? yes  -JK      Pre-Treatment Pain Level 0  -JK      Post-Treatment Pain Level 0  -JK         Cognition    Overall Cognitive Status WFL  -JK         Posture/Observations    Posture/Observations Comments Pt arrived to PT with no AD  -JK         Transfers     Sit-Stand [place] (Transfers) modified independence  -JK      Stand-Sit [place] (Transfers) modified independence  -JK         Gait/Stairs (Locomotion)    [place] Level (Gait) modified independence  -JK      Distance in Feet (Gait) 60' x 6  -JK      Pattern (Gait) step-through  -JK      Deviations/Abnormal Patterns (Gait) base of support, narrow;gait speed decreased;stride length decreased  -JK      Gait Assessment/Intervention decreased arm swing B UEs  -JK         Balance Skills Training    Standing-Level of Assistance Close supervision  -JK      Static Standing Balance Support parallel bars;No upper extremity supported  -JK      Standing-Balance Activities Standing unsupported;Semi-tandum;Rocker board;Compliant surfaces;Tandem Stance;Single Limb Stance;Retrieve object from floor;Standing on 1/2 roll;Standing on foam roll  -JK      Gait Balance-Level of Assistance Close supervision;Contact guard  -JK      Gait Balance Support No upper extremity supported  -JK      Gait Balance Activities backwards;braiding / front;tandem;stepping over object;scanning environment R/L  -JK                User Key  (r) = Recorded By, (t) = Taken By, (c) = Cosigned By      Initials Name Provider Type    Kiana Patel, PT Physical Therapist                             PT Assessment/Plan       Row Name 08/15/23 1199          PT Assessment    Functional Limitations Decreased safety during functional activities;Impaired gait;Limitation in home management;Limitations in community activities;Performance in self-care ADL  -JK     Impairments Balance;Gait;Pain;Posture  -JK     Assessment Comments Pt did well with balance tasks today and had improved scores on DGI (20/24) and LANDRY (50/56) today. Pt reports she is only still getting dizzy after looking down at times, but is better overall. Pt feels she has made good progress and is ready for DC. Pt has met all STG and LTG and is ready for discharge from PT today with  "continued use of HEP on her own.  -JK               User Key  (r) = Recorded By, (t) = Taken By, (c) = Cosigned By      Initials Name Provider Type    Kiana Patel, PT Physical Therapist                          OP Exercises       Row Name 08/15/23 1451 08/15/23 1355          Subjective Comments    Subjective Comments -- I'm doing better. Still get dizzy after I look down at the floor but no LOB when walkingl  -JK        Subjective Pain    Able to rate subjective pain? -- yes  -JK     Pre-Treatment Pain Level -- 0  -JK     Post-Treatment Pain Level -- 0  -JK        Total Minutes    75910 -  PT Neuromuscular Reeducation Minutes 40  -JK --               User Key  (r) = Recorded By, (t) = Taken By, (c) = Cosigned By      Initials Name Provider Type    Kiana Patel, PT Physical Therapist                                   PT OP Goals       Row Name 08/15/23 1355          PT Short Term Goals    STG Date to Achieve 07/27/23  -JK     STG 1 Pt to perform alternate tapping on 6\" for 10 reps each LE with SBA and no LOB.  -JK     STG 1 Progress Met  -JK     STG 2 Pt to be independent with basic HEP.  -JK     STG 2 Progress Met  -JK     STG 3 Pt to ambulate up/down 6\"curb with CGA and no LOB.  -JK     STG 3 Progress Met  -JK     STG 4 Pt to improve DGI score to 17/24.  -JK     STG 4 Progress Met  -JK        Long Term Goals    LTG Date to Achieve 08/24/23  -JK     LTG 1 Pt to be independent with advanced  HEP.  -JK     LTG 1 Progress Met  -JK     LTG 2 Pt to ambulate up/down 6\"curb with SBA and no LOB.  -JK     LTG 2 Progress Met  -JK     LTG 3 Pt to improve DGI score to 20/24.  -JK     LTG 3 Progress Met  -JK     LTG 4 Pt to improve LANDRY balance score to 46/56 or better.  -JK     LTG 4 Progress Met  -JK        Time Calculation    PT Goal Re-Cert Due Date 08/15/23  -JK               User Key  (r) = Recorded By, (t) = Taken By, (c) = Cosigned By      Initials Name Provider Type    Kiana Patel, PT Physical " Therapist                    Therapy Education  Education Details: reviewed Sacades exercises and gave pt another copy of these exercises; encouraged pt to keep doing HEP after DC  Given: HEP, Mobility training, Fall prevention and home safety, Posture/body mechanics  Program: Reinforced  How Provided: Verbal, Demonstration, Written  Provided to: Patient  Level of Understanding: Teach back education performed, Verbalized, Demonstrated    Torrez Balance Scale  Sitting to Standing: able to stand without using hands and stabilize independently  Standing Unsupported: able to stand safely for 2 minutes  Sitting with Back Unsupported but Feet Supported on Floor or on Stool: able to sit safely and securely for 2 minutes  Standing to Sitting: sits safely with minimal use of hands  Transfers: able to transfer safely with minor use of hands  Standing Unsupported with Eyes Closed: able to stand 10 seconds with supervision  Standing Unsupported with Feet Together: able to place feet together independently and stand 1 minute safely  Reaching Forward with Outstretched Arm While Standing: can reach forward confidently 25 cm (10 inches)   Object From the Floor From a Standing Position: able to  object but needs supervision  Turning to Look Behind Over Left and Right Shoulders While Standing: looks behind from both sides and weight shifts well  Turn 360 Degrees: able to turn 360 degrees safely in 4 seconds or less  Place Alternate Foot on Step or Stool While Standing Unsupported: able to stand independently and safely and complete 8 steps in 20 seconds  Standing Unsupported with One Foot in Front: able to place foot ahead independently and hold 30 seconds  Standing on One Leg: tries to lift leg unable to hold 3 seconds but remains standing independently  Torrez Total Score: 50  Dynamic Gait Index (DGI)  Gait Level Surface: Normal: walks 20', no assistive devices, good speed, no evidence for imbalance, normal gait  pattern  Change in Gait Speed: Normal: Able to smoothly change walking speed without loss of balance or gait deviation. Shows significant difference in walking speeds between normal, fast and slow paces.  Gait with Horizontal Head Turns: Normal: Performs head turns smoothly with no change in gait.  Gait with Vertical Head Turns: Normal: Performs head turns smoothly with no change in gait.  Gait and Pivot Turn: Mild impairment: pivot turns safely in >3 seconds and stops with no loss of balance.  Step Over Obstacle: Mild impairment: Is able to step over shoe box, but must slow down and adjust steps to clear box safely.  Step Around Obstacles: Mild impairment: Is able to step around both cones, but must slow down and adjust steps to clear cones.  Steps: Mild impairment: Alternating feet, must use rail.  Dynamic Gait Index Score: 20    Time Calculation:   Start Time: 1355  Stop Time: 1435  Time Calculation (min): 40 min  Total Timed Code Minutes- PT: 40 minute(s)  Timed Charges  26941 -  PT Neuromuscular Reeducation Minutes: 40  Total Minutes  Timed Charges Total Minutes: 40   Total Minutes: 40     Therapy Charges for Today       Code Description Service Date Service Provider Modifiers Qty    56642424606 HC PT NEUROMUSC RE EDUCATION EA 15 MIN 8/15/2023 Kiana Burrell, PT GP 3            PT G-Codes  Torrez Total Score: 50     OP PT Discharge Summary  Reason for Discharge: All goals achieved  Outcomes Achieved: Able to achieve all goals within established timeline  Discharge Destination: Home with home program        Kiana Burrell, RICHY  8/15/2023

## 2023-08-17 ENCOUNTER — APPOINTMENT (OUTPATIENT)
Dept: PHYSICAL THERAPY | Facility: HOSPITAL | Age: 63
End: 2023-08-17
Payer: MEDICARE

## 2023-08-21 ENCOUNTER — ANESTHESIA EVENT (OUTPATIENT)
Dept: PAIN MEDICINE | Facility: HOSPITAL | Age: 63
End: 2023-08-21

## 2023-08-21 ENCOUNTER — ANESTHESIA (OUTPATIENT)
Dept: PAIN MEDICINE | Facility: HOSPITAL | Age: 63
End: 2023-08-21

## 2023-08-21 ENCOUNTER — TELEPHONE (OUTPATIENT)
Dept: ORTHOPEDIC SURGERY | Facility: CLINIC | Age: 63
End: 2023-08-21

## 2023-08-21 NOTE — TELEPHONE ENCOUNTER
Caller: PATIENT    Relationship to patient: SELF    Best call back number: [phone]    Patient is needing: PATIENT WAS CALLING TO RESCHEDULE HER LEFT KNEE SURGERY WITH DR. TREVINO. PATIENT IS REQUESTING TO RESCHEDULE HER SURGERY FOR THE BEGINNING OF 2024. THANK YOU!

## 2023-08-22 ENCOUNTER — APPOINTMENT (OUTPATIENT)
Dept: PHYSICAL THERAPY | Facility: HOSPITAL | Age: 63
End: 2023-08-22
Payer: MEDICARE

## 2023-08-22 ENCOUNTER — HOSPITAL ENCOUNTER (OUTPATIENT)
Dept: GENERAL RADIOLOGY | Facility: HOSPITAL | Age: 63
Discharge: HOME | End: 2023-08-22
Payer: MEDICARE

## 2023-08-22 ENCOUNTER — ANESTHESIA (OUTPATIENT)
Dept: PAIN MEDICINE | Facility: HOSPITAL | Age: 63
End: 2023-08-22
Payer: MEDICARE

## 2023-08-22 ENCOUNTER — ANESTHESIA EVENT (OUTPATIENT)
Dept: PAIN MEDICINE | Facility: HOSPITAL | Age: 63
End: 2023-08-22
Payer: MEDICARE

## 2023-08-22 ENCOUNTER — HOSPITAL ENCOUNTER (OUTPATIENT)
Dept: PAIN MEDICINE | Facility: HOSPITAL | Age: 63
Discharge: HOME | End: 2023-08-22
Payer: MEDICARE

## 2023-08-22 VITALS
RESPIRATION RATE: 14 BRPM | DIASTOLIC BLOOD PRESSURE: 94 MMHG | TEMPERATURE: 97.8 F | OXYGEN SATURATION: 97 % | SYSTOLIC BLOOD PRESSURE: 158 MMHG | HEART RATE: 60 BPM

## 2023-08-22 DIAGNOSIS — M54.12 CERVICAL RADICULOPATHY: ICD-10-CM

## 2023-08-22 DIAGNOSIS — R52 PAIN: ICD-10-CM

## 2023-08-22 DIAGNOSIS — M54.2 CERVICALGIA: Primary | ICD-10-CM

## 2023-08-22 PROCEDURE — 77003 FLUOROGUIDE FOR SPINE INJECT: CPT

## 2023-08-22 PROCEDURE — 25010000002 DEXAMETHASONE SODIUM PHOSPHATE 10 MG/ML SOLUTION: Performed by: ANESTHESIOLOGY

## 2023-08-22 RX ORDER — FENTANYL CITRATE 50 UG/ML
50 INJECTION, SOLUTION INTRAMUSCULAR; INTRAVENOUS AS NEEDED
Status: DISCONTINUED | OUTPATIENT
Start: 2023-08-22 | End: 2023-08-23 | Stop reason: HOSPADM

## 2023-08-22 RX ORDER — MIDAZOLAM HYDROCHLORIDE 1 MG/ML
1 INJECTION INTRAMUSCULAR; INTRAVENOUS AS NEEDED
Status: DISCONTINUED | OUTPATIENT
Start: 2023-08-22 | End: 2023-08-23 | Stop reason: HOSPADM

## 2023-08-22 RX ORDER — LIDOCAINE HYDROCHLORIDE 10 MG/ML
1 INJECTION, SOLUTION INFILTRATION; PERINEURAL ONCE AS NEEDED
Status: DISCONTINUED | OUTPATIENT
Start: 2023-08-22 | End: 2023-08-23 | Stop reason: HOSPADM

## 2023-08-22 RX ORDER — SODIUM CHLORIDE 0.9 % (FLUSH) 0.9 %
1-10 SYRINGE (ML) INJECTION AS NEEDED
Status: DISCONTINUED | OUTPATIENT
Start: 2023-08-22 | End: 2023-08-23 | Stop reason: HOSPADM

## 2023-08-22 RX ORDER — DEXAMETHASONE SODIUM PHOSPHATE 10 MG/ML
10 INJECTION, SOLUTION INTRAMUSCULAR; INTRAVENOUS ONCE
Status: COMPLETED | OUTPATIENT
Start: 2023-08-22 | End: 2023-08-22

## 2023-08-22 RX ADMIN — DEXAMETHASONE SODIUM PHOSPHATE 10 MG: 10 INJECTION, SOLUTION INTRAMUSCULAR; INTRAVENOUS at 14:56

## 2023-08-22 NOTE — DISCHARGE INSTRUCTIONS
"Guide To Relieving And Avoiding Neck Pain    Exercise is important to help prevent and treat neck pain.  Good posture, exercise and avoiding injury will help to keep your neck healthy.    When the neck is strained or over worked symptoms may include headache, upper back pain, shoulder pain or arm pain.  Numbness or tingling in the fingers, dizziness or nausea may also occur.    Posture:    Avoid slumping over a desk.  Raise your work (including computer) to eye level to avoid bending at the neck.      Change Position Often:  Changing position prevents overuse of particular muscles.    Sleep On One Pillow:  Using to many pillows or to large of a pillow causes a \"kink\" in you neck.      Move and Exercise:  Living an active lifestyle is an important part of staying healthy.  Be sure to include the exercise to follow specifically for your neck.                    Range of Motion Exercises:  Do these exercises three times a day.  If you experience increased pain stop and contact your physician.  All exercises can be performed sitting or standing.        1.    2.   3.    1.  Place both hands behind you neck.  Gently tilt your neck backward so that you are looking at the ceiling.  Hold for a count of 10.    2.  Look straight facing forward.  Slowly tip your ear toward your right shoulder.  Do not force the motion.  Hold for a count of 10.  Bring head back to starting position and repeat to left side.    3.  Look straight facing forward.  Gently turn your head to the right.  Do not force the motion.  Hold for a count of 10.  Bring head back to starting position and repeat to the left side.    Exercises To Strengthen Muscles:  1.  Look straight facing forward.  Relax your shoulders.  Raise both shoulders toward your ears.  Hold for 3 seconds.       1.       2.   3.      1.  Look straight facing forward.  Relax your shoulders.  Raise both shoulders toward     2.  Raise your ars to your side and bend your elbows.  Squeeze " shoulder blades together as you rotate your arms outward.  Hold for 5 seconds.    3.  Look straight facing forward.  Pull your head straight back.  Do not tip or move your jaw.  Hold for 5 seconds.   EPIDURAL STEROID INJECTION          An epidural steroid injection is a shot of steroid medicine and numbing medicine that is given into the space between the spinal cord and the bones of the back (epidural space).  The injection helps relieve pain by an irritated or swollen nerve root.    TELL YOUR HEALTH CARE PROVIDER ABOUT:  Any allergies you have  All medicines you are taking including any over the counter medicines  Any blood disorders you have  Any surgeries you have had  Any medical conditions you have  Whether you are pregnant or may be pregnant    WHAT ARE THE RISK?  Generally, this is a safe procedure. However,problems may occur, including  Headache  Bleeding  Infection  Allergic Reaction  Nerve Damage    WHAT CAN I EXPECT AFTER THE PROCEDURE?    INJECTION SITE  Remove the Band-Aid/s after 24 hours  Check your injection site every day for signs of infection.  Check for:             Redness             Bleeding (small amt is normal)             Warmth             Pus or bad odor  Some numbness may be experienced for several hours following the procedure.  Avoid using heat on the injection site for 24 hours. You may use ice intermittently if needed by placing a         towel between your skin and the ice bag and using the ice for 20 minutes 2-3 times a day.  Do not take baths, swim or use a hot tub for 24 hours.    ACTIVITY  No strenuous activity for 24 hours then return to normal activity as tolerated.  If your leg is numb, no driving until full sensation and strength has returned.    GENERAL INSTRUCTIONS:  The injection site may feel numb, use ice with caution if numbness is present and no heat for 24 hours or until numbness is gone.   If you have numbness or weakness in your arm or leg, use those areas with  caution until normal sensation returns.  It is not uncommon to notice an increase in discomfort or a change in the location of discomfort for 3-4 days after the procedure.  If discomfort is noticed at the injection site, ice may be            applied to that area for 20 min 2-3 times a day.  Take the pain medicine your physician has prescribed or over the counter pain relievers as long as you do not have any contraindications.  If you are a diabetic, monitor your blood sugar closely.  The steroids used in your procedure may increase your blood sugar level up to 36 hours after the injection.  If your blood sugar is greater than 250, call the physician that helps you monitor your blood sugar.  Keep all follow-up visits as scheduled by your health care provider. This is important.    CONTACT OUR OFFICE IF:  You have any of these signs of infection            -Redness, swelling, or warmth around your injection site.            -Fluid or blood coming from your injection site (small amt of blood is normal)            -Pus or a bad odor from your injection site            -A fever  You develop a severe headache or a stiff neck  You lose control of your bladder or bowel movements      PAIN MANAGEMENT CENTER HOURS   Monday-Friday 7:30 am. - 4:00 pm.  For any problem related to your procedure we can be reached at [phone].  If you experience an emergency with your procedure, call [phone] or go to the emergency room.

## 2023-08-22 NOTE — ANESTHESIA PROCEDURE NOTES
PAIN Epidural block      Patient reassessed immediately prior to procedure    Patient location during procedure: pain clinic  Indication:procedure for pain  Performed By  Anesthesiologist: Javier Savage MD  Preanesthetic Checklist  Completed: patient identified and risks and benefits discussed  Additional Notes  Diagnosis:    Post-Op Diagnosis Codes:     * Cervical radiculopathy [M54.12]    Sedation: none    Sedation time:    A cervical epidural steroid injection with fluoroscopic guidance and an Isovue dye epidurogram was performed.  Under fluoroscopic guidance, the epidural space was identified and accessed, confirmed by loss of resistance to saline followed by injection of Isovue dye, which shows a good epidurogram.  The above medications were injected uneventfully.    Prep:  Pt Position:prone  Sterile Tech:cap, gloves, mask and sterile barrier  Prep:chlorhexidine gluconate and isopropyl alcohol  Monitoring:blood pressure monitoring, continuous pulse oximetry and EKG  Procedure:Sedation: no     Approach:midline  Guidance: fluoroscopy  Location:cervical  Level:6-7  Needle Type:Tuohy  Needle Gauge:20  Aspiration:negative  Medications:  Isovue:1mL  Comments:Isovue dye reveals good epidurogram    Decadron 10 mg preservative-free  Post Assessment:  Pt Tolerance:patient tolerated the procedure well with no apparent complications  Complications:no

## 2023-08-22 NOTE — H&P
HPI:  The patient returns for another Cervical epidural steroid injection today.  They have received at least 50% improvement since their last injection with a pain level of 4/10 at its worst recently.    Conservative measures tried in the interim rest    Current Outpatient Medications on File Prior to Encounter   Medication Sig Dispense Refill    ARIPiprazole (ABILIFY) 20 MG tablet Take 1 tablet by mouth Daily.      ASPIRIN 81 PO Take  by mouth.      atorvastatin (Lipitor) 40 MG tablet Take 1 tablet by mouth Daily. 90 tablet 3    B Complex Vitamins (VITAMIN B COMPLEX PO) Take  by mouth.      buPROPion XL (WELLBUTRIN XL) 300 MG 24 hr tablet Take 1 tablet by mouth Daily.      calcium carbonate (OS-STEVIE) 600 MG tablet Take 1 tablet by mouth Daily.      Cholecalciferol 25 MCG (1000 UT) capsule Take 1 capsule by mouth 2 (Two) Times a Day.      Ferrous Sulfate (IRON PO) Take  by mouth.      gabapentin (NEURONTIN) 300 MG capsule TAKE ONE CAPSULE BY MOUTH THREE TIMES A  capsule 0    hydrOXYzine (ATARAX) 50 MG tablet As Needed.      isosorbide mononitrate (IMDUR) 30 MG 24 hr tablet Take 1 tablet by mouth Daily. 90 tablet 2    lamoTRIgine (LaMICtal) 150 MG tablet Take 2 tablets by mouth Daily.      levothyroxine (SYNTHROID, LEVOTHROID) 88 MCG tablet TAKE ONE TABLET BY MOUTH EVERY MORNING 90 tablet 1    Melatonin 10 MG capsule Take  by mouth.      metoprolol succinate XL (Toprol XL) 25 MG 24 hr tablet Take 1 tablet by mouth Daily. For bp 90 tablet 3    oxybutynin XL (DITROPAN XL) 15 MG 24 hr tablet Take 1 tablet by mouth Daily.      pantoprazole (PROTONIX) 40 MG EC tablet Take 1 tablet by mouth 2 (Two) Times a Day. 180 tablet 3    pramipexole (MIRAPEX) 0.5 MG tablet Take 1 tablet by mouth Every Night for 360 days. 30 tablet 5    QUEtiapine (SEROquel) 25 MG tablet       venlafaxine XR (EFFEXOR-XR) 150 MG 24 hr capsule Take 1 capsule by mouth Daily. 90 capsule 1    vitamin B-12 (CYANOCOBALAMIN) 1000 MCG tablet TAKE ONE  TABLET BY MOUTH TWICE A DAY 60 tablet 4     No current facility-administered medications on file prior to encounter.       Past Medical History:   Diagnosis Date    Abnormal Pap smear of cervix     Anemia     Ankle sprain     Anxiety     Arthritis     Arthritis of back     Arthritis of neck     Atherosclerotic heart disease of native coronary artery with other forms of angina pectoris     Bipolar I disorder, single manic episode     depressive d(NOS)    Cervical disc herniation     Cervical dysplasia     Coronary artery disease     COVID-19 01/10/2023    CTS (carpal tunnel syndrome)     Depression     NO SUICIDAL PLANS    Difficulty swallowing     Dislocation of finger     Diverticular disease     Dyspepsia     Dysphagia     Fracture of wrist     Fracture, finger     Fracture, foot     Fracture, radius     Gastric reflux     GERD (gastroesophageal reflux disease)     Heart attack     Heart murmur     Hiatal hernia     High cholesterol     History of transfusion     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    HPV positive on pap LGSIL    Hyperlipidemia     Hypertension     Hypothyroidism     Incontinence in female     wears pads    Kidney disease 2017    stage 2     Kidney disease, chronic, stage III (GFR 30-59 ml/min)     Knee swelling     LGSIL on Pap smear of cervix 04/29/2016    LGSIL HPV positive    Lumbosacral disc disease Unsure    Lower left back is partial osteoarthritis and partial osteoporosis    Myocardial infarction 2000    Neck pain     Neck strain     Obesity     Osteopenia 2021    Bone density test    Past myocardial infarction 05/2000    Periodic limb movement disorder     Renal insufficiency 2018    Restless leg syndrome     Rotator cuff syndrome     Sleep apnea     cpap    Stress fracture     LEFT HEEL    Suicidal ideation 08/19/2016    history    Swelling of ankle     right had doppler no s/s blood clot    Tear of meniscus of knee     Thyroid nodule     Vitamin B12 deficiency     Wrist  sprain        Positive ROCKY screen/DX:  2 or more mitigating factors  non-supine position, no narcs    Review of systems negative for hematologic, infectious or constitutional symptoms    Exam:  LMP 10/21/2016 (Approximate) Comment: 54  Airway [place] 2  Alert and oriented    Diagnosis:    Post-Op Diagnosis Codes:     * Cervical radiculopathy [M54.12]    Plan:  Cervical 6 epidural steroid injection under fluoroscopic guidance    I have encouraged them to continue:  1.  Physical therapy exercises at home as prescribed by physical therapy or from the pain clinic handout.  Continuation of these exercises every day, or multiple times per week, even when the patient has good pain relief, was stressed to the patient as a preventative measure to decrease the frequency and severity of future pain episodes.  2.  Continue pain medicines as already prescribed.  If patient not currently taking any, it is recommended to begin Acetaminophen 1000 mg po q 8 hours.  If other medicines containing Acetaminophen are currently prescribed, maintain daily dose at 3000mg.    3.  If they can tolerate NSAIDS, it is recommended to take Ibuprofen 600 mg po q 6 hours for 7 days during pain exacerbations.  Alternatively, they may substitute an NSAID of their choice (e.g. Aleve)  4.  Heat and ice to the affected area as tolerated for pain control.  5.  Low impact exercise such as walking or water exercise was recommended to maintain overall health and aid in weight control.   6.  Follow up as needed for subsequent injections.

## 2023-08-24 ENCOUNTER — APPOINTMENT (OUTPATIENT)
Dept: PHYSICAL THERAPY | Facility: HOSPITAL | Age: 63
End: 2023-08-24
Payer: MEDICARE

## 2023-08-29 ENCOUNTER — APPOINTMENT (OUTPATIENT)
Dept: PHYSICAL THERAPY | Facility: HOSPITAL | Age: 63
End: 2023-08-29
Payer: MEDICARE

## 2023-08-29 ENCOUNTER — HOSPITAL ENCOUNTER (EMERGENCY)
Facility: HOSPITAL | Age: 63
Discharge: HOME | End: 2023-08-30
Attending: STUDENT IN AN ORGANIZED HEALTH CARE EDUCATION/TRAINING PROGRAM
Payer: MEDICARE

## 2023-08-29 ENCOUNTER — APPOINTMENT (OUTPATIENT)
Dept: GENERAL RADIOLOGY | Facility: HOSPITAL | Age: 63
End: 2023-08-29
Payer: MEDICARE

## 2023-08-29 VITALS
SYSTOLIC BLOOD PRESSURE: 125 MMHG | TEMPERATURE: 98.6 F | OXYGEN SATURATION: 93 % | RESPIRATION RATE: 16 BRPM | HEIGHT: 63 IN | DIASTOLIC BLOOD PRESSURE: 79 MMHG | WEIGHT: 203 LBS | HEART RATE: 72 BPM | BODY MASS INDEX: 35.97 KG/M2

## 2023-08-29 DIAGNOSIS — R00.2 PALPITATIONS: ICD-10-CM

## 2023-08-29 DIAGNOSIS — R07.9 CHEST PAIN, UNSPECIFIED TYPE: Primary | ICD-10-CM

## 2023-08-29 LAB
ALBUMIN SERPL-MCNC: 4 G/DL (ref 3.5–5.2)
ALBUMIN/GLOB SERPL: 1.7 G/DL
ALP SERPL-CCNC: 73 U/L (ref 39–117)
ALT SERPL W P-5'-P-CCNC: 19 U/L (ref 1–33)
ANION GAP SERPL CALCULATED.3IONS-SCNC: 10.8 MMOL/L (ref 5–15)
AST SERPL-CCNC: 17 U/L (ref 1–32)
BASOPHILS # BLD AUTO: 0.02 10*3/MM3 (ref 0–0.2)
BASOPHILS NFR BLD AUTO: 0.2 % (ref 0–1.5)
BILIRUB SERPL-MCNC: 0.3 MG/DL (ref 0–1.2)
BUN SERPL-MCNC: 13 MG/DL (ref 8–23)
BUN/CREAT SERPL: 12.4 (ref 7–25)
CALCIUM SPEC-SCNC: 9.2 MG/DL (ref 8.6–10.5)
CHLORIDE SERPL-SCNC: 105 MMOL/L (ref 98–107)
CO2 SERPL-SCNC: 24.2 MMOL/L (ref 22–29)
CREAT SERPL-MCNC: 1.05 MG/DL (ref 0.57–1)
D DIMER PPP FEU-MCNC: 0.28 MCGFEU/ML (ref 0–0.61)
DEPRECATED RDW RBC AUTO: 45.9 FL (ref 37–54)
EGFRCR SERPLBLD CKD-EPI 2021: 60.6 ML/MIN/1.73
EOSINOPHIL # BLD AUTO: 0.28 10*3/MM3 (ref 0–0.4)
EOSINOPHIL NFR BLD AUTO: 3 % (ref 0.3–6.2)
ERYTHROCYTE [DISTWIDTH] IN BLOOD BY AUTOMATED COUNT: 12.9 % (ref 12.3–15.4)
GEN 5 2HR TROPONIN T REFLEX: 14 NG/L
GLOBULIN UR ELPH-MCNC: 2.4 GM/DL
GLUCOSE SERPL-MCNC: 84 MG/DL (ref 65–99)
HCT VFR BLD AUTO: 39.7 % (ref 34–46.6)
HGB BLD-MCNC: 12.8 G/DL (ref 12–15.9)
IMM GRANULOCYTES # BLD AUTO: 0.03 10*3/MM3 (ref 0–0.05)
IMM GRANULOCYTES NFR BLD AUTO: 0.3 % (ref 0–0.5)
LYMPHOCYTES # BLD AUTO: 4.26 10*3/MM3 (ref 0.7–3.1)
LYMPHOCYTES NFR BLD AUTO: 45.5 % (ref 19.6–45.3)
MAGNESIUM SERPL-MCNC: 2.4 MG/DL (ref 1.6–2.4)
MCH RBC QN AUTO: 30.8 PG (ref 26.6–33)
MCHC RBC AUTO-ENTMCNC: 32.2 G/DL (ref 31.5–35.7)
MCV RBC AUTO: 95.7 FL (ref 79–97)
MONOCYTES # BLD AUTO: 0.53 10*3/MM3 (ref 0.1–0.9)
MONOCYTES NFR BLD AUTO: 5.7 % (ref 5–12)
NEUTROPHILS NFR BLD AUTO: 4.24 10*3/MM3 (ref 1.7–7)
NEUTROPHILS NFR BLD AUTO: 45.3 % (ref 42.7–76)
NT-PROBNP SERPL-MCNC: 303.9 PG/ML (ref 0–900)
PLATELET # BLD AUTO: 249 10*3/MM3 (ref 140–450)
PMV BLD AUTO: 9.9 FL (ref 6–12)
POTASSIUM SERPL-SCNC: 3.9 MMOL/L (ref 3.5–5.2)
PROT SERPL-MCNC: 6.4 G/DL (ref 6–8.5)
QT INTERVAL: 408 MS
QTC INTERVAL: 425 MS
RBC # BLD AUTO: 4.15 10*6/MM3 (ref 3.77–5.28)
SODIUM SERPL-SCNC: 140 MMOL/L (ref 136–145)
TROPONIN T DELTA: 0 NG/L
TROPONIN T SERPL HS-MCNC: 14 NG/L
WBC NRBC COR # BLD: 9.36 10*3/MM3 (ref 3.4–10.8)

## 2023-08-29 PROCEDURE — 80053 COMPREHEN METABOLIC PANEL: CPT | Performed by: STUDENT IN AN ORGANIZED HEALTH CARE EDUCATION/TRAINING PROGRAM

## 2023-08-29 PROCEDURE — 96360 HYDRATION IV INFUSION INIT: CPT

## 2023-08-29 PROCEDURE — 83880 ASSAY OF NATRIURETIC PEPTIDE: CPT | Performed by: EMERGENCY MEDICINE

## 2023-08-29 PROCEDURE — 85025 COMPLETE CBC W/AUTO DIFF WBC: CPT | Performed by: STUDENT IN AN ORGANIZED HEALTH CARE EDUCATION/TRAINING PROGRAM

## 2023-08-29 PROCEDURE — 36415 COLL VENOUS BLD VENIPUNCTURE: CPT

## 2023-08-29 PROCEDURE — 85379 FIBRIN DEGRADATION QUANT: CPT | Performed by: EMERGENCY MEDICINE

## 2023-08-29 PROCEDURE — 84484 ASSAY OF TROPONIN QUANT: CPT | Performed by: STUDENT IN AN ORGANIZED HEALTH CARE EDUCATION/TRAINING PROGRAM

## 2023-08-29 PROCEDURE — 93005 ELECTROCARDIOGRAM TRACING: CPT | Performed by: STUDENT IN AN ORGANIZED HEALTH CARE EDUCATION/TRAINING PROGRAM

## 2023-08-29 PROCEDURE — 71046 X-RAY EXAM CHEST 2 VIEWS: CPT

## 2023-08-29 PROCEDURE — 99284 EMERGENCY DEPT VISIT MOD MDM: CPT

## 2023-08-29 PROCEDURE — 93005 ELECTROCARDIOGRAM TRACING: CPT | Performed by: EMERGENCY MEDICINE

## 2023-08-29 PROCEDURE — 83735 ASSAY OF MAGNESIUM: CPT | Performed by: EMERGENCY MEDICINE

## 2023-08-29 RX ADMIN — SODIUM CHLORIDE 500 ML: 9 INJECTION, SOLUTION INTRAVENOUS at 23:26

## 2023-08-29 NOTE — FSED PROVIDER NOTE
"Subjective   History of Present Illness  Patient is a 63-year female with a history of heart stents that were placed in 2000.  She takes aspirin but no other blood thinners.  She has a history of high blood pressure, high cholesterol and she vapes.  She presents emergency room with a chest pain that started at 4 PM while she was cooking.  She says she has had\" a little\" as of breath.  Gets worse when she is walking or when she leans back.  She also mentioned that she had some issues with her bowels today but said that is chronic and no worse than baseline.    Review of Systems   Constitutional: Negative.    HENT: Negative.     Eyes: Negative.    Respiratory:  Positive for shortness of breath.    Cardiovascular:  Positive for chest pain. Negative for palpitations and leg swelling.   Gastrointestinal: Negative.    Genitourinary: Negative.    Musculoskeletal: Negative.    Neurological:         Episodes of being off balance is baseline for her   Hematological: Negative.    Psychiatric/Behavioral: Negative.       Past Medical History:   Diagnosis Date    Abnormal Pap smear of cervix     Anemia     Ankle sprain     Anxiety     Arthritis     Arthritis of back     Arthritis of neck     Atherosclerotic heart disease of native coronary artery with other forms of angina pectoris     Bipolar I disorder, single manic episode     depressive d(NOS)    Cervical disc herniation     Cervical dysplasia     Coronary artery disease     COVID-19 01/10/2023    CTS (carpal tunnel syndrome)     Depression     NO SUICIDAL PLANS    Difficulty swallowing     Dislocation of finger     Diverticular disease     Dyspepsia     Dysphagia     Fracture of wrist     Fracture, finger     Fracture, foot     Fracture, radius     Gastric reflux     GERD (gastroesophageal reflux disease)     Heart attack     Heart murmur     Hiatal hernia     High cholesterol     History of transfusion     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    HPV positive on " pap LGSIL    Hyperlipidemia     Hypertension     Hypothyroidism     Incontinence in female     wears pads    Kidney disease 2017    stage 2     Kidney disease, chronic, stage III (GFR 30-59 ml/min)     Knee swelling     LGSIL on Pap smear of cervix 04/29/2016    LGSIL HPV positive    Lumbosacral disc disease Unsure    Lower left back is partial osteoarthritis and partial osteoporosis    Myocardial infarction 2000    Neck pain     Neck strain     Obesity     Osteopenia 2021    Bone density test    Past myocardial infarction 05/2000    Periodic limb movement disorder     Renal insufficiency 2018    Restless leg syndrome     Rotator cuff syndrome     Sleep apnea     cpap    Stress fracture     LEFT HEEL    Suicidal ideation 08/19/2016    history    Swelling of ankle     right had doppler no s/s blood clot    Tear of meniscus of knee     Thyroid nodule     Vitamin B12 deficiency     Wrist sprain        No Known Allergies    Past Surgical History:   Procedure Laterality Date    ADENOIDECTOMY  1974    ANTERIOR CERVICAL DISCECTOMY W/ FUSION N/A 12/29/2016    Procedure: C3-4 anterior cervical discectomy and fusion with Depuy micro plate, ALLOGRAFT C3-4, AND HARDWARE REMOVAL C4-7.;  Surgeon: Hema Godwin MD;  Location: [facility];  Service:     BARIATRIC SURGERY  2018    CARDIAC CATHETERIZATION N/A 03/30/2017    Procedure: Left Heart Cath;  Surgeon: Tracey Vargas MD;  Location: [facility] CATH INVASIVE LOCATION;  Service:     CARDIAC CATHETERIZATION N/A 03/30/2017    Procedure: Coronary angiography;  Surgeon: Tracey Vargas MD;  Location: [facility] CATH INVASIVE LOCATION;  Service:     CARDIAC CATHETERIZATION N/A 03/30/2017    Procedure: Left ventriculography;  Surgeon: Tracey Vargas MD;  Location: [facility] CATH INVASIVE LOCATION;  Service:     CARDIAC CATHETERIZATION  03/30/2017    Procedure: Functional Flow Warren;  Surgeon: Tracey Vargas MD;  Location: [facility] CATH INVASIVE LOCATION;  Service:     CARPAL TUNNEL  RELEASE      CATARACT EXTRACTION      CERVICAL BIOPSY  MMXVI    Dr. Jeffery.     CERVICAL DISCECTOMY ANTERIOR  04/2013    C4-7    COLONOSCOPY  04/20/2015    Diverticulosis, IH    COLONOSCOPY  03/09/2021    COLPOSCOPY W/ BIOPSY / CURETTAGE  06/17/2016    LGSIL HPV positive. Results were normal repeat pap in one year. Chronic Cervicitis    CORONARY ANGIOPLASTY WITH STENT PLACEMENT      CORONARY STENT PLACEMENT  May 2000    ENDOSCOPY  MMXV    Normal.  Dr. Rodriguez    ENDOSCOPY N/A 06/22/2017    Erythematous mucosa in the stomach  PATH: Chronic active gastritis, moderate with intestinal metaplasia     EYE SURGERY  2022    Cateract surgery    GASTRIC BYPASS      Done using the sleeve technique    GASTRIC RESTRICTION SURGERY      HAND SURGERY  Carpal tunnel    HARDWARE REMOVAL  12/29/2016    cervical    HERNIA REPAIR      INGUINAL HERNIA REPAIR      LAPAROSCOPIC GASTRIC BANDING  02/2018    NECK SURGERY      SHOULDER SURGERY Left     RCR    TONSILLECTOMY      TONSILLECTOMY AND ADENOIDECTOMY      TRANSVAGINAL TAPING SUSPENSION N/A 12/06/2017    Procedure: MID URETHRAL SLING CYSTSCOPY;  Surgeon: Abby Méndez MD;  Location: [facility];  Service:     TRIGGER POINT INJECTION      TUBAL ABDOMINAL LIGATION      TYMPANOSTOMY TUBE PLACEMENT Right     UPPER GASTROINTESTINAL ENDOSCOPY  approx 2021    WRIST SURGERY Bilateral     carpal tunnel    WRIST SURGERY      L wrist/ 4/2020       Family History   Problem Relation Age of Onset    Diabetes type II Mother     Hypertension Mother     Osteoporosis Mother     Seizures Mother     Diabetes Mother     Arthritis Mother     Hyperlipidemia Mother     COPD Father     Hypertension Father     Lung cancer Father     Heart attack Father     Liver cancer Father     Liver disease Father     Heart disease Father     Cancer Father         Lung cacer that metastasized  into the liver.    Thyroid disease Sister     Hypertension Sister     Bipolar disorder Sister     Depression Sister      ADD / ADHD Sister     Anxiety disorder Sister     Mental illness Sister     Hyperlipidemia Sister     Broken bones Sister     No Known Problems Son     Abnormal EKG Daughter     Hypertension Daughter     Bipolar disorder Daughter     Thyroid disease Daughter     Mental illness Daughter     Hyperlipidemia Daughter     No Known Problems Paternal Grandfather     No Known Problems Paternal Grandmother     Cancer Maternal Grandmother     No Known Problems Maternal Grandfather     Thyroid disease Sister     Hypertension Sister     Bipolar disorder Sister     Anxiety disorder Sister     Depression Sister     Mental illness Sister     Hyperlipidemia Sister     Asthma Daughter     Diabetes Son         Type 1    Breast cancer Neg Hx     Ovarian cancer Neg Hx     Uterine cancer Neg Hx     Colon cancer Neg Hx     Malig Hyperthermia Neg Hx        Social History     Socioeconomic History    Marital status:     Number of children: 3    Years of education: 12   Tobacco Use    Smoking status: Light Smoker     Types: Electronic Cigarette     Passive exposure: Never    Smokeless tobacco: Current    Tobacco comments:     Electronic Cigarette   Vaping Use    Vaping Use: Every day    Substances: Nicotine, Flavoring    Devices: Refillable tank   Substance and Sexual Activity    Alcohol use: Not Currently    Drug use: Never    Sexual activity: Not Currently     Partners: Male     Birth control/protection: Condom, Abstinence, Post-menopausal, None, Tubal ligation           Objective   Physical Exam  Vitals reviewed.   Constitutional:       Appearance: She is well-developed.   HENT:      Head: Normocephalic.   Cardiovascular:      Rate and Rhythm: Normal rate and regular rhythm.      Heart sounds: Normal heart sounds.   Pulmonary:      Effort: Pulmonary effort is normal.      Breath sounds: Normal breath sounds.   Chest:      Chest wall: No mass, tenderness or edema.   Abdominal:      General: Bowel sounds are normal.       Palpations: Abdomen is soft.   Musculoskeletal:         General: Normal range of motion.   Skin:     General: Skin is warm and dry.   Neurological:      General: No focal deficit present.      Mental Status: She is alert.   Psychiatric:         Mood and Affect: Mood normal.         Behavior: Behavior normal.       ECG 12 Lead      Date/Time: 8/29/2023 6:17 PM  Performed by: Shreya Sotomayor PA-C  Authorized by: Marques Lock MD   Interpreted by physician  Comparison: compared with previous ECG from 4/30/2023  Similar to previous ECG  Rhythm: sinus rhythm  Rate: normal  BPM: 65  QRS axis: normal  Conduction: left bundle branch block  Comments: Short RR             ED Course                                           Medical Decision Making  Patient's chest pain started today around 4 PM while she was cooking.  She said she has had mild shortness of breath.  Things are worse when she walks or leans back.  She does not have risk factors for PE.  Troponin is slightly elevated.  No ST elevation on EKG.  Repeat will be done.  I it is shift change and care to Dr. Lock.    Amount and/or Complexity of Data Reviewed  Labs: ordered.  Radiology: ordered.  ECG/medicine tests: ordered and independent interpretation performed.        Final diagnoses:   None       ED Disposition  ED Disposition       None            No follow-up provider specified.       Medication List      No changes were made to your prescriptions during this visit.

## 2023-08-30 ENCOUNTER — TELEPHONE (OUTPATIENT)
Dept: CARDIOLOGY | Facility: CLINIC | Age: 63
End: 2023-08-30
Payer: MEDICARE

## 2023-08-30 LAB
QT INTERVAL: 397 MS
QTC INTERVAL: 416 MS

## 2023-08-30 NOTE — TELEPHONE ENCOUNTER
Caller: Keri Bhagat    Relationship to patient: Self    Best call back number: [phone]    Chief complaint: CHEST PAIN & PALPITATIONS    Type of visit: FOLLOW UP FROM ER VISIT    Requested date: ASAP     Additional notes: PT SAID SHE WAS THROWING PVC'S EVERY ONCE IN A WHILE, AND SHE SAID THE DOCTOR SAID HER CARDIAC ENZYMES WERE ELEVATED WHEN THEY TOOK HER LABS. PT SAID HER HEART RATE IS STILLL ELEVATED AND SHE DOESN'T REALLY FEEL WELL, SHE'D LIKE TO BE SEEN ASAP.

## 2023-08-30 NOTE — FSED PROVIDER NOTE
See initial note for complete HPI, ROS, and physical exam.    ED Course as of 08/30/23 0009   Tue Aug 29, 2023   2011 Patient received in signout from Shreya Sotomayor. [DH]   2304 Patient reassessed, she is feeling well and wanting to go home and her cardiac enzymes were stable, but her heart rate for some reason is 117.  Will add on D-dimer to rule out PE. [DH]   Wed Aug 30, 2023   0004 Patient reexamined again, she remains well-appearing and her heart rate is normal now.  She states she has been having short episodes of palpitations but is otherwise asymptomatic.  She continues to decline admission and wants to go home.  Troponin stable without evidence for ACS.  D-dimer and other add on labs normal.  No other emergent conditions identified.  Further testing does not appear indicated.  Strict return precautions discussed. [DH]   0009 Patient informed of lap band abnormality on x-ray, will follow-up with her GI doctor.  She is denying difficulty eating or swallowing or other concerns. [DH]      ED Course User Index  [DH] Marques Lock MD     XR Chest PA & Lateral   Final Result

## 2023-08-30 NOTE — ED NOTES
This RN asked repeat trop was not resulted yet, lab reported they spun it down but did not run the trop. Trop being ran now, Provider made aware.

## 2023-08-30 NOTE — DISCHARGE INSTRUCTIONS
Your Lap-Band may have been slightly malpositioned on today's chest x-ray.  Please follow-up with your GI doctor for further evaluation and fluoroscopy, or return to the ER if you have any difficulty eating or swallowing..

## 2023-08-31 ENCOUNTER — OFFICE VISIT (OUTPATIENT)
Dept: CARDIOLOGY | Facility: CLINIC | Age: 63
End: 2023-08-31
Payer: MEDICARE

## 2023-08-31 ENCOUNTER — APPOINTMENT (OUTPATIENT)
Dept: PHYSICAL THERAPY | Facility: HOSPITAL | Age: 63
End: 2023-08-31
Payer: MEDICARE

## 2023-08-31 VITALS
SYSTOLIC BLOOD PRESSURE: 124 MMHG | DIASTOLIC BLOOD PRESSURE: 80 MMHG | WEIGHT: 205 LBS | HEIGHT: 60 IN | OXYGEN SATURATION: 97 % | BODY MASS INDEX: 40.25 KG/M2 | HEART RATE: 72 BPM

## 2023-08-31 DIAGNOSIS — K95.09 GASTRIC BAND SLIPPAGE: ICD-10-CM

## 2023-08-31 DIAGNOSIS — I25.10 ATHEROSCLEROSIS OF NATIVE CORONARY ARTERY OF NATIVE HEART WITHOUT ANGINA PECTORIS: ICD-10-CM

## 2023-08-31 DIAGNOSIS — R07.89 CHEST PAIN, ATYPICAL: Primary | ICD-10-CM

## 2023-08-31 DIAGNOSIS — I10 ESSENTIAL HYPERTENSION: ICD-10-CM

## 2023-08-31 NOTE — PROGRESS NOTES
CARDIOLOGY        Patient Name: Keri Bhagat  :1960  Age: 63 y.o.  Primary Cardiologist: David Burroughs MD  Encounter Provider:  DAPHNEY Nolasco    Date of Service: 23      CHIEF COMPLAINT / REASON FOR OFFICE VISIT     Follow-up and Chest Pain      HISTORY OF PRESENT ILLNESS       HPI  Keri Bhagat is a 63 y.o. female who presents today for emergency department reevaluation.     Pt has a  history significant for CAD, hypertension.    Review of medical records reveals patient was evaluated in the freestanding emergency department on [facility] on 2023.  Patient presents with chest discomfort that occurred at 4 PM while cooking.  Reports that pain is worse with exertion.  I have personally reviewed ECG tracings which reveals no ischemic changes.  During emergency department assessment patient did have elevated heart rates with heart rate in the 1 teens very briefly.  She did report episodes of heart palpitations at that time.  Troponin remains flat.  D-dimer and other labs were unremarkable.  Chest x-ray did reveal a gastric lap band which is slightly more horizontal in configuration compared to .  Concern for malpositioning.  ED physician recommended follow-up with gastroenterology.    Patient reports that she presented to the emergency department for midsternum chest burning sensation.  She was told that she was having increase in PVCs and she did have a short burst of tachycardia.  She reports that pain has improved and subsided.  Patient reports that she is having balance issues and when she bends and comes up she gets lightheaded.  Neurosurgery follows patient for meningioma and cyst on pituitary gland, they have ordered PT.      The following portions of the patient's history were reviewed and updated as appropriate: allergies, current medications, past family history, past medical history, past social history, past surgical history and problem  "list.      VITAL SIGNS     Visit Vitals  /80   Pulse 72   Ht 152.4 cm (60\")   Wt 93 kg (205 lb)   LMP 10/21/2016 (Approximate) Comment: 54   SpO2 97%   BMI 40.04 kg/mý         Wt Readings from Last 3 Encounters:   08/31/23 93 kg (205 lb)   08/29/23 92.1 kg (203 lb)   08/09/23 91.4 kg (201 lb 9.6 oz)     Body mass index is 40.04 kg/mý.      REVIEW OF SYSTEMS   Review of Systems   Constitutional: Negative for chills, fever, weight gain and weight loss.   Cardiovascular:  Positive for chest pain. Negative for leg swelling.   Respiratory:  Negative for cough, snoring and wheezing.    Hematologic/Lymphatic: Negative for bleeding problem. Does not bruise/bleed easily.   Skin:  Negative for color change.   Musculoskeletal:  Negative for falls, joint pain and myalgias.   Gastrointestinal:  Positive for heartburn. Negative for melena.   Genitourinary:  Negative for hematuria.   Neurological:  Negative for excessive daytime sleepiness.   Psychiatric/Behavioral:  Negative for depression. The patient is not nervous/anxious.          PHYSICAL EXAMINATION     Vitals and nursing note reviewed.   Constitutional:       Appearance: Normal appearance. Well-developed.   Eyes:      Conjunctiva/sclera: Conjunctivae normal.   Neck:      Vascular: No carotid bruit.   Pulmonary:      Breath sounds: Normal breath sounds.   Cardiovascular:      Normal rate. Regular rhythm. Normal S1 with normal intensity. Normal S2 with normal intensity.       Murmurs: There is no murmur.      No gallop.  No click. No rub.   Edema:     Peripheral edema absent.   Musculoskeletal: Normal range of motion. Skin:     General: Skin is warm and dry.   Neurological:      Mental Status: Alert and oriented to person, place, and time.      GCS: GCS eye subscore is 4. GCS verbal subscore is 5. GCS motor subscore is 6.   Psychiatric:         Speech: Speech normal.         Behavior: Behavior normal.         Thought Content: Thought content normal.         Judgment: " Judgment normal.         REVIEWED DATA     Procedures    Cardiac Procedures:  Cardiac catheterization 3/30/2017.  Left main 0% stenosis, LAD 20% mid stenosis, distal LAD 30% stenosis.  Ramus 0% stenosis, 80% stenosis of a small secondary branch.  Left circumflex 10% stenosis.  RCA 50-60% stenosis.  PDA 30% stenosis.  Echocardiogram 8/27/2021.  LVEF 56-60%.  Normal LV cavity size noted.  Diastolic function normal.  Myocardial perfusion stress test 8/27/2021.  Normal myocardial perfusion study without evidence of ischemia.    Lipid Panel          9/15/2022    12:04 3/7/2023    15:01 7/11/2023    11:05   Lipid Panel   Total Cholesterol 183  172  181    Triglycerides 97  139  86    HDL Cholesterol 72  75  76    VLDL Cholesterol 17  24  16    LDL Cholesterol  94  73  89        ASSESSMENT & PLAN     Diagnoses and all orders for this visit:    1. Chest pain, atypical (Primary)  Patient experiencing atypical chest pain that she describes as a midsternal burning sensation consistent with heartburn  She is taking PPI  Chest x-ray in ED revealed malpositioning of patient's gastric band which I think is more consistent with her symptoms  Troponin remains flat.  Nonischemic changes on ECG    2. Gastric band slippage  Noted concern for malpositioning of gastric band on chest x-ray in ED  Recommended follow-up with bariatric surgeon.  Patient is unsure if bariatric surgeon is still practicing, if he has not she will follow-up with her GI physician.    3. Essential hypertension  BP controlled at 120/80  Continue metoprolol succinate    4. Atherosclerosis of native coronary artery of native heart without angina pectoris  ECG nonischemic  Continue atorvastatin, isosorbide mononitrate, metoprolol succinate        Return in about 1 year (around 8/31/2024) for Dr. Burroughs- Routine.    Future Appointments         Provider Department [place]    9/5/2023 8:00 AM TREY [facility] MRI 1 [facility]    9/5/2023 9:00 AM TREY  [facility] MAMM 1 [facility] MAMMOGRAPHY [place]    9/5/2023 2:15 PM Dona Black PA [facility] OBGYN [facility]    9/15/2023 1:40 PM Ajay Gutierrez MD [facility] ORTHOPEDICS [facility]    9/19/2023 2:00 PM Maxwell Martin MD [facility] ENDOCRINOLOGY [facility]    11/30/2023 10:00 AM TREATMENT RM 1 [facility] PAIN [facility] PAIN MGT [facility]    1/19/2024 3:30 PM Epley, James, APRN [facility] PRIMARY CARE [facility]    3/4/2024 9:00 AM TREY [facility] DEXA 1 [facility] BONE DENSITY [place]    5/24/2024 3:00 PM TREY [facility] CT 1 [facility] CT [place]    5/24/2024 3:30 PM TREY [facility] MRI 1 [facility] MRI [place]    5/29/2024 2:30 PM Bob Mercado MD [facility] NEUROSURGERY [facility]    9/6/2024 11:00 AM David Burroughs MD [facility] CARDIOLOGY [facility]                MEDICATIONS         Discharge Medications            Accurate as of August 31, 2023 12:16 PM. If you have any questions, ask your nurse or doctor.                Continue These Medications        Instructions Start Date   ARIPiprazole 20 MG tablet  Commonly known as: ABILIFY   20 mg, Oral, Daily      ASPIRIN 81 PO   1 tablet, Oral, Daily      atorvastatin 40 MG tablet  Commonly known as: Lipitor   40 mg, Oral, Daily      buPROPion  MG 24 hr tablet  Commonly known as: WELLBUTRIN XL   300 mg, Oral, Daily      calcium carbonate 600 MG tablet  Commonly known as: OS-STEVIE   600 mg, Oral, Daily      Cholecalciferol 25 MCG (1000 UT) capsule   1 capsule, Oral, 2 Times Daily      gabapentin 300 MG capsule  Commonly known as: NEURONTIN   TAKE ONE CAPSULE BY MOUTH THREE TIMES A DAY      hydrOXYzine 50 MG tablet  Commonly known as: ATARAX   As Needed      IRON PO   1 tablet, Oral, 2 Times Daily      isosorbide mononitrate 30 MG 24 hr tablet  Commonly known as: IMDUR   30 mg, Oral, Daily      lamoTRIgine 150 MG tablet  Commonly  known as: LaMICtal   300 mg, Oral, Daily      levothyroxine 88 MCG tablet  Commonly known as: SYNTHROID, LEVOTHROID   TAKE ONE TABLET BY MOUTH EVERY MORNING      Melatonin 10 MG capsule   1 capsule, Oral, As Needed      metoprolol succinate XL 25 MG 24 hr tablet  Commonly known as: Toprol XL   25 mg, Oral, Daily, For bp      oxybutynin XL 15 MG 24 hr tablet  Commonly known as: DITROPAN XL   15 mg, Oral, Daily      pantoprazole 40 MG EC tablet  Commonly known as: PROTONIX   40 mg, Oral, 2 Times Daily      pramipexole 0.5 MG tablet  Commonly known as: MIRAPEX   0.5 mg, Oral, Nightly      QUEtiapine 25 MG tablet  Commonly known as: SEROquel   Take 1 tablet by mouth Every Night.      venlafaxine  MG 24 hr capsule  Commonly known as: EFFEXOR-XR   150 mg, Oral, Daily      VITAMIN B COMPLEX PO   1 tablet, Oral, Daily      vitamin B-12 1000 MCG tablet  Commonly known as: CYANOCOBALAMIN   TAKE ONE TABLET BY MOUTH TWICE A DAY                   **Dragon Disclaimer:   Much of this encounter note is an electronic transcription/translation of spoken language to printed text. The electronic translation of spoken language may permit erroneous, or at times, nonsensical words or phrases to be inadvertently transcribed. Although I have reviewed the note for such errors, some may still exist.

## 2023-09-01 ENCOUNTER — PATIENT OUTREACH (OUTPATIENT)
Dept: CASE MANAGEMENT | Facility: OTHER | Age: 63
End: 2023-09-01
Payer: MEDICARE

## 2023-09-01 NOTE — OUTREACH NOTE
AMBULATORY CASE MANAGEMENT NOTE    Name and Relationship of Patient/Support Person: Olayinka Keri ZELALEM - Self    Adult Patient Profile  Questions/Answers      Flowsheet Row Most Recent Value   Symptoms/Conditions Managed at Home cardiovascular   Cardiovascular Symptoms/Conditions chest pain   Cardiovascular Management Strategies other (see comments)  [Patient followed up with cardiology.]   Cardiovascular Self-Management Outcome 4 (good)   Cardiovascular Comment Pain not related to heart, per patient. She has completed cardiology follow up. No needs at this time.        Patient Outreach    Introduced self, explained ACM RN role and provided contact information.  Spoke with patient regarding health and wellness after ED visit. Patient declines needs at this time. Patient has completed cardiology follow up. No further outreach scheduled at this time.     Education Documentation  No documentation found.        Manuela CAMARENA  Ambulatory Case Management    9/1/2023, 13:17 EDT

## 2023-09-04 ENCOUNTER — TELEPHONE (OUTPATIENT)
Dept: FAMILY MEDICINE CLINIC | Facility: CLINIC | Age: 63
End: 2023-09-04
Payer: MEDICARE

## 2023-09-05 ENCOUNTER — OFFICE VISIT (OUTPATIENT)
Dept: OBSTETRICS AND GYNECOLOGY | Age: 63
End: 2023-09-05
Payer: MEDICARE

## 2023-09-05 ENCOUNTER — HOSPITAL ENCOUNTER (OUTPATIENT)
Dept: MAMMOGRAPHY | Facility: HOSPITAL | Age: 63
Discharge: HOME | End: 2023-09-05
Admitting: NURSE PRACTITIONER
Payer: MEDICARE

## 2023-09-05 ENCOUNTER — APPOINTMENT (OUTPATIENT)
Dept: MRI IMAGING | Facility: HOSPITAL | Age: 63
End: 2023-09-05
Payer: MEDICARE

## 2023-09-05 VITALS
BODY MASS INDEX: 40.44 KG/M2 | SYSTOLIC BLOOD PRESSURE: 134 MMHG | HEIGHT: 60 IN | WEIGHT: 206 LBS | DIASTOLIC BLOOD PRESSURE: 88 MMHG

## 2023-09-05 DIAGNOSIS — Z11.51 SCREENING FOR HPV (HUMAN PAPILLOMAVIRUS): ICD-10-CM

## 2023-09-05 DIAGNOSIS — Z12.4 ENCOUNTER FOR PAPANICOLAOU SMEAR FOR CERVICAL CANCER SCREENING: ICD-10-CM

## 2023-09-05 DIAGNOSIS — Z01.419 WELL WOMAN EXAM WITH ROUTINE GYNECOLOGICAL EXAM: Primary | ICD-10-CM

## 2023-09-05 DIAGNOSIS — Z12.31 SCREENING MAMMOGRAM FOR BREAST CANCER: ICD-10-CM

## 2023-09-05 DIAGNOSIS — N39.41 URGE INCONTINENCE OF URINE: ICD-10-CM

## 2023-09-05 PROCEDURE — 77063 BREAST TOMOSYNTHESIS BI: CPT

## 2023-09-05 PROCEDURE — 77067 SCR MAMMO BI INCL CAD: CPT

## 2023-09-05 RX ORDER — GABAPENTIN 300 MG/1
CAPSULE ORAL
Qty: 270 CAPSULE | Refills: 0 | Status: SHIPPED | OUTPATIENT
Start: 2023-09-05

## 2023-09-05 RX ORDER — ALPRAZOLAM 1 MG/1
TABLET ORAL
Qty: 1 TABLET | Refills: 0 | Status: SHIPPED | OUTPATIENT
Start: 2023-09-05

## 2023-09-05 NOTE — PROGRESS NOTES
Subjective     Chief Complaint   Patient presents with   • Gynecologic Exam     New/re-establish annual exam 2019 neg/neg, m/g 2023 , dexa (scheduled 2024) , [facility]        History of Present Illness    Keri Bhagat is a 63 y.o.  who presents for annual exam.    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         Current contraception: post menopausal status  History of abnormal Pap smear: yes - 2016  Received Gardasil immunization: no  Perform regular self breast exam: yes - occl  Family history of uterine or ovarian cancer: yes - family history noted, message to pt to confirm info  Family History of colon cancer: no  Family history of breast cancer: no    Mammogram: up to date.  Colonoscopy: up to date.  DEXA: ordered.    Exercise: not active  Calcium/Vitamin D: adequate intake    The following portions of the patient's history were reviewed and updated as appropriate: allergies, current medications, past family history, past medical history, past social history, past surgical history, and  "problem list.    Review of Systems   All other systems reviewed and are negative.    Review of Systems   Constitutional: Negative for fatigue.   Respiratory: Negative for shortness of breath.    Gastrointestinal: Negative for abdominal pain.   Genitourinary: Negative for dysuria.   Neurological: Negative for headaches.   Psychiatric/Behavioral: Negative for dysphoric mood.         Objective   Physical Exam    /88   Ht 152.4 cm (60\")   Wt 93.4 kg (206 lb)   LMP 10/21/2016 (Approximate) Comment: 54  BMI 40.23 kg/m²   General:   alert, comfortable, and no distress   Heart: regular rate and rhythm   Lungs: clear to auscultation bilaterally   Breast: normal appearance, no masses or tenderness, Inspection negative, No nipple retraction or dimpling, No nipple discharge or bleeding, No axillary or supraclavicular adenopathy, Normal to palpation without dominant masses   Neck: no adenopathy and no carotid bruit   Abdomen: normal findings: soft, non-tender   CVA: Not performed today   Pelvis: External genitalia: normal general appearance  Vaginal: normal mucosa without prolapse or lesions  Cervix: normal appearance and thin prep PAP obtained  Adnexa: normal bimanual exam and non palpable  Uterus: normal single, nontender  Exam limited by body habitus   Extremities: Not performed today   Neurologic: negative   Psychiatric: Normal affect, judgement, and mood     Assessment & Plan   Diagnoses and all orders for this visit:    1. Well woman exam with routine gynecological exam (Primary)    2. Urge incontinence of urine  -     Urine Culture - Urine, Urine, Random Void    3. Encounter for Papanicolaou smear for cervical cancer screening  -     IGP, Aptima HPV, Rfx 16 / 18,45    4. Screening for HPV (human papillomavirus)  -     IGP, Aptima HPV, Rfx 16 / 18,45        All questions answered.  Breast self exam technique reviewed and patient encouraged to perform self-exam monthly.  Discussed healthy lifestyle " modifications.  Recommended 30 minutes of aerobic exercise five times per week.  Discussed calcium needs to prevent osteoporosis.    Pap done  DEXA ordered  Mammogram utd  CSC due in 2025      Keri Bhagat  reports that she has been smoking electronic cigarette. She has never been exposed to tobacco smoke. She uses smokeless tobacco.. I have educated her on the risk of diseases from using tobacco products such as cancer, COPD, and heart disease.     I advised her to quit and she is not willing to quit.    I spent 3  minutes counseling the patient.

## 2023-09-05 NOTE — TELEPHONE ENCOUNTER
Rx Refill Note  Requested Prescriptions     Pending Prescriptions Disp Refills    ALPRAZolam (XANAX) 1 MG tablet [Pharmacy Med Name: ALPRAZOLAM 1 MG TABLET] 1 tablet      Sig: TAKE 1 TABLET BY MOUTH 1 HOUR BEFORE MRI AS DIRECTED    gabapentin (NEURONTIN) 300 MG capsule [Pharmacy Med Name: GABAPENTIN 300 MG CAPSULE] 270 capsule      Sig: TAKE ONE CAPSULE BY MOUTH THREE TIMES A DAY      Last office visit with prescribing clinician: 7/19/2023   Last telemedicine visit with prescribing clinician: Visit date not found   Next office visit with prescribing clinician: 1/19/2024                         Would you like a call back once the refill request has been completed: [] Yes [] No    If the office needs to give you a call back, can they leave a voicemail: [] Yes [] No    Jigar Chapa Rep  09/05/23, 09:19 EDT

## 2023-09-07 LAB
BACTERIA UR CULT: NORMAL
BACTERIA UR CULT: NORMAL

## 2023-09-08 ENCOUNTER — PATIENT ROUNDING (BHMG ONLY) (OUTPATIENT)
Dept: OBSTETRICS AND GYNECOLOGY | Age: 63
End: 2023-09-08
Payer: MEDICARE

## 2023-09-12 LAB
CYTOLOGIST CVX/VAG CYTO: ABNORMAL
CYTOLOGY CVX/VAG DOC CYTO: ABNORMAL
CYTOLOGY CVX/VAG DOC THIN PREP: ABNORMAL
DX ICD CODE: ABNORMAL
DX ICD CODE: ABNORMAL
HIV 1 & 2 AB SER-IMP: ABNORMAL
HPV GENOTYPE REFLEX: ABNORMAL
HPV I/H RISK 4 DNA CVX QL PROBE+SIG AMP: POSITIVE
OTHER STN SPEC: ABNORMAL
PATHOLOGIST CVX/VAG CYTO: ABNORMAL
STAT OF ADQ CVX/VAG CYTO-IMP: ABNORMAL

## 2023-09-13 PROBLEM — R87.610 ASCUS WITH POSITIVE HIGH RISK HPV CERVICAL: Status: ACTIVE | Noted: 2023-09-13

## 2023-09-13 PROBLEM — R87.810 ASCUS WITH POSITIVE HIGH RISK HPV CERVICAL: Status: ACTIVE | Noted: 2023-09-13

## 2023-09-15 ENCOUNTER — TELEPHONE (OUTPATIENT)
Dept: ORTHOPEDIC SURGERY | Facility: CLINIC | Age: 63
End: 2023-09-15

## 2023-09-15 ENCOUNTER — OFFICE VISIT (OUTPATIENT)
Dept: ORTHOPEDIC SURGERY | Facility: CLINIC | Age: 63
End: 2023-09-15
Payer: MEDICARE

## 2023-09-15 VITALS — TEMPERATURE: 98.6 F | WEIGHT: 204.3 LBS | BODY MASS INDEX: 40.11 KG/M2 | HEIGHT: 60 IN

## 2023-09-15 DIAGNOSIS — G89.29 CHRONIC LEFT SHOULDER PAIN: Primary | ICD-10-CM

## 2023-09-15 DIAGNOSIS — M25.512 CHRONIC LEFT SHOULDER PAIN: Primary | ICD-10-CM

## 2023-09-15 RX ORDER — METHYLPREDNISOLONE ACETATE 80 MG/ML
1 INJECTION, SUSPENSION INTRA-ARTICULAR; INTRALESIONAL; INTRAMUSCULAR; SOFT TISSUE
Status: COMPLETED | OUTPATIENT
Start: 2023-09-15 | End: 2023-09-15

## 2023-09-15 RX ORDER — LIDOCAINE HYDROCHLORIDE 10 MG/ML
2 INJECTION, SOLUTION EPIDURAL; INFILTRATION; INTRACAUDAL; PERINEURAL
Status: COMPLETED | OUTPATIENT
Start: 2023-09-15 | End: 2023-09-15

## 2023-09-15 RX ADMIN — METHYLPREDNISOLONE ACETATE 1 MG: 80 INJECTION, SUSPENSION INTRA-ARTICULAR; INTRALESIONAL; INTRAMUSCULAR; SOFT TISSUE at 13:48

## 2023-09-15 RX ADMIN — LIDOCAINE HYDROCHLORIDE 2 ML: 10 INJECTION, SOLUTION EPIDURAL; INFILTRATION; INTRACAUDAL; PERINEURAL at 13:48

## 2023-09-15 NOTE — PROGRESS NOTES
Patient:Keri Bhagat    YOB: 1960    Medical Record Number:[id number]    Chief Complaints:  Left shoulder pain    History of Present Illness:     63 y.o. female patient who presents for her left shoulder.  She says it has been bothering her for about a year.  Denies any precipitating event or factor.  She has a history of a previous left rotator cuff repair that I did back in 2016.  She tells me the shoulder was great until about a year ago.  Her symptoms have gradually recurred.  She reports the symptoms are nearly identical to those that she was having prior to the repair.  The pain is moderate and aching.  Most of the pain seems to be in the front and side of the shoulder.  It is worse with reaching and lifting.  Pain is also worse at night.    Allergies:No Known Allergies    Home Medications:    Current Outpatient Medications:     ARIPiprazole (ABILIFY) 20 MG tablet, Take 1 tablet by mouth Daily., Disp: , Rfl:     ASPIRIN 81 PO, Take 1 tablet by mouth Daily., Disp: , Rfl:     atorvastatin (Lipitor) 40 MG tablet, Take 1 tablet by mouth Daily., Disp: 90 tablet, Rfl: 3    B Complex Vitamins (VITAMIN B COMPLEX PO), Take 1 tablet by mouth Daily., Disp: , Rfl:     buPROPion XL (WELLBUTRIN XL) 300 MG 24 hr tablet, Take 1 tablet by mouth Daily., Disp: , Rfl:     calcium carbonate (OS-STEVIE) 600 MG tablet, Take 1 tablet by mouth Daily., Disp: , Rfl:     Cholecalciferol 25 MCG (1000 UT) capsule, Take 1 capsule by mouth 2 (Two) Times a Day., Disp: , Rfl:     Ferrous Sulfate (IRON PO), Take 1 tablet by mouth 2 (Two) Times a Day., Disp: , Rfl:     gabapentin (NEURONTIN) 300 MG capsule, TAKE ONE CAPSULE BY MOUTH THREE TIMES A DAY, Disp: 270 capsule, Rfl: 0    hydrOXYzine (ATARAX) 50 MG tablet, As Needed., Disp: , Rfl:     isosorbide mononitrate (IMDUR) 30 MG 24 hr tablet, Take 1 tablet by mouth Daily., Disp: 90 tablet, Rfl: 2    lamoTRIgine (LaMICtal) 150 MG tablet, Take 2 tablets by mouth Daily., Disp: ,  Rfl:     levothyroxine (SYNTHROID, LEVOTHROID) 88 MCG tablet, TAKE ONE TABLET BY MOUTH EVERY MORNING, Disp: 90 tablet, Rfl: 1    metoprolol succinate XL (Toprol XL) 25 MG 24 hr tablet, Take 1 tablet by mouth Daily. For bp, Disp: 90 tablet, Rfl: 3    oxybutynin XL (DITROPAN XL) 15 MG 24 hr tablet, Take 1 tablet by mouth Daily., Disp: , Rfl:     pantoprazole (PROTONIX) 40 MG EC tablet, Take 1 tablet by mouth 2 (Two) Times a Day., Disp: 180 tablet, Rfl: 3    pramipexole (MIRAPEX) 0.5 MG tablet, Take 1 tablet by mouth Every Night for 360 days., Disp: 30 tablet, Rfl: 5    QUEtiapine (SEROquel) 25 MG tablet, Take 1 tablet by mouth Every Night., Disp: , Rfl:     venlafaxine XR (EFFEXOR-XR) 150 MG 24 hr capsule, Take 1 capsule by mouth Daily., Disp: 90 capsule, Rfl: 1    vitamin B-12 (CYANOCOBALAMIN) 1000 MCG tablet, TAKE ONE TABLET BY MOUTH TWICE A DAY, Disp: 60 tablet, Rfl: 4    ALPRAZolam (XANAX) 1 MG tablet, TAKE 1 TABLET BY MOUTH 1 HOUR BEFORE MRI AS DIRECTED, Disp: 1 tablet, Rfl: 0    Melatonin 10 MG capsule, Take 1 capsule by mouth As Needed. (Patient not taking: Reported on 9/15/2023), Disp: , Rfl:     Past Medical History:   Diagnosis Date    Abnormal Pap smear of cervix     Anemia     Ankle sprain     Anxiety     Arthritis     Arthritis of back     Arthritis of neck     Atherosclerotic heart disease of native coronary artery with other forms of angina pectoris     Bipolar I disorder, single manic episode     depressive d(NOS)    Cervical disc herniation     Cervical dysplasia     Coronary artery disease     COVID-19 01/10/2023    CTS (carpal tunnel syndrome)     Depression     NO SUICIDAL PLANS    Difficulty swallowing     Dislocation of finger     Diverticular disease     Dyspepsia     Dysphagia     Fracture of wrist     Fracture, finger     Fracture, foot     Fracture, radius     Gastric reflux     GERD (gastroesophageal reflux disease)     Heart attack     Heart murmur     Hiatal hernia     High cholesterol      History of transfusion     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    HPV positive on pap LGSIL    Hyperlipidemia     Hypertension     Hypothyroidism     Incontinence in female     wears pads    Kidney disease 2017    stage 2     Kidney disease, chronic, stage III (GFR 30-59 ml/min)     Knee swelling     LGSIL on Pap smear of cervix 04/29/2016    LGSIL HPV positive    Lumbosacral disc disease Unsure    Lower left back is partial osteoarthritis and partial osteoporosis    Myocardial infarction 2000    Neck pain     Neck strain     Obesity     Osteopenia 2021    Bone density test    Past myocardial infarction 05/2000    Periodic limb movement disorder     Renal insufficiency 2018    Restless leg syndrome     Rotator cuff syndrome     Sleep apnea     cpap    Stress fracture     LEFT HEEL    Suicidal ideation 08/19/2016    history    Swelling of ankle     right had doppler no s/s blood clot    Tear of meniscus of knee     Thyroid nodule     Vitamin B12 deficiency     Wrist sprain        Past Surgical History:   Procedure Laterality Date    ADENOIDECTOMY  1974    ANTERIOR CERVICAL DISCECTOMY W/ FUSION N/A 12/29/2016    Procedure: C3-4 anterior cervical discectomy and fusion with Depuy micro plate, ALLOGRAFT C3-4, AND HARDWARE REMOVAL C4-7.;  Surgeon: Hema Godwin MD;  Location: [facility];  Service:     BARIATRIC SURGERY  2018    CARDIAC CATHETERIZATION N/A 03/30/2017    Procedure: Left Heart Cath;  Surgeon: Tracey Vargas MD;  Location: [facility] CATH INVASIVE LOCATION;  Service:     CARDIAC CATHETERIZATION N/A 03/30/2017    Procedure: Coronary angiography;  Surgeon: Tracey Vargas MD;  Location: [facility] CATH INVASIVE LOCATION;  Service:     CARDIAC CATHETERIZATION N/A 03/30/2017    Procedure: Left ventriculography;  Surgeon: Tracey Vargas MD;  Location: [facility] CATH INVASIVE LOCATION;  Service:     CARDIAC CATHETERIZATION  03/30/2017    Procedure: Functional [place];  Surgeon: Tracey Vargas MD;   Location: [facility] INVASIVE LOCATION;  Service:     CARPAL TUNNEL RELEASE      CATARACT EXTRACTION      CERVICAL BIOPSY  MMXVI    Dr. Jeffery.     CERVICAL DISCECTOMY ANTERIOR  04/2013    C4-7    COLONOSCOPY  04/20/2015    Diverticulosis, IH    COLONOSCOPY  03/09/2021    COLPOSCOPY W/ BIOPSY / CURETTAGE  06/17/2016    LGSIL HPV positive. Results were normal repeat pap in one year. Chronic Cervicitis    CORONARY ANGIOPLASTY WITH STENT PLACEMENT      CORONARY STENT PLACEMENT  May 2000    ENDOSCOPY  MMXV    Normal.  Dr. Rodriguez    ENDOSCOPY N/A 06/22/2017    Erythematous mucosa in the stomach  PATH: Chronic active gastritis, moderate with intestinal metaplasia     EYE SURGERY  2022    Cateract surgery    GASTRIC BYPASS      Done using the sleeve technique    GASTRIC RESTRICTION SURGERY      HAND SURGERY  Carpal tunnel    HARDWARE REMOVAL  12/29/2016    cervical    HERNIA REPAIR      INGUINAL HERNIA REPAIR      LAPAROSCOPIC GASTRIC BANDING  02/2018    NECK SURGERY      SHOULDER SURGERY Left     RCR    TONSILLECTOMY      TONSILLECTOMY AND ADENOIDECTOMY      TRANSVAGINAL TAPING SUSPENSION N/A 12/06/2017    Procedure: MID URETHRAL SLING CYSTSCOPY;  Surgeon: Abby Méndez MD;  Location: [facility] OR;  Service:     TRIGGER POINT INJECTION      TUBAL ABDOMINAL LIGATION      TYMPANOSTOMY TUBE PLACEMENT Right     UPPER GASTROINTESTINAL ENDOSCOPY  approx 2021    WRIST SURGERY Bilateral     carpal tunnel    WRIST SURGERY      L wrist/ 4/2020       Social History     Occupational History    Occupation: disabled   Tobacco Use    Smoking status: Light Smoker     Types: Electronic Cigarette     Passive exposure: Never    Smokeless tobacco: Current    Tobacco comments:     Electronic Cigarette   Vaping Use    Vaping Use: Every day    Substances: Nicotine, Flavoring    Devices: Refillable tank   Substance and Sexual Activity    Alcohol use: Not Currently    Drug use: Never    Sexual activity: Not Currently     Partners:  Male     Birth control/protection: Condom, Abstinence, Post-menopausal, None, Tubal ligation      Social History     Social History Narrative    Not on file       Family History   Problem Relation Age of Onset    Diabetes type II Mother     Hypertension Mother     Osteoporosis Mother     Seizures Mother     Diabetes Mother     Arthritis Mother     Hyperlipidemia Mother     COPD Father     Hypertension Father     Lung cancer Father     Heart attack Father     Liver cancer Father     Liver disease Father     Heart disease Father     Cancer Father         Lung cacer that metastasized  into the liver.    Thyroid disease Sister     Hypertension Sister     Bipolar disorder Sister     Depression Sister     ADD / ADHD Sister     Anxiety disorder Sister     Mental illness Sister     Hyperlipidemia Sister     Broken bones Sister     No Known Problems Son     Abnormal EKG Daughter     Hypertension Daughter     Bipolar disorder Daughter     Thyroid disease Daughter     Mental illness Daughter     Hyperlipidemia Daughter     No Known Problems Paternal Grandfather     No Known Problems Paternal Grandmother     Cancer Maternal Grandmother     No Known Problems Maternal Grandfather     Thyroid disease Sister     Hypertension Sister     Bipolar disorder Sister     Anxiety disorder Sister     Depression Sister     Mental illness Sister     Hyperlipidemia Sister     Asthma Daughter     Diabetes Son         Type 1    Breast cancer Neg Hx     Ovarian cancer Neg Hx     Uterine cancer Neg Hx     Colon cancer Neg Hx     Malig Hyperthermia Neg Hx        Review of Systems:      Constitutional: Denies fever, shaking or chills   Eyes: Denies change in visual acuity   HEENT: Denies nasal congestion or sore throat   Respiratory: Denies cough or shortness of breath   Cardiovascular: Denies chest pain or edema  Endocrine: Denies tremors, palpitations, intolerance of heat or cold, polyuria, polydipsia.  GI: Denies abdominal pain, nausea, vomiting,  "bloody stools or diarrhea  : Denies frequency, urgency, incontinence, retention, or nocturia.  Musculoskeletal: Denies numbness, tingling or loss of motor function except as above  Integument: Denies rash, lesion or ulceration   Neurologic: Denies headache or focal weakness, deficits  Heme: Denies spontaneous or excessive bleeding, epistaxis, hematuria, melena, fatigue, enlarged or tender lymph nodes.      All other pertinent positives and negatives as noted above in HPI.    Physical Exam:63 y.o. female  Vitals:    09/15/23 1333   Temp: 98.6 °F (37 °C)   TempSrc: Temporal   Weight: 92.7 kg (204 lb 4.8 oz)   Height: 152.4 cm (60\")     General:  Patient is awake and alert.  Appears in no acute distress or discomfort.    Psych:  Affect and demeanor are appropriate.    Extremities:  Left upper extremity:  Skin is benign.  No gross abnormalities on inspection.  No palpable masses or effusion.  No focal areas of significant tenderness.  Full shoulder motion.  No evident instability or apprehension.  Mildly positive Neer, Austin maneuvers.  5-/5 with elevation in the scapular plane.   Good strength with internal and external rotation.  Good strength in the deltoid, biceps, triceps, and .  Intact sensation throughout the arm.  Brisk cap refill.  Palpable radial pulse.  Good skin turgor.     Imaging:  None taken today    Assessment/Plan:  Left shoulder pain, possible recurrent rotator cuff tear    I am concerned she might have a recurrent tear.  We discussed her options.  I recommended MR arthrogram.  She really wants to avoid any further surgery if at all possible.  She stated that she would like to try an injection.  If that fails then she might consider further work-up.  The risk, benefits and alternatives to the injection were discussed.  She consented and the injection was performed as described below.  She will call me if symptoms persist or recur.  Otherwise, she will follow-up as needed.    .Large Joint " Arthrocentesis: L subacromial bursa  Date/Time: 9/15/2023 1:48 PM  Consent given by: patient  Site marked: site marked  Timeout: Immediately prior to procedure a time out was called to verify the correct patient, procedure, equipment, support staff and site/side marked as required   Supporting Documentation  Indications: pain   Procedure Details  Location: shoulder - L subacromial bursa  Preparation: Patient was prepped and draped in the usual sterile fashion  Needle gauge: 21 G.  Approach: posterior  Medications administered: 2 mL lidocaine PF 1% 1 %; 1 mg methylPREDNISolone acetate 80 MG/ML  Patient tolerance: patient tolerated the procedure well with no immediate complications        Ajay Gutierrez MD    09/15/2023

## 2023-09-19 ENCOUNTER — OFFICE VISIT (OUTPATIENT)
Dept: ENDOCRINOLOGY | Age: 63
End: 2023-09-19
Payer: MEDICARE

## 2023-09-19 VITALS
SYSTOLIC BLOOD PRESSURE: 118 MMHG | HEART RATE: 100 BPM | OXYGEN SATURATION: 97 % | HEIGHT: 60 IN | DIASTOLIC BLOOD PRESSURE: 82 MMHG | BODY MASS INDEX: 40.01 KG/M2 | WEIGHT: 203.8 LBS | TEMPERATURE: 97 F

## 2023-09-19 DIAGNOSIS — M85.89 OSTEOPENIA OF MULTIPLE SITES: ICD-10-CM

## 2023-09-19 DIAGNOSIS — Z83.3 FAMILY HISTORY OF DIABETES MELLITUS (DM): ICD-10-CM

## 2023-09-19 DIAGNOSIS — I25.10 CORONARY ARTERY DISEASE INVOLVING NATIVE CORONARY ARTERY OF NATIVE HEART WITHOUT ANGINA PECTORIS: ICD-10-CM

## 2023-09-19 DIAGNOSIS — G47.33 OSA (OBSTRUCTIVE SLEEP APNEA): ICD-10-CM

## 2023-09-19 DIAGNOSIS — E78.5 HYPERLIPIDEMIA, UNSPECIFIED HYPERLIPIDEMIA TYPE: ICD-10-CM

## 2023-09-19 DIAGNOSIS — I10 ESSENTIAL HYPERTENSION: ICD-10-CM

## 2023-09-19 DIAGNOSIS — E03.9 PRIMARY HYPOTHYROIDISM: Primary | ICD-10-CM

## 2023-09-19 PROCEDURE — 99214 OFFICE O/P EST MOD 30 MIN: CPT | Performed by: INTERNAL MEDICINE

## 2023-09-19 PROCEDURE — 3079F DIAST BP 80-89 MM HG: CPT | Performed by: INTERNAL MEDICINE

## 2023-09-19 PROCEDURE — 3074F SYST BP LT 130 MM HG: CPT | Performed by: INTERNAL MEDICINE

## 2023-09-19 NOTE — PROGRESS NOTES
Subjective   Keri Bhagat is a 63 y.o. female.     History of Present Illness        She has hypothyroidism and has been on levothyroxine 88 µg per day. Thyroid ultrasound done in 10/17 showed stable 0.5 cm solid nodule in the right.  She denies dysphagia or pressure symptoms.  Thyroid ultrasound done in September 2020 was normal.       She has no history of head or neck radiation therapy.       She had an upper endoscopy with dilatation in June 2016 done by Dr. Rodriguez.  She has gastritis and hiatal hernia.  She is on Protonix.  She has occasional heartburn.  She denies melena or hematochezia.     She had a LAP-BAND and hiatal hernia repair done by Dr. Sims February 2018.  She has no significant weight since 3/23.     She has history of hypertension, and coronary artery disease. She had a previous heart attack in 2000 and had angioplasty was 1 stent. She had a cardiac catheterization done in 2015 which showed a patent stent to the LAD. She had normal nuclear stress test in August 2021.  She denies chest pain or SOB. She is on Toprol-XL 25 mg/day and Imdur and follows with Dr. Burroughs.        She has hyperlipidemia and has been on atorvastatin 40 mg once a day.  She did not have side effects when she was on simvastatin before.  She denies any myalgia.       She has no previous history of diabetes mellitus.  Hemoglobin A1c done in April 2022 is normal at 5.4%.  Her mother and son have diabetes mellitus.       She has sleep apnea and is using a BiPap.  She wakes up rested most of the time.     She has chronic knee and neck pain and sees Dr. Mercado and Dr. Pritchard.  She had steroid injection and Synvisc on both knees in the past.      She has osteopenia on bone density done in November 2018.  She had her natural menopause at age 54 and was never on hormone replacement therapy. She does exercise regularly.  She has no parental history of hip fractures.  She denies alcohol abuse.       Bone density done in March 2022  "showed osteopenia.  Her 10-year risk of major osteoporotic fracture is 5.4% and of hip fracture is 0.5%.  She is on vitamin D3 1000 units twice daily.      She smoked cigarettes for more than 40 years and switched to electronic cigarettes in 2017.  She is on Wellbutrin.     She was seen by her PCP in February 2019 because of poor memory and poor balance.  MRI of the brain done in January 2019 showed a 8 mm lesion on falx cerebri most likely a small meningioma.  Incidental to the study, there is a suggestion of a hypoenhancing focus measuring 4 mm in diameter on the right aspect of the anterior pituitary.  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  There is an incidental arachnoid cyst measuring up to 1.9 cm anterior to the right cerebellar hemisphere.  She is following with Dr. Mercado.     MRI of the pituitary done in August 2021 showed a stable pars intermedia cyst and stable meningioma on the falx cerebri.     She is scheduled for cervical biopsy due to an abnormal Pap smear.    The following portions of the patient's history were reviewed and updated as appropriate: allergies, current medications, past family history, past medical history, past social history, past surgical history, and problem list.    Review of Systems   Eyes:  Negative for visual disturbance.   Respiratory:  Negative for shortness of breath.    Cardiovascular:  Negative for chest pain and palpitations.   Gastrointestinal: Negative.    Endocrine: Negative for polyuria.   Genitourinary:  Positive for frequency (nocturia 2x/night). Negative for dysuria.   Neurological:  Negative for numbness.   Vitals:    09/19/23 1410   BP: 118/82   Pulse: 100   Temp: 97 °F (36.1 °C)   TempSrc: Temporal   SpO2: 97%   Weight: 92.4 kg (203 lb 12.8 oz)   Height: 152.4 cm (60\")      Objective "   Physical Exam  Constitutional:       Appearance: Normal appearance.   Eyes:      General: No scleral icterus.        Right eye: No discharge.         Left eye: No discharge.      Extraocular Movements: Extraocular movements intact.      Conjunctiva/sclera: Conjunctivae normal.   Neck:      Vascular: No carotid bruit.   Cardiovascular:      Rate and Rhythm: Normal rate and regular rhythm.      Heart sounds: No murmur heard.  Pulmonary:      Effort: No respiratory distress.      Breath sounds: Normal breath sounds. No rales.   Chest:      Chest wall: No tenderness.   Abdominal:      General: Bowel sounds are normal.      Palpations: Abdomen is soft.      Tenderness: There is no right CVA tenderness or left CVA tenderness.   Musculoskeletal:      Right lower leg: No edema.      Left lower leg: No edema.      Comments: No plantar ulcer.  Abrasions on left patella   Lymphadenopathy:      Cervical: No cervical adenopathy.   Neurological:      Mental Status: She is alert and oriented to person, place, and time.      Comments: Intact light touch in lower extremities.     Office Visit on 09/05/2023   Component Date Value Ref Range Status    Urine Culture 09/05/2023 Final report   Final    Result 1 09/05/2023 Comment   Final    Comment: Mixed urogenital frida  Less than 10,000 colonies/mL      Diagnosis 09/06/2023 Comment (A)   Final    Comment: EPITHELIAL CELL ABNORMALITY.  ATYPICAL SQUAMOUS CELLS OF UNDETERMINED SIGNIFICANCE (ASC-US).  CELLULAR CHANGES ASSOCIATED WITH ATROPHY ARE PRESENT.      Specimen adequacy: 09/06/2023 Comment   Final    Comment: Satisfactory for evaluation.  Endocervical component may not be  distinguished in cases of atrophy.      Clinician Provided ICD-10: 09/06/2023 Comment   Final    Comment: Z12.4  Z11.51      Performed by: 09/06/2023 Comment   Final    Whitney Marcos, Cytotechnologist (ASCP)    Electronically signed by: 09/06/2023 Comment   Final    Seble Mishra MD, Pathologist    .  09/06/2023 .   Final    Pathologist Provided ICD-10: 09/06/2023 Comment   Final    R87.610    Note: 09/06/2023 Comment   Final    Comment: The Pap smear is a screening test designed to aid in the detection of  premalignant and malignant conditions of the uterine cervix.  It is not a  diagnostic procedure and should not be used as the sole means of detecting  cervical cancer.  Both false-positive and false-negative reports do occur.      Method: 09/06/2023 CANCELED   Final-Edited    Comment: The Thin Prep(R) Imager was unable to read this specimen.  Therefore  a manual review was performed.    Result canceled by the ancillary.      HPV Aptima 09/06/2023 Positive (A)  Negative Final    Comment: This nucleic acid amplification test detects fourteen high-risk  HPV types (16,18,31,33,35,39,45,51,52,56,58,59,66,68) without  differentiation.      HPV Genotype Reflex 09/06/2023 Comment   Final    Criteria not met, HPV Genotype not performed.     Assessment & Plan   Diagnoses and all orders for this visit:    1. Primary hypothyroidism (Primary)    2. Essential hypertension    3. Coronary artery disease involving native coronary artery of native heart without angina pectoris    4. Hyperlipidemia, unspecified hyperlipidemia type    5. ROCKY (obstructive sleep apnea)    6. Osteopenia of multiple sites    7. Family history of diabetes mellitus (DM)      Continue levothyroxine 88 mcg/day.  Continue Toprol-XL and Imdur.  Continue atorvastatin 40 mg/day.  Continue BiPAP.  Continue vitamin D3 1000 units twice daily  Repeat bone density after March 2024.    Copy of my note sent to James Epley, NP.    RTC 6 mos.

## 2023-09-20 ENCOUNTER — OFFICE VISIT (OUTPATIENT)
Dept: ORTHOPEDIC SURGERY | Facility: CLINIC | Age: 63
End: 2023-09-20
Payer: MEDICARE

## 2023-09-20 VITALS — WEIGHT: 203 LBS | HEIGHT: 63 IN | BODY MASS INDEX: 35.97 KG/M2 | TEMPERATURE: 98 F

## 2023-09-20 DIAGNOSIS — M25.561 RIGHT KNEE PAIN, UNSPECIFIED CHRONICITY: Primary | ICD-10-CM

## 2023-09-20 DIAGNOSIS — S80.01XA CONTUSION OF RIGHT KNEE, INITIAL ENCOUNTER: ICD-10-CM

## 2023-09-20 LAB
25(OH)D3+25(OH)D2 SERPL-MCNC: 46 NG/ML (ref 30–100)
ALBUMIN SERPL-MCNC: 4.3 G/DL (ref 3.9–4.9)
ALBUMIN/GLOB SERPL: 2 {RATIO} (ref 1.2–2.2)
ALP SERPL-CCNC: 82 IU/L (ref 44–121)
ALT SERPL-CCNC: 21 IU/L (ref 0–32)
AST SERPL-CCNC: 17 IU/L (ref 0–40)
BILIRUB SERPL-MCNC: 0.2 MG/DL (ref 0–1.2)
BUN SERPL-MCNC: 14 MG/DL (ref 8–27)
BUN/CREAT SERPL: 14 (ref 12–28)
CALCIUM SERPL-MCNC: 9.1 MG/DL (ref 8.7–10.3)
CHLORIDE SERPL-SCNC: 104 MMOL/L (ref 96–106)
CHOLEST SERPL-MCNC: 178 MG/DL (ref 100–199)
CO2 SERPL-SCNC: 23 MMOL/L (ref 20–29)
CREAT SERPL-MCNC: 0.99 MG/DL (ref 0.57–1)
EGFRCR SERPLBLD CKD-EPI 2021: 65 ML/MIN/1.73
GLOBULIN SER CALC-MCNC: 2.1 G/DL (ref 1.5–4.5)
GLUCOSE SERPL-MCNC: 85 MG/DL (ref 70–99)
HBA1C MFR BLD: 5.5 % (ref 4.8–5.6)
HDLC SERPL-MCNC: 82 MG/DL
IMP & REVIEW OF LAB RESULTS: NORMAL
LDLC SERPL CALC-MCNC: 84 MG/DL (ref 0–99)
POTASSIUM SERPL-SCNC: 4.3 MMOL/L (ref 3.5–5.2)
PROT SERPL-MCNC: 6.4 G/DL (ref 6–8.5)
SODIUM SERPL-SCNC: 142 MMOL/L (ref 134–144)
T4 FREE SERPL-MCNC: 1.02 NG/DL (ref 0.82–1.77)
TRIGL SERPL-MCNC: 62 MG/DL (ref 0–149)
TSH SERPL DL<=0.005 MIU/L-ACNC: 2.57 UIU/ML (ref 0.45–4.5)
VLDLC SERPL CALC-MCNC: 12 MG/DL (ref 5–40)

## 2023-09-20 NOTE — PROGRESS NOTES
Normal thyroid function tests.  Continue levothyroxine 88 mcg a day.  LDL 84.  HDL 82.  Continue atorvastatin 40 mg/day.  Hemoglobin A1c 5.5%.  Hemoglobin A1c is in nondiabetic range.  Normal vitamin D.  Continue vitamin D3 1000 units twice a day.  Copy of labs sent to James Epley, NP and to patient through [facility].

## 2023-09-20 NOTE — PROGRESS NOTES
Patient Name: Keri Bhagat   YOB: 1960  Referring Primary Care Physician: Epley, James, APRN  BMI: Body mass index is 35.96 kg/m².    Chief Complaint:    Chief Complaint   Patient presents with    Right Knee - Pain, Follow-up        HPI: Pt had a fall and hit her right knee and scraped it and has pain and presents with pain and wanting it checked out.     Keri Bhagat is a 63 y.o. female who presents today for evaluation of   Chief Complaint   Patient presents with    Right Knee - Pain, Follow-up         Subjective   Medications:   Home Medications:  Current Outpatient Medications on File Prior to Visit   Medication Sig    ARIPiprazole (ABILIFY) 20 MG tablet Take 1 tablet by mouth Daily.    ASPIRIN 81 PO Take 1 tablet by mouth Daily.    atorvastatin (Lipitor) 40 MG tablet Take 1 tablet by mouth Daily.    B Complex Vitamins (VITAMIN B COMPLEX PO) Take 1 tablet by mouth Daily.    buPROPion XL (WELLBUTRIN XL) 300 MG 24 hr tablet Take 1 tablet by mouth Daily.    calcium carbonate (OS-STEVIE) 600 MG tablet Take 1 tablet by mouth Daily.    Cholecalciferol 25 MCG (1000 UT) capsule Take 1 capsule by mouth 2 (Two) Times a Day.    Ferrous Sulfate (IRON PO) Take 1 tablet by mouth 2 (Two) Times a Day.    gabapentin (NEURONTIN) 300 MG capsule TAKE ONE CAPSULE BY MOUTH THREE TIMES A DAY    hydrOXYzine (ATARAX) 50 MG tablet As Needed.    isosorbide mononitrate (IMDUR) 30 MG 24 hr tablet Take 1 tablet by mouth Daily.    lamoTRIgine (LaMICtal) 150 MG tablet Take 2 tablets by mouth Daily.    levothyroxine (SYNTHROID, LEVOTHROID) 88 MCG tablet TAKE ONE TABLET BY MOUTH EVERY MORNING    metoprolol succinate XL (Toprol XL) 25 MG 24 hr tablet Take 1 tablet by mouth Daily. For bp    oxybutynin XL (DITROPAN XL) 15 MG 24 hr tablet Take 1 tablet by mouth Daily.    pantoprazole (PROTONIX) 40 MG EC tablet Take 1 tablet by mouth 2 (Two) Times a Day.    pramipexole (MIRAPEX) 0.5 MG tablet Take 1 tablet by mouth Every Night  for 360 days.    QUEtiapine (SEROquel) 25 MG tablet Take 1 tablet by mouth Every Night.    venlafaxine XR (EFFEXOR-XR) 150 MG 24 hr capsule Take 1 capsule by mouth Daily.    vitamin B-12 (CYANOCOBALAMIN) 1000 MCG tablet TAKE ONE TABLET BY MOUTH TWICE A DAY     No current facility-administered medications on file prior to visit.     Current Medications:  Scheduled Meds:  Continuous Infusions:No current facility-administered medications for this visit.    PRN Meds:.    I have reviewed the patient's medical history in detail and updated the computerized patient record.  Review and summarization of old records includes:    Past Medical History:   Diagnosis Date    Abnormal Pap smear of cervix     Anemia     Ankle sprain     Anxiety     Arthritis     Arthritis of back     Arthritis of neck     Atherosclerotic heart disease of native coronary artery with other forms of angina pectoris     Bipolar I disorder, single manic episode     depressive d(NOS)    Cervical disc herniation     Cervical dysplasia     Coronary artery disease     COVID-19 01/10/2023    CTS (carpal tunnel syndrome)     Depression     NO SUICIDAL PLANS    Difficulty swallowing     Dislocation of finger     Diverticular disease     Dyspepsia     Dysphagia     Fracture of wrist     Fracture, finger     Fracture, foot     Fracture, radius     Gastric reflux     GERD (gastroesophageal reflux disease)     Heart attack     Heart murmur     Hiatal hernia     High cholesterol     History of transfusion     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    HPV positive on pap LGSIL    Hyperlipidemia     Hypertension     Hypothyroidism     Incontinence in female     wears pads    Kidney disease 2017    stage 2     Kidney disease, chronic, stage III (GFR 30-59 ml/min)     Knee swelling     LGSIL on Pap smear of cervix 04/29/2016    LGSIL HPV positive    Lumbosacral disc disease Unsure    Lower left back is partial osteoarthritis and partial osteoporosis     Myocardial infarction 2000    Neck pain     Neck strain     Obesity     Osteopenia 2021    Bone density test    Past myocardial infarction 05/2000    Periodic limb movement disorder     Renal insufficiency 2018    Restless leg syndrome     Rotator cuff syndrome     Sleep apnea     cpap    Stress fracture     LEFT HEEL    Suicidal ideation 08/19/2016    history    Swelling of ankle     right had doppler no s/s blood clot    Tear of meniscus of knee     Thyroid nodule     Vitamin B12 deficiency     Wrist sprain         Past Surgical History:   Procedure Laterality Date    ADENOIDECTOMY  1974    ANTERIOR CERVICAL DISCECTOMY W/ FUSION N/A 12/29/2016    Procedure: C3-4 anterior cervical discectomy and fusion with Depuy micro plate, ALLOGRAFT C3-4, AND HARDWARE REMOVAL C4-7.;  Surgeon: Hema Godwin MD;  Location: [facility] MAIN OR;  Service:     BARIATRIC SURGERY  2018    CARDIAC CATHETERIZATION N/A 03/30/2017    Procedure: Left Heart Cath;  Surgeon: Tracey Vargas MD;  Location:  [facility] CATH INVASIVE [facility]ATION;  Service:     CARDIAC CATHETERIZATION N/A 03/30/2017    Procedure: Coronary angiography;  Surgeon: Tracey Vargas MD;  Location:  [facility];  Service:     CARDIAC CATHETERIZATION N/A 03/30/2017    Procedure: Left ventriculography;  Surgeon: Tracey Vargas MD;  Location:  [facility];  Service:     CARDIAC CATHETERIZATION  03/30/2017    Procedure: Functional [place];  Surgeon: Tracey Vargas MD;  Location:  [facility]VAS[facility];  Service:     CARPAL TUNNEL RELEASE      CATARACT EXTRACTION      CERVICAL BIOPSY  [facility]    Dr. Jeffery.     CERVICAL DISCECTOMY ANTERIOR  04/2013    C4-7    COLONOSCOPY  04/20/2015    Diverticulosis, IH    COLONOSCOPY  03/09/2021    COLPOSCOPY W/ BIOPSY / CURETTAGE  06/17/2016    LGSIL HPV positive. Results were normal repeat pap in one year. Chronic Cervicitis    CORONARY ANGIOPLASTY WITH STENT PLACEMENT      CORONARY STENT PLACEMENT  May  2000    ENDOSCOPY  [facility]    Normal.  Dr. Rodriguez    ENDOSCOPY N/A 06/22/2017    Erythematous mucosa in the stomach  PATH: Chronic active gastritis, moderate with intestinal metaplasia     EYE SURGERY  2022    Cateract surgery    GASTRIC BYPASS      Done using the sleeve technique    GASTRIC RESTRICTION SURGERY      HAND SURGERY  Carpal tunnel    HARDWARE REMOVAL  12/29/2016    cervical    HERNIA REPAIR      INGUINAL HERNIA REPAIR      LAPAROSCOPIC GASTRIC BANDING  02/2018    NECK SURGERY      SHOULDER SURGERY Left     RCR    TONSILLECTOMY      TONSILLECTOMY AND ADENOIDECTOMY      TRANSVAGINAL TAPING SUSPENSION N/A 12/06/2017    Procedure: MID URETHRAL SLING CYSTSCOPY;  Surgeon: Abby Méndez MD;  Location: [facility] OR;  Service:     TRIGGER POINT INJECTION      TUBAL ABDOMINAL LIGATION      TYMPANOSTOMY TUBE PLACEMENT Right     UPPER GASTROINTESTINAL ENDOSCOPY  approx 2021    WRIST SURGERY Bilateral     carpal tunnel    WRIST SURGERY      L wrist/ 4/2020        Social History     Occupational History    Occupation: disabled   Tobacco Use    Smoking status: Light Smoker     Types: Electronic Cigarette     Passive exposure: Never    Smokeless tobacco: Current    Tobacco comments:     Electronic Cigarette   Vaping Use    Vaping Use: Every day    Substances: Nicotine, Flavoring    Devices: NCTech tank   Substance and Sexual Activity    Alcohol use: Not Currently    Drug use: Never    Sexual activity: Not Currently     Partners: Male     Birth control/protection: Condom, Abstinence, Post-menopausal, None, Tubal ligation      Social History     Social History Narrative    Not on file        Family History   Problem Relation Age of Onset    Diabetes type II Mother     Hypertension Mother     Osteoporosis Mother     Seizures Mother     Diabetes Mother     Arthritis Mother     Hyperlipidemia Mother     COPD Father     Hypertension Father     Lung cancer Father     Heart attack Father     Liver cancer Father      Liver disease Father     Heart disease Father     Cancer Father         Lung cacer that metastasized  into the liver.    Thyroid disease Sister     Hypertension Sister     Bipolar disorder Sister     Depression Sister     ADD / ADHD Sister     Anxiety disorder Sister     Mental illness Sister     Hyperlipidemia Sister     Broken bones Sister     No Known Problems Son     Abnormal EKG Daughter     Hypertension Daughter     Bipolar disorder Daughter     Thyroid disease Daughter     Mental illness Daughter     Hyperlipidemia Daughter     No Known Problems Paternal Grandfather     No Known Problems Paternal Grandmother     Cancer Maternal Grandmother     No Known Problems Maternal Grandfather     Thyroid disease Sister     Hypertension Sister     Bipolar disorder Sister     Anxiety disorder Sister     Depression Sister     Mental illness Sister     Hyperlipidemia Sister     Asthma Daughter     Diabetes Son         Type 1    Breast cancer Neg Hx     Ovarian cancer Neg Hx     Uterine cancer Neg Hx     Colon cancer Neg Hx     Malig Hyperthermia Neg Hx        ROS: 14 point review of systems was performed and all other systems were reviewed and are negative except for documented findings in HPI and today's encounter.     Allergies: No Known Allergies  Constitutional:  Denies fever, shaking or chills   Eyes:  Denies change in visual acuity   HENT:  Denies nasal congestion or sore throat   Respiratory:  Denies cough or shortness of breath   Cardiovascular:  Denies chest pain or severe LE edema   GI:  Denies abdominal pain, nausea, vomiting, bloody stools or diarrhea   Musculoskeletal:  Numbness, tingling, pain, or loss of motor function only as noted above in history of present illness.  : Denies painful urination or hematuria  Integument:  Denies rash, lesion or ulceration   Neurologic:  Denies headache or focal weakness  Endocrine:  Denies lymphadenopathy  Psych:  Denies confusion or change in mental status   Hem:   "Denies active bleeding    OBJECTIVE:  Physical Exam: 63 y.o. female  Wt Readings from Last 3 Encounters:   09/20/23 92.1 kg (203 lb)   09/19/23 92.4 kg (203 lb 12.8 oz)   09/15/23 92.7 kg (204 lb 4.8 oz)     Ht Readings from Last 1 Encounters:   09/20/23 160 cm (63\")     Body mass index is 35.96 kg/m².  Vitals:    09/20/23 1148   Temp: 98 °F (36.7 °C)     Vital signs reviewed.     General Appearance:    Alert, cooperative, in no acute distress                  Eyes: conjunctiva clear  ENT: external ears and nose atraumatic  CV: no peripheral edema  Resp: normal respiratory effort  Skin: no rashes or wounds; normal turgor  Psych: mood and affect appropriate  Lymph: no nodes appreciated  Neuro: gross sensation intact  Vascular:  Palpable peripheral pulse in noted extremity  Musculoskeletal Extremities: skin warm, dry and intact with no effusion, extensor mechanism intact, ambulates without difficulty, calf soft nttp, nvi and +pms   Superficial abrasion to patella no effusion     Radiology:   3 views right knee done for pain with no comparison views done no acute fracture and tricompartmental DJD     Assessment:     ICD-10-CM ICD-9-CM   1. Right knee pain, unspecified chronicity  M25.561 719.46   2. Contusion of right knee, initial encounter  S80.01XA 924.11        Procedures  Conservative treatment with RICE and offload with a cane   MDM/Plan:   The diagnosis(es), natural history, pathophysiology and treatment for diagnosis(es) were discussed. Opportunity given and questions answered.  Biomechanics of pertinent body areas discussed.  When appropriate, the use of ambulatory aids discussed.  EXERCISES:  Advice on benefits of, and types of regular/moderate exercise pertaining to orthopedic diagnosis(es).  MEDICATIONS:  The risks, benefits, warnings,side effects and alternatives of medications discussed.  Inflammation/pain control; with cold, heat, elevation and/or liniments discussed as appropriate  MEDICAL RECORDS " reviewed from other provider(s) for past and current medical history pertinent to this complaint.      9/20/2023    Much of this encounter note is an electronic transcription/translation of spoken language to printed text. The electronic translation of spoken language may permit erroneous, or at times, nonsensical words or phrases to be inadvertently transcribed; Although I have reviewed the note for such errors, some may still exist

## 2023-10-10 ENCOUNTER — TELEPHONE (OUTPATIENT)
Dept: OBSTETRICS AND GYNECOLOGY | Age: 63
End: 2023-10-10
Payer: MEDICARE

## 2023-10-10 NOTE — TELEPHONE ENCOUNTER
----- Message from Keri Bhagat sent at 10/9/2023  9:42 PM EDT -----  Regarding: Appointment Cancellation Request  Contact: [phone]  Keri Bhagat would like to cancel the following appointments:    Erika Wayne in [id number] ([phone]), 10/19/2023  9:30 AM    Comments:

## 2023-10-11 NOTE — TELEPHONE ENCOUNTER
I have never seen this patient.  I believe she was referred her for management of abnormal pap.  Please let me know if she would like to reschedule.

## 2023-10-16 RX ORDER — ALPRAZOLAM 1 MG/1
TABLET ORAL
Qty: 1 TABLET | Refills: 0 | Status: SHIPPED | OUTPATIENT
Start: 2023-10-16

## 2023-10-16 NOTE — TELEPHONE ENCOUNTER
Rx Refill Note  Requested Prescriptions     Pending Prescriptions Disp Refills    ALPRAZolam (XANAX) 1 MG tablet [Pharmacy Med Name: ALPRAZOLAM 1 MG TABLET] 1 tablet      Sig: TAKE 1 TABLET BY MOUTH 1 HOUR BEFORE MRI AS DIRECTED      Last office visit with prescribing clinician: 7/19/2023   Last telemedicine visit with prescribing clinician: Visit date not found   Next office visit with prescribing clinician: 1/19/2024                         Would you like a call back once the refill request has been completed: [] Yes [] No    If the office needs to give you a call back, can they leave a voicemail: [] Yes [] No    Atul Arizmendi MA  10/16/23, 11:31 EDT

## 2023-11-16 ENCOUNTER — TELEPHONE (OUTPATIENT)
Dept: ORTHOPEDIC SURGERY | Facility: CLINIC | Age: 63
End: 2023-11-16

## 2023-11-16 NOTE — TELEPHONE ENCOUNTER
Caller: Keri Bhagat    Relationship: Self    Best call back number: [phone]    What is the best time to reach you: ANYTIME TOMORROW OR AFTER 2:00 TODAY      Who are you requesting to speak with (clinical staff, provider,  specific staff member): CLINICAL STAFF     Do you know the name of the person who called: REQUESTING A CALL BACK     What was the call regarding: [facility] AND THEY ADVISED THE PATIENT TO HAVE THE OFFICE SEND IN A CONTINUATION OF CARE FORM FOR APPROVAL. OKAY TO LEAVE A VOICEMAIL IF NEEDED.     Is it okay if the provider responds through MyChart: YES

## 2023-11-19 ENCOUNTER — HOSPITAL ENCOUNTER (OUTPATIENT)
Facility: HOSPITAL | Age: 63
Discharge: HOME | End: 2023-11-19
Attending: EMERGENCY MEDICINE | Admitting: EMERGENCY MEDICINE
Payer: MEDICARE

## 2023-11-19 ENCOUNTER — APPOINTMENT (OUTPATIENT)
Dept: GENERAL RADIOLOGY | Facility: HOSPITAL | Age: 63
End: 2023-11-19
Payer: MEDICARE

## 2023-11-19 VITALS
TEMPERATURE: 98.3 F | WEIGHT: 203 LBS | SYSTOLIC BLOOD PRESSURE: 134 MMHG | DIASTOLIC BLOOD PRESSURE: 100 MMHG | BODY MASS INDEX: 35.97 KG/M2 | RESPIRATION RATE: 18 BRPM | OXYGEN SATURATION: 97 % | HEART RATE: 110 BPM | HEIGHT: 63 IN

## 2023-11-19 DIAGNOSIS — S80.02XA CONTUSION OF LEFT KNEE, INITIAL ENCOUNTER: Primary | ICD-10-CM

## 2023-11-19 PROCEDURE — G0463 HOSPITAL OUTPT CLINIC VISIT: HCPCS | Performed by: EMERGENCY MEDICINE

## 2023-11-19 PROCEDURE — 73562 X-RAY EXAM OF KNEE 3: CPT

## 2023-11-19 RX ORDER — NAPROXEN 500 MG/1
500 TABLET ORAL 2 TIMES DAILY PRN
Qty: 10 TABLET | Refills: 0 | Status: SHIPPED | OUTPATIENT
Start: 2023-11-19

## 2023-11-20 NOTE — FSED PROVIDER NOTE
Subjective   History of Present Illness  Patient fell onto her knees at 6 PM.  Is a mechanical fall.  She has pain to their left knee hurts to weight-bear.      Review of Systems   Musculoskeletal:  Positive for arthralgias.   All other systems reviewed and are negative.      Past Medical History:   Diagnosis Date    Abnormal Pap smear of cervix     Anemia     Ankle sprain     Anxiety     Arthritis     Arthritis of back     Arthritis of neck     Atherosclerotic heart disease of native coronary artery with other forms of angina pectoris     Bipolar I disorder, single manic episode     depressive d(NOS)    Cervical disc herniation     Cervical dysplasia     Coronary artery disease     COVID-19 01/10/2023    CTS (carpal tunnel syndrome)     Depression     NO SUICIDAL PLANS    Difficulty swallowing     Dislocation of finger     Diverticular disease     Dyspepsia     Dysphagia     Fracture of wrist     Fracture, finger     Fracture, foot     Fracture, radius     Gastric reflux     GERD (gastroesophageal reflux disease)     Heart attack     Heart murmur     Hiatal hernia     High cholesterol     History of transfusion     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    HPV positive on pap LGSIL    Hyperlipidemia     Hypertension     Hypothyroidism     Incontinence in female     wears pads    Kidney disease 2017    stage 2     Kidney disease, chronic, stage III (GFR 30-59 ml/min)     Knee swelling     LGSIL on Pap smear of cervix 04/29/2016    LGSIL HPV positive    Lumbosacral disc disease Unsure    Lower left back is partial osteoarthritis and partial osteoporosis    Myocardial infarction 2000    Neck pain     Neck strain     Obesity     Osteopenia 2021    Bone density test    Past myocardial infarction 05/2000    Periodic limb movement disorder     Renal insufficiency 2018    Restless leg syndrome     Rotator cuff syndrome     Sleep apnea     cpap    Stress fracture     LEFT HEEL    Suicidal ideation 08/19/2016     history    Swelling of ankle     right had doppler no s/s blood clot    Tear of meniscus of knee     Thyroid nodule     Vitamin B12 deficiency     Wrist sprain        No Known Allergies    Past Surgical History:   Procedure Laterality Date    ADENOIDECTOMY  1974    ANTERIOR CERVICAL DISCECTOMY W/ FUSION N/A 12/29/2016    Procedure: C3-4 anterior cervical discectomy and fusion with Depuy micro plate, ALLOGRAFT C3-4, AND HARDWARE REMOVAL C4-7.;  Surgeon: Hema Godwin MD;  Location: [facility] MAIN OR;  Service:     BARIATRIC SURGERY  2018    CARDIAC CATHETERIZATION N/A 03/30/2017    Procedure: Left Heart Cath;  Surgeon: Tracey Vargas MD;  Location:  [facility] CATH INVASIVE [facility];  Service:     CARDIAC CATHETERIZATION N/A 03/30/2017    Procedure: Coronary angiography;  Surgeon: Tracey Vargas MD;  Location:  [facility];  Service:     CARDIAC CATHETERIZATION N/A 03/30/2017    Procedure: Left ventriculography;  Surgeon: Tracey Vargas MD;  Location:  [facility];  Service:     CARDIAC CATHETERIZATION  03/30/2017    Procedure: Functional Flow [place];  Surgeon: Tracey Vargas MD;  Location:  [facility]ATH [facility];  Service:     CARPAL TUNNEL RELEASE      CATARACT EXTRACTION      CERVICAL BIOPSY  MMXVI    Dr. Jeffery.     CERVICAL DISCECTOMY ANTERIOR  04/2013    C4-7    COLONOSCOPY  04/20/2015    Diverticulosis, IH    COLONOSCOPY  03/09/2021    COLPOSCOPY W/ BIOPSY / CURETTAGE  06/17/2016    LGSIL HPV positive. Results were normal repeat pap in one year. Chronic Cervicitis    CORONARY ANGIOPLASTY WITH STENT PLACEMENT      CORONARY STENT PLACEMENT  May 2000    ENDOSCOPY  MMXV    Normal.  Dr. Rodriguez    ENDOSCOPY N/A 06/22/2017    Erythematous mucosa in the stomach  PATH: Chronic active gastritis, moderate with intestinal metaplasia     EYE SURGERY  2022    Cateract surgery    GASTRIC BYPASS      Done using the sleeve technique    GASTRIC RESTRICTION SURGERY      HAND SURGERY  Carpal  tunnel    HARDWARE REMOVAL  12/29/2016    cervical    HERNIA REPAIR      INGUINAL HERNIA REPAIR      LAPAROSCOPIC GASTRIC BANDING  02/2018    NECK SURGERY      SHOULDER SURGERY Left     RCR    TONSILLECTOMY      TONSILLECTOMY AND ADENOIDECTOMY      TRANSVAGINAL TAPING SUSPENSION N/A 12/06/2017    Procedure: MID URETHRAL SLING CYSTSCOPY;  Surgeon: Abby Méndez MD;  Location: [facility] OR;  Service:     TRIGGER POINT INJECTION      TUBAL ABDOMINAL LIGATION      TYMPANOSTOMY TUBE PLACEMENT Right     UPPER GASTROINTESTINAL ENDOSCOPY  approx 2021    WRIST SURGERY Bilateral     carpal tunnel    WRIST SURGERY      L wrist/ 4/2020       Family History   Problem Relation Age of Onset    Diabetes type II Mother     Hypertension Mother     Osteoporosis Mother     Seizures Mother     Diabetes Mother     Arthritis Mother     Hyperlipidemia Mother     COPD Father     Hypertension Father     Lung cancer Father     Heart attack Father     Liver cancer Father     Liver disease Father     Heart disease Father     Cancer Father         Lung cacer that metastasized  into the liver.    Thyroid disease Sister     Hypertension Sister     Bipolar disorder Sister     Depression Sister     ADD / ADHD Sister     Anxiety disorder Sister     Mental illness Sister     Hyperlipidemia Sister     Broken bones Sister     No Known Problems Son     Abnormal EKG Daughter     Hypertension Daughter     Bipolar disorder Daughter     Thyroid disease Daughter     Mental illness Daughter     Hyperlipidemia Daughter     No Known Problems Paternal Grandfather     No Known Problems Paternal Grandmother     Cancer Maternal Grandmother     No Known Problems Maternal Grandfather     Thyroid disease Sister     Hypertension Sister     Bipolar disorder Sister     Anxiety disorder Sister     Depression Sister     Mental illness Sister     Hyperlipidemia Sister     Asthma Daughter     Diabetes Son         Type 1    Breast cancer Neg Hx     Ovarian cancer  Neg Hx     Uterine cancer Neg Hx     Colon cancer Neg Hx     Malig Hyperthermia Neg Hx        Social History     Socioeconomic History    Marital status:     Number of children: 3    Years of education: 12   Tobacco Use    Smoking status: Light Smoker     Types: Electronic Cigarette     Passive exposure: Never    Smokeless tobacco: Current    Tobacco comments:     Electronic Cigarette   Vaping Use    Vaping Use: Every day    Substances: Nicotine, Flavoring    Devices: Refillable tank   Substance and Sexual Activity    Alcohol use: Not Currently    Drug use: Never    Sexual activity: Not Currently     Partners: Male     Birth control/protection: Condom, Abstinence, Post-menopausal, None, Tubal ligation           Objective   Physical Exam  Vitals and nursing note reviewed.   Constitutional:       Appearance: Normal appearance.   HENT:      Head: Normocephalic and atraumatic.   Eyes:      Extraocular Movements: Extraocular movements intact.      Pupils: Pupils are equal, round, and reactive to light.   Pulmonary:      Effort: No respiratory distress.   Musculoskeletal:         General: Tenderness and signs of injury present. No swelling or deformity.   Skin:     General: Skin is warm and dry.      Capillary Refill: Capillary refill takes less than 2 seconds.      Comments: No ligamentous laxity with anterior posterior drawer test or valgus or varus stressing there is no swelling there is no increased warmth she has tenderness at the diffusely anterior portion of her knee   Neurological:      Mental Status: She is alert and oriented to person, place, and time.         Procedures           ED Course                                           Medical Decision Making  Knee x-rays are negative I will place her in an Ace wrap and crutches partial weightbearing not to use her knee or walk much for the next 3 days ice and elevate.  I will place her on an anti-inflammatory.    Amount and/or Complexity of Data  Reviewed  Radiology: ordered.     Details: X-rays are negative for any fracture dislocation of the knee        Final diagnoses:   Contusion of left knee, initial encounter       ED Disposition  ED Disposition       ED Disposition   Discharge    Condition   Stable    Comment   --               Epley, James, APRN  [address]  [address]  [place] [id number] [phone]    In 1 week  Recheck knee         Medication List        New Prescriptions      naproxen 500 MG EC tablet  Commonly known as: EC NAPROSYN  Take 1 tablet by mouth 2 (Two) Times a Day As Needed for Mild Pain.               Where to Get Your Medications        These medications were sent to [facility] [id number] - [place], [place] - [address] [facility] - [phone]  - [phone]   [address] [facility] [address] [address]      Phone: [phone]   naproxen 500 MG EC tablet

## 2023-11-20 NOTE — DISCHARGE INSTRUCTIONS
Minimal weightbearing use crutches and Ace wrap for comfort for the next 72 hours and then as tolerated.

## 2023-11-29 ENCOUNTER — TELEPHONE (OUTPATIENT)
Dept: ORTHOPEDIC SURGERY | Facility: CLINIC | Age: 63
End: 2023-11-29
Payer: MEDICARE

## 2023-11-29 ENCOUNTER — OFFICE VISIT (OUTPATIENT)
Dept: ORTHOPEDIC SURGERY | Facility: CLINIC | Age: 63
End: 2023-11-29
Payer: MEDICARE

## 2023-11-29 VITALS — TEMPERATURE: 97.5 F | WEIGHT: 203 LBS | BODY MASS INDEX: 35.97 KG/M2 | HEIGHT: 63 IN

## 2023-11-29 DIAGNOSIS — M17.0 PRIMARY OSTEOARTHRITIS OF BOTH KNEES: Primary | ICD-10-CM

## 2023-11-29 RX ORDER — ESTRADIOL 0.1 MG/G
1 CREAM VAGINAL
COMMUNITY
Start: 2023-10-20

## 2023-11-29 RX ADMIN — LIDOCAINE HYDROCHLORIDE 2 ML: 20 INJECTION, SOLUTION EPIDURAL; INFILTRATION; INTRACAUDAL; PERINEURAL at 15:01

## 2023-11-29 RX ADMIN — METHYLPREDNISOLONE ACETATE 1 ML: 80 INJECTION, SUSPENSION INTRA-ARTICULAR; INTRALESIONAL; INTRAMUSCULAR; SOFT TISSUE at 15:01

## 2023-11-29 NOTE — TELEPHONE ENCOUNTER
Patient has a case request from 5/2023. However it looks like she is [employer] and I do not believe we will be able to do her surgery in the new year. If you could look into this I would appreciate it. Thank you!

## 2023-11-29 NOTE — TELEPHONE ENCOUNTER
Pt seeing [facility] today for noemi knee injections and would like to proceed with scheduling LEFT TKA.    Please contact pt.

## 2023-11-29 NOTE — PROGRESS NOTES
Patient: Keri Bhagat  YOB: 1960  Date of Service: 11/30/2023    Chief Complaints:   Chief Complaint   Patient presents with    Left Knee - Pain, Follow-up    Right Knee - Pain, Follow-up       Subjective: Patient presents for bilateral knee pain and would like to proceed with total knee arthroplasty she was set up with Dr. Pritchard and had to cancel due to illness with her mother  and she can be messaging Dr. Lunsford nurse practitioner to see if she can be placed on the schedule we will do the cortisone injections of both knees to help her with her pain and she is aware she has to wait 3 months for surgery and continue to work on weight loss.     History of Present Illness: Pt is seen in the office today with complaints of   Chief Complaint   Patient presents with    Left Knee - Pain, Follow-up    Right Knee - Pain, Follow-up   .  KNEE: TIMING:  The pain is described as ACUTE on CHRONIC.  LOCATION: medial joint line tenderness. AGGRAVATING FACTORS:  Is worsened by prolonged standing, sitting, walking and squatting activities.  ASSOCIATED SYMPTOMS:  swelling, tenderness, and aching.    This problem is not new to this examiner.     Allergies: No Known Allergies    Medications:   Home Medications:  Current Outpatient Medications on File Prior to Visit   Medication Sig    ARIPiprazole (ABILIFY) 20 MG tablet Take 1 tablet by mouth Daily.    ASPIRIN 81 PO Take 1 tablet by mouth Daily.    atorvastatin (Lipitor) 40 MG tablet Take 1 tablet by mouth Daily.    B Complex Vitamins (VITAMIN B COMPLEX PO) Take 1 tablet by mouth Daily.    buPROPion XL (WELLBUTRIN XL) 300 MG 24 hr tablet Take 1 tablet by mouth Daily.    calcium carbonate (OS-STEVIE) 600 MG tablet Take 1 tablet by mouth Daily.    Cholecalciferol 25 MCG (1000 UT) capsule Take 1 capsule by mouth 2 (Two) Times a Day.    estradiol (ESTRACE) 0.1 MG/GM vaginal cream Insert 1 applicator into the vagina.    Ferrous Sulfate (IRON PO) Take 1 tablet by mouth 2 (Two)  Times a Day.    gabapentin (NEURONTIN) 300 MG capsule TAKE ONE CAPSULE BY MOUTH THREE TIMES A DAY    hydrOXYzine (ATARAX) 50 MG tablet As Needed.    isosorbide mononitrate (IMDUR) 30 MG 24 hr tablet Take 1 tablet by mouth Daily.    lamoTRIgine (LaMICtal) 150 MG tablet Take 2 tablets by mouth Daily.    levothyroxine (SYNTHROID, LEVOTHROID) 88 MCG tablet TAKE ONE TABLET BY MOUTH EVERY MORNING    metoprolol succinate XL (Toprol XL) 25 MG 24 hr tablet Take 1 tablet by mouth Daily. For bp    naproxen (EC NAPROSYN) 500 MG EC tablet Take 1 tablet by mouth 2 (Two) Times a Day As Needed for Mild Pain.    oxybutynin XL (DITROPAN XL) 15 MG 24 hr tablet Take 1 tablet by mouth Daily.    pantoprazole (PROTONIX) 40 MG EC tablet Take 1 tablet by mouth 2 (Two) Times a Day.    pramipexole (MIRAPEX) 0.5 MG tablet Take 1 tablet by mouth Every Night for 360 days.    QUEtiapine (SEROquel) 25 MG tablet Take 1 tablet by mouth Every Night.    venlafaxine XR (EFFEXOR-XR) 150 MG 24 hr capsule Take 1 capsule by mouth Daily.    vitamin B-12 (CYANOCOBALAMIN) 1000 MCG tablet TAKE ONE TABLET BY MOUTH TWICE A DAY    ALPRAZolam (XANAX) 1 MG tablet TAKE 1 TABLET BY MOUTH 1 HOUR BEFORE MRI AS DIRECTED (Patient not taking: Reported on 11/29/2023)     No current facility-administered medications on file prior to visit.     Current Medications:  Scheduled Meds:  Continuous Infusions:No current facility-administered medications for this visit.    PRN Meds:.    I have reviewed the patient's medical history in detail and updated the computerized patient record.  Review and summarization of old records include:    Past Medical History:   Diagnosis Date    Abnormal Pap smear of cervix     Adrenal adenoma 09/21/2022    Anemia     Ankle sprain     Anxiety     Arthritis     Arthritis of back     Arthritis of neck     Atherosclerotic heart disease of native coronary artery with other forms of angina pectoris     Bipolar I disorder, single manic episode      depressive d(NOS)    Cervical disc herniation     Cervical dysplasia     Coronary artery disease     COVID-19 01/10/2023    CTS (carpal tunnel syndrome)     Depression     NO SUICIDAL PLANS    Difficulty swallowing     Dislocation of finger     Diverticular disease     Dyspepsia     Dysphagia     Encounter for follow-up examination after completed treatment for conditions other than malignant neoplasm 04/11/2018    Fracture of wrist     Fracture, finger     Fracture, foot     Fracture, radius     Gastric reflux     GERD (gastroesophageal reflux disease)     Heart attack     Heart murmur     Hiatal hernia     High cholesterol     History of transfusion     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    HPV positive on pap LGSIL    Hyperlipidemia     Hypertension     Hypothyroidism     Incontinence in female     wears pads    Kidney disease 2017    stage 2     Kidney disease, chronic, stage III (GFR 30-59 ml/min)     Knee swelling     LGSIL on Pap smear of cervix 04/29/2016    LGSIL HPV positive    Lumbosacral disc disease Unsure    Lower left back is partial osteoarthritis and partial osteoporosis    Myocardial infarction 2000    Neck pain     Neck strain     Obesity     Osteopenia 2021    Bone density test    Past myocardial infarction 05/2000    Periodic limb movement disorder     Renal insufficiency 2018    Restless leg syndrome     Rotator cuff syndrome     Sleep apnea     cpap    Stress fracture     LEFT HEEL    Suicidal ideation 08/19/2016    history    Swelling of ankle     right had doppler no s/s blood clot    Tear of meniscus of knee     Thyroid nodule     Vitamin B12 deficiency     Wrist sprain         Past Surgical History:   Procedure Laterality Date    ADENOIDECTOMY  1974    ANTERIOR CERVICAL DISCECTOMY W/ FUSION N/A 12/29/2016    Procedure: C3-4 anterior cervical discectomy and fusion with Depuy micro plate, ALLOGRAFT C3-4, AND HARDWARE REMOVAL C4-7.;  Surgeon: Hema Godwin MD;  Location:   [facility] MAIN OR;  Service:     BARIATRIC SURGERY  2018    CARDIAC CATHETERIZATION N/A 03/30/2017    Procedure: Left Heart Cath;  Surgeon: Tracey Vargas MD;  Location:  [facility]VAS[facility];  Service:     CARDIAC CATHETERIZATION N/A 03/30/2017    Procedure: Coronary angiography;  Surgeon: Tracey Vargas MD;  Location:  [facility];  Service:     CARDIAC CATHETERIZATION N/A 03/30/2017    Procedure: Left ventriculography;  Surgeon: Tracey Vargas MD;  Location:  [facility];  Service:     CARDIAC CATHETERIZATION  03/30/2017    Procedure: Functional Flow [place];  Surgeon: Tracey Vargas MD;  Location:  [facility];  Service:     CARPAL TUNNEL RELEASE      CATARACT EXTRACTION      CERVICAL BIOPSY  MMXVI    Dr. Jeffery.     CERVICAL DISCECTOMY ANTERIOR  04/2013    C4-7    COLONOSCOPY  04/20/2015    Diverticulosis, IH    COLONOSCOPY  03/09/2021    COLPOSCOPY W/ BIOPSY / CURETTAGE  06/17/2016    LGSIL HPV positive. Results were normal repeat pap in one year. Chronic Cervicitis    CORONARY ANGIOPLASTY WITH STENT PLACEMENT      CORONARY STENT PLACEMENT  May 2000    ENDOSCOPY  MMXV    Normal.  Dr. Rodriguez    ENDOSCOPY N/A 06/22/2017    Erythematous mucosa in the stomach  PATH: Chronic active gastritis, moderate with intestinal metaplasia     EPIDURAL BLOCK      EYE SURGERY  2022    Cateract surgery    GASTRIC BYPASS      Done using the sleeve technique    GASTRIC RESTRICTION SURGERY      HAND SURGERY  Carpal tunnel    HARDWARE REMOVAL  12/29/2016    cervical    HERNIA REPAIR      INGUINAL HERNIA REPAIR      LAPAROSCOPIC GASTRIC BANDING  02/2018    NECK SURGERY      SHOULDER SURGERY Left     RCR    TONSILLECTOMY      TONSILLECTOMY AND ADENOIDECTOMY      TRANSVAGINAL TAPING SUSPENSION N/A 12/06/2017    Procedure: MID URETHRAL SLING CYSTSCOPY;  Surgeon: Abby Méndez MD;  Location: [facility] MAIN OR;  Service:     TRIGGER POINT INJECTION      TUBAL ABDOMINAL  LIGATION      TYMPANOSTOMY TUBE PLACEMENT Right     UPPER GASTROINTESTINAL ENDOSCOPY  approx 2021    WRIST SURGERY Bilateral     carpal tunnel    WRIST SURGERY      L wrist/ 4/2020        Social History     Occupational History    Occupation: disabled   Tobacco Use    Smoking status: Light Smoker     Types: Electronic Cigarette     Passive exposure: Never    Smokeless tobacco: Current    Tobacco comments:     Electronic Cigarette   Vaping Use    Vaping Use: Every day    Substances: Nicotine, Flavoring    Devices: Refillable tank   Substance and Sexual Activity    Alcohol use: Not Currently    Drug use: Never    Sexual activity: Not Currently     Partners: Male     Birth control/protection: Condom, Abstinence, Post-menopausal, None, Tubal ligation      Social History     Social History Narrative    Not on file        Family History   Problem Relation Age of Onset    Diabetes type II Mother     Hypertension Mother     Osteoporosis Mother     Seizures Mother     Diabetes Mother     Arthritis Mother     Hyperlipidemia Mother     COPD Father     Hypertension Father     Lung cancer Father     Heart attack Father     Liver cancer Father     Liver disease Father     Heart disease Father     Cancer Father         Lung cacer that metastasized  into the liver.    Thyroid disease Sister     Hypertension Sister     Bipolar disorder Sister     Depression Sister     ADD / ADHD Sister     Anxiety disorder Sister     Mental illness Sister     Hyperlipidemia Sister     Broken bones Sister     No Known Problems Son     Abnormal EKG Daughter     Hypertension Daughter     Bipolar disorder Daughter     Thyroid disease Daughter     Mental illness Daughter     Hyperlipidemia Daughter     No Known Problems Paternal Grandfather     No Known Problems Paternal Grandmother     Cancer Maternal Grandmother     No Known Problems Maternal Grandfather     Thyroid disease Sister     Hypertension Sister     Bipolar disorder Sister     Anxiety disorder  Sister     Depression Sister     Mental illness Sister     Hyperlipidemia Sister     Asthma Daughter     Diabetes Son         Type 1    Breast cancer Neg Hx     Ovarian cancer Neg Hx     Uterine cancer Neg Hx     Colon cancer Neg Hx     Malig Hyperthermia Neg Hx        ROS: 14 point review of systems was performed and was negative except for documented findings in HPI and today's encounter.     Allergies: No Known Allergies  Constitutional:  Denies fever, shaking or chills   Eyes:  Denies change in visual acuity   HENT:  Denies nasal congestion or sore throat   Respiratory:  Denies cough or shortness of breath   Cardiovascular:  Denies chest pain or severe LE edema   GI:  Denies abdominal pain, nausea, vomiting, bloody stools or diarrhea   Musculoskeletal:  Numbness, tingling, or loss of motor function only as noted above in history of present illness.  : Denies painful urination or hematuria  Integument:  Denies rash, lesion or ulceration   Neurologic:  Denies headache or focal weakness  Endocrine:  Denies lymphadenopathy  Psych:  Denies confusion or change in mental status   Hem:  Denies active bleeding      Physical Exam: 63 y.o. female  Wt Readings from Last 3 Encounters:   11/29/23 92.1 kg (203 lb)   11/19/23 92.1 kg (203 lb)   09/20/23 92.1 kg (203 lb)       Body mass index is 35.96 kg/m².  Facility age limit for growth %nessa is 20 years.  Vitals:    11/29/23 1448   Temp: 97.5 °F (36.4 °C)     Vital signs reviewed.   General Appearance:    Alert, cooperative, in no acute distress                  Eyes: conjunctiva clear  ENT: external ears and nose atraumatic  CV: no peripheral edema  Resp: normal respiratory effort  Skin: no rashes or wounds; normal turgor  Psych: mood and affect appropriate  Lymph: no nodes appreciated  Neuro: gross sensation intact  Vascular:  Palpable peripheral pulse in noted extremity  Musculoskeletal Extremities: KNEE Exam: medial and lateral joint line tenderness with crepitation,  synovitis, swelling, and joint effusion bilateral knee.      Diagnostic Data:  Imaging done previously in the office and discussed with the patient:         Procedure:  Large Joint Arthrocentesis: R knee  Date/Time: 11/29/2023 3:01 PM  Consent given by: patient  Site marked: site marked  Timeout: Immediately prior to procedure a time out was called to verify the correct patient, procedure, equipment, support staff and site/side marked as required   Supporting Documentation  Indications: pain, joint swelling and diagnostic evaluation   Procedure Details  Location: knee - R knee  Preparation: Patient was prepped and draped in the usual sterile fashion  Needle gauge: 21G.  Medications administered: 1 mL methylPREDNISolone acetate 80 MG/ML; 2 mL lidocaine PF 2% 2 %  Patient tolerance: patient tolerated the procedure well with no immediate complications      Large Joint Arthrocentesis: L knee  Date/Time: 11/29/2023 3:01 PM  Consent given by: patient  Site marked: site marked  Timeout: Immediately prior to procedure a time out was called to verify the correct patient, procedure, equipment, support staff and site/side marked as required   Supporting Documentation  Indications: pain   Procedure Details  Location: knee - L knee  Preparation: Patient was prepped and draped in the usual sterile fashion  Needle gauge: 21G.  Medications administered: 1 mL methylPREDNISolone acetate 80 MG/ML; 2 mL lidocaine PF 2% 2 %  Patient tolerance: patient tolerated the procedure well with no immediate complications        Assessment:     ICD-10-CM ICD-9-CM   1. Primary osteoarthritis of both knees  M17.0 715.16       Plan:   Follow up as indicated.  Ice, elevate, and rest as needed.  The diagnosis(es), natural history, pathophysiology and treatment for diagnosis(es) were discussed. Opportunity given and questions answered.  EXERCISES:  Advice on benefits of, and types of regular/moderate exercise pertaining to orthopedic  diagnosis(es).  Inflammation/pain control; with cold, heat, elevation and/or liniments discussed as appropriate  Cortisone Injection. See procedure note.  HOME EXERCISE/PT program encouraged  MEDICAL RECORDS reviewed from other provider(s) for past and current medical history pertinent to this complaint.    11/30/2023

## 2023-11-30 ENCOUNTER — HOSPITAL ENCOUNTER (OUTPATIENT)
Dept: PAIN MEDICINE | Facility: HOSPITAL | Age: 63
Discharge: HOME | End: 2023-11-30
Payer: MEDICARE

## 2023-11-30 ENCOUNTER — ANESTHESIA EVENT (OUTPATIENT)
Dept: PAIN MEDICINE | Facility: HOSPITAL | Age: 63
End: 2023-11-30
Payer: MEDICARE

## 2023-11-30 ENCOUNTER — ANESTHESIA (OUTPATIENT)
Dept: PAIN MEDICINE | Facility: HOSPITAL | Age: 63
End: 2023-11-30
Payer: MEDICARE

## 2023-11-30 ENCOUNTER — HOSPITAL ENCOUNTER (OUTPATIENT)
Dept: GENERAL RADIOLOGY | Facility: HOSPITAL | Age: 63
Discharge: HOME | End: 2023-11-30
Payer: MEDICARE

## 2023-11-30 VITALS
TEMPERATURE: 98 F | RESPIRATION RATE: 16 BRPM | HEART RATE: 116 BPM | DIASTOLIC BLOOD PRESSURE: 82 MMHG | SYSTOLIC BLOOD PRESSURE: 116 MMHG | OXYGEN SATURATION: 96 %

## 2023-11-30 DIAGNOSIS — R52 PAIN: ICD-10-CM

## 2023-11-30 DIAGNOSIS — M50.30 DEGENERATIVE DISC DISEASE, CERVICAL: Primary | ICD-10-CM

## 2023-11-30 LAB
QT INTERVAL: 325 MS
QTC INTERVAL: 454 MS

## 2023-11-30 PROCEDURE — 93005 ELECTROCARDIOGRAM TRACING: CPT | Performed by: ANESTHESIOLOGY

## 2023-11-30 PROCEDURE — 25010000002 METHYLPREDNISOLONE PER 80 MG: Performed by: ANESTHESIOLOGY

## 2023-11-30 PROCEDURE — 25010000002 MIDAZOLAM PER 1 MG: Performed by: ANESTHESIOLOGY

## 2023-11-30 PROCEDURE — 77003 FLUOROGUIDE FOR SPINE INJECT: CPT

## 2023-11-30 RX ORDER — LIDOCAINE HYDROCHLORIDE 10 MG/ML
1 INJECTION, SOLUTION INFILTRATION; PERINEURAL ONCE AS NEEDED
Status: DISCONTINUED | OUTPATIENT
Start: 2023-11-30 | End: 2023-12-01 | Stop reason: HOSPADM

## 2023-11-30 RX ORDER — METHYLPREDNISOLONE ACETATE 80 MG/ML
1 INJECTION, SUSPENSION INTRA-ARTICULAR; INTRALESIONAL; INTRAMUSCULAR; SOFT TISSUE
Status: COMPLETED | OUTPATIENT
Start: 2023-11-29 | End: 2023-11-29

## 2023-11-30 RX ORDER — LIDOCAINE HYDROCHLORIDE 10 MG/ML
0.5 INJECTION, SOLUTION INFILTRATION; PERINEURAL ONCE AS NEEDED
Status: DISCONTINUED | OUTPATIENT
Start: 2023-11-30 | End: 2023-12-01 | Stop reason: HOSPADM

## 2023-11-30 RX ORDER — MIDAZOLAM HYDROCHLORIDE 1 MG/ML
1 INJECTION INTRAMUSCULAR; INTRAVENOUS AS NEEDED
Status: DISCONTINUED | OUTPATIENT
Start: 2023-11-30 | End: 2023-12-01 | Stop reason: HOSPADM

## 2023-11-30 RX ORDER — SODIUM CHLORIDE 0.9 % (FLUSH) 0.9 %
10 SYRINGE (ML) INJECTION EVERY 12 HOURS SCHEDULED
Status: DISCONTINUED | OUTPATIENT
Start: 2023-11-30 | End: 2023-12-01 | Stop reason: HOSPADM

## 2023-11-30 RX ORDER — FENTANYL CITRATE 50 UG/ML
50 INJECTION, SOLUTION INTRAMUSCULAR; INTRAVENOUS AS NEEDED
Status: DISCONTINUED | OUTPATIENT
Start: 2023-11-30 | End: 2023-12-01 | Stop reason: HOSPADM

## 2023-11-30 RX ORDER — SODIUM CHLORIDE 9 MG/ML
40 INJECTION, SOLUTION INTRAVENOUS AS NEEDED
Status: DISCONTINUED | OUTPATIENT
Start: 2023-11-30 | End: 2023-12-01 | Stop reason: HOSPADM

## 2023-11-30 RX ORDER — SODIUM CHLORIDE 0.9 % (FLUSH) 0.9 %
1-10 SYRINGE (ML) INJECTION AS NEEDED
Status: DISCONTINUED | OUTPATIENT
Start: 2023-11-30 | End: 2023-12-01 | Stop reason: HOSPADM

## 2023-11-30 RX ORDER — LIDOCAINE HYDROCHLORIDE 20 MG/ML
2 INJECTION, SOLUTION EPIDURAL; INFILTRATION; INTRACAUDAL; PERINEURAL
Status: COMPLETED | OUTPATIENT
Start: 2023-11-29 | End: 2023-11-29

## 2023-11-30 RX ORDER — SODIUM CHLORIDE 0.9 % (FLUSH) 0.9 %
10 SYRINGE (ML) INJECTION AS NEEDED
Status: DISCONTINUED | OUTPATIENT
Start: 2023-11-30 | End: 2023-12-01 | Stop reason: HOSPADM

## 2023-11-30 RX ORDER — METHYLPREDNISOLONE ACETATE 80 MG/ML
80 INJECTION, SUSPENSION INTRA-ARTICULAR; INTRALESIONAL; INTRAMUSCULAR; SOFT TISSUE ONCE
Status: COMPLETED | OUTPATIENT
Start: 2023-11-30 | End: 2023-11-30

## 2023-11-30 RX ORDER — IOPAMIDOL 408 MG/ML
12 INJECTION, SOLUTION INTRATHECAL
Status: DISCONTINUED | OUTPATIENT
Start: 2023-11-30 | End: 2023-12-01 | Stop reason: HOSPADM

## 2023-11-30 RX ADMIN — METHYLPREDNISOLONE ACETATE 80 MG: 80 INJECTION, SUSPENSION INTRA-ARTICULAR; INTRALESIONAL; INTRAMUSCULAR; SOFT TISSUE at 10:42

## 2023-11-30 RX ADMIN — MIDAZOLAM 2 MG: 1 INJECTION INTRAMUSCULAR; INTRAVENOUS at 10:38

## 2023-11-30 NOTE — ANESTHESIA PROCEDURE NOTES
PAIN Epidural block    Pre-sedation assessment completed: 11/30/2023 10:28 AM    Patient reassessed immediately prior to procedure    Patient location during procedure: pain clinic  Start Time: 11/30/2023 10:28 AM  Stop Time: 11/30/2023 10:43 AM  Indication:procedure for pain  Performed By  Anesthesiologist: Jagruti Dean MD  Preanesthetic Checklist  Completed: patient identified, IV checked, site marked, risks and benefits discussed, surgical consent, monitors and equipment checked, pre-op evaluation and timeout performed  Additional Notes  Fluoro used.    Prep:  Pt Position:prone  Sterile Tech:cap, gloves, mask and sterile barrier  Prep:chlorhexidine gluconate and isopropyl alcohol  Monitoring:blood pressure monitoring, continuous pulse oximetry and EKG  Procedure:Sedation: yes     Approach:midline  Guidance: fluoroscopy  Location:cervical  Level:6-7  Needle Type:Tuohy  Needle Gauge:20  Aspiration:negative  Medications:  Preservative Free Saline:3mL  Comments:No dye indicated.  Dx: cervical radiculopathy.    Sedtion [id number]:80  Post Assessment:  Dressing:occlusive dressing applied  Pt Tolerance:patient tolerated the procedure well with no apparent complications  Complications:no

## 2023-11-30 NOTE — H&P
[facility]    History and Physical    Patient Name: Keri Bhagat  :  1960  MRN:  [id number]  Date of Admission: 2023    Subjective     Patient is a 63 y.o. female presents with chief complaint of chronic, moderate neck and shoulder: left pain.  Onset of symptoms was gradual starting a few years ago.  Symptoms are associated/aggravated by activity. Symptoms improve with injection - more than 50% improvement following prior dariela.  Pain recently 3-8/10 depending on activity level.  Presents for dariela today.      The following portions of the patients history were reviewed and updated as appropriate: current medications, allergies, past medical history, past surgical history, past family history, past social history, and problem list                Objective     Past Medical History:   Past Medical History:   Diagnosis Date   • Abnormal Pap smear of cervix    • Adrenal adenoma 2022   • Anemia    • Ankle sprain    • Anxiety    • Arthritis    • Arthritis of back    • Arthritis of neck    • Atherosclerotic heart disease of native coronary artery with other forms of angina pectoris    • Bipolar I disorder, single manic episode     depressive d(NOS)   • Cervical disc herniation    • Cervical dysplasia    • Coronary artery disease    • COVID-19 01/10/2023   • CTS (carpal tunnel syndrome)    • Depression     NO SUICIDAL PLANS   • Difficulty swallowing    • Dislocation of finger    • Diverticular disease    • Dyspepsia    • Dysphagia    • Encounter for follow-up examination after completed treatment for conditions other than malignant neoplasm 2018   • Fracture of wrist    • Fracture, finger    • Fracture, foot    • Fracture, radius    • Gastric reflux    • GERD (gastroesophageal reflux disease)    • Heart attack    • Heart murmur    • Hiatal hernia    • High cholesterol    • History of transfusion    • HPV (human papilloma virus) infection 2016    HPV positive on pap LGSIL   •  Hyperlipidemia    • Hypertension    • Hypothyroidism    • Incontinence in female     wears pads   • Kidney disease 2017    stage 2    • Kidney disease, chronic, stage III (GFR 30-59 ml/min)    • Knee swelling    • LGSIL on Pap smear of cervix 04/29/2016    LGSIL HPV positive   • Lumbosacral disc disease Unsure    Lower left back is partial osteoarthritis and partial osteoporosis   • Myocardial infarction 2000   • Neck pain    • Neck strain    • Obesity    • Osteopenia 2021    Bone density test   • Past myocardial infarction 05/2000   • Periodic limb movement disorder    • Renal insufficiency 2018   • Restless leg syndrome    • Rotator cuff syndrome    • Sleep apnea     cpap   • Stress fracture     LEFT HEEL   • Suicidal ideation 08/19/2016    history   • Swelling of ankle     right had doppler no s/s blood clot   • Tear of meniscus of knee    • Thyroid nodule    • Vitamin B12 deficiency    • Wrist sprain      Past Surgical History:   Past Surgical History:   Procedure Laterality Date   • ADENOIDECTOMY  1974   • ANTERIOR CERVICAL DISCECTOMY W/ FUSION N/A 12/29/2016    Procedure: C3-4 anterior cervical discectomy and fusion with Depuy micro plate, ALLOGRAFT C3-4, AND HARDWARE REMOVAL C4-7.;  Surgeon: Hema Godwin MD;  Location: [facility] OR;  Service:    • BARIATRIC SURGERY  2018   • CARDIAC CATHETERIZATION N/A 03/30/2017    Procedure: Left Heart Cath;  Surgeon: Tracey Vargas MD;  Location: [facility] INVASIVE LOCATION;  Service:    • CARDIAC CATHETERIZATION N/A 03/30/2017    Procedure: Coronary angiography;  Surgeon: Tracey Vargas MD;  Location: [facility] CATH INVASIVE LOCATION;  Service:    • CARDIAC CATHETERIZATION N/A 03/30/2017    Procedure: Left ventriculography;  Surgeon: Tracey Vargas MD;  Location: [facility] CATH INVASIVE LOCATION;  Service:    • CARDIAC CATHETERIZATION  03/30/2017    Procedure: Functional Flow [place];  Surgeon: Tracey Vargas MD;  Location: [facility] CATH INVASIVE LOCATION;  Service:     • CARPAL TUNNEL RELEASE     • CATARACT EXTRACTION     • CERVICAL BIOPSY  [facility]    Dr. Jeffery.    • CERVICAL DISCECTOMY ANTERIOR  04/2013    C4-7   • COLONOSCOPY  04/20/2015    Diverticulosis, IH   • COLONOSCOPY  03/09/2021   • COLPOSCOPY W/ BIOPSY / CURETTAGE  06/17/2016    LGSIL HPV positive. Results were normal repeat pap in one year. Chronic Cervicitis   • CORONARY ANGIOPLASTY WITH STENT PLACEMENT     • CORONARY STENT PLACEMENT  May 2000   • ENDOSCOPY  [facility]    Normal.  Dr. Rodriguez   • ENDOSCOPY N/A 06/22/2017    Erythematous mucosa in the stomach  PATH: Chronic active gastritis, moderate with intestinal metaplasia    • EYE SURGERY  2022    Cateract surgery   • GASTRIC BYPASS      Done using the sleeve technique   • GASTRIC RESTRICTION SURGERY     • HAND SURGERY  Carpal tunnel   • HARDWARE REMOVAL  12/29/2016    cervical   • HERNIA REPAIR     • INGUINAL HERNIA REPAIR     • LAPAROSCOPIC GASTRIC BANDING  02/2018   • NECK SURGERY     • SHOULDER SURGERY Left     RCR   • TONSILLECTOMY     • TONSILLECTOMY AND ADENOIDECTOMY     • TRANSVAGINAL TAPING SUSPENSION N/A 12/06/2017    Procedure: MID URETHRAL SLING CYSTSCOPY;  Surgeon: Abby Méndez MD;  Location[facility];  Service:    • TRIGGER POINT INJECTION     • TUBAL ABDOMINAL LIGATION     • TYMPANOSTOMY TUBE PLACEMENT Right    • UPPER GASTROINTESTINAL ENDOSCOPY  approx 2021   • WRIST SURGERY Bilateral     carpal tunnel   • WRIST SURGERY      L wrist/ 4/2020     Family History:   Family History   Problem Relation Age of Onset   • Diabetes type II Mother    • Hypertension Mother    • Osteoporosis Mother    • Seizures Mother    • Diabetes Mother    • Arthritis Mother    • Hyperlipidemia Mother    • COPD Father    • Hypertension Father    • Lung cancer Father    • Heart attack Father    • Liver cancer Father    • Liver disease Father    • Heart disease Father    • Cancer Father         Lung cacer that metastasized  into the liver.   • Thyroid disease Sister     • Hypertension Sister    • Bipolar disorder Sister    • Depression Sister    • ADD / ADHD Sister    • Anxiety disorder Sister    • Mental illness Sister    • Hyperlipidemia Sister    • Broken bones Sister    • No Known Problems Son    • Abnormal EKG Daughter    • Hypertension Daughter    • Bipolar disorder Daughter    • Thyroid disease Daughter    • Mental illness Daughter    • Hyperlipidemia Daughter    • No Known Problems Paternal Grandfather    • No Known Problems Paternal Grandmother    • Cancer Maternal Grandmother    • No Known Problems Maternal Grandfather    • Thyroid disease Sister    • Hypertension Sister    • Bipolar disorder Sister    • Anxiety disorder Sister    • Depression Sister    • Mental illness Sister    • Hyperlipidemia Sister    • Asthma Daughter    • Diabetes Son         Type 1   • Breast cancer Neg Hx    • Ovarian cancer Neg Hx    • Uterine cancer Neg Hx    • Colon cancer Neg Hx    • Malig Hyperthermia Neg Hx      Social History:   Social History     Socioeconomic History   • Marital status:    • Number of children: 3   • Years of education: 12   Tobacco Use   • Smoking status: Light Smoker     Types: Electronic Cigarette     Passive exposure: Never   • Smokeless tobacco: Current   • Tobacco comments:     Electronic Cigarette   Vaping Use   • Vaping Use: Every day   • Substances: Nicotine, Flavoring   • Devices: Wave[employer] tank   Substance and Sexual Activity   • Alcohol use: Not Currently   • Drug use: Never   • Sexual activity: Not Currently     Partners: Male     Birth control/protection: Condom, Abstinence, Post-menopausal, None, Tubal ligation       Vital Signs Range for the last 24 hours  Temperature:     Temp Source:     BP:     Pulse:     Respirations:     SPO2:     O2 Amount (l/min):     O2 Devices     Weight:           --------------------------------------------------------------------------------    Current Outpatient Medications   Medication Sig Dispense Refill   •  ALPRAZolam (XANAX) 1 MG tablet TAKE 1 TABLET BY MOUTH 1 HOUR BEFORE MRI AS DIRECTED (Patient not taking: Reported on 11/29/2023) 1 tablet 0   • ARIPiprazole (ABILIFY) 20 MG tablet Take 1 tablet by mouth Daily.     • ASPIRIN 81 PO Take 1 tablet by mouth Daily.     • atorvastatin (Lipitor) 40 MG tablet Take 1 tablet by mouth Daily. 90 tablet 3   • B Complex Vitamins (VITAMIN B COMPLEX PO) Take 1 tablet by mouth Daily.     • buPROPion XL (WELLBUTRIN XL) 300 MG 24 hr tablet Take 1 tablet by mouth Daily.     • calcium carbonate (OS-STEVIE) 600 MG tablet Take 1 tablet by mouth Daily.     • Cholecalciferol 25 MCG (1000 UT) capsule Take 1 capsule by mouth 2 (Two) Times a Day.     • estradiol (ESTRACE) 0.1 MG/GM vaginal cream Insert 1 applicator into the vagina.     • Ferrous Sulfate (IRON PO) Take 1 tablet by mouth 2 (Two) Times a Day.     • gabapentin (NEURONTIN) 300 MG capsule TAKE ONE CAPSULE BY MOUTH THREE TIMES A  capsule 0   • hydrOXYzine (ATARAX) 50 MG tablet As Needed.     • isosorbide mononitrate (IMDUR) 30 MG 24 hr tablet Take 1 tablet by mouth Daily. 90 tablet 2   • lamoTRIgine (LaMICtal) 150 MG tablet Take 2 tablets by mouth Daily.     • levothyroxine (SYNTHROID, LEVOTHROID) 88 MCG tablet TAKE ONE TABLET BY MOUTH EVERY MORNING 90 tablet 1   • metoprolol succinate XL (Toprol XL) 25 MG 24 hr tablet Take 1 tablet by mouth Daily. For bp 90 tablet 3   • naproxen (EC NAPROSYN) 500 MG EC tablet Take 1 tablet by mouth 2 (Two) Times a Day As Needed for Mild Pain. 10 tablet 0   • oxybutynin XL (DITROPAN XL) 15 MG 24 hr tablet Take 1 tablet by mouth Daily.     • pantoprazole (PROTONIX) 40 MG EC tablet Take 1 tablet by mouth 2 (Two) Times a Day. 180 tablet 3   • pramipexole (MIRAPEX) 0.5 MG tablet Take 1 tablet by mouth Every Night for 360 days. 30 tablet 5   • QUEtiapine (SEROquel) 25 MG tablet Take 1 tablet by mouth Every Night.     • venlafaxine XR (EFFEXOR-XR) 150 MG 24 hr capsule Take 1 capsule by mouth Daily. 90  capsule 1   • vitamin B-12 (CYANOCOBALAMIN) 1000 MCG tablet TAKE ONE TABLET BY MOUTH TWICE A DAY 60 tablet 4     No current facility-administered medications for this encounter.       --------------------------------------------------------------------------------  Assessment & Plan      Anesthesia Evaluation     Patient summary reviewed and Nursing notes reviewed   no history of anesthetic complications:                Airway   Mallampati: II  Dental      Pulmonary    (+) a smoker,sleep apnea  Cardiovascular   Exercise tolerance: good (4-7 METS)    (+) hypertension, valvular problems/murmurs, past MI , CAD, angina, hyperlipidemia      Neuro/Psych  (+) syncope, numbness, psychiatric history  GI/Hepatic/Renal/Endo    (+) obesity, hiatal hernia, GERD, renal disease- CRI, thyroid problem thyroid nodules    Musculoskeletal     (+) neck pain, radiculopathy Left upper extremity  Abdominal   (+) obese   Substance History - negative use     OB/GYN negative ob/gyn ROS         Other   arthritis,   history of cancer             Diagnosis and Plan    Treatment Plan  ASA 3   Patient has had previous injection/procedure with 50-75% improvement.   Procedures: Cervical Epidural Steroid Injection(MARLA), With fluoroscopy,      Anesthetic plan and risks discussed with patient.      Diagnosis     * Cervical radiculopathy [M54.12]

## 2023-11-30 NOTE — DISCHARGE INSTRUCTIONS
"Guide To Relieving And Avoiding Neck Pain    Exercise is important to help prevent and treat neck pain.  Good posture, exercise and avoiding injury will help to keep your neck healthy.    When the neck is strained or over worked symptoms may include headache, upper back pain, shoulder pain or arm pain.  Numbness or tingling in the fingers, dizziness or nausea may also occur.    Posture:    Avoid slumping over a desk.  Raise your work (including computer) to eye level to avoid bending at the neck.      Change Position Often:  Changing position prevents overuse of particular muscles.    Sleep On One Pillow:  Using to many pillows or to large of a pillow causes a \"kink\" in you neck.      Move and Exercise:  Living an active lifestyle is an important part of staying healthy.  Be sure to include the exercise to follow specifically for your neck.                    Range of Motion Exercises:  Do these exercises three times a day.  If you experience increased pain stop and contact your physician.  All exercises can be performed sitting or standing.        1.    2.   3.    1.  Place both hands behind you neck.  Gently tilt your neck backward so that you are looking at the ceiling.  Hold for a count of 10.    2.  Look straight facing forward.  Slowly tip your ear toward your right shoulder.  Do not force the motion.  Hold for a count of 10.  Bring head back to starting position and repeat to left side.    3.  Look straight facing forward.  Gently turn your head to the right.  Do not force the motion.  Hold for a count of 10.  Bring head back to starting position and repeat to the left side.    Exercises To Strengthen Muscles:  1.  Look straight facing forward.  Relax your shoulders.  Raise both shoulders toward your ears.  Hold for 3 seconds.       1.       2.   3.      1.  Look straight facing forward.  Relax your shoulders.  Raise both shoulders toward     2.  Raise your ars to your side and bend your elbows.  Squeeze " "shoulder blades together as you rotate your arms outward.  Hold for 5 seconds.    3.  Look straight facing forward.  Pull your head straight back.  Do not tip or move your jaw.  Hold for 5 seconds.Guide To Relieving And Avoiding Neck Pain    Exercise is important to help prevent and treat neck pain.  Good posture, exercise and avoiding injury will help to keep your neck healthy.    When the neck is strained or over worked symptoms may include headache, upper back pain, shoulder pain or arm pain.  Numbness or tingling in the fingers, dizziness or nausea may also occur.    Posture:    Avoid slumping over a desk.  Raise your work (including computer) to eye level to avoid bending at the neck.      Change Position Often:  Changing position prevents overuse of particular muscles.    Sleep On One Pillow:  Using to many pillows or to large of a pillow causes a \"kink\" in you neck.      Move and Exercise:  Living an active lifestyle is an important part of staying healthy.  Be sure to include the exercise to follow specifically for your neck.                    Range of Motion Exercises:  Do these exercises three times a day.  If you experience increased pain stop and contact your physician.  All exercises can be performed sitting or standing.        1.    2.   3.    1.  Place both hands behind you neck.  Gently tilt your neck backward so that you are looking at the ceiling.  Hold for a count of 10.    2.  Look straight facing forward.  Slowly tip your ear toward your right shoulder.  Do not force the motion.  Hold for a count of 10.  Bring head back to starting position and repeat to left side.    3.  Look straight facing forward.  Gently turn your head to the right.  Do not force the motion.  Hold for a count of 10.  Bring head back to starting position and repeat to the left side.    Exercises To Strengthen Muscles:  1.  Look straight facing forward.  Relax your shoulders.  Raise both shoulders toward your ears.  Hold for " 3 seconds.       1.       2.   3.      1.  Look straight facing forward.  Relax your shoulders.  Raise both shoulders toward     2.  Raise your ars to your side and bend your elbows.  Squeeze shoulder blades together as you rotate your arms outward.  Hold for 5 seconds.    3.  Look straight facing forward.  Pull your head straight back.  Do not tip or move your jaw.  Hold for 5 seconds.

## 2023-12-06 ENCOUNTER — OFFICE VISIT (OUTPATIENT)
Dept: ORTHOPEDIC SURGERY | Facility: CLINIC | Age: 63
End: 2023-12-06
Payer: MEDICARE

## 2023-12-06 VITALS — WEIGHT: 212.1 LBS | TEMPERATURE: 97.3 F | BODY MASS INDEX: 37.58 KG/M2 | HEIGHT: 63 IN

## 2023-12-06 DIAGNOSIS — M17.12 ARTHRITIS OF LEFT KNEE: Primary | ICD-10-CM

## 2023-12-06 PROCEDURE — 99214 OFFICE O/P EST MOD 30 MIN: CPT | Performed by: ORTHOPAEDIC SURGERY

## 2023-12-06 RX ORDER — ACETAMINOPHEN 325 MG/1
1000 TABLET ORAL ONCE
OUTPATIENT
Start: 2023-12-06 | End: 2023-12-06

## 2023-12-06 RX ORDER — CHLORHEXIDINE GLUCONATE 500 MG/1
CLOTH TOPICAL TAKE AS DIRECTED
OUTPATIENT
Start: 2023-12-06

## 2023-12-06 RX ORDER — PREGABALIN 75 MG/1
75 CAPSULE ORAL ONCE
OUTPATIENT
Start: 2023-12-06 | End: 2023-12-06

## 2023-12-06 RX ORDER — MELOXICAM 7.5 MG/1
15 TABLET ORAL ONCE
OUTPATIENT
Start: 2023-12-06 | End: 2023-12-06

## 2023-12-06 NOTE — PROGRESS NOTES
"Patient Name: Keri Bhagat   YOB: 1960  Referring Primary Care Physician: Epley, James, APRN  BMI: Body mass index is 37.57 kg/m².    Chief Complaint:    Chief Complaint   Patient presents with    Left Knee - Initial Evaluation, Pain    Right Knee - Initial Evaluation, Pain        HPI:     Keri Bhagat is a 63 y.o. female who presents today for evaluation of   Chief Complaint   Patient presents with    Left Knee - Initial Evaluation, Pain    Right Knee - Initial Evaluation, Pain   .  Keri is seen back today complaint of bilateral knee pain with the left being worse than the right.  She says \"it been bone-on-bone for a while had the injections done the therapy and is getting where it is interfering with my life and I just cannot stand it anymore\".  BMI is currently 37.  She does not smoke anymore she says she does do some vaping and encouraged her to stop that if she wants to do surgery.  Told her it can affect healing as well as anesthesia etc.  She does see a cardiologist who told her she needed cardiology clearance.  He had the surgery scheduled before but her mother was sick and she had to cancel.      Subjective   Medications:   Home Medications:  Current Outpatient Medications on File Prior to Visit   Medication Sig    ARIPiprazole (ABILIFY) 20 MG tablet Take 1 tablet by mouth Daily.    ASPIRIN 81 PO Take 1 tablet by mouth Daily.    atorvastatin (Lipitor) 40 MG tablet Take 1 tablet by mouth Daily.    B Complex Vitamins (VITAMIN B COMPLEX PO) Take 1 tablet by mouth Daily.    buPROPion XL (WELLBUTRIN XL) 300 MG 24 hr tablet Take 1 tablet by mouth Daily.    calcium carbonate (OS-STEVIE) 600 MG tablet Take 1 tablet by mouth Daily.    Cholecalciferol 25 MCG (1000 UT) capsule Take 1 capsule by mouth 2 (Two) Times a Day.    estradiol (ESTRACE) 0.1 MG/GM vaginal cream Insert 1 applicator into the vagina.    Ferrous Sulfate (IRON PO) Take 1 tablet by mouth 2 (Two) Times a Day.    gabapentin " (NEURONTIN) 300 MG capsule TAKE ONE CAPSULE BY MOUTH THREE TIMES A DAY    hydrOXYzine (ATARAX) 50 MG tablet As Needed.    isosorbide mononitrate (IMDUR) 30 MG 24 hr tablet Take 1 tablet by mouth Daily.    lamoTRIgine (LaMICtal) 150 MG tablet Take 2 tablets by mouth Daily.    levothyroxine (SYNTHROID, LEVOTHROID) 88 MCG tablet TAKE ONE TABLET BY MOUTH EVERY MORNING    metoprolol succinate XL (Toprol XL) 25 MG 24 hr tablet Take 1 tablet by mouth Daily. For bp    oxybutynin XL (DITROPAN XL) 15 MG 24 hr tablet Take 1 tablet by mouth Daily.    pantoprazole (PROTONIX) 40 MG EC tablet Take 1 tablet by mouth 2 (Two) Times a Day.    pramipexole (MIRAPEX) 0.5 MG tablet Take 1 tablet by mouth Every Night for 360 days.    venlafaxine XR (EFFEXOR-XR) 150 MG 24 hr capsule Take 1 capsule by mouth Daily.    vitamin B-12 (CYANOCOBALAMIN) 1000 MCG tablet TAKE ONE TABLET BY MOUTH TWICE A DAY    ALPRAZolam (XANAX) 1 MG tablet TAKE 1 TABLET BY MOUTH 1 HOUR BEFORE MRI AS DIRECTED (Patient not taking: Reported on 12/6/2023)    naproxen (EC NAPROSYN) 500 MG EC tablet Take 1 tablet by mouth 2 (Two) Times a Day As Needed for Mild Pain. (Patient not taking: Reported on 12/6/2023)    QUEtiapine (SEROquel) 25 MG tablet Take 1 tablet by mouth Every Night. (Patient not taking: Reported on 12/6/2023)     No current facility-administered medications on file prior to visit.     Current Medications:  Scheduled Meds:  Continuous Infusions:No current facility-administered medications for this visit.    PRN Meds:.    I have reviewed the patient's medical history in detail and updated the computerized patient record.  Review and summarization of old records includes:    Past Medical History:   Diagnosis Date    Abnormal Pap smear of cervix     Adrenal adenoma 09/21/2022    Anemia     Ankle sprain     Anxiety     Arthritis     Arthritis of back     Arthritis of neck     Atherosclerotic heart disease of native coronary artery with other forms of angina  pectoris     Bipolar I disorder, single manic episode     depressive d(NOS)    Cervical disc herniation     Cervical dysplasia     Coronary artery disease     COVID-19 01/10/2023    CTS (carpal tunnel syndrome)     Depression     NO SUICIDAL PLANS    Difficulty swallowing     Dislocation of finger     Diverticular disease     Dyspepsia     Dysphagia     Encounter for follow-up examination after completed treatment for conditions other than malignant neoplasm 04/11/2018    Fracture of wrist     Fracture, finger     Fracture, foot     Fracture, radius     Gastric reflux     GERD (gastroesophageal reflux disease)     Heart attack     Heart murmur     Hiatal hernia     High cholesterol     History of transfusion     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    HPV positive on pap LGSIL    Hyperlipidemia     Hypertension     Hypothyroidism     Incontinence in female     wears pads    Kidney disease 2017    stage 2     Kidney disease, chronic, stage III (GFR 30-59 ml/min)     Knee swelling     LGSIL on Pap smear of cervix 04/29/2016    LGSIL HPV positive    Lumbosacral disc disease Unsure    Lower left back is partial osteoarthritis and partial osteoporosis    Myocardial infarction 2000    Neck pain     Neck strain     Obesity     Osteopenia 2021    Bone density test    Past myocardial infarction 05/2000    Periodic limb movement disorder     Renal insufficiency 2018    Restless leg syndrome     Rotator cuff syndrome     Sleep apnea     cpap    Stress fracture     LEFT HEEL    Suicidal ideation 08/19/2016    history    Swelling of ankle     right had doppler no s/s blood clot    Tear of meniscus of knee     Thyroid nodule     Vitamin B12 deficiency     Wrist sprain         Past Surgical History:   Procedure Laterality Date    ADENOIDECTOMY  1974    ANTERIOR CERVICAL DISCECTOMY W/ FUSION N/A 12/29/2016    Procedure: C3-4 anterior cervical discectomy and fusion with Depuy micro plate, ALLOGRAFT C3-4, AND HARDWARE  REMOVAL C4-7.;  Surgeon: Hema Godwin MD;  Location: [facility] MAIN OR;  Service:     BARIATRIC SURGERY  2018    CARDIAC CATHETERIZATION N/A 03/30/2017    Procedure: Left Heart Cath;  Surgeon: Tracey Vargas MD;  Location:  [facility] CATH [facility]VASIVE [facility];  Service:     CARDIAC CATHETERIZATION N/A 03/30/2017    Procedure: Coronary angiography;  Surgeon: Tracey Vargas MD;  Location:  [facility];  Service:     CARDIAC CATHETERIZATION N/A 03/30/2017    Procedure: Left ventriculography;  Surgeon: Tracey Vargas MD;  Location:  [facility]IVE [facility];  Service:     CARDIAC CATHETERIZATION  03/30/2017    Procedure: Functional Flow [place];  Surgeon: Tracey Vargas MD;  [facility];  Service:     CARPAL TUNNEL RELEASE      CATARACT EXTRACTION      CERVICAL BIOPSY  MMXVI    Dr. Jeffery.     CERVICAL DISCECTOMY ANTERIOR  04/2013    C4-7    COLONOSCOPY  04/20/2015    Diverticulosis, IH    COLONOSCOPY  03/09/2021    COLPOSCOPY W/ BIOPSY / CURETTAGE  06/17/2016    LGSIL HPV positive. Results were normal repeat pap in one year. Chronic Cervicitis    CORONARY ANGIOPLASTY WITH STENT PLACEMENT      CORONARY STENT PLACEMENT  May 2000    ENDOSCOPY  MMXV    Normal.  Dr. Rodriguez    ENDOSCOPY N/A 06/22/2017    Erythematous mucosa in the stomach  PATH: Chronic active gastritis, moderate with intestinal metaplasia     EPIDURAL BLOCK      EYE SURGERY  2022    Cateract surgery    GASTRIC BYPASS      Done using the sleeve technique    GASTRIC RESTRICTION SURGERY      HAND SURGERY  Carpal tunnel    HARDWARE REMOVAL  12/29/2016    cervical    HERNIA REPAIR      INGUINAL HERNIA REPAIR      LAPAROSCOPIC GASTRIC BANDING  02/2018    NECK SURGERY      SHOULDER SURGERY Left     RCR    TONSILLECTOMY      TONSILLECTOMY AND ADENOIDECTOMY      TRANSVAGINAL TAPING SUSPENSION N/A 12/06/2017    Procedure: MID URETHRAL SLING CYSTSCOPY;  Surgeon: Abby Méndez MD;  Location: [facility] MAIN OR;   Service:     TRIGGER POINT INJECTION      TUBAL ABDOMINAL LIGATION      TYMPANOSTOMY TUBE PLACEMENT Right     UPPER GASTROINTESTINAL ENDOSCOPY  approx 2021    WRIST SURGERY Bilateral     carpal tunnel    WRIST SURGERY      L wrist/ 4/2020        Social History     Occupational History    Occupation: disabled   Tobacco Use    Smoking status: Light Smoker     Types: Electronic Cigarette     Passive exposure: Never    Smokeless tobacco: Current    Tobacco comments:     Electronic Cigarette   Vaping Use    Vaping Use: Every day    Substances: Nicotine, Flavoring    Devices: Refillable tank   Substance and Sexual Activity    Alcohol use: Not Currently    Drug use: Never    Sexual activity: Not Currently     Partners: Male     Birth control/protection: Condom, Abstinence, Post-menopausal, None, Tubal ligation      Social History     Social History Narrative    Not on file        Family History   Problem Relation Age of Onset    Diabetes type II Mother     Hypertension Mother     Osteoporosis Mother     Seizures Mother     Diabetes Mother     Arthritis Mother     Hyperlipidemia Mother     COPD Father     Hypertension Father     Lung cancer Father     Heart attack Father     Liver cancer Father     Liver disease Father     Heart disease Father     Cancer Father         Lung cacer that metastasized  into the liver.    Thyroid disease Sister     Hypertension Sister     Bipolar disorder Sister     Depression Sister     ADD / ADHD Sister     Anxiety disorder Sister     Mental illness Sister     Hyperlipidemia Sister     Broken bones Sister     No Known Problems Son     Abnormal EKG Daughter     Hypertension Daughter     Bipolar disorder Daughter     Thyroid disease Daughter     Mental illness Daughter     Hyperlipidemia Daughter     No Known Problems Paternal Grandfather     No Known Problems Paternal Grandmother     Cancer Maternal Grandmother     No Known Problems Maternal Grandfather     Thyroid disease Sister      "Hypertension Sister     Bipolar disorder Sister     Anxiety disorder Sister     Depression Sister     Mental illness Sister     Hyperlipidemia Sister     Asthma Daughter     Diabetes Son         Type 1    Breast cancer Neg Hx     Ovarian cancer Neg Hx     Uterine cancer Neg Hx     Colon cancer Neg Hx     Malig Hyperthermia Neg Hx        ROS: 14 point review of systems was performed and all other systems were reviewed and are negative except for documented findings in HPI and today's encounter.     Allergies: No Known Allergies  Constitutional:  Denies fever, shaking or chills   Eyes:  Denies change in visual acuity   HENT:  Denies nasal congestion or sore throat   Respiratory:  Denies cough or shortness of breath   Cardiovascular:  Denies chest pain or severe LE edema   GI:  Denies abdominal pain, nausea, vomiting, bloody stools or diarrhea   Musculoskeletal:  Numbness, tingling, pain, or loss of motor function only as noted above in history of present illness.  : Denies painful urination or hematuria  Integument:  Denies rash, lesion or ulceration   Neurologic:  Denies headache or focal weakness  Endocrine:  Denies lymphadenopathy  Psych:  Denies confusion or change in mental status   Hem:  Denies active bleeding    OBJECTIVE:  Physical Exam: 63 y.o. female  Wt Readings from Last 3 Encounters:   12/06/23 96.2 kg (212 lb 1.6 oz)   11/29/23 92.1 kg (203 lb)   11/19/23 92.1 kg (203 lb)     Ht Readings from Last 1 Encounters:   12/06/23 160 cm (63\")     Body mass index is 37.57 kg/m².  Vitals:    12/06/23 1452   Temp: 97.3 °F (36.3 °C)     Vital signs reviewed.     General Appearance:    Alert, cooperative, in no acute distress                  Eyes: conjunctiva clear  ENT: external ears and nose atraumatic  CV: no peripheral edema  Resp: normal respiratory effort  Skin: no rashes or wounds; normal turgor  Psych: mood and affect appropriate  Lymph: no nodes appreciated  Neuro: gross sensation intact  Vascular:  " Palpable peripheral pulse in noted extremity  Musculoskeletal Extremities: Jacky today bilateral knees have crepitation synovitis swelling medial joint line tenderness she does have some stiffness and when she walks she has some varus.    Radiology:   PA lateral sunrise view previously taken in the office for complaints of pain with comparison views shows advanced arthritic change of the left knee with bone-on-bone osteophytes and subchondral sclerosis.        Assessment:     ICD-10-CM ICD-9-CM   1. Arthritis of left knee  M17.12 716.96        MDM/Plan:   The diagnosis(es), natural history, pathophysiology and treatment for diagnosis(es) were discussed. Opportunity given and questions answered.  Biomechanics of pertinent body areas discussed.  When appropriate, the use of ambulatory aids discussed.    Biomechanics of pertinent body areas discussed.  When appropriate, the use of ambulatory aids discussed.  HOME EXERCISE/PT program encouraged  Total Knee Replacement : Continuation of conservative management vs. TKA: I reviewed anatomy of a total knee arthroplasty in laymen's terms, as well as typical postoperative recovery and possibly 6-12 months for maximal recovery, and possible need for rehabilitation stay after hospitalization. We also discussed risks, benefits, alternatives, and limitations of procedure with risks including but not limited to neurovascular damage, bleeding, infection, malalignment, chronic pain, failure of implants, osteolysis, loosening of implants, loss of motion, weakness, stiffness, instability, DVT, pulmonary embolus, death, stroke, complex regional pain syndrome, myocardial infarction, and need for additional procedures. Concept of substitution vs. replacement discussed.  No guarantees were given regarding results of surgery.  Patient verbalized understanding, and was given the opportunity to ask and have all questions answered today.   She will require cardiac clearance answered her  questions and she wishes to proceed    12/6/2023    Dictated utilizing Dragon dictation

## 2024-01-03 ENCOUNTER — OFFICE VISIT (OUTPATIENT)
Dept: CARDIOLOGY | Facility: CLINIC | Age: 64
End: 2024-01-03
Payer: COMMERCIAL

## 2024-01-03 VITALS
HEIGHT: 63 IN | HEART RATE: 80 BPM | OXYGEN SATURATION: 97 % | WEIGHT: 214 LBS | BODY MASS INDEX: 37.92 KG/M2 | DIASTOLIC BLOOD PRESSURE: 80 MMHG | SYSTOLIC BLOOD PRESSURE: 105 MMHG

## 2024-01-03 DIAGNOSIS — Z01.810 PRE-OPERATIVE CARDIOVASCULAR EXAMINATION: ICD-10-CM

## 2024-01-03 DIAGNOSIS — I10 ESSENTIAL HYPERTENSION: ICD-10-CM

## 2024-01-03 DIAGNOSIS — I25.10 ATHEROSCLEROSIS OF NATIVE CORONARY ARTERY OF NATIVE HEART WITHOUT ANGINA PECTORIS: ICD-10-CM

## 2024-01-03 DIAGNOSIS — R00.2 PALPITATIONS: Primary | ICD-10-CM

## 2024-01-03 NOTE — PROGRESS NOTES
CARDIOLOGY        Patient Name: Keri Bhagat  :1960  Age: 63 y.o.  Primary Cardiologist: David Burroughs MD  Encounter Provider:  DAPHNEY Nolasco    Date of Service: 24      CHIEF COMPLAINT / REASON FOR OFFICE VISIT     Follow-Up, Coronary Artery Disease, and Hypertension      HISTORY OF PRESENT ILLNESS       HPI  Keri Bhagat is a 63 y.o. female who presents today for semiannual evaluation.    Pt has a  history significant for CAD, hypertension.    Review of medical records reveals patient was evaluated in the [facility] emergency department on [facility] on 2023.  Patient presents with chest discomfort that occurred at 4 PM while cooking.  Reports that pain is worse with exertion.  I have personally reviewed ECG tracings which reveals no ischemic changes.  During emergency department assessment patient did have elevated heart rates with heart rate in the 1 teens very briefly.  She did report episodes of heart palpitations at that time.  Troponin remains flat.  D-dimer and other labs were unremarkable.  Chest x-ray did reveal a gastric lap band which is slightly more horizontal in configuration compared to .  Concern for malpositioning.  ED physician recommended follow-up with gastroenterology.    Patient presents today for routine assessment as well as assessment of symptoms.  Patient reports that over the past months she has had intermittent episodes of tachycardia.  She reports that once while receiving a steroid epidural injection heart rate elevated to the 110s.  She reports that when she has tachycardia she has associated symptoms of dizziness.  Episodes occur every few weeks and will last for hours at a time.  Other than symptoms of tachycardia and palpitations patient denies chest pain, dyspnea at rest or with exertion, lightheadedness, edema, fatigue.    The following portions of the patient's history were reviewed and updated as appropriate:  "allergies, current medications, past family history, past medical history, past social history, past surgical history and problem list.      VITAL SIGNS     Visit Vitals  /80 (BP Location: Right arm, Patient Position: Sitting)   Pulse 80   Ht 160 cm (63\")   Wt 97.1 kg (214 lb)   LMP 10/21/2016 (Approximate) Comment: 54   SpO2 97%   BMI 37.91 kg/m²         Wt Readings from Last 3 Encounters:   01/03/24 97.1 kg (214 lb)   12/06/23 96.2 kg (212 lb 1.6 oz)   11/29/23 92.1 kg (203 lb)     Body mass index is 37.91 kg/m².      REVIEW OF SYSTEMS   Review of Systems   Constitutional: Negative for chills, fever, weight gain and weight loss.   Cardiovascular:  Positive for palpitations. Negative for chest pain and leg swelling.   Respiratory:  Negative for cough, snoring and wheezing.    Hematologic/Lymphatic: Negative for bleeding problem. Does not bruise/bleed easily.   Skin:  Negative for color change.   Musculoskeletal:  Negative for falls, joint pain and myalgias.   Gastrointestinal:  Positive for heartburn. Negative for melena.   Genitourinary:  Negative for hematuria.   Neurological:  Negative for excessive daytime sleepiness.   Psychiatric/Behavioral:  Negative for depression. The patient is not nervous/anxious.            PHYSICAL EXAMINATION     Vitals and nursing note reviewed.   Constitutional:       Appearance: Normal appearance. Well-developed.   Eyes:      Conjunctiva/sclera: Conjunctivae normal.   Neck:      Vascular: No carotid bruit.   Pulmonary:      Breath sounds: Normal breath sounds.   Cardiovascular:      Normal rate. Regular rhythm. Normal S1 with normal intensity. Normal S2 with normal intensity.       Murmurs: There is no murmur.      No gallop.  No click. No rub.   Edema:     Peripheral edema absent.   Musculoskeletal: Normal range of motion. Skin:     General: Skin is warm and dry.   Neurological:      Mental Status: Alert and oriented to person, place, and time.      GCS: GCS eye subscore is " 4. GCS verbal subscore is 5. GCS motor subscore is 6.   Psychiatric:         Speech: Speech normal.         Behavior: Behavior normal.         Thought Content: Thought content normal.         Judgment: Judgment normal.           REVIEWED DATA     Procedures    Cardiac Procedures:  Cardiac catheterization 3/30/2017.  Left main 0% stenosis, LAD 20% mid stenosis, distal LAD 30% stenosis.  Ramus 0% stenosis, 80% stenosis of a small secondary branch.  Left circumflex 10% stenosis.  RCA 50-60% stenosis.  PDA 30% stenosis.  Echocardiogram 8/27/2021.  LVEF 56-60%.  Normal LV cavity size noted.  Diastolic function normal.  Myocardial perfusion stress test 8/27/2021.  Normal myocardial perfusion study without evidence of ischemia.    Lipid Panel          3/7/2023    15:01 7/11/2023    11:05 9/19/2023    15:05   Lipid Panel   Total Cholesterol 172  181  178    Triglycerides 139  86  62    HDL Cholesterol 75  76  82    VLDL Cholesterol 24  16  12    LDL Cholesterol  73  89  84        ASSESSMENT & PLAN     Diagnoses and all orders for this visit:    1. Palpitations (Primary)  Assessment & Plan:  Patient reports that every few weeks that she has experienced episodes of tachycardia.  This was noted on monitor while patient receiving a steroid epidural.  Recommend further evaluation with a ZIO monitor to ensure patient is not having episodes of atrial fibrillation    Orders:  -     Holter Monitor - 72 Hour Up To 15 Days; Future    2. Essential hypertension  Assessment & Plan:  Blood pressure controlled at 105/80  Continue the following antihypertensive regimen:  Metoprolol succinate 25 mg/day        3. Atherosclerosis of native coronary artery of native heart without angina pectoris  Assessment & Plan:  Denies angina or dyspnea  CAD was nonobstructive on cardiac catheterization in 2017.  Goal LDL less than 80  Continue GDMT:  Aspirin  Atorvastatin  Isosorbide mononitrate  Metoprolol succinate      4. Pre-operative cardiovascular  examination  Assessment & Plan:  Patient in need of an upcoming knee replacement surgery in March with Dr. Castanon.  As long as ZIO monitor is unremarkable patient will be low risk of major cardiovascular episode in the perioperative setting.          No follow-ups on file.    Future Appointments         Provider Department Center    1/8/2024 2:30 PM [facility] CARDIOLOGY [facility]    1/19/2024 3:30 PM Epley, James, APRN [facility] PRIMARY CARE [facility]    2/20/2024 10:30 AM  [facility] PAT [address] [place] [address] PREADMISSION T [facility]    2/27/2024 11:00 AM Eduarda Trent APRN [facility] ORTHOPEDICS [facility]    2/29/2024 2:30 PM Haily Pritchard APRNADIRA [facility] ORTHOPEDICS [facility]    3/1/2024 3:30 PM [facility] MRI 1 [facility] MRI [place]    3/4/2024 9:00 AM [facility] DEXA 1 [facility] BONE DENSITY [place]    3/18/2024 11:00 AM Eduarda Trent APRN [facility] ORTHOPEDICS [facility]    3/28/2024 2:30 PM Maxwell Martin MD [facility] ENDOCRINOLOGY [facility]    5/24/2024 3:00 PM [facility] CT 1 [facility] CT [place]    5/24/2024 3:30 PM [facility] MRI 1 [facility]    5/29/2024 2:30 PM Bob Mercado MD [facility] NEUROSURGERY [facility]    9/6/2024 11:00 AM David Burroughs MD [facility] CARDIOLOGY [facility]              MEDICATIONS         Discharge Medications            Accurate as of January 3, 2024 11:59 PM. If you have any questions, ask your nurse or doctor.                Continue These Medications        Instructions Start Date   ALPRAZolam 1 MG tablet  Commonly known as: XANAX   TAKE 1 TABLET BY MOUTH 1 HOUR BEFORE MRI AS DIRECTED      ARIPiprazole 20 MG tablet  Commonly known as: ABILIFY   20 mg, Oral, Daily      ASPIRIN 81 PO   1 tablet, Oral, Daily      atorvastatin 40 MG tablet  Commonly known as: Lipitor   40 mg, Oral,  Daily      buPROPion  MG 24 hr tablet  Commonly known as: WELLBUTRIN XL   300 mg, Oral, Daily      calcium carbonate 600 MG tablet  Commonly known as: OS-STEVIE   600 mg, Oral, Daily      Cholecalciferol 25 MCG (1000 UT) capsule   1 capsule, Oral, 2 Times Daily      estradiol 0.1 MG/GM vaginal cream  Commonly known as: ESTRACE   1 g, Vaginal      gabapentin 300 MG capsule  Commonly known as: NEURONTIN   TAKE ONE CAPSULE BY MOUTH THREE TIMES A DAY      hydrOXYzine 50 MG tablet  Commonly known as: ATARAX   As Needed      IRON PO   1 tablet, Oral, 2 Times Daily      isosorbide mononitrate 30 MG 24 hr tablet  Commonly known as: IMDUR   30 mg, Oral, Daily      lamoTRIgine 150 MG tablet  Commonly known as: LaMICtal   300 mg, Oral, Daily      levothyroxine 88 MCG tablet  Commonly known as: SYNTHROID, LEVOTHROID   TAKE ONE TABLET BY MOUTH EVERY MORNING      metoprolol succinate XL 25 MG 24 hr tablet  Commonly known as: Toprol XL   25 mg, Oral, Daily, For bp      naproxen 500 MG EC tablet  Commonly known as: EC NAPROSYN   500 mg, Oral, 2 Times Daily PRN      oxybutynin XL 15 MG 24 hr tablet  Commonly known as: DITROPAN XL   15 mg, Oral, Daily      pantoprazole 40 MG EC tablet  Commonly known as: PROTONIX   40 mg, Oral, 2 Times Daily      pramipexole 0.5 MG tablet  Commonly known as: MIRAPEX   0.5 mg, Oral, Nightly      QUEtiapine 25 MG tablet  Commonly known as: SEROquel   Take 1 tablet by mouth Every Night.      venlafaxine  MG 24 hr capsule  Commonly known as: EFFEXOR-XR   150 mg, Oral, Daily      VITAMIN B COMPLEX PO   1 tablet, Oral, Daily      vitamin B-12 1000 MCG tablet  Commonly known as: CYANOCOBALAMIN   TAKE ONE TABLET BY MOUTH TWICE A DAY                   **Dragon Disclaimer:   Much of this encounter note is an electronic transcription/translation of spoken language to printed text. The electronic translation of spoken language may permit erroneous, or at times, nonsensical words or phrases to be  inadvertently transcribed. Although I have reviewed the note for such errors, some may still exist.

## 2024-01-04 PROBLEM — Z01.810 PRE-OPERATIVE CARDIOVASCULAR EXAMINATION: Status: ACTIVE | Noted: 2018-04-11

## 2024-01-04 PROBLEM — R00.2 PALPITATIONS: Status: ACTIVE | Noted: 2024-01-04

## 2024-01-04 NOTE — ASSESSMENT & PLAN NOTE
Patient reports that every few weeks that she has experienced episodes of tachycardia.  This was noted on monitor while patient receiving a steroid epidural.  Recommend further evaluation with a ZIO monitor to ensure patient is not having episodes of atrial fibrillation

## 2024-01-04 NOTE — ASSESSMENT & PLAN NOTE
Denies angina or dyspnea  CAD was nonobstructive on cardiac catheterization in 2017.  Goal LDL less than 80  Continue GDMT:  Aspirin  Atorvastatin  Isosorbide mononitrate  Metoprolol succinate

## 2024-01-04 NOTE — ASSESSMENT & PLAN NOTE
Patient in need of an upcoming knee replacement surgery in March with Dr. Castanon.  As long as ZIO monitor is unremarkable patient will be low risk of major cardiovascular episode in the perioperative setting.

## 2024-01-04 NOTE — ASSESSMENT & PLAN NOTE
Blood pressure controlled at 105/80  Continue the following antihypertensive regimen:  Metoprolol succinate 25 mg/day

## 2024-01-13 DIAGNOSIS — E03.9 HYPOTHYROIDISM (ACQUIRED): ICD-10-CM

## 2024-01-15 RX ORDER — LEVOTHYROXINE SODIUM 88 UG/1
88 TABLET ORAL EVERY MORNING
Qty: 90 TABLET | Refills: 2 | Status: SHIPPED | OUTPATIENT
Start: 2024-01-15

## 2024-01-15 NOTE — TELEPHONE ENCOUNTER
Rx Refill Note  Requested Prescriptions     Pending Prescriptions Disp Refills    levothyroxine (SYNTHROID, LEVOTHROID) 88 MCG tablet 90 tablet 1     Sig: Take 1 tablet by mouth Every Morning.      Last office visit with prescribing clinician: 9/19/2023   Last telemedicine visit with prescribing clinician: Visit date not found   Next office visit with prescribing clinician: 3/28/2024                         Would you like a call back once the refill request has been completed: [] Yes [] No    If the office needs to give you a call back, can they leave a voicemail: [] Yes [] No    Brittney Sanders MA  01/15/24, 08:07 EST

## 2024-01-18 RX ORDER — ESTRADIOL 0.1 MG/G
1 CREAM VAGINAL
OUTPATIENT
Start: 2024-01-18

## 2024-01-18 RX ORDER — VENLAFAXINE HYDROCHLORIDE 150 MG/1
150 CAPSULE, EXTENDED RELEASE ORAL DAILY
Qty: 90 CAPSULE | Refills: 1 | OUTPATIENT
Start: 2024-01-18

## 2024-01-18 RX ORDER — LAMOTRIGINE 150 MG/1
300 TABLET ORAL DAILY
OUTPATIENT
Start: 2024-01-18

## 2024-01-18 RX ORDER — GABAPENTIN 300 MG/1
300 CAPSULE ORAL 3 TIMES DAILY
Qty: 270 CAPSULE | Refills: 0 | Status: SHIPPED | OUTPATIENT
Start: 2024-01-18

## 2024-01-18 RX ORDER — BUPROPION HYDROCHLORIDE 300 MG/1
300 TABLET ORAL DAILY
OUTPATIENT
Start: 2024-01-18

## 2024-01-18 RX ORDER — METOPROLOL SUCCINATE 25 MG/1
25 TABLET, EXTENDED RELEASE ORAL DAILY
Qty: 90 TABLET | Refills: 3 | Status: SHIPPED | OUTPATIENT
Start: 2024-01-18

## 2024-01-18 RX ORDER — ISOSORBIDE MONONITRATE 30 MG/1
30 TABLET, EXTENDED RELEASE ORAL DAILY
Qty: 90 TABLET | Refills: 2 | Status: SHIPPED | OUTPATIENT
Start: 2024-01-18

## 2024-01-18 NOTE — TELEPHONE ENCOUNTER
Refill the medications I take care of looks like psychiatry and likely GYN refills the others thank you

## 2024-01-19 RX ORDER — ALPRAZOLAM 1 MG/1
TABLET ORAL
Qty: 1 TABLET | Refills: 0 | Status: SHIPPED | OUTPATIENT
Start: 2024-01-19

## 2024-01-19 NOTE — TELEPHONE ENCOUNTER
Rx Refill Note  Requested Prescriptions     Pending Prescriptions Disp Refills    ALPRAZolam (XANAX) 1 MG tablet [Pharmacy Med Name: ALPRAZOLAM 1 MG TABLET] 1 tablet      Sig: TAKE 1 TABLET BY MOUTH 1 HOUR BEFORE MRI AS DIRECTED      Last office visit with prescribing clinician: 7/19/2023   Last telemedicine visit with prescribing clinician: Visit date not found   Next office visit with prescribing clinician: 1/24/2024                         Would you like a call back once the refill request has been completed: [] Yes [] No    If the office needs to give you a call back, can they leave a voicemail: [] Yes [] No    Atul Arizmendi MA  01/19/24, 11:25 EST

## 2024-01-29 RX ORDER — METOPROLOL SUCCINATE 25 MG/1
37.5 TABLET, EXTENDED RELEASE ORAL DAILY
Qty: 135 TABLET | Refills: 3 | Status: SHIPPED | OUTPATIENT
Start: 2024-01-29

## 2024-01-31 DIAGNOSIS — K21.00 GASTROESOPHAGEAL REFLUX DISEASE WITH ESOPHAGITIS WITHOUT HEMORRHAGE: ICD-10-CM

## 2024-01-31 RX ORDER — PRAMIPEXOLE DIHYDROCHLORIDE 0.5 MG/1
0.5 TABLET ORAL NIGHTLY
Qty: 30 TABLET | Refills: 0 | Status: SHIPPED | OUTPATIENT
Start: 2024-01-31 | End: 2025-01-25

## 2024-01-31 RX ORDER — PANTOPRAZOLE SODIUM 40 MG/1
40 TABLET, DELAYED RELEASE ORAL 2 TIMES DAILY
Qty: 180 TABLET | Refills: 3 | OUTPATIENT
Start: 2024-01-31

## 2024-02-02 ENCOUNTER — OFFICE VISIT (OUTPATIENT)
Dept: FAMILY MEDICINE CLINIC | Facility: CLINIC | Age: 64
End: 2024-02-02
Payer: MEDICARE

## 2024-02-02 VITALS
WEIGHT: 212 LBS | OXYGEN SATURATION: 97 % | SYSTOLIC BLOOD PRESSURE: 126 MMHG | HEART RATE: 65 BPM | BODY MASS INDEX: 37.56 KG/M2 | HEIGHT: 63 IN | DIASTOLIC BLOOD PRESSURE: 88 MMHG

## 2024-02-02 DIAGNOSIS — Z79.899 HIGH RISK MEDICATION USE: ICD-10-CM

## 2024-02-02 DIAGNOSIS — E53.8 VITAMIN B12 DEFICIENCY: ICD-10-CM

## 2024-02-02 DIAGNOSIS — M17.0 PRIMARY OSTEOARTHRITIS OF BOTH KNEES: ICD-10-CM

## 2024-02-02 DIAGNOSIS — Z87.891 PERSONAL HISTORY OF NICOTINE DEPENDENCE: ICD-10-CM

## 2024-02-02 DIAGNOSIS — I10 ESSENTIAL HYPERTENSION: Primary | ICD-10-CM

## 2024-02-02 DIAGNOSIS — R73.9 HYPERGLYCEMIA: ICD-10-CM

## 2024-02-02 DIAGNOSIS — E78.5 HYPERLIPIDEMIA, UNSPECIFIED HYPERLIPIDEMIA TYPE: ICD-10-CM

## 2024-02-02 DIAGNOSIS — Z12.2 SCREENING FOR LUNG CANCER: ICD-10-CM

## 2024-02-02 DIAGNOSIS — I10 ESSENTIAL HYPERTENSION: ICD-10-CM

## 2024-02-02 PROCEDURE — 3074F SYST BP LT 130 MM HG: CPT | Performed by: NURSE PRACTITIONER

## 2024-02-02 PROCEDURE — 99213 OFFICE O/P EST LOW 20 MIN: CPT | Performed by: NURSE PRACTITIONER

## 2024-02-02 PROCEDURE — 3079F DIAST BP 80-89 MM HG: CPT | Performed by: NURSE PRACTITIONER

## 2024-02-02 RX ORDER — VENLAFAXINE HYDROCHLORIDE 150 MG/1
150 CAPSULE, EXTENDED RELEASE ORAL DAILY
Qty: 90 CAPSULE | Refills: 1 | Status: SHIPPED | OUTPATIENT
Start: 2024-02-02

## 2024-02-06 ENCOUNTER — OFFICE VISIT (OUTPATIENT)
Dept: CARDIOLOGY | Facility: CLINIC | Age: 64
End: 2024-02-06
Payer: MEDICARE

## 2024-02-06 VITALS
BODY MASS INDEX: 38.09 KG/M2 | DIASTOLIC BLOOD PRESSURE: 84 MMHG | SYSTOLIC BLOOD PRESSURE: 130 MMHG | WEIGHT: 215 LBS | HEART RATE: 58 BPM | HEIGHT: 63 IN

## 2024-02-06 DIAGNOSIS — I25.10 ATHEROSCLEROSIS OF NATIVE CORONARY ARTERY OF NATIVE HEART WITHOUT ANGINA PECTORIS: ICD-10-CM

## 2024-02-06 DIAGNOSIS — I48.0 PAF (PAROXYSMAL ATRIAL FIBRILLATION): Primary | ICD-10-CM

## 2024-02-06 PROCEDURE — 3075F SYST BP GE 130 - 139MM HG: CPT | Performed by: INTERNAL MEDICINE

## 2024-02-06 PROCEDURE — 3079F DIAST BP 80-89 MM HG: CPT | Performed by: INTERNAL MEDICINE

## 2024-02-06 PROCEDURE — 93000 ELECTROCARDIOGRAM COMPLETE: CPT | Performed by: INTERNAL MEDICINE

## 2024-02-06 PROCEDURE — 99214 OFFICE O/P EST MOD 30 MIN: CPT | Performed by: INTERNAL MEDICINE

## 2024-02-07 NOTE — PROGRESS NOTES
"      CARDIOLOGY    David Burroughs MD    ENCOUNTER DATE:  02/06/2024    Keri Bhagat / 63 y.o. / female        CHIEF COMPLAINT / REASON FOR OFFICE VISIT     Palpitations (01/03/2024 Follow up)  Coronary artery disease    HISTORY OF PRESENT ILLNESS       Palpitations       Keri Bhagat is a 63 y.o. female who presents today for reevaluation.  Patient is an upcoming knee replacement surgery next month.  Patient was having some palpitations so her metoprolol was increased to 37.5 mg a day.  Since that time she has been doing better.  She has had less palpitations and is feeling overall better.      The following portions of the patient's history were reviewed and updated as appropriate: allergies, current medications, past family history, past medical history, past social history, past surgical history and problem list.      VITAL SIGNS     Visit Vitals  /84 (BP Location: Left arm)   Pulse 58   Ht 160 cm (63\")   Wt 97.5 kg (215 lb)   LMP 10/21/2016 (Approximate) Comment: 54   BMI 38.09 kg/m²         Wt Readings from Last 3 Encounters:   02/06/24 97.5 kg (215 lb)   02/02/24 96.2 kg (212 lb)   01/03/24 97.1 kg (214 lb)     Body mass index is 38.09 kg/m².      REVIEW OF SYSTEMS   Review of Systems   Cardiovascular:  Positive for palpitations.           PHYSICAL EXAMINATION     Vitals reviewed.   Constitutional:       Appearance: Healthy appearance.   Pulmonary:      Effort: Pulmonary effort is normal.   Cardiovascular:      Normal rate. Regular rhythm. Normal S1. Normal S2.       Murmurs: There is no murmur.      No gallop.  No click. No rub.   Pulses:     Intact distal pulses.   Edema:     Peripheral edema absent.   Neurological:      Mental Status: Alert and oriented to person, place and time.           REVIEWED DATA       ECG 12 Lead    Date/Time: 2/6/2024 1:55 PM  Performed by: David Burroughs MD    Authorized by: David Burroughs MD  Comparison: compared with previous ECG from " 11/30/2023  Similar to previous ECG  Rhythm: sinus bradycardia          Cardiac Procedures:      Lipid Panel          3/7/2023    15:01 7/11/2023    11:05 9/19/2023    15:05   Lipid Panel   Total Cholesterol 172  181  178    Triglycerides 139  86  62    HDL Cholesterol 75  76  82    VLDL Cholesterol 24  16  12    LDL Cholesterol  73  89  84          ASSESSMENT & PLAN      Diagnosis Plan   1. PAF (paroxysmal atrial fibrillation)  Adult Transthoracic Echo Complete W/ Cont if Necessary Per Protocol      2. Atherosclerosis of native coronary artery of native heart without angina pectoris              SUMMARY/DISCUSSION  History of paroxysmal atrial fibrillation.  Patient remains in normal sinus rhythm today overall is doing well since her beta-blocker was increased.  Patient is facing an upcoming surgery and will need to hold her Eliquis 3 days prior to her surgery.  Patient had LV outflow tract obstruction on her last echocardiogram.  I did order another echo to reassess his prior to surgery.  If her echo is unremarkable I would previous proceed with surgery as clinically indicated on her current medical regimen with exception of holding the Eliquis as noted above.  If there is no issue perioperative to see her back in 6 to 12 months sooner if issues occur.        MEDICATIONS         Discharge Medications            Accurate as of February 6, 2024 11:59 PM. If you have any questions, ask your nurse or doctor.                New Medications        Instructions Start Date   apixaban 5 MG tablet tablet  Commonly known as: ELIQUIS  Started by: David Burroughs MD   5 mg, Oral, Every 12 Hours Scheduled             Continue These Medications        Instructions Start Date   ARIPiprazole 20 MG tablet  Commonly known as: ABILIFY   20 mg, Oral, Daily      atorvastatin 40 MG tablet  Commonly known as: Lipitor   40 mg, Oral, Daily      buPROPion  MG 24 hr tablet  Commonly known as: WELLBUTRIN XL   300 mg, Oral, Daily       calcium carbonate 600 MG tablet  Commonly known as: OS-STEVIE   600 mg, Oral, Daily      Cholecalciferol 25 MCG (1000 UT) capsule   1 capsule, Oral, 2 Times Daily      estradiol 0.1 MG/GM vaginal cream  Commonly known as: ESTRACE   1 g, Vaginal      gabapentin 300 MG capsule  Commonly known as: NEURONTIN   300 mg, Oral, 3 Times Daily      hydrOXYzine 50 MG tablet  Commonly known as: ATARAX   As Needed      IRON PO   1 tablet, Oral, 2 Times Daily      isosorbide mononitrate 30 MG 24 hr tablet  Commonly known as: IMDUR   30 mg, Oral, Daily      lamoTRIgine 150 MG tablet  Commonly known as: LaMICtal   300 mg, Oral, Daily      levothyroxine 88 MCG tablet  Commonly known as: SYNTHROID, LEVOTHROID   88 mcg, Oral, Every Morning      metoprolol succinate XL 25 MG 24 hr tablet  Commonly known as: Toprol XL   37.5 mg, Oral, Daily, For bp      oxybutynin XL 15 MG 24 hr tablet  Commonly known as: DITROPAN XL   15 mg, Oral, Daily      pantoprazole 40 MG EC tablet  Commonly known as: PROTONIX   40 mg, Oral, 2 Times Daily      pramipexole 0.5 MG tablet  Commonly known as: MIRAPEX   0.5 mg, Oral, Nightly      QUEtiapine 25 MG tablet  Commonly known as: SEROquel   Take 1 tablet by mouth Every Night.      venlafaxine  MG 24 hr capsule  Commonly known as: EFFEXOR-XR   150 mg, Oral, Daily      VITAMIN B COMPLEX PO   1 tablet, Oral, Daily      vitamin B-12 1000 MCG tablet  Commonly known as: CYANOCOBALAMIN   TAKE ONE TABLET BY MOUTH TWICE A DAY             Stop These Medications      ASPIRIN 81 PO  Stopped by: David Burroughs MD     naproxen 500 MG EC tablet  Commonly known as: EC NAPROSYN  Stopped by: David Burroughs MD                  **[place] Disclaimer:   Much of this encounter note is an electronic transcription/translation of spoken language to printed text. The electronic translation of spoken language may permit erroneous, or at times, nonsensical words or phrases to be inadvertently transcribed. Although I have  reviewed the note for such errors, some may still exist.

## 2024-02-15 ENCOUNTER — HOSPITAL ENCOUNTER (OUTPATIENT)
Dept: CARDIOLOGY | Facility: HOSPITAL | Age: 64
Discharge: HOME | End: 2024-02-15
Admitting: INTERNAL MEDICINE
Payer: MEDICARE

## 2024-02-15 ENCOUNTER — TELEPHONE (OUTPATIENT)
Dept: CARDIOLOGY | Facility: CLINIC | Age: 64
End: 2024-02-15
Payer: MEDICARE

## 2024-02-15 VITALS
OXYGEN SATURATION: 95 % | WEIGHT: 215 LBS | HEART RATE: 86 BPM | BODY MASS INDEX: 38.09 KG/M2 | DIASTOLIC BLOOD PRESSURE: 82 MMHG | HEIGHT: 63 IN | SYSTOLIC BLOOD PRESSURE: 138 MMHG

## 2024-02-15 DIAGNOSIS — I48.0 PAF (PAROXYSMAL ATRIAL FIBRILLATION): ICD-10-CM

## 2024-02-15 LAB
AORTIC ARCH: 2.4 CM
ASCENDING AORTA: 3.4 CM
BH CV ECHO MEAS - ACS: 1.95 CM
BH CV ECHO MEAS - AO MAX PG: 10.7 MMHG
BH CV ECHO MEAS - AO MEAN PG: 5.6 MMHG
BH CV ECHO MEAS - AO ROOT DIAM: 2.7 CM
BH CV ECHO MEAS - AO V2 MAX: 163.7 CM/SEC
BH CV ECHO MEAS - AO V2 VTI: 37.7 CM
BH CV ECHO MEAS - AVA(I,D): 2.6 CM2
BH CV ECHO MEAS - EDV(CUBED): 74.1 ML
BH CV ECHO MEAS - EDV(MOD-SP2): 68 ML
BH CV ECHO MEAS - EDV(MOD-SP4): 66 ML
BH CV ECHO MEAS - EF(MOD-BP): 59.7 %
BH CV ECHO MEAS - EF(MOD-SP2): 61.8 %
BH CV ECHO MEAS - EF(MOD-SP4): 60.6 %
BH CV ECHO MEAS - ESV(CUBED): 15.2 ML
BH CV ECHO MEAS - ESV(MOD-SP2): 26 ML
BH CV ECHO MEAS - ESV(MOD-SP4): 26 ML
BH CV ECHO MEAS - FS: 41.1 %
BH CV ECHO MEAS - IVS/LVPW: 1.15 CM
BH CV ECHO MEAS - IVSD: 1.5 CM
BH CV ECHO MEAS - LAT PEAK E' VEL: 8.4 CM/SEC
BH CV ECHO MEAS - LV DIASTOLIC VOL/BSA (35-75): 33.1 CM2
BH CV ECHO MEAS - LV MASS(C)D: 224.3 GRAMS
BH CV ECHO MEAS - LV MAX PG: 6.2 MMHG
BH CV ECHO MEAS - LV MEAN PG: 3.9 MMHG
BH CV ECHO MEAS - LV SYSTOLIC VOL/BSA (12-30): 13 CM2
BH CV ECHO MEAS - LV V1 MAX: 124.3 CM/SEC
BH CV ECHO MEAS - LV V1 VTI: 32.2 CM
BH CV ECHO MEAS - LVIDD: 4.2 CM
BH CV ECHO MEAS - LVIDS: 2.47 CM
BH CV ECHO MEAS - LVOT AREA: 3 CM2
BH CV ECHO MEAS - LVOT DIAM: 1.96 CM
BH CV ECHO MEAS - LVPWD: 1.3 CM
BH CV ECHO MEAS - MED PEAK E' VEL: 7.7 CM/SEC
BH CV ECHO MEAS - MR MAX PG: 69.2 MMHG
BH CV ECHO MEAS - MR MAX VEL: 416 CM/SEC
BH CV ECHO MEAS - MV A DUR: 0.11 SEC
BH CV ECHO MEAS - MV A MAX VEL: 97.3 CM/SEC
BH CV ECHO MEAS - MV DEC SLOPE: 284.9 CM/SEC2
BH CV ECHO MEAS - MV DEC TIME: 0.22 SEC
BH CV ECHO MEAS - MV E MAX VEL: 81.8 CM/SEC
BH CV ECHO MEAS - MV E/A: 0.84
BH CV ECHO MEAS - MV MAX PG: 3.9 MMHG
BH CV ECHO MEAS - MV MEAN PG: 1.67 MMHG
BH CV ECHO MEAS - MV P1/2T: 101.7 MSEC
BH CV ECHO MEAS - MV V2 VTI: 34.2 CM
BH CV ECHO MEAS - MVA(P1/2T): 2.16 CM2
BH CV ECHO MEAS - MVA(VTI): 2.8 CM2
BH CV ECHO MEAS - PA ACC TIME: 0.13 SEC
BH CV ECHO MEAS - PA V2 MAX: 95 CM/SEC
BH CV ECHO MEAS - PULM A REVS DUR: 0.1 SEC
BH CV ECHO MEAS - PULM A REVS VEL: 37.7 CM/SEC
BH CV ECHO MEAS - PULM DIAS VEL: 47.1 CM/SEC
BH CV ECHO MEAS - PULM S/D: 0.89
BH CV ECHO MEAS - PULM SYS VEL: 42 CM/SEC
BH CV ECHO MEAS - QP/QS: 0.52
BH CV ECHO MEAS - RAP SYSTOLE: 3 MMHG
BH CV ECHO MEAS - RV MAX PG: 2.33 MMHG
BH CV ECHO MEAS - RV V1 MAX: 76.3 CM/SEC
BH CV ECHO MEAS - RV V1 VTI: 16.2 CM
BH CV ECHO MEAS - RVOT DIAM: 1.99 CM
BH CV ECHO MEAS - RVSP: 27 MMHG
BH CV ECHO MEAS - SI(MOD-SP2): 21.1 ML/M2
BH CV ECHO MEAS - SI(MOD-SP4): 20.1 ML/M2
BH CV ECHO MEAS - SUP REN AO DIAM: 2 CM
BH CV ECHO MEAS - SV(LVOT): 96.8 ML
BH CV ECHO MEAS - SV(MOD-SP2): 42 ML
BH CV ECHO MEAS - SV(MOD-SP4): 40 ML
BH CV ECHO MEAS - SV(RVOT): 50.4 ML
BH CV ECHO MEAS - TAPSE (>1.6): 1.71 CM
BH CV ECHO MEAS - TR MAX PG: 22.8 MMHG
BH CV ECHO MEAS - TR MAX VEL: 238.9 CM/SEC
BH CV ECHO MEASUREMENTS AVERAGE E/E' RATIO: 10.16
BH CV XLRA - RV BASE: 3 CM
BH CV XLRA - RV LENGTH: 7.1 CM
BH CV XLRA - RV MID: 2.7 CM
BH CV XLRA - TDI S': 16.5 CM/SEC
LEFT ATRIUM VOLUME INDEX: 28.8 ML/M2
SINUS: 3 CM
STJ: 2.7 CM

## 2024-02-15 PROCEDURE — 93306 TTE W/DOPPLER COMPLETE: CPT

## 2024-02-20 ENCOUNTER — PRE-ADMISSION TESTING (OUTPATIENT)
Dept: PREADMISSION TESTING | Facility: HOSPITAL | Age: 64
End: 2024-02-20
Payer: MEDICARE

## 2024-02-20 VITALS
OXYGEN SATURATION: 98 % | BODY MASS INDEX: 36.35 KG/M2 | SYSTOLIC BLOOD PRESSURE: 136 MMHG | DIASTOLIC BLOOD PRESSURE: 90 MMHG | WEIGHT: 212.9 LBS | TEMPERATURE: 98.5 F | HEART RATE: 65 BPM | RESPIRATION RATE: 20 BRPM | HEIGHT: 64 IN

## 2024-02-20 LAB
ANION GAP SERPL CALCULATED.3IONS-SCNC: 12 MMOL/L (ref 5–15)
BUN SERPL-MCNC: 12 MG/DL (ref 8–23)
BUN/CREAT SERPL: 13.8 (ref 7–25)
CALCIUM SPEC-SCNC: 8.7 MG/DL (ref 8.6–10.5)
CHLORIDE SERPL-SCNC: 109 MMOL/L (ref 98–107)
CO2 SERPL-SCNC: 21 MMOL/L (ref 22–29)
CREAT SERPL-MCNC: 0.87 MG/DL (ref 0.57–1)
DEPRECATED RDW RBC AUTO: 40.6 FL (ref 37–54)
EGFRCR SERPLBLD CKD-EPI 2021: 75.9 ML/MIN/1.73
ERYTHROCYTE [DISTWIDTH] IN BLOOD BY AUTOMATED COUNT: 11.8 % (ref 12.3–15.4)
GLUCOSE SERPL-MCNC: 116 MG/DL (ref 65–99)
HCT VFR BLD AUTO: 39.4 % (ref 34–46.6)
HGB BLD-MCNC: 13.3 G/DL (ref 12–15.9)
MCH RBC QN AUTO: 31.6 PG (ref 26.6–33)
MCHC RBC AUTO-ENTMCNC: 33.8 G/DL (ref 31.5–35.7)
MCV RBC AUTO: 93.6 FL (ref 79–97)
PLATELET # BLD AUTO: 242 10*3/MM3 (ref 140–450)
PMV BLD AUTO: 9.9 FL (ref 6–12)
POTASSIUM SERPL-SCNC: 4 MMOL/L (ref 3.5–5.2)
RBC # BLD AUTO: 4.21 10*6/MM3 (ref 3.77–5.28)
SODIUM SERPL-SCNC: 142 MMOL/L (ref 136–145)
WBC NRBC COR # BLD AUTO: 6.64 10*3/MM3 (ref 3.4–10.8)

## 2024-02-20 PROCEDURE — 85027 COMPLETE CBC AUTOMATED: CPT

## 2024-02-20 PROCEDURE — 36415 COLL VENOUS BLD VENIPUNCTURE: CPT

## 2024-02-20 PROCEDURE — 80048 BASIC METABOLIC PNL TOTAL CA: CPT

## 2024-02-20 RX ORDER — ATORVASTATIN CALCIUM 40 MG/1
40 TABLET, FILM COATED ORAL NIGHTLY
COMMUNITY

## 2024-02-20 NOTE — DISCHARGE INSTRUCTIONS
Take the following medications the morning of surgery:  ABILIFY,WELLBUTRIN,GABAPENTIN,ISOSORBIDE,LAMOTRIGINE,METOPROLOL,PANTOPRAZOLE AND EFFEXOR    YOU WILL BE NOTIFIED OF ARRIVAL TIME    If you are on prescription narcotic pain medication to control your pain you may also take that medication the morning of surgery.    General Instructions:  Do not eat solid food after midnight the night before surgery.  You may drink clear liquids day of surgery but must stop at least one hour before your hospital arrival time.  It is beneficial for you to have a clear drink that contains carbohydrates the day of surgery.  We suggest a 12 to 20 ounce bottle of Gatorade or Powerade for non-diabetic patients or a 12 to 20 ounce bottle of G2 or Powerade Zero for diabetic patients. (Pediatric patients, are not advised to drink a 12 to 20 ounce carbohydrate drink)    Clear liquids are liquids you can see through.  Nothing red in color.     Plain water                               Sports drinks  Sodas                                   Gelatin (Jell-O)  Fruit juices without pulp such as white grape juice and apple juice  Popsicles that contain no fruit or yogurt  Tea or coffee (no cream or milk added)  Gatorade / Powerade  G2 / Powerade Zero    Infants may have breast milk up to four hours before surgery.  Infants drinking formula may drink formula up to six hours before surgery.   Patients who avoid smoking, chewing tobacco and alcohol for 4 weeks prior to surgery have a reduced risk of post-operative complications.  Quit smoking as many days before surgery as you can.  Do not smoke, use chewing tobacco or drink alcohol the day of surgery.   If applicable bring your C-PAP/ BI-PAP machine in with you to preop day of surgery.  Bring any papers given to you in the doctor’s office.  Wear clean comfortable clothes.  Do not wear contact lenses, false eyelashes or make-up.  Bring a case for your glasses.   Bring crutches or walker if  applicable.  Remove all piercings.  Leave jewelry and any other valuables at home.  Hair extensions with metal clips must be removed prior to surgery.  The Pre-Admission Testing nurse will instruct you to bring medications if unable to obtain an accurate list in Pre-Admission Testing.        If you were given a blood bank ID arm band remember to bring it with you the day of surgery.    Preventing a Surgical Site Infection:  For 2 to 3 days before surgery, avoid shaving with a razor because the razor can irritate skin and make it easier to develop an infection.    Any areas of open skin can increase the risk of a post-operative wound infection by allowing bacteria to enter and travel throughout the body.  Notify your surgeon if you have any skin wounds / rashes even if it is not near the expected surgical site.  The area will need assessed to determine if surgery should be delayed until it is healed.  The night prior to surgery shower using a fresh bar of anti-bacterial soap (such as Dial) and clean washcloth.  Sleep in a clean bed with clean clothing.  Do not allow pets to sleep with you.  Shower on the morning of surgery using a fresh bar of anti-bacterial soap (such as Dial) and clean washcloth.  Dry with a clean towel and dress in clean clothing.  Ask your surgeon if you will be receiving antibiotics prior to surgery.  Make sure you, your family, and all healthcare providers clean their hands with soap and water or an alcohol based hand  before caring for you or your wound.    Day of surgery:  Your arrival time is approximately two hours before your scheduled surgery time.  Upon arrival, a Pre-op nurse and Anesthesiologist will review your health history, obtain vital signs, and answer questions you may have.  The only belongings needed at this time will be a list of your home medications and if applicable your C-PAP/BI-PAP machine.  A Pre-op nurse will start an IV and you may receive medication in  preparation for surgery, including something to help you relax.     Please be aware that surgery does come with discomfort.  We want to make every effort to control your discomfort so please discuss any uncontrolled symptoms with your nurse.   Your doctor will most likely have prescribed pain medications.      If you are going home after surgery you will receive individualized written care instructions before being discharged.  A responsible adult must drive you to and from the hospital on the day of your surgery and ideally stay with you through the night.   .  Discharge prescriptions can be filled by the hospital pharmacy during regular pharmacy hours.  If you are having surgery late in the day/evening your prescription may be e-prescribed to your pharmacy.  Please verify your pharmacy hours or chose a 24 hour pharmacy to avoid not having access to your prescription because your pharmacy has closed for the day.    If you are staying overnight following surgery, you will be transported to your hospital room following the recovery period.  [facility] has all private rooms.    If you have any questions please call Pre-Admission Testing at [phone].  Deductibles and co-payments are collected on the day of service. Please be prepared to pay the required co-pay, deductible or deposit on the day of service as defined by your plan.    Call your surgeon immediately if you experience any of the following symptoms:  Sore Throat  Shortness of Breath or difficulty breathing  Cough  Chills  Body soreness or muscle pain  Headache  Fever  New loss of taste or smell  Do not arrive for your surgery ill.  Your procedure will need to be rescheduled to another time.  You will need to call your physician before the day of surgery to avoid any unnecessary exposure to hospital staff as well as other patients.      CHLORHEXIDINE CLOTH INSTRUCTIONS  The morning of surgery follow these instructions using the  Chlorhexidine cloths you've been given.  These steps reduce bacteria on the body.  Do not use the cloths near your eyes, ears mouth, genitalia or on open wounds.  Throw the cloths away after use but do not try to flush them down a toilet.      Open and remove one cloth at a time from the package.    Leave the cloth unfolded and begin the bathing.  Massage the skin with the cloths using gentle pressure to remove bacteria.  Do not scrub harshly.   Follow the steps below with one 2% CHG cloth per area (6 total cloths).  One cloth for neck, shoulders and chest.  One cloth for both arms, hands, fingers and underarms (do underarms last).  One cloth for the abdomen followed by groin.  One cloth for right leg and foot including between the toes.  One cloth for left leg and foot including between the toes.  The last cloth is to be used for the back of the neck, back and buttocks.    Allow the CHG to air dry 3 minutes on the skin which will give it time to work and decrease the chance of irritation.  The skin may feel sticky until it is dry.  Do not rinse with water or any other liquid or you will lose the beneficial effects of the CHG.  If mild skin irritation occurs, do rinse the skin to remove the CHG.  Report this to the nurse at time of admission.  Do not apply lotions, creams, ointments, deodorants or perfumes after using the clothes. Dress in clean clothes before coming to the hospital.

## 2024-02-21 ENCOUNTER — HOSPITAL ENCOUNTER (OUTPATIENT)
Dept: CT IMAGING | Facility: HOSPITAL | Age: 64
Discharge: HOME | End: 2024-02-21
Admitting: NURSE PRACTITIONER
Payer: MEDICARE

## 2024-02-21 PROCEDURE — 71271 CT THORAX LUNG CANCER SCR C-: CPT

## 2024-02-23 PROBLEM — I72.8 SPLENIC ARTERY ANEURYSM: Status: ACTIVE | Noted: 2024-02-23

## 2024-02-24 LAB
ALBUMIN SERPL-MCNC: 4.3 G/DL (ref 3.5–5.2)
ALBUMIN/GLOB SERPL: 2.5 G/DL
ALP SERPL-CCNC: 77 U/L (ref 39–117)
ALT SERPL-CCNC: 19 U/L (ref 1–33)
APPEARANCE UR: CLEAR
AST SERPL-CCNC: 16 U/L (ref 1–32)
BASOPHILS # BLD AUTO: 0.02 10*3/MM3 (ref 0–0.2)
BASOPHILS NFR BLD AUTO: 0.3 % (ref 0–1.5)
BILIRUB SERPL-MCNC: 0.2 MG/DL (ref 0–1.2)
BILIRUB UR QL STRIP: NEGATIVE
BUN SERPL-MCNC: 10 MG/DL (ref 8–23)
BUN/CREAT SERPL: 11.1 (ref 7–25)
CALCIUM SERPL-MCNC: 9.1 MG/DL (ref 8.6–10.5)
CHLORIDE SERPL-SCNC: 104 MMOL/L (ref 98–107)
CHOLEST SERPL-MCNC: 185 MG/DL (ref 0–200)
CO2 SERPL-SCNC: 24.1 MMOL/L (ref 22–29)
COLOR UR: YELLOW
CREAT SERPL-MCNC: 0.9 MG/DL (ref 0.57–1)
DRUGS UR: NORMAL
EGFRCR SERPLBLD CKD-EPI 2021: 72.9 ML/MIN/1.73
EOSINOPHIL # BLD AUTO: 0.27 10*3/MM3 (ref 0–0.4)
EOSINOPHIL NFR BLD AUTO: 3.7 % (ref 0.3–6.2)
ERYTHROCYTE [DISTWIDTH] IN BLOOD BY AUTOMATED COUNT: 11.9 % (ref 12.3–15.4)
GLOBULIN SER CALC-MCNC: 1.7 GM/DL
GLUCOSE SERPL-MCNC: 123 MG/DL (ref 65–99)
GLUCOSE UR QL STRIP: NEGATIVE
HBA1C MFR BLD: 5.4 % (ref 4.8–5.6)
HCT VFR BLD AUTO: 40 % (ref 34–46.6)
HDLC SERPL-MCNC: 61 MG/DL (ref 40–60)
HGB BLD-MCNC: 13.2 G/DL (ref 12–15.9)
HGB UR QL STRIP: NEGATIVE
IMM GRANULOCYTES # BLD AUTO: 0.02 10*3/MM3 (ref 0–0.05)
IMM GRANULOCYTES NFR BLD AUTO: 0.3 % (ref 0–0.5)
KETONES UR QL STRIP: NEGATIVE
LDLC SERPL CALC-MCNC: 101 MG/DL (ref 0–100)
LDLC/HDLC SERPL: 1.61 {RATIO}
LEUKOCYTE ESTERASE UR QL STRIP: NEGATIVE
LYMPHOCYTES # BLD AUTO: 2.68 10*3/MM3 (ref 0.7–3.1)
LYMPHOCYTES NFR BLD AUTO: 37.2 % (ref 19.6–45.3)
MCH RBC QN AUTO: 31.4 PG (ref 26.6–33)
MCHC RBC AUTO-ENTMCNC: 33 G/DL (ref 31.5–35.7)
MCV RBC AUTO: 95.2 FL (ref 79–97)
MONOCYTES # BLD AUTO: 0.46 10*3/MM3 (ref 0.1–0.9)
MONOCYTES NFR BLD AUTO: 6.4 % (ref 5–12)
NEUTROPHILS # BLD AUTO: 3.76 10*3/MM3 (ref 1.7–7)
NEUTROPHILS NFR BLD AUTO: 52.1 % (ref 42.7–76)
NITRITE UR QL STRIP: NEGATIVE
NRBC BLD AUTO-RTO: 0 /100 WBC (ref 0–0.2)
PH UR STRIP: 6.5 [PH] (ref 5–8)
PLATELET # BLD AUTO: 254 10*3/MM3 (ref 140–450)
POTASSIUM SERPL-SCNC: 4.2 MMOL/L (ref 3.5–5.2)
PROT SERPL-MCNC: 6 G/DL (ref 6–8.5)
PROT UR QL STRIP: NEGATIVE
RBC # BLD AUTO: 4.2 10*6/MM3 (ref 3.77–5.28)
SODIUM SERPL-SCNC: 138 MMOL/L (ref 136–145)
SP GR UR STRIP: 1.01 (ref 1–1.03)
TRIGL SERPL-MCNC: 130 MG/DL (ref 0–150)
TSH SERPL DL<=0.005 MIU/L-ACNC: 2.15 UIU/ML (ref 0.27–4.2)
UROBILINOGEN UR STRIP-MCNC: NORMAL MG/DL
VLDLC SERPL CALC-MCNC: 23 MG/DL (ref 5–40)
WBC # BLD AUTO: 7.21 10*3/MM3 (ref 3.4–10.8)

## 2024-02-27 ENCOUNTER — OFFICE VISIT (OUTPATIENT)
Dept: ORTHOPEDIC SURGERY | Facility: CLINIC | Age: 64
End: 2024-02-27
Payer: MEDICARE

## 2024-02-27 VITALS — HEIGHT: 63 IN | TEMPERATURE: 97.3 F | BODY MASS INDEX: 36.29 KG/M2 | WEIGHT: 204.8 LBS

## 2024-02-27 DIAGNOSIS — M17.12 ARTHRITIS OF KNEE, LEFT: Primary | ICD-10-CM

## 2024-02-27 RX ORDER — PRAMIPEXOLE DIHYDROCHLORIDE 0.5 MG/1
0.5 TABLET ORAL NIGHTLY
Qty: 30 TABLET | Refills: 0 | OUTPATIENT
Start: 2024-02-27

## 2024-02-27 NOTE — H&P (VIEW-ONLY)
"   History & Physical       Patient: Keri Bhagat  YOB: 1960  Medical Record Number: [id number]  Wt Readings from Last 3 Encounters:   02/27/24 92.9 kg (204 lb 12.8 oz)   02/20/24 96.6 kg (212 lb 14.4 oz)   02/15/24 97.5 kg (215 lb)     Ht Readings from Last 3 Encounters:   02/27/24 160 cm (63\")   02/20/24 161.3 cm (63.5\")   02/15/24 160 cm (63\")     Body mass index is 36.28 kg/m².  Facility age limit for growth %nessa is 20 years.    Surgeon:  Dr. Tho Pritchard    Chief Complaints:   Chief Complaint   Patient presents with    Left Knee - Pre-op Exam, Pain     Surgery: Left Total Knee Arthroplasty with [place] Navigation    Subjective:    History of Present Illness: 63 y.o. female presents with   Chief Complaint   Patient presents with    Left Knee - Pre-op Exam, Pain   . Chronic symptoms have been progressively worsening despite more conservative treatment measures including medications OTC and prescription, cortisone injections and physical therapy.  Symptoms are associated with ability to move, exercise, and perform activities of daily living.  Symptoms are aggravated by weight bearing and ROM necessary for activities of daily living.   Symptoms improve with rest, ice and elevation only minimally.      Allergies: No Known Allergies    Medications:   Home Medications:  Current Outpatient Medications on File Prior to Visit   Medication Sig    apixaban (ELIQUIS) 5 MG tablet tablet Take 1 tablet by mouth Every 12 (Twelve) Hours. (Patient taking differently: Take 1 tablet by mouth Every 12 (Twelve) Hours. PT IS HOLDING FOR 2 DAYS BEFORE SURGERY PER CARDIOLOGY)    ARIPiprazole (ABILIFY) 20 MG tablet Take 1 tablet by mouth Daily.    atorvastatin (LIPITOR) 40 MG tablet Take 1 tablet by mouth Every Night.    B Complex Vitamins (VITAMIN B COMPLEX PO) Take 1 tablet by mouth Daily. HOLD PRIOR TO SURG    buPROPion XL (WELLBUTRIN XL) 300 MG 24 hr tablet Take 1 tablet by mouth Daily.    calcium carbonate " (OS-STEVIE) 600 MG tablet Take 1 tablet by mouth Daily. HOLD PRIOR TO SURG    Cholecalciferol 25 MCG (1000 UT) capsule Take 1 capsule by mouth 2 (Two) Times a Day. HOLD PRIOR TO SURG    estradiol (ESTRACE) 0.1 MG/GM vaginal cream Insert 1 applicator into the vagina 3 (Three) Times a Week.    Ferrous Sulfate (IRON PO) Take 1 tablet by mouth 2 (Two) Times a Day.    gabapentin (NEURONTIN) 300 MG capsule Take 1 capsule by mouth 3 (Three) Times a Day.    isosorbide mononitrate (IMDUR) 30 MG 24 hr tablet Take 1 tablet by mouth Daily.    lamoTRIgine (LaMICtal) 150 MG tablet Take 1 tablet by mouth 2 (Two) Times a Day.    levothyroxine (SYNTHROID, LEVOTHROID) 88 MCG tablet Take 1 tablet by mouth Every Morning.    metoprolol succinate XL (Toprol XL) 25 MG 24 hr tablet Take 1.5 tablets by mouth Daily. For bp    pantoprazole (PROTONIX) 40 MG EC tablet Take 1 tablet by mouth 2 (Two) Times a Day.    pramipexole (MIRAPEX) 0.5 MG tablet Take 1 tablet by mouth Every Night for 360 days.    venlafaxine XR (EFFEXOR-XR) 150 MG 24 hr capsule Take 1 capsule by mouth Daily.    Vibegron 75 MG tablet Take 1 tablet by mouth Daily.    vitamin B-12 (CYANOCOBALAMIN) 1000 MCG tablet TAKE ONE TABLET BY MOUTH TWICE A DAY (Patient taking differently: HOLD PRIOR TO SURG)     No current facility-administered medications on file prior to visit.     Current Medications:  Scheduled Meds:  Continuous Infusions:No current facility-administered medications for this visit.    PRN Meds:.    I have reviewed the patient's medical history in detail and updated the computerized patient record.  Review and summarization of old records include:    Past Medical History:   Diagnosis Date    Abnormal Pap smear of cervix     Adrenal adenoma 09/21/2022    Anemia     Anxiety     Arthritis     Arthritis of neck     Bipolar I disorder, single manic episode     depressive d(NOS)    Cervical disc herniation     Coronary artery disease     COVID-19 01/10/2023    Death of family  member     MOTHER IN FEB 2024    Depression     NO SUICIDAL PLANS    Dislocation of finger     Diverticular disease     Dyspepsia     Encounter for follow-up examination after completed treatment for conditions other than malignant neoplasm 04/11/2018    GERD (gastroesophageal reflux disease)     Heart attack     AGE 39    Heart murmur     Hiatal hernia     History of transfusion     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    HPV positive on pap LGSIL    Hyperlipidemia     Hypertension     Hypothyroidism     Incontinence in female     wears pads    Kidney disease, chronic, stage III (GFR 30-59 ml/min)     Knee pain, bilateral     Knee swelling     LGSIL on Pap smear of cervix 04/29/2016    LGSIL HPV positive    Lumbosacral disc disease Unsure    Lower left back is partial osteoarthritis and partial osteoporosis    Obesity     Osteopenia 2021    Bone density test    Periodic limb movement disorder     Restless leg syndrome     LEFT SHOULDER    Rotator cuff syndrome     Sleep apnea     NO MACHINE CURRENTLY    Suicidal ideation 08/19/2016    history    Thyroid nodule     Vitamin B12 deficiency         Past Surgical History:   Procedure Laterality Date    ADENOIDECTOMY  1974    ANTERIOR CERVICAL DISCECTOMY W/ FUSION N/A 12/29/2016    Procedure: C3-4 anterior cervical discectomy and fusion with Depuy micro plate, ALLOGRAFT C3-4, AND HARDWARE REMOVAL C4-7.;  Surgeon: Hema Godwin MD;  Location: [facility] OR;  Service:     CARDIAC CATHETERIZATION N/A 03/30/2017    Procedure: Left Heart Cath;  Surgeon: Tracey Vargas MD;  Location: [facility] CATH INVASIVE LOCATION;  Service:     CARDIAC CATHETERIZATION N/A 03/30/2017    Procedure: Coronary angiography;  Surgeon: Tracey Vargas MD;  Location: [facility] CATH INVASIVE LOCATION;  Service:     CARDIAC CATHETERIZATION N/A 03/30/2017    Procedure: Left ventriculography;  Surgeon: Tracey Vargas MD;  Location: [facility] CATH INVASIVE LOCATION;  Service:     CARDIAC  CATHETERIZATION  03/30/2017    Procedure: Functional Flow Morton;  Surgeon: Tracey Vargas MD;  Location: [facility] INVASIVE LOCATION;  Service:     CATARACT EXTRACTION      CERVICAL BIOPSY  MMXVI    Dr. Jeffery.     CERVICAL DISCECTOMY ANTERIOR  04/2013    C4-7    COLONOSCOPY  04/20/2015    Diverticulosis, IH    COLONOSCOPY  03/09/2021    COLPOSCOPY W/ BIOPSY / CURETTAGE  06/17/2016    LGSIL HPV positive. Results were normal repeat pap in one year. Chronic Cervicitis    CORONARY ANGIOPLASTY WITH STENT PLACEMENT      ENDOSCOPY  MMXV    Normal.  Dr. Rodriguez    ENDOSCOPY N/A 06/22/2017    Erythematous mucosa in the stomach  PATH: Chronic active gastritis, moderate with intestinal metaplasia     EPIDURAL BLOCK      INGUINAL HERNIA REPAIR      LAPAROSCOPIC GASTRIC BANDING  02/2018    SHOULDER SURGERY Left     RCR    TONSILLECTOMY AND ADENOIDECTOMY      TRANSVAGINAL TAPING SUSPENSION N/A 12/06/2017    Procedure: MID URETHRAL SLING CYSTSCOPY;  Surgeon: Abby Méndez MD;  Location: [facility] OR;  Service:     TRIGGER POINT INJECTION      TUBAL ABDOMINAL LIGATION      TYMPANOSTOMY TUBE PLACEMENT Right     UPPER GASTROINTESTINAL ENDOSCOPY  approx 2021    WRIST SURGERY Bilateral     carpal tunnel        Social History     Occupational History    Occupation: disabled   Tobacco Use    Smoking status: Light Smoker     Types: Electronic Cigarette     Passive exposure: Current    Smokeless tobacco: Current    Tobacco comments:     Electronic Cigarette   Vaping Use    Vaping Use: Every day    Substances: Nicotine, Flavoring    Devices: Refillable tank   Substance and Sexual Activity    Alcohol use: Not Currently     Comment: RARELY    Drug use: Never    Sexual activity: Not Currently     Partners: Male     Birth control/protection: Condom, Abstinence, Post-menopausal, None, Tubal ligation      Social History     Social History Narrative    Not on file        Family History   Problem Relation Age of Onset    Diabetes  type II Mother     Hypertension Mother     Osteoporosis Mother     Seizures Mother     Diabetes Mother     Arthritis Mother     Hyperlipidemia Mother     COPD Father     Hypertension Father     Lung cancer Father     Heart attack Father     Liver cancer Father     Liver disease Father     Heart disease Father     Cancer Father         Lung cacer that metastasized  into the liver.    Thyroid disease Sister     Hypertension Sister     Bipolar disorder Sister     Depression Sister     ADD / ADHD Sister     Anxiety disorder Sister     Mental illness Sister     Hyperlipidemia Sister     Broken bones Sister     No Known Problems Son     Abnormal EKG Daughter     Hypertension Daughter     Bipolar disorder Daughter     Thyroid disease Daughter     Mental illness Daughter     Hyperlipidemia Daughter     No Known Problems Paternal Grandfather     No Known Problems Paternal Grandmother     Cancer Maternal Grandmother     No Known Problems Maternal Grandfather     Thyroid disease Sister     Hypertension Sister     Bipolar disorder Sister     Anxiety disorder Sister     Depression Sister     Mental illness Sister     Hyperlipidemia Sister     Asthma Daughter     Diabetes Son         Type 1    Breast cancer Neg Hx     Ovarian cancer Neg Hx     Uterine cancer Neg Hx     Colon cancer Neg Hx     Malig Hyperthermia Neg Hx        ROS: 14 point review of systems was performed and was negative except for documented findings in HPI and today's encounter.       Constitutional:  Denies fever, shaking or chills   Eyes:  Denies change in visual acuity   HENT:  Denies nasal congestion or sore throat   Respiratory:  Denies cough or shortness of breath   Cardiovascular:  Denies chest pain or severe LE edema   GI:  Denies abdominal pain, nausea, vomiting, bloody stools or diarrhea   Musculoskeletal:  Denies numbness tingling or loss of motor function except as outlined above in history of present illness.  : Denies painful urination or  "hematuria  Integument:  Denies rash, lesion or ulceration   Neurologic:  Denies headache or focal weakness  Endocrine:  Denies lymphadenopathy  Psych:  Denies confusion or change in mental status   Hem:  Denies active bleeding    Physical Exam: 63 y.o. female  Wt Readings from Last 3 Encounters:   02/27/24 92.9 kg (204 lb 12.8 oz)   02/20/24 96.6 kg (212 lb 14.4 oz)   02/15/24 97.5 kg (215 lb)     Ht Readings from Last 3 Encounters:   02/27/24 160 cm (63\")   02/20/24 161.3 cm (63.5\")   02/15/24 160 cm (63\")     Body mass index is 36.28 kg/m².  Facility age limit for growth %nessa is 20 years.  Vitals:    02/27/24 1101   Temp: 97.3 °F (36.3 °C)       Vital signs reviewed.   General Appearance:    Alert, cooperative, in no acute distress                  Eyes: conjunctiva clear  ENT: external ears and nose atraumatic  CV: no peripheral edema  Resp: normal respiratory effort  Skin: no rashes or wounds; normal turgor  Psych: mood and affect appropriate  Lymph: no nodes appreciated  Neuro: gross sensation intact  Vascular:  Palpable peripheral pulse in noted extremity  Musculoskeletal Extremities: KNEE Exam: medial joint line tenderness with crepitation, synovitis, swelling, and joint effusion left knee.        Diagnostic Tests:  Lab Results   Component Value Date    WBC 6.64 02/20/2024    HGB 13.3 02/20/2024    HCT 39.4 02/20/2024    MCV 93.6 02/20/2024     02/20/2024     Lab Results   Component Value Date    GLUCOSE 116 (H) 02/20/2024    CALCIUM 8.7 02/20/2024     02/20/2024    K 4.0 02/20/2024    CO2 21.0 (L) 02/20/2024     (H) 02/20/2024    BUN 12 02/20/2024    CREATININE 0.87 02/20/2024    EGFRRESULT 72.9 02/15/2024    EGFR 75.9 02/20/2024    BCR 13.8 02/20/2024    ANIONGAP 12.0 02/20/2024       EKG:    Complete Transthoracic Echocardiogram with Complete Doppler and Color Flow   Interpretation Summary         Left ventricular systolic function is normal. Calculated left ventricular EF = 59.7%    " Left ventricular wall thickness is consistent with moderate concentric hypertrophy.    Left ventricular diastolic function was normal.    Estimated right ventricular systolic pressure from tricuspid regurgitation is normal (<35 mmHg).    ECG 12 Lead     Date/Time: 2/6/2024 1:55 PM  Performed by: David Burroughs MD     Authorized by: David Burroughs MD  Comparison: compared with previous ECG from 11/30/2023  Similar to previous ECG  Rhythm: sinus bradycardia    I have reviewed all the lab & EKG results.     Radiology:  Imaging was done previously by outside location, viewed images and discussed with the patient:       Assessment:  Patient Active Problem List    Diagnosis Date Noted    Splenic artery aneurysm 02/23/2024     Note Last Updated: 2/23/2024     12 mm noted CT low-dose chest lung cancer screening February 2024      Palpitations 01/04/2024    ASCUS with positive high risk HPV cervical 09/13/2023    Cervicalgia 01/06/2023    Adrenal adenoma 09/21/2022    Lipoma of upper extremity 06/28/2022     Note Last Updated: 6/28/2022     Likely very small 1.5 cm flat mobile smooth lipoma or other soft tissue nodule, with benign appearance left lateral mid forearm      Gastro-esophageal reflux disease with esophagitis, without bleeding 05/10/2021    Vitamin B12 deficiency     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 On the current exam, it measures 8 x 10 mm in greatest axial  dimensions as compared to 7 x 8 mm in greatest axial dimensions on a  prior study. Continued close imaging follow-up is suggested.     Again noted is a 2 to 3 mm hypoenhancing focus within the expected level  of the interface of the anterior and posterior pituitary that  presumably  represents a pars intermedia cyst. This is unchanged.      This report was finalized on 8/22/2019 10:43 AM by Dr. Rickey Hutchins M.D.           Meningioma 08/28/2019     Note Last Updated: 8/28/2019     IMPRESSION:     The previously identified dural based enhancing lesion arising from the  left aspect of falx cerebri abutting the superior aspect of the left  gyrus rectus probably representing a meningioma may be mildly increased  in size. On the current exam, it measures 8 x 10 mm in greatest axial  dimensions as compared to 7 x 8 mm in greatest axial dimensions on a  prior study. Continued close imaging follow-up is suggested.     Again noted is a 2 to 3 mm hypoenhancing focus within the expected level  of the interface of the anterior and posterior pituitary that presumably  represents a pars intermedia cyst. This is unchanged.      This report was finalized on 8/22/2019 10:43 AM by Dr. Rickey Hutchins M.D.           Osteopenia 04/29/2019     Note Last Updated: 8/28/2019        FINDINGS: Average lumbar bone mineral density 0.90 g/sq cm correlating  to a T value of -1.3 and a Z value of -0.2. Right femoral neck bone  mineral density 0.67 g/sq cm correlating to a T value of -1.6 and a Z  value of -0.5. Left total hip bone mineral density 0.86 g/sq cm  correlating to a T value of -0.7 and a Z score of 0.1.     CONCLUSION: Osteopenia right femoral neck and lumbar spine.     This report was finalized on 11/5/2018 3:31 PM by Dr. Titi Mccall M.D.      Vaginal atrophy 04/29/2019    Female dyspareunia 04/29/2019    Degenerative disc disease, lumbar 04/25/2019    Suicide attempt 04/23/2019     Note Last Updated: 3/23/2021     Description: (PL)      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 02/26/2019    Memory changes 02/26/2019    Periodic limb movement disorder 02/26/2019    Gait disturbance 02/26/2019    Family history of diabetes mellitus (DM) 10/08/2018    Menopause  10/08/2018    Incomplete emptying of bladder 07/05/2018    Bilateral chronic knee pain 06/15/2018    Arthritis of left knee 06/15/2018    Arthritis of right knee 06/15/2018    Fracture follow-up 04/11/2018    Nondisplaced fracture of base of fifth metacarpal bone, left hand, initial encounter for closed fracture 04/11/2018    Closed fracture of lower end of left radius with routine healing 04/11/2018    Multiple dislocations of fingers, open 04/11/2018    Pre-operative cardiovascular examination 04/11/2018    Syncope and collapse 04/01/2018    Closed fracture of left distal radius 04/01/2018    Closed displaced fracture of neck of right fifth metacarpal bone 04/01/2018    Chest pain 03/30/2018    Vomiting 03/13/2018    Inappropriate diet and eating habits 02/21/2018    H/O bladder repair surgery 01/29/2018     Note Last Updated: 1/29/2018     Bladder sleeve 2017      Other specified postprocedural states 01/29/2018     Note Last Updated: 3/23/2021     Bladder sleeve 2017      Acute urinary tract infection 01/09/2018    Bacterial vaginosis 12/15/2017    Postoperative retention of urine 12/12/2017    Primary osteoarthritis of both knees 12/01/2017    MORALES I (cervical intraepithelial neoplasia I) 11/22/2017    Urinary frequency 11/12/2017    Diabetes mellitus screening 11/12/2017    Cervical radiculopathy 10/29/2017    Tobacco abuse 10/29/2017     Note Last Updated: 4/26/2022     Vaping no cigarette, quit tobacco smoking 4 years ago      Obesity, morbid, BMI 40.0-49.9 10/29/2017    Encounter for screening for lung cancer 10/29/2017    Stage 1 chronic kidney disease 10/08/2017    Left wrist tendinitis 10/08/2017    Tendinitis of left wrist 10/08/2017    Primary osteoarthritis of right knee 10/02/2017    LGSIL on Pap smear of cervix 09/28/2017    Chronic gastritis without bleeding 09/27/2017    Urgency incontinence 09/22/2017    Nocturia 09/22/2017    Calcific Achilles tendinitis 07/26/2017    Rod's deformity  07/26/2017    Pain and swelling of knee 06/02/2017    Knee injury 06/02/2017    Stress fracture of calcaneus 04/19/2017    Vitamin D deficiency 04/19/2017    Plantar fasciitis 04/10/2017    Peroneal tendon injury 04/10/2017    Weight gain, abnormal 03/20/2017     Note Last Updated: 3/20/2017     Excess calories,, increased appetite Abilify      Allergic rhinitis 03/20/2017    Obesity (BMI 30-39.9) 03/20/2017    Chronic pain of both knees 02/16/2017    Arthritis of both knees 02/16/2017    Cervical spondylosis with radiculopathy 12/29/2016    Atherosclerotic heart disease of native coronary artery with other forms of angina pectoris 11/14/2016    ROCKY (obstructive sleep apnea)     Depression 08/19/2016    Degenerative disc disease, cervical 08/18/2016    Right knee pain 07/15/2016    Primary hypothyroidism 06/29/2016    Iron deficiency anemia 06/29/2016    Hyperlipidemia     Fatigue 05/06/2016    Gastroesophageal reflux disease 04/29/2016    Bipolar I disorder, single manic episode 04/29/2016     Note Last Updated: 4/29/2016     Description: depressive d (NOS)      Atherosclerosis of coronary artery 04/29/2016     Note Last Updated: 1/4/2024     Cardiac catheterization 3/30/2017.  Left main 0% stenosis, LAD 20% mid stenosis, distal LAD 30% stenosis.  Ramus 0% stenosis, 80% stenosis of a small secondary branch.  Left circumflex 10% stenosis.  RCA 50-60% stenosis.  PDA 30% stenosis.      Essential hypertension 04/29/2016    Lightheadedness 04/29/2016    Low back pain 04/29/2016    Sciatica 04/29/2016    Thyroid nodule 04/29/2016     Note Last Updated: 4/29/2016     Description: 0.5 cm thyroid nodule, repeat thyroid ultrasound 1 year      Urinary urgency 03/25/2015    Vaginal discharge 03/25/2015    Anemia 02/04/2015     Note Last Updated: 3/23/2021     Description: (PL)      Anxiety 02/04/2015     Note Last Updated: 3/23/2021     Description: (PL)      Myocardial infarction 05/01/2000       Plan:  Dr. Tho Pritchard  reviewed anatomy of a total joint arthroplasty in laymen's terms, as well as typical postoperative recovery and possibly 6-12 months for maximal recovery, and possible need for rehabilitation stay after hospitalization. We also discussed risks, benefits, alternatives, and limitations of procedure with risks including but not limited to neurovascular damage, bleeding, infection, malalignment, chronic pian, failure of implants, osteolysis, loosening of implants, loss of motion, weakness, stiffness, instability, DVT, pulmonary embolus, death, stroke, complex regional pain syndrome, myocardial infarction, and need for additional procedures. Concept of substitution vs. replacement discussed.  No guarantees were given regarding results of surgery.      Keri Bhagat was given the opportunity to ask and have all questions answered today.  The patient voiced understanding of the risks, benefits, and alternative forms of treatment that were discussed and the patient consents to proceed with surgery.       Cleared by: Cardiology on 2/16/2024 Lluvia SHELLEY    Skin breakdown? WNL  Metal allergy? No  DVT Risk Factors: atrial fibrillation and significant cardiac history    DVT Prophylaxis:   Resume pre-operative Terrance Perez: has one at home  Consults: none  Additional orders: none  Pharmacy: meds to beds    Discharge Plan: POD #1 to home, home health, and when cleared by physical therapy as safe for discharge    To be updated    Date: 2/27/2024  DAPHNEY Choe    Dictated Utilizing Dragon Dictation

## 2024-02-27 NOTE — PROGRESS NOTES
"   History & Physical       Patient: Keri Bhagat  YOB: 1960  Medical Record Number: [id number]  Wt Readings from Last 3 Encounters:   02/27/24 92.9 kg (204 lb 12.8 oz)   02/20/24 96.6 kg (212 lb 14.4 oz)   02/15/24 97.5 kg (215 lb)     Ht Readings from Last 3 Encounters:   02/27/24 160 cm (63\")   02/20/24 161.3 cm (63.5\")   02/15/24 160 cm (63\")     Body mass index is 36.28 kg/m².  Facility age limit for growth %nessa is 20 years.    Surgeon:  Dr. Tho Pritchard    Chief Complaints:   Chief Complaint   Patient presents with    Left Knee - Pre-op Exam, Pain     Surgery: Left Total Knee Arthroplasty with [place] Navigation    Subjective:    History of Present Illness: 63 y.o. female presents with   Chief Complaint   Patient presents with    Left Knee - Pre-op Exam, Pain   . Chronic symptoms have been progressively worsening despite more conservative treatment measures including medications OTC and prescription, cortisone injections and physical therapy.  Symptoms are associated with ability to move, exercise, and perform activities of daily living.  Symptoms are aggravated by weight bearing and ROM necessary for activities of daily living.   Symptoms improve with rest, ice and elevation only minimally.      Allergies: No Known Allergies    Medications:   Home Medications:  Current Outpatient Medications on File Prior to Visit   Medication Sig    apixaban (ELIQUIS) 5 MG tablet tablet Take 1 tablet by mouth Every 12 (Twelve) Hours. (Patient taking differently: Take 1 tablet by mouth Every 12 (Twelve) Hours. PT IS HOLDING FOR 2 DAYS BEFORE SURGERY PER CARDIOLOGY)    ARIPiprazole (ABILIFY) 20 MG tablet Take 1 tablet by mouth Daily.    atorvastatin (LIPITOR) 40 MG tablet Take 1 tablet by mouth Every Night.    B Complex Vitamins (VITAMIN B COMPLEX PO) Take 1 tablet by mouth Daily. HOLD PRIOR TO SURG    buPROPion XL (WELLBUTRIN XL) 300 MG 24 hr tablet Take 1 tablet by mouth Daily.    calcium carbonate " (OS-STEVIE) 600 MG tablet Take 1 tablet by mouth Daily. HOLD PRIOR TO SURG    Cholecalciferol 25 MCG (1000 UT) capsule Take 1 capsule by mouth 2 (Two) Times a Day. HOLD PRIOR TO SURG    estradiol (ESTRACE) 0.1 MG/GM vaginal cream Insert 1 applicator into the vagina 3 (Three) Times a Week.    Ferrous Sulfate (IRON PO) Take 1 tablet by mouth 2 (Two) Times a Day.    gabapentin (NEURONTIN) 300 MG capsule Take 1 capsule by mouth 3 (Three) Times a Day.    isosorbide mononitrate (IMDUR) 30 MG 24 hr tablet Take 1 tablet by mouth Daily.    lamoTRIgine (LaMICtal) 150 MG tablet Take 1 tablet by mouth 2 (Two) Times a Day.    levothyroxine (SYNTHROID, LEVOTHROID) 88 MCG tablet Take 1 tablet by mouth Every Morning.    metoprolol succinate XL (Toprol XL) 25 MG 24 hr tablet Take 1.5 tablets by mouth Daily. For bp    pantoprazole (PROTONIX) 40 MG EC tablet Take 1 tablet by mouth 2 (Two) Times a Day.    pramipexole (MIRAPEX) 0.5 MG tablet Take 1 tablet by mouth Every Night for 360 days.    venlafaxine XR (EFFEXOR-XR) 150 MG 24 hr capsule Take 1 capsule by mouth Daily.    Vibegron 75 MG tablet Take 1 tablet by mouth Daily.    vitamin B-12 (CYANOCOBALAMIN) 1000 MCG tablet TAKE ONE TABLET BY MOUTH TWICE A DAY (Patient taking differently: HOLD PRIOR TO SURG)     No current facility-administered medications on file prior to visit.     Current Medications:  Scheduled Meds:  Continuous Infusions:No current facility-administered medications for this visit.    PRN Meds:.    I have reviewed the patient's medical history in detail and updated the computerized patient record.  Review and summarization of old records include:    Past Medical History:   Diagnosis Date    Abnormal Pap smear of cervix     Adrenal adenoma 09/21/2022    Anemia     Anxiety     Arthritis     Arthritis of neck     Bipolar I disorder, single manic episode     depressive d(NOS)    Cervical disc herniation     Coronary artery disease     COVID-19 01/10/2023    Death of family  member     MOTHER IN FEB 2024    Depression     NO SUICIDAL PLANS    Dislocation of finger     Diverticular disease     Dyspepsia     Encounter for follow-up examination after completed treatment for conditions other than malignant neoplasm 04/11/2018    GERD (gastroesophageal reflux disease)     Heart attack     AGE 39    Heart murmur     Hiatal hernia     History of transfusion     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    HPV positive on pap LGSIL    Hyperlipidemia     Hypertension     Hypothyroidism     Incontinence in female     wears pads    Kidney disease, chronic, stage III (GFR 30-59 ml/min)     Knee pain, bilateral     Knee swelling     LGSIL on Pap smear of cervix 04/29/2016    LGSIL HPV positive    Lumbosacral disc disease Unsure    Lower left back is partial osteoarthritis and partial osteoporosis    Obesity     Osteopenia 2021    Bone density test    Periodic limb movement disorder     Restless leg syndrome     LEFT SHOULDER    Rotator cuff syndrome     Sleep apnea     NO MACHINE CURRENTLY    Suicidal ideation 08/19/2016    history    Thyroid nodule     Vitamin B12 deficiency         Past Surgical History:   Procedure Laterality Date    ADENOIDECTOMY  1974    ANTERIOR CERVICAL DISCECTOMY W/ FUSION N/A 12/29/2016    Procedure: C3-4 anterior cervical discectomy and fusion with Depuy micro plate, ALLOGRAFT C3-4, AND HARDWARE REMOVAL C4-7.;  Surgeon: Hema Godwin MD;  Location: [facility] OR;  Service:     CARDIAC CATHETERIZATION N/A 03/30/2017    Procedure: Left Heart Cath;  Surgeon: Tracey Vargas MD;  Location: [facility] CATH INVASIVE LOCATION;  Service:     CARDIAC CATHETERIZATION N/A 03/30/2017    Procedure: Coronary angiography;  Surgeon: Tracey Vargas MD;  Location: [facility] CATH INVASIVE LOCATION;  Service:     CARDIAC CATHETERIZATION N/A 03/30/2017    Procedure: Left ventriculography;  Surgeon: Tracey Vargas MD;  Location: [facility] CATH INVASIVE LOCATION;  Service:     CARDIAC  CATHETERIZATION  03/30/2017    Procedure: Functional Flow [place];  Surgeon: Tracey Vargas MD;  Location: [facility] INVASIVE LOCATION;  Service:     CATARACT EXTRACTION      CERVICAL BIOPSY  MMXVI    Dr. Jeffery.     CERVICAL DISCECTOMY ANTERIOR  04/2013    C4-7    COLONOSCOPY  04/20/2015    Diverticulosis, IH    COLONOSCOPY  03/09/2021    COLPOSCOPY W/ BIOPSY / CURETTAGE  06/17/2016    LGSIL HPV positive. Results were normal repeat pap in one year. Chronic Cervicitis    CORONARY ANGIOPLASTY WITH STENT PLACEMENT      ENDOSCOPY  MMXV    Normal.  Dr. Rodriguez    ENDOSCOPY N/A 06/22/2017    Erythematous mucosa in the stomach  PATH: Chronic active gastritis, moderate with intestinal metaplasia     EPIDURAL BLOCK      INGUINAL HERNIA REPAIR      LAPAROSCOPIC GASTRIC BANDING  02/2018    SHOULDER SURGERY Left     RCR    TONSILLECTOMY AND ADENOIDECTOMY      TRANSVAGINAL TAPING SUSPENSION N/A 12/06/2017    Procedure: MID URETHRAL SLING CYSTSCOPY;  Surgeon: Abby Méndez MD;  Location: [facility] OR;  Service:     TRIGGER POINT INJECTION      TUBAL ABDOMINAL LIGATION      TYMPANOSTOMY TUBE PLACEMENT Right     UPPER GASTROINTESTINAL ENDOSCOPY  approx 2021    WRIST SURGERY Bilateral     carpal tunnel        Social History     Occupational History    Occupation: disabled   Tobacco Use    Smoking status: Light Smoker     Types: Electronic Cigarette     Passive exposure: Current    Smokeless tobacco: Current    Tobacco comments:     Electronic Cigarette   Vaping Use    Vaping Use: Every day    Substances: Nicotine, Flavoring    Devices: Refillable tank   Substance and Sexual Activity    Alcohol use: Not Currently     Comment: RARELY    Drug use: Never    Sexual activity: Not Currently     Partners: Male     Birth control/protection: Condom, Abstinence, Post-menopausal, None, Tubal ligation      Social History     Social History Narrative    Not on file        Family History   Problem Relation Age of Onset    Diabetes  type II Mother     Hypertension Mother     Osteoporosis Mother     Seizures Mother     Diabetes Mother     Arthritis Mother     Hyperlipidemia Mother     COPD Father     Hypertension Father     Lung cancer Father     Heart attack Father     Liver cancer Father     Liver disease Father     Heart disease Father     Cancer Father         Lung cacer that metastasized  into the liver.    Thyroid disease Sister     Hypertension Sister     Bipolar disorder Sister     Depression Sister     ADD / ADHD Sister     Anxiety disorder Sister     Mental illness Sister     Hyperlipidemia Sister     Broken bones Sister     No Known Problems Son     Abnormal EKG Daughter     Hypertension Daughter     Bipolar disorder Daughter     Thyroid disease Daughter     Mental illness Daughter     Hyperlipidemia Daughter     No Known Problems Paternal Grandfather     No Known Problems Paternal Grandmother     Cancer Maternal Grandmother     No Known Problems Maternal Grandfather     Thyroid disease Sister     Hypertension Sister     Bipolar disorder Sister     Anxiety disorder Sister     Depression Sister     Mental illness Sister     Hyperlipidemia Sister     Asthma Daughter     Diabetes Son         Type 1    Breast cancer Neg Hx     Ovarian cancer Neg Hx     Uterine cancer Neg Hx     Colon cancer Neg Hx     Malig Hyperthermia Neg Hx        ROS: 14 point review of systems was performed and was negative except for documented findings in HPI and today's encounter.       Constitutional:  Denies fever, shaking or chills   Eyes:  Denies change in visual acuity   HENT:  Denies nasal congestion or sore throat   Respiratory:  Denies cough or shortness of breath   Cardiovascular:  Denies chest pain or severe LE edema   GI:  Denies abdominal pain, nausea, vomiting, bloody stools or diarrhea   Musculoskeletal:  Denies numbness tingling or loss of motor function except as outlined above in history of present illness.  : Denies painful urination or  "hematuria  Integument:  Denies rash, lesion or ulceration   Neurologic:  Denies headache or focal weakness  Endocrine:  Denies lymphadenopathy  Psych:  Denies confusion or change in mental status   Hem:  Denies active bleeding    Physical Exam: 63 y.o. female  Wt Readings from Last 3 Encounters:   02/27/24 92.9 kg (204 lb 12.8 oz)   02/20/24 96.6 kg (212 lb 14.4 oz)   02/15/24 97.5 kg (215 lb)     Ht Readings from Last 3 Encounters:   02/27/24 160 cm (63\")   02/20/24 161.3 cm (63.5\")   02/15/24 160 cm (63\")     Body mass index is 36.28 kg/m².  Facility age limit for growth %nessa is 20 years.  Vitals:    02/27/24 1101   Temp: 97.3 °F (36.3 °C)       Vital signs reviewed.   General Appearance:    Alert, cooperative, in no acute distress                  Eyes: conjunctiva clear  ENT: external ears and nose atraumatic  CV: no peripheral edema  Resp: normal respiratory effort  Skin: no rashes or wounds; normal turgor  Psych: mood and affect appropriate  Lymph: no nodes appreciated  Neuro: gross sensation intact  Vascular:  Palpable peripheral pulse in noted extremity  Musculoskeletal Extremities: KNEE Exam: medial joint line tenderness with crepitation, synovitis, swelling, and joint effusion left knee.        Diagnostic Tests:  Lab Results   Component Value Date    WBC 6.64 02/20/2024    HGB 13.3 02/20/2024    HCT 39.4 02/20/2024    MCV 93.6 02/20/2024     02/20/2024     Lab Results   Component Value Date    GLUCOSE 116 (H) 02/20/2024    CALCIUM 8.7 02/20/2024     02/20/2024    K 4.0 02/20/2024    CO2 21.0 (L) 02/20/2024     (H) 02/20/2024    BUN 12 02/20/2024    CREATININE 0.87 02/20/2024    EGFRRESULT 72.9 02/15/2024    EGFR 75.9 02/20/2024    BCR 13.8 02/20/2024    ANIONGAP 12.0 02/20/2024       EKG:    Complete Transthoracic Echocardiogram with Complete Doppler and Color Flow   Interpretation Summary         Left ventricular systolic function is normal. Calculated left ventricular EF = 59.7%    " Left ventricular wall thickness is consistent with moderate concentric hypertrophy.    Left ventricular diastolic function was normal.    Estimated right ventricular systolic pressure from tricuspid regurgitation is normal (<35 mmHg).    ECG 12 Lead     Date/Time: 2/6/2024 1:55 PM  Performed by: David Burroughs MD     Authorized by: David Burroughs MD  Comparison: compared with previous ECG from 11/30/2023  Similar to previous ECG  Rhythm: sinus bradycardia    I have reviewed all the lab & EKG results.     Radiology:  Imaging was done previously by outside location, viewed images and discussed with the patient:       Assessment:  Patient Active Problem List    Diagnosis Date Noted    Splenic artery aneurysm 02/23/2024     Note Last Updated: 2/23/2024     12 mm noted CT low-dose chest lung cancer screening February 2024      Palpitations 01/04/2024    ASCUS with positive high risk HPV cervical 09/13/2023    Cervicalgia 01/06/2023    Adrenal adenoma 09/21/2022    Lipoma of upper extremity 06/28/2022     Note Last Updated: 6/28/2022     Likely very small 1.5 cm flat mobile smooth lipoma or other soft tissue nodule, with benign appearance left lateral mid forearm      Gastro-esophageal reflux disease with esophagitis, without bleeding 05/10/2021    Vitamin B12 deficiency     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 On the current exam, it measures 8 x 10 mm in greatest axial  dimensions as compared to 7 x 8 mm in greatest axial dimensions on a  prior study. Continued close imaging follow-up is suggested.     Again noted is a 2 to 3 mm hypoenhancing focus within the expected level  of the interface of the anterior and posterior pituitary that  presumably  represents a pars intermedia cyst. This is unchanged.      This report was finalized on 8/22/2019 10:43 AM by Dr. Rickey Hutchins M.D.           Meningioma 08/28/2019     Note Last Updated: 8/28/2019     IMPRESSION:     The previously identified dural based enhancing lesion arising from the  left aspect of falx cerebri abutting the superior aspect of the left  gyrus rectus probably representing a meningioma may be mildly increased  in size. On the current exam, it measures 8 x 10 mm in greatest axial  dimensions as compared to 7 x 8 mm in greatest axial dimensions on a  prior study. Continued close imaging follow-up is suggested.     Again noted is a 2 to 3 mm hypoenhancing focus within the expected level  of the interface of the anterior and posterior pituitary that presumably  represents a pars intermedia cyst. This is unchanged.      This report was finalized on 8/22/2019 10:43 AM by Dr. Rickey Hutchins M.D.           Osteopenia 04/29/2019     Note Last Updated: 8/28/2019        FINDINGS: Average lumbar bone mineral density 0.90 g/sq cm correlating  to a T value of -1.3 and a Z value of -0.2. Right femoral neck bone  mineral density 0.67 g/sq cm correlating to a T value of -1.6 and a Z  value of -0.5. Left total hip bone mineral density 0.86 g/sq cm  correlating to a T value of -0.7 and a Z score of 0.1.     CONCLUSION: Osteopenia right femoral neck and lumbar spine.     This report was finalized on 11/5/2018 3:31 PM by Dr. Titi Mccall M.D.      Vaginal atrophy 04/29/2019    Female dyspareunia 04/29/2019    Degenerative disc disease, lumbar 04/25/2019    Suicide attempt 04/23/2019     Note Last Updated: 3/23/2021     Description: (PL)      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 02/26/2019    Memory changes 02/26/2019    Periodic limb movement disorder 02/26/2019    Gait disturbance 02/26/2019    Family history of diabetes mellitus (DM) 10/08/2018    Menopause  10/08/2018    Incomplete emptying of bladder 07/05/2018    Bilateral chronic knee pain 06/15/2018    Arthritis of left knee 06/15/2018    Arthritis of right knee 06/15/2018    Fracture follow-up 04/11/2018    Nondisplaced fracture of base of fifth metacarpal bone, left hand, initial encounter for closed fracture 04/11/2018    Closed fracture of lower end of left radius with routine healing 04/11/2018    Multiple dislocations of fingers, open 04/11/2018    Pre-operative cardiovascular examination 04/11/2018    Syncope and collapse 04/01/2018    Closed fracture of left distal radius 04/01/2018    Closed displaced fracture of neck of right fifth metacarpal bone 04/01/2018    Chest pain 03/30/2018    Vomiting 03/13/2018    Inappropriate diet and eating habits 02/21/2018    H/O bladder repair surgery 01/29/2018     Note Last Updated: 1/29/2018     Bladder sleeve 2017      Other specified postprocedural states 01/29/2018     Note Last Updated: 3/23/2021     Bladder sleeve 2017      Acute urinary tract infection 01/09/2018    Bacterial vaginosis 12/15/2017    Postoperative retention of urine 12/12/2017    Primary osteoarthritis of both knees 12/01/2017    MORALES I (cervical intraepithelial neoplasia I) 11/22/2017    Urinary frequency 11/12/2017    Diabetes mellitus screening 11/12/2017    Cervical radiculopathy 10/29/2017    Tobacco abuse 10/29/2017     Note Last Updated: 4/26/2022     Vaping no cigarette, quit tobacco smoking 4 years ago      Obesity, morbid, BMI 40.0-49.9 10/29/2017    Encounter for screening for lung cancer 10/29/2017    Stage 1 chronic kidney disease 10/08/2017    Left wrist tendinitis 10/08/2017    Tendinitis of left wrist 10/08/2017    Primary osteoarthritis of right knee 10/02/2017    LGSIL on Pap smear of cervix 09/28/2017    Chronic gastritis without bleeding 09/27/2017    Urgency incontinence 09/22/2017    Nocturia 09/22/2017    Calcific Achilles tendinitis 07/26/2017    Rod's deformity  07/26/2017    Pain and swelling of knee 06/02/2017    Knee injury 06/02/2017    Stress fracture of calcaneus 04/19/2017    Vitamin D deficiency 04/19/2017    Plantar fasciitis 04/10/2017    Peroneal tendon injury 04/10/2017    Weight gain, abnormal 03/20/2017     Note Last Updated: 3/20/2017     Excess calories,, increased appetite Abilify      Allergic rhinitis 03/20/2017    Obesity (BMI 30-39.9) 03/20/2017    Chronic pain of both knees 02/16/2017    Arthritis of both knees 02/16/2017    Cervical spondylosis with radiculopathy 12/29/2016    Atherosclerotic heart disease of native coronary artery with other forms of angina pectoris 11/14/2016    ROCKY (obstructive sleep apnea)     Depression 08/19/2016    Degenerative disc disease, cervical 08/18/2016    Right knee pain 07/15/2016    Primary hypothyroidism 06/29/2016    Iron deficiency anemia 06/29/2016    Hyperlipidemia     Fatigue 05/06/2016    Gastroesophageal reflux disease 04/29/2016    Bipolar I disorder, single manic episode 04/29/2016     Note Last Updated: 4/29/2016     Description: depressive d (NOS)      Atherosclerosis of coronary artery 04/29/2016     Note Last Updated: 1/4/2024     Cardiac catheterization 3/30/2017.  Left main 0% stenosis, LAD 20% mid stenosis, distal LAD 30% stenosis.  [place]amus 0% stenosis, 80% stenosis of a small secondary branch.  Left circumflex 10% stenosis.  RCA 50-60% stenosis.  PDA 30% stenosis.      Essential hypertension 04/29/2016    Lightheadedness 04/29/2016    Low back pain 04/29/2016    Sciatica 04/29/2016    Thyroid nodule 04/29/2016     Note Last Updated: 4/29/2016     Description: 0.5 cm thyroid nodule, repeat thyroid ultrasound 1 year      Urinary urgency 03/25/2015    Vaginal discharge 03/25/2015    Anemia 02/04/2015     Note Last Updated: 3/23/2021     Description: (PL)      Anxiety 02/04/2015     Note Last Updated: 3/23/2021     Description: (PL)      Myocardial infarction 05/01/2000       Plan:  Dr. Tho Pritchard  reviewed anatomy of a total joint arthroplasty in laymen's terms, as well as typical postoperative recovery and possibly 6-12 months for maximal recovery, and possible need for rehabilitation stay after hospitalization. We also discussed risks, benefits, alternatives, and limitations of procedure with risks including but not limited to neurovascular damage, bleeding, infection, malalignment, chronic pian, failure of implants, osteolysis, loosening of implants, loss of motion, weakness, stiffness, instability, DVT, pulmonary embolus, death, stroke, complex regional pain syndrome, myocardial infarction, and need for additional procedures. Concept of substitution vs. replacement discussed.  No guarantees were given regarding results of surgery.      Keri Bhagat was given the opportunity to ask and have all questions answered today.  The patient voiced understanding of the risks, benefits, and alternative forms of treatment that were discussed and the patient consents to proceed with surgery.       Cleared by: Cardiology on 2/16/2024 Lluvia SHELLEY    Skin breakdown? WNL  Metal allergy? No  DVT Risk Factors: atrial fibrillation and significant cardiac history    DVT Prophylaxis:   Resume pre-operative Terrance Perez: has one at home  Consults: none  Additional orders: none  Pharmacy: meds to beds    Discharge Plan: POD #1 to home, home health, and when cleared by physical therapy as safe for discharge    To be updated    Date: 2/27/2024  DAPHNEY Choe    Dictated Utilizing Dragon Dictation

## 2024-03-01 ENCOUNTER — HOSPITAL ENCOUNTER (OUTPATIENT)
Dept: MRI IMAGING | Facility: HOSPITAL | Age: 64
Discharge: HOME | End: 2024-03-01
Payer: MEDICARE

## 2024-03-01 DIAGNOSIS — R90.89 ABNORMAL BRAIN MRI: ICD-10-CM

## 2024-03-01 DIAGNOSIS — D32.9 MENINGIOMA: ICD-10-CM

## 2024-03-04 ENCOUNTER — HOSPITAL ENCOUNTER (OUTPATIENT)
Dept: BONE DENSITY | Facility: HOSPITAL | Age: 64
Discharge: HOME | End: 2024-03-04
Admitting: NURSE PRACTITIONER
Payer: MEDICARE

## 2024-03-04 PROCEDURE — 77080 DXA BONE DENSITY AXIAL: CPT

## 2024-03-05 ENCOUNTER — HOSPITAL ENCOUNTER (OUTPATIENT)
Facility: HOSPITAL | Age: 64
Discharge: HOME | End: 2024-03-06
Attending: ORTHOPAEDIC SURGERY | Admitting: ORTHOPAEDIC SURGERY
Payer: MEDICARE

## 2024-03-05 ENCOUNTER — ANESTHESIA EVENT (OUTPATIENT)
Dept: PERIOP | Facility: HOSPITAL | Age: 64
End: 2024-03-05
Payer: MEDICARE

## 2024-03-05 ENCOUNTER — ANESTHESIA (OUTPATIENT)
Dept: PERIOP | Facility: HOSPITAL | Age: 64
End: 2024-03-05
Payer: MEDICARE

## 2024-03-05 ENCOUNTER — APPOINTMENT (OUTPATIENT)
Dept: GENERAL RADIOLOGY | Facility: HOSPITAL | Age: 64
End: 2024-03-05
Payer: MEDICARE

## 2024-03-05 DIAGNOSIS — Z96.652 S/P TKR (TOTAL KNEE REPLACEMENT), LEFT: Primary | ICD-10-CM

## 2024-03-05 DIAGNOSIS — M17.12 ARTHRITIS OF LEFT KNEE: ICD-10-CM

## 2024-03-05 LAB — GLUCOSE BLDC GLUCOMTR-MCNC: 83 MG/DL (ref 70–130)

## 2024-03-05 PROCEDURE — 25010000002 HYDROMORPHONE 1 MG/ML SOLUTION: Performed by: NURSE ANESTHETIST, CERTIFIED REGISTERED

## 2024-03-05 PROCEDURE — C1776 JOINT DEVICE (IMPLANTABLE): HCPCS | Performed by: ORTHOPAEDIC SURGERY

## 2024-03-05 PROCEDURE — 25810000003 LACTATED RINGERS PER 1000 ML: Performed by: ANESTHESIOLOGY

## 2024-03-05 PROCEDURE — 25010000002 ONDANSETRON PER 1 MG

## 2024-03-05 PROCEDURE — 27447 TOTAL KNEE ARTHROPLASTY: CPT | Performed by: SPECIALIST/TECHNOLOGIST, OTHER

## 2024-03-05 PROCEDURE — 25010000002 FENTANYL CITRATE (PF) 100 MCG/2ML SOLUTION

## 2024-03-05 PROCEDURE — C1713 ANCHOR/SCREW BN/BN,TIS/BN: HCPCS | Performed by: ORTHOPAEDIC SURGERY

## 2024-03-05 PROCEDURE — 20985 CPTR-ASST DIR MS PX: CPT | Performed by: SPECIALIST/TECHNOLOGIST, OTHER

## 2024-03-05 PROCEDURE — 25010000002 GLYCOPYRROLATE 0.2 MG/ML SOLUTION

## 2024-03-05 PROCEDURE — 25010000002 CEFAZOLIN IN DEXTROSE 2-4 GM/100ML-% SOLUTION: Performed by: ORTHOPAEDIC SURGERY

## 2024-03-05 PROCEDURE — 25810000003 LACTATED RINGERS PER 1000 ML: Performed by: NURSE PRACTITIONER

## 2024-03-05 PROCEDURE — 25010000002 ROPIVACAINE PER 1 MG: Performed by: ORTHOPAEDIC SURGERY

## 2024-03-05 PROCEDURE — 25010000002 CEFAZOLIN IN DEXTROSE 2-4 GM/100ML-% SOLUTION: Performed by: NURSE PRACTITIONER

## 2024-03-05 PROCEDURE — 25010000002 ESMOLOL 100 MG/10ML SOLUTION

## 2024-03-05 PROCEDURE — 82948 REAGENT STRIP/BLOOD GLUCOSE: CPT

## 2024-03-05 PROCEDURE — 25010000002 DEXAMETHASONE SODIUM PHOSPHATE 20 MG/5ML SOLUTION

## 2024-03-05 PROCEDURE — 25010000002 HYDRALAZINE PER 20 MG

## 2024-03-05 PROCEDURE — 20985 CPTR-ASST DIR MS PX: CPT | Performed by: ORTHOPAEDIC SURGERY

## 2024-03-05 PROCEDURE — 73560 X-RAY EXAM OF KNEE 1 OR 2: CPT

## 2024-03-05 PROCEDURE — 27447 TOTAL KNEE ARTHROPLASTY: CPT | Performed by: ORTHOPAEDIC SURGERY

## 2024-03-05 PROCEDURE — 25010000002 FENTANYL CITRATE (PF) 50 MCG/ML SOLUTION

## 2024-03-05 PROCEDURE — 25010000002 LABETALOL 5 MG/ML SOLUTION

## 2024-03-05 PROCEDURE — G0378 HOSPITAL OBSERVATION PER HR: HCPCS

## 2024-03-05 PROCEDURE — 25010000002 KETOROLAC TROMETHAMINE PER 15 MG: Performed by: ORTHOPAEDIC SURGERY

## 2024-03-05 PROCEDURE — 25010000002 CLONIDINE PER 1 MG: Performed by: ORTHOPAEDIC SURGERY

## 2024-03-05 PROCEDURE — 25010000002 EPINEPHRINE 1 MG/ML SOLUTION 30 ML VIAL: Performed by: ORTHOPAEDIC SURGERY

## 2024-03-05 PROCEDURE — 25010000002 MIDAZOLAM PER 1 MG: Performed by: ANESTHESIOLOGY

## 2024-03-05 PROCEDURE — 25010000002 PROPOFOL 200 MG/20ML EMULSION

## 2024-03-05 PROCEDURE — 25010000002 SUGAMMADEX 200 MG/2ML SOLUTION: Performed by: NURSE ANESTHETIST, CERTIFIED REGISTERED

## 2024-03-05 DEVICE — ATTUNE KNEE SYSTEM FEMORAL CRUCIATE RETAINING SIZE 5 LEFT CEMENTED
Type: IMPLANTABLE DEVICE | Site: KNEE | Status: FUNCTIONAL
Brand: ATTUNE

## 2024-03-05 DEVICE — IMPLANTABLE DEVICE: Type: IMPLANTABLE DEVICE | Status: FUNCTIONAL

## 2024-03-05 DEVICE — P.F.C. DRILL BIT AND STEINMAN PIN PACKET (1 UNIT) .125IN DIA 5IN LGTH
Type: IMPLANTABLE DEVICE | Site: KNEE | Status: FUNCTIONAL
Brand: P.F.C.

## 2024-03-05 DEVICE — ATTUNE KNEE SYSTEM TIBIAL INSERT FIXED BEARING MEDIAL STABILIZED LEFT AOX 5, 7MM
Type: IMPLANTABLE DEVICE | Site: KNEE | Status: FUNCTIONAL
Brand: ATTUNE

## 2024-03-05 DEVICE — ATTUNE KNEE SYSTEM TIBIAL BASE FIXED BEARING SIZE 5 CEMENTED
Type: IMPLANTABLE DEVICE | Site: KNEE | Status: FUNCTIONAL
Brand: ATTUNE

## 2024-03-05 DEVICE — DEV CONTRL TISS STRATAFIXSPIRALMNCRYL PLSPS2 REV3/0 45CM: Type: IMPLANTABLE DEVICE | Site: KNEE | Status: FUNCTIONAL

## 2024-03-05 DEVICE — SMARTSET HIGH PERFORMANCE MV MEDIUM VISCOSITY BONE CEMENT 40G
Type: IMPLANTABLE DEVICE | Site: KNEE | Status: FUNCTIONAL
Brand: SMARTSET

## 2024-03-05 DEVICE — ATTUNE PATELLA MEDIALIZED DOME 35MM CEMENTED AOX
Type: IMPLANTABLE DEVICE | Site: KNEE | Status: FUNCTIONAL
Brand: ATTUNE

## 2024-03-05 DEVICE — DEV CONTRL TISS STRATAFIX SYMM PDS PLUS VIL CT-1 60CM: Type: IMPLANTABLE DEVICE | Site: KNEE | Status: FUNCTIONAL

## 2024-03-05 RX ORDER — PRAMIPEXOLE DIHYDROCHLORIDE 0.5 MG/1
0.5 TABLET ORAL NIGHTLY
Status: DISCONTINUED | OUTPATIENT
Start: 2024-03-05 | End: 2024-03-06 | Stop reason: HOSPADM

## 2024-03-05 RX ORDER — MIDAZOLAM HYDROCHLORIDE 1 MG/ML
1 INJECTION INTRAMUSCULAR; INTRAVENOUS
Status: DISCONTINUED | OUTPATIENT
Start: 2024-03-05 | End: 2024-03-05 | Stop reason: HOSPADM

## 2024-03-05 RX ORDER — PANTOPRAZOLE SODIUM 40 MG/1
40 TABLET, DELAYED RELEASE ORAL 2 TIMES DAILY
Status: DISCONTINUED | OUTPATIENT
Start: 2024-03-05 | End: 2024-03-06 | Stop reason: HOSPADM

## 2024-03-05 RX ORDER — NALOXONE HCL 0.4 MG/ML
0.2 VIAL (ML) INJECTION AS NEEDED
Status: DISCONTINUED | OUTPATIENT
Start: 2024-03-05 | End: 2024-03-05 | Stop reason: HOSPADM

## 2024-03-05 RX ORDER — PROMETHAZINE HYDROCHLORIDE 25 MG/1
25 SUPPOSITORY RECTAL ONCE AS NEEDED
Status: DISCONTINUED | OUTPATIENT
Start: 2024-03-05 | End: 2024-03-05 | Stop reason: HOSPADM

## 2024-03-05 RX ORDER — BUPROPION HYDROCHLORIDE 300 MG/1
300 TABLET ORAL DAILY
Status: DISCONTINUED | OUTPATIENT
Start: 2024-03-05 | End: 2024-03-06 | Stop reason: HOSPADM

## 2024-03-05 RX ORDER — PROMETHAZINE HYDROCHLORIDE 25 MG/1
25 TABLET ORAL ONCE AS NEEDED
Status: DISCONTINUED | OUTPATIENT
Start: 2024-03-05 | End: 2024-03-05 | Stop reason: HOSPADM

## 2024-03-05 RX ORDER — EPHEDRINE SULFATE 50 MG/ML
5 INJECTION, SOLUTION INTRAVENOUS ONCE AS NEEDED
Status: DISCONTINUED | OUTPATIENT
Start: 2024-03-05 | End: 2024-03-05 | Stop reason: HOSPADM

## 2024-03-05 RX ORDER — FLUMAZENIL 0.1 MG/ML
0.2 INJECTION INTRAVENOUS AS NEEDED
Status: DISCONTINUED | OUTPATIENT
Start: 2024-03-05 | End: 2024-03-05 | Stop reason: HOSPADM

## 2024-03-05 RX ORDER — CEFAZOLIN SODIUM 2 G/100ML
2 INJECTION, SOLUTION INTRAVENOUS ONCE
Status: COMPLETED | OUTPATIENT
Start: 2024-03-05 | End: 2024-03-05

## 2024-03-05 RX ORDER — DROPERIDOL 2.5 MG/ML
0.62 INJECTION, SOLUTION INTRAMUSCULAR; INTRAVENOUS
Status: DISCONTINUED | OUTPATIENT
Start: 2024-03-05 | End: 2024-03-05 | Stop reason: HOSPADM

## 2024-03-05 RX ORDER — SODIUM CHLORIDE, SODIUM LACTATE, POTASSIUM CHLORIDE, CALCIUM CHLORIDE 600; 310; 30; 20 MG/100ML; MG/100ML; MG/100ML; MG/100ML
9 INJECTION, SOLUTION INTRAVENOUS CONTINUOUS
Status: DISCONTINUED | OUTPATIENT
Start: 2024-03-05 | End: 2024-03-05

## 2024-03-05 RX ORDER — SODIUM CHLORIDE, SODIUM LACTATE, POTASSIUM CHLORIDE, CALCIUM CHLORIDE 600; 310; 30; 20 MG/100ML; MG/100ML; MG/100ML; MG/100ML
100 INJECTION, SOLUTION INTRAVENOUS CONTINUOUS
Status: DISCONTINUED | OUTPATIENT
Start: 2024-03-05 | End: 2024-03-06 | Stop reason: HOSPADM

## 2024-03-05 RX ORDER — DOCUSATE SODIUM 100 MG/1
100 CAPSULE, LIQUID FILLED ORAL 2 TIMES DAILY
Status: DISCONTINUED | OUTPATIENT
Start: 2024-03-05 | End: 2024-03-06 | Stop reason: HOSPADM

## 2024-03-05 RX ORDER — FENTANYL CITRATE 50 UG/ML
INJECTION, SOLUTION INTRAMUSCULAR; INTRAVENOUS AS NEEDED
Status: DISCONTINUED | OUTPATIENT
Start: 2024-03-05 | End: 2024-03-05 | Stop reason: SURG

## 2024-03-05 RX ORDER — LIDOCAINE HYDROCHLORIDE 10 MG/ML
0.5 INJECTION, SOLUTION INFILTRATION; PERINEURAL ONCE AS NEEDED
Status: DISCONTINUED | OUTPATIENT
Start: 2024-03-05 | End: 2024-03-05 | Stop reason: HOSPADM

## 2024-03-05 RX ORDER — MAGNESIUM HYDROXIDE 1200 MG/15ML
LIQUID ORAL AS NEEDED
Status: DISCONTINUED | OUTPATIENT
Start: 2024-03-05 | End: 2024-03-05 | Stop reason: HOSPADM

## 2024-03-05 RX ORDER — HYDROMORPHONE HYDROCHLORIDE 1 MG/ML
0.5 INJECTION, SOLUTION INTRAMUSCULAR; INTRAVENOUS; SUBCUTANEOUS
Status: DISCONTINUED | OUTPATIENT
Start: 2024-03-05 | End: 2024-03-05 | Stop reason: HOSPADM

## 2024-03-05 RX ORDER — SODIUM CHLORIDE 0.9 % (FLUSH) 0.9 %
3 SYRINGE (ML) INJECTION EVERY 12 HOURS SCHEDULED
Status: DISCONTINUED | OUTPATIENT
Start: 2024-03-05 | End: 2024-03-05 | Stop reason: HOSPADM

## 2024-03-05 RX ORDER — SODIUM CHLORIDE 0.9 % (FLUSH) 0.9 %
3-10 SYRINGE (ML) INJECTION AS NEEDED
Status: DISCONTINUED | OUTPATIENT
Start: 2024-03-05 | End: 2024-03-05 | Stop reason: HOSPADM

## 2024-03-05 RX ORDER — HYDROCODONE BITARTRATE AND ACETAMINOPHEN 7.5; 325 MG/1; MG/1
1 TABLET ORAL EVERY 4 HOURS PRN
Status: DISCONTINUED | OUTPATIENT
Start: 2024-03-05 | End: 2024-03-06 | Stop reason: HOSPADM

## 2024-03-05 RX ORDER — ISOSORBIDE MONONITRATE 30 MG/1
30 TABLET, EXTENDED RELEASE ORAL DAILY
Status: DISCONTINUED | OUTPATIENT
Start: 2024-03-05 | End: 2024-03-06 | Stop reason: HOSPADM

## 2024-03-05 RX ORDER — ARIPIPRAZOLE 5 MG/1
20 TABLET ORAL DAILY
Status: DISCONTINUED | OUTPATIENT
Start: 2024-03-05 | End: 2024-03-06 | Stop reason: HOSPADM

## 2024-03-05 RX ORDER — GLYCOPYRROLATE 0.2 MG/ML
INJECTION INTRAMUSCULAR; INTRAVENOUS AS NEEDED
Status: DISCONTINUED | OUTPATIENT
Start: 2024-03-05 | End: 2024-03-05 | Stop reason: SURG

## 2024-03-05 RX ORDER — KETAMINE HCL IN NACL, ISO-OSM 100MG/10ML
SYRINGE (ML) INJECTION AS NEEDED
Status: DISCONTINUED | OUTPATIENT
Start: 2024-03-05 | End: 2024-03-05 | Stop reason: SURG

## 2024-03-05 RX ORDER — BISACODYL 10 MG
10 SUPPOSITORY, RECTAL RECTAL DAILY PRN
Status: DISCONTINUED | OUTPATIENT
Start: 2024-03-05 | End: 2024-03-06 | Stop reason: HOSPADM

## 2024-03-05 RX ORDER — POLYETHYLENE GLYCOL 3350 17 G/17G
17 POWDER, FOR SOLUTION ORAL DAILY
Status: DISCONTINUED | OUTPATIENT
Start: 2024-03-05 | End: 2024-03-06 | Stop reason: HOSPADM

## 2024-03-05 RX ORDER — DEXAMETHASONE SODIUM PHOSPHATE 4 MG/ML
INJECTION, SOLUTION INTRA-ARTICULAR; INTRALESIONAL; INTRAMUSCULAR; INTRAVENOUS; SOFT TISSUE AS NEEDED
Status: DISCONTINUED | OUTPATIENT
Start: 2024-03-05 | End: 2024-03-05 | Stop reason: SURG

## 2024-03-05 RX ORDER — LEVOTHYROXINE SODIUM 88 UG/1
88 TABLET ORAL EVERY MORNING
Status: DISCONTINUED | OUTPATIENT
Start: 2024-03-06 | End: 2024-03-06 | Stop reason: HOSPADM

## 2024-03-05 RX ORDER — VENLAFAXINE HYDROCHLORIDE 150 MG/1
150 CAPSULE, EXTENDED RELEASE ORAL DAILY
Status: DISCONTINUED | OUTPATIENT
Start: 2024-03-05 | End: 2024-03-06 | Stop reason: HOSPADM

## 2024-03-05 RX ORDER — HYDROMORPHONE HYDROCHLORIDE 1 MG/ML
0.5 INJECTION, SOLUTION INTRAMUSCULAR; INTRAVENOUS; SUBCUTANEOUS
Status: DISCONTINUED | OUTPATIENT
Start: 2024-03-05 | End: 2024-03-06 | Stop reason: HOSPADM

## 2024-03-05 RX ORDER — LABETALOL HYDROCHLORIDE 5 MG/ML
5 INJECTION, SOLUTION INTRAVENOUS
Status: DISCONTINUED | OUTPATIENT
Start: 2024-03-05 | End: 2024-03-05 | Stop reason: HOSPADM

## 2024-03-05 RX ORDER — ONDANSETRON 2 MG/ML
INJECTION INTRAMUSCULAR; INTRAVENOUS AS NEEDED
Status: DISCONTINUED | OUTPATIENT
Start: 2024-03-05 | End: 2024-03-05 | Stop reason: SURG

## 2024-03-05 RX ORDER — CEFAZOLIN SODIUM 2 G/100ML
2 INJECTION, SOLUTION INTRAVENOUS EVERY 8 HOURS
Qty: 200 ML | Refills: 0 | Status: COMPLETED | OUTPATIENT
Start: 2024-03-05 | End: 2024-03-06

## 2024-03-05 RX ORDER — LIDOCAINE HYDROCHLORIDE 20 MG/ML
INJECTION, SOLUTION EPIDURAL; INFILTRATION; INTRACAUDAL; PERINEURAL AS NEEDED
Status: DISCONTINUED | OUTPATIENT
Start: 2024-03-05 | End: 2024-03-05 | Stop reason: SURG

## 2024-03-05 RX ORDER — ROCURONIUM BROMIDE 10 MG/ML
INJECTION, SOLUTION INTRAVENOUS AS NEEDED
Status: DISCONTINUED | OUTPATIENT
Start: 2024-03-05 | End: 2024-03-05 | Stop reason: SURG

## 2024-03-05 RX ORDER — ATORVASTATIN CALCIUM 20 MG/1
40 TABLET, FILM COATED ORAL NIGHTLY
Status: DISCONTINUED | OUTPATIENT
Start: 2024-03-05 | End: 2024-03-06 | Stop reason: HOSPADM

## 2024-03-05 RX ORDER — ONDANSETRON 2 MG/ML
4 INJECTION INTRAMUSCULAR; INTRAVENOUS EVERY 6 HOURS PRN
Status: DISCONTINUED | OUTPATIENT
Start: 2024-03-05 | End: 2024-03-06 | Stop reason: HOSPADM

## 2024-03-05 RX ORDER — OXYCODONE AND ACETAMINOPHEN 7.5; 325 MG/1; MG/1
1 TABLET ORAL EVERY 4 HOURS PRN
Status: DISCONTINUED | OUTPATIENT
Start: 2024-03-05 | End: 2024-03-05 | Stop reason: HOSPADM

## 2024-03-05 RX ORDER — GABAPENTIN 300 MG/1
300 CAPSULE ORAL 3 TIMES DAILY
Status: DISCONTINUED | OUTPATIENT
Start: 2024-03-05 | End: 2024-03-06 | Stop reason: HOSPADM

## 2024-03-05 RX ORDER — ESMOLOL HYDROCHLORIDE 10 MG/ML
INJECTION INTRAVENOUS AS NEEDED
Status: DISCONTINUED | OUTPATIENT
Start: 2024-03-05 | End: 2024-03-05 | Stop reason: SURG

## 2024-03-05 RX ORDER — HYDROCODONE BITARTRATE AND ACETAMINOPHEN 5; 325 MG/1; MG/1
1 TABLET ORAL ONCE AS NEEDED
Status: DISCONTINUED | OUTPATIENT
Start: 2024-03-05 | End: 2024-03-05 | Stop reason: HOSPADM

## 2024-03-05 RX ORDER — ACETAMINOPHEN 500 MG
1000 TABLET ORAL ONCE
Status: COMPLETED | OUTPATIENT
Start: 2024-03-05 | End: 2024-03-05

## 2024-03-05 RX ORDER — NALOXONE HCL 0.4 MG/ML
0.1 VIAL (ML) INJECTION
Status: DISCONTINUED | OUTPATIENT
Start: 2024-03-05 | End: 2024-03-06 | Stop reason: HOSPADM

## 2024-03-05 RX ORDER — METOPROLOL TARTRATE 1 MG/ML
INJECTION, SOLUTION INTRAVENOUS AS NEEDED
Status: DISCONTINUED | OUTPATIENT
Start: 2024-03-05 | End: 2024-03-05 | Stop reason: SURG

## 2024-03-05 RX ORDER — DIPHENHYDRAMINE HYDROCHLORIDE 50 MG/ML
12.5 INJECTION INTRAMUSCULAR; INTRAVENOUS
Status: DISCONTINUED | OUTPATIENT
Start: 2024-03-05 | End: 2024-03-05 | Stop reason: HOSPADM

## 2024-03-05 RX ORDER — PREGABALIN 75 MG/1
75 CAPSULE ORAL ONCE
Status: COMPLETED | OUTPATIENT
Start: 2024-03-05 | End: 2024-03-05

## 2024-03-05 RX ORDER — IPRATROPIUM BROMIDE AND ALBUTEROL SULFATE 2.5; .5 MG/3ML; MG/3ML
3 SOLUTION RESPIRATORY (INHALATION) ONCE AS NEEDED
Status: DISCONTINUED | OUTPATIENT
Start: 2024-03-05 | End: 2024-03-05 | Stop reason: HOSPADM

## 2024-03-05 RX ORDER — MELOXICAM 15 MG/1
15 TABLET ORAL ONCE
Status: COMPLETED | OUTPATIENT
Start: 2024-03-05 | End: 2024-03-05

## 2024-03-05 RX ORDER — TRANEXAMIC ACID 100 MG/ML
INJECTION, SOLUTION INTRAVENOUS AS NEEDED
Status: DISCONTINUED | OUTPATIENT
Start: 2024-03-05 | End: 2024-03-05 | Stop reason: SURG

## 2024-03-05 RX ORDER — METOPROLOL SUCCINATE 25 MG/1
37.5 TABLET, EXTENDED RELEASE ORAL DAILY
Status: DISCONTINUED | OUTPATIENT
Start: 2024-03-05 | End: 2024-03-06 | Stop reason: HOSPADM

## 2024-03-05 RX ORDER — HYDROCODONE BITARTRATE AND ACETAMINOPHEN 7.5; 325 MG/1; MG/1
2 TABLET ORAL EVERY 4 HOURS PRN
Status: DISCONTINUED | OUTPATIENT
Start: 2024-03-05 | End: 2024-03-06 | Stop reason: HOSPADM

## 2024-03-05 RX ORDER — HYDRALAZINE HYDROCHLORIDE 20 MG/ML
5 INJECTION INTRAMUSCULAR; INTRAVENOUS
Status: DISCONTINUED | OUTPATIENT
Start: 2024-03-05 | End: 2024-03-05 | Stop reason: HOSPADM

## 2024-03-05 RX ORDER — FENTANYL CITRATE 50 UG/ML
50 INJECTION, SOLUTION INTRAMUSCULAR; INTRAVENOUS
Status: DISCONTINUED | OUTPATIENT
Start: 2024-03-05 | End: 2024-03-05 | Stop reason: HOSPADM

## 2024-03-05 RX ORDER — PROPOFOL 10 MG/ML
INJECTION, EMULSION INTRAVENOUS AS NEEDED
Status: DISCONTINUED | OUTPATIENT
Start: 2024-03-05 | End: 2024-03-05 | Stop reason: SURG

## 2024-03-05 RX ORDER — ONDANSETRON 2 MG/ML
4 INJECTION INTRAMUSCULAR; INTRAVENOUS ONCE AS NEEDED
Status: DISCONTINUED | OUTPATIENT
Start: 2024-03-05 | End: 2024-03-05 | Stop reason: HOSPADM

## 2024-03-05 RX ORDER — ACETAMINOPHEN 325 MG/1
325 TABLET ORAL EVERY 4 HOURS PRN
Status: DISCONTINUED | OUTPATIENT
Start: 2024-03-05 | End: 2024-03-06 | Stop reason: HOSPADM

## 2024-03-05 RX ORDER — BISACODYL 5 MG/1
10 TABLET, DELAYED RELEASE ORAL DAILY PRN
Status: DISCONTINUED | OUTPATIENT
Start: 2024-03-05 | End: 2024-03-06 | Stop reason: HOSPADM

## 2024-03-05 RX ORDER — ONDANSETRON 4 MG/1
4 TABLET, ORALLY DISINTEGRATING ORAL EVERY 6 HOURS PRN
Status: DISCONTINUED | OUTPATIENT
Start: 2024-03-05 | End: 2024-03-06 | Stop reason: HOSPADM

## 2024-03-05 RX ADMIN — Medication 40 MG: at 13:59

## 2024-03-05 RX ADMIN — SODIUM CHLORIDE, POTASSIUM CHLORIDE, SODIUM LACTATE AND CALCIUM CHLORIDE 9 ML/HR: 600; 310; 30; 20 INJECTION, SOLUTION INTRAVENOUS at 10:18

## 2024-03-05 RX ADMIN — PRAMIPEXOLE DIHYDROCHLORIDE 0.5 MG: 0.5 TABLET ORAL at 20:05

## 2024-03-05 RX ADMIN — TRANEXAMIC ACID 1000 MG: 100 INJECTION INTRAVENOUS at 13:40

## 2024-03-05 RX ADMIN — FENTANYL CITRATE 50 MCG: 50 INJECTION, SOLUTION INTRAMUSCULAR; INTRAVENOUS at 13:59

## 2024-03-05 RX ADMIN — SUGAMMADEX 200 MG: 100 INJECTION, SOLUTION INTRAVENOUS at 15:07

## 2024-03-05 RX ADMIN — FENTANYL CITRATE 50 MCG: 50 INJECTION, SOLUTION INTRAMUSCULAR; INTRAVENOUS at 16:52

## 2024-03-05 RX ADMIN — ONDANSETRON 4 MG: 2 INJECTION INTRAMUSCULAR; INTRAVENOUS at 13:38

## 2024-03-05 RX ADMIN — SODIUM CHLORIDE, POTASSIUM CHLORIDE, SODIUM LACTATE AND CALCIUM CHLORIDE 100 ML/HR: 600; 310; 30; 20 INJECTION, SOLUTION INTRAVENOUS at 18:26

## 2024-03-05 RX ADMIN — MIDAZOLAM 1 MG: 1 INJECTION INTRAMUSCULAR; INTRAVENOUS at 10:26

## 2024-03-05 RX ADMIN — HYDROMORPHONE HYDROCHLORIDE 0.5 MG: 1 INJECTION, SOLUTION INTRAMUSCULAR; INTRAVENOUS; SUBCUTANEOUS at 15:09

## 2024-03-05 RX ADMIN — HYDRALAZINE HYDROCHLORIDE 5 MG: 20 INJECTION INTRAMUSCULAR; INTRAVENOUS at 15:33

## 2024-03-05 RX ADMIN — PROPOFOL 50 MG: 10 INJECTION, EMULSION INTRAVENOUS at 13:58

## 2024-03-05 RX ADMIN — GLYCOPYRROLATE 0.2 MG: 0.2 INJECTION INTRAMUSCULAR; INTRAVENOUS at 13:43

## 2024-03-05 RX ADMIN — ROCURONIUM BROMIDE 50 MG: 10 INJECTION, SOLUTION INTRAVENOUS at 13:32

## 2024-03-05 RX ADMIN — DEXAMETHASONE SODIUM PHOSPHATE 8 MG: 4 INJECTION, SOLUTION INTRAMUSCULAR; INTRAVENOUS at 13:38

## 2024-03-05 RX ADMIN — HYDRALAZINE HYDROCHLORIDE 5 MG: 20 INJECTION INTRAMUSCULAR; INTRAVENOUS at 15:40

## 2024-03-05 RX ADMIN — METOPROLOL TARTRATE 2.5 MG: 1 INJECTION, SOLUTION INTRAVENOUS at 14:09

## 2024-03-05 RX ADMIN — Medication 10 MG: at 14:18

## 2024-03-05 RX ADMIN — GABAPENTIN 300 MG: 300 CAPSULE ORAL at 20:06

## 2024-03-05 RX ADMIN — HYDROCODONE BITARTRATE AND ACETAMINOPHEN 1 TABLET: 7.5; 325 TABLET ORAL at 20:05

## 2024-03-05 RX ADMIN — POLYETHYLENE GLYCOL 3350 17 G: 17 POWDER, FOR SOLUTION ORAL at 18:26

## 2024-03-05 RX ADMIN — ATORVASTATIN CALCIUM 40 MG: 20 TABLET, FILM COATED ORAL at 20:06

## 2024-03-05 RX ADMIN — PANTOPRAZOLE SODIUM 40 MG: 40 TABLET, DELAYED RELEASE ORAL at 20:06

## 2024-03-05 RX ADMIN — SODIUM CHLORIDE, POTASSIUM CHLORIDE, SODIUM LACTATE AND CALCIUM CHLORIDE: 600; 310; 30; 20 INJECTION, SOLUTION INTRAVENOUS at 13:46

## 2024-03-05 RX ADMIN — METOPROLOL SUCCINATE 37.5 MG: 25 TABLET, EXTENDED RELEASE ORAL at 17:57

## 2024-03-05 RX ADMIN — ACETAMINOPHEN 1000 MG: 500 TABLET ORAL at 10:18

## 2024-03-05 RX ADMIN — LAMOTRIGINE 150 MG: 100 TABLET ORAL at 20:05

## 2024-03-05 RX ADMIN — FENTANYL CITRATE 50 MCG: 50 INJECTION, SOLUTION INTRAMUSCULAR; INTRAVENOUS at 13:55

## 2024-03-05 RX ADMIN — ESMOLOL HYDROCHLORIDE 20 MG: 10 INJECTION, SOLUTION INTRAVENOUS at 14:00

## 2024-03-05 RX ADMIN — PROPOFOL 50 MG: 10 INJECTION, EMULSION INTRAVENOUS at 14:34

## 2024-03-05 RX ADMIN — CEFAZOLIN SODIUM 2 G: 2 INJECTION, SOLUTION INTRAVENOUS at 16:24

## 2024-03-05 RX ADMIN — PREGABALIN 75 MG: 75 CAPSULE ORAL at 10:18

## 2024-03-05 RX ADMIN — LABETALOL HYDROCHLORIDE 5 MG: 5 INJECTION, SOLUTION INTRAVENOUS at 16:39

## 2024-03-05 RX ADMIN — CEFAZOLIN SODIUM 2 G: 2 INJECTION, SOLUTION INTRAVENOUS at 13:20

## 2024-03-05 RX ADMIN — MELOXICAM 15 MG: 15 TABLET ORAL at 10:18

## 2024-03-05 RX ADMIN — PROPOFOL 180 MG: 10 INJECTION, EMULSION INTRAVENOUS at 13:31

## 2024-03-05 RX ADMIN — LIDOCAINE HYDROCHLORIDE 100 MG: 20 INJECTION, SOLUTION EPIDURAL; INFILTRATION; INTRACAUDAL; PERINEURAL at 13:31

## 2024-03-05 RX ADMIN — DOCUSATE SODIUM 100 MG: 100 CAPSULE, LIQUID FILLED ORAL at 20:06

## 2024-03-05 RX ADMIN — METOPROLOL TARTRATE 2.5 MG: 1 INJECTION, SOLUTION INTRAVENOUS at 14:05

## 2024-03-05 NOTE — NURSING NOTE
Spoke with LUNA GOLDEN about patient heart rate of 120.Patient denies shortness of air, chest pain. Dr Yates said ok to send patient to [facility] as long as home dose of Toprol XL be given upon arrival to [facility].    Spoke with Chip, Pharmacist. I released medication order while speaking with Chip. She stated she would tube to medication to [facility].

## 2024-03-05 NOTE — PLAN OF CARE
Goal Outcome Evaluation:      A/ox4, assist x1, due to void by 9:15pm, LTK      Problem: Adult Inpatient Plan of Care  Goal: Absence of Hospital-Acquired Illness or Injury  Intervention: Identify and Manage Fall Risk  Recent Flowsheet Documentation  Taken 3/5/2024 1817 by Sabina Guerra RN  Safety Promotion/Fall Prevention:   assistive device/personal items within reach   safety round/check completed  Taken 3/5/2024 1744 by Sabina Guerra RN  Safety Promotion/Fall Prevention:   assistive device/personal items within reach   activity supervised   clutter free environment maintained   fall prevention program maintained   gait belt   lighting adjusted   mobility aid in reach   nonskid shoes/slippers when out of bed   safety round/check completed  Intervention: Prevent Skin Injury  Recent Flowsheet Documentation  Taken 3/5/2024 1817 by Sabina Guerra RN  Body Position:   neutral body alignment   sitting up in bed  Taken 3/5/2024 1744 by Sabina Guerra RN  Body Position:   position changed independently   neutral body alignment   supine, legs elevated  Skin Protection:   adhesive use limited   incontinence pads utilized   protective footwear used  Intervention: Prevent and Manage VTE (Venous Thromboembolism) Risk  Recent Flowsheet Documentation  Taken 3/5/2024 1744 by Sabina Guerra RN  Activity Management:   ambulated in room   back to bed   dorsiflexion/plantar flexion performed   sitting, edge of bed   standing at bedside   activity encouraged  VTE Prevention/Management:   bilateral   foot pump device on  Range of Motion: active ROM (range of motion) encouraged  Goal: Optimal Comfort and Wellbeing  Intervention: Monitor Pain and Promote Comfort  Recent Flowsheet Documentation  Taken 3/5/2024 1744 by Sabina Guerra RN  Pain Management Interventions: cold applied  Intervention: Provide Person-Centered Care  Recent Flowsheet Documentation  Taken 3/5/2024 1744 by Sabina Guerra RN  Trust Relationship/Rapport:   care explained    thoughts/feelings acknowledged  Goal: Readiness for Transition of Care  Intervention: Mutually Develop Transition Plan  Recent Flowsheet Documentation  Taken 3/5/2024 1756 by Sabina Guerra RN  Transportation Anticipated: family or friend will provide  Patient/Family Anticipated Services at Transition: none  Patient/Family Anticipates Transition to: home with family     Problem: Hypertension Comorbidity  Goal: Blood Pressure in Desired Range  Intervention: Maintain Blood Pressure Management  Recent Flowsheet Documentation  Taken 3/5/2024 1817 by Sabina Guerra RN  Medication Review/Management: medications reviewed  Taken 3/5/2024 1744 by Sabina Guerra RN  Medication Review/Management: medications reviewed     Problem: Pain Acute  Goal: Acceptable Pain Control and Functional Ability  Intervention: Prevent or Manage Pain  Recent Flowsheet Documentation  Taken 3/5/2024 1817 by Sabina Guerra RN  Medication Review/Management: medications reviewed  Taken 3/5/2024 1744 by Sabina Guerra RN  Medication Review/Management: medications reviewed  Intervention: Develop Pain Management Plan  Recent Flowsheet Documentation  Taken 3/5/2024 1744 by Sabina Guerra RN  Pain Management Interventions: cold applied     Problem: Fall Injury Risk  Goal: Absence of Fall and Fall-Related Injury  Intervention: Identify and Manage Contributors  Recent Flowsheet Documentation  Taken 3/5/2024 1817 by Sabina Guerra RN  Medication Review/Management: medications reviewed  Taken 3/5/2024 1744 by Sabina Guerra RN  Medication Review/Management: medications reviewed  Intervention: Promote Injury-Free Environment  Recent Flowsheet Documentation  Taken 3/5/2024 1817 by Sabina Guerra RN  Safety Promotion/Fall Prevention:   assistive device/personal items within reach   safety round/check completed  Taken 3/5/2024 1744 by Sabina Guerra RN  Safety Promotion/Fall Prevention:   assistive device/personal items within reach   activity supervised   clutter free environment  maintained   fall prevention program maintained   gait belt   lighting adjusted   mobility aid in reach   nonskid shoes/slippers when out of bed   safety round/check completed     Problem: Functional Ability Impaired (Knee Arthroplasty)  Goal: Optimal Functional Ability  Intervention: Promote Optimal Functional Status  Recent Flowsheet Documentation  Taken 3/5/2024 1744 by Sabina Guerra RN  Activity Management:   ambulated in room   back to bed   dorsiflexion/plantar flexion performed   sitting, edge of bed   standing at bedside   activity encouraged  Assistive Device Utilized:   gait belt   walker     Problem: Ongoing Anesthesia Effects (Knee Arthroplasty)  Goal: Anesthesia/Sedation Recovery  Intervention: Optimize Anesthesia Recovery  Recent Flowsheet Documentation  Taken 3/5/2024 1817 by Sabina Guerra RN  Safety Promotion/Fall Prevention:   assistive device/personal items within reach   safety round/check completed  Administration (IS): self-administered  Taken 3/5/2024 1744 by Sabina Guerra RN  Safety Promotion/Fall Prevention:   assistive device/personal items within reach   activity supervised   clutter free environment maintained   fall prevention program maintained   gait belt   lighting adjusted   mobility aid in reach   nonskid shoes/slippers when out of bed   safety round/check completed  Administration (IS):   instruction provided, initial   proper technique demonstrated   self-administered     Problem: Pain (Knee Arthroplasty)  Goal: Acceptable Pain Control  Intervention: Prevent or Manage Pain  Recent Flowsheet Documentation  Taken 3/5/2024 1744 by Sabina Guerra RN  Pain Management Interventions: cold applied     Problem: Respiratory Compromise (Knee Arthroplasty)  Goal: Effective Oxygenation and Ventilation  Intervention: Optimize Oxygenation and Ventilation  Recent Flowsheet Documentation  Taken 3/5/2024 1817 by Sabina Guerra RN  Administration (IS): self-administered  Head of Bed (HOB) Positioning: HOB  elevated  Taken 3/5/2024 1744 by Sabina Guerra, RN  Administration (IS):   instruction provided, initial   proper technique demonstrated   self-administered  Head of Bed (HOB) Positioning: HOB elevated

## 2024-03-05 NOTE — OP NOTE
Operative Note    Name: Keri Bhagat  YOB: 1960  MRN: [id number]  BMI: There is no height or weight on file to calculate BMI.    DATE OF SURGERY: 3/5/2024    PREOPERATIVE DIAGNOSIS: left knee end-stage osteoarthritis    POSTOPERATIVE DIAGNOSIS: left knee end-stage osteoarthritis    PROCEDURE PERFORMED: left total knee replacement with Conchis navigation    SURGICAL APPROACH: Knee Mid-Vastus     SURGEON: Dr. Tho Pritchard    ASSISTANT: CARMEN Fernando    IMPLANTS: DepuyAttune    Estimated Blood Loss: 100 mL  Specimens : none  Complications: none  22 Modifier:  none    DESCRIPTION OF PROCEDURE: The patient was taken to the operating room and placed in the supine position. Preoperative antibiotics were administered. Surgical time out was performed. After adequate induction of anesthesia, the leg was prepped and draped in the usual sterile fashion. The leg was exsanguinated with an Esmarch bandage and the tourniquet inflated to 250 mmHg. A midline incision was performed followed by a medial parapatellar arthrotomy. The patella was subluxed laterally.  A portion of the fat pad, ACL, and anterior horns of the meniscus were excised.  The [employer] navigation device was affixed and navigated. The distal cut was made. The femur was then sized with a sizing guide. The femoral cutting block was placed and all femoral cuts were performed. The proximal tibia was exposed. Conchis navigation protocol was accomplished.  9 millimeters were removed from the distal femur.  We used the extramedullary tibial cutting guide set for removal of 3 mm of bone off the low side. The tibial cut was performed. The posterior horns of the menisci were excised. The posterior osteophytes were removed. Flexion extension blocks were then used to balance the knee. The tibial cut surface was then sized with the sizing templates and the tibial and femoral trial were then placed. The tray was aligned with the middle third of the  tubercle.    Attention was then placed to the patella. The patella was balanced to track centrally through range of motion.  It measured 24 and patella resurfacing was performed.   At this point all trial components were removed, the knee was copiously irrigated with pulsed lavage, and the knee was injected with anesthetic cocktail solution. The cut surfaces were then dried with clean lap sponges, and the components were cemented, first the tibia, then insertion of the polyethylene spacer, followed by femur. The patella was placed unless patelloplasty was chosen. The knee was held in full extension and all excess cement was removed. The cement was allowed to harden.   The knee was then copiously irrigated in standard fashion. The tourniquet was then released. There was excellent hemostasis. We closed the knee in multiple layers in standard fashion.  A sterile dressing was applied. At the end of the case, the sponge and needle counts were reported as being correct. There were no known complications. The patient was then transported to the recovery room.    The surgical assistant performed retraction, suction, hemostasis, and specific limb positioning and manipulation required for accuracy of joint placement, and assistance in wound closure and dressing application.       Tho Pritchard M.D.

## 2024-03-05 NOTE — ANESTHESIA PROCEDURE NOTES
Airway  Urgency: elective    Date/Time: 3/5/2024 1:36 PM  Airway not difficult    General Information and Staff    Patient location during procedure: OR  CRNA/CAA: Oscar Kc CRNA    Indications and Patient Condition  Indications for airway management: airway protection    Preoxygenated: yes  Mask difficulty assessment: 2 - vent by mask + OA or adjuvant +/- NMBA    Final Airway Details  Final airway type: endotracheal airway      Successful airway: ETT  Cuffed: yes   Successful intubation technique: direct laryngoscopy  Endotracheal tube insertion site: oral  Blade: Nagi  Blade size: 3  ETT size (mm): 7.0  Cormack-Lehane Classification: grade IIa - partial view of glottis  Placement verified by: chest auscultation and capnometry   Measured from: gums  ETT/EBT to gums (cm): 21  Number of attempts at approach: 2  Assessment: lips, teeth, and gum same as pre-op and atraumatic intubation

## 2024-03-05 NOTE — ANESTHESIA PREPROCEDURE EVALUATION
Anesthesia Evaluation     Patient summary reviewed and Nursing notes reviewed   NPO Solid Status: > 6 hours  NPO Liquid Status: > 2 hours           Airway   Mallampati: II  TM distance: >3 FB  Neck ROM: full  Comment: S/p C-spine surgery but nl ROM  Dental          Pulmonary    (+) a smoker Current,sleep apnea  (-) COPD, asthma  Cardiovascular   Exercise tolerance: good (4-7 METS)    ECG reviewed    (+) hypertension, valvular problems/murmurs murmur, past MI  >12 months, CAD, cardiac stents more than 12 months ago , dysrhythmias Paroxysmal Atrial Fib, hyperlipidemia  (-) angina    ROS comment: Recent TTE: nl LV/RV fxn, no significant valve issues    Negative stress 2021    [facility] 2017 nonobstructive CAD and patent LAD stent w/20% stenosis    Denies CP, Miguel    Neuro/Psych  (+) psychiatric history Anxiety, Depression and Bipolar  (-) seizures, CVA  GI/Hepatic/Renal/Endo    (+) obesity, hiatal hernia, GERD well controlled, renal disease- CRI, thyroid problem hypothyroidism  (-) liver disease, diabetes    Musculoskeletal     (+) neck pain  Abdominal    Substance History      OB/GYN          Other   arthritis,                 Anesthesia Plan    ASA 3     general       Anesthetic plan, risks, benefits, and alternatives have been provided, discussed and informed consent has been obtained with: patient.    CODE STATUS:

## 2024-03-05 NOTE — ANESTHESIA POSTPROCEDURE EVALUATION
Patient: Keri Bhagat    Procedure Summary       Date: 03/05/24 Room / Location:  [facility] /  [facility]    Anesthesia Start: 1323 Anesthesia Stop: 1519    Procedure: LEFT TOTAL KNEE ARTHROPLASTY WITH TAB NAVIGATION (Left: Knee) Diagnosis:       Arthritis of left knee      (Arthritis of left knee [M17.12])    Surgeons: Tho Pritchard MD Provider: Nader Yates MD    Anesthesia Type: general ASA Status: 3            Anesthesia Type: general    Vitals  Vitals Value Taken Time   /88 03/05/24 1615   Temp 36.9 °C (98.4 °F) 03/05/24 1516   Pulse 120 03/05/24 1615   Resp 20 03/05/24 1600   SpO2 94 % 03/05/24 1615   Vitals shown include unfiled device data.        Post Anesthesia Care and Evaluation    Patient location during evaluation: bedside  Patient participation: complete - patient participated  Level of consciousness: awake and alert  Pain management: adequate    Airway patency: patent  Anesthetic complications: No anesthetic complications  PONV Status: controlled  Cardiovascular status: blood pressure returned to baseline and acceptable  Respiratory status: acceptable  Hydration status: acceptable

## 2024-03-06 ENCOUNTER — DOCUMENTATION (OUTPATIENT)
Dept: HOME HEALTH SERVICES | Facility: HOME HEALTHCARE | Age: 64
End: 2024-03-06
Payer: MEDICARE

## 2024-03-06 ENCOUNTER — READMISSION MANAGEMENT (OUTPATIENT)
Dept: CALL CENTER | Facility: HOSPITAL | Age: 64
End: 2024-03-06
Payer: MEDICARE

## 2024-03-06 ENCOUNTER — HOME HEALTH ADMISSION (OUTPATIENT)
Dept: HOME HEALTH SERVICES | Facility: HOME HEALTHCARE | Age: 64
End: 2024-03-06
Payer: COMMERCIAL

## 2024-03-06 VITALS
HEIGHT: 63 IN | BODY MASS INDEX: 36.14 KG/M2 | OXYGEN SATURATION: 95 % | HEART RATE: 69 BPM | WEIGHT: 204 LBS | RESPIRATION RATE: 16 BRPM | TEMPERATURE: 98.3 F | SYSTOLIC BLOOD PRESSURE: 110 MMHG | DIASTOLIC BLOOD PRESSURE: 72 MMHG

## 2024-03-06 LAB
HCT VFR BLD AUTO: 35.4 % (ref 34–46.6)
HGB BLD-MCNC: 11.6 G/DL (ref 12–15.9)

## 2024-03-06 PROCEDURE — 85018 HEMOGLOBIN: CPT | Performed by: NURSE PRACTITIONER

## 2024-03-06 PROCEDURE — 25010000002 HYDROMORPHONE PER 4 MG: Performed by: NURSE PRACTITIONER

## 2024-03-06 PROCEDURE — 85014 HEMATOCRIT: CPT | Performed by: NURSE PRACTITIONER

## 2024-03-06 PROCEDURE — 99024 POSTOP FOLLOW-UP VISIT: CPT | Performed by: NURSE PRACTITIONER

## 2024-03-06 PROCEDURE — 97116 GAIT TRAINING THERAPY: CPT

## 2024-03-06 PROCEDURE — 25810000003 LACTATED RINGERS PER 1000 ML: Performed by: NURSE PRACTITIONER

## 2024-03-06 PROCEDURE — G0378 HOSPITAL OBSERVATION PER HR: HCPCS

## 2024-03-06 PROCEDURE — 97162 PT EVAL MOD COMPLEX 30 MIN: CPT

## 2024-03-06 PROCEDURE — 97530 THERAPEUTIC ACTIVITIES: CPT

## 2024-03-06 PROCEDURE — 25010000002 CEFAZOLIN IN DEXTROSE 2-4 GM/100ML-% SOLUTION: Performed by: NURSE PRACTITIONER

## 2024-03-06 RX ORDER — ONDANSETRON 4 MG/1
4 TABLET, ORALLY DISINTEGRATING ORAL EVERY 6 HOURS PRN
Qty: 10 TABLET | Refills: 0 | Status: SHIPPED | OUTPATIENT
Start: 2024-03-06

## 2024-03-06 RX ORDER — PSEUDOEPHEDRINE HCL 30 MG
100 TABLET ORAL 2 TIMES DAILY
Qty: 60 CAPSULE | Refills: 0 | Status: SHIPPED | OUTPATIENT
Start: 2024-03-06

## 2024-03-06 RX ORDER — POLYETHYLENE GLYCOL 3350 17 G/17G
17 POWDER, FOR SOLUTION ORAL DAILY
Qty: 238 G | Refills: 0 | Status: SHIPPED | OUTPATIENT
Start: 2024-03-07 | End: 2024-03-20

## 2024-03-06 RX ORDER — HYDROCODONE BITARTRATE AND ACETAMINOPHEN 7.5; 325 MG/1; MG/1
1 TABLET ORAL EVERY 6 HOURS PRN
Qty: 28 TABLET | Refills: 0 | Status: SHIPPED | OUTPATIENT
Start: 2024-03-06 | End: 2024-03-12 | Stop reason: SDUPTHER

## 2024-03-06 RX ADMIN — HYDROCODONE BITARTRATE AND ACETAMINOPHEN 2 TABLET: 7.5; 325 TABLET ORAL at 09:52

## 2024-03-06 RX ADMIN — CEFAZOLIN SODIUM 2 G: 2 INJECTION, SOLUTION INTRAVENOUS at 01:05

## 2024-03-06 RX ADMIN — HYDROMORPHONE HYDROCHLORIDE 0.5 MG: 1 INJECTION, SOLUTION INTRAMUSCULAR; INTRAVENOUS; SUBCUTANEOUS at 08:15

## 2024-03-06 RX ADMIN — SODIUM CHLORIDE, POTASSIUM CHLORIDE, SODIUM LACTATE AND CALCIUM CHLORIDE 100 ML/HR: 600; 310; 30; 20 INJECTION, SOLUTION INTRAVENOUS at 05:55

## 2024-03-06 RX ADMIN — GABAPENTIN 300 MG: 300 CAPSULE ORAL at 08:07

## 2024-03-06 RX ADMIN — ISOSORBIDE MONONITRATE 30 MG: 30 TABLET, EXTENDED RELEASE ORAL at 08:09

## 2024-03-06 RX ADMIN — LEVOTHYROXINE SODIUM 88 MCG: 88 TABLET ORAL at 08:07

## 2024-03-06 RX ADMIN — VENLAFAXINE HYDROCHLORIDE 150 MG: 150 CAPSULE, EXTENDED RELEASE ORAL at 08:09

## 2024-03-06 RX ADMIN — HYDROCODONE BITARTRATE AND ACETAMINOPHEN 2 TABLET: 7.5; 325 TABLET ORAL at 05:55

## 2024-03-06 RX ADMIN — PANTOPRAZOLE SODIUM 40 MG: 40 TABLET, DELAYED RELEASE ORAL at 08:07

## 2024-03-06 RX ADMIN — METOPROLOL SUCCINATE 37.5 MG: 25 TABLET, EXTENDED RELEASE ORAL at 08:07

## 2024-03-06 RX ADMIN — ARIPIPRAZOLE 20 MG: 5 TABLET ORAL at 08:08

## 2024-03-06 RX ADMIN — DOCUSATE SODIUM 100 MG: 100 CAPSULE, LIQUID FILLED ORAL at 08:07

## 2024-03-06 RX ADMIN — BUPROPION HYDROCHLORIDE 300 MG: 300 TABLET, EXTENDED RELEASE ORAL at 08:08

## 2024-03-06 RX ADMIN — APIXABAN 5 MG: 5 TABLET, FILM COATED ORAL at 08:07

## 2024-03-06 RX ADMIN — LAMOTRIGINE 150 MG: 100 TABLET ORAL at 08:07

## 2024-03-06 NOTE — PROGRESS NOTES
[facility] given referral per [facility] from Dr Pritchard for home PT and patient is agreeable.  Spoke with patient and verified all info.  Patient's cell is best.  Call Dtr Karla second if needed as Dtr lives with patient.  D/Cing today.

## 2024-03-06 NOTE — THERAPY EVALUATION
Goals to be met by: 20    Patient will increase functional independence with mobility by performin. Supine to sit with Contact Guard Assistance  2. Sit to supine with Contact Guard Assistance  3. Bed to chair transfer with Minimal/Contact Guard Assistancewith or without rolling walker using Stand Pivot TECHNIQUE  4. Gait  x ~50  feet with Minimal Assistance with or without rolling walker  5. Lower extremity exercise program x10 reps per handout, with assistance as needed   Patient Name: Keri Bhagat  : 1960    MRN: [id number]                              Today's Date: 3/6/2024       Admit Date: 3/5/2024    Visit Dx:     ICD-10-CM ICD-9-CM   1. S/P TKR (total knee replacement), left  Z96.652 V43.65   2. Arthritis of left knee  M17.12 716.96     Patient Active Problem List   Diagnosis    Gastroesophageal reflux disease    Bipolar I disorder, single manic episode    Atherosclerosis of coronary artery    Essential hypertension    Lightheadedness    Low back pain    Sciatica    Thyroid nodule    Fatigue    Hyperlipidemia    Primary hypothyroidism    Iron deficiency anemia    Right knee pain    Degenerative disc disease, cervical    Depression    ROCKY (obstructive sleep apnea)    Atherosclerotic heart disease of native coronary artery with other forms of angina pectoris    Cervical spondylosis with radiculopathy    Chronic pain of both knees    Arthritis of both knees    Weight gain, abnormal    Allergic rhinitis    Obesity (BMI 30-39.9)    Plantar fasciitis    Peroneal tendon injury    Stress fracture of calcaneus    Vitamin D deficiency    Pain and swelling of knee    Knee injury    Calcific Achilles tendinitis    Rod's deformity    Urgency incontinence    Nocturia    Chronic gastritis without bleeding    LGSIL on Pap smear of cervix    Primary osteoarthritis of right knee    Stage 1 chronic kidney disease    Left wrist tendinitis    Cervical radiculopathy    Tobacco abuse    Obesity, morbid, BMI 40.0-49.9    Encounter for screening for lung cancer    Urinary frequency    Diabetes mellitus screening    MORALES I (cervical intraepithelial neoplasia I)    Primary osteoarthritis of both knees    H/O bladder repair surgery    Chest pain    Syncope and collapse    Closed fracture of left distal radius    Closed displaced fracture of neck of right fifth metacarpal bone    Fracture follow-up    Nondisplaced fracture of base of fifth metacarpal bone, left hand, initial encounter for  closed fracture    Closed fracture of lower end of left radius with routine healing    Multiple dislocations of fingers, open    Bilateral chronic knee pain    Arthritis of left knee    Arthritis of right knee    Family history of diabetes mellitus (DM)    Menopause    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    Memory changes    Periodic limb movement disorder    Gait disturbance    Degenerative disc disease, lumbar    Osteopenia    Vaginal atrophy    Female dyspareunia    Pituitary cyst    Meningioma    Vitamin B12 deficiency    Acute urinary tract infection    Anemia    Bacterial vaginosis    Anxiety    Inappropriate diet and eating habits    Incomplete emptying of bladder    Myocardial infarction    Other specified postprocedural states    Postoperative retention of urine    Suicide attempt    Urinary urgency    Vaginal discharge    Vomiting    Gastro-esophageal reflux disease with esophagitis, without bleeding    Pre-operative cardiovascular examination    Tendinitis of left wrist    Lipoma of upper extremity    Adrenal adenoma    Cervicalgia    ASCUS with positive high risk HPV cervical    Palpitations    Splenic artery aneurysm     Past Medical History:   Diagnosis Date    Abnormal Pap smear of cervix     Adrenal adenoma 09/21/2022    Anemia     Anxiety     Arthritis     Arthritis of neck     Bipolar I disorder, single manic episode     depressive d(NOS)    Cervical disc herniation     Coronary artery disease     COVID-19 01/10/2023    Death of family member     MOTHER IN FEB 2024    Depression     NO SUICIDAL PLANS    Dislocation of finger     Diverticular disease     Dyspepsia     Encounter for follow-up examination after completed treatment for conditions other than malignant neoplasm 04/11/2018    GERD (gastroesophageal reflux disease)     Heart attack     AGE 39    Heart murmur     Hiatal hernia     History of transfusion     2001    HPV (human papilloma virus) infection  04/29/2016    HPV positive on pap LGSIL    Hyperlipidemia     Hypertension     Hypothyroidism     Incontinence in female     wears pads    Kidney disease, chronic, stage III (GFR 30-59 ml/min)     Knee pain, bilateral     Knee swelling     LGSIL on Pap smear of cervix 04/29/2016    LGSIL HPV positive    Lumbosacral disc disease Unsure    Lower left back is partial osteoarthritis and partial osteoporosis    Obesity     Osteopenia 2021    Bone density test    Periodic limb movement disorder     Restless leg syndrome     LEFT SHOULDER    Rotator cuff syndrome     Sleep apnea     NO MACHINE CURRENTLY    Suicidal ideation 08/19/2016    history    Thyroid nodule     Vitamin B12 deficiency      Past Surgical History:   Procedure Laterality Date    ADENOIDECTOMY  1974    ANTERIOR CERVICAL DISCECTOMY W/ FUSION N/A 12/29/2016    Procedure: C3-4 anterior cervical discectomy and fusion with Depuy micro plate, ALLOGRAFT C3-4, AND HARDWARE REMOVAL C4-7.;  Surgeon: Hema Godwin MD;  Location: [facility] MAIN OR;  Service:     CARDIAC CATHETERIZATION N/A 03/30/2017    Procedure: Left Heart Cath;  Surgeon: Tracey Vargas MD;  Location:  [facility] CATH INVASIVE [facility]ATION;  Service:     CARDIAC CATHETERIZATION N/A 03/30/2017    Procedure: Coronary angiography;  Surgeon: Tracey Vargas MD;  Location:  [facility];  Service:     CARDIAC CATHETERIZATION N/A 03/30/2017    Procedure: Left ventriculography;  Surgeon: Tracey Vargas MD;  Location:  [facility];  Service:     CARDIAC CATHETERIZATION  03/30/2017    Procedure: Functional Flow [place];  Surgeon: Tracey Vargas MD;  Location:  [facility];  Service:     CATARACT EXTRACTION Bilateral     CERVICAL BIOPSY  MMXVI    Dr. Jeffery.     CERVICAL DISCECTOMY ANTERIOR  04/2013    C4-7    COLONOSCOPY  04/20/2015    Diverticulosis, IH    COLONOSCOPY  03/09/2021    COLPOSCOPY W/ BIOPSY / CURETTAGE  06/17/2016    LGSIL HPV positive. Results were  normal repeat pap in one year. Chronic Cervicitis    CORONARY ANGIOPLASTY WITH STENT PLACEMENT      ENDOSCOPY  MMXV    Normal.  Dr. Rodriguez    ENDOSCOPY N/A 06/22/2017    Erythematous mucosa in the stomach  PATH: Chronic active gastritis, moderate with intestinal metaplasia     EPIDURAL BLOCK      INGUINAL HERNIA REPAIR      LAPAROSCOPIC GASTRIC BANDING  02/2018    SHOULDER SURGERY Left     RCR    TONSILLECTOMY AND ADENOIDECTOMY      TOTAL KNEE ARTHROPLASTY Left 3/5/2024    Procedure: LEFT TOTAL KNEE ARTHROPLASTY WITH TAB NAVIGATION;  Surgeon: Tho Pritchard MD;  Location: [facility];  Service: Orthopedics;  Laterality: Left;    TRANSVAGINAL TAPING SUSPENSION N/A 12/06/2017    Procedure: MID URETHRAL SLING CYSTSCOPY;  Surgeon: Abby Méndez MD;  Location: [facility];  Service:     TRIGGER POINT INJECTION      TUBAL ABDOMINAL LIGATION      TYMPANOSTOMY TUBE PLACEMENT Right     UPPER GASTROINTESTINAL ENDOSCOPY  approx 2021    WRIST SURGERY Bilateral     carpal tunnel      General Information       Row Name 03/06/24 1115          Physical Therapy Time and Intention    Document Type evaluation  -MG     Mode of Treatment individual therapy;physical therapy  -MG       Row Name 03/06/24 1115          General Information    Patient Profile Reviewed yes  -MG     Prior Level of Function independent:  No AD at BL. Owns a RW.  -MG     Existing Precautions/Restrictions fall  -MG     Barriers to Rehab none identified  -MG       Row Name 03/06/24 1115          Living Environment    People in Home child(krista), adult  Daughter. States she is there all the time.  -MG       Row Name 03/06/24 1115          Home Main Entrance    Number of Stairs, Main Entrance one  -MG     Stair Railings, Main Entrance none  -MG       Row Name 03/06/24 1115          Stairs Within Home, Primary    Number of Stairs, Within Home, Primary none  -MG       Row Name 03/06/24 1115          Cognition    Orientation Status (Cognition)  oriented x 4  -MG       Row Name 03/06/24 1115          Safety Issues, Functional Mobility    Impairments Affecting Function (Mobility) range of motion (ROM);endurance/activity tolerance;strength;pain  -MG     Comment, Safety Issues/Impairments (Mobility) Gait belt and non-skid socks donned.  -MG               User Key  (r) = Recorded By, (t) = Taken By, (c) = Cosigned By      Initials Name Provider Type    MG Ruba Morillo, PT Physical Therapist                   Mobility       Row Name 03/06/24 1116          Bed Mobility    Bed Mobility supine-sit  -MG     Supine-Sit Nicholas (Bed Mobility) standby assist;verbal cues  -MG     Assistive Device (Bed Mobility) head of bed elevated  -MG     Comment, (Bed Mobility) No dizziness on sitting.  -MG       Row Name 03/06/24 1116          Transfers    Comment, (Transfers) Tolieting tasks w/ SV. Independent for pericare.  -MG       Row Name 03/06/24 1116          Sit-Stand Transfer    Sit-Stand Nicholas (Transfers) standby assist;modified independence  -MG     Assistive Device (Sit-Stand Transfers) walker, front-wheeled  -MG     Comment, (Sit-Stand Transfer) x1 EOB, x1 straight back chair, x1 commode. Progressed to Mod-I.  -MG       Row Name 03/06/24 1116          Gait/Stairs (Locomotion)    Nicholas Level (Gait) standby assist;modified independence  -MG     Assistive Device (Gait) walker, front-wheeled  -MG     Distance in Feet (Gait) 60  x2  -MG     Deviations/Abnormal Patterns (Gait) antalgic;gait speed decreased;stride length decreased  -MG     Bilateral Gait Deviations forward flexed posture  -MG     Left Sided Gait Deviations weight shift ability decreased;decreased knee extension  -MG     Nicholas Level (Stairs) contact guard;stand by assist;verbal cues  -MG     Handrail Location (Stairs) both sides  -MG     Number of Steps (Stairs) 1  -MG     Ascending Technique (Stairs) step-to-step  -MG     Descending Technique (Stairs) step-to-step  -MG     Comment,  (Gait/Stairs) Pt amb in hallway and room demoing typical post-op TKA gait mechanics w/ progression to step-through mechanics with continuous RW advancement. Pt ascended/descended 1 step with BHR leading with her RLE. No c/o dizziness or SOB and no noted knee buckling or LOB during the session. Pt had good recall and carryover of education on stair safety/navigation.  -       Row Name 03/06/24 1116          Mobility    Extremity Weight-bearing Status left lower extremity  -MG     Left Lower Extremity (Weight-bearing Status) weight-bearing as tolerated (WBAT)  -               User Key  (r) = Recorded By, (t) = Taken By, (c) = Cosigned By      Initials Name Provider Type     Ruba Morillo, PT Physical Therapist                   Obj/Interventions       Row Name 03/06/24 1119          Range of Motion Comprehensive    General Range of Motion lower extremity range of motion deficits identified  -MG     Comment, General Range of Motion RLE, L ankle/hip WFL. L knee 3-85  -       Row Name 03/06/24 1119          Strength Comprehensive (MMT)    General Manual Muscle Testing (MMT) Assessment lower extremity strength deficits identified  -MG     Comment, General Manual Muscle Testing (MMT) Assessment Generalized weakness. Grossly 4+/5. Limited by L knee pain.  -       Row Name 03/06/24 1119          Motor Skills    Therapeutic Exercise other (see comments)  TKA protocol x10. Educated to perform 10-15 reps and holding quad sets and heel slide up to 10s; TID until seen by  PT.  -       Row Name 03/06/24 1119          Balance    Balance Assessment sitting static balance;sitting dynamic balance;standing static balance;standing dynamic balance  -     Static Sitting Balance supervision  -     Dynamic Sitting Balance supervision  -MG     Position, Sitting Balance unsupported;sitting edge of bed  commode  -     Static Standing Balance standby assist;modified independence  -     Dynamic Standing Balance standby  assist;modified independence  -MG     Position/Device Used, Standing Balance supported;walker, front-wheeled  -MG     Comment, Balance No knee buckling or LOB. SBA/Mod-I for standing balance w/ RW performing tolieting tasks and washing her hands at the sink.  -MG       Row Name 03/06/24 1119          Sensory Assessment (Somatosensory)    Sensory Assessment (Somatosensory) sensation intact  Denies N/T at baseline. L knee currently numb.  -MG               User Key  (r) = Recorded By, (t) = Taken By, (c) = Cosigned By      Initials Name Provider Type    MG Ruba Morillo, PT Physical Therapist                   Goals/Plan       Row Name 03/06/24 1127          Bed Mobility Goal 1 (PT)    Activity/Assistive Device (Bed Mobility Goal 1, PT) sit to supine/supine to sit  -MG     [place] Level/Cues Needed (Bed Mobility Goal 1, PT) modified independence  -MG     Time Frame (Bed Mobility Goal 1, PT) long term goal (LTG);3 days  -MG       Row Name 03/06/24 1127          Transfer Goal 1 (PT)    Activity/Assistive Device (Transfer Goal 1, PT) sit-to-stand/stand-to-sit  -MG     [place] Level/Cues Needed (Transfer Goal 1, PT) modified independence  -MG     Time Frame (Transfer Goal 1, PT) long term goal (LTG);3 days  -MG     Progress/Outcome (Transfer Goal 1, PT) goal met  -MG       Row Name 03/06/24 1127          Gait Training Goal 1 (PT)    Activity/Assistive Device (Gait Training Goal 1, PT) gait (walking locomotion)  -MG     [place] Level (Gait Training Goal 1, PT) modified independence  -MG     Distance (Gait Training Goal 1, PT) 120  -MG     Time Frame (Gait Training Goal 1, PT) long term goal (LTG);3 days  -MG       Row Name 03/06/24 1127          ROM Goal 1 (PT)    ROM Goal 1 (PT) Surgical Knee ROM: 0-85  -MG     Time Frame (ROM Goal 1, PT) long-term goal (LTG);3 days  -MG       Row Name 03/06/24 1127          Therapy Assessment/Plan (PT)    Planned Therapy Interventions (PT) balance training;bed mobility  training;gait training;home exercise program;ROM (range of motion);patient/family education;stair training;strengthening;stretching;transfer training  -MG               User Key  (r) = Recorded By, (t) = Taken By, (c) = Cosigned By      Initials Name Provider Type    MG Ruba Morillo, PT Physical Therapist                   Clinical Impression       Row Name 03/06/24 1121          Pain    Pretreatment Pain Rating 6/10  -MG     Posttreatment Pain Rating 8/10  -MG     Pain Location - Side/Orientation Left  -MG     Pain Location incisional  -MG     Pain Location - knee  -MG     Pain Intervention(s) Medication (See MAR);Ambulation/increased activity;Cold applied;Repositioned;Rest  -MG       Row Name 03/06/24 1121          Plan of Care Review    Plan of Care Reviewed With patient  -MG     Progress improving  -MG     Outcome Evaluation Pt is a 62 y/o F POD1 L TKA. Pt reports being independent at baseline without use of AD, owns a RW and lives in a house with 1 ARPIT with her adult daughter. Today, pt is SBA for bed mobility, SBA progressing to Mod-I for STS to RW and SBA progressing to Mod-I with RW for ambulation of 60' x2. Pt ascended/descended 1 step with CG/SBA, bilateral HR and leading with her RLE. No knee buckling, LOB, SOB or dizziness during mobility. Prior to mobility pt performed TKA protocol ther-ex for 10 reps demoing L knee flex/ext of 3-85deg. Pt performed tolieting tasks w/ SV and independence for pericare. Pt reports no concerns for return home, has good recall and carryover of all education provided on safety with RW, HEP, safety and sequencing of stair navigation and energy conservation. Pt does present below her baseline with typical post-op TKA decreased strength, pain with mobility, impaired gait mechanics and decreased endurance, thus will benefit from skilled PT. Rec home w/ family assist and HH PT. SBAR w/ RN following session.  -MG       Row Name 03/06/24 1121          Therapy Assessment/Plan (PT)     Rehab Potential (PT) good, to achieve stated therapy goals  -MG     Criteria for Skilled Interventions Met (PT) yes;meets criteria  -MG     Therapy Frequency (PT) daily  -MG       Row Name 03/06/24 1121          Vital Signs    Pretreatment Heart Rate (beats/min) 112  -MG     Posttreatment Heart Rate (beats/min) 78  -MG     Pre SpO2 (%) 92  -MG     O2 Delivery Pre Treatment room air  -MG     O2 Delivery Intra Treatment room air  -MG     Post SpO2 (%) 96  -MG     O2 Delivery Post Treatment room air  -MG     Pre Patient Position Supine  -MG     Intra Patient Position Standing  -MG     Post Patient Position Sitting  -MG       Row Name 03/06/24 1121          Positioning and Restraints    Pre-Treatment Position in bed  -MG     Post Treatment Position chair  -MG     In Chair notified nsg;reclined;call light within reach;encouraged to call for assist;exit alarm on;legs elevated  -MG               User Key  (r) = Recorded By, (t) = Taken By, (c) = Cosigned By      Initials Name Provider Type    Ruba Saleem PT Physical Therapist                   Outcome Measures       Row Name 03/06/24 1128          How much help from another person do you currently need...    Turning from your back to your side while in flat bed without using bedrails? 4  -MG     Moving from lying on back to sitting on the side of a flat bed without bedrails? 4  -MG     Moving to and from a bed to a chair (including a wheelchair)? 4  -MG     Standing up from a chair using your arms (e.g., wheelchair, bedside chair)? 4  -MG     Climbing 3-5 steps with a railing? 3  -MG     To walk in hospital room? 4  -MG     AM-PAC 6 Clicks Score (PT) 23  -MG     Highest Level of Mobility Goal 7 --> Walk 25 feet or more  -MG               User Key  (r) = Recorded By, (t) = Taken By, (c) = Cosigned By      Initials Name Provider Type    Ruba Saleem PT Physical Therapist                                 Physical Therapy Education       Title: PT OT SLP  Therapies (Resolved)       Topic: Physical Therapy (Resolved)       Point: Mobility training (Resolved)       Learning Progress Summary             Patient Acceptance, E,TB,D, VU,DU by MG at 3/6/2024 1128                         Point: Home exercise program (Resolved)       Learning Progress Summary             Patient Acceptance, E,TB,D, VU,DU by MG at 3/6/2024 1128                         Point: Body mechanics (Resolved)       Learning Progress Summary             Patient Acceptance, E,TB,D, VU,DU by MG at 3/6/2024 1128                         Point: Precautions (Resolved)       Learning Progress Summary             Patient Acceptance, E,TB,D, VU,DU by MG at 3/6/2024 1128                                         User Key       Initials Effective Dates Name Provider Type Discipline     05/24/22 -  Ruba Morillo, PT Physical Therapist PT                  PT Recommendation and Plan  Planned Therapy Interventions (PT): balance training, bed mobility training, gait training, home exercise program, ROM (range of motion), patient/family education, stair training, strengthening, stretching, transfer training  Plan of Care Reviewed With: patient  Progress: improving  Outcome Evaluation: Pt is a 64 y/o F POD1 L TKA. Pt reports being independent at baseline without use of AD, owns a RW and lives in a house with 1 ARPIT with her adult daughter. Today, pt is SBA for bed mobility, SBA progressing to Mod-I for STS to RW and SBA progressing to Mod-I with RW for ambulation of 60' x2. Pt ascended/descended 1 step with CG/SBA, bilateral HR and leading with her RLE. No knee buckling, LOB, SOB or dizziness during mobility. Prior to mobility pt performed TKA protocol ther-ex for 10 reps demoing L knee flex/ext of 3-85deg. Pt performed tolieting tasks w/ SV and independence for pericare. Pt reports no concerns for return home, has good recall and carryover of all education provided on safety with RW, HEP, safety and sequencing of stair  navigation and energy conservation. Pt does present below her baseline with typical post-op TKA decreased strength, pain with mobility, impaired gait mechanics and decreased endurance, thus will benefit from skilled PT. Rec home w/ family assist and HH PT. ZOËAR w/ RN following session.     Time Calculation:         PT Charges       Row Name 03/06/24 1128             Time Calculation    Start Time 0857  -MG      Stop Time 0933  -MG      Time Calculation (min) 36 min  -MG      PT Received On 03/06/24  -MG      PT - Next Appointment 03/07/24  -MG      PT Goal Re-Cert Due Date 03/23/24  -MG         Time Calculation- PT    Total Timed Code Minutes- PT 24 minute(s)  -MG                User Key  (r) = Recorded By, (t) = Taken By, (c) = Cosigned By      Initials Name Provider Type    MG Ruba Morillo, PT Physical Therapist                  Therapy Charges for Today       Code Description Service Date Service Provider Modifiers Qty    88856697461 HC PT EVAL MOD COMPLEXITY 2 3/6/2024 Ruba Morillo, PT GP 1    91031263021 HC GAIT TRAINING EA 15 MIN 3/6/2024 Ruba Morillo, PT GP 1    99659855928 HC PT THERAPEUTIC ACT EA 15 MIN 3/6/2024 Ruba Morillo, PT GP 1            PT G-Codes  AM-PAC 6 Clicks Score (PT): 23  PT Discharge Summary  Anticipated Discharge Disposition (PT): home with home health, home with assist    Ruba Morillo PT  3/6/2024

## 2024-03-06 NOTE — DISCHARGE PLACEMENT REQUEST
"Keri Bhagat (63 y.o. Female)       Date of Birth   1960    Social Security Number       Address   [address] [place], [place] [address]    Home Phone   [phone]    MRN   [id number]       [facility]    Marital Status                               Admission Date   3/5/24    Admission Type   Elective    Admitting Provider   Tho Pritchard MD    Attending Provider   Tho Pritchard MD    Department, Room/Bed   [address], P793/1       Discharge Date       Discharge Disposition   Home or Self Care    Discharge Destination                                 Attending Provider: Tho Pritchard MD    Allergies: No Known Allergies    Isolation: None   Infection: None   Code Status: CPR    Ht: 160 cm (63\")   Wt: 92.5 kg (204 lb)    Admission Cmt: None   Principal Problem: Arthritis of left knee [M17.12]                   Active Insurance as of 3/5/2024       Primary Coverage       Payor Plan Insurance Group Employer/Plan Group    AETNA MEDICARE REPLACEMENT AETNA MEDICARE REPLACEMENT [id number]       Payor Plan Address Payor Plan Phone Number Payor Plan Fax Number Effective Dates    [address] [phone]  3/1/2021 - None Entered    [place] [address]         Subscriber Name Subscriber Birth Date Member ID       KERI BHAGAT 1960 [id number]                     Emergency Contacts        (Rel.) Home Phone Work Phone Mobile Phone    Ariella Hill (Sister) [phone] -- [phone]    Annel Murphy (Sister) [phone] -- [phone]    Karla Wood (Daughter) [phone] -- [phone]          "

## 2024-03-06 NOTE — OUTREACH NOTE
Prep Survey      Flowsheet Row Responses   [facility] patient discharged from? [facility]   Is LACE score < 7 ? Yes   Eligibility [facility]   Date of Admission 03/05/24   Date of Discharge 03/06/24   Discharge Disposition Home-Health Care [facility]   Discharge diagnosis left knee end-stage osteoarthritis,    LEFT TOTAL KNEE ARTHROPLASTY   Does the patient have one of the following disease processes/diagnoses(primary or secondary)? Total Joint Replacement   Does the patient have Home health ordered? Yes   What is the Home health agency?  [facility]   Is there a DME ordered? No   Prep survey completed? Yes            Kinga ALONSO - Registered Nurse

## 2024-03-06 NOTE — DISCHARGE SUMMARY
Orthopedic Discharge Summary      Patient: Keri Bhagat  YOB: 1960  Medical Record Number: [id number]    Attending Physician: Tho Pritchard MD  Consulting Physician(s):   Consults       No orders found for last 30 day(s).            Date of Admission: 3/5/2024  9:30 AM  Date of Discharge: 3/6/2024    Discharge Diagnosis: SD ARTHRP KNE CONDYLE&PLATU MEDIAL&LAT COMPARTMENTS [33386] (LEFT TOTAL KNEE ARTHROPLASTY WITH TAB NAVIGATION),   Acute Blood Loss Anemia, stable  Post-operative Pain  Limited mobility, requires use of walker and assistance when OOB.    Presenting Problem/History of Present Illness: Arthritis of left knee [M17.12]    Allergies: No Known Allergies    Discharge Medications       Discharge Medications        New Medications        Instructions Start Date   docusate sodium 100 MG capsule   100 mg, Oral, 2 Times Daily      HYDROcodone-acetaminophen 7.5-325 MG per tablet  Commonly known as: NORCO   1 tablet, Oral, Every 6 Hours PRN      ondansetron ODT 4 MG disintegrating tablet  Commonly known as: ZOFRAN-ODT   4 mg, Translingual, Every 6 Hours PRN      polyethylene glycol 17 g packet  Commonly known as: MIRALAX   17 g, Oral, Daily   Start Date: March 7, 2024            Continue These Medications        Instructions Start Date   apixaban 5 MG tablet tablet  Commonly known as: ELIQUIS   5 mg, Oral, Every 12 Hours Scheduled      ARIPiprazole 20 MG tablet  Commonly known as: ABILIFY   20 mg, Oral, Daily      atorvastatin 40 MG tablet  Commonly known as: LIPITOR   40 mg, Oral, Nightly      buPROPion  MG 24 hr tablet  Commonly known as: WELLBUTRIN XL   300 mg, Oral, Daily      estradiol 0.1 MG/GM vaginal cream  Commonly known as: ESTRACE   1 g, Vaginal, 3 Times Weekly      gabapentin 300 MG capsule  Commonly known as: NEURONTIN   300 mg, Oral, 3 Times Daily      IRON PO   1 tablet, Oral, 2 Times Daily      isosorbide mononitrate 30 MG 24 hr tablet  Commonly known as: IMDUR    30 mg, Oral, Daily      lamoTRIgine 150 MG tablet  Commonly known as: LaMICtal   150 mg, Oral, 2 Times Daily      levothyroxine 88 MCG tablet  Commonly known as: SYNTHROID, LEVOTHROID   88 mcg, Oral, Every Morning      metoprolol succinate XL 25 MG 24 hr tablet  Commonly known as: Toprol XL   37.5 mg, Oral, Daily, For bp      pantoprazole 40 MG EC tablet  Commonly known as: PROTONIX   40 mg, Oral, 2 Times Daily      pramipexole 0.5 MG tablet  Commonly known as: MIRAPEX   0.5 mg, Oral, Nightly      venlafaxine  MG 24 hr capsule  Commonly known as: EFFEXOR-XR   150 mg, Oral, Daily      Vibegron 75 MG tablet   75 mg, Oral, Daily             Stop These Medications      calcium carbonate 600 MG tablet  Commonly known as: OS-STEVIE     Cholecalciferol 25 MCG (1000 UT) capsule     VITAMIN B COMPLEX PO     vitamin B-12 1000 MCG tablet  Commonly known as: CYANOCOBALAMIN                Past Medical History:   Diagnosis Date    Abnormal Pap smear of cervix     Adrenal adenoma 09/21/2022    Anemia     Anxiety     Arthritis     Arthritis of neck     Bipolar I disorder, single manic episode     depressive d(NOS)    Cervical disc herniation     Coronary artery disease     COVID-19 01/10/2023    Death of family member     MOTHER IN FEB 2024    Depression     NO SUICIDAL PLANS    Dislocation of finger     Diverticular disease     Dyspepsia     Encounter for follow-up examination after completed treatment for conditions other than malignant neoplasm 04/11/2018    GERD (gastroesophageal reflux disease)     Heart attack     AGE 39    Heart murmur     Hiatal hernia     History of transfusion     2001    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    HPV positive on pap LGSIL    Hyperlipidemia     Hypertension     Hypothyroidism     Incontinence in female     wears pads    Kidney disease, chronic, stage III (GFR 30-59 ml/min)     Knee pain, bilateral     Knee swelling     LGSIL on Pap smear of cervix 04/29/2016    LGSIL HPV  positive    Lumbosacral disc disease Unsure    Lower left back is partial osteoarthritis and partial osteoporosis    Obesity     Osteopenia 2021    Bone density test    Periodic limb movement disorder     Restless leg syndrome     LEFT SHOULDER    Rotator cuff syndrome     Sleep apnea     NO MACHINE CURRENTLY    Suicidal ideation 08/19/2016    history    Thyroid nodule     Vitamin B12 deficiency         Past Surgical History:   Procedure Laterality Date    ADENOIDECTOMY  1974    ANTERIOR CERVICAL DISCECTOMY W/ FUSION N/A 12/29/2016    Procedure: C3-4 anterior cervical discectomy and fusion with Depuy micro plate, ALLOGRAFT C3-4, AND HARDWARE REMOVAL C4-7.;  Surgeon: Hema Godwin MD;  Location: [facility] MAIN OR;  Service:     CARDIAC CATHETERIZATION N/A 03/30/2017    Procedure: Left Heart Cath;  Surgeon: Tracey Vargas MD;  Location:  [facility] CATH INVASIVE [facility];  Service:     CARDIAC CATHETERIZATION N/A 03/30/2017    Procedure: Coronary angiography;  Surgeon: Tracey Vargas MD;  Location:  [facility];  Service:     CARDIAC CATHETERIZATION N/A 03/30/2017    Procedure: Left ventriculography;  Surgeon: Tracey Vargas MD;  Location:  [facility];  Service:     CARDIAC CATHETERIZATION  03/30/2017    Procedure: Functional Flow [place];  Surgeon: Tracey Vargas MD;  Location:  [facility];  Service:     CATARACT EXTRACTION Bilateral     CERVICAL BIOPSY  MMXVI    Dr. Jeffery.     CERVICAL DISCECTOMY ANTERIOR  04/2013    C4-7    COLONOSCOPY  04/20/2015    Diverticulosis, IH    COLONOSCOPY  03/09/2021    COLPOSCOPY W/ BIOPSY / CURETTAGE  06/17/2016    LGSIL HPV positive. Results were normal repeat pap in one year. Chronic Cervicitis    CORONARY ANGIOPLASTY WITH STENT PLACEMENT      ENDOSCOPY  MMXV    Normal.  Dr. Rodriguez    ENDOSCOPY N/A 06/22/2017    Erythematous mucosa in the stomach  PATH: Chronic active gastritis, moderate with intestinal metaplasia     EPIDURAL BLOCK       INGUINAL HERNIA REPAIR      LAPAROSCOPIC GASTRIC BANDING  02/2018    SHOULDER SURGERY Left     RCR    TONSILLECTOMY AND ADENOIDECTOMY      TRANSVAGINAL TAPING SUSPENSION N/A 12/06/2017    Procedure: MID URETHRAL SLING CYSTSCOPY;  Surgeon: Abby Méndez MD;  Location: [facility] OR;  Service:     TRIGGER POINT INJECTION      TUBAL ABDOMINAL LIGATION      TYMPANOSTOMY TUBE PLACEMENT Right     UPPER GASTROINTESTINAL ENDOSCOPY  approx 2021    WRIST SURGERY Bilateral     carpal tunnel        Social History     Occupational History    Occupation: disabled   Tobacco Use    Smoking status: Light Smoker     Types: Electronic Cigarette     Passive exposure: Current    Smokeless tobacco: Current    Tobacco comments:     Electronic Cigarette   Vaping Use    Vaping status: Every Day    Substances: Nicotine, Flavoring    Devices: Refillable tank   Substance and Sexual Activity    Alcohol use: Not Currently     Comment: RARELY    Drug use: Never    Sexual activity: Not Currently     Partners: Male     Birth control/protection: Condom, Abstinence, Post-menopausal, None, Tubal ligation      Social History     Social History Narrative    Not on file        Family History   Problem Relation Age of Onset    Diabetes type II Mother     Hypertension Mother     Osteoporosis Mother     Seizures Mother     Diabetes Mother     Arthritis Mother     Hyperlipidemia Mother     COPD Father     Hypertension Father     Lung cancer Father     Heart attack Father     Liver cancer Father     Liver disease Father     Heart disease Father     Cancer Father         Lung cacer that metastasized  into the liver.    Thyroid disease Sister     Hypertension Sister     Bipolar disorder Sister     Depression Sister     ADD / ADHD Sister     Anxiety disorder Sister     Mental illness Sister     Hyperlipidemia Sister     Broken bones Sister     No Known Problems Son     Abnormal EKG Daughter     Hypertension Daughter     Bipolar disorder Daughter      Thyroid disease Daughter     Mental illness Daughter     Hyperlipidemia Daughter     No Known Problems Paternal Grandfather     No Known Problems Paternal Grandmother     Cancer Maternal Grandmother     No Known Problems Maternal Grandfather     Thyroid disease Sister     Hypertension Sister     Bipolar disorder Sister     Anxiety disorder Sister     Depression Sister     Mental illness Sister     Hyperlipidemia Sister     Asthma Daughter     Diabetes Son         Type 1    Breast cancer Neg Hx     Ovarian cancer Neg Hx     Uterine cancer Neg Hx     Colon cancer Neg Hx     Malig Hyperthermia Neg Hx          Physical Exam: 63 y.o. female   Body mass index is 36.14 kg/m².  Facility age limit for growth %nessa is 20 years.  Vitals:    03/06/24 0535   BP: 114/82   Pulse: 77   Resp: 16   Temp: 97.6 °F (36.4 °C)   SpO2: 94%         General Appearance:    Alert, cooperative, in no acute distress                      Vitals:    03/05/24 2000 03/05/24 2036 03/06/24 0118 03/06/24 0535   BP: 94/62  115/77 114/82   BP Location: Left arm  Left arm Left arm   Patient Position: Sitting  Lying Lying   Pulse: 90  117 77   Resp: 18  16 16   Temp:  97.2 °F (36.2 °C) 96.9 °F (36.1 °C) 97.6 °F (36.4 °C)   TempSrc:  Oral Oral Oral   SpO2: 93%  93% 94%   Weight:       Height:            DIAGNOSTIC TESTS:   Admission on 03/05/2024   Component Date Value Ref Range Status    Glucose 03/05/2024 83  70 - 130 mg/dL Final    Hemoglobin 03/06/2024 11.6 (L)  12.0 - 15.9 g/dL Final    Hematocrit 03/06/2024 35.4  34.0 - 46.6 % Final       Imaging Results (Last 72 Hours)       Procedure Component Value Units Date/Time    XR Knee 1 or 2 View Left [[id number]] Collected: 03/05/24 1539     Updated: 03/05/24 1543    Narrative:      XR KNEE 1 OR 2 VW LEFT-3/5/2024     HISTORY: Left knee arthroplasty.     Distal femoral and proximal tibia components of the left knee prosthesis  are well seated with no abnormal surrounding bony lucencies. Soft tissue  air  is seen. No unexpected findings are noted.       Impression:      1. Satisfactory postoperative appearance of the left knee.        This report was finalized on 3/5/2024 3:40 PM by Dr. Adryan Haywood M.D on Workstation: [id number]               Hospital Course:  63 y.o. female admitted to [facility] to services of Tho Pritchard MD with Arthritis of left knee [M17.12] on 3/5/2024 and underwent ID ARTHRP KNE CONDYLE&PLATU MEDIAL&LAT COMPARTMENTS [04887] (LEFT TOTAL KNEE ARTHROPLASTY WITH TAB NAVIGATION)  Per Tho Pritchard MD. Antibiotic and VTE prophylaxis were per SCIP protocols. Post-operatively the patient transferred to the post-operative floor where the patient underwent mobilization therapy that included active as well as passive ROM exercises. Opioids were titrated to achieve appropriate pain management to allow for participation in mobilization exercises. Vital signs are now stable. On the day of discharge the wound was clean, dry and intact and calf was soft and nontender and Homans sign was negative. Operative extremity neurovascular status remains intact.   Appropriate education re: incision care, activity levels, medications, and follow up visits was completed and all questions were answered. The patient is now deemed stable for discharge.    Condition on Discharge:  Stable    Total Joint Replacement Discharge Instructions: Patient is to continue with physical therapy exercises twice daily and continue working with the physical therapist as ordered  and should be up walking every 2 hours during the day in addition to the physical therapy exercises. Patient may weight bear as tolerated unless otherwise specified. Continue to ice regularly. Do frequent ankle pumping exercises while you are sitting with legs elevated.  Patient also instructed on deep breathing, coughing, and using  incentive spirometer during hospitalization and encouraged to continue to use at home regularly.        VI.  FOLLOW-UP VISITS:  Follow up in the office with Dr Tho Trevino in 2 weeks - If already scheduled (see Future Appointments) for date and time, if not yet scheduled, patient to call the office at [phone] to schedule. Prescriptions were given for pain medication, nausea, constipation, and blood thinner therapy.    If you have any concerns or suspected complications prior to your follow up visit, please call your surgeon's office. Do not wait until your appointment time if you suspect complications. These will need to be addressed in the office promptly.      Future Appointments   Date Time Provider Department [place]   3/18/2024 11:00 AM Eduarda Trent APRN [facility] L100 [facility]   3/28/2024  2:30 PM Maxwell Martin MD [facility]  [facility]   4/3/2024 11:00 AM Shaniqua Blas DNP, DAPHNEY [facility] SLP [facility] None   5/9/2024  9:00 AM David Burroughs MD [facility] CD LCGJT [facility]   5/24/2024  3:00 PM [facility] CT 1 BH [facility] CT M [place]   5/24/2024  3:30 PM [facility] MRI 1 [facility] MRI M [place]   5/29/2024  2:30 PM Bob Mercado MD [facility]   9/6/2024 11:00 AM David Burroughs MD [facility] CD LCGKR [facility]     Additional Instructions for the Follow-ups that You Need to Schedule       Ambulatory Referral to Home Health   As directed      Face to Face Visit Date: 3/6/2024   Follow-up provider for Plan of Care?: I will be treating the patient on an ongoing basis.  Please send me the Plan of Care for signature.   Follow-up provider: THO TREVINO [[id number]]   Reason/Clinical Findings: post surgical   Describe mobility limitations that make leaving home difficult: Requires the assistance of another to leave home   Nursing/Therapeutic Services Requested: Physical Therapy   PT orders: Total joint pathway   Frequency: 1 Week 1        Ambulatory Referral to Physical Therapy Evaluate and treat, POST OP   As directed      Left TKA, please schedule about 2 weeks post-op    Order Comments: Left TKA, please schedule about 2 weeks post-op    Specialty  needed: Evaluate and treat POST OP   Follow-up needed: Yes        Discharge Follow-up with Specialty: Orthopedics; 2 Weeks   As directed      Specialty: Orthopedics   Follow Up: 2 Weeks   Follow Up Details: Return to the office to see Dr. Tho Pritchard                Discharge Disposition Plan:today to home, home health, and when cleared by physical therapy as safe for discharge    Date: 3/6/2024    DAPHNEY Choe    Dictated Utilizing Dragon Dictation

## 2024-03-06 NOTE — PROGRESS NOTES
Continued Stay Note  [facility]     Patient Name: Keri Bhagat  MRN: [id number]  Today's Date: 3/6/2024    Admit Date: 3/5/2024        Discharge Plan       Row Name 03/06/24 1623       Plan    Final Discharge Disposition Code 06 - home with home health care    Final Note [facility]                   Discharge Codes    No documentation.                 Expected Discharge Date and Time       Expected Discharge Date Expected Discharge Time    Mar 6, 2024               Do Sosa RN

## 2024-03-06 NOTE — PROGRESS NOTES
"    Orthopedic Total Joint Progress Note        Patient: Keri Bhagat    Date of Admission: 3/5/2024  9:30 AM    YOB: 1960    Medical Record Number: [id number]    Attending Physician: Tho Pritchard MD      POD # 1 Day Post-Op Procedure(s) (LRB):  LEFT TOTAL KNEE ARTHROPLASTY WITH TAB NAVIGATION (Left)       Systemic or Specific Complaints: The patient has had a relatively normal postoperative course.  The patient has had no current complaints. The patient has had improving normal postoperative pain.  The patient has had no issues with the wound..  Patient in bed eating breakfast upon arrival.  She states she is doing well she has a little bit more pain today but she is feeling well otherwise.  She denies any dizziness or lightheadedness upon standing.  She has no other complaints at this time.      Allergies: No Known Allergies    Medications:   Current Medications:  Scheduled Meds:apixaban, 5 mg, Oral, Q12H  ARIPiprazole, 20 mg, Oral, Daily  atorvastatin, 40 mg, Oral, Nightly  buPROPion XL, 300 mg, Oral, Daily  docusate sodium, 100 mg, Oral, BID  gabapentin, 300 mg, Oral, TID  isosorbide mononitrate, 30 mg, Oral, Daily  lamoTRIgine, 150 mg, Oral, BID  levothyroxine, 88 mcg, Oral, QAM  metoprolol succinate XL, 37.5 mg, Oral, Daily  pantoprazole, 40 mg, Oral, BID  polyethylene glycol, 17 g, Oral, Daily  pramipexole, 0.5 mg, Oral, Nightly  venlafaxine XR, 150 mg, Oral, Daily      Continuous Infusions:lactated ringers, 100 mL/hr, Last Rate: 100 mL/hr (03/06/24 0555)      PRN Meds:.  acetaminophen    bisacodyl    bisacodyl    HYDROcodone-acetaminophen    HYDROcodone-acetaminophen    HYDROmorphone **AND** naloxone    ondansetron ODT **OR** ondansetron      Physical Exam: 63 y.o. female   Wt Readings from Last 3 Encounters:   03/05/24 92.5 kg (204 lb)   02/27/24 92.9 kg (204 lb 12.8 oz)   02/20/24 96.6 kg (212 lb 14.4 oz)     Ht Readings from Last 3 Encounters:   03/05/24 160 cm (63\")   02/27/24 " "160 cm (63\")   02/20/24 161.3 cm (63.5\")     Body mass index is 36.14 kg/m².    Vitals:    03/05/24 2000 03/05/24 2036 03/06/24 0118 03/06/24 0535   BP: 94/62  115/77 114/82   BP Location: Left arm  Left arm Left arm   Patient Position: Sitting  Lying Lying   Pulse: 90  117 77   Resp: 18  16 16   Temp:  97.2 °F (36.2 °C) 96.9 °F (36.1 °C) 97.6 °F (36.4 °C)   TempSrc:  Oral Oral Oral   SpO2: 93%  93% 94%   Weight:       Height:            General Appearance:    General: alert and oriented         Abdomen/:     soft non-tender, non-distended, voiding without difficulty       Extremities:   Operative extremity neurovascular status intact. ROM appropriate.  Incision intact w/out signs or symptoms of infection.  No cyanosis, calf is soft and nontender.  Ace bandage and cast padding removed.  Optifoam dressing clean dry and intact no signs of drainage or active bleeding.     Activity: Mobilizing Per P.T.   Weight Bearing: As Tolerated    Diagnostic Tests:   Admission on 03/05/2024   Component Date Value Ref Range Status    Glucose 03/05/2024 83  70 - 130 mg/dL Final    Hemoglobin 03/06/2024 11.6 (L)  12.0 - 15.9 g/dL Final    Hematocrit 03/06/2024 35.4  34.0 - 46.6 % Final       Imaging Results (Last 72 Hours)       Procedure Component Value Units Date/Time    XR Knee 1 or 2 View Left [[id number]] Collected: 03/05/24 1539     Updated: 03/05/24 1543    Narrative:      XR KNEE 1 OR 2 VW LEFT-3/5/2024     HISTORY: Left knee arthroplasty.     Distal femoral and proximal tibia components of the left knee prosthesis  are well seated with no abnormal surrounding bony lucencies. Soft tissue  air is seen. No unexpected findings are noted.       Impression:      1. Satisfactory postoperative appearance of the left knee.        This report was finalized on 3/5/2024 3:40 PM by Dr. Adryan Haywood M.D on Workstation: [email or web address]               Personally viewed ortho images and report     Assessment:    Arthritis of left knee    - " Doing well 1 Day Post-Op following total joint replacement  - Acute Blood Loss Anemia, preoperative hemoglobin 13.3, postoperative hemoglobin 11.6 - stable  - Post-operative Pain  - Limited mobility, requires use of walker and assistance when OOB.      Plan:    - Consults: none  - Continue to monitor labs and/or v/s, for tolerance to post op blood loss.  - Continue efforts to increase mobilization.  - Continue Pain Control Measures.  - Continue incisional Care.  - DVT prophylaxis -resume preoperative anticoagulation  - Follow up in office with Tho Pritchard M.D. In 2 weeks.    Discharge Plan:today to home, home health, and when cleared by physical therapy as safe for discharge    Date: 3/6/2024  DAPHNEY Choe

## 2024-03-06 NOTE — PLAN OF CARE
Goal Outcome Evaluation:  Plan of Care Reviewed With: patient        Progress: improving  Outcome Evaluation: Pt is a 64 y/o F POD1 L TKA. Pt reports being independent at baseline without use of AD, owns a RW and lives in a house with 1 ARPIT with her adult daughter. Today, pt is SBA for bed mobility, SBA progressing to Mod-I for STS to RW and SBA progressing to Mod-I with RW for ambulation of 60' x2. Pt ascended/descended 1 step with CG/SBA, bilateral HR and leading with her RLE. No knee buckling, LOB, SOB or dizziness during mobility. Prior to mobility pt performed TKA protocol ther-ex for 10 reps demoing L knee flex/ext of 3-85deg. Pt performed tolieting tasks w/ SV and independence for pericare. Pt reports no concerns for return home, has good recall and carryover of all education provided on safety with RW, HEP, safety and sequencing of stair navigation and energy conservation. Pt does present below her baseline with typical post-op TKA decreased strength, pain with mobility, impaired gait mechanics and decreased endurance, thus will benefit from skilled PT. Rec home w/ family assist and HH PT. SBAR w/ RN following session.      Anticipated Discharge Disposition (PT): home with home health, home with assist

## 2024-03-07 ENCOUNTER — TELEPHONE (OUTPATIENT)
Dept: ORTHOPEDIC SURGERY | Facility: CLINIC | Age: 64
End: 2024-03-07
Payer: MEDICARE

## 2024-03-07 ENCOUNTER — TRANSITIONAL CARE MANAGEMENT TELEPHONE ENCOUNTER (OUTPATIENT)
Dept: CALL CENTER | Facility: HOSPITAL | Age: 64
End: 2024-03-07
Payer: MEDICARE

## 2024-03-07 ENCOUNTER — HOME CARE VISIT (OUTPATIENT)
Dept: HOME HEALTH SERVICES | Facility: HOME HEALTHCARE | Age: 64
End: 2024-03-07
Payer: COMMERCIAL

## 2024-03-07 ENCOUNTER — HOME CARE VISIT (OUTPATIENT)
Dept: HOME HEALTH SERVICES | Facility: HOME HEALTHCARE | Age: 64
End: 2024-03-07
Payer: MEDICARE

## 2024-03-07 PROCEDURE — G0151 HHCP-SERV OF PT,EA 15 MIN: HCPCS

## 2024-03-07 NOTE — Clinical Note
Pt is a 64 yo female s/p L TKR on 3-5-24.  PT focus of care will include post op care, pain mgt, gait training and HEP progession.  She lives with family.  She is ambulating with a rwx.  PLOF pt was independent.    F/u with surgeon is on 3-18-24.    Waterproof bandage covering incision. Moderate swelling.

## 2024-03-07 NOTE — TELEPHONE ENCOUNTER
Provider: URIEL    Caller: TITO THORNE    Relationship to Patient: PATIENT    Pharmacy: NA    Phone Number: [phone] (home)       Reason for Call: PATIENT HAD SX 3.5.24 AND IS REQUESTING AN ICE MACHINE

## 2024-03-07 NOTE — OUTREACH NOTE
Call Center TCM Note      Flowsheet Row Responses   [facility] patient discharged from? [place]   Does the patient have one of the following disease processes/diagnoses(primary or secondary)? Total Joint Replacement   Joint surgery performed? Knee   TCM attempt successful? Yes   Call start time 1452   Call end time 1454   Discharge diagnosis left knee end-stage osteoarthritis,    LEFT TOTAL KNEE ARTHROPLASTY   Does the patient have all medications related to this admission filled (includes all antibiotics, pain medications, etc.) Yes   Is the patient taking all medications as directed (includes completed medication regime)? Yes   Is the patient able to teach back alternate methods of pain control? Ice, Correct alignment   Does the patient have an appointment with their PCP within 7-14 days of discharge? No appointments available   Nursing Interventions Routed TCM call to PCP office, PCP office requested to make appointment - message sent   What is the Home health agency?  [facility]   Has home health visited the patient within 72 hours of discharge? Yes   Home health comments  visited today (3/7/24)   Psychosocial issues? No   When is the first therapy visit scheduled (PO Day) including how many days per week  3/7/24   Has the patient began therapy sessions (either in the home or as an out patient)? Yes   If the patient has started attending therapy, what post op day did they begin to attend (either in home or as an out patient)?   3/7/24   Has the patient fallen since discharge? No   Did the patient receive a copy of their discharge instructions? Yes   Nursing interventions Reviewed instructions with patient   What is the patient's perception of their functional status since discharge? Improving   Is the patient able to teach back signs and symptoms of infection? Temp >100.4 for 24h or longer, Incisional drainage, Blisters around incision, Increased swelling or redness around incision (not associated with  surgical edema), Severe discomfort or pain   Is the patient able to teach back how to prevent infection? Check incision daily, Keep incision covered if drainage, Eat well-balanced diet, No lotion or creams, Keep incision covered if pets in house   Is the patient able to teach back signs and symptoms of DVT? Redness in calf, Severe pain in calf, Swelling in calf, Area hot to touch, Shortness of breath or chest pain   Is the patient able to teach back home safety measures? Ability to shower, Accessibility to necessary areas in home   If the patient is a current smoker, are they able to teach back resources for cessation? [id number]  [vapes]   Is the patient/caregiver able to teach back the hierarchy of who to call/visit for symptoms/problems? PCP, Specialist, Home health nurse, Urgent Care, ED, 911 Yes   TCM call completed? Yes   Call end time 0772            Mckenzie Galvin RN    3/7/2024, 14:54 EST

## 2024-03-07 NOTE — TELEPHONE ENCOUNTER
Dr. Pritchard does not use the ice machines. They can actually do more damage than good. If you leave them on all day they can burn the skin and secondly they can actually cause more swelling because the body thinks that part is hypothermic. So he sends patients how with the ice wrap and gel inserts to use. This way the body part can warm back up to body temperature. She can rotate ice packs, but we will not order and ice machine. Thank you!

## 2024-03-08 ENCOUNTER — HOME CARE VISIT (OUTPATIENT)
Dept: HOME HEALTH SERVICES | Facility: HOME HEALTHCARE | Age: 64
End: 2024-03-08
Payer: MEDICARE

## 2024-03-08 VITALS
DIASTOLIC BLOOD PRESSURE: 100 MMHG | HEART RATE: 81 BPM | RESPIRATION RATE: 18 BRPM | OXYGEN SATURATION: 97 % | SYSTOLIC BLOOD PRESSURE: 158 MMHG | TEMPERATURE: 97.3 F

## 2024-03-08 VITALS
SYSTOLIC BLOOD PRESSURE: 180 MMHG | DIASTOLIC BLOOD PRESSURE: 80 MMHG | RESPIRATION RATE: 18 BRPM | OXYGEN SATURATION: 97 % | HEART RATE: 70 BPM | TEMPERATURE: 97.1 F

## 2024-03-08 PROCEDURE — G0151 HHCP-SERV OF PT,EA 15 MIN: HCPCS

## 2024-03-08 RX ORDER — ATORVASTATIN CALCIUM 80 MG/1
80 TABLET, FILM COATED ORAL DAILY
Qty: 90 TABLET | Refills: 3 | Status: SHIPPED | OUTPATIENT
Start: 2024-03-08

## 2024-03-08 NOTE — PROGRESS NOTES
Called to schedule hospital fu. Pt unable to schedule at the moment will call later     Hub to relay and schedule

## 2024-03-08 NOTE — HOME HEALTH
Pt reports she feels slightly better and has been up walking and doing a couple exercises like she was told.    No new drainage today.    Plan for next visit:  pain mgt  gait training   HEP progression

## 2024-03-08 NOTE — HOME HEALTH
Pt is a 64 yo female s/p L TKR on 3-5-24.  PT focus of care will include post op care, pain mgt, gait training and HEP progession.  She lives with family.  She is ambulating with a rwx.  PLOF pt was independent.    F/u with surgeon is on 3-18-24.    Waterproof bandage covering incision. Moderate swelling.    Plan for next visit:  pain mgt  gait training  HEP progression

## 2024-03-10 DIAGNOSIS — K21.00 GASTROESOPHAGEAL REFLUX DISEASE WITH ESOPHAGITIS WITHOUT HEMORRHAGE: ICD-10-CM

## 2024-03-11 ENCOUNTER — HOME CARE VISIT (OUTPATIENT)
Dept: HOME HEALTH SERVICES | Facility: HOME HEALTHCARE | Age: 64
End: 2024-03-11
Payer: COMMERCIAL

## 2024-03-11 PROCEDURE — G0151 HHCP-SERV OF PT,EA 15 MIN: HCPCS

## 2024-03-11 RX ORDER — PANTOPRAZOLE SODIUM 40 MG/1
40 TABLET, DELAYED RELEASE ORAL 2 TIMES DAILY
Qty: 180 TABLET | Refills: 3 | OUTPATIENT
Start: 2024-03-11

## 2024-03-11 RX ORDER — PRAMIPEXOLE DIHYDROCHLORIDE 0.5 MG/1
0.5 TABLET ORAL NIGHTLY
Qty: 30 TABLET | Refills: 0 | OUTPATIENT
Start: 2024-03-11

## 2024-03-11 RX ORDER — GABAPENTIN 300 MG/1
300 CAPSULE ORAL 3 TIMES DAILY
Qty: 270 CAPSULE | Refills: 0 | Status: SHIPPED | OUTPATIENT
Start: 2024-03-11

## 2024-03-11 NOTE — TELEPHONE ENCOUNTER
Rx Refill Note  Requested Prescriptions     Pending Prescriptions Disp Refills    gabapentin (NEURONTIN) 300 MG capsule [Pharmacy Med Name: GABAPENTIN 300 MG CAPSULE] 270 capsule      Sig: TAKE ONE CAPSULE BY MOUTH THREE TIMES A DAY      Last office visit with prescribing clinician: 2/2/2024   Last telemedicine visit with prescribing clinician: Visit date not found   Next office visit with prescribing clinician: Visit date not found                         Would you like a call back once the refill request has been completed: [] Yes [] No    If the office needs to give you a call back, can they leave a voicemail: [] Yes [] No    Ashley Gonzales MA  03/11/24, 08:48 EDT

## 2024-03-12 ENCOUNTER — TELEPHONE (OUTPATIENT)
Dept: ORTHOPEDIC SURGERY | Facility: CLINIC | Age: 64
End: 2024-03-12
Payer: MEDICARE

## 2024-03-12 VITALS
RESPIRATION RATE: 18 BRPM | DIASTOLIC BLOOD PRESSURE: 72 MMHG | TEMPERATURE: 98 F | SYSTOLIC BLOOD PRESSURE: 140 MMHG | OXYGEN SATURATION: 97 % | HEART RATE: 90 BPM

## 2024-03-12 DIAGNOSIS — Z96.652 S/P TKR (TOTAL KNEE REPLACEMENT), LEFT: ICD-10-CM

## 2024-03-12 RX ORDER — HYDROCODONE BITARTRATE AND ACETAMINOPHEN 7.5; 325 MG/1; MG/1
1 TABLET ORAL EVERY 6 HOURS PRN
Qty: 28 TABLET | Refills: 0 | Status: SHIPPED | OUTPATIENT
Start: 2024-03-12 | End: 2024-03-18 | Stop reason: SDUPTHER

## 2024-03-12 NOTE — TELEPHONE ENCOUNTER
Provider: DR TREVINO    Caller: TITO THORNE    Relationship to Patient: SELF    Phone Number: [phone]    Reason for Call: PT REPORTS THAT LEFT KNEE IS SWOLLEN, RED, WARM TO TOUCH, AND PAINFUL. STATES THAT THE PAIN AND SWELLING STARTED A FEW DAYS AGO, BUT THINGS BECAME EXACERBATED SINCE LAST NIGHT. PLEASE CALL HER BACK ASAP.

## 2024-03-12 NOTE — TELEPHONE ENCOUNTER
I just looked at the photos she sent in [facility]. It does not look worrisome currently. It is going to feel warmer than the other knee for a few months. I do not see an red streaks running up the leg or an significant drainage on the bandage. It is normal to have days of increased pain and swelling and days of it feeling ok, it can be up and down like that. She should continue to ice and elevate and take pain medicaiton as needed. Has she had any fevers, chills or flu like symptoms herself? Thank you!

## 2024-03-12 NOTE — TELEPHONE ENCOUNTER
She can take it easy today, just gentle therapy. She may have slept with it in a different position since switching to a bed and it caused it to be stiff and swollen from sleeping in one position. Keeping icing and elevating and taking the pain mediation. If it is not improving in the next few days let us know. Thank you!

## 2024-03-12 NOTE — HOME HEALTH
Pt reports it was a long weekend because of the pain and so much swelling.    Dressing intact, no new bleeding.     Plan for next visit:  pain mgt  gait training   HEP

## 2024-03-12 NOTE — TELEPHONE ENCOUNTER
Caller: TITO THORNE    Relationship to patient: SELF    Best call back number:     Patient is needing: PATIENT CALLED BACK.  HUB CAN NOT RELAY INFORMATION ON TREATMENT,.  SHE HAS NOT HAD ANY FEVER CHILLS OR FLU LIKE SYMPTOMS.  HAVING SWELLING DOWN TO TOES AND TOES TINGLING. CAN CLINICAL PLEASE CALL PATIENT OR LABEL HUB OK TO READ IF YOU ALL WANT US TO PASS INFO ALONG

## 2024-03-12 NOTE — TELEPHONE ENCOUNTER
Can you please call and see if she can send us a picture through [employer] please. I see one from last week her PT sent but nothing from this week. Thank you!

## 2024-03-13 ENCOUNTER — HOME CARE VISIT (OUTPATIENT)
Dept: HOME HEALTH SERVICES | Facility: HOME HEALTHCARE | Age: 64
End: 2024-03-13
Payer: MEDICARE

## 2024-03-13 VITALS
TEMPERATURE: 97.7 F | RESPIRATION RATE: 18 BRPM | DIASTOLIC BLOOD PRESSURE: 72 MMHG | HEART RATE: 80 BPM | OXYGEN SATURATION: 98 % | SYSTOLIC BLOOD PRESSURE: 158 MMHG

## 2024-03-13 PROCEDURE — G0151 HHCP-SERV OF PT,EA 15 MIN: HCPCS

## 2024-03-13 NOTE — HOME HEALTH
Pt reports her leg was very painful and swollen today but is down today and feels better.      Plan for next visit:  PT dc to OP PT; she has f/u with surgeon on Monday and begins OP Tue

## 2024-03-15 ENCOUNTER — HOME CARE VISIT (OUTPATIENT)
Dept: HOME HEALTH SERVICES | Facility: HOME HEALTHCARE | Age: 64
End: 2024-03-15
Payer: COMMERCIAL

## 2024-03-15 VITALS
HEART RATE: 81 BPM | TEMPERATURE: 97.1 F | OXYGEN SATURATION: 98 % | DIASTOLIC BLOOD PRESSURE: 80 MMHG | SYSTOLIC BLOOD PRESSURE: 148 MMHG | RESPIRATION RATE: 18 BRPM

## 2024-03-15 PROCEDURE — G0151 HHCP-SERV OF PT,EA 15 MIN: HCPCS

## 2024-03-15 NOTE — HOME HEALTH
PT completing dc.  She has met her goals.  She is scheduled to see her surgeon next week and begin OP PT.

## 2024-03-18 ENCOUNTER — OFFICE VISIT (OUTPATIENT)
Dept: ORTHOPEDIC SURGERY | Facility: CLINIC | Age: 64
End: 2024-03-18
Payer: MEDICARE

## 2024-03-18 VITALS — WEIGHT: 207.4 LBS | HEIGHT: 63 IN | BODY MASS INDEX: 36.75 KG/M2 | TEMPERATURE: 97.7 F

## 2024-03-18 DIAGNOSIS — R52 PAIN: Primary | ICD-10-CM

## 2024-03-18 DIAGNOSIS — Z96.652 S/P TKR (TOTAL KNEE REPLACEMENT), LEFT: ICD-10-CM

## 2024-03-18 PROCEDURE — 1159F MED LIST DOCD IN RCRD: CPT | Performed by: NURSE PRACTITIONER

## 2024-03-18 PROCEDURE — 73562 X-RAY EXAM OF KNEE 3: CPT | Performed by: NURSE PRACTITIONER

## 2024-03-18 PROCEDURE — 1160F RVW MEDS BY RX/DR IN RCRD: CPT | Performed by: NURSE PRACTITIONER

## 2024-03-18 PROCEDURE — 99024 POSTOP FOLLOW-UP VISIT: CPT | Performed by: NURSE PRACTITIONER

## 2024-03-18 RX ORDER — HYDROCODONE BITARTRATE AND ACETAMINOPHEN 7.5; 325 MG/1; MG/1
1 TABLET ORAL EVERY 6 HOURS PRN
Qty: 28 TABLET | Refills: 0 | Status: SHIPPED | OUTPATIENT
Start: 2024-03-18 | End: 2024-03-22 | Stop reason: SDUPTHER

## 2024-03-18 NOTE — PROGRESS NOTES
Keri Bhagat : 1960 MRN: [id number] DATE: 3/18/2024  Body mass index is 36.74 kg/m².  Vitals:    24 1112   Temp: 97.7 °F (36.5 °C)       DIAGNOSIS: 2 week follow up left total knee arthroplasty    SUBJECTIVE:Patient returns today for 2 week follow up of left total knee replacement.  Patient ambulates to appointment with walker, family present.  She states she is doing pretty well.  She did have an episode of swelling and increased pain over the past week but this did improve for her.  She states she felt very good when she woke up this morning but did have increased pain as she started to walk around.  She denies any fevers chills or any flulike symptoms.  She is a smoker and is aware we want to keep an eye on her incision.  She starts outpatient physical therapy next week.  She has no other complaints at this time.    OBJECTIVE:   Exam:. The incision is healing appropriately. No sign of infection. Range of motion is progressing as expected, ROM approximately 5 degrees short of full extension and approximately 95 degrees of flexion. The calf is soft and nontender with a negative Homans sign.    DIAGNOSTIC STUDIES  2V AP&Lat of the left knee were done in the office today  Indication, findings and comparison: images were reviewed for evaluation of recent knee replacement. They demonstrate a well positioned, well aligned knee replacement without complicating factors noted. In comparison with previous films there has been interval implant placement.    ASSESSMENT: 2 week status post left knee replacement expected healing.  Incision appears well-healing, edges well-approximated, no erythema, no open wounds, no signs of active drainage or bleeding noted.  There is a small area along the incision just distal to the patella where there is granulation tissue visible in the incision.  But there are no openings or drainage, no foul odor, no pus or bleeding with manipulation.  Instructed the patient to keep  the area clean and dry if there is any bleeding or drainage she is to cover with a Band-Aid, she may shower wash area with antibacterial soap pat dry with clean towel and replace bandage if there is still drainage.  If there is nothing else she does not have to cover the incision at this point.  She verbalized understanding is agreement this plan.    PLAN: 1) dressing and exofin fusion removed, steri-strips applied   2) Order given for PT and pain medicine (as needed)   3) Continue ice PRN   4) Continue pre-operative anticoagulation medications   5) Follow up in 4 weeks with repeat Xrays of left knee (2 views)   6) Wait for 3 months after surgery for routine teeth cleaning and continue with taking a dose of antibiotics before dental procedures for 2 yrs post total joint replacement.    Eduarda Trent, DAPHNEY  3/18/2024      Dictated Utilizing Dragon Dictation

## 2024-03-19 ENCOUNTER — TREATMENT (OUTPATIENT)
Dept: PHYSICAL THERAPY | Facility: CLINIC | Age: 64
End: 2024-03-19
Payer: MEDICARE

## 2024-03-19 DIAGNOSIS — Z96.652 STATUS POST TOTAL LEFT KNEE REPLACEMENT: Primary | ICD-10-CM

## 2024-03-19 DIAGNOSIS — Z96.652 S/P TKR (TOTAL KNEE REPLACEMENT), LEFT: ICD-10-CM

## 2024-03-19 DIAGNOSIS — Z74.09 IMPAIRED MOBILITY AND ENDURANCE: ICD-10-CM

## 2024-03-19 NOTE — PROGRESS NOTES
"Physical Therapy Initial Evaluation and Plan of Care  [facility]  [address], [address]  [place] [address]    Patient: Keri Bhagat   : 1960  Diagnosis/ICD-10 Code:  Status post total left knee replacement [Z96.652]  Referring practitioner: DAPHNEY Choe  Today's Date: 3/21/2024    Subjective Evaluation    History of Present Illness  Mechanism of injury: S/P TKR 2 weeks ago, MD Pritchard. Chronic history of OA. Post op imaging states \"Satisfactory postoperative appearance of the left knee.\" MD Haywood 3/5/24    Pt reports she was diagnosed with A fib ~ 2 months ago and has significant cardiac history. Pt states she is not working due to disability w/ neck problems including C3-7 fusion w/ bone graft and DJD.    Pt expresses fear of falling and describes event 4 years ago where she tripped and fell and broke both wrists.    LEFS score this date:     Subjective comment: My knee is really hurting bad today  Patient Occupation: Unemployed, disabled Pain  Current pain ratin  At best pain rating: 3  At worst pain rating: 10  Quality: throbbing, burning, cramping and discomfort  Relieving factors: change in position, ice, relaxation and rest  Aggravating factors: ambulation and movement    Social Support  Lives with: adult children    Diagnostic Tests  X-ray: normal    Treatments  Current treatment: home therapy, medication and physical therapy  Patient Goals  Patient goals for therapy: increased strength, independence with ADLs/IADLs, decreased pain, improved balance and return to sport/leisure activities             Objective          Observations     Additional Knee Observation Details  L Knee mid patella - 39.5 cm, 37 cm (10 cm inferior)    R Knee mid patella - 45.5 cm, 39.5 cm (10 cm inferior)    Incision is clean, w/ new steri strips.    Mod erythema and warmth typical of post-op status    Palpation     Additional Palpation Details  Pt " "reports:  Mod pain at distal 1/3 of posterior calf, states \"feel like a arnaldo horse\"  Mod tenderness along M/L border of incision  Mod pain and pressure at posterior knee    Neurological Testing     Sensation     Knee   Left Knee   Intact: Light touch    Right Knee   Intact: light touch     Active Range of Motion   Left Knee   Flexion: 90 degrees   Extension: 5 degrees     Right Knee   Flexion: 125 degrees   Extension: 0 degrees     Patellar Mobility   Left Knee Hypomobile in the left medial, left lateral, left superior and left inferior patellar tendon(s).     Right Knee Patellar tendons within functional limits include the medial, lateral, superior and inferior.     Strength/Myotome Testing     Additional Strength Details  Formal MMT not tested this date due to acute status    Ambulation     Comments   Pt presents to clinic this date w/ 4WW and PWB. Gait looks good considering post-op status. Min antalgic pattern, min rollover, reduced knee swing as expected.          Assessment & Plan       Assessment  Impairments: abnormal gait, abnormal muscle firing, abnormal muscle tone, abnormal or restricted ROM, activity intolerance, impaired physical strength, lacks appropriate home exercise program, pain with function and weight-bearing intolerance   Functional limitations: carrying objects, walking, uncomfortable because of pain, sitting and standing   Assessment details:     Pt is 64 y/o female 2 weeks s/p TKR, MD Pritchard. Post-op progress as expected so far. Pt reports some pain in distal calf likely due to excessive ER gait compensation, no DVT suspected. Incision looks good and was recently cleaned and received new steri strips at MD appt. Mod warmth and erythema around distal femur but not excessive. Pt reports pressure at posterior knee secondary to mod edema.    Pt presents to clinic w/ walker and min antalgic gait. Passive knee extension lacking 5 degrees, knee flexion 90 degrees and limited by edema.    Pt " reports reduced pain post treatment (4/10) and demonstrates improved AROM w/ light stretching and exercise this date.      Prognosis: good    Goals  Plan Goals: STG 2 weeks:  Pt will tolerate initial exercise.  Pt will have reduced swelling by 75%  Pt will have pain <5/10    LTG 4 weeks:  Pt will demonstrate 0 degrees knee extension and 120 degrees knee flexion AROM  Pt will demonstrate knee strength MMT 4/5  Pt will demonstrate pain free ambulation w/ ADLs and short community distances      Plan  Planned modality interventions: TENS, ultrasound and electrical stimulation/Russian stimulation  Planned therapy interventions: manual therapy, neuromuscular re-education, soft tissue mobilization, strengthening, therapeutic activities, joint mobilization, functional ROM exercises, gait training and flexibility  Frequency: 3x week  Duration in visits: 12  Duration in weeks: 4  Treatment plan discussed with: patient        Manual Therapy:    15    mins  88877;  Therapeutic Exercise:    15    mins  97158;     Neuromuscular Zelda:    10    mins  54860;    Therapeutic Activity:     0     mins  84084;     Ultrasound                  __0_  mins  72844  Iontophoresis                 0    mins  10150    Electrical Stimulation     0    mins  29999 ([id number])  Traction                         _0  mins  05574     Evaluation Time:     30  mins  Timed Treatment:   40   mins   Total Treatment:     70   mins    PT: Derrick Diana PT     [place] License Number:  [id number]  Electronically signed by Derrcik Diana PT, 03/19/24, 4:15 PM EDT    Certification Period: 3/21/2024 thru 6/18/2024  I certify that the therapy services are furnished while this patient is under my care.  The services outlined above are required by this patient, and will be reviewed every 90 days.         Physician Signature:__________________________________________________    PHYSICIAN: Eduarda Trent APRN      DATE:     Please sign and return via fax to [email or web address] . Thank  you, [facility].    PT SIGNATURE: Derrick Diana, PT   [place] LICENSE: [id number]

## 2024-03-21 NOTE — PROGRESS NOTES
Physical Therapy Daily Treatment Note    Visit Diagnoses:    ICD-10-CM ICD-9-CM   1. Impaired mobility and endurance  Z74.09 V49.89   2. Status post total left knee replacement  Z96.652 V43.65   3. S/P TKR (total knee replacement), left  Z96.652 V43.65       VISIT#: 2      Keri Bhagat reports: Her knee for the most part is good. But some days is is more swollen and hurts a lot. Today her knee is aching more and the continuous aching bothers her the most. She was on her feet too long at [employer] yesterday. Also, she is numb in her lateral thigh on the L from buttock to knee.   Current Pain Level:    6/10; Worst:   8/10 prolonged; Best:  0/10  Affected Area: L TKR  Quality of Pain:  throbbing, burning, cramping and discomfort   Functional Deficits/Irritating Factors:  ambulation and movement   Progression: no significant changes yet  Compliance with HEP Reported: doing a few    Objective  Presents: ambulating with RW, steri-strips in place, moderate edema  Increased sets/reps of:    Increased resistance on:  none  Added to Program: HS and Calf Stretches, TKE STM to knee and calf   AROM L knee flexion = 105 deg; Extension = lacking 10 deg    See Exercise, Manual, and Modality Logs for complete treatment.     Patient Education: Pt was educated on exercise biomechanical correctness, intensity, and speed.     Assessment:  Keri presented with a lot of achiness today from prolonged standing while shopping yesterday. Focused on STM and edema mobilization to decrease pain and swelling.  She did feel better after STM but extension stretching aggravated the pain again. Added a couple of more extension exercises to try and improve her extension range of motion. Pt will continue to benefit from skilled PT interventions to address current functional deficits and impairments.       Plan: Progress to/Continue with current program. Heel Slides with belt?      Timed:  Manual Therapy:            15_    mins  45426;  Ultrasound:                      0      mins  90738;   Therapeutic Exercise:    25     mins  76732;     Neuromuscular Zelda:   15     mins  24123;    Therapeutic Activity:      0     mins  10123;      Iontophoresis              _0__   mins  Dry Needling               _0____   mins         Untimed:  Work Conditioning: __0__ mins 14156  Electrical Stimulation:    15    mins  99619 ( );  Mechanical Traction:       0        mins  13840;   Paraffin                       __0___  mins   Ice/Heat: __15__ mins      Timed Treatment:   55   mins   Total Treatment:     70   mins          HARSHAL Bahena License # [id number]  Physical Therapist Assistant

## 2024-03-22 ENCOUNTER — TELEPHONE (OUTPATIENT)
Dept: PHYSICAL THERAPY | Facility: CLINIC | Age: 64
End: 2024-03-22

## 2024-03-22 ENCOUNTER — TREATMENT (OUTPATIENT)
Dept: PHYSICAL THERAPY | Facility: CLINIC | Age: 64
End: 2024-03-22
Payer: MEDICARE

## 2024-03-22 DIAGNOSIS — Z96.652 S/P TKR (TOTAL KNEE REPLACEMENT), LEFT: ICD-10-CM

## 2024-03-22 DIAGNOSIS — Z74.09 IMPAIRED MOBILITY AND ENDURANCE: Primary | ICD-10-CM

## 2024-03-22 DIAGNOSIS — Z96.652 STATUS POST TOTAL LEFT KNEE REPLACEMENT: ICD-10-CM

## 2024-03-22 PROCEDURE — 97110 THERAPEUTIC EXERCISES: CPT | Performed by: PHYSICAL THERAPIST

## 2024-03-22 PROCEDURE — 97140 MANUAL THERAPY 1/> REGIONS: CPT | Performed by: PHYSICAL THERAPIST

## 2024-03-22 PROCEDURE — 97112 NEUROMUSCULAR REEDUCATION: CPT | Performed by: PHYSICAL THERAPIST

## 2024-03-22 PROCEDURE — G0283 ELEC STIM OTHER THAN WOUND: HCPCS | Performed by: PHYSICAL THERAPIST

## 2024-03-22 RX ORDER — HYDROCODONE BITARTRATE AND ACETAMINOPHEN 7.5; 325 MG/1; MG/1
1 TABLET ORAL EVERY 6 HOURS PRN
Qty: 28 TABLET | Refills: 0 | Status: SHIPPED | OUTPATIENT
Start: 2024-03-22

## 2024-03-23 DIAGNOSIS — K21.00 GASTROESOPHAGEAL REFLUX DISEASE WITH ESOPHAGITIS WITHOUT HEMORRHAGE: ICD-10-CM

## 2024-03-25 ENCOUNTER — TREATMENT (OUTPATIENT)
Dept: PHYSICAL THERAPY | Facility: CLINIC | Age: 64
End: 2024-03-25
Payer: MEDICARE

## 2024-03-25 DIAGNOSIS — Z96.652 STATUS POST TOTAL LEFT KNEE REPLACEMENT: ICD-10-CM

## 2024-03-25 DIAGNOSIS — K21.00 GASTROESOPHAGEAL REFLUX DISEASE WITH ESOPHAGITIS WITHOUT HEMORRHAGE: ICD-10-CM

## 2024-03-25 DIAGNOSIS — Z96.652 S/P TKR (TOTAL KNEE REPLACEMENT), LEFT: ICD-10-CM

## 2024-03-25 DIAGNOSIS — Z74.09 IMPAIRED MOBILITY AND ENDURANCE: Primary | ICD-10-CM

## 2024-03-25 RX ORDER — PRAMIPEXOLE DIHYDROCHLORIDE 0.5 MG/1
0.5 TABLET ORAL NIGHTLY
Qty: 30 TABLET | Refills: 0 | OUTPATIENT
Start: 2024-03-25

## 2024-03-25 RX ORDER — PANTOPRAZOLE SODIUM 40 MG/1
40 TABLET, DELAYED RELEASE ORAL 2 TIMES DAILY
Qty: 180 TABLET | Refills: 3 | OUTPATIENT
Start: 2024-03-25

## 2024-03-25 RX ORDER — PANTOPRAZOLE SODIUM 40 MG/1
40 TABLET, DELAYED RELEASE ORAL 2 TIMES DAILY
Qty: 180 TABLET | Refills: 0 | Status: SHIPPED | OUTPATIENT
Start: 2024-03-25

## 2024-03-25 NOTE — PROGRESS NOTES
I have reviewed the notes, assessments, and/or procedures performed by Derrick Diana, I concur with her/his documentation of Keri Bhagat.

## 2024-03-25 NOTE — TELEPHONE ENCOUNTER
Caller: Keri Bhagat    Relationship: Self    Best call back number: [phone]    Requested Prescriptions:   Requested Prescriptions     Pending Prescriptions Disp Refills    pantoprazole (PROTONIX) 40 MG EC tablet 180 tablet 3     Sig: Take 1 tablet by mouth 2 (Two) Times a Day.        Pharmacy where request should be sent: [facility]      Last office visit with prescribing clinician: Visit date not found   Last telemedicine visit with prescribing clinician: Visit date not found   Next office visit with prescribing clinician: MAY 2ND WITH MONA BOWSER    Additional details provided by patient:     Does the patient have less than a 3 day supply:  [] Yes  [x] No    Would you like a call back once the refill request has been completed: [] Yes [] No    If the office needs to give you a call back, can they leave a voicemail: [x] Yes [] No    Jigar Mcnair Rep   03/25/24 10:02 EDT

## 2024-03-25 NOTE — PROGRESS NOTES
45Physical Therapy Daily Treatment Note  [facility]   [address] [place] [address]  P: [phone] F: [phone]    Patient: Keri Bhagat   : 1960  Referring practitioner: DAPHNEY Choe  Date of Initial Visit: Type: THERAPY  Noted: 3/19/2024  Today's Date: 3/25/2024  Patient seen for 3 sessions       Visit Diagnoses:    ICD-10-CM ICD-9-CM   1. Impaired mobility and endurance  Z74.09 V49.89   2. S/P TKR (total knee replacement), left  Z96.652 V43.65   3. Status post total left knee replacement  Z96.652 V43.65         Subjective:  Keri Bhagat reports: min pain today 3/10 at rest. Pt reports some discomfort at posterior lower leg just above the ankle but not bad. Pt reports she has been walking around the house without her walker some and had no problems.      Objective          Observations     Additional Knee Observation Details  Incision looks good.   Min/mod warmth and erythema still present but not excessive considering post-op status, reduced compared to last treatment. No signs of infection.    Knee measurement  Mid patella 45cm, 10 cm below 38.5 cm    Tenderness     Additional Tenderness Details  Pt reports min/mod tenderness still present in mid achilles region, but is improving.    Active Range of Motion   Left Knee   Flexion: 100 degrees   Extensor la degrees       See Exercise, Manual, and Modality Logs for complete treatment.       Assessment:  Pt demonstrates improved WB, gait mechanics and muscle activation this date. She presents to clinic w/ 4 point walker, but was comfortable walking w/o AD for short distances. Pt also shows reduced swelling and reports improved pain management.      Plan:   Continue to monitor and progress.        Timed:         Manual Therapy:    15     mins  24524;     Therapeutic Exercise:    15     mins  96115;     Neuromuscular Zelda:    15    mins  48426;    Therapeutic Activity:     15      mins  69258;     Ultrasound:     0     mins  35004;    Ionto                               0    mins  97902    Un-Timed:  Electrical Stimulation:    0     mins  00338 ( );  Traction     0     mins 79903        Timed Treatment:   60   mins   Total Treatment:     60   mins    RICHY Rodriguez License #: [id number]    Physical Therapist

## 2024-03-25 NOTE — TELEPHONE ENCOUNTER
Pt has upcoming appt for 05/02 with Erendira KHAN.  Pantoprazole 40mg po bid #180 escribed to pt's pharmacy.

## 2024-03-27 ENCOUNTER — TELEPHONE (OUTPATIENT)
Dept: PHYSICAL THERAPY | Facility: CLINIC | Age: 64
End: 2024-03-27

## 2024-03-28 ENCOUNTER — OFFICE VISIT (OUTPATIENT)
Dept: ENDOCRINOLOGY | Age: 64
End: 2024-03-28
Payer: MEDICARE

## 2024-03-28 VITALS
WEIGHT: 208.2 LBS | HEART RATE: 80 BPM | DIASTOLIC BLOOD PRESSURE: 84 MMHG | TEMPERATURE: 97.5 F | OXYGEN SATURATION: 96 % | BODY MASS INDEX: 36.89 KG/M2 | SYSTOLIC BLOOD PRESSURE: 126 MMHG | HEIGHT: 63 IN

## 2024-03-28 DIAGNOSIS — E03.9 PRIMARY HYPOTHYROIDISM: Primary | ICD-10-CM

## 2024-03-28 DIAGNOSIS — I10 PRIMARY HYPERTENSION: ICD-10-CM

## 2024-03-28 DIAGNOSIS — I25.10 CORONARY ARTERY DISEASE INVOLVING NATIVE CORONARY ARTERY OF NATIVE HEART WITHOUT ANGINA PECTORIS: ICD-10-CM

## 2024-03-28 DIAGNOSIS — E78.5 HYPERLIPIDEMIA, UNSPECIFIED HYPERLIPIDEMIA TYPE: ICD-10-CM

## 2024-03-28 DIAGNOSIS — G47.33 OBSTRUCTIVE SLEEP APNEA SYNDROME: ICD-10-CM

## 2024-03-28 DIAGNOSIS — M85.80 OSTEOPENIA, UNSPECIFIED LOCATION: ICD-10-CM

## 2024-03-28 DIAGNOSIS — Z87.19 HISTORY OF GASTRITIS: ICD-10-CM

## 2024-03-28 DIAGNOSIS — I48.0 PAROXYSMAL ATRIAL FIBRILLATION: ICD-10-CM

## 2024-03-28 DIAGNOSIS — E23.6 PITUITARY CYST: ICD-10-CM

## 2024-03-28 PROCEDURE — 3079F DIAST BP 80-89 MM HG: CPT | Performed by: INTERNAL MEDICINE

## 2024-03-28 PROCEDURE — 1159F MED LIST DOCD IN RCRD: CPT | Performed by: INTERNAL MEDICINE

## 2024-03-28 PROCEDURE — 1160F RVW MEDS BY RX/DR IN RCRD: CPT | Performed by: INTERNAL MEDICINE

## 2024-03-28 PROCEDURE — 3074F SYST BP LT 130 MM HG: CPT | Performed by: INTERNAL MEDICINE

## 2024-03-28 PROCEDURE — 99214 OFFICE O/P EST MOD 30 MIN: CPT | Performed by: INTERNAL MEDICINE

## 2024-03-28 RX ORDER — LAMOTRIGINE 200 MG/1
TABLET ORAL
COMMUNITY
Start: 2024-03-22 | End: 2024-03-28 | Stop reason: ALTCHOICE

## 2024-03-28 NOTE — PROGRESS NOTES
Physical Therapy Daily Treatment Note    Visit Diagnoses:    ICD-10-CM ICD-9-CM   1. S/P TKR (total knee replacement), left  Z96.652 V43.65   2. Impaired mobility and endurance  Z74.09 V49.89   3. Status post total left knee replacement  Z96.652 V43.65       VISIT#: 4      Keri Bhagat reports: She is a little sore today because she has been doing a lot of walking. She went to [place], looked for an apartment, errands, etc. She did take a pain pill when she went home. She is walking around more and is generally more mobile. She will walk around the house without her walker at times. She has been propping up her leg into extension at home as discussed.   Current Pain Level:    6-7/10; Worst:   8/10 prolonged; Best:  0/10  Affected Area: L TKR, medial knee  Quality of Pain:  throbbing, burning, cramping and discomfort   Functional Deficits/Irritating Factors:  ambulation and movement   Progression: improving  Compliance with HEP Reported: Yes    Objective  Presents: ambulating with RW, steri-strips in place, min/mod edema - less than last visit with this PTA   Increased sets/reps of:  SLR, standing knee flexion  Increased resistance on:  SAQ  Added to Program: knee flexion stretch w/belt, standing marching,      AROM L knee flexion = 110 deg; Extension = lacking 6 deg   AAROM knee flexion w/belt = 114 deg  Mild redness in medial knee after some exercises.     See Exercise, Manual, and Modality Logs for complete treatment.     Patient Education: Pt was educated on exercise biomechanical correctness, intensity, and speed.     Assessment:  Keri had improvement in both her extension and flexion range of motion today. Her edema has decreased. Did note some increased redness in her medial knee after a few exercises.  Had Dorothy Tapia, PT also check her knee and she thought it was ok. Told pt to keep an eye on it and if her redness, swelling, pain increased to call her MD. Dynamic standing balance is fair/good. Added a few  exercises today for balance and functional activity. Pt will continue to benefit from skilled PT interventions to address current functional deficits and impairments.       Plan: Progress to/Continue with current program.       Timed:  Manual Therapy:            10_    mins  78858;  Ultrasound:                     0      mins  41978;   Therapeutic Exercise:    35     mins  27345;     Neuromuscular Zelda:   0     mins  01867;    Therapeutic Activity:      10     mins  77392;      Iontophoresis              _0__   mins  Dry Needling               _0____   mins         Untimed:  Work Conditioning: __0__ mins 37715  Electrical Stimulation:    0    mins  07807 ( );  Mechanical Traction:       0        mins  91057;   Paraffin                       __0___  mins   Ice/Heat: __0__ mins      Timed Treatment:   55   mins   Total Treatment:     55   mins          HARSHAL Bahena License # [id number]  Physical Therapist Assistant

## 2024-03-28 NOTE — PROGRESS NOTES
Subjective   Keri Bhagat is a 63 y.o. female.     History of Present Illness     She has hypothyroidism and has been on levothyroxine 88 µg per day. Thyroid ultrasound done in 10/17 showed stable 0.5 cm solid nodule in the right.  She denies dysphagia or pressure symptoms.  Thyroid ultrasound done in September 2020 was normal.       She has no history of head or neck radiation therapy.       She had an upper endoscopy with dilatation in June 2016 done by Dr. Rodriguez.  She has gastritis and hiatal hernia.  She is on Protonix.  She denies heartburn.  She denies melena or hematochezia.     She had a LAP-BAND and hiatal hernia repair done by Dr. Sims February 2018.  She has gained 5 lbs since 9/23.     She has history of hypertension, paroxysmal atrial fibrillation and coronary artery disease. She had a previous heart attack in 2000 and had angioplasty was 1 stent. She had a cardiac catheterization done in 2015 which showed a patent stent to the LAD. She had normal nuclear stress test in August 2021.  She denies chest pain or SOB. She is on Eliquis, Toprol-XL 25 mg/day and Imdur and follows with Dr. Burroughs.        She has hyperlipidemia and has been on atorvastatin 80 mg once a day.  She did not have side effects when she was on simvastatin before.  She denies any myalgia.       She has no previous history of diabetes mellitus.  Hemoglobin A1c done in April 2022 is normal at 5.4%.  Her mother and son have diabetes mellitus.  Her last meal was at 11 AM.      She has sleep apnea and is not using her BiPap.  She wakes up tired.     She has osteopenia on bone density done in November 2018.  She had her natural menopause at age 54 and was never on hormone replacement therapy.  She has no parental history of hip fractures.  She denies alcohol abuse.       Bone density done in March 2024 showed osteopenia.  10-year risk for major osteoporotic fracture 7.4% and for hip fracture is 1%.  She is on calcium with vitamin D 1  "tablet twice a day     She smoked cigarettes for more than 40 years and switched to electronic cigarettes in 2017.  She is on Wellbutrin.     She was seen by her PCP in February 2019 because of poor memory and poor balance.  MRI of the brain done in January 2019 showed a 8 mm lesion on falx cerebri most likely a small meningioma.  Incidental to the study, there is a suggestion of a hypoenhancing focus measuring 4 mm in diameter on the right aspect of the anterior pituitary.  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  There is an incidental arachnoid cyst measuring up to 1.9 cm anterior to the right cerebellar hemisphere.  She is following with Dr. Mercado.     MRI of the pituitary done in August 2021 showed a stable pars intermedia cyst and stable meningioma on the falx cerebri.    She has chronic neck pain and sees Dr. Mercado.    She had left total knee replacement done by Dr. Pritchard in March 2024.  She is going through rehab.    The following portions of the patient's history were reviewed and updated as appropriate: allergies, current medications, past family history, past medical history, past social history, past surgical history, and problem list.    Review of Systems   Respiratory:  Negative for shortness of breath.    Cardiovascular:  Negative for chest pain and palpitations.   Gastrointestinal: Negative.    Genitourinary: Negative.    Musculoskeletal:  Negative for myalgias.   Neurological:  Negative for numbness.     Vitals:    03/28/24 1434   BP: 126/84   Pulse: 80   Temp: 97.5 °F (36.4 °C)   TempSrc: Temporal   SpO2: 96%   Weight: 94.4 kg (208 lb 3.2 oz)   Height: 160 cm (62.99\")      Objective   Physical Exam  Constitutional:       General: She is not in acute distress.     Appearance: Normal appearance. She is not ill-appearing.   Eyes: "      General: No scleral icterus.        Right eye: No discharge.         Left eye: No discharge.      Extraocular Movements: Extraocular movements intact.      Conjunctiva/sclera: Conjunctivae normal.   Cardiovascular:      Rate and Rhythm: Normal rate and regular rhythm.   Pulmonary:      Breath sounds: No rales.   Chest:      Chest wall: No tenderness.   Abdominal:      Palpations: Abdomen is soft.      Tenderness: There is no right CVA tenderness or left CVA tenderness.   Musculoskeletal:      Right lower leg: No edema.      Left lower leg: No edema.   Lymphadenopathy:      Cervical: No cervical adenopathy.   Neurological:      Mental Status: She is alert.       Admission on 03/05/2024, Discharged on 03/06/2024   Component Date Value Ref Range Status    Glucose 03/05/2024 83  70 - 130 mg/dL Final    Hemoglobin 03/06/2024 11.6 (L)  12.0 - 15.9 g/dL Final    Hematocrit 03/06/2024 35.4  34.0 - 46.6 % Final     Assessment & Plan   Diagnoses and all orders for this visit:    1. Primary hypothyroidism (Primary)  -     T4, Free  -     TSH    2. History of gastritis    3. Primary hypertension  -     Comprehensive Metabolic Panel    4. Paroxysmal atrial fibrillation    5. Coronary artery disease involving native coronary artery of native heart without angina pectoris  -     Lipid Panel    6. Hyperlipidemia, unspecified hyperlipidemia type  -     Lipid Panel    7. Obstructive sleep apnea syndrome    8. Osteopenia, unspecified location  -     Vitamin D,25-Hydroxy    9. Pituitary cyst  -     T4, Free  -     TSH  -     Luteinizing Hormone  -     Follicle Stimulating Hormone  -     Prolactin      Continue levothyroxine 88 mcg/day.    Continue Protonix    Continue Toprol, Imdur, and Eliquis per Dr. Burroughs.    Continue atorvastatin 80 mg/day.    Advised to start using her BiPAP regularly.    Continue calcium and vitamin D supplements.    Follow-up with Dr. Mercado as scheduled.    Copy of my note sent to James Epley, NP, Dr. Pritchard  and Dr. Mercado.    RTC 6 mos.

## 2024-03-29 ENCOUNTER — TELEPHONE (OUTPATIENT)
Dept: ORTHOPEDIC SURGERY | Facility: CLINIC | Age: 64
End: 2024-03-29
Payer: MEDICARE

## 2024-03-29 ENCOUNTER — TREATMENT (OUTPATIENT)
Dept: PHYSICAL THERAPY | Facility: CLINIC | Age: 64
End: 2024-03-29
Payer: MEDICARE

## 2024-03-29 DIAGNOSIS — Z96.652 S/P TKR (TOTAL KNEE REPLACEMENT), LEFT: Primary | ICD-10-CM

## 2024-03-29 DIAGNOSIS — Z96.652 STATUS POST TOTAL LEFT KNEE REPLACEMENT: ICD-10-CM

## 2024-03-29 DIAGNOSIS — Z74.09 IMPAIRED MOBILITY AND ENDURANCE: ICD-10-CM

## 2024-03-29 LAB
25(OH)D3+25(OH)D2 SERPL-MCNC: 38.4 NG/ML (ref 30–100)
ALBUMIN SERPL-MCNC: 4.4 G/DL (ref 3.5–5.2)
ALBUMIN/GLOB SERPL: 1.9 G/DL
ALP SERPL-CCNC: 121 U/L (ref 39–117)
ALT SERPL-CCNC: 19 U/L (ref 1–33)
AST SERPL-CCNC: 18 U/L (ref 1–32)
BILIRUB SERPL-MCNC: 0.2 MG/DL (ref 0–1.2)
BUN SERPL-MCNC: 16 MG/DL (ref 8–23)
BUN/CREAT SERPL: 15 (ref 7–25)
CALCIUM SERPL-MCNC: 9.4 MG/DL (ref 8.6–10.5)
CHLORIDE SERPL-SCNC: 103 MMOL/L (ref 98–107)
CHOLEST SERPL-MCNC: 156 MG/DL (ref 0–200)
CO2 SERPL-SCNC: 22 MMOL/L (ref 22–29)
CREAT SERPL-MCNC: 1.07 MG/DL (ref 0.57–1)
EGFRCR SERPLBLD CKD-EPI 2021: 59.2 ML/MIN/1.73
FSH SERPL-ACNC: 72.8 MIU/ML (ref 25.8–134.8)
GLOBULIN SER CALC-MCNC: 2.3 GM/DL
GLUCOSE SERPL-MCNC: 99 MG/DL (ref 65–99)
HDLC SERPL-MCNC: 59 MG/DL (ref 40–60)
IMP & REVIEW OF LAB RESULTS: NORMAL
LDLC SERPL CALC-MCNC: 77 MG/DL (ref 0–100)
LH SERPL-ACNC: 45.6 MIU/ML (ref 7.7–58.5)
POTASSIUM SERPL-SCNC: 4.7 MMOL/L (ref 3.5–5.2)
PROLACTIN SERPL-MCNC: 9.3 NG/ML (ref 3.6–25.2)
PROT SERPL-MCNC: 6.7 G/DL (ref 6–8.5)
SODIUM SERPL-SCNC: 138 MMOL/L (ref 136–145)
T4 FREE SERPL-MCNC: 1.23 NG/DL (ref 0.93–1.7)
TRIGL SERPL-MCNC: 113 MG/DL (ref 0–150)
TSH SERPL DL<=0.005 MIU/L-ACNC: 2.93 UIU/ML (ref 0.27–4.2)
VLDLC SERPL CALC-MCNC: 20 MG/DL (ref 5–40)

## 2024-03-29 NOTE — PROGRESS NOTES
Normal thyroid function tests.  Continue levothyroxine 88 mcg/day.  LH and FSH are normal.  Normal prolactin.  LDL improved at 77.  HDL 59.  Continue atorvastatin 80 mg/day.  Normal vitamin D.  Continue calcium with vitamin D 1 tablet twice daily.  Copy of labs sent to James Epley, NP, Dr. Mercado, and to patient through [employer].

## 2024-03-29 NOTE — TELEPHONE ENCOUNTER
Provider: LEA CAO     Caller: PATIENT     Phone Number: [phone]    Reason for Call: PATIENT HAD LEFT KNEE SURGERY ON 3-5-24 AND WOULD LIKE TO KNOW WHEN SHE WILL BE ABLE TO DRIVE     When was the patient last seen: 3-18-24

## 2024-03-29 NOTE — TELEPHONE ENCOUNTER
Called patient and stated that she needs to wait 2-4 weeks since having her left knee replaced on 3-5-2024. Patient also needs to be off pain medication, off walker. Patient understood.

## 2024-03-31 ENCOUNTER — HOSPITAL ENCOUNTER (OUTPATIENT)
Facility: HOSPITAL | Age: 64
Discharge: HOME | End: 2024-03-31
Attending: EMERGENCY MEDICINE | Admitting: EMERGENCY MEDICINE
Payer: MEDICARE

## 2024-03-31 ENCOUNTER — APPOINTMENT (OUTPATIENT)
Dept: GENERAL RADIOLOGY | Facility: HOSPITAL | Age: 64
End: 2024-03-31
Payer: MEDICARE

## 2024-03-31 VITALS
DIASTOLIC BLOOD PRESSURE: 89 MMHG | WEIGHT: 208 LBS | TEMPERATURE: 99.9 F | BODY MASS INDEX: 36.86 KG/M2 | HEART RATE: 55 BPM | RESPIRATION RATE: 17 BRPM | SYSTOLIC BLOOD PRESSURE: 120 MMHG | OXYGEN SATURATION: 97 % | HEIGHT: 63 IN

## 2024-03-31 DIAGNOSIS — J10.1 INFLUENZA A: Primary | ICD-10-CM

## 2024-03-31 LAB
FLUAV SUBTYP SPEC NAA+PROBE: DETECTED
FLUBV RNA ISLT QL NAA+PROBE: NOT DETECTED
SARS-COV-2 RNA RESP QL NAA+PROBE: NOT DETECTED
STREP A PCR: NOT DETECTED

## 2024-03-31 PROCEDURE — 71045 X-RAY EXAM CHEST 1 VIEW: CPT

## 2024-03-31 PROCEDURE — G0463 HOSPITAL OUTPT CLINIC VISIT: HCPCS | Performed by: NURSE PRACTITIONER

## 2024-03-31 PROCEDURE — 87651 STREP A DNA AMP PROBE: CPT | Performed by: NURSE PRACTITIONER

## 2024-03-31 PROCEDURE — 87636 SARSCOV2 & INF A&B AMP PRB: CPT | Performed by: NURSE PRACTITIONER

## 2024-03-31 RX ORDER — BENZONATATE 100 MG/1
100 CAPSULE ORAL 3 TIMES DAILY PRN
Qty: 20 CAPSULE | Refills: 0 | Status: SHIPPED | OUTPATIENT
Start: 2024-03-31

## 2024-03-31 NOTE — FSED PROVIDER NOTE
Subjective   History of Present Illness  Patient is 63-year-old female who presents complaining of cough, congestion, body aches since Friday.  She denies any vomiting or abdominal pain.  Denies any known fever.  Complains of fatigue.  States she has been taking over-the-counter medication without relief.      Review of Systems   HENT:  Positive for congestion and sore throat.    Respiratory:  Positive for cough.    All other systems reviewed and are negative.      Past Medical History:   Diagnosis Date    Abnormal Pap smear of cervix     Adrenal adenoma 09/21/2022    Anemia     Anxiety     Arthritis     Arthritis of back     Arthritis of neck     Bipolar I disorder, single manic episode     depressive d(NOS)    Cervical disc herniation     Coronary artery disease     COVID-19 01/10/2023    Death of family member     MOTHER IN FEB 2024    Depression     NO SUICIDAL PLANS    Dislocation of finger     Diverticular disease     Dyspepsia     Encounter for follow-up examination after completed treatment for conditions other than malignant neoplasm 04/11/2018    GERD (gastroesophageal reflux disease)     Heart attack     AGE 39    Heart murmur     Hiatal hernia     History of transfusion     2001    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    HPV positive on pap LGSIL    Hyperlipidemia     Hypertension     Hypothyroidism     Incontinence in female     wears pads    Kidney disease, chronic, stage III (GFR 30-59 ml/min)     Knee pain, bilateral     Knee swelling     LGSIL on Pap smear of cervix 04/29/2016    LGSIL HPV positive    Lumbosacral disc disease Unsure    Lower left back is partial osteoarthritis and partial osteoporosis    Obesity     Osteopenia 2021    Bone density test    Periodic limb movement disorder     Restless leg syndrome     LEFT SHOULDER    Rotator cuff syndrome     Sleep apnea     NO MACHINE CURRENTLY    Suicidal ideation 08/19/2016    history    Thyroid nodule     Vitamin B12 deficiency         No Known Allergies    Past Surgical History:   Procedure Laterality Date    ADENOIDECTOMY  1974    ANTERIOR CERVICAL DISCECTOMY W/ FUSION N/A 12/29/2016    Procedure: C3-4 anterior cervical discectomy and fusion with Depuy micro plate, ALLOGRAFT C3-4, AND HARDWARE REMOVAL C4-7.;  Surgeon: Hema Godwin MD;  Location: [facility] OR;  Service:     CARDIAC CATHETERIZATION N/A 03/30/2017    Procedure: Left Heart Cath;  Surgeon: Tracey Vargas MD;  Location: [facility] CATH INVASIVE LOCATION;  Service:     CARDIAC CATHETERIZATION N/A 03/30/2017    Procedure: Coronary angiography;  Surgeon: Tracey Vargas MD;  Location:  [facility] CATH INVASIVE LOCATION;  Service:     CARDIAC CATHETERIZATION N/A 03/30/2017    Procedure: Left ventriculography;  Surgeon: Tracey Vargas MD;  Location: [facility] CATH INVASIVE LOCATION;  Service:     CARDIAC CATHETERIZATION  03/30/2017    Procedure: Functional Flow [place];  Surgeon: Tracey Vargas MD;  Location: [facility] CATH INVASIVE LOC[facility];  Service:     CATARACT EXTRACTION Bilateral     CERVICAL BIOPSY  MMXVI    Dr. Jeffery.     CERVICAL DISCECTOMY ANTERIOR  04/2013    C4-7    COLONOSCOPY  04/20/2015    Diverticulosis, IH    COLONOSCOPY  03/09/2021    COLPOSCOPY W/ BIOPSY / CURETTAGE  06/17/2016    LGSIL HPV positive. Results were normal repeat pap in one year. Chronic Cervicitis    CORONARY ANGIOPLASTY WITH STENT PLACEMENT      ENDOSCOPY  MMXV    Normal.  Dr. Rodriguez    ENDOSCOPY N/A 06/22/2017    Erythematous mucosa in the stomach  PATH: Chronic active gastritis, moderate with intestinal metaplasia     EPIDURAL BLOCK      INGUINAL HERNIA REPAIR      LAPAROSCOPIC GASTRIC BANDING  02/2018    SHOULDER SURGERY Left     RCR    TONSILLECTOMY AND ADENOIDECTOMY      TOTAL KNEE ARTHROPLASTY Left 3/5/2024    Procedure: LEFT TOTAL KNEE ARTHROPLASTY WITH TAB NAVIGATION;  Surgeon: Tho Pritchard MD;  Location: [facility];  Service: Orthopedics;  Laterality: Left;    TRANSVAGINAL TAPING  SUSPENSION N/A 12/06/2017    Procedure: MID URETHRAL SLING CYSTSCOPY;  Surgeon: Abby Méndez MD;  Location: [facility] OR;  Service:     TRIGGER POINT INJECTION      TUBAL ABDOMINAL LIGATION      TYMPANOSTOMY TUBE PLACEMENT Right     UPPER GASTROINTESTINAL ENDOSCOPY  approx 2021    WRIST SURGERY Bilateral     carpal tunnel       Family History   Problem Relation Age of Onset    Diabetes type II Mother     Hypertension Mother     Osteoporosis Mother     Seizures Mother     Diabetes Mother     Arthritis Mother     Hyperlipidemia Mother     COPD Father     Hypertension Father     Lung cancer Father     Heart attack Father     Liver cancer Father     Liver disease Father     Heart disease Father     Cancer Father         Lung cacer that metastasized  into the liver.    Thyroid disease Sister     Hypertension Sister     Bipolar disorder Sister     Depression Sister     ADD / ADHD Sister     Anxiety disorder Sister     Mental illness Sister     Hyperlipidemia Sister     Broken bones Sister     Thyroid disease Sister     Hypertension Sister     Bipolar disorder Sister     Anxiety disorder Sister     Depression Sister     Mental illness Sister     Hyperlipidemia Sister     Cancer Maternal Grandmother     No Known Problems Maternal Grandfather     No Known Problems Paternal Grandmother     No Known Problems Paternal Grandfather     Abnormal EKG Daughter     Hypertension Daughter     Bipolar disorder Daughter     Thyroid disease Daughter     Mental illness Daughter     Hyperlipidemia Daughter     Asthma Daughter     No Known Problems Son     Diabetes Son         Type 1    Breast cancer Neg Hx     Ovarian cancer Neg Hx     Uterine cancer Neg Hx     Colon cancer Neg Hx     Malig Hyperthermia Neg Hx        Social History     Socioeconomic History    Marital status:     Number of children: 3    Years of education: 12   Tobacco Use    Smoking status: Light Smoker     Current packs/day: 0.25     Types:  Electronic Cigarette, Cigarettes     Passive exposure: Current    Smokeless tobacco: Current    Tobacco comments:     Electronic Cigarette   Vaping Use    Vaping status: Every Day    Substances: Nicotine, Flavoring    Devices: Refillable tank   Substance and Sexual Activity    Alcohol use: Not Currently     Comment: RARELY    Drug use: Never    Sexual activity: Not Currently     Partners: Male     Birth control/protection: Condom, Abstinence, Post-menopausal, None, Tubal ligation           Objective   Physical Exam  Vitals and nursing note reviewed.   Constitutional:       Appearance: Normal appearance.   HENT:      Head: Normocephalic and atraumatic.      Nose: Congestion present.   Cardiovascular:      Rate and Rhythm: Normal rate and regular rhythm.   Pulmonary:      Effort: Pulmonary effort is normal.      Breath sounds: Normal breath sounds.   Abdominal:      General: Abdomen is flat.      Palpations: Abdomen is soft.      Tenderness: There is no abdominal tenderness.   Musculoskeletal:      Cervical back: Normal range of motion and neck supple.   Skin:     General: Skin is warm and dry.      Capillary Refill: Capillary refill takes less than 2 seconds.   Neurological:      General: No focal deficit present.      Mental Status: She is alert and oriented to person, place, and time.         Procedures           ED Course                                           Medical Decision Making  Patient is nontoxic-appearing, positive for influenza A.  She is negative for both strep and COVID.  Chest x-ray negative for acute findings.  She is follow-up with her primary care and was given strict return precautions.    Problems Addressed:  Influenza A: complicated acute illness or injury    Amount and/or Complexity of Data Reviewed  Radiology: ordered.    Risk  Prescription drug management.        Final diagnoses:   Influenza A       ED Disposition  ED Disposition       ED Disposition   Discharge    Condition   Stable     Comment   --               Epley, James, APRN  [address]  [address]  [place] [address]  [phone]    Call   If symptoms worsen         Medication List        New Prescriptions      benzonatate 100 MG capsule  Commonly known as: TESSALON  Take 1 capsule by mouth 3 (Three) Times a Day As Needed for Cough.               Where to Get Your Medications        These medications were sent to [facility] [id number] - [place], [place] - [address] AT [facility] - [phone] [facility] [phone]   [address] , [facility] [address]      Phone: [phone]   benzonatate 100 MG capsule

## 2024-04-01 ENCOUNTER — TELEPHONE (OUTPATIENT)
Dept: PHYSICAL THERAPY | Facility: OTHER | Age: 64
End: 2024-04-01
Payer: MEDICARE

## 2024-04-01 NOTE — TELEPHONE ENCOUNTER
Caller: Keri Bhagat    Relationship: Self    What was the call regarding: PATIENT CANCELLED APPT TODAY DUE TO NOT FEELING WELL

## 2024-04-03 ENCOUNTER — TREATMENT (OUTPATIENT)
Dept: PHYSICAL THERAPY | Facility: CLINIC | Age: 64
End: 2024-04-03
Payer: MEDICARE

## 2024-04-03 DIAGNOSIS — Z96.652 S/P TKR (TOTAL KNEE REPLACEMENT), LEFT: ICD-10-CM

## 2024-04-03 DIAGNOSIS — Z96.652 STATUS POST TOTAL LEFT KNEE REPLACEMENT: ICD-10-CM

## 2024-04-03 DIAGNOSIS — Z96.652 S/P TKR (TOTAL KNEE REPLACEMENT), LEFT: Primary | ICD-10-CM

## 2024-04-03 DIAGNOSIS — Z74.09 IMPAIRED MOBILITY AND ENDURANCE: ICD-10-CM

## 2024-04-03 RX ORDER — HYDROCODONE BITARTRATE AND ACETAMINOPHEN 7.5; 325 MG/1; MG/1
1 TABLET ORAL EVERY 6 HOURS PRN
Qty: 28 TABLET | Refills: 0 | Status: SHIPPED | OUTPATIENT
Start: 2024-04-03

## 2024-04-03 NOTE — PROGRESS NOTES
Physical Therapy Daily Treatment Note  [facility] [place]   [address] [place] [address]  P: [phone] F: [phone]    Patient: Keri Bhagat   : 1960  Referring practitioner: DAPHNEY Choe  Date of Initial Visit: Type: THERAPY  Noted: 3/19/2024  Today's Date: 4/3/2024  Patient seen for 5 sessions       Visit Diagnoses:    ICD-10-CM ICD-9-CM   1. S/P TKR (total knee replacement), left  Z96.652 V43.65   2. Impaired mobility and endurance  Z74.09 V49.89   3. Status post total left knee replacement  Z96.652 V43.65         Subjective:  Keri Bhagat reports: She had a cold recently and hasn't felt good for a few days but is doing ok currently. Pt reports she has had some regression w/ knee pain and thinks it is because she hasn't been using it as much and has been more sedentary this week.      Objective          Active Range of Motion   Left Knee   Flexion: 120 degrees with pain  Extensor la degrees     Ambulation     Comments   Pt demonstrated walking overground w/ no AD and therapist SBA for 100` 2x.       See Exercise, Manual, and Modality Logs for complete treatment.     39, 48.5  Assessment:  Pt presents to clinic w/ 4 pt walker today. She is 1 month s/p TKA, and we discussed overground walking w/ no AD and pt was a little apprehensive. Pt reports she has been walking around home w/ no AD. We trialed this in the clinic at a distance of 100'  and pt had no loss of balance on 2x attempts but reports feeling a little unsteady and states she would prefer to use a cane she has at home. Pt was advised this is fine and she can keep the walker in her car if she needs it for longer community distances. Lighter intensity treatment today due to pt not feeling her best.      Plan:   Continue to monitor and progress.        Timed:         Manual Therapy:    10     mins  91645;     Therapeutic Exercise:    15     mins  54149;     Neuromuscular Zelda:     15    mins  74763;    Therapeutic Activity:     15     mins  07293;     Ultrasound:     0     mins  00561;    Ionto                               0    mins  61635    Un-Timed:  Electrical Stimulation:    0     mins  62868 ( );  Traction     0     mins 19422        Timed Treatment:   55   mins   Total Treatment:     65   mins    RICHY Rodriguze License #: [id number]    Physical Therapist

## 2024-04-04 NOTE — PROGRESS NOTES
I have reviewed the notes, assessments, and/or procedures performed by Derrick Diana P.T.  I concur with his documentation of Keri Bhagat.       Dorothy Tapia, PT  [place] License [id number]

## 2024-04-04 NOTE — PROGRESS NOTES
Physical Therapy Daily Treatment Note    Visit Diagnoses:    ICD-10-CM ICD-9-CM   1. S/P TKR (total knee replacement), left  Z96.652 V43.65   2. Impaired mobility and endurance  Z74.09 V49.89   3. Status post total left knee replacement  Z96.652 V43.65       VISIT#: 6      Keri Bhagat reports: She is ambulating better and using the walker less and less at home. She has tried to use the cane too but she is fearful of falling. Her knee feels unstable to her. She gets sharp pain intermittently. She has one step on her porch and one step from the walkway to the driveway. She uses her walker for them as she, again, fears falling. She has trouble sleeping at night and tosses and turns a lot in order to get comfortable.   Current Pain Level:    7/10; Worst:   8/10 prolonged; Best:  0/10  Affected Area: L TKR, medial knee  Quality of Pain:  throbbing, burning, cramping and discomfort   Functional Deficits/Irritating Factors:  ambulation and movement   Progression: improving slowly  Compliance with HEP Reported: yes    Objective  Presents: decreased stride, L arch in pronation    Added to Program: knee extension stretch, Seated knee flexion      AROM L knee flexion = 113 deg; Extension = lacking 5 deg   AAROM knee flexion w/belt = 118 deg    See Exercise, Manual, and Modality Logs for complete treatment.     Patient Education: Pt was educated on exercise biomechanical correctness, intensity, and speed. Explained to patient that supporting her leg with a pillow while knee is extended is not going to improve her knee extension as much as it will with the pillow just under her ankle.  Walk at home w/o the wx in the hallway. Advised arch supports    Assessment:  Misty knee is slowly improving. Functionally, she is still limited. She is weaning off of her walker and trying to go without it or use her cane at home. We have practiced here in the clinic the past two sessions. She is somewhat unsteady with her gait when  ambulating without her AD. Encouraged to walk down her hallway at home and practice w/o the wx. She continues to need quad strengthening to decreased the feeling of knee instability. Her flexion range of motion has improved since last measured by this PTA and slight improved extension.  Pt will continue to benefit from skilled PT interventions to address current functional deficits and impairments.       Plan: Progress to/Continue with current program.       Timed:  Manual Therapy:            15_    mins  56911;  Ultrasound:                     0      mins  67804;   Therapeutic Exercise:    15     mins  01833;     Neuromuscular Zelda:   0     mins  56175;    Therapeutic Activity:      10     mins  45619;      Iontophoresis              _0__   mins  Dry Needling               _0____   mins         Untimed:  Work Conditioning: __0__ mins 50907  Electrical Stimulation:    0    mins  19831 ( );  Mechanical Traction:       0        mins  11288;   Paraffin                       __0___  mins   Ice/Heat: __10__ mins      Timed Treatment:   40   mins   Total Treatment:     50   mins          Diana [place] PTA  [place] License # [id number]  Physical Therapist Assistant

## 2024-04-05 ENCOUNTER — TREATMENT (OUTPATIENT)
Dept: PHYSICAL THERAPY | Facility: CLINIC | Age: 64
End: 2024-04-05
Payer: MEDICARE

## 2024-04-05 DIAGNOSIS — Z96.652 S/P TKR (TOTAL KNEE REPLACEMENT), LEFT: Primary | ICD-10-CM

## 2024-04-05 DIAGNOSIS — Z74.09 IMPAIRED MOBILITY AND ENDURANCE: ICD-10-CM

## 2024-04-05 DIAGNOSIS — Z96.652 STATUS POST TOTAL LEFT KNEE REPLACEMENT: ICD-10-CM

## 2024-04-08 ENCOUNTER — TREATMENT (OUTPATIENT)
Dept: PHYSICAL THERAPY | Facility: CLINIC | Age: 64
End: 2024-04-08
Payer: MEDICARE

## 2024-04-08 DIAGNOSIS — Z96.652 STATUS POST TOTAL LEFT KNEE REPLACEMENT: ICD-10-CM

## 2024-04-08 DIAGNOSIS — Z74.09 IMPAIRED MOBILITY AND ENDURANCE: ICD-10-CM

## 2024-04-08 DIAGNOSIS — Z96.652 S/P TKR (TOTAL KNEE REPLACEMENT), LEFT: Primary | ICD-10-CM

## 2024-04-08 NOTE — PROGRESS NOTES
"Physical Therapy Daily Treatment Note    Visit Diagnoses:    ICD-10-CM ICD-9-CM   1. S/P TKR (total knee replacement), left  Z96.652 V43.65   2. Impaired mobility and endurance  Z74.09 V49.89   3. Status post total left knee replacement  Z96.652 V43.65       VISIT#: 7      Keri Bhagat reports: She is very sore today. She was on her feet for a long time yesterday packing for her move. The swelling in her knee increased too. She did ice and elevate.   Current Pain Level:    6-7/10; Worst:   8-9/10 prolonged; Best:  0/10  Affected Area: L TKR, medial knee  Quality of Pain:  throbbing, burning, cramping and discomfort   Functional Deficits/Irritating Factors:  ambulation and movement   Progression: improving overall  Compliance with HEP Reported: stretched    Objective  Presents: 10 min late for her appointment. decreased stride, L arch in pronation, walking with SPC today.  Mild edema  Increased sets/reps of:  knee flexion, marching   Increased resistance on:  knee flexion  Added to Program: Leg Press      AROM L knee flexion = 118 deg; Extension = lacking 4 deg after weighted stretch  AAROM knee flexion w/belt = 119 deg    See Exercise, Manual, and Modality Logs for complete treatment.     Patient Education: Pt was educated on exercise biomechanical correctness, intensity, and speed.     Assessment:  Keri's active range of motion has improved in both flexion and extension. She progressed to a 6\" step today. Also now ambulating with SPC versus walker. Mild edema and moderate pain level still present.  Pt will continue to benefit from skilled PT interventions to address current functional deficits and impairments.       Plan: Progress to/Continue with current program.       Timed:  Manual Therapy:            15_    mins  67782;  Ultrasound:                     0      mins  28005;   Therapeutic Exercise:    25     mins  85725;     Neuromuscular Zelda:   10     mins  86576;    Therapeutic Activity:      10     mins  " 39502;      Iontophoresis              _0__   mins  Dry Needling               _0____   mins         Untimed:  Work Conditioning: __0__ mins 28513  Electrical Stimulation:    0    mins  69741 ( );  Mechanical Traction:       0        mins  42902;   Paraffin                       __0___  mins   Ice/Heat: __0__ mins      Timed Treatment:   60   mins   Total Treatment:     60   mins          Diana Wilson PTA  [place] License # [id number]  Physical Therapist Assistant

## 2024-04-10 ENCOUNTER — TREATMENT (OUTPATIENT)
Dept: PHYSICAL THERAPY | Facility: CLINIC | Age: 64
End: 2024-04-10
Payer: MEDICARE

## 2024-04-10 DIAGNOSIS — Z96.652 S/P TKR (TOTAL KNEE REPLACEMENT), LEFT: ICD-10-CM

## 2024-04-10 DIAGNOSIS — Z96.652 S/P TKR (TOTAL KNEE REPLACEMENT), LEFT: Primary | ICD-10-CM

## 2024-04-10 DIAGNOSIS — Z74.09 IMPAIRED MOBILITY AND ENDURANCE: ICD-10-CM

## 2024-04-10 RX ORDER — HYDROCODONE BITARTRATE AND ACETAMINOPHEN 7.5; 325 MG/1; MG/1
1 TABLET ORAL EVERY 6 HOURS PRN
Qty: 28 TABLET | Refills: 0 | Status: SHIPPED | OUTPATIENT
Start: 2024-04-10

## 2024-04-10 NOTE — TELEPHONE ENCOUNTER
Last refill 04/03/24    Surgery 03/06/24    Last appointment 03/18/24    Future appointment 04/29/24

## 2024-04-10 NOTE — PROGRESS NOTES
Physical Therapy Daily Treatment Note  [facility] [place]   [address] [place] [address]  P: [phone] F: [phone]    Patient: Keri Bhagat   : 1960  Referring practitioner: DAPHNEY Choe  Date of Initial Visit: Type: THERAPY  Noted: 3/19/2024  Today's Date: 2024  Patient seen for 8 sessions       Visit Diagnoses:    ICD-10-CM ICD-9-CM   1. S/P TKR (total knee replacement), left  Z96.652 V43.65   2. Impaired mobility and endurance  Z74.09 V49.89         Subjective:  Keri Bhagat reports: Knee is a little sore. Pt present w/ cane today and is no longer using rollator. She has been walking more around the house w/ her AD and feels safe for short distances      Objective          Active Range of Motion   Left Knee   Flexion: 112 degrees   Extensor la degrees     Passive Range of Motion     Right Knee   Normal passive range of motion      See Exercise, Manual, and Modality Logs for complete treatment.       Assessment:  Pt reports tightness in anterior knee is improved after knee flexion stretching and demonstrates improved balance/form w/ high knee marching exercise. We practiced ascending/descending stairs today and pt did well. She started using 2 hands on safety rail but was down to using one hand after a couple minutes of practice with no negative effect.      Plan:   Continue to monitor and progress.        Timed:         Manual Therapy:    15     mins  70701;     Therapeutic Exercise:    15     mins  19553;     Neuromuscular Zelda:    15    mins  28392;    Therapeutic Activity:     15     mins  19154;     Ultrasound:     0     mins  64940;    Ionto                               0    mins  79209    Un-Timed:  Electrical Stimulation:    0     mins  18965 ( );  Traction     0     mins 04084        Timed Treatment:   60   mins   Total Treatment:     60   mins    Derrick Diana, PT  [place] License #: [id number]    Physical  Therapist

## 2024-04-10 NOTE — TELEPHONE ENCOUNTER
Caller: Olayinka Keri L    Relationship: Self    Best call back number: [phone]    Requested Prescriptions:   Requested Prescriptions     Pending Prescriptions Disp Refills    HYDROcodone-acetaminophen (NORCO) 7.5-325 MG per tablet 28 tablet 0     Sig: Take 1 tablet by mouth Every 6 (Six) Hours As Needed for Severe Pain.        Pharmacy where request should be sent:  [facility]   Last office visit with prescribing clinician: 3/18/2024   Last telemedicine visit with prescribing clinician: Visit date not found   Next office visit with prescribing clinician: 4/29/2024     Additional details provided by patient: PATIENT STATES SHE IS TRYING TO MAKE THEM LAST AS LONG AS POSSIBLE AND IS NOT TAKING EVERY SIX HOURS.    Does the patient have less than a 3 day supply:  [x] Yes  [] No    Would you like a call back once the refill request has been completed: [] Yes [x] No    If the office needs to give you a call back, can they leave a voicemail: [x] Yes [] No    Jigar Pike Rep   04/10/24 13:34 EDT

## 2024-04-12 ENCOUNTER — TREATMENT (OUTPATIENT)
Dept: PHYSICAL THERAPY | Facility: CLINIC | Age: 64
End: 2024-04-12
Payer: MEDICARE

## 2024-04-12 DIAGNOSIS — Z74.09 IMPAIRED MOBILITY AND ENDURANCE: ICD-10-CM

## 2024-04-12 DIAGNOSIS — Z96.652 S/P TKR (TOTAL KNEE REPLACEMENT), LEFT: Primary | ICD-10-CM

## 2024-04-12 DIAGNOSIS — Z96.652 STATUS POST TOTAL LEFT KNEE REPLACEMENT: ICD-10-CM

## 2024-04-12 DIAGNOSIS — K21.00 GASTROESOPHAGEAL REFLUX DISEASE WITH ESOPHAGITIS WITHOUT HEMORRHAGE: ICD-10-CM

## 2024-04-12 PROCEDURE — 97140 MANUAL THERAPY 1/> REGIONS: CPT | Performed by: PHYSICAL THERAPIST

## 2024-04-12 PROCEDURE — 97110 THERAPEUTIC EXERCISES: CPT | Performed by: PHYSICAL THERAPIST

## 2024-04-12 NOTE — PROGRESS NOTES
Physical Therapy Daily Treatment Note    Visit Diagnoses:    ICD-10-CM ICD-9-CM   1. S/P TKR (total knee replacement), left  Z96.652 V43.65   2. Impaired mobility and endurance  Z74.09 V49.89   3. Status post total left knee replacement  Z96.652 V43.65       VISIT#: 9      Keri Bhagat reports: Her knee was sore earlier today. She took  a pain pill and iced and it is doing ok now. She is walking all over her house now without her cane.   Current Pain Level:    2-3/10; Worst:   8-9/10 prolonged; Best:  0/10  Affected Area: L TKR, medial knee  Quality of Pain:  throbbing, burning, cramping and discomfort   Functional Deficits/Irritating Factors:  ambulation and movement   Progression: improving overall  Compliance with HEP Reported: Yes    Objective  Presents: Pt 6 min late for her appointment. Ambulating with WPC  Increased sets/reps of:  extension stretch time   Increased resistance on:  SLR  Added to Program: none      AROM L knee flexion = 115 deg; Extension = lacking 2 deg after weighted stretch  AAROM knee flexion w/belt = 120 deg    See Exercise, Manual, and Modality Logs for complete treatment.     Patient Education: Pt was educated on exercise biomechanical correctness, intensity, and speed.     Assessment:  Keri continues to make progress with gains in her passive flexion range of motion and also with her active extension. She has been more diligent with extension stretching at home and doing it properly now. No issues with any of her exercises. She has very good compliance with her HEP and PT attendance. Pt will continue to benefit from skilled PT interventions to address current functional deficits and impairments.       Plan: Progress to/Continue with current program.       Timed:  Manual Therapy:            10_    mins  61651;  Ultrasound:                     0      mins  69799;   Therapeutic Exercise:    30     mins  85487;     Neuromuscular Zelda:   0     mins  98492;    Therapeutic Activity:       0     mins  84235;      Iontophoresis              _0__   mins  Dry Needling               _0____   mins         Untimed:  Work Conditioning: __0__ mins 64179  Electrical Stimulation:    0    mins  12508 ( );  Mechanical Traction:       0        mins  92584;   Paraffin                       __0___  mins   Ice/Heat: __0__ mins      Timed Treatment:   40   mins   Total Treatment:     40   mins          HARSHAL Bahena License # [id number]  Physical Therapist Assistant

## 2024-04-15 ENCOUNTER — TREATMENT (OUTPATIENT)
Dept: PHYSICAL THERAPY | Facility: CLINIC | Age: 64
End: 2024-04-15
Payer: MEDICARE

## 2024-04-15 DIAGNOSIS — Z74.09 IMPAIRED MOBILITY AND ENDURANCE: ICD-10-CM

## 2024-04-15 DIAGNOSIS — Z96.652 S/P TKR (TOTAL KNEE REPLACEMENT), LEFT: Primary | ICD-10-CM

## 2024-04-15 DIAGNOSIS — Z96.652 STATUS POST TOTAL LEFT KNEE REPLACEMENT: ICD-10-CM

## 2024-04-15 PROCEDURE — 97110 THERAPEUTIC EXERCISES: CPT

## 2024-04-15 PROCEDURE — 97530 THERAPEUTIC ACTIVITIES: CPT

## 2024-04-15 PROCEDURE — 97112 NEUROMUSCULAR REEDUCATION: CPT

## 2024-04-15 PROCEDURE — 97140 MANUAL THERAPY 1/> REGIONS: CPT

## 2024-04-15 RX ORDER — PANTOPRAZOLE SODIUM 40 MG/1
40 TABLET, DELAYED RELEASE ORAL 2 TIMES DAILY
Qty: 180 TABLET | Refills: 0 | Status: SHIPPED | OUTPATIENT
Start: 2024-04-15

## 2024-04-16 ENCOUNTER — TELEPHONE (OUTPATIENT)
Dept: ORTHOPEDIC SURGERY | Facility: CLINIC | Age: 64
End: 2024-04-16
Payer: MEDICARE

## 2024-04-16 NOTE — TELEPHONE ENCOUNTER
Caller: GREG    Relationship to patient: [facility]    Best call back number: [phone]    Patient is needing: PLAN OF CARE IS NEEDED FOR PATIENT.  CAN FAX  [phone]

## 2024-04-17 ENCOUNTER — TREATMENT (OUTPATIENT)
Dept: PHYSICAL THERAPY | Facility: CLINIC | Age: 64
End: 2024-04-17
Payer: MEDICARE

## 2024-04-17 ENCOUNTER — OFFICE VISIT (OUTPATIENT)
Dept: SLEEP MEDICINE | Facility: HOSPITAL | Age: 64
End: 2024-04-17
Payer: MEDICARE

## 2024-04-17 VITALS — WEIGHT: 206 LBS | BODY MASS INDEX: 36.5 KG/M2 | HEART RATE: 81 BPM | HEIGHT: 63 IN | OXYGEN SATURATION: 97 %

## 2024-04-17 DIAGNOSIS — Z96.652 STATUS POST TOTAL LEFT KNEE REPLACEMENT: ICD-10-CM

## 2024-04-17 DIAGNOSIS — G47.33 SEVERE OBSTRUCTIVE SLEEP APNEA: Primary | ICD-10-CM

## 2024-04-17 DIAGNOSIS — Z74.09 IMPAIRED MOBILITY AND ENDURANCE: ICD-10-CM

## 2024-04-17 DIAGNOSIS — D64.9 ANEMIA, UNSPECIFIED TYPE: ICD-10-CM

## 2024-04-17 DIAGNOSIS — R53.83 FATIGUE, UNSPECIFIED TYPE: ICD-10-CM

## 2024-04-17 DIAGNOSIS — Z96.652 S/P TKR (TOTAL KNEE REPLACEMENT), LEFT: Primary | ICD-10-CM

## 2024-04-17 DIAGNOSIS — G25.81 RESTLESS LEGS SYNDROME (RLS): ICD-10-CM

## 2024-04-17 PROCEDURE — G0463 HOSPITAL OUTPT CLINIC VISIT: HCPCS

## 2024-04-17 NOTE — PROGRESS NOTES
[facility]  [address]  [address]  [place] [address]  Phone   Fax         SLEEP CLINIC FOLLOW-UP PROGRESS NOTE    Keri Bhagat  [id number]   1960  63 y.o.  female      PCP: Epley, James, APRN    DATE OF VISIT: 4/17/2024          CHIEF COMPLAINT: Obstructive sleep apnea     HPI:  This is a 63 y.o. year old patient who presents to the clinic today for the management of obstructive sleep apnea.  She presented to the office to reestablish with new provider 5/2023.  She lost 20 pounds and felt that her sleep apnea may have resolved and she was requesting repeat home sleep study.  She had not been using BiPAP for a while.  Patient had a(n) home sleep study 6/2023 showing severe obstructive sleep apnea with AHI of 51/hr. it was recommended that she continue use of her BiPAP however her maximum pressure was decreased from 25 to18, minimum EPAP was kept at 10, and flex at 3 was recommended to help with tolerance.  It was recommended the patient follow-up with the office in 2 to 3 weeks.  She canceled her July and August follow-ups and missed her February follow-up.      She now presents today for follow-up.  Unfortunately she lost her mother in February.  She was caring for her since last July.  She has good family and friend support and has a counselor who she sees regularly, mood stable.  She has not started using her BiPAP since previous sleep study.  Verified today that the pressure adjustments were made.  Have changed humidifier to auto and have turned the ramp on for 20 minutes out of 6 to help ease into use.  She will let us know if she has any issues with this.    Was on Mirapex for restless legs but has been off this about 4 months as she ran out.  Denies augmentation symptoms when she was on the Mirapex.  Denies side effects from medication.  Denies compulsive behaviors with medication use or history of this in general.  Recommended avoiding nicotine,  "caffeine, sleep deprivation, and ensuring that sleep apnea well treated.  Untreated sleep apnea can worsen symptoms.  Also would like to recheck a ferritin level as I do not see recent 1 on file and she does have a history of low ferritin in the past as well as history of anemia.          MEDICATIONS: reviewed     ALLERGIES:  Patient has no known allergies.    SOCIAL HISTORY (habits pertaining to sleep medicine):  Tobacco use: Yes, e-cigarettes, discussed risk, recommended cessation  Alcohol use: 0 per week  Caffeine use: 2-3     REVIEW OF SYSTEMS:   Pertinent positive symptoms are:  [place] Sleepiness Scale :Total score: 11   Postnasal drip  Heartburn  Anxiety  Depression        PHYSICAL EXAMINATION:  CONSTITUTIONAL:  Vitals:    04/17/24 0700   Pulse: 81   SpO2: 97%   Weight: 93.4 kg (206 lb)   Height: 160 cm (62.99\")    Body mass index is 36.5 kg/m².   HEAD: atraumatic, normocephalic  RESP SYSTEM: not in respiratory distress, breathing unlabored  CARDIOVASULAR: normal rate, no edema noted   NEURO: Alert and oriented x 3, mood and affect appeared appropriate                ASSESSMENT AND PLAN:  Obstructive Sleep Apnea: Reiterated risks of untreated sleep apnea including potential increased risk of abnormal heart rhythms such as A-fib or premature heartbeats, heart attack, stroke, etc.  Patient verbalized understanding.  She is going to start using her BiPAP tonight.  Have turned ramp on and have changed humidifier to auto.  Will adjust as needed but need to avoid condensation or rain.  She uses full facemask.  She does report good airflow through her nose.  We also discussed possible chinstrap to see if she can breathe a little bit more through her nose since she has good airflow to see if this can help with dry mouth issues if they occur.  Obesity: Body mass index is 36.5 kg/m².. Patients who are overweight or obese are at increased risk of sleep apnea/ sleep disordered breathing. Weight reduction and healthy " lifestyle are encouraged in overweight/ obese patients as part of a comprehensive approach to sleep apnea treatment.    Restless leg syndrome: Was on Mirapex for restless legs but ran out and has been off of this at least 4 months.  Denies augmentation symptoms when she was on the Mirapex.  Denies side effects from medication when she was taking it.  Denies compulsive behaviors with medication or history of this in general.  Recommended avoiding nicotine, caffeine, sleep deprivation, and ensuring that sleep apnea well treated, resume BiPAP as above as untreated sleep apnea can worsen symptoms.  Also would like to recheck a ferritin level as I do not see recent one on file and she does have a history of low ferritin in the past as well as history of anemia.  Currently she is on [place] through her orthopedic provider for recent knee replacement.  Will reevaluate symptoms next visit or sooner for issues or concerns.    Hypertension  CAD  Chronic pain  Nicotine dependence: recommended cessation      Patient will follow-up in 4 to 8 weeks or follow-up sooner for any issues or concerns.  Patient's questions were answered.          Thank you for allowing me to participate in the care of this patient.     Janessa Blas, GURDEEP, APRN  [facility]

## 2024-04-17 NOTE — PROGRESS NOTES
Physical Therapy Daily Treatment Note  [facility] [place]   [address] [place] [address]  P: [phone] F: [phone]    Patient: Keri Bhagat   : 1960  Referring practitioner: DAPHNEY Choe  Date of Initial Visit: Type: THERAPY  Noted: 3/19/2024  Today's Date: 2024  Patient seen for 11 sessions       Visit Diagnoses:    ICD-10-CM ICD-9-CM   1. S/P TKR (total knee replacement), left  Z96.652 V43.65   2. Impaired mobility and endurance  Z74.09 V49.89   3. Status post total left knee replacement  Z96.652 V43.65         Subjective:  Keri Bhagat reports: Knee is a little sore today likely due to overuse. Pt reports being busy at home and is in the process of moving.      Objective          Active Range of Motion   Left Knee   Flexion: 115 degrees with pain  Extension: 2 degrees       See Exercise, Manual, and Modality Logs for complete treatment.       Assessment:  Pt shows signs of improvement w/ overground ambulation, still reliant on AD for distances > 50' feet. Knee extension is good, flexion progress as slowed and pt reports tightness at anterior knee. Next session practice overground ambulation for longer distances w/ no AD and therapist SBA to improve pt confidence.      Plan:   Continue to monitor and progress.          Timed:         Manual Therapy:    15     mins  80260;     Therapeutic Exercise:    15     mins  48991;     Neuromuscular Zelda:    15    mins  87357;    Therapeutic Activity:     15     mins  00851;     Ultrasound:     0     mins  34889;    Ionto                               0    mins  15992    Un-Timed:  Electrical Stimulation:    0     mins  68795 ( );  Traction     0     mins 67202        Timed Treatment:   60   mins   Total Treatment:     60   mins    Derrick Diana PT  [place] License #: [id number]    Physical Therapist

## 2024-04-19 DIAGNOSIS — Z96.652 S/P TKR (TOTAL KNEE REPLACEMENT), LEFT: ICD-10-CM

## 2024-04-19 RX ORDER — HYDROCODONE BITARTRATE AND ACETAMINOPHEN 7.5; 325 MG/1; MG/1
1 TABLET ORAL EVERY 6 HOURS PRN
Qty: 20 TABLET | Refills: 0 | Status: SHIPPED | OUTPATIENT
Start: 2024-04-19

## 2024-04-19 NOTE — TELEPHONE ENCOUNTER
Patient called and stated she needs another refill on her Hydrocodone. She thought she was going to be able to wean off but she has 2 tablets left and woke up this morning in pain and assumes it is due to the weather.    Surgery: 3/5/24, Left TKA    Last refill: 4/10/24    Last visit: 3/18/24    Next visit: 4/29/24   shortly

## 2024-04-19 NOTE — TELEPHONE ENCOUNTER
Please instruct the patient that she is now over 6 weeks out from surgery and needs to be weaning off of narcotic pain medication at this point in her recovery.

## 2024-04-22 ENCOUNTER — TREATMENT (OUTPATIENT)
Dept: PHYSICAL THERAPY | Facility: CLINIC | Age: 64
End: 2024-04-22
Payer: MEDICARE

## 2024-04-22 DIAGNOSIS — Z96.652 STATUS POST TOTAL LEFT KNEE REPLACEMENT: ICD-10-CM

## 2024-04-22 DIAGNOSIS — Z74.09 IMPAIRED MOBILITY AND ENDURANCE: ICD-10-CM

## 2024-04-22 DIAGNOSIS — Z96.652 S/P TKR (TOTAL KNEE REPLACEMENT), LEFT: Primary | ICD-10-CM

## 2024-04-22 PROCEDURE — 97112 NEUROMUSCULAR REEDUCATION: CPT

## 2024-04-22 PROCEDURE — 97530 THERAPEUTIC ACTIVITIES: CPT

## 2024-04-22 PROCEDURE — 97140 MANUAL THERAPY 1/> REGIONS: CPT

## 2024-04-22 PROCEDURE — 97110 THERAPEUTIC EXERCISES: CPT

## 2024-04-22 NOTE — PROGRESS NOTES
Physical Therapy Daily Treatment Note  [facility] [place]   [address] [place] [address]  P: [phone] F: [phone]    Patient: Keri Bhagat   : 1960  Referring practitioner: DAPHNEY Choe  Date of Initial Visit: Type: THERAPY  Noted: 3/19/2024  Today's Date: 2024  Patient seen for 12 sessions       Visit Diagnoses:    ICD-10-CM ICD-9-CM   1. S/P TKR (total knee replacement), left  Z96.652 V43.65   2. Impaired mobility and endurance  Z74.09 V49.89   3. Status post total left knee replacement  Z96.652 V43.65         Subjective:  Keri Bhagat reports: knee is feeling much better and she is using the cane less often . Still feels some limitation with bending due to pressure behind her knee and on the medial side.      Objective          Active Range of Motion   Left Knee   Flexion: 115 degrees   Extension: 5 degrees       See Exercise, Manual, and Modality Logs for complete treatment.       Assessment:  Pt demonstrates progress w/ quad/hamstring strength and single leg balance today. We spent some more time knee flexion stretching with no negative effect. Pt states her knee feels like it is moving better after treatment.      Plan:   Continue to monitor and progress.          Timed:         Manual Therapy:    15     mins  35467;     Therapeutic Exercise:    15     mins  14451;     Neuromuscular Zelda:    15    mins  57376;    Therapeutic Activity:     15     mins  98157;     Ultrasound:     0     mins  64242;    Ionto                               0    mins  40185    Un-Timed:  Electrical Stimulation:    0     mins  27356 ( );  Traction     0     mins 40431        Timed Treatment:   60   mins   Total Treatment:     60   mins    RICHY Rodriguez License #: [id number]    Physical Therapist

## 2024-04-25 ENCOUNTER — TREATMENT (OUTPATIENT)
Dept: PHYSICAL THERAPY | Facility: CLINIC | Age: 64
End: 2024-04-25
Payer: MEDICARE

## 2024-04-25 DIAGNOSIS — Z74.09 IMPAIRED MOBILITY AND ENDURANCE: ICD-10-CM

## 2024-04-25 DIAGNOSIS — Z96.652 S/P TKR (TOTAL KNEE REPLACEMENT), LEFT: Primary | ICD-10-CM

## 2024-04-25 DIAGNOSIS — Z96.652 STATUS POST TOTAL LEFT KNEE REPLACEMENT: ICD-10-CM

## 2024-04-25 PROCEDURE — 97112 NEUROMUSCULAR REEDUCATION: CPT

## 2024-04-25 PROCEDURE — 97110 THERAPEUTIC EXERCISES: CPT

## 2024-04-25 PROCEDURE — G0283 ELEC STIM OTHER THAN WOUND: HCPCS

## 2024-04-25 PROCEDURE — 97140 MANUAL THERAPY 1/> REGIONS: CPT

## 2024-04-25 NOTE — PROGRESS NOTES
MD Letter  [facility] Therapy - [facility]  [address]  [place] [address]  Patient: Keri Bhagat   : 1960  DAPHNEY Choe  Date of Initial Visit: Type: THERAPY  Noted: 3/19/2024  Today's Date: 2024  Patient seen for 13 sessions    Treatment has included: therapeutic exercise, neuromuscular re-education, manual therapy, therapeutic activity, gait training, electrical stimulation, moist heat, and cryotherapy    Subjective Knee is feeling better overall. No pain or undue soreness since last visit. Pt reports less discomfort with end range flexion.  Pt has follow-up with ortho on       Objective   AROM - 120 degrees flexion, 4 degrees extension lag  Strength - Flexion and Extension  MMT  Sensation - WNL, min/mod tenderness at medial joint line  Palpation - WNL  Activity tolerances - Pt reports walking tolerance is about 20'  with no AD    Assessment/Plan  Patient has demonstrated significant  improvement since the initiation of therapy.  The pain at rest is typically 0/10 but has been as high as 8/10 in the past week after overuse while working around the house.  The motion has improved with flexion and extension to WNL.  The activity tolerances have are also improved but pt still has some discomfort descending stairs, and we are actively working on this in therapy.  I feel that the patient would benefit from further short course of physical therapy.  If you have any questions concerning the care, please do not hesitate to contact me.          PT Signature: Derrick Diana PT  PT License #[id number]    Electronically signed by Derrick Diana PT, 24, 2:57 PM EDT    Manual Therapy:    8     mins  29272;  Therapeutic Exercise:    15     mins  47421;     Neuromuscular Zelda:    15    mins  38043;    Therapeutic Activity:     0     mins  60397;    Ultrasound                     0 _  mins 00070  Iontophoresis              __0____  Electrical  Stimulation  ___15___mins 40689    Timed Treatment:   38   mins   Total Treatment:     53   mins

## 2024-04-28 NOTE — PROGRESS NOTES
Physical Therapy Daily Treatment Note    Visit Diagnoses:    ICD-10-CM ICD-9-CM   1. S/P TKR (total knee replacement), left  Z96.652 V43.65   2. Impaired mobility and endurance  Z74.09 V49.89   3. Status post total left knee replacement  Z96.652 V43.65       VISIT#: 14      Keri Bhagat reports: Her knee is sore today. She saw the MD this morning and she said everything looks good. She took a pain pill earlier - before that her pain was a 6-7/10 but no pain now. She has not had to take any pain meds the last 3-4 days. She usually just take OTC medicine for pain. She wonders if maybe today or Friday can be her last day and discharged to her [facility].   Current Pain Level:   0/10 occasionally, Worst: 8/10    Affected Area: L TKR, medial knee  Quality of Pain:  occasional sharp, cramping and discomfort   Functional Deficits/Irritating Factors:  squatting, prolonged standing still > 10 min, can do stairs with cane slowly.  She can walk about 30 minutes before she needs to sit down.    Objective  Presents: Ambulating with SPC due to fear of falling (per pt)     Increased resistance on:  sciFit  Added to Program: SLS, Reverse TM      Poor balance - unable to  SLS on R LE and L LE  Improved with descending stairs    See Exercise, Manual, and Modality Logs for complete treatment.     Patient Education: Pt was educated on exercise biomechanical correctness, intensity, and speed. Reviewed patient's goals with her.     Assessment:  Keri would have liked to d/c today but do not think she is quite ready. Her pain level is still high at times, she has difficult on stairs, however, she did better descending stairs today.  She is still shy on full knee extension. Her balance would benefit from improvement. May decide to d/c on Friday pending on how she does. Pt will continue to benefit from skilled PT interventions to address current functional deficits and impairments.       Plan: Progress to/Continue with current program.        Timed:  Manual Therapy:            0_    mins  63531;  Ultrasound:                     0      mins  32668;   Therapeutic Exercise:    40     mins  88465;     Neuromuscular Zelda:   0     mins  09549;    Therapeutic Activity:      15     mins  63640;      Iontophoresis              _0__   mins  Dry Needling               _0____   mins         Untimed:  Work Conditioning: __0__ mins 76468  Electrical Stimulation:    0    mins  50813 ( );  Mechanical Traction:       0        mins  12922;   Paraffin                       __0___  mins   Ice/Heat: __0__ mins      Timed Treatment:   55   mins   Total Treatment:     55   mins          Diana Wilson PTA  [place] License # [id number]  Physical Therapist Assistant

## 2024-04-29 ENCOUNTER — OFFICE VISIT (OUTPATIENT)
Dept: ORTHOPEDIC SURGERY | Facility: CLINIC | Age: 64
End: 2024-04-29
Payer: MEDICARE

## 2024-04-29 ENCOUNTER — TREATMENT (OUTPATIENT)
Dept: PHYSICAL THERAPY | Facility: CLINIC | Age: 64
End: 2024-04-29
Payer: MEDICARE

## 2024-04-29 VITALS — WEIGHT: 207.6 LBS | TEMPERATURE: 98.2 F | BODY MASS INDEX: 36.79 KG/M2 | HEIGHT: 63 IN

## 2024-04-29 DIAGNOSIS — R52 PAIN: Primary | ICD-10-CM

## 2024-04-29 DIAGNOSIS — Z96.652 STATUS POST TOTAL LEFT KNEE REPLACEMENT: ICD-10-CM

## 2024-04-29 DIAGNOSIS — Z96.652 S/P TKR (TOTAL KNEE REPLACEMENT), LEFT: Primary | ICD-10-CM

## 2024-04-29 DIAGNOSIS — Z74.09 IMPAIRED MOBILITY AND ENDURANCE: ICD-10-CM

## 2024-04-29 PROCEDURE — 73562 X-RAY EXAM OF KNEE 3: CPT | Performed by: NURSE PRACTITIONER

## 2024-04-29 PROCEDURE — 1160F RVW MEDS BY RX/DR IN RCRD: CPT | Performed by: NURSE PRACTITIONER

## 2024-04-29 PROCEDURE — 97110 THERAPEUTIC EXERCISES: CPT | Performed by: PHYSICAL THERAPIST

## 2024-04-29 PROCEDURE — 1159F MED LIST DOCD IN RCRD: CPT | Performed by: NURSE PRACTITIONER

## 2024-04-29 PROCEDURE — 99024 POSTOP FOLLOW-UP VISIT: CPT | Performed by: NURSE PRACTITIONER

## 2024-04-29 PROCEDURE — 97530 THERAPEUTIC ACTIVITIES: CPT | Performed by: PHYSICAL THERAPIST

## 2024-04-29 NOTE — PROGRESS NOTES
Keri Bhagat : 1960 MRN: [id number] DATE: 2024  Body mass index is 36.77 kg/m².  Vitals:    24 0940   Temp: 98.2 °F (36.8 °C)       Chief Complaint and HPI: 6 week follow up left total knee    SUBJECTIVE:Patient returns today for follow up of total joint replacement. Patient reports doing well with no unusual complaints. Appears to be progressing appropriately.  Patient is still doing outpatient physical therapy and has had a few sutures come out of the incision but it appears to be well-healing.  She wants to know about injections in the right knee.  She has no other complaints at this time.    OBJECTIVE:   Exam:. The incision is healing appropriately. No sign of infection. ROM approximately full extension and approximately 120 degrees of flexion. The calf is soft and nontender with a negative Homans sign.    Radiology:  Indication, findings and comparison:2V AP&Lat of the operative joint were done in the office today  images were reviewed for evaluation of recent joint replacement. They demonstrate a well positioned, well aligned total joint replacement without complicating factors noted. In comparison with previous films there has been no alignment change.    ASSESSMENT: status post left total knee replacement expected healing.    PLAN: 1) Continue with PT exercises as prescribed   2)  Can receive an injection in the right knee first week of  -can schedule with Haily if she would like as she has done her injections previously.   2) Follow up 2 MONTHS  WITH XRAYS (2 views of same joint).   3) Wait for 3 months after surgery for routine teeth cleaning and continue with taking a dose of antibiotics before dental procedures for 2 yrs post total joint replacement.    Eduarda Trent, DAPHNEY  2024     Dictated Utilizing Dragon Dictation

## 2024-05-02 ENCOUNTER — OFFICE VISIT (OUTPATIENT)
Dept: GASTROENTEROLOGY | Facility: CLINIC | Age: 64
End: 2024-05-02
Payer: MEDICARE

## 2024-05-02 VITALS
WEIGHT: 206 LBS | BODY MASS INDEX: 36.5 KG/M2 | HEART RATE: 61 BPM | SYSTOLIC BLOOD PRESSURE: 147 MMHG | DIASTOLIC BLOOD PRESSURE: 95 MMHG | TEMPERATURE: 97.2 F | HEIGHT: 63 IN | OXYGEN SATURATION: 98 %

## 2024-05-02 DIAGNOSIS — K21.00 GASTROESOPHAGEAL REFLUX DISEASE WITH ESOPHAGITIS WITHOUT HEMORRHAGE: ICD-10-CM

## 2024-05-02 DIAGNOSIS — K76.0 HEPATIC STEATOSIS: ICD-10-CM

## 2024-05-02 DIAGNOSIS — K59.03 DRUG-INDUCED CONSTIPATION: Primary | ICD-10-CM

## 2024-05-02 DIAGNOSIS — Z46.51 FITTING AND ADJUSTMENT OF GASTRIC LAP BAND: ICD-10-CM

## 2024-05-02 DIAGNOSIS — R74.8 ELEVATED ALKALINE PHOSPHATASE LEVEL: ICD-10-CM

## 2024-05-02 DIAGNOSIS — Z86.010 PERSONAL HISTORY OF COLONIC POLYPS: ICD-10-CM

## 2024-05-02 RX ORDER — PANTOPRAZOLE SODIUM 40 MG/1
40 TABLET, DELAYED RELEASE ORAL 2 TIMES DAILY
Qty: 180 TABLET | Refills: 0 | Status: SHIPPED | OUTPATIENT
Start: 2024-05-02

## 2024-05-02 RX ORDER — CARIPRAZINE 1.5 MG/1
CAPSULE, GELATIN COATED ORAL
COMMUNITY
Start: 2024-04-26

## 2024-05-02 NOTE — PROGRESS NOTES
"Chief Complaint  gastritis, Med Refill, and Gas    Subjective          History of Present Illness    Keri Bhagat is a  63 y.o. female presents for follow-up on GERD.  She is a patient of Dr. Rodriguez last seen by Ember SHELLEY on 1/24/2023.  She is new to me.    She has a history of GERD with esophagitis and takes pantoprazole 40 mg twice daily for this. She reports worsening heartburn, gas x 3-4 weeks.  History of lap band but has had fluid removed. She wants the lap band removed does not want to go back to the original bariatric surgeon at [facility].     She also reports change in bowel habits with Bms every 3 days, sometimes soft, feels like she has to have a BM and can't at times. She had knee surgery March 5th and on pain meds but taking them intermittently at this point. She is on stool softeners twice a day.     History of fatty liver disease noted on gallbladder ultrasound.  Historically normal LFTs but most recent CMP on 3/28/2024 did show a mild elevation in alkaline phosphatase at 121.  Normal thyroid and cholesterol panel.    3/10/2021 colonoscopy at [facility].  Pathology showed tubular adenoma in the sigmoid.  I cannot see full report.  Recall 5 years.  3/10/2021 EGD with negative urease test.  I cannot see full report.    Objective   Vital Signs:   /95   Pulse 61   Temp 97.2 °F (36.2 °C) (Temporal)   Ht 160 cm (63\")   Wt 93.4 kg (206 lb)   SpO2 98%   BMI 36.49 kg/m²       Physical Exam  Vitals reviewed.   Constitutional:       General: She is awake. She is not in acute distress.     Appearance: Normal appearance. She is well-developed and well-groomed.   HENT:      Head: Normocephalic.   Pulmonary:      Effort: Pulmonary effort is normal. No respiratory distress.   Skin:     Coloration: Skin is not pale.   Neurological:      Mental Status: She is alert and oriented to person, place, and time.      Gait: Gait is intact.   Psychiatric:    "      Mood and Affect: Mood and affect normal.         Speech: Speech normal.         Behavior: Behavior is cooperative.         Judgment: Judgment normal.          Result Review :             Assessment and Plan    Diagnoses and all orders for this visit:    1. Drug-induced constipation (Primary)    2. Gastroesophageal reflux disease with esophagitis without hemorrhage  -     pantoprazole (PROTONIX) 40 MG EC tablet; Take 1 tablet by mouth 2 (Two) Times a Day.  Dispense: 180 tablet; Refill: 0    3. Personal history of colonic polyps    4. Hepatic steatosis    5. Elevated alkaline phosphatase level  -     Alkaline Phosphatase, Isoenzymes    6. Fitting and adjustment of gastric lap band  -     Ambulatory Referral to Bariatric Surgery    Recommend miralax in titrating dose to get bowels moving-likely due to opioid pain meds from her recent knee surgery which she is working on weaning off.  If her bowels are moving better but still with heartburn, will change PPI.  She will let me know.    She had lap band placed at [facility] 2-3 years ago. She would like it removed. Does not want to go back to that group.  We will refer to Dr. Santillan if he is agreeable to see her.    Mild ALP elevation likely from the knee replacement March. Will recheck today given hepatic steatosis history to ensure normalization.     Follow Up   Return in about 4 weeks (around 5/30/2024).    Dragon dictation used throughout this note.     Erendira Arteaga PA-C

## 2024-05-03 ENCOUNTER — TELEPHONE (OUTPATIENT)
Dept: PHYSICAL THERAPY | Facility: CLINIC | Age: 64
End: 2024-05-03

## 2024-05-06 LAB
ALP BONE CFR SERPL: 45 % (ref 14–68)
ALP INTEST CFR SERPL: 0 % (ref 0–18)
ALP LIVER CFR SERPL: 55 % (ref 18–85)
ALP SERPL-CCNC: 91 IU/L (ref 44–121)

## 2024-05-11 ENCOUNTER — HOSPITAL ENCOUNTER (INPATIENT)
Facility: HOSPITAL | Age: 64
LOS: 2 days | Discharge: HOME HEALTH | DRG: 909 | End: 2024-05-13
Attending: EMERGENCY MEDICINE | Admitting: INTERNAL MEDICINE
Payer: MEDICARE

## 2024-05-11 ENCOUNTER — APPOINTMENT (OUTPATIENT)
Dept: GENERAL RADIOLOGY | Facility: HOSPITAL | Age: 64
DRG: 909 | End: 2024-05-11
Payer: MEDICARE

## 2024-05-11 DIAGNOSIS — M25.562 ACUTE PAIN OF LEFT KNEE: ICD-10-CM

## 2024-05-11 DIAGNOSIS — R26.9 GAIT DISTURBANCE: Primary | ICD-10-CM

## 2024-05-11 DIAGNOSIS — T81.30XA WOUND DEHISCENCE: ICD-10-CM

## 2024-05-11 LAB
ALBUMIN SERPL-MCNC: 4.4 G/DL (ref 3.5–5.2)
ALBUMIN/GLOB SERPL: 1.8 G/DL
ALP SERPL-CCNC: 104 U/L (ref 39–117)
ALT SERPL W P-5'-P-CCNC: 22 U/L (ref 1–33)
ANION GAP SERPL CALCULATED.3IONS-SCNC: 10.6 MMOL/L (ref 5–15)
APTT PPP: 25.9 SECONDS (ref 22.7–35.4)
AST SERPL-CCNC: 18 U/L (ref 1–32)
BASOPHILS # BLD AUTO: 0.01 10*3/MM3 (ref 0–0.2)
BASOPHILS NFR BLD AUTO: 0.1 % (ref 0–1.5)
BILIRUB SERPL-MCNC: <0.2 MG/DL (ref 0–1.2)
BUN SERPL-MCNC: 12 MG/DL (ref 8–23)
BUN/CREAT SERPL: 14.8 (ref 7–25)
CALCIUM SPEC-SCNC: 9.3 MG/DL (ref 8.6–10.5)
CHLORIDE SERPL-SCNC: 105 MMOL/L (ref 98–107)
CO2 SERPL-SCNC: 25.4 MMOL/L (ref 22–29)
CREAT SERPL-MCNC: 0.81 MG/DL (ref 0.57–1)
DEPRECATED RDW RBC AUTO: 42.8 FL (ref 37–54)
EGFRCR SERPLBLD CKD-EPI 2021: 82.7 ML/MIN/1.73
EOSINOPHIL # BLD AUTO: 0.29 10*3/MM3 (ref 0–0.4)
EOSINOPHIL NFR BLD AUTO: 4 % (ref 0.3–6.2)
ERYTHROCYTE [DISTWIDTH] IN BLOOD BY AUTOMATED COUNT: 12.4 % (ref 12.3–15.4)
GLOBULIN UR ELPH-MCNC: 2.4 GM/DL
GLUCOSE SERPL-MCNC: 99 MG/DL (ref 65–99)
HCT VFR BLD AUTO: 41.4 % (ref 34–46.6)
HGB BLD-MCNC: 13.7 G/DL (ref 12–15.9)
IMM GRANULOCYTES # BLD AUTO: 0.03 10*3/MM3 (ref 0–0.05)
IMM GRANULOCYTES NFR BLD AUTO: 0.4 % (ref 0–0.5)
INR PPP: 1.17 (ref 0.9–1.1)
LYMPHOCYTES # BLD AUTO: 2.89 10*3/MM3 (ref 0.7–3.1)
LYMPHOCYTES NFR BLD AUTO: 40.1 % (ref 19.6–45.3)
MCH RBC QN AUTO: 31.1 PG (ref 26.6–33)
MCHC RBC AUTO-ENTMCNC: 33.1 G/DL (ref 31.5–35.7)
MCV RBC AUTO: 94.1 FL (ref 79–97)
MONOCYTES # BLD AUTO: 0.49 10*3/MM3 (ref 0.1–0.9)
MONOCYTES NFR BLD AUTO: 6.8 % (ref 5–12)
NEUTROPHILS NFR BLD AUTO: 3.5 10*3/MM3 (ref 1.7–7)
NEUTROPHILS NFR BLD AUTO: 48.6 % (ref 42.7–76)
NRBC BLD AUTO-RTO: 0 /100 WBC (ref 0–0.2)
PLATELET # BLD AUTO: 270 10*3/MM3 (ref 140–450)
PMV BLD AUTO: 9.7 FL (ref 6–12)
POTASSIUM SERPL-SCNC: 4.2 MMOL/L (ref 3.5–5.2)
PROT SERPL-MCNC: 6.8 G/DL (ref 6–8.5)
PROTHROMBIN TIME: 15.1 SECONDS (ref 11.7–14.2)
RBC # BLD AUTO: 4.4 10*6/MM3 (ref 3.77–5.28)
SODIUM SERPL-SCNC: 141 MMOL/L (ref 136–145)
WBC NRBC COR # BLD AUTO: 7.21 10*3/MM3 (ref 3.4–10.8)

## 2024-05-11 PROCEDURE — 80053 COMPREHEN METABOLIC PANEL: CPT | Performed by: PHYSICIAN ASSISTANT

## 2024-05-11 PROCEDURE — 85025 COMPLETE CBC W/AUTO DIFF WBC: CPT | Performed by: PHYSICIAN ASSISTANT

## 2024-05-11 PROCEDURE — 25010000002 MORPHINE PER 10 MG: Performed by: EMERGENCY MEDICINE

## 2024-05-11 PROCEDURE — 99285 EMERGENCY DEPT VISIT HI MDM: CPT

## 2024-05-11 PROCEDURE — 85610 PROTHROMBIN TIME: CPT | Performed by: PHYSICIAN ASSISTANT

## 2024-05-11 PROCEDURE — 85730 THROMBOPLASTIN TIME PARTIAL: CPT | Performed by: PHYSICIAN ASSISTANT

## 2024-05-11 PROCEDURE — 73560 X-RAY EXAM OF KNEE 1 OR 2: CPT

## 2024-05-11 RX ORDER — MORPHINE SULFATE 2 MG/ML
4 INJECTION, SOLUTION INTRAMUSCULAR; INTRAVENOUS ONCE
Status: COMPLETED | OUTPATIENT
Start: 2024-05-11 | End: 2024-05-11

## 2024-05-11 RX ORDER — BISACODYL 10 MG
10 SUPPOSITORY, RECTAL RECTAL DAILY PRN
Status: DISCONTINUED | OUTPATIENT
Start: 2024-05-11 | End: 2024-05-13 | Stop reason: HOSPADM

## 2024-05-11 RX ORDER — PRAMIPEXOLE DIHYDROCHLORIDE 0.5 MG/1
0.5 TABLET ORAL NIGHTLY
Status: DISCONTINUED | OUTPATIENT
Start: 2024-05-11 | End: 2024-05-13 | Stop reason: HOSPADM

## 2024-05-11 RX ORDER — HYDROCODONE BITARTRATE AND ACETAMINOPHEN 7.5; 325 MG/1; MG/1
1 TABLET ORAL EVERY 6 HOURS PRN
Status: DISCONTINUED | OUTPATIENT
Start: 2024-05-11 | End: 2024-05-13 | Stop reason: HOSPADM

## 2024-05-11 RX ORDER — LEVOTHYROXINE SODIUM 88 UG/1
88 TABLET ORAL EVERY MORNING
Status: DISCONTINUED | OUTPATIENT
Start: 2024-05-12 | End: 2024-05-13 | Stop reason: HOSPADM

## 2024-05-11 RX ORDER — VENLAFAXINE HYDROCHLORIDE 150 MG/1
150 CAPSULE, EXTENDED RELEASE ORAL DAILY
Status: DISCONTINUED | OUTPATIENT
Start: 2024-05-11 | End: 2024-05-13 | Stop reason: HOSPADM

## 2024-05-11 RX ORDER — OLANZAPINE 5 MG/1
1 TABLET ORAL NIGHTLY
COMMUNITY

## 2024-05-11 RX ORDER — ONDANSETRON 2 MG/ML
4 INJECTION INTRAMUSCULAR; INTRAVENOUS EVERY 6 HOURS PRN
Status: DISCONTINUED | OUTPATIENT
Start: 2024-05-11 | End: 2024-05-13 | Stop reason: HOSPADM

## 2024-05-11 RX ORDER — ATORVASTATIN CALCIUM 40 MG/1
1 TABLET, FILM COATED ORAL DAILY
COMMUNITY

## 2024-05-11 RX ORDER — OXYBUTYNIN CHLORIDE 10 MG/1
10 TABLET, EXTENDED RELEASE ORAL DAILY
Status: DISCONTINUED | OUTPATIENT
Start: 2024-05-11 | End: 2024-05-13 | Stop reason: HOSPADM

## 2024-05-11 RX ORDER — SODIUM CHLORIDE 0.9 % (FLUSH) 0.9 %
10 SYRINGE (ML) INJECTION AS NEEDED
Status: DISCONTINUED | OUTPATIENT
Start: 2024-05-11 | End: 2024-05-13 | Stop reason: HOSPADM

## 2024-05-11 RX ORDER — ACETAMINOPHEN 160 MG/5ML
650 SOLUTION ORAL EVERY 4 HOURS PRN
Status: DISCONTINUED | OUTPATIENT
Start: 2024-05-11 | End: 2024-05-13 | Stop reason: HOSPADM

## 2024-05-11 RX ORDER — ACETAMINOPHEN 650 MG/1
650 SUPPOSITORY RECTAL EVERY 4 HOURS PRN
Status: DISCONTINUED | OUTPATIENT
Start: 2024-05-11 | End: 2024-05-13 | Stop reason: HOSPADM

## 2024-05-11 RX ORDER — AMOXICILLIN 250 MG
2 CAPSULE ORAL 2 TIMES DAILY PRN
Status: DISCONTINUED | OUTPATIENT
Start: 2024-05-11 | End: 2024-05-13 | Stop reason: HOSPADM

## 2024-05-11 RX ORDER — POLYETHYLENE GLYCOL 3350 17 G/17G
17 POWDER, FOR SOLUTION ORAL DAILY PRN
Status: DISCONTINUED | OUTPATIENT
Start: 2024-05-11 | End: 2024-05-13 | Stop reason: HOSPADM

## 2024-05-11 RX ORDER — VITAMIN B COMPLEX
1 CAPSULE ORAL 2 TIMES DAILY
COMMUNITY

## 2024-05-11 RX ORDER — ARIPIPRAZOLE 5 MG/1
20 TABLET ORAL DAILY
Status: DISCONTINUED | OUTPATIENT
Start: 2024-05-11 | End: 2024-05-13 | Stop reason: HOSPADM

## 2024-05-11 RX ORDER — PANTOPRAZOLE SODIUM 40 MG/1
40 TABLET, DELAYED RELEASE ORAL 2 TIMES DAILY
Status: DISCONTINUED | OUTPATIENT
Start: 2024-05-11 | End: 2024-05-13 | Stop reason: HOSPADM

## 2024-05-11 RX ORDER — ACETAMINOPHEN 325 MG/1
650 TABLET ORAL EVERY 4 HOURS PRN
Status: DISCONTINUED | OUTPATIENT
Start: 2024-05-11 | End: 2024-05-13 | Stop reason: HOSPADM

## 2024-05-11 RX ORDER — DOCUSATE SODIUM 100 MG/1
1 CAPSULE, LIQUID FILLED ORAL AS NEEDED
COMMUNITY

## 2024-05-11 RX ORDER — ISOSORBIDE MONONITRATE 30 MG/1
30 TABLET, EXTENDED RELEASE ORAL DAILY
Status: DISCONTINUED | OUTPATIENT
Start: 2024-05-11 | End: 2024-05-13 | Stop reason: HOSPADM

## 2024-05-11 RX ORDER — BISACODYL 5 MG/1
5 TABLET, DELAYED RELEASE ORAL DAILY PRN
Status: DISCONTINUED | OUTPATIENT
Start: 2024-05-11 | End: 2024-05-13 | Stop reason: HOSPADM

## 2024-05-11 RX ORDER — BENZONATATE 100 MG/1
100 CAPSULE ORAL 3 TIMES DAILY PRN
Status: DISCONTINUED | OUTPATIENT
Start: 2024-05-11 | End: 2024-05-13 | Stop reason: HOSPADM

## 2024-05-11 RX ORDER — GABAPENTIN 300 MG/1
300 CAPSULE ORAL 3 TIMES DAILY
Status: DISCONTINUED | OUTPATIENT
Start: 2024-05-11 | End: 2024-05-13 | Stop reason: HOSPADM

## 2024-05-11 RX ORDER — BUPROPION HYDROCHLORIDE 300 MG/1
300 TABLET ORAL DAILY
Status: DISCONTINUED | OUTPATIENT
Start: 2024-05-11 | End: 2024-05-13 | Stop reason: HOSPADM

## 2024-05-11 RX ORDER — METOPROLOL SUCCINATE 25 MG/1
37.5 TABLET, EXTENDED RELEASE ORAL DAILY
Status: DISCONTINUED | OUTPATIENT
Start: 2024-05-11 | End: 2024-05-13 | Stop reason: HOSPADM

## 2024-05-11 RX ADMIN — MORPHINE SULFATE 4 MG: 2 INJECTION, SOLUTION INTRAMUSCULAR; INTRAVENOUS at 14:36

## 2024-05-11 RX ADMIN — PRAMIPEXOLE DIHYDROCHLORIDE 0.5 MG: 0.5 TABLET ORAL at 21:00

## 2024-05-11 RX ADMIN — ACETAMINOPHEN 650 MG: 325 TABLET, FILM COATED ORAL at 21:06

## 2024-05-11 RX ADMIN — HYDROCODONE BITARTRATE AND ACETAMINOPHEN 1 TABLET: 7.5; 325 TABLET ORAL at 22:54

## 2024-05-11 RX ADMIN — HYDROCODONE BITARTRATE AND ACETAMINOPHEN 1 TABLET: 7.5; 325 TABLET ORAL at 16:51

## 2024-05-11 RX ADMIN — PANTOPRAZOLE SODIUM 40 MG: 40 TABLET, DELAYED RELEASE ORAL at 21:00

## 2024-05-11 RX ADMIN — GABAPENTIN 300 MG: 300 CAPSULE ORAL at 21:00

## 2024-05-11 RX ADMIN — GABAPENTIN 300 MG: 300 CAPSULE ORAL at 16:51

## 2024-05-11 RX ADMIN — LAMOTRIGINE 150 MG: 100 TABLET ORAL at 21:00

## 2024-05-11 NOTE — PLAN OF CARE
Goal Outcome Evaluation:  Plan of Care Reviewed With: patient        Progress: no change  Outcome Evaluation: OR I&D scheduled for tomorrow. NPO at midnight. A&Ox4, VSS on RA. Dressing intact. Voiding per bedpan. IV SL. PRN Norco given. All needs met.

## 2024-05-11 NOTE — ED PROVIDER NOTES
MD ATTESTATION NOTE    The MIREYA and I have discussed this patient's history, physical exam, and treatment plan.  I have reviewed the documentation and personally had a face to face interaction with the patient. I affirm the documentation and agree with the treatment and plan.  The attached note describes my personal findings.      SHARED VISIT: This visit was performed by BOTH a physician and an APC. The substantive portion of the medical decision making was performed by this attesting physician who made or approved the management plan and takes responsibility for patient management. All studies in the APC note (if performed) were independently interpreted by me.      Brief HPI: Patient presents for dehiscence of wound.  Patient had knee replacement that had healed.  Patient then fell and split open her wound.  Left leg.    PHYSICAL EXAM  ED Triage Vitals [05/11/24 1320]   Temp Heart Rate Resp BP SpO2   98.4 °F (36.9 °C) 68 16 146/87 99 %      Temp src Heart Rate Source Patient Position BP Location FiO2 (%)   -- -- -- -- --         GENERAL: no acute distress  HENT: nares patent  EYES: no scleral icterus  CV: regular rhythm, normal rate  RESPIRATORY: normal effort  ABDOMEN: soft  MUSCULOSKELETAL: no deformity  NEURO: alert, moves all extremities, follows commands  PSYCH:  calm, cooperative  SKIN: warm, dry.  Wound dehiscence left wound    Vital signs and nursing notes reviewed.    ED Course as of 05/11/24 1424   Sat May 11, 2024   1401 I discussed the case with MD Brenda with Orthopedics at this time regarding the patient.  I discussed work-up, results, concerns.  I discussed the consulting provider's desire for administering Betadine gauze to the wound and wrapping with Kerlix and Ace wrap and admitting to the hospitalist service for operative management tomorrow.   [DC]   4824 I discussed the case with MD Lizzie with [facility] at this time regarding the patient.  I discussed work-up, results, concerns.  I discussed the  consulting provider's desire for med surg admit.   [DC]      ED Course User Index  [DC] Marques Norris, PA         Plan: admit       Lj Lr MD  05/11/24 1420

## 2024-05-11 NOTE — ED PROVIDER NOTES
" EMERGENCY DEPARTMENT ENCOUNTER      Room Number:  35/35  PCP: Epley, James, APRN  Independent Historians: Patient  Patient Care Team:  Epley, James, APRN as PCP - General (Family Medicine)  David Burroughs MD as Consulting Physician (Cardiology)  Ajay Gutierrez MD as Consulting Physician (Orthopedic Surgery)  Ana Medina MD (Psychiatry)  Eduar Rodriguez MD as Consulting Physician (Gastroenterology)  Chata Lind MD as Consulting Physician (Internal Medicine)  Maxwell Martin MD as Consulting Physician (Endocrinology)  Sil Roman MD as Surgeon (Orthopedic Surgery)  Tho Simmons MD as Consulting Physician (Nephrology)  Jhony Morse III, MD (General Surgery)  Nelida Jeffery MD as Obstetrician (Obstetrics and Gynecology)       HPI:  Chief Complaint: Knee wound    A complete HPI/ROS/PMH/PSH/SH/FH are unobtainable due to: None    Chronic or social conditions impacting patient care (Social Determinants of Health): None      Context: Keri Bhagat is a 63 y.o. female with a PMH significant for arthritis, obesity, bipolar disorder, anxiety, anticoagulated on Eliquis who presents to the ED c/o acute laceration to the anterior left knee.  The patient had knee replacement surgery in March with Dr. Pritchard and had been recovering well.  She tripped over a curb today and fell directly onto her anterior left knee and \"opened my incision\".  She had no sutures in place but the scar from her surgery has now opened with a fairly large wound.  She does describe an aching and nonradiating pain.  She is anticoagulated on Eliquis.      Upon review of prior external notes (non-ED) -and- Review of prior external test results outside of this encounter it appears the patient was evaluated in the office with gastroenterology for drug-induced constipation on May 2, 2024.  The patient had a normal strep screen on 3/31/2024 and a normal glucose on 3/5/2024.      PAST MEDICAL " HISTORY  Active Ambulatory Problems     Diagnosis Date Noted    Gastroesophageal reflux disease 04/29/2016    Bipolar I disorder, single manic episode 04/29/2016    Atherosclerosis of coronary artery 04/29/2016    Essential hypertension 04/29/2016    Lightheadedness 04/29/2016    Low back pain 04/29/2016    Sciatica 04/29/2016    Thyroid nodule 04/29/2016    Fatigue 05/06/2016    Hyperlipidemia     Primary hypothyroidism 06/29/2016    Iron deficiency anemia 06/29/2016    Right knee pain 07/15/2016    Degenerative disc disease, cervical 08/18/2016    Depression 08/19/2016    ROCKY (obstructive sleep apnea)     Atherosclerotic heart disease of native coronary artery with other forms of angina pectoris 11/14/2016    Cervical spondylosis with radiculopathy 12/29/2016    Chronic pain of both knees 02/16/2017    Arthritis of both knees 02/16/2017    Weight gain, abnormal 03/20/2017    Allergic rhinitis 03/20/2017    Obesity (BMI 30-39.9) 03/20/2017    Plantar fasciitis 04/10/2017    Peroneal tendon injury 04/10/2017    Stress fracture of calcaneus 04/19/2017    Vitamin D deficiency 04/19/2017    Pain and swelling of knee 06/02/2017    Knee injury 06/02/2017    Calcific Achilles tendinitis 07/26/2017    Rod's deformity 07/26/2017    Urgency incontinence 09/22/2017    Nocturia 09/22/2017    Chronic gastritis without bleeding 09/27/2017    LGSIL on Pap smear of cervix 09/28/2017    Primary osteoarthritis of right knee 10/02/2017    Stage 1 chronic kidney disease 10/08/2017    Left wrist tendinitis 10/08/2017    Cervical radiculopathy 10/29/2017    Tobacco abuse 10/29/2017    Obesity, morbid, BMI 40.0-49.9 10/29/2017    Encounter for screening for lung cancer 10/29/2017    Urinary frequency 11/12/2017    Diabetes mellitus screening 11/12/2017    MORALES I (cervical intraepithelial neoplasia I) 11/22/2017    Primary osteoarthritis of both knees 12/01/2017    H/O bladder repair surgery 01/29/2018    Chest pain 03/30/2018     Syncope and collapse 04/01/2018    Closed fracture of left distal radius 04/01/2018    Closed displaced fracture of neck of right fifth metacarpal bone 04/01/2018    Fracture follow-up 04/11/2018    Nondisplaced fracture of base of fifth metacarpal bone, left hand, initial encounter for closed fracture 04/11/2018    Closed fracture of lower end of left radius with routine healing 04/11/2018    Multiple dislocations of fingers, open 04/11/2018    Bilateral chronic knee pain 06/15/2018    Arthritis of left knee 06/15/2018    Arthritis of right knee 06/15/2018    Family history of diabetes mellitus (DM) 10/08/2018    Menopause 10/08/2018    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 02/26/2019    Memory changes 02/26/2019    Periodic limb movement disorder 02/26/2019    Gait disturbance 02/26/2019    Degenerative disc disease, lumbar 04/25/2019    Osteopenia 04/29/2019    Vaginal atrophy 04/29/2019    Female dyspareunia 04/29/2019    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    Meningioma 08/28/2019    Vitamin B12 deficiency     Acute urinary tract infection 01/09/2018    Anemia 02/04/2015    Bacterial vaginosis 12/15/2017    Anxiety 02/04/2015    Inappropriate diet and eating habits 02/21/2018    Incomplete emptying of bladder 07/05/2018    Myocardial infarction 05/01/2000    Other specified postprocedural states 01/29/2018    Postoperative retention of urine 12/12/2017    Suicide attempt 04/23/2019    Urinary urgency 03/25/2015    Vaginal discharge 03/25/2015    Vomiting 03/13/2018    Gastro-esophageal reflux disease with esophagitis, without bleeding 05/10/2021    Pre-operative cardiovascular examination 04/11/2018    Tendinitis of left wrist 10/08/2017    Lipoma of upper extremity 06/28/2022    Adrenal adenoma 09/21/2022    Cervicalgia 01/06/2023    ASCUS with positive high risk HPV cervical 09/13/2023    Palpitations 01/04/2024    Splenic artery aneurysm 02/23/2024     Resolved Ambulatory Problems      Diagnosis Date Noted    Abnormal vaginal bleeding 04/29/2016    Dysphagia 04/29/2016    Indigestion 04/29/2016    Flatulence 04/29/2016    Nausea 04/29/2016    Viral gastroenteritis 04/29/2016    LGSIL (low grade squamous intraepithelial dysplasia) 06/17/2016    Suicidal ideation 08/19/2016    RLQ abdominal pain 09/22/2017    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     Past Medical History:   Diagnosis Date    Abnormal Pap smear of cervix     Arthritis     Arthritis of back     Arthritis of neck     Cervical disc herniation     Colon polyp     Coronary artery disease     COVID-19 01/10/2023    Death of family member     Dislocation of finger     Diverticular disease     Diverticulitis of colon     Dyspepsia     Encounter for follow-up examination after completed treatment for conditions other than malignant neoplasm 04/11/2018    Fracture of wrist     GERD (gastroesophageal reflux disease)     Heart attack     Heart murmur     Hiatal hernia     History of transfusion     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    Hypertension     Hypothyroidism     Incontinence in female     Kidney disease, chronic, stage III (GFR 30-59 ml/min)     Knee pain, bilateral     Knee swelling     Lumbosacral disc disease Unsure    Obesity     Restless leg syndrome     Rotator cuff syndrome     Sleep apnea          PAST SURGICAL HISTORY  Past Surgical History:   Procedure Laterality Date    ADENOIDECTOMY  1974    ANTERIOR CERVICAL DISCECTOMY W/ FUSION N/A 12/29/2016    Procedure: C3-4 anterior cervical discectomy and fusion with Depuy micro plate, ALLOGRAFT C3-4, AND HARDWARE REMOVAL C4-7.;  Surgeon: Hema Godwin MD;  Location: [facility] OR;  Service:     CARDIAC CATHETERIZATION N/A 03/30/2017    Procedure: Left Heart Cath;  Surgeon: Tracey Vargas MD;  [facility] CATH [facility]ASIVE [facility];  Service:     CARDIAC CATHETERIZATION N/A 03/30/2017    Procedure: Coronary angiography;  Surgeon: Tracey Vargas MD;  Location: [facility] CATH  INVASIVE LOCATION;  Service:     CARDIAC CATHETERIZATION N/A 03/30/2017    Procedure: Left ventriculography;  Surgeon: Tracey Vargas MD;  Location:  [facility] CATH INVASIVE LOCATION;  Service:     CARDIAC CATHETERIZATION  03/30/2017    Procedure: Functional Flow [place];  Surgeon: Tracey Vargas MD;  Location:  [facility] CATH INVASIVE LOCATION;  Service:     CATARACT EXTRACTION Bilateral     CERVICAL BIOPSY  MMXVI    Dr. Jeffery.     CERVICAL DISCECTOMY ANTERIOR  04/2013    C4-7    COLONOSCOPY  04/20/2015    Diverticulosis, IH    COLONOSCOPY  03/09/2021    COLPOSCOPY W/ BIOPSY / CURETTAGE  06/17/2016    LGSIL HPV positive. Results were normal repeat pap in one year. Chronic Cervicitis    CORONARY ANGIOPLASTY WITH STENT PLACEMENT      ENDOSCOPY  MMXV    Normal.  Dr. Rodriguez    ENDOSCOPY N/A 06/22/2017    Erythematous mucosa in the stomach  PATH: Chronic active gastritis, moderate with intestinal metaplasia     EPIDURAL BLOCK      INGUINAL HERNIA REPAIR      KNEE SURGERY      LAPAROSCOPIC GASTRIC BANDING  02/2018    SHOULDER SURGERY Left     RCR    TONSILLECTOMY AND ADENOIDECTOMY      TOTAL KNEE ARTHROPLASTY Left 03/05/2024    Procedure: LEFT TOTAL KNEE ARTHROPLASTY WITH TAB NAVIGATION;  Surgeon: Tho Pritchard MD;  Location: [facility] OR [facility];  Service: Orthopedics;  Laterality: Left;    TRANSVAGINAL TAPING SUSPENSION N/A 12/06/2017    Procedure: MID URETHRAL SLING CYSTSCOPY;  Surgeon: Abby Méndez MD;  [facility];  Service:     TRIGGER POINT INJECTION      TUBAL ABDOMINAL LIGATION      TYMPANOSTOMY TUBE PLACEMENT Right     UPPER GASTROINTESTINAL ENDOSCOPY  approx 2021    WRIST SURGERY Bilateral     carpal tunnel         FAMILY HISTORY  Family History   Problem Relation Age of Onset    Diabetes type II Mother     Hypertension Mother     Osteoporosis Mother     Seizures Mother     Diabetes Mother     Arthritis Mother     Hyperlipidemia Mother     COPD Father     Hypertension Father     Lung cancer  Father     Heart attack Father     Liver cancer Father     Liver disease Father     Heart disease Father     Cancer Father         Lung cacer that metastasized  into the liver.    Thyroid disease Sister     Hypertension Sister     Bipolar disorder Sister     Depression Sister     ADD / ADHD Sister     Anxiety disorder Sister     Mental illness Sister     Hyperlipidemia Sister     Broken bones Sister     Thyroid disease Sister     Hypertension Sister     Bipolar disorder Sister     Anxiety disorder Sister     Depression Sister     Mental illness Sister     Hyperlipidemia Sister     Cancer Maternal Grandmother     No Known Problems Maternal Grandfather     No Known Problems Paternal Grandmother     No Known Problems Paternal Grandfather     Abnormal EKG Daughter     Hypertension Daughter     Bipolar disorder Daughter     Thyroid disease Daughter     Mental illness Daughter     Hyperlipidemia Daughter     Asthma Daughter     No Known Problems Son     Diabetes Son         Type 1    Diabetes Paternal Uncle     Breast cancer Neg Hx     Ovarian cancer Neg Hx     Uterine cancer Neg Hx     Colon cancer Neg Hx     Malig Hyperthermia Neg Hx          SOCIAL HISTORY  Social History     Socioeconomic History    Marital status:     Number of children: 3    Years of education: 12   Tobacco Use    Smoking status: Light Smoker     Current packs/day: 0.25     Types: Electronic Cigarette, Cigarettes     Passive exposure: Current    Smokeless tobacco: Current    Tobacco comments:     Electronic Cigarette   Vaping Use    Vaping status: Every Day    Substances: Nicotine, Flavoring    Devices: Refillable tank   Substance and Sexual Activity    Alcohol use: Not Currently     Comment: RARELY    Drug use: Never    Sexual activity: Not Currently     Partners: Male     Birth control/protection: Condom, Abstinence, Post-menopausal, None, Tubal ligation         ALLERGIES  Patient has no known allergies.      REVIEW OF SYSTEMS  Included in  HPI  All systems reviewed and negative except for those discussed in HPI.      PHYSICAL EXAM    I have reviewed the triage vital signs and nursing notes.    ED Triage Vitals [05/11/24 1320]   Temp Heart Rate Resp BP SpO2   98.4 °F (36.9 °C) 68 16 146/87 99 %      Temp src Heart Rate Source Patient Position BP Location FiO2 (%)   -- -- -- -- --       Physical Exam  Constitutional:       General: She is not in acute distress.     Appearance: She is well-developed.   HENT:      Head: Normocephalic and atraumatic.   Eyes:      General: No scleral icterus.     Conjunctiva/sclera: Conjunctivae normal.   Neck:      Trachea: No tracheal deviation.   Cardiovascular:      Rate and Rhythm: Normal rate and regular rhythm.   Pulmonary:      Effort: Pulmonary effort is normal.      Breath sounds: Normal breath sounds.   Abdominal:      Palpations: Abdomen is soft.      Tenderness: There is no abdominal tenderness.   Musculoskeletal:         General: No deformity.      Cervical back: Normal range of motion.        Legs:    Lymphadenopathy:      Cervical: No cervical adenopathy.   Skin:     General: Skin is warm and dry.   Neurological:      Mental Status: She is alert and oriented to person, place, and time.   Psychiatric:         Behavior: Behavior normal.         Vital signs and nursing notes reviewed.      PPE: I wore a surgical mask throughout my encounters with the pt. I performed hand hygiene on entry into the pt room and upon exit.      LAB RESULTS  No results found for this or any previous visit (from the past 24 hour(s)).      RADIOLOGY  No Radiology Exams Resulted Within Past 24 Hours      MEDICATIONS GIVEN IN ER  Medications   sodium chloride 0.9 % flush 10 mL (has no administration in time range)         ORDERS PLACED DURING THIS VISIT:  Orders Placed This Encounter   Procedures    XR Knee 1 or 2 View Left    Comprehensive Metabolic Panel    Protime-INR    aPTT    CBC Auto Differential    NPO Diet NPO Type: Strict NPO     Ortho (on-call MD unless specified)    [facility] (on-call MD unless specified) Details    Insert Peripheral IV    Inpatient Admission    CBC & Differential         OUTPATIENT MEDICATION MANAGEMENT:  Current Facility-Administered Medications Ordered in Epic   Medication Dose Route Frequency Provider Last Rate Last Admin    sodium chloride 0.9 % flush 10 mL  10 mL Intravenous PRN Marques Norris PA         Current Outpatient Medications Ordered in Epic   Medication Sig Dispense Refill    apixaban (ELIQUIS) 5 MG tablet tablet Take 1 tablet by mouth Every 12 (Twelve) Hours. 60 tablet 11    ARIPiprazole (ABILIFY) 20 MG tablet Take 1 tablet by mouth Daily.      benzonatate (TESSALON) 100 MG capsule Take 1 capsule by mouth 3 (Three) Times a Day As Needed for Cough. 20 capsule 0    buPROPion XL (WELLBUTRIN XL) 300 MG 24 hr tablet Take 1 tablet by mouth Daily.      estradiol (ESTRACE) 0.1 MG/GM vaginal cream Insert 1 g into the vagina 3 (Three) Times a Week.      Ferrous Sulfate (IRON PO) Take 1 tablet by mouth 2 (Two) Times a Day.      gabapentin (NEURONTIN) 300 MG capsule TAKE ONE CAPSULE BY MOUTH THREE TIMES A  capsule 0    HYDROcodone-acetaminophen (NORCO) 7.5-325 MG per tablet Take 1 tablet by mouth Every 6 (Six) Hours As Needed for Severe Pain. 20 tablet 0    isosorbide mononitrate (IMDUR) 30 MG 24 hr tablet Take 1 tablet by mouth Daily. 90 tablet 2    lamoTRIgine (LaMICtal) 150 MG tablet Take 1 tablet by mouth 2 (Two) Times a Day.      levothyroxine (SYNTHROID, LEVOTHROID) 88 MCG tablet Take 1 tablet by mouth Every Morning. 90 tablet 2    metoprolol succinate XL (Toprol XL) 25 MG 24 hr tablet Take 1.5 tablets by mouth Daily. For bp 135 tablet 3    pantoprazole (PROTONIX) 40 MG EC tablet Take 1 tablet by mouth 2 (Two) Times a Day. 180 tablet 0    pramipexole (MIRAPEX) 0.5 MG tablet Take 1 tablet by mouth Every Night for 360 days. 30 tablet 0    venlafaxine XR (EFFEXOR-XR) 150 MG 24 hr capsule Take 1 capsule by  mouth Daily. 90 capsule 1    Vibegron 75 MG tablet Take 1 tablet by mouth Daily.      Vraylar 1.5 MG capsule capsule                PROGRESS, DATA ANALYSIS, CONSULTS, AND MEDICAL DECISION MAKING  All labs have been independently interpreted by me.  All radiology studies have been reviewed by me. All EKG's have been independently viewed and interpreted by me.  Discussion below represents my analysis of pertinent findings related to patient's condition, differential diagnosis, treatment plan and final disposition.    Patient presentation and evaluation most consistent with acute dehiscence of a wound to the left anterior knee.  She will require admission for operative management.  Patient agreeable.    DIFFERENTIAL DIAGNOSIS INCLUDE BUT NOT LIMITED TO:     Patellar fracture, wound dehiscence, tibia fracture    Clinical Scores: N/A      ED Course as of 05/11/24 1407   Sat May 11, 2024   1401 I discussed the case with MD Brenda with Orthopedics at this time regarding the patient.  I discussed work-up, results, concerns.  I discussed the consulting provider's desire for administering Betadine gauze to the wound and wrapping with Kerlix and Ace wrap and admitting to the hospitalist service for operative management tomorrow.   [DC]   1406 I discussed the case with MD Lizzie with [facility] at this time regarding the patient.  I discussed work-up, results, concerns.  I discussed the consulting provider's desire for med surg admit.   [DC]      ED Course User Index  [DC] Marques Norris, PA           1408 I rechecked the patient.  I discussed the patient's labs, radiology findings (including all incidental findings), diagnosis, and plan for admission. The patient understands and agrees with the plan.      AS OF 14:07 EDT VITALS:    BP - 146/87  HR - 68  TEMP - 98.4 °F (36.9 °C)  O2 SATS - 99%    COMPLEXITY OF CARE  The patient requires admission.      DIAGNOSIS  Final diagnoses:   Wound dehiscence   Acute pain of left knee          DISPOSITION  ED Disposition       ED Disposition   Decision to Admit    Condition   --    Comment   Level of Care: Med/Surg [1]   Diagnosis: Wound dehiscence [503159]   Admitting Physician: PHOEBE BAILEY [[id number]]   Attending Physician: PHOEBE BAILEY [[id number]]   Certification: I Certify That Inpatient Hospital Services Are Medically Necessary For Greater Than 2 Midnights                  Please note that portions of this document were completed with a voice recognition program.    Note Disclaimer: At [facility], we believe that sharing information builds trust and better relationships. You are receiving this note because you recently visited [facility]. It is possible you will see health information before a provider has talked with you about it. This kind of information can be easy to misunderstand. To help you fully understand what it means for your health, we urge you to discuss this note with your provider.         Marques Norris PA  05/11/24 [id number]

## 2024-05-11 NOTE — PROGRESS NOTES
Clinical Pharmacy Services: Medication History    Keri Bhagat is a 63 y.o. female presenting to [facility] for   Chief Complaint   Patient presents with    Fall    Knee Injury       She  has a past medical history of Abnormal Pap smear of cervix, Adrenal adenoma (09/21/2022), Anemia, Anxiety, Arthritis, Arthritis of back, Arthritis of neck, Bipolar I disorder, single manic episode, Cervical disc herniation, Colon polyp, Coronary artery disease, COVID-19 (01/10/2023), Death of family member, Depression, Dislocation of finger, Diverticular disease, Diverticulitis of colon, Dyspepsia, Encounter for follow-up examination after completed treatment for conditions other than malignant neoplasm (04/11/2018), Fracture of wrist, GERD (gastroesophageal reflux disease), Heart attack, Heart murmur, Hiatal hernia, History of transfusion, HPV (human papilloma virus) infection (04/29/2016), Hyperlipidemia, Hypertension, Hypothyroidism, Incontinence in female, Kidney disease, chronic, stage III (GFR 30-59 ml/min), Knee pain, bilateral, Knee swelling, LGSIL on Pap smear of cervix (04/29/2016), Lumbosacral disc disease (Unsure), Obesity, Osteopenia (2021), Periodic limb movement disorder, Restless leg syndrome, Rotator cuff syndrome, Sleep apnea, Suicidal ideation (08/19/2016), Thyroid nodule, and Vitamin B12 deficiency.    Allergies as of 05/11/2024    (No Known Allergies)       Medication information was obtained from: Patient  Pharmacy and Phone Number:     Prior to Admission Medications       Prescriptions Last Dose Informant Patient Reported? Taking?    apixaban (ELIQUIS) 5 MG tablet tablet 5/11/2024 Self No Yes    Take 1 tablet by mouth Every 12 (Twelve) Hours.    atorvastatin (LIPITOR) 40 MG tablet 5/11/2024 Self Yes Yes    Take 1 tablet by mouth Daily.    B Complex Vitamins (vitamin b complex) capsule capsule 5/11/2024  Yes Yes    Take 1 capsule by mouth 2 (Two) Times a Day.    buPROPion XL (WELLBUTRIN XL)  150 MG 24 hr tablet 5/11/2024 Self Yes Yes    Take 1 tablet by mouth 2 (Two) Times a Day.    Calcium Citrate-Vitamin D (CITRUS CALCIUM/VITAMIN D PO) 5/11/2024 Self Yes Yes    Take  by mouth 2 (Two) Times a Day.    docusate sodium (COLACE) 100 MG capsule Past Week Self Yes Yes    Take 1 capsule by mouth As Needed for Constipation.    estradiol (ESTRACE) 0.1 MG/GM vaginal cream 5/10/2024 Self Yes Yes    Insert 1 g into the vagina 3 (Three) Times a Week.    Ferrous Sulfate (IRON PO) 5/11/2024 Self Yes Yes    Take 1 tablet by mouth 2 (Two) Times a Day.    gabapentin (NEURONTIN) 300 MG capsule 5/11/2024 Self No Yes    TAKE ONE CAPSULE BY MOUTH THREE TIMES A DAY    isosorbide mononitrate (IMDUR) 30 MG 24 hr tablet 5/11/2024 Self No Yes    Take 1 tablet by mouth Daily.    lamoTRIgine (LaMICtal) 150 MG tablet 5/11/2024 Self Yes Yes    Take 1 tablet by mouth 2 (Two) Times a Day.    levothyroxine (SYNTHROID, LEVOTHROID) 88 MCG tablet 5/11/2024 Self No Yes    Take 1 tablet by mouth Every Morning.    metoprolol succinate XL (Toprol XL) 25 MG 24 hr tablet 5/11/2024 Self No Yes    Take 1.5 tablets by mouth Daily. For bp    OLANZapine (zyPREXA) 5 MG tablet 5/10/2024 Self Yes Yes    Take 1 tablet by mouth Every Night.    pantoprazole (PROTONIX) 40 MG EC tablet 5/11/2024 Self No Yes    Take 1 tablet by mouth 2 (Two) Times a Day.    venlafaxine XR (EFFEXOR-XR) 150 MG 24 hr capsule 5/11/2024 Self No Yes    Take 1 capsule by mouth Daily.    Vibegron 75 MG tablet 5/11/2024 Self Yes Yes    Take 1 tablet by mouth Daily.    Vraylar 1.5 MG capsule capsule 5/11/2024 Self Yes Yes    Take 1 capsule by mouth.    benzonatate (TESSALON) 100 MG capsule   No No    Take 1 capsule by mouth 3 (Three) Times a Day As Needed for Cough.              Medication notes: Patient reports her wellbutrin was switched to 150mg BID from 300mg QD.     This medication list is complete to the best of my knowledge as of 5/11/2024    Please call if questions.    Yoshi  Carlos   Pharmacy Intern   [phone]    5/11/2024 15:06 EDT

## 2024-05-11 NOTE — ED NOTES
"Nursing report ED to floor  Keri Bhagat  63 y.o.  female    HPI :  HPI (Adult)  Stated Reason for Visit: fall  History Obtained From: EMS, patient    Chief Complaint  Chief Complaint   Patient presents with    Fall    Knee Injury       Admitting doctor:   Mee Samuel MD    Admitting diagnosis:   The primary encounter diagnosis was Wound dehiscence. A diagnosis of Acute pain of left knee was also pertinent to this visit.    Code status:   Current Code Status       Date Active Code Status Order ID Comments User Context       Prior            Allergies:   Patient has no known allergies.    Isolation:   No active isolations    Intake and Output  No intake or output data in the 24 hours ending 05/11/24 1418    Weight:       05/11/24  1320   Weight: 92.1 kg (203 lb)       Most recent vitals:   Vitals:    05/11/24 1320   BP: 146/87   Pulse: 68   Resp: 16   Temp: 98.4 °F (36.9 °C)   SpO2: 99%   Weight: 92.1 kg (203 lb)   Height: 160 cm (63\")       Active LDAs/IV Access:   Lines, Drains & Airways       Active LDAs       Name Placement date Placement time Site Days    Peripheral IV 05/11/24 1418 Right Antecubital 05/11/24  1418  Antecubital  less than 1                    Labs (abnormal labs have a star):   Labs Reviewed   COMPREHENSIVE METABOLIC PANEL   PROTIME-INR   APTT   CBC WITH AUTO DIFFERENTIAL   CBC AND DIFFERENTIAL    Narrative:     The following orders were created for panel order CBC & Differential.  Procedure                               Abnormality         Status                     ---------                               -----------         ------                     CBC Auto Differential[[id number]]                                                         Please view results for these tests on the individual orders.       EKG:   No orders to display       Meds given in ED:   Medications   sodium chloride 0.9 % flush 10 mL (has no administration in time range)   morphine injection 4 mg (has no " administration in time range)       Imaging results:  XR Knee 1 or 2 View Left    Result Date: 5/11/2024   1. No fracture or dislocation. 2. Total knee arthroplasty  This report was finalized on 5/11/2024 2:07 PM by Dr. Rufus Capellan M.D on Workstation: [id number]       Ambulatory status:   Full assist    Social issues:   Social History     Socioeconomic History    Marital status:     Number of children: 3    Years of education: 12   Tobacco Use    Smoking status: Light Smoker     Current packs/day: 0.25     Types: Electronic Cigarette, Cigarettes     Passive exposure: Current    Smokeless tobacco: Current    Tobacco comments:     Electronic Cigarette   Vaping Use    Vaping status: Every Day    Substances: Nicotine, Flavoring    Devices: Refillable tank   Substance and Sexual Activity    Alcohol use: Not Currently     Comment: RARELY    Drug use: Never    Sexual activity: Not Currently     Partners: Male     Birth control/protection: Condom, Abstinence, Post-menopausal, None, Tubal ligation       Peripheral Neurovascular  Peripheral Neurovascular (Adult)  Peripheral Neurovascular WDL: WDL    Neuro Cognitive  Neuro Cognitive (Adult)  Cognitive/Neuro/Behavioral WDL: WDL    Learning  Learning Assessment (Adult)  Learning Readiness and Ability: no barriers identified  Education Provided  Person Taught: patient  Teaching Method: verbal instruction  Teaching Focus: symptom/problem overview  Education Outcome Evaluation: eager to learn    Respiratory  Respiratory (Adult)  Airway WDL: WDL  Respiratory WDL  Respiratory WDL: WDL    Abdominal Pain       Pain Assessments  Pain (Adult)  (0-10) Pain Rating: Rest: 10    NIH Stroke Scale       Dianne Frost RN  05/11/24 14:18 EDT

## 2024-05-12 ENCOUNTER — ANESTHESIA (OUTPATIENT)
Dept: PERIOP | Facility: HOSPITAL | Age: 64
End: 2024-05-12
Payer: MEDICARE

## 2024-05-12 ENCOUNTER — APPOINTMENT (OUTPATIENT)
Dept: GENERAL RADIOLOGY | Facility: HOSPITAL | Age: 64
DRG: 909 | End: 2024-05-12
Payer: MEDICARE

## 2024-05-12 ENCOUNTER — ANESTHESIA EVENT (OUTPATIENT)
Dept: PERIOP | Facility: HOSPITAL | Age: 64
End: 2024-05-12
Payer: MEDICARE

## 2024-05-12 LAB
ANION GAP SERPL CALCULATED.3IONS-SCNC: 9 MMOL/L (ref 5–15)
BASOPHILS # BLD AUTO: 0.02 10*3/MM3 (ref 0–0.2)
BASOPHILS NFR BLD AUTO: 0.2 % (ref 0–1.5)
BUN SERPL-MCNC: 14 MG/DL (ref 8–23)
BUN/CREAT SERPL: 13.3 (ref 7–25)
CALCIUM SPEC-SCNC: 8.8 MG/DL (ref 8.6–10.5)
CHLORIDE SERPL-SCNC: 107 MMOL/L (ref 98–107)
CO2 SERPL-SCNC: 27 MMOL/L (ref 22–29)
CREAT SERPL-MCNC: 1.05 MG/DL (ref 0.57–1)
DEPRECATED RDW RBC AUTO: 40.3 FL (ref 37–54)
EGFRCR SERPLBLD CKD-EPI 2021: 60.6 ML/MIN/1.73
EOSINOPHIL # BLD AUTO: 0.33 10*3/MM3 (ref 0–0.4)
EOSINOPHIL NFR BLD AUTO: 4.1 % (ref 0.3–6.2)
ERYTHROCYTE [DISTWIDTH] IN BLOOD BY AUTOMATED COUNT: 12.1 % (ref 12.3–15.4)
GLUCOSE SERPL-MCNC: 99 MG/DL (ref 65–99)
HCT VFR BLD AUTO: 39.5 % (ref 34–46.6)
HGB BLD-MCNC: 13.4 G/DL (ref 12–15.9)
IMM GRANULOCYTES # BLD AUTO: 0.04 10*3/MM3 (ref 0–0.05)
IMM GRANULOCYTES NFR BLD AUTO: 0.5 % (ref 0–0.5)
LYMPHOCYTES # BLD AUTO: 4.18 10*3/MM3 (ref 0.7–3.1)
LYMPHOCYTES NFR BLD AUTO: 51.4 % (ref 19.6–45.3)
MCH RBC QN AUTO: 31.2 PG (ref 26.6–33)
MCHC RBC AUTO-ENTMCNC: 33.9 G/DL (ref 31.5–35.7)
MCV RBC AUTO: 92.1 FL (ref 79–97)
MONOCYTES # BLD AUTO: 0.61 10*3/MM3 (ref 0.1–0.9)
MONOCYTES NFR BLD AUTO: 7.5 % (ref 5–12)
NEUTROPHILS NFR BLD AUTO: 2.96 10*3/MM3 (ref 1.7–7)
NEUTROPHILS NFR BLD AUTO: 36.3 % (ref 42.7–76)
NRBC BLD AUTO-RTO: 0 /100 WBC (ref 0–0.2)
PLATELET # BLD AUTO: 259 10*3/MM3 (ref 140–450)
PMV BLD AUTO: 9.9 FL (ref 6–12)
POTASSIUM SERPL-SCNC: 4.6 MMOL/L (ref 3.5–5.2)
QT INTERVAL: 361 MS
QTC INTERVAL: 511 MS
RBC # BLD AUTO: 4.29 10*6/MM3 (ref 3.77–5.28)
SODIUM SERPL-SCNC: 143 MMOL/L (ref 136–145)
WBC NRBC COR # BLD AUTO: 8.14 10*3/MM3 (ref 3.4–10.8)

## 2024-05-12 PROCEDURE — 25010000002 HYDROMORPHONE 1 MG/ML SOLUTION: Performed by: ANESTHESIOLOGY

## 2024-05-12 PROCEDURE — 25810000003 LACTATED RINGERS PER 1000 ML: Performed by: ORTHOPAEDIC SURGERY

## 2024-05-12 PROCEDURE — 25010000002 PHENYLEPHRINE 10 MG/ML SOLUTION: Performed by: ANESTHESIOLOGY

## 2024-05-12 PROCEDURE — 25010000002 CEFAZOLIN PER 500 MG: Performed by: ORTHOPAEDIC SURGERY

## 2024-05-12 PROCEDURE — 85025 COMPLETE CBC W/AUTO DIFF WBC: CPT | Performed by: INTERNAL MEDICINE

## 2024-05-12 PROCEDURE — 25010000002 ONDANSETRON PER 1 MG: Performed by: ANESTHESIOLOGY

## 2024-05-12 PROCEDURE — 25010000002 FENTANYL CITRATE (PF) 50 MCG/ML SOLUTION: Performed by: ANESTHESIOLOGY

## 2024-05-12 PROCEDURE — 25010000002 PROPOFOL 10 MG/ML EMULSION: Performed by: ANESTHESIOLOGY

## 2024-05-12 PROCEDURE — 80048 BASIC METABOLIC PNL TOTAL CA: CPT | Performed by: INTERNAL MEDICINE

## 2024-05-12 PROCEDURE — 25010000002 LABETALOL 5 MG/ML SOLUTION: Performed by: ANESTHESIOLOGY

## 2024-05-12 PROCEDURE — 25010000002 MIDAZOLAM PER 1 MG: Performed by: ANESTHESIOLOGY

## 2024-05-12 PROCEDURE — 25010000002 SUGAMMADEX 200 MG/2ML SOLUTION: Performed by: ANESTHESIOLOGY

## 2024-05-12 PROCEDURE — 97530 THERAPEUTIC ACTIVITIES: CPT

## 2024-05-12 PROCEDURE — 97162 PT EVAL MOD COMPLEX 30 MIN: CPT

## 2024-05-12 PROCEDURE — 13160 SEC CLSR SURG WND/DEHSN XTN: CPT | Performed by: ORTHOPAEDIC SURGERY

## 2024-05-12 PROCEDURE — 25810000003 LACTATED RINGERS PER 1000 ML: Performed by: ANESTHESIOLOGY

## 2024-05-12 PROCEDURE — 25010000002 DEXAMETHASONE PER 1 MG: Performed by: ANESTHESIOLOGY

## 2024-05-12 PROCEDURE — 73560 X-RAY EXAM OF KNEE 1 OR 2: CPT

## 2024-05-12 PROCEDURE — 25010000002 HYDROMORPHONE PER 4 MG: Performed by: ANESTHESIOLOGY

## 2024-05-12 PROCEDURE — 99222 1ST HOSP IP/OBS MODERATE 55: CPT | Performed by: ORTHOPAEDIC SURGERY

## 2024-05-12 PROCEDURE — 93005 ELECTROCARDIOGRAM TRACING: CPT | Performed by: STUDENT IN AN ORGANIZED HEALTH CARE EDUCATION/TRAINING PROGRAM

## 2024-05-12 PROCEDURE — 25010000002 KETOROLAC TROMETHAMINE PER 15 MG: Performed by: ORTHOPAEDIC SURGERY

## 2024-05-12 PROCEDURE — 25010000002 ONDANSETRON PER 1 MG: Performed by: ORTHOPAEDIC SURGERY

## 2024-05-12 PROCEDURE — 36415 COLL VENOUS BLD VENIPUNCTURE: CPT | Performed by: INTERNAL MEDICINE

## 2024-05-12 RX ORDER — PHENYLEPHRINE HYDROCHLORIDE 10 MG/ML
INJECTION INTRAVENOUS AS NEEDED
Status: DISCONTINUED | OUTPATIENT
Start: 2024-05-12 | End: 2024-05-12 | Stop reason: SURG

## 2024-05-12 RX ORDER — POLYETHYLENE GLYCOL 3350 17 G/17G
17 POWDER, FOR SOLUTION ORAL DAILY
Status: DISCONTINUED | OUTPATIENT
Start: 2024-05-13 | End: 2024-05-13 | Stop reason: HOSPADM

## 2024-05-12 RX ORDER — ACETAMINOPHEN 650 MG
TABLET, EXTENDED RELEASE ORAL AS NEEDED
Status: DISCONTINUED | OUTPATIENT
Start: 2024-05-12 | End: 2024-05-12 | Stop reason: HOSPADM

## 2024-05-12 RX ORDER — LIDOCAINE HYDROCHLORIDE 20 MG/ML
INJECTION, SOLUTION INFILTRATION; PERINEURAL AS NEEDED
Status: DISCONTINUED | OUTPATIENT
Start: 2024-05-12 | End: 2024-05-12 | Stop reason: SURG

## 2024-05-12 RX ORDER — LABETALOL HYDROCHLORIDE 5 MG/ML
5 INJECTION, SOLUTION INTRAVENOUS
Status: COMPLETED | OUTPATIENT
Start: 2024-05-12 | End: 2024-05-12

## 2024-05-12 RX ORDER — NALOXONE HCL 0.4 MG/ML
0.1 VIAL (ML) INJECTION
Status: DISCONTINUED | OUTPATIENT
Start: 2024-05-12 | End: 2024-05-13 | Stop reason: HOSPADM

## 2024-05-12 RX ORDER — CEPHALEXIN 500 MG/1
500 CAPSULE ORAL 3 TIMES DAILY
Qty: 21 CAPSULE | Refills: 0 | Status: SHIPPED | OUTPATIENT
Start: 2024-05-12

## 2024-05-12 RX ORDER — LABETALOL HYDROCHLORIDE 5 MG/ML
INJECTION, SOLUTION INTRAVENOUS AS NEEDED
Status: DISCONTINUED | OUTPATIENT
Start: 2024-05-12 | End: 2024-05-12 | Stop reason: SURG

## 2024-05-12 RX ORDER — SODIUM CHLORIDE, SODIUM LACTATE, POTASSIUM CHLORIDE, CALCIUM CHLORIDE 600; 310; 30; 20 MG/100ML; MG/100ML; MG/100ML; MG/100ML
9 INJECTION, SOLUTION INTRAVENOUS CONTINUOUS
Status: DISCONTINUED | OUTPATIENT
Start: 2024-05-12 | End: 2024-05-13 | Stop reason: HOSPADM

## 2024-05-12 RX ORDER — ONDANSETRON 2 MG/ML
INJECTION INTRAMUSCULAR; INTRAVENOUS AS NEEDED
Status: DISCONTINUED | OUTPATIENT
Start: 2024-05-12 | End: 2024-05-12 | Stop reason: SURG

## 2024-05-12 RX ORDER — SODIUM CHLORIDE 0.9 % (FLUSH) 0.9 %
10 SYRINGE (ML) INJECTION AS NEEDED
Status: DISCONTINUED | OUTPATIENT
Start: 2024-05-12 | End: 2024-05-13 | Stop reason: HOSPADM

## 2024-05-12 RX ORDER — DROPERIDOL 2.5 MG/ML
0.62 INJECTION, SOLUTION INTRAMUSCULAR; INTRAVENOUS
Status: DISCONTINUED | OUTPATIENT
Start: 2024-05-12 | End: 2024-05-12 | Stop reason: HOSPADM

## 2024-05-12 RX ORDER — PROPOFOL 10 MG/ML
VIAL (ML) INTRAVENOUS AS NEEDED
Status: DISCONTINUED | OUTPATIENT
Start: 2024-05-12 | End: 2024-05-12 | Stop reason: SURG

## 2024-05-12 RX ORDER — BISACODYL 10 MG
10 SUPPOSITORY, RECTAL RECTAL DAILY PRN
Status: DISCONTINUED | OUTPATIENT
Start: 2024-05-12 | End: 2024-05-13 | Stop reason: HOSPADM

## 2024-05-12 RX ORDER — SODIUM CHLORIDE 0.9 % (FLUSH) 0.9 %
3-10 SYRINGE (ML) INJECTION AS NEEDED
Status: DISCONTINUED | OUTPATIENT
Start: 2024-05-12 | End: 2024-05-12 | Stop reason: HOSPADM

## 2024-05-12 RX ORDER — LIDOCAINE HYDROCHLORIDE 10 MG/ML
0.5 INJECTION, SOLUTION INFILTRATION; PERINEURAL ONCE AS NEEDED
Status: DISCONTINUED | OUTPATIENT
Start: 2024-05-12 | End: 2024-05-12 | Stop reason: HOSPADM

## 2024-05-12 RX ORDER — HYDROCODONE BITARTRATE AND ACETAMINOPHEN 7.5; 325 MG/1; MG/1
2 TABLET ORAL EVERY 4 HOURS PRN
Status: DISCONTINUED | OUTPATIENT
Start: 2024-05-12 | End: 2024-05-13 | Stop reason: HOSPADM

## 2024-05-12 RX ORDER — HYDROMORPHONE HYDROCHLORIDE 1 MG/ML
0.5 INJECTION, SOLUTION INTRAMUSCULAR; INTRAVENOUS; SUBCUTANEOUS
Status: DISCONTINUED | OUTPATIENT
Start: 2024-05-12 | End: 2024-05-13 | Stop reason: HOSPADM

## 2024-05-12 RX ORDER — MELOXICAM 15 MG/1
15 TABLET ORAL DAILY
Status: DISCONTINUED | OUTPATIENT
Start: 2024-05-13 | End: 2024-05-13 | Stop reason: HOSPADM

## 2024-05-12 RX ORDER — ACETAMINOPHEN 500 MG
500 TABLET ORAL 2 TIMES DAILY PRN
Status: DISCONTINUED | OUTPATIENT
Start: 2024-05-12 | End: 2024-05-13 | Stop reason: HOSPADM

## 2024-05-12 RX ORDER — LIDOCAINE HYDROCHLORIDE 40 MG/ML
SOLUTION TOPICAL AS NEEDED
Status: DISCONTINUED | OUTPATIENT
Start: 2024-05-12 | End: 2024-05-12 | Stop reason: SURG

## 2024-05-12 RX ORDER — ONDANSETRON 2 MG/ML
4 INJECTION INTRAMUSCULAR; INTRAVENOUS ONCE AS NEEDED
Status: COMPLETED | OUTPATIENT
Start: 2024-05-12 | End: 2024-05-12

## 2024-05-12 RX ORDER — DEXAMETHASONE SODIUM PHOSPHATE 4 MG/ML
INJECTION, SOLUTION INTRA-ARTICULAR; INTRALESIONAL; INTRAMUSCULAR; INTRAVENOUS; SOFT TISSUE AS NEEDED
Status: DISCONTINUED | OUTPATIENT
Start: 2024-05-12 | End: 2024-05-12 | Stop reason: SURG

## 2024-05-12 RX ORDER — NALOXONE HCL 0.4 MG/ML
0.2 VIAL (ML) INJECTION AS NEEDED
Status: DISCONTINUED | OUTPATIENT
Start: 2024-05-12 | End: 2024-05-12 | Stop reason: HOSPADM

## 2024-05-12 RX ORDER — DOCUSATE SODIUM 100 MG/1
100 CAPSULE, LIQUID FILLED ORAL 2 TIMES DAILY
Status: DISCONTINUED | OUTPATIENT
Start: 2024-05-12 | End: 2024-05-13 | Stop reason: HOSPADM

## 2024-05-12 RX ORDER — DIPHENHYDRAMINE HYDROCHLORIDE 50 MG/ML
12.5 INJECTION INTRAMUSCULAR; INTRAVENOUS
Status: DISCONTINUED | OUTPATIENT
Start: 2024-05-12 | End: 2024-05-12 | Stop reason: HOSPADM

## 2024-05-12 RX ORDER — PROMETHAZINE HYDROCHLORIDE 25 MG/1
25 SUPPOSITORY RECTAL ONCE AS NEEDED
Status: DISCONTINUED | OUTPATIENT
Start: 2024-05-12 | End: 2024-05-12 | Stop reason: HOSPADM

## 2024-05-12 RX ORDER — KETOROLAC TROMETHAMINE 15 MG/ML
15 INJECTION, SOLUTION INTRAMUSCULAR; INTRAVENOUS EVERY 6 HOURS PRN
Status: DISCONTINUED | OUTPATIENT
Start: 2024-05-12 | End: 2024-05-13 | Stop reason: HOSPADM

## 2024-05-12 RX ORDER — SODIUM CHLORIDE 0.9 % (FLUSH) 0.9 %
3 SYRINGE (ML) INJECTION EVERY 12 HOURS SCHEDULED
Status: DISCONTINUED | OUTPATIENT
Start: 2024-05-12 | End: 2024-05-13 | Stop reason: HOSPADM

## 2024-05-12 RX ORDER — HYDROCODONE BITARTRATE AND ACETAMINOPHEN 5; 325 MG/1; MG/1
1 TABLET ORAL ONCE AS NEEDED
Status: DISCONTINUED | OUTPATIENT
Start: 2024-05-12 | End: 2024-05-12 | Stop reason: HOSPADM

## 2024-05-12 RX ORDER — FENTANYL CITRATE 50 UG/ML
INJECTION, SOLUTION INTRAMUSCULAR; INTRAVENOUS AS NEEDED
Status: DISCONTINUED | OUTPATIENT
Start: 2024-05-12 | End: 2024-05-12 | Stop reason: SURG

## 2024-05-12 RX ORDER — OXYCODONE AND ACETAMINOPHEN 7.5; 325 MG/1; MG/1
1 TABLET ORAL EVERY 4 HOURS PRN
Status: DISCONTINUED | OUTPATIENT
Start: 2024-05-12 | End: 2024-05-12 | Stop reason: HOSPADM

## 2024-05-12 RX ORDER — ONDANSETRON 4 MG/1
4 TABLET, ORALLY DISINTEGRATING ORAL EVERY 6 HOURS PRN
Status: DISCONTINUED | OUTPATIENT
Start: 2024-05-12 | End: 2024-05-13 | Stop reason: HOSPADM

## 2024-05-12 RX ORDER — EPHEDRINE SULFATE 50 MG/ML
INJECTION, SOLUTION INTRAVENOUS AS NEEDED
Status: DISCONTINUED | OUTPATIENT
Start: 2024-05-12 | End: 2024-05-12 | Stop reason: SURG

## 2024-05-12 RX ORDER — SODIUM CHLORIDE 0.9 % (FLUSH) 0.9 %
3 SYRINGE (ML) INJECTION EVERY 12 HOURS SCHEDULED
Status: DISCONTINUED | OUTPATIENT
Start: 2024-05-12 | End: 2024-05-12 | Stop reason: HOSPADM

## 2024-05-12 RX ORDER — FENTANYL CITRATE 50 UG/ML
50 INJECTION, SOLUTION INTRAMUSCULAR; INTRAVENOUS ONCE AS NEEDED
Status: COMPLETED | OUTPATIENT
Start: 2024-05-12 | End: 2024-05-12

## 2024-05-12 RX ORDER — HYDROCODONE BITARTRATE AND ACETAMINOPHEN 7.5; 325 MG/1; MG/1
1 TABLET ORAL EVERY 4 HOURS PRN
Status: DISCONTINUED | OUTPATIENT
Start: 2024-05-12 | End: 2024-05-12

## 2024-05-12 RX ORDER — DOCUSATE SODIUM 100 MG/1
100 CAPSULE, LIQUID FILLED ORAL 2 TIMES DAILY
Qty: 60 CAPSULE | Refills: 0 | Status: SHIPPED | OUTPATIENT
Start: 2024-05-12

## 2024-05-12 RX ORDER — PROMETHAZINE HYDROCHLORIDE 25 MG/1
25 TABLET ORAL ONCE AS NEEDED
Status: DISCONTINUED | OUTPATIENT
Start: 2024-05-12 | End: 2024-05-12 | Stop reason: HOSPADM

## 2024-05-12 RX ORDER — ONDANSETRON 4 MG/1
4 TABLET, FILM COATED ORAL EVERY 8 HOURS PRN
Qty: 30 TABLET | Refills: 0 | Status: SHIPPED | OUTPATIENT
Start: 2024-05-12

## 2024-05-12 RX ORDER — FENTANYL CITRATE 50 UG/ML
50 INJECTION, SOLUTION INTRAMUSCULAR; INTRAVENOUS
Status: DISCONTINUED | OUTPATIENT
Start: 2024-05-12 | End: 2024-05-12 | Stop reason: HOSPADM

## 2024-05-12 RX ORDER — SODIUM CHLORIDE, SODIUM LACTATE, POTASSIUM CHLORIDE, CALCIUM CHLORIDE 600; 310; 30; 20 MG/100ML; MG/100ML; MG/100ML; MG/100ML
100 INJECTION, SOLUTION INTRAVENOUS CONTINUOUS
Status: DISCONTINUED | OUTPATIENT
Start: 2024-05-12 | End: 2024-05-13 | Stop reason: HOSPADM

## 2024-05-12 RX ORDER — HYDROCODONE BITARTRATE AND ACETAMINOPHEN 7.5; 325 MG/1; MG/1
1 TABLET ORAL EVERY 4 HOURS PRN
Qty: 42 TABLET | Refills: 0 | Status: SHIPPED | OUTPATIENT
Start: 2024-05-12

## 2024-05-12 RX ORDER — MAGNESIUM HYDROXIDE 1200 MG/15ML
LIQUID ORAL AS NEEDED
Status: DISCONTINUED | OUTPATIENT
Start: 2024-05-12 | End: 2024-05-12 | Stop reason: HOSPADM

## 2024-05-12 RX ORDER — EPHEDRINE SULFATE 50 MG/ML
5 INJECTION, SOLUTION INTRAVENOUS ONCE AS NEEDED
Status: DISCONTINUED | OUTPATIENT
Start: 2024-05-12 | End: 2024-05-12 | Stop reason: HOSPADM

## 2024-05-12 RX ORDER — HYDROCODONE BITARTRATE AND ACETAMINOPHEN 5; 325 MG/1; MG/1
1 TABLET ORAL EVERY 4 HOURS PRN
Status: DISCONTINUED | OUTPATIENT
Start: 2024-05-12 | End: 2024-05-13 | Stop reason: HOSPADM

## 2024-05-12 RX ORDER — HYDRALAZINE HYDROCHLORIDE 20 MG/ML
5 INJECTION INTRAMUSCULAR; INTRAVENOUS
Status: DISCONTINUED | OUTPATIENT
Start: 2024-05-12 | End: 2024-05-12 | Stop reason: HOSPADM

## 2024-05-12 RX ORDER — ONDANSETRON 2 MG/ML
4 INJECTION INTRAMUSCULAR; INTRAVENOUS EVERY 6 HOURS PRN
Status: DISCONTINUED | OUTPATIENT
Start: 2024-05-12 | End: 2024-05-13 | Stop reason: HOSPADM

## 2024-05-12 RX ORDER — SODIUM CHLORIDE 9 MG/ML
40 INJECTION, SOLUTION INTRAVENOUS AS NEEDED
Status: DISCONTINUED | OUTPATIENT
Start: 2024-05-12 | End: 2024-05-13 | Stop reason: HOSPADM

## 2024-05-12 RX ORDER — HYDROMORPHONE HYDROCHLORIDE 1 MG/ML
0.5 INJECTION, SOLUTION INTRAMUSCULAR; INTRAVENOUS; SUBCUTANEOUS
Status: DISCONTINUED | OUTPATIENT
Start: 2024-05-12 | End: 2024-05-12 | Stop reason: HOSPADM

## 2024-05-12 RX ORDER — CEPHALEXIN 500 MG/1
500 CAPSULE ORAL EVERY 6 HOURS SCHEDULED
Status: DISCONTINUED | OUTPATIENT
Start: 2024-05-13 | End: 2024-05-13 | Stop reason: HOSPADM

## 2024-05-12 RX ORDER — FLUMAZENIL 0.1 MG/ML
0.2 INJECTION INTRAVENOUS AS NEEDED
Status: DISCONTINUED | OUTPATIENT
Start: 2024-05-12 | End: 2024-05-12 | Stop reason: HOSPADM

## 2024-05-12 RX ORDER — ROCURONIUM BROMIDE 10 MG/ML
INJECTION, SOLUTION INTRAVENOUS AS NEEDED
Status: DISCONTINUED | OUTPATIENT
Start: 2024-05-12 | End: 2024-05-12 | Stop reason: SURG

## 2024-05-12 RX ORDER — METOPROLOL TARTRATE 1 MG/ML
2.5 INJECTION, SOLUTION INTRAVENOUS
Status: DISCONTINUED | OUTPATIENT
Start: 2024-05-12 | End: 2024-05-12 | Stop reason: HOSPADM

## 2024-05-12 RX ORDER — MIDAZOLAM HYDROCHLORIDE 1 MG/ML
1 INJECTION INTRAMUSCULAR; INTRAVENOUS
Status: DISCONTINUED | OUTPATIENT
Start: 2024-05-12 | End: 2024-05-12 | Stop reason: HOSPADM

## 2024-05-12 RX ORDER — IPRATROPIUM BROMIDE AND ALBUTEROL SULFATE 2.5; .5 MG/3ML; MG/3ML
3 SOLUTION RESPIRATORY (INHALATION) ONCE AS NEEDED
Status: DISCONTINUED | OUTPATIENT
Start: 2024-05-12 | End: 2024-05-12 | Stop reason: HOSPADM

## 2024-05-12 RX ADMIN — FENTANYL CITRATE 50 MCG: 50 INJECTION, SOLUTION INTRAMUSCULAR; INTRAVENOUS at 10:08

## 2024-05-12 RX ADMIN — DOCUSATE SODIUM 100 MG: 100 CAPSULE, LIQUID FILLED ORAL at 11:39

## 2024-05-12 RX ADMIN — ONDANSETRON 4 MG: 2 INJECTION INTRAMUSCULAR; INTRAVENOUS at 21:50

## 2024-05-12 RX ADMIN — PANTOPRAZOLE SODIUM 40 MG: 40 TABLET, DELAYED RELEASE ORAL at 11:38

## 2024-05-12 RX ADMIN — DEXAMETHASONE SODIUM PHOSPHATE 8 MG: 4 INJECTION, SOLUTION INTRA-ARTICULAR; INTRALESIONAL; INTRAMUSCULAR; INTRAVENOUS; SOFT TISSUE at 08:07

## 2024-05-12 RX ADMIN — BUPROPION HYDROCHLORIDE 300 MG: 300 TABLET, EXTENDED RELEASE ORAL at 11:40

## 2024-05-12 RX ADMIN — PANTOPRAZOLE SODIUM 40 MG: 40 TABLET, DELAYED RELEASE ORAL at 20:57

## 2024-05-12 RX ADMIN — FENTANYL CITRATE 50 MCG: 50 INJECTION, SOLUTION INTRAMUSCULAR; INTRAVENOUS at 07:23

## 2024-05-12 RX ADMIN — GABAPENTIN 300 MG: 300 CAPSULE ORAL at 20:56

## 2024-05-12 RX ADMIN — SODIUM CHLORIDE, POTASSIUM CHLORIDE, SODIUM LACTATE AND CALCIUM CHLORIDE 9 ML/HR: 600; 310; 30; 20 INJECTION, SOLUTION INTRAVENOUS at 07:23

## 2024-05-12 RX ADMIN — Medication 3 ML: at 20:57

## 2024-05-12 RX ADMIN — LABETALOL HYDROCHLORIDE 10 MG: 5 INJECTION, SOLUTION INTRAVENOUS at 08:32

## 2024-05-12 RX ADMIN — LIDOCAINE HYDROCHLORIDE 100 MG: 20 INJECTION, SOLUTION INFILTRATION; PERINEURAL at 07:48

## 2024-05-12 RX ADMIN — HYDROMORPHONE HYDROCHLORIDE 0.5 MG: 1 INJECTION, SOLUTION INTRAMUSCULAR; INTRAVENOUS; SUBCUTANEOUS at 09:46

## 2024-05-12 RX ADMIN — HYDROMORPHONE HYDROCHLORIDE 0.5 MG: 1 INJECTION, SOLUTION INTRAMUSCULAR; INTRAVENOUS; SUBCUTANEOUS at 08:27

## 2024-05-12 RX ADMIN — SODIUM CHLORIDE, POTASSIUM CHLORIDE, SODIUM LACTATE AND CALCIUM CHLORIDE 100 ML/HR: 600; 310; 30; 20 INJECTION, SOLUTION INTRAVENOUS at 11:41

## 2024-05-12 RX ADMIN — PRAMIPEXOLE DIHYDROCHLORIDE 0.5 MG: 0.5 TABLET ORAL at 20:56

## 2024-05-12 RX ADMIN — OXYBUTYNIN CHLORIDE 10 MG: 10 TABLET, EXTENDED RELEASE ORAL at 11:41

## 2024-05-12 RX ADMIN — SODIUM CHLORIDE 2 G: 900 INJECTION INTRAVENOUS at 23:27

## 2024-05-12 RX ADMIN — KETOROLAC TROMETHAMINE 15 MG: 15 INJECTION, SOLUTION INTRAMUSCULAR; INTRAVENOUS at 09:55

## 2024-05-12 RX ADMIN — MIDAZOLAM 1 MG: 1 INJECTION INTRAMUSCULAR; INTRAVENOUS at 07:23

## 2024-05-12 RX ADMIN — LIDOCAINE HYDROCHLORIDE 1 EACH: 40 SOLUTION TOPICAL at 07:51

## 2024-05-12 RX ADMIN — HYDROMORPHONE HYDROCHLORIDE 0.5 MG: 1 INJECTION, SOLUTION INTRAMUSCULAR; INTRAVENOUS; SUBCUTANEOUS at 09:29

## 2024-05-12 RX ADMIN — FENTANYL CITRATE 50 MCG: 50 INJECTION, SOLUTION INTRAMUSCULAR; INTRAVENOUS at 09:16

## 2024-05-12 RX ADMIN — FENTANYL CITRATE 50 MCG: 50 INJECTION, SOLUTION INTRAMUSCULAR; INTRAVENOUS at 07:48

## 2024-05-12 RX ADMIN — ISOSORBIDE MONONITRATE 30 MG: 30 TABLET, EXTENDED RELEASE ORAL at 11:42

## 2024-05-12 RX ADMIN — ONDANSETRON 4 MG: 2 INJECTION INTRAMUSCULAR; INTRAVENOUS at 08:24

## 2024-05-12 RX ADMIN — FENTANYL CITRATE 50 MCG: 50 INJECTION, SOLUTION INTRAMUSCULAR; INTRAVENOUS at 08:58

## 2024-05-12 RX ADMIN — HYDROMORPHONE HYDROCHLORIDE 0.5 MG: 1 INJECTION, SOLUTION INTRAMUSCULAR; INTRAVENOUS; SUBCUTANEOUS at 08:29

## 2024-05-12 RX ADMIN — SUGAMMADEX 200 MG: 100 INJECTION, SOLUTION INTRAVENOUS at 08:24

## 2024-05-12 RX ADMIN — SODIUM CHLORIDE 2000 MG: 900 INJECTION INTRAVENOUS at 07:35

## 2024-05-12 RX ADMIN — LAMOTRIGINE 150 MG: 100 TABLET ORAL at 20:57

## 2024-05-12 RX ADMIN — ONDANSETRON 4 MG: 2 INJECTION INTRAMUSCULAR; INTRAVENOUS at 09:22

## 2024-05-12 RX ADMIN — PHENYLEPHRINE HYDROCHLORIDE 100 MCG: 10 INJECTION INTRAVENOUS at 07:53

## 2024-05-12 RX ADMIN — EPHEDRINE SULFATE 10 MG: 50 INJECTION INTRAVENOUS at 07:58

## 2024-05-12 RX ADMIN — LABETALOL HYDROCHLORIDE 20 MG: 5 INJECTION, SOLUTION INTRAVENOUS at 08:42

## 2024-05-12 RX ADMIN — LABETALOL HYDROCHLORIDE 5 MG: 5 INJECTION, SOLUTION INTRAVENOUS at 09:04

## 2024-05-12 RX ADMIN — METOPROLOL TARTRATE 2.5 MG: 1 INJECTION, SOLUTION INTRAVENOUS at 09:29

## 2024-05-12 RX ADMIN — SODIUM CHLORIDE 2 G: 900 INJECTION INTRAVENOUS at 15:19

## 2024-05-12 RX ADMIN — OXYCODONE AND ACETAMINOPHEN 1 TABLET: 7.5; 325 TABLET ORAL at 09:42

## 2024-05-12 RX ADMIN — METOPROLOL SUCCINATE 37.5 MG: 25 TABLET, EXTENDED RELEASE ORAL at 06:38

## 2024-05-12 RX ADMIN — DOCUSATE SODIUM 100 MG: 100 CAPSULE, LIQUID FILLED ORAL at 20:57

## 2024-05-12 RX ADMIN — PROPOFOL 200 MG: 10 INJECTION, EMULSION INTRAVENOUS at 07:48

## 2024-05-12 RX ADMIN — LAMOTRIGINE 150 MG: 100 TABLET ORAL at 11:39

## 2024-05-12 RX ADMIN — ROCURONIUM BROMIDE 50 MG: 10 INJECTION, SOLUTION INTRAVENOUS at 07:48

## 2024-05-12 RX ADMIN — Medication 3 ML: at 11:42

## 2024-05-12 RX ADMIN — PHENYLEPHRINE HYDROCHLORIDE 200 MCG: 10 INJECTION INTRAVENOUS at 07:58

## 2024-05-12 RX ADMIN — ARIPIPRAZOLE 20 MG: 5 TABLET ORAL at 11:40

## 2024-05-12 RX ADMIN — LABETALOL HYDROCHLORIDE 5 MG: 5 INJECTION, SOLUTION INTRAVENOUS at 08:58

## 2024-05-12 RX ADMIN — METOPROLOL SUCCINATE 37.5 MG: 25 TABLET, EXTENDED RELEASE ORAL at 13:26

## 2024-05-12 RX ADMIN — HYDROCODONE BITARTRATE AND ACETAMINOPHEN 1 TABLET: 7.5; 325 TABLET ORAL at 20:57

## 2024-05-12 RX ADMIN — LABETALOL HYDROCHLORIDE 10 MG: 5 INJECTION, SOLUTION INTRAVENOUS at 08:29

## 2024-05-12 RX ADMIN — LABETALOL HYDROCHLORIDE 5 MG: 5 INJECTION, SOLUTION INTRAVENOUS at 09:09

## 2024-05-12 RX ADMIN — VENLAFAXINE HYDROCHLORIDE 150 MG: 150 CAPSULE, EXTENDED RELEASE ORAL at 11:41

## 2024-05-12 RX ADMIN — LABETALOL HYDROCHLORIDE 5 MG: 5 INJECTION, SOLUTION INTRAVENOUS at 08:48

## 2024-05-12 NOTE — PLAN OF CARE
Goal Outcome Evaluation:  Plan of Care Reviewed With: patient           Outcome Evaluation: Pt seen for PT this afternoon. Pt had a fall and about 2 months out from a total knee and roughly the bottom half of the incision dehisced when she fell.  Pt now s/p L knee I&D w complex wound closure. She presents w expected post op pain and weakness. Pt doing well, she is up in BR upon entry to room. She is able to stand w CGA and Rwx. She ambulated approx 10 ft in room w CGA. No unsteadiness noted. Pt agreeable to sit up in chair at end of session. Pt tolerated knee exercises well; however did not perform heel slides as this therapist is unsure if pt has any flexion restrictions following sx. Pt plans home at [place] and states she will stay w her sister. She will continue to benefit from skilled PT to maximize safety, strength, and independence w mobility.      Anticipated Discharge Disposition (PT): home with assist, home with home health

## 2024-05-12 NOTE — H&P
HISTORY AND PHYSICAL   [facility]        Date of Admission: 2024  Patient Identification:  Name: Keri Bhagat  Age: 63 y.o.  Sex: female  :  1960  MRN: [id number]                     Primary Care Physician: Epley, James, APRN    Chief Complaint:  63 year old female presented to the emergency room with an open wound of her left knee; she had a knee replacement a few weeks ago; she tripped and fell today her incision came apart; she has some pain in involved area;     History of Present Illness:   As above    Past Medical History:  Past Medical History:   Diagnosis Date    Abnormal Pap smear of cervix     Adrenal adenoma 2022    Anemia     Anxiety     Arthritis     Arthritis of back     Arthritis of neck     Bipolar I disorder, single manic episode     depressive d(NOS)    Cervical disc herniation     Colon polyp     Coronary artery disease     COVID-19 01/10/2023    Death of family member     MOTHER IN 2024    Depression     NO SUICIDAL PLANS    Dislocation of finger     Diverticular disease     Diverticulitis of colon     Dyspepsia     Encounter for follow-up examination after completed treatment for conditions other than malignant neoplasm 2018    Fracture of wrist     GERD (gastroesophageal reflux disease)     Heart attack     AGE 39    Heart murmur     Hiatal hernia     History of transfusion         HPV (human papilloma virus) infection 2016    HPV positive on pap LGSIL    Hyperlipidemia     Hypertension     Hypothyroidism     Incontinence in female     wears pads    Kidney disease, chronic, stage III (GFR 30-59 ml/min)     Knee pain, bilateral     Knee swelling     LGSIL on Pap smear of cervix 2016    LGSIL HPV positive    Lumbosacral disc disease Unsure    Lower left back is partial osteoarthritis and partial osteoporosis    Obesity     Osteopenia     Bone density test    Periodic limb movement disorder     Restless leg syndrome     LEFT  SHOULDER    Rotator cuff syndrome     Sleep apnea     NO MACHINE CURRENTLY    Suicidal ideation 08/19/2016    history    Thyroid nodule     Vitamin B12 deficiency      Past Surgical History:  Past Surgical History:   Procedure Laterality Date    ADENOIDECTOMY  1974    ANTERIOR CERVICAL DISCECTOMY W/ FUSION N/A 12/29/2016    Procedure: C3-4 anterior cervical discectomy and fusion with Depuy micro plate, ALLOGRAFT C3-4, AND HARDWARE REMOVAL C4-7.;  Surgeon: Hema Godwin MD;  Location: [facility] MAIN OR;  Service:     CARDIAC CATHETERIZATION N/A 03/30/2017    Procedure: Left Heart Cath;  Surgeon: Tracey Vargas MD;  Location:  [facility] CATH INVASIVE LOCATION;  Service:     CARDIAC CATHETERIZATION N/A 03/30/2017    Procedure: Coronary angiography;  Surgeon: Tracey Vargas MD;  Location:  [facility]IVE [facility];  Service:     CARDIAC CATHETERIZATION N/A 03/30/2017    Procedure: Left ventriculography;  Surgeon: Tracey Vargas MD;  Location:  [facility];  Service:     CARDIAC CATHETERIZATION  03/30/2017    Procedure: Functional Flow [place];  Surgeon: Tracey Vargas MD;  Location:  [facility]VASIVE [facility];  Service:     CATARACT EXTRACTION Bilateral     CERVICAL BIOPSY  MMXVI    Dr. Jeffery.     CERVICAL DISCECTOMY ANTERIOR  04/2013    C4-7    COLONOSCOPY  04/20/2015    Diverticulosis, IH    COLONOSCOPY  03/09/2021    COLPOSCOPY W/ BIOPSY / CURETTAGE  06/17/2016    LGSIL HPV positive. Results were normal repeat pap in one year. Chronic Cervicitis    CORONARY ANGIOPLASTY WITH STENT PLACEMENT      ENDOSCOPY  MMXV    Normal.  Dr. Rodriguez    ENDOSCOPY N/A 06/22/2017    Erythematous mucosa in the stomach  PATH: Chronic active gastritis, moderate with intestinal metaplasia     EPIDURAL BLOCK      INGUINAL HERNIA REPAIR      KNEE SURGERY      LAPAROSCOPIC GASTRIC BANDING  02/2018    SHOULDER SURGERY Left     RCR    TONSILLECTOMY AND ADENOIDECTOMY      TOTAL KNEE ARTHROPLASTY Left 03/05/2024    Procedure:  LEFT TOTAL KNEE ARTHROPLASTY WITH TAB NAVIGATION;  Surgeon: Tho Pritchard MD;  [place] [facility] [place] [facility]  Service: Orthopedics;  Laterality: Left;    TRANSVAGINAL TAPING SUSPENSION N/A 12/06/2017    Procedure: MID URETHRAL SLING CYSTSCOPY;  Surgeon: Abby Méndez MD;  Location: [facility] MAIN OR;  Service:     TRIGGER POINT INJECTION      TUBAL ABDOMINAL LIGATION      TYMPANOSTOMY TUBE PLACEMENT Right     UPPER GASTROINTESTINAL ENDOSCOPY  approx 2021    WRIST SURGERY Bilateral     carpal tunnel      Home Meds:  Medications Prior to Admission   Medication Sig Dispense Refill Last Dose    apixaban (ELIQUIS) 5 MG tablet tablet Take 1 tablet by mouth Every 12 (Twelve) Hours. 60 tablet 11 5/11/2024    atorvastatin (LIPITOR) 40 MG tablet Take 1 tablet by mouth Daily.   5/11/2024    B Complex Vitamins (vitamin b complex) capsule capsule Take 1 capsule by mouth 2 (Two) Times a Day.   5/11/2024    buPROPion XL (WELLBUTRIN XL) 150 MG 24 hr tablet Take 1 tablet by mouth 2 (Two) Times a Day.   5/11/2024    Calcium Citrate-Vitamin D (CITRUS CALCIUM/VITAMIN D PO) Take  by mouth 2 (Two) Times a Day.   5/11/2024    docusate sodium (COLACE) 100 MG capsule Take 1 capsule by mouth As Needed for Constipation.   Past Week    estradiol (ESTRACE) 0.1 MG/GM vaginal cream Insert 1 g into the vagina 3 (Three) Times a Week.   5/10/2024    Ferrous Sulfate (IRON PO) Take 1 tablet by mouth 2 (Two) Times a Day.   5/11/2024    gabapentin (NEURONTIN) 300 MG capsule TAKE ONE CAPSULE BY MOUTH THREE TIMES A  capsule 0 5/11/2024    isosorbide mononitrate (IMDUR) 30 MG 24 hr tablet Take 1 tablet by mouth Daily. 90 tablet 2 5/11/2024    lamoTRIgine (LaMICtal) 150 MG tablet Take 1 tablet by mouth 2 (Two) Times a Day.   5/11/2024    levothyroxine (SYNTHROID, LEVOTHROID) 88 MCG tablet Take 1 tablet by mouth Every Morning. 90 tablet 2 5/11/2024    metoprolol succinate XL (Toprol XL) 25 MG 24 hr tablet Take 1.5 tablets by mouth Daily.  For bp 135 tablet 3 5/11/2024    OLANZapine (zyPREXA) 5 MG tablet Take 1 tablet by mouth Every Night.   5/10/2024    pantoprazole (PROTONIX) 40 MG EC tablet Take 1 tablet by mouth 2 (Two) Times a Day. 180 tablet 0 5/11/2024    venlafaxine XR (EFFEXOR-XR) 150 MG 24 hr capsule Take 1 capsule by mouth Daily. 90 capsule 1 5/11/2024    Vibegron 75 MG tablet Take 1 tablet by mouth Daily.   5/11/2024    Vraylar 1.5 MG capsule capsule Take 1 capsule by mouth.   5/11/2024    benzonatate (TESSALON) 100 MG capsule Take 1 capsule by mouth 3 (Three) Times a Day As Needed for Cough. 20 capsule 0        Allergies:  No Known Allergies  Immunizations:  Immunization History   Administered Date(s) Administered    COVID-19 (PFIZER) Purple Cap Monovalent 03/25/2021, 04/15/2021, 11/17/2021    Flublock Quad =>18yrs 10/27/2020    Fluzone (or Fluarix & Flulaval for VFC) >6mos 10/13/2021    Influenza, Unspecified 08/11/2020    Tdap 08/31/2021     Social History:   Social History     Social History Narrative    Not on file     Social History     Socioeconomic History    Marital status:     Number of children: 3    Years of education: 12   Tobacco Use    Smoking status: Light Smoker     Current packs/day: 0.25     Types: Electronic Cigarette, Cigarettes     Passive exposure: Current    Smokeless tobacco: Current    Tobacco comments:     Electronic Cigarette   Vaping Use    Vaping status: Every Day    Substances: Nicotine, Flavoring    Devices: 91 [employer] [employer] tank   Substance and Sexual Activity    Alcohol use: Not Currently     Comment: RARELY    Drug use: Never    Sexual activity: Not Currently     Partners: Male     Birth control/protection: Condom, Abstinence, Post-menopausal, None, Tubal ligation       Family History:  Family History   Problem Relation Age of Onset    Diabetes type II Mother     Hypertension Mother     Osteoporosis Mother     Seizures Mother     Diabetes Mother     Arthritis Mother     Hyperlipidemia Mother     COPD  Father     Hypertension Father     Lung cancer Father     Heart attack Father     Liver cancer Father     Liver disease Father     Heart disease Father     Cancer Father         Lung cacer that metastasized  into the liver.    Thyroid disease Sister     Hypertension Sister     Bipolar disorder Sister     Depression Sister     ADD / ADHD Sister     Anxiety disorder Sister     Mental illness Sister     Hyperlipidemia Sister     Broken bones Sister     Thyroid disease Sister     Hypertension Sister     Bipolar disorder Sister     Anxiety disorder Sister     Depression Sister     Mental illness Sister     Hyperlipidemia Sister     Cancer Maternal Grandmother     No Known Problems Maternal Grandfather     No Known Problems Paternal Grandmother     No Known Problems Paternal Grandfather     Abnormal EKG Daughter     Hypertension Daughter     Bipolar disorder Daughter     Thyroid disease Daughter     Mental illness Daughter     Hyperlipidemia Daughter     Asthma Daughter     No Known Problems Son     Diabetes Son         Type 1    Diabetes Paternal Uncle     Breast cancer Neg Hx     Ovarian cancer Neg Hx     Uterine cancer Neg Hx     Colon cancer Neg Hx     Malig Hyperthermia Neg Hx         Review of Systems  See history of present illness and past medical history.  Patient denies headache, dizziness, syncope, falls, trauma, change in vision, change in hearing, change in taste, changes in weight, changes in appetite, focal weakness, numbness, or paresthesia.  Patient denies chest pain, palpitations, dyspnea, orthopnea, PND, cough, sinus pressure, rhinorrhea, epistaxis, hemoptysis, nausea, vomiting,hematemesis, diarrhea, constipation or hematochezia.  Denies cold or heat intolerance, polydipsia, polyuria, polyphagia. Denies hematuria, pyuria, dysuria, hesitancy, frequency or urgency. Denies consumption of raw and under cooked meats foods or change in water source.  Denies fever, chills, sweats, night sweats.    ".    Objective:  T Max 24 hrs: Temp (24hrs), Av.3 °F (36.8 °C), Min:98.1 °F (36.7 °C), Max:98.4 °F (36.9 °C)    Vitals Ranges:   Temp:  [98.1 °F (36.7 °C)-98.4 °F (36.9 °C)] 98.3 °F (36.8 °C)  Heart Rate:  [] 68  Resp:  [16] 16  BP: (112-146)/() 112/78      Exam:  /78 (BP Location: Left arm, Patient Position: Sitting)   Pulse 68   Temp 98.3 °F (36.8 °C) (Oral)   Resp 16   Ht 160 cm (62.99\")   Wt 92.1 kg (203 lb 0.7 oz)   LMP 10/21/2016 (Approximate) Comment: 54  SpO2 95%   BMI 35.98 kg/m²     General Appearance:    Alert, cooperative, no distress, appears stated age   Head:    Normocephalic, without obvious abnormality, atraumatic   Eyes:    PERRL, conjunctivae/corneas clear, EOM's intact, both eyes   Ears:    Normal external ear canals, both ears   Nose:   Nares normal, septum midline, mucosa normal, no drainage    or sinus tenderness   Throat:   Lips, mucosa, and tongue normal   Neck:   Supple, symmetrical, trachea midline, no adenopathy;     thyroid:  no enlargement/tenderness/nodules; no carotid    bruit or JVD   Back:     Symmetric, no curvature, ROM normal, no CVA tenderness   Lungs:     Clear to auscultation bilaterally, respirations unlabored   Chest Wall:    No tenderness or deformity    Heart:    Regular rate and rhythm, S1 and S2 normal, no murmur, rub   or gallop   Abdomen:     Soft, nontender, bowel sounds active all four quadrants,     no masses, no hepatomegaly, no splenomegaly   Extremities:   Extremities normal, atraumatic, no cyanosis or edema   Pulses:   2+ and symmetric all extremities   Skin:   Skin color, texture, turgor normal, no rashes or lesions   Lymph nodes:   Cervical, supraclavicular, and axillary nodes normal   Neurologic:   CNII-XII intact, normal strength, sensation intact throughout      .    Data Review:  Labs in chart were reviewed.  WBC   Date Value Ref Range Status   2024 7.21 3.40 - 10.80 10*3/mm3 Final     Hemoglobin   Date Value Ref Range " Status   05/11/2024 13.7 12.0 - 15.9 g/dL Final     Hematocrit   Date Value Ref Range Status   05/11/2024 41.4 34.0 - 46.6 % Final     Platelets   Date Value Ref Range Status   05/11/2024 270 140 - 450 10*3/mm3 Final     Sodium   Date Value Ref Range Status   05/11/2024 141 136 - 145 mmol/L Final     Potassium   Date Value Ref Range Status   05/11/2024 4.2 3.5 - 5.2 mmol/L Final     Comment:     Slight hemolysis detected by analyzer. Result may be falsely elevated.     Chloride   Date Value Ref Range Status   05/11/2024 105 98 - 107 mmol/L Final     CO2   Date Value Ref Range Status   05/11/2024 25.4 22.0 - 29.0 mmol/L Final     BUN   Date Value Ref Range Status   05/11/2024 12 8 - 23 mg/dL Final     Creatinine   Date Value Ref Range Status   05/11/2024 0.81 0.57 - 1.00 mg/dL Final     Glucose   Date Value Ref Range Status   05/11/2024 99 65 - 99 mg/dL Final     Calcium   Date Value Ref Range Status   05/11/2024 9.3 8.6 - 10.5 mg/dL Final                Imaging Results (All)       Procedure Component Value Units Date/Time    XR Knee 1 or 2 View Left [[id number]] Collected: 05/11/24 1406     Updated: 05/11/24 1411    Narrative:      XR KNEE 1 OR 2 VW LEFT-        INDICATION: Pain after fall     COMPARISON: Left knee radiographs March 5, 2024     TECHNIQUE: 2 view left knee     FINDINGS:      Total knee arthroplasty. No lucency seen surrounding the hardware.  Normal alignment. No fracture. No dislocation. No effusion.       Impression:         1. No fracture or dislocation.  2. Total knee arthroplasty     This report was finalized on 5/11/2024 2:07 PM by Dr. Rufus Capellan M.D on Workstation: [id number]                 Assessment:  Active Hospital Problems    Diagnosis  POA    **Wound dehiscence [T81.30XA]  Yes      Resolved Hospital Problems   No resolved problems to display.   Fall  Hypertension  Cad  Hypothyroidism  Obesity   Sleep apnea    Plan:  Will have surgery tomorrow  Npo after midnight  Pain  control  Continue home meds  Dw patient and ed provider    Mee Samuel MD  5/11/2024  22:58 EDT

## 2024-05-12 NOTE — OP NOTE
Orthopaedic Operative Note    Facility: [facility]    Patient: Keri Bhagat    Medical Record Number: [id number]    YOB: 1960    Dictating Surgeon: Ajay Gutierrez M.D.*    Primary Care Physician: Epley, James, APRN    Date of Operation: 5/12/2024    Pre-Operative Diagnosis:  Left knee wound dehiscence status post total knee arthroplasty    Post-Operative Diagnosis:  Left knee wound dehiscence status post total knee arthroplasty    Procedure Performed: Irrigation debridement with complex wound closure, left knee    Surgeon: Ajay Gutierrez MD     Assistant: Chiara Tran whose assistance was critical for help with patient positioning, suctioning and irrigation, retraction, manipulation of the extremity for insertion of the implants, wound closure and application of the bandages.  Her assistance was critical to the success of this case.      Anesthesia: General    Complications: None.     Estimated Blood Loss: Less than 50 mL.     Implants: None    Specimens: * No orders in the log *    Brief Operative Indication: Ms. Bhagat fell and landed directly on her left knee.  She is about 2 months out from a total knee and roughly the bottom half of the incision dehisced when she fell.  The risk, benefits and alternatives to irrigation and debridement with wound closure were discussed.  She consented to proceed.    Description of the procedure in detail: The patient and operative site were identified in the preoperative holding area.  The surgical site was marked.  Patient was taken the operating room.  General anesthesia was administered.  She was positioned on the operating table in the supine position.  All bony prominences were padded and protected.  A timeout was taken and preoperative antibiotics administered.  Following this the left lower extremity was prepped and draped in the standard, sterile fashion.  The leg was cleaned with alcohol.  A Hibiclens scrub was performed  followed by ChloraPrep paint.  We allow those to dry for 3 minutes before the drape procedure was carried out.  The length of the wound was about 5 cm.  This extended down into the subcutaneous tissues and then extended around the lateral retinaculum.  There was no compromise of the retinaculum or extensor mechanism.  The implants were not palpable through the wound and the extensor mechanism appeared to have healed completely.  This was just a dehiscence of the skin and subcutaneous tissues but did not extend deeper.  There was  hematoma in the wound which was evacuated.  No necrosis, purulence or other concerning findings were noted.  I debrided the skin edges with a scalpel.  I debrided some of the old hematoma with a rongeur.  Following this, the wound was copiously irrigated out with a liter of sterile saline mixed with Betadine followed by 3 L of sterile saline via pulsatile lavage.  I made sure we had good hemostasis.  The wound was then closed in a layered fashion using 2-0 Vicryl for the deep tissues and staples for the skin.  Sterile dressings were applied.  She was awakened and taken to recovery room in good condition.      Ajay Gutierrez MD  05/12/24

## 2024-05-12 NOTE — PLAN OF CARE
Goal Outcome Evaluation:  Plan of Care Reviewed With: patient           Outcome Evaluation: VSS, purewick in place, PRN norco x1, NPO at midnight, Knee dressing intact and leg, elevated, consent for procedure needing to be obtained, saline locked, continuing with plan of care.

## 2024-05-12 NOTE — PLAN OF CARE
Goal Outcome Evaluation:  Plan of Care Reviewed With: patient, daughter        Progress: no change  Outcome Evaluation: Tachycardic, BP stable, on 2L O2. Dressing to L knee is CDI. No c/o numbness/tingling in LLE. IV abx and IVF as ordered, up w/ assist x1 walker and gait belt, ice applied to knee, voiding without issue, remained very sleepy this shift. Falls precautions maintained, patient and daughter updated on plan of care. Pt up in chair for a short time.

## 2024-05-12 NOTE — PROGRESS NOTES
Name: Keri Bhagat ADMIT: 2024   : 1960  PCP: Epley, James, APRN    MRN: [id number] LOS: 1 days   AGE/SEX: 63 y.o. female  ROOM: [facility]     Subjective     Underwent irrigation and debridement of the left knee today.     Objective   Objective   Vital Signs  Temp:  [96 °F (35.6 °C)-98.6 °F (37 °C)] 98.6 °F (37 °C)  Heart Rate:  [] 122  Resp:  [16] 16  BP: (105-186)/() 105/71  SpO2:  [91 %-99 %] 95 %  on  Flow (L/min):  [3-4] 3;   Device (Oxygen Therapy): nasal cannula  Body mass index is 35.98 kg/m².  Physical Exam  Constitutional:       Appearance: Normal appearance.   HENT:      Head: Normocephalic and atraumatic.   Eyes:      Extraocular Movements: Extraocular movements intact.      Pupils: Pupils are equal, round, and reactive to light.   Cardiovascular:      Rate and Rhythm: Normal rate and regular rhythm.   Pulmonary:      Effort: Pulmonary effort is normal. No respiratory distress.   Abdominal:      General: There is no distension.      Palpations: Abdomen is soft.      Tenderness: There is no abdominal tenderness.   Musculoskeletal:         General: No swelling or tenderness.      Comments: Left leg in ACE wrapping    Skin:     General: Skin is warm and dry.   Neurological:      General: No focal deficit present.      Mental Status: She is alert and oriented to person, place, and time.         Results Review     I reviewed the patient's new clinical results.  Results from last 7 days   Lab Units 24  0436 24  1415   WBC 10*3/mm3 8.14 7.21   HEMOGLOBIN g/dL 13.4 13.7   PLATELETS 10*3/mm3 259 270     Results from last 7 days   Lab Units 24  0436 24  1415   SODIUM mmol/L 143 141   POTASSIUM mmol/L 4.6 4.2   CHLORIDE mmol/L 107 105   CO2 mmol/L 27.0 25.4   BUN mg/dL 14 12   CREATININE mg/dL 1.05* 0.81   GLUCOSE mg/dL 99 99   Estimated Creatinine Clearance: 60.7 mL/min (A) (by C-G formula based on SCr of 1.05 mg/dL (H)).  Results from last 7 days   Lab Units  "05/11/24  1415   ALBUMIN g/dL 4.4   BILIRUBIN mg/dL <0.2   ALK PHOS U/L 104   AST (SGOT) U/L 18   ALT (SGPT) U/L 22     Results from last 7 days   Lab Units 05/12/24  0436 05/11/24  1415   CALCIUM mg/dL 8.8 9.3   ALBUMIN g/dL  --  4.4       COVID19   Date Value Ref Range Status   03/31/2024 Not Detected Not Detected - Ref. Range Final   01/13/2023 Detected (A)  Final     No results found for: \"HGBA1C\", \"POCGLU\"  No results found. However, due to the size of the patient record, not all encounters were searched. Please check Results Review for a complete set of results.      XR Knee 1 or 2 View Left  Narrative: XR KNEE 1 OR 2 VW LEFT-        INDICATION: Postoperative     COMPARISON: Left knee radiographs May 11, 2024     TECHNIQUE: 2 view left knee     FINDINGS:      Total knee arthroplasty. No lucency around the hardware. Normal  alignment. No fracture. No definite effusion. New anterior cutaneous  staples and anterior soft tissue swelling.     Impression:    1. New anterior cutaneous staples and soft tissue swelling.  2. Stable total knee arthroplasty..     This report was finalized on 5/12/2024 9:10 AM by Dr. Rufus Capellan M.D on Workstation: [id number]       Scheduled Medications  ARIPiprazole, 20 mg, Oral, Daily  buPROPion XL, 300 mg, Oral, Daily  ceFAZolin, 2 g, Intravenous, Q8H  docusate sodium, 100 mg, Oral, BID  gabapentin, 300 mg, Oral, TID  isosorbide mononitrate, 30 mg, Oral, Daily  lamoTRIgine, 150 mg, Oral, BID  levothyroxine, 88 mcg, Oral, QAM  [START ON 5/13/2024] meloxicam, 15 mg, Oral, Daily  metoprolol succinate XL, 37.5 mg, Oral, Daily  oxybutynin XL, 10 mg, Oral, Daily  pantoprazole, 40 mg, Oral, BID  [START ON 5/13/2024] polyethylene glycol, 17 g, Oral, Daily  pramipexole, 0.5 mg, Oral, Nightly  sodium chloride, 3 mL, Intravenous, Q12H  venlafaxine XR, 150 mg, Oral, Daily    Infusions  lactated ringers, 9 mL/hr, Last Rate: 9 mL/hr (05/12/24 0741)  lactated ringers, 100 mL/hr, Last Rate: 100 " mL/hr (05/12/24 1141)    Diet  Diet: Regular/House; Fluid Consistency: Thin (IDDSI 0)       Assessment/Plan     Active Hospital Problems    Diagnosis  POA    **Wound dehiscence [T81.30XA]  Yes      Resolved Hospital Problems   No resolved problems to display.       63 y.o. female admitted with Wound dehiscence.    Wound dehiscence  Left total knee arthroplasty  She underwent incision and debridement of the left knee wound.  Currently on cefazolin for surgical prophylaxis.  I am going to extend the course after discharge with Keflex to complete a 5-day course of antibiotic therapy    Coronary artery disease  Hypertension  Blood pressures are actually little bit softer  After anesthesia.  Interestingly her heart rate has remained elevated.  She has been given Imdur, metoprolol this morning.  Monitor blood pressures    Depressive disorder  On Effexor    GERD  On pantoprazole    Hypothyroidism  On levothyroxine      SCDs for DVT prophylaxis.  Full code.  Discussed with patient and family.  Anticipate discharge home tomorrow.      Quentin Munoz MD  [facility]  05/12/24  13:19 EDT

## 2024-05-12 NOTE — THERAPY EVALUATION
Patient Name: Keri Bhagat  : 1960    MRN: [id number]                              Today's Date: 2024       Admit Date: 2024    Visit Dx:     ICD-10-CM ICD-9-CM   1. Wound dehiscence  T81.30XA 998.30   2. Acute pain of left knee  M25.562 719.46     Patient Active Problem List   Diagnosis    Gastroesophageal reflux disease    Bipolar I disorder, single manic episode    Atherosclerosis of coronary artery    Essential hypertension    Lightheadedness    Low back pain    Sciatica    Thyroid nodule    Fatigue    Hyperlipidemia    Primary hypothyroidism    Iron deficiency anemia    Right knee pain    Degenerative disc disease, cervical    Depression    ROCKY (obstructive sleep apnea)    Atherosclerotic heart disease of native coronary artery with other forms of angina pectoris    Cervical spondylosis with radiculopathy    Chronic pain of both knees    Arthritis of both knees    Weight gain, abnormal    Allergic rhinitis    Obesity (BMI 30-39.9)    Plantar fasciitis    Peroneal tendon injury    Stress fracture of calcaneus    Vitamin D deficiency    Pain and swelling of knee    Knee injury    Calcific Achilles tendinitis    Rod's deformity    Urgency incontinence    Nocturia    Chronic gastritis without bleeding    LGSIL on Pap smear of cervix    Primary osteoarthritis of right knee    Stage 1 chronic kidney disease    Left wrist tendinitis    Cervical radiculopathy    Tobacco abuse    Obesity, morbid, BMI 40.0-49.9    Encounter for screening for lung cancer    Urinary frequency    Diabetes mellitus screening    MORALES I (cervical intraepithelial neoplasia I)    Primary osteoarthritis of both knees    H/O bladder repair surgery    Chest pain    Syncope and collapse    Closed fracture of left distal radius    Closed displaced fracture of neck of right fifth metacarpal bone    Fracture follow-up    Nondisplaced fracture of base of fifth metacarpal bone, left hand, initial encounter for closed fracture     Closed fracture of lower end of left radius with routine healing    Multiple dislocations of fingers, open    Bilateral chronic knee pain    Arthritis of left knee    Arthritis of right knee    Family history of diabetes mellitus (DM)    Menopause    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    Memory changes    Periodic limb movement disorder    Gait disturbance    Degenerative disc disease, lumbar    Osteopenia    Vaginal atrophy    Female dyspareunia    Pituitary cyst    Meningioma    Vitamin B12 deficiency    Acute urinary tract infection    Anemia    Bacterial vaginosis    Anxiety    Inappropriate diet and eating habits    Incomplete emptying of bladder    Myocardial infarction    Other specified postprocedural states    Postoperative retention of urine    Suicide attempt    Urinary urgency    Vaginal discharge    Vomiting    Gastro-esophageal reflux disease with esophagitis, without bleeding    Pre-operative cardiovascular examination    Tendinitis of left wrist    Lipoma of upper extremity    Adrenal adenoma    Cervicalgia    ASCUS with positive high risk HPV cervical    Palpitations    Splenic artery aneurysm    Wound dehiscence     Past Medical History:   Diagnosis Date    Abnormal Pap smear of cervix     Adrenal adenoma 09/21/2022    Anemia     Anxiety     Arthritis     Arthritis of back     Arthritis of neck     Bipolar I disorder, single manic episode     depressive d(NOS)    Cervical disc herniation     Colon polyp     Coronary artery disease     COVID-19 01/10/2023    Death of family member     MOTHER IN FEB 2024    Depression     NO SUICIDAL PLANS    Dislocation of finger     Diverticular disease     Diverticulitis of colon     Dyspepsia     Encounter for follow-up examination after completed treatment for conditions other than malignant neoplasm 04/11/2018    Fracture of wrist     GERD (gastroesophageal reflux disease)     Heart attack     AGE 39    Heart murmur      Hiatal hernia     History of transfusion     2001    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    HPV positive on pap LGSIL    Hyperlipidemia     Hypertension     Hypothyroidism     Incontinence in female     wears pads    Kidney disease, chronic, stage III (GFR 30-59 ml/min)     Knee pain, bilateral     Knee swelling     LGSIL on Pap smear of cervix 04/29/2016    LGSIL HPV positive    Lumbosacral disc disease Unsure    Lower left back is partial osteoarthritis and partial osteoporosis    Obesity     Osteopenia 2021    Bone density test    Periodic limb movement disorder     Restless leg syndrome     LEFT SHOULDER    Rotator cuff syndrome     Sleep apnea     NO MACHINE CURRENTLY    Suicidal ideation 08/19/2016    history    Thyroid nodule     Vitamin B12 deficiency      Past Surgical History:   Procedure Laterality Date    ADENOIDECTOMY  1974    ANTERIOR CERVICAL DISCECTOMY W/ FUSION N/A 12/29/2016    Procedure: C3-4 anterior cervical discectomy and fusion with Depuy micro plate, ALLOGRAFT C3-4, AND HARDWARE REMOVAL C4-7.;  Surgeon: Hema Godwin MD;  Location: [facility] MAIN OR;  Service:     CARDIAC CATHETERIZATION N/A 03/30/2017    Procedure: Left Heart Cath;  Surgeon: Tracey Vargas MD;  Location:  [facility] INVASIVE [facility];  Service:     CARDIAC CATHETERIZATION N/A 03/30/2017    Procedure: Coronary angiography;  Surgeon: Tracey Vargas MD;  Location:  [facility];  Service:     CARDIAC CATHETERIZATION N/A 03/30/2017    Procedure: Left ventriculography;  Surgeon: Tracey Vargas MD;  Location:  [facility];  Service:     CARDIAC CATHETERIZATION  03/30/2017    Procedure: Functional Flow [place];  Surgeon: Tracey Vargas MD;  Location:  [facility];  Service:     CATARACT EXTRACTION Bilateral     CERVICAL BIOPSY  MMXVI    Dr. Jeffery.     CERVICAL DISCECTOMY ANTERIOR  04/2013    C4-7    COLONOSCOPY  04/20/2015    Diverticulosis, IH    COLONOSCOPY   03/09/2021    COLPOSCOPY W/ BIOPSY / CURETTAGE  06/17/2016    LGSIL HPV positive. Results were normal repeat pap in one year. Chronic Cervicitis    CORONARY ANGIOPLASTY WITH STENT PLACEMENT      ENDOSCOPY  MMXV    Normal.  Dr. Rodriguez    ENDOSCOPY N/A 06/22/2017    Erythematous mucosa in the stomach  PATH: Chronic active gastritis, moderate with intestinal metaplasia     EPIDURAL BLOCK      INGUINAL HERNIA REPAIR      KNEE SURGERY      LAPAROSCOPIC GASTRIC BANDING  02/2018    SHOULDER SURGERY Left     RCR    TONSILLECTOMY AND ADENOIDECTOMY      TOTAL KNEE ARTHROPLASTY Left 03/05/2024    Procedure: LEFT TOTAL KNEE ARTHROPLASTY WITH TAB NAVIGATION;  Surgeon: Tho Pritchard MD;  Location: [facility];  Service: Orthopedics;  Laterality: Left;    TRANSVAGINAL TAPING SUSPENSION N/A 12/06/2017    Procedure: MID URETHRAL SLING CYSTSCOPY;  Surgeon: Abby Méndez MD;  Location: [facility] OR;  Service:     TRIGGER POINT INJECTION      TUBAL ABDOMINAL LIGATION      TYMPANOSTOMY TUBE PLACEMENT Right     UPPER GASTROINTESTINAL ENDOSCOPY  approx 2021    WRIST SURGERY Bilateral     carpal tunnel      General Information       Row Name 05/12/24 [facility]          Physical Therapy Time and Intention    Document Type evaluation  -EJ     Mode of Treatment physical therapy  -       Row Name 05/12/24 1352          General Information    Patient Profile Reviewed yes  -EJ     Prior Level of Function independent:;all household mobility;ADL's  ambulating w cane  -EJ     Existing Precautions/Restrictions no known precautions/restrictions  -EJ     Barriers to Rehab none identified  -EJ       Row Name 05/12/24 1352          Living Environment    People in Home sibling(s)  reports she is going to stay w her sister  -EJ       Row Name 05/12/24 1352          Home Main Entrance    Number of Stairs, Main Entrance one  -EJ       Row Name 05/12/24 1352          Cognition    Orientation Status (Cognition) oriented x 4  -EJ       Row  Name 05/12/24 1352          Safety Issues, Functional Mobility    Impairments Affecting Function (Mobility) strength;pain;range of motion (ROM)  -EJ               User Key  (r) = Recorded By, (t) = Taken By, (c) = Cosigned By      Initials Name Provider Type    Anupama Byrd, PT Physical Therapist                   Mobility       Row Name 05/12/24 [facility]          Bed Mobility    Comment, (Bed Mobility) in BR upon entry to room and then up in chair  -EJ       Row Name 05/12/24 [facility]          Sit-Stand Transfer    Sit-Stand [place] (Transfers) contact guard;verbal cues  -EJ     Assistive Device (Sit-Stand Transfers) walker, front-wheeled  -EJ       Row Name 05/12/24 1352          Gait/Stairs (Locomotion)    [place] Level (Gait) verbal cues;contact guard  -EJ     Assistive Device (Gait) walker, front-wheeled  -EJ     Distance in Feet (Gait) 10  -EJ     Deviations/Abnormal Patterns (Gait) elena decreased;antalgic;stride length decreased  -EJ     Comment, (Gait/Stairs) no unsteadiness noted, pt declines attempt to ambulate further at this time  -EJ               User Key  (r) = Recorded By, (t) = Taken By, (c) = Cosigned By      Initials Name Provider Type    Anupama Byrd, PT Physical Therapist                   Obj/Interventions       Row Name 05/12/24 [facility]          Range of Motion Comprehensive    General Range of Motion no range of motion deficits identified  -EJ     Comment, General Range of Motion x L knee  -       Row Name 05/12/24 [facility]          Strength Comprehensive (MMT)    Comment, General Manual Muscle Testing (MMT) Assessment post op weakness  -       Row Name 05/12/24 [facility]          Motor Skills    Therapeutic Exercise --  L knee protocol x 5 reps (did not perform heel slides- unsure if  pt has any flexion restrictions?)  -EJ               User Key  (r) = Recorded By, (t) = Taken By, (c) = Cosigned By      Initials Name Provider Type    Anupama Byrd, PT Physical  Therapist                   Goals/Plan       [facility] Name 05/12/24 9980          Bed Mobility Goal 1 (PT)    Activity/Assistive Device (Bed Mobility Goal 1, PT) bed mobility activities, all  -EJ     [place] Level/Cues Needed (Bed Mobility Goal 1, PT) supervision required  -EJ     Time Frame (Bed Mobility Goal 1, PT) 3 days  -EJ       [facility] Name 05/12/24 5568          Transfer Goal 1 (PT)    Activity/Assistive Device (Transfer Goal 1, PT) transfers, all;walker, rolling  -EJ     [place] Level/Cues Needed (Transfer Goal 1, PT) standby assist  -EJ     Time Frame (Transfer Goal 1, PT) 3 days  -EJ       [facility] Name 05/12/24 1562          Gait Training Goal 1 (PT)    Activity/Assistive Device (Gait Training Goal 1, PT) gait (walking locomotion);walker, rolling  -EJ     [place] Level (Gait Training Goal 1, PT) standby assist  -EJ     Distance (Gait Training Goal 1, PT) 100  -EJ     Time Frame (Gait Training Goal 1, PT) 3 days  -EJ       Row Name 05/12/24 6888          ROM Goal 1 (PT)    ROM Goal 1 (PT) L knee 5-90  -EJ     Time Frame (ROM Goal 1, PT) 3 days  -EJ       Row Name 05/12/24 1220          Therapy Assessment/Plan (PT)    Planned Therapy Interventions (PT) balance training;bed mobility training;gait training;home exercise program;stretching;strengthening;stair training;ROM (range of motion);patient/family education;transfer training  -EJ               User Key  (r) = Recorded By, (t) = Taken By, (c) = Cosigned By      Initials Name Provider Type    EJ Anupama June, PT Physical Therapist                   Clinical Impression       Row Name 05/12/24 1875          Pain    Pretreatment Pain Rating 4/10  -EJ     Pain Location - Side/Orientation Left  -EJ     Pain Location - knee  -EJ     Pain Intervention(s) Repositioned;Ambulation/increased activity  -EJ       Row Name 05/12/24 6618          Plan of Care Review    Plan of Care Reviewed With patient  -EJ     Outcome Evaluation Pt seen for PT this afternoon.  Pt had a fall and about 2 months out from a total knee and roughly the bottom half of the incision dehisced when she fell.  Pt now s/p L knee I&D w complex wound closure. She presents w expected post op pain and weakness. Pt doing well, she is up in BR upon entry to room. She is able to stand w CGA and Rwx. She ambulated approx 10 ft in room w CGA. No unsteadiness noted. Pt agreeable to sit up in chair at end of session. Pt tolerated knee exercises well; however did not perform heel slides as this therapist is unsure if pt has any flexion restrictions following sx. Pt plans home at [place] and states she will stay w her sister. She will continue to benefit from skilled PT to maximize safety, strength, and independence w mobility.  -       Row Name 05/12/24 3958          Therapy Assessment/Plan (PT)    Rehab Potential (PT) good, to achieve stated therapy goals  -EJ     Criteria for Skilled Interventions Met (PT) yes  -EJ     Therapy Frequency (PT) daily  -       Row Name 05/12/24 7605          Positioning and Restraints    Pre-Treatment Position bathroom  -EJ     Post Treatment Position chair  -EJ     In Chair notified nsg;reclined;call light within reach;encouraged to call for assist;exit alarm on;with family/caregiver  -EJ               User Key  (r) = Recorded By, (t) = Taken By, (c) = Cosigned By      Initials Name Provider Type    Anupama Byrd, PT Physical Therapist                   Outcome Measures       Row Name 05/12/24 6745 05/12/24 1051       How much help from another person do you currently need...    Turning from your back to your side while in flat bed without using bedrails? 4  -EJ 4  -JS    Moving from lying on back to sitting on the side of a flat bed without bedrails? 4  -EJ 4  -JS    Moving to and from a bed to a chair (including a wheelchair)? 3  -EJ 3  -JS    Standing up from a chair using your arms (e.g., wheelchair, bedside chair)? 3  -EJ 3  -JS    Climbing 3-5 steps with a railing? 3   -EJ 2  -JS    To walk in hospital room? 3  -EJ 3  -JS    AM-PAC 6 Clicks Score (PT) 20  -EJ 19  -JS    Highest Level of Mobility Goal 6 --> Walk 10 steps or more  -EJ 6 --> Walk 10 steps or more  -JS              User Key  (r) = Recorded By, (t) = Taken By, (c) = Cosigned By      Initials Name Provider Type    EJ Anupama June, PT Physical Therapist    Whitney Quintanilla RN Registered Nurse                                   PT Recommendation and Plan  Planned Therapy Interventions (PT): balance training, bed mobility training, gait training, home exercise program, stretching, strengthening, stair training, ROM (range of motion), patient/family education, transfer training  Plan of Care Reviewed With: patient  Outcome Evaluation: Pt seen for PT this afternoon. Pt had a fall and about 2 months out from a total knee and roughly the bottom half of the incision dehisced when she fell.  Pt now s/p L knee I&D w complex wound closure. She presents w expected post op pain and weakness. Pt doing well, she is up in BR upon entry to room. She is able to stand w CGA and Rwx. She ambulated approx 10 ft in room w CGA. No unsteadiness noted. Pt agreeable to sit up in chair at end of session. Pt tolerated knee exercises well; however did not perform heel slides as this therapist is unsure if pt has any flexion restrictions following sx. Pt plans home at [place] and states she will stay w her sister. She will continue to benefit from skilled PT to maximize safety, strength, and independence w mobility.     Time Calculation:         PT Charges       Row Name 05/12/24 1400             Time Calculation    Start Time 1330  -EJ      Stop Time 1347  -EJ      Time Calculation (min) 17 min  -EJ      PT Received On 05/12/24  -EJ      PT - Next Appointment 05/13/24  -EJ      PT Goal Re-Cert Due Date 05/15/24  -EJ         Time Calculation- PT    Total Timed Code Minutes- PT 12 minute(s)  -EJ                User Key  (r) = Recorded By, (t) =  Taken By, (c) = Cosigned By      Initials Name Provider Type    EJ Anupama June, PT Physical Therapist                  Therapy Charges for Today       Code Description Service Date Service Provider Modifiers Qty    [id number]  PT EVAL MOD COMPLEXITY 3 5/12/2024 Anupama June, PT GP 1    [id number]  PT THERAPEUTIC ACT EA 15 MIN 5/12/2024 Anupama June, PT GP 1            PT G-Codes  AM-PAC 6 Clicks Score (PT): 20  PT Discharge Summary  Anticipated Discharge Disposition (PT): home with assist, home with home health    Anupama June, PT  5/12/2024

## 2024-05-12 NOTE — CONSULTS
Orthopedic Consult      Patient: Keri Bhagat    Date of Admission: 5/11/2024  1:22 PM    YOB: 1960    Medical Record Number: [id number]    Consulting Physician: Quentin Munoz MD    Chief Complaints: Left knee wound    History of Present Illness: 63 y.o. female admitted to [facility] to services of Quentin Munoz MD with a wound dehiscence of her left knee.  She is known to me from a previous left shoulder surgery.  She says the shoulder is doing fine.  She fell yesterday and landed directly on her left knee.  She is about 2 months out from a knee replacement done by Dr. Pritchard.  She says the knee was doing well until a fall.  She had just gotten to the point where she was beginning to ambulate without a cane.  Denies any other injuries or complaints.    Allergies: No Known Allergies    Home Medications:    Current Facility-Administered Medications:     [Transfer Hold] acetaminophen (TYLENOL) tablet 650 mg, 650 mg, Oral, Q4H PRN, 650 mg at 05/11/24 2106 **OR** [Transfer Hold] acetaminophen (TYLENOL) 160 MG/5ML oral solution 650 mg, 650 mg, Oral, Q4H PRN **OR** [Transfer Hold] acetaminophen (TYLENOL) suppository 650 mg, 650 mg, Rectal, Q4H PRN, Mee Samuel MD    ARIPiprazole (ABILIFY) tablet 20 mg, 20 mg, Oral, Daily, StingMee sanchez MD    benzonatate (TESSALON) capsule 100 mg, 100 mg, Oral, TID PRN, Mee Samuel MD    [Transfer Hold] sennosides-docusate (PERICOLACE) 8.6-50 MG per tablet 2 tablet, 2 tablet, Oral, BID PRN **AND** [Transfer Hold] polyethylene glycol (MIRALAX) packet 17 g, 17 g, Oral, Daily PRN **AND** [Transfer Hold] bisacodyl (DULCOLAX) EC tablet 5 mg, 5 mg, Oral, Daily PRN **AND** [Transfer Hold] bisacodyl (DULCOLAX) suppository 10 mg, 10 mg, Rectal, Daily PRN, StingMee sanchez MD    buPROPion XL (WELLBUTRIN XL) 24 hr tablet 300 mg, 300 mg, Oral, Daily, Mee Samuel MD    ceFAZolin 2000 mg IVPB in 100 mL NS (MBP), 2,000 mg,  Intravenous, Once, Ajay Gutierrez MD    gabapentin (NEURONTIN) capsule 300 mg, 300 mg, Oral, TID, Mee Samuel MD, 300 mg at 05/11/24 2100    HYDROcodone-acetaminophen (NORCO) 7.5-325 MG per tablet 1 tablet, 1 tablet, Oral, Q6H PRN, Mee Samuel MD, 1 tablet at 05/11/24 2254    isosorbide mononitrate (IMDUR) 24 hr tablet 30 mg, 30 mg, Oral, Daily, Mee Samuel MD    lactated ringers infusion, 9 mL/hr, Intravenous, Continuous, Deion Vick MD, Last Rate: 9 mL/hr at 05/12/24 0723, 9 mL/hr at 05/12/24 0723    lamoTRIgine (LaMICtal) tablet 150 mg, 150 mg, Oral, BID, Mee Samuel MD, 150 mg at 05/11/24 2100    levothyroxine (SYNTHROID, LEVOTHROID) tablet 88 mcg, 88 mcg, Oral, QAM, Mee Samuel MD    lidocaine (XYLOCAINE) 1 % injection 0.5 mL, 0.5 mL, Intradermal, Once PRN, Deion Vick MD    metoprolol succinate XL (TOPROL-XL) 24 hr tablet 37.5 mg, 37.5 mg, Oral, Daily, Mee Samuel MD, 37.5 mg at 05/12/24 0638    midazolam (VERSED) injection 1 mg, 1 mg, Intravenous, Q5 Min PRN, Deion Vick MD, 1 mg at 05/12/24 0723    [Transfer Hold] ondansetron (ZOFRAN) injection 4 mg, 4 mg, Intravenous, Q6H PRN, Mee Samuel MD    oxybutynin XL (DITROPAN-XL) 24 hr tablet 10 mg, 10 mg, Oral, Daily, Mee Samuel MD    pantoprazole (PROTONIX) EC tablet 40 mg, 40 mg, Oral, BID, Mee Samuel MD, 40 mg at 05/11/24 2100    pramipexole (MIRAPEX) tablet 0.5 mg, 0.5 mg, Oral, Nightly, Mee Samuel MD, 0.5 mg at 05/11/24 2100    [COMPLETED] Insert Peripheral IV, , , Once **AND** [Transfer Hold] sodium chloride 0.9 % flush 10 mL, 10 mL, Intravenous, PRN, Marques Norris PA    sodium chloride 0.9 % flush 3 mL, 3 mL, Intravenous, Q12H, Deion Vick MD    sodium chloride 0.9 % flush 3-10 mL, 3-10 mL, Intravenous, PRN, Deion Vick MD    venlafaxine XR (EFFEXOR-XR) 24 hr capsule 150 mg, 150 mg, Oral, Daily, Mee Samuel  MD Jose Guadalupe    Current Medications:  Scheduled Meds:ARIPiprazole, 20 mg, Oral, Daily  buPROPion XL, 300 mg, Oral, Daily  ceFAZolin 2000 mg IVPB in 100 mL NS (MBP), 2,000 mg, Intravenous, Once  gabapentin, 300 mg, Oral, TID  isosorbide mononitrate, 30 mg, Oral, Daily  lamoTRIgine, 150 mg, Oral, BID  levothyroxine, 88 mcg, Oral, QAM  metoprolol succinate XL, 37.5 mg, Oral, Daily  oxybutynin XL, 10 mg, Oral, Daily  pantoprazole, 40 mg, Oral, BID  pramipexole, 0.5 mg, Oral, Nightly  sodium chloride, 3 mL, Intravenous, Q12H  venlafaxine XR, 150 mg, Oral, Daily      Continuous Infusions:lactated ringers, 9 mL/hr, Last Rate: 9 mL/hr (05/12/24 0723)      PRN Meds:.  [Transfer Hold] acetaminophen **OR** [Transfer Hold] acetaminophen **OR** [Transfer Hold] acetaminophen    benzonatate    [Transfer Hold] senna-docusate sodium **AND** [Transfer Hold] polyethylene glycol **AND** [Transfer Hold] bisacodyl **AND** [Transfer Hold] bisacodyl    HYDROcodone-acetaminophen    lidocaine    midazolam    [Transfer Hold] ondansetron    [COMPLETED] Insert Peripheral IV **AND** [Transfer Hold] sodium chloride    sodium chloride    Past Medical History:   Diagnosis Date    Abnormal Pap smear of cervix     Adrenal adenoma 09/21/2022    Anemia     Anxiety     Arthritis     Arthritis of back     Arthritis of neck     Bipolar I disorder, single manic episode     depressive d(NOS)    Cervical disc herniation     Colon polyp     Coronary artery disease     COVID-19 01/10/2023    Death of family member     MOTHER IN FEB 2024    Depression     NO SUICIDAL PLANS    Dislocation of finger     Diverticular disease     Diverticulitis of colon     Dyspepsia     Encounter for follow-up examination after completed treatment for conditions other than malignant neoplasm 04/11/2018    Fracture of wrist     GERD (gastroesophageal reflux disease)     Heart attack     AGE 39    Heart murmur     Hiatal hernia     History of transfusion     2001    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/2016    HPV positive on pap LGSIL    Hyperlipidemia     Hypertension     Hypothyroidism     Incontinence in female     wears pads    Kidney disease, chronic, stage III (GFR 30-59 ml/min)     Knee pain, bilateral     Knee swelling     LGSIL on Pap smear of cervix 04/29/2016    LGSIL HPV positive    Lumbosacral disc disease Unsure    Lower left back is partial osteoarthritis and partial osteoporosis    Obesity     Osteopenia 2021    Bone density test    Periodic limb movement disorder     Restless leg syndrome     LEFT SHOULDER    Rotator cuff syndrome     Sleep apnea     NO MACHINE CURRENTLY    Suicidal ideation 08/19/2016    history    Thyroid nodule     Vitamin B12 deficiency        Past Surgical History:   Procedure Laterality Date    ADENOIDECTOMY  1974    ANTERIOR CERVICAL DISCECTOMY W/ FUSION N/A 12/29/2016    Procedure: C3-4 anterior cervical discectomy and fusion with Depuy micro plate, ALLOGRAFT C3-4, AND HARDWARE REMOVAL C4-7.;  Surgeon: Hema Godwin MD;  Location: [facility] MAIN OR;  Service:     CARDIAC CATHETERIZATION N/A 03/30/2017    Procedure: Left Heart Cath;  Surgeon: Tracey Vargas MD;  [facility];  Service:     CARDIAC CATHETERIZATION N/A 03/30/2017    Procedure: Coronary angiography;  Surgeon: Tracey Vargas MD;  Location:  [facility] CATH INVASIVE LOCATION;  Service:     CARDIAC CATHETERIZATION N/A 03/30/2017    Procedure: Left ventriculography;  Surgeon: Tracey Vargas MD;  Location:  [facility] CATH INVASIVE LOCATION;  Service:     CARDIAC CATHETERIZATION  03/30/2017    Procedure: Functional Flow Roxie;  Surgeon: Tracey Vargas MD;  Location:  [facility] CATH INVASIVE LOCATION;  Service:     CATARACT EXTRACTION Bilateral     CERVICAL BIOPSY  MMXVI    Dr. Jeffery.     CERVICAL DISCECTOMY ANTERIOR  04/2013    C4-7    COLONOSCOPY  04/20/2015    Diverticulosis, IH    COLONOSCOPY  03/09/2021    COLPOSCOPY W/ BIOPSY / CURETTAGE  06/17/2016    LGSIL  HPV positive. Results were normal repeat pap in one year. Chronic Cervicitis    CORONARY ANGIOPLASTY WITH STENT PLACEMENT      ENDOSCOPY  MMXV    Normal.  Dr. Rodriguez    ENDOSCOPY N/A 06/22/2017    Erythematous mucosa in the stomach  PATH: Chronic active gastritis, moderate with intestinal metaplasia     EPIDURAL BLOCK      INGUINAL HERNIA REPAIR      KNEE SURGERY      LAPAROSCOPIC GASTRIC BANDING  02/2018    SHOULDER SURGERY Left     RCR    TONSILLECTOMY AND ADENOIDECTOMY      TOTAL KNEE ARTHROPLASTY Left 03/05/2024    Procedure: LEFT TOTAL KNEE ARTHROPLASTY WITH TAB NAVIGATION;  Surgeon: Tho Pritchard MD;  Location: [facility];  Service: Orthopedics;  Laterality: Left;    TRANSVAGINAL TAPING SUSPENSION N/A 12/06/2017    Procedure: MID URETHRAL SLING CYSTSCOPY;  Surgeon: Abby Méndez MD;  Location: [facility];  Service:     TRIGGER POINT INJECTION      TUBAL ABDOMINAL LIGATION      TYMPANOSTOMY TUBE PLACEMENT Right     UPPER GASTROINTESTINAL ENDOSCOPY  approx 2021    WRIST SURGERY Bilateral     carpal tunnel       Social History     Occupational History    Occupation: disabled   Tobacco Use    Smoking status: Light Smoker     Current packs/day: 0.25     Types: Electronic Cigarette, Cigarettes     Passive exposure: Current    Smokeless tobacco: Current    Tobacco comments:     Electronic Cigarette   Vaping Use    Vaping status: Every Day    Substances: Nicotine, Flavoring    Devices: Refillable tank   Substance and Sexual Activity    Alcohol use: Not Currently     Comment: RARELY    Drug use: Never    Sexual activity: Not Currently     Partners: Male     Birth control/protection: Condom, Abstinence, Post-menopausal, None, Tubal ligation      Social History     Social History Narrative    Not on file       Family History   Problem Relation Age of Onset    Diabetes type II Mother     Hypertension Mother     Osteoporosis Mother     Seizures Mother     Diabetes Mother     Arthritis Mother      Hyperlipidemia Mother     COPD Father     Hypertension Father     Lung cancer Father     Heart attack Father     Liver cancer Father     Liver disease Father     Heart disease Father     Cancer Father         Lung cacer that metastasized  into the liver.    Thyroid disease Sister     Hypertension Sister     Bipolar disorder Sister     Depression Sister     ADD / ADHD Sister     Anxiety disorder Sister     Mental illness Sister     Hyperlipidemia Sister     Broken bones Sister     Thyroid disease Sister     Hypertension Sister     Bipolar disorder Sister     Anxiety disorder Sister     Depression Sister     Mental illness Sister     Hyperlipidemia Sister     Cancer Maternal Grandmother     No Known Problems Maternal Grandfather     No Known Problems Paternal Grandmother     No Known Problems Paternal Grandfather     Abnormal EKG Daughter     Hypertension Daughter     Bipolar disorder Daughter     Thyroid disease Daughter     Mental illness Daughter     Hyperlipidemia Daughter     Asthma Daughter     No Known Problems Son     Diabetes Son         Type 1    Diabetes Paternal Uncle     Breast cancer Neg Hx     Ovarian cancer Neg Hx     Uterine cancer Neg Hx     Colon cancer Neg Hx     Malig Hyperthermia Neg Hx        Review of Systems:     Constitutional:  Denies fever, shaking or chills   Eyes:  Denies change in visual acuity   HEENT:  Denies nasal congestion or sore throat   Respiratory:  Denies cough or shortness of breath   Cardiovascular:  Denies chest pain or edema  Endocrine: Denies tremors, palpitations, intolerance of heat or cold, polyuria, polydipsia.  GI:  Denies abdominal pain, nausea, vomiting, bloody stools or diarrhea  :  Denies frequency, urgency, incontinence, retention, or nocturia.  Musculoskeletal:  Denies numbness tingling or loss of motor function except as above  Integument:  Denies rash, lesion or ulceration   Neurologic:  Denies headache or focal weakness, deficits  Heme:  Denies epistaxis,  "spontaneous or excessive bleeding, hematuria, melena, fatigue, enlarged or tender lymph nodes.      All other pertinent positives and negatives as noted above in HPI.    Physical Exam: 63 y.o. female    Vitals:    05/11/24 1605 05/11/24 2123 05/12/24 0551 05/12/24 0703   BP: 122/84 112/78 143/96 153/87   BP Location: Left arm Left arm Left arm Left arm   Patient Position: Lying Sitting Lying Lying   Pulse: 64 68 66 66   Resp: 16 16 16 16   Temp: 98.1 °F (36.7 °C) 98.3 °F (36.8 °C) 97 °F (36.1 °C) 98.4 °F (36.9 °C)   TempSrc: Oral Oral Oral Oral   SpO2: 98% 95% 98% 97%   Weight: 92.1 kg (203 lb 0.7 oz)      Height: 160 cm (62.99\")        General:  Awake, alert. No acute distress.      Head/Neck:  Normocephalic, atraumatic.  Conjunctivae and sclerae clear.  Hearing adequate for the exam.  Neck is supple with normal ROM.    Psych:  Affect and demeanor appropriate.    CV:  Regular rate and rhythm.  Hemodynamically stable.    Lungs:  Good chest expansion, breathing unlabored.    Abdomen:  Soft.  Nontender, nondistended.    Extremities: Left knee: She had a bandage in place which was removed.  She has about a 3 inch wound dehiscence.  It appears to go to the subcutaneous tissues only.  There does not appear to be any extension into the extensor mechanism and the implants are not visible.  She can straight leg raise.  She can bend her knee about 30 or 40 degrees with minimal discomfort.  I did not have her bend beyond that.  Calf is soft.  She has intact motor and sensory function distally and palpable pulses.    All other extremities atraumatic without gross abnormality.     Diagnostic Tests:    Admission on 05/11/2024   Component Date Value Ref Range Status    Glucose 05/11/2024 99  65 - 99 mg/dL Final    BUN 05/11/2024 12  8 - 23 mg/dL Final    Creatinine 05/11/2024 0.81  0.57 - 1.00 mg/dL Final    Sodium 05/11/2024 141  136 - 145 mmol/L Final    Potassium 05/11/2024 4.2  3.5 - 5.2 mmol/L Final    Slight hemolysis " detected by analyzer. Result may be falsely elevated.    Chloride 05/11/2024 105  98 - 107 mmol/L Final    CO2 05/11/2024 25.4  22.0 - 29.0 mmol/L Final    Calcium 05/11/2024 9.3  8.6 - 10.5 mg/dL Final    Total Protein 05/11/2024 6.8  6.0 - 8.5 g/dL Final    Albumin 05/11/2024 4.4  3.5 - 5.2 g/dL Final    ALT (SGPT) 05/11/2024 22  1 - 33 U/L Final    AST (SGOT) 05/11/2024 18  1 - 32 U/L Final    Alkaline Phosphatase 05/11/2024 104  39 - 117 U/L Final    Total Bilirubin 05/11/2024 <0.2  0.0 - 1.2 mg/dL Final    Globulin 05/11/2024 2.4  gm/dL Final    A/G Ratio 05/11/2024 1.8  g/dL Final    BUN/Creatinine Ratio 05/11/2024 14.8  7.0 - 25.0 Final    Anion Gap 05/11/2024 10.6  5.0 - 15.0 mmol/L Final    eGFR 05/11/2024 82.7  >60.0 mL/min/1.73 Final    Protime 05/11/2024 15.1 (H)  11.7 - 14.2 Seconds Final    INR 05/11/2024 1.17 (H)  0.90 - 1.10 Final    PTT 05/11/2024 25.9  22.7 - 35.4 seconds Final    WBC 05/11/2024 7.21  3.40 - 10.80 10*3/mm3 Final    RBC 05/11/2024 4.40  3.77 - 5.28 10*6/mm3 Final    Hemoglobin 05/11/2024 13.7  12.0 - 15.9 g/dL Final    Hematocrit 05/11/2024 41.4  34.0 - 46.6 % Final    MCV 05/11/2024 94.1  79.0 - 97.0 fL Final    MCH 05/11/2024 31.1  26.6 - 33.0 pg Final    MCHC 05/11/2024 33.1  31.5 - 35.7 g/dL Final    RDW 05/11/2024 12.4  12.3 - 15.4 % Final    RDW-SD 05/11/2024 42.8  37.0 - 54.0 fl Final    MPV 05/11/2024 9.7  6.0 - 12.0 fL Final    Platelets 05/11/2024 270  140 - 450 10*3/mm3 Final    Neutrophil % 05/11/2024 48.6  42.7 - 76.0 % Final    Lymphocyte % 05/11/2024 40.1  19.6 - 45.3 % Final    Monocyte % 05/11/2024 6.8  5.0 - 12.0 % Final    Eosinophil % 05/11/2024 4.0  0.3 - 6.2 % Final    Basophil % 05/11/2024 0.1  0.0 - 1.5 % Final    Immature Grans % 05/11/2024 0.4  0.0 - 0.5 % Final    Neutrophils, Absolute 05/11/2024 3.50  1.70 - 7.00 10*3/mm3 Final    Lymphocytes, Absolute 05/11/2024 2.89  0.70 - 3.10 10*3/mm3 Final    Monocytes, Absolute 05/11/2024 0.49  0.10 - 0.90 10*3/mm3  Final    Eosinophils, Absolute 05/11/2024 0.29  0.00 - 0.40 10*3/mm3 Final    Basophils, Absolute 05/11/2024 0.01  0.00 - 0.20 10*3/mm3 Final    Immature Grans, Absolute 05/11/2024 0.03  0.00 - 0.05 10*3/mm3 Final    nRBC 05/11/2024 0.0  0.0 - 0.2 /100 WBC Final    Glucose 05/12/2024 99  65 - 99 mg/dL Final    BUN 05/12/2024 14  8 - 23 mg/dL Final    Creatinine 05/12/2024 1.05 (H)  0.57 - 1.00 mg/dL Final    Sodium 05/12/2024 143  136 - 145 mmol/L Final    Potassium 05/12/2024 4.6  3.5 - 5.2 mmol/L Final    Chloride 05/12/2024 107  98 - 107 mmol/L Final    CO2 05/12/2024 27.0  22.0 - 29.0 mmol/L Final    Calcium 05/12/2024 8.8  8.6 - 10.5 mg/dL Final    BUN/Creatinine Ratio 05/12/2024 13.3  7.0 - 25.0 Final    Anion Gap 05/12/2024 9.0  5.0 - 15.0 mmol/L Final    eGFR 05/12/2024 60.6  >60.0 mL/min/1.73 Final    WBC 05/12/2024 8.14  3.40 - 10.80 10*3/mm3 Final    RBC 05/12/2024 4.29  3.77 - 5.28 10*6/mm3 Final    Hemoglobin 05/12/2024 13.4  12.0 - 15.9 g/dL Final    Hematocrit 05/12/2024 39.5  34.0 - 46.6 % Final    MCV 05/12/2024 92.1  79.0 - 97.0 fL Final    MCH 05/12/2024 31.2  26.6 - 33.0 pg Final    MCHC 05/12/2024 33.9  31.5 - 35.7 g/dL Final    RDW 05/12/2024 12.1 (L)  12.3 - 15.4 % Final    RDW-SD 05/12/2024 40.3  37.0 - 54.0 fl Final    MPV 05/12/2024 9.9  6.0 - 12.0 fL Final    Platelets 05/12/2024 259  140 - 450 10*3/mm3 Final    Neutrophil % 05/12/2024 36.3 (L)  42.7 - 76.0 % Final    Lymphocyte % 05/12/2024 51.4 (H)  19.6 - 45.3 % Final    Monocyte % 05/12/2024 7.5  5.0 - 12.0 % Final    Eosinophil % 05/12/2024 4.1  0.3 - 6.2 % Final    Basophil % 05/12/2024 0.2  0.0 - 1.5 % Final    Immature Grans % 05/12/2024 0.5  0.0 - 0.5 % Final    Neutrophils, Absolute 05/12/2024 2.96  1.70 - 7.00 10*3/mm3 Final    Lymphocytes, Absolute 05/12/2024 4.18 (H)  0.70 - 3.10 10*3/mm3 Final    Monocytes, Absolute 05/12/2024 0.61  0.10 - 0.90 10*3/mm3 Final    Eosinophils, Absolute 05/12/2024 0.33  0.00 - 0.40 10*3/mm3  Final    Basophils, Absolute 05/12/2024 0.02  0.00 - 0.20 10*3/mm3 Final    Immature Grans, Absolute 05/12/2024 0.04  0.00 - 0.05 10*3/mm3 Final    nRBC 05/12/2024 0.0  0.0 - 0.2 /100 WBC Final     Lab Results (last 24 hours)       Procedure Component Value Units Date/Time    Basic Metabolic Panel [[id number]]  (Abnormal) Collected: 05/12/24 0436    Specimen: Blood Updated: 05/12/24 0542     Glucose 99 mg/dL      BUN 14 mg/dL      Creatinine 1.05 mg/dL      Sodium 143 mmol/L      Potassium 4.6 mmol/L      Chloride 107 mmol/L      CO2 27.0 mmol/L      Calcium 8.8 mg/dL      BUN/Creatinine Ratio 13.3     Anion Gap 9.0 mmol/L      eGFR 60.6 mL/min/1.73     Narrative:      GFR Normal >60  Chronic Kidney Disease <60  Kidney Failure <15      CBC Auto Differential [[id number]]  (Abnormal) Collected: 05/12/24 0436    Specimen: Blood Updated: 05/12/24 0528     WBC 8.14 10*3/mm3      RBC 4.29 10*6/mm3      Hemoglobin 13.4 g/dL      Hematocrit 39.5 %      MCV 92.1 fL      MCH 31.2 pg      MCHC 33.9 g/dL      RDW 12.1 %      RDW-SD 40.3 fl      MPV 9.9 fL      Platelets 259 10*3/mm3      Neutrophil % 36.3 %      Lymphocyte % 51.4 %      Monocyte % 7.5 %      Eosinophil % 4.1 %      Basophil % 0.2 %      Immature Grans % 0.5 %      Neutrophils, Absolute 2.96 10*3/mm3      Lymphocytes, Absolute 4.18 10*3/mm3      Monocytes, Absolute 0.61 10*3/mm3      Eosinophils, Absolute 0.33 10*3/mm3      Basophils, Absolute 0.02 10*3/mm3      Immature Grans, Absolute 0.04 10*3/mm3      nRBC 0.0 /100 WBC     Comprehensive Metabolic Panel [[id number]] Collected: 05/11/24 1415    Specimen: Blood Updated: 05/11/24 1445     Glucose 99 mg/dL      BUN 12 mg/dL      Creatinine 0.81 mg/dL      Sodium 141 mmol/L      Potassium 4.2 mmol/L      Comment: Slight hemolysis detected by analyzer. Result may be falsely elevated.        Chloride 105 mmol/L      CO2 25.4 mmol/L      Calcium 9.3 mg/dL      Total Protein 6.8 g/dL      Albumin 4.4 g/dL      ALT (SGPT)  22 U/L      AST (SGOT) 18 U/L      Alkaline Phosphatase 104 U/L      Total Bilirubin <0.2 mg/dL      Globulin 2.4 gm/dL      A/G Ratio 1.8 g/dL      BUN/Creatinine Ratio 14.8     Anion Gap 10.6 mmol/L      eGFR 82.7 mL/min/1.73     Narrative:      GFR Normal >60  Chronic Kidney Disease <60  Kidney Failure <15      Protime-INR [[id number]]  (Abnormal) Collected: 05/11/24 1415    Specimen: Blood Updated: 05/11/24 1436     Protime 15.1 Seconds      INR 1.17    aPTT [[id number]]  (Normal) Collected: 05/11/24 1415    Specimen: Blood Updated: 05/11/24 1436     PTT 25.9 seconds     CBC & Differential [[id number]]  (Normal) Collected: 05/11/24 1415    Specimen: Blood Updated: 05/11/24 1425    Narrative:      The following orders were created for panel order CBC & Differential.  Procedure                               Abnormality         Status                     ---------                               -----------         ------                     CBC Auto Differential[[id number]]        Normal              Final result                 Please view results for these tests on the individual orders.    CBC Auto Differential [[id number]]  (Normal) Collected: 05/11/24 1415    Specimen: Blood Updated: 05/11/24 1425     WBC 7.21 10*3/mm3      RBC 4.40 10*6/mm3      Hemoglobin 13.7 g/dL      Hematocrit 41.4 %      MCV 94.1 fL      MCH 31.1 pg      MCHC 33.1 g/dL      RDW 12.4 %      RDW-SD 42.8 fl      MPV 9.7 fL      Platelets 270 10*3/mm3      Neutrophil % 48.6 %      Lymphocyte % 40.1 %      Monocyte % 6.8 %      Eosinophil % 4.0 %      Basophil % 0.1 %      Immature Grans % 0.4 %      Neutrophils, Absolute 3.50 10*3/mm3      Lymphocytes, Absolute 2.89 10*3/mm3      Monocytes, Absolute 0.49 10*3/mm3      Eosinophils, Absolute 0.29 10*3/mm3      Basophils, Absolute 0.01 10*3/mm3      Immature Grans, Absolute 0.03 10*3/mm3      nRBC 0.0 /100 WBC           Imaging: Three-view x-rays are reviewed on the [employer] system.  No concerning  findings noted.  Implants look fine.    Assessment: Wound dehiscence s/p left TKA    Plan: The risk, benefits and alternatives to open irrigation debridement with wound closure were thoroughly discussed.  We discussed the risk of infection, drainage, hematoma, wound dehiscence, necrosis of the tissues and anesthesia related complications.  She acknowledged understanding and consented to proceed.    Date: 5/12/2024    Ajay Gutierrez MD    CC: Quentin Munoz MD

## 2024-05-12 NOTE — BRIEF OP NOTE
INCISION AND DRAINAGE LOWER EXTREMITY  Progress Note    Keri Bhagat  5/12/2024    Pre-op Diagnosis:   Wound dehiscence status post left total knee arthroplasty       Post-Op Diagnosis Codes:  Wound dehiscence status post left total knee arthroplasty    Procedure/CPT® Codes:        Procedure(s):  INCISION AND DRAINAGE LOWER EXTREMITY WOUND CLOSURE KNEE              Surgeon(s):  Ajay Gutierrez MD    Anesthesia: General    Staff:   Circulator: Siria Jarvis RN  Scrub Person: Jose Mishra  Assistant: Chiara Tran CSA  Assistant: Chiara Tran CSA      Estimated Blood Loss: minimal    Urine Voided: * No values recorded between 5/12/2024  7:41 AM and 5/12/2024  8:36 AM *    Specimens:                None          Drains:   [REMOVED] External Urinary Catheter (Removed)   Site Assessment Clean 05/11/24 2123   Application/Removal external catheter changed 05/11/24 2220   Collection Container Wall suction 05/11/24 2123   Wall suction (mmHG) 120 mmHG 05/11/24 2123   Securement Method Securing device 05/11/24 2123   Catheter care complete Yes 05/11/24 2123       Findings: See dictation        Complications: None    Assistant: Chiara Tran CSA  was responsible for performing the following activities: Retraction and their skilled assistance was necessary for the success of this case.    Ajay Gutierrez MD     Date: 5/12/2024  Time: 08:41 EDT

## 2024-05-13 ENCOUNTER — READMISSION MANAGEMENT (OUTPATIENT)
Dept: CALL CENTER | Facility: HOSPITAL | Age: 64
End: 2024-05-13
Payer: MEDICARE

## 2024-05-13 ENCOUNTER — DOCUMENTATION (OUTPATIENT)
Dept: HOME HEALTH SERVICES | Facility: HOME HEALTHCARE | Age: 64
End: 2024-05-13
Payer: COMMERCIAL

## 2024-05-13 ENCOUNTER — TELEPHONE (OUTPATIENT)
Dept: ORTHOPEDIC SURGERY | Facility: CLINIC | Age: 64
End: 2024-05-13
Payer: MEDICARE

## 2024-05-13 ENCOUNTER — HOME HEALTH ADMISSION (OUTPATIENT)
Dept: HOME HEALTH SERVICES | Facility: HOME HEALTHCARE | Age: 64
End: 2024-05-13
Payer: COMMERCIAL

## 2024-05-13 VITALS
HEART RATE: 79 BPM | WEIGHT: 203.04 LBS | HEIGHT: 63 IN | OXYGEN SATURATION: 96 % | TEMPERATURE: 97.5 F | DIASTOLIC BLOOD PRESSURE: 80 MMHG | SYSTOLIC BLOOD PRESSURE: 120 MMHG | RESPIRATION RATE: 16 BRPM | BODY MASS INDEX: 35.98 KG/M2

## 2024-05-13 LAB
HCT VFR BLD AUTO: 33.1 % (ref 34–46.6)
HGB BLD-MCNC: 10.9 G/DL (ref 12–15.9)

## 2024-05-13 PROCEDURE — 97530 THERAPEUTIC ACTIVITIES: CPT

## 2024-05-13 PROCEDURE — 0JDP0ZZ EXTRACTION OF LEFT LOWER LEG SUBCUTANEOUS TISSUE AND FASCIA, OPEN APPROACH: ICD-10-PCS | Performed by: ORTHOPAEDIC SURGERY

## 2024-05-13 PROCEDURE — 85014 HEMATOCRIT: CPT | Performed by: ORTHOPAEDIC SURGERY

## 2024-05-13 PROCEDURE — 97116 GAIT TRAINING THERAPY: CPT

## 2024-05-13 PROCEDURE — 85018 HEMOGLOBIN: CPT | Performed by: ORTHOPAEDIC SURGERY

## 2024-05-13 RX ADMIN — PANTOPRAZOLE SODIUM 40 MG: 40 TABLET, DELAYED RELEASE ORAL at 09:07

## 2024-05-13 RX ADMIN — LAMOTRIGINE 150 MG: 100 TABLET ORAL at 09:11

## 2024-05-13 RX ADMIN — DOCUSATE SODIUM 100 MG: 100 CAPSULE, LIQUID FILLED ORAL at 09:07

## 2024-05-13 RX ADMIN — CEPHALEXIN 500 MG: 500 CAPSULE ORAL at 12:19

## 2024-05-13 RX ADMIN — POLYETHYLENE GLYCOL 3350 17 G: 17 POWDER, FOR SOLUTION ORAL at 09:07

## 2024-05-13 RX ADMIN — MELOXICAM 15 MG: 15 TABLET ORAL at 09:11

## 2024-05-13 RX ADMIN — METOPROLOL SUCCINATE 37.5 MG: 25 TABLET, EXTENDED RELEASE ORAL at 09:11

## 2024-05-13 RX ADMIN — GABAPENTIN 300 MG: 300 CAPSULE ORAL at 16:10

## 2024-05-13 RX ADMIN — Medication 3 ML: at 09:09

## 2024-05-13 RX ADMIN — CEPHALEXIN 500 MG: 500 CAPSULE ORAL at 06:15

## 2024-05-13 RX ADMIN — BUPROPION HYDROCHLORIDE 300 MG: 300 TABLET, EXTENDED RELEASE ORAL at 09:12

## 2024-05-13 RX ADMIN — VENLAFAXINE HYDROCHLORIDE 150 MG: 150 CAPSULE, EXTENDED RELEASE ORAL at 09:09

## 2024-05-13 RX ADMIN — ISOSORBIDE MONONITRATE 30 MG: 30 TABLET, EXTENDED RELEASE ORAL at 09:12

## 2024-05-13 RX ADMIN — HYDROCODONE BITARTRATE AND ACETAMINOPHEN 1 TABLET: 7.5; 325 TABLET ORAL at 04:56

## 2024-05-13 RX ADMIN — OXYBUTYNIN CHLORIDE 10 MG: 10 TABLET, EXTENDED RELEASE ORAL at 09:10

## 2024-05-13 RX ADMIN — GABAPENTIN 300 MG: 300 CAPSULE ORAL at 09:07

## 2024-05-13 RX ADMIN — LEVOTHYROXINE SODIUM 88 MCG: 88 TABLET ORAL at 06:15

## 2024-05-13 RX ADMIN — ARIPIPRAZOLE 20 MG: 5 TABLET ORAL at 09:12

## 2024-05-13 NOTE — DISCHARGE PLACEMENT REQUEST
"Keri Bhagat (63 y.o. Female)       Date of Birth   1960    Social Security Number       Address   [address] [place] [address]    Home Phone   [phone]    MRN   [id number]       [facility]    Marital Status                               Admission Date   5/11/24    Admission Type   Emergency    Admitting Provider   Mee Samuel MD    Attending Provider   Quentin Munoz MD    Department, Room/Bed   [address], 86/1       Discharge Date       Discharge Disposition   Home or Self Care    Discharge Destination                                 Attending Provider: Quentin Munoz MD    Allergies: No Known Allergies    Isolation: None   Infection: None   Code Status: CPR    Ht: 160 cm (62.99\")   Wt: 92.1 kg (203 lb 0.7 oz)    Admission Cmt: None   Principal Problem: Wound dehiscence [T81.30XA]                   Active Insurance as of 5/11/2024       Primary Coverage       Payor Plan Insurance Group Employer/Plan Group    AETNA MEDICARE REPLACEMENT AETNA MEDICARE REPLACEMENT [id number]       Payor Plan Address Payor Plan Phone Number Payor Plan Fax Number Effective Dates    [address] [phone]  3/1/2021 - None Entered    [place] [address]         Subscriber Name Subscriber Birth Date Member ID       KERI BHAGAT 1960 [id number]               Secondary Coverage       Payor Plan Insurance Group Employer/Plan Group    [place]        Payor Plan Address Payor Plan Phone Number Payor Plan Fax Number Effective Dates    [address]   11/1/2022 - None Entered    [facility] [place] [address]         Subscriber Name Subscriber Birth Date Member ID       KERI BHAGAT 1960 [id number]                     Emergency Contacts        (Rel.) Home Phone Work Phone Mobile Phone    Ariella Hill (Sister) [phone] -- [phone]    IsraelKarla (Daughter) [phone] -- [phone]    " Annel Murphy (Sister) [phone] -- [phone]    ELOISA TEJADA (Sister) -- -- [phone]

## 2024-05-13 NOTE — OUTREACH NOTE
Prep Survey      Flowsheet Row Responses   [facility] patient discharged from? [facility]   Is LACE score < 7 ? No   Eligibility [facility]   Date of Admission 05/11/24   Date of Discharge 05/13/24   Discharge Disposition Home-Health Care [facility]   Discharge diagnosis Wound dehiscence (TKA approximately 2 months ago who fell and landed directly on her left knee), INCISION AND DRAINAGE LOWER EXTREMITY WOUND CLOSURE KNEE   Does the patient have one of the following disease processes/diagnoses(primary or secondary)? General Surgery   Does the patient have Home health ordered? Yes   What is the Home health agency?  [facility]   Is there a DME ordered? Yes   What DME was ordered? cane from [facility]   Prep survey completed? Yes            Kinga ALONSO - Registered Nurse

## 2024-05-13 NOTE — THERAPY DISCHARGE NOTE
Patient Name: Keri Bhagat  : 1960    MRN: [id number]                              Today's Date: 2024       Admit Date: 2024    Visit Dx:     ICD-10-CM ICD-9-CM   1. Wound dehiscence  T81.30XA 998.30   2. Acute pain of left knee  M25.562 719.46     Patient Active Problem List   Diagnosis    Gastroesophageal reflux disease    Bipolar I disorder, single manic episode    Atherosclerosis of coronary artery    Essential hypertension    Lightheadedness    Low back pain    Sciatica    Thyroid nodule    Fatigue    Hyperlipidemia    Primary hypothyroidism    Iron deficiency anemia    Right knee pain    Degenerative disc disease, cervical    Depression    ROCKY (obstructive sleep apnea)    Atherosclerotic heart disease of native coronary artery with other forms of angina pectoris    Cervical spondylosis with radiculopathy    Chronic pain of both knees    Arthritis of both knees    Weight gain, abnormal    Allergic rhinitis    Obesity (BMI 30-39.9)    Plantar fasciitis    Peroneal tendon injury    Stress fracture of calcaneus    Vitamin D deficiency    Pain and swelling of knee    Knee injury    Calcific Achilles tendinitis    Rod's deformity    Urgency incontinence    Nocturia    Chronic gastritis without bleeding    LGSIL on Pap smear of cervix    Primary osteoarthritis of right knee    Stage 1 chronic kidney disease    Left wrist tendinitis    Cervical radiculopathy    Tobacco abuse    Obesity, morbid, BMI 40.0-49.9    Encounter for screening for lung cancer    Urinary frequency    Diabetes mellitus screening    MORALES I (cervical intraepithelial neoplasia I)    Primary osteoarthritis of both knees    H/O bladder repair surgery    Chest pain    Syncope and collapse    Closed fracture of left distal radius    Closed displaced fracture of neck of right fifth metacarpal bone    Fracture follow-up    Nondisplaced fracture of base of fifth metacarpal bone, left hand, initial encounter for closed fracture     Closed fracture of lower end of left radius with routine healing    Multiple dislocations of fingers, open    Bilateral chronic knee pain    Arthritis of left knee    Arthritis of right knee    Family history of diabetes mellitus (DM)    Menopause    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    Memory changes    Periodic limb movement disorder    Gait disturbance    Degenerative disc disease, lumbar    Osteopenia    Vaginal atrophy    Female dyspareunia    Pituitary cyst    Meningioma    Vitamin B12 deficiency    Acute urinary tract infection    Anemia    Bacterial vaginosis    Anxiety    Inappropriate diet and eating habits    Incomplete emptying of bladder    Myocardial infarction    Other specified postprocedural states    Postoperative retention of urine    Suicide attempt    Urinary urgency    Vaginal discharge    Vomiting    Gastro-esophageal reflux disease with esophagitis, without bleeding    Pre-operative cardiovascular examination    Tendinitis of left wrist    Lipoma of upper extremity    Adrenal adenoma    Cervicalgia    ASCUS with positive high risk HPV cervical    Palpitations    Splenic artery aneurysm    Wound dehiscence     Past Medical History:   Diagnosis Date    Abnormal Pap smear of cervix     Adrenal adenoma 09/21/2022    Anemia     Anxiety     Arthritis     Arthritis of back     Arthritis of neck     Bipolar I disorder, single manic episode     depressive d(NOS)    Cervical disc herniation     Colon polyp     Coronary artery disease     COVID-19 01/10/2023    Death of family member     MOTHER IN FEB 2024    Depression     NO SUICIDAL PLANS    Dislocation of finger     Diverticular disease     Diverticulitis of colon     Dyspepsia     Encounter for follow-up examination after completed treatment for conditions other than malignant neoplasm 04/11/2018    Fracture of wrist     GERD (gastroesophageal reflux disease)     Heart attack     AGE 39    Heart murmur      Hiatal hernia     History of transfusion     2001    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    HPV positive on pap LGSIL    Hyperlipidemia     Hypertension     Hypothyroidism     Incontinence in female     wears pads    Kidney disease, chronic, stage III (GFR 30-59 ml/min)     Knee pain, bilateral     Knee swelling     LGSIL on Pap smear of cervix 04/29/2016    LGSIL HPV positive    Lumbosacral disc disease Unsure    Lower left back is partial osteoarthritis and partial osteoporosis    Obesity     Osteopenia 2021    Bone density test    Periodic limb movement disorder     Restless leg syndrome     LEFT SHOULDER    Rotator cuff syndrome     Sleep apnea     NO MACHINE CURRENTLY    Suicidal ideation 08/19/2016    history    Thyroid nodule     Vitamin B12 deficiency      Past Surgical History:   Procedure Laterality Date    ADENOIDECTOMY  1974    ANTERIOR CERVICAL DISCECTOMY W/ FUSION N/A 12/29/2016    Procedure: C3-4 anterior cervical discectomy and fusion with Depuy micro plate, ALLOGRAFT C3-4, AND HARDWARE REMOVAL C4-7.;  Surgeon: Hema Godwin MD;  Location: [facility] MAIN OR;  Service:     CARDIAC CATHETERIZATION N/A 03/30/2017    Procedure: Left Heart Cath;  Surgeon: Tracey Vargas MD;  Location:  [facility];  Service:     CARDIAC CATHETERIZATION N/A 03/30/2017    Procedure: Coronary angiography;  Surgeon: Tracye Vargas MD;  Location:  [facility];  Service:     CARDIAC CATHETERIZATION N/A 03/30/2017    Procedure: Left ventriculography;  Surgeon: Tracey Vargas MD;  Location:  [facility];  Service:     CARDIAC CATHETERIZATION  03/30/2017    Procedure: Functional Flow [place];  Surgeon: Tracey Vargas MD;  Location:  [facility];  Service:     CATARACT EXTRACTION Bilateral     CERVICAL BIOPSY  MMXVI    Dr. Jeffery.     CERVICAL DISCECTOMY ANTERIOR  04/2013    C4-7    COLONOSCOPY  04/20/2015    Diverticulosis, IH    COLONOSCOPY   03/09/2021    COLPOSCOPY W/ BIOPSY / CURETTAGE  06/17/2016    LGSIL HPV positive. Results were normal repeat pap in one year. Chronic Cervicitis    CORONARY ANGIOPLASTY WITH STENT PLACEMENT      ENDOSCOPY  MMXV    Normal.  Dr. Rodriguez    ENDOSCOPY N/A 06/22/2017    Erythematous mucosa in the stomach  PATH: Chronic active gastritis, moderate with intestinal metaplasia     EPIDURAL BLOCK      INCISION AND DRAINAGE LEG Left 5/12/2024    Procedure: INCISION AND DRAINAGE LOWER EXTREMITY WOUND CLOSURE KNEE;  Surgeon: Ajay Gutierrez MD;  Location: [facility] OR;  Service: Orthopedics;  Laterality: Left;    INGUINAL HERNIA REPAIR      KNEE SURGERY      LAPAROSCOPIC GASTRIC BANDING  02/2018    SHOULDER SURGERY Left     RCR    TONSILLECTOMY AND ADENOIDECTOMY      TOTAL KNEE ARTHROPLASTY Left 03/05/2024    Procedure: LEFT TOTAL KNEE ARTHROPLASTY WITH TAB NAVIGATION;  Surgeon: Tho Pritchard MD;  Location: [facility];  Service: Orthopedics;  Laterality: Left;    TRANSVAGINAL TAPING SUSPENSION N/A 12/06/2017    Procedure: MID URETHRAL SLING CYSTSCOPY;  Surgeon: Abby Méndez MD;  Location: [facility] OR;  Service:     TRIGGER POINT INJECTION      TUBAL ABDOMINAL LIGATION      TYMPANOSTOMY TUBE PLACEMENT Right     UPPER GASTROINTESTINAL ENDOSCOPY  approx 2021    WRIST SURGERY Bilateral     carpal tunnel      General Information       Row Name 05/13/24 0856          Physical Therapy Time and Intention    Document Type discharge evaluation/summary  -DJ     Mode of Treatment physical therapy;individual therapy  -DJ       Row Name 05/13/24 0856          General Information    Patient Profile Reviewed yes  -DJ     Existing Precautions/Restrictions --  pt reports MD instructed her not to flex knee  -DJ       Row Name 05/13/24 0856          Cognition    Orientation Status (Cognition) oriented x 4  -DJ       Row Name 05/13/24 0856          Safety Issues, Functional Mobility    Comment, Safety Issues/Impairments  (Mobility) gt belt, nonskid socks  -DJ               User Key  (r) = Recorded By, (t) = Taken By, (c) = Cosigned By      Initials Name Provider Type    DJ Seble Magana, PT Physical Therapist                   Mobility       Row Name 05/13/24 0857          Bed Mobility    Bed Mobility supine-sit  -DJ     Supine-Sit [place] (Bed Mobility) modified independence  -DJ     Comment, (Bed Mobility) holds knee in exten  -DJ       Row Name 05/13/24 0857          Transfers    Comment, (Transfers) sit/stand from EOB and commode  -DJ       Row Name 05/13/24 0857          Bed-Chair Transfer    Bed-Chair K[place] (Transfers) not tested  -DJ       Row Name 05/13/24 0857          Sit-Stand Transfer    Sit-Stand [place] (Transfers) modified independence;verbal cues  -DJ     Assistive Device (Sit-Stand Transfers) walker, front-wheeled  -DJ       Row Name 05/13/24 0857          Gait/Stairs (Locomotion)    [place] Level (Gait) standby assist;verbal cues  -DJ     Assistive Device (Gait) walker, front-wheeled;other (see comments)  also amb without AD  -DJ     Distance in Feet (Gait) 250  -DJ     Deviations/Abnormal Patterns (Gait) elena decreased;antalgic;stride length decreased  -DJ     Bilateral Gait Deviations decreased arm swing  -DJ     Left Sided Gait Deviations --  decreased L knee flex duetin swing thru  -DJ     [place] Level (Stairs) stand by assist;verbal cues  -DJ     Handrail Location (Stairs) left side (ascending)  -DJ     Number of Steps (Stairs) 2  -DJ     Ascending Technique (Stairs) step-to-step  -DJ     Descending Technique (Stairs) step-to-step  -DJ     Comment, (Gait/Stairs) Pt amb 250' with SBA. She initially amb 125' with r wx but then progressed to amb without ' - fair pace and good balance, decreased L knee flex. She negotiated 2 stairs with handrail and SBA.  -DJ               User Key  (r) = Recorded By, (t) = Taken By, (c) = Cosigned By      Initials Name Provider Type    DJ  Seble Magana, PT Physical Therapist                   Obj/Interventions       Row Name 05/13/24 0900          Motor Skills    Motor Skills functional endurance  -DJ     Functional Endurance improving  -DJ       Row Name 05/13/24 0900          Balance    Balance Assessment standing static balance;standing dynamic balance  -DJ     Static Standing Balance modified independence  -DJ     Dynamic Standing Balance standby assist;verbal cues  -DJ     Position/Device Used, Standing Balance walker, front-wheeled;other (see comments)  or no AD  -DJ     Balance Interventions sitting;standing;sit to stand;supported;weight shifting activity  -DJ     Comment, Balance good  -DJ               User Key  (r) = Recorded By, (t) = Taken By, (c) = Cosigned By      Initials Name Provider Type    DJ Seble Magana, PT Physical Therapist                   Goals/Plan       Row Name 05/13/24 0908          Bed Mobility Goal 1 (PT)    Progress/Outcomes (Bed Mobility Goal 1, PT) goal met  -DJ       Row Name 05/13/24 0908          Transfer Goal 1 (PT)    Progress/Outcome (Transfer Goal 1, PT) goal met  -DJ       Row Name 05/13/24 0908          Gait Training Goal 1 (PT)    Progress/Outcome (Gait Training Goal 1, PT) goal met  -DJ       Row Name 05/13/24 0908          ROM Goal 1 (PT)    Strategies/Barriers (ROM Goal 1, PT) pt reports MD instructed her not to flex L knee  -DJ     Progress/Outcome (ROM Goal 1, PT) goal not met  -DJ               User Key  (r) = Recorded By, (t) = Taken By, (c) = Cosigned By      Initials Name Provider Type    DJ Seble Magana, PT Physical Therapist                   Clinical Impression       Row Name 05/13/24 0901          Pain    Pretreatment Pain Rating 6/10  -DJ     Pain Location - Side/Orientation Left  -DJ     Pain Location - knee  -DJ     Pre/Posttreatment Pain Comment Pt reports she recently received pain meds which is helping  -DJ     Pain Intervention(s) Repositioned;Rest;Cold applied  -DJ       Row Name  05/13/24 0901          Plan of Care Review    Plan of Care Reviewed With patient  -DJ     Progress improving  -DJ     Outcome Evaluation Pt resting in bed in NAD,pleasant and cooperative. She reports that she is being d/c today and will be going to her sister's house. She states the MD told her not to flex her L knee. She is also requesting a cane. Pt was essentially independent with bed mobility and stood from EOB (and commode) independently. Pt amb 250' with SBA. She initially amb 125' with r wx but then progressed to amb without ' - fair pace and good balance, decreased L knee flex. She negotiated 2 stairs with handrail and SBA. She voided in bathroom upon return to her room and was independent with toileting hygeine. [facility]. She has met all her goals. She is safe to return home and is scheduled to return to outpt PT this week.  -DJ       Row Name 05/13/24 0901          Therapy Assessment/Plan (PT)    Patient/Family Therapy Goals Statement (PT) home today  -DJ     Criteria for Skilled Interventions Met (PT) skilled treatment is necessary  -DJ       Row Name 05/13/24 0901          Vital Signs    O2 Delivery Pre Treatment room air  -DJ     O2 Delivery Intra Treatment room air  -DJ     Post SpO2 (%) 96  -DJ     O2 Delivery Post Treatment room air  -DJ     Pre Patient Position Supine  -DJ     Intra Patient Position Standing  -DJ     Post Patient Position Sitting  -DJ       Row Name 05/13/24 0901          Positioning and Restraints    Pre-Treatment Position in bed  -DJ     Post Treatment Position chair  -DJ     In Chair reclined;call light within reach;encouraged to call for assist;exit alarm on  -DJ               User Key  (r) = Recorded By, (t) = Taken By, (c) = Cosigned By      Initials Name Provider Type    Seble Pond, PT Physical Therapist                   Outcome Measures       Row Name 05/13/24 0909          How much help from another person do you currently  need...    Turning from your back to your side while in flat bed without using bedrails? 4  -DJ     Moving from lying on back to sitting on the side of a flat bed without bedrails? 4  -DJ     Moving to and from a bed to a chair (including a wheelchair)? 3  -DJ     Standing up from a chair using your arms (e.g., wheelchair, bedside chair)? 4  -DJ     Climbing 3-5 steps with a railing? 3  -DJ     To walk in hospital room? 3  -DJ     AM-PAC 6 Clicks Score (PT) 21  -DJ     Highest Level of Mobility Goal 6 --> Walk 10 steps or more  -DJ       Row Name 05/13/24 0909          Functional Assessment    Outcome Measure Options AM-PAC 6 Clicks Basic Mobility (PT)  -               User Key  (r) = Recorded By, (t) = Taken By, (c) = Cosigned By      Initials Name Provider Type    Seble Pond, PT Physical Therapist                  Physical Therapy Education       Title: PT OT SLP Therapies (In Progress)       Topic: Physical Therapy (In Progress)       Point: Mobility training (Done)       Learning Progress Summary             Patient Acceptance, E, VU by MITCH at 5/13/2024 0910                         Point: Home exercise program (Not Started)       Learner Progress:  Not documented in this visit.              Point: Body mechanics (Done)       Learning Progress Summary             Patient Acceptance, E, VU by MITCH at 5/13/2024 0910                         Point: Precautions (Done)       Learning Progress Summary             Patient Acceptance, E, VU by  at 5/13/2024 0910                                         User Key       Initials Effective Dates Name Provider Type Discipline     10/25/19 -  Seble Magana, PT Physical Therapist PT                  PT Recommendation and Plan     Plan of Care Reviewed With: patient  Progress: improving  Outcome Evaluation: Pt resting in bed in NAD,pleasant and cooperative. She reports that she is being d/c today and will be going to her sister's house. She states the MD told her not to  flex her L knee. She is also requesting a cane. Pt was essentially independent with bed mobility and stood from EOB (and commode) independently. Pt amb 250' with SBA. She initially amb 125' with r wx but then progressed to amb without ' - fair pace and good balance, decreased L knee flex. She negotiated 2 stairs with handrail and SBA. She voided in bathroom upon return to her room and was independent with toileting hygeine. She then returned to [facility] with all needs met. She has met all her goals. She is safe to return home and is scheduled to return to outpt PT this week.     Time Calculation:         PT Charges       Row Name 05/13/24 0911             Time Calculation    Start Time 0828  -DJ      Stop Time 0854  -DJ      Time Calculation (min) 26 min  -DJ      PT Non-Billable Time (min) 10 min  -DJ      PT Received On 05/13/24  -DJ                User Key  (r) = Recorded By, (t) = Taken By, (c) = Cosigned By      Initials Name Provider Type    Seble Pond, PT Physical Therapist                  Therapy Charges for Today       Code Description Service Date Service Provider Modifiers Qty    20078891621 HC PT THERAPEUTIC ACT EA 15 MIN 5/13/2024 Seble Magana, PT GP 1    41239521154 HC GAIT TRAINING EA 15 MIN 5/13/2024 Seble Magana, PT GP 1            PT G-Codes  Outcome Measure Options: AM-PAC 6 Clicks Basic Mobility (PT)  AM-PAC 6 Clicks Score (PT): 21    PT Discharge Summary  Anticipated Discharge Disposition (PT): home with assist, home with outpatient therapy services (pt reports [facility] is scheduled for outpt PT this week (she was attending prior to admission))    Seble Magana PT  5/13/2024

## 2024-05-13 NOTE — PROGRESS NOTES
Continued Stay Note  [facility]     Patient Name: Keri Bhagat  MRN: [id number]  Today's Date: 5/13/2024    Admit Date: 5/11/2024    Plan: Home to her sister's home with [facility] (Referral in [facility]IC/[facility])   Discharge Plan       Row Name 05/13/24 1049       Plan    Plan Home to her sister's home with [facility] (Referral in EPIC/[facility])    Plan Comments Per Tamiko PALMA with Ortho, order placed for Home Health. Patient agreeable to [facility]. Referral placed in EPIC. Spoke to Lila with [facility] to inform of referral and DC today. Sister's address is [address] [place], [place] [address]      Row Name 05/13/24 1007       Plan    Plan Home to her sister's house    Plan Comments Discharge order noted. Met with patient who confirmed DC plan is to return to her sister's home. Stated she has OP PT already scheduled. Stated her sister will assist as needed and will provide transportation at [place]. Informed waiting [employer] to deliver cane. Denies any additional needs/equipment.      Row Name 05/13/24 0933       Plan    Plan Comments Order noted for cane. VM left for Adam with [employer] to informof order and DC today.                   Discharge Codes    No documentation.                 Expected Discharge Date and Time       Expected Discharge Date Expected Discharge Time    May 13, 2024               Tara Scruggs, RN

## 2024-05-13 NOTE — DISCHARGE SUMMARY
Patient Name: Keri Bhagat  : 1960  MRN: [id number]    Date of Admission: 2024  Date of Discharge:  2024  Primary Care Physician: Epley, James, APRN      Chief Complaint:   Fall and Knee Injury      Discharge Diagnoses     Active Hospital Problems    Diagnosis  POA    **Wound dehiscence [T81.30XA]  Yes    Splenic artery aneurysm [I72.8]  Yes    Gastro-esophageal reflux disease with esophagitis, without bleeding [K21.00]  Yes    Primary hypothyroidism [E03.9]  Yes    Atherosclerosis of coronary artery [I25.10]  Yes    Essential hypertension [I10]  Yes      Resolved Hospital Problems   No resolved problems to display.        Hospital Course       This is a 63-year-old woman who had a TKA approximately 2 months ago who fell and landed directly on her left knee.  She experienced a dehiscence of the wound and presented to the hospital.  She was seen in consultation by orthopedic surgery.  She underwent irrigation and debridement on 2024.  Postoperatively her heart rate remained despite being administered her beta-blocker.  This resolved on its own the following morning.  She will be discharged on Keflex to complete a 5-day course of antibiotic therapy.    Physical Exam:  Temp:  [96 °F (35.6 °C)-98.6 °F (37 °C)] 97.5 °F (36.4 °C)  Heart Rate:  [] 79  Resp:  [15-16] 16  BP: (105-145)/() 120/80  Body mass index is 35.98 kg/m².  Physical Exam  Constitutional:       General: She is not in acute distress.  HENT:      Head: Normocephalic and atraumatic.   Cardiovascular:      Rate and Rhythm: Normal rate and regular rhythm.   Pulmonary:      Effort: Pulmonary effort is normal. No respiratory distress.   Abdominal:      General: There is no distension.      Palpations: Abdomen is soft.      Tenderness: There is no abdominal tenderness.   Musculoskeletal:      Comments: Left knee dressed   Neurological:      Mental Status: She is alert.         Consultants     Consult Orders (all) (From  admission, onward)       Start     Ordered    05/12/24 0847  Inpatient Case Management  Consult  Once        Provider:  (Not yet assigned)    05/12/24 0847    05/11/24 1402  [facility] (on-call MD unless specified) Details  Once        Specialty:  Hospitalist  Provider:  Mee Samuel MD    05/11/24 1401    05/11/24 1344  Ortho (on-call MD unless specified)  Once        Specialty:  Orthopedic Surgery  Provider:  Tho Pritchard MD    05/11/24 1343                  Procedures     Imaging Results (All)       Procedure Component Value Units Date/Time    XR Knee 1 or 2 View Left [[id number]] Collected: 05/12/24 0909     Updated: 05/12/24 0913    Narrative:      XR KNEE 1 OR 2 VW LEFT-        INDICATION: Postoperative     COMPARISON: Left knee radiographs May 11, 2024     TECHNIQUE: 2 view left knee     FINDINGS:      Total knee arthroplasty. No lucency around the hardware. Normal  alignment. No fracture. No definite effusion. New anterior cutaneous  staples and anterior soft tissue swelling.       Impression:         1. New anterior cutaneous staples and soft tissue swelling.  2. Stable total knee arthroplasty..     This report was finalized on 5/12/2024 9:10 AM by Dr. Rufus Capellan M.D on Workstation: [id number]       XR Knee 1 or 2 View Left [[id number]] Collected: 05/11/24 1406     Updated: 05/11/24 1411    Narrative:      XR KNEE 1 OR 2 VW LEFT-        INDICATION: Pain after fall     COMPARISON: Left knee radiographs March 5, 2024     TECHNIQUE: 2 view left knee     FINDINGS:      Total knee arthroplasty. No lucency seen surrounding the hardware.  Normal alignment. No fracture. No dislocation. No effusion.       Impression:         1. No fracture or dislocation.  2. Total knee arthroplasty     This report was finalized on 5/11/2024 2:07 PM by Dr. Rufus Capellan M.D on Workstation: [id number]               Pertinent Labs     Results from last 7 days   Lab Units 05/13/24  0621 05/12/24  8646  "05/11/24  1415   WBC 10*3/mm3  --  8.14 7.21   HEMOGLOBIN g/dL 10.9* 13.4 13.7   PLATELETS 10*3/mm3  --  259 270     Results from last 7 days   Lab Units 05/12/24  0436 05/11/24  1415   SODIUM mmol/L 143 141   POTASSIUM mmol/L 4.6 4.2   CHLORIDE mmol/L 107 105   CO2 mmol/L 27.0 25.4   BUN mg/dL 14 12   CREATININE mg/dL 1.05* 0.81   GLUCOSE mg/dL 99 99   Estimated Creatinine Clearance: 60.7 mL/min (A) (by C-G formula based on SCr of 1.05 mg/dL (H)).  Results from last 7 days   Lab Units 05/11/24  1415   ALBUMIN g/dL 4.4   BILIRUBIN mg/dL <0.2   ALK PHOS U/L 104   AST (SGOT) U/L 18   ALT (SGPT) U/L 22     Results from last 7 days   Lab Units 05/12/24  0436 05/11/24  1415   CALCIUM mg/dL 8.8 9.3   ALBUMIN g/dL  --  4.4               Invalid input(s): \"LDLCALC\"        Test Results Pending at Discharge       Discharge Details        Discharge Medications        New Medications        Instructions Start Date   cephalexin 500 MG capsule  Commonly known as: KEFLEX   500 mg, Oral, 3 Times Daily      HYDROcodone-acetaminophen 7.5-325 MG per tablet  Commonly known as: NORCO   1 tablet, Oral, Every 4 Hours PRN      ondansetron 4 MG tablet  Commonly known as: Zofran   4 mg, Oral, Every 8 Hours PRN             Changes to Medications        Instructions Start Date   docusate sodium 100 MG capsule  Commonly known as: COLACE  What changed: You were already taking a medication with the same name, and this prescription was added. Make sure you understand how and when to take each.   100 mg, Oral, 2 Times Daily      docusate sodium 100 MG capsule  Commonly known as: COLACE  What changed: Another medication with the same name was added. Make sure you understand how and when to take each.   100 mg, Oral, As Needed             Continue These Medications        Instructions Start Date   apixaban 5 MG tablet tablet  Commonly known as: ELIQUIS   5 mg, Oral, Every 12 Hours Scheduled      atorvastatin 40 MG tablet  Commonly known as: LIPITOR   " 40 mg, Oral, Daily      benzonatate 100 MG capsule  Commonly known as: TESSALON   100 mg, Oral, 3 Times Daily PRN      buPROPion  MG 24 hr tablet  Commonly known as: WELLBUTRIN XL   150 mg, Oral, 2 Times Daily      CITRUS CALCIUM/VITAMIN D PO   Oral, 2 Times Daily      estradiol 0.1 MG/GM vaginal cream  Commonly known as: ESTRACE   1 g, Vaginal, 3 Times Weekly      gabapentin 300 MG capsule  Commonly known as: NEURONTIN   300 mg, Oral, 3 Times Daily      IRON PO   1 tablet, Oral, 2 Times Daily      isosorbide mononitrate 30 MG 24 hr tablet  Commonly known as: IMDUR   30 mg, Oral, Daily      lamoTRIgine 150 MG tablet  Commonly known as: LaMICtal   150 mg, Oral, 2 Times Daily      levothyroxine 88 MCG tablet  Commonly known as: SYNTHROID, LEVOTHROID   88 mcg, Oral, Every Morning      metoprolol succinate XL 25 MG 24 hr tablet  Commonly known as: Toprol XL   37.5 mg, Oral, Daily, For bp      OLANZapine 5 MG tablet  Commonly known as: zyPREXA   5 mg, Oral, Nightly      pantoprazole 40 MG EC tablet  Commonly known as: PROTONIX   40 mg, Oral, 2 Times Daily      venlafaxine  MG 24 hr capsule  Commonly known as: EFFEXOR-XR   150 mg, Oral, Daily      Vibegron 75 MG tablet   75 mg, Oral, Daily      vitamin b complex capsule capsule   1 capsule, Oral, 2 Times Daily      Vraylar 1.5 MG capsule capsule  Generic drug: Cariprazine HCl   Take 1 capsule by mouth.               No Known Allergies      Discharge Disposition:  Home or Self Care    Discharge Diet:  Diet Order   Procedures    Diet: Regular/House; Fluid Consistency: Thin (IDDSI 0)       Discharge Activity:   Activity Instructions       Discharge Activity      1.  May weight-bear as tolerated and perform range of motion of the knee as tolerated.  2.  Ice knee for 20 minutes every 2 hours as needed.  3.  Follow-up with Dr. Gutierrez in 2 week  4.  Please leave dressings in place for 7 days.  5.  OK to shower after 7 days.    Range of Motion      Weight Bearing As  Tolerated      Weight Bearing Status      Weight Bearing Status: As Tolerated            CODE STATUS:    Code Status and Medical Interventions:   Ordered at: 05/12/24 0847     Code Status (Patient has no pulse and is not breathing):    CPR (Attempt to Resuscitate)     Medical Interventions (Patient has pulse or is breathing):    Full       Future Appointments   Date Time Provider Department Center   5/15/2024  3:00 PM DianaTorreyer, PT [facility]   5/17/2024  3:00 PM Diana Wilson, PTA [facility]   5/21/2024  1:00 PM LebronTorreyer, PT [facility]   5/23/2024  1:00 PM Derrick Diana, PT [facility]   5/24/2024  3:00 PM [facility] CT 1  [facility]   5/24/2024  3:30 PM [facility] MRI 1  [facility] MRI [facility]   5/28/2024  2:00 PM David Burroughs MD [facility]   5/29/2024  2:30 PM Bob Mercado MD [facility]   5/30/2024  1:15 PM Erendira Arteaga PA-C [facility] GE EA [facility]   6/10/2024  2:00 PM Eduarda Trent APRN [facility] L100 [facility]   6/12/2024 11:00 AM Shaniqua Blas DNP, DAPHNEY [facility] None   7/1/2024 10:00 AM Eduarda Trent APRN [facility] L100 [facility]   9/5/2024 11:00 AM David Burroughs MD [facility]   9/24/2024  2:30 PM Maxwell Martin MD [facility] EN  [facility]     Additional Instructions for the Follow-ups that You Need to Schedule       Apply Ice to Affected Knee Every 2 Hours   As directed      Discharge Follow-up with Specified Provider: Dr. Gutierrez; 2 Weeks   As directed      To: Dr. Gutierrez   Follow Up: 2 Weeks        Remove Dressing   As directed      May remove dressings and shower after 7 days.    Order Comments: May remove dressings and shower after 7 days.                Follow-up Information       Epley, James, APRN .    Specialties: Nurse Practitioner, Family Medicine  Contact information:  [address] [facility] [address]  [place] [id number] [phone]                             Additional Instructions for the Follow-ups that You Need to Schedule        Apply Ice to Affected Knee Every 2 Hours   As directed      Discharge Follow-up with Specified Provider: Dr. Gutierrez; 2 Weeks   As directed      To: Dr. Gutierrez   Follow Up: 2 Weeks        Remove Dressing   As directed      May remove dressings and shower after 7 days.    Order Comments: May remove dressings and shower after 7 days.             Time Spent on Discharge:  Greater than 30 minutes      Quentin Munoz MD  [facility]  05/13/24  09:18 EDT

## 2024-05-13 NOTE — PLAN OF CARE
Goal Outcome Evaluation:  Plan of Care Reviewed With: patient           Outcome Evaluation: Tachy heart rate, BP remains stable, norco given for pain, zofran for nausea, up with asst and walker, weight bearing as tolerated, ice to knee as needed, IVF infusing, IV abx given to start PO abx in morning, no c/o numbness or tingling in lower leg, pulses present and warm to touch, encourging foot pumps while awake, [facility], plan of care continuing.

## 2024-05-13 NOTE — PAYOR COMM NOTE
"Keri Bhagat (63 y.o. Female)                 ATTENTION INITIAL CLINICALS CASE REF [id number]              REPLY TO [facility] DEPT  [phone] OR CALL    MARIAM CARRASCO LPNADIRA           Date of Birth   1960    Social Security Number       Address   [address] [place] [address]    Home Phone   [phone]    MRN   [id number]       Lutheran   Pent[employer]alism    Marital Status                               Admission Date   5/11/24    Admission Type   Emergency    Admitting Provider   Mee Samuel MD    Attending Provider   Quentin Munoz MD    Department, Room/Bed   [address], 86/1       Discharge Date       Discharge Disposition   Home or Self Care    Discharge Destination                                 Attending Provider: Quentin Munoz MD    Allergies: No Known Allergies    Isolation: None   Infection: None   Code Status: CPR    Ht: 160 cm (62.99\")   Wt: 92.1 kg (203 lb 0.7 oz)    Admission Cmt: None   Principal Problem: Wound dehiscence [T81.30XA]                   Active Insurance as of 5/11/2024       Primary Coverage       Payor Plan Insurance Group Employer/Plan Group    AETNA MEDICARE REPLACEMENT AETNA MEDICARE REPLACEMENT [id number]       Payor Plan Address Payor Plan Phone Number Payor Plan Fax Number Effective Dates    [address] [phone]  3/1/2021 - None Entered    [place] [address]         Subscriber Name Subscriber Birth Date Member ID       KERI BHAGAT 1960 [id number]               Secondary Coverage       Payor Plan Insurance Group Employer/Plan Group    [place]        Payor Plan Address Payor Plan Phone Number Payor Plan Fax Number Effective Dates    [address]   11/1/2022 - None Entered    [place] [address]         Subscriber Name Subscriber Birth Date Member ID       KERI BHAGAT 1960 [id number]                     Emergency Contacts        (Rel.) " Home Phone Work Phone Mobile Phone    Ariella Hill (Sister) [phone] -- [phone]    Karla Wood (Daughter) [phone] -- [phone]    Annel Murphy (Sister) [phone] -- [phone]    MALLORYELOISA KHAN (Sister) -- -- [phone]                 History & Physical        StingMee sanchez MD at [phone]          HISTORY AND PHYSICAL   [facility]        Date of Admission: 2024  Patient Identification:  Name: Keri Bhagat  Age: 63 y.o.  Sex: female  :  1960  MRN: [id number]                     Primary Care Physician: Epley, James, APRN    Chief Complaint:  63 year old female presented to the emergency room with an open wound of her left knee; she had a knee replacement a few weeks ago; she tripped and fell today her incision came apart; she has some pain in involved area;     History of Present Illness:   As above    Past Medical History:  Past Medical History:   Diagnosis Date    Abnormal Pap smear of cervix     Adrenal adenoma 2022    Anemia     Anxiety     Arthritis     Arthritis of back     Arthritis of neck     Bipolar I disorder, single manic episode     depressive d(NOS)    Cervical disc herniation     Colon polyp     Coronary artery disease     COVID-19 01/10/2023    Death of family member     MOTHER IN 2024    Depression     NO SUICIDAL PLANS    Dislocation of finger     Diverticular disease     Diverticulitis of colon     Dyspepsia     Encounter for follow-up examination after completed treatment for conditions other than malignant neoplasm 2018    Fracture of wrist     GERD (gastroesophageal reflux disease)     Heart attack     AGE 39    Heart murmur     Hiatal hernia     History of transfusion         HPV (human papilloma virus) infection 2016    HPV positive on pap LGSIL    Hyperlipidemia     Hypertension     Hypothyroidism     Incontinence in female     wears pads    Kidney disease, chronic, stage III (GFR 30-59  ml/min)     Knee pain, bilateral     Knee swelling     LGSIL on Pap smear of cervix 04/29/2016    LGSIL HPV positive    Lumbosacral disc disease Unsure    Lower left back is partial osteoarthritis and partial osteoporosis    Obesity     Osteopenia 2021    Bone density test    Periodic limb movement disorder     Restless leg syndrome     LEFT SHOULDER    Rotator cuff syndrome     Sleep apnea     NO MACHINE CURRENTLY    Suicidal ideation 08/19/2016    history    Thyroid nodule     Vitamin B12 deficiency      Past Surgical History:  Past Surgical History:   Procedure Laterality Date    ADENOIDECTOMY  1974    ANTERIOR CERVICAL DISCECTOMY W/ FUSION N/A 12/29/2016    Procedure: C3-4 anterior cervical discectomy and fusion with Depuy micro plate, ALLOGRAFT C3-4, AND HARDWARE REMOVAL C4-7.;  Surgeon: Hema Godwin MD;  Location: [facility] MAIN OR;  Service:     CARDIAC CATHETERIZATION N/A 03/30/2017    Procedure: Left Heart Cath;  Surgeon: Tracey Vargas MD;  Location:  [facility] CATH INVASIVE LOCATION;  Service:     CARDIAC CATHETERIZATION N/A 03/30/2017    Procedure: Coronary angiography;  Surgeon: Tracey Vargas MD;  Location:  [facility]IVE [facility];  Service:     CARDIAC CATHETERIZATION N/A 03/30/2017    Procedure: Left ventriculography;  Surgeon: Tracey Vargas MD;  Location:  [facility];  Service:     CARDIAC CATHETERIZATION  03/30/2017    Procedure: Functional Flow Cyrus;  Surgeon: Tracey Vargas MD;  Location:  [facility];  Service:     CATARACT EXTRACTION Bilateral     CERVICAL BIOPSY  MMXVI    Dr. Jeffery.     CERVICAL DISCECTOMY ANTERIOR  04/2013    C4-7    COLONOSCOPY  04/20/2015    Diverticulosis, IH    COLONOSCOPY  03/09/2021    COLPOSCOPY W/ BIOPSY / CURETTAGE  06/17/2016    LGSIL HPV positive. Results were normal repeat pap in one year. Chronic Cervicitis    CORONARY ANGIOPLASTY WITH STENT PLACEMENT      ENDOSCOPY  MMXV    Normal.  Dr. Rodriguez    ENDOSCOPY N/A  06/22/2017    Erythematous mucosa in the stomach  PATH: Chronic active gastritis, moderate with intestinal metaplasia     EPIDURAL BLOCK      INGUINAL HERNIA REPAIR      KNEE SURGERY      LAPAROSCOPIC GASTRIC BANDING  02/2018    SHOULDER SURGERY Left     RCR    TONSILLECTOMY AND ADENOIDECTOMY      TOTAL KNEE ARTHROPLASTY Left 03/05/2024    Procedure: LEFT TOTAL KNEE ARTHROPLASTY WITH TAB NAVIGATION;  Surgeon: Tho Pritchard MD;  Location: [facility] OR [facility];  Service: Orthopedics;  Laterality: Left;    TRANSVAGINAL TAPING SUSPENSION N/A 12/06/2017    Procedure: MID URETHRAL SLING CYSTSCOPY;  Surgeon: Abby Méndez MD;  Location: [facility] MAIN OR;  Service:     TRIGGER POINT INJECTION      TUBAL ABDOMINAL LIGATION      TYMPANOSTOMY TUBE PLACEMENT Right     UPPER GASTROINTESTINAL ENDOSCOPY  approx 2021    WRIST SURGERY Bilateral     carpal tunnel      Home Meds:  Medications Prior to Admission   Medication Sig Dispense Refill Last Dose    apixaban (ELIQUIS) 5 MG tablet tablet Take 1 tablet by mouth Every 12 (Twelve) Hours. 60 tablet 11 5/11/2024    atorvastatin (LIPITOR) 40 MG tablet Take 1 tablet by mouth Daily.   5/11/2024    B Complex Vitamins (vitamin b complex) capsule capsule Take 1 capsule by mouth 2 (Two) Times a Day.   5/11/2024    buPROPion XL (WELLBUTRIN XL) 150 MG 24 hr tablet Take 1 tablet by mouth 2 (Two) Times a Day.   5/11/2024    Calcium Citrate-Vitamin D (CITRUS CALCIUM/VITAMIN D PO) Take  by mouth 2 (Two) Times a Day.   5/11/2024    docusate sodium (COLACE) 100 MG capsule Take 1 capsule by mouth As Needed for Constipation.   Past Week    estradiol (ESTRACE) 0.1 MG/GM vaginal cream Insert 1 g into the vagina 3 (Three) Times a Week.   5/10/2024    Ferrous Sulfate (IRON PO) Take 1 tablet by mouth 2 (Two) Times a Day.   5/11/2024    gabapentin (NEURONTIN) 300 MG capsule TAKE ONE CAPSULE BY MOUTH THREE TIMES A  capsule 0 5/11/2024    isosorbide mononitrate (IMDUR) 30 MG 24 hr tablet  Take 1 tablet by mouth Daily. 90 tablet 2 5/11/2024    lamoTRIgine (LaMICtal) 150 MG tablet Take 1 tablet by mouth 2 (Two) Times a Day.   5/11/2024    levothyroxine (SYNTHROID, LEVOTHROID) 88 MCG tablet Take 1 tablet by mouth Every Morning. 90 tablet 2 5/11/2024    metoprolol succinate XL (Toprol XL) 25 MG 24 hr tablet Take 1.5 tablets by mouth Daily. For bp 135 tablet 3 5/11/2024    OLANZapine (zyPREXA) 5 MG tablet Take 1 tablet by mouth Every Night.   5/10/2024    pantoprazole (PROTONIX) 40 MG EC tablet Take 1 tablet by mouth 2 (Two) Times a Day. 180 tablet 0 5/11/2024    venlafaxine XR (EFFEXOR-XR) 150 MG 24 hr capsule Take 1 capsule by mouth Daily. 90 capsule 1 5/11/2024    Vibegron 75 MG tablet Take 1 tablet by mouth Daily.   5/11/2024    Vraylar 1.5 MG capsule capsule Take 1 capsule by mouth.   5/11/2024    benzonatate (TESSALON) 100 MG capsule Take 1 capsule by mouth 3 (Three) Times a Day As Needed for Cough. 20 capsule 0        Allergies:  No Known Allergies  Immunizations:  Immunization History   Administered Date(s) Administered    COVID-19 (PFIZER) Purple Cap Monovalent 03/25/2021, 04/15/2021, 11/17/2021    Flublock Quad =>18yrs 10/27/2020    Fluzone (or Fluarix & Flulaval for VFC) >6mos 10/13/2021    Influenza, Unspecified 08/11/2020    Tdap 08/31/2021     Social History:   Social History     Social History Narrative    Not on file     Social History     Socioeconomic History    Marital status:     Number of children: 3    Years of education: 12   Tobacco Use    Smoking status: Light Smoker     Current packs/day: 0.25     Types: Electronic Cigarette, Cigarettes     Passive exposure: Current    Smokeless tobacco: Current    Tobacco comments:     Electronic Cigarette   Vaping Use    Vaping status: Every Day    Substances: Nicotine, Flavoring    Devices: Refillable tank   Substance and Sexual Activity    Alcohol use: Not Currently     Comment: RARELY    Drug use: Never    Sexual activity: Not  Currently     Partners: Male     Birth control/protection: Condom, Abstinence, Post-menopausal, None, Tubal ligation       Family History:  Family History   Problem Relation Age of Onset    Diabetes type II Mother     Hypertension Mother     Osteoporosis Mother     Seizures Mother     Diabetes Mother     Arthritis Mother     Hyperlipidemia Mother     COPD Father     Hypertension Father     Lung cancer Father     Heart attack Father     Liver cancer Father     Liver disease Father     Heart disease Father     Cancer Father         Lung cacer that metastasized  into the liver.    Thyroid disease Sister     Hypertension Sister     Bipolar disorder Sister     Depression Sister     ADD / ADHD Sister     Anxiety disorder Sister     Mental illness Sister     Hyperlipidemia Sister     Broken bones Sister     Thyroid disease Sister     Hypertension Sister     Bipolar disorder Sister     Anxiety disorder Sister     Depression Sister     Mental illness Sister     Hyperlipidemia Sister     Cancer Maternal Grandmother     No Known Problems Maternal Grandfather     No Known Problems Paternal Grandmother     No Known Problems Paternal Grandfather     Abnormal EKG Daughter     Hypertension Daughter     Bipolar disorder Daughter     Thyroid disease Daughter     Mental illness Daughter     Hyperlipidemia Daughter     Asthma Daughter     No Known Problems Son     Diabetes Son         Type 1    Diabetes Paternal Uncle     Breast cancer Neg Hx     Ovarian cancer Neg Hx     Uterine cancer Neg Hx     Colon cancer Neg Hx     Malig Hyperthermia Neg Hx         Review of Systems  See history of present illness and past medical history.  Patient denies headache, dizziness, syncope, falls, trauma, change in vision, change in hearing, change in taste, changes in weight, changes in appetite, focal weakness, numbness, or paresthesia.  Patient denies chest pain, palpitations, dyspnea, orthopnea, PND, cough, sinus pressure, rhinorrhea, epistaxis,  "hemoptysis, nausea, vomiting,hematemesis, diarrhea, constipation or hematochezia.  Denies cold or heat intolerance, polydipsia, polyuria, polyphagia. Denies hematuria, pyuria, dysuria, hesitancy, frequency or urgency. Denies consumption of raw and under cooked meats foods or change in water source.  Denies fever, chills, sweats, night sweats.   .    Objective:  T Max 24 hrs: Temp (24hrs), Av.3 °F (36.8 °C), Min:98.1 °F (36.7 °C), Max:98.4 °F (36.9 °C)    Vitals Ranges:   Temp:  [98.1 °F (36.7 °C)-98.4 °F (36.9 °C)] 98.3 °F (36.8 °C)  Heart Rate:  [] 68  Resp:  [16] 16  BP: (112-146)/() 112/78      Exam:  /78 (BP Location: Left arm, Patient Position: Sitting)   Pulse 68   Temp 98.3 °F (36.8 °C) (Oral)   Resp 16   Ht 160 cm (62.99\")   Wt 92.1 kg (203 lb 0.7 oz)   LMP 10/21/2016 (Approximate) Comment: 54  SpO2 95%   BMI 35.98 kg/m²     General Appearance:    Alert, cooperative, no distress, appears stated age   Head:    Normocephalic, without obvious abnormality, atraumatic   Eyes:    PERRL, conjunctivae/corneas clear, EOM's intact, both eyes   Ears:    Normal external ear canals, both ears   Nose:   Nares normal, septum midline, mucosa normal, no drainage    or sinus tenderness   Throat:   Lips, mucosa, and tongue normal   Neck:   Supple, symmetrical, trachea midline, no adenopathy;     thyroid:  no enlargement/tenderness/nodules; no carotid    bruit or JVD   Back:     Symmetric, no curvature, ROM normal, no CVA tenderness   Lungs:     Clear to auscultation bilaterally, respirations unlabored   Chest Wall:    No tenderness or deformity    Heart:    Regular rate and rhythm, S1 and S2 normal, no murmur, rub   or gallop   Abdomen:     Soft, nontender, bowel sounds active all four quadrants,     no masses, no hepatomegaly, no splenomegaly   Extremities:   Extremities normal, atraumatic, no cyanosis or edema   Pulses:   2+ and symmetric all extremities   Skin:   Skin color, texture, turgor " normal, no rashes or lesions   Lymph nodes:   Cervical, supraclavicular, and axillary nodes normal   Neurologic:   CNII-XII intact, normal strength, sensation intact throughout      .    Data Review:  Labs in chart were reviewed.  WBC   Date Value Ref Range Status   05/11/2024 7.21 3.40 - 10.80 10*3/mm3 Final     Hemoglobin   Date Value Ref Range Status   05/11/2024 13.7 12.0 - 15.9 g/dL Final     Hematocrit   Date Value Ref Range Status   05/11/2024 41.4 34.0 - 46.6 % Final     Platelets   Date Value Ref Range Status   05/11/2024 270 140 - 450 10*3/mm3 Final     Sodium   Date Value Ref Range Status   05/11/2024 141 136 - 145 mmol/L Final     Potassium   Date Value Ref Range Status   05/11/2024 4.2 3.5 - 5.2 mmol/L Final     Comment:     Slight hemolysis detected by analyzer. Result may be falsely elevated.     Chloride   Date Value Ref Range Status   05/11/2024 105 98 - 107 mmol/L Final     CO2   Date Value Ref Range Status   05/11/2024 25.4 22.0 - 29.0 mmol/L Final     BUN   Date Value Ref Range Status   05/11/2024 12 8 - 23 mg/dL Final     Creatinine   Date Value Ref Range Status   05/11/2024 0.81 0.57 - 1.00 mg/dL Final     Glucose   Date Value Ref Range Status   05/11/2024 99 65 - 99 mg/dL Final     Calcium   Date Value Ref Range Status   05/11/2024 9.3 8.6 - 10.5 mg/dL Final                Imaging Results (All)       Procedure Component Value Units Date/Time    XR Knee 1 or 2 View Left [[id number]] Collected: 05/11/24 1406     Updated: 05/11/24 1411    Narrative:      XR KNEE 1 OR 2 VW LEFT-        INDICATION: Pain after fall     COMPARISON: Left knee radiographs March 5, 2024     TECHNIQUE: 2 view left knee     FINDINGS:      Total knee arthroplasty. No lucency seen surrounding the hardware.  Normal alignment. No fracture. No dislocation. No effusion.       Impression:         1. No fracture or dislocation.  2. Total knee arthroplasty     This report was finalized on 5/11/2024 2:07 PM by Dr. Hooper  EULALIA Capellan on Workstation: [id number]                 Assessment:  Active Hospital Problems    Diagnosis  POA    **Wound dehiscence [T81.30XA]  Yes      Resolved Hospital Problems   No resolved problems to display.   Fall  Hypertension  Cad  Hypothyroidism  Obesity   Sleep apnea    Plan:  Will have surgery tomorrow  Npo after midnight  Pain control  Continue home meds  Dw patient and ed provider    Mee Samuel MD  5/11/2024  22:58 EDT      Electronically signed by Mee Samuel MD at 05/11/24 2301          Emergency Department Notes            Lj Lr MD at 05/11/24 1358          MD ATTESTATION NOTE    The MIREYA and I have discussed this patient's history, physical exam, and treatment plan.  I have reviewed the documentation and personally had a face to face interaction with the patient. I affirm the documentation and agree with the treatment and plan.  The attached note describes my personal findings.      SHARED VISIT: This visit was performed by BOTH a physician and an APC. The substantive portion of the medical decision making was performed by this attesting physician who made or approved the management plan and takes responsibility for patient management. All studies in the APC note (if performed) were independently interpreted by me.      Brief HPI: Patient presents for dehiscence of wound.  Patient had knee replacement that had healed.  Patient then fell and split open her wound.  Left leg.    PHYSICAL EXAM  ED Triage Vitals [05/11/24 1320]   Temp Heart Rate Resp BP SpO2   98.4 °F (36.9 °C) 68 16 146/87 99 %      Temp src Heart Rate Source Patient Position BP Location FiO2 (%)   -- -- -- -- --         GENERAL: no acute distress  HENT: nares patent  EYES: no scleral icterus  CV: regular rhythm, normal rate  RESPIRATORY: normal effort  ABDOMEN: soft  MUSCULOSKELETAL: no deformity  NEURO: alert, moves all extremities, follows commands  PSYCH:  calm, cooperative  SKIN: warm, dry.   Wound dehiscence left wound    Vital signs and nursing notes reviewed.    ED Course as of 05/11/24 1424   Sat May 11, 2024   1401 I discussed the case with MD Brenda with Orthopedics at this time regarding the patient.  I discussed work-up, results, concerns.  I discussed the consulting provider's desire for administering Betadine gauze to the wound and wrapping with Kerlix and Ace wrap and admitting to the hospitalist service for operative management tomorrow.   [DC]   1406 I discussed the case with MD Lizzie with [facility] at this time regarding the patient.  I discussed work-up, results, concerns.  I discussed the consulting provider's desire for med surg admit.   [DC]      ED Course User Index  [DC] Marques Norris PA         Plan: admit       jL Lr MD  05/11/24 1424      Electronically signed by Lj Lr MD at 05/11/24 1424       Marques Norris PA at 05/11/24 1325       Attestation signed by Lj Lr MD at 05/11/24 1424        SHARED APC FACE TO FACE: I performed a substantive part of the MDM during the patient's E/M visit. I personally evaluated and examined the patient. I personally made or approved the documented management plan and acknowledge its risk of complications.   Lj Lr MD 5/11/2024 14:24 EDT                          EMERGENCY DEPARTMENT ENCOUNTER      Room Number:  35/35  PCP: Epley, James, APRN  Independent Historians: Patient  Patient Care Team:  Epley, James, APRN as PCP - General (Family Medicine)  David Burroughs MD as Consulting Physician (Cardiology)  Ajay Gutierrez MD as Consulting Physician (Orthopedic Surgery)  Ana Medina MD (Psychiatry)  Eduar Rodriguez MD as Consulting Physician (Gastroenterology)  Chata Lind MD as Consulting Physician (Internal Medicine)  Maxwell Martin MD as Consulting Physician (Endocrinology)  Sil Roman MD as Surgeon (Orthopedic Surgery)  Tho Simmons MD as  "Consulting Physician (Nephrology)  Jhony Morse III, MD (General Surgery)  Nelida Jeffery MD as Obstetrician (Obstetrics and Gynecology)       HPI:  Chief Complaint: Knee wound    A complete HPI/ROS/PMH/PSH/SH/FH are unobtainable due to: None    Chronic or social conditions impacting patient care (Social Determinants of Health): None      Context: Keri Bhagat is a 63 y.o. female with a PMH significant for arthritis, obesity, bipolar disorder, anxiety, anticoagulated on Eliquis who presents to the ED c/o acute laceration to the anterior left knee.  The patient had knee replacement surgery in March with Dr. Pritchard and had been recovering well.  She tripped over a curb today and fell directly onto her anterior left knee and \"opened my incision\".  She had no sutures in place but the scar from her surgery has now opened with a fairly large wound.  She does describe an aching and nonradiating pain.  She is anticoagulated on Eliquis.      Upon review of prior external notes (non-ED) -and- Review of prior external test results outside of this encounter it appears the patient was evaluated in the office with gastroenterology for drug-induced constipation on May 2, 2024.  The patient had a normal strep screen on 3/31/2024 and a normal glucose on 3/5/2024.      PAST MEDICAL HISTORY  Active Ambulatory Problems     Diagnosis Date Noted    Gastroesophageal reflux disease 04/29/2016    Bipolar I disorder, single manic episode 04/29/2016    Atherosclerosis of coronary artery 04/29/2016    Essential hypertension 04/29/2016    Lightheadedness 04/29/2016    Low back pain 04/29/2016    Sciatica 04/29/2016    Thyroid nodule 04/29/2016    Fatigue 05/06/2016    Hyperlipidemia     Primary hypothyroidism 06/29/2016    Iron deficiency anemia 06/29/2016    Right knee pain 07/15/2016    Degenerative disc disease, cervical 08/18/2016    Depression 08/19/2016    ROCKY (obstructive sleep apnea)     Atherosclerotic heart disease of " native coronary artery with other forms of angina pectoris 11/14/2016    Cervical spondylosis with radiculopathy 12/29/2016    Chronic pain of both knees 02/16/2017    Arthritis of both knees 02/16/2017    Weight gain, abnormal 03/20/2017    Allergic rhinitis 03/20/2017    Obesity (BMI 30-39.9) 03/20/2017    Plantar fasciitis 04/10/2017    Peroneal tendon injury 04/10/2017    Stress fracture of calcaneus 04/19/2017    Vitamin D deficiency 04/19/2017    Pain and swelling of knee 06/02/2017    Knee injury 06/02/2017    Calcific Achilles tendinitis 07/26/2017    Rod's deformity 07/26/2017    Urgency incontinence 09/22/2017    Nocturia 09/22/2017    Chronic gastritis without bleeding 09/27/2017    LGSIL on Pap smear of cervix 09/28/2017    Primary osteoarthritis of right knee 10/02/2017    Stage 1 chronic kidney disease 10/08/2017    Left wrist tendinitis 10/08/2017    Cervical radiculopathy 10/29/2017    Tobacco abuse 10/29/2017    Obesity, morbid, BMI 40.0-49.9 10/29/2017    Encounter for screening for lung cancer 10/29/2017    Urinary frequency 11/12/2017    Diabetes mellitus screening 11/12/2017    MORALES I (cervical intraepithelial neoplasia I) 11/22/2017    Primary osteoarthritis of both knees 12/01/2017    H/O bladder repair surgery 01/29/2018    Chest pain 03/30/2018    Syncope and collapse 04/01/2018    Closed fracture of left distal radius 04/01/2018    Closed displaced fracture of neck of right fifth metacarpal bone 04/01/2018    Fracture follow-up 04/11/2018    Nondisplaced fracture of base of fifth metacarpal bone, left hand, initial encounter for closed fracture 04/11/2018    Closed fracture of lower end of left radius with routine healing 04/11/2018    Multiple dislocations of fingers, open 04/11/2018    Bilateral chronic knee pain 06/15/2018    Arthritis of left knee 06/15/2018    Arthritis of right knee 06/15/2018    Family history of diabetes mellitus (DM) 10/08/2018    Menopause 10/08/2018     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 02/26/2019    Memory changes 02/26/2019    Periodic limb movement disorder 02/26/2019    Gait disturbance 02/26/2019    Degenerative disc disease, lumbar 04/25/2019    Osteopenia 04/29/2019    Vaginal atrophy 04/29/2019    Female dyspareunia 04/29/2019    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    Meningioma 08/28/2019    Vitamin B12 deficiency     Acute urinary tract infection 01/09/2018    Anemia 02/04/2015    Bacterial vaginosis 12/15/2017    Anxiety 02/04/2015    Inappropriate diet and eating habits 02/21/2018    Incomplete emptying of bladder 07/05/2018    Myocardial infarction 05/01/2000    Other specified postprocedural states 01/29/2018    Postoperative retention of urine 12/12/2017    Suicide attempt 04/23/2019    Urinary urgency 03/25/2015    Vaginal discharge 03/25/2015    Vomiting 03/13/2018    Gastro-esophageal reflux disease with esophagitis, without bleeding 05/10/2021    Pre-operative cardiovascular examination 04/11/2018    Tendinitis of left wrist 10/08/2017    Lipoma of upper extremity 06/28/2022    Adrenal adenoma 09/21/2022    Cervicalgia 01/06/2023    ASCUS with positive high risk HPV cervical 09/13/2023    Palpitations 01/04/2024    Splenic artery aneurysm 02/23/2024     Resolved Ambulatory Problems     Diagnosis Date Noted    Abnormal vaginal bleeding 04/29/2016    Dysphagia 04/29/2016    Indigestion 04/29/2016    Flatulence 04/29/2016    Nausea 04/29/2016    Viral gastroenteritis 04/29/2016    LGSIL (low grade squamous intraepithelial dysplasia) 06/17/2016    Suicidal ideation 08/19/2016    RLQ abdominal pain 09/22/2017    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     Past Medical History:   Diagnosis Date    Abnormal Pap smear of cervix     Arthritis     Arthritis of back     Arthritis of neck     Cervical disc herniation     Colon polyp     Coronary artery disease     COVID-19 01/10/2023    Death of family member     Dislocation of finger      Diverticular disease     Diverticulitis of colon     Dyspepsia     Encounter for follow-up examination after completed treatment for conditions other than malignant neoplasm 04/11/2018    Fracture of wrist     GERD (gastroesophageal reflux disease)     Heart attack     Heart murmur     Hiatal hernia     History of transfusion     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    Hypertension     Hypothyroidism     Incontinence in female     Kidney disease, chronic, stage III (GFR 30-59 ml/min)     Knee pain, bilateral     Knee swelling     Lumbosacral disc disease Unsure    Obesity     Restless leg syndrome     Rotator cuff syndrome     Sleep apnea          PAST SURGICAL HISTORY  Past Surgical History:   Procedure Laterality Date    ADENOIDECTOMY  1974    ANTERIOR CERVICAL DISCECTOMY W/ FUSION N/A 12/29/2016    Procedure: C3-4 anterior cervical discectomy and fusion with Depuy micro plate, ALLOGRAFT C3-4, AND HARDWARE REMOVAL C4-7.;  Surgeon: Hema Godwin MD;  [facility];  Service:     CARDIAC CATHETERIZATION N/A 03/30/2017    Procedure: Left Heart Cath;  Surgeon: Tracey Vargas MD;  Location:  [facility];  Service:     CARDIAC CATHETERIZATION N/A 03/30/2017    Procedure: Coronary angiography;  Surgeon: Tracey Vargas MD;  Location:  [facility];  Service:     CARDIAC CATHETERIZATION N/A 03/30/2017    Procedure: Left ventriculography;  Surgeon: Tracey Vargas MD;  Location:  [facility];  Service:     CARDIAC CATHETERIZATION  03/30/2017    Procedure: Functional Flow F[place];  Surgeon: Tracey Vargas MD;  Location:  [facility];  Service:     CATARACT EXTRACTION Bilateral     CERVICAL BIOPSY  MMXVI    Dr. Jeffery.     CERVICAL DISCECTOMY ANTERIOR  04/2013    C4-7    COLONOSCOPY  04/20/2015    Diverticulosis, IH    COLONOSCOPY  03/09/2021    COLPOSCOPY W/ BIOPSY / CURETTAGE  06/17/2016    LGSIL HPV positive. Results were normal  repeat pap in one year. Chronic Cervicitis    CORONARY ANGIOPLASTY WITH STENT PLACEMENT      ENDOSCOPY  MMXV    Normal.  Dr. Rodriguez    ENDOSCOPY N/A 06/22/2017    Erythematous mucosa in the stomach  PATH: Chronic active gastritis, moderate with intestinal metaplasia     EPIDURAL BLOCK      INGUINAL HERNIA REPAIR      KNEE SURGERY      LAPAROSCOPIC GASTRIC BANDING  02/2018    SHOULDER SURGERY Left     RCR    TONSILLECTOMY AND ADENOIDECTOMY      TOTAL KNEE ARTHROPLASTY Left 03/05/2024    Procedure: LEFT TOTAL KNEE ARTHROPLASTY WITH TAB NAVIGATION;  Surgeon: Tho Pritchard MD;  Location: [facility];  Service: Orthopedics;  Laterality: Left;    TRANSVAGINAL TAPING SUSPENSION N/A 12/06/2017    Procedure: MID URETHRAL SLING CYSTSCOPY;  Surgeon: Abby Méndez MD;  Location: [facility] OR;  Service:     TRIGGER POINT INJECTION      TUBAL ABDOMINAL LIGATION      TYMPANOSTOMY TUBE PLACEMENT Right     UPPER GASTROINTESTINAL ENDOSCOPY  approx 2021    WRIST SURGERY Bilateral     carpal tunnel         FAMILY HISTORY  Family History   Problem Relation Age of Onset    Diabetes type II Mother     Hypertension Mother     Osteoporosis Mother     Seizures Mother     Diabetes Mother     Arthritis Mother     Hyperlipidemia Mother     COPD Father     Hypertension Father     Lung cancer Father     Heart attack Father     Liver cancer Father     Liver disease Father     Heart disease Father     Cancer Father         Lung cacer that metastasized  into the liver.    Thyroid disease Sister     Hypertension Sister     Bipolar disorder Sister     Depression Sister     ADD / ADHD Sister     Anxiety disorder Sister     Mental illness Sister     Hyperlipidemia Sister     Broken bones Sister     Thyroid disease Sister     Hypertension Sister     Bipolar disorder Sister     Anxiety disorder Sister     Depression Sister     Mental illness Sister     Hyperlipidemia Sister     Cancer Maternal Grandmother     No Known Problems  Maternal Grandfather     No Known Problems Paternal Grandmother     No Known Problems Paternal Grandfather     Abnormal EKG Daughter     Hypertension Daughter     Bipolar disorder Daughter     Thyroid disease Daughter     Mental illness Daughter     Hyperlipidemia Daughter     Asthma Daughter     No Known Problems Son     Diabetes Son         Type 1    Diabetes Paternal Uncle     Breast cancer Neg Hx     Ovarian cancer Neg Hx     Uterine cancer Neg Hx     Colon cancer Neg Hx     Malig Hyperthermia Neg Hx          SOCIAL HISTORY  Social History     Socioeconomic History    Marital status:     Number of children: 3    Years of education: 12   Tobacco Use    Smoking status: Light Smoker     Current packs/day: 0.25     Types: Electronic Cigarette, Cigarettes     Passive exposure: Current    Smokeless tobacco: Current    Tobacco comments:     Electronic Cigarette   Vaping Use    Vaping status: Every Day    Substances: Nicotine, Flavoring    Devices: Refillable tank   Substance and Sexual Activity    Alcohol use: Not Currently     Comment: RARELY    Drug use: Never    Sexual activity: Not Currently     Partners: Male     Birth control/protection: Condom, Abstinence, Post-menopausal, None, Tubal ligation         ALLERGIES  Patient has no known allergies.      REVIEW OF SYSTEMS  Included in HPI  All systems reviewed and negative except for those discussed in HPI.      PHYSICAL EXAM    I have reviewed the triage vital signs and nursing notes.    ED Triage Vitals [05/11/24 1320]   Temp Heart Rate Resp BP SpO2   98.4 °F (36.9 °C) 68 16 146/87 99 %      Temp src Heart Rate Source Patient Position BP Location FiO2 (%)   -- -- -- -- --       Physical Exam  Constitutional:       General: She is not in acute distress.     Appearance: She is well-developed.   HENT:      Head: Normocephalic and atraumatic.   Eyes:      General: No scleral icterus.     Conjunctiva/sclera: Conjunctivae normal.   Neck:      Trachea: No tracheal  deviation.   Cardiovascular:      Rate and Rhythm: Normal rate and regular rhythm.   Pulmonary:      Effort: Pulmonary effort is normal.      Breath sounds: Normal breath sounds.   Abdominal:      Palpations: Abdomen is soft.      Tenderness: There is no abdominal tenderness.   Musculoskeletal:         General: No deformity.      Cervical back: Normal range of motion.        Legs:    Lymphadenopathy:      Cervical: No cervical adenopathy.   Skin:     General: Skin is warm and dry.   Neurological:      Mental Status: She is alert and oriented to person, place, and time.   Psychiatric:         Behavior: Behavior normal.         Vital signs and nursing notes reviewed.      PPE: I wore a surgical mask throughout my encounters with the pt. I performed hand hygiene on entry into the pt room and upon exit.      LAB RESULTS  No results found for this or any previous visit (from the past 24 hour(s)).      RADIOLOGY  No Radiology Exams Resulted Within Past 24 Hours      MEDICATIONS GIVEN IN ER  Medications   sodium chloride 0.9 % flush 10 mL (has no administration in time range)         ORDERS PLACED DURING THIS VISIT:  Orders Placed This Encounter   Procedures    XR Knee 1 or 2 View Left    Comprehensive Metabolic Panel    Protime-INR    aPTT    CBC Auto Differential    NPO Diet NPO Type: Strict NPO    Ortho (on-call MD unless specified)    [facility] (on-call MD unless specified) Details    Insert Peripheral IV    Inpatient Admission    CBC & Differential         OUTPATIENT MEDICATION MANAGEMENT:  Current Facility-Administered Medications Ordered in Epic   Medication Dose Route Frequency Provider Last Rate Last Admin    sodium chloride 0.9 % flush 10 mL  10 mL Intravenous PRN Marques Norris PA         Current Outpatient Medications Ordered in Epic   Medication Sig Dispense Refill    apixaban (ELIQUIS) 5 MG tablet tablet Take 1 tablet by mouth Every 12 (Twelve) Hours. 60 tablet 11    ARIPiprazole (ABILIFY) 20 MG tablet Take 1  tablet by mouth Daily.      benzonatate (TESSALON) 100 MG capsule Take 1 capsule by mouth 3 (Three) Times a Day As Needed for Cough. 20 capsule 0    buPROPion XL (WELLBUTRIN XL) 300 MG 24 hr tablet Take 1 tablet by mouth Daily.      estradiol (ESTRACE) 0.1 MG/GM vaginal cream Insert 1 g into the vagina 3 (Three) Times a Week.      Ferrous Sulfate (IRON PO) Take 1 tablet by mouth 2 (Two) Times a Day.      gabapentin (NEURONTIN) 300 MG capsule TAKE ONE CAPSULE BY MOUTH THREE TIMES A  capsule 0    HYDROcodone-acetaminophen (NORCO) 7.5-325 MG per tablet Take 1 tablet by mouth Every 6 (Six) Hours As Needed for Severe Pain. 20 tablet 0    isosorbide mononitrate (IMDUR) 30 MG 24 hr tablet Take 1 tablet by mouth Daily. 90 tablet 2    lamoTRIgine (LaMICtal) 150 MG tablet Take 1 tablet by mouth 2 (Two) Times a Day.      levothyroxine (SYNTHROID, LEVOTHROID) 88 MCG tablet Take 1 tablet by mouth Every Morning. 90 tablet 2    metoprolol succinate XL (Toprol XL) 25 MG 24 hr tablet Take 1.5 tablets by mouth Daily. For bp 135 tablet 3    pantoprazole (PROTONIX) 40 MG EC tablet Take 1 tablet by mouth 2 (Two) Times a Day. 180 tablet 0    pramipexole (MIRAPEX) 0.5 MG tablet Take 1 tablet by mouth Every Night for 360 days. 30 tablet 0    venlafaxine XR (EFFEXOR-XR) 150 MG 24 hr capsule Take 1 capsule by mouth Daily. 90 capsule 1    Vibegron 75 MG tablet Take 1 tablet by mouth Daily.      Vraylar 1.5 MG capsule capsule                PROGRESS, DATA ANALYSIS, CONSULTS, AND MEDICAL DECISION MAKING  All labs have been independently interpreted by me.  All radiology studies have been reviewed by me. All EKG's have been independently viewed and interpreted by me.  Discussion below represents my analysis of pertinent findings related to patient's condition, differential diagnosis, treatment plan and final disposition.    Patient presentation and evaluation most consistent with acute dehiscence of a wound to the left anterior knee.  She  will require admission for operative management.  Patient agreeable.    DIFFERENTIAL DIAGNOSIS INCLUDE BUT NOT LIMITED TO:     Patellar fracture, wound dehiscence, tibia fracture    Clinical Scores: N/A      ED Course as of 05/11/24 1407   Sat May 11, 2024   1401 I discussed the case with MD Brenda with Orthopedics at this time regarding the patient.  I discussed work-up, results, concerns.  I discussed the consulting provider's desire for administering Betadine gauze to the wound and wrapping with Kerlix and Ace wrap and admitting to the hospitalist service for operative management tomorrow.   [DC]   1406 I discussed the case with MD Lizzie with A at this time regarding the patient.  I discussed work-up, results, concerns.  I discussed the consulting provider's desire for med surg admit.   [DC]      ED Course User Index  [DC] Marques Norris, PA           1408 I rechecked the patient.  I discussed the patient's labs, radiology findings (including all incidental findings), diagnosis, and plan for admission. The patient understands and agrees with the plan.      AS OF 14:07 EDT VITALS:    BP - 146/87  HR - 68  TEMP - 98.4 °F (36.9 °C)  O2 SATS - 99%    COMPLEXITY OF CARE  The patient requires admission.      DIAGNOSIS  Final diagnoses:   Wound dehiscence   Acute pain of left knee         DISPOSITION  ED Disposition       ED Disposition   Decision to Admit    Condition   --    Comment   Level of Care: Med/Surg [1]   Diagnosis: Wound dehiscence [[id number]]   Admitting Physician: PHOEBE BAILEY [[id number]]   Attending Physician: PHOEBE BAILEY [[id number]]   Certification: I Certify That Inpatient Hospital Services Are Medically Necessary For Greater Than 2 Midnights                  Please note that portions of this document were completed with a voice recognition program.    Note Disclaimer: At [facility], we believe that sharing information builds trust and better relationships. You are receiving this note  because you recently visited [facility]. It is possible you will see health information before a provider has talked with you about it. This kind of information can be easy to misunderstand. To help you fully understand what it means for your health, we urge you to discuss this note with your provider.         Marques Norris PA  05/11/24 1409      Electronically signed by Lj Lr MD at 05/11/24 1424       Kacy Whiting RN at 05/11/24 1318          Pt from home via ems, called for fall, s/p Left TKR, incision has opened    Electronically signed by Kacy Whiting RN at 05/11/24 1319       Vital Signs (last 3 days)       Date/Time Temp Temp src Pulse Resp BP Patient Position SpO2    05/13/24 0900 97.5 (36.4) Oral 79 16 120/80 Lying 96    05/13/24 0505 97.3 (36.3) Oral 97 16 136/82 Lying 97    05/13/24 0052 96.7 (35.9) Oral 116 15 130/76 Lying 97    05/12/24 2142 97.2 (36.2) Oral 90 15 119/66 Lying 95    05/12/24 1918 -- -- 80 -- -- -- --    05/12/24 1821 -- -- 117 16 -- -- --    05/12/24 1704 98.4 (36.9) Oral 121 16 114/76 Lying 91    05/12/24 1510 -- -- 115 16 116/68 Lying 98    05/12/24 1300 -- -- 122 -- 105/71 -- 95    05/12/24 1252 98.6 (37) Oral 118 16 118/82 Lying 94    05/12/24 1136 96 (35.6)  Oral 115 16 111/78 Lying 96    Temp: patient drinking ice water at 05/12/24 1136    05/12/24 1051 98.1 (36.7) Oral 116 16 137/93 Lying 97    05/12/24 1030 -- -- 119 -- 133/104 -- 95    05/12/24 1015 97.8 (36.6) Oral 113 -- 122/101 -- 96    05/12/24 1000 -- -- 113 -- 129/91 -- 95    05/12/24 0945 -- -- 117 -- 124/86 -- 97    05/12/24 0930 -- -- 113 -- 145/100 -- 97    05/12/24 0929 -- -- 114 -- 145/100 -- 96    05/12/24 0915 -- -- 112 -- 147/102 -- 96    05/12/24 0909 -- -- 111 -- 166/122 -- 97    05/12/24 0904 -- -- 112 -- 176/125 -- --    05/12/24 0900 -- -- 112 -- 172/125 -- 91    05/12/24 0858 -- -- 109 -- 176/104 -- --    05/12/24 0850 -- -- 111 -- 176/104 -- 95    05/12/24 0848 98.4 (36.9)  Oral 110 16 186/132 -- --    05/12/24 0845 -- -- 107 -- 186/132 -- 93    05/12/24 0703 98.4 (36.9) Oral 66 16 153/87 Lying 97    05/12/24 0551 97 (36.1) Oral 66 16 143/96 Lying 98    05/11/24 2123 98.3 (36.8) Oral 68 16 112/78 Sitting 95    05/11/24 1605 98.1 (36.7) Oral 64 16 122/84 Lying 98    05/11/24 1549 -- -- 119 -- -- -- 97    05/11/24 1531 -- -- 115 -- 122/100 -- 97    05/11/24 1320 98.4 (36.9) -- 68 16 146/87 -- 99          Oxygen Therapy (last 3 days)       Date/Time SpO2 Device (Oxygen Therapy) Flow (L/min) Oxygen Concentration (%) ETCO2 (mmHg)    05/13/24 0900 96 room air -- -- --    05/13/24 0845 -- room air -- -- --    05/13/24 0505 97 nasal cannula 2 -- --    05/13/24 0052 97 nasal cannula 2 -- --    05/12/24 2142 95 nasal cannula 2 -- --    05/12/24 2055 -- nasal cannula 2 -- --    05/12/24 1704 91 room air -- -- --    05/12/24 1510 98 nasal cannula 3 -- --    05/12/24 1300 95 nasal cannula 3 -- --    05/12/24 1252 94 nasal cannula 3 -- --    05/12/24 1136 96 nasal cannula 3 -- --    05/12/24 1051 97 nasal cannula 4 -- --    05/12/24 1030 95 -- -- -- --    05/12/24 1015 96 nasal cannula 4 -- --    05/12/24 1000 95 nasal cannula 4 -- --    05/12/24 0945 97 nasal cannula 4 -- --    05/12/24 0930 97 nasal cannula 4 -- --    05/12/24 0929 96 nasal cannula 4 -- --    05/12/24 0915 96 nasal cannula 4 -- --    05/12/24 0909 97 nasal cannula 4 -- --    05/12/24 0900 91 nasal cannula 4 -- --    05/12/24 0850 95 nasal cannula 4 -- --    05/12/24 0848 -- nasal cannula 4 -- --    05/12/24 0845 93 -- -- -- --    05/12/24 0703 97 room air -- -- --    05/12/24 0551 98 room air -- -- --    05/11/24 2123 95 room air -- -- --    05/11/24 2059 -- room air -- -- --    05/11/24 1605 98 room air -- -- --    05/11/24 1549 97 -- -- -- --    05/11/24 1531 97 -- -- -- --    05/11/24 1320 99 room air -- -- --          Facility-Administered Medications as of 5/13/2024   Medication Dose Route Frequency Provider Last Rate Last  Admin    acetaminophen (TYLENOL) tablet 650 mg  650 mg Oral Q4H PRN Ajay Gutierrez MD   650 mg at 05/11/24 2106    Or    acetaminophen (TYLENOL) 160 MG/5ML oral solution 650 mg  650 mg Oral Q4H PRN Ajay Guiterrez MD        Or    acetaminophen (TYLENOL) suppository 650 mg  650 mg Rectal Q4H PRN Ajay Gutierrez MD        acetaminophen (TYLENOL) tablet 500 mg  500 mg Oral BID PRN Ajay Gutierrez MD        ARIPiprazole (ABILIFY) tablet 20 mg  20 mg Oral Daily Ajay Gutierrez MD   20 mg at 05/13/24 0912    benzonatate (TESSALON) capsule 100 mg  100 mg Oral TID PRN Ajay Gutierrez MD        sennosides-docusate (PERICOLACE) 8.6-50 MG per tablet 2 tablet  2 tablet Oral BID PRN Ajay Gutierrez MD        And    polyethylene glycol (MIRALAX) packet 17 g  17 g Oral Daily PRN Ajay Gutierrez MD        And    bisacodyl (DULCOLAX) EC tablet 5 mg  5 mg Oral Daily PRN Ajay Gutierrez MD        And    bisacodyl (DULCOLAX) suppository 10 mg  10 mg Rectal Daily PRN Ajay Gutierrez MD        bisacodyl (DULCOLAX) suppository 10 mg  10 mg Rectal Daily PRN Ajay Gutierrez MD        buPROPion XL (WELLBUTRIN XL) 24 hr tablet 300 mg  300 mg Oral Daily Ajay Gutierrez MD   300 mg at 05/13/24 0912    [COMPLETED] ceFAZolin 2000 mg IVPB in 100 mL NS (MBP)  2,000 mg Intravenous Once Ajay Gutierrez MD   2,000 mg at 05/12/24 0735    [COMPLETED] ceFAZolin 2000 mg IVPB in 100 mL NS (MBP)  2 g Intravenous Q8H jAay Gutierrez MD   2 g at 05/12/24 2327    cephalexin (KEFLEX) capsule 500 mg  500 mg Oral Q6H Quentin Munoz MD   500 mg at 05/13/24 0615    docusate sodium (COLACE) capsule 100 mg  100 mg Oral BID Ajay Gutierrez MD   100 mg at 05/13/24 0907    [COMPLETED] fentaNYL citrate (PF) (SUBLIMAZE) injection 50 mcg  50 mcg Intravenous Once PRN Deion Vick MD   50 mcg at 05/12/24 0723    gabapentin (NEURONTIN) capsule 300 mg  300 mg Oral TID Ajay Gutierrez MD   300 mg at 05/13/24 0907    HYDROcodone-acetaminophen  (NORCO) 5-325 MG per tablet 1 tablet  1 tablet Oral Q4H PRN Quentin Munoz MD        HYDROcodone-acetaminophen (NORCO) 7.5-325 MG per tablet 1 tablet  1 tablet Oral Q6H PRN Ajay Gutierrez MD   1 tablet at 05/13/24 0456    HYDROcodone-acetaminophen (NORCO) 7.5-325 MG per tablet 2 tablet  2 tablet Oral Q4H PRN Ajay Gutierrez MD        HYDROmorphone (DILAUDID) injection 0.5 mg  0.5 mg Intravenous Q2H PRN Ajay Gutierrez MD        And    naloxone (NARCAN) injection 0.1 mg  0.1 mg Intravenous Q5 Min PRN Ajay Gutierrez MD        isosorbide mononitrate (IMDUR) 24 hr tablet 30 mg  30 mg Oral Daily Ajay Gutierrez MD   30 mg at 05/13/24 0912    ketorolac (TORADOL) injection 15 mg  15 mg Intravenous Q6H PRN Ajay Gutierrez MD   15 mg at 05/12/24 0955    [COMPLETED] labetalol (NORMODYNE,TRANDATE) injection 5 mg  5 mg Intravenous Q5 Min PRN Deion Vick MD   5 mg at 05/12/24 0909    lactated ringers infusion  9 mL/hr Intravenous Continuous Ajay Gutierrez MD 9 mL/hr at 05/12/24 0741 Restarted at 05/12/24 0844    lactated ringers infusion  100 mL/hr Intravenous Continuous Ajay Gutierrez  mL/hr at 05/12/24 1141 100 mL/hr at 05/12/24 1141    lamoTRIgine (LaMICtal) tablet 150 mg  150 mg Oral BID Ajay Gutierrez MD   150 mg at 05/13/24 0911    levothyroxine (SYNTHROID, LEVOTHROID) tablet 88 mcg  88 mcg Oral QAM Ajya Gutierrez MD   88 mcg at 05/13/24 0615    meloxicam (MOBIC) tablet 15 mg  15 mg Oral Daily Ajay Gutierrez MD   15 mg at 05/13/24 0911    metoprolol succinate XL (TOPROL-XL) 24 hr tablet 37.5 mg  37.5 mg Oral Daily Ajay Gutierrez MD   37.5 mg at 05/13/24 0911    [COMPLETED] morphine injection 4 mg  4 mg Intravenous Once Lj Lr MD   4 mg at 05/11/24 1436    ondansetron (ZOFRAN) injection 4 mg  4 mg Intravenous Q6H PRN Ajay Gutierrez MD   4 mg at 05/12/24 2150    [COMPLETED] ondansetron (ZOFRAN) injection 4 mg  4 mg Intravenous Once PRN Deion Vick MD   4 mg at  05/12/24 0922    ondansetron ODT (ZOFRAN-ODT) disintegrating tablet 4 mg  4 mg Oral Q6H PRN Ajay Gutierrez MD        Or    ondansetron (ZOFRAN) injection 4 mg  4 mg Intravenous Q6H PRN Ajay Gutierrez MD        oxybutynin XL (DITROPAN-XL) 24 hr tablet 10 mg  10 mg Oral Daily Ajay Gutierrez MD   10 mg at 05/13/24 0910    pantoprazole (PROTONIX) EC tablet 40 mg  40 mg Oral BID Ajay Gutierrez MD   40 mg at 05/13/24 0907    polyethylene glycol (MIRALAX) packet 17 g  17 g Oral Daily Ajay Gutierrez MD   17 g at 05/13/24 0907    pramipexole (MIRAPEX) tablet 0.5 mg  0.5 mg Oral Nightly Ajay Gutierrez MD   0.5 mg at 05/12/24 2056    sodium chloride 0.9 % flush 10 mL  10 mL Intravenous PRN Ajay Gutierrez MD        sodium chloride 0.9 % flush 10 mL  10 mL Intravenous PRN Ajay Gutierrez MD        sodium chloride 0.9 % flush 3 mL  3 mL Intravenous Q12H Ajay Gutierrez MD   3 mL at 05/13/24 0909    sodium chloride 0.9 % infusion 40 mL  40 mL Intravenous PRN Ajay Gutierrez MD        venlafaxine XR (EFFEXOR-XR) 24 hr capsule 150 mg  150 mg Oral Daily Ajay Gutierrez MD   150 mg at 05/13/24 0909     Orders (all)        Start     Ordered    05/13/24 1600  Vital Signs  Every 8 Hours         05/12/24 0847    05/13/24 1600  Neurovascular Checks  Every 8 Hours         05/12/24 0847    05/13/24 0900  meloxicam (MOBIC) tablet 15 mg  Daily         05/12/24 0847    05/13/24 0900  polyethylene glycol (MIRALAX) packet 17 g  Daily         05/12/24 0847    05/13/24 0821  Discharge patient  Once         05/13/24 0822    05/13/24 0800  Up in Chair 3x / Day - Beginning POD 1  3 Times Daily         05/12/24 0847    05/13/24 0800  Ambulate in [place]an[place] 4x / Day Beginning POD 1  4 Times Daily       05/12/24 0847    05/13/24 0600  Hemoglobin & Hematocrit, Blood  Daily       05/12/24 0847    05/13/24 0600  cephalexin (KEFLEX) capsule 500 mg  Every 6 Hours Scheduled         05/12/24 1357    05/13/24 0000  Cane  Single Tip  Cane with Tip         05/13/24 0919    05/13/24 0000  Discharge Follow-up with Specialty: Orthopedics; 2 Weeks         05/13/24 1035    05/13/24 0000  Leave Dressing On - Keep Clean, Dry & Intact Until Clinic Visit        Comments: May remove ace bandage on 5/14/24.  Do not remove surgical dressing    05/13/24 1035    05/13/24 0000  Ambulatory Referral to Home Health ([facility])         05/13/24 1035    05/12/24 1734  ECG 12 Lead Tachycardia  Once         05/12/24 1734    05/12/24 1500  ceFAZolin 2000 mg IVPB in 100 mL NS (MBP)  Every 8 Hours         05/12/24 0847    05/12/24 1209  HYDROcodone-acetaminophen (NORCO) 5-325 MG per tablet 1 tablet  Every 4 Hours PRN         05/12/24 1209    05/12/24 1200  Vital Signs  Every 4 Hours       05/12/24 0847    05/12/24 1200  Neurovascular Checks  Every 4 Hours       05/12/24 0847    05/12/24 0907  metoprolol tartrate (LOPRESSOR) injection 2.5 mg  Every 5 Minutes PRN,   Status:  Discontinued         05/12/24 0908    05/12/24 0900  sodium chloride 0.9 % flush 3 mL  Every 12 Hours Scheduled,   Status:  Discontinued         05/12/24 0712    05/12/24 0900  sodium chloride 0.9 % flush 3 mL  Every 12 Hours Scheduled         05/12/24 0847    05/12/24 0900  docusate sodium (COLACE) capsule 100 mg  2 Times Daily         05/12/24 0847    05/12/24 0849  lactated ringers infusion  Continuous         05/12/24 0847    05/12/24 0848  Incentive Spirometry  Every 2 Hours While Awake      Comments: Until lungs are clear and no productive cough.    05/12/24 0847    05/12/24 0848  Discharge patient  Once,   Status:  Canceled         05/12/24 0850    05/12/24 0847  Code Status and Medical Interventions:  Continuous         05/12/24 0847    05/12/24 0847  Intake and Output  Every Shift       05/12/24 0847    05/12/24 0847  Weight Bearing - As Tolerated  Continuous         05/12/24 0847    05/12/24 0847  Ambulate in [place] x1 Day of Surgery  Once         05/12/24 0847    05/12/24 0847  Turn cough deep  breathe every 2 hour until ambulatory.  Every Shift       05/12/24 0847    05/12/24 0847  Pillows may be used to elevate the operative leg, but DO NOT flex the operative knee over a pillow.  Until Discontinued         05/12/24 0847    05/12/24 0847  Instruct Patient to Do At Least 10 Ankle Pumps Per Hour While Awake  Once        Comments: Instruct Patient to Do At Least 10 Ankle Pumps Per Hour While Awake    05/12/24 0847    05/12/24 0847  May stand to void or use BSC day of surgery. POD #1 and thereafter use bedside commode or bathroom.  Once         05/12/24 0847    05/12/24 0847  Patient may shower with assistance.  Once         05/12/24 0847    05/12/24 0847  Saline Lock IV With Adequate PO Intake  Continuous         05/12/24 0847 05/12/24 0847  Change Dressing  Per Order Details        Comments: May Discontinue Dressing Changes When No Drainage From Wound.    05/12/24 0847    05/12/24 0847  Oxygen Therapy-  Continuous         05/12/24 0847    05/12/24 0847  Continuous Pulse Oximetry  Continuous         05/12/24 0847    05/12/24 0847  Diet: Regular/House; Fluid Consistency: Thin (IDDSI 0)  Diet Effective Now         05/12/24 0847    05/12/24 0847  Advance Diet As Tolerated -  Until Discontinued         05/12/24 0847    05/12/24 0847  XR Knee 1 or 2 View Left  1 Time Imaging         05/12/24 0847    05/12/24 0847  Inpatient Case Management  Consult  Once        Provider:  (Not yet assigned)    05/12/24 0847    05/12/24 0847  PT Consult: Eval & Treat  Once         05/12/24 0847    05/12/24 0847  Insert Peripheral IV  Once         05/12/24 0847    05/12/24 0847  Saline Lock & Maintain IV Access  Continuous         05/12/24 0847    05/12/24 0847  Place Sequential Compression Device  Once         05/12/24 0847    05/12/24 0847  Maintain Sequential Compression Device  Continuous         05/12/24 0847    05/12/24 0846  ondansetron ODT (ZOFRAN-ODT) disintegrating tablet 4 mg  Every 6 Hours PRN       "  Placed in \"Or\" Linked Group    05/12/24 0847    05/12/24 0846  ondansetron (ZOFRAN) injection 4 mg  Every 6 Hours PRN        Placed in \"Or\" Linked Group    05/12/24 0847    05/12/24 0846  bisacodyl (DULCOLAX) suppository 10 mg  Daily PRN         05/12/24 0847    05/12/24 0846  acetaminophen (TYLENOL) tablet 500 mg  2 Times Daily PRN         05/12/24 0847    05/12/24 0846  sodium chloride 0.9 % flush 10 mL  As Needed         05/12/24 0847    05/12/24 0846  sodium chloride 0.9 % infusion 40 mL  As Needed         05/12/24 0847    05/12/24 0846  HYDROcodone-acetaminophen (NORCO) 7.5-325 MG per tablet 1 tablet  Every 4 Hours PRN,   Status:  Discontinued         05/12/24 0847    05/12/24 0846  ketorolac (TORADOL) injection 15 mg  Every 6 Hours PRN         05/12/24 0847    05/12/24 0846  HYDROcodone-acetaminophen (NORCO) 7.5-325 MG per tablet 2 tablet  Every 4 Hours PRN         05/12/24 0847    05/12/24 0846  HYDROmorphone (DILAUDID) injection 0.5 mg  Every 2 Hours PRN        Placed in \"And\" Linked Group    05/12/24 0847    05/12/24 0846  naloxone (NARCAN) injection 0.1 mg  Every 5 Minutes PRN        Placed in \"And\" Linked Group    05/12/24 0847    05/12/24 0843  Continuous Pulse Oximetry  Continuous,   Status:  Canceled         05/12/24 0842    05/12/24 0843  POC Glucose Once  Once,   Status:  Canceled        Comments: Post op glucose check on all diabetic patients...Notify Anesthesia if blood sugar is less than 80 mg/dL or greater than 220 mg/dL.      05/12/24 0842    05/12/24 0843  Vital signs every 5 minutes for 15 minutes, every 15 minutes thereafter.  Once,   Status:  Canceled         05/12/24 0842    05/12/24 0843  Call Anesthesiologist for additional IV Fluid bolus for Hypotension/Tachycardia  Until Discontinued,   Status:  Canceled         05/12/24 0842    05/12/24 0843  Notify Anesthesia of Any Acute Changes in Patient Condition  Until Discontinued,   Status:  Canceled         05/12/24 0842    05/12/24 0843  " "Notify Anesthesia for Unrelieved Pain  Until Discontinued,   Status:  Canceled         05/12/24 0842    05/12/24 0843  Once DC criteria to floor met, follow surgeon's orders.  Until Discontinued,   Status:  Canceled         05/12/24 0842    05/12/24 0843  Discharge patient from PACU when discharge criteria is met.  Until Discontinued,   Status:  Canceled         05/12/24 0842    05/12/24 0842  Oxygen Therapy- Nasal Cannula; Titrate 1-6 LPM Per SpO2; 90 - 95%  Continuous PRN,   Status:  Canceled       05/12/24 0842    05/12/24 0842  HYDROcodone-acetaminophen (NORCO) 5-325 MG per tablet 1 tablet  Once As Needed,   Status:  Discontinued         05/12/24 0842 05/12/24 0842  HYDROmorphone (DILAUDID) injection 0.5 mg  Every 5 Minutes PRN,   Status:  Discontinued         05/12/24 0842    05/12/24 0842  oxyCODONE-acetaminophen (PERCOCET) 7.5-325 MG per tablet 1 tablet  Every 4 Hours PRN,   Status:  Discontinued         05/12/24 0842    05/12/24 0842  fentaNYL citrate (PF) (SUBLIMAZE) injection 50 mcg  Every 5 Minutes PRN,   Status:  Discontinued         05/12/24 0842    05/12/24 0842  naloxone (NARCAN) injection 0.2 mg  As Needed,   Status:  Discontinued         05/12/24 0842 05/12/24 0842  flumazenil (ROMAZICON) injection 0.2 mg  As Needed,   Status:  Discontinued         05/12/24 0842    05/12/24 0842  ondansetron (ZOFRAN) injection 4 mg  Once As Needed         05/12/24 0842 05/12/24 0842  droperidol (INAPSINE) injection 0.625 mg  Every 20 Minutes PRN,   Status:  Discontinued        Placed in \"Or\" Linked Group    05/12/24 0842    05/12/24 0842  droperidol (INAPSINE) injection 0.625 mg  Every 20 Minutes PRN,   Status:  Discontinued        Placed in \"Or\" Linked Group    05/12/24 0842    05/12/24 0842  promethazine (PHENERGAN) suppository 25 mg  Once As Needed,   Status:  Discontinued        Placed in \"Or\" Linked Group    05/12/24 0842    05/12/24 0842  promethazine (PHENERGAN) tablet 25 mg  Once As Needed,   " "Status:  Discontinued        Placed in \"Or\" Linked Group    05/12/24 0842    05/12/24 0842  labetalol (NORMODYNE,TRANDATE) injection 5 mg  Every 5 Minutes PRN         05/12/24 0842    05/12/24 0842  hydrALAZINE (APRESOLINE) injection 5 mg  Every 10 Minutes PRN,   Status:  Discontinued         05/12/24 0842    05/12/24 0842  ePHEDrine injection 5 mg  Once As Needed,   Status:  Discontinued         05/12/24 0842    05/12/24 0842  diphenhydrAMINE (BENADRYL) injection 12.5 mg  Every 15 Minutes PRN,   Status:  Discontinued         05/12/24 0842    05/12/24 0842  ipratropium-albuterol (DUO-NEB) nebulizer solution 3 mL  Once As Needed,   Status:  Discontinued         05/12/24 0842    05/12/24 0825  povidone-iodine (BETADINE) external solution 10%  As Needed,   Status:  Discontinued         05/12/24 0826    05/12/24 0816  sodium chloride (NS) irrigation solution  As Needed,   Status:  Discontinued         05/12/24 0816    05/12/24 0731  ceFAZolin 2000 mg IVPB in 100 mL NS (MBP)  Once         05/12/24 0729    05/12/24 0714  lactated ringers infusion  Continuous         05/12/24 0712    05/12/24 0713  Continuous Pulse Oximetry  Continuous,   Status:  Canceled         05/12/24 0712    05/12/24 0713  Insert Peripheral IV  Once,   Status:  Canceled         05/12/24 0712    05/12/24 0713  Saline Lock & Maintain IV Access  Continuous,   Status:  Canceled         05/12/24 0712    05/12/24 0713  May take Beta Blocker from home with sip of water.  Once,   Status:  Canceled        Comments: Only applicable to patients on home Beta Blocker    05/12/24 0712    05/12/24 0712  midazolam (VERSED) injection 1 mg  Every 5 Minutes PRN,   Status:  Discontinued         05/12/24 0712    05/12/24 0712  Vital Signs - Per Anesthesia Protocol  As Needed,   Status:  Canceled       05/12/24 0712    05/12/24 0712  Oxygen Therapy- Nasal Cannula; Titrate 1-6 LPM Per SpO2; 90 - 95%  Continuous PRN,   Status:  Canceled       05/12/24 0712 05/12/24 0712  " POC Glucose PRN  As Needed,   Status:  Canceled      Comments: For all diabetic patients. Notify Anesthesiologist for blood sugar > 180.      05/12/24 0712    05/12/24 0712  sodium chloride 0.9 % flush 3-10 mL  As Needed,   Status:  Discontinued         05/12/24 0712    05/12/24 0712  lidocaine (XYLOCAINE) 1 % injection 0.5 mL  Once As Needed,   Status:  Discontinued         05/12/24 0712    05/12/24 0712  fentaNYL citrate (PF) (SUBLIMAZE) injection 50 mcg  Once As Needed         05/12/24 0712    05/12/24 0700  levothyroxine (SYNTHROID, LEVOTHROID) tablet 88 mcg  Every Morning         05/11/24 1622    05/12/24 0600  Basic Metabolic Panel  Morning Draw         05/11/24 1432    05/12/24 0600  CBC Auto Differential  Morning Draw         05/11/24 1432    05/12/24 0001  NPO Diet NPO Type: Strict NPO  Diet Effective Midnight,   Status:  Canceled         05/11/24 1405    05/12/24 0001  NPO Diet NPO Type: Strict NPO  Diet Effective Midnight,   Status:  Canceled         05/11/24 1432    05/12/24 0000  Discharge Follow-up with Specified Provider: Dr. Gutierrez; 2 Weeks         05/12/24 0850    05/12/24 0000  Weight Bearing As Tolerated         05/12/24 0850    05/12/24 0000  Apply Ice to Affected Knee Every 2 Hours         05/12/24 0850    05/12/24 0000  Weight Bearing Status         05/12/24 0850    05/12/24 0000  Range of Motion         05/12/24 0850    05/12/24 0000  Remove Dressing        Comments: May remove dressings and shower after 7 days.    05/12/24 0850    05/12/24 0000  Do Not Immerse in Water After Dressing Removal         05/12/24 0850    05/12/24 0000  May Use Crutches / Walker As Needed         05/12/24 0850    05/12/24 0000  Discharge Activity        Comments: 1.  May weight-bear as tolerated and perform range of motion of the knee as tolerated.  2.  Ice knee for 20 minutes every 2 hours as needed.  3.  Follow-up with Dr. Gutierrez in 2 week  4.  Please leave dressings in place for 7 days.  5.  OK to shower after  "7 days.    05/12/24 0850    05/12/24 0000  HYDROcodone-acetaminophen (NORCO) 7.5-325 MG per tablet  Every 4 Hours PRN         05/12/24 0850    05/12/24 0000  ondansetron (Zofran) 4 MG tablet  Every 8 Hours PRN         05/12/24 0850    05/12/24 0000  docusate sodium (COLACE) 100 MG capsule  2 Times Daily         05/12/24 0850    05/12/24 0000  cephalexin (KEFLEX) 500 MG capsule  3 Times Daily         05/12/24 0850    05/11/24 2100  lamoTRIgine (LaMICtal) tablet 150 mg  2 Times Daily         05/11/24 1622    05/11/24 2100  pantoprazole (PROTONIX) EC tablet 40 mg  2 Times Daily         05/11/24 1622    05/11/24 2100  pramipexole (MIRAPEX) tablet 0.5 mg  Nightly         05/11/24 1622    05/11/24 1800  Oral Care  2 Times Daily       05/11/24 1432    05/11/24 1715  ARIPiprazole (ABILIFY) tablet 20 mg  Daily         05/11/24 1621 05/11/24 1715  buPROPion XL (WELLBUTRIN XL) 24 hr tablet 300 mg  Daily         05/11/24 1622    05/11/24 1715  gabapentin (NEURONTIN) capsule 300 mg  3 Times Daily         05/11/24 1622    05/11/24 1715  isosorbide mononitrate (IMDUR) 24 hr tablet 30 mg  Daily         05/11/24 1622    05/11/24 1715  metoprolol succinate XL (TOPROL-XL) 24 hr tablet 37.5 mg  Daily         05/11/24 1622    05/11/24 1715  venlafaxine XR (EFFEXOR-XR) 24 hr capsule 150 mg  Daily         05/11/24 1622    05/11/24 1715  oxybutynin XL (DITROPAN-XL) 24 hr tablet 10 mg  Daily         05/11/24 1622    05/11/24 1621  benzonatate (TESSALON) capsule 100 mg  3 Times Daily PRN         05/11/24 1621    05/11/24 1621  HYDROcodone-acetaminophen (NORCO) 7.5-325 MG per tablet 1 tablet  Every 6 Hours PRN         05/11/24 1622    05/11/24 1600  Vital Signs  Every 4 Hours       05/11/24 1432    05/11/24 1434  morphine injection 4 mg  Once         05/11/24 1418    05/11/24 1432  sennosides-docusate (PERICOLACE) 8.6-50 MG per tablet 2 tablet  2 Times Daily PRN        Placed in \"And\" Linked Group    05/11/24 1432    05/11/24 1432  " "polyethylene glycol (MIRALAX) packet 17 g  Daily PRN        Placed in \"And\" Linked Group    05/11/24 1432    05/11/24 1432  bisacodyl (DULCOLAX) EC tablet 5 mg  Daily PRN        Placed in \"And\" Linked Group    05/11/24 1432    05/11/24 1432  bisacodyl (DULCOLAX) suppository 10 mg  Daily PRN        Placed in \"And\" Linked Group    05/11/24 1432    05/11/24 1432  Code Status and Medical Interventions:  Continuous,   Status:  Canceled         05/11/24 1432    05/11/24 1432  Diet: Regular/House; Fluid Consistency: Thin (IDDSI 0)  Diet Effective Now,   Status:  Canceled         05/11/24 1432    05/11/24 1432  Intake & Output  Every Shift       05/11/24 1432    05/11/24 1432  Place Sequential Compression Device  Once         05/11/24 1432    05/11/24 1432  Maintain Sequential Compression Device  Continuous         05/11/24 1432    05/11/24 1431  acetaminophen (TYLENOL) tablet 650 mg  Every 4 Hours PRN        Placed in \"Or\" Linked Group    05/11/24 1432    05/11/24 1431  acetaminophen (TYLENOL) 160 MG/5ML oral solution 650 mg  Every 4 Hours PRN        Placed in \"Or\" Linked Group    05/11/24 1432    05/11/24 1431  acetaminophen (TYLENOL) suppository 650 mg  Every 4 Hours PRN        Placed in \"Or\" Linked Group    05/11/24 1432    05/11/24 1431  ondansetron (ZOFRAN) injection 4 mg  Every 6 Hours PRN         05/11/24 1432    05/11/24 1407  Inpatient Admission  Once         05/11/24 1407    05/11/24 1402  [facility] (on-call MD unless specified) Details  Once        Specialty:  Hospitalist  Provider:  Mee Samuel MD    05/11/24 1401    05/11/24 1346  Insert Peripheral IV  Once        Placed in \"And\" Linked Group    05/11/24 1345    05/11/24 1346  CBC & Differential  Once         05/11/24 1345    05/11/24 1346  Comprehensive Metabolic Panel  Once         05/11/24 1345    05/11/24 1346  Protime-INR  Once         05/11/24 1345    05/11/24 1346  aPTT  Once         05/11/24 1345    05/11/24 1346  CBC Auto Differential  PROCEDURE " "ONCE         05/11/24 1345    05/11/24 1345  sodium chloride 0.9 % flush 10 mL  As Needed        Placed in \"And\" Linked Group    05/11/24 1345    05/11/24 1344  XR Knee 1 or 2 View Left  1 Time Imaging         05/11/24 1343    05/11/24 1344  Ortho (on-call MD unless specified)  Once        Specialty:  Orthopedic Surgery  Provider:  Tho Pritchard MD    05/11/24 1343    Unscheduled  Up With Assistance  As Needed       05/11/24 1432    Unscheduled  Up in Chair x 1 day of surgery, then up in chair x 3 beginning POD #1.  As Needed       05/12/24 0847    Unscheduled  Ice to incision for 48 hours, then PRN for edema, after activity or exercise, and to lessen discomfort.  As Needed       05/12/24 0847    Unscheduled  Apply Ice to Incision PRN for Edema, After Activity or Exercise, and to Lessen Discomfort  As Needed       05/12/24 0847    Unscheduled  Incentive Spirometry  As Needed      Comments: PRN Every 2 Hours While Awake.    05/12/24 0847    --  atorvastatin (LIPITOR) 40 MG tablet  Daily         05/11/24 1504    --  docusate sodium (COLACE) 100 MG capsule  As Needed         05/11/24 1504    --  OLANZapine (zyPREXA) 5 MG tablet  Nightly         05/11/24 1504    --  Calcium Citrate-Vitamin D (CITRUS CALCIUM/VITAMIN D PO)  2 Times Daily         05/11/24 1504    --  B Complex Vitamins (vitamin b complex) capsule capsule  2 Times Daily         05/11/24 1505                     Operative/Procedure Notes (all)        Ajay Gutierrez MD at 05/12/24 0841  Version 1 of 1         INCISION AND DRAINAGE LOWER EXTREMITY  Progress Note    Keri Bhagat  5/12/2024    Pre-op Diagnosis:   Wound dehiscence status post left total knee arthroplasty       Post-Op Diagnosis Codes:  Wound dehiscence status post left total knee arthroplasty    Procedure/CPT® Codes:        Procedure(s):  INCISION AND DRAINAGE LOWER EXTREMITY WOUND CLOSURE KNEE              Surgeon(s):  Ajay Gutierrez MD    Anesthesia: General    Staff: "   Circulator: Siria Jarvis RN  Scrub Person: Jose Mishra  Assistant: Chiara Tran CSA  Assistant: Chiara Tran CSA      Estimated Blood Loss: minimal    Urine Voided: * No values recorded between 5/12/2024  7:41 AM and 5/12/2024  8:36 AM *    Specimens:                None          Drains:   [REMOVED] External Urinary Catheter (Removed)   Site Assessment Clean 05/11/24 2123   Application/Removal external catheter changed 05/11/24 2220   Collection Container Wall suction 05/11/24 2123   Wall suction (mmHG) 120 mmHG 05/11/24 2123   Securement Method Securing device 05/11/24 2123   Catheter care complete Yes 05/11/24 2123       Findings: See dictation        Complications: None    Assistant: Chiara Tran CSA  was responsible for performing the following activities: Retraction and their skilled assistance was necessary for the success of this case.    Ajay Gutierrez MD     Date: 5/12/2024  Time: 08:41 EDT          Electronically signed by Ajay Gutierrez MD at 05/12/24 0841       Ajay Gutierrez MD at 05/12/24 0841  Version 1 of 1         Orthopaedic Operative Note    Facility: [facility]    Patient: Keri Bhagat    Medical Record Number: [id number]    YOB: 1960    Dictating Surgeon: Ajay Gutierrez M.D.*    Primary Care Physician: Epley, James, APRN    Date of Operation: 5/12/2024    Pre-Operative Diagnosis:  Left knee wound dehiscence status post total knee arthroplasty    Post-Operative Diagnosis:  Left knee wound dehiscence status post total knee arthroplasty    Procedure Performed: Irrigation debridement with complex wound closure, left knee    Surgeon: Ajay Gutierrez MD     Assistant: Chiara Tran whose assistance was critical for help with patient positioning, suctioning and irrigation, retraction, manipulation of the extremity for insertion of the implants, wound closure and application of the bandages.  Her assistance was  critical to the success of this case.      Anesthesia: General    Complications: None.     Estimated Blood Loss: Less than 50 mL.     Implants: None    Specimens: * No orders in the log *    Brief Operative Indication: Ms. Bhagat fell and landed directly on her left knee.  She is about 2 months out from a total knee and roughly the bottom half of the incision dehisced when she fell.  The risk, benefits and alternatives to irrigation and debridement with wound closure were discussed.  She consented to proceed.    Description of the procedure in detail: The patient and operative site were identified in the preoperative holding area.  The surgical site was marked.  Patient was taken the operating room.  General anesthesia was administered.  She was positioned on the operating table in the supine position.  All bony prominences were padded and protected.  A timeout was taken and preoperative antibiotics administered.  Following this the left lower extremity was prepped and draped in the standard, sterile fashion.  The leg was cleaned with alcohol.  A Hibiclens scrub was performed followed by ChloraPrep paint.  We allow those to dry for 3 minutes before the drape procedure was carried out.  The length of the wound was about 5 cm.  This extended down into the subcutaneous tissues and then extended around the lateral retinaculum.  There was no compromise of the retinaculum or extensor mechanism.  The implants were not palpable through the wound and the extensor mechanism appeared to have healed completely.  This was just a dehiscence of the skin and subcutaneous tissues but did not extend deeper.  There was  hematoma in the wound which was evacuated.  No necrosis, purulence or other concerning findings were noted.  I debrided the skin edges with a scalpel.  I debrided some of the old hematoma with a rongeur.  Following this, the wound was copiously irrigated out with a liter of sterile saline mixed with Betadine followed  by 3 L of sterile saline via pulsatile lavage.  I made sure we had good hemostasis.  The wound was then closed in a layered fashion using 2-0 Vicryl for the deep tissues and staples for the skin.  Sterile dressings were applied.  She was awakened and taken to recovery room in good condition.      Ajay Gutierrez MD  24     Electronically signed by Ajay Gutierrez MD at [id number]          Physician Progress Notes (all)        Quentin Munoz MD at 24 [id number]              Name: Keri Bhagat ADMIT: 2024   : 1960  PCP: Epley, James, APRN    MRN: [id number] LOS: 1 days   AGE/SEX: 63 y.o. female  ROOM: [facility]     Subjective     Underwent irrigation and debridement of the left knee today.     Objective   Objective   Vital Signs  Temp:  [96 °F (35.6 °C)-98.6 °F (37 °C)] 98.6 °F (37 °C)  Heart Rate:  [] 122  Resp:  [16] 16  BP: (105-186)/() 105/71  SpO2:  [91 %-99 %] 95 %  on  Flow (L/min):  [3-4] 3;   Device (Oxygen Therapy): nasal cannula  Body mass index is 35.98 kg/m².  Physical Exam  Constitutional:       Appearance: Normal appearance.   HENT:      Head: Normocephalic and atraumatic.   Eyes:      Extraocular Movements: Extraocular movements intact.      Pupils: Pupils are equal, round, and reactive to light.   Cardiovascular:      Rate and Rhythm: Normal rate and regular rhythm.   Pulmonary:      Effort: Pulmonary effort is normal. No respiratory distress.   Abdominal:      General: There is no distension.      Palpations: Abdomen is soft.      Tenderness: There is no abdominal tenderness.   Musculoskeletal:         General: No swelling or tenderness.      Comments: Left leg in ACE wrapping    Skin:     General: Skin is warm and dry.   Neurological:      General: No focal deficit present.      Mental Status: She is alert and oriented to person, place, and time.         Results Review     I reviewed the patient's new clinical results.  Results from last 7 days   Lab Units  "05/12/24  0436 05/11/24  1415   WBC 10*3/mm3 8.14 7.21   HEMOGLOBIN g/dL 13.4 13.7   PLATELETS 10*3/mm3 259 270     Results from last 7 days   Lab Units 05/12/24  0436 05/11/24  1415   SODIUM mmol/L 143 141   POTASSIUM mmol/L 4.6 4.2   CHLORIDE mmol/L 107 105   CO2 mmol/L 27.0 25.4   BUN mg/dL 14 12   CREATININE mg/dL 1.05* 0.81   GLUCOSE mg/dL 99 99   Estimated Creatinine Clearance: 60.7 mL/min (A) (by C-G formula based on SCr of 1.05 mg/dL (H)).  Results from last 7 days   Lab Units 05/11/24  1415   ALBUMIN g/dL 4.4   BILIRUBIN mg/dL <0.2   ALK PHOS U/L 104   AST (SGOT) U/L 18   ALT (SGPT) U/L 22     Results from last 7 days   Lab Units 05/12/24  0436 05/11/24  1415   CALCIUM mg/dL 8.8 9.3   ALBUMIN g/dL  --  4.4       COVID19   Date Value Ref Range Status   03/31/2024 Not Detected Not Detected - Ref. Range Final   01/13/2023 Detected (A)  Final     No results found for: \"HGBA1C\", \"POCGLU\"  No results found. However, due to the size of the patient record, not all encounters were searched. Please check Results Review for a complete set of results.      XR Knee 1 or 2 View Left  Narrative: XR KNEE 1 OR 2 VW LEFT-        INDICATION: Postoperative     COMPARISON: Left knee radiographs May 11, 2024     TECHNIQUE: 2 view left knee     FINDINGS:      Total knee arthroplasty. No lucency around the hardware. Normal  alignment. No fracture. No definite effusion. New anterior cutaneous  staples and anterior soft tissue swelling.     Impression:    1. New anterior cutaneous staples and soft tissue swelling.  2. Stable total knee arthroplasty..     This report was finalized on 5/12/2024 9:10 AM by Dr. Rufus Capellan M.D on Workstation: [id number]       Scheduled Medications  ARIPiprazole, 20 mg, Oral, Daily  buPROPion XL, 300 mg, Oral, Daily  ceFAZolin, 2 g, Intravenous, Q8H  docusate sodium, 100 mg, Oral, BID  gabapentin, 300 mg, Oral, TID  isosorbide mononitrate, 30 mg, Oral, Daily  lamoTRIgine, 150 mg, Oral, " BID  levothyroxine, 88 mcg, Oral, QAM  [START ON 5/13/2024] meloxicam, 15 mg, Oral, Daily  metoprolol succinate XL, 37.5 mg, Oral, Daily  oxybutynin XL, 10 mg, Oral, Daily  pantoprazole, 40 mg, Oral, BID  [START ON 5/13/2024] polyethylene glycol, 17 g, Oral, Daily  pramipexole, 0.5 mg, Oral, Nightly  sodium chloride, 3 mL, Intravenous, Q12H  venlafaxine XR, 150 mg, Oral, Daily    Infusions  lactated ringers, 9 mL/hr, Last Rate: 9 mL/hr (05/12/24 0741)  lactated ringers, 100 mL/hr, Last Rate: 100 mL/hr (05/12/24 1141)    Diet  Diet: Regular/House; Fluid Consistency: Thin (IDDSI 0)      Assessment/Plan     Active Hospital Problems    Diagnosis  POA    **Wound dehiscence [T81.30XA]  Yes      Resolved Hospital Problems   No resolved problems to display.       63 y.o. female admitted with Wound dehiscence.    Wound dehiscence  Left total knee arthroplasty  She underwent incision and debridement of the left knee wound.  Currently on cefazolin for surgical prophylaxis.  I am going to extend the course after discharge with Keflex to complete a 5-day course of antibiotic therapy    Coronary artery disease  Hypertension  Blood pressures are actually little bit softer  After anesthesia.  Interestingly her heart rate has remained elevated.  She has been given Imdur, metoprolol this morning.  Monitor blood pressures    Depressive disorder  On Effexor    GERD  On pantoprazole    Hypothyroidism  On levothyroxine      SCDs for DVT prophylaxis.  Full code.  Discussed with patient and family.  Anticipate discharge home tomorrow.      Quentin Munoz MD  [facility]  05/12/24  13:19 EDT            Electronically signed by Quentin Munoz MD at 05/12/24 6420          Consult Notes (all)        Ajay Gutierrez MD at 05/12/24 4151             Orthopedic Consult      Patient: Keri Bhagat    Date of Admission: 5/11/2024  1:22 PM    YOB: 1960    Medical Record Number: [id number]    Consulting  Physician: Quentin Munoz MD    Chief Complaints: Left knee wound    History of Present Illness: 63 y.o. female admitted to [facility] to services of Quentin Munoz MD with a wound dehiscence of her left knee.  She is known to me from a previous left shoulder surgery.  She says the shoulder is doing fine.  She fell yesterday and landed directly on her left knee.  She is about 2 months out from a knee replacement done by Dr. Pritchard.  She says the knee was doing well until a fall.  She had just gotten to the point where she was beginning to ambulate without a cane.  Denies any other injuries or complaints.    Allergies: No Known Allergies    Home Medications:    Current Facility-Administered Medications:     [Transfer Hold] acetaminophen (TYLENOL) tablet 650 mg, 650 mg, Oral, Q4H PRN, 650 mg at 05/11/24 2106 **OR** [Transfer Hold] acetaminophen (TYLENOL) 160 MG/5ML oral solution 650 mg, 650 mg, Oral, Q4H PRN **OR** [Transfer Hold] acetaminophen (TYLENOL) suppository 650 mg, 650 mg, Rectal, Q4H PRN, Mee Samuel MD    ARIPiprazole (ABILIFY) tablet 20 mg, 20 mg, Oral, Daily, Mee Samuel MD    benzonatate (TESSALON) capsule 100 mg, 100 mg, Oral, TID PRN, Mee Samuel MD    [Transfer Hold] sennosides-docusate (PERICOLACE) 8.6-50 MG per tablet 2 tablet, 2 tablet, Oral, BID PRN **AND** [Transfer Hold] polyethylene glycol (MIRALAX) packet 17 g, 17 g, Oral, Daily PRN **AND** [Transfer Hold] bisacodyl (DULCOLAX) EC tablet 5 mg, 5 mg, Oral, Daily PRN **AND** [Transfer Hold] bisacodyl (DULCOLAX) suppository 10 mg, 10 mg, Rectal, Daily PRN, Mee Samuel MD    buPROPion XL (WELLBUTRIN XL) 24 hr tablet 300 mg, 300 mg, Oral, Daily, Mee Samuel MD    ceFAZolin 2000 mg IVPB in 100 mL NS (MBP), 2,000 mg, Intravenous, Once, Ajay Gutierrez MD    gabapentin (NEURONTIN) capsule 300 mg, 300 mg, Oral, TID, StinglMee MD, 300 mg at 05/11/24 2100    HYDROcodone-acetaminophen  (NORCO) 7.5-325 MG per tablet 1 tablet, 1 tablet, Oral, Q6H PRN, Mee Samuel MD, 1 tablet at 05/11/24 2254    isosorbide mononitrate (IMDUR) 24 hr tablet 30 mg, 30 mg, Oral, Daily, Mee Samuel MD    lactated ringers infusion, 9 mL/hr, Intravenous, Continuous, Deion Vick MD, Last Rate: 9 mL/hr at 05/12/24 0723, 9 mL/hr at 05/12/24 0723    lamoTRIgine (LaMICtal) tablet 150 mg, 150 mg, Oral, BID, eMe Samuel MD, 150 mg at 05/11/24 2100    levothyroxine (SYNTHROID, LEVOTHROID) tablet 88 mcg, 88 mcg, Oral, QAM, Mee Samuel MD    lidocaine (XYLOCAINE) 1 % injection 0.5 mL, 0.5 mL, Intradermal, Once PRN, Deion Vick MD    metoprolol succinate XL (TOPROL-XL) 24 hr tablet 37.5 mg, 37.5 mg, Oral, Daily, Mee Samuel MD, 37.5 mg at 05/12/24 0638    midazolam (VERSED) injection 1 mg, 1 mg, Intravenous, Q5 Min PRN, Deion Vick MD, 1 mg at 05/12/24 0723    [Transfer Hold] ondansetron (ZOFRAN) injection 4 mg, 4 mg, Intravenous, Q6H PRN, Mee Samuel MD    oxybutynin XL (DITROPAN-XL) 24 hr tablet 10 mg, 10 mg, Oral, Daily, Mee Samuel MD    pantoprazole (PROTONIX) EC tablet 40 mg, 40 mg, Oral, BID, Mee Samuel MD, 40 mg at 05/11/24 2100    pramipexole (MIRAPEX) tablet 0.5 mg, 0.5 mg, Oral, Nightly, Mee Samuel MD, 0.5 mg at 05/11/24 2100    [COMPLETED] Insert Peripheral IV, , , Once **AND** [Transfer Hold] sodium chloride 0.9 % flush 10 mL, 10 mL, Intravenous, PRN, Marques Norris PA    sodium chloride 0.9 % flush 3 mL, 3 mL, Intravenous, Q12H, Deion Vick MD    sodium chloride 0.9 % flush 3-10 mL, 3-10 mL, Intravenous, PRN, Deion Vick MD    venlafaxine XR (EFFEXOR-XR) 24 hr capsule 150 mg, 150 mg, Oral, Daily, Mee Samuel MD    Current Medications:  Scheduled Meds:ARIPiprazole, 20 mg, Oral, Daily  buPROPion XL, 300 mg, Oral, Daily  ceFAZolin 2000 mg IVPB in 100 mL NS (MBP), 2,000 mg,  Intravenous, Once  gabapentin, 300 mg, Oral, TID  isosorbide mononitrate, 30 mg, Oral, Daily  lamoTRIgine, 150 mg, Oral, BID  levothyroxine, 88 mcg, Oral, QAM  metoprolol succinate XL, 37.5 mg, Oral, Daily  oxybutynin XL, 10 mg, Oral, Daily  pantoprazole, 40 mg, Oral, BID  pramipexole, 0.5 mg, Oral, Nightly  sodium chloride, 3 mL, Intravenous, Q12H  venlafaxine XR, 150 mg, Oral, Daily      Continuous Infusions:lactated ringers, 9 mL/hr, Last Rate: 9 mL/hr (05/12/24 3407)      PRN Meds:.  [Transfer Hold] acetaminophen **OR** [Transfer Hold] acetaminophen **OR** [Transfer Hold] acetaminophen    benzonatate    [Transfer Hold] senna-docusate sodium **AND** [Transfer Hold] polyethylene glycol **AND** [Transfer Hold] bisacodyl **AND** [Transfer Hold] bisacodyl    HYDROcodone-acetaminophen    lidocaine    midazolam    [Transfer Hold] ondansetron    [COMPLETED] Insert Peripheral IV **AND** [Transfer Hold] sodium chloride    sodium chloride    Past Medical History:   Diagnosis Date    Abnormal Pap smear of cervix     Adrenal adenoma 09/21/2022    Anemia     Anxiety     Arthritis     Arthritis of back     Arthritis of neck     Bipolar I disorder, single manic episode     depressive d(NOS)    Cervical disc herniation     Colon polyp     Coronary artery disease     COVID-19 01/10/2023    Death of family member     MOTHER IN FEB 2024    Depression     NO SUICIDAL PLANS    Dislocation of finger     Diverticular disease     Diverticulitis of colon     Dyspepsia     Encounter for follow-up examination after completed treatment for conditions other than malignant neoplasm 04/11/2018    Fracture of wrist     GERD (gastroesophageal reflux disease)     Heart attack     AGE 39    Heart murmur     Hiatal hernia     History of transfusion     2001    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    HPV positive on pap LGSIL    Hyperlipidemia     Hypertension     Hypothyroidism     Incontinence in female     wears pads    Kidney  disease, chronic, stage III (GFR 30-59 ml/min)     Knee pain, bilateral     Knee swelling     LGSIL on Pap smear of cervix 04/29/2016    LGSIL HPV positive    Lumbosacral disc disease Unsure    Lower left back is partial osteoarthritis and partial osteoporosis    Obesity     Osteopenia 2021    Bone density test    Periodic limb movement disorder     Restless leg syndrome     LEFT SHOULDER    Rotator cuff syndrome     Sleep apnea     NO MACHINE CURRENTLY    Suicidal ideation 08/19/2016    history    Thyroid nodule     Vitamin B12 deficiency        Past Surgical History:   Procedure Laterality Date    ADENOIDECTOMY  1974    ANTERIOR CERVICAL DISCECTOMY W/ FUSION N/A 12/29/2016    Procedure: C3-4 anterior cervical discectomy and fusion with Depuy micro plate, ALLOGRAFT C3-4, AND HARDWARE REMOVAL C4-7.;  Surgeon: Hema Godwin MD;  Location: [facility] MAIN OR;  Service:     CARDIAC CATHETERIZATION N/A 03/30/2017    Procedure: Left Heart Cath;  Surgeon: Tracey Vargas MD;  Location:  [facility] CATH INVASIVE [facility];  Service:     CARDIAC CATHETERIZATION N/A 03/30/2017    Procedure: Coronary angiography;  Surgeon: Tracey Vargas MD;  Location:  [facility];  Service:     CARDIAC CATHETERIZATION N/A 03/30/2017    Procedure: Left ventriculography;  Surgeon: Tracey Vargas MD;  Location:  [facility];  Service:     CARDIAC CATHETERIZATION  03/30/2017    Procedure: Functional Flow [place];  Surgeon: Tracey Vargas MD;  Location:  [facility];  Service:     CATARACT EXTRACTION Bilateral     CERVICAL BIOPSY  MMXVI    Dr. Jeffery.     CERVICAL DISCECTOMY ANTERIOR  04/2013    C4-7    COLONOSCOPY  04/20/2015    Diverticulosis, IH    COLONOSCOPY  03/09/2021    COLPOSCOPY W/ BIOPSY / CURETTAGE  06/17/2016    LGSIL HPV positive. Results were normal repeat pap in one year. Chronic Cervicitis    CORONARY ANGIOPLASTY WITH STENT PLACEMENT      ENDOSCOPY  MMXV    Normal.  Dr. Rodriguez     ENDOSCOPY N/A 06/22/2017    Erythematous mucosa in the stomach  PATH: Chronic active gastritis, moderate with intestinal metaplasia     EPIDURAL BLOCK      INGUINAL HERNIA REPAIR      KNEE SURGERY      LAPAROSCOPIC GASTRIC BANDING  02/2018    SHOULDER SURGERY Left     RCR    TONSILLECTOMY AND ADENOIDECTOMY      TOTAL KNEE ARTHROPLASTY Left 03/05/2024    Procedure: LEFT TOTAL KNEE ARTHROPLASTY WITH TAB NAVIGATION;  Surgeon: Tho Pritchard MD;  [facility];  Service: Orthopedics;  Laterality: Left;    TRANSVAGINAL TAPING SUSPENSION N/A 12/06/2017    Procedure: MID URETHRAL SLING CYSTSCOPY;  Surgeon: Abby Méndez MD;  Location: [facility] OR;  Service:     TRIGGER POINT INJECTION      TUBAL ABDOMINAL LIGATION      TYMPANOSTOMY TUBE PLACEMENT Right     UPPER GASTROINTESTINAL ENDOSCOPY  approx 2021    WRIST SURGERY Bilateral     carpal tunnel       Social History     Occupational History    Occupation: disabled   Tobacco Use    Smoking status: Light Smoker     Current packs/day: 0.25     Types: Electronic Cigarette, Cigarettes     Passive exposure: Current    Smokeless tobacco: Current    Tobacco comments:     Electronic Cigarette   Vaping Use    Vaping status: Every Day    Substances: Nicotine, Flavoring    Devices: [employer] tank   Substance and Sexual Activity    Alcohol use: Not Currently     Comment: RARELY    Drug use: Never    Sexual activity: Not Currently     Partners: Male     Birth control/protection: Condom, Abstinence, Post-menopausal, None, Tubal ligation      Social History     Social History Narrative    Not on file       Family History   Problem Relation Age of Onset    Diabetes type II Mother     Hypertension Mother     Osteoporosis Mother     Seizures Mother     Diabetes Mother     Arthritis Mother     Hyperlipidemia Mother     COPD Father     Hypertension Father     Lung cancer Father     Heart attack Father     Liver cancer Father     Liver disease Father     Heart  disease Father     Cancer Father         Lung cacer that metastasized  into the liver.    Thyroid disease Sister     Hypertension Sister     Bipolar disorder Sister     Depression Sister     ADD / ADHD Sister     Anxiety disorder Sister     Mental illness Sister     Hyperlipidemia Sister     Broken bones Sister     Thyroid disease Sister     Hypertension Sister     Bipolar disorder Sister     Anxiety disorder Sister     Depression Sister     Mental illness Sister     Hyperlipidemia Sister     Cancer Maternal Grandmother     No Known Problems Maternal Grandfather     No Known Problems Paternal Grandmother     No Known Problems Paternal Grandfather     Abnormal EKG Daughter     Hypertension Daughter     Bipolar disorder Daughter     Thyroid disease Daughter     Mental illness Daughter     Hyperlipidemia Daughter     Asthma Daughter     No Known Problems Son     Diabetes Son         Type 1    Diabetes Paternal Uncle     Breast cancer Neg Hx     Ovarian cancer Neg Hx     Uterine cancer Neg Hx     Colon cancer Neg Hx     Malig Hyperthermia Neg Hx        Review of Systems:     Constitutional:  Denies fever, shaking or chills   Eyes:  Denies change in visual acuity   HEENT:  Denies nasal congestion or sore throat   Respiratory:  Denies cough or shortness of breath   Cardiovascular:  Denies chest pain or edema  Endocrine: Denies tremors, palpitations, intolerance of heat or cold, polyuria, polydipsia.  GI:  Denies abdominal pain, nausea, vomiting, bloody stools or diarrhea  :  Denies frequency, urgency, incontinence, retention, or nocturia.  Musculoskeletal:  Denies numbness tingling or loss of motor function except as above  Integument:  Denies rash, lesion or ulceration   Neurologic:  Denies headache or focal weakness, deficits  Heme:  Denies epistaxis, spontaneous or excessive bleeding, hematuria, melena, fatigue, enlarged or tender lymph nodes.      All other pertinent positives and negatives as noted above in  "HPI.    Physical Exam: 63 y.o. female    Vitals:    05/11/24 1605 05/11/24 2123 05/12/24 0551 05/12/24 0703   BP: 122/84 112/78 143/96 153/87   BP Location: Left arm Left arm Left arm Left arm   Patient Position: Lying Sitting Lying Lying   Pulse: 64 68 66 66   Resp: 16 16 16 16   Temp: 98.1 °F (36.7 °C) 98.3 °F (36.8 °C) 97 °F (36.1 °C) 98.4 °F (36.9 °C)   TempSrc: Oral Oral Oral Oral   SpO2: 98% 95% 98% 97%   Weight: 92.1 kg (203 lb 0.7 oz)      Height: 160 cm (62.99\")        General:  Awake, alert. No acute distress.      Head/Neck:  Normocephalic, atraumatic.  Conjunctivae and sclerae clear.  Hearing adequate for the exam.  Neck is supple with normal ROM.    Psych:  Affect and demeanor appropriate.    CV:  Regular rate and rhythm.  Hemodynamically stable.    Lungs:  Good chest expansion, breathing unlabored.    Abdomen:  Soft.  Nontender, nondistended.    Extremities: Left knee: She had a bandage in place which was removed.  She has about a 3 inch wound dehiscence.  It appears to go to the subcutaneous tissues only.  There does not appear to be any extension into the extensor mechanism and the implants are not visible.  She can straight leg raise.  She can bend her knee about 30 or 40 degrees with minimal discomfort.  I did not have her bend beyond that.  Calf is soft.  She has intact motor and sensory function distally and palpable pulses.    All other extremities atraumatic without gross abnormality.     Diagnostic Tests:    Admission on 05/11/2024   Component Date Value Ref Range Status    Glucose 05/11/2024 99  65 - 99 mg/dL Final    BUN 05/11/2024 12  8 - 23 mg/dL Final    Creatinine 05/11/2024 0.81  0.57 - 1.00 mg/dL Final    Sodium 05/11/2024 141  136 - 145 mmol/L Final    Potassium 05/11/2024 4.2  3.5 - 5.2 mmol/L Final    Slight hemolysis detected by analyzer. Result may be falsely elevated.    Chloride 05/11/2024 105  98 - 107 mmol/L Final    CO2 05/11/2024 25.4  22.0 - 29.0 mmol/L Final    Calcium " 05/11/2024 9.3  8.6 - 10.5 mg/dL Final    Total Protein 05/11/2024 6.8  6.0 - 8.5 g/dL Final    Albumin 05/11/2024 4.4  3.5 - 5.2 g/dL Final    ALT (SGPT) 05/11/2024 22  1 - 33 U/L Final    AST (SGOT) 05/11/2024 18  1 - 32 U/L Final    Alkaline Phosphatase 05/11/2024 104  39 - 117 U/L Final    Total Bilirubin 05/11/2024 <0.2  0.0 - 1.2 mg/dL Final    Globulin 05/11/2024 2.4  gm/dL Final    A/G Ratio 05/11/2024 1.8  g/dL Final    BUN/Creatinine Ratio 05/11/2024 14.8  7.0 - 25.0 Final    Anion Gap 05/11/2024 10.6  5.0 - 15.0 mmol/L Final    eGFR 05/11/2024 82.7  >60.0 mL/min/1.73 Final    Protime 05/11/2024 15.1 (H)  11.7 - 14.2 Seconds Final    INR 05/11/2024 1.17 (H)  0.90 - 1.10 Final    PTT 05/11/2024 25.9  22.7 - 35.4 seconds Final    WBC 05/11/2024 7.21  3.40 - 10.80 10*3/mm3 Final    RBC 05/11/2024 4.40  3.77 - 5.28 10*6/mm3 Final    Hemoglobin 05/11/2024 13.7  12.0 - 15.9 g/dL Final    Hematocrit 05/11/2024 41.4  34.0 - 46.6 % Final    MCV 05/11/2024 94.1  79.0 - 97.0 fL Final    MCH 05/11/2024 31.1  26.6 - 33.0 pg Final    MCHC 05/11/2024 33.1  31.5 - 35.7 g/dL Final    RDW 05/11/2024 12.4  12.3 - 15.4 % Final    RDW-SD 05/11/2024 42.8  37.0 - 54.0 fl Final    MPV 05/11/2024 9.7  6.0 - 12.0 fL Final    Platelets 05/11/2024 270  140 - 450 10*3/mm3 Final    Neutrophil % 05/11/2024 48.6  42.7 - 76.0 % Final    Lymphocyte % 05/11/2024 40.1  19.6 - 45.3 % Final    Monocyte % 05/11/2024 6.8  5.0 - 12.0 % Final    Eosinophil % 05/11/2024 4.0  0.3 - 6.2 % Final    Basophil % 05/11/2024 0.1  0.0 - 1.5 % Final    Immature Grans % 05/11/2024 0.4  0.0 - 0.5 % Final    Neutrophils, Absolute 05/11/2024 3.50  1.70 - 7.00 10*3/mm3 Final    Lymphocytes, Absolute 05/11/2024 2.89  0.70 - 3.10 10*3/mm3 Final    Monocytes, Absolute 05/11/2024 0.49  0.10 - 0.90 10*3/mm3 Final    Eosinophils, Absolute 05/11/2024 0.29  0.00 - 0.40 10*3/mm3 Final    Basophils, Absolute 05/11/2024 0.01  0.00 - 0.20 10*3/mm3 Final    Immature Grans,  Absolute 05/11/2024 0.03  0.00 - 0.05 10*3/mm3 Final    nRBC 05/11/2024 0.0  0.0 - 0.2 /100 WBC Final    Glucose 05/12/2024 99  65 - 99 mg/dL Final    BUN 05/12/2024 14  8 - 23 mg/dL Final    Creatinine 05/12/2024 1.05 (H)  0.57 - 1.00 mg/dL Final    Sodium 05/12/2024 143  136 - 145 mmol/L Final    Potassium 05/12/2024 4.6  3.5 - 5.2 mmol/L Final    Chloride 05/12/2024 107  98 - 107 mmol/L Final    CO2 05/12/2024 27.0  22.0 - 29.0 mmol/L Final    Calcium 05/12/2024 8.8  8.6 - 10.5 mg/dL Final    BUN/Creatinine Ratio 05/12/2024 13.3  7.0 - 25.0 Final    Anion Gap 05/12/2024 9.0  5.0 - 15.0 mmol/L Final    eGFR 05/12/2024 60.6  >60.0 mL/min/1.73 Final    WBC 05/12/2024 8.14  3.40 - 10.80 10*3/mm3 Final    RBC 05/12/2024 4.29  3.77 - 5.28 10*6/mm3 Final    Hemoglobin 05/12/2024 13.4  12.0 - 15.9 g/dL Final    Hematocrit 05/12/2024 39.5  34.0 - 46.6 % Final    MCV 05/12/2024 92.1  79.0 - 97.0 fL Final    MCH 05/12/2024 31.2  26.6 - 33.0 pg Final    MCHC 05/12/2024 33.9  31.5 - 35.7 g/dL Final    RDW 05/12/2024 12.1 (L)  12.3 - 15.4 % Final    RDW-SD 05/12/2024 40.3  37.0 - 54.0 fl Final    MPV 05/12/2024 9.9  6.0 - 12.0 fL Final    Platelets 05/12/2024 259  140 - 450 10*3/mm3 Final    Neutrophil % 05/12/2024 36.3 (L)  42.7 - 76.0 % Final    Lymphocyte % 05/12/2024 51.4 (H)  19.6 - 45.3 % Final    Monocyte % 05/12/2024 7.5  5.0 - 12.0 % Final    Eosinophil % 05/12/2024 4.1  0.3 - 6.2 % Final    Basophil % 05/12/2024 0.2  0.0 - 1.5 % Final    Immature Grans % 05/12/2024 0.5  0.0 - 0.5 % Final    Neutrophils, Absolute 05/12/2024 2.96  1.70 - 7.00 10*3/mm3 Final    Lymphocytes, Absolute 05/12/2024 4.18 (H)  0.70 - 3.10 10*3/mm3 Final    Monocytes, Absolute 05/12/2024 0.61  0.10 - 0.90 10*3/mm3 Final    Eosinophils, Absolute 05/12/2024 0.33  0.00 - 0.40 10*3/mm3 Final    Basophils, Absolute 05/12/2024 0.02  0.00 - 0.20 10*3/mm3 Final    Immature Grans, Absolute 05/12/2024 0.04  0.00 - 0.05 10*3/mm3 Final    nRBC 05/12/2024  0.0  0.0 - 0.2 /100 WBC Final     Lab Results (last 24 hours)       Procedure Component Value Units Date/Time    Basic Metabolic Panel [[id number]]  (Abnormal) Collected: 05/12/24 0436    Specimen: Blood Updated: 05/12/24 0542     Glucose 99 mg/dL      BUN 14 mg/dL      Creatinine 1.05 mg/dL      Sodium 143 mmol/L      Potassium 4.6 mmol/L      Chloride 107 mmol/L      CO2 27.0 mmol/L      Calcium 8.8 mg/dL      BUN/Creatinine Ratio 13.3     Anion Gap 9.0 mmol/L      eGFR 60.6 mL/min/1.73     Narrative:      GFR Normal >60  Chronic Kidney Disease <60  Kidney Failure <15      CBC Auto Differential [[id number]]  (Abnormal) Collected: 05/12/24 0436    Specimen: Blood Updated: 05/12/24 0528     WBC 8.14 10*3/mm3      RBC 4.29 10*6/mm3      Hemoglobin 13.4 g/dL      Hematocrit 39.5 %      MCV 92.1 fL      MCH 31.2 pg      MCHC 33.9 g/dL      RDW 12.1 %      RDW-SD 40.3 fl      MPV 9.9 fL      Platelets 259 10*3/mm3      Neutrophil % 36.3 %      Lymphocyte % 51.4 %      Monocyte % 7.5 %      Eosinophil % 4.1 %      Basophil % 0.2 %      Immature Grans % 0.5 %      Neutrophils, Absolute 2.96 10*3/mm3      Lymphocytes, Absolute 4.18 10*3/mm3      Monocytes, Absolute 0.61 10*3/mm3      Eosinophils, Absolute 0.33 10*3/mm3      Basophils, Absolute 0.02 10*3/mm3      Immature Grans, Absolute 0.04 10*3/mm3      nRBC 0.0 /100 WBC     Comprehensive Metabolic Panel [[id number]] Collected: 05/11/24 1415    Specimen: Blood Updated: 05/11/24 1445     Glucose 99 mg/dL      BUN 12 mg/dL      Creatinine 0.81 mg/dL      Sodium 141 mmol/L      Potassium 4.2 mmol/L      Comment: Slight hemolysis detected by analyzer. Result may be falsely elevated.        Chloride 105 mmol/L      CO2 25.4 mmol/L      Calcium 9.3 mg/dL      Total Protein 6.8 g/dL      Albumin 4.4 g/dL      ALT (SGPT) 22 U/L      AST (SGOT) 18 U/L      Alkaline Phosphatase 104 U/L      Total Bilirubin <0.2 mg/dL      Globulin 2.4 gm/dL      A/G Ratio 1.8 g/dL      BUN/Creatinine  Ratio 14.8     Anion Gap 10.6 mmol/L      eGFR 82.7 mL/min/1.73     Narrative:      GFR Normal >60  Chronic Kidney Disease <60  Kidney Failure <15      Protime-INR [[id number]]  (Abnormal) Collected: 05/11/24 1415    Specimen: Blood Updated: 05/11/24 1436     Protime 15.1 Seconds      INR 1.17    aPTT [[id number]]  (Normal) Collected: 05/11/24 1415    Specimen: Blood Updated: 05/11/24 1436     PTT 25.9 seconds     CBC & Differential [[id number]]  (Normal) Collected: 05/11/24 1415    Specimen: Blood Updated: 05/11/24 1425    Narrative:      The following orders were created for panel order CBC & Differential.  Procedure                               Abnormality         Status                     ---------                               -----------         ------                     CBC Auto Differential[[id number]]        Normal              Final result                 Please view results for these tests on the individual orders.    CBC Auto Differential [[id number]]  (Normal) Collected: 05/11/24 1415    Specimen: Blood Updated: 05/11/24 1425     WBC 7.21 10*3/mm3      RBC 4.40 10*6/mm3      Hemoglobin 13.7 g/dL      Hematocrit 41.4 %      MCV 94.1 fL      MCH 31.1 pg      MCHC 33.1 g/dL      RDW 12.4 %      RDW-SD 42.8 fl      MPV 9.7 fL      Platelets 270 10*3/mm3      Neutrophil % 48.6 %      Lymphocyte % 40.1 %      Monocyte % 6.8 %      Eosinophil % 4.0 %      Basophil % 0.1 %      Immature Grans % 0.4 %      Neutrophils, Absolute 3.50 10*3/mm3      Lymphocytes, Absolute 2.89 10*3/mm3      Monocytes, Absolute 0.49 10*3/mm3      Eosinophils, Absolute 0.29 10*3/mm3      Basophils, Absolute 0.01 10*3/mm3      Immature Grans, Absolute 0.03 10*3/mm3      nRBC 0.0 /100 WBC           Imaging: Three-view x-rays are reviewed on the [employer] system.  No concerning findings noted.  Implants look fine.    Assessment: Wound dehiscence s/p left TKA    Plan: The risk, benefits and alternatives to open irrigation debridement with  wound closure were thoroughly discussed.  We discussed the risk of infection, drainage, hematoma, wound dehiscence, necrosis of the tissues and anesthesia related complications.  She acknowledged understanding and consented to proceed.    Date: 5/12/2024    Ajay Gutierrez MD    CC: Quentin Munoz MD         Electronically signed by Ajay Gutierrez MD at 05/12/24 0845

## 2024-05-13 NOTE — PLAN OF CARE
Goal Outcome Evaluation:  Plan of Care Reviewed With: patient        Progress: improving  Outcome Evaluation: Pt resting in bed in NAD,pleasant and cooperative. She reports that she is being d/c today and will be going to her sister's house. She states the MD told her not to flex her L knee. She is also requesting a cane. Pt was essentially independent with bed mobility and stood from EOB (and commode) independently. Pt amb 250' with SBA. She initially amb 125' with r wx but then progressed to amb without ' - fair pace and good balance, decreased L knee flex. She negotiated 2 stairs with handrail and SBA. She voided in bathroom upon return to her room and was independent with toileting hygeine. [facility]. She has met all her goals. She is safe to return home and is scheduled to return to outpt PT this week.      Anticipated Discharge Disposition (PT): home with assist, home with outpatient therapy services (pt reports senahun is scheduled for outpt PT this week (she was attending prior to admission))

## 2024-05-13 NOTE — PROGRESS NOTES
Continued Stay Note  [facility]     Patient Name: Keri Bhagat  MRN: [id number]  Today's Date: 5/13/2024    Admit Date: 5/11/2024    Plan: Home to her sister's house   Discharge Plan       Row Name 05/13/24 1007       Plan    Plan Home to her sister's house    Plan Comments Discharge order noted. Met with patient who confirmed DC plan is to return to her sister's home. Stated she has OP PT already scheduled. Stated her sister will assist as needed and will provide transportation at DC. Informed waiting [employer] to deliver cane. Denies any additional needs/equipment.      Row Name 05/13/24 0933       Plan    Plan Comments Order noted for cane. VM left for Adam with [employer] to informof order and DC today.                   Discharge Codes    No documentation.                 Expected Discharge Date and Time       Expected Discharge Date Expected Discharge Time    May 13, 2024               Tara Scruggs RN

## 2024-05-13 NOTE — PROGRESS NOTES
Continued Stay Note  [facility]     Patient Name: Keri Bhagat  MRN: [id number]  Today's Date: 5/13/2024    Admit Date: 5/11/2024    Plan: Home to her sister's home with [facility] (Referral in EPIC/[facility])   Discharge Plan       Row Name 05/13/24 1310       Plan    Plan Comments Cane delivered to bedside from [employer]      Row Name 05/13/24 1049       Plan    Plan Home to her sister's home with [facility] (Referral in EPIC/[facility])    Plan Comments Per Tamiko B with Ortho, order placed for Home Health. Patient agreeable to [facility]. Referral placed in EPIC. Spoke to Lila with [facility] to inform of referral and DC today. Sister's address is [id number] [place], [place] [address]                   Discharge Codes    No documentation.                 Expected Discharge Date and Time       Expected Discharge Date Expected Discharge Time    May 13, 2024               Tara Scruggs RN

## 2024-05-13 NOTE — PROGRESS NOTES
[facility] to provide home health PT for a couple weeks following wound dehiscence.  Noted order in epic per Dr Gutierrez.  Spoke with patient and she is agreeable for home PT for 2 weeks and then to resume outpatient rehab.  Patient will be going to [address] [place] [place] [address], sister's address, [address].  Call patient's cell to schedule visits please.

## 2024-05-13 NOTE — TELEPHONE ENCOUNTER
Caller: Keri Bhagat    Relationship to patient: Self    Best call back number: [phone] (home)     Patient is needing: MS MATI WOULD LIKE TO KOW IF DR BAE WANTS HER TO START PHYSICAL THERAPY ON 5/15/24 OR IF SHE SHOULD START AT A LATER DATE BECAUSE HE TOLD HER TO KEEP HER LEG STRAIGHT

## 2024-05-13 NOTE — TELEPHONE ENCOUNTER
Spoke with patient, relayed the following to her per Dr Gutierrez, patient verbalized understanding    It would be fine for her to resume PT right away. Please remind her that we are going to treat this just like when she first had it done. I want her to go ahead and get moving the knee right away.

## 2024-05-13 NOTE — PROGRESS NOTES
Orthopedic Progress Note      Patient: Keri Bhagat    YOB: 1960    Medical Record Number: [id number]    Attending Physician: Quentin Munoz MD    Date of Admission: 5/11/2024  1:22 PM    Admitting Dx:  Wound dehiscence [T81.30XA]  Acute pain of left knee [M25.562]    Status Post: Irrigation debridement with complex wound closure, left knee     Post Operative Day Number: 1    Current Problem List:   Wound dehiscence    Atherosclerosis of coronary artery    Essential hypertension    Primary hypothyroidism    Gastro-esophageal reflux disease with esophagitis, without bleeding    Splenic artery aneurysm      Past Medical History:   Diagnosis Date    Abnormal Pap smear of cervix     Adrenal adenoma 09/21/2022    Anemia     Anxiety     Arthritis     Arthritis of back     Arthritis of neck     Bipolar I disorder, single manic episode     depressive d(NOS)    Cervical disc herniation     Colon polyp     Coronary artery disease     COVID-19 01/10/2023    Death of family member     MOTHER IN FEB 2024    Depression     NO SUICIDAL PLANS    Dislocation of finger     Diverticular disease     Diverticulitis of colon     Dyspepsia     Encounter for follow-up examination after completed treatment for conditions other than malignant neoplasm 04/11/2018    Fracture of wrist     GERD (gastroesophageal reflux disease)     Heart attack     AGE 39    Heart murmur     Hiatal hernia     History of transfusion     2001    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    HPV positive on pap LGSIL    Hyperlipidemia     Hypertension     Hypothyroidism     Incontinence in female     wears pads    Kidney disease, chronic, stage III (GFR 30-59 ml/min)     Knee pain, bilateral     Knee swelling     LGSIL on Pap smear of cervix 04/29/2016    LGSIL HPV positive    Lumbosacral disc disease Unsure    Lower left back is partial osteoarthritis and partial osteoporosis    Obesity     Osteopenia 2021    Bone density test     Periodic limb movement disorder     Restless leg syndrome     LEFT SHOULDER    Rotator cuff syndrome     Sleep apnea     NO MACHINE CURRENTLY    Suicidal ideation 08/19/2016    history    Thyroid nodule     Vitamin B12 deficiency        Current Medications:  Scheduled Meds:ARIPiprazole, 20 mg, Oral, Daily  buPROPion XL, 300 mg, Oral, Daily  cephalexin, 500 mg, Oral, Q6H  docusate sodium, 100 mg, Oral, BID  gabapentin, 300 mg, Oral, TID  isosorbide mononitrate, 30 mg, Oral, Daily  lamoTRIgine, 150 mg, Oral, BID  levothyroxine, 88 mcg, Oral, QAM  meloxicam, 15 mg, Oral, Daily  metoprolol succinate XL, 37.5 mg, Oral, Daily  oxybutynin XL, 10 mg, Oral, Daily  pantoprazole, 40 mg, Oral, BID  polyethylene glycol, 17 g, Oral, Daily  pramipexole, 0.5 mg, Oral, Nightly  sodium chloride, 3 mL, Intravenous, Q12H  venlafaxine XR, 150 mg, Oral, Daily      PRN Meds:.  acetaminophen **OR** acetaminophen **OR** acetaminophen    acetaminophen    benzonatate    senna-docusate sodium **AND** polyethylene glycol **AND** bisacodyl **AND** bisacodyl    bisacodyl    HYDROcodone-acetaminophen    HYDROcodone-acetaminophen    HYDROcodone-acetaminophen    HYDROmorphone **AND** naloxone    ketorolac    ondansetron    ondansetron ODT **OR** ondansetron    [COMPLETED] Insert Peripheral IV **AND** sodium chloride    sodium chloride    sodium chloride    SUBJECTIVE: 63 y.o.  female    OBJECTIVE:   Vitals:    05/12/24 2142 05/13/24 0052 05/13/24 0505 05/13/24 0900   BP: 119/66 130/76 136/82 120/80   BP Location: Left arm Left arm Left arm Left arm   Patient Position: Lying Lying Lying Lying   Pulse: 90 116 97 79   Resp: 15 15 16 16   Temp: 97.2 °F (36.2 °C) 96.7 °F (35.9 °C) 97.3 °F (36.3 °C) 97.5 °F (36.4 °C)   TempSrc: Oral Oral Oral Oral   SpO2: 95% 97% 97% 96%   Weight:       Height:         I/O last 3 completed shifts:  In: 870 [P.O.:120; I.V.:750]  Out: 1500 [Urine:1500]    Diagnostic Tests:  Lab Results (last 72 hours)       Procedure  Component Value Units Date/Time    Hemoglobin & Hematocrit, Blood [[id number]]  (Abnormal) Collected: 05/13/24 0621    Specimen: Blood Updated: 05/13/24 0647     Hemoglobin 10.9 g/dL      Hematocrit 33.1 %     Basic Metabolic Panel [[id number]]  (Abnormal) Collected: 05/12/24 0436    Specimen: Blood Updated: 05/12/24 0542     Glucose 99 mg/dL      BUN 14 mg/dL      Creatinine 1.05 mg/dL      Sodium 143 mmol/L      Potassium 4.6 mmol/L      Chloride 107 mmol/L      CO2 27.0 mmol/L      Calcium 8.8 mg/dL      BUN/Creatinine Ratio 13.3     Anion Gap 9.0 mmol/L      eGFR 60.6 mL/min/1.73     Narrative:      GFR Normal >60  Chronic Kidney Disease <60  Kidney Failure <15      CBC Auto Differential [[id number]]  (Abnormal) Collected: 05/12/24 0436    Specimen: Blood Updated: 05/12/24 0528     WBC 8.14 10*3/mm3      RBC 4.29 10*6/mm3      Hemoglobin 13.4 g/dL      Hematocrit 39.5 %      MCV 92.1 fL      MCH 31.2 pg      MCHC 33.9 g/dL      RDW 12.1 %      RDW-SD 40.3 fl      MPV 9.9 fL      Platelets 259 10*3/mm3      Neutrophil % 36.3 %      Lymphocyte % 51.4 %      Monocyte % 7.5 %      Eosinophil % 4.1 %      Basophil % 0.2 %      Immature Grans % 0.5 %      Neutrophils, Absolute 2.96 10*3/mm3      Lymphocytes, Absolute 4.18 10*3/mm3      Monocytes, Absolute 0.61 10*3/mm3      Eosinophils, Absolute 0.33 10*3/mm3      Basophils, Absolute 0.02 10*3/mm3      Immature Grans, Absolute 0.04 10*3/mm3      nRBC 0.0 /100 WBC     Comprehensive Metabolic Panel [[id number]] Collected: 05/11/24 1415    Specimen: Blood Updated: 05/11/24 1445     Glucose 99 mg/dL      BUN 12 mg/dL      Creatinine 0.81 mg/dL      Sodium 141 mmol/L      Potassium 4.2 mmol/L      Comment: Slight hemolysis detected by analyzer. Result may be falsely elevated.        Chloride 105 mmol/L      CO2 25.4 mmol/L      Calcium 9.3 mg/dL      Total Protein 6.8 g/dL      Albumin 4.4 g/dL      ALT (SGPT) 22 U/L      AST (SGOT) 18 U/L      Alkaline Phosphatase 104 U/L       Total Bilirubin <0.2 mg/dL      Globulin 2.4 gm/dL      A/G Ratio 1.8 g/dL      BUN/Creatinine Ratio 14.8     Anion Gap 10.6 mmol/L      eGFR 82.7 mL/min/1.73     Narrative:      GFR Normal >60  Chronic Kidney Disease <60  Kidney Failure <15      Protime-INR [[id number]]  (Abnormal) Collected: 05/11/24 1415    Specimen: Blood Updated: 05/11/24 1436     Protime 15.1 Seconds      INR 1.17    aPTT [[id number]]  (Normal) Collected: 05/11/24 1415    Specimen: Blood Updated: 05/11/24 1436     PTT 25.9 seconds     CBC & Differential [[id number]]  (Normal) Collected: 05/11/24 1415    Specimen: Blood Updated: 05/11/24 1425    Narrative:      The following orders were created for panel order CBC & Differential.  Procedure                               Abnormality         Status                     ---------                               -----------         ------                     CBC Auto Differential[[id number]]        Normal              Final result                 Please view results for these tests on the individual orders.    CBC Auto Differential [[id number]]  (Normal) Collected: 05/11/24 1415    Specimen: Blood Updated: 05/11/24 1425     WBC 7.21 10*3/mm3      RBC 4.40 10*6/mm3      Hemoglobin 13.7 g/dL      Hematocrit 41.4 %      MCV 94.1 fL      MCH 31.1 pg      MCHC 33.1 g/dL      RDW 12.4 %      RDW-SD 42.8 fl      MPV 9.7 fL      Platelets 270 10*3/mm3      Neutrophil % 48.6 %      Lymphocyte % 40.1 %      Monocyte % 6.8 %      Eosinophil % 4.0 %      Basophil % 0.1 %      Immature Grans % 0.4 %      Neutrophils, Absolute 3.50 10*3/mm3      Lymphocytes, Absolute 2.89 10*3/mm3      Monocytes, Absolute 0.49 10*3/mm3      Eosinophils, Absolute 0.29 10*3/mm3      Basophils, Absolute 0.01 10*3/mm3      Immature Grans, Absolute 0.03 10*3/mm3      nRBC 0.0 /100 WBC              PHYSICAL EXAM:  Awake and alert.  Sitting up in chair without complaints.  Left knee dressing remains dry and intact.  Calf soft and nontender.   Good motion and sensation to left foot and ankle.  Pain well controlled.  Ambulating well with PT.  Hg 10.9.  VSS. Voiding well       ASSESSMENT & PLAN:    Dc home to her sister's home with home [facility] for DVT prophylaxis.     Return to the office is 2 weeks to see Dr. Gutierrez      Date: 5/13/2024    Tamiko Ham RN

## 2024-05-13 NOTE — PROGRESS NOTES
Continued Stay Note  [facility]     Patient Name: Keri Bhagat  MRN: [id number]  Today's Date: 5/13/2024    Admit Date: 5/11/2024        Discharge Plan       Row Name 05/13/24 0933       Plan    Plan Comments Order noted for cane. VM left for Adam with [employer] to informof order and DC today.                   Discharge Codes    No documentation.                 Expected Discharge Date and Time       Expected Discharge Date Expected Discharge Time    May 13, 2024               Tara Scruggs RN

## 2024-05-14 ENCOUNTER — TRANSITIONAL CARE MANAGEMENT TELEPHONE ENCOUNTER (OUTPATIENT)
Dept: CALL CENTER | Facility: HOSPITAL | Age: 64
End: 2024-05-14
Payer: MEDICARE

## 2024-05-14 NOTE — OUTREACH NOTE
Call Center TCM Note      Flowsheet Row Responses   [facility] patient discharged from? [place]   Does the patient have one of the following disease processes/diagnoses(primary or secondary)? General Surgery   TCM attempt successful? Yes  [Sister]   Call start time 0901   Call end time 0903   Discharge diagnosis Wound dehiscence (TKA approximately 2 months ago who fell and landed directly on her left knee), INCISION AND DRAINAGE LOWER EXTREMITY WOUND CLOSURE KNEE   Meds reviewed with patient/caregiver? Yes   Is the patient having any side effects they believe may be caused by any medication additions or changes? No   Does the patient have all medications related to this admission filled (includes all antibiotics, pain medications, etc.) Yes   Is the patient taking all medications as directed (includes completed medication regime)? Yes   Does the patient have an appointment with their PCP within 7-14 days of discharge? No   Nursing Interventions Routed TCM call to PCP office   What is the Home health agency?  [facility]   Has home health visited the patient within 72 hours of discharge? Call prior to 72 hours   Psychosocial issues? No   Did the patient receive a copy of their discharge instructions? Yes   Nursing interventions Reviewed instructions with patient   What is the patient's perception of their health status since discharge? Improving   Is the patient/caregiver able to teach back signs and symptoms of incisional infection? Increased redness, swelling or pain at the incisonal site, Increased drainage or bleeding, Incisional warmth, Pus or odor from incision, Fever   Is the patient/caregiver able to teach back steps to recovery at home? Set small, achievable goals for return to baseline health, Rest and rebuild strength, gradually increase activity, Eat a well-balance diet   TCM call completed? Yes   Call end time 0903            Eliana Chandler RN    5/14/2024, 09:03 EDT

## 2024-05-14 NOTE — PROGRESS NOTES
Case Management Discharge Note      Final Note: Discharged to sister's house with [facility]. Tara Scruggs RN         Selected Continued Care - Discharged on 5/13/2024 Admission date: 5/11/2024 - Discharge disposition: Home or Self Care          Home Medical Care       Service Provider Selected Services Address Phone Fax Patient Preferred    [facility] [address] [address] [phone] --             Transportation Services  Private: Car    Final Discharge Disposition Code: 06 - home with home health care

## 2024-05-15 ENCOUNTER — TELEPHONE (OUTPATIENT)
Dept: PHYSICAL THERAPY | Facility: CLINIC | Age: 64
End: 2024-05-15

## 2024-05-15 ENCOUNTER — HOME CARE VISIT (OUTPATIENT)
Dept: HOME HEALTH SERVICES | Facility: HOME HEALTHCARE | Age: 64
End: 2024-05-15
Payer: COMMERCIAL

## 2024-05-15 VITALS
HEART RATE: 119 BPM | TEMPERATURE: 96.8 F | RESPIRATION RATE: 18 BRPM | SYSTOLIC BLOOD PRESSURE: 130 MMHG | OXYGEN SATURATION: 96 % | DIASTOLIC BLOOD PRESSURE: 90 MMHG

## 2024-05-15 PROCEDURE — G0151 HHCP-SERV OF PT,EA 15 MIN: HCPCS

## 2024-05-15 NOTE — Clinical Note
"Matthews,     The PT start of care was performed 5/15/24.  Here is a brief summary of my visit.    REASON FOR REFERRAL:  63 yr old female who is S/P (L) TKA on 3/5/24 by Dr. Pritchard.  Pt hospitalized at [facility] 5/11/24 - 5/13/24 after falling directly on her (L) knee resulting in DEHISCENCE OF (L) KNEE INCISION.  Pt underwent I&D of the wound on 5/12/24.  She was discharged home on Keflex & referred for home health PT services to address deficits in strength, balance & mobility.    HOME ENVIRONMENT:  Patient living with 3 sisters + spouses, 4 small dogs in single story home with 1 step to enter.    MENTAL STATUS:  Alert & Oriented x 4    PATIENT GOAL FOR EPISODE OF CARE:  \"To get back to normal\"    EDEMA: Mild knee.  Calf soft & non-tender.  Lauro's negative.     WOUND / SKIN CONDITION:  Elastic bandage & cotton wrap removed (as per instructions in AVS), revealing no dressing covering (L) knee incision.  A medicated strip with dried sanguineous drainage was adhered to the  incision which was secured with staples.  Applied tegaderm over incision/medicated strip.  Picture uploaded into EPIC.     SIGNS SYMPTOMS OF INFECTION:   None    (L) KNEE ROM:  -5* to 105*    Pt is ambulating with a cane demonstrating reciprocal gt pattern.     Reviewed all medications, surgical aftercare of  (L) incision repair & home exercise program to be performed twice daily.    Plan to treat 2w2.  Orders to be sent to Dr. Gutierrez's inbox for authorization if you agree.    **PLEASE NOTE, PATIENT WAS RECENTLY DISCHARGED FROM [facility] ON 5/8/24 AFTER HAVING A \"NERVOUS BREAKDOWN\".  SHE SCORED AS HAVING MODERATE DEPRESSION ON THE PHQ-2 DEPRESSION SCALE.  PATIENT IS REQUESTING MSW SERVICES TO ASSIST WITH RESOURCES FOR AFFORDABLE HOUSING.  ORDERS FOR MSW SERVICES WILL ALSO BE FORWARDED FOR AUTHORIZATION.    Please feel free to contact me if you have any questions.  Thank you,         Kelle Lazo, PT        [phone]  "

## 2024-05-15 NOTE — HOME HEALTH
"___________________________  PHYSICAL THERAPY EVALUATION    REASON FOR REFERRAL:  63 yr old female who is S/P (L) TKA on 3/5/24 by Dr. Pritchard.  Pt hospitalized at [facility] 5/11/24 - 5/13/24 after falling directly on her (L) knee resulting in DEHISCENCE OF (L) KNEE INCISION.  Pt underwent I&D of the wound on 5/12/24.  She was discharged home on Keflex & referred for home health PT services to address deficits in strength, balance & mobility.    Pt states she fell after stubbing her toe on the sidewalk, landing \"full force\" on her (L) knee.  Pt states she's been through a lot lately.  She reports she was previously living with her mother, who passed away in February.  They just sold her mothers home & she is now living temporarily with her 2 sisters + spouses.  She is in search of a new home/apt where she plans to move in with her daughter who has a history of addiction.  Pt  reports feeling overwhelmed with everything that has gone on in recent months & had a \"mental breakdown\" 1.5 weeks ago.  She was hospitalized at [facility] & discharged home on 5/8/24.  She sustained a fall on 5/11/24 requiring another hospitalization & surgery.  Pt reports she requires assistance finding affordable housing.    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  Hyperlipidemia    Hypertension    Hypothyroidism    Incontinence in female wears pads  Kidney disease, chronic, stage III (GFR 30-59 ml/min)    Lumbosacral disc disease   Obesity    Osteopenia   Restless leg syndrome    Rotator cuff syndrom- LEFT " "SHOULDER  Sleep apnea NO MACHINE CURRENTLY  Thyroid nodule    Vitamin B12 deficiency     PERTINENT PAST SURGICAL HISTORY:    ADENOIDECTOMY  ANTERIOR CERVICAL DISCECTOMY W/ FUSION C3-4, AND HARDWARE REMOVAL C4-7 2016  CATARACT EXTRACTION Bilateral    CERVICAL DISCECTOMY ANTERIOR C4-7 2013  CORONARY ANGIOPLASTY WITH STENT PLACEMENT      INGUINAL HERNIA REPAIR      LAPAROSCOPIC GASTRIC BANDING 02/2018  LEFT SHOULDER SURGERY- Rotator Cuff Repair  TONSILLECTOMY AND ADENOIDECTOMY      LEFT TOTAL KNEE ARTHROPLASTY   TRANSVAGINAL TAPING SUSPENSION 2017  TRIGGER POINT INJECTION      TUBAL ABDOMINAL LIGATION      RIGHT TYMPANOSTOMY TUBE PLACEMENT   (B) CARPAL TUNNEL WRIST SURGERY     CODE STATUS:  FULL/CPR    PARTICIPANTS PRESENT IN HOME:  Sisters, however no one participating    PRIOR LEVEL OF FUNCTION: Independent self care, transfers & ambulation without assistive device. Driving.    CAREGIVER:  Sister- Ariella Malik    HOME ENVIRONMENT:  Patient living with 3 sisters & 4 small dogs in single story home with 1 step to enter.    MENTAL STATUS:  Alert & Oriented x 4    PATIENT GOAL FOR EPISODE OF CARE:  \"To get back to normal\"    EDEMA: Mild knee.  Calf soft & non-tender.  Lauro's negative.     WOUND / SKIN CONDITION:  Elastic bandage & cotton wrap removed (as per instructions in AVS), revealing no dressing covering (L) knee incision.  A medicated strip with dried sanguineous drainage was adhered to the incision which was secured with staples.  Applied tegaderm over incision/medicated strip.  Picture uploaded into EPIC.     SIGNS SYMPTOMS OF INFECTION:   None    POSTURE:  No gross deformities    MUSCLE TONE/COORDINATION:  WNL    SENSATION/PROPRIOCEPTION:  WNL    DOMINANT SIDE:  Right    (L) KNEE ROM:  -5* to 105*    STRENGTH GRADES  (L) KNEE:  3+/5.  All other muscles/joints are WNL    BED MOBILITY    SUPINE<>SIT: Supervision, ROLLING/SCOOTING:  Independent    TRANSFERS   IN/OUT OF BED: Supervision  SIT<>STAND: " "Supervision  TOILET: Supervision  TUB/SHOWER:  CGA for safety     GAIT:  Pt ambulates with & without cane demonstrating reciprocal pattern with decreased step length (R) LE & decreased (L) knee flexion during swing.  She demonstrates a moderately antalgic gt without cane & has  been instructed to ambulate with a cane at all times.  She may utilize a rolling walker for support if she is having a significant amount of pain.    TINETTI SCORE:   21/28- indicates MODERATE FALL RISK  (TINETTI FALL RISK SCORING: Under 19= High  19-24= Moderate  25 & up= Low)    POST EVALUATION ASSESSMENT:  Patient performed all exercise well.  She was instructed to ambulate with a cane for support, however may utilize a walker if she felt she needed additional support. Pts PHQ-2 score of 10 indicates moderate depression.  She also reports \"always\" feeling isolated.  Pt  would benefit from MSW services to assist with resources for affordable housing.  MD notified.    PLAN TO TREAT: 2w2    FOCUS OF CARE: IMPAIRMENT IN ROM & FUNCTIONAL MOBILITY RELATED TO DEHISCENCE OF LEFT TOTAL KNEE INCISION FOLLOWING FALL     MEDICAL NECESSITY FOR ONGOING SKILLED PHYSICAL THERAPY:  Patient requires skilled physical therapy services to address deficits in (B) LE strength & balance, impeding ability to safely perform functional transfers, bed mobility, ADL's & gait activities.  Patient is unable to ambulate the required distance or negotiate stairs to safely exit home for medical appointments.    PLAN FOR NEXT VISIT:  Progress (L) Knee ROM, HEP, gait/balance training."

## 2024-05-15 NOTE — TELEPHONE ENCOUNTER
Caller: KARTHIK CHEN    Relationship: Home Health       What was the call regarding: BACK ON HOME HEALTH, HAD A FALL OPENED INCESSION HAD SURGERY , STOPPING US FOR 2 WEEKS, PLEASE DC SO INSURANCE WILL COVER HOME HEALTH, WILL PRESUE OP PT AFTER DC FROM [facility]

## 2024-05-16 ENCOUNTER — HOME CARE VISIT (OUTPATIENT)
Dept: HOME HEALTH SERVICES | Facility: HOME HEALTHCARE | Age: 64
End: 2024-05-16
Payer: COMMERCIAL

## 2024-05-16 PROCEDURE — G0155 HHCP-SVS OF CSW,EA 15 MIN: HCPCS

## 2024-05-17 ENCOUNTER — HOME CARE VISIT (OUTPATIENT)
Dept: HOME HEALTH SERVICES | Facility: HOME HEALTHCARE | Age: 64
End: 2024-05-17
Payer: COMMERCIAL

## 2024-05-17 VITALS
DIASTOLIC BLOOD PRESSURE: 82 MMHG | TEMPERATURE: 97.7 F | SYSTOLIC BLOOD PRESSURE: 114 MMHG | OXYGEN SATURATION: 97 % | HEART RATE: 126 BPM | RESPIRATION RATE: 18 BRPM

## 2024-05-17 PROCEDURE — G0151 HHCP-SERV OF PT,EA 15 MIN: HCPCS

## 2024-05-17 NOTE — HOME HEALTH
ABY rutherford on this date with patient. Patient currently living with sister following death of mother and sale of home in which they were living. Patient would benefit from subsidized apt. Provided list of options and contacted a couple and left voicemail to return call to patient regarding application process. Referral to [facility] as well through [employer] . Patient reports long hx of mental health issues but denies current SI or symptoms of depression affecting recovering from fall and therapy. Patient currently has all other needs met. Will follow up with patient via phone call to determine if any follow up phone calls were made.

## 2024-05-18 NOTE — HOME HEALTH
_____________________________________________  PHYSICAL THERAPY ROUTINE VISIT NOTE    SUBJECTIVE:  Patient states she's been trying to walk a lot & forgot to do the exercises.    MEDICATION CHANGES:  None    FALLS SINCE LAST VISIT:  None    MENTAL STATUS:  Alert & Oriented x 4    EDEMA: Mild knee.  Calf soft & non-tender.  Lauro's negative.    WOUND / SKIN CONDITION:  Removed tegaderm dressing & replaced with optifoam dressing.  9.5 cm (L) knee incision secured with staples is healing well.  Picture uploaded into EPIC.    SIGNS SYMPTOMS OF INFECTION:   None    POST TREATMENT ASSESSMENT:  Patient performing well.  (L) knee ROM progressing.  She was pleased with MSW visit & appreciated all the recommendations.    FOCUS OF CARE: IMPAIRMENT IN ROM & FUNCTIONAL MOBILITY RELATED TO DEHISCENCE OF LEFT TOTAL KNEE INCISION FOLLOWING FALL     MEDICAL NECESSITY FOR ONGOING SKILLED PHYSICAL THERAPY:  Patient requires skilled physical therapy services to address deficits in (B) LE strength & balance, impeding ability to safely perform functional transfers, bed mobility, ADL's & gait activities.  Patient is unable to ambulate the required distance or negotiate stairs to safely exit home for medical appointments.    PLAN FOR NEXT VISIT:  Progress (L) Knee ROM, HEP, gait/balance training.

## 2024-05-21 ENCOUNTER — HOME CARE VISIT (OUTPATIENT)
Dept: HOME HEALTH SERVICES | Facility: HOME HEALTHCARE | Age: 64
End: 2024-05-21
Payer: COMMERCIAL

## 2024-05-21 VITALS
RESPIRATION RATE: 18 BRPM | HEART RATE: 123 BPM | TEMPERATURE: 97.7 F | OXYGEN SATURATION: 97 % | SYSTOLIC BLOOD PRESSURE: 128 MMHG | DIASTOLIC BLOOD PRESSURE: 80 MMHG

## 2024-05-21 PROCEDURE — G0151 HHCP-SERV OF PT,EA 15 MIN: HCPCS

## 2024-05-21 NOTE — HOME HEALTH
_____________________________________________  PHYSICAL THERAPY ROUTINE VISIT NOTE    SUBJECTIVE:  Patient states she's doing well.  She is having pain, but is taking her pain medication.    MEDICATION CHANGES:  None    FALLS SINCE LAST VISIT:  None    MENTAL STATUS:  Alert & Oriented x 4    EDEMA: Mild knee.  Calf soft & non-tender.  Lauro's negative.    WOUND / SKIN CONDITION:  Removed optifoam dressing from (L) knee.  (L) knee incision healing well with staples intact.  Informed pt that she does not need to cover unless she wants to do so.  Island dressing available for pt to use if she choosed to cover because of the dogs.  She was informed that she may shower & let soapy water run over incision.  She is not to scrub & pat dry with a towel.  Pt v/u.    SIGNS SYMPTOMS OF INFECTION:   None    POST TREATMENT ASSESSMENT:  Patient progressing very well. Discussed DC next visit with pt who is in agreement.  Note sent to MD & Jannet Monteiro regarding staple removal to inquire if they would like PT to remove next visit, as pt is not scheduled to f/u with ortho until 5/31/24.    FOCUS OF CARE: IMPAIRMENT IN ROM & FUNCTIONAL MOBILITY RELATED TO DEHISCENCE OF LEFT TOTAL KNEE INCISION FOLLOWING FALL     MEDICAL NECESSITY FOR ONGOING SKILLED PHYSICAL THERAPY:  Patient requires skilled physical therapy services to address deficits in (B) LE strength & balance, impeding ability to safely perform functional transfers, bed mobility, ADL's & gait activities.  Patient is unable to ambulate the required distance or negotiate stairs to safely exit home for medical appointments.    PLAN FOR NEXT VISIT:  Progress (L) Knee ROM, HEP, gait/balance training.

## 2024-05-21 NOTE — Clinical Note
Greetings,    I wanted inform you that Keri Bhagat is scheduled to be discharged from Homecare services on 5/23/24.  She is scheduled to f/u with Jannet on 5/31/24.  She has staples present on (L) knee.  Do you want me to remove her staples & replace with steri strips that date?  Her incision is healing very well.  A picture has been uploaded into Epic for your review.    Thank you,    Kelle Lazo, PT  [phone]

## 2024-05-22 ENCOUNTER — READMISSION MANAGEMENT (OUTPATIENT)
Dept: CALL CENTER | Facility: HOSPITAL | Age: 64
End: 2024-05-22
Payer: MEDICARE

## 2024-05-22 NOTE — OUTREACH NOTE
General Surgery Week 2 Survey      Flowsheet Row Responses   [facility] patient discharged from? [place]   Does the patient have one of the following disease processes/diagnoses(primary or secondary)? General Surgery   Week 2 attempt successful? Yes   Call start time 1141   Call end time 1144   Discharge diagnosis Wound dehiscence (TKA approximately 2 months ago who fell and landed directly on her left knee), INCISION AND DRAINAGE LOWER EXTREMITY WOUND CLOSURE KNEE   Meds reviewed with patient/caregiver? Yes   Is the patient having any side effects they believe may be caused by any medication additions or changes? No   Does the patient have all medications related to this admission filled (includes all antibiotics, pain medications, etc.) Yes   Is the patient taking all medications as directed (includes completed medication regime)? Yes   Does the patient have a follow up appointment scheduled with their surgeon? Yes   Has the patient kept scheduled appointments due by today? Yes   Has home health visited the patient within 72 hours of discharge? Yes   What DME was ordered? cane from [employer]   Has all DME been delivered? Yes   Psychosocial issues? No   Did the patient receive a copy of their discharge instructions? Yes   Nursing interventions Reviewed instructions with patient   What is the patient's perception of their health status since discharge? Improving   Nursing interventions Nurse provided patient education   Is the patient /caregiver able to teach back basic post-op care? No tub bath, swimming, or hot tub until instructed by MD, Practice 'cough and deep breath'   Is the patient/caregiver able to teach back signs and symptoms of incisional infection? Increased drainage or bleeding, Incisional warmth, Increased redness, swelling or pain at the incisonal site, Pus or odor from incision, Fever   Is the patient/caregiver able to teach back the hierarchy of who to call/visit for symptoms/problems? PCP,  Specialist, Home health nurse, Urgent Care, ED, 911 Yes   Week 2 call completed? Yes   Is the patient interested in additional calls from an ambulatory ? No   Would this patient benefit from a Referral to Amb Social Work? No   Wrap up additional comments Patient doing well, denies any concerns today.   Call end time 1144            Odalis CHOW - Registered Nurse

## 2024-05-23 ENCOUNTER — OFFICE VISIT (OUTPATIENT)
Dept: FAMILY MEDICINE CLINIC | Facility: CLINIC | Age: 64
End: 2024-05-23
Payer: MEDICARE

## 2024-05-23 ENCOUNTER — HOME CARE VISIT (OUTPATIENT)
Dept: HOME HEALTH SERVICES | Facility: HOME HEALTHCARE | Age: 64
End: 2024-05-23
Payer: COMMERCIAL

## 2024-05-23 ENCOUNTER — TELEPHONE (OUTPATIENT)
Dept: FAMILY MEDICINE CLINIC | Facility: CLINIC | Age: 64
End: 2024-05-23

## 2024-05-23 VITALS
RESPIRATION RATE: 16 BRPM | DIASTOLIC BLOOD PRESSURE: 86 MMHG | HEART RATE: 91 BPM | OXYGEN SATURATION: 96 % | WEIGHT: 205 LBS | HEIGHT: 63 IN | BODY MASS INDEX: 36.32 KG/M2 | SYSTOLIC BLOOD PRESSURE: 127 MMHG

## 2024-05-23 VITALS
HEART RATE: 74 BPM | RESPIRATION RATE: 18 BRPM | DIASTOLIC BLOOD PRESSURE: 90 MMHG | TEMPERATURE: 97.8 F | SYSTOLIC BLOOD PRESSURE: 140 MMHG | OXYGEN SATURATION: 97 %

## 2024-05-23 DIAGNOSIS — F32.2 SEVERE MAJOR DEPRESSION: Primary | ICD-10-CM

## 2024-05-23 DIAGNOSIS — F41.8 SITUATIONAL ANXIETY: ICD-10-CM

## 2024-05-23 PROCEDURE — G0151 HHCP-SERV OF PT,EA 15 MIN: HCPCS

## 2024-05-23 PROCEDURE — 1125F AMNT PAIN NOTED PAIN PRSNT: CPT | Performed by: NURSE PRACTITIONER

## 2024-05-23 PROCEDURE — 3079F DIAST BP 80-89 MM HG: CPT | Performed by: NURSE PRACTITIONER

## 2024-05-23 PROCEDURE — 3074F SYST BP LT 130 MM HG: CPT | Performed by: NURSE PRACTITIONER

## 2024-05-23 PROCEDURE — 99213 OFFICE O/P EST LOW 20 MIN: CPT | Performed by: NURSE PRACTITIONER

## 2024-05-23 RX ORDER — DICLOFENAC SODIUM 1 MG/ML
1 SOLUTION/ DROPS OPHTHALMIC 4 TIMES DAILY
COMMUNITY

## 2024-05-23 RX ORDER — LAMOTRIGINE 100 MG/1
100 TABLET ORAL DAILY
Start: 2024-05-23

## 2024-05-23 RX ORDER — BUPROPION HYDROCHLORIDE 300 MG/1
TABLET ORAL
COMMUNITY
Start: 2024-05-23 | End: 2024-05-23

## 2024-05-23 NOTE — HOME HEALTH
________________________________________  PHYSICAL THERAPY DISCHARGE VISIT NOTE    SUBJECTIVE:  Patient states she's doing alright.  She's driving without difficulty.    MEDICATION CHANGES:  None    FALLS SINCE LAST VISIT:  None    MENTAL STATUS:  Alert & Oriented x 4    EDEMA: Mild knee.  Calf soft & non-tender.  Lauro's negative.    WOUND / SKIN CONDITION:  9.5 cm (L) knee incision secured with staples healing well.  Patient to have staples removed at f/u appt on 5/31/24.    SIGNS SYMPTOMS OF INFECTION:   None    DISCHARGE STATUS:  Home to self care    DISCHARGE CONDITION: Good    INSTRUCTIONS HOME PROGRAM PROVIDED:  Yes          CONTENT: Written/pictoral Home exercise program          PATIENT/CAREGIVER LEVEL OF COMPLIANCE: Good    HOME HEALTH SERVICES CONTINUING: None

## 2024-05-23 NOTE — Clinical Note
Greetings,    I wanted to inform you that Keri Bhagat has been discharged from [facility] PT services effective 5/23/24.    REASON FOR HOMECARE SERVICES:   DEHISCENCE OF (L) KNEE INCISION following fall  CURRENT LEVEL OF FUNCTION:  Patient is independent with self care & is ambulating safely & independently with & without a cane over all surface levels & stairs.  (L) KNEE ROM:  0 to 125*.  PROGRESS TOWARD GOALS:  Good  BARRIERS PREVENTING GOAL ACHIEVEMENT:  None  LIVING ARRANGEMENTS: Living with siblings/spouses & 4 small dogs in multi story home with 1 step to enter.  CONCERNS:  None  FOLLOW UP APPOINTMENTS/PLANS:  Jannet Monteiro on 5/31/24.  Staples to be removed at that time.       Please feel free to contact me if you have any questions.  Kelle Lazo, PT        [phone]

## 2024-05-23 NOTE — PROGRESS NOTES
"Chief Complaint  Follow-up (Pt has been doing well mentally and with knee. Did fall and reopen sutures and have to have surgery Sunday to repair)    Subjective        Keri Bhagat presents to [facility]  History of Present Illness  Very pleasant patient here today after having an episode of severe major depression, increased anxiety situational anxiety unfortunately her mother passed away in January,  And unfortunately the house was just so if she has to kate past, and she had nowhere to go, just everything overwhelming, she just had knee surgery as well, she was distressed emotionally crying and having her daughter called EMS and she was admitted to the hospital, for severe major depression medications were adjusted,  And discharged unfortunately she had fall and injuring her left knee status post recent replacement, open surgical wound which closed with antibiotic care Dr. Gutierrez and she is doing nicely now.  Thankfully she was able to stay with her sister temporarily at least, with a try to find another home                  Objective   Vital Signs:  /86   Pulse 91   Resp 16   Ht 160 cm (62.99\")   Wt 93 kg (205 lb)   SpO2 96%   BMI 36.32 kg/m²   Estimated body mass index is 36.32 kg/m² as calculated from the following:    Height as of this encounter: 160 cm (62.99\").    Weight as of this encounter: 93 kg (205 lb).            Physical Exam  Vitals reviewed.   Constitutional:       General: She is not in acute distress.     Appearance: Normal appearance. She is well-developed. She is not ill-appearing, toxic-appearing or diaphoretic.   HENT:      Head: Normocephalic.      Nose: Nose normal.   Eyes:      General: No scleral icterus.     Conjunctiva/sclera: Conjunctivae normal.      Pupils: Pupils are equal, round, and reactive to light.   Neck:      Thyroid: No thyromegaly.      Vascular: No JVD.   Cardiovascular:      Rate and Rhythm: Normal rate and regular " rhythm.      Heart sounds: Normal heart sounds. No murmur heard.     No friction rub. No gallop.   Pulmonary:      Effort: Pulmonary effort is normal. No respiratory distress.      Breath sounds: Normal breath sounds. No stridor. No wheezing or rales.   Abdominal:      General: Bowel sounds are normal. There is no distension.      Palpations: Abdomen is soft.      Tenderness: There is no abdominal tenderness.      Comments: No hepatosplenomegaly, no ascites,   Musculoskeletal:         General: No tenderness.      Cervical back: Neck supple.      Comments: Left knee, multiple staples in place, knee looks like is healing nicely Tracer of erythema no increased heat looks good for status post knee replacement   Lymphadenopathy:      Cervical: No cervical adenopathy.   Skin:     General: Skin is warm and dry.      Findings: No erythema or rash.   Neurological:      Mental Status: She is alert and oriented to person, place, and time.      Deep Tendon Reflexes: Reflexes are normal and symmetric.   Psychiatric:         Mood and Affect: Mood normal.         Behavior: Behavior normal.         Thought Content: Thought content normal.         Judgment: Judgment normal.      Comments: Spirits are good smiling appropriately          Result Review :                Assessment and Plan   Diagnoses and all orders for this visit:    1. Severe major depression (Primary)    2. Situational anxiety    Other orders  -     lamoTRIgine (LaMICtal) 100 MG tablet; Take 1 tablet by mouth Daily.           I spent 20  minutes caring for Keri on this date of service. This time includes time spent by me in the following activities:preparing for the visit, reviewing tests, obtaining and/or reviewing a separately obtained history, performing a medically appropriate examination and/or evaluation , counseling and educating the patient/family/caregiver, ordering medications, tests, or procedures, documenting information in the medical record, and care  coordination  Follow Up   Return in about 3 months (around 8/23/2024) for Labs before next visit.  Patient was given instructions and counseling regarding her condition or for health maintenance advice. Please see specific information pulled into the AVS if appropriate.     There are no Patient Instructions on file for this visit.       Patient will follow-up with psychiatry  Will follow-up with counseling  No change in medication  Psychiatric medications including Lamictal will be managed by psychiatry  Did give bridge prescription until she sees psychiatry follow-up

## 2024-05-24 ENCOUNTER — HOSPITAL ENCOUNTER (OUTPATIENT)
Dept: MRI IMAGING | Facility: HOSPITAL | Age: 64
Discharge: HOME | End: 2024-05-24
Payer: MEDICARE

## 2024-05-24 ENCOUNTER — APPOINTMENT (OUTPATIENT)
Dept: CT IMAGING | Facility: HOSPITAL | Age: 64
End: 2024-05-24
Payer: MEDICARE

## 2024-05-24 ENCOUNTER — HOSPITAL ENCOUNTER (OUTPATIENT)
Dept: CT IMAGING | Facility: HOSPITAL | Age: 64
Discharge: HOME | End: 2024-05-24
Payer: MEDICARE

## 2024-05-24 DIAGNOSIS — D32.9 MENINGIOMA: ICD-10-CM

## 2024-05-24 DIAGNOSIS — R90.89 ABNORMAL BRAIN MRI: ICD-10-CM

## 2024-05-24 PROCEDURE — 70450 CT HEAD/BRAIN W/O DYE: CPT

## 2024-05-24 RX ORDER — ONDANSETRON 4 MG/1
TABLET, FILM COATED ORAL
Qty: 30 TABLET | Refills: 0 | Status: SHIPPED | OUTPATIENT
Start: 2024-05-24

## 2024-05-24 RX ORDER — CEPHALEXIN 500 MG/1
500 CAPSULE ORAL 3 TIMES DAILY
Qty: 21 CAPSULE | Refills: 0 | Status: SHIPPED | OUTPATIENT
Start: 2024-05-24

## 2024-05-28 ENCOUNTER — OFFICE VISIT (OUTPATIENT)
Dept: CARDIOLOGY | Facility: CLINIC | Age: 64
End: 2024-05-28
Payer: MEDICARE

## 2024-05-28 VITALS
WEIGHT: 210 LBS | HEIGHT: 63 IN | SYSTOLIC BLOOD PRESSURE: 150 MMHG | HEART RATE: 82 BPM | BODY MASS INDEX: 37.21 KG/M2 | OXYGEN SATURATION: 93 % | DIASTOLIC BLOOD PRESSURE: 96 MMHG

## 2024-05-28 DIAGNOSIS — R06.02 SHORTNESS OF BREATH: Primary | ICD-10-CM

## 2024-05-28 DIAGNOSIS — I25.10 ATHEROSCLEROSIS OF NATIVE CORONARY ARTERY OF NATIVE HEART WITHOUT ANGINA PECTORIS: ICD-10-CM

## 2024-05-29 ENCOUNTER — HOSPITAL ENCOUNTER (OUTPATIENT)
Dept: CARDIOLOGY | Facility: HOSPITAL | Age: 64
Discharge: HOME | End: 2024-05-29
Admitting: INTERNAL MEDICINE
Payer: MEDICARE

## 2024-05-29 VITALS
DIASTOLIC BLOOD PRESSURE: 92 MMHG | SYSTOLIC BLOOD PRESSURE: 160 MMHG | OXYGEN SATURATION: 98 % | HEART RATE: 67 BPM | HEIGHT: 63 IN | BODY MASS INDEX: 37.21 KG/M2 | WEIGHT: 210 LBS

## 2024-05-29 DIAGNOSIS — R06.02 SHORTNESS OF BREATH: ICD-10-CM

## 2024-05-29 LAB
AORTIC ARCH: 2.3 CM
AORTIC DIMENSIONLESS INDEX: 1 (DI)
ASCENDING AORTA: 3 CM
BH CV ECHO MEAS - ACS: 2.04 CM
BH CV ECHO MEAS - AO MAX PG: 9.7 MMHG
BH CV ECHO MEAS - AO MEAN PG: 6.6 MMHG
BH CV ECHO MEAS - AO ROOT DIAM: 2.7 CM
BH CV ECHO MEAS - AO V2 MAX: 156.1 CM/SEC
BH CV ECHO MEAS - AO V2 VTI: 34.8 CM
BH CV ECHO MEAS - AVA(I,D): 4 CM2
BH CV ECHO MEAS - EDV(CUBED): 74.1 ML
BH CV ECHO MEAS - EDV(MOD-SP2): 90 ML
BH CV ECHO MEAS - EDV(MOD-SP4): 85 ML
BH CV ECHO MEAS - EF(MOD-BP): 63.8 %
BH CV ECHO MEAS - EF(MOD-SP2): 68.9 %
BH CV ECHO MEAS - EF(MOD-SP4): 60 %
BH CV ECHO MEAS - ESV(CUBED): 22.4 ML
BH CV ECHO MEAS - ESV(MOD-SP2): 28 ML
BH CV ECHO MEAS - ESV(MOD-SP4): 34 ML
BH CV ECHO MEAS - FS: 32.9 %
BH CV ECHO MEAS - IVS/LVPW: 1 CM
BH CV ECHO MEAS - IVSD: 0.9 CM
BH CV ECHO MEAS - LAT PEAK E' VEL: 6.1 CM/SEC
BH CV ECHO MEAS - LV DIASTOLIC VOL/BSA (35-75): 43.1 CM2
BH CV ECHO MEAS - LV MASS(C)D: 118.7 GRAMS
BH CV ECHO MEAS - LV MAX PG: 9.7 MMHG
BH CV ECHO MEAS - LV MEAN PG: 5.7 MMHG
BH CV ECHO MEAS - LV SYSTOLIC VOL/BSA (12-30): 17.2 CM2
BH CV ECHO MEAS - LV V1 MAX: 155.5 CM/SEC
BH CV ECHO MEAS - LV V1 VTI: 36.2 CM
BH CV ECHO MEAS - LVIDD: 4.2 CM
BH CV ECHO MEAS - LVIDS: 2.8 CM
BH CV ECHO MEAS - LVOT AREA: 3.9 CM2
BH CV ECHO MEAS - LVOT DIAM: 2.22 CM
BH CV ECHO MEAS - LVPWD: 0.9 CM
BH CV ECHO MEAS - MED PEAK E' VEL: 5.5 CM/SEC
BH CV ECHO MEAS - MV A DUR: 0.1 SEC
BH CV ECHO MEAS - MV A MAX VEL: 106 CM/SEC
BH CV ECHO MEAS - MV DEC SLOPE: 529.6 CM/SEC2
BH CV ECHO MEAS - MV DEC TIME: 0.19 SEC
BH CV ECHO MEAS - MV E MAX VEL: 105 CM/SEC
BH CV ECHO MEAS - MV E/A: 0.99
BH CV ECHO MEAS - MV MAX PG: 3.9 MMHG
BH CV ECHO MEAS - MV MEAN PG: 1.97 MMHG
BH CV ECHO MEAS - MV P1/2T: 53.3 MSEC
BH CV ECHO MEAS - MV V2 VTI: 33.1 CM
BH CV ECHO MEAS - MVA(P1/2T): 4.1 CM2
BH CV ECHO MEAS - MVA(VTI): 4.2 CM2
BH CV ECHO MEAS - PA ACC TIME: 0.09 SEC
BH CV ECHO MEAS - PA V2 MAX: 108.6 CM/SEC
BH CV ECHO MEAS - PULM A REVS DUR: 0.09 SEC
BH CV ECHO MEAS - PULM A REVS VEL: 36.8 CM/SEC
BH CV ECHO MEAS - PULM DIAS VEL: 38.8 CM/SEC
BH CV ECHO MEAS - PULM S/D: 1.4
BH CV ECHO MEAS - PULM SYS VEL: 54.4 CM/SEC
BH CV ECHO MEAS - QP/QS: 0.46
BH CV ECHO MEAS - RAP SYSTOLE: 3 MMHG
BH CV ECHO MEAS - RV MAX PG: 3.9 MMHG
BH CV ECHO MEAS - RV V1 MAX: 99 CM/SEC
BH CV ECHO MEAS - RV V1 VTI: 20.4 CM
BH CV ECHO MEAS - RVOT DIAM: 2.01 CM
BH CV ECHO MEAS - RVSP: 28 MMHG
BH CV ECHO MEAS - SUP REN AO DIAM: 2.2 CM
BH CV ECHO MEAS - SV(LVOT): 140.5 ML
BH CV ECHO MEAS - SV(MOD-SP2): 62 ML
BH CV ECHO MEAS - SV(MOD-SP4): 51 ML
BH CV ECHO MEAS - SV(RVOT): 64.9 ML
BH CV ECHO MEAS - SVI(LVOT): 71.1 ML/M2
BH CV ECHO MEAS - SVI(MOD-SP2): 31.4 ML/M2
BH CV ECHO MEAS - SVI(MOD-SP4): 25.8 ML/M2
BH CV ECHO MEAS - TAPSE (>1.6): 1.82 CM
BH CV ECHO MEAS - TR MAX PG: 24.7 MMHG
BH CV ECHO MEAS - TR MAX VEL: 248.4 CM/SEC
BH CV ECHO MEASUREMENTS AVERAGE E/E' RATIO: 18.1
BH CV VAS BP LEFT ARM: NORMAL MMHG
BH CV XLRA - RV BASE: 3 CM
BH CV XLRA - RV LENGTH: 7.6 CM
BH CV XLRA - RV MID: 3 CM
BH CV XLRA - TDI S': 13.6 CM/SEC
LEFT ATRIUM VOLUME INDEX: 40.2 ML/M2
SINUS: 2.7 CM
STJ: 2.8 CM

## 2024-05-29 PROCEDURE — 93306 TTE W/DOPPLER COMPLETE: CPT

## 2024-05-29 NOTE — PROGRESS NOTES
"      CARDIOLOGY    David Burroughs MD    ENCOUNTER DATE:  05/28/2024    Keri Bhagat / 63 y.o. / female        CHIEF COMPLAINT / REASON FOR OFFICE VISIT     PAF (paroxysmal atrial fibrillation) (02/06/2024 Follow up)  Hypertension    HISTORY OF PRESENT ILLNESS       HPI  Keri Bhagat is a 63 y.o. female who presents today for reevaluation.  Patient had a difficult past several months.  Patient 2 weeks ago fell and ripped open her recent knee replacement and had to have it redone.  Patient was also in [facility] for emotional breakdown.  She is now complaining of shortness of breath.  Patient had an emotional breakdown after she was forced to move out of her mother's house who passed away and had sold.  While she was in [facility] her family had to move her stuff out put it into a storage facility.      The following portions of the patient's history were reviewed and updated as appropriate: allergies, current medications, past family history, past medical history, past social history, past surgical history and problem list.      VITAL SIGNS     Visit Vitals  /96 (BP Location: Left arm)   Pulse 82   Ht 160 cm (63\")   Wt 95.3 kg (210 lb)   LMP 10/21/2016 (Approximate) Comment: 54   SpO2 93%   BMI 37.20 kg/m²         Wt Readings from Last 3 Encounters:   05/28/24 95.3 kg (210 lb)   05/23/24 93 kg (205 lb)   05/11/24 92.1 kg (203 lb 0.7 oz)     Body mass index is 37.2 kg/m².      REVIEW OF SYSTEMS   ROS        PHYSICAL EXAMINATION     Vitals reviewed.   Cardiovascular:      Normal rate. Regular rhythm. Normal S1. Normal S2.       Murmurs: There is no murmur.      No gallop.  No click. No rub.   Pulses:     Intact distal pulses.   Edema:     Peripheral edema absent.           REVIEWED DATA     Procedures    Cardiac Procedures:      Lipid Panel          9/19/2023    15:05 2/15/2024    11:14 3/28/2024    15:34   Lipid Panel   Total Cholesterol 178  185  156    Triglycerides 62  130  113  "   HDL Cholesterol 82  61  59    VLDL Cholesterol 12  23  20    LDL Cholesterol  84  101  77    LDL/HDL Ratio  1.61           ASSESSMENT & PLAN      Diagnosis Plan   1. Shortness of breath  Adult Transthoracic Echo Complete W/ Cont if Necessary Per Protocol      2. Atherosclerosis of native coronary artery of native heart without angina pectoris              SUMMARY/DISCUSSION  Coronary artery disease.  Clinically stable.  Hypertension patient blood pressure little bit elevated has been running better at home.  Patient with a nervous breakdown she is now short of breath.  Patient is set up for Takotsubo cardiomyopathy.  She just recently had an echocardiogram but I do want to repeat it to make sure her LV function is normal.  If not we will continue to follow her clinically and have her come back in 3 to 6 months sooner if things worsen.        MEDICATIONS         Discharge Medications            Accurate as of May 28, 2024 11:59 PM. If you have any questions, ask your nurse or doctor.                Continue These Medications        Instructions Start Date   apixaban 5 MG tablet tablet  Commonly known as: ELIQUIS   5 mg, Oral, Every 12 Hours Scheduled      atorvastatin 40 MG tablet  Commonly known as: LIPITOR   1 tablet, Oral, Daily      buPROPion  MG 24 hr tablet  Commonly known as: WELLBUTRIN XL   1 tablet, Oral, 2 Times Daily      cephalexin 500 MG capsule  Commonly known as: KEFLEX   500 mg, Oral, 3 Times Daily      CITRUS CALCIUM/VITAMIN D PO   Oral, 2 Times Daily      cyanocobalamin 1000 MCG tablet  Commonly known as: VITAMIN B-12   1,000 mcg, Oral, Daily      diclofenac 0.1 % ophthalmic solution  Commonly known as: VOLTAREN   1 drop, 4 Times Daily      docusate sodium 100 MG capsule  Commonly known as: COLACE   100 mg, Oral, 2 Times Daily      estradiol 0.1 MG/GM vaginal cream  Commonly known as: ESTRACE   1 g, Vaginal, 3 Times Weekly      gabapentin 300 MG capsule  Commonly known as: NEURONTIN   300  mg, Oral, 3 Times Daily      HYDROcodone-acetaminophen 7.5-325 MG per tablet  Commonly known as: NORCO   1 tablet, Oral, Every 4 Hours PRN      IRON PO   1 tablet, Oral, 2 Times Daily      isosorbide mononitrate 30 MG 24 hr tablet  Commonly known as: IMDUR   30 mg, Oral, Daily      lamoTRIgine 100 MG tablet  Commonly known as: LaMICtal   100 mg, Oral, Daily      levothyroxine 88 MCG tablet  Commonly known as: SYNTHROID, LEVOTHROID   88 mcg, Oral, Every Morning      metoprolol succinate XL 25 MG 24 hr tablet  Commonly known as: Toprol XL   37.5 mg, Oral, Daily, For bp      OLANZapine 5 MG tablet  Commonly known as: zyPREXA   1 tablet, Oral, Nightly      ondansetron 4 MG tablet  Commonly known as: ZOFRAN   TAKE 1 TABLET BY MOUTH EVERY 8 HOURS AS NEEDED FOR NAUSEA AND/OR VOMITING      pantoprazole 40 MG EC tablet  Commonly known as: PROTONIX   40 mg, Oral, 2 Times Daily      vitamin b complex capsule capsule   1 capsule, Oral, 2 Times Daily                   **Dragon Disclaimer:   Much of this encounter note is an electronic transcription/translation of spoken language to printed text. The electronic translation of spoken language may permit erroneous, or at times, nonsensical words or phrases to be inadvertently transcribed. Although I have reviewed the note for such errors, some may still exist.

## 2024-05-30 ENCOUNTER — OFFICE VISIT (OUTPATIENT)
Dept: GASTROENTEROLOGY | Facility: CLINIC | Age: 64
End: 2024-05-30
Payer: MEDICARE

## 2024-05-30 VITALS
TEMPERATURE: 97.5 F | BODY MASS INDEX: 37.7 KG/M2 | DIASTOLIC BLOOD PRESSURE: 74 MMHG | HEART RATE: 71 BPM | WEIGHT: 212.8 LBS | SYSTOLIC BLOOD PRESSURE: 116 MMHG | HEIGHT: 63 IN

## 2024-05-30 DIAGNOSIS — K59.03 DRUG-INDUCED CONSTIPATION: Primary | ICD-10-CM

## 2024-05-30 DIAGNOSIS — K21.00 GASTROESOPHAGEAL REFLUX DISEASE WITH ESOPHAGITIS WITHOUT HEMORRHAGE: ICD-10-CM

## 2024-05-30 DIAGNOSIS — Z86.010 PERSONAL HISTORY OF COLONIC POLYPS: ICD-10-CM

## 2024-05-30 RX ORDER — VENLAFAXINE 37.5 MG/1
TABLET ORAL
COMMUNITY
Start: 2024-05-23

## 2024-05-30 NOTE — PROGRESS NOTES
Chief Complaint   Patient presents with    Heartburn    Constipation       HPI    Keri Bhagat is a  63 y.o. female here for a follow up visit for GERD and heartburn.    This patient follows with Dr. Rodriguez, new to me.    Past medical history of fatty liver disease, anxiety, arthritis, bipolar disorder, coronary artery disease, GERD, hypertension, hyperlipidemia along with thyroid disease.    Patient last seen in the office roughly 1 month ago for drug-induced constipation treated with MiraLAX, GERD along with referral to bariatric surgery due to gastric lap band.    Since last office visit she suffered a fall and injured her left knee which had recently been replaced requiring hospital admission and closure of wound.  She currently has staples.  She has a follow-up fairly soon with orthopedic specialist.  Because of this she has yet to see Dr. Santillan with bariatric surgery.    Her primary complaint today is excessive gas and bloating and mild constipation.  Her bowels move every 2 or 3 days.  Stools are formed.  No rectal bleeding or rectal pain.  She has been taking stool softeners and MiraLAX with some improvement.  She still requires as needed Norco for left knee pain.    She reports adequate control dyspeptic symptoms on twice daily dosing Protonix.  No nausea, vomiting, dysphagia or odynophagia.    Additional data reviewed:    Colonoscopy 2021 ([facility]).  Pathology showed tubular adenoma in the sigmoid.  Recall 5 years.     EGD 2017 - no gross lesions in the esophagus.  Dilation.  Erythematous stomach.  normal duodenum.    Colonoscopy 2015 - normal ileum.  Diverticulosis.  Internal hemorrhoids.  Recall 5 years.    Past Medical History:   Diagnosis Date    Abnormal Pap smear of cervix     Adrenal adenoma 09/21/2022    Anemia     Anxiety     Arthritis     Arthritis of back     Arthritis of neck     Bipolar I disorder, single manic episode     depressive d(NOS)    Cervical disc  herniation     Colon polyp     Coronary artery disease     COVID-19 01/10/2023    Death of family member     MOTHER IN FEB 2024    Depression     NO SUICIDAL PLANS    Dislocation of finger     Diverticular disease     Diverticulitis of colon     Dyspepsia     Encounter for follow-up examination after completed treatment for conditions other than malignant neoplasm 04/11/2018    Fracture of wrist     GERD (gastroesophageal reflux disease)     Heart attack     AGE 39    Heart murmur     Hiatal hernia     History of transfusion     2001    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    HPV positive on pap LGSIL    Hyperlipidemia     Hypertension     Hypothyroidism     Incontinence in female     wears pads    Kidney disease, chronic, stage III (GFR 30-59 ml/min)     Knee pain, bilateral     Knee swelling     LGSIL on Pap smear of cervix 04/29/2016    LGSIL HPV positive    Lumbosacral disc disease Unsure    Lower left back is partial osteoarthritis and partial osteoporosis    Obesity     Osteopenia 2021    Bone density test    Periodic limb movement disorder     Restless leg syndrome     LEFT SHOULDER    Rotator cuff syndrome     Sleep apnea     NO MACHINE CURRENTLY    Suicidal ideation 08/19/2016    history    Thyroid nodule     Vitamin B12 deficiency        Past Surgical History:   Procedure Laterality Date    ADENOIDECTOMY  1974    ANTERIOR CERVICAL DISCECTOMY W/ FUSION N/A 12/29/2016    Procedure: C3-4 anterior cervical discectomy and fusion with Depuy micro plate, ALLOGRAFT C3-4, AND HARDWARE REMOVAL C4-7.;  Surgeon: Hema Godwin MD;  Location: [facility] OR;  Service:     BARIATRIC SURGERY      CARDIAC CATHETERIZATION N/A 03/30/2017    Procedure: Left Heart Cath;  Surgeon: Tracey Vargas MD;  Location:  [facility] CATH INVASIVE [facility]ATION;  Service:     CARDIAC CATHETERIZATION N/A 03/30/2017    Procedure: Coronary angiography;  Surgeon: Tracey Vargas MD;  Location:  [facility];  Service:      CARDIAC CATHETERIZATION N/A 03/30/2017    Procedure: Left ventriculography;  Surgeon: Tracey Vargas MD;  Location: [facility] CATH INVASIVE LOCATION;  Service:     CARDIAC CATHETERIZATION  03/30/2017    Procedure: Functional Flow Ponce;  Surgeon: Tracey Vargas MD;  Location: [facility] CATH INVASIVE LOCATION;  Service:     CATARACT EXTRACTION Bilateral     CERVICAL BIOPSY  MMXVI    Dr. Jeffery.     CERVICAL DISCECTOMY ANTERIOR  04/2013    C4-7    COLONOSCOPY  04/20/2015    Diverticulosis, IH    COLONOSCOPY  03/09/2021    COLPOSCOPY W/ BIOPSY / CURETTAGE  06/17/2016    LGSIL HPV positive. Results were normal repeat pap in one year. Chronic Cervicitis    CORONARY ANGIOPLASTY WITH STENT PLACEMENT      ENDOSCOPY  MMXV    Normal.  Dr. Rodriguez    ENDOSCOPY N/A 06/22/2017    Erythematous mucosa in the stomach  PATH: Chronic active gastritis, moderate with intestinal metaplasia     EPIDURAL BLOCK      INCISION AND DRAINAGE LEG Left 05/12/2024    Procedure: INCISION AND DRAINAGE LOWER EXTREMITY WOUND CLOSURE KNEE;  Surgeon: Ajay Gutierrez MD;  Location: [facility];  Service: Orthopedics;  Laterality: Left;    INGUINAL HERNIA REPAIR      KNEE SURGERY      LAPAROSCOPIC GASTRIC BANDING  02/2018    SHOULDER SURGERY Left     RCR    TONSILLECTOMY AND ADENOIDECTOMY      TOTAL KNEE ARTHROPLASTY Left 03/05/2024    Procedure: LEFT TOTAL KNEE ARTHROPLASTY WITH TAB NAVIGATION;  Surgeon: Tho Pritchard MD;  Location: [facility];  Service: Orthopedics;  Laterality: Left;    TRANSVAGINAL TAPING SUSPENSION N/A 12/06/2017    Procedure: MID URETHRAL SLING CYSTSCOPY;  Surgeon: Abby Méndez MD;  Location: [facility] OR;  Service:     TRIGGER POINT INJECTION      TUBAL ABDOMINAL LIGATION      TYMPANOSTOMY TUBE PLACEMENT Right     UPPER GASTROINTESTINAL ENDOSCOPY  approx 2021    WRIST SURGERY Bilateral     carpal tunnel       Scheduled Meds:     Continuous Infusions: No current facility-administered medications for this  visit.      PRN Meds:     No Known Allergies    Social History     Socioeconomic History    Marital status:     Number of children: 3    Years of education: 12   Tobacco Use    Smoking status: Light Smoker     Types: Electronic Cigarette     Passive exposure: Current    Smokeless tobacco: Current    Tobacco comments:     Electronic Cigarette   Vaping Use    Vaping status: Every Day    Substances: Nicotine, Flavoring    Devices: Refillable tank   Substance and Sexual Activity    Alcohol use: Not Currently     Comment: RARELY    Drug use: Never    Sexual activity: Not Currently     Partners: Male     Birth control/protection: Condom, Abstinence, Post-menopausal, None, Tubal ligation       Family History   Problem Relation Age of Onset    Diabetes type II Mother     Hypertension Mother     Osteoporosis Mother     Seizures Mother     Diabetes Mother     Arthritis Mother     Hyperlipidemia Mother     COPD Father     Hypertension Father     Lung cancer Father     Heart attack Father     Liver cancer Father     Liver disease Father     Heart disease Father     Cancer Father         Lung cacer that metastasized  into the liver.    Thyroid disease Sister     Hypertension Sister     Bipolar disorder Sister     Depression Sister     ADD / ADHD Sister     Anxiety disorder Sister     Mental illness Sister     Hyperlipidemia Sister     Broken bones Sister     Colon polyps Sister     Thyroid disease Sister     Hypertension Sister     Bipolar disorder Sister     Anxiety disorder Sister     Depression Sister     Mental illness Sister     Hyperlipidemia Sister     Colon polyps Sister     Cancer Maternal Grandmother     No Known Problems Maternal Grandfather     No Known Problems Paternal Grandmother     No Known Problems Paternal Grandfather     Abnormal EKG Daughter     Hypertension Daughter     Bipolar disorder Daughter     Thyroid disease Daughter     Mental illness Daughter     Hyperlipidemia Daughter     Asthma Daughter      No Known Problems Son     Diabetes Son         Type 1    Diabetes Paternal Uncle     Breast cancer Neg Hx     Ovarian cancer Neg Hx     Uterine cancer Neg Hx     Colon cancer Neg Hx     Malig Hyperthermia Neg Hx        Review of Systems   Gastrointestinal:  Positive for constipation.       Vitals:    05/30/24 1322   BP: 116/74   Pulse: 71   Temp: 97.5 °F (36.4 °C)       Physical Exam  Constitutional:       Appearance: She is well-developed.   Abdominal:      General: Bowel sounds are normal. There is no distension.      Palpations: Abdomen is soft. There is no mass.      Tenderness: There is no abdominal tenderness. There is no guarding.      Hernia: No hernia is present.   Skin:     General: Skin is warm and dry.      Capillary Refill: Capillary refill takes less than 2 seconds.   Neurological:      Mental Status: She is alert and oriented to person, place, and time.   Psychiatric:         Behavior: Behavior normal.     Assessment    Diagnoses and all orders for this visit:    1. Drug-induced constipation (Primary)    2. Gastroesophageal reflux disease with esophagitis without hemorrhage    3. Personal history of colonic polyps    Other orders  -     linaclotide (Linzess) 145 MCG capsule capsule; Take 1 capsule by mouth Every Morning Before Breakfast for 12 days.  Dispense: 12 capsule; Refill: 0       Plan    Recommend trial of Linzess 145 mcg once daily as above to improve constipation.  Samples with dosing instructions provided to the patient.  Stool softeners and MiraLAX while trialing Linzess.  Patient to keep us updated on symptom progress.    Stable GERD, continue twice daily dosing PPI therapy.  Reiterated antireflux diet.    Colonoscopy for screening purposes 2026.    Follow-up 3 months or sooner if needed.         DAPHNEY Sanchez  [facility]  [address] [place] [address]  Office: [phone]

## 2024-05-30 NOTE — PROGRESS NOTES
I called spoke directly with patient reviewed echo.  Patient was told to follow blood pressure needs to be in the 120s to 130s consistently.  I had a concern of Takotsubo cardiomyopathy there is no signs of this.

## 2024-05-31 ENCOUNTER — OFFICE VISIT (OUTPATIENT)
Dept: ORTHOPEDIC SURGERY | Facility: CLINIC | Age: 64
End: 2024-05-31
Payer: MEDICARE

## 2024-05-31 VITALS — TEMPERATURE: 98 F | HEIGHT: 63 IN | BODY MASS INDEX: 36.75 KG/M2 | WEIGHT: 207.4 LBS

## 2024-05-31 DIAGNOSIS — Z09 SURGERY FOLLOW-UP: Primary | ICD-10-CM

## 2024-05-31 NOTE — PROGRESS NOTES
Keri Bhagat     : 1960     MRN: [id number]     DATE: 2024    DIAGNOSIS: Follow-up 2 weeks status post left knee irrigation debridement with complex wound closure    SUBJECTIVE:  Patient returns today for 2 week follow up of recent left knee irrigation debridement with complex wound closure.  Patient reports doing well with no unusual complaints.  Patient reports compliance with the anticoagulation.  Reports she is not interested in attending formal physical therapy.  Reports she still has all of the exercises at home she received in formal physical therapy after her knee replacement with Dr. Pritchard.    OBJECTIVE:     Exam: The incision is healing appropriately.  No sign of infection.  Range of motion is progressin-100°.  The calf is soft and nontender with a negative Homans sign.    DIAGNOSTIC STUDIES     Xrays: AP and lateral views of the left knee were ordered and reviewed for evaluation.  These are compared to previous x-rays.  They demonstrate a well positioned, well aligned knee replacement without complicating factors noted. In comparison with previous films there has been no change in regards to the implants.     ASSESSMENT: 2 week status post left knee irrigation debridement with complex wound closure    PLAN:   1) Staples removed and Steri-Strips applied.  2) Continue home exercise program as learned in physical therapy.  I demonstrated some stretching exercises as well.   3) Continue to ice as needed.   4) Continue anticoagulation as discussed   5) We discussed the need for prophylactic antibiotics prior to dental appointments.   6) Follow-up in 4 weeks with Dr. Gutierrez.     Jannet Hubbard, APRN    2024    Answers submitted by the patient for this visit:  Other (Submitted on 2024)  Please describe your symptoms.: ReCheck on knee  Have you had these symptoms before?: No  How long have you been having these symptoms?: Greater than 2 weeks  Primary Reason for Visit (Submitted  on 5/24/2024)  What is the primary reason for your visit?: Other

## 2024-07-01 ENCOUNTER — OFFICE VISIT (OUTPATIENT)
Dept: ORTHOPEDIC SURGERY | Facility: CLINIC | Age: 64
End: 2024-07-01
Payer: MEDICARE

## 2024-07-01 VITALS — HEIGHT: 63 IN | TEMPERATURE: 98.4 F | BODY MASS INDEX: 37.42 KG/M2 | WEIGHT: 211.2 LBS

## 2024-07-01 DIAGNOSIS — Z09 SURGERY FOLLOW-UP: Primary | ICD-10-CM

## 2024-07-01 PROCEDURE — 99024 POSTOP FOLLOW-UP VISIT: CPT | Performed by: ORTHOPAEDIC SURGERY

## 2024-07-01 RX ORDER — VIBEGRON 75 MG/1
TABLET, FILM COATED ORAL
COMMUNITY
Start: 2024-06-17

## 2024-07-01 RX ORDER — IBUPROFEN 800 MG/1
800 TABLET ORAL 3 TIMES DAILY
Qty: 90 TABLET | Refills: 0 | Status: SHIPPED | OUTPATIENT
Start: 2024-07-01

## 2024-07-01 NOTE — PROGRESS NOTES
CC:  Follow up s/p left knee washout and wound closure    Ms. Bhagat is now roughly 1 month out from a left knee washout and wound closure.  She says the knee is doing fine.  She says she is roughly back where she was prior to the fall.  The knee is a little stiff with flexion.  She feels like the wound has healed up okay.    The wound is well-approximated and benign.  It does appear that everything has healed.  No drainage or erythema.  She has full knee extension.  Flexion is to about 115 degrees.  Gait is reciprocal heel-toe and nonantalgic.    Assessment: Follow-up now 1 month status post left knee washout and wound closure    Plan: She seems to be doing fine.  I encouraged her to continue working on the PT.  She has got an appointment to see Eduarda for a right knee injection.  I assured her that would be fine with respect to the left knee at this point.  I would want to check her total knee back in about 6 months.    Ajay Gutierrez MD

## 2024-07-08 ENCOUNTER — TELEPHONE (OUTPATIENT)
Dept: NEUROSURGERY | Facility: CLINIC | Age: 64
End: 2024-07-08
Payer: MEDICARE

## 2024-07-08 DIAGNOSIS — D32.9 MENINGIOMA: Primary | ICD-10-CM

## 2024-07-08 NOTE — TELEPHONE ENCOUNTER
I called and left  for Keri regarding her appmt with Dr. Mercado on Wednesday 07/10/2024.  I let her know per Dr. Mercado last ovn that she needed to also get her MRI of the pituitary gland performed.  Once she has the scheduled then she could call back re-scheduled with Dr. Mercado but first she needed to get her MRI completed.

## 2024-07-22 RX ORDER — ATORVASTATIN CALCIUM 40 MG/1
40 TABLET, FILM COATED ORAL DAILY
Qty: 90 TABLET | Refills: 1 | Status: SHIPPED | OUTPATIENT
Start: 2024-07-22

## 2024-07-22 NOTE — TELEPHONE ENCOUNTER
Rx Refill Note  Requested Prescriptions     Pending Prescriptions Disp Refills    atorvastatin (LIPITOR) 40 MG tablet [Pharmacy Med Name: ATORVASTATIN 40 MG TABLET] 90 tablet 1     Sig: TAKE 1 TABLET BY MOUTH DAILY      Last office visit with prescribing clinician: 5/23/2024   Last telemedicine visit with prescribing clinician: Visit date not found   Next office visit with prescribing clinician: 8/26/2024                         Would you like a call back once the refill request has been completed: [] Yes [] No    If the office needs to give you a call back, can they leave a voicemail: [] Yes [] No    Jigar Chapa Rep  07/22/24, 11:12 EDT

## 2024-07-31 ENCOUNTER — HOSPITAL ENCOUNTER (OUTPATIENT)
Dept: MRI IMAGING | Facility: HOSPITAL | Age: 64
Discharge: HOME | End: 2024-07-31
Payer: MEDICARE

## 2024-07-31 DIAGNOSIS — D32.9 MENINGIOMA: ICD-10-CM

## 2024-07-31 PROCEDURE — 70553 MRI BRAIN STEM W/O & W/DYE: CPT

## 2024-07-31 PROCEDURE — 0 GADOBENATE DIMEGLUMINE 529 MG/ML SOLUTION

## 2024-07-31 PROCEDURE — A9577 INJ MULTIHANCE: HCPCS

## 2024-07-31 RX ADMIN — GADOBENATE DIMEGLUMINE 20 ML: 529 INJECTION, SOLUTION INTRAVENOUS at 14:29

## 2024-08-07 NOTE — PROGRESS NOTES
Patient ID: Keri Bhagat is a 64 y.o. female is here today for follow-up for abnormal brain MRI and meningioma.    Imaging: MRI of the pituitary performed on 07/31/2024    Subjective     The patient is here in regards to   Chief Complaint   Patient presents with    Brain Tumor     Meningioma       History of Present Illness  Keri has not had any major issues with her vision or cranial nerves.  She still continues have balance issues.  She had knee surgery in March and due to her poor balance she fell and split the incision open.      While in the room and during my examination of the patient I wore a mask and eye protection.  I washed my hands before and after this patient encounter.  The patient was also wearing a mask.    The following portions of the patient's history were reviewed and updated as appropriate: allergies, current medications, past family history, past medical history, past social history, past surgical history and problem list.    Review of Systems   Eyes:  Negative for visual disturbance.   Musculoskeletal:  Positive for gait problem.   Neurological:  Positive for dizziness. Negative for light-headedness and headaches.        Past Medical History:   Diagnosis Date    Abnormal Pap smear of cervix     Adrenal adenoma 09/21/2022    Anemia     Anxiety     Arthritis     Arthritis of back     Arthritis of neck     Bipolar I disorder, single manic episode     depressive d(NOS)    Cervical disc herniation     Colon polyp     Coronary artery disease     COVID-19 01/10/2023    Death of family member     MOTHER IN FEB 2024    Depression     NO SUICIDAL PLANS    Dislocation of finger     Diverticular disease     Diverticulitis of colon     Dyspepsia     Encounter for follow-up examination after completed treatment for conditions other than malignant neoplasm 04/11/2018    Fracture of wrist     GERD (gastroesophageal reflux disease)     Heart attack     AGE 39    Heart murmur     Hiatal hernia      History of transfusion     2001    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    Lumbosacral disc disease Unsure    Lower left back is partial osteoarthritis and partial osteoporosis    Obesity     Osteopenia 2021    Bone density test    Periodic limb movement disorder     Restless leg syndrome     LEFT SHOULDER    Rotator cuff syndrome     Sleep apnea     NO MACHINE CURRENTLY    Suicidal ideation 08/19/2016    history    Thyroid nodule     Vitamin B12 deficiency        No Known Allergies    Family History   Problem Relation Age of Onset    Diabetes type II Mother     Hypertension Mother     Osteoporosis Mother     Seizures Mother     Diabetes Mother     Arthritis Mother     Hyperlipidemia Mother     COPD Father     Hypertension Father     Lung cancer Father     Heart attack Father     Liver cancer Father     Liver disease Father     Heart disease Father     Cancer Father         Lung cacer that metastasized  into the liver.    Thyroid disease Sister     Hypertension Sister     Bipolar disorder Sister     Depression Sister     ADD / ADHD Sister     Anxiety disorder Sister     Mental illness Sister     Hyperlipidemia Sister     Broken bones Sister     Colon polyps Sister     Thyroid disease Sister     Hypertension Sister     Bipolar disorder Sister     Anxiety disorder Sister     Depression Sister     Mental illness Sister     Hyperlipidemia Sister     Colon polyps Sister     Cancer Maternal Grandmother     No Known Problems Maternal Grandfather     No Known Problems Paternal Grandmother     No Known Problems Paternal Grandfather      Abnormal EKG Daughter     Hypertension Daughter     Bipolar disorder Daughter     Thyroid disease Daughter     Mental illness Daughter     Hyperlipidemia Daughter     Asthma Daughter     No Known Problems Son     Diabetes Son         Type 1    Diabetes Paternal Uncle     Breast cancer Neg Hx     Ovarian cancer Neg Hx     Uterine cancer Neg Hx     Colon cancer Neg Hx     Malig Hyperthermia Neg Hx        Social History     Socioeconomic History    Marital status:     Number of children: 3    Years of education: 12   Tobacco Use    Smoking status: Light Smoker     Current packs/day: 0.25     Types: Cigarettes, Electronic Cigarette     Passive exposure: Current    Smokeless tobacco: Current    Tobacco comments:     Electronic Cigarette   Vaping Use    Vaping status: Every Day    Substances: Nicotine, Flavoring    Devices: Refillable tank   Substance and Sexual Activity    Alcohol use: Not Currently     Comment: RARELY    Drug use: Never    Sexual activity: Not Currently     Partners: Male     Birth control/protection: Condom, Abstinence, Post-menopausal, None, Tubal ligation       Past Surgical History:   Procedure Laterality Date    ADENOIDECTOMY  1974    ANTERIOR CERVICAL DISCECTOMY W/ FUSION N/A 12/29/2016    Procedure: C3-4 anterior cervical discectomy and fusion with Depuy micro plate, ALLOGRAFT C3-4, AND HARDWARE REMOVAL C4-7.;  Surgeon: Hema Godwin MD;  Location: [facility] OR;  Service:     BARIATRIC SURGERY      CARDIAC CATHETERIZATION N/A 03/30/2017    Procedure: Left Heart Cath;  Surgeon: Tracey Vargas MD;  Location: [facility] CATH INVASIVE LOCATION;  Service:     CARDIAC CATHETERIZATION N/A 03/30/2017    Procedure: Coronary angiography;  Surgeon: Tracey Vargas MD;  Location: [facility] CATH INVASIVE LOCATION;  Service:     CARDIAC CATHETERIZATION N/A 03/30/2017    Procedure: Left ventriculography;  Surgeon: Tracey Vargas MD;  Location: [facility] CATH INVASIVE LOCATION;  Service:     CARDIAC  CATHETERIZATION  03/30/2017    Procedure: Functional Flow Reading;  Surgeon: Tracey Vargas MD;  Location: [facility] CATH INVASIVE LOCATION;  Service:     CATARACT EXTRACTION Bilateral     CERVICAL BIOPSY  MMXVI    Dr. Jeffery.     CERVICAL DISCECTOMY ANTERIOR  04/2013    C4-7    COLONOSCOPY  04/20/2015    Diverticulosis, IH    COLONOSCOPY  03/09/2021    COLPOSCOPY W/ BIOPSY / CURETTAGE  06/17/2016    LGSIL HPV positive. Results were normal repeat pap in one year. Chronic Cervicitis    CORONARY ANGIOPLASTY WITH STENT PLACEMENT      ENDOSCOPY  MMXV    Normal.  Dr. Rodriguez    ENDOSCOPY N/A 06/22/2017    Erythematous mucosa in the stomach  PATH: Chronic active gastritis, moderate with intestinal metaplasia     EPIDURAL BLOCK      INCISION AND DRAINAGE LEG Left 05/12/2024    Procedure: INCISION AND DRAINAGE LOWER EXTREMITY WOUND CLOSURE KNEE;  Surgeon: Ajay Gutierrez MD;  Location: [facility] OR;  Service: Orthopedics;  Laterality: Left;    INGUINAL HERNIA REPAIR      JOINT REPLACEMENT  3/5/24    Left knee    KNEE SURGERY      LAPAROSCOPIC GASTRIC BANDING  02/2018    SHOULDER SURGERY Left     RCR    TONSILLECTOMY AND ADENOIDECTOMY      TOTAL KNEE ARTHROPLASTY Left 03/05/2024    Procedure: LEFT TOTAL KNEE ARTHROPLASTY WITH TAB NAVIGATION;  Surgeon: Tho Pritchard MD;  Location: [facility] OSC;  Service: Orthopedics;  Laterality: Left;    TRANSVAGINAL TAPING SUSPENSION N/A 12/06/2017    Procedure: MID URETHRAL SLING CYSTSCOPY;  Surgeon: Abby Méndez MD;  Location: [facility] OR;  Service:     TRIGGER POINT INJECTION      TUBAL ABDOMINAL LIGATION      TYMPANOSTOMY TUBE PLACEMENT Right     UPPER GASTROINTESTINAL ENDOSCOPY  approx 2021    WRIST SURGERY Bilateral     carpal tunnel         Objective     Vitals:    08/09/24 1016   BP: 123/75   Pulse: 67   SpO2: 97%     Body mass index is 37.41 kg/m².    Physical Exam  Constitutional:       Appearance: Normal appearance.   HENT:      Head: Normocephalic and  atraumatic.   Eyes:      Extraocular Movements: Extraocular movements intact.      Conjunctiva/sclera: Conjunctivae normal.      Pupils: Pupils are equal, round, and reactive to light.   Cardiovascular:      Rate and Rhythm: Normal rate and regular rhythm.      Pulses: Normal pulses.   Pulmonary:      Breath sounds: Normal breath sounds.   Abdominal:      Palpations: Abdomen is soft.   Musculoskeletal:         General: Normal range of motion.      Cervical back: Normal range of motion and neck supple.   Skin:     General: Skin is warm and dry.   Neurological:      Mental Status: She is alert and oriented to person, place, and time.      Cranial Nerves: Cranial nerves 2-12 are intact.      Motor: Motor function is intact. No weakness or atrophy.      Coordination: Coordination is intact. Romberg sign negative. Romberg Test abnormal.      Gait: Gait is intact. Gait normal.      Deep Tendon Reflexes: Reflexes are normal and symmetric.      Reflex Scores:       Tricep reflexes are 2+ on the right side and 2+ on the left side.       Bicep reflexes are 2+ on the right side and 2+ on the left side.       Brachioradialis reflexes are 2+ on the right side and 2+ on the left side.       Patellar reflexes are 2+ on the right side and 2+ on the left side.       Achilles reflexes are 2+ on the right side and 2+ on the left side.  Psychiatric:         Speech: Speech normal.         Neurologic Exam     Mental Status   Oriented to person, place, and time.   Attention: normal. Concentration: normal.   Speech: speech is normal   Level of consciousness: alert    Cranial Nerves   Cranial nerves II through XII intact.     CN III, IV, VI   Pupils are equal, round, and reactive to light.    Motor Exam   Muscle bulk: normal  Overall muscle tone: normal    Strength   Strength 5/5 except as noted.     Sensory Exam   Light touch normal.     Gait, Coordination, and Reflexes     Gait  Gait: normal    Coordination   Romberg: positive    Reflexes    Reflexes 2+ except as noted.   Right brachioradialis: 2+  Left brachioradialis: 2+  Right biceps: 2+  Left biceps: 2+  Right triceps: 2+  Left triceps: 2+  Right patellar: 2+  Left patellar: 2+  Right achilles: 2+  Left achilles: 2+  Right Curiel: present  Left Curiel: present      Assessment & Plan   Independent Review of Radiographic Studies:      I personally reviewed the images from the following studies.    MR: MRI of the brain w/wo contrast was reviewed and shows stable pituitary cyst and stable small meningioma in the parafalcine anterior portion    Assessment/Plan: Stable imaging for her pituitary cyst and her small meningioma.  However she continues to complain of balance issues.  She does have poor balance and bilateral Mike's but that may be related to a previous spinal cord compression and myelopathy that was addressed with an ACDF prior to 2023.  We can obtain a repeat MRI if there is no residual stenosis is likely is just residual myelopathy.  She did very well from physical therapy and which improved her balance after her last visit and I think she will benefit from that again.    Medical Decision Making:      MRI cervical spine  Physical therapy         Diagnoses and all orders for this visit:    1. Gait disturbance (Primary)  -     MRI Cervical Spine Without Contrast; Future  -     Ambulatory Referral to Physical Therapy             Patient Instructions/Recommendations:    Follow-up in 3 months with MRI      Bob Mercado MD  08/09/24  10:26 EDT

## 2024-08-09 ENCOUNTER — OFFICE VISIT (OUTPATIENT)
Dept: NEUROSURGERY | Facility: CLINIC | Age: 64
End: 2024-08-09
Payer: MEDICARE

## 2024-08-09 VITALS
HEIGHT: 63 IN | BODY MASS INDEX: 37.42 KG/M2 | WEIGHT: 211.2 LBS | DIASTOLIC BLOOD PRESSURE: 75 MMHG | HEART RATE: 67 BPM | SYSTOLIC BLOOD PRESSURE: 123 MMHG | OXYGEN SATURATION: 97 %

## 2024-08-09 DIAGNOSIS — R26.9 GAIT DISTURBANCE: Primary | ICD-10-CM

## 2024-08-22 ENCOUNTER — TELEPHONE (OUTPATIENT)
Dept: GASTROENTEROLOGY | Facility: CLINIC | Age: 64
End: 2024-08-22
Payer: MEDICARE

## 2024-08-22 ENCOUNTER — OFFICE VISIT (OUTPATIENT)
Dept: GASTROENTEROLOGY | Facility: CLINIC | Age: 64
End: 2024-08-22
Payer: MEDICARE

## 2024-08-22 ENCOUNTER — TREATMENT (OUTPATIENT)
Dept: PHYSICAL THERAPY | Facility: CLINIC | Age: 64
End: 2024-08-22
Payer: MEDICARE

## 2024-08-22 VITALS
WEIGHT: 219 LBS | BODY MASS INDEX: 37.39 KG/M2 | TEMPERATURE: 97 F | HEIGHT: 64 IN | SYSTOLIC BLOOD PRESSURE: 124 MMHG | DIASTOLIC BLOOD PRESSURE: 84 MMHG | HEART RATE: 58 BPM

## 2024-08-22 DIAGNOSIS — R26.89 DECREASED FUNCTIONAL MOBILITY: ICD-10-CM

## 2024-08-22 DIAGNOSIS — R29.898 WEAKNESS OF BOTH LEGS: ICD-10-CM

## 2024-08-22 DIAGNOSIS — D50.9 IRON DEFICIENCY ANEMIA, UNSPECIFIED IRON DEFICIENCY ANEMIA TYPE: ICD-10-CM

## 2024-08-22 DIAGNOSIS — Z80.0 FH: PANCREATIC CANCER: Primary | ICD-10-CM

## 2024-08-22 DIAGNOSIS — D12.6 ADENOMATOUS POLYP OF COLON, UNSPECIFIED PART OF COLON: ICD-10-CM

## 2024-08-22 DIAGNOSIS — R29.6 FREQUENT FALLS: ICD-10-CM

## 2024-08-22 DIAGNOSIS — R26.9 GAIT DISTURBANCE: Primary | ICD-10-CM

## 2024-08-22 DIAGNOSIS — K59.00 CONSTIPATION, UNSPECIFIED CONSTIPATION TYPE: ICD-10-CM

## 2024-08-22 NOTE — TELEPHONE ENCOUNTER
DR SEVILLA PT WAS IN TO SEE YOU TODAY YOU REQUESTED HER HAVE EGD IN 6-8WKS IF YOU PROVIDE ME WITH A DATE I WILL SCHEDULE.OK FOR HUB TO RELAY

## 2024-08-22 NOTE — PROGRESS NOTES
"    Physical Therapy Initial Evaluation and Plan of Care  [facility]  [place] - [address], [address]     [place] [address]   Phone: [phone]   Fax: [phone]    Patient: Keri Bhagat   : 1960  Diagnosis/ICD-10 Code:  Gait disturbance [R26.9]  Referring practitioner: Bob TORRES MD  Date of Initial Visit: 2024  Today's Date: 2024  Patient seen for 1 session       Visit Diagnoses:    ICD-10-CM ICD-9-CM   1. Gait disturbance  R26.9 781.2   2. Decreased functional mobility  R26.89 781.99   3. Frequent falls  R29.6 V15.88   4. Weakness of both legs  R29.898 729.89     Subjective Questionnaire: ABC: 55%      Subjective Evaluation    History of Present Illness  Mechanism of injury: Patient presents to this clinic with diagnosis of gait disturbances. Patient reports having frequent falls, and reports having a TKA completed in March of this year, and report having a fall in May resulting in dehiscence of L TKA requiring additional surgery. Patient reports she has 2-3 falls per year. \"My feet get tangled up sometimes, and sometimes I get lightheaded and dizzy. And sometimes I catch my toes on things.\" Patient reports she lives with her adult daughter. Patient denies use of assistive device at this time, but reports she does have a walker and SPC if needed. Patient reports she has about 6-8 steps to enter with a unilateral hand rail. Patient reports she has a tub/shower combo with currently no hand rails.     Patient reports she is currently being followed by neurology for current pituitary cyst, and meningioma. Followed by cardiology for murmur and CAD.    Patient reports having physical therapy for balance last year, which she feels helped her balance significantly. Hopes to have similar outcomes this time as well.      Patient Occupation: disabled Pain  Current pain rating: 3  At best pain rating: 3  At worst pain ratin  Location: L " knee  Quality: sharp, throbbing and dull ache  Relieving factors: medications, rest and relaxation  Aggravating factors: squatting, standing, stairs and ambulation    Social Support  Lives in: apartment  Lives with: adult children    Hand dominance: right    Treatments  Previous treatment: physical therapy and medication  Patient Goals  Patient goals for therapy: decreased pain, improved balance and increased strength         Pertinent PMH: L TKA 3/5/24 with dehiscence due to fall in May 2024, pituitary cyst and small meningioma (followed by neurology), MI (39 years old) and CAD and heart murmur (followed by cardiology), hypertension, bilateral wrist fracture with L ORIF ~.     Objective          Active Range of Motion   Left Knee   Flexion: 108 degrees   Extension: 0 degrees     Right Knee   Normal active range of motion    Strength/Myotome Testing     Left Hip   Planes of Motion   Flexion: 4-  Extension: 4-  Abduction: 4-  Adduction: 4    Right Hip   Planes of Motion   Flexion: 4-  Extension: 4-  Abduction: 4-  Adduction: 4    Left Knee   Flexion: 4-  Extension: 4-    Right Knee   Flexion: 4  Extension: 4    Left Ankle/Foot   Dorsiflexion: 5  Plantar flexion: 5    Right Ankle/Foot   Dorsiflexion: 5  Plantar flexion: 5    Ambulation     Ambulation: Stairs   Ascend stairs: independent  Pattern: non-reciprocal  Railings: two rails  Descend stairs: independent  Pattern: non-reciprocal  Railings: two rails    Functional Assessment     Comments  Balance:   30 second sit to stand: 10 reps  TU.3 seconds  Torrez/56  Romberg on firm surface EO: 30+ sec, EC 1.2 seconds  Tandem stance on firm surface 10 seconds bilaterally, airex 1/2 seconds  SLS: 1.2 seconds R, unable L          Assessment & Plan       Assessment  Impairments: abnormal coordination, abnormal gait, activity intolerance, impaired balance, impaired physical strength, lacks appropriate home exercise program and safety issue   Functional limitations:  walking, standing, stooping and reaching overhead   Assessment details: Patient is a 65 y/o female who presents to physical therapy with diagnosis of balance disorder, and reports 2-3 falls in the past year. Patient currently ambulates without an assistive device. Patient has PMH significant for pituitary cyst, meninginoma, bilateral wrist fx with L ORIF, L TKA with complications, heart murmur and CAD, and MI when she was 40 y/o. Patient demonstrates decreased muscle power of bilateral lower extremities, decreased ROM and mobility of L LE, is a moderate fall risk according to multiple fall screens include scoring below threshold on TUG, but scored okay on LANDRY balance scale. Due to deficits, patient exhibits difficulty with ambulating on even and uneven surface, negotiating curbs/stairs, navigating the community, and has difficulty with transitional movements. Secondary to limitations, patient has difficulty participating in typical ADLs, such as house chores and cooking, and expresses difficulty participating in community events such as grocery shopping, and spending time with family and friends. Patient scored a 55% on the ABC scale. Patient is a good candidate to initiate outpatient physical therapy to improve LE strength, improve functional mobility, and decrease risk for future falls.     Prognosis: good    Goals  Plan Goals: SHORT TERM GOALS:   5 weeks  1. Pt will be compliant with HEP.  2. Pt will have TUG score of 12.5 sec or less without AD in order to decrease risk for falls.  3.  Pt to perform 11 reps in 30 second sit to stand to demonstrate improve safety with positional changes.  4. Pt will demonstrate 4+/5 B LE strength in order to improve gait, transfers and stair climbing ability.  5. Patient will navigate four 6 in steps with reciprocal pattern with bilateral hand rails.       LONG TERM GOALS:  10 weeks  1.Patient will be independent with progressive HEP.  2. Pt will be able to climb ten 6in steps  with reciprocal pattern and unilateral handrail due to improved strength.  3. Pt will be able to perform 12 sit-stand without use of UE in 30 second sit to stand due to improved strength.  4. Pt will be able to stand on foam for 15 sec or greater with eyes closed due to improved balance.  5. Pt will improve TUG score to 11 sec or less due to improved gait mechanics, strength and balance.  6.  Pt to score at least 52/56 on LANDRY assessment.        Plan  Therapy options: will be seen for skilled therapy services  Planned modality interventions: cryotherapy and thermotherapy (hydrocollator packs)  Planned therapy interventions: abdominal trunk stabilization, ADL retraining, body mechanics training, balance/weight-bearing training, flexibility, functional ROM exercises, gait training, home exercise program, neuromuscular re-education, motor coordination training, postural training, soft tissue mobilization, strengthening, stretching, therapeutic activities, transfer training and IADL retraining  Frequency: 2x week  Duration in weeks: 8  Treatment plan discussed with: patient        History # of Personal Factors and/or Comorbidities: MODERATE (1-2)  Examination of Body System(s): # of elements: MODERATE (3)  Clinical Presentation: EVOLVING  Clinical Decision Making: MODERATE      Timed:         Manual Therapy:         mins  58426;     Therapeutic Exercise:    20     mins  58040;     Neuromuscular Zelda:        mins  77382;    Therapeutic Activity:          mins  48427;     Gait Training:           mins  71313;     Ultrasound:          mins  69569;    Ionto                                   mins   68262  Self Care                            mins   91043      Un-Timed:  Electrical Stimulation:         mins  57796 ( );  Dry Needling          mins self-pay  Traction          mins 42747  Low Eval          Mins  23757  Mod Eval     40     Mins  74885  High Eval                            Mins  72855  [facility]          mins 25701      Timed Treatment:   20   mins   Total Treatment:     60   mins          PT: Parish Lock PT     License Number: [place] [id number]# [id number]. [place] LIC# [id number]  Electronically signed by Parish Lock PT, 08/22/24, 1:29 PM EDT    Certification Period: 8/22/2024 thru 11/19/2024  I certify that the therapy services are furnished while this patient is under my care.  The services outlined above are required by this patient, and will be reviewed every 90 days.         Physician Signature:__________________________________________________    PHYSICIAN: Bob Mercado MD  NPI: [id number]                                      DATE:      Please sign and return via fax to [phone]. Thank you, [facility].

## 2024-08-22 NOTE — PROGRESS NOTES
Chief Complaint   Patient presents with    Constipation       History of Present Illness:   64 y.o. female with GERD and h/o colon polyps.        Colonoscopy 2021 ([facility]).  Pathology showed tubular adenoma in the sigmoid.  Recall 5 years.           EGD 2017 - no gross lesions in the esophagus.  Dilation.  Erythematous stomach.  normal duodenum.       She was given Linzess 145 mcg/day for constipation and it gave her diarrhea. No rectal bleeding or melena. No abd or chest pain. NO nausea or vomiting. Sometimes heartburn.       FH (cousin) pancreatic cancer. Sister has pancreatic troubles. Her weight is going up.      Past Medical History:   Diagnosis Date    Abnormal Pap smear of cervix     Adrenal adenoma 09/21/2022    Anemia     Anxiety     Arthritis     Arthritis of back     Arthritis of neck     Bipolar I disorder, single manic episode     depressive d(NOS)    Cervical disc herniation     Colon polyp     Coronary artery disease     COVID-19 01/10/2023    Death of family member     MOTHER IN FEB 2024    Depression     NO SUICIDAL PLANS    Dislocation of finger     Diverticular disease     Diverticulitis of colon     Dyspepsia     Encounter for follow-up examination after completed treatment for conditions other than malignant neoplasm 04/11/2018    Fracture of wrist     GERD (gastroesophageal reflux disease)     Heart attack     AGE 39    Heart murmur     Hiatal hernia     History of transfusion     2001    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    Lumbosacral disc disease Unsure    Lower left back  is partial osteoarthritis and partial osteoporosis    Obesity     Osteopenia 2021    Bone density test    Periodic limb movement disorder     Restless leg syndrome     LEFT SHOULDER    Rotator cuff syndrome     Sleep apnea     NO MACHINE CURRENTLY    Suicidal ideation 08/19/2016    history    Thyroid nodule     Vitamin B12 deficiency        Past Surgical History:   Procedure Laterality Date    ADENOIDECTOMY  1974    ANTERIOR CERVICAL DISCECTOMY W/ FUSION N/A 12/29/2016    Procedure: C3-4 anterior cervical discectomy and fusion with Depuy micro plate, ALLOGRAFT C3-4, AND HARDWARE REMOVAL C4-7.;  Surgeon: Hema Godwin MD;  Location: [facility] MAIN OR;  Service:     BARIATRIC SURGERY      CARDIAC CATHETERIZATION N/A 03/30/2017    Procedure: Left Heart Cath;  Surgeon: Tracey Vargas MD;  Location:  [facility] CATH INVASIVE [facility]ATION;  Service:     CARDIAC CATHETERIZATION N/A 03/30/2017    Procedure: Coronary angiography;  Surgeon: Tracey Vargas MD;  Location:  [facility];  Service:     CARDIAC CATHETERIZATION N/A 03/30/2017    Procedure: Left ventriculography;  Surgeon: Tracey Vargas MD;  Location:  [facility];  Service:     CARDIAC CATHETERIZATION  03/30/2017    Procedure: Functional Flow [place];  Surgeon: Tracey Vargas MD;  Location:  [facility];  Service:     CATARACT EXTRACTION Bilateral     CERVICAL BIOPSY  MMXVI    Dr. Jeffery.     CERVICAL DISCECTOMY ANTERIOR  04/2013    C4-7    COLONOSCOPY  04/20/2015    Diverticulosis, IH    COLONOSCOPY  03/09/2021    COLPOSCOPY W/ BIOPSY / CURETTAGE  06/17/2016    LGSIL HPV positive. Results were normal repeat pap in one year. Chronic Cervicitis    CORONARY ANGIOPLASTY WITH STENT PLACEMENT      ENDOSCOPY  MMXV    Normal.  Dr. Rodriguez    ENDOSCOPY N/A 06/22/2017    Erythematous mucosa in the stomach  PATH: Chronic active gastritis, moderate with intestinal metaplasia     EPIDURAL BLOCK      INCISION AND DRAINAGE LEG Left  05/12/2024    Procedure: INCISION AND DRAINAGE LOWER EXTREMITY WOUND CLOSURE KNEE;  Surgeon: Ajay Gutierrez MD;  Location: [facility] OR;  Service: Orthopedics;  Laterality: Left;    INGUINAL HERNIA REPAIR      JOINT REPLACEMENT  3/5/24    Left knee    KNEE SURGERY      LAPAROSCOPIC GASTRIC BANDING  02/2018    SHOULDER SURGERY Left     RCR    TONSILLECTOMY AND ADENOIDECTOMY      TOTAL KNEE ARTHROPLASTY Left 03/05/2024    Procedure: LEFT TOTAL KNEE ARTHROPLASTY WITH TAB NAVIGATION;  Surgeon: Tho Pritchard MD;  Location: [facility];  Service: Orthopedics;  Laterality: Left;    TRANSVAGINAL TAPING SUSPENSION N/A 12/06/2017    Procedure: MID URETHRAL SLING CYSTSCOPY;  Surgeon: Abby Méndez MD;  Location: [facility] OR;  Service:     TRIGGER POINT INJECTION      TUBAL ABDOMINAL LIGATION      TYMPANOSTOMY TUBE PLACEMENT Right     UPPER GASTROINTESTINAL ENDOSCOPY  approx 2021    WRIST SURGERY Bilateral     carpal tunnel         Current Outpatient Medications:     apixaban (ELIQUIS) 5 MG tablet tablet, Take 1 tablet by mouth Every 12 (Twelve) Hours., Disp: 60 tablet, Rfl: 11    atorvastatin (LIPITOR) 40 MG tablet, TAKE 1 TABLET BY MOUTH DAILY, Disp: 90 tablet, Rfl: 1    B Complex Vitamins (vitamin b complex) capsule capsule, Take 1 capsule by mouth 2 (Two) Times a Day. Indications: Vitamin Deficiency, Disp: , Rfl:     buPROPion XL (WELLBUTRIN XL) 150 MG 24 hr tablet, Take 1 tablet by mouth 2 (Two) Times a Day. Indications: Major Depressive Disorder, Disp: , Rfl:     Calcium Citrate-Vitamin D (CITRUS CALCIUM/VITAMIN D PO), Take  by mouth 2 (Two) Times a Day. Indications: supplement, Disp: , Rfl:     cephalexin (KEFLEX) 500 MG capsule, TAKE 1 CAPSULE BY MOUTH 3 TIMES A DAY, Disp: 21 capsule, Rfl: 0    cyanocobalamin (VITAMIN B-12) 1000 MCG tablet, Take 1 tablet by mouth Daily. Indications: Inadequate Vitamin B12, Disp: , Rfl:     diclofenac (VOLTAREN) 0.1 % ophthalmic solution, 1 drop 4 (Four)  Times a Day., Disp: , Rfl:     estradiol (ESTRACE) 0.1 MG/GM vaginal cream, Insert 1 g into the vagina 3 (Three) Times a Week. Indications: Vulvovaginal Atrophy, Disp: , Rfl:     Ferrous Sulfate (IRON PO), Take 1 tablet by mouth 2 (Two) Times a Day. Indications: Supplement, Disp: , Rfl:     gabapentin (NEURONTIN) 300 MG capsule, TAKE ONE CAPSULE BY MOUTH THREE TIMES A DAY, Disp: 270 capsule, Rfl: 0    Gemtesa 75 MG tablet, , Disp: , Rfl:     ibuprofen (ADVIL,MOTRIN) 800 MG tablet, Take 1 tablet by mouth 3 (Three) Times a Day., Disp: 90 tablet, Rfl: 0    isosorbide mononitrate (IMDUR) 30 MG 24 hr tablet, Take 1 tablet by mouth Daily., Disp: 90 tablet, Rfl: 2    lamoTRIgine (LaMICtal) 100 MG tablet, Take 1 tablet by mouth Daily., Disp: , Rfl:     levothyroxine (SYNTHROID, LEVOTHROID) 88 MCG tablet, Take 1 tablet by mouth Every Morning., Disp: 90 tablet, Rfl: 2    metoprolol succinate XL (Toprol XL) 25 MG 24 hr tablet, Take 1.5 tablets by mouth Daily. For bp, Disp: 135 tablet, Rfl: 3    OLANZapine (zyPREXA) 5 MG tablet, Take 1 tablet by mouth Every Night. Indications: Depressive Phase of Manic-Depression, Disp: , Rfl:     ondansetron (ZOFRAN) 4 MG tablet, TAKE 1 TABLET BY MOUTH EVERY 8 HOURS AS NEEDED FOR NAUSEA AND/OR VOMITING, Disp: 30 tablet, Rfl: 0    pantoprazole (PROTONIX) 40 MG EC tablet, Take 1 tablet by mouth 2 (Two) Times a Day., Disp: 180 tablet, Rfl: 0    venlafaxine (EFFEXOR) 37.5 MG tablet, , Disp: , Rfl:     No Known Allergies    Family History   Problem Relation Age of Onset    Diabetes type II Mother     Hypertension Mother     Osteoporosis Mother     Seizures Mother     Diabetes Mother     Arthritis Mother     Hyperlipidemia Mother     COPD Father     Hypertension Father     Lung cancer Father     Heart attack Father     Liver cancer Father     Liver disease Father     Heart disease Father     Cancer Father         Lung cacer that metastasized  into the liver.    Thyroid disease Sister      Hypertension Sister     Bipolar disorder Sister     Depression Sister     ADD / ADHD Sister     Anxiety disorder Sister     Mental illness Sister     Hyperlipidemia Sister     Broken bones Sister     Colon polyps Sister     Thyroid disease Sister     Hypertension Sister     Bipolar disorder Sister     Anxiety disorder Sister     Depression Sister     Mental illness Sister     Hyperlipidemia Sister     Colon polyps Sister     Cancer Maternal Grandmother     No Known Problems Maternal Grandfather     No Known Problems Paternal Grandmother     No Known Problems Paternal Grandfather     Abnormal EKG Daughter     Hypertension Daughter     Bipolar disorder Daughter     Thyroid disease Daughter     Mental illness Daughter     Hyperlipidemia Daughter     Asthma Daughter     No Known Problems Son     Diabetes Son         Type 1    Diabetes Paternal Uncle     Breast cancer Neg Hx     Ovarian cancer Neg Hx     Uterine cancer Neg Hx     Colon cancer Neg Hx     Malig Hyperthermia Neg Hx        Social History     Socioeconomic History    Marital status:     Number of children: 3    Years of education: 12   Tobacco Use    Smoking status: Every Day     Current packs/day: 0.25     Average packs/day: 0.6 packs/day for 23.8 years (15.0 ttl pk-yrs)     Types: Cigarettes, Electronic Cigarette     Start date: 12/12/2012     Passive exposure: Current    Smokeless tobacco: Current    Tobacco comments:     Just using vape.   Vaping Use    Vaping status: Every Day    Substances: Nicotine, Flavoring    Devices: [employer]ister Spexble tank   Substance and Sexual Activity    Alcohol use: Not Currently     Comment: RARELY    Drug use: Never    Sexual activity: Not Currently     Partners: Male     Birth control/protection: Abstinence, Post-menopausal       Review of Systems   Gastrointestinal:  Negative for abdominal pain.   All other systems reviewed and are negative.    Pertinent positives and negatives documented in the HPI and all other systems  reviewed and were found to be negative.  Vitals:    08/22/24 1159   BP: 124/84   Pulse: 58   Temp: 97 °F (36.1 °C)       Physical Exam  Vitals reviewed.   Constitutional:       General: She is not in acute distress.     Appearance: Normal appearance. She is well-developed. She is not diaphoretic.   HENT:      Head: Normocephalic and atraumatic. Hair is normal.      Right Ear: Hearing, tympanic membrane, ear canal and external ear normal. No decreased hearing noted. No drainage.      Left Ear: Hearing, tympanic membrane, ear canal and external ear normal. No decreased hearing noted.      Nose: Nose normal. No nasal deformity.      Mouth/Throat:      Mouth: Mucous membranes are moist.   Eyes:      General: Lids are normal.         Right eye: No discharge.         Left eye: No discharge.      Extraocular Movements: Extraocular movements intact.      Conjunctiva/sclera: Conjunctivae normal.      Pupils: Pupils are equal, round, and reactive to light.   Neck:      Thyroid: No thyromegaly.      Vascular: No JVD.      Trachea: No tracheal deviation.   Cardiovascular:      Rate and Rhythm: Normal rate and regular rhythm.      Pulses: Normal pulses.      Heart sounds: Normal heart sounds. No murmur heard.     No friction rub. No gallop.   Pulmonary:      Effort: Pulmonary effort is normal. No respiratory distress.      Breath sounds: Normal breath sounds. No wheezing or rales.   Chest:      Chest wall: No tenderness.   Abdominal:      General: Bowel sounds are normal. There is no distension.      Palpations: Abdomen is soft. There is no mass.      Tenderness: There is no abdominal tenderness. There is no guarding or rebound.      Hernia: No hernia is present.   Genitourinary:     Rectum: Normal. Guaiac result negative.   Musculoskeletal:         General: No tenderness or deformity. Normal range of motion.      Cervical back: Normal range of motion and neck supple.   Lymphadenopathy:      Cervical: No cervical adenopathy.    Skin:     General: Skin is warm and dry.      Findings: No erythema or rash.   Neurological:      Mental Status: She is alert and oriented to person, place, and time.      Cranial Nerves: No cranial nerve deficit.      Motor: No abnormal muscle tone.      Coordination: Coordination normal.      Deep Tendon Reflexes: Reflexes are normal and symmetric. Reflexes normal.   Psychiatric:         Mood and Affect: Mood normal.         Behavior: Behavior normal.         Thought Content: Thought content normal.         Judgment: Judgment normal.         Diagnoses and all orders for this visit:    1. FH: pancreatic cancer (Primary)  -     CT Abdomen Pelvis With Contrast; Future  -     Case Request; Standing  -     Case Request    2. Iron deficiency anemia, unspecified iron deficiency anemia type  -     Case Request; Standing  -     Case Request    3. Adenomatous polyp of colon, unspecified part of colon    4. Constipation, unspecified constipation type    Other orders  -     Implement Anesthesia orders day of procedure.; Standing  -     Follow Anesthesia Guidelines / Protocol; Future  -     Verify bowel prep was successful; Standing  -     Give tap water enema if bowel prep was insufficient; Standing      Assessment:  History of colon polyps.  Her last colonoscopy was in 2021.  It was recommended that she have a repeat colonoscopy in 5 years.  GERD  On Eliquis for a fib. Dr. Burroughs  Constipation.   H/o iron def anemia  FH (cousin) pancreatic cancer.      Recommendations:  Take Miralax daily  EGD and colonoscopy.  CT abd/pelvis      No follow-ups on file.    Eduar Rodriguez MD  8/22/2024

## 2024-08-23 ENCOUNTER — TELEPHONE (OUTPATIENT)
Dept: CARDIOLOGY | Facility: CLINIC | Age: 64
End: 2024-08-23
Payer: MEDICARE

## 2024-08-23 ENCOUNTER — HOSPITAL ENCOUNTER (OUTPATIENT)
Dept: CARDIOLOGY | Facility: HOSPITAL | Age: 64
Discharge: HOME | End: 2024-08-23
Payer: MEDICARE

## 2024-08-23 ENCOUNTER — TELEPHONE (OUTPATIENT)
Dept: GASTROENTEROLOGY | Facility: CLINIC | Age: 64
End: 2024-08-23
Payer: MEDICARE

## 2024-08-23 VITALS
OXYGEN SATURATION: 94 % | HEART RATE: 63 BPM | TEMPERATURE: 98.1 F | SYSTOLIC BLOOD PRESSURE: 144 MMHG | DIASTOLIC BLOOD PRESSURE: 89 MMHG | RESPIRATION RATE: 16 BRPM

## 2024-08-23 DIAGNOSIS — I48.0 PAF (PAROXYSMAL ATRIAL FIBRILLATION): ICD-10-CM

## 2024-08-23 DIAGNOSIS — R06.02 SHORTNESS OF BREATH: ICD-10-CM

## 2024-08-23 DIAGNOSIS — R07.9 CHEST PAIN, UNSPECIFIED TYPE: Primary | ICD-10-CM

## 2024-08-23 PROBLEM — Z80.0 FH: PANCREATIC CANCER: Status: ACTIVE | Noted: 2024-08-22

## 2024-08-23 LAB
ALBUMIN SERPL-MCNC: 4 G/DL (ref 3.5–5.2)
ALBUMIN/GLOB SERPL: 1.7 G/DL
ALP SERPL-CCNC: 84 U/L (ref 39–117)
ALT SERPL W P-5'-P-CCNC: 25 U/L (ref 1–33)
ANION GAP SERPL CALCULATED.3IONS-SCNC: 8.4 MMOL/L (ref 5–15)
AST SERPL-CCNC: 21 U/L (ref 1–32)
BASOPHILS # BLD AUTO: 0.02 10*3/MM3 (ref 0–0.2)
BASOPHILS NFR BLD AUTO: 0.3 % (ref 0–1.5)
BILIRUB SERPL-MCNC: 0.3 MG/DL (ref 0–1.2)
BUN SERPL-MCNC: 10 MG/DL (ref 8–23)
BUN/CREAT SERPL: 10.5 (ref 7–25)
CALCIUM SPEC-SCNC: 9 MG/DL (ref 8.6–10.5)
CHLORIDE SERPL-SCNC: 106 MMOL/L (ref 98–107)
CO2 SERPL-SCNC: 27.6 MMOL/L (ref 22–29)
CREAT SERPL-MCNC: 0.95 MG/DL (ref 0.57–1)
D DIMER PPP FEU-MCNC: <0.27 MCGFEU/ML (ref 0–0.62)
DEPRECATED RDW RBC AUTO: 42.4 FL (ref 37–54)
EGFRCR SERPLBLD CKD-EPI 2021: 67.9 ML/MIN/1.73
EOSINOPHIL # BLD AUTO: 0.22 10*3/MM3 (ref 0–0.4)
EOSINOPHIL NFR BLD AUTO: 3.1 % (ref 0.3–6.2)
ERYTHROCYTE [DISTWIDTH] IN BLOOD BY AUTOMATED COUNT: 12.3 % (ref 12.3–15.4)
GLOBULIN UR ELPH-MCNC: 2.3 GM/DL
GLUCOSE SERPL-MCNC: 101 MG/DL (ref 65–99)
HCT VFR BLD AUTO: 37.7 % (ref 34–46.6)
HGB BLD-MCNC: 12.7 G/DL (ref 12–15.9)
IMM GRANULOCYTES # BLD AUTO: 0.03 10*3/MM3 (ref 0–0.05)
IMM GRANULOCYTES NFR BLD AUTO: 0.4 % (ref 0–0.5)
LYMPHOCYTES # BLD AUTO: 2.98 10*3/MM3 (ref 0.7–3.1)
LYMPHOCYTES NFR BLD AUTO: 42 % (ref 19.6–45.3)
MCH RBC QN AUTO: 31.6 PG (ref 26.6–33)
MCHC RBC AUTO-ENTMCNC: 33.7 G/DL (ref 31.5–35.7)
MCV RBC AUTO: 93.8 FL (ref 79–97)
MONOCYTES # BLD AUTO: 0.42 10*3/MM3 (ref 0.1–0.9)
MONOCYTES NFR BLD AUTO: 5.9 % (ref 5–12)
NEUTROPHILS NFR BLD AUTO: 3.43 10*3/MM3 (ref 1.7–7)
NEUTROPHILS NFR BLD AUTO: 48.3 % (ref 42.7–76)
NRBC BLD AUTO-RTO: 0 /100 WBC (ref 0–0.2)
NT-PROBNP SERPL-MCNC: 378 PG/ML (ref 0–900)
PLATELET # BLD AUTO: 238 10*3/MM3 (ref 140–450)
PMV BLD AUTO: 10.2 FL (ref 6–12)
POTASSIUM SERPL-SCNC: 3.8 MMOL/L (ref 3.5–5.2)
PROT SERPL-MCNC: 6.3 G/DL (ref 6–8.5)
RBC # BLD AUTO: 4.02 10*6/MM3 (ref 3.77–5.28)
SODIUM SERPL-SCNC: 142 MMOL/L (ref 136–145)
TROPONIN T SERPL HS-MCNC: 7 NG/L
WBC NRBC COR # BLD AUTO: 7.1 10*3/MM3 (ref 3.4–10.8)

## 2024-08-23 PROCEDURE — 85025 COMPLETE CBC W/AUTO DIFF WBC: CPT

## 2024-08-23 PROCEDURE — 36415 COLL VENOUS BLD VENIPUNCTURE: CPT

## 2024-08-23 PROCEDURE — 93005 ELECTROCARDIOGRAM TRACING: CPT | Performed by: NURSE PRACTITIONER

## 2024-08-23 PROCEDURE — 80053 COMPREHEN METABOLIC PANEL: CPT

## 2024-08-23 PROCEDURE — 84484 ASSAY OF TROPONIN QUANT: CPT | Performed by: NURSE PRACTITIONER

## 2024-08-23 PROCEDURE — 83880 ASSAY OF NATRIURETIC PEPTIDE: CPT | Performed by: NURSE PRACTITIONER

## 2024-08-23 PROCEDURE — 85379 FIBRIN DEGRADATION QUANT: CPT | Performed by: NURSE PRACTITIONER

## 2024-08-23 RX ORDER — SODIUM CHLORIDE 0.9 % (FLUSH) 0.9 %
10 SYRINGE (ML) INJECTION AS NEEDED
Status: SHIPPED | OUTPATIENT
Start: 2024-08-23

## 2024-08-23 RX ORDER — NITROGLYCERIN 0.4 MG/1
0.4 TABLET SUBLINGUAL
Status: SHIPPED | OUTPATIENT
Start: 2024-08-23

## 2024-08-23 NOTE — TELEPHONE ENCOUNTER
Called and left VM. Will continue to try to reach patient. HUB transfer call to triage.     Tenisha Bartlett RN  Triage [facility] [place]

## 2024-08-23 NOTE — TELEPHONE ENCOUNTER
Patient is calling because she has been actively having sharp chest pain all morning. She said that the pain has been intermittent all week, but now it is constant and she rates it a 7/10. She had no vitals to provide. I said with her having the constant pain and rating it as a 7/10 she should go to the ER. She verbalized understanding and said she will go.     Tenisha Bartlett RN  Triage [facility] [place]

## 2024-08-23 NOTE — TELEPHONE ENCOUNTER
08/23/24  11:29 EDT    Can she come to the [facility] today and I will see her there?  I would like to avoid ED for things that I can see a patient in clinic for.      DAPHNEY Gonzalez  [facility]

## 2024-08-23 NOTE — PROGRESS NOTES
CARDIOLOGY        Patient Name: Keri Bhagat  :1960  Age: 64 y.o.  Primary Cardiologist: David Burroughs MD  Encounter Provider:  PAO MEDRANO    Date of Service: 24    CHIEF COMPLAINT / REASON FOR OFFICE VISIT     Chest Pain      HISTORY OF PRESENT ILLNESS       Chest Pain       Keri Bhagat is a 64 y.o. female who [place] to the [facility] [place] for evaluation of chest pain as an urgent add-on.    Pt has a  history significant for CAD, hypertension.    Review of medical records reveals [facility] on [facility]. Patient presents with chest discomfort that occurred at 4 PM while cooking. Reports that pain is worse with exertion. I have personally reviewed ECG tracings which reveals no ischemic changes. During emergency department assessment patient did have elevated heart rates with heart rate in the 1 teens very briefly. She did report episodes of heart palpitations at that time. Troponin remains flat. D-dimer and other labs were unremarkable. Chest x-ray did reveal a gastric lap band which is slightly more horizontal in configuration compared to . Concern for malpositioning. ED physician recommended follow-up with gastroenterology.    Patient had an echocardiogram in 2024 which was normal and does not reveal any signs of heart failure or valvular abnormalities.    The patient reports that today she moved from her chair in the livin room and experienced a sharp chest pain.  She is currently pain free.  She has noted intermittent episodes of a sharp chest pain over the past week.  She has noted that her BP is elevated when she is experieecning pain and improved at rest.  The pain is decribed as a sharp and stabbing pain that will last for several minutes.  Pain is not worsened with exertion.  Denies dyspnea, nausea or vomiting.  She has never experienced pain like this before.  She has also noted intermittent  heart palpitations.  Her daughter notes increased generalized fatigue with normal activities.  The patient has noted weight gain over the past few months.      The following portions of the patient's history were reviewed and updated as appropriate: allergies, current medications, past family history, past medical history, past social history, past surgical history and problem list.      VITAL SIGNS     Visit Vitals  /89 (BP Location: Right arm)   Pulse 63   Temp 98.1 °F (36.7 °C) (Oral)   Resp 16   LMP 10/21/2016 (Approximate) Comment: 54   SpO2 94%         Wt Readings from Last 3 Encounters:   08/22/24 99.3 kg (219 lb)   08/09/24 95.8 kg (211 lb 3.2 oz)   07/01/24 95.8 kg (211 lb 3.2 oz)     There is no height or weight on file to calculate BMI.      REVIEW OF SYSTEMS     Review of Systems   Cardiovascular:  Positive for chest pain.           PHYSICAL EXAMINATION     Physical Exam      REVIEWED DATA       ECG 12 Lead Chest Pain    Date/Time: 8/23/2024 1:29 PM  Performed by: Lluvia Porter APRN    Authorized by: Lluvia Porter APRN  Comparison: compared with previous ECG from 2/6/2024  Rhythm: sinus rhythm  Rate: normal  Conduction: conduction normal  ST Segments: ST segments normal  T Waves: T waves normal  QRS axis: normal    Clinical impression: normal ECG          Cardiac Procedures:  Cardiac catheterization 3/30/2017.  Left main 0% stenosis, LAD 20% mid stenosis, distal LAD 30% stenosis.  Ramus 0% stenosis, 80% stenosis of a small secondary branch.  Left circumflex 10% stenosis.  RCA 50-60% stenosis.  PDA 30% stenosis.  Echocardiogram 8/27/2021.  LVEF 56-60%.  Normal LV cavity size noted.  Diastolic function normal.  Myocardial perfusion stress test 8/27/2021.  Normal myocardial perfusion study without evidence of ischemia.  Cardiac event monitor 1/25/2024.  Abnormal monitor study.  PVCs representing less than 1% of all beats.  Patient had paroxysmal atrial fibrillation accounting for 2.3% of  beats.  Echocardiogram 2/15/2024.  LVEF 59%.  Moderate hypertrophy.  Echocardiogram 5/29/2024.  LVEF 63%.  Sigmoid shaped ventricular septum.  Grade 2 diastolic dysfunction.  Estimated RVSP less than 35 mmHg.    Lipid Panel          9/19/2023    15:05 2/15/2024    11:14 3/28/2024    15:34   Lipid Panel   Total Cholesterol 178  185  156    Triglycerides 62  130  113    HDL Cholesterol 82  61  59    VLDL Cholesterol 12  23  20    LDL Cholesterol  84  101  77    LDL/HDL Ratio  1.61         Lab Results   Component Value Date     08/23/2024     05/12/2024    K 3.8 08/23/2024    K 4.6 05/12/2024     08/23/2024     05/12/2024    CO2 27.6 08/23/2024    CO2 27.0 05/12/2024    BUN 10 08/23/2024    BUN 14 05/12/2024    CREATININE 0.95 08/23/2024    CREATININE 1.05 (H) 05/12/2024    EGFRIFNONA 49 (L) 10/13/2021    EGFRIFNONA 62 08/06/2021    EGFRIFAFRI 60 (L) 10/13/2021    EGFRIFAFRI 70 07/01/2021    GLUCOSE 101 (H) 08/23/2024    GLUCOSE 99 05/12/2024    CALCIUM 9.0 08/23/2024    CALCIUM 8.8 05/12/2024    PROTENTOTREF 6.7 03/28/2024    PROTENTOTREF 6.0 02/15/2024    ALBUMIN 4.0 08/23/2024    ALBUMIN 4.4 05/11/2024    BILITOT 0.3 08/23/2024    BILITOT <0.2 05/11/2024    AST 21 08/23/2024    AST 18 05/11/2024    ALT 25 08/23/2024    ALT 22 05/11/2024     Lab Results   Component Value Date    WBC 7.10 08/23/2024    WBC 8.14 05/12/2024    HGB 12.7 08/23/2024    HGB 10.9 (L) 05/13/2024    HCT 37.7 08/23/2024    HCT 33.1 (L) 05/13/2024    MCV 93.8 08/23/2024    MCV 92.1 05/12/2024     08/23/2024     05/12/2024     Lab Results   Component Value Date    PROBNP 378.0 08/23/2024    PROBNP 303.9 08/29/2023     Lab Results   Component Value Date    TROPONINI <0.01 05/03/2021    TROPONINT 7 08/23/2024     Lab Results   Component Value Date    TSH 2.930 03/28/2024    TSH 2.150 02/15/2024             ASSESSMENT & PLAN     Diagnoses and all orders for this visit:    1. Chest pain, unspecified type  (Primary)  -     Cardiac Monitoring; Standing  -     Vital Signs - Once; Standing  -     Vital Signs - As Needed; Standing  -     Pulse Oximetry; Standing  -     Oxygen Therapy- Nasal Cannula; Titrate 1-6 LPM Per SpO2; 92% or Greater; Standing  -     Insert Peripheral IV; Standing  -     sodium chloride 0.9 % flush 10 mL  -     nitroglycerin (NITROSTAT) SL tablet 0.4 mg  -     NPO Diet NPO Type: Strict NPO; Standing  -     Bathroom Privileges With Assistance; Standing  -     CBC & Differential  -     Comprehensive Metabolic Panel  -     Troponin; Future  -     D-Dimer; Future  -     proBNP; Future  -     Cardiac Monitoring  -     Vital Signs - Once  -     Insert Peripheral IV  -     NPO Diet NPO Type: Strict NPO  -     Bathroom Privileges With Assistance  -     Troponin; Standing  -     Troponin  -     D-Dimer; Standing  -     D-Dimer  -     proBNP; Standing  -     proBNP  -     High Sensitivity Troponin T 2Hr; Standing  -     High Sensitivity Troponin T 2Hr    2. Shortness of breath  -     Cardiac Monitoring; Standing  -     Vital Signs - Once; Standing  -     Vital Signs - As Needed; Standing  -     Pulse Oximetry; Standing  -     Oxygen Therapy- Nasal Cannula; Titrate 1-6 LPM Per SpO2; 92% or Greater; Standing  -     Insert Peripheral IV; Standing  -     sodium chloride 0.9 % flush 10 mL  -     nitroglycerin (NITROSTAT) SL tablet 0.4 mg  -     NPO Diet NPO Type: Strict NPO; Standing  -     Bathroom Privileges With Assistance; Standing  -     CBC & Differential  -     Comprehensive Metabolic Panel  -     Troponin; Future  -     D-Dimer; Future  -     proBNP; Future  -     Cardiac Monitoring  -     Vital Signs - Once  -     Insert Peripheral IV  -     NPO Diet NPO Type: Strict NPO  -     Bathroom Privileges With Assistance  -     Troponin; Standing  -     Troponin  -     D-Dimer; Standing  -     D-Dimer  -     proBNP; Standing  -     proBNP  -     High Sensitivity Troponin T 2Hr; Standing  -     High Sensitivity  Troponin T 2Hr    Other orders  -     BNP; Standing  -     BNP  -     ECG 12 Lead Chest Pain; Standing  -     ECG 12 Lead Chest Pain      Keri Bhagat is a 64 y.o. female who [place] [facility] [place].  Chest pain has been intermittent and described as sharp and stabbing.  She does note elevated blood pressure readings during times of pain.  She also notes heart fluttering sensation.  On bedside telemetry patient is having intermittent episodes of sinus tachycardia versus atrial fibrillation.  Laboratory studies were reviewed.  Specifically no signs of anemia, renal studies and electrolytes are normal.  High-sensitivity troponin and proBNP normal.  Will send patient home with a 72-hour ZIO monitor.  Patient recently had an echocardiogram which was reassuring.  If Zio monitor is unrevealing would consider outpatient stress testing.    Future Appointments         Provider Department Center    8/26/2024 2:30 PM Epley, James, APRN [facility] PRIMARY CARE [facility]    8/26/2024 4:30 PM Parish Lock, PT [facility] PHYSICAL THERAPY [facility]    8/28/2024 3:00 PM [facility] [address]    8/29/2024 11:00 AM Marvin Sanchez PTA [facility] PHYSICAL THERAPY [facility]    9/4/2024 11:30 AM Parish Lock PT [facility] PHYSICAL THERAPY [facility]    9/5/2024 11:00 AM David Burroughs MD [facility] CARDIOLOGY [facility]    9/6/2024 10:00 AM Parish Lock PT [facility] PHYSICAL THERAPY [facility]    9/9/2024 11:30 AM Parish Lock PT [facility] HEALTH PHYSICAL THERAPY [facility]    9/11/2024 11:30 AM Parish Lock PT [facility] HEALTH PHYSICAL THERAPY [facility]    9/16/2024 11:30 AM Parish Lock PT [facility] HEALTH PHYSICAL THERAPY [facility]    9/18/2024 2:00 PM Parish Lock PT [facility] HEALTH PHYSICAL THERAPY [facility]    9/23/2024 11:30 AM Parish Lock PT [facility] HEALTH PHYSICAL THERAPY [facility]    9/24/2024 2:30 PM Maxwell Martin MD [facility]  MEDICAL GROUP ENDOCRINOLOGY [facility]    9/25/2024 11:30 AM Parish Lock PT [facility] PHYSICAL THERAPY [facility]    11/8/2024 10:45 AM Bob Mercado MD [facility] NEUROSURGERY [facility]    1/6/2025 1:00 PM Ajay Gutierrez MD [facility] ORTHOPEDICS [facility]              MEDICATIONS         Discharge Medications        ASK your doctor about these medications        Instructions Start Date   apixaban 5 MG tablet tablet  Commonly known as: ELIQUIS   5 mg, Oral, Every 12 Hours Scheduled      atorvastatin 40 MG tablet  Commonly known as: LIPITOR   40 mg, Oral, Daily      buPROPion  MG 24 hr tablet  Commonly known as: WELLBUTRIN XL   1 tablet, Oral, 2 Times Daily      CITRUS CALCIUM/VITAMIN D PO   Oral, 2 Times Daily      cyanocobalamin 1000 MCG tablet  Commonly known as: VITAMIN B-12   1,000 mcg, Oral, Daily      estradiol 0.1 MG/GM vaginal cream  Commonly known as: ESTRACE   1 g, Vaginal, 3 Times Weekly      gabapentin 300 MG capsule  Commonly known as: NEURONTIN   300 mg, Oral, 3 Times Daily      Gemtesa 75 MG tablet  Generic drug: Vibegron       ibuprofen 800 MG tablet  Commonly known as: ADVIL,MOTRIN   800 mg, Oral, 3 Times Daily      IRON PO   1 tablet, Oral, 2 Times Daily      isosorbide mononitrate 30 MG 24 hr tablet  Commonly known as: IMDUR   30 mg, Oral, Daily      lamoTRIgine 100 MG tablet  Commonly known as: LaMICtal   100 mg, Oral, Daily      levothyroxine 88 MCG tablet  Commonly known as: SYNTHROID, LEVOTHROID   88 mcg, Oral, Every Morning      metoprolol succinate XL 25 MG 24 hr tablet  Commonly known as: Toprol XL   37.5 mg, Oral, Daily, For bp      OLANZapine 5 MG tablet  Commonly known as: zyPREXA   1 tablet, Oral, Nightly      ondansetron 4 MG tablet  Commonly known as: ZOFRAN   TAKE 1 TABLET BY MOUTH EVERY 8 HOURS AS NEEDED FOR NAUSEA AND/OR VOMITING      pantoprazole 40 MG EC tablet  Commonly known as: PROTONIX   40 mg, Oral, 2 Times Daily      venlafaxine 37.5 MG  tablet  Commonly known as: EFFEXOR       vitamin b complex capsule capsule   1 capsule, Oral, 2 Times Daily                   **Dragon Disclaimer:   Much of this encounter note is an electronic transcription/translation of spoken language to printed text. The electronic translation of spoken language may permit erroneous, or at times, nonsensical words or phrases to be inadvertently transcribed. Although I have reviewed the note for such errors, some may still exist.

## 2024-08-23 NOTE — TELEPHONE ENCOUNTER
Pt called back. Went over recommendations from Karla. She is going to come to [facility] around 12:45. I notified Vanessa in [facility] She verbalized understanding.    Thank you,    Janessa Cannon, RN  Triage [facility]  08/23/24 12:02 EDT

## 2024-08-23 NOTE — TELEPHONE ENCOUNTER
BISHOP RACHEL IN SCHEDULING PT SCHEDULED 08/30/2024 ARRIVING AT 9:30AM MIRALAX /EGD PREP HANDED TO PT WHILE IN OFFICE,BISHOP PT VERBALIZES UNDERSTANDING APPT TIME/DATE AND COMMUNICATED SHE WILL PLACE TIME DATE ON INFORMATION SHEET VERBALIZES UNDERSTANDING TO HOLD CARLTON AS DIRECTED OK FOR HUB TO RELAY

## 2024-08-26 ENCOUNTER — OFFICE VISIT (OUTPATIENT)
Dept: FAMILY MEDICINE CLINIC | Facility: CLINIC | Age: 64
End: 2024-08-26
Payer: MEDICARE

## 2024-08-26 ENCOUNTER — TELEPHONE (OUTPATIENT)
Dept: FAMILY MEDICINE CLINIC | Facility: CLINIC | Age: 64
End: 2024-08-26

## 2024-08-26 ENCOUNTER — TELEPHONE (OUTPATIENT)
Dept: GASTROENTEROLOGY | Facility: CLINIC | Age: 64
End: 2024-08-26
Payer: COMMERCIAL

## 2024-08-26 ENCOUNTER — TELEPHONE (OUTPATIENT)
Dept: PHYSICAL THERAPY | Facility: CLINIC | Age: 64
End: 2024-08-26
Payer: MEDICARE

## 2024-08-26 ENCOUNTER — TELEPHONE (OUTPATIENT)
Dept: GASTROENTEROLOGY | Facility: CLINIC | Age: 64
End: 2024-08-26
Payer: MEDICARE

## 2024-08-26 VITALS
WEIGHT: 218.7 LBS | SYSTOLIC BLOOD PRESSURE: 110 MMHG | TEMPERATURE: 98 F | OXYGEN SATURATION: 96 % | BODY MASS INDEX: 37.34 KG/M2 | HEIGHT: 64 IN | HEART RATE: 126 BPM | DIASTOLIC BLOOD PRESSURE: 78 MMHG | RESPIRATION RATE: 16 BRPM

## 2024-08-26 DIAGNOSIS — I10 ESSENTIAL HYPERTENSION: ICD-10-CM

## 2024-08-26 DIAGNOSIS — E66.09 OBESITY DUE TO EXCESS CALORIES, UNSPECIFIED CLASSIFICATION, UNSPECIFIED WHETHER SERIOUS COMORBIDITY PRESENT: ICD-10-CM

## 2024-08-26 DIAGNOSIS — F43.21 GRIEF: Primary | ICD-10-CM

## 2024-08-26 DIAGNOSIS — I48.0 PAROXYSMAL A-FIB: ICD-10-CM

## 2024-08-26 DIAGNOSIS — K21.9 GERD WITHOUT ESOPHAGITIS: ICD-10-CM

## 2024-08-26 PROCEDURE — 1125F AMNT PAIN NOTED PAIN PRSNT: CPT | Performed by: NURSE PRACTITIONER

## 2024-08-26 PROCEDURE — 99214 OFFICE O/P EST MOD 30 MIN: CPT | Performed by: NURSE PRACTITIONER

## 2024-08-26 PROCEDURE — 3078F DIAST BP <80 MM HG: CPT | Performed by: NURSE PRACTITIONER

## 2024-08-26 PROCEDURE — 3074F SYST BP LT 130 MM HG: CPT | Performed by: NURSE PRACTITIONER

## 2024-08-26 RX ORDER — ALPRAZOLAM 0.5 MG
0.5 TABLET ORAL ONCE
Qty: 1 TABLET | Refills: 0 | Status: SHIPPED | OUTPATIENT
Start: 2024-08-26 | End: 2024-08-29

## 2024-08-26 RX ORDER — SEMAGLUTIDE 0.25 MG/.5ML
0.25 INJECTION, SOLUTION SUBCUTANEOUS WEEKLY
Qty: 2 ML | Refills: 0 | Status: ON HOLD | OUTPATIENT
Start: 2024-08-26

## 2024-08-26 NOTE — TELEPHONE ENCOUNTER
I have called pt to confirm  scope for 8/30/24 but had to leave a VM. Message sent to nurse for clearance for Eliquis.

## 2024-08-26 NOTE — TELEPHONE ENCOUNTER
Please let her know that Dr. Burroughs (her Cardiologist) said that she could hold the Eliquis for 2 days prior to the scheduled EGD and colonoscopy (that is scheduled to be done 8/30/24). Also she will need to hold the Wegovy for one week prior to the scopes. Thx.kjh

## 2024-08-26 NOTE — PROGRESS NOTES
"Chief Complaint  Depression and Anxiety    Subjective        Keri Bhagat presents to [facility] PRIMARY CARE  History of Present Illness    Fu depression anxiety, grief, loss of her mother, loss of her home, living with her sister present  Bp 3 mos   Blood pressure, Sunday 122/94 pulse 101  Later on that night it was 108/86 pulse 106      Episode cp Friday seen card  Ecg holter OK  History of proximal A-fib presently stable takes Eliquis     Neurlogist mri neck make sure everything is okay with her balance  Up-to-date with Dr. Heineke upcoming EGD and colonoscopy  Up-to-date with psychiatric counseling patient had situational stressors this year unfortunately she lost her mother, and grief and then the mortgage was too much and unfortunately she had to move but thankfully she was able to make an arrangements with her daughter but now her daughter just recently lost her job that she is actively seeking employment and they think everything will be okay                    Depression  Her past medical history is significant for depression.   Anxiety  Her past medical history is significant for depression.       Objective   Vital Signs:  /78 (BP Location: Right arm, Patient Position: Sitting, Cuff Size: Large Adult)   Pulse (!) 126   Temp 98 °F (36.7 °C) (Oral)   Resp 16   Ht 161.5 cm (63.6\")   Wt 99.2 kg (218 lb 11.2 oz)   SpO2 96%   BMI 38.01 kg/m²   Estimated body mass index is 38.01 kg/m² as calculated from the following:    Height as of this encounter: 161.5 cm (63.6\").    Weight as of this encounter: 99.2 kg (218 lb 11.2 oz).            Physical Exam  Constitutional:       Appearance: Normal appearance. She is not ill-appearing or diaphoretic.   Eyes:      Conjunctiva/sclera: Conjunctivae normal.      Pupils: Pupils are equal, round, and reactive to light.   Cardiovascular:      Rate and Rhythm: Normal rate.      Comments: Regular rhythm tachycardic 105 no murmur  Pulmonary:     "  Comments: Unlabored respirations even  Musculoskeletal:         General: Normal range of motion.   Skin:     General: Skin is warm and dry.   Neurological:      General: No focal deficit present.      Mental Status: She is alert. Mental status is at baseline.   Psychiatric:         Mood and Affect: Mood normal.         Behavior: Behavior normal.         Thought Content: Thought content normal.         Judgment: Judgment normal.        Result Review :                Assessment and Plan   Diagnoses and all orders for this visit:    1. Grief (Primary)    2. Essential hypertension    3. GERD without esophagitis    4. Paroxysmal A-fib    5. Obesity due to excess calories, unspecified classification, unspecified whether serious comorbidity present  -     Semaglutide-Weight Management (Wegovy) 0.25 MG/0.5ML solution auto-injector; Inject 0.5 mL under the skin into the appropriate area as directed 1 (One) Time Per Week. (Patient taking differently: Inject 0.5 mL under the skin into the appropriate area as directed 1 (One) Time Per Week. HAS NOT STARTED YET)  Dispense: 2 mL; Refill: 0             Follow Up   Return in about 6 months (around 2/26/2025) for Medicare Wellness, Labs before next visit.  Patient was given instructions and counseling regarding her condition or for health maintenance advice. Please see specific information pulled into the AVS if appropriate.     Patient Instructions   Discharge instructions continue present plan I am very proud of you for sticking with things, and to a better situation,    Prayer, positive aromas positive music  Good communication family friends,    Continue present medications lungs are doing well and feeling well I will see back in 6 months but should you have any increased depression stressors, uncontrolled blood pressure or any other difficulties urgent recheck  Follow-up with cardiology with your Holter results which have increased chest pain shortness of breath weakness  emergency room    If you have increased shortness of breath weakness,  Unsteadiness this can be persistent A-fib any persistent racing heart and shortness of breath not resolving should go to the emergency room to avoid heart failure,    Let me know if Wegovy goes through for further risk-benefit and plan and need for follow-up if not do not get discouraged please  Slow steady changes prolonged lasting results  Call your insurance company about 10 days and let me know           Answers submitted by the patient for this visit:  Other (Submitted on 8/24/2024)  Please describe your symptoms.: Check up  Have you had these symptoms before?: No  How long have you been having these symptoms?: Greater than 2 weeks  Primary Reason for Visit (Submitted on 8/24/2024)  What is the primary reason for your visit?: Other

## 2024-08-26 NOTE — TELEPHONE ENCOUNTER
Dr Burroughs,  We have this pt scheduled for an EGD and Colonoscopy on 8/30/24.  Dr Rodriguez would like to know if she can hold her eliquis for 48 hours prior?  Thanks  Mraia Elena   ------------------------------------------------------------------  Gastro nurses, she was just started on wegovy today, she will have to hod this for a week prior as well once we get clearance on eliquis

## 2024-08-26 NOTE — PATIENT INSTRUCTIONS
Discharge instructions continue present plan I am very proud of you for sticking with things, and to a better situation,    Prayer, positive aromas positive music  Good communication family friends,    Continue present medications lungs are doing well and feeling well I will see back in 6 months but should you have any increased depression stressors, uncontrolled blood pressure or any other difficulties urgent recheck  Follow-up with cardiology with your Holter results which have increased chest pain shortness of breath weakness emergency room    If you have increased shortness of breath weakness,  Unsteadiness this can be persistent A-fib any persistent racing heart and shortness of breath not resolving should go to the emergency room to avoid heart failure,    Let me know if Wegovy goes through for further risk-benefit and plan and need for follow-up if not do not get discouraged please  Slow steady changes prolonged lasting results  Call your insurance company about 10 days and let me know

## 2024-08-27 ENCOUNTER — TELEPHONE (OUTPATIENT)
Dept: GASTROENTEROLOGY | Facility: CLINIC | Age: 64
End: 2024-08-27

## 2024-08-27 NOTE — TELEPHONE ENCOUNTER
Called pt and advised of Dr Rodriguez's note. ADvised that the last day for her to take her eliquis is today.  Verb understanding.     Pt reports that she has not started wegovy and will wait until after her procedure.  Update sent to Dr Rodriguez.

## 2024-08-28 ENCOUNTER — HOSPITAL ENCOUNTER (OUTPATIENT)
Dept: MRI IMAGING | Facility: HOSPITAL | Age: 64
Discharge: HOME | End: 2024-08-28
Admitting: NEUROLOGICAL SURGERY
Payer: MEDICARE

## 2024-08-28 DIAGNOSIS — R26.9 GAIT DISTURBANCE: ICD-10-CM

## 2024-08-28 PROCEDURE — 72141 MRI NECK SPINE W/O DYE: CPT

## 2024-08-29 ENCOUNTER — TELEPHONE (OUTPATIENT)
Dept: CARDIOLOGY | Facility: CLINIC | Age: 64
End: 2024-08-29
Payer: MEDICARE

## 2024-08-29 ENCOUNTER — TREATMENT (OUTPATIENT)
Dept: PHYSICAL THERAPY | Facility: CLINIC | Age: 64
End: 2024-08-29
Payer: COMMERCIAL

## 2024-08-29 DIAGNOSIS — R26.9 GAIT DISTURBANCE: Primary | ICD-10-CM

## 2024-08-29 DIAGNOSIS — R29.6 FREQUENT FALLS: ICD-10-CM

## 2024-08-29 DIAGNOSIS — I49.1 PREMATURE ATRIAL CONTRACTIONS: ICD-10-CM

## 2024-08-29 DIAGNOSIS — R29.898 WEAKNESS OF BOTH LEGS: ICD-10-CM

## 2024-08-29 DIAGNOSIS — R07.9 CHEST PAIN, UNSPECIFIED TYPE: Primary | ICD-10-CM

## 2024-08-29 DIAGNOSIS — I49.3 PREMATURE VENTRICULAR CONTRACTION: ICD-10-CM

## 2024-08-29 DIAGNOSIS — R26.89 DECREASED FUNCTIONAL MOBILITY: ICD-10-CM

## 2024-08-29 PROCEDURE — 97110 THERAPEUTIC EXERCISES: CPT

## 2024-08-29 PROCEDURE — 97112 NEUROMUSCULAR REEDUCATION: CPT

## 2024-08-29 RX ORDER — ALPRAZOLAM 0.5 MG
TABLET ORAL
Qty: 1 TABLET | Refills: 0 | Status: SHIPPED | OUTPATIENT
Start: 2024-08-29

## 2024-08-29 RX ORDER — CEPHALEXIN 500 MG/1
500 CAPSULE ORAL 3 TIMES DAILY
Qty: 21 CAPSULE | Refills: 0 | OUTPATIENT
Start: 2024-08-29

## 2024-08-29 RX ORDER — GABAPENTIN 300 MG/1
300 CAPSULE ORAL 3 TIMES DAILY
Qty: 270 CAPSULE | Refills: 1 | Status: SHIPPED | OUTPATIENT
Start: 2024-08-29

## 2024-08-29 NOTE — PROGRESS NOTES
Physical Therapy Daily Treatment Note    [facility]  [address], [address]  [place] [address]    Visit # 2        Patient: Keri Bhagat   : 1960  Referring practitioner: Bob TORRES MD  Date of Initial Evaluation:  Type: THERAPY  Noted: 2024  Today's Date: 2024           ICD-10-CM ICD-9-CM   1. Gait disturbance  R26.9 781.2   2. Decreased functional mobility  R26.89 781.99   3. Frequent falls  R29.6 V15.88   4. Weakness of both legs  R29.898 729.89       Subjective  Keri Bhagat reports:   No significant changes from status at PT eval through today.  Reports (L) knee still feels tight and there is some pain at times, (R) knee painful as well, planning on TKA in the near future.        Objective   See Exercise, Manual, and Modality Logs for complete treatment   Note: any exercises/interventions not performed today have been marked as deferred on flowsheets.     Pt Education:  HEP review  Exercise rationale/ pain free exercise performance  Anatomy and structure of affected musculature  Sleeping positions with pillows  Alternate exercise positions  Verbal/Tactile cues to ensure correct exercise performance/technique    Assessment/Plan  Tolerated continued progression of therapeutic exercise/therapeutic activity well today, no increased pain reported during or after session.     Did require minor review and cueing of previously issued HEP for proper performance. Pt demonstrates improved performance of HEP after review and practice.     Would continue to benefit from skilled PT progressing with functional ROM and strength.     Progress per Plan of Care and Progress strengthening /stabilization /functional activity         Timed:         Manual Therapy:         mins  80231     Therapeutic Exercise:     15    mins  68554     Neuromuscular Zelda:    10    mins  49925    Therapeutic Activity:      5    mins  96260     Gait Training:            mins  61450     Ultrasound:          mins  23447    Ionto                                   mins  38160  Self Care                            mins  08144    Un-Timed:  Electrical Stimulation:         mins 62447 ( )  Traction          mins 04590    Timed Treatment:   30   mins   Total Treatment:     30   mins    HARSHAL Gardner License #[id number]  Physical Therapist Assistant

## 2024-08-29 NOTE — TELEPHONE ENCOUNTER
----- Message from Lluvia Porter sent at 8/29/2024 12:50 PM EDT -----  Left message for patient to return call and asked to speak with triage to review monitor results.  Triage, if patient returns call please let me know so that I can speak with her and give recommendations.

## 2024-08-29 NOTE — TELEPHONE ENCOUNTER
Rx Refill Note  Requested Prescriptions     Pending Prescriptions Disp Refills    gabapentin (NEURONTIN) 300 MG capsule [Pharmacy Med Name: GABAPENTIN 300 MG CAPSULE] 270 capsule      Sig: TAKE 1 CAPSULE BY MOUTH 3 TIMES A DAY    ALPRAZolam (XANAX) 0.5 MG tablet [Pharmacy Med Name: ALPRAZolam 0.5 MG TABLET] 1 tablet      Sig: TAKE 1 TABLET BY MOUTH DAILY FOR 1 TIME DOSE 1 HOUR PRIOR TO TEST CAUTION SEDATION POSSIBLE DO NOT DRIVE AFTER TAKING THIS MEDICATION      Last office visit with prescribing clinician: 8/26/2024   Last telemedicine visit with prescribing clinician: Visit date not found   Next office visit with prescribing clinician: 2/26/2025                         Would you like a call back once the refill request has been completed: [] Yes [] No    If the office needs to give you a call back, can they leave a voicemail: [] Yes [] No    Rosie Astorga MA  08/29/24, 15:11 EDT

## 2024-08-29 NOTE — TELEPHONE ENCOUNTER
Pt called back. See results encounter for Karla's documentation.    Thank you,    Janessa Cannon, RN  Triage [facility] [place]  08/29/24 14:40 EDT

## 2024-08-30 ENCOUNTER — ANESTHESIA EVENT (OUTPATIENT)
Dept: GASTROENTEROLOGY | Facility: HOSPITAL | Age: 64
End: 2024-08-30

## 2024-08-30 ENCOUNTER — HOSPITAL ENCOUNTER (OUTPATIENT)
Facility: HOSPITAL | Age: 64
Setting detail: HOSPITAL OUTPATIENT SURGERY
Discharge: HOME | End: 2024-08-30
Attending: INTERNAL MEDICINE | Admitting: INTERNAL MEDICINE
Payer: MEDICARE

## 2024-08-30 ENCOUNTER — ANESTHESIA (OUTPATIENT)
Dept: GASTROENTEROLOGY | Facility: HOSPITAL | Age: 64
End: 2024-08-30

## 2024-08-30 PROCEDURE — G0463 HOSPITAL OUTPT CLINIC VISIT: HCPCS | Performed by: INTERNAL MEDICINE

## 2024-08-30 NOTE — ANESTHESIA PREPROCEDURE EVALUATION
Anesthesia Evaluation                  Airway   Dental      Pulmonary    (+) ,sleep apnea  Cardiovascular     (+) hypertension, valvular problems/murmurs murmur, past MI , CAD, cardiac stents , angina, hyperlipidemia      Neuro/Psych  GI/Hepatic/Renal/Endo    (+) morbid obesity, hiatal hernia, GERD, renal disease- CRI, thyroid problem hypothyroidism    Musculoskeletal     Abdominal    Substance History      OB/GYN          Other      History of cancer: Pancreatic.              Anesthesia Plan    CODE STATUS:

## 2024-09-03 DIAGNOSIS — R07.9 CHEST PAIN, UNSPECIFIED TYPE: Primary | ICD-10-CM

## 2024-09-03 DIAGNOSIS — I49.1 PREMATURE ATRIAL CONTRACTIONS: ICD-10-CM

## 2024-09-04 ENCOUNTER — TREATMENT (OUTPATIENT)
Dept: PHYSICAL THERAPY | Facility: CLINIC | Age: 64
End: 2024-09-04
Payer: COMMERCIAL

## 2024-09-04 DIAGNOSIS — R26.9 GAIT DISTURBANCE: Primary | ICD-10-CM

## 2024-09-04 DIAGNOSIS — R29.6 FREQUENT FALLS: ICD-10-CM

## 2024-09-04 DIAGNOSIS — R29.898 WEAKNESS OF BOTH LEGS: ICD-10-CM

## 2024-09-04 DIAGNOSIS — R26.89 DECREASED FUNCTIONAL MOBILITY: ICD-10-CM

## 2024-09-04 NOTE — PROGRESS NOTES
"Physical Therapy Daily Treatment Note  [facility]  [place] - [address], [address]     [place] [address]   Phone: [phone]   Fax: [phone]      Patient: Keri Bhagat   : 1960  Referring practitioner: Bob TORRES MD  Date of Initial Visit: Type: THERAPY  Noted: 2024  Today's Date: 2024  Patient seen for 3 sessions       Visit Diagnoses:    ICD-10-CM ICD-9-CM   1. Gait disturbance  R26.9 781.2   2. Decreased functional mobility  R26.89 781.99   3. Frequent falls  R29.6 V15.88   4. Weakness of both legs  R29.898 729.89         Subjective:  Keri Bhagat reports: mild L knee soreness rated as a 3-4/10 at the start of today's session. Patient reports a couple of \"close calls\" since previous sessions, but denies any falls. Often catching her toes.       Objective   See Exercise, Manual, and Modality Logs for complete treatment.       Assessment:  Patient tolerates today's movement interventions well without increase pain reported, but does feel fatigue by end of today's session. Patient completes table exercises initially for strengthening followed by standing static/dynamic balance training. Focus towards dynamic surfaces and movements to enhance ankle/stepping reaction and reduce fall risk. Several losses of balance noted throughout balance training, but able to correct independently. Patient demonstrate good progress towards established therapy goals.       Plan:   Continue strengthening to bilateral LE's, and balance training on dynamic surfaces and dynamic motions to reduce fall risk. Warm up/gait training on treadmill next visit      Timed:         Manual Therapy:         mins  99486;     Therapeutic Exercise:    15     mins  00587;     Neuromuscular Zelda:    24    mins  89771;    Therapeutic Activity:     4     mins  53128;     Gait Training:      10     mins  77074;     Ultrasound:          mins  44368;    Ionto                           "         mins  51613  Self Care                            mins  30034  Traction          mins 34522      Un-Timed:  Canalith Repos         mins 58737  Electrical Stimulation:         mins  82476 ( );  Dry Needling          mins self-pay  Traction          mins 88268        Timed Treatment:   53   mins   Total Treatment:     53   mins    Parish Lock PT  License Number: [place] [id number]# [id number]. [place] LIC# [id number]    Physical Therapist

## 2024-09-06 ENCOUNTER — TELEPHONE (OUTPATIENT)
Dept: PHYSICAL THERAPY | Facility: CLINIC | Age: 64
End: 2024-09-06

## 2024-09-09 ENCOUNTER — TREATMENT (OUTPATIENT)
Dept: PHYSICAL THERAPY | Facility: CLINIC | Age: 64
End: 2024-09-09
Payer: MEDICARE

## 2024-09-09 DIAGNOSIS — R26.89 DECREASED FUNCTIONAL MOBILITY: ICD-10-CM

## 2024-09-09 DIAGNOSIS — R26.9 GAIT DISTURBANCE: Primary | ICD-10-CM

## 2024-09-09 DIAGNOSIS — R29.6 FREQUENT FALLS: ICD-10-CM

## 2024-09-09 DIAGNOSIS — R29.898 WEAKNESS OF BOTH LEGS: ICD-10-CM

## 2024-09-09 PROCEDURE — 97530 THERAPEUTIC ACTIVITIES: CPT

## 2024-09-09 PROCEDURE — 97112 NEUROMUSCULAR REEDUCATION: CPT

## 2024-09-09 PROCEDURE — 97110 THERAPEUTIC EXERCISES: CPT

## 2024-09-09 NOTE — PROGRESS NOTES
Physical Therapy Daily Treatment Note  [facility]  [place] - [address], [address]     [place] [id number]   Phone: [phone]   Fax: [phone]      Patient: Keri Bhagat   : 1960  Referring practitioner: Bob TORRES MD  Date of Initial Visit: Type: THERAPY  Noted: 2024  Today's Date: 2024  Patient seen for 4 sessions       Visit Diagnoses:    ICD-10-CM ICD-9-CM   1. Gait disturbance  R26.9 781.2   2. Frequent falls  R29.6 V15.88   3. Weakness of both legs  R29.898 729.89   4. Decreased functional mobility  R26.89 781.99         Subjective:  Keri Bhagat reports: she is doing well overall, reports only mild amounts of L knee soreness at the start of today's session. Patient denies any falls or close calls over the weekend.       Objective   See Exercise, Manual, and Modality Logs for complete treatment.       Assessment:  Patient tolerates today's movement interventions well without adverse effects. Continue to progress standing balance on dynamic surfaces and with dynamic movements. Rehearsed cross over stepping laterally to improve stepping reaction and improving reactive balance. Patient demonstrates improving static balance on airex pad with only minimal truncal sway noted.       Plan:   Continue balance training and provide movement interventions to strengthen bilateral lower extremities.       Timed:         Manual Therapy:         mins  20724;     Therapeutic Exercise:    20     mins  39342;     Neuromuscular Zelda:    15    mins  05115;    Therapeutic Activity:     8     mins  59508;     Gait Training:           mins  55726;     Ultrasound:          mins  88557;    Ionto                                   mins  21269  Self Care                            mins  24331  Traction          mins 47728      Un-Timed:  Canalith Repos         mins 79399  Electrical Stimulation:         mins  24416 ( );  Dry Needling          mins  self-pay  Traction          mins 23913        Timed Treatment:   43   mins   Total Treatment:     43   mins    Parish Lock PT  License Number: [place] [id number]# [id number]. [place] LIC# [id number]    Physical Therapist

## 2024-09-10 ENCOUNTER — HOSPITAL ENCOUNTER (OUTPATIENT)
Dept: CT IMAGING | Facility: HOSPITAL | Age: 64
Discharge: HOME | End: 2024-09-10
Admitting: INTERNAL MEDICINE
Payer: MEDICARE

## 2024-09-10 DIAGNOSIS — Z80.0 FH: PANCREATIC CANCER: ICD-10-CM

## 2024-09-10 PROCEDURE — 74177 CT ABD & PELVIS W/CONTRAST: CPT

## 2024-09-10 PROCEDURE — 0 DIATRIZOATE MEGLUMINE & SODIUM PER 1 ML: Performed by: INTERNAL MEDICINE

## 2024-09-10 PROCEDURE — 25510000001 IOPAMIDOL 61 % SOLUTION: Performed by: INTERNAL MEDICINE

## 2024-09-10 RX ORDER — IOPAMIDOL 612 MG/ML
100 INJECTION, SOLUTION INTRAVASCULAR
Status: COMPLETED | OUTPATIENT
Start: 2024-09-10 | End: 2024-09-10

## 2024-09-10 RX ADMIN — IOPAMIDOL 85 ML: 612 INJECTION, SOLUTION INTRAVENOUS at 14:49

## 2024-09-10 RX ADMIN — DIATRIZOATE MEGLUMINE AND DIATRIZOATE SODIUM 30 ML: 660; 100 LIQUID ORAL; RECTAL at 13:15

## 2024-09-11 ENCOUNTER — TREATMENT (OUTPATIENT)
Dept: PHYSICAL THERAPY | Facility: CLINIC | Age: 64
End: 2024-09-11
Payer: MEDICARE

## 2024-09-11 DIAGNOSIS — R29.6 FREQUENT FALLS: ICD-10-CM

## 2024-09-11 DIAGNOSIS — R29.898 WEAKNESS OF BOTH LEGS: ICD-10-CM

## 2024-09-11 DIAGNOSIS — R26.9 GAIT DISTURBANCE: Primary | ICD-10-CM

## 2024-09-11 DIAGNOSIS — R26.89 DECREASED FUNCTIONAL MOBILITY: ICD-10-CM

## 2024-09-11 NOTE — PROGRESS NOTES
"Physical Therapy Daily Treatment Note  [facility]  [place] - [address], [address]     [place] [address]   Phone: [phone]   Fax: [phone]      Patient: Keri Bhagat   : 1960  Referring practitioner: Bob TORRES MD  Date of Initial Visit: Type: THERAPY  Noted: 2024  Today's Date: 2024  Patient seen for 5 sessions       Visit Diagnoses:    ICD-10-CM ICD-9-CM   1. Gait disturbance  R26.9 781.2   2. Weakness of both legs  R29.898 729.89   3. Frequent falls  R29.6 V15.88   4. Decreased functional mobility  R26.89 781.99         Subjective:  Keri Bhagat reports: overall is doing well. Patient reports knee pain is doing better. Reports no falls since the last session. \"My feet sometimes get tangled up.\"      Objective   See Exercise, Manual, and Modality Logs for complete treatment.       Assessment:  Patient tolerates today's movement interventions well without adverse effects. Patient challenged by today's balance exercises with increased difficulty with dynamic balance training with hurdles and rehearsing lateral stepping reaction. Patient often demonstrates poor proprioception of feet and often misplaces or catches them on the contralateral limb causing loss of balance. Patient continues to benefit from skilled physical therapy to address balance and muscle power deficits and reduce risk of falling.      Plan:   Continue balance training, and providing movement interventions to improve lower extremity proprioception.      Timed:         Manual Therapy:         mins  50224;     Therapeutic Exercise:    15     mins  64918;     Neuromuscular Zelda:    28    mins  45695;    Therapeutic Activity:          mins  49905;     Gait Training:           mins  65418;     Ultrasound:          mins  07807;    Ionto                                   mins  50493  Self Care                            mins  95338  Traction          mins " 27363      Un-Timed:  Canalith Repos         mins 96933  Electrical Stimulation:         mins  03674 ( );  Dry Needling          mins self-pay  Traction          mins 92963        Timed Treatment:   43   mins   Total Treatment:     49   mins    Parish Lock PT  License Number: [place] [id number]# [id number]. [place] LIC# [id number]    Physical Therapist

## 2024-09-13 ENCOUNTER — TELEPHONE (OUTPATIENT)
Dept: CARDIOLOGY | Facility: CLINIC | Age: 64
End: 2024-09-13
Payer: MEDICARE

## 2024-09-13 NOTE — PROGRESS NOTES
09/13/24       Tell her that her CT of the abdomen and pelvis shows a normal-appearing pancreas.  We can discuss in more detail when I see her in follow-up.       We will see what the EGD and colonoscopy show?       Please send a copy of this report to her PCP.  Quang zheng

## 2024-09-16 ENCOUNTER — TELEPHONE (OUTPATIENT)
Dept: GASTROENTEROLOGY | Facility: CLINIC | Age: 64
End: 2024-09-16
Payer: MEDICARE

## 2024-09-16 ENCOUNTER — APPOINTMENT (OUTPATIENT)
Dept: GENERAL RADIOLOGY | Facility: HOSPITAL | Age: 64
DRG: 092 | End: 2024-09-16
Payer: MEDICARE

## 2024-09-16 ENCOUNTER — APPOINTMENT (OUTPATIENT)
Dept: CT IMAGING | Facility: HOSPITAL | Age: 64
DRG: 092 | End: 2024-09-16
Payer: MEDICARE

## 2024-09-16 ENCOUNTER — HOSPITAL ENCOUNTER (INPATIENT)
Facility: HOSPITAL | Age: 64
LOS: 3 days | Discharge: HOME | DRG: 092 | End: 2024-09-19
Attending: EMERGENCY MEDICINE | Admitting: INTERNAL MEDICINE
Payer: MEDICARE

## 2024-09-16 DIAGNOSIS — R07.9 CHEST PAIN, UNSPECIFIED TYPE: ICD-10-CM

## 2024-09-16 DIAGNOSIS — R42 DIZZINESS: ICD-10-CM

## 2024-09-16 DIAGNOSIS — R55 NEAR SYNCOPE: ICD-10-CM

## 2024-09-16 DIAGNOSIS — R41.82 ALTERED MENTAL STATUS, UNSPECIFIED ALTERED MENTAL STATUS TYPE: Primary | ICD-10-CM

## 2024-09-16 DIAGNOSIS — F31.77 BIPOLAR DISORDER, IN PARTIAL REMISSION, MOST RECENT EPISODE MIXED: ICD-10-CM

## 2024-09-16 LAB
ALBUMIN SERPL-MCNC: 4.2 G/DL (ref 3.5–5.2)
ALBUMIN/GLOB SERPL: 1.7 G/DL
ALP SERPL-CCNC: 87 U/L (ref 39–117)
ALT SERPL W P-5'-P-CCNC: 22 U/L (ref 1–33)
ANION GAP SERPL CALCULATED.3IONS-SCNC: 9 MMOL/L (ref 5–15)
APTT PPP: 29.9 SECONDS (ref 22.7–35.4)
AST SERPL-CCNC: 22 U/L (ref 1–32)
BASOPHILS # BLD AUTO: 0.02 10*3/MM3 (ref 0–0.2)
BASOPHILS NFR BLD AUTO: 0.3 % (ref 0–1.5)
BILIRUB SERPL-MCNC: 0.2 MG/DL (ref 0–1.2)
BILIRUB UR QL STRIP: NEGATIVE
BUN SERPL-MCNC: 10 MG/DL (ref 8–23)
BUN/CREAT SERPL: 10 (ref 7–25)
CALCIUM SPEC-SCNC: 9.4 MG/DL (ref 8.6–10.5)
CHLORIDE SERPL-SCNC: 105 MMOL/L (ref 98–107)
CLARITY UR: CLEAR
CO2 SERPL-SCNC: 27 MMOL/L (ref 22–29)
COLOR UR: YELLOW
CREAT SERPL-MCNC: 1 MG/DL (ref 0.57–1)
D-LACTATE SERPL-SCNC: 0.8 MMOL/L (ref 0.5–2)
DEPRECATED RDW RBC AUTO: 41.1 FL (ref 37–54)
EGFRCR SERPLBLD CKD-EPI 2021: 63.8 ML/MIN/1.73
EOSINOPHIL # BLD AUTO: 0.17 10*3/MM3 (ref 0–0.4)
EOSINOPHIL NFR BLD AUTO: 2.3 % (ref 0.3–6.2)
ERYTHROCYTE [DISTWIDTH] IN BLOOD BY AUTOMATED COUNT: 12 % (ref 12.3–15.4)
GEN 5 2HR TROPONIN T REFLEX: 12 NG/L
GLOBULIN UR ELPH-MCNC: 2.5 GM/DL
GLUCOSE SERPL-MCNC: 85 MG/DL (ref 65–99)
GLUCOSE UR STRIP-MCNC: NEGATIVE MG/DL
HCT VFR BLD AUTO: 39.8 % (ref 34–46.6)
HGB BLD-MCNC: 13.1 G/DL (ref 12–15.9)
HGB UR QL STRIP.AUTO: NEGATIVE
IMM GRANULOCYTES # BLD AUTO: 0.02 10*3/MM3 (ref 0–0.05)
IMM GRANULOCYTES NFR BLD AUTO: 0.3 % (ref 0–0.5)
INR PPP: 1.45 (ref 0.9–1.1)
KETONES UR QL STRIP: NEGATIVE
LEUKOCYTE ESTERASE UR QL STRIP.AUTO: NEGATIVE
LYMPHOCYTES # BLD AUTO: 2.4 10*3/MM3 (ref 0.7–3.1)
LYMPHOCYTES NFR BLD AUTO: 32.2 % (ref 19.6–45.3)
MCH RBC QN AUTO: 30.9 PG (ref 26.6–33)
MCHC RBC AUTO-ENTMCNC: 32.9 G/DL (ref 31.5–35.7)
MCV RBC AUTO: 93.9 FL (ref 79–97)
MONOCYTES # BLD AUTO: 0.46 10*3/MM3 (ref 0.1–0.9)
MONOCYTES NFR BLD AUTO: 6.2 % (ref 5–12)
NEUTROPHILS NFR BLD AUTO: 4.39 10*3/MM3 (ref 1.7–7)
NEUTROPHILS NFR BLD AUTO: 58.7 % (ref 42.7–76)
NITRITE UR QL STRIP: NEGATIVE
NRBC BLD AUTO-RTO: 0 /100 WBC (ref 0–0.2)
NT-PROBNP SERPL-MCNC: 206 PG/ML (ref 0–900)
PH UR STRIP.AUTO: 6.5 [PH] (ref 5–8)
PLATELET # BLD AUTO: 270 10*3/MM3 (ref 140–450)
PMV BLD AUTO: 9.6 FL (ref 6–12)
POTASSIUM SERPL-SCNC: 4.3 MMOL/L (ref 3.5–5.2)
PROT SERPL-MCNC: 6.7 G/DL (ref 6–8.5)
PROT UR QL STRIP: NEGATIVE
PROTHROMBIN TIME: 17.9 SECONDS (ref 11.7–14.2)
RBC # BLD AUTO: 4.24 10*6/MM3 (ref 3.77–5.28)
SODIUM SERPL-SCNC: 141 MMOL/L (ref 136–145)
SP GR UR STRIP: 1.01 (ref 1–1.03)
TROPONIN T DELTA: 2 NG/L
TROPONIN T SERPL HS-MCNC: 10 NG/L
UROBILINOGEN UR QL STRIP: NORMAL
WBC NRBC COR # BLD AUTO: 7.46 10*3/MM3 (ref 3.4–10.8)

## 2024-09-16 PROCEDURE — 83880 ASSAY OF NATRIURETIC PEPTIDE: CPT | Performed by: EMERGENCY MEDICINE

## 2024-09-16 PROCEDURE — 84484 ASSAY OF TROPONIN QUANT: CPT | Performed by: EMERGENCY MEDICINE

## 2024-09-16 PROCEDURE — 70450 CT HEAD/BRAIN W/O DYE: CPT

## 2024-09-16 PROCEDURE — 71045 X-RAY EXAM CHEST 1 VIEW: CPT

## 2024-09-16 PROCEDURE — 99291 CRITICAL CARE FIRST HOUR: CPT

## 2024-09-16 PROCEDURE — 81003 URINALYSIS AUTO W/O SCOPE: CPT | Performed by: EMERGENCY MEDICINE

## 2024-09-16 PROCEDURE — 83605 ASSAY OF LACTIC ACID: CPT | Performed by: EMERGENCY MEDICINE

## 2024-09-16 PROCEDURE — 85610 PROTHROMBIN TIME: CPT | Performed by: EMERGENCY MEDICINE

## 2024-09-16 PROCEDURE — 80053 COMPREHEN METABOLIC PANEL: CPT | Performed by: EMERGENCY MEDICINE

## 2024-09-16 PROCEDURE — 93005 ELECTROCARDIOGRAM TRACING: CPT | Performed by: EMERGENCY MEDICINE

## 2024-09-16 PROCEDURE — 25810000003 SODIUM CHLORIDE 0.9 % SOLUTION: Performed by: EMERGENCY MEDICINE

## 2024-09-16 PROCEDURE — 85730 THROMBOPLASTIN TIME PARTIAL: CPT | Performed by: EMERGENCY MEDICINE

## 2024-09-16 PROCEDURE — 25010000002 ONDANSETRON PER 1 MG: Performed by: EMERGENCY MEDICINE

## 2024-09-16 PROCEDURE — 36415 COLL VENOUS BLD VENIPUNCTURE: CPT

## 2024-09-16 PROCEDURE — 93010 ELECTROCARDIOGRAM REPORT: CPT | Performed by: INTERNAL MEDICINE

## 2024-09-16 PROCEDURE — 85025 COMPLETE CBC W/AUTO DIFF WBC: CPT | Performed by: EMERGENCY MEDICINE

## 2024-09-16 RX ORDER — LAMOTRIGINE 100 MG/1
100 TABLET ORAL 2 TIMES DAILY
Status: DISCONTINUED | OUTPATIENT
Start: 2024-09-16 | End: 2024-09-19 | Stop reason: HOSPADM

## 2024-09-16 RX ORDER — ACETAMINOPHEN 160 MG/5ML
650 SOLUTION ORAL EVERY 4 HOURS PRN
Status: DISCONTINUED | OUTPATIENT
Start: 2024-09-16 | End: 2024-09-19 | Stop reason: HOSPADM

## 2024-09-16 RX ORDER — METOPROLOL SUCCINATE 25 MG/1
37.5 TABLET, EXTENDED RELEASE ORAL DAILY
Status: DISCONTINUED | OUTPATIENT
Start: 2024-09-17 | End: 2024-09-19 | Stop reason: HOSPADM

## 2024-09-16 RX ORDER — BUPROPION HYDROCHLORIDE 150 MG/1
150 TABLET ORAL 2 TIMES DAILY
Status: DISCONTINUED | OUTPATIENT
Start: 2024-09-16 | End: 2024-09-19 | Stop reason: HOSPADM

## 2024-09-16 RX ORDER — BISACODYL 5 MG/1
5 TABLET, DELAYED RELEASE ORAL DAILY PRN
Status: DISCONTINUED | OUTPATIENT
Start: 2024-09-16 | End: 2024-09-19 | Stop reason: HOSPADM

## 2024-09-16 RX ORDER — ACETAMINOPHEN 650 MG/1
650 SUPPOSITORY RECTAL EVERY 4 HOURS PRN
Status: DISCONTINUED | OUTPATIENT
Start: 2024-09-16 | End: 2024-09-19 | Stop reason: HOSPADM

## 2024-09-16 RX ORDER — BISACODYL 10 MG
10 SUPPOSITORY, RECTAL RECTAL DAILY PRN
Status: DISCONTINUED | OUTPATIENT
Start: 2024-09-16 | End: 2024-09-19 | Stop reason: HOSPADM

## 2024-09-16 RX ORDER — POLYETHYLENE GLYCOL 3350 17 G/17G
17 POWDER, FOR SOLUTION ORAL DAILY PRN
Status: DISCONTINUED | OUTPATIENT
Start: 2024-09-16 | End: 2024-09-19 | Stop reason: HOSPADM

## 2024-09-16 RX ORDER — LEVOTHYROXINE SODIUM 88 UG/1
88 TABLET ORAL EVERY MORNING
Status: DISCONTINUED | OUTPATIENT
Start: 2024-09-17 | End: 2024-09-19 | Stop reason: HOSPADM

## 2024-09-16 RX ORDER — ACETAMINOPHEN 325 MG/1
650 TABLET ORAL EVERY 4 HOURS PRN
Status: DISCONTINUED | OUTPATIENT
Start: 2024-09-16 | End: 2024-09-19 | Stop reason: HOSPADM

## 2024-09-16 RX ORDER — ATORVASTATIN CALCIUM 20 MG/1
40 TABLET, FILM COATED ORAL DAILY
Status: DISCONTINUED | OUTPATIENT
Start: 2024-09-17 | End: 2024-09-19 | Stop reason: HOSPADM

## 2024-09-16 RX ORDER — OXYBUTYNIN CHLORIDE 10 MG/1
10 TABLET, EXTENDED RELEASE ORAL DAILY
Status: DISCONTINUED | OUTPATIENT
Start: 2024-09-17 | End: 2024-09-19 | Stop reason: HOSPADM

## 2024-09-16 RX ORDER — OLANZAPINE 5 MG/1
5 TABLET ORAL NIGHTLY
Status: DISCONTINUED | OUTPATIENT
Start: 2024-09-16 | End: 2024-09-19 | Stop reason: HOSPADM

## 2024-09-16 RX ORDER — ONDANSETRON 2 MG/ML
4 INJECTION INTRAMUSCULAR; INTRAVENOUS ONCE
Status: COMPLETED | OUTPATIENT
Start: 2024-09-16 | End: 2024-09-16

## 2024-09-16 RX ORDER — SODIUM CHLORIDE 0.9 % (FLUSH) 0.9 %
10 SYRINGE (ML) INJECTION AS NEEDED
Status: DISCONTINUED | OUTPATIENT
Start: 2024-09-16 | End: 2024-09-19 | Stop reason: HOSPADM

## 2024-09-16 RX ORDER — PANTOPRAZOLE SODIUM 40 MG/1
40 TABLET, DELAYED RELEASE ORAL 2 TIMES DAILY
Status: DISCONTINUED | OUTPATIENT
Start: 2024-09-16 | End: 2024-09-19 | Stop reason: HOSPADM

## 2024-09-16 RX ORDER — LORAZEPAM 2 MG/ML
1 INJECTION INTRAMUSCULAR ONCE
Status: DISCONTINUED | OUTPATIENT
Start: 2024-09-17 | End: 2024-09-19 | Stop reason: HOSPADM

## 2024-09-16 RX ORDER — ONDANSETRON 2 MG/ML
4 INJECTION INTRAMUSCULAR; INTRAVENOUS EVERY 6 HOURS PRN
Status: DISCONTINUED | OUTPATIENT
Start: 2024-09-16 | End: 2024-09-19 | Stop reason: HOSPADM

## 2024-09-16 RX ORDER — VENLAFAXINE 75 MG/1
37.5 TABLET ORAL DAILY
Status: DISCONTINUED | OUTPATIENT
Start: 2024-09-17 | End: 2024-09-19 | Stop reason: HOSPADM

## 2024-09-16 RX ORDER — ONDANSETRON 4 MG/1
4 TABLET, ORALLY DISINTEGRATING ORAL EVERY 6 HOURS PRN
Status: DISCONTINUED | OUTPATIENT
Start: 2024-09-16 | End: 2024-09-19 | Stop reason: HOSPADM

## 2024-09-16 RX ORDER — GABAPENTIN 300 MG/1
300 CAPSULE ORAL 2 TIMES DAILY
Status: DISCONTINUED | OUTPATIENT
Start: 2024-09-16 | End: 2024-09-18

## 2024-09-16 RX ORDER — AMOXICILLIN 250 MG
2 CAPSULE ORAL 2 TIMES DAILY PRN
Status: DISCONTINUED | OUTPATIENT
Start: 2024-09-16 | End: 2024-09-19 | Stop reason: HOSPADM

## 2024-09-16 RX ORDER — ISOSORBIDE MONONITRATE 30 MG/1
30 TABLET, EXTENDED RELEASE ORAL DAILY
Status: DISCONTINUED | OUTPATIENT
Start: 2024-09-17 | End: 2024-09-19 | Stop reason: HOSPADM

## 2024-09-16 RX ADMIN — APIXABAN 5 MG: 5 TABLET, FILM COATED ORAL at 23:43

## 2024-09-16 RX ADMIN — ONDANSETRON 4 MG: 2 INJECTION, SOLUTION INTRAMUSCULAR; INTRAVENOUS at 11:34

## 2024-09-16 RX ADMIN — BUPROPION HYDROCHLORIDE 150 MG: 150 TABLET, EXTENDED RELEASE ORAL at 23:43

## 2024-09-16 RX ADMIN — SODIUM CHLORIDE 1000 ML: 9 INJECTION, SOLUTION INTRAVENOUS at 16:00

## 2024-09-16 RX ADMIN — SODIUM CHLORIDE 1000 ML: 9 INJECTION, SOLUTION INTRAVENOUS at 11:33

## 2024-09-16 RX ADMIN — PANTOPRAZOLE SODIUM 40 MG: 40 TABLET, DELAYED RELEASE ORAL at 23:43

## 2024-09-16 RX ADMIN — GABAPENTIN 300 MG: 300 CAPSULE ORAL at 23:43

## 2024-09-16 RX ADMIN — LAMOTRIGINE 100 MG: 100 TABLET ORAL at 23:43

## 2024-09-17 ENCOUNTER — APPOINTMENT (OUTPATIENT)
Dept: NEUROLOGY | Facility: HOSPITAL | Age: 64
DRG: 092 | End: 2024-09-17
Payer: MEDICARE

## 2024-09-17 LAB
ANION GAP SERPL CALCULATED.3IONS-SCNC: 7.9 MMOL/L (ref 5–15)
BUN SERPL-MCNC: 10 MG/DL (ref 8–23)
BUN/CREAT SERPL: 9.8 (ref 7–25)
CALCIUM SPEC-SCNC: 8.9 MG/DL (ref 8.6–10.5)
CHLORIDE SERPL-SCNC: 109 MMOL/L (ref 98–107)
CO2 SERPL-SCNC: 24.1 MMOL/L (ref 22–29)
CREAT SERPL-MCNC: 1.02 MG/DL (ref 0.57–1)
DEPRECATED RDW RBC AUTO: 41.6 FL (ref 37–54)
EGFRCR SERPLBLD CKD-EPI 2021: 62.3 ML/MIN/1.73
ERYTHROCYTE [DISTWIDTH] IN BLOOD BY AUTOMATED COUNT: 12.1 % (ref 12.3–15.4)
GLUCOSE SERPL-MCNC: 81 MG/DL (ref 65–99)
HCT VFR BLD AUTO: 34.1 % (ref 34–46.6)
HGB BLD-MCNC: 11.3 G/DL (ref 12–15.9)
MCH RBC QN AUTO: 31 PG (ref 26.6–33)
MCHC RBC AUTO-ENTMCNC: 33.1 G/DL (ref 31.5–35.7)
MCV RBC AUTO: 93.4 FL (ref 79–97)
PLATELET # BLD AUTO: 236 10*3/MM3 (ref 140–450)
PMV BLD AUTO: 10.1 FL (ref 6–12)
POTASSIUM SERPL-SCNC: 3.7 MMOL/L (ref 3.5–5.2)
QT INTERVAL: 405 MS
QTC INTERVAL: 405 MS
RBC # BLD AUTO: 3.65 10*6/MM3 (ref 3.77–5.28)
SODIUM SERPL-SCNC: 141 MMOL/L (ref 136–145)
WBC NRBC COR # BLD AUTO: 8.03 10*3/MM3 (ref 3.4–10.8)

## 2024-09-17 PROCEDURE — 36415 COLL VENOUS BLD VENIPUNCTURE: CPT | Performed by: INTERNAL MEDICINE

## 2024-09-17 PROCEDURE — 95816 EEG AWAKE AND DROWSY: CPT

## 2024-09-17 PROCEDURE — 95816 EEG AWAKE AND DROWSY: CPT | Performed by: PSYCHIATRY & NEUROLOGY

## 2024-09-17 PROCEDURE — 80048 BASIC METABOLIC PNL TOTAL CA: CPT | Performed by: INTERNAL MEDICINE

## 2024-09-17 PROCEDURE — 99222 1ST HOSP IP/OBS MODERATE 55: CPT | Performed by: INTERNAL MEDICINE

## 2024-09-17 PROCEDURE — 85027 COMPLETE CBC AUTOMATED: CPT | Performed by: INTERNAL MEDICINE

## 2024-09-17 PROCEDURE — 99222 1ST HOSP IP/OBS MODERATE 55: CPT | Performed by: STUDENT IN AN ORGANIZED HEALTH CARE EDUCATION/TRAINING PROGRAM

## 2024-09-17 RX ADMIN — APIXABAN 5 MG: 5 TABLET, FILM COATED ORAL at 20:04

## 2024-09-17 RX ADMIN — LAMOTRIGINE 100 MG: 100 TABLET ORAL at 08:23

## 2024-09-17 RX ADMIN — BUPROPION HYDROCHLORIDE 150 MG: 150 TABLET, EXTENDED RELEASE ORAL at 08:23

## 2024-09-17 RX ADMIN — ATORVASTATIN CALCIUM 40 MG: 20 TABLET, FILM COATED ORAL at 08:24

## 2024-09-17 RX ADMIN — PANTOPRAZOLE SODIUM 40 MG: 40 TABLET, DELAYED RELEASE ORAL at 20:04

## 2024-09-17 RX ADMIN — METOPROLOL SUCCINATE 37.5 MG: 25 TABLET, EXTENDED RELEASE ORAL at 14:27

## 2024-09-17 RX ADMIN — BUPROPION HYDROCHLORIDE 150 MG: 150 TABLET, EXTENDED RELEASE ORAL at 20:04

## 2024-09-17 RX ADMIN — PANTOPRAZOLE SODIUM 40 MG: 40 TABLET, DELAYED RELEASE ORAL at 08:24

## 2024-09-17 RX ADMIN — GABAPENTIN 300 MG: 300 CAPSULE ORAL at 20:04

## 2024-09-17 RX ADMIN — LEVOTHYROXINE SODIUM 88 MCG: 88 TABLET ORAL at 08:23

## 2024-09-17 RX ADMIN — OLANZAPINE 5 MG: 5 TABLET, FILM COATED ORAL at 20:04

## 2024-09-17 RX ADMIN — OXYBUTYNIN CHLORIDE 10 MG: 10 TABLET, EXTENDED RELEASE ORAL at 08:23

## 2024-09-17 RX ADMIN — LAMOTRIGINE 100 MG: 100 TABLET ORAL at 20:04

## 2024-09-17 RX ADMIN — ISOSORBIDE MONONITRATE 30 MG: 30 TABLET, EXTENDED RELEASE ORAL at 08:24

## 2024-09-17 RX ADMIN — GABAPENTIN 300 MG: 300 CAPSULE ORAL at 08:23

## 2024-09-17 RX ADMIN — APIXABAN 5 MG: 5 TABLET, FILM COATED ORAL at 08:24

## 2024-09-17 RX ADMIN — OLANZAPINE 5 MG: 5 TABLET, FILM COATED ORAL at 01:29

## 2024-09-17 RX ADMIN — VENLAFAXINE HYDROCHLORIDE 37.5 MG: 75 TABLET ORAL at 08:24

## 2024-09-18 ENCOUNTER — APPOINTMENT (OUTPATIENT)
Dept: NUCLEAR MEDICINE | Facility: HOSPITAL | Age: 64
DRG: 092 | End: 2024-09-18
Payer: MEDICARE

## 2024-09-18 ENCOUNTER — HOME HEALTH ADMISSION (OUTPATIENT)
Dept: HOME HEALTH SERVICES | Facility: HOME HEALTHCARE | Age: 64
End: 2024-09-18
Payer: COMMERCIAL

## 2024-09-18 LAB
ANION GAP SERPL CALCULATED.3IONS-SCNC: 9 MMOL/L (ref 5–15)
BH CV REST NUCLEAR ISOTOPE DOSE: 11.2 MCI
BH CV STRESS COMMENTS STAGE 1: NORMAL
BH CV STRESS DOSE REGADENOSON STAGE 1: 0.4
BH CV STRESS DURATION MIN STAGE 1: 0
BH CV STRESS DURATION SEC STAGE 1: 10
BH CV STRESS NUCLEAR ISOTOPE DOSE: 34.4 MCI
BH CV STRESS PROTOCOL 1: NORMAL
BH CV STRESS RECOVERY BP: NORMAL MMHG
BH CV STRESS RECOVERY HR: 89 BPM
BH CV STRESS STAGE 1: 1
BUN SERPL-MCNC: 11 MG/DL (ref 8–23)
BUN/CREAT SERPL: 10.8 (ref 7–25)
CALCIUM SPEC-SCNC: 9 MG/DL (ref 8.6–10.5)
CHLORIDE SERPL-SCNC: 108 MMOL/L (ref 98–107)
CO2 SERPL-SCNC: 26 MMOL/L (ref 22–29)
CREAT SERPL-MCNC: 1.02 MG/DL (ref 0.57–1)
DEPRECATED RDW RBC AUTO: 43.9 FL (ref 37–54)
EGFRCR SERPLBLD CKD-EPI 2021: 62.3 ML/MIN/1.73
ERYTHROCYTE [DISTWIDTH] IN BLOOD BY AUTOMATED COUNT: 12.6 % (ref 12.3–15.4)
GLUCOSE SERPL-MCNC: 84 MG/DL (ref 65–99)
HCT VFR BLD AUTO: 37.4 % (ref 34–46.6)
HGB BLD-MCNC: 12.4 G/DL (ref 12–15.9)
LV EF NUC BP: 71 %
MAXIMAL PREDICTED HEART RATE: 158 BPM
MCH RBC QN AUTO: 31.5 PG (ref 26.6–33)
MCHC RBC AUTO-ENTMCNC: 33.2 G/DL (ref 31.5–35.7)
MCV RBC AUTO: 94.9 FL (ref 79–97)
PERCENT MAX PREDICTED HR: 60.76 %
PLATELET # BLD AUTO: 230 10*3/MM3 (ref 140–450)
PMV BLD AUTO: 9.9 FL (ref 6–12)
POTASSIUM SERPL-SCNC: 3.8 MMOL/L (ref 3.5–5.2)
RBC # BLD AUTO: 3.94 10*6/MM3 (ref 3.77–5.28)
SODIUM SERPL-SCNC: 143 MMOL/L (ref 136–145)
STRESS BASELINE BP: NORMAL MMHG
STRESS BASELINE HR: 61 BPM
STRESS PERCENT HR: 71 %
STRESS POST PEAK BP: NORMAL MMHG
STRESS POST PEAK HR: 96 BPM
STRESS TARGET HR: 134 BPM
TSH SERPL DL<=0.05 MIU/L-ACNC: 0.85 UIU/ML (ref 0.27–4.2)
WBC NRBC COR # BLD AUTO: 7.5 10*3/MM3 (ref 3.4–10.8)

## 2024-09-18 PROCEDURE — 0 TECHNETIUM TETROFOSMIN KIT: Performed by: INTERNAL MEDICINE

## 2024-09-18 PROCEDURE — 78452 HT MUSCLE IMAGE SPECT MULT: CPT | Performed by: INTERNAL MEDICINE

## 2024-09-18 PROCEDURE — 93017 CV STRESS TEST TRACING ONLY: CPT

## 2024-09-18 PROCEDURE — 97530 THERAPEUTIC ACTIVITIES: CPT

## 2024-09-18 PROCEDURE — 93016 CV STRESS TEST SUPVJ ONLY: CPT | Performed by: INTERNAL MEDICINE

## 2024-09-18 PROCEDURE — A9502 TC99M TETROFOSMIN: HCPCS | Performed by: INTERNAL MEDICINE

## 2024-09-18 PROCEDURE — 25010000002 REGADENOSON 0.4 MG/5ML SOLUTION: Performed by: INTERNAL MEDICINE

## 2024-09-18 PROCEDURE — 85027 COMPLETE CBC AUTOMATED: CPT | Performed by: STUDENT IN AN ORGANIZED HEALTH CARE EDUCATION/TRAINING PROGRAM

## 2024-09-18 PROCEDURE — 99232 SBSQ HOSP IP/OBS MODERATE 35: CPT | Performed by: INTERNAL MEDICINE

## 2024-09-18 PROCEDURE — 78452 HT MUSCLE IMAGE SPECT MULT: CPT

## 2024-09-18 PROCEDURE — 80048 BASIC METABOLIC PNL TOTAL CA: CPT | Performed by: STUDENT IN AN ORGANIZED HEALTH CARE EDUCATION/TRAINING PROGRAM

## 2024-09-18 PROCEDURE — 97162 PT EVAL MOD COMPLEX 30 MIN: CPT

## 2024-09-18 PROCEDURE — 93018 CV STRESS TEST I&R ONLY: CPT | Performed by: INTERNAL MEDICINE

## 2024-09-18 PROCEDURE — 84443 ASSAY THYROID STIM HORMONE: CPT | Performed by: INTERNAL MEDICINE

## 2024-09-18 PROCEDURE — 99231 SBSQ HOSP IP/OBS SF/LOW 25: CPT | Performed by: PHYSICIAN ASSISTANT

## 2024-09-18 RX ORDER — GABAPENTIN 100 MG/1
200 CAPSULE ORAL 2 TIMES DAILY
Status: DISCONTINUED | OUTPATIENT
Start: 2024-09-18 | End: 2024-09-19 | Stop reason: HOSPADM

## 2024-09-18 RX ORDER — REGADENOSON 0.08 MG/ML
0.4 INJECTION, SOLUTION INTRAVENOUS
Status: COMPLETED | OUTPATIENT
Start: 2024-09-18 | End: 2024-09-18

## 2024-09-18 RX ADMIN — BUPROPION HYDROCHLORIDE 150 MG: 150 TABLET, EXTENDED RELEASE ORAL at 09:51

## 2024-09-18 RX ADMIN — GABAPENTIN 200 MG: 100 CAPSULE ORAL at 20:01

## 2024-09-18 RX ADMIN — TETROFOSMIN 1 DOSE: 1.38 INJECTION, POWDER, LYOPHILIZED, FOR SOLUTION INTRAVENOUS at 09:55

## 2024-09-18 RX ADMIN — LAMOTRIGINE 100 MG: 100 TABLET ORAL at 09:51

## 2024-09-18 RX ADMIN — ISOSORBIDE MONONITRATE 30 MG: 30 TABLET, EXTENDED RELEASE ORAL at 09:51

## 2024-09-18 RX ADMIN — TETROFOSMIN 1 DOSE: 1.38 INJECTION, POWDER, LYOPHILIZED, FOR SOLUTION INTRAVENOUS at 11:38

## 2024-09-18 RX ADMIN — PANTOPRAZOLE SODIUM 40 MG: 40 TABLET, DELAYED RELEASE ORAL at 09:51

## 2024-09-18 RX ADMIN — APIXABAN 5 MG: 5 TABLET, FILM COATED ORAL at 09:51

## 2024-09-18 RX ADMIN — GABAPENTIN 300 MG: 300 CAPSULE ORAL at 09:51

## 2024-09-18 RX ADMIN — APIXABAN 5 MG: 5 TABLET, FILM COATED ORAL at 20:01

## 2024-09-18 RX ADMIN — PANTOPRAZOLE SODIUM 40 MG: 40 TABLET, DELAYED RELEASE ORAL at 20:01

## 2024-09-18 RX ADMIN — BUPROPION HYDROCHLORIDE 150 MG: 150 TABLET, EXTENDED RELEASE ORAL at 20:01

## 2024-09-18 RX ADMIN — METOPROLOL SUCCINATE 37.5 MG: 25 TABLET, EXTENDED RELEASE ORAL at 09:51

## 2024-09-18 RX ADMIN — OLANZAPINE 5 MG: 5 TABLET, FILM COATED ORAL at 20:01

## 2024-09-18 RX ADMIN — VENLAFAXINE HYDROCHLORIDE 37.5 MG: 75 TABLET ORAL at 13:54

## 2024-09-18 RX ADMIN — LAMOTRIGINE 100 MG: 100 TABLET ORAL at 20:01

## 2024-09-18 RX ADMIN — OXYBUTYNIN CHLORIDE 10 MG: 10 TABLET, EXTENDED RELEASE ORAL at 09:51

## 2024-09-18 RX ADMIN — REGADENOSON 0.4 MG: 0.08 INJECTION, SOLUTION INTRAVENOUS at 11:38

## 2024-09-18 RX ADMIN — ATORVASTATIN CALCIUM 40 MG: 20 TABLET, FILM COATED ORAL at 09:51

## 2024-09-19 ENCOUNTER — READMISSION MANAGEMENT (OUTPATIENT)
Dept: CALL CENTER | Facility: HOSPITAL | Age: 64
End: 2024-09-19
Payer: MEDICARE

## 2024-09-19 VITALS
SYSTOLIC BLOOD PRESSURE: 131 MMHG | RESPIRATION RATE: 18 BRPM | OXYGEN SATURATION: 97 % | TEMPERATURE: 98.2 F | HEART RATE: 98 BPM | HEIGHT: 64 IN | DIASTOLIC BLOOD PRESSURE: 91 MMHG | WEIGHT: 217.7 LBS | BODY MASS INDEX: 37.17 KG/M2

## 2024-09-19 PROBLEM — F31.9 BIPOLAR DISORDER: Status: ACTIVE | Noted: 2024-09-19

## 2024-09-19 PROBLEM — G92.9 ENCEPHALOPATHY, TOXIC: Status: ACTIVE | Noted: 2024-09-19

## 2024-09-19 PROBLEM — R41.82 AMS (ALTERED MENTAL STATUS): Status: RESOLVED | Noted: 2024-09-16 | Resolved: 2024-09-19

## 2024-09-19 PROBLEM — G92.9 ENCEPHALOPATHY, TOXIC: Status: RESOLVED | Noted: 2024-09-19 | Resolved: 2024-09-19

## 2024-09-19 PROBLEM — R07.89 CHEST PAIN, ATYPICAL: Status: RESOLVED | Noted: 2018-03-30 | Resolved: 2024-09-19

## 2024-09-19 PROBLEM — Z86.018 HISTORY OF MENINGIOMA: Status: ACTIVE | Noted: 2024-09-19

## 2024-09-19 PROBLEM — R07.89 CHEST PAIN, ATYPICAL: Status: ACTIVE | Noted: 2018-03-30

## 2024-09-19 LAB
ANION GAP SERPL CALCULATED.3IONS-SCNC: 9 MMOL/L (ref 5–15)
BUN SERPL-MCNC: 11 MG/DL (ref 8–23)
BUN/CREAT SERPL: 11.3 (ref 7–25)
CALCIUM SPEC-SCNC: 8.7 MG/DL (ref 8.6–10.5)
CHLORIDE SERPL-SCNC: 106 MMOL/L (ref 98–107)
CO2 SERPL-SCNC: 25 MMOL/L (ref 22–29)
CREAT SERPL-MCNC: 0.97 MG/DL (ref 0.57–1)
DEPRECATED RDW RBC AUTO: 42.6 FL (ref 37–54)
EGFRCR SERPLBLD CKD-EPI 2021: 66.2 ML/MIN/1.73
ERYTHROCYTE [DISTWIDTH] IN BLOOD BY AUTOMATED COUNT: 12.4 % (ref 12.3–15.4)
GLUCOSE SERPL-MCNC: 83 MG/DL (ref 65–99)
HCT VFR BLD AUTO: 38.7 % (ref 34–46.6)
HGB BLD-MCNC: 12.8 G/DL (ref 12–15.9)
MCH RBC QN AUTO: 31.6 PG (ref 26.6–33)
MCHC RBC AUTO-ENTMCNC: 33.1 G/DL (ref 31.5–35.7)
MCV RBC AUTO: 95.6 FL (ref 79–97)
PLATELET # BLD AUTO: 241 10*3/MM3 (ref 140–450)
PMV BLD AUTO: 10.1 FL (ref 6–12)
POTASSIUM SERPL-SCNC: 3.4 MMOL/L (ref 3.5–5.2)
RBC # BLD AUTO: 4.05 10*6/MM3 (ref 3.77–5.28)
SODIUM SERPL-SCNC: 140 MMOL/L (ref 136–145)
WBC NRBC COR # BLD AUTO: 8.52 10*3/MM3 (ref 3.4–10.8)

## 2024-09-19 PROCEDURE — 85027 COMPLETE CBC AUTOMATED: CPT | Performed by: STUDENT IN AN ORGANIZED HEALTH CARE EDUCATION/TRAINING PROGRAM

## 2024-09-19 PROCEDURE — 80048 BASIC METABOLIC PNL TOTAL CA: CPT | Performed by: STUDENT IN AN ORGANIZED HEALTH CARE EDUCATION/TRAINING PROGRAM

## 2024-09-19 PROCEDURE — 99232 SBSQ HOSP IP/OBS MODERATE 35: CPT | Performed by: NURSE PRACTITIONER

## 2024-09-19 RX ORDER — GABAPENTIN 100 MG/1
200 CAPSULE ORAL 2 TIMES DAILY
Qty: 12 CAPSULE | Refills: 0 | Status: SHIPPED | OUTPATIENT
Start: 2024-09-19 | End: 2024-09-25 | Stop reason: SDUPTHER

## 2024-09-19 RX ORDER — LAMOTRIGINE 100 MG/1
100 TABLET ORAL 2 TIMES DAILY
Start: 2024-09-19

## 2024-09-19 RX ORDER — POTASSIUM CHLORIDE 750 MG/1
40 TABLET, FILM COATED, EXTENDED RELEASE ORAL ONCE
Status: COMPLETED | OUTPATIENT
Start: 2024-09-19 | End: 2024-09-19

## 2024-09-19 RX ADMIN — PANTOPRAZOLE SODIUM 40 MG: 40 TABLET, DELAYED RELEASE ORAL at 08:41

## 2024-09-19 RX ADMIN — OXYBUTYNIN CHLORIDE 10 MG: 10 TABLET, EXTENDED RELEASE ORAL at 08:40

## 2024-09-19 RX ADMIN — POTASSIUM CHLORIDE 40 MEQ: 750 TABLET, EXTENDED RELEASE ORAL at 10:15

## 2024-09-19 RX ADMIN — METOPROLOL SUCCINATE 37.5 MG: 25 TABLET, EXTENDED RELEASE ORAL at 08:40

## 2024-09-19 RX ADMIN — ATORVASTATIN CALCIUM 40 MG: 20 TABLET, FILM COATED ORAL at 08:39

## 2024-09-19 RX ADMIN — APIXABAN 5 MG: 5 TABLET, FILM COATED ORAL at 08:41

## 2024-09-19 RX ADMIN — BUPROPION HYDROCHLORIDE 150 MG: 150 TABLET, EXTENDED RELEASE ORAL at 08:40

## 2024-09-19 RX ADMIN — LEVOTHYROXINE SODIUM 88 MCG: 88 TABLET ORAL at 06:10

## 2024-09-19 RX ADMIN — LAMOTRIGINE 100 MG: 100 TABLET ORAL at 08:39

## 2024-09-19 RX ADMIN — GABAPENTIN 200 MG: 100 CAPSULE ORAL at 08:39

## 2024-09-19 RX ADMIN — ISOSORBIDE MONONITRATE 30 MG: 30 TABLET, EXTENDED RELEASE ORAL at 08:40

## 2024-09-19 RX ADMIN — VENLAFAXINE HYDROCHLORIDE 37.5 MG: 75 TABLET ORAL at 08:39

## 2024-09-20 ENCOUNTER — TRANSITIONAL CARE MANAGEMENT TELEPHONE ENCOUNTER (OUTPATIENT)
Dept: CALL CENTER | Facility: HOSPITAL | Age: 64
End: 2024-09-20
Payer: MEDICARE

## 2024-09-23 ENCOUNTER — TREATMENT (OUTPATIENT)
Dept: PHYSICAL THERAPY | Facility: CLINIC | Age: 64
End: 2024-09-23
Payer: MEDICARE

## 2024-09-23 DIAGNOSIS — R26.89 DECREASED FUNCTIONAL MOBILITY: ICD-10-CM

## 2024-09-23 DIAGNOSIS — R29.898 WEAKNESS OF BOTH LEGS: ICD-10-CM

## 2024-09-23 DIAGNOSIS — R26.9 GAIT DISTURBANCE: Primary | ICD-10-CM

## 2024-09-23 DIAGNOSIS — R29.6 FREQUENT FALLS: ICD-10-CM

## 2024-09-23 PROCEDURE — 97164 PT RE-EVAL EST PLAN CARE: CPT

## 2024-09-24 ENCOUNTER — OFFICE VISIT (OUTPATIENT)
Dept: ENDOCRINOLOGY | Age: 64
End: 2024-09-24
Payer: MEDICARE

## 2024-09-24 VITALS
SYSTOLIC BLOOD PRESSURE: 114 MMHG | WEIGHT: 211.8 LBS | HEIGHT: 64 IN | TEMPERATURE: 97.6 F | HEART RATE: 85 BPM | BODY MASS INDEX: 36.16 KG/M2 | OXYGEN SATURATION: 97 % | DIASTOLIC BLOOD PRESSURE: 74 MMHG

## 2024-09-24 DIAGNOSIS — I25.10 ATHEROSCLEROSIS OF NATIVE CORONARY ARTERY OF NATIVE HEART WITHOUT ANGINA PECTORIS: ICD-10-CM

## 2024-09-24 DIAGNOSIS — M85.89 OSTEOPENIA OF MULTIPLE SITES: ICD-10-CM

## 2024-09-24 DIAGNOSIS — E78.5 HYPERLIPIDEMIA, UNSPECIFIED HYPERLIPIDEMIA TYPE: ICD-10-CM

## 2024-09-24 DIAGNOSIS — E23.6 PITUITARY CYST: ICD-10-CM

## 2024-09-24 DIAGNOSIS — E03.9 PRIMARY HYPOTHYROIDISM: Primary | ICD-10-CM

## 2024-09-24 DIAGNOSIS — E03.9 HYPOTHYROIDISM (ACQUIRED): ICD-10-CM

## 2024-09-24 DIAGNOSIS — I10 ESSENTIAL HYPERTENSION: ICD-10-CM

## 2024-09-24 PROCEDURE — 1160F RVW MEDS BY RX/DR IN RCRD: CPT | Performed by: INTERNAL MEDICINE

## 2024-09-24 PROCEDURE — 1159F MED LIST DOCD IN RCRD: CPT | Performed by: INTERNAL MEDICINE

## 2024-09-24 PROCEDURE — 3074F SYST BP LT 130 MM HG: CPT | Performed by: INTERNAL MEDICINE

## 2024-09-24 PROCEDURE — G2211 COMPLEX E/M VISIT ADD ON: HCPCS | Performed by: INTERNAL MEDICINE

## 2024-09-24 PROCEDURE — 99214 OFFICE O/P EST MOD 30 MIN: CPT | Performed by: INTERNAL MEDICINE

## 2024-09-24 PROCEDURE — 3078F DIAST BP <80 MM HG: CPT | Performed by: INTERNAL MEDICINE

## 2024-09-24 RX ORDER — LEVOTHYROXINE SODIUM 88 UG/1
88 TABLET ORAL EVERY MORNING
Qty: 90 TABLET | Refills: 2 | Status: SHIPPED | OUTPATIENT
Start: 2024-09-24

## 2024-09-25 ENCOUNTER — OFFICE VISIT (OUTPATIENT)
Dept: FAMILY MEDICINE CLINIC | Facility: CLINIC | Age: 64
End: 2024-09-25
Payer: MEDICARE

## 2024-09-25 ENCOUNTER — TREATMENT (OUTPATIENT)
Dept: PHYSICAL THERAPY | Facility: CLINIC | Age: 64
End: 2024-09-25
Payer: MEDICARE

## 2024-09-25 VITALS
TEMPERATURE: 97 F | RESPIRATION RATE: 14 BRPM | OXYGEN SATURATION: 98 % | BODY MASS INDEX: 36.6 KG/M2 | WEIGHT: 214.4 LBS | HEART RATE: 73 BPM | HEIGHT: 64 IN | DIASTOLIC BLOOD PRESSURE: 80 MMHG | SYSTOLIC BLOOD PRESSURE: 138 MMHG

## 2024-09-25 DIAGNOSIS — R29.6 FREQUENT FALLS: ICD-10-CM

## 2024-09-25 DIAGNOSIS — R26.89 DECREASED FUNCTIONAL MOBILITY: ICD-10-CM

## 2024-09-25 DIAGNOSIS — R41.82 ALTERED MENTAL STATUS, UNSPECIFIED ALTERED MENTAL STATUS TYPE: Primary | ICD-10-CM

## 2024-09-25 DIAGNOSIS — F31.77 BIPOLAR DISORDER, IN PARTIAL REMISSION, MOST RECENT EPISODE MIXED: ICD-10-CM

## 2024-09-25 DIAGNOSIS — R26.9 GAIT DISTURBANCE: Primary | ICD-10-CM

## 2024-09-25 DIAGNOSIS — R55 NEAR SYNCOPE: ICD-10-CM

## 2024-09-25 DIAGNOSIS — E66.09 OBESITY DUE TO EXCESS CALORIES, UNSPECIFIED CLASSIFICATION, UNSPECIFIED WHETHER SERIOUS COMORBIDITY PRESENT: ICD-10-CM

## 2024-09-25 DIAGNOSIS — R29.898 WEAKNESS OF BOTH LEGS: ICD-10-CM

## 2024-09-25 PROCEDURE — 3079F DIAST BP 80-89 MM HG: CPT | Performed by: NURSE PRACTITIONER

## 2024-09-25 PROCEDURE — 1126F AMNT PAIN NOTED NONE PRSNT: CPT | Performed by: NURSE PRACTITIONER

## 2024-09-25 PROCEDURE — 99214 OFFICE O/P EST MOD 30 MIN: CPT | Performed by: NURSE PRACTITIONER

## 2024-09-25 PROCEDURE — 3075F SYST BP GE 130 - 139MM HG: CPT | Performed by: NURSE PRACTITIONER

## 2024-09-25 RX ORDER — GABAPENTIN 100 MG/1
200 CAPSULE ORAL 2 TIMES DAILY
Qty: 360 CAPSULE | Refills: 0 | Status: SHIPPED | OUTPATIENT
Start: 2024-09-25

## 2024-09-25 RX ORDER — SEMAGLUTIDE 0.5 MG/.5ML
0.5 INJECTION, SOLUTION SUBCUTANEOUS WEEKLY
Qty: 2 ML | Refills: 0 | Status: SHIPPED | OUTPATIENT
Start: 2024-09-25

## 2024-09-25 NOTE — PROGRESS NOTES
"Chief Complaint  Hospital Follow Up Visit    Subjective        Keri Bhagat presents to [facility]  History of Present Illness  Pleasant patient recent follow-up weakness, developed some confusion dizziness, went to the emergency room, initially her sugar was in the 50s,  She was given fluids in the EMS and she had labs and x-rays and brain studies in the hospital, checked out, no evidence of any heart attack or stroke, and was discharged and she said they thought it could be her blood pressure she is feeling better now, she has had no recurrent low sugars her blood pressure dips recently started on Wegovy 0.25 mg but she still eating and has helped her appetite but she is not fasting  Feels like she is doing well well with the medication listed content      Objective   Vital Signs:  /80   Pulse 73   Temp 97 °F (36.1 °C) (Infrared)   Resp 14   Ht 161.3 cm (63.5\")   Wt 97.3 kg (214 lb 6.4 oz)   SpO2 98%   BMI 37.38 kg/m²   Estimated body mass index is 37.38 kg/m² as calculated from the following:    Height as of this encounter: 161.3 cm (63.5\").    Weight as of this encounter: 97.3 kg (214 lb 6.4 oz).            Physical Exam  Vitals reviewed.   Constitutional:       General: She is not in acute distress.     Appearance: Normal appearance. She is well-developed. She is not ill-appearing, toxic-appearing or diaphoretic.   HENT:      Head: Normocephalic.      Nose: Nose normal.   Eyes:      General: No scleral icterus.     Conjunctiva/sclera: Conjunctivae normal.      Pupils: Pupils are equal, round, and reactive to light.   Neck:      Thyroid: No thyromegaly.      Vascular: No JVD.   Cardiovascular:      Rate and Rhythm: Normal rate and regular rhythm.      Heart sounds: Normal heart sounds. No murmur heard.     No friction rub. No gallop.   Pulmonary:      Effort: Pulmonary effort is normal. No respiratory distress.      Breath sounds: Normal breath sounds. No stridor. " No wheezing or rales.   Abdominal:      General: Bowel sounds are normal. There is no distension.      Palpations: Abdomen is soft.      Tenderness: There is no abdominal tenderness.      Comments: No hepatosplenomegaly, no ascites,   Musculoskeletal:         General: No tenderness.      Cervical back: Neck supple.   Lymphadenopathy:      Cervical: No cervical adenopathy.   Skin:     General: Skin is warm and dry.      Findings: No erythema or rash.   Neurological:      General: No focal deficit present.      Mental Status: She is alert and oriented to person, place, and time. Mental status is at baseline.      Deep Tendon Reflexes: Reflexes are normal and symmetric.   Psychiatric:         Mood and Affect: Mood normal.         Behavior: Behavior normal.         Thought Content: Thought content normal.         Judgment: Judgment normal.        Result Review :                Assessment and Plan   Diagnoses and all orders for this visit:    1. Altered mental status, unspecified altered mental status type (Primary)    2. Near syncope    3. Bipolar disorder, in partial remission, most recent episode mixed  -     gabapentin (NEURONTIN) 100 MG capsule; Take 2 capsules by mouth 2 (Two) Times a Day.  Dispense: 360 capsule; Refill: 0    4. Obesity due to excess calories, unspecified classification, unspecified whether serious comorbidity present    Other orders  -     Semaglutide-Weight Management (Wegovy) 0.5 MG/0.5ML solution auto-injector; Inject 0.5 mL under the skin into the appropriate area as directed 1 (One) Time Per Week.  Dispense: 2 mL; Refill: 0             Follow Up   Return in about 1 month (around 10/25/2024).  Patient was given instructions and counseling regarding her condition or for health maintenance advice. Please see specific information pulled into the AVS if appropriate.     Patient Instructions   Discharge instructions,  Present plan hydrate well  Extra caution standing avoid climbing    If taking  Wegovy increase 0.5 mg weekly however at least temporarily during the transition  Small bedtime snack with protein plenty of protein and healthy fat with each meal  But portion control  Avoid skipping or long times without eating especially the next several weeks    If you have recurrent symptoms we should be suspicious of hypoglycemia this is less likely and generally does not occur often with your medication but possible    Otherwise hydrate well take it easy  You are for any further complaints  Update me weekly your progress  No skipping meals temporarily until we make sure you are not having any low sugars which is less likely

## 2024-09-25 NOTE — PATIENT INSTRUCTIONS
Discharge instructions,  Present plan hydrate well  Extra caution standing avoid climbing    If taking Wegovy increase 0.5 mg weekly however at least temporarily during the transition  Small bedtime snack with protein plenty of protein and healthy fat with each meal  But portion control  Avoid skipping or long times without eating especially the next several weeks    If you have recurrent symptoms we should be suspicious of hypoglycemia this is less likely and generally does not occur often with your medication but possible    Otherwise hydrate well take it easy  You are for any further complaints  Update me weekly your progress  No skipping meals temporarily until we make sure you are not having any low sugars which is less likely

## 2024-09-26 ENCOUNTER — TRANSCRIBE ORDERS (OUTPATIENT)
Dept: CARDIOLOGY | Facility: CLINIC | Age: 64
End: 2024-09-26
Payer: MEDICARE

## 2024-09-26 ENCOUNTER — OFFICE VISIT (OUTPATIENT)
Age: 64
End: 2024-09-26
Payer: MEDICARE

## 2024-09-26 VITALS
DIASTOLIC BLOOD PRESSURE: 68 MMHG | SYSTOLIC BLOOD PRESSURE: 126 MMHG | HEIGHT: 64 IN | WEIGHT: 215 LBS | BODY MASS INDEX: 36.7 KG/M2 | HEART RATE: 62 BPM

## 2024-09-26 DIAGNOSIS — Z13.6 SCREENING FOR ISCHEMIC HEART DISEASE: ICD-10-CM

## 2024-09-26 DIAGNOSIS — I47.10 SUSTAINED SVT: Primary | ICD-10-CM

## 2024-09-26 DIAGNOSIS — Z01.810 PRE-OPERATIVE CARDIOVASCULAR EXAMINATION: Primary | ICD-10-CM

## 2024-09-26 PROCEDURE — 3074F SYST BP LT 130 MM HG: CPT | Performed by: INTERNAL MEDICINE

## 2024-09-26 PROCEDURE — 3078F DIAST BP <80 MM HG: CPT | Performed by: INTERNAL MEDICINE

## 2024-09-26 PROCEDURE — 99214 OFFICE O/P EST MOD 30 MIN: CPT | Performed by: INTERNAL MEDICINE

## 2024-09-29 NOTE — H&P (VIEW-ONLY)
Date of Office Visit: 2024  Encounter Provider: Eduar Almodovar MD  Place of Service: [facility] CARDIOLOGY  Patient Name: Keri Bhagat  :1960    Chief Complaint   Patient presents with    PACs    PVCs   :     HPI: Keri Bhagat is a 64 y.o. female who presents today for abnormal Holter monitor.    She reports palpitations for the past several months.   The episodes occur sporadically, and can last up to an hour    She also reports episodes of weakness and trouble with memory.      She was recently hospitalized.            Past Medical History:   Diagnosis Date    Abnormal Pap smear of cervix     Adrenal adenoma 2022    Anemia     Anxiety     Arthritis     Arthritis of back     Arthritis of neck     Bipolar I disorder, single manic episode     depressive d(NOS)    Cataract     Removed    Cervical disc herniation     Colon polyp     Constipation     Coronary artery disease     COVID-19 01/10/2023    Death of family member     MOTHER IN 2024    Depression     NO SUICIDAL PLANS    Dislocation of finger     Diverticular disease     Diverticulitis of colon     Dyspepsia     Encounter for follow-up examination after completed treatment for conditions other than malignant neoplasm 2018    Fracture of wrist     GERD (gastroesophageal reflux disease)     Heart attack     AGE 39    Heart murmur     Hiatal hernia     History of transfusion         HPV (human papilloma virus) infection 2016    HPV positive on pap LGSIL    Hyperlipidemia     Hypertension     Hypothyroidism     Incontinence in female     wears pads    Kidney disease, chronic, stage III (GFR 30-59 ml/min)     Knee pain, bilateral     Knee swelling     LGSIL on Pap smear of cervix 2016    LGSIL HPV positive    Low back strain     Lower back gets extremely achy after about 1 hour of  housework.    Lumbosacral disc disease Unsure    Lower left back is partial osteoarthritis  and partial osteoporosis    Obesity     Osteopenia 2021    Bone density test    Periodic limb movement disorder     Renal insufficiency     Restless leg syndrome     LEFT SHOULDER    Rotator cuff syndrome     Sleep apnea     NO MACHINE CURRENTLY    Suicidal ideation 08/19/2016    history    Thyroid nodule     Urinary tract infection     Visual impairment     Vitamin B12 deficiency        Past Surgical History:   Procedure Laterality Date    ADENOIDECTOMY  1974    ANTERIOR CERVICAL DISCECTOMY W/ FUSION N/A 12/29/2016    Procedure: C3-4 anterior cervical discectomy and fusion with Depuy micro plate, ALLOGRAFT C3-4, AND HARDWARE REMOVAL C4-7.;  Surgeon: Hema Godwin MD;  Location: [facility] MAIN OR;  Service:     BARIATRIC SURGERY      CARDIAC CATHETERIZATION N/A 03/30/2017    Procedure: Left Heart Cath;  Surgeon: Tracey Vargas MD;  Location:  [facility] CATH INVASIVE LOCATION;  Service:     CARDIAC CATHETERIZATION N/A 03/30/2017    Procedure: Coronary angiography;  Surgeon: Tracey Vargas MD;  Location:  [facility] CATH INVASIVE LOCATION;  Service:     CARDIAC CATHETERIZATION N/A 03/30/2017    Procedure: Left ventriculography;  Surgeon: Tracey Vargas MD;  Location:  [facility] CATH INVASIVE LOCATION;  Service:     CARDIAC CATHETERIZATION  03/30/2017    Procedure: Functional Flow [place];  Surgeon: Tracey Vargas MD;  Location:  [facility] CATH INVASIVE LOCATION;  Service:     CATARACT EXTRACTION Bilateral     CERVICAL BIOPSY  MMXVI    Dr. Jeffery.     CERVICAL DISCECTOMY ANTERIOR  04/2013    C4-7    COLONOSCOPY  04/20/2015    Diverticulosis, IH    COLONOSCOPY  03/09/2021    COLPOSCOPY W/ BIOPSY / CURETTAGE  06/17/2016    LGSIL HPV positive. Results were normal repeat pap in one year. Chronic Cervicitis    CORONARY ANGIOPLASTY WITH STENT PLACEMENT      CORONARY STENT PLACEMENT      ENDOSCOPY  MMXV    Normal.  Dr. Rodriguez    ENDOSCOPY N/A 06/22/2017    Erythematous mucosa in the stomach  PATH: Chronic active gastritis, moderate with  intestinal metaplasia     EPIDURAL BLOCK      EYE SURGERY      Cateract removal    FRACTURE SURGERY      INCISION AND DRAINAGE LEG Left 05/12/2024    Procedure: INCISION AND DRAINAGE LOWER EXTREMITY WOUND CLOSURE KNEE;  Surgeon: Ajay Gutierrez MD;  Location: [facility] OR;  Service: Orthopedics;  Laterality: Left;    INGUINAL HERNIA REPAIR      JOINT REPLACEMENT  3/5/24    Left knee    KNEE SURGERY      LAPAROSCOPIC GASTRIC BANDING  02/2018    SHOULDER SURGERY Left     RCR    TONSILLECTOMY      TONSILLECTOMY AND ADENOIDECTOMY      TOTAL KNEE ARTHROPLASTY Left 03/05/2024    Procedure: LEFT TOTAL KNEE ARTHROPLASTY WITH TAB NAVIGATION;  Surgeon: Tho Pritchard MD;  Location: [facility];  Service: Orthopedics;  Laterality: Left;    TRANSVAGINAL TAPING SUSPENSION N/A 12/06/2017    Procedure: MID URETHRAL SLING CYSTSCOPY;  Surgeon: Abby Méndez MD;  Location: [facility] OR;  Service:     TRIGGER POINT INJECTION      TUBAL ABDOMINAL LIGATION      TYMPANOSTOMY TUBE PLACEMENT Right     UPPER GASTROINTESTINAL ENDOSCOPY  approx 2021    WRIST SURGERY Bilateral     carpal tunnel       Social History     Socioeconomic History    Marital status:     Number of children: 3    Years of education: 12   Tobacco Use    Smoking status: Every Day     Types: Electronic Cigarette     Passive exposure: Current    Smokeless tobacco: Never    Tobacco comments:     Just using vape.   Vaping Use    Vaping status: Every Day    Substances: Nicotine, Flavoring    Devices: Refillable tank   Substance and Sexual Activity    Alcohol use: Not Currently     Comment: RARELY    Drug use: Never    Sexual activity: Not Currently     Partners: Male     Birth control/protection: Abstinence, Post-menopausal       Family History   Problem Relation Age of Onset    Diabetes type II Mother     Hypertension Mother     Osteoporosis Mother     Seizures Mother     Diabetes Mother     Arthritis Mother     Hyperlipidemia Mother      Cancer Mother     COPD Mother     Heart disease Mother     Kidney disease Mother     COPD Father     Hypertension Father     Lung cancer Father     Heart attack Father     Liver cancer Father     Liver disease Father     Heart disease Father     Cancer Father         Lung cacer that metastasized  into the liver.    Thyroid disease Sister     Hypertension Sister     Bipolar disorder Sister     Depression Sister     ADD / ADHD Sister     Anxiety disorder Sister     Mental illness Sister     Hyperlipidemia Sister     Broken bones Sister     Colon polyps Sister     Thyroid disease Sister     Hypertension Sister     Bipolar disorder Sister     Anxiety disorder Sister     Depression Sister     Mental illness Sister     Hyperlipidemia Sister     Colon polyps Sister     Cancer Maternal Grandmother     No Known Problems Maternal Grandfather     No Known Problems Paternal Grandmother     No Known Problems Paternal Grandfather     Abnormal EKG Daughter     Hypertension Daughter     Bipolar disorder Daughter     Thyroid disease Daughter     Mental illness Daughter     Hyperlipidemia Daughter     Asthma Daughter     No Known Problems Son     Diabetes Son         Type 1    Diabetes Paternal Uncle     Anxiety disorder Sister     Depression Sister     Hyperlipidemia Sister     Thyroid disease Daughter     Breast cancer Neg Hx     Ovarian cancer Neg Hx     Uterine cancer Neg Hx     Colon cancer Neg Hx     Malig Hyperthermia Neg Hx        Review of Systems   Constitutional: Negative.   Cardiovascular:  Positive for palpitations.   Respiratory: Negative.     Gastrointestinal: Negative.        No Known Allergies      Current Outpatient Medications:     apixaban (ELIQUIS) 5 MG tablet tablet, Take 1 tablet by mouth Every 12 (Twelve) Hours., Disp: 60 tablet, Rfl: 11    B Complex Vitamins (vitamin b complex) capsule capsule, Take 1 capsule by mouth 2 (Two) Times a Day. Indications: Vitamin Deficiency, Disp: , Rfl:     buPROPion XL  (WELLBUTRIN XL) 150 MG 24 hr tablet, Take 1 tablet by mouth 2 (Two) Times a Day. Indications: Major Depressive Disorder, Disp: , Rfl:     Calcium Citrate-Vitamin D (CITRUS CALCIUM/VITAMIN D PO), Take 1 tablet by mouth 2 (Two) Times a Day. Indications: supplement, Disp: , Rfl:     cyanocobalamin (VITAMIN B-12) 1000 MCG tablet, Take 1 tablet by mouth Daily. Indications: Inadequate Vitamin B12, Disp: , Rfl:     estradiol (ESTRACE) 0.1 MG/GM vaginal cream, Insert 1 g into the vagina 3 (Three) Times a Week. Indications: Vulvovaginal Atrophy, Disp: , Rfl:     Ferrous Sulfate (IRON PO), Take 1 tablet by mouth 2 (Two) Times a Day. Indications: Supplement, Disp: , Rfl:     gabapentin (NEURONTIN) 100 MG capsule, Take 2 capsules by mouth 2 (Two) Times a Day., Disp: 360 capsule, Rfl: 0    isosorbide mononitrate (IMDUR) 30 MG 24 hr tablet, Take 1 tablet by mouth Daily., Disp: 90 tablet, Rfl: 2    lamoTRIgine (LaMICtal) 100 MG tablet, Take 1 tablet by mouth 2 (Two) Times a Day., Disp: , Rfl:     levothyroxine (SYNTHROID, LEVOTHROID) 88 MCG tablet, Take 1 tablet by mouth Every Morning. Indications: Underactive Thyroid, Disp: 90 tablet, Rfl: 2    metoprolol succinate XL (Toprol XL) 25 MG 24 hr tablet, Take 1.5 tablets by mouth Daily. For bp, Disp: 135 tablet, Rfl: 3    OLANZapine (zyPREXA) 5 MG tablet, Take 1 tablet by mouth Every Night. Indications: Depressive Phase of Manic-Depression, Disp: , Rfl:     ondansetron (ZOFRAN) 4 MG tablet, TAKE 1 TABLET BY MOUTH EVERY 8 HOURS AS NEEDED FOR NAUSEA AND/OR VOMITING, Disp: 30 tablet, Rfl: 0    pantoprazole (PROTONIX) 40 MG EC tablet, Take 1 tablet by mouth 2 (Two) Times a Day., Disp: 180 tablet, Rfl: 0    Semaglutide-Weight Management (Wegovy) 0.5 MG/0.5ML solution auto-injector, Inject 0.5 mL under the skin into the appropriate area as directed 1 (One) Time Per Week., Disp: 2 mL, Rfl: 0    venlafaxine (EFFEXOR) 37.5 MG tablet, Take 1 tablet by mouth Daily., Disp: , Rfl:     Current  "Facility-Administered Medications:     nitroglycerin (NITROSTAT) SL tablet 0.4 mg, 0.4 mg, Sublingual, Q5 Min PRN, Lluvia Porter APRN      Objective:     Vitals:    09/26/24 1125   BP: 126/68   Pulse: 62   Weight: 97.5 kg (215 lb)   Height: 161.3 cm (63.5\")     Body mass index is 37.49 kg/m².    PHYSICAL EXAM:    Vitals and nursing note reviewed.   Constitutional:       General: Not in acute distress.     Appearance: Healthy appearance. Not in distress.   Pulmonary:      Effort: Pulmonary effort is normal. No respiratory distress.   Cardiovascular:      Normal rate. Regular rhythm.   Edema:     Peripheral edema absent.   Skin:     General: Skin is warm and dry.   Neurological:      Mental Status: Alert and oriented to person, place, and time.   Psychiatric:         Behavior: Behavior normal.         Thought Content: Thought content normal.         Judgment: Judgment normal.             ECG 12 Lead    Date/Time: 10/2/2024 10:57 PM  Performed by: Eduar Almdoovar MD    Authorized by: Eduar Almodovar MD  Comparison: compared with previous ECG   Rhythm: sinus rhythm            Assessment:       Diagnosis Plan   1. Sustained SVT  Case Request EP Lab: Ablation SVT             Plan:       I think she does have a SVT, that causes palpitations.     Her monitor was not of high quality.  There is a lot of artifact, but I do see an SVT.     I don't think it is related to all of her symptoms.     We discussed and she really wanted to pursue ablation    We discussed the risk and she gave her consent to proceed.     As always, it has been a pleasure to participate in your patient's care.      Sincerely,         Eduar Almodovar MD  "

## 2024-09-29 NOTE — PROGRESS NOTES
Date of Office Visit: 2024  Encounter Provider: Eduar Almodovar MD  Place of Service: [facility] CARDIOLOGY  Patient Name: Keri Bhagat  :1960    Chief Complaint   Patient presents with    PACs    PVCs   :     HPI: Keri Bhagat is a 64 y.o. female who presents today for abnormal Holter monitor.    She reports palpitations for the past several months.   The episodes occur sporadically, and can last up to an hour    She also reports episodes of weakness and trouble with memory.      She was recently hospitalized.            Past Medical History:   Diagnosis Date    Abnormal Pap smear of cervix     Adrenal adenoma 2022    Anemia     Anxiety     Arthritis     Arthritis of back     Arthritis of neck     Bipolar I disorder, single manic episode     depressive d(NOS)    Cataract     Removed    Cervical disc herniation     Colon polyp     Constipation     Coronary artery disease     COVID-19 01/10/2023    Death of family member     MOTHER IN 2024    Depression     NO SUICIDAL PLANS    Dislocation of finger     Diverticular disease     Diverticulitis of colon     Dyspepsia     Encounter for follow-up examination after completed treatment for conditions other than malignant neoplasm 2018    Fracture of wrist     GERD (gastroesophageal reflux disease)     Heart attack     AGE 39    Heart murmur     Hiatal hernia     History of transfusion         HPV (human papilloma virus) infection 2016    HPV positive on pap LGSIL    Hyperlipidemia     Hypertension     Hypothyroidism     Incontinence in female     wears pads    Kidney disease, chronic, stage III (GFR 30-59 ml/min)     Knee pain, bilateral     Knee swelling     LGSIL on Pap smear of cervix 2016    LGSIL HPV positive    Low back strain     Lower back gets extremely achy after about 1 hour of  housework.    Lumbosacral disc disease Unsure    Lower left back is partial osteoarthritis  and partial osteoporosis    Obesity     Osteopenia 2021    Bone density test    Periodic limb movement disorder     Renal insufficiency     Restless leg syndrome     LEFT SHOULDER    Rotator cuff syndrome     Sleep apnea     NO MACHINE CURRENTLY    Suicidal ideation 08/19/2016    history    Thyroid nodule     Urinary tract infection     Visual impairment     Vitamin B12 deficiency        Past Surgical History:   Procedure Laterality Date    ADENOIDECTOMY  1974    ANTERIOR CERVICAL DISCECTOMY W/ FUSION N/A 12/29/2016    Procedure: C3-4 anterior cervical discectomy and fusion with Depuy micro plate, ALLOGRAFT C3-4, AND HARDWARE REMOVAL C4-7.;  Surgeon: Hema Godwin MD;  Location: [facility] MAIN OR;  Service:     BARIATRIC SURGERY      CARDIAC CATHETERIZATION N/A 03/30/2017    Procedure: Left Heart Cath;  Surgeon: Tracey Vargas MD;  Location:  [facility] CATH INVASIVE LOCATION;  Service:     CARDIAC CATHETERIZATION N/A 03/30/2017    Procedure: Coronary angiography;  Surgeon: Tracey Vargas MD;  Location:  [facility] CATH INVASIVE LOCATION;  Service:     CARDIAC CATHETERIZATION N/A 03/30/2017    Procedure: Left ventriculography;  Surgeon: Tracey Vargas MD;  Location:  [facility] CATH INVASIVE LOCATION;  Service:     CARDIAC CATHETERIZATION  03/30/2017    Procedure: Functional Flow [place];  Surgeon: Tracey Vargas MD;  Location:  [facility] CATH INVASIVE LOCATION;  Service:     CATARACT EXTRACTION Bilateral     CERVICAL BIOPSY  MMXVI    Dr. Jeffery.     CERVICAL DISCECTOMY ANTERIOR  04/2013    C4-7    COLONOSCOPY  04/20/2015    Diverticulosis, IH    COLONOSCOPY  03/09/2021    COLPOSCOPY W/ BIOPSY / CURETTAGE  06/17/2016    LGSIL HPV positive. Results were normal repeat pap in one year. Chronic Cervicitis    CORONARY ANGIOPLASTY WITH STENT PLACEMENT      CORONARY STENT PLACEMENT      ENDOSCOPY  MMXV    Normal.  Dr. Rodriguez    ENDOSCOPY N/A 06/22/2017    Erythematous mucosa in the stomach  PATH: Chronic active gastritis, moderate with  intestinal metaplasia     EPIDURAL BLOCK      EYE SURGERY      Cateract removal    FRACTURE SURGERY      INCISION AND DRAINAGE LEG Left 05/12/2024    Procedure: INCISION AND DRAINAGE LOWER EXTREMITY WOUND CLOSURE KNEE;  Surgeon: Ajay Gutierrez MD;  Location: [facility] OR;  Service: Orthopedics;  Laterality: Left;    INGUINAL HERNIA REPAIR      JOINT REPLACEMENT  3/5/24    Left knee    KNEE SURGERY      LAPAROSCOPIC GASTRIC BANDING  02/2018    SHOULDER SURGERY Left     RCR    TONSILLECTOMY      TONSILLECTOMY AND ADENOIDECTOMY      TOTAL KNEE ARTHROPLASTY Left 03/05/2024    Procedure: LEFT TOTAL KNEE ARTHROPLASTY WITH TAB NAVIGATION;  Surgeon: Tho Pritchard MD;  Location: [facility];  Service: Orthopedics;  Laterality: Left;    TRANSVAGINAL TAPING SUSPENSION N/A 12/06/2017    Procedure: MID URETHRAL SLING CYSTSCOPY;  Surgeon: Abby Méndez MD;  Location: [facility] OR;  Service:     TRIGGER POINT INJECTION      TUBAL ABDOMINAL LIGATION      TYMPANOSTOMY TUBE PLACEMENT Right     UPPER GASTROINTESTINAL ENDOSCOPY  approx 2021    WRIST SURGERY Bilateral     carpal tunnel       Social History     Socioeconomic History    Marital status:     Number of children: 3    Years of education: 12   Tobacco Use    Smoking status: Every Day     Types: Electronic Cigarette     Passive exposure: Current    Smokeless tobacco: Never    Tobacco comments:     Just using vape.   Vaping Use    Vaping status: Every Day    Substances: Nicotine, Flavoring    Devices: Refillable tank   Substance and Sexual Activity    Alcohol use: Not Currently     Comment: RARELY    Drug use: Never    Sexual activity: Not Currently     Partners: Male     Birth control/protection: Abstinence, Post-menopausal       Family History   Problem Relation Age of Onset    Diabetes type II Mother     Hypertension Mother     Osteoporosis Mother     Seizures Mother     Diabetes Mother     Arthritis Mother     Hyperlipidemia Mother      Cancer Mother     COPD Mother     Heart disease Mother     Kidney disease Mother     COPD Father     Hypertension Father     Lung cancer Father     Heart attack Father     Liver cancer Father     Liver disease Father     Heart disease Father     Cancer Father         Lung cacer that metastasized  into the liver.    Thyroid disease Sister     Hypertension Sister     Bipolar disorder Sister     Depression Sister     ADD / ADHD Sister     Anxiety disorder Sister     Mental illness Sister     Hyperlipidemia Sister     Broken bones Sister     Colon polyps Sister     Thyroid disease Sister     Hypertension Sister     Bipolar disorder Sister     Anxiety disorder Sister     Depression Sister     Mental illness Sister     Hyperlipidemia Sister     Colon polyps Sister     Cancer Maternal Grandmother     No Known Problems Maternal Grandfather     No Known Problems Paternal Grandmother     No Known Problems Paternal Grandfather     Abnormal EKG Daughter     Hypertension Daughter     Bipolar disorder Daughter     Thyroid disease Daughter     Mental illness Daughter     Hyperlipidemia Daughter     Asthma Daughter     No Known Problems Son     Diabetes Son         Type 1    Diabetes Paternal Uncle     Anxiety disorder Sister     Depression Sister     Hyperlipidemia Sister     Thyroid disease Daughter     Breast cancer Neg Hx     Ovarian cancer Neg Hx     Uterine cancer Neg Hx     Colon cancer Neg Hx     Malig Hyperthermia Neg Hx        Review of Systems   Constitutional: Negative.   Cardiovascular:  Positive for palpitations.   Respiratory: Negative.     Gastrointestinal: Negative.        No Known Allergies      Current Outpatient Medications:     apixaban (ELIQUIS) 5 MG tablet tablet, Take 1 tablet by mouth Every 12 (Twelve) Hours., Disp: 60 tablet, Rfl: 11    B Complex Vitamins (vitamin b complex) capsule capsule, Take 1 capsule by mouth 2 (Two) Times a Day. Indications: Vitamin Deficiency, Disp: , Rfl:     buPROPion XL  (WELLBUTRIN XL) 150 MG 24 hr tablet, Take 1 tablet by mouth 2 (Two) Times a Day. Indications: Major Depressive Disorder, Disp: , Rfl:     Calcium Citrate-Vitamin D (CITRUS CALCIUM/VITAMIN D PO), Take 1 tablet by mouth 2 (Two) Times a Day. Indications: supplement, Disp: , Rfl:     cyanocobalamin (VITAMIN B-12) 1000 MCG tablet, Take 1 tablet by mouth Daily. Indications: Inadequate Vitamin B12, Disp: , Rfl:     estradiol (ESTRACE) 0.1 MG/GM vaginal cream, Insert 1 g into the vagina 3 (Three) Times a Week. Indications: Vulvovaginal Atrophy, Disp: , Rfl:     Ferrous Sulfate (IRON PO), Take 1 tablet by mouth 2 (Two) Times a Day. Indications: Supplement, Disp: , Rfl:     gabapentin (NEURONTIN) 100 MG capsule, Take 2 capsules by mouth 2 (Two) Times a Day., Disp: 360 capsule, Rfl: 0    isosorbide mononitrate (IMDUR) 30 MG 24 hr tablet, Take 1 tablet by mouth Daily., Disp: 90 tablet, Rfl: 2    lamoTRIgine (LaMICtal) 100 MG tablet, Take 1 tablet by mouth 2 (Two) Times a Day., Disp: , Rfl:     levothyroxine (SYNTHROID, LEVOTHROID) 88 MCG tablet, Take 1 tablet by mouth Every Morning. Indications: Underactive Thyroid, Disp: 90 tablet, Rfl: 2    metoprolol succinate XL (Toprol XL) 25 MG 24 hr tablet, Take 1.5 tablets by mouth Daily. For bp, Disp: 135 tablet, Rfl: 3    OLANZapine (zyPREXA) 5 MG tablet, Take 1 tablet by mouth Every Night. Indications: Depressive Phase of Manic-Depression, Disp: , Rfl:     ondansetron (ZOFRAN) 4 MG tablet, TAKE 1 TABLET BY MOUTH EVERY 8 HOURS AS NEEDED FOR NAUSEA AND/OR VOMITING, Disp: 30 tablet, Rfl: 0    pantoprazole (PROTONIX) 40 MG EC tablet, Take 1 tablet by mouth 2 (Two) Times a Day., Disp: 180 tablet, Rfl: 0    Semaglutide-Weight Management (Wegovy) 0.5 MG/0.5ML solution auto-injector, Inject 0.5 mL under the skin into the appropriate area as directed 1 (One) Time Per Week., Disp: 2 mL, Rfl: 0    venlafaxine (EFFEXOR) 37.5 MG tablet, Take 1 tablet by mouth Daily., Disp: , Rfl:     Current  "Facility-Administered Medications:     nitroglycerin (NITROSTAT) SL tablet 0.4 mg, 0.4 mg, Sublingual, Q5 Min PRN, Lluvia Porter APRN      Objective:     Vitals:    09/26/24 1125   BP: 126/68   Pulse: 62   Weight: 97.5 kg (215 lb)   Height: 161.3 cm (63.5\")     Body mass index is 37.49 kg/m².    PHYSICAL EXAM:    Vitals and nursing note reviewed.   Constitutional:       General: Not in acute distress.     Appearance: Healthy appearance. Not in distress.   Pulmonary:      Effort: Pulmonary effort is normal. No respiratory distress.   Cardiovascular:      Normal rate. Regular rhythm.   Edema:     Peripheral edema absent.   Skin:     General: Skin is warm and dry.   Neurological:      Mental Status: Alert and oriented to person, place, and time.   Psychiatric:         Behavior: Behavior normal.         Thought Content: Thought content normal.         Judgment: Judgment normal.             ECG 12 Lead    Date/Time: 10/2/2024 10:57 PM  Performed by: Eduar Almodovar MD    Authorized by: Eduar Almodovar MD  Comparison: compared with previous ECG   Rhythm: sinus rhythm            Assessment:       Diagnosis Plan   1. Sustained SVT  Case Request EP Lab: Ablation SVT             Plan:       I think she does have a SVT, that causes palpitations.     Her monitor was not of high quality.  There is a lot of artifact, but I do see an SVT.     I don't think it is related to all of her symptoms.     We discussed and she really wanted to pursue ablation    We discussed the risk and she gave her consent to proceed.     As always, it has been a pleasure to participate in your patient's care.      Sincerely,         Eduar Almodovar MD  "

## 2024-10-02 PROCEDURE — 93000 ELECTROCARDIOGRAM COMPLETE: CPT | Performed by: INTERNAL MEDICINE

## 2024-10-03 ENCOUNTER — HOSPITAL ENCOUNTER (EMERGENCY)
Facility: HOSPITAL | Age: 64
Discharge: HOME | End: 2024-10-04
Attending: STUDENT IN AN ORGANIZED HEALTH CARE EDUCATION/TRAINING PROGRAM
Payer: MEDICARE

## 2024-10-03 ENCOUNTER — OFFICE VISIT (OUTPATIENT)
Dept: ORTHOPEDIC SURGERY | Facility: CLINIC | Age: 64
End: 2024-10-03
Payer: MEDICARE

## 2024-10-03 VITALS — TEMPERATURE: 97.8 F | HEIGHT: 63 IN | BODY MASS INDEX: 36.86 KG/M2 | WEIGHT: 208 LBS

## 2024-10-03 DIAGNOSIS — M17.11 PRIMARY OSTEOARTHRITIS OF RIGHT KNEE: ICD-10-CM

## 2024-10-03 DIAGNOSIS — I10 HYPERTENSION, UNSPECIFIED TYPE: Primary | ICD-10-CM

## 2024-10-03 DIAGNOSIS — R00.0 TACHYCARDIA: ICD-10-CM

## 2024-10-03 DIAGNOSIS — I49.1 ECTOPIC ATRIAL RHYTHM: ICD-10-CM

## 2024-10-03 DIAGNOSIS — R52 PAIN: Primary | ICD-10-CM

## 2024-10-03 LAB
ALBUMIN SERPL-MCNC: 4.7 G/DL (ref 3.5–5.2)
ALBUMIN/GLOB SERPL: 1.6 G/DL
ALP SERPL-CCNC: 95 U/L (ref 39–117)
ALT SERPL W P-5'-P-CCNC: 26 U/L (ref 1–33)
ANION GAP SERPL CALCULATED.3IONS-SCNC: 9.8 MMOL/L (ref 5–15)
AST SERPL-CCNC: 20 U/L (ref 1–32)
BASOPHILS # BLD AUTO: 0.01 10*3/MM3 (ref 0–0.2)
BASOPHILS NFR BLD AUTO: 0.1 % (ref 0–1.5)
BILIRUB SERPL-MCNC: 0.2 MG/DL (ref 0–1.2)
BUN SERPL-MCNC: 9 MG/DL (ref 8–23)
BUN/CREAT SERPL: 9.2 (ref 7–25)
CALCIUM SPEC-SCNC: 9.9 MG/DL (ref 8.6–10.5)
CHLORIDE SERPL-SCNC: 107 MMOL/L (ref 98–107)
CO2 SERPL-SCNC: 24.2 MMOL/L (ref 22–29)
CREAT SERPL-MCNC: 0.98 MG/DL (ref 0.57–1)
DEPRECATED RDW RBC AUTO: 44.3 FL (ref 37–54)
EGFRCR SERPLBLD CKD-EPI 2021: 65.4 ML/MIN/1.73
EOSINOPHIL # BLD AUTO: 0.01 10*3/MM3 (ref 0–0.4)
EOSINOPHIL NFR BLD AUTO: 0.1 % (ref 0.3–6.2)
ERYTHROCYTE [DISTWIDTH] IN BLOOD BY AUTOMATED COUNT: 12.9 % (ref 12.3–15.4)
GLOBULIN UR ELPH-MCNC: 2.9 GM/DL
GLUCOSE SERPL-MCNC: 160 MG/DL (ref 65–99)
HCT VFR BLD AUTO: 44.3 % (ref 34–46.6)
HGB BLD-MCNC: 14.9 G/DL (ref 12–15.9)
IMM GRANULOCYTES # BLD AUTO: 0.07 10*3/MM3 (ref 0–0.05)
IMM GRANULOCYTES NFR BLD AUTO: 0.8 % (ref 0–0.5)
LIPASE SERPL-CCNC: 26 U/L (ref 13–60)
LYMPHOCYTES # BLD AUTO: 1.04 10*3/MM3 (ref 0.7–3.1)
LYMPHOCYTES NFR BLD AUTO: 12.1 % (ref 19.6–45.3)
MCH RBC QN AUTO: 31.4 PG (ref 26.6–33)
MCHC RBC AUTO-ENTMCNC: 33.6 G/DL (ref 31.5–35.7)
MCV RBC AUTO: 93.3 FL (ref 79–97)
MONOCYTES # BLD AUTO: 0.05 10*3/MM3 (ref 0.1–0.9)
MONOCYTES NFR BLD AUTO: 0.6 % (ref 5–12)
NEUTROPHILS NFR BLD AUTO: 7.39 10*3/MM3 (ref 1.7–7)
NEUTROPHILS NFR BLD AUTO: 86.3 % (ref 42.7–76)
PLATELET # BLD AUTO: 269 10*3/MM3 (ref 140–450)
PMV BLD AUTO: 9.9 FL (ref 6–12)
POTASSIUM SERPL-SCNC: 3.9 MMOL/L (ref 3.5–5.2)
PROT SERPL-MCNC: 7.6 G/DL (ref 6–8.5)
RBC # BLD AUTO: 4.75 10*6/MM3 (ref 3.77–5.28)
SODIUM SERPL-SCNC: 141 MMOL/L (ref 136–145)
WBC NRBC COR # BLD AUTO: 8.57 10*3/MM3 (ref 3.4–10.8)

## 2024-10-03 PROCEDURE — 25010000002 ONDANSETRON PER 1 MG: Performed by: STUDENT IN AN ORGANIZED HEALTH CARE EDUCATION/TRAINING PROGRAM

## 2024-10-03 PROCEDURE — 93005 ELECTROCARDIOGRAM TRACING: CPT | Performed by: STUDENT IN AN ORGANIZED HEALTH CARE EDUCATION/TRAINING PROGRAM

## 2024-10-03 PROCEDURE — 96376 TX/PRO/DX INJ SAME DRUG ADON: CPT

## 2024-10-03 PROCEDURE — 96374 THER/PROPH/DIAG INJ IV PUSH: CPT

## 2024-10-03 PROCEDURE — 25810000003 SODIUM CHLORIDE 0.9 % SOLUTION: Performed by: STUDENT IN AN ORGANIZED HEALTH CARE EDUCATION/TRAINING PROGRAM

## 2024-10-03 PROCEDURE — 96361 HYDRATE IV INFUSION ADD-ON: CPT

## 2024-10-03 PROCEDURE — 96375 TX/PRO/DX INJ NEW DRUG ADDON: CPT

## 2024-10-03 PROCEDURE — 93010 ELECTROCARDIOGRAM REPORT: CPT | Performed by: INTERNAL MEDICINE

## 2024-10-03 PROCEDURE — 83690 ASSAY OF LIPASE: CPT | Performed by: STUDENT IN AN ORGANIZED HEALTH CARE EDUCATION/TRAINING PROGRAM

## 2024-10-03 PROCEDURE — 99283 EMERGENCY DEPT VISIT LOW MDM: CPT

## 2024-10-03 PROCEDURE — 85025 COMPLETE CBC W/AUTO DIFF WBC: CPT | Performed by: STUDENT IN AN ORGANIZED HEALTH CARE EDUCATION/TRAINING PROGRAM

## 2024-10-03 PROCEDURE — 80053 COMPREHEN METABOLIC PANEL: CPT | Performed by: STUDENT IN AN ORGANIZED HEALTH CARE EDUCATION/TRAINING PROGRAM

## 2024-10-03 PROCEDURE — 99284 EMERGENCY DEPT VISIT MOD MDM: CPT | Performed by: STUDENT IN AN ORGANIZED HEALTH CARE EDUCATION/TRAINING PROGRAM

## 2024-10-03 RX ORDER — CLONIDINE HYDROCHLORIDE 0.1 MG/1
0.1 TABLET ORAL ONCE
Status: COMPLETED | OUTPATIENT
Start: 2024-10-04 | End: 2024-10-03

## 2024-10-03 RX ORDER — ONDANSETRON 2 MG/ML
4 INJECTION INTRAMUSCULAR; INTRAVENOUS ONCE
Status: COMPLETED | OUTPATIENT
Start: 2024-10-04 | End: 2024-10-03

## 2024-10-03 RX ORDER — ONDANSETRON 2 MG/ML
4 INJECTION INTRAMUSCULAR; INTRAVENOUS ONCE
Status: COMPLETED | OUTPATIENT
Start: 2024-10-03 | End: 2024-10-03

## 2024-10-03 RX ORDER — LIDOCAINE HYDROCHLORIDE 10 MG/ML
2 INJECTION, SOLUTION EPIDURAL; INFILTRATION; INTRACAUDAL; PERINEURAL
Status: COMPLETED | OUTPATIENT
Start: 2024-10-03 | End: 2024-10-03

## 2024-10-03 RX ORDER — METHYLPREDNISOLONE ACETATE 80 MG/ML
1 INJECTION, SUSPENSION INTRA-ARTICULAR; INTRALESIONAL; INTRAMUSCULAR; SOFT TISSUE
Status: COMPLETED | OUTPATIENT
Start: 2024-10-03 | End: 2024-10-03

## 2024-10-03 RX ADMIN — METOPROLOL TARTRATE 5 MG: 1 INJECTION, SOLUTION INTRAVENOUS at 22:09

## 2024-10-03 RX ADMIN — LIDOCAINE HYDROCHLORIDE 2 ML: 10 INJECTION, SOLUTION EPIDURAL; INFILTRATION; INTRACAUDAL; PERINEURAL at 15:31

## 2024-10-03 RX ADMIN — ONDANSETRON 4 MG: 2 INJECTION, SOLUTION INTRAMUSCULAR; INTRAVENOUS at 23:37

## 2024-10-03 RX ADMIN — CLONIDINE HYDROCHLORIDE 0.1 MG: 0.1 TABLET ORAL at 23:41

## 2024-10-03 RX ADMIN — METOPROLOL TARTRATE 5 MG: 1 INJECTION, SOLUTION INTRAVENOUS at 22:36

## 2024-10-03 RX ADMIN — ONDANSETRON 4 MG: 2 INJECTION, SOLUTION INTRAMUSCULAR; INTRAVENOUS at 22:07

## 2024-10-03 RX ADMIN — METHYLPREDNISOLONE ACETATE 1 ML: 80 INJECTION, SUSPENSION INTRA-ARTICULAR; INTRALESIONAL; INTRAMUSCULAR; SOFT TISSUE at 15:31

## 2024-10-03 RX ADMIN — SODIUM CHLORIDE 1000 ML: 9 INJECTION, SOLUTION INTRAVENOUS at 22:36

## 2024-10-03 NOTE — PROGRESS NOTES
Patient: Keri Bhagat  YOB: 1960  Date of Service: 10/3/2024    Chief Complaints:   Chief Complaint   Patient presents with    Right Knee - Pain       Subjective: Here for right knee pain desires conservative treatment would like to follow-up with one of the knee surgeons to discuss total knee arthroplasty she had a total knee arthroplasty on the left    History of Present Illness: Pt is seen in the office today with complaints of   Chief Complaint   Patient presents with    Right Knee - Pain   .  KNEE: TIMING:  The pain is described as ACUTE on CHRONIC.  LOCATION: medial joint line tenderness. AGGRAVATING FACTORS:  Is worsened by prolonged standing, sitting, walking and squatting activities.  ASSOCIATED SYMPTOMS:  swelling, tenderness, and aching.    This problem is not new to this examiner.     Allergies: No Known Allergies    Medications:   Home Medications:  Current Outpatient Medications on File Prior to Visit   Medication Sig    apixaban (ELIQUIS) 5 MG tablet tablet Take 1 tablet by mouth Every 12 (Twelve) Hours.    B Complex Vitamins (vitamin b complex) capsule capsule Take 1 capsule by mouth 2 (Two) Times a Day. Indications: Vitamin Deficiency    buPROPion XL (WELLBUTRIN XL) 150 MG 24 hr tablet Take 1 tablet by mouth 2 (Two) Times a Day. Indications: Major Depressive Disorder    Calcium Citrate-Vitamin D (CITRUS CALCIUM/VITAMIN D PO) Take 1 tablet by mouth 2 (Two) Times a Day. Indications: supplement    cyanocobalamin (VITAMIN B-12) 1000 MCG tablet Take 1 tablet by mouth Daily. Indications: Inadequate Vitamin B12    estradiol (ESTRACE) 0.1 MG/GM vaginal cream Insert 1 g into the vagina 3 (Three) Times a Week. Indications: Vulvovaginal Atrophy    Ferrous Sulfate (IRON PO) Take 1 tablet by mouth 2 (Two) Times a Day. Indications: Supplement    gabapentin (NEURONTIN) 100 MG capsule Take 2 capsules by mouth 2 (Two) Times a Day.    isosorbide mononitrate (IMDUR) 30 MG 24 hr tablet Take 1 tablet  by mouth Daily.    lamoTRIgine (LaMICtal) 100 MG tablet Take 1 tablet by mouth 2 (Two) Times a Day.    levothyroxine (SYNTHROID, LEVOTHROID) 88 MCG tablet Take 1 tablet by mouth Every Morning. Indications: Underactive Thyroid    metoprolol succinate XL (Toprol XL) 25 MG 24 hr tablet Take 1.5 tablets by mouth Daily. For bp    OLANZapine (zyPREXA) 5 MG tablet Take 1 tablet by mouth Every Night. Indications: Depressive Phase of Manic-Depression    ondansetron (ZOFRAN) 4 MG tablet TAKE 1 TABLET BY MOUTH EVERY 8 HOURS AS NEEDED FOR NAUSEA AND/OR VOMITING    pantoprazole (PROTONIX) 40 MG EC tablet Take 1 tablet by mouth 2 (Two) Times a Day.    Semaglutide-Weight Management (Wegovy) 0.5 MG/0.5ML solution auto-injector Inject 0.5 mL under the skin into the appropriate area as directed 1 (One) Time Per Week.    venlafaxine (EFFEXOR) 37.5 MG tablet Take 1 tablet by mouth Daily.     Current Facility-Administered Medications on File Prior to Visit   Medication    nitroglycerin (NITROSTAT) SL tablet 0.4 mg     Current Medications:  Scheduled Meds:   Continuous Infusions:   PRN Meds:.  nitroglycerin    I have reviewed the patient's medical history in detail and updated the computerized patient record.  Review and summarization of old records include:    Past Medical History:   Diagnosis Date    Abnormal Pap smear of cervix     Adrenal adenoma 09/21/2022    Anemia     Anxiety     Arthritis     Arthritis of back     Arthritis of neck     Bipolar I disorder, single manic episode     depressive d(NOS)    Cataract 2021    Removed    Cervical disc herniation     Colon polyp     Constipation     Coronary artery disease     COVID-19 01/10/2023    Death of family member     MOTHER IN FEB 2024    Depression     NO SUICIDAL PLANS    Dislocation of finger     Diverticular disease     Diverticulitis of colon     Dyspepsia     Encounter for follow-up examination after completed treatment for conditions other than malignant neoplasm 04/11/2018     Fracture of wrist     GERD (gastroesophageal reflux disease)     Heart attack     AGE 39    Heart murmur     Hiatal hernia     History of transfusion     2001    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    Lumbosacral disc disease Unsure    Lower left back is partial osteoarthritis and partial osteoporosis    Obesity     Osteopenia 2021    Bone density test    Periodic limb movement disorder     Renal insufficiency     Restless leg syndrome     LEFT SHOULDER    Rotator cuff syndrome     Sleep apnea     NO MACHINE CURRENTLY    Suicidal ideation 08/19/2016    history    Thyroid nodule     Urinary tract infection     Visual impairment     Vitamin B12 deficiency         Past Surgical History:   Procedure Laterality Date    ADENOIDECTOMY  1974    ANTERIOR CERVICAL DISCECTOMY W/ FUSION N/A 12/29/2016    Procedure: C3-4 anterior cervical discectomy and fusion with Depuy micro plate, ALLOGRAFT C3-4, AND HARDWARE REMOVAL C4-7.;  Surgeon: Hema Godwin MD;  Location: [facility] OR;  Service:     BARIATRIC SURGERY      CARDIAC CATHETERIZATION N/A 03/30/2017    Procedure: Left Heart Cath;  Surgeon: Tracey Vargas MD;  Location: [facility] CATH INVASIVE LOCATION;  Service:     CARDIAC CATHETERIZATION N/A 03/30/2017    Procedure: Coronary angiography;  Surgeon: Tracey Vargas MD;  [facility]ASIVE [facility];  Service:     CARDIAC CATHETERIZATION N/A 03/30/2017    Procedure: Left ventriculography;  Surgeon: Tracey Vargas MD;  Location: [facility] CATH INVASIVE LOCATION;  Service:     CARDIAC CATHETERIZATION  03/30/2017     Procedure: Functional Flow Scroggins;  Surgeon: Tracey Vargas MD;  Location: [facility] CATH INVASIVE LOCATION;  Service:     CATARACT EXTRACTION Bilateral     CERVICAL BIOPSY  MMXVI    Dr. Jeffery.     CERVICAL DISCECTOMY ANTERIOR  04/2013    C4-7    COLONOSCOPY  04/20/2015    Diverticulosis, IH    COLONOSCOPY  03/09/2021    COLPOSCOPY W/ BIOPSY / CURETTAGE  06/17/2016    LGSIL HPV positive. Results were normal repeat pap in one year. Chronic Cervicitis    CORONARY ANGIOPLASTY WITH STENT PLACEMENT      CORONARY STENT PLACEMENT      ENDOSCOPY  MMXV    Normal.  Dr. Rodriguez    ENDOSCOPY N/A 06/22/2017    Erythematous mucosa in the stomach  PATH: Chronic active gastritis, moderate with intestinal metaplasia     EPIDURAL BLOCK      EYE SURGERY      Cateract removal    FRACTURE SURGERY      INCISION AND DRAINAGE LEG Left 05/12/2024    Procedure: INCISION AND DRAINAGE LOWER EXTREMITY WOUND CLOSURE KNEE;  Surgeon: Ajay Gutierrez MD;  Location: [facility] MAIN OR;  Service: Orthopedics;  Laterality: Left;    INGUINAL HERNIA REPAIR      JOINT REPLACEMENT  3/5/24    Left knee    KNEE SURGERY      LAPAROSCOPIC GASTRIC BANDING  02/2018    SHOULDER SURGERY Left     RCR    TONSILLECTOMY      TONSILLECTOMY AND ADENOIDECTOMY      TOTAL KNEE ARTHROPLASTY Left 03/05/2024    Procedure: LEFT TOTAL KNEE ARTHROPLASTY WITH TAB NAVIGATION;  Surgeon: Tho Pritchard MD;  Location: [facility] OR [facility];  Service: Orthopedics;  Laterality: Left;    TRANSVAGINAL TAPING SUSPENSION N/A 12/06/2017    Procedure: MID URETHRAL SLING CYSTSCOPY;  Surgeon: Abby Méndez MD;  Location: [facility] MAIN OR;  Service:     TRIGGER POINT INJECTION      TUBAL ABDOMINAL LIGATION      TYMPANOSTOMY TUBE PLACEMENT Right     UPPER GASTROINTESTINAL ENDOSCOPY  approx 2021    WRIST SURGERY Bilateral     carpal tunnel        Social History     Occupational History    Occupation: disabled   Tobacco Use    Smoking status: Every Day     Types: Electronic Cigarette      Passive exposure: Current    Smokeless tobacco: Never    Tobacco comments:     Just using vape.   Vaping Use    Vaping status: Every Day    Substances: Nicotine, Flavoring    Devices: Refillable tank   Substance and Sexual Activity    Alcohol use: Not Currently     Comment: RARELY    Drug use: Never    Sexual activity: Not Currently     Partners: Male     Birth control/protection: Abstinence, Post-menopausal      Social History     Social History Narrative    Not on file        Family History   Problem Relation Age of Onset    Diabetes type II Mother     Hypertension Mother     Osteoporosis Mother     Seizures Mother     Diabetes Mother     Arthritis Mother     Hyperlipidemia Mother     Cancer Mother     COPD Mother     Heart disease Mother     Kidney disease Mother     COPD Father     Hypertension Father     Lung cancer Father     Heart attack Father     Liver cancer Father     Liver disease Father     Heart disease Father     Cancer Father         Lung cacer that metastasized  into the liver.    Thyroid disease Sister     Hypertension Sister     Bipolar disorder Sister     Depression Sister     ADD / ADHD Sister     Anxiety disorder Sister     Mental illness Sister     Hyperlipidemia Sister     Broken bones Sister     Colon polyps Sister     Thyroid disease Sister     Hypertension Sister     Bipolar disorder Sister     Anxiety disorder Sister     Depression Sister     Mental illness Sister     Hyperlipidemia Sister     Colon polyps Sister     Cancer Maternal Grandmother     No Known Problems Maternal Grandfather     No Known Problems Paternal Grandmother     No Known Problems Paternal Grandfather     Abnormal EKG Daughter     Hypertension Daughter     Bipolar disorder Daughter     Thyroid disease Daughter     Mental illness Daughter     Hyperlipidemia Daughter     Asthma Daughter     No Known Problems Son     Diabetes Son         Type 1    Diabetes Paternal Uncle     Anxiety disorder Sister     Depression Sister      Hyperlipidemia Sister     Thyroid disease Daughter     Breast cancer Neg Hx     Ovarian cancer Neg Hx     Uterine cancer Neg Hx     Colon cancer Neg Hx     Malig Hyperthermia Neg Hx        ROS: 14 point review of systems was performed and was negative except for documented findings in HPI and today's encounter.     Allergies: No Known Allergies  Constitutional:  Denies fever, shaking or chills   Eyes:  Denies change in visual acuity   HENT:  Denies nasal congestion or sore throat   Respiratory:  Denies cough or shortness of breath   Cardiovascular:  Denies chest pain or severe LE edema   GI:  Denies abdominal pain, nausea, vomiting, bloody stools or diarrhea   Musculoskeletal:  Numbness, tingling, or loss of motor function only as noted above in history of present illness.  : Denies painful urination or hematuria  Integument:  Denies rash, lesion or ulceration   Neurologic:  Denies headache or focal weakness  Endocrine:  Denies lymphadenopathy  Psych:  Denies confusion or change in mental status   Hem:  Denies active bleeding      Physical Exam: 64 y.o. female  Wt Readings from Last 3 Encounters:   10/03/24 94.3 kg (208 lb)   09/26/24 97.5 kg (215 lb)   09/25/24 97.3 kg (214 lb 6.4 oz)       Body mass index is 36.85 kg/m².  Facility age limit for growth %nessa is 20 years.  Vitals:    10/03/24 1509   Temp: 97.8 °F (36.6 °C)     Vital signs reviewed.   General Appearance:    Alert, cooperative, in no acute distress                  Eyes: conjunctiva clear  ENT: external ears and nose atraumatic  CV: no peripheral edema  Resp: normal respiratory effort  Skin: no rashes or wounds; normal turgor  Psych: mood and affect appropriate  Lymph: no nodes appreciated  Neuro: gross sensation intact  Vascular:  Palpable peripheral pulse in noted extremity  Musculoskeletal Extremities: KNEE Exam: medial and lateral joint line tenderness with crepitation, synovitis, swelling, and joint effusion right knee.      Diagnostic  Data:  Imaging done today and discussed with the patient:    Indication: pain related symptoms,  Views: 3V AP, LAT & 40 degree PA right knee(s)   Findings: advanced arthritis  Comparison views: viewed last xray done in the office.      Procedure:  Large Joint Arthrocentesis: R knee  Date/Time: 10/3/2024 3:31 PM  Consent given by: patient  Site marked: site marked  Timeout: Immediately prior to procedure a time out was called to verify the correct patient, procedure, equipment, support staff and site/side marked as required   Supporting Documentation  Indications: pain   Procedure Details  Location: knee - R knee  Preparation: Patient was prepped and draped in the usual sterile fashion  Needle gauge: 21G.  Medications administered: 1 mL methylPREDNISolone acetate 80 MG/ML; 2 mL lidocaine PF 1% 1 %  Patient tolerance: patient tolerated the procedure well with no immediate complications        Assessment:     ICD-10-CM ICD-9-CM   1. Pain  R52 780.96   2. Primary osteoarthritis of right knee  M17.11 715.16       Plan:   Follow up as indicated.  Ice, elevate, and rest as needed.  The diagnosis(es), natural history, pathophysiology and treatment for diagnosis(es) were discussed. Opportunity given and questions answered.  Biomechanics of pertinent body areas discussed.  When appropriate, the use of ambulatory aids discussed.  EXERCISES:  Advice on benefits of, and types of regular/moderate exercise pertaining to orthopedic diagnosis(es).  MEDICATIONS:  The risks, benefits, warnings,side effects and alternatives of medications discussed.  Inflammation/pain control; with cold, heat, elevation and/or liniments discussed as appropriate  Cortisone Injection. See procedure note.  HOME EXERCISE/PT program encouraged  MEDICAL RECORDS reviewed from other provider(s) for past and current medical history pertinent to this complaint.    10/3/2024

## 2024-10-04 ENCOUNTER — TELEPHONE (OUTPATIENT)
Dept: CARDIOLOGY | Facility: CLINIC | Age: 64
End: 2024-10-04
Payer: MEDICARE

## 2024-10-04 VITALS
RESPIRATION RATE: 18 BRPM | HEART RATE: 115 BPM | DIASTOLIC BLOOD PRESSURE: 114 MMHG | BODY MASS INDEX: 37.21 KG/M2 | TEMPERATURE: 97.5 F | SYSTOLIC BLOOD PRESSURE: 163 MMHG | OXYGEN SATURATION: 95 % | HEIGHT: 63 IN | WEIGHT: 210 LBS

## 2024-10-04 LAB
QT INTERVAL: 336 MS
QT INTERVAL: 421 MS
QTC INTERVAL: 451 MS
QTC INTERVAL: 463 MS

## 2024-10-04 NOTE — FSED PROVIDER NOTE
Subjective   History of Present Illness  Pt is a 64 y.o. female with PMH as listed who presents for   Chief Complaint   Patient presents with    Vomiting    Hypertension       Patient is a 64-year-old female presents for nausea and vomiting for the past day.  States that she started an increased dose of Wegovy yesterday for treatment of her hyperglycemia, this subsequently caused her to vomit all of her medications today including her metoprolol which she takes for both rate control and blood pressure control.  Her heart rate has been elevated at 125 upon arrival due to her not being able to take her medications p.o.      Review of Systems    Past Medical History:   Diagnosis Date    Abnormal Pap smear of cervix     Adrenal adenoma 09/21/2022    Anemia     Anxiety     Arthritis     Arthritis of back     Arthritis of neck     Bipolar I disorder, single manic episode     depressive d(NOS)    Cataract 2021    Removed    Cervical disc herniation     Colon polyp     Constipation     Coronary artery disease     COVID-19 01/10/2023    Death of family member     MOTHER IN FEB 2024    Depression     NO SUICIDAL PLANS    Dislocation of finger     Diverticular disease     Diverticulitis of colon     Dyspepsia     Encounter for follow-up examination after completed treatment for conditions other than malignant neoplasm 04/11/2018    Fracture of wrist     GERD (gastroesophageal reflux disease)     Heart attack     AGE 39    Heart murmur     Hiatal hernia     History of transfusion     2001    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     Lumbosacral disc disease Unsure    Lower left back is partial osteoarthritis and partial osteoporosis    Obesity     Osteopenia 2021    Bone density test    Periodic limb movement disorder     Renal insufficiency     Restless leg syndrome     LEFT SHOULDER    Rotator cuff syndrome     Sleep apnea     NO MACHINE CURRENTLY    Suicidal ideation 08/19/2016    history    Thyroid nodule     Urinary tract infection     Visual impairment     Vitamin B12 deficiency        No Known Allergies    Past Surgical History:   Procedure Laterality Date    ADENOIDECTOMY  1974    ANTERIOR CERVICAL DISCECTOMY W/ FUSION N/A 12/29/2016    Procedure: C3-4 anterior cervical discectomy and fusion with Depuy micro plate, ALLOGRAFT C3-4, AND HARDWARE REMOVAL C4-7.;  Surgeon: Hema Godwin MD;  Location: [facility] MAIN OR;  Service:     BARIATRIC SURGERY      CARDIAC CATHETERIZATION N/A 03/30/2017    Procedure: Left Heart Cath;  Surgeon: Tracey Vargas MD;  Location:  [facility] CATH INVASIVE [facility]ATION;  Service:     CARDIAC CATHETERIZATION N/A 03/30/2017    Procedure: Coronary angiography;  Surgeon: Tarcey Vargas MD;  Location:  [facility];  Service:     CARDIAC CATHETERIZATION N/A 03/30/2017    Procedure: Left ventriculography;  Surgeon: Tracey Vargas MD;  Location:  [facility];  Service:     CARDIAC CATHETERIZATION  03/30/2017    Procedure: Functional Flow [place];  Surgeon: Tracey Vargas MD;  Location:  [facility];  Service:     CATARACT EXTRACTION Bilateral     CERVICAL BIOPSY  MMXVI    Dr. Jeffery.     CERVICAL DISCECTOMY ANTERIOR  04/2013    C4-7    COLONOSCOPY  04/20/2015    Diverticulosis, IH    COLONOSCOPY  03/09/2021    COLPOSCOPY W/ BIOPSY / CURETTAGE  06/17/2016    LGSIL HPV positive. Results were normal repeat pap in one year. Chronic Cervicitis    CORONARY ANGIOPLASTY WITH STENT PLACEMENT      CORONARY STENT PLACEMENT      ENDOSCOPY  MMXV    Normal.  Dr. Rodriguez    ENDOSCOPY N/A  06/22/2017    Erythematous mucosa in the stomach  PATH: Chronic active gastritis, moderate with intestinal metaplasia     EPIDURAL BLOCK      EYE SURGERY      Cateract removal    FRACTURE SURGERY      INCISION AND DRAINAGE LEG Left 05/12/2024    Procedure: INCISION AND DRAINAGE LOWER EXTREMITY WOUND CLOSURE KNEE;  Surgeon: Ajay Gutierrez MD;  Location: [facility] MAIN OR;  Service: Orthopedics;  Laterality: Left;    INGUINAL HERNIA REPAIR      JOINT REPLACEMENT  3/5/24    Left knee    KNEE SURGERY      LAPAROSCOPIC GASTRIC BANDING  02/2018    SHOULDER SURGERY Left     RCR    TONSILLECTOMY      TONSILLECTOMY AND ADENOIDECTOMY      TOTAL KNEE ARTHROPLASTY Left 03/05/2024    Procedure: LEFT TOTAL KNEE ARTHROPLASTY WITH TAB NAVIGATION;  Surgeon: Tho Pritchard MD;  Location: [facility] OR [facility];  Service: Orthopedics;  Laterality: Left;    TRANSVAGINAL TAPING SUSPENSION N/A 12/06/2017    Procedure: MID URETHRAL SLING CYSTSCOPY;  Surgeon: Abby Méndez MD;  Location: [facility] OR;  Service:     TRIGGER POINT INJECTION      TUBAL ABDOMINAL LIGATION      TYMPANOSTOMY TUBE PLACEMENT Right     UPPER GASTROINTESTINAL ENDOSCOPY  approx 2021    WRIST SURGERY Bilateral     carpal tunnel       Family History   Problem Relation Age of Onset    Diabetes type II Mother     Hypertension Mother     Osteoporosis Mother     Seizures Mother     Diabetes Mother     Arthritis Mother     Hyperlipidemia Mother     Cancer Mother     COPD Mother     Heart disease Mother     Kidney disease Mother     COPD Father     Hypertension Father     Lung cancer Father     Heart attack Father     Liver cancer Father     Liver disease Father     Heart disease Father     Cancer Father         Lung cacer that metastasized  into the liver.    Thyroid disease Sister     Hypertension Sister     Bipolar disorder Sister     Depression Sister     ADD / ADHD Sister     Anxiety disorder Sister     Mental illness Sister     Hyperlipidemia Sister      Broken bones Sister     Colon polyps Sister     Thyroid disease Sister     Hypertension Sister     Bipolar disorder Sister     Anxiety disorder Sister     Depression Sister     Mental illness Sister     Hyperlipidemia Sister     Colon polyps Sister     Cancer Maternal Grandmother     No Known Problems Maternal Grandfather     No Known Problems Paternal Grandmother     No Known Problems Paternal Grandfather     Abnormal EKG Daughter     Hypertension Daughter     Bipolar disorder Daughter     Thyroid disease Daughter     Mental illness Daughter     Hyperlipidemia Daughter     Asthma Daughter     No Known Problems Son     Diabetes Son         Type 1    Diabetes Paternal Uncle     Anxiety disorder Sister     Depression Sister     Hyperlipidemia Sister     Thyroid disease Daughter     Breast cancer Neg Hx     Ovarian cancer Neg Hx     Uterine cancer Neg Hx     Colon cancer Neg Hx     Malig Hyperthermia Neg Hx        Social History     Socioeconomic History    Marital status:     Number of children: 3    Years of education: 12   Tobacco Use    Smoking status: Every Day     Types: Electronic Cigarette     Passive exposure: Current    Smokeless tobacco: Never    Tobacco comments:     Just using vape.   Vaping Use    Vaping status: Every Day    Substances: Nicotine, Flavoring    Devices: Refillable tank   Substance and Sexual Activity    Alcohol use: Not Currently     Comment: RARELY    Drug use: Never    Sexual activity: Not Currently     Partners: Male     Birth control/protection: Abstinence, Post-menopausal           Objective   Physical Exam  Constitutional:       Appearance: Normal appearance.   HENT:      Head: Normocephalic and atraumatic.      Mouth/Throat:      Mouth: Mucous membranes are moist.      Pharynx: Oropharynx is clear.   Eyes:      Conjunctiva/sclera: Conjunctivae normal.   Cardiovascular:      Rate and Rhythm: Regular rhythm. Tachycardia present.   Pulmonary:      Effort: Pulmonary effort is  normal.      Breath sounds: Normal breath sounds.   Abdominal:      General: Abdomen is flat.      Palpations: Abdomen is soft.      Tenderness: There is no abdominal tenderness.   Musculoskeletal:      Cervical back: Neck supple.   Skin:     General: Skin is warm and dry.   Neurological:      Mental Status: She is alert.   Psychiatric:         Mood and Affect: Mood normal.         Procedures           ED Course  ED Course as of 10/04/24 0022   Thu Oct 03, 2024   2157 Patient is a 64-year-old female presents for nausea and vomiting for the past day.  States that she started an increased dose of Wegovy yesterday for treatment of her hyperglycemia, this subsequently caused her to vomit all of her medications today including her metoprolol which she takes for both rate control and blood pressure control.  Her heart rate has been elevated at 125 upon arrival due to her not being able to take her medications p.o.  Will obtain CBC CMP lipase and EKG and treat with metoprolol and Zofran for symptomatic treatment. [JF]   2238 All lab work unremarkable for any acute findings.  Patient given initially 5 mg of metoprolol IV, given an additional 5 mg of metoprolol due to incomplete improvement in her heart rate control.  10 mg IV is same dosing as the 25 mg she takes daily given 2.5-1 conversion from p.o. to IV.  Patient also given fluid bolus given her multiple signs of vomiting today.  Will continue to monitor. [JF]   2333 Patient given additional Zofran and will attempt to help blood pressure control with clonidine. [JF]   Fri Oct 04, 2024   0020 Patient feeling significantly better, pressure has improved after clonidine.  Rate continues to be variable between the 120s and then will change abruptly into the 60s to 80s and back into the 110s.  Likely related to patient's ectopic atrial rhythm that is known by cardiology and for which she is scheduled for a ablation later this month.  Discussed with her importance of following  with cardiology in the morning to discuss potentially moving up her ablation.  Patient completely asymptomatic at this time, nausea resolved.  Patient has tolerated p.o. and feels well and wants to go home.  Given her reassuring lab work and no symptoms at the time she has been here in the ED regarding chest pain or shortness of breath or any cardiac complaints we will have her follow-up with her cardiologist and PCP.  All questions answered. [JF]      ED Course User Index  [JF] Ziyad Loomis MD                                           Medical Decision Making  My differential diagnosis for vomiting includes but is not limited to viral illness including COVID-19, gastroenteritis, esophageal food impaction, pregnancy related vomiting, cyclic vomiting, food poisoning, alcohol poisoning, alcohol withdrawal, opioid withdrawal, benzo withdrawal, medication side effects, overdose, chemical ingestion, chemical exposures, gallbladder disease, appendicitis, bowel obstruction, DKA, AKA and panic attacks.      Problems Addressed:  Ectopic atrial rhythm: complicated acute illness or injury  Hypertension, unspecified type: complicated acute illness or injury  Tachycardia: complicated acute illness or injury    Amount and/or Complexity of Data Reviewed  Labs: ordered. Decision-making details documented in ED Course.  ECG/medicine tests: ordered.     Details: EKG  10/30/2024 2220  Rhythm sinus, rate 114  Normal axis, normal QT interval, Q-wave inferior leads, nonspecific ST changes  EKG fairly similar to prior from 9/16/2024 other than nonspecific ST changes in inferior leads, suspect this is rate dependent given that patient's rate is 114 at this time and was 60 on prior EKG.  EKG interpreted by me contemporaneously by me with care    EKG  10/30/2024 2345  Rhythm sinus, rate 69  Normal axis, normal QT interval, Q waves inferior leads, nonspecific T wave changes seen previously are improved with rate improvement.  EKG  interpreted by me contemporaneously by me with care    Risk  Prescription drug management.        Final diagnoses:   Hypertension, unspecified type   Ectopic atrial rhythm   Tachycardia       ED Disposition  ED Disposition       ED Disposition   Discharge    Condition   Stable    Comment   --               Epley, James, APRN  [address]  [address]  [facility] [id number] [phone]    Schedule an appointment as soon as possible for a visit in 2 days  For re-evaluation    Eduar Almodovar MD  [address]  [place] [id number] [phone]    Call today  For re-evaluation         Medication List      No changes were made to your prescriptions during this visit.

## 2024-10-04 NOTE — TELEPHONE ENCOUNTER
Caller: TITO    Relationship: SELF    Best call back number: [phone]    What is the best time to reach you: ANY        What was the call regarding: PT WENT TO THE ED 10/3/24 FOR PALPITATIONS, BP ISSUES AND HIGH HEART RATE    SHE IS SCHEDULED FOR AN ABLATION ON 10/16/24- THE PROVIDER AT THE ED SAID TO CALL TODAY AND SEE IF SHE NEEDS A SOONER APPOINTMENT OR TO HAVE THE PROCEDURE DONE SOONER.    PT STATES HER HEART RATE IS STILL RUNNING HIGH LIKE IT WAS AT THE ED YESTERDAY.

## 2024-10-04 NOTE — TELEPHONE ENCOUNTER
Unfortunately, I don't think we are going to be able to get her in for an ablation sooner than her scheduled appointment.    If her heart rates are still high I would recommend continuing to take her morning dose of metoprolol 37.5 mg and adding another dose of 25 mg later in the day if her heart rate still high.    If she still having high heart rates despite increasing her beta-blocker come Monday I want her to call the office let us know how she is doing.  Thanks

## 2024-10-07 ENCOUNTER — TELEPHONE (OUTPATIENT)
Dept: PHYSICAL THERAPY | Facility: CLINIC | Age: 64
End: 2024-10-07

## 2024-10-07 ENCOUNTER — TELEPHONE (OUTPATIENT)
Age: 64
End: 2024-10-07
Payer: MEDICARE

## 2024-10-08 ENCOUNTER — TREATMENT (OUTPATIENT)
Dept: PHYSICAL THERAPY | Facility: CLINIC | Age: 64
End: 2024-10-08
Payer: MEDICARE

## 2024-10-08 DIAGNOSIS — R26.9 GAIT DISTURBANCE: Primary | ICD-10-CM

## 2024-10-08 DIAGNOSIS — R26.89 DECREASED FUNCTIONAL MOBILITY: ICD-10-CM

## 2024-10-08 DIAGNOSIS — R29.6 FREQUENT FALLS: ICD-10-CM

## 2024-10-08 DIAGNOSIS — R29.898 WEAKNESS OF BOTH LEGS: ICD-10-CM

## 2024-10-08 PROCEDURE — 97112 NEUROMUSCULAR REEDUCATION: CPT

## 2024-10-08 PROCEDURE — 97110 THERAPEUTIC EXERCISES: CPT

## 2024-10-08 NOTE — PROGRESS NOTES
Physical Therapy Daily Treatment Note  [facility]  [place] - [address], [address]     [place] [id number]   Phone: [phone]   Fax: [phone]      Patient: Keri Bhagat   : 1960  Referring practitioner: Bob TORRES MD  Date of Initial Visit: Type: THERAPY  Noted: 2024  Today's Date: 10/8/2024  Patient seen for 8 sessions       Visit Diagnoses:    ICD-10-CM ICD-9-CM   1. Gait disturbance  R26.9 781.2   2. Weakness of both legs  R29.898 729.89   3. Frequent falls  R29.6 V15.88   4. Decreased functional mobility  R26.89 781.99         Subjective:  Keri Bhagat reports: going to the ED on Thursday last week due to uncontrolled hypertension. BP taken at resting prior to the start of today's session read 138/90. Patient reports consistently taking blood pressure meds. Patient denies any falls since IE.       Objective   See Exercise, Manual, and Modality Logs for complete treatment.       Assessment:  Patient tolerates today's movement interventions well without adverse effects. Patient completes table strengthening exercises followed by standing balance interventions on an airex pad. Patient with mild challenge to balance interventions, but appears overall fairly stable. Biggest challenge with vision deprivation and with external perturbations. Despite other health conditions occurring simultaneously, she is progressing well.       Plan:   Continue to monitor vitals, and pain levels, progress LE strength and reduce fall       Timed:         Manual Therapy:         mins  09714;     Therapeutic Exercise:    20     mins  12625;     Neuromuscular Zelda:    12    mins  73088;    Therapeutic Activity:          mins  40621;     Gait Training:           mins  87238;     Ultrasound:          mins  55246;    Ionto                                   mins  05138  Self Care                            mins  89898  Traction          mins  92661      Un-Timed:  Canalith Repos         mins 32834  Electrical Stimulation:         mins  14696 ( );  Dry Needling          mins self-pay  Traction          mins 72535        Timed Treatment:   32   mins   Total Treatment:     46   mins    Parish Lock PT  License Number: [place] [id number]# [id number]. [place] LIC# [id number]    Physical Therapist

## 2024-10-09 ENCOUNTER — TELEPHONE (OUTPATIENT)
Dept: CARDIOLOGY | Facility: CLINIC | Age: 64
End: 2024-10-09

## 2024-10-09 NOTE — TELEPHONE ENCOUNTER
Caller: Keri Bhagat    Relationship to patient: Self    Best call back number:  [phone]    Patient is needing: PATIENT CALLED TO CONFIRM THAT SHE GOT THE MESSAGE TO BE AT THE HOSPITAL AT 7:30 ON THE 16TH

## 2024-10-10 ENCOUNTER — OFFICE VISIT (OUTPATIENT)
Dept: CARDIOLOGY | Facility: CLINIC | Age: 64
End: 2024-10-10
Payer: MEDICARE

## 2024-10-10 VITALS
SYSTOLIC BLOOD PRESSURE: 120 MMHG | HEART RATE: 106 BPM | HEIGHT: 63 IN | DIASTOLIC BLOOD PRESSURE: 90 MMHG | OXYGEN SATURATION: 96 % | BODY MASS INDEX: 37.03 KG/M2 | WEIGHT: 209 LBS

## 2024-10-10 DIAGNOSIS — I10 ESSENTIAL HYPERTENSION: ICD-10-CM

## 2024-10-10 DIAGNOSIS — I49.3 PREMATURE VENTRICULAR CONTRACTION: Primary | ICD-10-CM

## 2024-10-10 PROCEDURE — 3080F DIAST BP >= 90 MM HG: CPT | Performed by: NURSE PRACTITIONER

## 2024-10-10 PROCEDURE — 99214 OFFICE O/P EST MOD 30 MIN: CPT | Performed by: NURSE PRACTITIONER

## 2024-10-10 PROCEDURE — 3074F SYST BP LT 130 MM HG: CPT | Performed by: NURSE PRACTITIONER

## 2024-10-10 PROCEDURE — 93000 ELECTROCARDIOGRAM COMPLETE: CPT | Performed by: NURSE PRACTITIONER

## 2024-10-10 RX ORDER — METOPROLOL SUCCINATE 25 MG/1
TABLET, EXTENDED RELEASE ORAL
Qty: 180 TABLET | Refills: 3 | Status: SHIPPED | OUTPATIENT
Start: 2024-10-10

## 2024-10-10 NOTE — PROGRESS NOTES
CARDIOLOGY        Patient Name: Keri Bhagat  :1960  Age: 64 y.o.  Primary Cardiologist: David Burroughs MD  Encounter Provider:  DAPHNEY Nolasco    Date of Service: 10/10/24    CHIEF COMPLAINT / REASON FOR OFFICE VISIT     No chief complaint on file.      HISTORY OF PRESENT ILLNESS       Chest Pain   Associated symptoms include malaise/fatigue and palpitations. Pertinent negatives include no cough or fever.     Keri Bhagat is a 64 y.o. female who presents today for reevaluation.  Pt has a  history significant for CAD, hypertension.    Review of medical records reveals patient was evaluated in the freestanding emergency department on [facility] on 2023. Patient presents with chest discomfort that occurred at 4 PM while cooking. Reports that pain is worse with exertion. I have personally reviewed ECG tracings which reveals no ischemic changes. During emergency department assessment patient did have elevated heart rates with heart rate in the 1 teens very briefly. She did report episodes of heart palpitations at that time. Troponin remains flat. D-dimer and other labs were unremarkable. Chest x-ray did reveal a gastric lap band which is slightly more horizontal in configuration compared to . Concern for malpositioning. ED physician recommended follow-up with gastroenterology.    Patient had an echocardiogram in 2024 which was normal and does not reveal any signs of heart failure or valvular abnormalities.    The patient was evaluated in the [facility] in 2024 for chest pain and heart palpitations.  Chest pain was intermittent and blood pressure was elevated during times of pain.  Patient also noted a heart fluttering sensation and was sent home with an outpatient monitor.  Outpatient monitor revealed sinus rhythm with 27 triggered events which correlated with a supraventricular ectopic rhythm.  Patient also had 4% PVC burden and 67% PAC burden.      Patient  "was hospitalized in September 2024 for complaints of altered mental status and intermittent dizziness.  EP did evaluate patient to see if arrhythmia was contributing and they did not feel that this was contributing.  During this admission she had a myocardial perfusion stress test which was negative for ischemia.    Patient was evaluated by electrophysiology in the outpatient setting in October 2024, due to concern for paroxysmal SVT causing symptoms patient has elected to proceed with a ablation.  Cardiac ablation is scheduled for 10/16/2024.    10//2024 patient called the office complaining of elevated heart rates and blood pressures, she was wanting to know if her ablation could be scheduled any sooner.  Unfortunately, I do not think that we will be able to move up her ablation appointment but I did recommend adding a evening dose of 25 mg of metoprolol if heart rates continued to remain high.    Patient reports that unfortunately she forgot to add an additional evening dose of metoprolol succinate.  She brings with her blood pressure and heart rate diary where her heart rate is frequently over 100.  Blood pressure is in the 100s-120s.  She continues to have symptoms of heart palpitations and generalized fatigue.  She is compliant with medications without any adverse effects.    The following portions of the patient's history were reviewed and updated as appropriate: allergies, current medications, past family history, past medical history, past social history, past surgical history and problem list.      VITAL SIGNS     Visit Vitals  /90 (BP Location: Right arm, Patient Position: Sitting)   Pulse 106   Ht 160 cm (63\")   Wt 94.8 kg (209 lb)   LMP 10/21/2016 (Approximate) Comment: 54   SpO2 96%   BMI 37.02 kg/m²         Wt Readings from Last 3 Encounters:   10/10/24 94.8 kg (209 lb)   10/03/24 95.3 kg (210 lb)   10/03/24 94.3 kg (208 lb)     Body mass index is 37.02 kg/m².      REVIEW OF SYSTEMS     Review " of Systems   Constitutional: Positive for malaise/fatigue. Negative for chills, fever, weight gain and weight loss.   Cardiovascular:  Positive for palpitations. Negative for chest pain and leg swelling.   Respiratory:  Negative for cough, snoring and wheezing.    Hematologic/Lymphatic: Negative for bleeding problem. Does not bruise/bleed easily.   Skin:  Negative for color change.   Musculoskeletal:  Negative for falls, joint pain and myalgias.   Gastrointestinal:  Positive for heartburn. Negative for melena.   Genitourinary:  Negative for hematuria.   Neurological:  Negative for excessive daytime sleepiness.   Psychiatric/Behavioral:  Negative for depression. The patient is not nervous/anxious.            PHYSICAL EXAMINATION     Vitals and nursing note reviewed.   Constitutional:       Appearance: Normal appearance. Well-developed.   Eyes:      Conjunctiva/sclera: Conjunctivae normal.   Neck:      Vascular: No carotid bruit.   Pulmonary:      Breath sounds: Normal breath sounds.   Cardiovascular:      Tachycardia present. Normal S1 with normal intensity. Normal S2 with normal intensity.       Murmurs: There is no murmur.      No gallop.  No click. No rub.   Edema:     Peripheral edema absent.   Musculoskeletal: Normal range of motion. Skin:     General: Skin is warm and dry.   Neurological:      Mental Status: Alert and oriented to person, place, and time.      GCS: GCS eye subscore is 4. GCS verbal subscore is 5. GCS motor subscore is 6.   Psychiatric:         Speech: Speech normal.         Behavior: Behavior normal.         Thought Content: Thought content normal.         Judgment: Judgment normal.           REVIEWED DATA       ECG 12 Lead    Date/Time: 10/10/2024 10:29 AM  Performed by: Lluvia Porter APRN    Authorized by: Lluvia Porter APRN  Comparison: compared with previous ECG   Rhythm: sinus rhythm  Ectopy: trigeminy  Rate: normal  BPM: 73  Conduction: conduction normal  ST Segments: ST segments  normal  T Waves: T waves normal  QRS axis: normal    Clinical impression: abnormal EKG          Cardiac Procedures:  Cardiac catheterization 3/30/2017.  Left main 0% stenosis, LAD 20% mid stenosis, distal LAD 30% stenosis.  Ramus 0% stenosis, 80% stenosis of a small secondary branch.  Left circumflex 10% stenosis.  RCA 50-60% stenosis.  PDA 30% stenosis.  Echocardiogram 8/27/2021.  LVEF 56-60%.  Normal LV cavity size noted.  Diastolic function normal.  Myocardial perfusion stress test 8/27/2021.  Normal myocardial perfusion study without evidence of ischemia.  Cardiac event monitor 1/25/2024.  Abnormal monitor study.  PVCs representing less than 1% of all beats.  Patient had paroxysmal atrial fibrillation accounting for 2.3% of beats.  Echocardiogram 2/15/2024.  LVEF 59%.  Moderate hypertrophy.  Echocardiogram 5/29/2024.  LVEF 63%.  Sigmoid shaped ventricular septum.  Grade 2 diastolic dysfunction.  Estimated RVSP less than 35 mmHg.  Zio monitor 8/28/2024. sinus rhythm with 27 triggered events which correlated with a supraventricular ectopic rhythm.  Patient also had 4% PVC burden and 67% PAC burden.    Myocardial perfusion stress test 9/20/2024.  Negative for ischemia.  Impressions consistent with a low restudy.    Lipid Panel          2/15/2024    11:14 3/28/2024    15:34   Lipid Panel   Total Cholesterol 185  156    Triglycerides 130  113    HDL Cholesterol 61  59    VLDL Cholesterol 23  20    LDL Cholesterol  101  77    LDL/HDL Ratio 1.61         Lab Results   Component Value Date     10/03/2024     09/19/2024    K 3.9 10/03/2024    K 3.4 (L) 09/19/2024     10/03/2024     09/19/2024    CO2 24.2 10/03/2024    CO2 25.0 09/19/2024    BUN 9 10/03/2024    BUN 11 09/19/2024    CREATININE 0.98 10/03/2024    CREATININE 0.97 09/19/2024    EGFRIFNONA 49 (L) 10/13/2021    EGFRIFNONA 62 08/06/2021    EGFRIFAFRI 60 (L) 10/13/2021    EGFRIFAFRI 70 07/01/2021    GLUCOSE 160 (H) 10/03/2024    GLUCOSE 83  09/19/2024    CALCIUM 9.9 10/03/2024    CALCIUM 8.7 09/19/2024    PROTENTOTREF 6.7 03/28/2024    PROTENTOTREF 6.0 02/15/2024    ALBUMIN 4.7 10/03/2024    ALBUMIN 4.2 09/16/2024    BILITOT 0.2 10/03/2024    BILITOT 0.2 09/16/2024    AST 20 10/03/2024    AST 22 09/16/2024    ALT 26 10/03/2024    ALT 22 09/16/2024     Lab Results   Component Value Date    WBC 8.57 10/03/2024    WBC 8.52 09/19/2024    HGB 14.9 10/03/2024    HGB 12.8 09/19/2024    HCT 44.3 10/03/2024    HCT 38.7 09/19/2024    MCV 93.3 10/03/2024    MCV 95.6 09/19/2024     10/03/2024     09/19/2024     Lab Results   Component Value Date    PROBNP 206.0 09/16/2024    PROBNP 378.0 08/23/2024     Lab Results   Component Value Date    TROPONINI <0.01 05/03/2021    TROPONINT 12 09/16/2024     Lab Results   Component Value Date    TSH 0.846 09/18/2024    TSH 2.930 03/28/2024             ASSESSMENT & PLAN     Diagnoses and all orders for this visit:    1. Premature ventricular contraction (Primary)  Assessment & Plan:  Patient with monitor showing paroxysmal SVT as well as frequent PACs and PVCs.  Patient has ventricular trigeminy on ECG in clinic today  Increase metoprolol succinate to 37.5 mg in the morning and 12.5 mg in the evening  Patient has an ablation scheduled 10/16/2024, encouraged to keep appointment    Orders:  -     ECG 12 Lead    2. Essential hypertension  Assessment & Plan:  BP controlled at 120/90  Regimen as follows:  Metoprolol succinate 37.5 mg in the morning and 12.5 mg in the evening      Other orders  -     metoprolol succinate XL (Toprol XL) 25 MG 24 hr tablet; Take 1.5 tablets by mouth Every Morning AND 0.5 tablets Every Evening. For bp  Indications: High Blood Pressure  Dispense: 180 tablet; Refill: 3        Return in about 1 year (around 10/10/2025) for Dr. Jordan Gerardo.    Future Appointments         Provider Department Center    10/10/2024 3:30 PM Marvin Sanchez PTA [facility] PHYSICAL THERAPY [facility]    10/14/2024  3:00 PM Parish Lock, PT [facility] PHYSICAL THERAPY [facility]    10/15/2024 2:20 PM Javier Núñez MD [facility] ORTHOPEDICS [facility]    10/16/2024 3:00 PM Parish Lock, PT [facility] PHYSICAL THERAPY [facility]    10/21/2024 3:00 PM Parish Lock, PT [facility] PHYSICAL THERAPY [facility]    10/22/2024 11:30 AM Epley, James, APRN [facility] PRIMARY CARE [facility]    10/23/2024 3:00 PM CoverdellMarvin, [facility] PHYSICAL THERAPY [facility]    10/28/2024 3:00 PM Coverdell, Marvin, [facility] PHYSICAL THERAPY [facility]    10/30/2024 3:00 PM Coverdell, Marvin, [facility] PHYSICAL THERAPY [facility]    11/8/2024 10:45 AM Bob Mercado MD [facility] NEUROSURGERY [facility]    1/6/2025 1:00 PM Ajay Gutierrez MD [facility] ORTHOPEDICS [facility]    2/26/2025 2:30 PM Epley, James, APRN [facility] PRIMARY CARE [facility]    3/24/2025 3:00 PM Maxwell Martin MD [facility] ENDOCRINOLOGY [facility]    10/21/2025 11:40 AM David Burroughs MD [facility] CARDIOLOGY [facility]              MEDICATIONS         Discharge Medications            Accurate as of October 10, 2024 12:12 PM. If you have any questions, ask your nurse or doctor.                Changes to Medications        Instructions Start Date   metoprolol succinate XL 25 MG 24 hr tablet  Commonly known as: Toprol XL  What changed: See the new instructions.  Changed by: DAPHNEY Gonzalez   Take 1.5 tablets by mouth Every Morning AND 0.5 tablets Every Evening. For bp  Indications: High Blood Pressure             Continue These Medications        Instructions Start Date   apixaban 5 MG tablet tablet  Commonly known as: ELIQUIS   5 mg, Oral, Every 12 Hours Scheduled      buPROPion  MG 24 hr tablet  Commonly known as: WELLBUTRIN XL   1 tablet, Oral, 2 Times Daily      CITRUS CALCIUM/VITAMIN D PO   1 tablet, Oral, 2 Times Daily      cyanocobalamin 1000 MCG tablet  Commonly  known as: VITAMIN B-12   1,000 mcg, Oral, Daily      estradiol 0.1 MG/GM vaginal cream  Commonly known as: ESTRACE   1 g, Vaginal, 3 Times Weekly      gabapentin 100 MG capsule  Commonly known as: NEURONTIN   200 mg, Oral, 2 Times Daily      IRON PO   1 tablet, Oral, 2 Times Daily      isosorbide mononitrate 30 MG 24 hr tablet  Commonly known as: IMDUR   30 mg, Oral, Daily      lamoTRIgine 100 MG tablet  Commonly known as: LaMICtal   100 mg, Oral, 2 Times Daily      levothyroxine 88 MCG tablet  Commonly known as: SYNTHROID, LEVOTHROID   88 mcg, Oral, Every Morning      OLANZapine 5 MG tablet  Commonly known as: zyPREXA   1 tablet, Oral, Nightly      ondansetron 4 MG tablet  Commonly known as: ZOFRAN   TAKE 1 TABLET BY MOUTH EVERY 8 HOURS AS NEEDED FOR NAUSEA AND/OR VOMITING      pantoprazole 40 MG EC tablet  Commonly known as: PROTONIX   40 mg, Oral, 2 Times Daily      venlafaxine 37.5 MG tablet  Commonly known as: EFFEXOR   Take 1 tablet by mouth Daily.      vitamin b complex capsule capsule   1 capsule, Oral, 2 Times Daily      Wegovy 0.5 MG/0.5ML solution auto-injector  Generic drug: Semaglutide-Weight Management   0.5 mg, Subcutaneous, Weekly                   **Dragon Disclaimer:   Much of this encounter note is an electronic transcription/translation of spoken language to printed text. The electronic translation of spoken language may permit erroneous, or at times, nonsensical words or phrases to be inadvertently transcribed. Although I have reviewed the note for such errors, some may still exist.

## 2024-10-10 NOTE — ASSESSMENT & PLAN NOTE
Patient with monitor showing paroxysmal SVT as well as frequent PACs and PVCs.  Patient has ventricular trigeminy on ECG in clinic today  Increase metoprolol succinate to 37.5 mg in the morning and 12.5 mg in the evening  Patient has an ablation scheduled 10/16/2024, encouraged to keep appointment

## 2024-10-10 NOTE — ASSESSMENT & PLAN NOTE
BP controlled at 120/90  Regimen as follows:  Metoprolol succinate 37.5 mg in the morning and 12.5 mg in the evening

## 2024-10-14 ENCOUNTER — TREATMENT (OUTPATIENT)
Dept: PHYSICAL THERAPY | Facility: CLINIC | Age: 64
End: 2024-10-14
Payer: MEDICARE

## 2024-10-14 DIAGNOSIS — R29.898 WEAKNESS OF BOTH LEGS: ICD-10-CM

## 2024-10-14 DIAGNOSIS — R29.6 FREQUENT FALLS: ICD-10-CM

## 2024-10-14 DIAGNOSIS — R26.9 GAIT DISTURBANCE: Primary | ICD-10-CM

## 2024-10-14 DIAGNOSIS — R26.89 DECREASED FUNCTIONAL MOBILITY: ICD-10-CM

## 2024-10-14 PROCEDURE — 97112 NEUROMUSCULAR REEDUCATION: CPT

## 2024-10-14 PROCEDURE — 97530 THERAPEUTIC ACTIVITIES: CPT

## 2024-10-14 PROCEDURE — 97110 THERAPEUTIC EXERCISES: CPT

## 2024-10-14 NOTE — PROGRESS NOTES
Physical Therapy Daily Treatment Note  [facility]  [place] - [address], [address]     [place] [id number]   Phone: [phone]   Fax: [phone]      Patient: Keri Bhagat   : 1960  Referring practitioner: Bob TORRES MD  Date of Initial Visit: Type: THERAPY  Noted: 2024  Today's Date: 10/14/2024  Patient seen for 9 sessions       Visit Diagnoses:    ICD-10-CM ICD-9-CM   1. Gait disturbance  R26.9 781.2   2. Weakness of both legs  R29.898 729.89   3. Frequent falls  R29.6 V15.88   4. Decreased functional mobility  R26.89 781.99         Subjective:  Keri Bhagat reports: Overall doing okay, no falls or close calls. Patient does report increase L shoulder pain over the weekend. Patient plans to call Dr. Gutierrez's office in regards to L shoulder. Patient denies any inciting event to L shoulder, but gradual worsening L shoulder pain over the past several weeks.       Objective   See Exercise, Manual, and Modality Logs for complete treatment.       Assessment:  Patient tolerates today's movement interventions well without adverse effects. Patient demonstrates improved standing balance with 1-2 losses of balance during today's balance exercises, with greatest amounts of difficulty with vision deprivation. Progress standing strengthening and balance interventions without significant issues. Patient continues to be a mild fall risk, and continues to benefit from physical therapy to diminish risk of falling.       Plan:   Balance training, Le strengthening/stabilization, interventions to reduce risk of falling         Timed:         Manual Therapy:         mins  45050;     Therapeutic Exercise:    18     mins  80666;     Neuromuscular Zelda:    26    mins  04335;    Therapeutic Activity:     10     mins  16765;     Gait Training:           mins  92927;     Ultrasound:          mins  65235;    Ionto                                   mins  17951  Self Care                             mins  74680  Traction          mins 26794      Un-Timed:  Canalith Repos         mins 32973  Electrical Stimulation:         mins  09941 ( );  Dry Needling          mins self-pay  Traction          mins 49365        Timed Treatment:   54   mins   Total Treatment:     54   mins    Parish Lock PT  License Number: [place] [id number]# [id number]. [place] LIC# [id number]    Physical Therapist

## 2024-10-16 ENCOUNTER — HOSPITAL ENCOUNTER (OUTPATIENT)
Facility: HOSPITAL | Age: 64
Setting detail: HOSPITAL OUTPATIENT SURGERY
Discharge: HOME | End: 2024-10-16
Attending: INTERNAL MEDICINE | Admitting: INTERNAL MEDICINE
Payer: MEDICARE

## 2024-10-16 VITALS
HEART RATE: 74 BPM | TEMPERATURE: 97.8 F | BODY MASS INDEX: 36.86 KG/M2 | OXYGEN SATURATION: 97 % | HEIGHT: 63 IN | RESPIRATION RATE: 18 BRPM | DIASTOLIC BLOOD PRESSURE: 76 MMHG | WEIGHT: 208 LBS | SYSTOLIC BLOOD PRESSURE: 117 MMHG

## 2024-10-16 DIAGNOSIS — I47.10 SUSTAINED SVT: ICD-10-CM

## 2024-10-16 LAB
QT INTERVAL: 388 MS
QT INTERVAL: 390 MS
QTC INTERVAL: 406 MS
QTC INTERVAL: 422 MS

## 2024-10-16 PROCEDURE — 93662 INTRACARDIAC ECG (ICE): CPT | Performed by: INTERNAL MEDICINE

## 2024-10-16 PROCEDURE — 93005 ELECTROCARDIOGRAM TRACING: CPT | Performed by: INTERNAL MEDICINE

## 2024-10-16 PROCEDURE — 25810000003 SODIUM CHLORIDE 0.9 % SOLUTION: Performed by: INTERNAL MEDICINE

## 2024-10-16 PROCEDURE — 25010000002 FENTANYL CITRATE (PF) 50 MCG/ML SOLUTION: Performed by: INTERNAL MEDICINE

## 2024-10-16 PROCEDURE — 93010 ELECTROCARDIOGRAM REPORT: CPT | Performed by: INTERNAL MEDICINE

## 2024-10-16 PROCEDURE — C1894 INTRO/SHEATH, NON-LASER: HCPCS | Performed by: INTERNAL MEDICINE

## 2024-10-16 PROCEDURE — C1893 INTRO/SHEATH, FIXED,NON-PEEL: HCPCS | Performed by: INTERNAL MEDICINE

## 2024-10-16 PROCEDURE — C1732 CATH, EP, DIAG/ABL, 3D/VECT: HCPCS | Performed by: INTERNAL MEDICINE

## 2024-10-16 PROCEDURE — 93462 L HRT CATH TRNSPTL PUNCTURE: CPT | Performed by: INTERNAL MEDICINE

## 2024-10-16 PROCEDURE — 25010000002 HEPARIN (PORCINE) PER 1000 UNITS: Performed by: INTERNAL MEDICINE

## 2024-10-16 PROCEDURE — 93005 ELECTROCARDIOGRAM TRACING: CPT

## 2024-10-16 PROCEDURE — C1730 CATH, EP, 19 OR FEW ELECT: HCPCS | Performed by: INTERNAL MEDICINE

## 2024-10-16 PROCEDURE — 25010000002 PROTAMINE SULFATE PER 10 MG: Performed by: INTERNAL MEDICINE

## 2024-10-16 PROCEDURE — 25010000002 MIDAZOLAM PER 1 MG: Performed by: INTERNAL MEDICINE

## 2024-10-16 PROCEDURE — 93653 COMPRE EP EVAL TX SVT: CPT | Performed by: INTERNAL MEDICINE

## 2024-10-16 PROCEDURE — 25010000002 LIDOCAINE-EPINEPHRINE 1 %-1:200000 SOLUTION: Performed by: INTERNAL MEDICINE

## 2024-10-16 PROCEDURE — 25810000003 SODIUM CHLORIDE 0.9 % SOLUTION 250 ML FLEX CONT: Performed by: INTERNAL MEDICINE

## 2024-10-16 PROCEDURE — C1759 CATH, INTRA ECHOCARDIOGRAPHY: HCPCS | Performed by: INTERNAL MEDICINE

## 2024-10-16 RX ORDER — FENTANYL CITRATE 50 UG/ML
INJECTION, SOLUTION INTRAMUSCULAR; INTRAVENOUS
Status: DISCONTINUED | OUTPATIENT
Start: 2024-10-16 | End: 2024-10-16 | Stop reason: HOSPADM

## 2024-10-16 RX ORDER — PROTAMINE SULFATE 10 MG/ML
INJECTION, SOLUTION INTRAVENOUS
Status: DISCONTINUED | OUTPATIENT
Start: 2024-10-16 | End: 2024-10-16 | Stop reason: HOSPADM

## 2024-10-16 RX ORDER — SODIUM CHLORIDE 0.9 % (FLUSH) 0.9 %
10 SYRINGE (ML) INJECTION AS NEEDED
Status: DISCONTINUED | OUTPATIENT
Start: 2024-10-16 | End: 2024-10-16 | Stop reason: HOSPADM

## 2024-10-16 RX ORDER — VIBEGRON 75 MG/1
75 TABLET, FILM COATED ORAL DAILY
COMMUNITY

## 2024-10-16 RX ORDER — ATORVASTATIN CALCIUM 80 MG/1
80 TABLET, FILM COATED ORAL DAILY
COMMUNITY

## 2024-10-16 RX ORDER — NITROGLYCERIN 0.4 MG/1
0.4 TABLET SUBLINGUAL
Status: DISCONTINUED | OUTPATIENT
Start: 2024-10-16 | End: 2024-10-16 | Stop reason: HOSPADM

## 2024-10-16 RX ORDER — METOPROLOL SUCCINATE 25 MG/1
25 TABLET, EXTENDED RELEASE ORAL DAILY
Qty: 180 TABLET | Refills: 3 | Status: SHIPPED | OUTPATIENT
Start: 2024-10-16

## 2024-10-16 RX ORDER — SODIUM CHLORIDE 9 MG/ML
75 INJECTION, SOLUTION INTRAVENOUS CONTINUOUS
Status: DISCONTINUED | OUTPATIENT
Start: 2024-10-16 | End: 2024-10-16 | Stop reason: HOSPADM

## 2024-10-16 RX ORDER — METHOHEXITAL IN WATER/PF 100MG/10ML
SYRINGE (ML) INTRAVENOUS
Status: DISCONTINUED | OUTPATIENT
Start: 2024-10-16 | End: 2024-10-16 | Stop reason: HOSPADM

## 2024-10-16 RX ORDER — MIDAZOLAM HYDROCHLORIDE 1 MG/ML
INJECTION INTRAMUSCULAR; INTRAVENOUS
Status: DISCONTINUED | OUTPATIENT
Start: 2024-10-16 | End: 2024-10-16 | Stop reason: HOSPADM

## 2024-10-16 RX ORDER — ACETAMINOPHEN 650 MG/1
650 SUPPOSITORY RECTAL EVERY 4 HOURS PRN
Status: DISCONTINUED | OUTPATIENT
Start: 2024-10-16 | End: 2024-10-16 | Stop reason: HOSPADM

## 2024-10-16 RX ORDER — HEPARIN SODIUM 1000 [USP'U]/ML
INJECTION, SOLUTION INTRAVENOUS; SUBCUTANEOUS
Status: DISCONTINUED | OUTPATIENT
Start: 2024-10-16 | End: 2024-10-16 | Stop reason: HOSPADM

## 2024-10-16 RX ORDER — ACETAMINOPHEN 325 MG/1
650 TABLET ORAL EVERY 4 HOURS PRN
Status: DISCONTINUED | OUTPATIENT
Start: 2024-10-16 | End: 2024-10-16 | Stop reason: HOSPADM

## 2024-10-16 RX ADMIN — SODIUM CHLORIDE 75 ML/HR: 9 INJECTION, SOLUTION INTRAVENOUS at 08:05

## 2024-10-16 NOTE — DISCHARGE INSTRUCTIONS
[facility]  [address] [place] [address]    Dr. Almodvoar's Discharge Instructions for Ablation     Refer to this sheet in the next few weeks. These instructions provide you with information on caring for yourself after your procedure.     Make arrangements to have someone stay with you for 24 hours following the procedure.  This is due to the sedation you received and for your safety in the event of any complications.      About your Procedure:  You have had a procedure called a catheter ablation.  It is used to treat abnormal heartbeats (arrhythmia). The procedure destroyed (ablated) the cells in your heart that were causing your heart rhythm problem. During the procedure, the healthcare provider placed a thin, flexible wire (catheter) into a blood vessel in your groin.  The provider then threaded the catheter to your heart and destroyed the problematic cells.      Goal of Procedure:  The goal of this procedure is to regain a normal heart rhythm (sinus rhythm) and reduce the symptoms associated with your irregular heart rhythm. In many cases, one ablation is enough to treat the arrythmia, but sometimes the problem returns, and a second ablation may be necessary.      What to Expect After the Procedure:  After your procedure, it is typical to have the following sensations:  Minor discomfort or soreness in the chest or a small bump at the insertion site (groin). The bump should usually decrease in size and tenderness within 1 to 2 weeks.  Mild bruising which will usually fade within 2 to 4 weeks.  A mild sore throat  Sometimes the irregular heartbeat goes away immediately after the procedure.  Other times, it may take longer.  As your heart heals, it is common to have some Atrial Fibrillation symptoms and you may even go into atrial fibrillation up to a few months after the procedure.    Do not miss a dose of your blood thinner (Apixaban/Eliquis, rivaroxaban/Xarelto, Warfarin/Coumadin).   You  will have a follow up appointment in approximately 1 month.      Home Care Instructions:  Take your medications exactly as directed.  Do not skip doses unless directed to do so by your healthcare provider.   You should be able to return to most of your normal activities in the next 1-2 days. These include walking, climbing stairs, and doing household chores.   You may remove the dressings in your groin in 24 hours.    You may then shower and gently wash the area with plain soap and water after removing the dressings.     Do not apply powder or lotion to the site.  Do not take baths, swim, or use a hot tub until your health care provider approves and the site is completely healed.  Hold direct pressure to your groin sites any time you laugh, cough, sneeze or change positions.  Do this for the next 48 hours.   Do not bend, squat, or lift anything over 20 lb. (9 kg) for 7 days after your ablation. Do not do any other heavy physical activity for 7 days.  This allows your body time to heal properly.    Inspect the groin sites daily for signs of infection for 7 days. Those signs include: redness, swelling, drainage, or warmth at the groin sites.     Follow instructions about when you can drive or return to work as directed by your physician.    If you experience bleeding or swelling (larger than the size of a golf ball) at the groin sites after you go home, do not remove the dressing. Lie down flat and hold pressure over the sites for at last 10-15 minutes. You or the person with you should call the office at [phone] for further instructions. If the bleeding does not stop after 10-15 minutes, call [phone] and continue holding pressure.  You need to come to the emergency room for evaluation.     Call Your Doctor If:  You experience the symptoms you had before the procedure for more than 24 hours. You have drainage (other than a small amount of blood on the dressing).  You have chills or a fever > 101.  You have redness,  warmth, swelling (larger than a walnut), drainage or severe pain at the insertion site  You develop chest pain or shortness of breath, feel faint, or pass out.  You develop pain, discoloration, coldness, numbness, tingling, or severe bruising in the leg that held the catheter.  You develop bleeding from any other place, such as the bowels.  You have difficulty swallowing, excessive pain when swallowing, difficulty breathing or vomiting of blood  You have unstable vital signs such as blood pressure less than 90/60 or a heart rate greater than 130 beats per minute for more than 1 hour.   You have any symptoms of a stroke.  Remember BE FAST  B-balance. Sudden trouble walking or loss of balance.  E-eyes.  Sudden changes in how you see or a sudden onset of a very bad headache.   F-face. Sudden weakness or loss of feeling of the face or facial droop on one side.   A-arms Sudden weakness or numbness in one arm.  One arm drifts down if they are both held out in front of you. This happens suddenly and usually on one side of the body.  S-speech.  Sudden trouble speaking, slurred speech or trouble understanding what people are saying.   T-time  Time to call emergency services.  Write down the symptoms and the time they started.

## 2024-10-16 NOTE — Clinical Note
Hemostasis started on the right femoral vein. Figure 8 suturing was used in achieving hemostasis. Closure device deployed in the vessel and manual pressure applied to vessel. Manual pressure was held by NURA Penaloza. Manual pressure was held for 10 min. Hemostasis achieved successfully.

## 2024-10-18 ENCOUNTER — OFFICE VISIT (OUTPATIENT)
Dept: ORTHOPEDIC SURGERY | Facility: CLINIC | Age: 64
End: 2024-10-18
Payer: MEDICARE

## 2024-10-18 VITALS — HEIGHT: 63 IN | WEIGHT: 211.1 LBS | TEMPERATURE: 97.8 F | BODY MASS INDEX: 37.4 KG/M2

## 2024-10-18 DIAGNOSIS — Z96.652 S/P TKR (TOTAL KNEE REPLACEMENT), LEFT: ICD-10-CM

## 2024-10-18 DIAGNOSIS — M17.11 PRIMARY OSTEOARTHRITIS OF RIGHT KNEE: Primary | ICD-10-CM

## 2024-10-18 DIAGNOSIS — R52 PAIN: ICD-10-CM

## 2024-10-18 PROCEDURE — 72170 X-RAY EXAM OF PELVIS: CPT | Performed by: ORTHOPAEDIC SURGERY

## 2024-10-18 PROCEDURE — 99214 OFFICE O/P EST MOD 30 MIN: CPT | Performed by: ORTHOPAEDIC SURGERY

## 2024-10-18 PROCEDURE — 1160F RVW MEDS BY RX/DR IN RCRD: CPT | Performed by: ORTHOPAEDIC SURGERY

## 2024-10-18 PROCEDURE — 1159F MED LIST DOCD IN RCRD: CPT | Performed by: ORTHOPAEDIC SURGERY

## 2024-10-18 RX ORDER — LAMOTRIGINE 25 MG/1
TABLET ORAL
COMMUNITY
Start: 2024-10-07

## 2024-10-18 RX ORDER — METOPROLOL TARTRATE 25 MG/1
25 TABLET, FILM COATED ORAL
COMMUNITY

## 2024-10-18 NOTE — PROGRESS NOTES
Patient ID: Keri Bhagat     Chief Complaint:    Chief Complaint   Patient presents with    Left Knee - Establish Care, Pain, Initial Evaluation    Right Knee - Establish Care, Pain, Initial Evaluation        HPI:    Keri Bhagat is a 64 y.o. who presents today for evaluation bilateral knees.  Patient had a left total knee done this past year overall reports doing pretty well.  However the right knee has been giving her more problems in which she did have an injection on October 3.  She states it only helped for short while.  She is tried over-the-counter medications and PT.  She continues to be symptomatic in which her symptoms are limiting her normal daily activities and overall quality life.  Patient is here to discuss other treatment options for the right.    Patient does see cardiology she has A-fib she is on Eliquis.  Did have a recent cardiac ablation surgery.  Does report history of anemia is on iron.  Most recent hemoglobin 14.9.  Does states she has early kidney disease.  Does use a vape pen.    Social History     Socioeconomic History    Marital status:     Number of children: 3    Years of education: 12   Tobacco Use    Smoking status: Every Day     Types: Electronic Cigarette     Passive exposure: Current    Smokeless tobacco: Never    Tobacco comments:     Just using vape.   Vaping Use    Vaping status: Every Day    Substances: Nicotine, Flavoring    Devices: Refillable tank   Substance and Sexual Activity    Alcohol use: Not Currently     Comment: RARELY    Drug use: Never    Sexual activity: Defer     Partners: Male     Birth control/protection: Abstinence, Post-menopausal     Past Medical History:   Diagnosis Date    Abnormal Pap smear of cervix     Adrenal adenoma 09/21/2022    Anemia     Anxiety     Arthritis     Arthritis of back     Arthritis of neck     Bipolar I disorder, single manic episode     depressive d(NOS)    Cataract 2021    Removed    Cervical disc herniation      Colon polyp     Constipation     Coronary artery disease     COVID-19 01/10/2023    Death of family member     MOTHER IN FEB 2024    Depression     NO SUICIDAL PLANS    Dislocation of finger     Diverticular disease     Diverticulitis of colon     Dyspepsia     Encounter for follow-up examination after completed treatment for conditions other than malignant neoplasm 04/11/2018    Fracture of wrist     GERD (gastroesophageal reflux disease)     Heart attack     AGE 39    Heart murmur     Hiatal hernia     History of transfusion     2001    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    Lumbosacral disc disease Unsure    Lower left back is partial osteoarthritis and partial osteoporosis    Obesity     Osteopenia 2021    Bone density test    Periodic limb movement disorder     Renal insufficiency     Restless leg syndrome     LEFT SHOULDER    Rotator cuff syndrome     Sleep apnea     NO MACHINE CURRENTLY    Suicidal ideation 08/19/2016    history    Thyroid nodule     Urinary tract infection     Visual impairment     Vitamin B12 deficiency      Family History   Problem Relation Age of Onset    Diabetes type II Mother     Hypertension Mother     Osteoporosis Mother     Seizures Mother     Diabetes Mother     Arthritis Mother     Hyperlipidemia Mother     Cancer Mother     COPD Mother     Heart disease Mother     Kidney disease Mother     COPD Father     Hypertension Father     Lung cancer Father     Heart attack Father     Liver cancer Father     Liver disease Father     Heart disease Father     Cancer Father         Lung cacer that  "metastasized  into the liver.    Thyroid disease Sister     Hypertension Sister     Bipolar disorder Sister     Depression Sister     ADD / ADHD Sister     Anxiety disorder Sister     Mental illness Sister     Hyperlipidemia Sister     Broken bones Sister     Colon polyps Sister     Thyroid disease Sister     Hypertension Sister     Bipolar disorder Sister     Anxiety disorder Sister     Depression Sister     Mental illness Sister     Hyperlipidemia Sister     Colon polyps Sister     Cancer Maternal Grandmother     No Known Problems Maternal Grandfather     No Known Problems Paternal Grandmother     No Known Problems Paternal Grandfather     Abnormal EKG Daughter     Hypertension Daughter     Bipolar disorder Daughter     Thyroid disease Daughter     Mental illness Daughter     Hyperlipidemia Daughter     Asthma Daughter     No Known Problems Son     Diabetes Son         Type 1    Diabetes Paternal Uncle     Anxiety disorder Sister     Depression Sister     Hyperlipidemia Sister     Thyroid disease Daughter     Breast cancer Neg Hx     Ovarian cancer Neg Hx     Uterine cancer Neg Hx     Colon cancer Neg Hx     Malig Hyperthermia Neg Hx        ROS:    ROS:  Constitutional:  Denies fever, shaking or chills         All other pertinent positives and negatives as noted above in HPI.    Physical Exam:     Vital Signs:  Temp 97.8 °F (36.6 °C) (Temporal)   Ht 160 cm (63\")   Wt 95.8 kg (211 lb 1.6 oz)   LMP 10/21/2016 (Approximate) Comment: 54  BMI 37.39 kg/m²   Constitutional: Awake alert and oriented x3, well developed, well nourished, no acute distress, non-toxic appearance.    Musculoskeletal:    Exam of the right  knee:  Painful gait with a subtle limp  No muscle atrophy, erythema, ecchymosis, or gross deformity noted  mild knee effusion  + medial> lateral joint line tenderness  Active range of motion 4-125  5/5 strength flexion and extension  The knee is stable to varus and valgus stress testing  Mild varus " alignment of the limb  Lachman negative  Posterior drawer negative  Hamida's negative  Patellofemoral grind +  Sensation grossly intact to light tough throughout the lower extremity  Skin is intact  Distal pulses are 2+  No signs or symptoms of DVT    Left knee exam incision healed stable varus valgus stress.  Functional range of motion.          Diagnostic Studies:     Imaging was personally and individually reviewed and discussed at length with the patient:    Previous knee films reviewed    AP pelvis was taken in the office today: Mild degenerative changes right hip joint with slight decrease in joint space.  Overall preservation of left hip joint space            Assessment:     right  Knee Osteoarthritis, history left total knee            Plan:     Based on x-rays, history, and office evaluation, we have diagnosed Keri Bhagat with knee arthritis.  Patient has tried and failed multiple conservative treatments as noted above.  Her right knee symptoms are affecting her normal daily activities and overall quality life.  She had a recent left total knee done and overall has done okay with that she is at the point that she would like to discuss right total knee surgery which I think is warranted given the above.  I did discuss surgery in detail with the patient including use of a model as well as risk and benefits.  I did discuss risk and benefits with the patient with risk including but not limited to bleeding, infection, damage to nearby nerves, vessels, tendons, ligaments, continued pain, worsening pain, fracture, dislocation, leg length discrepancy, blood clots, even death with anesthesia and possible need for future procedures surgeries.  Patient understood this and has chosen to proceed.    Right total knee replacement (patient will need to wait minimum 3 months from October 3 and will require new cardiac clearance given recent cardiac ablation)    Cardiac clearance    Will need to hold Eliquis  preoperatively plan restart postoperatively    Required to stop nicotine/vape 4 weeks prior to surgery and 2 weeks after    Will check kidney function possibly limit any anti-inflammatories    History of anemia however recent hemoglobin 14.9    Patient will stay overnight for 23 observation    All questions were answered, the patient understands and agrees with the plan.

## 2024-10-21 ENCOUNTER — TREATMENT (OUTPATIENT)
Dept: PHYSICAL THERAPY | Facility: CLINIC | Age: 64
End: 2024-10-21
Payer: MEDICARE

## 2024-10-21 DIAGNOSIS — R29.6 FREQUENT FALLS: ICD-10-CM

## 2024-10-21 DIAGNOSIS — R26.89 DECREASED FUNCTIONAL MOBILITY: ICD-10-CM

## 2024-10-21 DIAGNOSIS — R26.9 GAIT DISTURBANCE: Primary | ICD-10-CM

## 2024-10-21 DIAGNOSIS — R29.898 WEAKNESS OF BOTH LEGS: ICD-10-CM

## 2024-10-21 PROBLEM — M17.11 OSTEOARTHRITIS OF RIGHT KNEE: Status: ACTIVE | Noted: 2024-10-21

## 2024-10-21 PROCEDURE — 97110 THERAPEUTIC EXERCISES: CPT

## 2024-10-21 PROCEDURE — 97112 NEUROMUSCULAR REEDUCATION: CPT

## 2024-10-21 RX ORDER — MELOXICAM 7.5 MG/1
15 TABLET ORAL ONCE
OUTPATIENT
Start: 2024-10-21 | End: 2024-10-21

## 2024-10-21 RX ORDER — ACETAMINOPHEN 10 MG/ML
1000 INJECTION, SOLUTION INTRAVENOUS ONCE
OUTPATIENT
Start: 2024-10-21

## 2024-10-21 RX ORDER — PREGABALIN 75 MG/1
150 CAPSULE ORAL ONCE
OUTPATIENT
Start: 2024-10-21 | End: 2024-10-21

## 2024-10-21 NOTE — PROGRESS NOTES
Physical Therapy Daily Treatment Note  [facility]  [place] - [address], [address]     [place] [address]   Phone: [phone]   Fax: [phone]      Patient: Keri Bhagat   : 1960  Referring practitioner: Bob TORRES MD  Date of Initial Visit: Type: THERAPY  Noted: 2024  Today's Date: 10/21/2024  Patient seen for 10 sessions       Visit Diagnoses:    ICD-10-CM ICD-9-CM   1. Gait disturbance  R26.9 781.2   2. Weakness of both legs  R29.898 729.89   3. Frequent falls  R29.6 V15.88   4. Decreased functional mobility  R26.89 781.99         Subjective:  Keri Bhagat reports: overall doing well. Reports no falls over the weekend. Reports increased soreness into L shoulder, which is a chronic issue. Patient reports having a cardiac SVT ablation outpatient procedure completed last Wednesday and reports it went well without issue.       Objective   See Exercise, Manual, and Modality Logs for complete treatment.       Assessment:  Patient tolerates today's movement interventions well without adverse effects. Patient with warm up beginning on NuStep followed by table exercises to improve muscle power of bilateral lower extremities. Patient then completes standing balance interventions with focus into improving ankle strategy, which was tolerated well. Patient with improving standing stability/balance with minimal loss of balance noted with all balance activities. Patient demonstrates gradual improvements overall.       Plan:   Progress note needed      Timed:         Manual Therapy:         mins  36952;     Therapeutic Exercise:    16     mins  52187;     Neuromuscular Zelda:    16    mins  26088;    Therapeutic Activity:     4     mins  33658;     Gait Training:           mins  33954;     Ultrasound:          mins  76130;    Ionto                                   mins  73404  Self Care                            mins  48396  Traction          mins  58640      Un-Timed:  Canalith Repos         mins 60796  Electrical Stimulation:         mins  64529 ( );  Dry Needling          mins self-pay  Traction          mins 23810        Timed Treatment:   36   mins   Total Treatment:     46   mins    Parish Lock PT  License Number: [place] [id number]# [id number]. [place] LIC# [id number]    Physical Therapist

## 2024-10-22 ENCOUNTER — OFFICE VISIT (OUTPATIENT)
Dept: FAMILY MEDICINE CLINIC | Facility: CLINIC | Age: 64
End: 2024-10-22
Payer: MEDICARE

## 2024-10-22 VITALS
WEIGHT: 208.2 LBS | TEMPERATURE: 98 F | DIASTOLIC BLOOD PRESSURE: 82 MMHG | RESPIRATION RATE: 14 BRPM | HEIGHT: 63 IN | HEART RATE: 66 BPM | OXYGEN SATURATION: 94 % | SYSTOLIC BLOOD PRESSURE: 120 MMHG | BODY MASS INDEX: 36.89 KG/M2

## 2024-10-22 DIAGNOSIS — I25.10 ATHEROSCLEROSIS OF NATIVE CORONARY ARTERY OF NATIVE HEART WITHOUT ANGINA PECTORIS: Primary | ICD-10-CM

## 2024-10-22 DIAGNOSIS — E66.9 OBESITY (BMI 30-39.9): ICD-10-CM

## 2024-10-22 DIAGNOSIS — I48.91 ATRIAL FIBRILLATION, UNSPECIFIED TYPE: ICD-10-CM

## 2024-10-22 PROCEDURE — 3079F DIAST BP 80-89 MM HG: CPT | Performed by: NURSE PRACTITIONER

## 2024-10-22 PROCEDURE — 3074F SYST BP LT 130 MM HG: CPT | Performed by: NURSE PRACTITIONER

## 2024-10-22 PROCEDURE — 99213 OFFICE O/P EST LOW 20 MIN: CPT | Performed by: NURSE PRACTITIONER

## 2024-10-22 PROCEDURE — 1125F AMNT PAIN NOTED PAIN PRSNT: CPT | Performed by: NURSE PRACTITIONER

## 2024-10-22 NOTE — PROGRESS NOTES
"Chief Complaint  Follow-up (F/u Cardiac ablation last Wednesday. Pt. Doing well w/ ozempic. Inquiring about glocose amrita.)    Subjective        Keri Bhagat presents to [facility]  History of Present Illness  Very pleasant patient here today follow-up, recently had a cardiac ablation Wednesday doing well a little bump in her groin would like me to take a look at it, just questions about this but she has no fever chills severe pains  No bleeding,  History of CAD Wegovy was approved with her insurance as well as has obesity and she is trying to improve her diet, no problems on medication although she may have had some low sugar last month,  She has no history of hypoglycemia and takes no insulin no other medications no problems since  I am going to give her a glucometer just to have as a sample today hopefully if we have those    Otherwise she is doing well today,  Follow-up      Objective   Vital Signs:  /82 (BP Location: Right arm, Patient Position: Sitting, Cuff Size: Adult)   Pulse 66   Temp 98 °F (36.7 °C) (Oral)   Resp 14   Ht 160 cm (62.99\")   Wt 94.4 kg (208 lb 3.2 oz)   SpO2 94%   BMI 36.89 kg/m²   Estimated body mass index is 36.89 kg/m² as calculated from the following:    Height as of this encounter: 160 cm (62.99\").    Weight as of this encounter: 94.4 kg (208 lb 3.2 oz).            Physical Exam  Vitals reviewed.   Constitutional:       General: She is not in acute distress.     Appearance: Normal appearance. She is well-developed. She is not ill-appearing, toxic-appearing or diaphoretic.   HENT:      Head: Normocephalic.      Nose: Nose normal.   Eyes:      General: No scleral icterus.     Conjunctiva/sclera: Conjunctivae normal.      Pupils: Pupils are equal, round, and reactive to light.   Neck:      Thyroid: No thyromegaly.      Vascular: No JVD.   Cardiovascular:      Rate and Rhythm: Normal rate and regular rhythm.      Heart sounds: Normal heart " sounds. No murmur heard.     No friction rub. No gallop.      Comments: Regular rate and rhythm no murmur  Pulmonary:      Effort: Pulmonary effort is normal. No respiratory distress.      Breath sounds: Normal breath sounds. No stridor. No wheezing or rales.   Abdominal:      General: Bowel sounds are normal. There is no distension.      Palpations: Abdomen is soft.      Tenderness: There is no abdominal tenderness.      Comments: No hepatosplenomegaly, no ascites,   Musculoskeletal:         General: No tenderness.      Cervical back: Neck supple.   Lymphadenopathy:      Cervical: No cervical adenopathy.   Skin:     General: Skin is warm and dry.      Findings: No erythema or rash.   Neurological:      Mental Status: She is alert and oriented to person, place, and time.      Deep Tendon Reflexes: Reflexes are normal and symmetric.   Psychiatric:         Behavior: Behavior normal.         Thought Content: Thought content normal.         Judgment: Judgment normal.        Result Review :                Assessment and Plan   Diagnoses and all orders for this visit:    1. Atherosclerosis of native coronary artery of native heart without angina pectoris (Primary)    2. Obesity (BMI 30-39.9)    3. Atrial fibrillation, unspecified type             Follow Up   Return in about 1 month (around 11/22/2024).  Patient was given instructions and counseling regarding her condition or for health maintenance advice. Please see specific information pulled into the AVS if appropriate.     There are no Patient Instructions on file for this visit.

## 2024-10-22 NOTE — PATIENT INSTRUCTIONS
Very important to hydrate well, drink at least 64 ounces of water daily  No fasting  No prolonged time without eating, more protein  Healthy meat chicken fish beings, less bread Posta sweets which raise sugar quickly stimulate insulin response  And sometimes we can get some hypoglycemia  Precaution make sure diet looks good caloric restriction but also lots of protein  Smaller more frequent meals.    If you are having frequent hypoglycemia we would need to stop the Wegovy

## 2024-10-28 ENCOUNTER — TELEPHONE (OUTPATIENT)
Dept: PHYSICAL THERAPY | Facility: CLINIC | Age: 64
End: 2024-10-28

## 2024-11-04 ENCOUNTER — OFFICE VISIT (OUTPATIENT)
Dept: ORTHOPEDIC SURGERY | Facility: CLINIC | Age: 64
End: 2024-11-04
Payer: MEDICARE

## 2024-11-04 VITALS — TEMPERATURE: 98.7 F | BODY MASS INDEX: 36.86 KG/M2 | WEIGHT: 208 LBS | HEIGHT: 63 IN

## 2024-11-04 DIAGNOSIS — G89.29 CHRONIC LEFT SHOULDER PAIN: Primary | ICD-10-CM

## 2024-11-04 DIAGNOSIS — M25.512 CHRONIC LEFT SHOULDER PAIN: Primary | ICD-10-CM

## 2024-11-04 PROCEDURE — 73030 X-RAY EXAM OF SHOULDER: CPT | Performed by: ORTHOPAEDIC SURGERY

## 2024-11-04 PROCEDURE — 99213 OFFICE O/P EST LOW 20 MIN: CPT | Performed by: ORTHOPAEDIC SURGERY

## 2024-11-04 RX ORDER — DIAZEPAM 5 MG/1
5 TABLET ORAL ONCE AS NEEDED
Qty: 1 TABLET | Refills: 0 | Status: SHIPPED | OUTPATIENT
Start: 2024-11-04 | End: 2024-11-08

## 2024-11-04 NOTE — TELEPHONE ENCOUNTER
Pt came into office and asked if Fermin Epley could refill her Semaglutide (Wegovy)? Please refill Pt's medication if appropriate. Pt would also like a call once refilled. Thank you!

## 2024-11-04 NOTE — PROGRESS NOTES
Chief Complaint:  Follow up left shoulder pain    HPI:  Ms. Bhagat follows up for her left shoulder.  I did an injection for her when I last saw her, she says it helped tremendously but has now worn off.  Her current pain is moderate, constant and aching.  She reports weakness when she tries to reach or lift with the left arm.  She also reports night pain.  Overall, she does feel like her symptoms are very similar to those that she was having before her previous repair.     Exam: Left shoulder is examined.  Skin is benign.  No focal areas of tenderness.  Full motion.  She has pain and weakness with resisted forward elevation in the scapular plane and abduction.    Imaging: AP and scapular Y views left shoulder are ordered and reviewed.  These compared to previous x-rays.  No new or concerning findings.    Assessment: Left shoulder pain and weakness, suspected recurrent rotator cuff tear    Plan: We discussed her options.  She wants to move forward with further workup.  I recommended an MR arthrogram.  She does have a cardiac stent but she says it is MRI compatible and she has just recently had MRIs of her pituitary and cervical spine.  I told her I will call her once to see the results.      Ajay Gutierrez MD  11/04/2024

## 2024-11-05 ENCOUNTER — TREATMENT (OUTPATIENT)
Dept: PHYSICAL THERAPY | Facility: CLINIC | Age: 64
End: 2024-11-05
Payer: MEDICARE

## 2024-11-05 DIAGNOSIS — R26.89 DECREASED FUNCTIONAL MOBILITY: ICD-10-CM

## 2024-11-05 DIAGNOSIS — R29.6 FREQUENT FALLS: ICD-10-CM

## 2024-11-05 DIAGNOSIS — R26.9 GAIT DISTURBANCE: Primary | ICD-10-CM

## 2024-11-05 DIAGNOSIS — R29.898 WEAKNESS OF BOTH LEGS: ICD-10-CM

## 2024-11-05 RX ORDER — SEMAGLUTIDE 0.5 MG/.5ML
0.5 INJECTION, SOLUTION SUBCUTANEOUS WEEKLY
Qty: 2 ML | Refills: 0 | Status: SHIPPED | OUTPATIENT
Start: 2024-11-05

## 2024-11-05 NOTE — TELEPHONE ENCOUNTER
Attempted to call Pt twice and call did not go through. I checked Pt's meds in chart and Pt had medication sent to [facility] last month for Wegovy

## 2024-11-05 NOTE — PROGRESS NOTES
"  Physical Therapy Re Certification Of [facility]  [place] - [address], [address]     [place] [address]   Phone: [phone]   Fax: [phone]  Patient: Keri Bhagat   : 1960  Diagnosis/ICD-10 Code:  Gait disturbance [R26.9]  Referring practitioner: Bob TORRES MD  Date of Initial Visit: Type: THERAPY  Noted: 2024  Today's Date: 2024  Patient seen for 11 sessions         Visit Diagnoses:    ICD-10-CM ICD-9-CM   1. Gait disturbance  R26.9 781.2   2. Weakness of both legs  R29.898 729.89   3. Decreased functional mobility  R26.89 781.99   4. Frequent falls  R29.6 V15.88         Keri Bhagat reports: reports 80% overall improvements. \"I'm actually able to stand on one leg long enough to put my pants on.\" Patient reports no falls since the start of therapy. Patient reports, \"I still don't feel quite as stable on my R lower extremity. I also get occasionally dizzy when I bend over, but that doesn't feel as often.\" Patient was unable to attend the last week or so of physical therapy due to having the Flu.     Subjective Questionnaire: ABC: 57%  Clinical Progress: improved  Home Program Compliance: Yes  Treatment has included: therapeutic exercise, neuromuscular re-education, therapeutic activity, and gait training        Objective   Strength/Myotome Testing      Left Hip   Planes of Motion   Flexion: 4+  Extension: 4+  Abduction: 4+  Adduction: 4+     Right Hip   Planes of Motion   Flexion: 4+  Extension: 4+  Abduction: 4+  Adduction: 4+     Left Knee   Flexion: 5  Extension: 5     Right Knee   Flexion: 5  Extension: 5     Left Ankle/Foot   Dorsiflexion: 5  Plantar flexion: 5     Right Ankle/Foot   Dorsiflexion: 5  Plantar flexion: 5     Ambulation      Ambulation: Stairs   Ascend stairs: independent  Pattern: reciprocal  Railings: two rails  Descend stairs: independent  Pattern: reciprocal  Railings: two rails     Functional " Assessment      Comments  Balance:   30 second sit to stand: 13 reps without UE support  TU.75 seconds without assistive device  Torrez/56  Romberg on firm surface EO: 30+ sec, EC 30+ seconds  Romberg on airex EO: 30+ seconds, EC 20 seconds  Tandem stance on firm surface 10 seconds bilaterally, airex 2.1 seconds  SLS: 9.2 seconds R, 8.4 seconds L     Assessment & Plan       Assessment  Assessment details: Patient is a 65 y/o female who presents for her 11th physical therapy visit for diagnosis of balance disorder with PMH significant for pituitary cyst, meninginoma, bilateral wrist fx with L ORIF, L TKA with complications, heart murmur and CAD, and MI with stent placement. Patient currently ambulates without the need for an assistive device. Patient demonstrates good improvements on her balance scales and per balance screening is a low fall risk. Patient has met all established therapy goals, and is currently independent with HEP. Patient does report L shoulder pain and is currently being seen by Dr. Gutierrez for management. However, patient is going to be discharged of this plan of care for balance, as she has demonstrated good improvements.     Plan  Frequency: 1x week  Duration in visits: 1  Treatment plan discussed with: patient    Recommendations: Discharge    Plan Goals: SHORT TERM GOALS:   5 weeks  1. Pt will be compliant with HEP. - MET  2. Pt will have TUG score of 12.5 sec or less without AD in order to decrease risk for falls. - MET  3.  Pt to perform 11 reps in 30 second sit to stand to demonstrate improve safety with positional changes. - MET  4. Pt will demonstrate 4+/5 B LE strength in order to improve gait, transfers and stair climbing ability. - MET  5. Patient will navigate four 6 in steps with reciprocal pattern with bilateral hand rails. - MET                            LONG TERM GOALS:  10 weeks  1.Patient will be independent with progressive HEP. - MET  2. Pt will be able to climb ten 6in  steps with reciprocal pattern and unilateral handrail due to improved strength. - MET  3. Pt will be able to perform 12 sit-stand without use of UE in 30 second sit to stand due to improved strength. - MET  4. Pt will be able to stand on foam for 15 sec or greater with eyes closed due to improved balance.- MET  5. Pt will improve TUG score to 11 sec or less due to improved gait mechanics, strength and balance. -MET  6.  Pt to score at least 52/56 on LANDRY assessment.  -MET    Timed:         Manual Therapy:         mins  51005;     Therapeutic Exercise:    15     mins  34246;     Neuromuscular Zelda:        mins  91249;    Therapeutic Activity:          mins  20301;     Gait Training:           mins  29779;     Ultrasound:          mins  99795;    Ionto                                   mins   00592  Self Care                            mins   12310    Un-Timed:  Electrical Stimulation:         mins  73187 ( );  Dry Needling          mins self-pay  Traction          mins 36884  Re-Eval                          20     mins  12856  Canalith Repos         mins 24897    Timed Treatment:   15   mins   Total Treatment:     45   mins          PT: Parish Lock PT     License Number: [place] [id number]# [id number]. [place] LIC# [id number]  Electronically signed by Parish Lock PT, 11/05/24, 3:07 PM EST    Certification Period: 11/5/2024 thru 2/2/2025  I certify that the therapy services are furnished while this patient is under my care.  The services outlined above are required by this patient, and will be reviewed every 90 days.         Physician Signature:__________________________________________________    PHYSICIAN: Bob Mercado MD  NPI: [id number]                                      DATE:  :     Please sign and return via fax to [phone]. Thank you, [facility]

## 2024-11-05 NOTE — TELEPHONE ENCOUNTER
Rx Refill Note  Requested Prescriptions     Pending Prescriptions Disp Refills    Semaglutide-Weight Management (Wegovy) 0.5 MG/0.5ML solution auto-injector 2 mL 0     Sig: Inject 0.5 mL under the skin into the appropriate area as directed 1 (One) Time Per Week.      Last office visit with prescribing clinician: 10/22/2024   Last telemedicine visit with prescribing clinician: Visit date not found   Next office visit with prescribing clinician: 2/26/2025                         Would you like a call back once the refill request has been completed: [] Yes [] No    If the office needs to give you a call back, can they leave a voicemail: [] Yes [] No    Rama Evans LPN  11/05/24, 12:07 EST

## 2024-11-06 NOTE — PROGRESS NOTES
Patient ID: Keri Bhagat is a 64 y.o. female is here today for follow-up for a gait disturbance.    Imaging: MRI of the cervical spine completed on 08/28/2024    Treatment: Ongoing PT at [facility]    Subjective     The patient is here in regards to   Chief Complaint   Patient presents with    Neck Pain    Follow-up       History of Present Illness  Doing well with physical therapy.  She felt that she progressed very quickly and she has been discharged from PT with PT home exercises.  She did recently have an episode where she passed out and went to the ED, an EEG was performed which showed no evidence of seizure.  She feels like she is back to her baseline.  She is working on improving her balance.  She occasionally does have some dizziness due to inner ear issues.      While in the room and during my examination of the patient I wore a mask and eye protection.  I washed my hands before and after this patient encounter.  The patient was also wearing a mask.    The following portions of the patient's history were reviewed and updated as appropriate: allergies, current medications, past family history, past medical history, past social history, past surgical history and problem list.    Review of Systems   Constitutional:  Negative for fever.   Musculoskeletal:  Positive for back pain, gait problem and neck pain. Negative for neck stiffness.   Neurological:  Positive for dizziness, light-headedness and headaches. Negative for weakness and numbness.        Past Medical History:   Diagnosis Date    Abnormal Pap smear of cervix     Adrenal adenoma 09/21/2022    Anemia     Anxiety     Arthritis     Arthritis of back     Arthritis of neck     Bipolar I disorder, single manic episode     depressive d(NOS)    Cataract 2021    Removed    Cervical disc herniation     Colon polyp     Constipation     Coronary artery disease     COVID-19 01/10/2023    Death of family member     MOTHER IN FEB 2024    Depression     NO  SUICIDAL PLANS    Dislocation of finger     Diverticular disease     Diverticulitis of colon     Dyspepsia     Encounter for follow-up examination after completed treatment for conditions other than malignant neoplasm 04/11/2018    Fracture of wrist     GERD (gastroesophageal reflux disease)     Heart attack     AGE 39    Heart murmur     Hiatal hernia     History of transfusion     2001    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    Lumbosacral disc disease Unsure    Lower left back is partial osteoarthritis and partial osteoporosis    Obesity     Osteopenia 2021    Bone density test    Periodic limb movement disorder     Renal insufficiency     Restless leg syndrome     LEFT SHOULDER    Rotator cuff syndrome     Sleep apnea     NO MACHINE CURRENTLY    Suicidal ideation 08/19/2016    history    Thyroid nodule     Urinary tract infection     Visual impairment     Vitamin B12 deficiency        No Known Allergies    Family History   Problem Relation Age of Onset    Diabetes type II Mother     Hypertension Mother     Osteoporosis Mother     Seizures Mother     Diabetes Mother     Arthritis Mother     Hyperlipidemia Mother     Cancer Mother     COPD Mother     Heart disease Mother     Kidney disease Mother     COPD Father     Hypertension Father     Lung cancer Father     Heart attack Father     Liver cancer Father     Liver disease Father     Heart disease Father     Cancer Father         Lung cacer that metastasized  into the liver.    Thyroid disease Sister     Hypertension Sister     Bipolar disorder Sister     Depression Sister      ADD / ADHD Sister     Anxiety disorder Sister     Mental illness Sister     Hyperlipidemia Sister     Broken bones Sister     Colon polyps Sister     Thyroid disease Sister     Hypertension Sister     Bipolar disorder Sister     Anxiety disorder Sister     Depression Sister     Mental illness Sister     Hyperlipidemia Sister     Colon polyps Sister     Cancer Maternal Grandmother     No Known Problems Maternal Grandfather     No Known Problems Paternal Grandmother     No Known Problems Paternal Grandfather     Abnormal EKG Daughter     Hypertension Daughter     Bipolar disorder Daughter     Thyroid disease Daughter     Mental illness Daughter     Hyperlipidemia Daughter     Asthma Daughter     No Known Problems Son     Diabetes Son         Type 1    Diabetes Paternal Uncle     Anxiety disorder Sister     Depression Sister     Hyperlipidemia Sister     Thyroid disease Daughter     Breast cancer Neg Hx     Ovarian cancer Neg Hx     Uterine cancer Neg Hx     Colon cancer Neg Hx     Malig Hyperthermia Neg Hx        Social History     Socioeconomic History    Marital status:     Number of children: 3    Years of education: 12   Tobacco Use    Smoking status: Former     Current packs/day: 0.00     Average packs/day: 1 pack/day for 37.0 years (37.0 ttl pk-yrs)     Types: Cigarettes     Start date:      Quit date:      Years since quittin.8     Passive exposure: Current    Smokeless tobacco: Never    Tobacco comments:     Just using vape.   Vaping Use    Vaping status: Every Day    Start date: 2017    Substances: Nicotine, Flavoring    Devices: Refillable tank   Substance and Sexual Activity    Alcohol use: Not Currently     Comment: RARELY    Drug use: Never    Sexual activity: Not Currently     Partners: Male     Birth control/protection: Condom, Abstinence, Tubal ligation       Past Surgical History:   Procedure Laterality Date    ADENOIDECTOMY  1974    ANTERIOR CERVICAL DISCECTOMY W/ FUSION  N/A 12/29/2016    Procedure: C3-4 anterior cervical discectomy and fusion with Depuy micro plate, ALLOGRAFT C3-4, AND HARDWARE REMOVAL C4-7.;  Surgeon: Hema Godwin MD;  Location: [facility] MAIN OR;  Service:     BARIATRIC SURGERY      CARDIAC CATHETERIZATION N/A 03/30/2017    Procedure: Left Heart Cath;  Surgeon: Tracey Vargas MD;  Location:  [facility] CATH INVASIVE LOCATION;  Service:     CARDIAC CATHETERIZATION N/A 03/30/2017    Procedure: Coronary angiography;  Surgeon: Tracey Vargas MD;  Location:  [facility] CATH INVASIVE LOCATION;  Service:     CARDIAC CATHETERIZATION N/A 03/30/2017    Procedure: Left ventriculography;  Surgeon: Tracey Vargas MD;  Location:  [facility] CATH INVASIVE LOCATION;  Service:     CARDIAC CATHETERIZATION  03/30/2017    Procedure: Functional Flow [place];  Surgeon: Tracey Vargas MD;  Location:  [facility] CATH INVASIVE LOCATION;  Service:     CARDIAC ELECTROPHYSIOLOGY PROCEDURE N/A 10/16/2024    Procedure: Ablation SVT;  Surgeon: Eduar Almodovar MD;  Location: [facility] CATH INVASIVE LOC[facility];  Service: Cardiovascular;  Laterality: N/A;    CATARACT EXTRACTION Bilateral     CERVICAL BIOPSY  MMXVI    Dr. Jeffery.     CERVICAL DISCECTOMY ANTERIOR  04/2013    C4-7    COLONOSCOPY  04/20/2015    Diverticulosis, IH    COLONOSCOPY  03/09/2021    COLPOSCOPY W/ BIOPSY / CURETTAGE  06/17/2016    LGSIL HPV positive. Results were normal repeat pap in one year. Chronic Cervicitis    CORONARY ANGIOPLASTY WITH STENT PLACEMENT      CORONARY STENT PLACEMENT      ENDOSCOPY  MMXV    Normal.  Dr. Rodriguez    ENDOSCOPY N/A 06/22/2017    Erythematous mucosa in the stomach  PATH: Chronic active gastritis, moderate with intestinal metaplasia     EPIDURAL BLOCK      EYE SURGERY      Cateract removal    FRACTURE SURGERY      INCISION AND DRAINAGE LEG Left 05/12/2024    Procedure: INCISION AND DRAINAGE LOWER EXTREMITY WOUND CLOSURE KNEE;  Surgeon: Ajay Gutierrez MD;  Location: [facility] MAIN OR;  Service: Orthopedics;   Laterality: Left;    INGUINAL HERNIA REPAIR      JOINT REPLACEMENT  3/5/24    Left knee    KNEE SURGERY      LAPAROSCOPIC GASTRIC BANDING  02/2018    NECK SURGERY      SHOULDER SURGERY Left     RCR    TONSILLECTOMY      TONSILLECTOMY AND ADENOIDECTOMY      TOTAL KNEE ARTHROPLASTY Left 03/05/2024    Procedure: LEFT TOTAL KNEE ARTHROPLASTY WITH TAB NAVIGATION;  Surgeon: Tho Pritchard MD;  Location: [facility];  Service: Orthopedics;  Laterality: Left;    TRANSVAGINAL TAPING SUSPENSION N/A 12/06/2017    Procedure: MID URETHRAL SLING CYSTSCOPY;  Surgeon: Abby Méndez MD;  Location: [facility] OR;  Service:     TRIGGER POINT INJECTION      TUBAL ABDOMINAL LIGATION      TYMPANOSTOMY TUBE PLACEMENT Right     UPPER GASTROINTESTINAL ENDOSCOPY  approx 2021    WRIST SURGERY Bilateral     carpal tunnel         Objective     Vitals:    11/08/24 1051   BP: 128/76   Pulse: 78   Resp: 16   Temp: 97 °F (36.1 °C)   SpO2: 97%     Body mass index is 36.31 kg/m².    Physical Exam  Constitutional:       General: She is awake.      Appearance: Normal appearance.   HENT:      Head: Normocephalic and atraumatic.   Eyes:      General: Lids are normal.      Extraocular Movements: Extraocular movements intact.      Conjunctiva/sclera: Conjunctivae normal.      Pupils: Pupils are equal, round, and reactive to light.   Cardiovascular:      Rate and Rhythm: Normal rate and regular rhythm.      Pulses: Normal pulses.   Pulmonary:      Breath sounds: Normal breath sounds.   Abdominal:      Palpations: Abdomen is soft.   Musculoskeletal:         General: Normal range of motion.      Cervical back: Normal range of motion and neck supple.   Skin:     General: Skin is warm and dry.   Neurological:      Mental Status: She is alert and oriented to person, place, and time.      Motor: Motor function is intact. No weakness or atrophy.      Coordination: Coordination is intact. Romberg sign negative.      Gait: Gait is intact. Gait  normal.      Deep Tendon Reflexes: Reflexes are normal and symmetric.      Reflex Scores:       Tricep reflexes are 2+ on the right side and 2+ on the left side.       Bicep reflexes are 2+ on the right side and 2+ on the left side.       Brachioradialis reflexes are 2+ on the right side and 2+ on the left side.       Patellar reflexes are 2+ on the right side and 2+ on the left side.       Achilles reflexes are 2+ on the right side and 2+ on the left side.        Neurological Exam  Mental Status  Awake and alert. Oriented to person, place and time. Oriented to person, place, and time.    Cranial Nerves  CN II: Visual acuity is normal. Visual fields full to confrontation.  CN III, IV, VI: Extraocular movements intact bilaterally. Normal lids and orbits bilaterally. Pupils equal round and reactive to light bilaterally.  CN V: Facial sensation is normal.  CN VII: Full and symmetric facial movement.  CN IX, X: Palate elevates symmetrically. Normal gag reflex.  CN XI: Shoulder shrug strength is normal.  CN XII: Tongue midline without atrophy or fasciculations.    Motor                                               Right                     Left  Deltoid                                   5                          5   Biceps                                   5                          5   Iliopsoas                               5                          5   Quadriceps                           5                          5   Hamstring                             5                          5   Gastrocnemius                     5                           5   Anterior tibialis                      5                          5    Sensory  Sensation is intact to light touch, pinprick, vibration and proprioception in all four extremities.    Reflexes  Deep tendon reflexes are 2+ and symmetric in all four extremities.                                            Right                      Left  Brachioradialis                     2+                         2+  Biceps                                 2+                         2+  Triceps                                2+                         2+  Patellar                                2+                         2+  Achilles                                2+                         2+    Coordination    Finger-to-nose, rapid alternating movements and heel-to-shin normal bilaterally without dysmetria.    Gait   Normal gait. Normal gait. Romberg is absent.      Assessment & Plan   Independent Review of Radiographic Studies:      I personally reviewed the images from the following studies.    FINDINGS:    MR: MRI of the cervical spine wo contrast was reviewed and shows no residual compression after previous C3-4 ACDF.  There is mild degenerative disc disease at C2-3 without obvious cord compression.  There is no evidence of previous myomalacia.    Assessment/Plan: Likely has residual balance issues from her myelopathy which was addressed with an ACDF in the past.  There does not appear to be any residual compression on her repeat MRI.  She did well with physical therapy and should continue to work with conservative management in terms of her balance issues.  As far as her pituitary cyst, its unlikely to change and be operated on at her age.  Her meningioma also appears to be stable and unchanged.    Medical Decision Making:      Follow-up as needed         Diagnoses and all orders for this visit:    1. Gait disturbance (Primary)             Patient Instructions/Recommendations:    Call with any questions or concerns      Bob Mercado MD  11/08/24  11:07 EST

## 2024-11-08 ENCOUNTER — OFFICE VISIT (OUTPATIENT)
Dept: NEUROSURGERY | Facility: CLINIC | Age: 64
End: 2024-11-08
Payer: MEDICARE

## 2024-11-08 VITALS
HEIGHT: 63 IN | TEMPERATURE: 97 F | WEIGHT: 205 LBS | SYSTOLIC BLOOD PRESSURE: 128 MMHG | DIASTOLIC BLOOD PRESSURE: 76 MMHG | BODY MASS INDEX: 36.32 KG/M2 | HEART RATE: 78 BPM | RESPIRATION RATE: 16 BRPM | OXYGEN SATURATION: 97 %

## 2024-11-08 DIAGNOSIS — R26.9 GAIT DISTURBANCE: Primary | ICD-10-CM

## 2024-11-14 ENCOUNTER — TELEPHONE (OUTPATIENT)
Dept: FAMILY MEDICINE CLINIC | Facility: CLINIC | Age: 64
End: 2024-11-14
Payer: MEDICARE

## 2024-11-14 RX ORDER — SEMAGLUTIDE 0.25 MG/.5ML
0.25 INJECTION, SOLUTION SUBCUTANEOUS WEEKLY
Qty: 2 ML | Refills: 0 | Status: SHIPPED | OUTPATIENT
Start: 2024-11-14

## 2024-11-14 NOTE — TELEPHONE ENCOUNTER
Need to stick with Wegovy is already approved and not easy to switch    I will lower the dose to 0.25 see if she can find it at this dose I sent it back to [facility] but have her call around if they do not have it [facility] patient still have it and find it    Let me know if there is more to the story, have her stick with this is a great medication spine to help her substantially but update me thank you

## 2024-11-14 NOTE — TELEPHONE ENCOUNTER
Caller: Keri Bhagat    Relationship: Self    Best call back number: [phone]     What medication are you requesting: SOMETHING TO REPLACE WEGOVY        If a prescription is needed, what is your preferred pharmacy and phone number:      [id number] - [place], [place] - [address] AT [facility] - [phone]  - [phone]     Additional notes:    PATIENT IS HAVING A HARD TIME FINDING ANY PHARMACY THAT HAS WEGOVY IN STOCK.  ASKING FOR SOMETHING TO REPLACE THAT.

## 2024-11-18 ENCOUNTER — TELEPHONE (OUTPATIENT)
Dept: ORTHOPEDIC SURGERY | Facility: CLINIC | Age: 64
End: 2024-11-18

## 2024-11-18 ENCOUNTER — PREP FOR SURGERY (OUTPATIENT)
Dept: OTHER | Facility: HOSPITAL | Age: 64
End: 2024-11-18
Payer: MEDICARE

## 2024-11-18 ENCOUNTER — TELEPHONE (OUTPATIENT)
Dept: GASTROENTEROLOGY | Facility: CLINIC | Age: 64
End: 2024-11-18
Payer: MEDICARE

## 2024-11-18 DIAGNOSIS — K63.5 COLON POLYP: ICD-10-CM

## 2024-11-18 DIAGNOSIS — D50.9 IRON DEFICIENCY ANEMIA: ICD-10-CM

## 2024-11-18 DIAGNOSIS — K21.9 GASTROESOPHAGEAL REFLUX DISEASE, UNSPECIFIED WHETHER ESOPHAGITIS PRESENT: Primary | ICD-10-CM

## 2024-11-18 DIAGNOSIS — K59.00 CONSTIPATION: ICD-10-CM

## 2024-11-18 NOTE — TELEPHONE ENCOUNTER
Hub staff attempted to follow warm transfer process and was unsuccessful     Caller: Keri Bhagat    Relationship to patient: Self    Best call back number: [phone]    Patient is needing: REQUESTING TO SPEAK WITH ERNA. PATIENT STATES SHE CALLED [facility] TO SCHEDULE HER MRI PER ERNA, AND THEY DON'T HAVE HER ORDERS. [facility]

## 2024-11-18 NOTE — TELEPHONE ENCOUNTER
Caller: Keri Bhagat    Relationship to patient: Self    Best call back number: [phone] (home)     Patient is needing: DR BAE ORDERED AN INJECTION AND MRI FOR PATIENT - ORDERS WERE SENT TO [facility] BUT PATIENT NEEDS THEM SENT TO [facility] ON [address] [facility]

## 2024-11-18 NOTE — TELEPHONE ENCOUNTER
Hub staff attempted to follow warm transfer process and was unsuccessful     Caller: Keri Bhagat    Relationship to patient: Self    Best call back number: [phone]    Patient is needing: PT HAD TO POSTPONE HER PROCEDURES WITH DR. SEVILLA BACK IN AUGUST DUE TO HOSPITAL STAY FOR HEART.  SHE IS NOW READY TO RESCHEDULE.  PLEASE CALL HER BACK.

## 2024-11-19 ENCOUNTER — TELEPHONE (OUTPATIENT)
Dept: GASTROENTEROLOGY | Facility: CLINIC | Age: 64
End: 2024-11-19
Payer: MEDICARE

## 2024-11-19 NOTE — TELEPHONE ENCOUNTER
Dr Burroughs,  Dr Rodriguez would like to schedule this pt for a colonoscopy and would like to know if it is ok for her to hold her eliquis  for 48 hours prior?  Thanks  Maria Elena SALGADO

## 2024-11-19 NOTE — TELEPHONE ENCOUNTER
Called pt and advised she hold her eliquis for 48 hours and her wegovy 1 week prior to her scopes, pt verbalized understanding.    Message to the schedulers to get this scheduled.

## 2024-11-19 NOTE — TELEPHONE ENCOUNTER
Case request for EGD and colonoscopy done. She will need to hold her Eliquis for 2 days prior to the scopes and hold the Wegovy for at least 1 week prior to the scopes. Siria

## 2024-11-20 ENCOUNTER — TELEPHONE (OUTPATIENT)
Dept: GASTROENTEROLOGY | Facility: CLINIC | Age: 64
End: 2024-11-20
Payer: MEDICARE

## 2024-11-20 ENCOUNTER — OFFICE VISIT (OUTPATIENT)
Age: 64
End: 2024-11-20
Payer: MEDICARE

## 2024-11-20 VITALS
HEIGHT: 63 IN | WEIGHT: 204.7 LBS | OXYGEN SATURATION: 99 % | BODY MASS INDEX: 36.27 KG/M2 | HEART RATE: 70 BPM | DIASTOLIC BLOOD PRESSURE: 70 MMHG | SYSTOLIC BLOOD PRESSURE: 130 MMHG

## 2024-11-20 DIAGNOSIS — I47.10 SUSTAINED SVT: Primary | ICD-10-CM

## 2024-11-20 NOTE — TELEPHONE ENCOUNTER
CALLED [facility] PT COMMUNICATED HER DR GAVE THE OK FOR HER TO HOLD ELIQUIS 48HRS PRIOR TO HER PROCEDURE,OK FOR HUB TO [facility]

## 2024-11-20 NOTE — PROGRESS NOTES
Date of Office Visit: 2024  Encounter Provider: DAPHNEY Cristobal  Place of Service: [facility] CARDIOLOGY  Patient Name: Keri Bhagat  :1960    Chief Complaint   Patient presents with    Follow-up   :     HPI: Keri Bhagat is a 64 y.o. female who follows with Dr. Burroughs, referred to Dr. Almodovar for abnormal holter monitor.     She began having palpitations several months ago. Sounds like around .     Episodes were sporadic and lasting up to a hour.     She wore a monitor but overall was poor quality. There were several tracings concerning for SVT lasting hours and correlating with symptoms.     She saw Dr. Almodovar in September. They discussed options and elected to proceed with EP study with possible ablation.     EP study on 10/16 she was found to have an atrial tachycardia arising from the roof of the LA. She underwent ablation with no further inducible tachycardia.                 She presents today for follow up appt.     Since ablation she has been doing well.     She has not had any further episodes of palpitations or symptoms.     EKG shows NSR.     She has had some issues with reflux recently.     No further episodes of SVT since her ablation.         Past Medical History:   Diagnosis Date    Abnormal Pap smear of cervix     Adrenal adenoma 2022    Anemia     Anxiety     Arthritis     Arthritis of back     Arthritis of neck     Bipolar I disorder, single manic episode     depressive d(NOS)    Cataract     Removed    Cervical disc herniation     Colon polyp     Constipation     Coronary artery disease     COVID-19 01/10/2023    Death of family member     MOTHER IN 2024    Depression     NO SUICIDAL PLANS    Dislocation of finger     Diverticular disease     Diverticulitis of colon     Dyspepsia     Encounter for follow-up examination after completed treatment for conditions other than malignant neoplasm 2018    Fracture of wrist      GERD (gastroesophageal reflux disease)     Heart attack     AGE 39    Heart murmur     Hiatal hernia     History of transfusion     2001    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    Lumbosacral disc disease Unsure    Lower left back is partial osteoarthritis and partial osteoporosis    Obesity     Osteopenia 2021    Bone density test    Periodic limb movement disorder     Renal insufficiency     Restless leg syndrome     LEFT SHOULDER    Rotator cuff syndrome     Sleep apnea     NO MACHINE CURRENTLY    Suicidal ideation 08/19/2016    history    Thyroid nodule     Urinary tract infection     Visual impairment     Vitamin B12 deficiency        Past Surgical History:   Procedure Laterality Date    ADENOIDECTOMY  1974    ANTERIOR CERVICAL DISCECTOMY W/ FUSION N/A 12/29/2016    Procedure: C3-4 anterior cervical discectomy and fusion with Depuy micro plate, ALLOGRAFT C3-4, AND HARDWARE REMOVAL C4-7.;  Surgeon: Hema Godwin MD;  Location: [facility] OR;  Service:     BARIATRIC SURGERY      CARDIAC CATHETERIZATION N/A 03/30/2017    Procedure: Left Heart Cath;  Surgeon: Tracey Vargas MD;  Location: [facility] CATH INVASIVE LOCATION;  Service:     CARDIAC CATHETERIZATION N/A 03/30/2017    Procedure: Coronary angiography;  Surgeon: Tracey Vargas MD;  Location: [facility] CATH INVASIVE LOCATION;  Service:     CARDIAC CATHETERIZATION N/A 03/30/2017    Procedure: Left ventriculography;  Surgeon: Tracey Vargas MD;  Location: [facility] CATH INVASIVE LOCATION;  Service:     CARDIAC CATHETERIZATION  03/30/2017    Procedure: Functional Flow  Reserve;  Surgeon: Tracey Vargas MD;  Location:  [facility] CATH INVASIVE LOCATION;  Service:     CARDIAC ELECTROPHYSIOLOGY PROCEDURE N/A 10/16/2024    Procedure: Ablation SVT;  Surgeon: Eduar Almodovar MD;  Location:  [facility] CATH INVASIVE LOCATION;  Service: Cardiovascular;  Laterality: N/A;    CATARACT EXTRACTION Bilateral     CERVICAL BIOPSY  MMXVI    Dr. Jeffery.     CERVICAL DISCECTOMY ANTERIOR  04/2013    C4-7    COLONOSCOPY  04/20/2015    Diverticulosis, IH    COLONOSCOPY  03/09/2021    COLPOSCOPY W/ BIOPSY / CURETTAGE  06/17/2016    LGSIL HPV positive. Results were normal repeat pap in one year. Chronic Cervicitis    CORONARY ANGIOPLASTY WITH STENT PLACEMENT      CORONARY STENT PLACEMENT      ENDOSCOPY  MMXV    Normal.  Dr. Rodriguez    ENDOSCOPY N/A 06/22/2017    Erythematous mucosa in the stomach  PATH: Chronic active gastritis, moderate with intestinal metaplasia     EPIDURAL BLOCK      EYE SURGERY      Cateract removal    FRACTURE SURGERY      INCISION AND DRAINAGE LEG Left 05/12/2024    Procedure: INCISION AND DRAINAGE LOWER EXTREMITY WOUND CLOSURE KNEE;  Surgeon: Ajay Gutierrez MD;  Location: [facility] MAIN OR;  Service: Orthopedics;  Laterality: Left;    INGUINAL HERNIA REPAIR      JOINT REPLACEMENT  3/5/24    Left knee    KNEE SURGERY      LAPAROSCOPIC GASTRIC BANDING  02/2018    NECK SURGERY      SHOULDER SURGERY Left     RCR    TONSILLECTOMY      TONSILLECTOMY AND ADENOIDECTOMY      TOTAL KNEE ARTHROPLASTY Left 03/05/2024    Procedure: LEFT TOTAL KNEE ARTHROPLASTY WITH TAB NAVIGATION;  Surgeon: Tho Pritchard MD;  Location: [facility] OR OSC;  Service: Orthopedics;  Laterality: Left;    TRANSVAGINAL TAPING SUSPENSION N/A 12/06/2017    Procedure: MID URETHRAL SLING CYSTSCOPY;  Surgeon: Abby Méndez MD;  Location: [facility] MAIN OR;  Service:     TRIGGER POINT INJECTION      TUBAL ABDOMINAL LIGATION      TYMPANOSTOMY TUBE PLACEMENT Right     UPPER GASTROINTESTINAL ENDOSCOPY  approx 2021     WRIST SURGERY Bilateral     carpal tunnel       Social History     Socioeconomic History    Marital status:     Number of children: 3    Years of education: 12   Tobacco Use    Smoking status: Former     Current packs/day: 0.00     Average packs/day: 1 pack/day for 37.0 years (37.0 ttl pk-yrs)     Types: Cigarettes     Start date:      Quit date:      Years since quittin.8     Passive exposure: Current    Smokeless tobacco: Never    Tobacco comments:     Just using vape.   Vaping Use    Vaping status: Every Day    Start date: 2017    Substances: Nicotine, Flavoring    Devices: Refillable tank   Substance and Sexual Activity    Alcohol use: Not Currently     Comment: RARELY    Drug use: Never    Sexual activity: Not Currently     Partners: Male     Birth control/protection: Condom, Abstinence, Tubal ligation       Family History   Problem Relation Age of Onset    Diabetes type II Mother     Hypertension Mother     Osteoporosis Mother     Seizures Mother     Diabetes Mother     Arthritis Mother     Hyperlipidemia Mother     Cancer Mother     COPD Mother     Heart disease Mother     Kidney disease Mother     COPD Father     Hypertension Father     Lung cancer Father     Heart attack Father     Liver cancer Father     Liver disease Father     Heart disease Father     Cancer Father         Lung cacer that metastasized  into the liver.    Thyroid disease Sister     Hypertension Sister     Bipolar disorder Sister     Depression Sister     ADD / ADHD Sister     Anxiety disorder Sister     Mental illness Sister     Hyperlipidemia Sister     Broken bones Sister     Colon polyps Sister     Thyroid disease Sister     Hypertension Sister     Bipolar disorder Sister     Anxiety disorder Sister     Depression Sister     Mental illness Sister     Hyperlipidemia Sister     Colon polyps Sister     Cancer Maternal Grandmother     No Known Problems Maternal Grandfather     No Known Problems Paternal Grandmother      No Known Problems Paternal Grandfather     Abnormal EKG Daughter     Hypertension Daughter     Bipolar disorder Daughter     Thyroid disease Daughter     Mental illness Daughter     Hyperlipidemia Daughter     Asthma Daughter     No Known Problems Son     Diabetes Son         Type 1    Diabetes Paternal Uncle     Anxiety disorder Sister     Depression Sister     Hyperlipidemia Sister     Thyroid disease Daughter     Breast cancer Neg Hx     Ovarian cancer Neg Hx     Uterine cancer Neg Hx     Colon cancer Neg Hx     Malig Hyperthermia Neg Hx        Review of Systems   Constitutional: Negative for chills, fever and malaise/fatigue.   Cardiovascular:  Negative for chest pain, dyspnea on exertion, leg swelling, near-syncope, orthopnea, palpitations, paroxysmal nocturnal dyspnea and syncope.   Respiratory:  Negative for cough and shortness of breath.    Hematologic/Lymphatic: Negative.    Musculoskeletal:  Negative for joint pain, joint swelling and myalgias.   Gastrointestinal:  Negative for abdominal pain, diarrhea, melena, nausea and vomiting.   Genitourinary:  Negative for frequency and hematuria.   Neurological:  Negative for light-headedness, numbness, paresthesias and seizures.   Allergic/Immunologic: Negative.    All other systems reviewed and are negative.      No Known Allergies      Current Outpatient Medications:     apixaban (ELIQUIS) 5 MG tablet tablet, Take 1 tablet by mouth Every 12 (Twelve) Hours., Disp: 60 tablet, Rfl: 11    atorvastatin (LIPITOR) 80 MG tablet, Take 1 tablet by mouth Daily., Disp: , Rfl:     B Complex Vitamins (vitamin b complex) capsule capsule, Take 1 capsule by mouth 2 (Two) Times a Day. Indications: Vitamin Deficiency, Disp: , Rfl:     buPROPion XL (WELLBUTRIN XL) 150 MG 24 hr tablet, Take 1 tablet by mouth 2 (Two) Times a Day. Indications: Major Depressive Disorder, Disp: , Rfl:     Calcium Citrate-Vitamin D (CITRUS CALCIUM/VITAMIN D PO), Take 1 tablet by mouth 2 (Two) Times a  Day. Indications: supplement, Disp: , Rfl:     cyanocobalamin (VITAMIN B-12) 1000 MCG tablet, Take 1 tablet by mouth Daily. Indications: Inadequate Vitamin B12, Disp: , Rfl:     Diclofenac Sodium (VOLTAREN) 1 % gel gel, Apply 4 g topically to the appropriate area as directed 4 (Four) Times a Day As Needed., Disp: , Rfl:     estradiol (ESTRACE) 0.1 MG/GM vaginal cream, Insert 1 g into the vagina 3 (Three) Times a Week. Indications: Vulvovaginal Atrophy, Disp: , Rfl:     Ferrous Sulfate (IRON PO), Take 1 tablet by mouth 2 (Two) Times a Day. Indications: Supplement, Disp: , Rfl:     gabapentin (NEURONTIN) 100 MG capsule, Take 2 capsules by mouth 2 (Two) Times a Day., Disp: 360 capsule, Rfl: 0    isosorbide mononitrate (IMDUR) 30 MG 24 hr tablet, Take 1 tablet by mouth Daily., Disp: 90 tablet, Rfl: 2    lamoTRIgine (LaMICtal) 25 MG tablet, , Disp: , Rfl:     levothyroxine (SYNTHROID, LEVOTHROID) 88 MCG tablet, Take 1 tablet by mouth Every Morning. Indications: Underactive Thyroid, Disp: 90 tablet, Rfl: 2    metoprolol succinate XL (Toprol XL) 25 MG 24 hr tablet, Take 1 tablet by mouth Daily. For bp  Indications: High Blood Pressure, Disp: 180 tablet, Rfl: 3    OLANZapine (zyPREXA) 5 MG tablet, Take 1 tablet by mouth Every Night. Indications: Depressive Phase of Manic-Depression, Disp: , Rfl:     pantoprazole (PROTONIX) 40 MG EC tablet, Take 1 tablet by mouth 2 (Two) Times a Day., Disp: 180 tablet, Rfl: 0    Semaglutide-Weight Management (Wegovy) 0.25 MG/0.5ML solution auto-injector, Inject 0.5 mL under the skin into the appropriate area as directed 1 (One) Time Per Week., Disp: 2 mL, Rfl: 0    venlafaxine (EFFEXOR) 37.5 MG tablet, Take 1 tablet by mouth Daily., Disp: , Rfl:     Vibegron (Gemtesa) 75 MG tablet, Take 1 tablet by mouth Daily., Disp: , Rfl:     Current Facility-Administered Medications:     nitroglycerin (NITROSTAT) SL tablet 0.4 mg, 0.4 mg, Sublingual, Q5 Min PRN, Lluvia Porter, APRN      Objective:  "    Vitals:    11/20/24 1059   BP: 130/70   BP Location: Left arm   Patient Position: Sitting   Cuff Size: Adult   Pulse: 70   SpO2: 99%   Weight: 92.9 kg (204 lb 11.2 oz)   Height: 160 cm (63\")     Body mass index is 36.26 kg/m².    PHYSICAL EXAM:    Vitals Reviewed.   General Appearance: No acute distress, well developed and well nourished.   Eyes: Conjunctiva and lids: No erythema, swelling, or discharge. Sclera non-icteric.   HENT: Atraumatic, normocephalic. External eyes, ears, and nose normal.   Respiratory: No signs of respiratory distress. Respiration rhythm and depth normal.   Clear to auscultation. No rales, crackles, rhonchi, or wheezing auscultated.   Cardiovascular:  Heart Rate and Rhythm: Normal, Heart Sounds: Normal S1 and S2. No S3 or S4 noted.  Gastrointestinal:  Abdomen soft, non-distended, non-tender.   Musculoskeletal: Normal movement of extremities  Skin: Warm and dry.   Psychiatric: Patient alert and oriented to person, place, and time. Speech and behavior appropriate. Normal mood and affect.       ECG 12 Lead    Date/Time: 11/20/2024 1:03 PM  Performed by: Jorden Bal APRN    Authorized by: Jorden Bal APRN  Comparison: compared with previous ECG   Similar to previous ECG  Rhythm: sinus rhythm            Assessment:       Diagnosis Plan   1. Sustained SVT               Plan:   SVT---s/p ablation of AT (2024)---- she has done great since ablation. No further episodes of palpitations. EKG shows NSR. She will call if she has recurrent symptoms.             She will follow up as needed.           As always, it has been a pleasure to participate in your patient's care.      Sincerely,         DAPHNEY Cristobal   "

## 2024-11-20 NOTE — TELEPHONE ENCOUNTER
BISHOP PERALES IN SCHEDULING PT SCHEDULED 02/13/2025 ARRIVING AT 12:00PM [facility] /EGD PREP INFORMATION MAILED TO ADDRESS ON FILE VERIFIED BY PT OK FOR HUB TO RELAY

## 2024-12-30 ENCOUNTER — TELEPHONE (OUTPATIENT)
Dept: FAMILY MEDICINE CLINIC | Facility: CLINIC | Age: 64
End: 2024-12-30
Payer: MEDICARE

## 2024-12-31 ENCOUNTER — OFFICE VISIT (OUTPATIENT)
Dept: FAMILY MEDICINE CLINIC | Facility: CLINIC | Age: 64
End: 2024-12-31
Payer: MEDICARE

## 2024-12-31 VITALS
RESPIRATION RATE: 12 BRPM | OXYGEN SATURATION: 98 % | WEIGHT: 207.6 LBS | TEMPERATURE: 97 F | BODY MASS INDEX: 36.79 KG/M2 | HEIGHT: 63 IN | HEART RATE: 64 BPM | DIASTOLIC BLOOD PRESSURE: 80 MMHG | SYSTOLIC BLOOD PRESSURE: 120 MMHG

## 2024-12-31 DIAGNOSIS — F43.21 GRIEF: ICD-10-CM

## 2024-12-31 DIAGNOSIS — F32.2 SEVERE MAJOR DEPRESSION: ICD-10-CM

## 2024-12-31 DIAGNOSIS — F41.8 SITUATIONAL ANXIETY: ICD-10-CM

## 2024-12-31 DIAGNOSIS — I48.91 ATRIAL FIBRILLATION, UNSPECIFIED TYPE: ICD-10-CM

## 2024-12-31 DIAGNOSIS — R41.82 ALTERED MENTAL STATUS, UNSPECIFIED ALTERED MENTAL STATUS TYPE: Primary | ICD-10-CM

## 2024-12-31 RX ORDER — LAMOTRIGINE 100 MG/1
100 TABLET ORAL DAILY
COMMUNITY
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
## 2025-01-03 ENCOUNTER — OFFICE VISIT (OUTPATIENT)
Dept: FAMILY MEDICINE CLINIC | Facility: CLINIC | Age: 65
End: 2025-01-03
Payer: MEDICARE

## 2025-01-03 VITALS
SYSTOLIC BLOOD PRESSURE: 122 MMHG | HEART RATE: 70 BPM | BODY MASS INDEX: 37.03 KG/M2 | HEIGHT: 63 IN | OXYGEN SATURATION: 97 % | DIASTOLIC BLOOD PRESSURE: 82 MMHG | TEMPERATURE: 97.7 F | WEIGHT: 209 LBS

## 2025-01-03 DIAGNOSIS — G93.89 BRAIN MASS: ICD-10-CM

## 2025-01-03 DIAGNOSIS — R55 NEAR SYNCOPE: Primary | ICD-10-CM

## 2025-01-03 DIAGNOSIS — R41.0 EPISODIC CONFUSION: ICD-10-CM

## 2025-01-03 DIAGNOSIS — R73.9 HYPERGLYCEMIA: ICD-10-CM

## 2025-01-03 DIAGNOSIS — Z23 FLU VACCINE NEED: ICD-10-CM

## 2025-01-03 PROCEDURE — 1126F AMNT PAIN NOTED NONE PRSNT: CPT | Performed by: NURSE PRACTITIONER

## 2025-01-03 PROCEDURE — 3079F DIAST BP 80-89 MM HG: CPT | Performed by: NURSE PRACTITIONER

## 2025-01-03 PROCEDURE — 3074F SYST BP LT 130 MM HG: CPT | Performed by: NURSE PRACTITIONER

## 2025-01-03 PROCEDURE — 99213 OFFICE O/P EST LOW 20 MIN: CPT | Performed by: NURSE PRACTITIONER

## 2025-01-03 NOTE — PATIENT INSTRUCTIONS
Discharge instructions hydrate well plenty of fluids, continue Wegovy but lets avoid any serious diet to slow steady changes for now,    Labs now, should you have weakness confusion slurred speech syncope episode emergency room,  This type of complaint is difficult to ascertain the proper diagnosis sometimes sometimes it can be a loose  Nonetheless we should be diligent follow-up with neurology, outpatient MRI brain,  Make sure you discuss with any potential metal implants and make sure it safe prior to the test    Cardiology outpatient Holter monitor follow-up with cardiology hopefully nothing here, could be electrolyte or anything that we make sure nothing else is going on so

## 2025-01-04 DIAGNOSIS — F31.77 BIPOLAR DISORDER, IN PARTIAL REMISSION, MOST RECENT EPISODE MIXED: ICD-10-CM

## 2025-01-04 LAB
ALBUMIN SERPL-MCNC: 4.2 G/DL (ref 3.5–5.2)
ALBUMIN/GLOB SERPL: 1.4 G/DL
ALP SERPL-CCNC: 85 U/L (ref 39–117)
ALT SERPL-CCNC: 22 U/L (ref 1–33)
APPEARANCE UR: CLEAR
AST SERPL-CCNC: 23 U/L (ref 1–32)
BACTERIA #/AREA URNS HPF: NORMAL /HPF
BASOPHILS # BLD AUTO: 0.02 10*3/MM3 (ref 0–0.2)
BASOPHILS NFR BLD AUTO: 0.2 % (ref 0–1.5)
BILIRUB SERPL-MCNC: 0.3 MG/DL (ref 0–1.2)
BILIRUB UR QL STRIP: NEGATIVE
BUN SERPL-MCNC: 10 MG/DL (ref 8–23)
BUN/CREAT SERPL: 9.5 (ref 7–25)
CALCIUM SERPL-MCNC: 9.4 MG/DL (ref 8.6–10.5)
CASTS URNS MICRO: NORMAL
CHLORIDE SERPL-SCNC: 105 MMOL/L (ref 98–107)
CO2 SERPL-SCNC: 24.5 MMOL/L (ref 22–29)
COLOR UR: YELLOW
CREAT SERPL-MCNC: 1.05 MG/DL (ref 0.57–1)
EGFRCR SERPLBLD CKD-EPI 2021: 60.2 ML/MIN/1.73
EOSINOPHIL # BLD AUTO: 0.16 10*3/MM3 (ref 0–0.4)
EOSINOPHIL NFR BLD AUTO: 1.9 % (ref 0.3–6.2)
EPI CELLS #/AREA URNS HPF: NORMAL /HPF
ERYTHROCYTE [DISTWIDTH] IN BLOOD BY AUTOMATED COUNT: 12.3 % (ref 12.3–15.4)
GLOBULIN SER CALC-MCNC: 2.9 GM/DL
GLUCOSE SERPL-MCNC: 91 MG/DL (ref 65–99)
GLUCOSE UR QL STRIP: NEGATIVE
HCT VFR BLD AUTO: 42.9 % (ref 34–46.6)
HGB BLD-MCNC: 13.8 G/DL (ref 12–15.9)
HGB UR QL STRIP: NEGATIVE
IMM GRANULOCYTES # BLD AUTO: 0.07 10*3/MM3 (ref 0–0.05)
IMM GRANULOCYTES NFR BLD AUTO: 0.8 % (ref 0–0.5)
KETONES UR QL STRIP: NEGATIVE
LEUKOCYTE ESTERASE UR QL STRIP: ABNORMAL
LYMPHOCYTES # BLD AUTO: 3.65 10*3/MM3 (ref 0.7–3.1)
LYMPHOCYTES NFR BLD AUTO: 43.7 % (ref 19.6–45.3)
MAGNESIUM SERPL-MCNC: 2.2 MG/DL (ref 1.6–2.4)
MCH RBC QN AUTO: 30.6 PG (ref 26.6–33)
MCHC RBC AUTO-ENTMCNC: 32.2 G/DL (ref 31.5–35.7)
MCV RBC AUTO: 95.1 FL (ref 79–97)
MONOCYTES # BLD AUTO: 0.41 10*3/MM3 (ref 0.1–0.9)
MONOCYTES NFR BLD AUTO: 4.9 % (ref 5–12)
NEUTROPHILS # BLD AUTO: 4.04 10*3/MM3 (ref 1.7–7)
NEUTROPHILS NFR BLD AUTO: 48.5 % (ref 42.7–76)
NITRITE UR QL STRIP: POSITIVE
NRBC BLD AUTO-RTO: 0 /100 WBC (ref 0–0.2)
PH UR STRIP: 6.5 [PH] (ref 5–8)
PLATELET # BLD AUTO: 259 10*3/MM3 (ref 140–450)
POTASSIUM SERPL-SCNC: 4.3 MMOL/L (ref 3.5–5.2)
PROT SERPL-MCNC: 7.1 G/DL (ref 6–8.5)
PROT UR QL STRIP: NEGATIVE
RBC # BLD AUTO: 4.51 10*6/MM3 (ref 3.77–5.28)
RBC #/AREA URNS HPF: NORMAL /HPF
SODIUM SERPL-SCNC: 142 MMOL/L (ref 136–145)
SP GR UR STRIP: 1.01 (ref 1–1.03)
TSH SERPL DL<=0.005 MIU/L-ACNC: 2.28 UIU/ML (ref 0.27–4.2)
UROBILINOGEN UR STRIP-MCNC: ABNORMAL MG/DL
WBC # BLD AUTO: 8.35 10*3/MM3 (ref 3.4–10.8)
WBC #/AREA URNS HPF: NORMAL /HPF

## 2025-01-07 NOTE — TELEPHONE ENCOUNTER
Rx Refill Note  Requested Prescriptions     Pending Prescriptions Disp Refills    gabapentin (NEURONTIN) 100 MG capsule [Pharmacy Med Name: GABAPENTIN 100 MG CAPSULE] 360 capsule 1     Sig: TAKE 2 CAPSULES BY MOUTH TWICE A DAY      Last office visit with prescribing clinician: 1/3/2025   Last telemedicine visit with prescribing clinician: Visit date not found   Next office visit with prescribing clinician: 2/3/2025                         Would you like a call back once the refill request has been completed: [] Yes [] No    If the office needs to give you a call back, can they leave a voicemail: [] Yes [] No    Jigar Chapa Rep  01/07/25, 11:36 EST

## 2025-01-08 RX ORDER — GABAPENTIN 100 MG/1
200 CAPSULE ORAL 2 TIMES DAILY
Qty: 360 CAPSULE | Refills: 1 | Status: SHIPPED | OUTPATIENT
Start: 2025-01-08

## 2025-01-10 ENCOUNTER — TELEPHONE (OUTPATIENT)
Dept: ORTHOPEDIC SURGERY | Facility: CLINIC | Age: 65
End: 2025-01-10

## 2025-01-10 NOTE — TELEPHONE ENCOUNTER
Hub staff attempted to follow warm transfer process and was unsuccessful     Caller: THEODORE     Relationship to patient: [facility]    Best call back number: [phone]    Patient is needing: SPOKE TO GAMALIEL YESTERDAY REGARDING PRE AUTHORIZATION. PLEASE FAX TO FAX: [phone]. IF THIS IS NOT RECEIVED BY 3:00 PM TODAY THEY WILL HAVE TO CANCEL THIS PATIENTS APPOINTMENT

## 2025-01-16 ENCOUNTER — OFFICE VISIT (OUTPATIENT)
Dept: CARDIOLOGY | Facility: CLINIC | Age: 65
End: 2025-01-16
Payer: MEDICARE

## 2025-01-16 VITALS
HEART RATE: 69 BPM | BODY MASS INDEX: 36.14 KG/M2 | HEIGHT: 63 IN | WEIGHT: 204 LBS | DIASTOLIC BLOOD PRESSURE: 80 MMHG | OXYGEN SATURATION: 97 % | SYSTOLIC BLOOD PRESSURE: 120 MMHG

## 2025-01-16 DIAGNOSIS — I25.10 ATHEROSCLEROSIS OF NATIVE CORONARY ARTERY OF NATIVE HEART WITHOUT ANGINA PECTORIS: ICD-10-CM

## 2025-01-16 DIAGNOSIS — I47.10 SUSTAINED SVT: Primary | ICD-10-CM
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
## 2025-01-20 RX ORDER — ISOSORBIDE MONONITRATE 30 MG/1
30 TABLET, EXTENDED RELEASE ORAL DAILY
Qty: 90 TABLET | Refills: 1 | Status: SHIPPED | OUTPATIENT
Start: 2025-01-20

## 2025-01-20 NOTE — PROGRESS NOTES
"Chief Complaint  Balance Issues (Has improved. )    Subjective        Keri Bhagat presents to [facility]  History of Present Illness  Recent near syncope episodes, balance episodes, just feels lightheaded, more after standing,  No chest pain shortness of breath no acute slurred speech weakness,  No unilateral paresthesias or unilateral gait difficulties, she has had no racing heart,  Occasionally, feels like she may pass out.  And some difficult time processing thoughts, without focal confusion slurred speech    Started Wegovy no problems, for history CAD stable with obesity.      Objective   Vital Signs:  /82 (BP Location: Right arm, Patient Position: Sitting, Cuff Size: Adult)   Pulse 70   Temp 97.7 °F (36.5 °C) (Temporal)   Ht 160 cm (63\")   Wt 94.8 kg (209 lb)   SpO2 97%   BMI 37.02 kg/m²   Estimated body mass index is 37.02 kg/m² as calculated from the following:    Height as of this encounter: 160 cm (63\").    Weight as of this encounter: 94.8 kg (209 lb).            Physical Exam  Vitals reviewed.   Constitutional:       General: She is not in acute distress.     Appearance: Normal appearance. She is well-developed. She is not ill-appearing, toxic-appearing or diaphoretic.   HENT:      Head: Normocephalic.      Nose: Nose normal.   Eyes:      General: No scleral icterus.     Extraocular Movements: Extraocular movements intact.      Conjunctiva/sclera: Conjunctivae normal.      Pupils: Pupils are equal, round, and reactive to light.   Neck:      Thyroid: No thyromegaly.      Vascular: No JVD.      Comments: Carotids clear  Cardiovascular:      Rate and Rhythm: Normal rate and regular rhythm.      Heart sounds: Normal heart sounds. No murmur heard.     No friction rub. No gallop.   Pulmonary:      Effort: Pulmonary effort is normal. No respiratory distress.      Breath sounds: Normal breath sounds. No stridor. No wheezing or rales.   Abdominal:      General: " Bowel sounds are normal. There is no distension.      Palpations: Abdomen is soft.      Tenderness: There is no abdominal tenderness.      Comments: No hepatosplenomegaly, no ascites,   Musculoskeletal:         General: No tenderness.      Cervical back: Neck supple.   Lymphadenopathy:      Cervical: No cervical adenopathy.   Skin:     General: Skin is warm and dry.      Capillary Refill: Capillary refill takes less than 2 seconds.      Findings: No erythema or rash.   Neurological:      General: No focal deficit present.      Mental Status: She is alert and oriented to person, place, and time. Mental status is at baseline.      Cranial Nerves: No cranial nerve deficit.      Sensory: No sensory deficit.      Motor: No weakness.      Coordination: Coordination normal.      Gait: Gait normal.      Deep Tendon Reflexes: Reflexes are normal and symmetric. Reflexes normal.      Comments: PERRLA, EOM intact field-of-view intact speech clear,   Psychiatric:         Mood and Affect: Mood normal.         Behavior: Behavior normal.         Thought Content: Thought content normal.         Judgment: Judgment normal.        Result Review :                Assessment and Plan   Diagnoses and all orders for this visit:    1. Near syncope (Primary)  -     CBC & Differential  -     Comprehensive Metabolic Panel  -     Urinalysis With Microscopic If Indicated (No Culture) - Urine, Clean Catch  -     TSH Rfx On Abnormal To Free T4  -     Magnesium  -     Holter Monitor - 48 Hour  -     Ambulatory Referral to Cardiology    2. Flu vaccine need  -     Cancel: Fluzone >6mos ([id number])  -     CBC & Differential  -     Comprehensive Metabolic Panel  -     Urinalysis With Microscopic If Indicated (No Culture) - Urine, Clean Catch  -     TSH Rfx On Abnormal To Free T4  -     Magnesium    3. Episodic confusion  -     CBC & Differential  -     Comprehensive Metabolic Panel  -     Urinalysis With Microscopic If Indicated (No Culture) - Urine,  Clean Catch  -     TSH Rfx On Abnormal To Free T4  -     Magnesium  -     Ambulatory Referral to Neurology    4. Hyperglycemia  -     CBC & Differential  -     Comprehensive Metabolic Panel  -     Urinalysis With Microscopic If Indicated (No Culture) - Urine, Clean Catch  -     TSH Rfx On Abnormal To Free T4  -     Magnesium    5. Brain mass  -     Ambulatory Referral to Neurology    Other orders  -     Microscopic Examination -             Follow Up   Return in about 1 month (around 2/3/2025).  Patient was given instructions and counseling regarding her condition or for health maintenance advice. Please see specific information pulled into the AVS if appropriate.     Patient Instructions   Discharge instructions hydrate well plenty of fluids, continue Wegovy but lets avoid any serious diet to slow steady changes for now,    Labs now, should you have weakness confusion slurred speech syncope episode emergency room,  This type of complaint is difficult to ascertain the proper diagnosis sometimes sometimes it can be a loose  Nonetheless we should be diligent follow-up with neurology, outpatient MRI brain,  Make sure you discuss with any potential metal implants and make sure it safe prior to the test    Cardiology outpatient Holter monitor follow-up with cardiology hopefully nothing here, could be electrolyte or anything that we make sure nothing else is going on so     Continue Wegovy as tolerated, avoid periods of extended fasting, hydrate well falls precaution,  Monthly update, neurology and cardiology any confusion weakness emergency room #1

## 2025-01-21 RX ORDER — SEMAGLUTIDE 0.25 MG/.5ML
0.25 INJECTION, SOLUTION SUBCUTANEOUS WEEKLY
Qty: 2 ML | Refills: 0 | Status: CANCELLED | OUTPATIENT
Start: 2025-01-21

## 2025-01-21 NOTE — TELEPHONE ENCOUNTER
Caller: Keri Bhagat    Relationship: Self    Best call back number: [phone]    Requested Prescriptions:   Requested Prescriptions     Pending Prescriptions Disp Refills    Semaglutide-Weight Management (Wegovy) 0.25 MG/0.5ML solution auto-injector 2 mL 0     Sig: Inject 0.5 mL under the skin into the appropriate area as directed 1 (One) Time Per Week.        Pharmacy where request should be sent: [facility] [id number] [place] [id number] [facility] AT [facility] [phone] [facility] [phone] FX     Last office visit with prescribing clinician: 1/3/2025   Last telemedicine visit with prescribing clinician: Visit date not found   Next office visit with prescribing clinician: 2/3/2025     Additional details provided by patient: PATIENT IS OUT OF THIS MEDICATION.     Does the patient have less than a 3 day supply:  [x] Yes  [] No    Would you like a call back once the refill request has been completed: [] Yes [x] No    If the office needs to give you a call back, can they leave a voicemail: [] Yes [x] No    Jigar Evans Rep   01/21/25 10:38 EST

## 2025-01-21 NOTE — TELEPHONE ENCOUNTER
Rx Refill Note  Requested Prescriptions     Pending Prescriptions Disp Refills    Semaglutide-Weight Management 0.5 MG/0.5ML solution auto-injector 2 mL 3     Sig: Inject 0.5 mL under the skin into the appropriate area as directed 1 (One) Time Per Week.      Last office visit with prescribing clinician: 1/3/2025   Last telemedicine visit with prescribing clinician: Visit date not found   Next office visit with prescribing clinician: 2/3/2025                         Would you like a call back once the refill request has been completed: [] Yes [] No    If the office needs to give you a call back, can they leave a voicemail: [] Yes [] No    Skinny Ann  01/21/25, 16:30 EST

## 2025-02-04 ENCOUNTER — TELEPHONE (OUTPATIENT)
Dept: ORTHOPEDIC SURGERY | Facility: CLINIC | Age: 65
End: 2025-02-04
Payer: MEDICARE

## 2025-02-04 RX ORDER — APIXABAN 5 MG/1
5 TABLET, FILM COATED ORAL
Qty: 60 TABLET | Refills: 11 | Status: SHIPPED | OUTPATIENT
Start: 2025-02-04
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
## 2025-02-07 ENCOUNTER — TELEPHONE (OUTPATIENT)
Dept: ORTHOPEDIC SURGERY | Facility: CLINIC | Age: 65
End: 2025-02-07

## 2025-02-07 ENCOUNTER — TELEPHONE (OUTPATIENT)
Dept: FAMILY MEDICINE CLINIC | Facility: CLINIC | Age: 65
End: 2025-02-07

## 2025-02-07 ENCOUNTER — TELEPHONE (OUTPATIENT)
Dept: CARDIOLOGY | Facility: CLINIC | Age: 65
End: 2025-02-07
Payer: MEDICARE

## 2025-02-07 ENCOUNTER — TELEPHONE (OUTPATIENT)
Dept: GASTROENTEROLOGY | Facility: CLINIC | Age: 65
End: 2025-02-07

## 2025-02-07 NOTE — TELEPHONE ENCOUNTER
Thank you for the recommendations.  Do you want her to double isosorbide mononitrate from 30 mg to 60 mg daily?    Karla Mishra RN  [facility] Triage  02/07/25 12:50 EST

## 2025-02-07 NOTE — TELEPHONE ENCOUNTER
Caller: Keri Bhagat    Relationship: Self    Best call back number: [phone]     Which medication are you concerned about: isosorbide mononitrate (IMDUR)     Who prescribed you this medication: JAMES EPLEY      What are your concerns:     PATIENT STATED THAT HER CARDIOLOGIST HAS INCREASED THIS MEDICATION FROM 30 MG TO 60 MG.    PATIENT IS WANTING TO KNOW IF YOU CAN CALL THAT INTO HER PHARMACY.      [facility] [id number] - [facility] [address] AT [facility] [phone] [facility] [phone]
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
## 2025-02-07 NOTE — TELEPHONE ENCOUNTER
ONEAL/SANDY pt LOV 1/16/25    Patient is calling because she has been having intermittent, sharp chest pain in the center of her chest for 1 week.  It does not radiate.  She denies SOA, nausea, sweating, dizziness, unusual fatigue.    She notes this morning she had 1 palpitation.  She last checked her BP Tuesday and it was 120/84.    She has not had any more episodes of nearly passing out.      Nothing makes the CP worse.  Tums sometimes helps some.    She called Dr. Rodriguez because she thought this could be heartburn.  She is on protonix BID.  She has an EGD scheduled 2/13.  Dr. Rodriguez's office advised she call us with symptoms.    She is on eliquis, imdur, and metoprolol.  She does not have any nitro to try.  She had a normal stress test 9/2024.    She is asking to be evaluated.  Do you have any recommendations for this pt?    Thank you!    Karla Mishra RN  [facility] Triage  02/07/25 12:26 EST

## 2025-02-07 NOTE — TELEPHONE ENCOUNTER
Provider: DR DANE SEVILLA    Caller: TITO THORNE    Relationship to Patient: SELF     Phone Number: [phone]     Reason for Call: PT SPOKE TO DR MA HER CARDIOLOGIST AND HE DOESN'T THINK HEART BURN IS CAUSED BY THE HEART AND ITS OK TO DO THE EGD AND COLONOSCOPY PLEASE ADVISE AND CALL PT BACK ALSO PT WANTS TO KNOW IF DR SEVILLA COULD INCREASE MEDICATION FOR HEART BURN

## 2025-02-07 NOTE — TELEPHONE ENCOUNTER
I called and spoke with patient and I have given her the recommendations and she verbalizes understanding and agrees with plan.    Karla Mishra RN  [facility] Triage  02/07/25 13:01 EST

## 2025-02-07 NOTE — TELEPHONE ENCOUNTER
02/07/25  12:47 EST    I have extensively reviewed this patient's medical record.  She has longstanding history of heartburn.  She had a cardiac catheterization in 2017 which revealed nonobstructive coronary disease.  She has had a myocardial perfusion stress test within the past year which was negative for ischemia.  The fact that pain is relieved with Tums would leave one to think that this is a GI etiology.  Please have her increase isosorbide mononitrate to see if that helps improve the symptoms of chest pain.    DAPHNEY Gonzalez  [facility]

## 2025-02-10 RX ORDER — DEXLANSOPRAZOLE 60 MG/1
60 CAPSULE, DELAYED RELEASE ORAL DAILY
Qty: 30 CAPSULE | Refills: 2 | Status: SHIPPED | OUTPATIENT
Start: 2025-02-10 | End: 2025-02-21

## 2025-02-10 NOTE — TELEPHONE ENCOUNTER
She could try taking Dexilant 60 mg po daily, and stop the Protonix? How many of her complaints are from the Wegovy that she is on? If she is OK with trying Dexilant then send in a prescription for this to her pharmacy. Per Dr Rodriguez.

## 2025-02-10 NOTE — TELEPHONE ENCOUNTER
I called and let her know that cardiology need to fill medication if they are going to increase the dosage, pt verbalized understanding

## 2025-02-10 NOTE — TELEPHONE ENCOUNTER
She would need to call the cardiologist office to have them write the prescription if she is out and she is going to be out and they do not respond let me know so I can do it for her but clarify the dose again thank you and frequent

## 2025-02-10 NOTE — TELEPHONE ENCOUNTER
Called pt and advised of Dr Rodriguez's note. Verb understanding.     Pt would like to proceed with Dexilant. Advised we will escribe dexilant 60mg po daily #30 with 2 refills to her [facility] pharmacy.  Verb understanding.  Update sent to Dr Rodriguez.

## 2025-02-13 ENCOUNTER — HOSPITAL ENCOUNTER (OUTPATIENT)
Facility: HOSPITAL | Age: 65
Setting detail: HOSPITAL OUTPATIENT SURGERY
Discharge: HOME | End: 2025-02-13
Attending: INTERNAL MEDICINE | Admitting: INTERNAL MEDICINE
Payer: MEDICARE

## 2025-02-13 ENCOUNTER — ANESTHESIA EVENT (OUTPATIENT)
Dept: GASTROENTEROLOGY | Facility: HOSPITAL | Age: 65
End: 2025-02-13
Payer: MEDICARE

## 2025-02-13 ENCOUNTER — ANESTHESIA (OUTPATIENT)
Dept: GASTROENTEROLOGY | Facility: HOSPITAL | Age: 65
End: 2025-02-13
Payer: MEDICARE

## 2025-02-13 VITALS
BODY MASS INDEX: 35.97 KG/M2 | SYSTOLIC BLOOD PRESSURE: 145 MMHG | OXYGEN SATURATION: 97 % | HEART RATE: 64 BPM | RESPIRATION RATE: 17 BRPM | WEIGHT: 203 LBS | HEIGHT: 63 IN | DIASTOLIC BLOOD PRESSURE: 99 MMHG

## 2025-02-13 DIAGNOSIS — K63.5 COLON POLYP: ICD-10-CM

## 2025-02-13 DIAGNOSIS — K59.00 CONSTIPATION: ICD-10-CM

## 2025-02-13 DIAGNOSIS — D50.9 IRON DEFICIENCY ANEMIA: ICD-10-CM

## 2025-02-13 DIAGNOSIS — K21.9 GASTROESOPHAGEAL REFLUX DISEASE, UNSPECIFIED WHETHER ESOPHAGITIS PRESENT: ICD-10-CM

## 2025-02-13 PROCEDURE — 88305 TISSUE EXAM BY PATHOLOGIST: CPT | Performed by: INTERNAL MEDICINE

## 2025-02-13 PROCEDURE — 25010000002 PROPOFOL 1000 MG/100ML EMULSION

## 2025-02-13 PROCEDURE — 45380 COLONOSCOPY AND BIOPSY: CPT | Performed by: INTERNAL MEDICINE

## 2025-02-13 PROCEDURE — 25810000003 LACTATED RINGERS PER 1000 ML: Performed by: INTERNAL MEDICINE

## 2025-02-13 PROCEDURE — 88342 IMHCHEM/IMCYTCHM 1ST ANTB: CPT | Performed by: INTERNAL MEDICINE

## 2025-02-13 PROCEDURE — 43239 EGD BIOPSY SINGLE/MULTIPLE: CPT | Performed by: INTERNAL MEDICINE

## 2025-02-13 PROCEDURE — 25010000002 LIDOCAINE 2% SOLUTION

## 2025-02-13 PROCEDURE — 25010000002 GLYCOPYRROLATE 0.2 MG/ML SOLUTION

## 2025-02-13 PROCEDURE — 88341 IMHCHEM/IMCYTCHM EA ADD ANTB: CPT | Performed by: INTERNAL MEDICINE

## 2025-02-13 PROCEDURE — 25010000002 PROPOFOL 200 MG/20ML EMULSION

## 2025-02-13 RX ORDER — LIDOCAINE HYDROCHLORIDE 20 MG/ML
INJECTION, SOLUTION INFILTRATION; PERINEURAL AS NEEDED
Status: DISCONTINUED | OUTPATIENT
Start: 2025-02-13 | End: 2025-02-13 | Stop reason: SURG

## 2025-02-13 RX ORDER — GLYCOPYRROLATE 0.2 MG/ML
INJECTION INTRAMUSCULAR; INTRAVENOUS AS NEEDED
Status: DISCONTINUED | OUTPATIENT
Start: 2025-02-13 | End: 2025-02-13 | Stop reason: SURG

## 2025-02-13 RX ORDER — SODIUM CHLORIDE, SODIUM LACTATE, POTASSIUM CHLORIDE, CALCIUM CHLORIDE 600; 310; 30; 20 MG/100ML; MG/100ML; MG/100ML; MG/100ML
30 INJECTION, SOLUTION INTRAVENOUS CONTINUOUS PRN
Status: DISCONTINUED | OUTPATIENT
Start: 2025-02-13 | End: 2025-02-13 | Stop reason: HOSPADM

## 2025-02-13 RX ORDER — PROPOFOL 10 MG/ML
INJECTION, EMULSION INTRAVENOUS AS NEEDED
Status: DISCONTINUED | OUTPATIENT
Start: 2025-02-13 | End: 2025-02-13 | Stop reason: SURG

## 2025-02-13 RX ORDER — PROPOFOL 10 MG/ML
INJECTION, EMULSION INTRAVENOUS CONTINUOUS PRN
Status: DISCONTINUED | OUTPATIENT
Start: 2025-02-13 | End: 2025-02-13 | Stop reason: SURG

## 2025-02-13 RX ADMIN — PROPOFOL 160 MCG/KG/MIN: 10 INJECTION, EMULSION INTRAVENOUS at 13:12

## 2025-02-13 RX ADMIN — PROPOFOL INJECTABLE EMULSION 100 MG: 10 INJECTION, EMULSION INTRAVENOUS at 13:12

## 2025-02-13 RX ADMIN — LIDOCAINE HYDROCHLORIDE 100 MG: 20 INJECTION, SOLUTION INFILTRATION; PERINEURAL at 13:12

## 2025-02-13 RX ADMIN — SODIUM CHLORIDE, POTASSIUM CHLORIDE, SODIUM LACTATE AND CALCIUM CHLORIDE 30 ML/HR: 600; 310; 30; 20 INJECTION, SOLUTION INTRAVENOUS at 12:36

## 2025-02-13 RX ADMIN — GLYCOPYRROLATE 0.1 MG: 0.2 INJECTION INTRAMUSCULAR; INTRAVENOUS at 13:12

## 2025-02-13 NOTE — ANESTHESIA PREPROCEDURE EVALUATION
Anesthesia Evaluation     Patient summary reviewed and Nursing notes reviewed                Airway   Mallampati: II  Dental      Pulmonary    (+) a smoker (37 smoking years) Former, COPD,sleep apnea on CPAP  Cardiovascular     ECG reviewed  PT is on anticoagulation therapy  Patient on routine beta blocker  Rhythm: regular  Rate: normal    (+) hypertension, valvular problems/murmurs (no gross pathos in valves by previous echo) murmur, past MI (age 39 at time of MI)  >12 months, CAD, cardiac stents Drug eluting stent more than 12 months ago , dysrhythmias (s/p ablation of atrial tachycardia) Tachycardia, angina, hyperlipidemia      Neuro/Psych  (+) syncope, numbness, psychiatric history Anxiety, Depression and Bipolar  GI/Hepatic/Renal/Endo    (+) morbid obesity, hiatal hernia, GERD, renal disease- CRI, thyroid problem hypothyroidism    Musculoskeletal     (+) neck pain (s/p ACDF)  Abdominal    Substance History - negative use     OB/GYN negative ob/gyn ROS         Other   arthritis,   history of cancer                Anesthesia Plan    ASA 3     MAC     (Ozempic last injected 2/5/25  )  intravenous induction     Anesthetic plan, risks, benefits, and alternatives have been provided, discussed and informed consent has been obtained with: patient.    CODE STATUS:

## 2025-02-13 NOTE — ANESTHESIA POSTPROCEDURE EVALUATION
Patient: Keri Bhagat    Procedure Summary       Date: 02/13/25 Room / Location: [facility] ENDOSCOPY 1 /  [facility]    Anesthesia Start: 1306 Anesthesia Stop: 1353    Procedures:       ESOPHAGOGASTRODUODENOSCOPY (EGD) with biopsies (Esophagus)      COLONOSCOPY into cecum and terminal ileum with cold biopsy polypectomy Diagnosis:       Gastroesophageal reflux disease, unspecified whether esophagitis present      Iron deficiency anemia      Colon polyp      Constipation      (Gastroesophageal reflux disease, unspecified whether esophagitis present [K21.9])      (Iron deficiency anemia [D50.9])      (Colon polyp [K63.5])      (Constipation [K59.00])    Surgeons: Eduar Rodriguez MD Provider: Miguel Madden MD    Anesthesia Type: MAC ASA Status: 3            Anesthesia Type: MAC    Vitals  Vitals Value Taken Time   /91 02/13/25 1403   Temp     Pulse 65 02/13/25 1412   Resp 12 02/13/25 1400   SpO2 91 % 02/13/25 1412   Vitals shown include unfiled device data.        Post Anesthesia Care and Evaluation    Patient location during evaluation: PACU  Patient participation: complete - patient participated  Level of consciousness: awake and alert  Pain management: adequate    Airway patency: patent  Anesthetic complications: No anesthetic complications    Cardiovascular status: acceptable  Respiratory status: acceptable  Hydration status: acceptable    Comments: --------------------            02/13/25               1400     --------------------   BP:       143/97     Pulse:      74       Resp:       12       SpO2:      98%      --------------------

## 2025-02-13 NOTE — H&P
Chief Complaint   Patient presents with    Constipation         History of Present Illness:   64 y.o. female with GERD and h/o colon polyps.        Colonoscopy 2021 ([facility]).  Pathology showed tubular adenoma in the sigmoid.  Recall 5 years.           EGD 2017 - no gross lesions in the esophagus.  Dilation.  Erythematous stomach.  normal duodenum.       She was given Linzess 145 mcg/day for constipation and it gave her diarrhea. No rectal bleeding or melena. No abd or chest pain. NO nausea or vomiting. Sometimes heartburn.       FH (cousin) pancreatic cancer. Sister has pancreatic troubles. Her weight is going up.       Medical History        Past Medical History:   Diagnosis Date    Abnormal Pap smear of cervix      Adrenal adenoma 09/21/2022    Anemia      Anxiety      Arthritis      Arthritis of back      Arthritis of neck      Bipolar I disorder, single manic episode       depressive d(NOS)    Cervical disc herniation      Colon polyp      Coronary artery disease      COVID-19 01/10/2023    Death of family member       MOTHER IN FEB 2024    Depression       NO SUICIDAL PLANS    Dislocation of finger      Diverticular disease      Diverticulitis of colon      Dyspepsia      Encounter for follow-up examination after completed treatment for conditions other than malignant neoplasm 04/11/2018    Fracture of wrist      GERD (gastroesophageal reflux disease)      Heart attack       AGE 39    Heart murmur      Hiatal hernia      History of transfusion       2001    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    Lumbosacral disc disease Unsure     Lower left back is partial osteoarthritis and partial osteoporosis    Obesity      Osteopenia 2021     Bone density test    Periodic limb movement disorder      Restless leg syndrome       LEFT SHOULDER    Rotator cuff syndrome      Sleep apnea       NO MACHINE CURRENTLY    Suicidal ideation 08/19/2016     history    Thyroid nodule      Vitamin B12 deficiency              Surgical History         Past Surgical History:   Procedure Laterality Date    ADENOIDECTOMY   1974    ANTERIOR CERVICAL DISCECTOMY W/ FUSION N/A 12/29/2016     Procedure: C3-4 anterior cervical discectomy and fusion with Depuy micro plate, ALLOGRAFT C3-4, AND HARDWARE REMOVAL C4-7.;  Surgeon: Hema Godwin MD;  Location: [facility] MAIN OR;  Service:     BARIATRIC SURGERY        CARDIAC CATHETERIZATION N/A 03/30/2017     Procedure: Left Heart Cath;  Surgeon: Tracey Vargas MD;  Location:  [facility] C[facility]VASIVE [facility];  Service:     CARDIAC CATHETERIZATION N/A 03/30/2017     Procedure: Coronary angiography;  Surgeon: Tracey Vargas MD;  Location:  [facility];  Service:     CARDIAC CATHETERIZATION N/A 03/30/2017     Procedure: Left ventriculography;  Surgeon: Tracey Vargas MD;  Location:  [facility];  Service:     CARDIAC CATHETERIZATION   03/30/2017     Procedure: Functional [place];  Surgeon: Tracey Vargas MD;  Location:  [facility];  Service:     CATARACT EXTRACTION Bilateral      CERVICAL BIOPSY   MMXVI     Dr. Jeffery.     CERVICAL DISCECTOMY ANTERIOR   04/2013     C4-7    COLONOSCOPY   04/20/2015     Diverticulosis, IH    COLONOSCOPY   03/09/2021    COLPOSCOPY W/ BIOPSY / CURETTAGE   06/17/2016     LGSIL HPV positive. Results were normal repeat pap in one year. Chronic Cervicitis    CORONARY ANGIOPLASTY WITH STENT PLACEMENT        ENDOSCOPY   MMXV     Normal.  Dr. Rodriguez    ENDOSCOPY N/A 06/22/2017     Erythematous mucosa in the  stomach  PATH: Chronic active gastritis, moderate with intestinal metaplasia     EPIDURAL BLOCK        INCISION AND DRAINAGE LEG Left 05/12/2024     Procedure: INCISION AND DRAINAGE LOWER EXTREMITY WOUND CLOSURE KNEE;  Surgeon: Ajay Gutierrez MD;  Location: [facility] OR;  Service: Orthopedics;  Laterality: Left;    INGUINAL HERNIA REPAIR        JOINT REPLACEMENT   3/5/24     Left knee    KNEE SURGERY        LAPAROSCOPIC GASTRIC BANDING   02/2018    SHOULDER SURGERY Left       RCR    TONSILLECTOMY AND ADENOIDECTOMY        TOTAL KNEE ARTHROPLASTY Left 03/05/2024     Procedure: LEFT TOTAL KNEE ARTHROPLASTY WITH TAB NAVIGATION;  Surgeon: Tho Pritchard MD;  Location: [facility];  Service: Orthopedics;  Laterality: Left;    TRANSVAGINAL TAPING SUSPENSION N/A 12/06/2017     Procedure: MID URETHRAL SLING CYSTSCOPY;  Surgeon: Abby Méndez MD;  Location: [facility] OR;  Service:     TRIGGER POINT INJECTION        TUBAL ABDOMINAL LIGATION        TYMPANOSTOMY TUBE PLACEMENT Right      UPPER GASTROINTESTINAL ENDOSCOPY   approx 2021    WRIST SURGERY Bilateral       carpal tunnel              Current Medications      Current Outpatient Medications:     apixaban (ELIQUIS) 5 MG tablet tablet, Take 1 tablet by mouth Every 12 (Twelve) Hours., Disp: 60 tablet, Rfl: 11    atorvastatin (LIPITOR) 40 MG tablet, TAKE 1 TABLET BY MOUTH DAILY, Disp: 90 tablet, Rfl: 1    B Complex Vitamins (vitamin b complex) capsule capsule, Take 1 capsule by mouth 2 (Two) Times a Day. Indications: Vitamin Deficiency, Disp: , Rfl:     buPROPion XL (WELLBUTRIN XL) 150 MG 24 hr tablet, Take 1 tablet by mouth 2 (Two) Times a Day. Indications: Major Depressive Disorder, Disp: , Rfl:     Calcium Citrate-Vitamin D (CITRUS CALCIUM/VITAMIN D PO), Take  by mouth 2 (Two) Times a Day. Indications: supplement, Disp: , Rfl:     cephalexin (KEFLEX) 500 MG capsule, TAKE 1 CAPSULE BY MOUTH 3 TIMES A DAY, Disp: 21 capsule, Rfl: 0    cyanocobalamin  (VITAMIN B-12) 1000 MCG tablet, Take 1 tablet by mouth Daily. Indications: Inadequate Vitamin B12, Disp: , Rfl:     diclofenac (VOLTAREN) 0.1 % ophthalmic solution, 1 drop 4 (Four) Times a Day., Disp: , Rfl:     estradiol (ESTRACE) 0.1 MG/GM vaginal cream, Insert 1 g into the vagina 3 (Three) Times a Week. Indications: Vulvovaginal Atrophy, Disp: , Rfl:     Ferrous Sulfate (IRON PO), Take 1 tablet by mouth 2 (Two) Times a Day. Indications: Supplement, Disp: , Rfl:     gabapentin (NEURONTIN) 300 MG capsule, TAKE ONE CAPSULE BY MOUTH THREE TIMES A DAY, Disp: 270 capsule, Rfl: 0    Gemtesa 75 MG tablet, , Disp: , Rfl:     ibuprofen (ADVIL,MOTRIN) 800 MG tablet, Take 1 tablet by mouth 3 (Three) Times a Day., Disp: 90 tablet, Rfl: 0    isosorbide mononitrate (IMDUR) 30 MG 24 hr tablet, Take 1 tablet by mouth Daily., Disp: 90 tablet, Rfl: 2    lamoTRIgine (LaMICtal) 100 MG tablet, Take 1 tablet by mouth Daily., Disp: , Rfl:     levothyroxine (SYNTHROID, LEVOTHROID) 88 MCG tablet, Take 1 tablet by mouth Every Morning., Disp: 90 tablet, Rfl: 2    metoprolol succinate XL (Toprol XL) 25 MG 24 hr tablet, Take 1.5 tablets by mouth Daily. For bp, Disp: 135 tablet, Rfl: 3    OLANZapine (zyPREXA) 5 MG tablet, Take 1 tablet by mouth Every Night. Indications: Depressive Phase of Manic-Depression, Disp: , Rfl:     ondansetron (ZOFRAN) 4 MG tablet, TAKE 1 TABLET BY MOUTH EVERY 8 HOURS AS NEEDED FOR NAUSEA AND/OR VOMITING, Disp: 30 tablet, Rfl: 0    pantoprazole (PROTONIX) 40 MG EC tablet, Take 1 tablet by mouth 2 (Two) Times a Day., Disp: 180 tablet, Rfl: 0    venlafaxine (EFFEXOR) 37.5 MG tablet, , Disp: , Rfl:         Allergies   No Known Allergies              Family History   Problem Relation Age of Onset    Diabetes type II Mother      Hypertension Mother      Osteoporosis Mother      Seizures Mother      Diabetes Mother      Arthritis Mother      Hyperlipidemia Mother      COPD Father      Hypertension Father      Lung cancer  Father      Heart attack Father      Liver cancer Father      Liver disease Father      Heart disease Father      Cancer Father           Lung cacer that metastasized  into the liver.    Thyroid disease Sister      Hypertension Sister      Bipolar disorder Sister      Depression Sister      ADD / ADHD Sister      Anxiety disorder Sister      Mental illness Sister      Hyperlipidemia Sister      Broken bones Sister      Colon polyps Sister      Thyroid disease Sister      Hypertension Sister      Bipolar disorder Sister      Anxiety disorder Sister      Depression Sister      Mental illness Sister      Hyperlipidemia Sister      Colon polyps Sister      Cancer Maternal Grandmother      No Known Problems Maternal Grandfather      No Known Problems Paternal Grandmother      No Known Problems Paternal Grandfather      Abnormal EKG Daughter      Hypertension Daughter      Bipolar disorder Daughter      Thyroid disease Daughter      Mental illness Daughter      Hyperlipidemia Daughter      Asthma Daughter      No Known Problems Son      Diabetes Son           Type 1    Diabetes Paternal Uncle      Breast cancer Neg Hx      Ovarian cancer Neg Hx      Uterine cancer Neg Hx      Colon cancer Neg Hx      Malig Hyperthermia Neg Hx           Social History   Social History            Socioeconomic History    Marital status:     Number of children: 3    Years of education: 12   Tobacco Use    Smoking status: Every Day       Current packs/day: 0.25       Average packs/day: 0.6 packs/day for 23.8 years (15.0 ttl pk-yrs)       Types: Cigarettes, Electronic Cigarette       Start date: 12/12/2012       Passive exposure: Current    Smokeless tobacco: Current    Tobacco comments:       Just using vape.   Vaping Use    Vaping status: Every Day    Substances: Nicotine, Flavoring    Devices: Refillable tank   Substance and Sexual Activity    Alcohol use: Not Currently       Comment: RARELY    Drug use: Never    Sexual activity:  Not Currently       Partners: Male       Birth control/protection: Abstinence, Post-menopausal            Review of Systems   Gastrointestinal:  Negative for abdominal pain.   All other systems reviewed and are negative.     Pertinent positives and negatives documented in the HPI and all other systems reviewed and were found to be negative.      Vitals:     08/22/24 1159   BP: 124/84   Pulse: 58   Temp: 97 °F (36.1 °C)         Physical Exam  Vitals reviewed.   Constitutional:       General: She is not in acute distress.     Appearance: Normal appearance. She is well-developed. She is not diaphoretic.   HENT:      Head: Normocephalic and atraumatic. Hair is normal.      Right Ear: Hearing, tympanic membrane, ear canal and external ear normal. No decreased hearing noted. No drainage.      Left Ear: Hearing, tympanic membrane, ear canal and external ear normal. No decreased hearing noted.      Nose: Nose normal. No nasal deformity.      Mouth/Throat:      Mouth: Mucous membranes are moist.   Eyes:      General: Lids are normal.         Right eye: No discharge.         Left eye: No discharge.      Extraocular Movements: Extraocular movements intact.      Conjunctiva/sclera: Conjunctivae normal.      Pupils: Pupils are equal, round, and reactive to light.   Neck:      Thyroid: No thyromegaly.      Vascular: No JVD.      Trachea: No tracheal deviation.   Cardiovascular:      Rate and Rhythm: Normal rate and regular rhythm.      Pulses: Normal pulses.      Heart sounds: Normal heart sounds. No murmur heard.     No friction rub. No gallop.   Pulmonary:      Effort: Pulmonary effort is normal. No respiratory distress.      Breath sounds: Normal breath sounds. No wheezing or rales.   Chest:      Chest wall: No tenderness.   Abdominal:      General: Bowel sounds are normal. There is no distension.      Palpations: Abdomen is soft. There is no mass.      Tenderness: There is no abdominal tenderness. There is no guarding or  rebound.      Hernia: No hernia is present.   Genitourinary:     Rectum: Normal. Guaiac result negative.   Musculoskeletal:         General: No tenderness or deformity. Normal range of motion.      Cervical back: Normal range of motion and neck supple.   Lymphadenopathy:      Cervical: No cervical adenopathy.   Skin:     General: Skin is warm and dry.      Findings: No erythema or rash.   Neurological:      Mental Status: She is alert and oriented to person, place, and time.      Cranial Nerves: No cranial nerve deficit.      Motor: No abnormal muscle tone.      Coordination: Coordination normal.      Deep Tendon Reflexes: Reflexes are normal and symmetric. Reflexes normal.   Psychiatric:         Mood and Affect: Mood normal.         Behavior: Behavior normal.         Thought Content: Thought content normal.         Judgment: Judgment normal.            Diagnoses and all orders for this visit:     1. FH: pancreatic cancer (Primary)  -     CT Abdomen Pelvis With Contrast; Future  -     Case Request; Standing  -     Case Request     2. Iron deficiency anemia, unspecified iron deficiency anemia type  -     Case Request; Standing  -     Case Request     3. Adenomatous polyp of colon, unspecified part of colon     4. Constipation, unspecified constipation type     Other orders  -     Implement Anesthesia orders day of procedure.; Standing  -     Follow Anesthesia Guidelines / Protocol; Future  -     Verify bowel prep was successful; Standing  -     Give tap water enema if bowel prep was insufficient; Standing        Assessment:  History of colon polyps.  Her last colonoscopy was in 2021.  It was recommended that she have a repeat colonoscopy in 5 years.  GERD  On Eliquis for a fib. Dr. Burroughs  Constipation.   H/o iron def anemia  FH (cousin) pancreatic cancer.        Recommendations:  Take Miralax daily  EGD and colonoscopy.  CT abd/pelvis        No follow-ups on file.     2/13/25 - No change from the above H and P.      Eduar Rodriguez MD

## 2025-02-13 NOTE — DISCHARGE INSTRUCTIONS
For the next 24 hours patient needs to be with a responsible adult.    For 24 hours DO NOT drive, operate machinery, appliances, drink alcohol, make important decisions or sign legal documents.    Start with a light or bland diet if you are feeling sick to your stomach otherwise advance to regular diet as tolerated.    Follow recommendations on procedure report if provided by your doctor.    Call Dr Rodriguez for problems [phone]    Problems may include but not limited to: large amounts of bleeding, trouble breathing, repeated vomiting, severe unrelieved pain, fever or chills.

## 2025-02-15 LAB
CYTO UR: NORMAL
LAB AP CASE REPORT: NORMAL
LAB AP DIAGNOSIS COMMENT: NORMAL
PATH REPORT.ADDENDUM SPEC: NORMAL
PATH REPORT.FINAL DX SPEC: NORMAL
PATH REPORT.GROSS SPEC: NORMAL

## 2025-02-16 NOTE — PROGRESS NOTES
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 Autoimmune metaplastic atrophic gastritis is a chronic GI condition where cells attack the stomach and that could lead to stomach cancer. To help decide if you have this autoimmune metaplastic atrophic gastritis I would draw the following labs:  - fasting gastrin level, ferritin, TIBC, CBC, and B12 level.       Tell her that the colon polyp that was removed was not cancerous and not precancerous, which is good.  I would recommend that she have a repeat colonoscopy in 5 years.       I would have her f/u with one of the NP/PA's in 8 weeks and you could discuss the above.       Please send a copy of this report to her PCP.  Quang zheng"

## 2025-02-17 ENCOUNTER — TELEPHONE (OUTPATIENT)
Dept: GASTROENTEROLOGY | Facility: CLINIC | Age: 65
End: 2025-02-17
Payer: MEDICARE

## 2025-02-17 DIAGNOSIS — K21.9 GASTROESOPHAGEAL REFLUX DISEASE, UNSPECIFIED WHETHER ESOPHAGITIS PRESENT: Primary | ICD-10-CM

## 2025-02-17 DIAGNOSIS — K25.3 ACUTE GASTRIC ULCER WITHOUT HEMORRHAGE OR PERFORATION: ICD-10-CM

## 2025-02-17 DIAGNOSIS — K29.00 ACUTE GASTRITIS WITHOUT HEMORRHAGE, UNSPECIFIED GASTRITIS TYPE: ICD-10-CM

## 2025-02-17 DIAGNOSIS — R19.8 ABNORMAL FINDINGS ON ESOPHAGOGASTRODUODENOSCOPY (EGD): ICD-10-CM

## 2025-02-17 NOTE — TELEPHONE ENCOUNTER
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 Autoimmune metaplastic atrophic gastritis is a chronic GI condition where cells attack the stomach and that could lead to stomach cancer. To help decide if you have this autoimmune metaplastic atrophic gastritis I would draw the following labs:  - fasting gastrin level, ferritin, TIBC, CBC, and B12 level.       Tell her that the colon polyp that was removed was not cancerous and not precancerous, which is good.  I would recommend that she have a repeat colonoscopy in 5 years.       I would have her f/u with one of the NP/PA's in 8 weeks and you could discuss the above.       Please send a copy of this report to her PCP.  Quang zheng  "

## 2025-02-17 NOTE — TELEPHONE ENCOUNTER
Called pt and advised of Dr Rodriguez's note. Verb understanding.     Lab orders placed as requested below. Pt is gong to get labs done at her local [facility] tomorrow.       F/u appt made for 04/22 at 115p with Shanice KHAN.     C/s placed in recall and  for 02/13/2030.     Results sent to pcp thru [facility].

## 2025-02-19 LAB
BASOPHILS # BLD AUTO: 0 X10E3/UL (ref 0–0.2)
BASOPHILS NFR BLD AUTO: 0 %
EOSINOPHIL # BLD AUTO: 0.2 X10E3/UL (ref 0–0.4)
EOSINOPHIL NFR BLD AUTO: 3 %
ERYTHROCYTE [DISTWIDTH] IN BLOOD BY AUTOMATED COUNT: 12.3 % (ref 11.7–15.4)
FERRITIN SERPL-MCNC: 151 NG/ML (ref 15–150)
HCT VFR BLD AUTO: 42.4 % (ref 34–46.6)
HGB BLD-MCNC: 13.8 G/DL (ref 11.1–15.9)
IMM GRANULOCYTES # BLD AUTO: 0 X10E3/UL (ref 0–0.1)
IMM GRANULOCYTES NFR BLD AUTO: 0 %
IRON SATN MFR SERPL: 33 % (ref 15–55)
IRON SERPL-MCNC: 88 UG/DL (ref 27–139)
LYMPHOCYTES # BLD AUTO: 3.5 X10E3/UL (ref 0.7–3.1)
LYMPHOCYTES NFR BLD AUTO: 45 %
MCH RBC QN AUTO: 30.9 PG (ref 26.6–33)
MCHC RBC AUTO-ENTMCNC: 32.5 G/DL (ref 31.5–35.7)
MCV RBC AUTO: 95 FL (ref 79–97)
MONOCYTES # BLD AUTO: 0.5 X10E3/UL (ref 0.1–0.9)
MONOCYTES NFR BLD AUTO: 6 %
NEUTROPHILS # BLD AUTO: 3.5 X10E3/UL (ref 1.4–7)
NEUTROPHILS NFR BLD AUTO: 46 %
PLATELET # BLD AUTO: 258 X10E3/UL (ref 150–450)
RBC # BLD AUTO: 4.46 X10E6/UL (ref 3.77–5.28)
TIBC SERPL-MCNC: 264 UG/DL (ref 250–450)
UIBC SERPL-MCNC: 176 UG/DL (ref 118–369)
VIT B12 SERPL-MCNC: 1455 PG/ML (ref 232–1245)
WBC # BLD AUTO: 7.7 X10E3/UL (ref 3.4–10.8)

## 2025-02-20 ENCOUNTER — TELEPHONE (OUTPATIENT)
Dept: CARDIOLOGY | Facility: CLINIC | Age: 65
End: 2025-02-20

## 2025-02-20 ENCOUNTER — TELEPHONE (OUTPATIENT)
Dept: GASTROENTEROLOGY | Facility: CLINIC | Age: 65
End: 2025-02-20
Payer: MEDICARE

## 2025-02-20 LAB — GASTRIN SERPL-MCNC: 41 PG/ML (ref 0–115)

## 2025-02-20 NOTE — TELEPHONE ENCOUNTER
Caller: Keri Bhagat    Relationship: Self    Best call back number: [phone]    What was the call regarding: PT IS CALLING TO SEE IF DR. SEVILLA CAN INCREASE HER MEDICATION OR CHANGE HER TO SOMETHING ELSE. PT IS CURRENTLY TAKING 60MG DEXILANT ONCE DAILY AND IT DOES NOT SEEM TO BE HELPING. PLEASE CALL PT BACK AND ADVISE

## 2025-02-20 NOTE — TELEPHONE ENCOUNTER
Caller: Keri Bhagat    Relationship to patient: Self    Best call back number: [phone]     Patient is needing: PT IS WONDERING IF DR GUZMÁN CAN PRESCRIBE HER isosorbide mononitrate (IMDUR) 30 MG 24 hr tablet- PT SAID HER PCP HAS DECLINED WORKING WITH THIS MEDICATION ANYMORE. PLS CALL AND ADVISE.

## 2025-02-21 ENCOUNTER — APPOINTMENT (OUTPATIENT)
Dept: GENERAL RADIOLOGY | Facility: HOSPITAL | Age: 65
End: 2025-02-21
Payer: MEDICARE

## 2025-02-21 ENCOUNTER — HOSPITAL ENCOUNTER (EMERGENCY)
Facility: HOSPITAL | Age: 65
Discharge: HOME | End: 2025-02-21
Attending: STUDENT IN AN ORGANIZED HEALTH CARE EDUCATION/TRAINING PROGRAM
Payer: MEDICARE

## 2025-02-21 VITALS
TEMPERATURE: 98.4 F | OXYGEN SATURATION: 96 % | RESPIRATION RATE: 16 BRPM | HEIGHT: 63 IN | HEART RATE: 61 BPM | WEIGHT: 203 LBS | DIASTOLIC BLOOD PRESSURE: 89 MMHG | BODY MASS INDEX: 35.97 KG/M2 | SYSTOLIC BLOOD PRESSURE: 124 MMHG

## 2025-02-21 DIAGNOSIS — R10.13 EPIGASTRIC PAIN: Primary | ICD-10-CM

## 2025-02-21 LAB
ALBUMIN SERPL-MCNC: 3.9 G/DL (ref 3.5–5.2)
ALBUMIN/GLOB SERPL: 1.9 G/DL
ALP SERPL-CCNC: 71 U/L (ref 39–117)
ALT SERPL W P-5'-P-CCNC: 20 U/L (ref 1–33)
ANION GAP SERPL CALCULATED.3IONS-SCNC: 6.9 MMOL/L (ref 5–15)
AST SERPL-CCNC: 18 U/L (ref 1–32)
BASOPHILS # BLD AUTO: 0.02 10*3/MM3 (ref 0–0.2)
BASOPHILS NFR BLD AUTO: 0.3 % (ref 0–1.5)
BILIRUB SERPL-MCNC: 0.2 MG/DL (ref 0–1.2)
BUN SERPL-MCNC: 7 MG/DL (ref 8–23)
BUN/CREAT SERPL: 7.9 (ref 7–25)
CALCIUM SPEC-SCNC: 8.9 MG/DL (ref 8.6–10.5)
CHLORIDE SERPL-SCNC: 106 MMOL/L (ref 98–107)
CO2 SERPL-SCNC: 27.1 MMOL/L (ref 22–29)
CREAT SERPL-MCNC: 0.89 MG/DL (ref 0.57–1)
DEPRECATED RDW RBC AUTO: 45 FL (ref 37–54)
EGFRCR SERPLBLD CKD-EPI 2021: 73.4 ML/MIN/1.73
EOSINOPHIL # BLD AUTO: 0.16 10*3/MM3 (ref 0–0.4)
EOSINOPHIL NFR BLD AUTO: 2.2 % (ref 0.3–6.2)
ERYTHROCYTE [DISTWIDTH] IN BLOOD BY AUTOMATED COUNT: 12.7 % (ref 12.3–15.4)
GEN 5 1HR TROPONIN T REFLEX: 11 NG/L
GLOBULIN UR ELPH-MCNC: 2.1 GM/DL
GLUCOSE SERPL-MCNC: 110 MG/DL (ref 65–99)
HCT VFR BLD AUTO: 38.7 % (ref 34–46.6)
HGB BLD-MCNC: 12.7 G/DL (ref 12–15.9)
IMM GRANULOCYTES # BLD AUTO: 0.01 10*3/MM3 (ref 0–0.05)
IMM GRANULOCYTES NFR BLD AUTO: 0.1 % (ref 0–0.5)
LIPASE SERPL-CCNC: 20 U/L (ref 13–60)
LYMPHOCYTES # BLD AUTO: 2.6 10*3/MM3 (ref 0.7–3.1)
LYMPHOCYTES NFR BLD AUTO: 35.3 % (ref 19.6–45.3)
MCH RBC QN AUTO: 31.3 PG (ref 26.6–33)
MCHC RBC AUTO-ENTMCNC: 32.8 G/DL (ref 31.5–35.7)
MCV RBC AUTO: 95.3 FL (ref 79–97)
MONOCYTES # BLD AUTO: 0.46 10*3/MM3 (ref 0.1–0.9)
MONOCYTES NFR BLD AUTO: 6.3 % (ref 5–12)
NEUTROPHILS NFR BLD AUTO: 4.11 10*3/MM3 (ref 1.7–7)
NEUTROPHILS NFR BLD AUTO: 55.8 % (ref 42.7–76)
PLATELET # BLD AUTO: 225 10*3/MM3 (ref 140–450)
PMV BLD AUTO: 9.9 FL (ref 6–12)
POTASSIUM SERPL-SCNC: 3.8 MMOL/L (ref 3.5–5.2)
PROT SERPL-MCNC: 6 G/DL (ref 6–8.5)
QT INTERVAL: 377 MS
QTC INTERVAL: 398 MS
RBC # BLD AUTO: 4.06 10*6/MM3 (ref 3.77–5.28)
SODIUM SERPL-SCNC: 140 MMOL/L (ref 136–145)
TROPONIN T NUMERIC DELTA: 0 NG/L
TROPONIN T SERPL HS-MCNC: 11 NG/L
WBC NRBC COR # BLD AUTO: 7.36 10*3/MM3 (ref 3.4–10.8)

## 2025-02-21 PROCEDURE — 84484 ASSAY OF TROPONIN QUANT: CPT | Performed by: STUDENT IN AN ORGANIZED HEALTH CARE EDUCATION/TRAINING PROGRAM

## 2025-02-21 PROCEDURE — 80053 COMPREHEN METABOLIC PANEL: CPT | Performed by: STUDENT IN AN ORGANIZED HEALTH CARE EDUCATION/TRAINING PROGRAM

## 2025-02-21 PROCEDURE — 83690 ASSAY OF LIPASE: CPT | Performed by: STUDENT IN AN ORGANIZED HEALTH CARE EDUCATION/TRAINING PROGRAM

## 2025-02-21 PROCEDURE — 93010 ELECTROCARDIOGRAM REPORT: CPT | Performed by: STUDENT IN AN ORGANIZED HEALTH CARE EDUCATION/TRAINING PROGRAM

## 2025-02-21 PROCEDURE — 71045 X-RAY EXAM CHEST 1 VIEW: CPT

## 2025-02-21 PROCEDURE — 36415 COLL VENOUS BLD VENIPUNCTURE: CPT

## 2025-02-21 PROCEDURE — 85025 COMPLETE CBC W/AUTO DIFF WBC: CPT | Performed by: STUDENT IN AN ORGANIZED HEALTH CARE EDUCATION/TRAINING PROGRAM

## 2025-02-21 PROCEDURE — 93005 ELECTROCARDIOGRAM TRACING: CPT | Performed by: STUDENT IN AN ORGANIZED HEALTH CARE EDUCATION/TRAINING PROGRAM

## 2025-02-21 PROCEDURE — 99284 EMERGENCY DEPT VISIT MOD MDM: CPT | Performed by: STUDENT IN AN ORGANIZED HEALTH CARE EDUCATION/TRAINING PROGRAM

## 2025-02-21 PROCEDURE — 99284 EMERGENCY DEPT VISIT MOD MDM: CPT

## 2025-02-21 RX ORDER — LANSOPRAZOLE 30 MG/1
30 CAPSULE, DELAYED RELEASE ORAL 2 TIMES DAILY
Qty: 180 CAPSULE | Refills: 0 | Status: SHIPPED | OUTPATIENT
Start: 2025-02-21

## 2025-02-21 RX ORDER — ASPIRIN 325 MG
325 TABLET ORAL ONCE
Status: COMPLETED | OUTPATIENT
Start: 2025-02-21 | End: 2025-02-21

## 2025-02-21 RX ORDER — SODIUM CHLORIDE 0.9 % (FLUSH) 0.9 %
10 SYRINGE (ML) INJECTION AS NEEDED
Status: DISCONTINUED | OUTPATIENT
Start: 2025-02-21 | End: 2025-02-21 | Stop reason: HOSPADM

## 2025-02-21 RX ORDER — ISOSORBIDE MONONITRATE 30 MG/1
30 TABLET, EXTENDED RELEASE ORAL DAILY
Qty: 90 TABLET | Refills: 3 | Status: SHIPPED | OUTPATIENT
Start: 2025-02-21

## 2025-02-21 RX ADMIN — ALUMINUM HYDROXIDE, MAGNESIUM HYDROXIDE, AND DIMETHICONE: 400; 400; 40 SUSPENSION ORAL at 14:47

## 2025-02-21 RX ADMIN — ASPIRIN 325 MG ORAL TABLET 325 MG: 325 PILL ORAL at 14:47

## 2025-02-21 NOTE — FSED PROVIDER NOTE
Subjective   History of Present Illness  A 64-year-old female patient presents with a chief complaint of chest pain that has been ongoing for approximately 2 weeks. The patient describes it as extreme heartburn and sharp pain localized to her chest. She reports the pain as sharp and tense, describing it as a squeezing sensation in her chest. The pain is sometimes exacerbated by eating, but not consistently. It occasionally subsides for brief periods but is generally persistent. The patient denies worsening with activity. She has attempted to manage the symptoms with pantoprazole prescribed by her gastroenterologist last week, but reports no improvement.    The patient has a significant cardiac history, including a myocardial infarction at age 39 (25 years ago) with subsequent stent placement. She also reports a history of atrial fibrillation, hypertension, and hypercholesterolemia. The patient is currently on Eliquis for anticoagulation. She is an active smoker. Recent diagnostic tests, including a stress test and endoscopic procedures, were reportedly normal. The patient had a colonoscopy and EGD which showed chronic gastritis.        Review of Systems   Constitutional:  Negative for chills and fever.   HENT:  Negative for congestion, rhinorrhea and sore throat.    Eyes:  Negative for pain and visual disturbance.   Respiratory:  Negative for apnea, cough, chest tightness and shortness of breath.    Cardiovascular:  Positive for chest pain. Negative for palpitations.   Gastrointestinal:  Negative for abdominal pain, diarrhea, nausea and vomiting.   Genitourinary:  Negative for difficulty urinating and dysuria.   Musculoskeletal:  Negative for joint swelling and myalgias.   Skin:  Negative for color change.   Neurological:  Negative for seizures and headaches.   Psychiatric/Behavioral: Negative.     All other systems reviewed and are negative.      Past Medical History:   Diagnosis Date    Abnormal Pap smear of  cervix     Adrenal adenoma 09/21/2022    Anemia     Anxiety     Arthritis     Arthritis of back     Arthritis of neck     Bipolar I disorder, single manic episode     depressive d(NOS)    Cataract 2021    Removed    Cervical disc herniation     Colon polyp     Constipation     Coronary artery disease     COVID-19 01/10/2023    Death of family member     MOTHER IN FEB 2024    Depression     NO SUICIDAL PLANS    Dislocation of finger     Diverticular disease     Diverticulitis of colon     Dyspepsia     Encounter for follow-up examination after completed treatment for conditions other than malignant neoplasm 04/11/2018    Fracture of wrist     GERD (gastroesophageal reflux disease)     Heart attack     AGE 39    Heart murmur     Hiatal hernia     History of transfusion     2001    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    Lumbosacral disc disease Unsure    Lower left back is partial osteoarthritis and partial osteoporosis    Obesity     Osteopenia 2021    Bone density test    Periodic limb movement disorder     Renal insufficiency     Restless leg syndrome     LEFT SHOULDER    Rotator cuff syndrome     Sleep apnea     NO MACHINE CURRENTLY    Suicidal ideation 08/19/2016    history    Thyroid nodule     Urinary tract infection     Visual impairment     Vitamin B12 deficiency        No Known Allergies    Past Surgical History:   Procedure Laterality Date    ADENOIDECTOMY  1974    ANTERIOR CERVICAL DISCECTOMY W/ FUSION N/A 12/29/2016    Procedure: C3-4 anterior cervical discectomy and fusion with Depuy micro plate, ALLOGRAFT C3-4, AND  HARDWARE REMOVAL C4-7.;  Surgeon: Hema Godwin MD;  Location: [facility] MAIN OR;  Service:     BARIATRIC SURGERY      CARDIAC CATHETERIZATION N/A 03/30/2017    Procedure: Left Heart Cath;  Surgeon: Tracey Vargas MD;  Location[facility] CATH INVASIVE LOCATION;  Service:     CARDIAC CATHETERIZATION N/A 03/30/2017    Procedure: Coronary angiography;  Surgeon: Tracey Vargas MD;  [facility] CATH [facility]ASIVE LOCATION;  Service:     CARDIAC CATHETERIZATION N/A 03/30/2017    Procedure: Left ventriculography;  Surgeon: Tracey Vargas MD;  [facility] CATH INVASIVE LOCATION;  Service:     CARDIAC CATHETERIZATION  03/30/2017    Procedure: Functional Flow [place];  Surgeon: Tracey Vargas MD;  Location: [facility] CATH INVASIVE LOCATION;  Service:     CARDIAC ELECTROPHYSIOLOGY PROCEDURE N/A 10/16/2024    Procedure: Ablation SVT;  Surgeon: Eduar Almodovar MD;  Location: [facility] CATH INVASIVE LOCATION;  Service: Cardiovascular;  Laterality: N/A;    CATARACT EXTRACTION Bilateral     CERVICAL BIOPSY  MMXVI    Dr. Jeffery.     CERVICAL DISCECTOMY ANTERIOR  04/2013    C4-7    COLONOSCOPY  04/20/2015    Diverticulosis, IH    COLONOSCOPY  03/09/2021    COLONOSCOPY N/A 2/13/2025    Procedure: COLONOSCOPY into cecum and terminal ileum with cold biopsy polypectomy;  Surgeon: Eduar Rodriguez MD;  Location: [facility] ENDOSCOPY;  Service: Gastroenterology;  Laterality: N/A;  pre- constipation, iron deficiency anemia  post- polyp, hemorrhoids, diverticulosis    COLPOSCOPY W/ BIOPSY / CURETTAGE  06/17/2016    LGSIL HPV positive. Results were normal repeat pap in one year. Chronic Cervicitis    CORONARY ANGIOPLASTY WITH STENT PLACEMENT      CORONARY STENT PLACEMENT      ENDOSCOPY  MMXV    Normal.  Dr. Rodriguez    ENDOSCOPY N/A 06/22/2017    Erythematous mucosa in the stomach  PATH: Chronic active gastritis, moderate with intestinal metaplasia     ENDOSCOPY N/A 2/13/2025    Procedure: ESOPHAGOGASTRODUODENOSCOPY (EGD) with biopsies;  Surgeon:  Eduar Rodriguez MD;  Location: [facility] ENDOSCOPY;  Service: Gastroenterology;  Laterality: N/A;  pre- GERD, iron deficiency anemia  post- gastritis    EPIDURAL BLOCK      EYE SURGERY      Cateract removal    FRACTURE SURGERY      INCISION AND DRAINAGE LEG Left 05/12/2024    Procedure: INCISION AND DRAINAGE LOWER EXTREMITY WOUND CLOSURE KNEE;  Surgeon: Ajay Gutierrez MD;  Location: [facility] MAIN OR;  Service: Orthopedics;  Laterality: Left;    INGUINAL HERNIA REPAIR      JOINT REPLACEMENT  3/5/24    Left knee    KNEE SURGERY      LAPAROSCOPIC GASTRIC BANDING  02/2018    NECK SURGERY      SHOULDER SURGERY Left     RCR    TONSILLECTOMY      TONSILLECTOMY AND ADENOIDECTOMY      TOTAL KNEE ARTHROPLASTY Left 03/05/2024    Procedure: LEFT TOTAL KNEE ARTHROPLASTY WITH TAB NAVIGATION;  Surgeon: hTo Pritchard MD;  Location: [facility] OR OSC;  Service: Orthopedics;  Laterality: Left;    TRANSVAGINAL TAPING SUSPENSION N/A 12/06/2017    Procedure: MID URETHRAL SLING CYSTSCOPY;  Surgeon: Abby Méndez MD;  Location: [facility] OR;  Service:     TRIGGER POINT INJECTION      TUBAL ABDOMINAL LIGATION      TYMPANOSTOMY TUBE PLACEMENT Right     UPPER GASTROINTESTINAL ENDOSCOPY  approx 2021    WRIST SURGERY Bilateral     carpal tunnel       Family History   Problem Relation Age of Onset    Diabetes type II Mother     Hypertension Mother     Osteoporosis Mother     Seizures Mother     Diabetes Mother     Arthritis Mother     Hyperlipidemia Mother     Cancer Mother     COPD Mother     Heart disease Mother     Kidney disease Mother     COPD Father     Hypertension Father     Lung cancer Father     Heart attack Father     Liver cancer Father     Liver disease Father     Heart disease Father     Cancer Father         Lung cacer that metastasized  into the liver.    Thyroid disease Sister     Hypertension Sister     Bipolar disorder Sister     Depression Sister     ADD / ADHD Sister     Anxiety disorder Sister     Mental  illness Sister     Hyperlipidemia Sister     Broken bones Sister     Colon polyps Sister     Thyroid disease Sister     Hypertension Sister     Bipolar disorder Sister     Anxiety disorder Sister     Depression Sister     Mental illness Sister     Hyperlipidemia Sister     Colon polyps Sister     Cancer Maternal Grandmother     No Known Problems Maternal Grandfather     No Known Problems Paternal Grandmother     No Known Problems Paternal Grandfather     Abnormal EKG Daughter     Hypertension Daughter     Bipolar disorder Daughter     Thyroid disease Daughter     Mental illness Daughter     Hyperlipidemia Daughter     Asthma Daughter     No Known Problems Son     Diabetes Son         Type 1    Diabetes Paternal Uncle     Anxiety disorder Sister     Depression Sister     Hyperlipidemia Sister     Thyroid disease Daughter     Breast cancer Neg Hx     Ovarian cancer Neg Hx     Uterine cancer Neg Hx     Colon cancer Neg Hx     Malig Hyperthermia Neg Hx        Social History     Socioeconomic History    Marital status:     Number of children: 3    Years of education: 12   Tobacco Use    Smoking status: Former     Current packs/day: 0.00     Average packs/day: 1 pack/day for 37.0 years (37.0 ttl pk-yrs)     Types: Cigarettes     Start date:      Quit date:      Years since quittin.1     Passive exposure: Never    Smokeless tobacco: Never    Tobacco comments:     Just using vape.   Vaping Use    Vaping status: Every Day    Start date: 2017    Substances: Nicotine, Flavoring    Devices: Refillable tank   Substance and Sexual Activity    Alcohol use: Not Currently     Comment: RARELY    Drug use: Never    Sexual activity: Not Currently     Partners: Male     Birth control/protection: Condom, Abstinence, Tubal ligation           Objective   Physical Exam  Vitals and nursing note reviewed.   Constitutional:       General: She is not in acute distress.     Appearance: Normal appearance. She is not  toxic-appearing.   HENT:      Head: Normocephalic and atraumatic.      Jaw: There is normal jaw occlusion.   Eyes:      General: Lids are normal.      Extraocular Movements: Extraocular movements intact.      Conjunctiva/sclera: Conjunctivae normal.      Pupils: Pupils are equal, round, and reactive to light.   Cardiovascular:      Rate and Rhythm: Normal rate and regular rhythm.      Pulses: Normal pulses.      Heart sounds: Normal heart sounds.   Pulmonary:      Effort: Pulmonary effort is normal. No respiratory distress.      Breath sounds: Normal breath sounds. No wheezing or rhonchi.   Abdominal:      General: Abdomen is flat.      Palpations: Abdomen is soft.      Tenderness: There is no abdominal tenderness. There is no guarding or rebound.   Musculoskeletal:         General: Normal range of motion.      Cervical back: Normal range of motion and neck supple.      Right lower leg: No edema.      Left lower leg: No edema.   Skin:     General: Skin is warm and dry.   Neurological:      Mental Status: She is alert and oriented to person, place, and time. Mental status is at baseline.   Psychiatric:         Mood and Affect: Mood normal.         Procedures           ED Course                                           Medical Decision Making  Cardiology note reviewed from end of last year    1. History coronary artery disease.  Asymptomatic.  Patient has stress test 9/18/2024.  2. Patient SVT she is status post ablation on 10/18/2024.        1530 I spoke with her cardiologist Dr. Burroughs discussed the negative troponins unchanged EKG and presentation.  He states he will follow-up with the patient next week in clinic.    Diagnostic Test Results:  - EKG: No ST elevation or depression. Q waves present in lead 3 (noted as previously present). QTc 398.  - Chest x-ray: Negative  - Serial troponin: Negative      Chest Pain  Assessment: 64-year-old female with a history of myocardial infarction 25 years ago, coronary artery  stent placement, atrial fibrillation, hypertension, and hyperlipidemia presents with chest pain for 1-2 weeks. Pain is described as sharp, tense, and squeezing, occasionally worsening with food intake. Recent EGD showed chronic gastritis. EKG shows no acute changes with QTc 398ms and previously noted Q waves in lead III. Serial troponins were negative. Chest x-ray was negative. Symptoms are atypical and likely gastrointestinal in origin, but cardiac etiology cannot be completely ruled out given patient's history.    Administered aspirin in ED.    Cardiology consultation with Dr. Burroughs.    Follow-up with cardiologist early next week.    Follow-up with gastroenterologist.    Discharge home with improved symptoms and no pain.    Chronic Gastritis  Assessment: Patient has recently diagnosed chronic gastritis on EGD. She was switched from pantoprazole to a new medication last week by her gastroenterologist,      Problems Addressed:  Epigastric pain: complicated acute illness or injury    Amount and/or Complexity of Data Reviewed  Labs: ordered.  Radiology: ordered.  ECG/medicine tests: ordered.    Risk  OTC drugs.  Prescription drug management.        Final diagnoses:   Epigastric pain       ED Disposition  ED Disposition       ED Disposition   Discharge    Condition   Stable    Comment   --               Epley, James, DAPHNEY  [address]  [facility] [id number] [phone]          David Burroughs MD  [address]  [place] [address]  [phone]               Medication List        Changed      Eliquis 5 MG tablet tablet  Generic drug: apixaban  TAKE ONE TABLET BY MOUTH EVERY 12 HOURS  What changed: additional instructions     Semaglutide-Weight Management 0.5 MG/0.5ML solution auto-injector  Inject 0.5 mL under the skin into the appropriate area as directed 1 (One) Time Per Week.  What changed: additional instructions

## 2025-02-21 NOTE — TELEPHONE ENCOUNTER
Called patient, no answer, left voicemail to let them know the prescription has been sent to their pharmacy.

## 2025-02-21 NOTE — TELEPHONE ENCOUNTER
Dexilant 60 mg daily is the highest that medication will go.  She is also failed pantoprazole 40 mg twice daily.  Lets try lansoprazole 30 mg twice daily.  May need to consider Voquezna if persistent symptoms.  Follow-up with Shanice as scheduled.  I sent to her pharmacy.   The patient is a 41y Male complaining of

## 2025-02-21 NOTE — DISCHARGE INSTRUCTIONS
Follow-up with your cardiologist early next week.  If you have worsening symptoms, vomiting, difficulty breathing, or lightheadedness, return to the emergency department.

## 2025-02-24 ENCOUNTER — TELEPHONE (OUTPATIENT)
Dept: CARDIOLOGY | Facility: CLINIC | Age: 65
End: 2025-02-24
Payer: MEDICARE

## 2025-02-24 NOTE — TELEPHONE ENCOUNTER
I was able to reach pt.  She confirmed her appt tomorrow with SANDY she will be here.    Karla Mishra RN  [facility] Triage  02/24/25 16:07 EST     Patient of Dr. aSige Doan presented to the clinic for B12 IM injection. Patient received injection to Rt deltoid. No adverse reactions observed, patient was discharged home in good condition.

## 2025-02-24 NOTE — TELEPHONE ENCOUNTER
I spoke with Dr. Burroughs on Friday.  Pt was discharged from [facility].  She needs any appt in the office. I have scheduled her to see SANDY tomorrow at 0940.  Please confirm if this appt works for pt.  If not, maybe she can see another APRN this week.  I left VM for pt to call the office back.    Permission for the hub to transfer this call directly to the nurse triage line at [facility].    Karla Mishra RN  [facility] Triage  02/24/25 09:06 EST

## 2025-02-25 ENCOUNTER — OFFICE VISIT (OUTPATIENT)
Dept: CARDIOLOGY | Facility: CLINIC | Age: 65
End: 2025-02-25
Payer: MEDICARE

## 2025-02-25 VITALS
DIASTOLIC BLOOD PRESSURE: 90 MMHG | HEART RATE: 65 BPM | HEIGHT: 63 IN | SYSTOLIC BLOOD PRESSURE: 146 MMHG | OXYGEN SATURATION: 96 % | WEIGHT: 204 LBS | BODY MASS INDEX: 36.14 KG/M2

## 2025-02-25 DIAGNOSIS — I25.10 ATHEROSCLEROSIS OF NATIVE CORONARY ARTERY OF NATIVE HEART WITHOUT ANGINA PECTORIS: Primary | ICD-10-CM

## 2025-02-25 DIAGNOSIS — K29.70 GASTRITIS WITHOUT BLEEDING, UNSPECIFIED CHRONICITY, UNSPECIFIED GASTRITIS TYPE: ICD-10-CM

## 2025-02-25 DIAGNOSIS — I10 ESSENTIAL HYPERTENSION: ICD-10-CM

## 2025-02-25 DIAGNOSIS — I49.3 PREMATURE VENTRICULAR CONTRACTION: ICD-10-CM

## 2025-02-25 DIAGNOSIS — R07.9 CHEST PAIN, UNSPECIFIED TYPE: ICD-10-CM

## 2025-02-25 PROCEDURE — 3080F DIAST BP >= 90 MM HG: CPT | Performed by: NURSE PRACTITIONER

## 2025-02-25 PROCEDURE — 3077F SYST BP >= 140 MM HG: CPT | Performed by: NURSE PRACTITIONER

## 2025-02-25 PROCEDURE — G2211 COMPLEX E/M VISIT ADD ON: HCPCS | Performed by: NURSE PRACTITIONER

## 2025-02-25 PROCEDURE — 99214 OFFICE O/P EST MOD 30 MIN: CPT | Performed by: NURSE PRACTITIONER

## 2025-02-25 NOTE — ASSESSMENT & PLAN NOTE
Patient continues to complain of atypical chest pain.  I think that this is likely secondary to her gastritis, this does not sound like ACS or angina.  Encourage patient to reach out to GI/PCP

## 2025-02-25 NOTE — ASSESSMENT & PLAN NOTE
Coronary Artery Disease (OPTIONAL): Coronary artery disease is stable.  Continue current treatment regimen. Regular aerobic exercise.  Cardiac status will be reassessed at the next regular appointment.

## 2025-02-25 NOTE — ASSESSMENT & PLAN NOTE
Patient with recent ECG that showed signs of gastritis  Patient continues to complain of severe heartburn despite changing PPIs  Encourage patient to reach back out to GI physician.  I do question if semaglutide could be contributing to worsening heartburn as this is known to delay gastric emptying.  Will defer to GI and PCP who has ordered this.

## 2025-02-25 NOTE — PROGRESS NOTES
CARDIOLOGY        Patient Name: Keri Bhagat  :1960  Age: 64 y.o.  Primary Cardiologist: David Burroughs MD  Encounter Provider:  DAPHNEY Nolasco    Date of Service: 25    CHIEF COMPLAINT / REASON FOR OFFICE VISIT     Follow-up (HTN/Atypical chest pain)      HISTORY OF PRESENT ILLNESS       Chest Pain   Associated symptoms include palpitations. Pertinent negatives include no cough, fever or malaise/fatigue.     Keri Bhagat is a 64 y.o. female who presents today for reevaluation.  Pt has a  history significant for CAD, hypertension.    Review of medical records reveals patient was evaluated in the freestanding emergency department on [facility] on 2023. Patient presents with chest discomfort that occurred at 4 PM while cooking. Reports that pain is worse with exertion. I have personally reviewed ECG tracings which reveals no ischemic changes. During emergency department assessment patient did have elevated heart rates with heart rate in the 1 teens very briefly. She did report episodes of heart palpitations at that time. Troponin remains flat. D-dimer and other labs were unremarkable. Chest x-ray did reveal a gastric lap band which is slightly more horizontal in configuration compared to . Concern for malpositioning. ED physician recommended follow-up with gastroenterology.    Patient had an echocardiogram in 2024 which was normal and does not reveal any signs of heart failure or valvular abnormalities.    The patient was evaluated in the [facility] in 2024 for chest pain and heart palpitations.  Chest pain was intermittent and blood pressure was elevated during times of pain.  Patient also noted a heart fluttering sensation and was sent home with an outpatient monitor.  Outpatient monitor revealed sinus rhythm with 27 triggered events which correlated with a supraventricular ectopic rhythm.  Patient also had 4% PVC burden and 67% PAC burden.   "    Patient was hospitalized in September 2024 for complaints of altered mental status and intermittent dizziness.  EP did evaluate patient to see if arrhythmia was contributing and they did not feel that this was contributing.  During this admission she had a myocardial perfusion stress test which was negative for ischemia.    Patient was evaluated by electrophysiology in the outpatient setting in October 2024, due to concern for paroxysmal SVT causing symptoms patient has elected to proceed with a ablation.  Cardiac ablation is scheduled for 10/16/2024.    10//2024 patient called the office complaining of elevated heart rates and blood pressures, she was wanting to know if her ablation could be scheduled any sooner.  Unfortunately, I do not think that we will be able to move up her ablation appointment but I did recommend adding a evening dose of 25 mg of metoprolol if heart rates continued to remain high.    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  GI is following and has recommended further workup for autoimmune component.  There are multiple telephone encounters with her GI specialist requesting changes to her regimen.    Patient called the office 2/20/2025 requesting prescription for isosorbide mononitrate.  She states that her PCP has declined refilling this medication.  There is a another telephone encounter 2/21/2025 with GI physician assistant making recommendations for her PPI regimen.    Patient was evaluated in the emergency department 2/21/2025 for complaints of chest pain that have been ongoing for 2 weeks described as extreme heartburn with pain to the central chest.  Patient had high-sensitivity troponins x 2 which were negative and ECG was nonischemic.  She was recommended for outpatient follow-up.    Patient presents today for follow up:  Patient notes that she has " "continued to have consistent heartburn symptoms, she has now failed several PPI's.  She states that her GERD and heartburn are causing significant discomfort.  She denies exertional symptoms.  Denies shortness of breath.  Has not followed back with GI since her EGD.     The following portions of the patient's history were reviewed and updated as appropriate: allergies, current medications, past family history, past medical history, past social history, past surgical history and problem list.      VITAL SIGNS     Visit Vitals  /90 (BP Location: Right arm, Patient Position: Sitting, Cuff Size: Adult)   Pulse 65   Ht 160 cm (63\")   Wt 92.5 kg (204 lb)   LMP 10/21/2016 (Approximate) Comment: 54   SpO2 96%   BMI 36.14 kg/m²         Wt Readings from Last 3 Encounters:   02/25/25 92.5 kg (204 lb)   02/21/25 92.1 kg (203 lb)   02/13/25 92.1 kg (203 lb)     Body mass index is 36.14 kg/m².      REVIEW OF SYSTEMS     Review of Systems   Constitutional: Negative for chills, fever, malaise/fatigue, weight gain and weight loss.   Cardiovascular:  Positive for palpitations. Negative for chest pain and leg swelling.   Respiratory:  Negative for cough, snoring and wheezing.    Hematologic/Lymphatic: Negative for bleeding problem. Does not bruise/bleed easily.   Skin:  Negative for color change.   Musculoskeletal:  Negative for falls, joint pain and myalgias.   Gastrointestinal:  Positive for heartburn. Negative for melena.   Genitourinary:  Negative for hematuria.   Neurological:  Negative for excessive daytime sleepiness.   Psychiatric/Behavioral:  Negative for depression. The patient is not nervous/anxious.            PHYSICAL EXAMINATION     Vitals and nursing note reviewed.   Constitutional:       Appearance: Normal appearance. Well-developed.   Eyes:      Conjunctiva/sclera: Conjunctivae normal.   Neck:      Vascular: No carotid bruit.   Pulmonary:      Breath sounds: Normal breath sounds.   Cardiovascular:      " Tachycardia present. Normal S1 with normal intensity. Normal S2 with normal intensity.       Murmurs: There is no murmur.      No gallop.  No click. No rub.   Edema:     Peripheral edema absent.   Musculoskeletal: Normal range of motion. Skin:     General: Skin is warm and dry.   Neurological:      Mental Status: Alert and oriented to person, place, and time.      GCS: GCS eye subscore is 4. GCS verbal subscore is 5. GCS motor subscore is 6.   Psychiatric:         Speech: Speech normal.         Behavior: Behavior normal.         Thought Content: Thought content normal.         Judgment: Judgment normal.           REVIEWED DATA     Procedures    Cardiac Procedures:  Cardiac catheterization 3/30/2017.  Left main 0% stenosis, LAD 20% mid stenosis, distal LAD 30% stenosis.  Ramus 0% stenosis, 80% stenosis of a small secondary branch.  Left circumflex 10% stenosis.  RCA 50-60% stenosis.  PDA 30% stenosis.  Echocardiogram 8/27/2021.  LVEF 56-60%.  Normal LV cavity size noted.  Diastolic function normal.  Myocardial perfusion stress test 8/27/2021.  Normal myocardial perfusion study without evidence of ischemia.  Cardiac event monitor 1/25/2024.  Abnormal monitor study.  PVCs representing less than 1% of all beats.  Patient had paroxysmal atrial fibrillation accounting for 2.3% of beats.  Echocardiogram 2/15/2024.  LVEF 59%.  Moderate hypertrophy.  Echocardiogram 5/29/2024.  LVEF 63%.  Sigmoid shaped ventricular septum.  Grade 2 diastolic dysfunction.  Estimated RVSP less than 35 mmHg.  Zio monitor 8/28/2024. sinus rhythm with 27 triggered events which correlated with a supraventricular ectopic rhythm.  Patient also had 4% PVC burden and 67% PAC burden.    Myocardial perfusion stress test 9/20/2024.  Negative for ischemia.  Impressions consistent with a low restudy.    Lipid Panel          3/28/2024    15:34   Lipid Panel   Total Cholesterol 156    Triglycerides 113    HDL Cholesterol 59    VLDL Cholesterol 20    LDL  Cholesterol  77        Lab Results   Component Value Date     02/21/2025     01/03/2025    K 3.8 02/21/2025    K 4.3 01/03/2025     02/21/2025     01/03/2025    CO2 27.1 02/21/2025    CO2 24.5 01/03/2025    BUN 7 (L) 02/21/2025    BUN 10 01/03/2025    CREATININE 0.89 02/21/2025    CREATININE 1.05 (H) 01/03/2025    EGFRIFNONA 49 (L) 10/13/2021    EGFRIFNONA 62 08/06/2021    EGFRIFAFRI 60 (L) 10/13/2021    EGFRIFAFRI 70 07/01/2021    GLUCOSE 110 (H) 02/21/2025    GLUCOSE 91 01/03/2025    CALCIUM 8.9 02/21/2025    CALCIUM 9.4 01/03/2025    ALBUMIN 3.9 02/21/2025    ALBUMIN 4.2 01/03/2025    BILITOT 0.2 02/21/2025    BILITOT 0.3 01/03/2025    AST 18 02/21/2025    AST 23 01/03/2025    ALT 20 02/21/2025    ALT 22 01/03/2025     Lab Results   Component Value Date    WBC 7.36 02/21/2025    WBC 7.7 02/18/2025    HGB 12.7 02/21/2025    HGB 13.8 02/18/2025    HCT 38.7 02/21/2025    HCT 42.4 02/18/2025    MCV 95.3 02/21/2025    MCV 95 02/18/2025     02/21/2025     02/18/2025     Lab Results   Component Value Date    PROBNP 206.0 09/16/2024    PROBNP 378.0 08/23/2024     Lab Results   Component Value Date    TROPONINI <0.01 05/03/2021    TROPONINT 11 02/21/2025     Lab Results   Component Value Date    TSH 2.280 01/03/2025    TSH 0.846 09/18/2024             ASSESSMENT & PLAN     Diagnoses and all orders for this visit:    1. Atherosclerosis of native coronary artery of native heart without angina pectoris (Primary)  Overview:  Cardiac catheterization 3/30/2017.  Left main 0% stenosis, LAD 20% mid stenosis, distal LAD 30% stenosis.  Ramus 0% stenosis, 80% stenosis of a small secondary branch.  Left circumflex 10% stenosis.  RCA 50-60% stenosis.  PDA 30% stenosis.      Continue GDMT:  Aspirin  Atorvastatin  Isosorbide mononitrate, patient advised to take a holiday from isosorbide mononitrate to see if chest pain worsens  Metoprolol succinate    Assessment & Plan:  Coronary Artery Disease  (OPTIONAL): Coronary artery disease is stable.  Continue current treatment regimen. Regular aerobic exercise.  Cardiac status will be reassessed at the next regular appointment.      2. Essential hypertension  Overview:  Metoprolol succinate 37.5 mg in the morning and 12.5 mg in the evening    Assessment & Plan:  Hypertension is stable and controlled  Continue current treatment regimen.  Dietary sodium restriction.  Regular aerobic exercise.  Ambulatory blood pressure monitoring.  Blood pressure will be reassessed in 3 months.      3. Premature ventricular contraction    4. Gastritis without bleeding, unspecified chronicity, unspecified gastritis type  Assessment & Plan:  Patient with recent ECG that showed signs of gastritis  Patient continues to complain of severe heartburn despite changing PPIs  Encourage patient to reach back out to GI physician.  I do question if semaglutide could be contributing to worsening heartburn as this is known to delay gastric emptying.  Will defer to GI and PCP who has ordered this.      5. Chest pain, unspecified type  Assessment & Plan:  Patient continues to complain of atypical chest pain.  I think that this is likely secondary to her gastritis, this does not sound like ACS or angina.  Encourage patient to reach out to GI/PCP          Future Appointments         Provider Department Center    2/26/2025 2:30 PM Epley, James, APRN [facility] PRIMARY CARE [facility]    3/19/2025 1:30 PM Ajay Gutierrez MD [facility] ORTHOPEDICS [facility]    3/20/2025 10:40 AM Claire Oshea MD [facility] NEUROLOGY [facility]    3/24/2025 3:00 PM Maxwell Martin MD [facility] ENDOCRINOLOGY [facility]    4/22/2025 1:15 PM Shanice Stallings PA-C [facility] GASTROENTEROLOGY [facility]    10/21/2025 11:40 AM David Burroughs MD [facility] CARDIOLOGY [facility]              MEDICATIONS         Discharge Medications             Accurate as of February 25, 2025  1:09 PM. If you have any questions, ask your nurse or doctor.                Changes to Medications        Instructions Start Date   Eliquis 5 MG tablet tablet  Generic drug: apixaban  What changed: additional instructions   5 mg, Oral             Continue These Medications        Instructions Start Date   atorvastatin 80 MG tablet  Commonly known as: LIPITOR   80 mg, Daily      buPROPion  MG 24 hr tablet  Commonly known as: WELLBUTRIN XL   1 tablet, 2 Times Daily      CITRUS CALCIUM/VITAMIN D PO   1 tablet, 2 Times Daily      cyanocobalamin 1000 MCG tablet  Commonly known as: VITAMIN B-12   1,000 mcg, Daily      estradiol 0.1 MG/GM vaginal cream  Commonly known as: ESTRACE   1 g, 3 Times Weekly      gabapentin 100 MG capsule  Commonly known as: NEURONTIN   200 mg, Oral, 2 Times Daily      Gemtesa 75 MG tablet  Generic drug: Vibegron   75 mg, Daily      IRON PO   1 tablet, 2 Times Daily      isosorbide mononitrate 30 MG 24 hr tablet  Commonly known as: IMDUR   30 mg, Oral, Daily      lamoTRIgine 25 MG tablet  Commonly known as: LaMICtal   Take 1 tablet by mouth Daily.      lamoTRIgine 100 MG tablet  Commonly known as: LaMICtal   100 mg, Daily      lansoprazole 30 MG capsule  Commonly known as: PREVACID   30 mg, Oral, 2 Times Daily      levothyroxine 88 MCG tablet  Commonly known as: SYNTHROID, LEVOTHROID   88 mcg, Oral, Every Morning      metoprolol succinate XL 25 MG 24 hr tablet  Commonly known as: Toprol XL   25 mg, Oral, Daily, For bp      OLANZapine 5 MG tablet  Commonly known as: zyPREXA   1 tablet, Nightly      Semaglutide-Weight Management 0.5 MG/0.5ML solution auto-injector   0.5 mg, Subcutaneous, Weekly      venlafaxine 37.5 MG tablet  Commonly known as: EFFEXOR   Take 1 tablet by mouth Daily.      vitamin b complex capsule capsule   1 capsule, 2 Times Daily                   **Dragon Disclaimer:   Much of this encounter note is an electronic  transcription/translation of spoken language to printed text. The electronic translation of spoken language may permit erroneous, or at times, nonsensical words or phrases to be inadvertently transcribed. Although I have reviewed the note for such errors, some may still exist.

## 2025-02-26 ENCOUNTER — TELEPHONE (OUTPATIENT)
Dept: GASTROENTEROLOGY | Facility: CLINIC | Age: 65
End: 2025-02-26

## 2025-02-26 ENCOUNTER — OFFICE VISIT (OUTPATIENT)
Dept: FAMILY MEDICINE CLINIC | Facility: CLINIC | Age: 65
End: 2025-02-26
Payer: MEDICARE

## 2025-02-26 VITALS
WEIGHT: 199.9 LBS | HEART RATE: 65 BPM | HEIGHT: 63 IN | RESPIRATION RATE: 16 BRPM | OXYGEN SATURATION: 96 % | BODY MASS INDEX: 35.42 KG/M2 | DIASTOLIC BLOOD PRESSURE: 70 MMHG | SYSTOLIC BLOOD PRESSURE: 136 MMHG | TEMPERATURE: 97.7 F

## 2025-02-26 DIAGNOSIS — I25.118 ATHEROSCLEROTIC HEART DISEASE OF NATIVE CORONARY ARTERY WITH OTHER FORMS OF ANGINA PECTORIS: ICD-10-CM

## 2025-02-26 DIAGNOSIS — K29.40 AUTOIMMUNE GASTRITIS: ICD-10-CM

## 2025-02-26 DIAGNOSIS — I25.10 ATHEROSCLEROSIS OF NATIVE CORONARY ARTERY OF NATIVE HEART WITHOUT ANGINA PECTORIS: ICD-10-CM

## 2025-02-26 DIAGNOSIS — I25.10 CORONARY ARTERY DISEASE DUE TO LIPID RICH PLAQUE: ICD-10-CM

## 2025-02-26 DIAGNOSIS — I25.83 CORONARY ARTERY DISEASE DUE TO LIPID RICH PLAQUE: ICD-10-CM

## 2025-02-26 DIAGNOSIS — K21.9 GASTROESOPHAGEAL REFLUX DISEASE, UNSPECIFIED WHETHER ESOPHAGITIS PRESENT: Primary | ICD-10-CM

## 2025-02-26 DIAGNOSIS — G47.33 OSA (OBSTRUCTIVE SLEEP APNEA): ICD-10-CM

## 2025-02-26 DIAGNOSIS — Z59.9 FINANCIAL DIFFICULTIES: ICD-10-CM

## 2025-02-26 DIAGNOSIS — Z00.00 MEDICARE ANNUAL WELLNESS VISIT, SUBSEQUENT: Primary | ICD-10-CM

## 2025-02-26 DIAGNOSIS — R07.9 CHEST PAIN, UNSPECIFIED TYPE: ICD-10-CM

## 2025-02-26 DIAGNOSIS — I25.2 HISTORY OF MI (MYOCARDIAL INFARCTION): ICD-10-CM

## 2025-02-26 DIAGNOSIS — Z65.8 INADEQUATE SOCIAL SUPPORT: ICD-10-CM

## 2025-02-26 RX ORDER — SEMAGLUTIDE 0.25 MG/.5ML
0.25 INJECTION, SOLUTION SUBCUTANEOUS WEEKLY
Qty: 2 ML | Refills: 0 | Status: SHIPPED | OUTPATIENT
Start: 2025-02-26

## 2025-02-26 RX ORDER — ALUMINA, MAGNESIA, AND SIMETHICONE 2400; 2400; 240 MG/30ML; MG/30ML; MG/30ML
20 SUSPENSION ORAL EVERY 6 HOURS PRN
Qty: 355 ML | Refills: 3 | Status: SHIPPED | OUTPATIENT
Start: 2025-02-26

## 2025-02-26 RX ORDER — SUCRALFATE 1 G/1
1 TABLET ORAL 4 TIMES DAILY
Qty: 30 TABLET | Refills: 2 | Status: SHIPPED | OUTPATIENT
Start: 2025-02-26

## 2025-02-26 SDOH — ECONOMIC STABILITY - INCOME SECURITY: PROBLEM RELATED TO HOUSING AND ECONOMIC CIRCUMSTANCES, UNSPECIFIED: Z59.9

## 2025-02-26 NOTE — PROGRESS NOTES
"Chief Complaint  Medicare Wellness-subsequent and Gastric Cancer (Michael is investigating, pt only presenting with heart burn, new medicine doesn't work)    Subjective        Keri Bhagat presents to [facility] PRIMARY CARE  History of Present Illness    Objective   Vital Signs:  /70 (BP Location: Left arm, Patient Position: Sitting, Cuff Size: Adult)   Pulse 65   Temp 97.7 °F (36.5 °C) (Oral)   Resp 16   Ht 160 cm (63\")   Wt 90.7 kg (199 lb 14.4 oz)   SpO2 96%   BMI 35.41 kg/m²   Estimated body mass index is 35.41 kg/m² as calculated from the following:    Height as of this encounter: 160 cm (63\").    Weight as of this encounter: 90.7 kg (199 lb 14.4 oz).            Physical Exam   Result Review :                Assessment and Plan   Diagnoses and all orders for this visit:    1. Medicare annual wellness visit, subsequent (Primary)    2. Atherosclerotic heart disease of native coronary artery with other forms of angina pectoris  -     Semaglutide-Weight Management (Wegovy) 0.25 MG/0.5ML solution auto-injector; Inject 0.5 mL under the skin into the appropriate area as directed 1 (One) Time Per Week.  Dispense: 2 mL; Refill: 0    3. Atherosclerosis of native coronary artery of native heart without angina pectoris    4. Financial difficulties  -     Ambulatory Referral to Social Care Services (Amb Case Mgmt)    5. Inadequate social support  -     Ambulatory Referral to Social Care Services (Amb Case Mgmt)    6. Chest pain, unspecified type    7. ROCKY (obstructive sleep apnea)  Comments:  Not using CPAP presently 2025 but will start doing    8. Coronary artery disease due to lipid rich plaque  -     Semaglutide-Weight Management (Wegovy) 0.25 MG/0.5ML solution auto-injector; Inject 0.5 mL under the skin into the appropriate area as directed 1 (One) Time Per Week.  Dispense: 2 mL; Refill: 0    9. History of MI (myocardial infarction)  -     Semaglutide-Weight Management (Wegovy) 0.25 " MG/0.5ML solution auto-injector; Inject 0.5 mL under the skin into the appropriate area as directed 1 (One) Time Per Week.  Dispense: 2 mL; Refill: 0             Follow Up   No follow-ups on file.  Patient was given instructions and counseling regarding her condition or for health maintenance advice. Please see specific information pulled into the AVS if appropriate.     There are no Patient Instructions on file for this visit.

## 2025-02-26 NOTE — TELEPHONE ENCOUNTER
----- Message from Eduar Rodriguez sent at 2/26/2025  1:33 PM EST -----  02/26/25       I tried to call her but could only leave a message.         Tell her that her lab work shows that her vitamin B12 level is adequate.  Her CBC is normal her iron level is normal.  Her gastrin level is normal.       In further reading about this autoimmune gastritis I would recommend that we get the following lab tests also done please:  - Parietal cell antibodies, and   - Intrinsic factor antibody       (Clinical Pool #1: please order the above labs.         Also I would want to know if she has a family history of gastric cancer or not?       Send a copy of this report to her PCP.   Quang zheng

## 2025-02-26 NOTE — PATIENT INSTRUCTIONS
Discharge instruction  Bright lights positive music  105.9  Prayer, samantha,  Good communication family friends  Referred you for  to communicate see if there is any options for you  Wegovy will send this through but call your insurance company  To see if they cover Wegovy for history of coronary artery disease  If so we will make sure we get the prior authorization in for this specific issue and let me know the progress on whether or not they cover this please    Otherwise follow-up with gastro,  Likely nothing terrible was going on here,  They will continue to work you up any severe chest pain shortness of breath weakness emergency room    Call your insurance company to see if they cover Shingrix x 2   RSV vaccine  Prevnar 20 vaccine  Stay on topic COVID and flu shot you can get these at the pharmacy  Follow-up with psychiatry    Follow-up with me in 3 months    Continue generic for Prevacid twice a day  But if you have acute esophagitis not suggestive of a heart issue then  You can take generic Carafate for a week and on top of that as needed Mylanta      If you just have 1 episode Mylanta only if not better emergency room

## 2025-02-26 NOTE — PROGRESS NOTES
Subjective   The ABCs of the Annual Wellness Visit  Medicare Wellness Visit      Keri Bhagat is a 64 y.o. patient who presents for a Medicare Wellness Visit.    The following portions of the patient's history were reviewed and   updated as appropriate: allergies, current medications, past family history, past medical history, past social history, past surgical history, and problem list.    Compared to one year ago, the patient's physical   health is the same.  Compared to one year ago, the patient's mental   health is worse.    Recent Hospitalizations:  This patient has had a [facility] admission record on file within the last 365 days.  Current Medical Providers:  Patient Care Team:  Epley, James, APRN as PCP - General (Family Medicine)  David Burroughs MD as Consulting Physician (Cardiology)  Ajay Gutierrez MD as Consulting Physician (Orthopedic Surgery)  Ana Medina MD (Psychiatry)  Eduar Rodriguez MD as Consulting Physician (Gastroenterology)  Chata Lind MD as Consulting Physician (Internal Medicine)  Maxwell Martin MD as Consulting Physician (Endocrinology)  Sil Roman MD as Surgeon (Orthopedic Surgery)  Tho Simmons MD as Consulting Physician (Nephrology)  Jhony Morse III, MD (General Surgery)  Nelida Jeffery MD as Obstetrician (Obstetrics and Gynecology)    Outpatient Medications Prior to Visit   Medication Sig Dispense Refill    atorvastatin (LIPITOR) 80 MG tablet Take 1 tablet by mouth Daily.      B Complex Vitamins (vitamin b complex) capsule capsule Take 1 capsule by mouth 2 (Two) Times a Day. Indications: Vitamin Deficiency      buPROPion XL (WELLBUTRIN XL) 150 MG 24 hr tablet Take 1 tablet by mouth 2 (Two) Times a Day. Indications: Major Depressive Disorder      Calcium Citrate-Vitamin D (CITRUS CALCIUM/VITAMIN D PO) Take 1 tablet by mouth 2 (Two) Times a Day. Indications: supplement      cyanocobalamin (VITAMIN B-12) 1000 MCG  tablet Take 1 tablet by mouth Daily. Indications: Inadequate Vitamin B12      estradiol (ESTRACE) 0.1 MG/GM vaginal cream Insert 1 g into the vagina 3 (Three) Times a Week. Indications: Vulvovaginal Atrophy      Ferrous Sulfate (IRON PO) Take 1 tablet by mouth 2 (Two) Times a Day. Indications: Supplement      gabapentin (NEURONTIN) 100 MG capsule TAKE 2 CAPSULES BY MOUTH TWICE A  capsule 1    isosorbide mononitrate (IMDUR) 30 MG 24 hr tablet Take 1 tablet by mouth Daily. Indications: Stable Angina Pectoris 90 tablet 3    lamoTRIgine (LaMICtal) 100 MG tablet Take 1 tablet by mouth Daily.      lamoTRIgine (LaMICtal) 25 MG tablet Take 1 tablet by mouth Daily.      levothyroxine (SYNTHROID, LEVOTHROID) 88 MCG tablet Take 1 tablet by mouth Every Morning. Indications: Underactive Thyroid 90 tablet 2    metoprolol succinate XL (Toprol XL) 25 MG 24 hr tablet Take 1 tablet by mouth Daily. For bp  Indications: High Blood Pressure 180 tablet 3    OLANZapine (zyPREXA) 5 MG tablet Take 1 tablet by mouth Every Night. Indications: Depressive Phase of Manic-Depression      venlafaxine (EFFEXOR) 37.5 MG tablet Take 1 tablet by mouth Daily.      Vibegron (Gemtesa) 75 MG tablet Take 1 tablet by mouth Daily.      Eliquis 5 MG tablet tablet TAKE ONE TABLET BY MOUTH EVERY 12 HOURS (Patient taking differently: Take 1 tablet by mouth. HELD 48 HRS AS DIRECTED) 60 tablet 11    lansoprazole (PREVACID) 30 MG capsule Take 1 capsule by mouth 2 (Two) Times a Day. 180 capsule 0    Semaglutide-Weight Management 0.5 MG/0.5ML solution auto-injector Inject 0.5 mL under the skin into the appropriate area as directed 1 (One) Time Per Week. 2 mL 1     Facility-Administered Medications Prior to Visit   Medication Dose Route Frequency Provider Last Rate Last Admin    nitroglycerin (NITROSTAT) SL tablet 0.4 mg  0.4 mg Sublingual Q5 Min PRN Lluvia Porter, DAPHNEY         No opioid medication identified on active medication list. I have reviewed chart  for other potential  high risk medication/s and harmful drug interactions in the elderly.      Aspirin is not on active medication list.  Aspirin use is indicated based on review of current medical condition/s. Pros and cons of this therapy have been discussed with this patient. Benefits of this medication outweigh potential harm.  Patient has been instructed to start taking this medication..    Patient Active Problem List   Diagnosis    Gastroesophageal reflux disease    Bipolar I disorder, single manic episode    Atherosclerosis of coronary artery    Essential hypertension    Lightheadedness    Low back pain    Sciatica    Thyroid nodule    Fatigue    Hyperlipidemia    Primary hypothyroidism    Iron deficiency anemia    Right knee pain    Degenerative disc disease, cervical    Depression    ROCKY (obstructive sleep apnea)    Atherosclerotic heart disease of native coronary artery with other forms of angina pectoris    Cervical spondylosis with radiculopathy    Chronic pain of both knees    Arthritis of both knees    Weight gain, abnormal    Allergic rhinitis    Obesity (BMI 30-39.9)    Plantar fasciitis    Peroneal tendon injury    Stress fracture of calcaneus    Vitamin D deficiency    Pain and swelling of knee    Knee injury    Calcific Achilles tendinitis    Rod's deformity    Urgency incontinence    Nocturia    Gastritis without bleeding    LGSIL on Pap smear of cervix    Primary osteoarthritis of right knee    Stage 1 chronic kidney disease    Left wrist tendinitis    Cervical radiculopathy    Tobacco abuse    Obesity, morbid, BMI 40.0-49.9    Encounter for screening for lung cancer    Urinary frequency    Diabetes mellitus screening    MORALES I (cervical intraepithelial neoplasia I)    Primary osteoarthritis of both knees    H/O bladder repair surgery    Chest pain    Syncope and collapse    Closed fracture of left distal radius    Closed displaced fracture of neck of right fifth metacarpal bone    Fracture  follow-up    Nondisplaced fracture of base of fifth metacarpal bone, left hand, initial encounter for closed fracture    Closed fracture of lower end of left radius with routine healing    Multiple dislocations of fingers, open    Bilateral chronic knee pain    Arthritis of left knee    Arthritis of right knee    Family history of diabetes mellitus (DM)    Menopause    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    Memory changes    Periodic limb movement disorder    Gait disturbance    Degenerative disc disease, lumbar    Osteopenia    Vaginal atrophy    Female dyspareunia    Pituitary cyst    Meningioma    Vitamin B12 deficiency    Acute urinary tract infection    Anemia    Bacterial vaginosis    Anxiety    Inappropriate diet and eating habits    Incomplete emptying of bladder    Myocardial infarction    Other specified postprocedural states    Postoperative retention of urine    Suicide attempt    Urinary urgency    Vaginal discharge    Vomiting    Gastro-esophageal reflux disease with esophagitis, without bleeding    Pre-operative cardiovascular examination    Tendinitis of left wrist    Lipoma of upper extremity    Adrenal adenoma    Cervicalgia    ASCUS with positive high risk HPV cervical    Palpitations    Splenic artery aneurysm    Wound dehiscence    FH: pancreatic cancer    History of meningioma    Bipolar disorder    Sustained SVT    Premature ventricular contraction    Osteoarthritis of right knee    Colon polyp    Constipation    Financial difficulties    Inadequate social support     Advance Care Planning Advance Directive is not on file.  ACP discussion was held with the patient during this visit. Patient has an advance directive (not in EMR), copy requested.            Objective   Vitals:    02/26/25 1430   BP: 136/70   BP Location: Left arm   Patient Position: Sitting   Cuff Size: Adult   Pulse: 65   Resp: 16   Temp: 97.7 °F (36.5 °C)   TempSrc: Oral   SpO2: 96%  "  Weight: 90.7 kg (199 lb 14.4 oz)   Height: 160 cm (63\")   PainSc: 0-No pain       Estimated body mass index is 35.41 kg/m² as calculated from the following:    Height as of this encounter: 160 cm (63\").    Weight as of this encounter: 90.7 kg (199 lb 14.4 oz).                Does the patient have evidence of cognitive impairment? No                                                                                                Health  Risk Assessment    Smoking Status:  Social History     Tobacco Use   Smoking Status Every Day    Current packs/day: 0.00    Average packs/day: 1 pack/day for 37.0 years (37.0 ttl pk-yrs)    Types: Cigarettes    Start date:     Last attempt to quit: 2017    Years since quittin.2    Passive exposure: Never   Smokeless Tobacco Never   Tobacco Comments    Smoke e cigarettes now     Alcohol Consumption:  Social History     Substance and Sexual Activity   Alcohol Use Not Currently    Comment: RARELY       Fall Risk Screen  RUSS Fall Risk Assessment was completed, and patient is at MODERATE risk for falls. Assessment completed on:2025    Depression Screening   Little interest or pleasure in doing things? Nearly every day   Feeling down, depressed, or hopeless? More than half the days   PHQ-2 Total Score 5   Trouble falling or staying asleep, or sleeping too much? Several days   Feeling tired or having little energy? More than half the days   Poor appetite or overeating? More than half the days   Feeling bad about yourself - or that you are a failure or have let yourself or your family down? More than half the days   Trouble concentrating on things, such as reading the newspaper or watching television? Several days   Moving or speaking so slowly that other people could have noticed? Or the opposite - being so fidgety or restless that you have been moving around a lot more than usual? Several days     Thoughts that you would be better off dead, or of hurting yourself in some " way? Not at all   PHQ-9 Total Score 14   If you checked off any problems, how difficult have these problems made it for you to do your work, take care of things at home, or get along with other people? Somewhat difficult        Health Habits and Functional and Cognitive Screenin/19/2025     1:13 PM   Functional & Cognitive Status   Do you have difficulty preparing food and eating? No   Do you have difficulty bathing yourself, getting dressed or grooming yourself? No   Do you have difficulty using the toilet? No   Do you have difficulty moving around from place to place? No   Do you have trouble with steps or getting out of a bed or a chair? Yes   Current Diet Limited Junk Food   Dental Exam Not up to date   Eye Exam Not up to date   Exercise (times per week) 0 times per week   Current Exercises Include No Regular Exercise   Do you need help using the phone?  No   Are you deaf or do you have serious difficulty hearing?  No   Do you need help to go to places out of walking distance? Yes   Do you need help shopping? No   Do you need help preparing meals?  No   Do you need help with housework?  No   Do you need help with laundry? No   Do you need help taking your medications? No   Do you need help managing money? Yes   Do you ever drive or ride in a car without wearing a seat belt? No   Have you felt unusual stress, anger or loneliness in the last month? Yes   Who do you live with? Alone   If you need help, do you have trouble finding someone available to you? No   Have you been bothered in the last four weeks by sexual problems? No   Do you have difficulty concentrating, remembering or making decisions? Yes           Age-appropriate Screening Schedule:  Refer to the list below for future screening recommendations based on patient's age, sex and/or medical conditions. Orders for these recommended tests are listed in the plan section. The patient has been provided with a written plan.    Health Maintenance  List  Health Maintenance   Topic Date Due    Pneumococcal Vaccine 50+ (1 of 2 - PCV) Never done    ZOSTER VACCINE (1 of 2) Never done    LUNG CANCER SCREENING  02/21/2025    LIPID PANEL  03/28/2025    INFLUENZA VACCINE  03/31/2025 (Originally 7/1/2024)    COVID-19 Vaccine (4 - 2024-25 season) 05/18/2025 (Originally 9/1/2024)    MAMMOGRAM  09/05/2025    ANNUAL WELLNESS VISIT  02/26/2026    BMI FOLLOWUP  02/26/2026    PAP SMEAR  09/06/2026    COLORECTAL CANCER SCREENING  02/13/2030    TDAP/TD VACCINES (2 - Td or Tdap) 08/31/2031    HEPATITIS C SCREENING  Completed                                                                                                                                                CMS Preventative Services Quick Reference  Risk Factors Identified During Encounter  Fall Risk-High or Moderate: Discussed Fall Prevention in the home    The above risks/problems have been discussed with the patient.  Pertinent information has been shared with the patient in the After Visit Summary.  An After Visit Summary and PPPS were made available to the patient.    Follow Up:   Next Medicare Wellness visit to be scheduled in 1 year.         Additional E&M Note during same encounter follows:  Patient has additional, significant, and separately identifiable condition(s)/problem(s) that require work above and beyond the Medicare Wellness Visit     Chief Complaint  Medicare Wellness-subsequent and Gastric Cancer (Michael is investigating, pt only presenting with heart burn, new medicine doesn't work)    Subjective   Very pleasant patient here today for Medicare wellness,  Recently had a EGD and colonoscopy no evidence of cancer, she has recurrent esophageal pain, no pain over previous Lap-Band or gastric pain or epigastric,  Nothing suggestive of any MI heart she is already up-to-date with cardiology she has a history of coronary artery disease with previous stenting previous heart attack, atherosclerotic disease  Has  "obesity, Wegovy was approved, then suddenly not approved which is unfortunate, and unexpected for insurance to suddenly improve this medication  She was doing well with the medication she was having no increased epigastric or esophageal complaints, this is just a side effect and she has no contraindications,  Or blackbox warnings, she would like to proceed  She does have some increased stressors unfortunately and depression anxiety, but related to external issues her daughter unfortunately was arrested recently, she does not have assistance with rent as they were sharing an apartment and she was getting help at that time with her daughter.  She will follow-up with psychiatry soon, but she is stable on her medications, she has no increased internal depression gypsy or other issues, not a medication problem, but situational.  No alcohol  No smoking does vape,  No drugs,  Good decisions in her life,  Unfortunately her mother passed away last year,      Gastric Cancer    Keri is also being seen today for an annual adult preventative physical exam.  and Keri is also being seen today for additional medical problem/s.    Review of Systems   Constitutional: Negative.    HENT: Negative.     Eyes: Negative.    Respiratory: Negative.     Cardiovascular: Negative.    Gastrointestinal: Negative.    Genitourinary: Negative.    Musculoskeletal: Negative.    Allergic/Immunologic: Negative.    Neurological: Negative.    Hematological: Negative.    Psychiatric/Behavioral: Negative.     All other systems reviewed and are negative.             Objective   Vital Signs:  /70 (BP Location: Left arm, Patient Position: Sitting, Cuff Size: Adult)   Pulse 65   Temp 97.7 °F (36.5 °C) (Oral)   Resp 16   Ht 160 cm (63\")   Wt 90.7 kg (199 lb 14.4 oz)   SpO2 96%   BMI 35.41 kg/m²   Physical Exam  Vitals reviewed.   Constitutional:       General: She is not in acute distress.     Appearance: Normal appearance. She is " well-developed. She is not ill-appearing, toxic-appearing or diaphoretic.   HENT:      Head: Normocephalic.      Nose: Nose normal.   Eyes:      General: No scleral icterus.     Conjunctiva/sclera: Conjunctivae normal.      Pupils: Pupils are equal, round, and reactive to light.   Neck:      Thyroid: No thyromegaly.      Vascular: No JVD.   Cardiovascular:      Rate and Rhythm: Normal rate and regular rhythm.      Heart sounds: Normal heart sounds. No murmur heard.     No friction rub. No gallop.   Pulmonary:      Effort: Pulmonary effort is normal. No respiratory distress.      Breath sounds: Normal breath sounds. No stridor. No wheezing or rales.   Abdominal:      General: Bowel sounds are normal. There is no distension.      Palpations: Abdomen is soft.      Tenderness: There is no abdominal tenderness.      Comments: No hepatosplenomegaly, no ascites,   Musculoskeletal:         General: No tenderness.      Cervical back: Normal range of motion and neck supple.   Lymphadenopathy:      Cervical: No cervical adenopathy.   Skin:     General: Skin is warm and dry.      Findings: No erythema or rash.   Neurological:      General: No focal deficit present.      Mental Status: She is alert and oriented to person, place, and time. Mental status is at baseline.      Deep Tendon Reflexes: Reflexes are normal and symmetric.   Psychiatric:         Mood and Affect: Mood normal.         Behavior: Behavior normal.         Thought Content: Thought content normal.         Judgment: Judgment normal.                     Assessment and Plan Additional age appropriate preventative wellness advice topics were discussed during today's preventative wellness exam(some topics already addressed during AWV portion of the note above):    Physical Activity: Advised cardiovascular activity 150 minutes per week as tolerated. (example brisk walk for 30 minutes, 5 days a week).     Nutrition: Discussed nutrition plan with patient. Information  shared in after visit summary. Goal is for a well balanced diet to enhance overall health.     Healthy Weight: Discussed current and goal BMI with patient. Steps to attain this goal discussed. Information shared in after visit summary.     Injury Prevention Discussion:  Information shared in after visit summary.                 Medicare annual wellness visit, subsequent         Atherosclerotic heart disease of native coronary artery with other forms of angina pectoris      Orders:    Semaglutide-Weight Management (Wegovy) 0.25 MG/0.5ML solution auto-injector; Inject 0.5 mL under the skin into the appropriate area as directed 1 (One) Time Per Week.    Atherosclerosis of native coronary artery of native heart without angina pectoris           Financial difficulties    Orders:    Ambulatory Referral to Social Care Services (Amb Case Mgmt)    Inadequate social support    Orders:    Ambulatory Referral to Social Care Services (Amb Case Mgmt)    Chest pain, unspecified type         ROCKY (obstructive sleep apnea)         Coronary artery disease due to lipid rich plaque  Coronary Artery Disease (OPTIONAL): Coronary artery disease is stable.  Continue current treatment regimen.  Cardiac status will be reassessed in 6 months.    Orders:    Semaglutide-Weight Management (Wegovy) 0.25 MG/0.5ML solution auto-injector; Inject 0.5 mL under the skin into the appropriate area as directed 1 (One) Time Per Week.    History of MI (myocardial infarction)    Orders:    Semaglutide-Weight Management (Wegovy) 0.25 MG/0.5ML solution auto-injector; Inject 0.5 mL under the skin into the appropriate area as directed 1 (One) Time Per Week.            Follow Up   Return in about 3 months (around 5/26/2025).  Patient was given instructions and counseling regarding her condition or for health maintenance advice. Please see specific information pulled into the AVS if appropriate.

## 2025-02-26 NOTE — TELEPHONE ENCOUNTER
Called pt and advised of Dr Rodriguez's note. Verb understanding.     Lab orders placed as requested below.  Pt is going to see her pcp today and will see if they can draw them.      Pt  denies any hx of stomach cancer in her family.     Update sent to Dr Rodriguez.     Also results sent to pcp thru [facility].

## 2025-02-26 NOTE — ASSESSMENT & PLAN NOTE
Orders:    Semaglutide-Weight Management (Wegovy) 0.25 MG/0.5ML solution auto-injector; Inject 0.5 mL under the skin into the appropriate area as directed 1 (One) Time Per Week.

## 2025-02-26 NOTE — TELEPHONE ENCOUNTER
Provider: DR DANE SEVILLA     Caller: TITO THORNE    Relationship to Patient: SELF    Phone Number: [phone]     Reason for Call: PT WOULD LIKE TO KNOW THE RESULTS OF HER PATHOLOGY PLEASE ADVISE AND CALL PT BACK

## 2025-02-28 ENCOUNTER — REFERRAL TRIAGE (OUTPATIENT)
Age: 65
End: 2025-02-28
Payer: MEDICARE

## 2025-02-28 LAB
IF BLOCK AB SER-ACNC: 45.3 AU/ML (ref 0–1.1)
PCA AB SER-ACNC: 170 UNITS (ref 0–20)

## 2025-03-04 ENCOUNTER — TELEPHONE (OUTPATIENT)
Dept: GASTROENTEROLOGY | Facility: CLINIC | Age: 65
End: 2025-03-04
Payer: MEDICARE

## 2025-03-04 ENCOUNTER — PATIENT OUTREACH (OUTPATIENT)
Age: 65
End: 2025-03-04
Payer: MEDICARE

## 2025-03-04 ENCOUNTER — TELEPHONE (OUTPATIENT)
Dept: GASTROENTEROLOGY | Facility: CLINIC | Age: 65
End: 2025-03-04

## 2025-03-04 DIAGNOSIS — K21.9 GASTROESOPHAGEAL REFLUX DISEASE, UNSPECIFIED WHETHER ESOPHAGITIS PRESENT: Primary | ICD-10-CM

## 2025-03-04 RX ORDER — APIXABAN 5 MG/1
5 TABLET, FILM COATED ORAL
Qty: 180 TABLET | Refills: 1 | Status: SHIPPED | OUTPATIENT
Start: 2025-03-04

## 2025-03-04 NOTE — TELEPHONE ENCOUNTER
Hub staff attempted to follow warm transfer process and was unsuccessful     Caller: Keri Bhagat    Relationship to patient: Self    Best call back number: [phone]    Patient is needing: PATIENT CALLED IN AND STATED THAT SHE WOULD LIKE TO KNOW IF SHE WAS TOLD TO DO MORE LABS AND IF SO, WHEN. PATIENT STATED THAT SHE COULD NOT REMEMBER. PLEASE CALL BACK ANYTIME, OKAY TO LEAVE .

## 2025-03-04 NOTE — TELEPHONE ENCOUNTER
Caller: Keri Bhagat     Relationship: SELF    Best call back number: [phone]    What is your medical concern? PT IS CALLING TO REPORT SHE IS HAVING EXCRUCIATING HEART BURN. THE MEDICATION DR. SEVILLA PUT HER ON IS NOT HELPING. PLEASE CALL PT BACK AND ADVISE    How long has this issue been going on? A FEW WEEKS    Is your provider already aware of this issue? YES    Have you been treated for this issue? YES

## 2025-03-04 NOTE — TELEPHONE ENCOUNTER
Called pt and pt reports that she has been taking lansoprazole 30mg po bid for about a week. She reports that her heartburn symptoms are bad.   Her pcp gave her a script for sucralfate.  She is having lots of heartburn and lot of chest pressure. She reports that she does not have any heart issues.  Pt reports she is passing a lot of gas.  Pt reporst that her bm's are irregular and have been like that for a long time.   Advised will send message to Erendira KHAN.  Verb understanding.

## 2025-03-04 NOTE — TELEPHONE ENCOUNTER
Hub staff attempted to follow warm transfer process and was unsuccessful     Caller: Keri Bhagat    Relationship to patient: Self    Best call back number: [phone]    Patient is needing: RETURNING CALL TO KERI BUCK

## 2025-03-04 NOTE — OUTREACH NOTE
Social Work Assessment  Questions/Answers      Flowsheet Row Most Recent Value   Referral Source physician, outpatient staff, outpatient clinic   Reason for Consult community resources, financial concerns   Preferred Language English   Advance Care Planning Reviewed no concerns identified   People in Home alone   Current Living Arrangements apartment   Potentially Unsafe Housing Conditions none   In the past 12 months has the electric, gas, oil, or water company threatened to shut off services in your home? No   Primary Care Provided by self   Source of Income unable to assess   Financial/Environmental Concerns other (see comments)  [unable to afford utilities]   Application for Public Assistance pending public assistance pending number   Medications independent   Meal Preparation independent   Housekeeping independent   Laundry independent   Shopping independent   If for any reason you need help with day-to-day activities such as bathing, preparing meals, shopping, managing finances, etc., do you get the help you need? I don't need any help   Major Change/Loss/Stressor financial   Sources of Support community support   Do you want help finding or keeping work or a job? I do not need or want help   Do you want help with school or training? For example, starting or completing job training or getting a high school diploma, GED or equivalent No   Transportation Concerns none   Current Discharge Risk financial support inadequate          SDOH updated and reviewed with the patient during this program:  --     Disabilities: Not At Risk (2/12/2025)    Disabilities     Concentrating, Remembering, or Making Decisions Difficulty: no     Doing Errands Independently Difficulty: no      --     Employment: Not At Risk (3/4/2025)    Employment     Do you want help finding or keeping work or a job?: I do not need or want help      Financial Resource Strain: Medium Risk (3/4/2025)    Overall Financial Resource Strain (CARDIA)      Difficulty of Paying Living Expenses: Somewhat hard      --     Food Insecurity: No Food Insecurity (3/4/2025)    Hunger Vital Sign     Worried About Running Out of Food in the Last Year: Never true     Ran Out of Food in the Last Year: Never true      --     Health Literacy: Not At Risk (3/4/2025)    Education     Help with school or training?: No     Preferred Language: English      --     Housing Stability: High Risk (3/4/2025)    Housing Stability Vital Sign     Unable to Pay for Housing in the Last Year: No     Number of Times Moved in the Last Year: 2     Homeless in the Last Year: No      --     Interpersonal Safety: Not At Risk (2/21/2025)    Abuse Screen     Unsafe at Home or Work/School: no     Feels Threatened by Someone?: no     Does Anyone Keep You from Contacting Others or Doint Things Outside the Home?: no     Physical Sign of Abuse Present: no      --     Transportation Needs: No Transportation Needs (3/4/2025)    PRAPARE - Transportation     Lack of Transportation (Medical): No     Lack of Transportation (Non-Medical): No      --     Utilities: Not At Risk (3/4/2025)    [facility] Utilities     Threatened with loss of utilities: No      Continuing Care   [facility]    [address] [id number]    Phone: [phone]    Request Status: Accepted    Services: Food Insecurity Services    Resource for: Food Insecurity   [facility]    [address] [id number]    Phone: [phone]    Request Status: Accepted    Services: Financial Assistance, Food Insecurity Services    Resource for: Financial Resource Strain, Food Insecurity, Utilities   [facility] CARES UTILITY ASSISTANCE PROGRAM    [address] [address]    Phone: [phone]    Request Status: Accepted    Services: Financial Assistance    Resource for: Financial Resource Strain, Utilities   [facility] HOPE ASSISTANCE PROGRAM    [address] [id number]    Phone: [phone]    Request Status: Accepted    Services: Financial Assistance, Housing Advice, Housing Insecurity Services    Resource for: Financial Resource Strain, Housing Stability, Utilities   [facility] HOUSING STABILIZATION PROGRAM    [address] [address]    Phone: [phone]    Request Status: Accepted    Services: Financial Assistance, Help Find Housing, Housing Advice, Housing Insecurity Services    Resource for: Financial Resource Strain, Housing Stability, Utilities   [facility]    [address] [address]    Phone: [phone]    Request Status: Accepted    Services: Financial Assistance    Resource for: Financial Resource Strain, Utilities   [facility] [address] [address]    Phone: [phone]    Request Status: Accepted    Services: Food Insecurity Services    Resource for: Food Insecurity     Patient Outreach    MSW outreach to patient as requested per primary care provider referral for assistance with community resource needs. Patient states she needs assistance with financial resources and [facility]. MSW and patient discussed other financial assistance options including [facility] and [employer] for financial assistance. Patient and MSW discussed food resources and patient open to receiving a list of food pantry locations through [facility]. Patient states she receives $150 per month for groceries through her Medicare Advantage plan. Patient also discussed that assistance is needed for car insurance and MSW unaware of any community resources to assist with this cost. Patient would like MSW to send all community resource information via [employer] and this has been sent. Patient has MSW contact information and will call if she has any questions or assistance needed.     Teresa Hernandez  Worker  Ambulatory Case Management    3/4/2025, 14:06 EST

## 2025-03-06 RX ORDER — VONOPRAZAN FUMARATE 26.72 MG/1
20 TABLET ORAL DAILY
Qty: 60 TABLET | Refills: 0 | Status: SHIPPED | OUTPATIENT
Start: 2025-03-06

## 2025-03-06 NOTE — TELEPHONE ENCOUNTER
Lets try Voquezna 20 mg daily for severe GERD.  I sent the prescription to [facility] to hopefully get this medicine at a reasonable cost for the patient.  Please explain the process to her.  Will need to prior Auth it likely through cover my meds.  She should continue to take the sucralfate up to 4 times daily to hopefully help the symptoms of reflux.    Separate message sent asking if she needs further labs.  Looks like she had all the labs done that Dr. Rodriguez recommended.  Shanice can go over them at follow-up.    May need to see if Shanice get her in sooner as well since she is not doing well.

## 2025-03-07 ENCOUNTER — TELEPHONE (OUTPATIENT)
Dept: GASTROENTEROLOGY | Facility: CLINIC | Age: 65
End: 2025-03-07
Payer: MEDICARE

## 2025-03-07 NOTE — TELEPHONE ENCOUNTER
TITO THORNE (Key: [id number])  PA Case ID #: [id number]  Rx #: [id number]  Need Help? Call us at [phone]  Status  sent iconSent to Plan today  Drug  Voquezna 20MG tablets

## 2025-03-07 NOTE — TELEPHONE ENCOUNTER
Hub staff attempted to follow warm transfer process and was unsuccessful     Caller: Keri Bhagat    Relationship to patient: Self    Best call back number: [phone]      Patient is needing: PT RETURNING KERI'S PHONE CALL, PLS CALL PT BACK.

## 2025-03-07 NOTE — TELEPHONE ENCOUNTER
Hub staff attempted to follow warm transfer process and was unsuccessful     Caller: Keri Bhagat    Relationship to patient: Self    Best call back number:[phone]    Patient is needing: PT MISSED A CALL FROM KERI AT THE OFFICE.

## 2025-03-07 NOTE — TELEPHONE ENCOUNTER
Called pt and advised of Shanice KHAN's note. Verb understanding.     Pt made sooner appt with Erendira KHAN on 03/17 at 115p.

## 2025-03-10 NOTE — TELEPHONE ENCOUNTER
Denied on March 7 by [employer] NCPDP 2017  The drug you asked for is not listed in your preferred drug list (formulary). The preferred drug(s), you may not have tried are: esomeprazole magnesium capsule,delayed release AND omeprazole-sodium bicarbonate capsule. Your provider needs to give us medical reasons why the preferred drug(s) would not work for you and/or would have bad side effects. Sometimes a preferred drug needs more review for approval. Additionally, some preferred drugs listed may be the same drugs with different strengths or forms. Human may only require one strength or form of that drug to be tried. This decision was from [facility] Non-Formulary Exceptions Coverage Policy at [employer].

## 2025-03-13 PROBLEM — Z59.9 FINANCIAL DIFFICULTIES: Status: ACTIVE | Noted: 2025-03-13

## 2025-03-13 PROBLEM — Z65.8 INADEQUATE SOCIAL SUPPORT: Status: ACTIVE | Noted: 2025-03-13

## 2025-03-15 ENCOUNTER — RESULTS FOLLOW-UP (OUTPATIENT)
Dept: GASTROENTEROLOGY | Facility: CLINIC | Age: 65
End: 2025-03-15
Payer: MEDICARE

## 2025-03-15 NOTE — PROGRESS NOTES
Former Michael pt  Labs are c/w pernicious anemia.  Will need periodic EGD and monitoring of B12 levels.  Pt has upcoming f/u with SM

## 2025-03-17 ENCOUNTER — OFFICE VISIT (OUTPATIENT)
Dept: GASTROENTEROLOGY | Facility: CLINIC | Age: 65
End: 2025-03-17
Payer: MEDICARE

## 2025-03-17 VITALS
WEIGHT: 199.8 LBS | BODY MASS INDEX: 35.4 KG/M2 | HEIGHT: 63 IN | DIASTOLIC BLOOD PRESSURE: 86 MMHG | SYSTOLIC BLOOD PRESSURE: 124 MMHG | TEMPERATURE: 97.3 F | HEART RATE: 65 BPM

## 2025-03-17 DIAGNOSIS — K21.9 GASTROESOPHAGEAL REFLUX DISEASE WITHOUT ESOPHAGITIS: ICD-10-CM

## 2025-03-17 DIAGNOSIS — K29.40 AUTOIMMUNE GASTRITIS: Primary | ICD-10-CM

## 2025-03-17 DIAGNOSIS — Z80.0 FH: PANCREATIC CANCER: ICD-10-CM

## 2025-03-17 DIAGNOSIS — Z46.51 FITTING AND ADJUSTMENT OF GASTRIC LAP BAND: ICD-10-CM

## 2025-03-17 PROCEDURE — 1159F MED LIST DOCD IN RCRD: CPT | Performed by: PHYSICIAN ASSISTANT

## 2025-03-17 PROCEDURE — 3079F DIAST BP 80-89 MM HG: CPT | Performed by: PHYSICIAN ASSISTANT

## 2025-03-17 PROCEDURE — 99214 OFFICE O/P EST MOD 30 MIN: CPT | Performed by: PHYSICIAN ASSISTANT

## 2025-03-17 PROCEDURE — 1160F RVW MEDS BY RX/DR IN RCRD: CPT | Performed by: PHYSICIAN ASSISTANT

## 2025-03-17 PROCEDURE — 3074F SYST BP LT 130 MM HG: CPT | Performed by: PHYSICIAN ASSISTANT

## 2025-03-17 RX ORDER — LANSOPRAZOLE 30 MG/1
30 CAPSULE, DELAYED RELEASE ORAL 2 TIMES DAILY
COMMUNITY

## 2025-03-17 NOTE — PROGRESS NOTES
"Chief Complaint  Heartburn and change in bowel habits    Subjective          History of Present Illness    Keri Bhagat is a  64 y.o. female presents for follow-up on severe heartburn  She is a former patient of Dr. Rodriguez and will follow with Dr. Finley going forward.    She has been severe heartburn, chest pressure, and excessive gas.  Lansoprazole 30 mg twice daily helps some--flares 2-3 times/week this is improved over previous.  Received sucralfate from PCP--using as needed but not often. Failed pantoprazole. Never received voquezna--PA denied. She is on B12 and B-complex.    He also has history of chronic constipation.  Failed Linzess-diarrhea, MiraLAX    2/18/2025 Labs showed vitamin B12 (1400), and normal CBC, iron, and gastrin levels.  2/27/2025 labs showed elevated intrinsic factor antibodies and parietal cell antibodies c/w pernicious anemia. Will need periodic EGD and monitoring of B12 levels.     2/13/2025 EGD showed diffuse mild gastritis, otherwise unremarkable.  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  H. pylori negative.    2/13/25 colonoscopy showed benign lymphoid aggregate colon polyp, diverticulosis, internal hemorrhoids.  Recall 5 years.    She is on Wegovy for weight loss with history of CAD--last Wegovy dose 1 week prior to colonoscopy.  History of lap band.  FH (cousin) pancreatic cancer. Sister has pancreatic troubles. Paternal uncle had gastric cancer.     Objective   Vital Signs:   /86   Pulse 65   Temp 97.3 °F (36.3 °C)   Ht 160 cm (63\")   Wt 90.6 kg (199 lb 12.8 oz)   BMI 35.39 kg/m²       Physical Exam  Vitals reviewed.   Constitutional:       General: She is awake. She is not in acute distress.     Appearance: Normal appearance. She is well-developed and well-groomed.   HENT:      Head: Normocephalic.   Pulmonary:      Effort: Pulmonary effort is normal. No respiratory " distress.   Skin:     Coloration: Skin is not pale.   Neurological:      Mental Status: She is alert and oriented to person, place, and time.      Gait: Gait is intact.   Psychiatric:         Mood and Affect: Mood and affect normal.         Speech: Speech normal.         Behavior: Behavior is cooperative.         Judgment: Judgment normal.          Result Review :             Assessment and Plan    Diagnoses and all orders for this visit:    1. Autoimmune gastritis (Primary)    2. Gastroesophageal reflux disease without esophagitis    3. FH: pancreatic cancer    4. Fitting and adjustment of gastric lap band  -     Ambulatory Referral to Bariatric Surgery    Continue PPI BID, add in sucralfate more regularly for flares, can take 2-3 times per day, avoid taking with other medications. Work on GERD diet--discussed in office and handout given. Consider changing PPI if persistent symptoms several times/week.     Her recent B12 was high so recommended she stop vitamin B12 that she is taking separately but continue B-Complex BID.  Will recheck B12 in 3 months.    She will need periodic EGDs typically every 2-3 years but will discuss with Dr. Finley for his recommendation and place in the health maintenance section for reminder.    Refer to Dr. Santillan for management on Lab band, may need fluid removed. This is likely worsening symptoms.  She believes she missed last referral phone call as she was having some phone problems.    She has not been on Wegovy for about 4 to 5 weeks.  I did recommend she stay off this for now as this will worsen her GERD.  Could consider retrial later on if she is well-controlled from a GI standpoint.    Follow Up   Return in about 3 months (around 6/17/2025).    Dragon dictation used throughout this note.     I spent 30 minutes caring for Mrs. Bhagat on this date of service. This time includes time spent by me in the following activities: preparing for the visit, reviewing tests, obtaining  and/or reviewing a separately obtained history, performing a medically appropriate examination and/or evaluation , counseling and educating the patient/family/caregiver, ordering medications, tests, or procedures, documenting information in the medical record, independently interpreting results and communicating that information with the patient/family/caregiver and care coordination.      Erendira Arteaga PA-C

## 2025-03-17 NOTE — Clinical Note
64F patient of Dr. Rodriguez switching over to your care.  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  B12 actually high since she is taking a lot of extra B12-I had her reduce and we will recheck in 3 months.  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  When do you recommend a repeat EGD?  I will place it in the health maintenance section.

## 2025-03-18 ENCOUNTER — TELEPHONE (OUTPATIENT)
Dept: ORTHOPEDIC SURGERY | Facility: CLINIC | Age: 65
End: 2025-03-18
Payer: MEDICARE

## 2025-03-18 DIAGNOSIS — M25.512 CHRONIC LEFT SHOULDER PAIN: Primary | ICD-10-CM

## 2025-03-18 DIAGNOSIS — G89.29 CHRONIC LEFT SHOULDER PAIN: Primary | ICD-10-CM

## 2025-03-18 NOTE — TELEPHONE ENCOUNTER
Caller: Keri Bhagat    Relationship: Self    Best call back number: [phone]    What was the call regarding: PT CALLING TO REQUEST TO GET L SHOULDER MRI ORDER SENT TO THE [facility] F[facility]ILITY IN [place] ON [address]. PT HAS NOT BEEN ABLE TO GET MRI SCHEDULED YET WITH THE PREVIOUS FACILITY.

## 2025-03-20 ENCOUNTER — OFFICE VISIT (OUTPATIENT)
Dept: NEUROLOGY | Facility: CLINIC | Age: 65
End: 2025-03-20
Payer: MEDICARE

## 2025-03-20 VITALS
DIASTOLIC BLOOD PRESSURE: 84 MMHG | WEIGHT: 201 LBS | HEIGHT: 63 IN | HEART RATE: 63 BPM | BODY MASS INDEX: 35.61 KG/M2 | SYSTOLIC BLOOD PRESSURE: 132 MMHG | OXYGEN SATURATION: 97 %

## 2025-03-20 DIAGNOSIS — R41.89 SPELL OF ALTERED COGNITION: ICD-10-CM

## 2025-03-20 DIAGNOSIS — D32.9 MENINGIOMA: Primary | ICD-10-CM

## 2025-03-20 NOTE — PROGRESS NOTES
Chief Complaint  Memory Loss (January had an incident with her car thinking she put it in park and it was really in drive ) and spell of confusion    Subjective          Keri Bhagat presents to [facility] for   HISTORY OF PRESENT ILLNESS:    Keri Bhagat is a 64 year old woman who presents to neurology clinic for initial evaluation by myself and follow up of memory loss and confusion.  Of note she has history of anxiety, depression, MI, carpal tunnel syndrome, coronary disease, obstructive sleep apnea, restless legs, hypertension, vitamin D deficiency, bipolar disorder, Afib on Eliquis, chronic kidney disease, hyperlipidemia, GERD, and hypothyroidism and was more recently evaluated by my colleagues in the hospital for dizziness and upper extremity jerking and previously by Dr. Cook who has since left this practice for memory impairment which at the time was felt to be more related to attention and concentration issues likely related to her mood disorders as she had stable MoCA at that time.  She scored a 23 out of 30 on her MoCA in 2019. She scores a 22 out of 30 in 7/2023.  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  She has been followed by [facility] for the meningioma.  She had a routing EEG on 9/17/2024 which was mildly abnormal due to mild slowing.  She had been on gabapentin and there was concern with regards to her taking too much gabapentin potentially causing toxic metabolic encephalopathy and she reports her dose of gabapentin was reduced since that time.  She had lab work on  "2/21/2025 including CMP with mildly elevated blood glucose and normal CBC.  She also had elevated antibody testing to intrinsic factor and parietal cell consistent with pernicious anemia with her most recent Vit B12 level on 2/18/2025 was 1455.  She has also had normal TSH level this year and normal globulin.  In 1/2025 she went to check the mail and she felt confused and lightheaded and she thought that she had left the car in park but she left it in drive and she put her foot on the accelerator and thought it was the break and she ended up hitting a cement block in the grass and stopped. She was able to check the mail and drive back home but still felt off like shew as not there and her \"head was in a bubble\".  She was having a hard time concentrating.  She has a hard time remembering names of people and objects.  She thinks she has spell of confusion once every couple months.  She lives by herself.  She has two children in [place].  She does not have POA or Living Will.  She denies any issus with cooking, burning food.  She denies any other driving accidents.  She thinks she may space out sometimes.  She does report taking lamotrigine 125 mg BID.  She is being followed by psychiatry and therapy.  She reports a family history of epilepsy in her mother. No history of childhood seizures.  No history of head trauma, meningitis or encephalitis.  She denies any thoughts of hurting herself or anyone else.  She does report having significant stress.   She reports her mother had dementia.       Past Medical History:   Diagnosis Date    Abnormal ECG     Abnormal Pap smear of cervix     Adrenal adenoma 09/21/2022    Anemia     Aneurysm     Dr. Salvador did an ecocardiogram.    Angina pectoris     Ankle sprain     Anxiety     Arrhythmia     Arthritis     Arthritis of back     Arthritis of neck     Atrial fibrillation     Bipolar I disorder, single manic episode     depressive d(NOS)    Cataract 2021    Removed    Cervical " disc herniation     Cervical dysplasia     Chlamydia     Claustrophobia     Colon polyp     Constipation     Coronary artery disease     COVID-19 01/10/2023    Death of family member     MOTHER IN FEB 2024    Depression     NO SUICIDAL PLANS    Dislocation of finger     Diverticular disease     Diverticulitis of colon     Dyspepsia     Encounter for follow-up examination after completed treatment for conditions other than malignant neoplasm 04/11/2018    Fracture of wrist     GERD (gastroesophageal reflux disease)     Gonorrhea     Heart attack     AGE 39    Heart murmur     Hiatal hernia     History of transfusion     2001    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    Lumbosacral disc disease Unsure    Lower left back is partial osteoarthritis and partial osteoporosis    Obesity     Osteopenia 2021    Bone density test    Periodic limb movement disorder     PID (pelvic inflammatory disease)     Preeclampsia     Renal insufficiency     Restless leg syndrome     LEFT SHOULDER    Rotator cuff syndrome     Sleep apnea     NO MACHINE CURRENTLY    Suicidal ideation 08/19/2016    history    Thyroid nodule     Urinary tract infection     Varicella     Visual impairment     Vitamin B12 deficiency     Vitamin D deficiency         Family History   Problem Relation Age of Onset    Diabetes type II Mother     Hypertension Mother     Osteoporosis Mother     Seizures Mother     Diabetes Mother     Arthritis Mother     Hyperlipidemia Mother     Cancer Mother     COPD Mother     Heart disease Mother     Kidney disease Mother      Heart failure Mother     Coronary artery disease Mother     Neuropathy Mother     Dementia Mother     COPD Father     Hypertension Father     Lung cancer Father     Heart attack Father     Liver cancer Father     Liver disease Father     Heart disease Father     Cancer Father         Lung cacer that metastasized  into the liver.    Arrhythmia Father     Alcohol abuse Father     Hyperlipidemia Father     Coronary artery disease Father     Thyroid disease Sister     Hypertension Sister     Bipolar disorder Sister     Depression Sister     ADD / ADHD Sister     Anxiety disorder Sister     Mental illness Sister     Hyperlipidemia Sister     Broken bones Sister     Colon polyps Sister         Non cancerous    Anemia Sister     Sleep apnea Sister     Migraines Sister     Thyroid disease Sister     Hypertension Sister     Bipolar disorder Sister     Anxiety disorder Sister     Depression Sister     Mental illness Sister     Hyperlipidemia Sister     Colon polyps Sister     Cancer Maternal Grandmother     Ovarian cancer Maternal Grandmother     No Known Problems Maternal Grandfather     No Known Problems Paternal Grandmother     No Known Problems Paternal Grandfather     Abnormal EKG Daughter     Hypertension Daughter     Bipolar disorder Daughter     Thyroid disease Daughter     Mental illness Daughter     Hyperlipidemia Daughter     Asthma Daughter     No Known Problems Son     Diabetes Son         Type 1    Diabetes Paternal Uncle     Heart failure Paternal Uncle     Anxiety disorder Sister     Depression Sister     Hyperlipidemia Sister     Thyroid disease Daughter     Anxiety disorder Sister         She's third on my list    Anxiety disorder Sister     Depression Sister     Mental illness Sister     Thyroid disease Sister     Stomach cancer Paternal Uncle     Breast cancer Neg Hx     Uterine cancer Neg Hx     Colon cancer Neg Hx     Malig Hyperthermia Neg Hx         Social History     Socioeconomic History     "Marital status:     Number of children: 3    Years of education: 12   Tobacco Use    Smoking status: Every Day     Current packs/day: 0.00     Average packs/day: 1 pack/day for 37.0 years (37.0 ttl pk-yrs)     Types: Cigarettes     Start date: 1980     Last attempt to quit: 2017     Years since quittin.2     Passive exposure: Never    Smokeless tobacco: Never    Tobacco comments:     Smoke e cigarettes now   Vaping Use    Vaping status: Every Day    Start date: 2017    Substances: Nicotine, Flavoring    Devices: Refillable tank   Substance and Sexual Activity    Alcohol use: Not Currently     Comment: RARELY    Drug use: Never    Sexual activity: Not Currently     Partners: Male     Birth control/protection: Condom, Abstinence, Tubal ligation        I have reviewed and confirmed the accuracy of the ROS as documented by the MA/LPN/RN Claire Oshea MD   Review of Systems   Neurological:  Positive for dizziness, light-headedness, memory problem and confusion. Negative for tremors, seizures, syncope, facial asymmetry, speech difficulty, weakness, numbness and headache.   Psychiatric/Behavioral:  Positive for decreased concentration. Negative for agitation, behavioral problems, dysphoric mood, hallucinations, self-injury, sleep disturbance, suicidal ideas, negative for hyperactivity, depressed mood and stress. The patient is not nervous/anxious.         Objective   Vital Signs:   /84   Pulse 63   Ht 160 cm (62.99\")   Wt 91.2 kg (201 lb)   SpO2 97%   BMI 35.61 kg/m²       PHYSICAL EXAM:    General   Mental Status - Alert. General Appearance - Well developed, Well groomed, Oriented and Cooperative. Orientation - Oriented X3.       Head and Neck  Head - normocephalic, atraumatic with no lesions or palpable masses.  Neck    Global Assessment - supple.       Eye   Sclera/Conjunctiva - Bilateral - Normal.    ENMT  Mouth and Throat   Oral Cavity/Oropharynx: Oropharynx - the soft palate,uvula and " tongue are normal in appearance.    Chest and Lung Exam   Chest - lung clear to auscultation bilaterally.    Cardiovascular   Cardiovascular examination reveals  - normal heart sounds, regular rate and rhythm.    Neurologic   Mental Status: Speech - Normal. Cognitive function - SLUMS 24/30 with 2/5 object recall today with appropriate fund of knowledge. Some impairment of attention, Impairment of concentration, impairment of long term memory and impairment of short term memory.  Cranial Nerves:   II Optic: Visual acuity - Left - Normal. Right - Normal. Visual fields - Normal (to confrontation).  III Oculomotor: Pupillary constriction - Left - Normal. Right - Normal.  VII Facial: - Normal Bilaterally.   IX Glossopharyngeal / X Vagus - Normal.  XI Accessory: Trapezius - Bilateral - Normal. Sternocleidomastoid - Bilateral - Normal.  XII Hypoglossal - Bilateral - Normal.  Eye Movements: - Normal Bilaterally.  Sensory:   Light Touch: Intact - Globally.  Motor:   Bulk and Contour: - Normal.  Tone: - Normal.  Tremor: Not present.  Strength: 5/5 normal muscle strength - All Muscles.   General Assessment of Reflexes: - deep tendon reflexes are normal. Coordination - No Impairment of finger-to-nose or Impairment of rapid alternating movements. Gait - Normal.       Result Review :                 Assessment and Plan    Problem List Items Addressed This Visit       Spell of altered cognition    Current Assessment & Plan   64 year old woman with memory loss and confusion.  Of note she has history of anxiety, depression, MI, carpal tunnel syndrome, coronary disease, obstructive sleep apnea, restless legs, hypertension, vitamin D deficiency, bipolar disorder, Afib on Eliquis, chronic kidney disease, hyperlipidemia, GERD, and hypothyroidism and was more recently evaluated by my colleagues in the hospital for dizziness and upper extremity jerking and previously by Dr. Cook who has since left this practice for memory impairment  "which at the time was felt to be more related to attention and concentration issues likely related to her mood disorders as she had stable MoCA at that time.  She scored a 23 out of 30 on her MoCA in 2019. She scores a 22 out of 30 in 7/2023.  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  She has been followed by [facility] for the meningioma.  She had a routing EEG on 9/17/2024 which was mildly abnormal due to mild slowing.  She had been on gabapentin and there was concern with regards to her taking too much gabapentin potentially causing toxic metabolic encephalopathy and she reports her dose of gabapentin was reduced since that time.  She had lab work on 2/21/2025 including CMP with mildly elevated blood glucose and normal CBC.  She also had elevated antibody testing to intrinsic factor and parietal cell consistent with pernicious anemia with her most recent Vit B12 level on 2/18/2025 was 1455.  She has also had normal TSH level this year and normal globulin.  In 1/2025 she went to check the mail and she felt confused and lightheaded and she thought that she had left the car in park but she left it in drive and she put her foot on the accelerator and thought it was the break and she ended up hitting a cement block in the grass and stopped. She was able to check the mail and drive back home but still felt off like shew as not there and her \"head was in a bubble\".  She was having a hard time concentrating.  She has a hard time remembering names of people and objects.  She thinks she has spell of confusion once every couple months.  " She lives by herself.  She has two children in [place].  She does not have POA or Living Will.  She denies any issus with cooking, burning food.  She denies any other driving accidents.  She thinks she may space out sometimes.  She does report taking lamotrigine 125 mg BID.  She is being followed by psychiatry and therapy.  She reports a family history of epilepsy in her mother. No history of childhood seizures.  No history of head trauma, meningitis or encephalitis.  She denies any thoughts of hurting herself or anyone else.  She does report having significant stress.  I will order a more prolonged EEG to capture both awake and sleep states for further evaluation of her underlying risk for epileptic seizures given meningioma and reported family history of seizures in her mother.  I will refer her for formal neuropsychology testing for further evaluation as her SLUMS today of 24/30 appears to be stable compared to prior MoCA.  I advised her to continue following up with her psychiatrist and psychologist as well.  Advised her to exercise and socialize.  I will also refer her to have formal community mobility assessment to be sure she is ok to continue driving.  Advised her not to be driving if she is confused and altered and discussed safety concerns.  Also advised her to have her Vit B12 levels routinely checked and supplemented given pernicious anemia.  Will follow up after testing is completed and sooner if needed.           Relevant Orders    EEG Continuous Monitoring With Video    Ambulatory Referral to Neuropsychology (Completed)    Ambulatory Referral to Occupational Therapy for Evaluation & Treatment (Completed)    Meningioma - Primary    Overview   IMPRESSION:     The previously identified dural based enhancing lesion arising from the  left aspect of falx cerebri abutting the superior aspect of the left  gyrus rectus probably representing a meningioma may be mildly increased  in size. On the current exam,  it measures 8 x 10 mm in greatest axial  dimensions as compared to 7 x 8 mm in greatest axial dimensions on a  prior study. Continued close imaging follow-up is suggested.     Again noted is a 2 to 3 mm hypoenhancing focus within the expected level  of the interface of the anterior and posterior pituitary that presumably  represents a pars intermedia cyst. This is unchanged.      This report was finalized on 8/22/2019 10:43 AM by Dr. Rickey Hutchins M.D.              Current Assessment & Plan   Being followed by [facility].             I spent 55 minutes caring for Keri on this date of service. This time includes time spent by me in the following activities:preparing for the visit, reviewing tests, obtaining and/or reviewing a separately obtained history, performing a medically appropriate examination and/or evaluation , counseling and educating the patient/family/caregiver, ordering medications, tests, or procedures, documenting information in the medical record, independently interpreting results and communicating that information with the patient/family/caregiver, and care coordination    Follow Up   No follow-ups on file.  Patient was given instructions and counseling regarding her condition or for health maintenance advice. Please see specific information pulled into the AVS if appropriate.

## 2025-03-20 NOTE — ASSESSMENT & PLAN NOTE
64 year old woman with memory loss and confusion.  Of note she has history of anxiety, depression, MI, carpal tunnel syndrome, coronary disease, obstructive sleep apnea, restless legs, hypertension, vitamin D deficiency, bipolar disorder, Afib on Eliquis, chronic kidney disease, hyperlipidemia, GERD, and hypothyroidism and was more recently evaluated by my colleagues in the hospital for dizziness and upper extremity jerking and previously by Dr. Cook who has since left this practice for memory impairment which at the time was felt to be more related to attention and concentration issues likely related to her mood disorders as she had stable MoCA at that time.  She scored a 23 out of 30 on her MoCA in 2019. She scores a 22 out of 30 in 7/2023.  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  She has been followed by [facility] for the meningioma.  She had a routing EEG on 9/17/2024 which was mildly abnormal due to mild slowing.  She had been on gabapentin and there was concern with regards to her taking too much gabapentin potentially causing toxic metabolic encephalopathy and she reports her dose of gabapentin was reduced since that time.  She had lab work on 2/21/2025 including CMP with mildly elevated blood glucose and normal CBC.  She also had elevated antibody testing to intrinsic factor and parietal cell consistent with pernicious anemia with her most recent Vit B12 level on 2/18/2025 was 1455.  She has also had normal TSH level this year and normal globulin.  In 1/2025 she went to check the mail and she felt confused and  "lightheaded and she thought that she had left the car in park but she left it in drive and she put her foot on the accelerator and thought it was the break and she ended up hitting a cement block in the grass and stopped. She was able to check the mail and drive back home but still felt off like shew as not there and her \"head was in a bubble\".  She was having a hard time concentrating.  She has a hard time remembering names of people and objects.  She thinks she has spell of confusion once every couple months.  She lives by herself.  She has two children in [place].  She does not have POA or Living Will.  She denies any issus with cooking, burning food.  She denies any other driving accidents.  She thinks she may space out sometimes.  She does report taking lamotrigine 125 mg BID.  She is being followed by psychiatry and therapy.  She reports a family history of epilepsy in her mother. No history of childhood seizures.  No history of head trauma, meningitis or encephalitis.  She denies any thoughts of hurting herself or anyone else.  She does report having significant stress.  I will order a more prolonged EEG to capture both awake and sleep states for further evaluation of her underlying risk for epileptic seizures given meningioma and reported family history of seizures in her mother.  I will refer her for formal neuropsychology testing for further evaluation as her SLUMS today of 24/30 appears to be stable compared to prior MoCA.  I advised her to continue following up with her psychiatrist and psychologist as well.  Advised her to exercise and socialize.  I will also refer her to have formal community mobility assessment to be sure she is ok to continue driving.  Advised her not to be driving if she is confused and altered and discussed safety concerns.  Also advised her to have her Vit B12 levels routinely checked and supplemented given pernicious anemia.  Will follow up after testing is completed and " sooner if needed.

## 2025-03-24 ENCOUNTER — OFFICE VISIT (OUTPATIENT)
Dept: ENDOCRINOLOGY | Age: 65
End: 2025-03-24
Payer: MEDICARE

## 2025-03-24 VITALS
WEIGHT: 202.6 LBS | OXYGEN SATURATION: 97 % | HEIGHT: 63 IN | SYSTOLIC BLOOD PRESSURE: 122 MMHG | TEMPERATURE: 97.7 F | HEART RATE: 58 BPM | BODY MASS INDEX: 35.9 KG/M2 | DIASTOLIC BLOOD PRESSURE: 80 MMHG

## 2025-03-24 DIAGNOSIS — D51.0 PERNICIOUS ANEMIA: ICD-10-CM

## 2025-03-24 DIAGNOSIS — E03.9 HYPOTHYROIDISM (ACQUIRED): ICD-10-CM

## 2025-03-24 DIAGNOSIS — E23.6 PITUITARY CYST: ICD-10-CM

## 2025-03-24 DIAGNOSIS — I25.10 CORONARY ARTERY DISEASE INVOLVING NATIVE CORONARY ARTERY OF NATIVE HEART WITHOUT ANGINA PECTORIS: ICD-10-CM

## 2025-03-24 DIAGNOSIS — M85.89 OSTEOPENIA OF MULTIPLE SITES: ICD-10-CM

## 2025-03-24 DIAGNOSIS — R79.9 ABNORMAL FINDING OF BLOOD CHEMISTRY, UNSPECIFIED: ICD-10-CM

## 2025-03-24 DIAGNOSIS — K29.40 AUTOIMMUNE GASTRITIS: ICD-10-CM

## 2025-03-24 DIAGNOSIS — E78.5 HYPERLIPIDEMIA, UNSPECIFIED HYPERLIPIDEMIA TYPE: ICD-10-CM

## 2025-03-24 DIAGNOSIS — E03.9 PRIMARY HYPOTHYROIDISM: Primary | ICD-10-CM

## 2025-03-24 DIAGNOSIS — G47.33 OSA (OBSTRUCTIVE SLEEP APNEA): ICD-10-CM

## 2025-03-24 DIAGNOSIS — I10 ESSENTIAL HYPERTENSION: ICD-10-CM

## 2025-03-24 PROCEDURE — 3079F DIAST BP 80-89 MM HG: CPT | Performed by: INTERNAL MEDICINE

## 2025-03-24 PROCEDURE — 99214 OFFICE O/P EST MOD 30 MIN: CPT | Performed by: INTERNAL MEDICINE

## 2025-03-24 PROCEDURE — 3074F SYST BP LT 130 MM HG: CPT | Performed by: INTERNAL MEDICINE

## 2025-03-24 PROCEDURE — G2211 COMPLEX E/M VISIT ADD ON: HCPCS | Performed by: INTERNAL MEDICINE

## 2025-03-24 RX ORDER — LEVOTHYROXINE SODIUM 88 UG/1
88 TABLET ORAL EVERY MORNING
Qty: 90 TABLET | Refills: 2 | Status: SHIPPED | OUTPATIENT
Start: 2025-03-24

## 2025-03-24 NOTE — PROGRESS NOTES
Subjective   Keri Bhagat is a 64 y.o. female.     History of Present Illness     She has hypothyroidism and has been on levothyroxine 88 µg per day. Thyroid ultrasound done in 10/17 showed stable 0.5 cm solid nodule in the right.  She denies dysphagia or pressure symptoms.  Thyroid ultrasound done in September 2020 was normal.  TSH done in September 2024 is normal at 0.846.      She has no history of head or neck radiation therapy.       She had an upper endoscopy with dilatation in June 2016 done by Dr. Rodriguez.  She has gastritis and hiatal hernia.   She has intermittent heartburn.  She denies melena or hematochezia.     She had a LAP-BAND and hiatal hernia repair done by Dr. Sims February 2018.     She had an EGD and colonoscopy done by Dr. Rodriguez in February 2025.  Pathology showed chronic gastritis with focal intestinal metaplasia.  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  Benign colonic mucosa with prominent lymphoid aggregate.  She was advised repeat colonoscopy in 2030.  Parietal cell antibodies and intrinsic factor antibodies were elevated.  She is on Prevacid, sucralfate and Mylanta.    She has vitamin B12 deficiency and is on vitamin B complex 1 capsule twice daily.     She has history of hypertension, paroxysmal atrial fibrillation and coronary artery disease. She had a previous heart attack in 2000 and had angioplasty was 1 stent. She had a cardiac catheterization done in 2015 which showed a patent stent to the LAD. She had normal nuclear stress test in September 2024.  She denies chest pain or SOB. She is on Eliquis,  and Toprol-XL 25 mg/day and follows with Dr. Burroughs.        She has hyperlipidemia and has been on atorvastatin 80 mg once a day.  She did not have side effects when she was on simvastatin before.  She denies any myalgia.       She has no previous history of diabetes mellitus.  Hemoglobin A1c done in April 2022 is normal at 5.4%.  Hemoglobin A1c done  in February 2024 is normal at 5.4%.  Her mother and son have diabetes mellitus.  She was started on Wegovy by her PCP in August 2024.  She denies side effects from it.  She has lost 9 pounds since September 2024.      She has sleep apnea and is using her BiPap.  She wakes up tested.     She has osteopenia on bone density done in November 2018.  She had her natural menopause at age 54 and was never on hormone replacement therapy.  She has no parental history of hip fractures.  She denies alcohol abuse.       Bone density done in March 2024 showed osteopenia of lumbar spine and right hip.  10-year risk for major osteoporotic fracture 7.4% and for hip fracture is 1%.  She is on calcium with vitamin D 1 tablet twice a day.  She does not exercise regularly.  She denies alcohol abuse.     She smoked cigarettes for more than 40 years and switched to electronic cigarettes in 2017.  She is on Wellbutrin for depression     She was seen by her PCP in February 2019 because of poor memory and poor balance.  MRI of the brain done in January 2019 showed a 8 mm lesion on falx cerebri most likely a small meningioma.  Incidental to the study, there is a suggestion of a hypoenhancing focus measuring 4 mm in diameter on the right aspect of the anterior pituitary.  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  There is an incidental arachnoid cyst measuring up to 1.9 cm anterior to the right cerebellar hemisphere.       MRI of the pituitary done in August 2021 showed a stable pars intermedia cyst and stable meningioma on the falx cerebri.  MRI of the pituitary done on July 31, 2024 showed a small pars intermedia cyst and an 11 mm meningioma to the left of the falx.    She is undergoing neurology evaluation by Dr. Oshea.     She was seen by Dr. Mercado on August 9, 2024.  " MRI of the cervical spine done on August 28, 2022 showed small central disc osteophyte complex at C2-C3 which abuts and mildly flattens the ventral surface of the cord.  She had a follow-up with Dr. Mercado on November 8, 2024.  She goes to physical therapy for gait imbalance.     She is on disability because of arthritis.    The following portions of the patient's history were reviewed and updated as appropriate: allergies, current medications, past family history, past medical history, past social history, past surgical history, and problem list.    Review of Systems   Eyes: Negative.    Respiratory:  Negative for shortness of breath and wheezing.    Cardiovascular:  Negative for chest pain and palpitations.   Gastrointestinal: Negative.    Genitourinary: Negative.    Musculoskeletal:  Negative for myalgias.   Neurological:  Negative for seizures, weakness and numbness.     Vitals:    03/24/25 1528   BP: 122/80   Pulse: 58   Temp: 97.7 °F (36.5 °C)   TempSrc: Oral   SpO2: 97%   Weight: 91.9 kg (202 lb 9.6 oz)   Height: 160 cm (62.99\")      Objective   Physical Exam  Constitutional:       Appearance: Normal appearance. She is not toxic-appearing or diaphoretic.   Eyes:      General: No scleral icterus.        Right eye: No discharge.         Left eye: No discharge.      Extraocular Movements: Extraocular movements intact.   Cardiovascular:      Rate and Rhythm: Normal rate and regular rhythm.      Heart sounds: Normal heart sounds. No murmur heard.     No friction rub. No gallop.   Pulmonary:      Breath sounds: Normal breath sounds. No rales.   Chest:      Chest wall: No tenderness.   Abdominal:      General: Bowel sounds are normal.      Palpations: Abdomen is soft.      Tenderness: There is no right CVA tenderness or left CVA tenderness.   Musculoskeletal:      Right lower leg: No edema.      Left lower leg: No edema.   Neurological:      Mental Status: She is alert and oriented to person, place, and time. "       Telephone on 02/26/2025   Component Date Value Ref Range Status    Parietal Cell Ab 02/27/2025 170.0 (H)  0.0 - 20.0 Units Final    Comment:                                 Negative    0.0 - 20.0                                  Equivocal  20.1 - 24.9                                  Positive         >24.9  Parietal Cell Antibodies are found in 90% of patients  with pernicious anemia and 30% of first degree  relatives with pernicious anemia.      Intrinsic Factor Antibodies 02/27/2025 45.3 (H)  0.0 - 1.1 AU/mL Final     Assessment & Plan   Diagnoses and all orders for this visit:    1. Primary hypothyroidism (Primary)  -     TSH  -     T4, Free  -     Hemoglobin A1c    2. Hypothyroidism (acquired)  -     levothyroxine (SYNTHROID, LEVOTHROID) 88 MCG tablet; Take 1 tablet by mouth Every Morning. Indications: Underactive Thyroid  Dispense: 90 tablet; Refill: 2    3. Autoimmune gastritis  -     Adrenal 21-Hydroxylase Autoantibodies  -     CBC & Differential    4. Pernicious anemia  -     Adrenal 21-Hydroxylase Autoantibodies  -     CBC & Differential    5. Essential hypertension    6. Coronary artery disease involving native coronary artery of native heart without angina pectoris  -     Lipid Panel    7. Hyperlipidemia, unspecified hyperlipidemia type  -     Comprehensive Metabolic Panel  -     Lipid Panel  -     Hemoglobin A1c    8. ROCKY (obstructive sleep apnea)    9. 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 Abnormal finding of blood chemistry, unspecified  -     Hemoglobin A1c      Continue levothyroxine 88 mcg a day.    Continue vitamin B complex 1 capsule twice daily.    Continue Toprol-XL and Eliquis per cardiologist.    Continue atorvastatin 80 mg/day.    Continue BiPAP.    Continue calcium with vitamin D 1 tablet twice a day.  Repeat bone density in March 2026.    Follow-up with Dr. Oshea as scheduled.    Copy of my note sent to Fermin  Epley, NP, Dr. Oshea, and Dr. Burroughs    RTC 6 mos

## 2025-03-28 ENCOUNTER — TELEPHONE (OUTPATIENT)
Dept: NEUROLOGY | Facility: CLINIC | Age: 65
End: 2025-03-28

## 2025-03-28 NOTE — TELEPHONE ENCOUNTER
Provider: DR STANFORD    Caller: Keri Bhagat    Relationship to Patient: Self    Phone Number: [phone]    Reason for Call: STATES SHE IS HAVING TROUBLE GETTING NEUROPSYCH TESTING SCHEDULE. CAN'T GET ANYONE TO CALL HER BACK. WOULD LIKE TO SEE IF OFFICE CAN SCHEDULE APPT FOR HER & CALL HER BACK WITH INFO. PLEASE REVIEW & ADVISE, THANK YOU.

## 2025-04-06 ENCOUNTER — HOSPITAL ENCOUNTER (EMERGENCY)
Facility: HOSPITAL | Age: 65
Discharge: HOME | End: 2025-04-06
Attending: STUDENT IN AN ORGANIZED HEALTH CARE EDUCATION/TRAINING PROGRAM | Admitting: EMERGENCY MEDICINE
Payer: MEDICARE

## 2025-04-06 ENCOUNTER — APPOINTMENT (OUTPATIENT)
Dept: CT IMAGING | Facility: HOSPITAL | Age: 65
End: 2025-04-06
Payer: MEDICARE

## 2025-04-06 VITALS
HEIGHT: 63 IN | DIASTOLIC BLOOD PRESSURE: 82 MMHG | TEMPERATURE: 98.4 F | OXYGEN SATURATION: 94 % | BODY MASS INDEX: 36.86 KG/M2 | HEART RATE: 55 BPM | SYSTOLIC BLOOD PRESSURE: 135 MMHG | RESPIRATION RATE: 16 BRPM | WEIGHT: 208 LBS

## 2025-04-06 DIAGNOSIS — R10.9 RIGHT SIDED ABDOMINAL PAIN: Primary | ICD-10-CM

## 2025-04-06 LAB
ALBUMIN SERPL-MCNC: 4.5 G/DL (ref 3.5–5.2)
ALBUMIN/GLOB SERPL: 1.7 G/DL
ALP SERPL-CCNC: 88 U/L (ref 39–117)
ALT SERPL W P-5'-P-CCNC: 24 U/L (ref 1–33)
ANION GAP SERPL CALCULATED.3IONS-SCNC: 8.9 MMOL/L (ref 5–15)
AST SERPL-CCNC: 23 U/L (ref 1–32)
BILIRUB SERPL-MCNC: 0.3 MG/DL (ref 0–1.2)
BILIRUB UR QL STRIP: NEGATIVE
BUN SERPL-MCNC: 12 MG/DL (ref 8–23)
BUN/CREAT SERPL: 11.7 (ref 7–25)
CALCIUM SPEC-SCNC: 9.4 MG/DL (ref 8.6–10.5)
CHLORIDE SERPL-SCNC: 105 MMOL/L (ref 98–107)
CLARITY UR: CLEAR
CO2 SERPL-SCNC: 25.1 MMOL/L (ref 22–29)
COLOR UR: YELLOW
CREAT SERPL-MCNC: 1.03 MG/DL (ref 0.57–1)
D-LACTATE SERPL-SCNC: 0.7 MMOL/L (ref 0.5–2)
EGFRCR SERPLBLD CKD-EPI 2021: 61.6 ML/MIN/1.73
GLOBULIN UR ELPH-MCNC: 2.7 GM/DL
GLUCOSE SERPL-MCNC: 94 MG/DL (ref 65–99)
GLUCOSE UR STRIP-MCNC: NEGATIVE MG/DL
HGB UR QL STRIP.AUTO: NEGATIVE
KETONES UR QL STRIP: NEGATIVE
LEUKOCYTE ESTERASE UR QL STRIP.AUTO: NEGATIVE
LIPASE SERPL-CCNC: 36 U/L (ref 13–60)
NITRITE UR QL STRIP: NEGATIVE
PH UR STRIP.AUTO: 6 [PH] (ref 5–8)
POTASSIUM SERPL-SCNC: 4.3 MMOL/L (ref 3.5–5.2)
PROT SERPL-MCNC: 7.2 G/DL (ref 6–8.5)
PROT UR QL STRIP: NEGATIVE
SODIUM SERPL-SCNC: 139 MMOL/L (ref 136–145)
SP GR UR STRIP: 1.01 (ref 1–1.03)
UROBILINOGEN UR QL STRIP: NORMAL

## 2025-04-06 PROCEDURE — 25010000002 DEXAMETHASONE SODIUM PHOSPHATE 10 MG/ML SOLUTION: Performed by: NURSE PRACTITIONER

## 2025-04-06 PROCEDURE — 74176 CT ABD & PELVIS W/O CONTRAST: CPT

## 2025-04-06 PROCEDURE — 99284 EMERGENCY DEPT VISIT MOD MDM: CPT | Performed by: NURSE PRACTITIONER

## 2025-04-06 PROCEDURE — 96375 TX/PRO/DX INJ NEW DRUG ADDON: CPT

## 2025-04-06 PROCEDURE — 25010000002 ONDANSETRON PER 1 MG: Performed by: NURSE PRACTITIONER

## 2025-04-06 PROCEDURE — 83605 ASSAY OF LACTIC ACID: CPT | Performed by: NURSE PRACTITIONER

## 2025-04-06 PROCEDURE — 83690 ASSAY OF LIPASE: CPT | Performed by: NURSE PRACTITIONER

## 2025-04-06 PROCEDURE — 80053 COMPREHEN METABOLIC PANEL: CPT | Performed by: NURSE PRACTITIONER

## 2025-04-06 PROCEDURE — 99284 EMERGENCY DEPT VISIT MOD MDM: CPT

## 2025-04-06 PROCEDURE — 25010000002 HYDROMORPHONE PER 4 MG: Performed by: NURSE PRACTITIONER

## 2025-04-06 PROCEDURE — 81003 URINALYSIS AUTO W/O SCOPE: CPT | Performed by: NURSE PRACTITIONER

## 2025-04-06 PROCEDURE — 96374 THER/PROPH/DIAG INJ IV PUSH: CPT

## 2025-04-06 PROCEDURE — 25010000002 KETOROLAC TROMETHAMINE PER 15 MG: Performed by: NURSE PRACTITIONER

## 2025-04-06 RX ORDER — KETOROLAC TROMETHAMINE 30 MG/ML
30 INJECTION, SOLUTION INTRAMUSCULAR; INTRAVENOUS ONCE
Status: COMPLETED | OUTPATIENT
Start: 2025-04-06 | End: 2025-04-06

## 2025-04-06 RX ORDER — CYCLOBENZAPRINE HCL 10 MG
10 TABLET ORAL 3 TIMES DAILY PRN
Qty: 30 TABLET | Refills: 0 | Status: SHIPPED | OUTPATIENT
Start: 2025-04-06 | End: 2025-04-16

## 2025-04-06 RX ORDER — SODIUM CHLORIDE 0.9 % (FLUSH) 0.9 %
10 SYRINGE (ML) INJECTION AS NEEDED
Status: DISCONTINUED | OUTPATIENT
Start: 2025-04-06 | End: 2025-04-06 | Stop reason: HOSPADM

## 2025-04-06 RX ORDER — IBUPROFEN 600 MG/1
600 TABLET, FILM COATED ORAL EVERY 6 HOURS PRN
Qty: 30 TABLET | Refills: 0 | Status: SHIPPED | OUTPATIENT
Start: 2025-04-06 | End: 2025-04-16

## 2025-04-06 RX ORDER — ONDANSETRON 2 MG/ML
4 INJECTION INTRAMUSCULAR; INTRAVENOUS ONCE
Status: COMPLETED | OUTPATIENT
Start: 2025-04-06 | End: 2025-04-06

## 2025-04-06 RX ORDER — HYDROMORPHONE HYDROCHLORIDE 1 MG/ML
0.5 INJECTION, SOLUTION INTRAMUSCULAR; INTRAVENOUS; SUBCUTANEOUS ONCE
Refills: 0 | Status: COMPLETED | OUTPATIENT
Start: 2025-04-06 | End: 2025-04-06

## 2025-04-06 RX ORDER — DEXAMETHASONE SODIUM PHOSPHATE 10 MG/ML
10 INJECTION, SOLUTION INTRAMUSCULAR; INTRAVENOUS ONCE
Status: COMPLETED | OUTPATIENT
Start: 2025-04-06 | End: 2025-04-06

## 2025-04-06 RX ADMIN — KETOROLAC TROMETHAMINE 30 MG: 30 INJECTION, SOLUTION INTRAMUSCULAR at 17:51

## 2025-04-06 RX ADMIN — ONDANSETRON 4 MG: 2 INJECTION, SOLUTION INTRAMUSCULAR; INTRAVENOUS at 17:51

## 2025-04-06 RX ADMIN — DEXAMETHASONE SODIUM PHOSPHATE 10 MG: 10 INJECTION, SOLUTION INTRAMUSCULAR; INTRAVENOUS at 20:08

## 2025-04-06 RX ADMIN — HYDROMORPHONE HYDROCHLORIDE 0.5 MG: 1 INJECTION, SOLUTION INTRAMUSCULAR; INTRAVENOUS; SUBCUTANEOUS at 18:26

## 2025-04-07 ENCOUNTER — HOSPITAL ENCOUNTER (OUTPATIENT)
Dept: NEUROLOGY | Facility: HOSPITAL | Age: 65
Discharge: HOME | End: 2025-04-07
Admitting: PSYCHIATRY & NEUROLOGY
Payer: MEDICARE

## 2025-04-07 DIAGNOSIS — R41.89 SPELL OF ALTERED COGNITION: ICD-10-CM

## 2025-04-07 PROCEDURE — 95713 VEEG 2-12 HR CONT MNTR: CPT

## 2025-04-07 PROCEDURE — 95718 EEG PHYS/QHP 2-12 HR W/VEEG: CPT | Performed by: PSYCHIATRY & NEUROLOGY

## 2025-04-07 NOTE — FSED PROVIDER NOTE
Subjective   History of Present Illness  64 yr old female presents C/O right flank and right side ABD pain.  States it started yesterday morning and feels like previous kidney stone pain she had.  Denies N/V, did have some nausea last night.  No fever or chills.  She denies back pain or injury. States the pain is like a sharp throbbing when it occurs.         Review of Systems   Constitutional: Negative.    HENT: Negative.     Respiratory: Negative.     Cardiovascular: Negative.    Gastrointestinal:  Positive for abdominal pain.   Genitourinary:  Positive for flank pain and frequency. Negative for decreased urine volume, dysuria, pelvic pain, urgency, vaginal bleeding, vaginal discharge and vaginal pain.   Skin: Negative.    Neurological: Negative.        Past Medical History:   Diagnosis Date    Abnormal ECG     Abnormal Pap smear of cervix     Adrenal adenoma 09/21/2022    Anemia     Aneurysm     Dr. Salvador did an ecocardiogram.    Angina pectoris     Ankle sprain     Anxiety     Arrhythmia     Arthritis     Arthritis of back     Arthritis of neck     Atrial fibrillation     Bipolar I disorder, single manic episode     depressive d(NOS)    Cataract 2021    Removed    Cervical disc herniation     Cervical dysplasia     Chlamydia     Claustrophobia     Colon polyp     Constipation     Coronary artery disease     COVID-19 01/10/2023    Death of family member     MOTHER IN FEB 2024    Depression     NO SUICIDAL PLANS    Dislocation of finger     Diverticular disease     Diverticulitis of colon     Dyspepsia     Encounter for follow-up examination after completed treatment for conditions other than malignant neoplasm 04/11/2018    Fracture of wrist     GERD (gastroesophageal reflux disease)     Gonorrhea     Heart attack     AGE 39    Heart murmur     Hiatal hernia     History of transfusion     2001    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    Lumbosacral disc disease Unsure    Lower left back is partial osteoarthritis and partial osteoporosis    Obesity     Osteopenia 2021    Bone density test    Periodic limb movement disorder     PID (pelvic inflammatory disease)     Preeclampsia     Renal insufficiency     Restless leg syndrome     LEFT SHOULDER    Rotator cuff syndrome     Sleep apnea     NO MACHINE CURRENTLY    Suicidal ideation 08/19/2016    history    Thyroid nodule     Urinary tract infection     Varicella     Visual impairment     Vitamin B12 deficiency     Vitamin D deficiency        No Known Allergies    Past Surgical History:   Procedure Laterality Date    ADENOIDECTOMY  1974    ANTERIOR CERVICAL DISCECTOMY W/ FUSION N/A 12/29/2016    Procedure: C3-4 anterior cervical discectomy and fusion with Depuy micro plate, ALLOGRAFT C3-4, AND HARDWARE REMOVAL C4-7.;  Surgeon: Hema Godwin MD;  Location: [facility] OR;  Service:     BARIATRIC SURGERY      CARDIAC CATHETERIZATION N/A 03/30/2017    Procedure: Left Heart Cath;  Surgeon: Tracey Vargas MD;  Location: [facility] CATH INVASIVE LOCATION;  Service:     CARDIAC CATHETERIZATION N/A 03/30/2017    Procedure: Coronary angiography;  Surgeon: Tracey Vargas MD;  [facility];  Service:     CARDIAC CATHETERIZATION N/A 03/30/2017    Procedure: Left ventriculography;  Surgeon: Tracey Vargas MD;  [facility];  Service:     CARDIAC CATHETERIZATION  03/30/2017    Procedure: Functional Flow [place];  Surgeon: Tracey Vargas MD;  Location: [facility] CATH INVASIVE LOCATION;  Service:     CARDIAC ELECTROPHYSIOLOGY  PROCEDURE N/A 10/16/2024    Procedure: Ablation SVT;  Surgeon: Eduar Almodovar MD;  Location: [facility] CATH INVASIVE LOCATION;  Service: Cardiovascular;  Laterality: N/A;    CARPAL TUNNEL RELEASE      CATARACT EXTRACTION Bilateral     CERVICAL BIOPSY  MMXVI    Dr. Jeffery.     CERVICAL DISCECTOMY ANTERIOR  04/2013    C4-7    COLONOSCOPY  04/20/2015    Diverticulosis, IH    COLONOSCOPY  03/09/2021    COLONOSCOPY N/A 02/13/2025    Procedure: COLONOSCOPY into cecum and terminal ileum with cold biopsy polypectomy;  Surgeon: Eduar Rodriguez MD;  Location: [facility] ENDOSCOPY;  Service: Gastroenterology;  Laterality: N/A;  pre- constipation, iron deficiency anemia  post- polyp, hemorrhoids, diverticulosis    COLPOSCOPY W/ BIOPSY / CURETTAGE  06/17/2016    LGSIL HPV positive. Results were normal repeat pap in one year. Chronic Cervicitis    CORONARY ANGIOPLASTY WITH STENT PLACEMENT      CORONARY STENT PLACEMENT      ENDOSCOPY  MMXV    Normal.  Dr. Rodriguez    ENDOSCOPY N/A 06/22/2017    Erythematous mucosa in the stomach  PATH: Chronic active gastritis, moderate with intestinal metaplasia     ENDOSCOPY N/A 02/13/2025    Procedure: ESOPHAGOGASTRODUODENOSCOPY (EGD) with biopsies;  Surgeon: Eduar Rodriguez MD;  Location: [facility] ENDOSCOPY;  Service: Gastroenterology;  Laterality: N/A;  pre- GERD, iron deficiency anemia  post- gastritis    EPIDURAL BLOCK      EYE SURGERY      Cateract removal    FRACTURE SURGERY      GASTRIC RESTRICTION SURGERY  2019    GASTRIC SLEEVE LAPAROSCOPIC      INCISION AND DRAINAGE LEG Left 05/12/2024    Procedure: INCISION AND DRAINAGE LOWER EXTREMITY WOUND CLOSURE KNEE;  Surgeon: Ajay Gutierrez MD;  Location: [facility] MAIN OR;  Service: Orthopedics;  Laterality: Left;    INGUINAL HERNIA REPAIR      JOINT REPLACEMENT  3/5/24    Left knee    KNEE SURGERY      LAPAROSCOPIC GASTRIC BANDING  02/2018    NECK SURGERY      SHOULDER ARTHROSCOPY  Left shoulder torn rotator cuff    SHOULDER SURGERY Left     RCR     TONSILLECTOMY      TONSILLECTOMY AND ADENOIDECTOMY      TOTAL KNEE ARTHROPLASTY Left 03/05/2024    Procedure: LEFT TOTAL KNEE ARTHROPLASTY WITH TAB NAVIGATION;  Surgeon: Tho Pritchard MD;  [place] [facility] [place] [facility] [place]  Service: [place]opedics;  Laterality: Left;    TRANSVAGINAL TAPING SUSPENSION N/A 12/06/2017    Procedure: MID URETHRAL SLING CYSTSCOPY;  Surgeon: Abby Méndez MD;  Location: [facility] MAIN OR;  Service:     TRIGGER POINT INJECTION      TUBAL ABDOMINAL LIGATION      TYMPANOSTOMY TUBE PLACEMENT Right     UPPER GASTROINTESTINAL ENDOSCOPY  approx 2021    WISDOM TOOTH EXTRACTION      WRIST FRACTURE SURGERY  Left wrist fracture repair    WRIST SURGERY Bilateral     carpal tunnel       Family History   Problem Relation Age of Onset    Diabetes type II Mother     Hypertension Mother     Osteoporosis Mother     Seizures Mother     Diabetes Mother     Arthritis Mother     Hyperlipidemia Mother     Cancer Mother     COPD Mother     Heart disease Mother     Kidney disease Mother     Heart failure Mother     Coronary artery disease Mother     Neuropathy Mother     Dementia Mother     COPD Father     Hypertension Father     Lung cancer Father     Heart attack Father     Liver cancer Father     Liver disease Father     Heart disease Father     Cancer Father         Lung cacer that metastasized  into the liver.    Arrhythmia Father     Alcohol abuse Father     Hyperlipidemia Father     Coronary artery disease Father     Thyroid disease Sister     Hypertension Sister     Bipolar disorder Sister     Depression Sister     ADD / ADHD Sister     Anxiety disorder Sister     Mental illness Sister     Hyperlipidemia Sister     Broken bones Sister     Colon polyps Sister         Non cancerous    Anemia Sister     Sleep apnea Sister     Migraines Sister     Thyroid disease Sister     Hypertension Sister     Bipolar disorder Sister     Anxiety disorder Sister     Depression Sister     Mental illness Sister      Hyperlipidemia Sister     Colon polyps Sister     Cancer Maternal Grandmother     Ovarian cancer Maternal Grandmother     No Known Problems Maternal Grandfather     No Known Problems Paternal Grandmother     No Known Problems Paternal Grandfather     Abnormal EKG Daughter     Hypertension Daughter     Bipolar disorder Daughter     Thyroid disease Daughter     Mental illness Daughter     Hyperlipidemia Daughter     Asthma Daughter     No Known Problems Son     Diabetes Son         Type 1    Diabetes Paternal Uncle     Heart failure Paternal Uncle     Anxiety disorder Sister     Depression Sister     Hyperlipidemia Sister     Thyroid disease Daughter     Anxiety disorder Sister         She's third on my list    Anxiety disorder Sister     Depression Sister     Mental illness Sister     Thyroid disease Sister     Stomach cancer Paternal Uncle     Breast cancer Neg Hx     Uterine cancer Neg Hx     Colon cancer Neg Hx     Malig Hyperthermia Neg Hx        Social History     Socioeconomic History    Marital status:     Number of children: 3    Years of education: 12   Tobacco Use    Smoking status: Every Day     Current packs/day: 0.00     Average packs/day: 1 pack/day for 37.0 years (37.0 ttl pk-yrs)     Types: Cigarettes     Start date: 1980     Last attempt to quit:      Years since quittin.2     Passive exposure: Never    Smokeless tobacco: Never    Tobacco comments:     Smoke e cigarettes now   Vaping Use    Vaping status: Every Day    Start date: 2017    Substances: Nicotine, Flavoring    Devices: Refillable tank   Substance and Sexual Activity    Alcohol use: Not Currently     Comment: RARELY    Drug use: Never    Sexual activity: Not Currently     Partners: Male     Birth control/protection: Condom, Abstinence, Tubal ligation           Objective   Physical Exam  Vitals and nursing note reviewed.   Constitutional:       Appearance: Normal appearance.   Pulmonary:      Effort: Pulmonary effort  is normal.      Breath sounds: Normal breath sounds and air entry.   Abdominal:      General: Abdomen is protuberant.      Palpations: Abdomen is soft.      Tenderness: There is abdominal tenderness in the right upper quadrant and right lower quadrant. There is right CVA tenderness. There is no left CVA tenderness, guarding or rebound.   Lymphadenopathy:      Head:      Right side of head: No submental, submandibular, tonsillar, preauricular, posterior auricular or occipital adenopathy.      Left side of head: No submental, submandibular, tonsillar, preauricular, posterior auricular or occipital adenopathy.   Skin:     General: Skin is warm and dry.      Capillary Refill: Capillary refill takes less than 2 seconds.   Neurological:      Mental Status: She is alert.         Procedures           ED Course    Reviewed Lab work and CT results with patient. No Emergent findings.  Will treat with NSAIDs and Muscle relaxant and if no improvement she is to F/U with her GI and PCP provider for further evaluation and management                                       Medical Decision Making  Problems Addressed:  Right sided abdominal pain: complicated acute illness or injury    Amount and/or Complexity of Data Reviewed  Radiology: ordered.    Risk  Prescription drug management.        Final diagnoses:   Right sided abdominal pain       ED Disposition  ED Disposition       ED Disposition   Discharge    Condition   Stable    Comment   --               Epley, James, APRN  [address]  [place] [id number] [phone]      As needed, If symptoms worsen         Medication List        New Prescriptions      cyclobenzaprine 10 MG tablet  Commonly known as: FLEXERIL  Take 1 tablet by mouth 3 (Three) Times a Day As Needed for Muscle Spasms for up to 10 days.     ibuprofen 600 MG tablet  Commonly known as: ADVIL,MOTRIN  Take 1 tablet by mouth Every 6 (Six) Hours As Needed for Mild Pain for up to 10 days.             Changed      metoprolol succinate XL 25 MG 24 hr tablet  Commonly known as: Toprol XL  Take 1 tablet by mouth Daily. For bp  Indications: High Blood Pressure  What changed: additional instructions               Where to Get Your Medications        These medications were sent to [facility] [id number] - [place], [place] - [address] AT [facility] - [phone]  - [phone]   [address], [facility] [address]      Phone: [phone]   cyclobenzaprine 10 MG tablet  ibuprofen 600 MG tablet

## 2025-04-09 ENCOUNTER — OFFICE VISIT (OUTPATIENT)
Dept: FAMILY MEDICINE CLINIC | Facility: CLINIC | Age: 65
End: 2025-04-09
Payer: MEDICARE

## 2025-04-09 VITALS
OXYGEN SATURATION: 98 % | BODY MASS INDEX: 36.25 KG/M2 | HEART RATE: 58 BPM | DIASTOLIC BLOOD PRESSURE: 76 MMHG | HEIGHT: 63 IN | SYSTOLIC BLOOD PRESSURE: 122 MMHG | TEMPERATURE: 97.7 F | WEIGHT: 204.6 LBS

## 2025-04-09 DIAGNOSIS — M54.50 ACUTE RIGHT-SIDED LOW BACK PAIN, UNSPECIFIED WHETHER SCIATICA PRESENT: ICD-10-CM

## 2025-04-09 DIAGNOSIS — G58.9 PINCHED NERVE: Primary | ICD-10-CM

## 2025-04-09 PROCEDURE — 3074F SYST BP LT 130 MM HG: CPT | Performed by: NURSE PRACTITIONER

## 2025-04-09 PROCEDURE — 1125F AMNT PAIN NOTED PAIN PRSNT: CPT | Performed by: NURSE PRACTITIONER

## 2025-04-09 PROCEDURE — 3078F DIAST BP <80 MM HG: CPT | Performed by: NURSE PRACTITIONER

## 2025-04-09 PROCEDURE — 99214 OFFICE O/P EST MOD 30 MIN: CPT | Performed by: NURSE PRACTITIONER

## 2025-04-09 RX ORDER — METHYLPREDNISOLONE 4 MG/1
TABLET ORAL
Qty: 21 TABLET | Refills: 0 | Status: SHIPPED | OUTPATIENT
Start: 2025-04-09

## 2025-04-09 NOTE — PROGRESS NOTES
"Chief Complaint  Back Pain (Somewhat improved. Seen in ER on 4/6 )    Subjective        Keri Bhagat presents to [facility]  History of Present Illness  Very pleasant patient here today follow-up, recent acute onset of severe low back pain right low back approximately L3-L4 on the right, midline and somewhat to the right, and radiated to her right abdomen, but not into her groin,  Without chest pain or increased shortness of breath or fever  She had a CT was negative for any stone on that side, or other acute pathology of her CT,  She has no focal weakness, she received a steroid shot  In the emergency room and her pain levels improved quite a bit down to a 3 presently,  She has no focal weakness no fever chills no associate abdominal pain no hematuria she is improved substantially and she is here as a follow-up as she was instructed to return to office here.  She just had her EEG and she says it was normal thankfully, she is doing or improved overall    Back Pain    Objective   Vital Signs:  /76 (BP Location: Left arm, Patient Position: Sitting, Cuff Size: Adult)   Pulse 58   Temp 97.7 °F (36.5 °C) (Temporal)   Ht 160 cm (63\")   Wt 92.8 kg (204 lb 9.6 oz)   SpO2 98%   BMI 36.24 kg/m²   Estimated body mass index is 36.24 kg/m² as calculated from the following:    Height as of this encounter: 160 cm (63\").    Weight as of this encounter: 92.8 kg (204 lb 9.6 oz).            Physical Exam  Vitals reviewed.   Constitutional:       Appearance: Normal appearance. She is well-developed.      Comments: Pleasant appears well   HENT:      Head: Normocephalic.      Nose: Nose normal.   Eyes:      General: No scleral icterus.     Conjunctiva/sclera: Conjunctivae normal.      Pupils: Pupils are equal, round, and reactive to light.   Neck:      Thyroid: No thyromegaly.      Vascular: No JVD.   Cardiovascular:      Rate and Rhythm: Normal rate and regular rhythm.      Heart sounds: " Normal heart sounds. No murmur heard.     No friction rub. No gallop.   Pulmonary:      Effort: Pulmonary effort is normal. No respiratory distress.      Breath sounds: Normal breath sounds. No stridor. No wheezing or rales.   Abdominal:      General: Bowel sounds are normal. There is no distension.      Palpations: Abdomen is soft.      Tenderness: There is no abdominal tenderness.      Comments: No hepatosplenomegaly, no ascites,   Musculoskeletal:         General: No tenderness.      Cervical back: Neck supple.      Comments: Mild tenderness, approximately L3-L4-L5 midline into the right, not extending to the flank  No flank tenderness abdomen soft, nontender no distention  No lower extremity swelling or tenderness,  No focal weakness     Lymphadenopathy:      Cervical: No cervical adenopathy.   Skin:     General: Skin is warm and dry.      Findings: No erythema or rash.   Neurological:      General: No focal deficit present.      Mental Status: She is alert and oriented to person, place, and time. Mental status is at baseline.      Deep Tendon Reflexes: Reflexes are normal and symmetric.   Psychiatric:         Behavior: Behavior normal.         Thought Content: Thought content normal.         Judgment: Judgment normal.      Result Review :                Assessment and Plan   There are no diagnoses linked to this encounter.         Follow Up   No follow-ups on file.  Patient was given instructions and counseling regarding her condition or for health maintenance advice. Please see specific information pulled into the AVS if appropriate.     There are no Patient Instructions on file for this visit.

## 2025-04-09 NOTE — PATIENT INSTRUCTIONS
Discharge instructions,    A little more diligent presently not to fall and to avoid activities causing pain give this a week or 2 to continue to to resolve  If a flareup you can take ibuprofen 800 or Medrol pack resume  Do not take both  Tylenol with either if needed for temporarily  Always extra water with Medrol take with food lowest effective dose shortest time possible  Update me your progress in 2 weeks  If not improving will send you the therapy with a good follow-up  Fever chest pain shortness of breath weakness bowel or bladder incontinence retention Sudbeck paresthesias emergency

## 2025-04-12 LAB
21HYDROXYLASE AB SER QL: NEGATIVE
25(OH)D3+25(OH)D2 SERPL-MCNC: 37.8 NG/ML (ref 30–100)
ALBUMIN SERPL-MCNC: 4.4 G/DL (ref 3.9–4.9)
ALP SERPL-CCNC: 89 IU/L (ref 44–121)
ALT SERPL-CCNC: 27 IU/L (ref 0–32)
AST SERPL-CCNC: 29 IU/L (ref 0–40)
BASOPHILS # BLD AUTO: 0 X10E3/UL (ref 0–0.2)
BASOPHILS NFR BLD AUTO: 0 %
BILIRUB SERPL-MCNC: 0.3 MG/DL (ref 0–1.2)
BUN SERPL-MCNC: 10 MG/DL (ref 8–27)
BUN/CREAT SERPL: 9 (ref 12–28)
CALCIUM SERPL-MCNC: 9.3 MG/DL (ref 8.7–10.3)
CHLORIDE SERPL-SCNC: 104 MMOL/L (ref 96–106)
CHOLEST SERPL-MCNC: 166 MG/DL (ref 100–199)
CO2 SERPL-SCNC: 24 MMOL/L (ref 20–29)
CREAT SERPL-MCNC: 1.14 MG/DL (ref 0.57–1)
EGFRCR SERPLBLD CKD-EPI 2021: 54 ML/MIN/1.73
EOSINOPHIL # BLD AUTO: 0.2 X10E3/UL (ref 0–0.4)
EOSINOPHIL NFR BLD AUTO: 2 %
ERYTHROCYTE [DISTWIDTH] IN BLOOD BY AUTOMATED COUNT: 12.4 % (ref 11.7–15.4)
GLOBULIN SER CALC-MCNC: 2.5 G/DL (ref 1.5–4.5)
GLUCOSE SERPL-MCNC: 88 MG/DL (ref 70–99)
HBA1C MFR BLD: 5.5 % (ref 4.8–5.6)
HCT VFR BLD AUTO: 43.1 % (ref 34–46.6)
HDLC SERPL-MCNC: 68 MG/DL
HGB BLD-MCNC: 14.1 G/DL (ref 11.1–15.9)
IMM GRANULOCYTES # BLD AUTO: 0 X10E3/UL (ref 0–0.1)
IMM GRANULOCYTES NFR BLD AUTO: 0 %
IMP & REVIEW OF LAB RESULTS: NORMAL
LDLC SERPL CALC-MCNC: 79 MG/DL (ref 0–99)
LYMPHOCYTES # BLD AUTO: 3.6 X10E3/UL (ref 0.7–3.1)
LYMPHOCYTES NFR BLD AUTO: 40 %
MCH RBC QN AUTO: 30.9 PG (ref 26.6–33)
MCHC RBC AUTO-ENTMCNC: 32.7 G/DL (ref 31.5–35.7)
MCV RBC AUTO: 94 FL (ref 79–97)
MONOCYTES # BLD AUTO: 0.6 X10E3/UL (ref 0.1–0.9)
MONOCYTES NFR BLD AUTO: 7 %
NEUTROPHILS # BLD AUTO: 4.5 X10E3/UL (ref 1.4–7)
NEUTROPHILS NFR BLD AUTO: 51 %
PLATELET # BLD AUTO: 262 X10E3/UL (ref 150–450)
POTASSIUM SERPL-SCNC: 4.2 MMOL/L (ref 3.5–5.2)
PROT SERPL-MCNC: 6.9 G/DL (ref 6–8.5)
RBC # BLD AUTO: 4.57 X10E6/UL (ref 3.77–5.28)
REPORT: NORMAL
SODIUM SERPL-SCNC: 143 MMOL/L (ref 134–144)
T4 FREE SERPL-MCNC: 0.95 NG/DL (ref 0.82–1.77)
TRIGL SERPL-MCNC: 109 MG/DL (ref 0–149)
TSH SERPL DL<=0.005 MIU/L-ACNC: 2.41 UIU/ML (ref 0.45–4.5)
VLDLC SERPL CALC-MCNC: 19 MG/DL (ref 5–40)
WBC # BLD AUTO: 8.9 X10E3/UL (ref 3.4–10.8)

## 2025-04-14 ENCOUNTER — TELEPHONE (OUTPATIENT)
Dept: ORTHOPEDIC SURGERY | Facility: CLINIC | Age: 65
End: 2025-04-14
Payer: MEDICARE

## 2025-04-16 ENCOUNTER — TELEPHONE (OUTPATIENT)
Dept: ORTHOPEDIC SURGERY | Facility: CLINIC | Age: 65
End: 2025-04-16
Payer: MEDICARE

## 2025-04-16 NOTE — TELEPHONE ENCOUNTER
I spoke with her.  We discussed her MRI results.  I explained that the MRI does not show a recurrent rotator cuff tear.  I do not see anything on the MRI that looks like it would be amenable to any further surgery.  She has an appointment to see me in 2 weeks.  We can review the MRI in person at that time and discuss options.  She is in agreement with that plan.

## 2025-04-18 ENCOUNTER — TELEPHONE (OUTPATIENT)
Dept: GASTROENTEROLOGY | Facility: CLINIC | Age: 65
End: 2025-04-18
Payer: MEDICARE

## 2025-04-18 NOTE — TELEPHONE ENCOUNTER
----- Message from Bill Finley sent at 3/18/2025  8:09 AM EDT -----  1 year ,thanks  ----- Message -----  From: Erendira Arteaga PA-C  Sent: 3/17/2025   3:25 PM EDT  To: Bill Finley MD    64F patient of Dr. Rodriguez switching over to your care.  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  B12 actually high since she is taking a lot of extra B12-I had her reduce and we will recheck in 3 months.  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  When do you recommend a repeat EGD?  I will place it in the health maintenance section.

## 2025-04-21 NOTE — TELEPHONE ENCOUNTER
Patient called. She states she recently took a Medrol dose quoc and completed sometime around 4/12.   The past 4 days she has had extreme heartburn. Reports she just recently restarted her Sucralfate and Mylanta but has not had any relief. Advised will send Erendira an update. She verb understanding.

## 2025-04-21 NOTE — TELEPHONE ENCOUNTER
Hub staff attempted to follow warm transfer process and was unsuccessful     Caller: Keri Bhagat    Relationship to patient: Self    Best call back number: [phone]    Patient is needing: PT IS CALLING TO SEE WHAT CAN BE DONE ABOUT HER EXTREME HEARTBURN SHE IS CONTINUING TO HAVE FOR THE LAST 4 DAYS. PT STATED SHE HAS BEEN TAKING THE MEDICATION FROM US AND DR EPLEY.    PLEASE CALL AND ADVISE. IT'S OK TO [facility].

## 2025-04-30 ENCOUNTER — OFFICE VISIT (OUTPATIENT)
Dept: ORTHOPEDIC SURGERY | Facility: CLINIC | Age: 65
End: 2025-04-30
Payer: MEDICARE

## 2025-04-30 VITALS — TEMPERATURE: 98.2 F | BODY MASS INDEX: 35.37 KG/M2 | WEIGHT: 199.6 LBS | HEIGHT: 63 IN

## 2025-04-30 DIAGNOSIS — G89.29 CHRONIC LEFT SHOULDER PAIN: Primary | ICD-10-CM

## 2025-04-30 DIAGNOSIS — M25.512 CHRONIC LEFT SHOULDER PAIN: Primary | ICD-10-CM

## 2025-04-30 PROCEDURE — 1159F MED LIST DOCD IN RCRD: CPT | Performed by: ORTHOPAEDIC SURGERY

## 2025-04-30 PROCEDURE — 1160F RVW MEDS BY RX/DR IN RCRD: CPT | Performed by: ORTHOPAEDIC SURGERY

## 2025-04-30 PROCEDURE — 99213 OFFICE O/P EST LOW 20 MIN: CPT | Performed by: ORTHOPAEDIC SURGERY

## 2025-04-30 RX ORDER — IBUPROFEN 600 MG/1
TABLET, FILM COATED ORAL
COMMUNITY
Start: 2025-04-07

## 2025-04-30 NOTE — PROGRESS NOTES
Patient: Keri Bhagat    YOB: 1960    Medical Record Number: [id number]    Chief Complaints: Follow-up regarding left shoulder pain    History of Present Illness:     64 y.o. female patient who presents for follow-up of the left shoulder.  Since I last saw her, she says the left shoulder has gotten significantly better.  She still has some discomfort but she says that she is able to move and use it fairly normally.  She did get the MRI and that study is available for review today.    Allergies: No Known Allergies    Home Medications:    Current Outpatient Medications:     aluminum-magnesium hydroxide-simethicone (Mylanta Maximum Strength) 400-400-40 MG/5ML suspension, Take 20 mL by mouth Every 6 (Six) Hours As Needed for Indigestion or Heartburn., Disp: 355 mL, Rfl: 3    atorvastatin (LIPITOR) 80 MG tablet, Take 1 tablet by mouth Daily., Disp: , Rfl:     B Complex Vitamins (vitamin b complex) capsule capsule, Take 1 capsule by mouth 2 (Two) Times a Day. Indications: Vitamin Deficiency, Disp: , Rfl:     buPROPion XL (WELLBUTRIN XL) 150 MG 24 hr tablet, Take 1 tablet by mouth 2 (Two) Times a Day. Indications: Major Depressive Disorder, Disp: , Rfl:     Calcium Citrate-Vitamin D (CITRUS CALCIUM/VITAMIN D PO), Take 1 tablet by mouth 2 (Two) Times a Day. Indications: supplement, Disp: , Rfl:     Eliquis 5 MG tablet tablet, TAKE ONE TABLET BY MOUTH EVERY 12 HOURS, Disp: 180 tablet, Rfl: 1    estradiol (ESTRACE) 0.1 MG/GM vaginal cream, Insert 1 g into the vagina 3 (Three) Times a Week. Indications: Vulvovaginal Atrophy, Disp: , Rfl:     Ferrous Sulfate (IRON PO), Take 1 tablet by mouth 2 (Two) Times a Day. Indications: Supplement, Disp: , Rfl:     gabapentin (NEURONTIN) 100 MG capsule, TAKE 2 CAPSULES BY MOUTH TWICE A DAY, Disp: 360 capsule, Rfl: 1    ibuprofen (IBU) 600 MG tablet, , Disp: , Rfl:     lamoTRIgine (LaMICtal) 100 MG tablet, Take 1 tablet by mouth Daily., Disp: , Rfl:     lamoTRIgine  (LaMICtal) 25 MG tablet, Take 1 tablet by mouth Daily., Disp: , Rfl:     lansoprazole (PREVACID) 30 MG capsule, Take 1 capsule by mouth 2 (Two) Times a Day., Disp: , Rfl:     levothyroxine (SYNTHROID, LEVOTHROID) 88 MCG tablet, Take 1 tablet by mouth Every Morning. Indications: Underactive Thyroid, Disp: 90 tablet, Rfl: 2    metoprolol succinate XL (Toprol XL) 25 MG 24 hr tablet, Take 1 tablet by mouth Daily. For bp  Indications: High Blood Pressure (Patient taking differently: Take 1 tablet by mouth Daily. 1 and 1/2 in the am and 1/2 in pm   Indications: High Blood Pressure), Disp: 180 tablet, Rfl: 3    OLANZapine (zyPREXA) 5 MG tablet, Take 1 tablet by mouth Every Night. Indications: Depressive Phase of Manic-Depression, Disp: , Rfl:     Semaglutide-Weight Management (Wegovy) 0.25 MG/0.5ML solution auto-injector, Inject 0.5 mL under the skin into the appropriate area as directed 1 (One) Time Per Week., Disp: 2 mL, Rfl: 0    sucralfate (Carafate) 1 g tablet, Take 1 tablet by mouth 4 (Four) Times a Day. If needed for esophagitis, Disp: 30 tablet, Rfl: 2    venlafaxine (EFFEXOR) 37.5 MG tablet, Take 1 tablet by mouth Daily., Disp: , Rfl:     Vibegron (Gemtesa) 75 MG tablet, Take 1 tablet by mouth Daily., Disp: , Rfl:     methylPREDNISolone (MEDROL) 4 MG dose pack, Take as directed on package instructions. (Patient not taking: Reported on 4/30/2025), Disp: 21 tablet, Rfl: 0    Current Facility-Administered Medications:     nitroglycerin (NITROSTAT) SL tablet 0.4 mg, 0.4 mg, Sublingual, Q5 Min MARY ELLENN, Lluvia Porter APRN    Past Medical History:   Diagnosis Date    Abnormal ECG     Abnormal Pap smear of cervix     Adrenal adenoma 09/21/2022    Anemia     Aneurysm     Dr. Salvador did an ecocardiogram.    Angina pectoris     Ankle sprain     Anxiety     Arrhythmia     Arthritis     Arthritis of back     Arthritis of neck     Atrial fibrillation     Bipolar I disorder, single manic episode     depressive d(NOS)     Cataract 2021    Removed    Cervical disc herniation     Cervical dysplasia     Chlamydia     Claustrophobia     Colon polyp     Constipation     Coronary artery disease     COVID-19 01/10/2023    Death of family member     MOTHER IN FEB 2024    Depression     NO SUICIDAL PLANS    Dislocation of finger     Diverticular disease     Diverticulitis of colon     Dyspepsia     Encounter for follow-up examination after completed treatment for conditions other than malignant neoplasm 04/11/2018    Fracture of wrist     GERD (gastroesophageal reflux disease)     Gonorrhea     Heart attack     AGE 39    Heart murmur     Hiatal hernia     History of transfusion     2001    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    Lumbosacral disc disease Unsure    Lower left back is partial osteoarthritis and partial osteoporosis    Obesity     Osteopenia 2021    Bone density test    Periodic limb movement disorder     PID (pelvic inflammatory disease)     Preeclampsia     Renal insufficiency     Restless leg syndrome     LEFT SHOULDER    Rotator cuff syndrome     Sleep apnea     NO MACHINE CURRENTLY    Suicidal ideation 08/19/2016    history    Thyroid nodule     Urinary tract infection     Varicella     Visual impairment     Vitamin B12 deficiency     Vitamin D deficiency        Past Surgical History:   Procedure Laterality Date    ADENOIDECTOMY  1974    ANTERIOR CERVICAL DISCECTOMY W/ FUSION N/A 12/29/2016    Procedure: C3-4 anterior cervical discectomy and fusion with Depuy micro plate, ALLOGRAFT C3-4, AND HARDWARE REMOVAL C4-7.;   Surgeon: Hema Godwin MD;  Location: [facility] MAIN OR;  Service:     BARIATRIC SURGERY      CARDIAC CATHETERIZATION N/A 03/30/2017    Procedure: Left Heart Cath;  Surgeon: Tracey Vargas MD;  Location: [facility] CATH INVASIVE LOCATION;  Service:     CARDIAC CATHETERIZATION N/A 03/30/2017    Procedure: Coronary angiography;  Surgeon: Tracey Vargas MD;  Location: [facility] CATH INVASIVE LOCATION;  Service:     CARDIAC CATHETERIZATION N/A 03/30/2017    Procedure: Left ventriculography;  Surgeon: Tracey Vargas MD;  [facility];  Service:     CARDIAC CATHETERIZATION  03/30/2017    Procedure: Functional Flow [place];  Surgeon: Tracey Vargas MD;  Location: [facility] CATH INVASIVE LOCATION;  Service:     CARDIAC ELECTROPHYSIOLOGY PROCEDURE N/A 10/16/2024    Procedure: Ablation SVT;  Surgeon: Eduar Almodovar MD;  Location: [facility] CATH INVASIVE LOCATION;  Service: Cardiovascular;  Laterality: N/A;    CARPAL TUNNEL RELEASE      CATARACT EXTRACTION Bilateral     CERVICAL BIOPSY  MMXVI    Dr. Jeffery.     CERVICAL DISCECTOMY ANTERIOR  04/2013    C4-7    COLONOSCOPY  04/20/2015    Diverticulosis, IH    COLONOSCOPY  03/09/2021    COLONOSCOPY N/A 02/13/2025    Procedure: COLONOSCOPY into cecum and terminal ileum with cold biopsy polypectomy;  Surgeon: Eduar Rodriguez MD;  Location: [facility] ENDOSCOPY;  Service: Gastroenterology;  Laterality: N/A;  pre- constipation, iron deficiency anemia  post- polyp, hemorrhoids, diverticulosis    COLPOSCOPY W/ BIOPSY / CURETTAGE  06/17/2016    LGSIL HPV positive. Results were normal repeat pap in one year. Chronic Cervicitis    CORONARY ANGIOPLASTY WITH STENT PLACEMENT      CORONARY STENT PLACEMENT      ENDOSCOPY  MMXV    Normal.  Dr. Rodriguez    ENDOSCOPY N/A 06/22/2017    Erythematous mucosa in the stomach  PATH: Chronic active gastritis, moderate with intestinal metaplasia     ENDOSCOPY N/A 02/13/2025    Procedure: ESOPHAGOGASTRODUODENOSCOPY (EGD) with biopsies;   Surgeon: Eduar Rodriguez MD;  Location: [facility] ENDOSCOPY;  Service: Gastroenterology;  Laterality: N/A;  pre- GERD, iron deficiency anemia  post- gastritis    EPIDURAL BLOCK      EYE SURGERY      Cateract removal    FRACTURE SURGERY      GASTRIC RESTRICTION SURGERY  2019    GASTRIC SLEEVE LAPAROSCOPIC      INCISION AND DRAINAGE LEG Left 2024    Procedure: INCISION AND DRAINAGE LOWER EXTREMITY WOUND CLOSURE KNEE;  Surgeon: Ajay Gutierrez MD;  Location: [facility] MAIN OR;  Service: Orthopedics;  Laterality: Left;    INGUINAL HERNIA REPAIR      JOINT REPLACEMENT  3/5/24    Left knee    KNEE SURGERY      LAPAROSCOPIC GASTRIC BANDING  2018    NECK SURGERY      SHOULDER ARTHROSCOPY  Left shoulder torn rotator cuff    SHOULDER SURGERY Left     RCR    TONSILLECTOMY      TONSILLECTOMY AND ADENOIDECTOMY      TOTAL KNEE ARTHROPLASTY Left 2024    Procedure: LEFT TOTAL KNEE ARTHROPLASTY WITH [place] NAVIGATION;  Surgeon: Tho Pritchard MD;  Location:  [facility];  Service: Orthopedics;  Laterality: Left;    TRANSVAGINAL TAPING SUSPENSION N/A 2017    Procedure: MID URETHRAL SLING CYSTSCOPY;  Surgeon: Abby Méndez MD;  Location: [facility] MAIN OR;  Service:     TRIGGER POINT INJECTION      TUBAL ABDOMINAL LIGATION      TYMPANOSTOMY TUBE PLACEMENT Right     UPPER GASTROINTESTINAL ENDOSCOPY  approx     WISDOM TOOTH EXTRACTION      WRIST FRACTURE SURGERY  Left wrist fracture repair    WRIST SURGERY Bilateral     carpal tunnel       Social History     Occupational History    Occupation: disabled   Tobacco Use    Smoking status: Every Day     Current packs/day: 0.00     Average packs/day: 1 pack/day for 37.0 years (37.0 ttl pk-yrs)     Types: Cigarettes     Start date: 1980     Last attempt to quit: 2017     Years since quittin.3     Passive exposure: Never    Smokeless tobacco: Never    Tobacco comments:     Smoke e cigarettes now   Vaping Use    Vaping status: Every Day    Start date:  1/1/2017    Substances: Nicotine, Flavoring    Devices: Refillable tank   Substance and Sexual Activity    Alcohol use: Not Currently     Comment: RARELY    Drug use: Never    Sexual activity: Not Currently     Partners: Male     Birth control/protection: Condom, Abstinence, Tubal ligation      Social History     Social History Narrative    Not on file       Family History   Problem Relation Age of Onset    Diabetes type II Mother     Hypertension Mother     Osteoporosis Mother     Seizures Mother     Diabetes Mother     Arthritis Mother     Hyperlipidemia Mother     Cancer Mother     COPD Mother     Heart disease Mother     Kidney disease Mother     Heart failure Mother     Coronary artery disease Mother     Neuropathy Mother     Dementia Mother     COPD Father     Hypertension Father     Lung cancer Father     Heart attack Father     Liver cancer Father     Liver disease Father     Heart disease Father     Cancer Father         Lung cacer that metastasized  into the liver.    Arrhythmia Father     Alcohol abuse Father     Hyperlipidemia Father     Coronary artery disease Father     Thyroid disease Sister     Hypertension Sister     Bipolar disorder Sister     Depression Sister     ADD / ADHD Sister     Anxiety disorder Sister     Mental illness Sister     Hyperlipidemia Sister     Broken bones Sister     Colon polyps Sister         Non cancerous    Anemia Sister     Sleep apnea Sister     Migraines Sister     Thyroid disease Sister     Hypertension Sister     Bipolar disorder Sister     Anxiety disorder Sister     Depression Sister     Mental illness Sister     Hyperlipidemia Sister     Colon polyps Sister     Cancer Maternal Grandmother     Ovarian cancer Maternal Grandmother     No Known Problems Maternal Grandfather     No Known Problems Paternal Grandmother     No Known Problems Paternal Grandfather     Abnormal EKG Daughter     Hypertension Daughter     Bipolar disorder Daughter     Thyroid disease Daughter   "   Mental illness Daughter     Hyperlipidemia Daughter     Asthma Daughter     No Known Problems Son     Diabetes Son         Type 1    Diabetes Paternal Uncle     Heart failure Paternal Uncle     Anxiety disorder Sister     Depression Sister     Hyperlipidemia Sister     Thyroid disease Daughter     Mental illness Daughter     Anxiety disorder Sister         She's third on my list    Anxiety disorder Sister     Depression Sister     Mental illness Sister     Thyroid disease Sister     Stomach cancer Paternal Uncle     Cancer Paternal Uncle     Breast cancer Neg Hx     Uterine cancer Neg Hx     Colon cancer Neg Hx     Malig Hyperthermia Neg Hx        Review of Systems:      Constitutional: Denies fever, shaking or chills   Eyes: Denies change in visual acuity   HEENT: Denies nasal congestion or sore throat   Respiratory: Denies cough or shortness of breath   Cardiovascular: Denies chest pain or edema  Endocrine: Denies tremors, palpitations, intolerance of heat or cold, polyuria, polydipsia.  GI: Denies abdominal pain, nausea, vomiting, bloody stools or diarrhea  : Denies frequency, urgency, incontinence, retention, or nocturia.  Musculoskeletal: Denies numbness, tingling or loss of motor function except as above  Integument: Denies rash, lesion or ulceration   Neurologic: Denies headache or focal weakness, deficits  Heme: Denies spontaneous or excessive bleeding, epistaxis, hematuria, melena, fatigue, enlarged or tender lymph nodes.      All other pertinent positives and negatives as noted above in HPI.    Physical Exam:   64 y.o. female  Vitals:    04/30/25 0812   Temp: 98.2 °F (36.8 °C)   TempSrc: Temporal   Weight: 90.5 kg (199 lb 9.6 oz)   Height: 160 cm (63\")     General:  Patient is awake and alert.  Appears in no acute distress or discomfort.    Psych:  Affect and demeanor are appropriate.    Extremities: Left shoulder is examined.  Skin is benign.  No significant areas of tenderness.  She has good motion. "  She has some weakness with forward elevation in the scapular plane as well as mild discomfort.  Normal motor and sensory function in the lower arm and hand.  Palpable radial pulse.  Brisk capillary refill.    Imaging: MRI of the left shoulder is reviewed along with the associated report.  I have independently interpreted the images.  There is some motion artifact but the study is diagnostic.  I do not see any recurrent tear of the rotator cuff.  She appears to have some rotator cuff tendinopathy and early arthritis but I do not see anything that looks like it would warrant further surgery at this point.    Assessment/Plan: Left rotator cuff tendinopathy without recurrent tear    We had a long discussion regarding the MRI findings.  I explained that neither I nor the radiologist clearly see a recurrent tear of the rotator cuff.  She has some rotator cuff tendinopathy but I do not see anything that looks too concerning here.  Fortunately, she reports feeling much better which is encouraging.  If her symptoms change, I told her I will be happy to see her back and reevaluate but for now, she can follow-up with me on an as-needed basis.    Of note, as she was leaving, she mentions that she has been having some trouble with her left knee.  We briefly talked about that.  I told her I will be happy to see her back to better evaluate that issue.    Ajay Gutierrez MD    04/30/2025

## 2025-05-22 RX ORDER — LANSOPRAZOLE 30 MG/1
30 CAPSULE, DELAYED RELEASE ORAL 2 TIMES DAILY
Qty: 180 CAPSULE | Refills: 0 | OUTPATIENT
Start: 2025-05-22

## 2025-06-03 RX ORDER — APIXABAN 5 MG/1
5 TABLET, FILM COATED ORAL EVERY 12 HOURS SCHEDULED
Qty: 180 TABLET | Refills: 1 | Status: SHIPPED | OUTPATIENT
Start: 2025-06-03

## 2025-06-05 ENCOUNTER — TELEPHONE (OUTPATIENT)
Dept: GASTROENTEROLOGY | Facility: CLINIC | Age: 65
End: 2025-06-05

## 2025-06-05 ENCOUNTER — TELEPHONE (OUTPATIENT)
Dept: GASTROENTEROLOGY | Facility: CLINIC | Age: 65
End: 2025-06-05
Payer: MEDICARE

## 2025-06-05 DIAGNOSIS — K29.40 AUTOIMMUNE GASTRITIS: ICD-10-CM

## 2025-06-05 DIAGNOSIS — K21.9 GASTROESOPHAGEAL REFLUX DISEASE WITHOUT ESOPHAGITIS: Primary | ICD-10-CM

## 2025-06-05 RX ORDER — VONOPRAZAN FUMARATE 26.72 MG/1
20 TABLET ORAL DAILY
Qty: 60 TABLET | Refills: 1 | Status: SHIPPED | OUTPATIENT
Start: 2025-06-05

## 2025-06-05 NOTE — TELEPHONE ENCOUNTER
Caller: Keri Bhagat    Relationship: Self    Best call back number: [phone]    Requested Prescriptions: Voquezna 20 mg daily     Pharmacy where request should be sent:  [facility] [id number] - [address] AT [facility] - [phone]  - [phone] FX    Last office visit with prescribing clinician: 3/17/2025   Last telemedicine visit with prescribing clinician: Visit date not found   Next office visit with prescribing clinician: 6/19/2025     Additional details provided by patient: PATIENT IS REQUESTING A PRESCRIPTION OF THE Voquezna 20 mg daily BE SENT TO HER PHARMACY.    Does the patient have less than a 3 day supply:  [x] Yes  [] No    Would you like a call back once the refill request has been completed: [x] Yes [] No    If the office needs to give you a call back, can they leave a voicemail: [x] Yes [] No

## 2025-06-05 NOTE — TELEPHONE ENCOUNTER
Pt left a voicemail said she was given samples and they really worked. She would like a prescription called in. Please call pt back at [phone].

## 2025-06-05 NOTE — TELEPHONE ENCOUNTER
Called pt and left vm for pt to call back.  Was calling  to see what medication it was that she received.

## 2025-06-05 NOTE — TELEPHONE ENCOUNTER
Sent Voquezna to [facility] pharmacy.  Sometimes this requires being sent to [facility] for processing.  It will also need a prior Auth.    She has failed lansoprazole, esomeprazole, pantoprazole, omeprazole, sucralfate.  Diagnosis GERD

## 2025-06-06 ENCOUNTER — TELEPHONE (OUTPATIENT)
Dept: GASTROENTEROLOGY | Facility: CLINIC | Age: 65
End: 2025-06-06
Payer: MEDICARE

## 2025-06-06 NOTE — TELEPHONE ENCOUNTER
Keri Bhagat (Rico: [id number])  PA Case ID #: [id number]  Need Help? Call us at [phone]  Outcome  Approved today by [facility] 2017  PA Case: [id number], Status: Approved, Coverage Starts on: 1/1/2025 12:00:00 AM, Coverage Ends on: 12/31/2025 12:00:00 AM. Questions? Contact [phone].  Effective Date: 1/1/2025  Authorization Expiration Date: 12/31/2025  Drug  Voquezna 20MG tablets

## 2025-06-06 NOTE — TELEPHONE ENCOUNTER
Caller: Keri Bhagat    Relationship: Self    Best call back number: [phone]    Which medication are you concerned about: UNKNOWN/NEW    Who prescribed you this medication: MONA BOWSER PA-C    When did you start taking this medication: HASN'T STARTED YET.    What are your concerns: PT STATES SHE WAS GIVEN NEW NEW SCRIPTS. MEDS WILL COST $1000.00 PT IS REQUESTING A LESS EXPENSIVE MED BE CALLED INTO HER PHARMACY. PT DOES NOT KNOW THE NAME OF THE MEDS. PLEASE CONTACT PT TO CONFIRM/ADVISE.

## 2025-06-06 NOTE — TELEPHONE ENCOUNTER
Called and left message about her voquezna being approved and should be able to get this from the pharmacy.

## 2025-06-06 NOTE — TELEPHONE ENCOUNTER
Called pharmacy, rx has a $0 copay but they do not have in stock.  They will order and it will be ready on Monday after 4PM.     Called pt and left detailed msg on identified vm advising of information from pharmacy. .  Advised to call back if any questions.

## 2025-06-06 NOTE — TELEPHONE ENCOUNTER
Keri Bhagat (Rico: [id number])  PA Case ID #: [id number]  Need Help? Call us at [phone]  Status  sent iconSent to Plan today  Drug  Voquezna 20MG tablets

## 2025-06-09 DIAGNOSIS — F31.77 BIPOLAR DISORDER, IN PARTIAL REMISSION, MOST RECENT EPISODE MIXED: ICD-10-CM

## 2025-06-09 DIAGNOSIS — M25.512 CHRONIC LEFT SHOULDER PAIN: ICD-10-CM

## 2025-06-09 DIAGNOSIS — G89.29 CHRONIC LEFT SHOULDER PAIN: ICD-10-CM

## 2025-06-10 RX ORDER — GABAPENTIN 100 MG/1
200 CAPSULE ORAL 2 TIMES DAILY
Qty: 360 CAPSULE | Refills: 0 | Status: SHIPPED | OUTPATIENT
Start: 2025-06-10

## 2025-06-10 NOTE — TELEPHONE ENCOUNTER
Rx Refill Note  Requested Prescriptions     Pending Prescriptions Disp Refills    gabapentin (NEURONTIN) 100 MG capsule [Pharmacy Med Name: GABAPENTIN 100 MG CAPSULE] 360 capsule      Sig: TAKE 2 CAPSULES BY MOUTH TWICE A DAY      Last office visit with prescribing clinician: 4/9/2025   Last telemedicine visit with prescribing clinician: Visit date not found   Next office visit with prescribing clinician: 6/16/2025                         Would you like a call back once the refill request has been completed: [] Yes [] No    If the office needs to give you a call back, can they leave a voicemail: [] Yes [] No    Cathy Gibson MA  06/10/25, 08:08 EDT

## 2025-06-16 ENCOUNTER — OFFICE VISIT (OUTPATIENT)
Dept: FAMILY MEDICINE CLINIC | Facility: CLINIC | Age: 65
End: 2025-06-16
Payer: MEDICARE

## 2025-06-16 VITALS
BODY MASS INDEX: 33.75 KG/M2 | HEART RATE: 78 BPM | SYSTOLIC BLOOD PRESSURE: 118 MMHG | WEIGHT: 190.5 LBS | DIASTOLIC BLOOD PRESSURE: 80 MMHG | HEIGHT: 63 IN | OXYGEN SATURATION: 95 %

## 2025-06-16 DIAGNOSIS — I25.10 ATHEROSCLEROSIS OF NATIVE CORONARY ARTERY OF NATIVE HEART WITHOUT ANGINA PECTORIS: ICD-10-CM

## 2025-06-16 DIAGNOSIS — H10.30 ACUTE CONJUNCTIVITIS, UNSPECIFIED ACUTE CONJUNCTIVITIS TYPE, UNSPECIFIED LATERALITY: Primary | ICD-10-CM

## 2025-06-16 PROCEDURE — 3079F DIAST BP 80-89 MM HG: CPT | Performed by: NURSE PRACTITIONER

## 2025-06-16 PROCEDURE — 99213 OFFICE O/P EST LOW 20 MIN: CPT | Performed by: NURSE PRACTITIONER

## 2025-06-16 PROCEDURE — 1125F AMNT PAIN NOTED PAIN PRSNT: CPT | Performed by: NURSE PRACTITIONER

## 2025-06-16 PROCEDURE — 3074F SYST BP LT 130 MM HG: CPT | Performed by: NURSE PRACTITIONER

## 2025-06-16 PROCEDURE — G2211 COMPLEX E/M VISIT ADD ON: HCPCS | Performed by: NURSE PRACTITIONER

## 2025-06-16 NOTE — PROGRESS NOTES
"Chief Complaint  Follow-up ([facility] - eye infection 3wks ago - pt reports feeling like sand is in eyes and discharge )    Subjective        Keri Bhagat presents to [facility]  History of Present Illness  Redness irritation possible stye 3 weeks ago used erythromycin drops, and then left became infected started on both eyes overall better but still has dry eye irritation irritation bilateral without vision change eye pain severe pain without surrounding lesions or purulence  Erythromycin ointment had some epigastric discomfort GERD, stopped wegovy thought it might be implicated and medications were changed and gastro she has good follow-up, feeling better    We Hira is not at this time an absolute contraindication it is relative    Eye infection  Symptoms are: new.   Onset was in the past 7 days.   Symptoms occur: daily.  Pertinent negative symptoms include no abdominal pain, no anorexia, no joint pain, no change in stool, no chest pain, no chills, no congestion, no cough, no diaphoresis, no fatigue, no fever, no headaches, no joint swelling, no myalgias, no nausea, no neck pain, no numbness, no rash, no sore throat, no swollen glands, no dysuria, no vertigo, no visual change, no vomiting and no weakness.   Treatment and/or Medications comments include: Erythromycin salve       Objective   Vital Signs:  /80   Pulse 78   Ht 160 cm (62.99\")   Wt 86.4 kg (190 lb 8 oz)   SpO2 95%   BMI 33.75 kg/m²   Estimated body mass index is 33.75 kg/m² as calculated from the following:    Height as of this encounter: 160 cm (62.99\").    Weight as of this encounter: 86.4 kg (190 lb 8 oz).            Physical Exam  Constitutional:       General: She is not in acute distress.     Appearance: Normal appearance. She is not ill-appearing, toxic-appearing or diaphoretic.   Eyes:      General: No scleral icterus.        Right eye: No discharge.         Left eye: No discharge.      " Extraocular Movements: Extraocular movements intact.      Pupils: Pupils are equal, round, and reactive to light.      Comments: Cornea clear bilaterally no photophobia EOM intact sclera clear with slight injection  No obvious foreign bodies lids appear normal surrounding redness orbits are normal   Pulmonary:      Effort: Pulmonary effort is normal.   Skin:     General: Skin is warm and dry.   Neurological:      General: No focal deficit present.      Mental Status: Mental status is at baseline.   Psychiatric:         Mood and Affect: Mood normal.         Behavior: Behavior normal.         Thought Content: Thought content normal.         Judgment: Judgment normal.      Result Review :                Assessment and Plan   Diagnoses and all orders for this visit:    1. Acute conjunctivitis, unspecified acute conjunctivitis type, unspecified laterality (Primary)             Follow Up   Return for Print discharge instructions/educational handouts.  Patient was given instructions and counseling regarding her condition or for health maintenance advice. Please see specific information pulled into the AVS if appropriate.     Patient Instructions   Discharge instruction    Follow instructions with gastroenterology, if Wegovy is causing epigastric pain or increased reflux you cannot take this  Make the changes that you need to make, continue the newer medications    Hopefully in 6 to 8 weeks per gastro if they agree you can try Wegovy lowest dose again cautiously and light diet during the transition and avoiding potential pitfalls    If you try this, and you have increased epigastric pain or reflux symptoms then this is not the medicine for you and you must discontinue it any severe symptoms emergency room          Discontinue erythromycin    Likely more of an irritation possibly related to the ointment at this time unlikely any bacteria conjunctivitis  Could be some dry eye component as well    Try over-the-counter  lubricating drops such as refresh  Or similar  1 drop 4 times a day each eye for couple weeks    Update me your progress Thursday or Friday  Should you have increased redness irritation purulent discharge emergency room urgent care or if mild symptoms call me for a change or initiating antibiotic  But at this time unlikely any need for antibiotics  Any problem I will let me know    If needed  You can use cautiously Opcon-A  Which is a get the red out medication and antihistamine  Typically do not need this but you could try this in a couple days if still irritation 1 drop twice a day for 2 to 3 days only then DC  Repetitive use of this medication will cause dry eye and redness irritation  If not improving over next several days make an appointment with your ophthalmologist

## 2025-06-16 NOTE — PATIENT INSTRUCTIONS
Discharge instruction    Follow instructions with gastroenterology, if Wegovy is causing epigastric pain or increased reflux you cannot take this  Make the changes that you need to make, continue the newer medications    Hopefully in 6 to 8 weeks per gastro if they agree you can try Wegovy lowest dose again cautiously and light diet during the transition and avoiding potential pitfalls    If you try this, and you have increased epigastric pain or reflux symptoms then this is not the medicine for you and you must discontinue it any severe symptoms emergency room          Discontinue erythromycin    Likely more of an irritation possibly related to the ointment at this time unlikely any bacteria conjunctivitis  Could be some dry eye component as well    Try over-the-counter lubricating drops such as refresh  Or similar  1 drop 4 times a day each eye for couple weeks    Update me your progress Thursday or Friday  Should you have increased redness irritation purulent discharge emergency room urgent care or if mild symptoms call me for a change or initiating antibiotic  But at this time unlikely any need for antibiotics  Any problem I will let me know    If needed  You can use cautiously Opcon-A  Which is a get the red out medication and antihistamine  Typically do not need this but you could try this in a couple days if still irritation 1 drop twice a day for 2 to 3 days only then DC  Repetitive use of this medication will cause dry eye and redness irritation  If not improving over next several days make an appointment with your ophthalmologist

## 2025-06-19 ENCOUNTER — OFFICE VISIT (OUTPATIENT)
Dept: GASTROENTEROLOGY | Facility: CLINIC | Age: 65
End: 2025-06-19
Payer: MEDICARE

## 2025-06-19 VITALS
DIASTOLIC BLOOD PRESSURE: 85 MMHG | HEART RATE: 67 BPM | BODY MASS INDEX: 33.7 KG/M2 | TEMPERATURE: 97.1 F | SYSTOLIC BLOOD PRESSURE: 121 MMHG | WEIGHT: 190.2 LBS | HEIGHT: 63 IN

## 2025-06-19 DIAGNOSIS — K59.04 CHRONIC IDIOPATHIC CONSTIPATION: ICD-10-CM

## 2025-06-19 DIAGNOSIS — R10.13 EPIGASTRIC PAIN: ICD-10-CM

## 2025-06-19 DIAGNOSIS — R63.5 WEIGHT GAIN, ABNORMAL: ICD-10-CM

## 2025-06-19 DIAGNOSIS — K21.9 GASTROESOPHAGEAL REFLUX DISEASE WITHOUT ESOPHAGITIS: Primary | ICD-10-CM

## 2025-06-19 DIAGNOSIS — K29.40 AUTOIMMUNE GASTRITIS: ICD-10-CM

## 2025-06-19 DIAGNOSIS — Z80.0 FH: PANCREATIC CANCER: ICD-10-CM

## 2025-06-19 RX ORDER — SUCRALFATE 1 G/1
1 TABLET ORAL 3 TIMES DAILY PRN
Qty: 90 TABLET | Refills: 1 | Status: SHIPPED | OUTPATIENT
Start: 2025-06-19

## 2025-06-19 NOTE — PROGRESS NOTES
Chief Complaint  Autoimmune gastritis and Heartburn    Subjective          History of Present Illness    Keri Bhagat is a  65 y.o. 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  She is a patient of Dr. Finley last seen by 3/17/25    History of severe dyspepsia associated with chest pressure and gas.  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  At last visit her B12 was high since she was taking B complex and additional vitamin B12.  She was advised to stop the additional vitamin B12.  She is currently taking 2 B-complex pills/day.     She has failed multiple trials of PPIs as below at once daily and twice daily dosing.  Currently on Voquezna 20mg daily, started 1 month ago. GERD diet discussed and handout given after last visit.  She does have history of lap band and was to see Dr. Santillan after last visit.  Previously on Wegovy and I asked her to hold this given severity of symptoms. She reports she was doing well until about 2 weeks ago when dyspepsia worsened. Symptoms every 2 -3 days. Reports does well for a while then flares--cannot determine trigger. Does not take as needed antacids.  Reports poor appetite and 10 pound weight loss since last visit without trying. Denies Nausea, vomiting.     She has failed lansoprazole, esomeprazole, pantoprazole, omeprazole, sucralfate.     She also has history of chronic constipation. Currently reports excessive gas and explosive stools.  Explosive stools seem to be preceded by multiple days of small stools. BM every day. Failed Linzess-diarrhea, MiraLAX     From 3/17/2025 office note:  2/13/2025 EGD showed diffuse mild gastritis, otherwise unremarkable.  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  H. pylori negative.  Recall 1  "year.     2/13/25 colonoscopy showed benign lymphoid aggregate colon polyp, diverticulosis, internal hemorrhoids.  Recall 5 years.     She is on Wegovy for weight loss with history of CAD--last Wegovy dose 1 week prior to colonoscopy.  History of lap band.  FH (cousin) pancreatic cancer. Sister has pancreatic troubles. Paternal uncle had gastric cancer.       Objective   Vital Signs:   /85   Pulse 67   Temp 97.1 °F (36.2 °C)   Ht 160 cm (63\")   Wt 86.3 kg (190 lb 3.2 oz)   BMI 33.69 kg/m²       Physical Exam  Vitals reviewed.   Constitutional:       General: She is awake. She is not in acute distress.     Appearance: Normal appearance. She is well-developed and well-groomed.   HENT:      Head: Normocephalic.   Pulmonary:      Effort: Pulmonary effort is normal. No respiratory distress.   Abdominal:      General: Bowel sounds are normal. There is no distension.      Palpations: Abdomen is soft. There is no hepatomegaly or mass.      Tenderness: There is abdominal tenderness in the epigastric area. There is no guarding or rebound. Negative signs include Arteaga's sign.      Comments: TTP left of epigastrum-mild   Skin:     Coloration: Skin is not pale.   Neurological:      Mental Status: She is alert and oriented to person, place, and time.      Gait: Gait is intact.   Psychiatric:         Mood and Affect: Mood and affect normal.         Speech: Speech normal.         Behavior: Behavior is cooperative.         Judgment: Judgment normal.          Result Review :             Assessment and Plan    Diagnoses and all orders for this visit:    1. Gastroesophageal reflux disease without esophagitis (Primary)    2. Autoimmune gastritis  -     Vitamin B12  -     CT Abdomen Pelvis With Contrast    3. FH: pancreatic cancer  -     CT Abdomen Pelvis With Contrast    4. Chronic idiopathic constipation    5. Weight gain, abnormal  -     CT Abdomen Pelvis With Contrast    6. Epigastric pain  -     CT Abdomen Pelvis With " Contrast    Other orders  -     sucralfate (CARAFATE) 1 g tablet; Take 1 tablet by mouth 3 (Three) Times a Day As Needed (heartburn, reflux). Dissolve 1 tablet in 4 ounces of water and swallow.  Dispense: 90 tablet; Refill: 1    Continue Voquezna 20mg daily.  Recommended she take as needed medication for dyspepsia flares: She can take sucralfate, Gaviscon, or Mylanta as needed.    Discussed that her constipation may be worsening her dyspepsia so recommend more aggressive treatment.  She does not feel constipated as she goes daily but we discussed that her pattern would suggest incomplete clearing of the bowels.  Recommend she start metamucil powder 1 tablespoon daily, with slow increase to regulate stools but prevent gas.     Check B12, she stopped individual B12 tablet, only on Bcomplex currently.  Recall EGD 2/2026.    Again recommended she follow up with Dr. Santillan to discuss removing fluid from lapband to see if this helps her ongoing dyspepsia.    She also reports 10 pound weight loss.  She has family history of pancreatic cancer and with her persistent dyspepsia, recommend CT scan for further evaluation.    Follow Up   Return in about 3 months (around 9/19/2025).    Dragon dictation used throughout this note.     Erendira Arteaga PA-C

## 2025-06-20 LAB — VIT B12 SERPL-MCNC: 1013 PG/ML (ref 211–946)

## 2025-07-16 ENCOUNTER — HOSPITAL ENCOUNTER (OUTPATIENT)
Facility: HOSPITAL | Age: 65
Discharge: HOME | End: 2025-07-16
Admitting: PHYSICIAN ASSISTANT
Payer: MEDICARE

## 2025-07-16 PROCEDURE — 74177 CT ABD & PELVIS W/CONTRAST: CPT

## 2025-07-16 PROCEDURE — 25510000002 DIATRIZOATE MEGLUMINE & SODIUM PER 1 ML: Performed by: PHYSICIAN ASSISTANT

## 2025-07-16 PROCEDURE — 25510000001 IOPAMIDOL 61 % SOLUTION: Performed by: PHYSICIAN ASSISTANT

## 2025-07-16 RX ORDER — IOPAMIDOL 612 MG/ML
100 INJECTION, SOLUTION INTRAVASCULAR
Status: COMPLETED | OUTPATIENT
Start: 2025-07-16 | End: 2025-07-16

## 2025-07-16 RX ORDER — DIATRIZOATE MEGLUMINE AND DIATRIZOATE SODIUM 660; 100 MG/ML; MG/ML
30 SOLUTION ORAL; RECTAL
Status: COMPLETED | OUTPATIENT
Start: 2025-07-16 | End: 2025-07-16

## 2025-07-16 RX ADMIN — IOPAMIDOL 85 ML: 612 INJECTION, SOLUTION INTRAVENOUS at 14:26

## 2025-07-16 RX ADMIN — DIATRIZOATE MEGLUMINE AND DIATRIZOATE SODIUM 30 ML: 600; 100 SOLUTION ORAL; RECTAL at 13:07

## 2025-07-31 ENCOUNTER — PATIENT OUTREACH (OUTPATIENT)
Age: 65
End: 2025-07-31
Payer: MEDICARE

## 2025-07-31 NOTE — OUTREACH NOTE
Social Work Assessment  Questions/Answers      Flowsheet Row Most Recent Value   Referral Source physician, outpatient staff, outpatient clinic   Reason for Consult community resources, financial concerns, other (see comments)  [food insecurity]   Preferred Language English   Advance Care Planning Reviewed no concerns identified   Current Living Arrangements home   Potentially Unsafe Housing Conditions none   Primary Care Provided by self   Quality of Family Relationships stressful   Source of Income unable to assess   Financial/Environmental Concerns unable to afford food, other (see comments), unable to afford rent/mortgage  [unable to afford utilities]   Application for Public Assistance pending public assistance pending number   Spiritual, Cultural Beliefs, Anabaptist Practices, Values that Affect Care no   Medications independent   Meal Preparation independent   Housekeeping independent   Laundry independent   Shopping independent   If for any reason you need help with day-to-day activities such as bathing, preparing meals, shopping, managing finances, etc., do you get the help you need? I don't need any help   Feels Unsafe at Home or Work/School no   Feels Threatened by Someone no   Does Anyone Try to Keep You From Having Contact with Others or Doing Things Outside Your Home? no   Feels Unsafe at Home or Work/School no   Feels Threatened by Someone no   Does Anyone Try to Keep You From Having Contact with Others or Doing Things Outside Your Home? no   Major Change/Loss/Stressor family problems, financial   Sources of Support community support, mental health providers   Transportation Concerns none   Current Discharge Risk financial support inadequate          SDOH updated and reviewed with the patient during this program:  --     Alcohol Use: Not At Risk (7/23/2025)    AUDIT-C     Frequency of Alcohol Consumption: Never     Average Number of Drinks: 1 or 2     Frequency of Binge Drinking: Never      --      Depression: At risk (2/26/2025)    PHQ-2     PHQ-2 Score: 14      --     Disabilities: At Risk (7/23/2025)    Disabilities     Concentrating, Remembering, or Making Decisions Difficulty: yes     Doing Errands Independently Difficulty: no      --     Employment: Not At Risk (7/23/2025)    Employment     Do you want help finding or keeping work or a job?: I do not need or want help      Financial Resource Strain: High Risk (7/23/2025)    Overall Financial Resource Strain (CARDIA)     Difficulty of Paying Living Expenses: Very hard      --     Food Insecurity: Food Insecurity Present (7/23/2025)    Hunger Vital Sign     Worried About Running Out of Food in the Last Year: Sometimes true     Ran Out of Food in the Last Year: Sometimes true      --     Health Literacy: Not At Risk (7/31/2025)    Education     Help with school or training?: No     Preferred Language: English   Recent Concern: Health Literacy - Inadequate Health Literacy (7/23/2025)    Amb Case Mgmt     How often do you have someone help you read facts about your health?: sometimes     How often do you have trouble learning about your health because you don't know what the written words mean?: sometimes     How confident are you filling out forms by yourself?: always      --     Housing Stability: Not At Risk (7/31/2025)    Housing Stability     Current Living Arrangements: home     Potentially Unsafe Housing Conditions: none   Recent Concern: Housing Stability - High Risk (7/23/2025)    Housing Stability Vital Sign     Unable to Pay for Housing in the Last Year: Yes     Number of Times Moved in the Last Year: 1     Homeless in the Last Year: No      --     Interpersonal Safety: Not At Risk (7/31/2025)    Abuse Screen     Unsafe at Home or Work/School: no     Feels Threatened by Someone?: no     Does Anyone Keep You from Contacting Others or Doint Things Outside the Home?: no     Physical Sign of Abuse Present: no      --     Social Connections: Unknown  (7/31/2025)    Family and Community Support     Help with Day-to-Day Activities: I don't need any help   Recent Concern: Social Connections - Socially Isolated (7/23/2025)    Social Connection and Isolation Panel [NHANES]     Frequency of Communication with Friends and Family: More than three times a week     Frequency of Social Gatherings with Friends and Family: Three times a week     Attends Mandaen Services: Never     Active Member of Clubs or Organizations: No     Attends Club or Organization Meetings: Never     Marital Status:       --     Stress: Stress Concern Present (7/23/2025)    [employer] - Occupational Stress Questionnaire     Feeling of Stress : Very much      --     Transportation Needs: No Transportation Needs (7/23/2025)    PRAPARE - Transportation     Lack of Transportation (Medical): No     Lack of Transportation (Non-Medical): No      --     Utilities: At Risk (7/23/2025)    [facility] Utilities     Threatened with loss of utilities: Yes      Continuing Care   Community & DME   [facility]    [address] [id number]    Phone: [phone]    Request Status: Pending - No Request Sent    Services: Food Insecurity Services    Resource for: Food Insecurity   [facility]ULT DAY    [address] [id number]    Phone: [phone]    Request Status: Pending - No Request Sent    Services: Adult Daycares, Daytime Care, Elder Community Support/Recreation, Financial Assistance, Food Insecurity Services    Resource for: Financial Resource Strain, Food Insecurity, Social Connections, Utilities   [facility] CARES UTILITY ASSISTANCE PROGRAM    [address] [address]    Phone: [phone]    Request Status: Pending - No Request Sent    Services: Financial Assistance    Resource for: Financial Resource Strain, Utilities   [facility] HOPE ASSISTANCE PROGRAM    [address] [id number]    Phone: [phone]    Request Status: Pending - No Request Sent    Services: Financial Assistance, Housing Advice, Housing Insecurity Services    Resource for: Financial Resource Strain, Housing Stability, Utilities   [facility] HOUSING STABILIZATION PROGRAM    [address] [address]    Phone: [phone]    Request Status: Pending - No Request Sent    Services: Financial Assistance, Help Find Housing, Housing Advice, Housing Insecurity Services    Resource for: Financial Resource Strain, Housing Stability, Utilities   [facility]    [address] [address]    Phone: [phone]    Request Status: Pending - No Request Sent    Services: Financial Assistance    Resource for: Financial Resource Strain, Utilities   [facility] [address] [address]    Phone: [phone]    Request Status: Pending - No Request Sent    Services: Food Insecurity Services    Resource for: Food Insecurity     Patient Outreach    MSW outreach to patient via [employer] message after completion of Social Drivers of Health (SDOH) survey. Patient requesting assistance with food and financial assistance resources for housing and utilities. MSW has sent patient a list of [employer] food pantries along with link to [employer] website if need for lookup to additional food pantries. MSW provided patient with contact information for financial assistance with [facility] [place] [employer] [facility] [email or web address], [facility] and [employer]. MSW encouraged patient to contact agencies directly to discuss application process for assistance. Patient to follow-up with MSW if needed for additional assistance.     Teresa JARVIS -   Ambulatory Case Management    7/31/2025, 16:06 EDT

## 2025-08-06 ENCOUNTER — TELEPHONE (OUTPATIENT)
Dept: FAMILY MEDICINE CLINIC | Facility: CLINIC | Age: 65
End: 2025-08-06

## 2025-08-06 ENCOUNTER — OFFICE VISIT (OUTPATIENT)
Dept: FAMILY MEDICINE CLINIC | Facility: CLINIC | Age: 65
End: 2025-08-06
Payer: MEDICARE

## 2025-08-06 VITALS
DIASTOLIC BLOOD PRESSURE: 84 MMHG | BODY MASS INDEX: 33.45 KG/M2 | SYSTOLIC BLOOD PRESSURE: 130 MMHG | WEIGHT: 188.8 LBS | HEIGHT: 63 IN | OXYGEN SATURATION: 98 % | HEART RATE: 78 BPM

## 2025-08-06 DIAGNOSIS — R19.7 DIARRHEA, UNSPECIFIED TYPE: Primary | ICD-10-CM

## 2025-08-06 DIAGNOSIS — R10.13 DYSPEPSIA: ICD-10-CM

## 2025-08-06 RX ORDER — ATORVASTATIN CALCIUM 80 MG/1
80 TABLET, FILM COATED ORAL DAILY
Qty: 90 TABLET | Refills: 3 | Status: SHIPPED | OUTPATIENT
Start: 2025-08-06

## 2025-08-06 RX ORDER — QUETIAPINE FUMARATE 25 MG/1
TABLET, FILM COATED ORAL
COMMUNITY
Start: 2025-08-04

## 2025-08-27 ENCOUNTER — TELEPHONE (OUTPATIENT)
Dept: BARIATRICS/WEIGHT MGMT | Facility: CLINIC | Age: 65
End: 2025-08-27
Payer: MEDICARE

## 2025-08-28 ENCOUNTER — PATIENT OUTREACH (OUTPATIENT)
Age: 65
End: 2025-08-28
Payer: MEDICARE

## (undated) DEVICE — BITEBLOCK OMNI BLOC

## (undated) DEVICE — PREP SOL POVIDONE/IODINE BT 4OZ

## (undated) DEVICE — CONVERTORS STOCKINETTE: Brand: CONVERTORS

## (undated) DEVICE — ADAPT CLN BIOGUARD AIR/H2O DISP

## (undated) DEVICE — PATIENT RETURN ELECTRODE, SINGLE-USE, CONTACT QUALITY MONITORING, ADULT, WITH 9FT CORD, FOR PATIENTS WEIGING OVER 33LBS. (15KG): Brand: MEGADYNE

## (undated) DEVICE — MAT FLR ABSORBENT LG 4FT 10 2.5FT

## (undated) DEVICE — ANTIBACTERIAL UNDYED BRAIDED (POLYGLACTIN 910), SYNTHETIC ABSORBABLE SUTURE: Brand: COATED VICRYL

## (undated) DEVICE — DRAPE,UNDERBUTTOCKS,PCH,STERILE: Brand: MEDLINE

## (undated) DEVICE — DRAPE,U/ SHT,SPLIT,PLAS,STERIL: Brand: MEDLINE

## (undated) DEVICE — TUBING, SUCTION, 1/4" X 10', STRAIGHT: Brand: MEDLINE

## (undated) DEVICE — BNDG,ELSTC,MATRIX,STRL,6"X5YD,LF,HOOK&LP: Brand: MEDLINE

## (undated) DEVICE — 6F .070 JR 4 100CM: Brand: CORDIS

## (undated) DEVICE — SUT ETHLN 3/0 PS1 18IN 1663H

## (undated) DEVICE — TRAP FLD MINIVAC MEGADYNE 100ML

## (undated) DEVICE — DEV INDEFLATOR

## (undated) DEVICE — GLV SURG BIOGEL LTX PF 6 1/2

## (undated) DEVICE — SOL IRR NACL 0.9PCT ARTHROMATIC 3000ML

## (undated) DEVICE — OPTIFOAM GENTLE SA, POSTOP, 4X12: Brand: MEDLINE

## (undated) DEVICE — TBG PENCL TELESCP MEGADYNE SMOKE EVAC 10FT

## (undated) DEVICE — CANN NASL CO2 TRULINK W/O2 A/

## (undated) DEVICE — COVER,MAYO STAND,STERILE: Brand: MEDLINE

## (undated) DEVICE — LN SMPL CO2 SHTRM SD STREAM W/M LUER

## (undated) DEVICE — PINNACLE INTRODUCER SHEATH: Brand: PINNACLE

## (undated) DEVICE — SKIN PREP TRAY W/CHG: Brand: MEDLINE INDUSTRIES, INC.

## (undated) DEVICE — HANDPIECE SET WITH COAXIAL HIGH FLOW TIP AND SUCTION TUBE: Brand: INTERPULSE

## (undated) DEVICE — DRSNG WND GZ CURAD OIL EMULSION 3X3IN STRL

## (undated) DEVICE — LOU EP: Brand: MEDLINE INDUSTRIES, INC.

## (undated) DEVICE — SPNG GZ WOVN 4X4IN 12PLY 10/BX STRL

## (undated) DEVICE — INTRO SHEATH SWARTZ SL2 TRNSEP/GUIDE BR 8.5F 63CM BX/1

## (undated) DEVICE — SYR LL TP 10ML STRL

## (undated) DEVICE — BNDG ELAS CO-FLEX SLF ADHR 6IN 5YD LF STRL

## (undated) DEVICE — BLCK/BITE BLOX W/DENTL/RIM W/STRAP 54F

## (undated) DEVICE — DRAPE,REIN 53X77,STERILE: Brand: MEDLINE

## (undated) DEVICE — MSK PROC CURAPLEX O2 2/ADAPT 7FT

## (undated) DEVICE — CATH DIAG IMPULSE FL3.5 5F 100CM

## (undated) DEVICE — Device: Brand: DECANAV

## (undated) DEVICE — GW PRESSUREWIRE AERIS W/ AGILE TP 175CM

## (undated) DEVICE — SYS TRNSEP ACC ACROSS ADLT BRK1 71CM

## (undated) DEVICE — APPL CHLORAPREP HI/LITE 26ML ORNG

## (undated) DEVICE — NEEDLE, QUINCKE, 18GX3.5": Brand: MEDLINE

## (undated) DEVICE — DECANT BG O JET

## (undated) DEVICE — SOUNDSTAR ECO 8F G CATHETER: Brand: SOUNDSTAR

## (undated) DEVICE — BI-DIRECTIONAL NAVIGATION CATHETER, NAV, F-J: Brand: QDOT MICRO

## (undated) DEVICE — Device: Brand: REFERENCE PATCH CARTO 3

## (undated) DEVICE — Device: Brand: DEFENDO AIR/WATER/SUCTION AND BIOPSY VALVE

## (undated) DEVICE — CATH EP SUPRM QUADPOLAR CRD2 6F 5MM 120CM

## (undated) DEVICE — STERILE PATIENT PROTECTIVE PAD FOR IMP® KNEE POSITIONERS & COHESIVE WRAP (10 / CASE): Brand: DE MAYO KNEE POSITIONER®

## (undated) DEVICE — PK ORTHO MINOR 40

## (undated) DEVICE — CULT AER/ANAEROB FASTIDIOUS BACT

## (undated) DEVICE — LOU D & C HYSTEROSCOPY: Brand: MEDLINE INDUSTRIES, INC.

## (undated) DEVICE — FRCP BX RADJAW4 NDL 2.8 240CM LG OG BX40

## (undated) DEVICE — SUT MNCRYL PLS ANTIB UD 4/0 PS2 18IN

## (undated) DEVICE — SINGLE-USE BIOPSY FORCEPS: Brand: RADIAL JAW 4

## (undated) DEVICE — SOL H2O INJ PL/BG 1000ML STRL

## (undated) DEVICE — GLV SURG BIOGEL LTX PF 8 1/2

## (undated) DEVICE — GLV SURG SIGNATURE ESSENTIAL PF LTX SZ8.5

## (undated) DEVICE — DUAL CUT SAGITTAL BLADE

## (undated) DEVICE — KT MANIFLD CARDIAC

## (undated) DEVICE — BND PRESS RADL COMFRT 14IN STRL

## (undated) DEVICE — SUT ETHLN 4/0 PS2 PLSTC 1667G

## (undated) DEVICE — UNDERCAST PADDING: Brand: DEROYAL

## (undated) DEVICE — SOL ISO/ALC 70PCT 4OZ

## (undated) DEVICE — GLV SURG PREMIERPRO ORTHO LTX PF SZ8.5 BRN

## (undated) DEVICE — SYR LUERLOK 30CC

## (undated) DEVICE — Device: Brand: SMARTABLATE

## (undated) DEVICE — KT ORCA ORCAPOD DISP STRL

## (undated) DEVICE — MEDI-VAC YANKAUER SUCTION HANDLE W/BULBOUS TIP: Brand: CARDINAL HEALTH

## (undated) DEVICE — PK CATH CARD 40

## (undated) DEVICE — GW EMR FIX EXCHG J STD .035 3MM 260CM

## (undated) DEVICE — NDL SPINE 22G 31/2IN BLK

## (undated) DEVICE — CATH DIAG IMPULSE FR4 5F 100CM

## (undated) DEVICE — PADDING,UNDERCAST,COTTON, 3X4YD STERILE: Brand: MEDLINE

## (undated) DEVICE — PK KN TOTL 40

## (undated) DEVICE — SENSR O2 OXIMAX FNGR A/ 18IN NONSTR

## (undated) DEVICE — STPLR SKIN VISISTAT WD 35CT

## (undated) DEVICE — PREMIUM WET SKIN PREP TRAY: Brand: MEDLINE INDUSTRIES, INC.

## (undated) DEVICE — GLV SURG SENSICARE POLYISPRN W/ALOE PF LF 9 GRN STRL

## (undated) DEVICE — SYS CLS SKIN PREMIERPRO EXOFINFUSION 22CM

## (undated) DEVICE — STOCKINETTE,IMPERVIOUS,12X48,STERILE: Brand: MEDLINE

## (undated) DEVICE — GLOVE,SURG,SENSICARE,ALOE,LF,PF,9: Brand: MEDLINE

## (undated) DEVICE — GLV SURG SENSICARE GREEN W/ALOE PF LF 6.5 STRL

## (undated) DEVICE — GLIDESHEATH BASIC HYDROPHILIC COATED INTRODUCER SHEATH: Brand: GLIDESHEATH

## (undated) DEVICE — INSTRUMENT BATTERY

## (undated) DEVICE — SUT VICRYL 1 CT1 27IN  JJ40G

## (undated) DEVICE — ST IRR CYSTO W/SPK 77IN LF

## (undated) DEVICE — TUBING, SUCTION, 1/4" X 20', STRAIGHT: Brand: MEDLINE INDUSTRIES, INC.

## (undated) DEVICE — SOL NACL 0.9PCT 100ML SGL

## (undated) DEVICE — CATH VENT MIV RADL PIG ST TIP 5F 110CM

## (undated) DEVICE — 3M™ STERI-STRIP™ ANTIMICROBIAL SKIN CLOSURES 1/2 INCH X 4 INCHES 50/CARTON 4 CARTONS/CASE A1847: Brand: 3M™ STERI-STRIP™

## (undated) DEVICE — PENCL ES MEGADINE EZ/CLEAN BUTN W/HOLSTR 10FT

## (undated) DEVICE — CATH FOL SIMPLYLATEX SILELAST 18F 17IN

## (undated) DEVICE — Device: Brand: WEBSTER

## (undated) DEVICE — 1 X VERSACROSS TRANSSEPTAL SHEATH (INCLUDING  1 X J-TIP GUIDEWIRE); 1 X VERSACROSS RF WIRE (INCLUDING 1 X CONNECTOR CABLE (SINGLE USE)); 1 X DISPERSIVE ELECTRODE: Brand: VERSACROSS ACCESS SOLUTION

## (undated) DEVICE — INTENDED FOR TISSUE SEPARATION, AND OTHER PROCEDURES THAT REQUIRE A SHARP SURGICAL BLADE TO PUNCTURE OR CUT.: Brand: BARD-PARKER ® CARBON RIB-BACK BLADES

## (undated) DEVICE — OCTA,PERSEID,2-2-2-2-2,F-CURVE: Brand: OCTARAY MAPPING CATHETER

## (undated) DEVICE — LEGGINGS, PAIR, CLEAR, STERILE: Brand: MEDLINE

## (undated) DEVICE — DECANTER BAG 9": Brand: MEDLINE INDUSTRIES, INC.

## (undated) DEVICE — 2108 SERIES SAGITTAL BLADE, NO OFFSET  (12.4 X 1.19 X 82.1MM)